# Patient Record
Sex: FEMALE | Race: WHITE | Employment: OTHER | URBAN - METROPOLITAN AREA
[De-identification: names, ages, dates, MRNs, and addresses within clinical notes are randomized per-mention and may not be internally consistent; named-entity substitution may affect disease eponyms.]

---

## 2018-03-19 RX ORDER — OXYCODONE AND ACETAMINOPHEN 7.5; 325 MG/1; MG/1
1 TABLET ORAL 3 TIMES DAILY
COMMUNITY
End: 2019-06-13 | Stop reason: ALTCHOICE

## 2018-03-20 ENCOUNTER — HOSPITAL ENCOUNTER (OUTPATIENT)
Age: 58
Setting detail: OUTPATIENT SURGERY
Discharge: HOME | End: 2018-03-20
Attending: INTERNAL MEDICINE | Admitting: INTERNAL MEDICINE
Payer: COMMERCIAL

## 2018-03-20 ENCOUNTER — ANESTHESIA EVENT (OUTPATIENT)
Dept: ENDOSCOPY | Age: 58
End: 2018-03-20
Payer: COMMERCIAL

## 2018-03-20 ENCOUNTER — ANESTHESIA (OUTPATIENT)
Dept: ENDOSCOPY | Age: 58
End: 2018-03-20
Payer: COMMERCIAL

## 2018-03-20 VITALS
HEART RATE: 68 BPM | RESPIRATION RATE: 18 BRPM | OXYGEN SATURATION: 94 % | WEIGHT: 259 LBS | TEMPERATURE: 97.8 F | DIASTOLIC BLOOD PRESSURE: 69 MMHG | HEIGHT: 67 IN | SYSTOLIC BLOOD PRESSURE: 121 MMHG | BODY MASS INDEX: 40.65 KG/M2

## 2018-03-20 VITALS — OXYGEN SATURATION: 98 % | DIASTOLIC BLOOD PRESSURE: 61 MMHG | SYSTOLIC BLOOD PRESSURE: 130 MMHG

## 2018-03-20 PROCEDURE — C1773 RET DEV, INSERTABLE: HCPCS | Performed by: INTERNAL MEDICINE

## 2018-03-20 PROCEDURE — 6360000002 HC RX W HCPCS: Performed by: NURSE ANESTHETIST, CERTIFIED REGISTERED

## 2018-03-20 PROCEDURE — 6360000002 HC RX W HCPCS: Performed by: ANESTHESIOLOGY

## 2018-03-20 PROCEDURE — 7100000010 HC PHASE II RECOVERY - FIRST 15 MIN: Performed by: INTERNAL MEDICINE

## 2018-03-20 PROCEDURE — 2580000003 HC RX 258: Performed by: HOSPITALIST

## 2018-03-20 PROCEDURE — 3700000001 HC ADD 15 MINUTES (ANESTHESIA): Performed by: INTERNAL MEDICINE

## 2018-03-20 PROCEDURE — 7100000011 HC PHASE II RECOVERY - ADDTL 15 MIN: Performed by: INTERNAL MEDICINE

## 2018-03-20 PROCEDURE — 3609009500 HC COLONOSCOPY DIAGNOSTIC OR SCREENING: Performed by: INTERNAL MEDICINE

## 2018-03-20 PROCEDURE — 3700000000 HC ANESTHESIA ATTENDED CARE: Performed by: INTERNAL MEDICINE

## 2018-03-20 PROCEDURE — 3609012400 HC EGD TRANSORAL BIOPSY SINGLE/MULTIPLE: Performed by: INTERNAL MEDICINE

## 2018-03-20 PROCEDURE — 88305 TISSUE EXAM BY PATHOLOGIST: CPT

## 2018-03-20 RX ORDER — MIDAZOLAM HYDROCHLORIDE 1 MG/ML
INJECTION INTRAMUSCULAR; INTRAVENOUS PRN
Status: DISCONTINUED | OUTPATIENT
Start: 2018-03-20 | End: 2018-03-20 | Stop reason: SDUPTHER

## 2018-03-20 RX ORDER — FENTANYL CITRATE 50 UG/ML
25 INJECTION, SOLUTION INTRAMUSCULAR; INTRAVENOUS PRN
Status: DISCONTINUED | OUTPATIENT
Start: 2018-03-20 | End: 2018-03-20 | Stop reason: HOSPADM

## 2018-03-20 RX ORDER — FENTANYL CITRATE 50 UG/ML
INJECTION, SOLUTION INTRAMUSCULAR; INTRAVENOUS PRN
Status: DISCONTINUED | OUTPATIENT
Start: 2018-03-20 | End: 2018-03-20 | Stop reason: SDUPTHER

## 2018-03-20 RX ORDER — SODIUM CHLORIDE 9 MG/ML
INJECTION, SOLUTION INTRAVENOUS CONTINUOUS
Status: DISCONTINUED | OUTPATIENT
Start: 2018-03-20 | End: 2018-03-20 | Stop reason: HOSPADM

## 2018-03-20 RX ORDER — SODIUM CHLORIDE 9 MG/ML
INJECTION, SOLUTION INTRAVENOUS CONTINUOUS PRN
Status: DISCONTINUED | OUTPATIENT
Start: 2018-03-20 | End: 2018-03-20

## 2018-03-20 RX ORDER — PROPOFOL 10 MG/ML
INJECTION, EMULSION INTRAVENOUS PRN
Status: DISCONTINUED | OUTPATIENT
Start: 2018-03-20 | End: 2018-03-20 | Stop reason: SDUPTHER

## 2018-03-20 RX ADMIN — PROPOFOL 50 MG: 10 INJECTION, EMULSION INTRAVENOUS at 15:46

## 2018-03-20 RX ADMIN — MIDAZOLAM HYDROCHLORIDE 1 MG: 1 INJECTION, SOLUTION INTRAMUSCULAR; INTRAVENOUS at 15:00

## 2018-03-20 RX ADMIN — PROPOFOL 100 MG: 10 INJECTION, EMULSION INTRAVENOUS at 15:43

## 2018-03-20 RX ADMIN — PROPOFOL 100 MG: 10 INJECTION, EMULSION INTRAVENOUS at 16:05

## 2018-03-20 RX ADMIN — FENTANYL CITRATE 50 MCG: 50 INJECTION, SOLUTION INTRAMUSCULAR; INTRAVENOUS at 15:43

## 2018-03-20 RX ADMIN — FENTANYL CITRATE 50 MCG: 50 INJECTION, SOLUTION INTRAMUSCULAR; INTRAVENOUS at 15:46

## 2018-03-20 RX ADMIN — MIDAZOLAM HYDROCHLORIDE 1 MG: 1 INJECTION, SOLUTION INTRAMUSCULAR; INTRAVENOUS at 15:46

## 2018-03-20 RX ADMIN — SODIUM CHLORIDE: 9 INJECTION, SOLUTION INTRAVENOUS at 14:45

## 2018-03-20 RX ADMIN — FENTANYL CITRATE 25 MCG: 50 INJECTION INTRAMUSCULAR; INTRAVENOUS at 16:45

## 2018-03-20 ASSESSMENT — PAIN - FUNCTIONAL ASSESSMENT: PAIN_FUNCTIONAL_ASSESSMENT: 0-10

## 2018-03-20 NOTE — PROCEDURES
Procedure:  Esophagogastroduodenoscopy with Biopsy and 61 F Amanda dilation of esophagus    Indication:  Dysphagia    Sedation  MAC    Endoscope was advanced easily through mouth to Diane- en- Y anastomosis      Oropharynx views are limited but grossly normal.    Esophagus:   Mucosa is normal.  GEJ at 40 cm. Bx from esophagus taken                          61 F Amanda dilation of esophagus without difficulty    Stomach:   Diane- en-Y bypass widely patent    Duodenum: Visible small bowel is normal       IMPRESSION AND PLAN:     1.  Diane- en-Y bypass widely patent       Await response to dilation and Biopsy results. Office visit 1 month      Follow up as outpatient in office, call [phone] to schedule for appointment.       CHRISTOPHE Monaco MD  3/20/2018  4:11 PM

## 2018-03-20 NOTE — PROCEDURES
Colonoscopy note    Procedure: Colonoscopy with Hot Snare Polypectomy    Indication: Colon cancer screening      Sedation: MAC      Endoscope was advanced through anus to cecum       Preparation is poor. Patient tolerated procedure well. No apparent post- procedure complications. Cecum is normal  Ascending colon is normal  Hepatic Flexure 8 mm polyp removed with hot snare  Proximal Transverse colon 8 mm polyp removed with hot snare  Splenic Flexure is normal  Descending colon 8 mm polyp removed with hot snare  Sigmoid colon is normal  Rectum direct views are normal  Retroflexion in rectum shows internal hemorrhoids    IMPRESSION AND PLAN:  Hepatic Flexure 8 mm polyp   Proximal Transverse colon 8 mm polyp  Descending colon 8 mm polyp   Repeat colonoscopy 6 months with two day prep         Follow up as outpatient in office , call [phone] to schedule for appointment if needed.

## 2018-03-20 NOTE — ANESTHESIA PRE PROCEDURE
20 mEq by mouth 2 times daily as needed    Yes Historical Provider, MD   loratadine (CLARITIN) 10 MG tablet Take 10 mg by mouth daily   Yes Historical Provider, MD   ferrous sulfate 325 (65 FE) MG tablet Take 325 mg by mouth daily (with breakfast)    Yes Historical Provider, MD   allopurinol (ZYLOPRIM) 100 MG tablet Take 100 mg by mouth daily   Yes Historical Provider, MD   OXYGEN 2.5-3 L by Nasal route as needed. Yes Historical Provider, MD   escitalopram (LEXAPRO) 20 MG tablet   Take 20 mg by mouth 2 times daily    Yes Historical Provider, MD   baclofen (LIORESAL) 20 MG tablet Take 1 tablet by mouth 4 times daily. 10/31/13  Yes Wandy Phan MD   gabapentin (NEURONTIN) 600 MG tablet Take 1 tablet by mouth 4 times daily. 10/31/13  Yes Wandy Phan MD   furosemide (LASIX) 40 MG tablet   Take 40 mg by mouth daily    Yes Historical Provider, MD   topiramate (TOPAMAX) 200 MG tablet Take 1 tablet by mouth 2 times daily. 5/9/12  Yes Ab Benavides,    ziprasidone (GEODON) 20 MG capsule Take 20 mg by mouth nightly    Yes Historical Provider, MD   clonazepam (KLONOPIN) 0.5 MG tablet Take 1 mg by mouth nightly . Yes Historical Provider, MD       Current medications:    No current facility-administered medications for this encounter. Allergies: Allergies   Allergen Reactions    Penicillins Anaphylaxis    Ropinirole Hcl Anaphylaxis    Vistaril [Hydroxyzine Hcl] Anaphylaxis    Aripiprazole Other (See Comments)     muscle spasms and confusion    Hydroxyzine Pamoate Itching and Rash    Tape Usha Konig Tape] Rash     Paper tape allergy       Problem List:    Patient Active Problem List   Diagnosis Code    Rhabdomyolysis M62.82    Acute renal failure ([address] N17.9    Fall W19. XXXA    Debility R53.81    Obesity E66.9    Bipolar 1 disorder ([address] F31.9    Seizure disorder ([address] G40.909    HTN (hypertension) I10    Hypernatremia E87.0    Fall W19. 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    Lymphedema of lower extremity I89.0    Swelling of lower limb M79.89    Pain in lower limb M79.606    Degenerative Osteoarthritis of both knees M17.0    Osteoarthritis of left hip M16.12    Status post total knee replacement, right Z96.651    Exogenous obesity E66.09    Mitochondrial disease ([facility]) E88.40    Degenerative Osteoarthritis of lumbar spine M47.816    Degenerative Osteoarthritis of cervical spine M47.812    Degenerative Osteoarthritis thoracic spine M47.814    Thoracic facet syndrome M12.88    Cervical facet syndrome M12.88    Facet syndrome, lumbar M12.88    Kidney dysfunction N28.9    Bursitis M71.9    Neural foraminal stenosis of lumbar spine M99.83    Lumbar radiculopathy M54.16    DDD (degenerative disc disease), cervical M50.30    Neural foraminal stenosis of cervical spine M99.81    Protruded cervical disc M50.20    Cervical radiculopathy M54.12    Lumbar post-laminectomy syndrome M96.1    Neuropathic pain syndrome (non-herpetic) M79.2    Chronic pain G89.29    Left lower quadrant pain R10.32       Past Medical History:        Diagnosis Date    Arthritis     Asthma     Bipolar affective ([facility])     Chronic back pain     CPAP (continuous positive airway pressure) dependence     Depression     Hyperlipidemia     Lymphedema of lower extremity 09/06/2012    Mitochondrial cytopathy ([address]     Obesity     PONV (postoperative nausea and vomiting)     Seizures ([address]     last approx fall 2017    Spinal headache        Past Surgical History:        Procedure Laterality Date    BACK SURGERY      CARDIAC CATHETERIZATION Right 6-6-2013    Dr. Ranjan Amaya, COLON, DIAGNOSTIC      GALLBLADDER SURGERY      GASTRIC BYPASS SURGERY      HYSTERECTOMY      JOINT REPLACEMENT Bilateral     KNEE SURGERY      MYOMECTOMY      NERVE BLOCK Left 10 02 2013    lumbar paravertebral facet #2    NERVE BLOCK Left 10 9 13    hip inj #1    NERVE BLOCK Left 10/16/13    hip injection    NERVE BLOCK Left 10/29/2014    left lumbar transforaminal nerve lbock  #1    NERVE BLOCK Left 11/12/2014    lumbar left transforaminal nerve block  #2    NERVE BLOCK Left 12 8 14    lumbar transforaminal #3    NERVE BLOCK Right 3/30/15    cervical transforaminal #1    NERVE BLOCK Right 4/6/2015    right cervical transforaminal nerve block  #2    NERVE BLOCK Right 4/13/15    cervical transforaminal #3    NERVE BLOCK Left 7/6/15    knee injection #1    NERVE BLOCK  07/20/15    left genicular nerve block/knee #2    NERVE BLOCK Left 10 1 15    lumb transforam #1    NERVE BLOCK  10/26/15    left lumbar transforaminal nerve block #3    OTHER SURGICAL HISTORY Left 11 25 13    lumbar radiofreq    OTHER SURGICAL HISTORY  3/28/2016    stage 1, 3 day percutanous trial [employer] lumbar spinal cord stimulator    TONSILLECTOMY         Social History:    Social History   Substance Use Topics    Smoking status: Current Every Day Smoker     Packs/day: 0.50     Years: 30.00     Types: Cigarettes, Cigars    Smokeless tobacco: Never Used    Alcohol use No                                Ready to quit: Not Answered  Counseling given: Not Answered      Vital Signs (Current):   Vitals:    03/19/18 1005 03/20/18 1419   BP:  121/65   Pulse:  74   Resp:  20   Temp:  97.8 °F (36.6 °C)   TempSrc:  Temporal   SpO2:  92%   Weight: 259 lb (117.5 kg) 259 lb (117.5 kg)   Height: 5' 7\" (1.702 m) 5' 7\" (1.702 m)                                              BP Readings from Last 3 Encounters:   03/20/18 121/65   02/19/18 131/70   09/26/17 104/68       NPO Status:                                                                                 BMI:   Wt Readings from Last 3 Encounters:   03/20/18 259 lb (117.5

## 2018-03-26 ENCOUNTER — OFFICE VISIT (OUTPATIENT)
Dept: ENT CLINIC | Age: 58
End: 2018-03-26
Payer: COMMERCIAL

## 2018-03-26 VITALS
BODY MASS INDEX: 40.81 KG/M2 | HEIGHT: 67 IN | WEIGHT: 260 LBS | SYSTOLIC BLOOD PRESSURE: 103 MMHG | DIASTOLIC BLOOD PRESSURE: 52 MMHG | OXYGEN SATURATION: 91 % | HEART RATE: 71 BPM

## 2018-03-26 DIAGNOSIS — J32.4 CHRONIC PANSINUSITIS: Primary | ICD-10-CM

## 2018-03-26 DIAGNOSIS — J34.89 NASAL SEPTAL SPUR: ICD-10-CM

## 2018-03-26 DIAGNOSIS — J34.3 HYPERTROPHY OF BOTH INFERIOR NASAL TURBINATES: ICD-10-CM

## 2018-03-26 PROCEDURE — G8427 DOCREV CUR MEDS BY ELIG CLIN: HCPCS | Performed by: OTOLARYNGOLOGY

## 2018-03-26 PROCEDURE — 4004F PT TOBACCO SCREEN RCVD TLK: CPT | Performed by: OTOLARYNGOLOGY

## 2018-03-26 PROCEDURE — G8484 FLU IMMUNIZE NO ADMIN: HCPCS | Performed by: OTOLARYNGOLOGY

## 2018-03-26 PROCEDURE — G8417 CALC BMI ABV UP PARAM F/U: HCPCS | Performed by: OTOLARYNGOLOGY

## 2018-03-26 PROCEDURE — 99213 OFFICE O/P EST LOW 20 MIN: CPT | Performed by: OTOLARYNGOLOGY

## 2018-03-26 PROCEDURE — 3017F COLORECTAL CA SCREEN DOC REV: CPT | Performed by: OTOLARYNGOLOGY

## 2018-03-26 PROCEDURE — 3014F SCREEN MAMMO DOC REV: CPT | Performed by: OTOLARYNGOLOGY

## 2018-03-26 RX ORDER — FEXOFENADINE HCL 180 MG/1
180 TABLET ORAL DAILY
Qty: 30 TABLET | Refills: 2 | Status: SHIPPED | OUTPATIENT
Start: 2018-03-26 | End: 2018-04-25

## 2018-03-26 RX ORDER — RANITIDINE 300 MG/1
1 TABLET ORAL 2 TIMES DAILY
Refills: 3 | COMMUNITY
Start: 2018-03-16 | End: 2019-06-13 | Stop reason: ALTCHOICE

## 2018-03-26 ASSESSMENT — ENCOUNTER SYMPTOMS
VOMITING: 0
SHORTNESS OF BREATH: 0
COUGH: 0
HEARTBURN: 0
BLURRED VISION: 0
SINUS PAIN: 1
DOUBLE VISION: 0

## 2018-05-29 ENCOUNTER — HOSPITAL ENCOUNTER (EMERGENCY)
Age: 58
Discharge: HOME | End: 2018-05-29
Attending: EMERGENCY MEDICINE
Payer: COMMERCIAL

## 2018-05-29 VITALS
TEMPERATURE: 97.9 F | WEIGHT: 257 LBS | BODY MASS INDEX: 40.34 KG/M2 | HEIGHT: 67 IN | DIASTOLIC BLOOD PRESSURE: 68 MMHG | OXYGEN SATURATION: 98 % | HEART RATE: 78 BPM | RESPIRATION RATE: 16 BRPM | SYSTOLIC BLOOD PRESSURE: 121 MMHG

## 2018-05-29 DIAGNOSIS — S39.012A LUMBOSACRAL STRAIN, INITIAL ENCOUNTER: Primary | ICD-10-CM

## 2018-05-29 LAB
BILIRUBIN URINE: NEGATIVE
BLOOD, URINE: NEGATIVE
CLARITY: CLEAR
COLOR: YELLOW
GLUCOSE URINE: NEGATIVE MG/DL
KETONES, URINE: NEGATIVE MG/DL
LEUKOCYTE ESTERASE, URINE: NEGATIVE
NITRITE, URINE: NEGATIVE
PH UA: 5.5 (ref 5–9)
PROTEIN UA: NEGATIVE MG/DL
SPECIFIC GRAVITY UA: 1.01 (ref 1–1.03)
UROBILINOGEN, URINE: 0.2 E.U./DL

## 2018-05-29 PROCEDURE — 6360000002 HC RX W HCPCS: Performed by: EMERGENCY MEDICINE

## 2018-05-29 PROCEDURE — 99284 EMERGENCY DEPT VISIT MOD MDM: CPT

## 2018-05-29 PROCEDURE — 81003 URINALYSIS AUTO W/O SCOPE: CPT

## 2018-05-29 PROCEDURE — 96374 THER/PROPH/DIAG INJ IV PUSH: CPT

## 2018-05-29 RX ORDER — LORAZEPAM 2 MG/ML
2 INJECTION INTRAMUSCULAR ONCE
Status: COMPLETED | OUTPATIENT
Start: 2018-05-29 | End: 2018-05-29

## 2018-05-29 RX ORDER — ORPHENADRINE CITRATE 100 MG/1
100 TABLET, EXTENDED RELEASE ORAL 2 TIMES DAILY
Qty: 20 TABLET | Refills: 0 | Status: SHIPPED | OUTPATIENT
Start: 2018-05-29 | End: 2018-06-08

## 2018-05-29 RX ORDER — LORAZEPAM 2 MG/ML
2 INJECTION INTRAMUSCULAR ONCE
Status: DISCONTINUED | OUTPATIENT
Start: 2018-05-29 | End: 2018-05-29

## 2018-05-29 RX ADMIN — LORAZEPAM 2 MG: 2 INJECTION INTRAMUSCULAR; INTRAVENOUS at 09:48

## 2018-05-29 ASSESSMENT — PAIN DESCRIPTION - LOCATION: LOCATION: FLANK

## 2018-05-29 ASSESSMENT — PAIN DESCRIPTION - DESCRIPTORS: DESCRIPTORS: PATIENT UNABLE TO DESCRIBE

## 2018-05-29 ASSESSMENT — PAIN DESCRIPTION - ORIENTATION: ORIENTATION: LEFT

## 2018-05-29 ASSESSMENT — PAIN DESCRIPTION - PROGRESSION
CLINICAL_PROGRESSION: NOT CHANGED
CLINICAL_PROGRESSION: RAPIDLY IMPROVING

## 2018-05-29 ASSESSMENT — PAIN SCALES - GENERAL
PAINLEVEL_OUTOF10: 6
PAINLEVEL_OUTOF10: 1

## 2018-05-29 ASSESSMENT — PAIN DESCRIPTION - FREQUENCY: FREQUENCY: CONTINUOUS

## 2018-05-29 ASSESSMENT — PAIN DESCRIPTION - PAIN TYPE
TYPE: ACUTE PAIN
TYPE: ACUTE PAIN

## 2018-06-08 ENCOUNTER — TELEPHONE (OUTPATIENT)
Dept: ENT CLINIC | Age: 58
End: 2018-06-08

## 2018-06-09 RX ORDER — AZELASTINE 1 MG/ML
2 SPRAY, METERED NASAL 2 TIMES DAILY
Qty: 1 BOTTLE | Refills: 3 | Status: SHIPPED | OUTPATIENT
Start: 2018-06-09 | End: 2018-09-17 | Stop reason: SDUPTHER

## 2018-06-11 ENCOUNTER — HOSPITAL ENCOUNTER (OUTPATIENT)
Dept: GENERAL RADIOLOGY | Age: 58
Discharge: HOME | End: 2018-06-13
Payer: COMMERCIAL

## 2018-06-11 DIAGNOSIS — M81.0 POSTMENOPAUSAL OSTEOPOROSIS: ICD-10-CM

## 2018-06-11 PROCEDURE — 77080 DXA BONE DENSITY AXIAL: CPT

## 2018-06-13 ENCOUNTER — TELEPHONE (OUTPATIENT)
Dept: ENT CLINIC | Age: 58
End: 2018-06-13

## 2018-06-13 RX ORDER — FEXOFENADINE HCL 180 MG/1
180 TABLET ORAL DAILY
Qty: 30 TABLET | Refills: 1 | Status: SHIPPED | OUTPATIENT
Start: 2018-06-13 | End: 2018-08-13 | Stop reason: SDUPTHER

## 2018-06-23 ENCOUNTER — APPOINTMENT (OUTPATIENT)
Dept: GENERAL RADIOLOGY | Age: 58
End: 2018-06-23
Payer: COMMERCIAL

## 2018-06-23 ENCOUNTER — HOSPITAL ENCOUNTER (EMERGENCY)
Age: 58
Discharge: HOME | End: 2018-06-23
Payer: COMMERCIAL

## 2018-06-23 VITALS
DIASTOLIC BLOOD PRESSURE: 84 MMHG | RESPIRATION RATE: 16 BRPM | SYSTOLIC BLOOD PRESSURE: 123 MMHG | HEIGHT: 67 IN | OXYGEN SATURATION: 95 % | TEMPERATURE: 98.2 F | BODY MASS INDEX: 40.34 KG/M2 | HEART RATE: 78 BPM | WEIGHT: 257 LBS

## 2018-06-23 DIAGNOSIS — S93.401A SPRAIN OF RIGHT ANKLE, UNSPECIFIED LIGAMENT, INITIAL ENCOUNTER: Primary | ICD-10-CM

## 2018-06-23 PROCEDURE — 6360000002 HC RX W HCPCS: Performed by: NURSE PRACTITIONER

## 2018-06-23 PROCEDURE — 96372 THER/PROPH/DIAG INJ SC/IM: CPT

## 2018-06-23 PROCEDURE — 73610 X-RAY EXAM OF ANKLE: CPT

## 2018-06-23 PROCEDURE — 99283 EMERGENCY DEPT VISIT LOW MDM: CPT

## 2018-06-23 RX ORDER — MORPHINE SULFATE 2 MG/ML
2 INJECTION, SOLUTION INTRAMUSCULAR; INTRAVENOUS ONCE
Status: COMPLETED | OUTPATIENT
Start: 2018-06-23 | End: 2018-06-23

## 2018-06-23 RX ADMIN — MORPHINE SULFATE 2 MG: 2 INJECTION, SOLUTION INTRAMUSCULAR; INTRAVENOUS at 20:47

## 2018-06-23 ASSESSMENT — PAIN DESCRIPTION - DESCRIPTORS: DESCRIPTORS: ACHING

## 2018-06-23 ASSESSMENT — PAIN DESCRIPTION - LOCATION: LOCATION: ANKLE

## 2018-06-23 ASSESSMENT — PAIN SCALES - GENERAL
PAINLEVEL_OUTOF10: 3
PAINLEVEL_OUTOF10: 6
PAINLEVEL_OUTOF10: 6

## 2018-06-23 ASSESSMENT — PAIN DESCRIPTION - ORIENTATION: ORIENTATION: RIGHT

## 2018-06-23 ASSESSMENT — PAIN DESCRIPTION - PAIN TYPE: TYPE: ACUTE PAIN

## 2018-07-09 ENCOUNTER — HOSPITAL ENCOUNTER (OUTPATIENT)
Dept: GENERAL RADIOLOGY | Age: 58
Discharge: HOME | End: 2018-07-11
Payer: COMMERCIAL

## 2018-07-09 DIAGNOSIS — Z12.31 VISIT FOR SCREENING MAMMOGRAM: ICD-10-CM

## 2018-07-09 PROCEDURE — 77063 BREAST TOMOSYNTHESIS BI: CPT

## 2018-08-13 NOTE — TELEPHONE ENCOUNTER
Patient requesting refill on Allegra. Last seen in March and last fill of med in June. Please advise.     Electronically signed by Christin Osei MA on 8/13/18 at 2:45 PM

## 2018-08-14 RX ORDER — FEXOFENADINE HCL 180 MG/1
180 TABLET ORAL DAILY
Qty: 30 TABLET | Refills: 1 | Status: SHIPPED | OUTPATIENT
Start: 2018-08-14 | End: 2019-04-01 | Stop reason: SDUPTHER

## 2018-09-04 ENCOUNTER — HOSPITAL ENCOUNTER (OUTPATIENT)
Age: 58
Discharge: HOME | End: 2018-09-04
Payer: COMMERCIAL

## 2018-09-04 ENCOUNTER — HOSPITAL ENCOUNTER (OUTPATIENT)
Dept: CT IMAGING | Age: 58
Discharge: HOME | End: 2018-09-06
Payer: COMMERCIAL

## 2018-09-04 DIAGNOSIS — R10.84 ABDOMINAL PAIN, GENERALIZED: ICD-10-CM

## 2018-09-04 DIAGNOSIS — Z12.11 ENCOUNTER FOR COLONOSCOPY DUE TO HISTORY OF ADENOMATOUS COLONIC POLYPS: ICD-10-CM

## 2018-09-04 DIAGNOSIS — Z86.010 ENCOUNTER FOR COLONOSCOPY DUE TO HISTORY OF ADENOMATOUS COLONIC POLYPS: ICD-10-CM

## 2018-09-04 LAB
BUN BLDV-MCNC: 9 MG/DL (ref 6–20)
CREAT SERPL-MCNC: 0.9 MG/DL (ref 0.5–1)
GFR AFRICAN AMERICAN: >60
GFR NON-AFRICAN AMERICAN: >60 ML/MIN/1.73

## 2018-09-04 PROCEDURE — 84520 ASSAY OF UREA NITROGEN: CPT

## 2018-09-04 PROCEDURE — 74177 CT ABD & PELVIS W/CONTRAST: CPT

## 2018-09-04 PROCEDURE — 82565 ASSAY OF CREATININE: CPT

## 2018-09-04 PROCEDURE — 36415 COLL VENOUS BLD VENIPUNCTURE: CPT

## 2018-09-04 PROCEDURE — 6360000004 HC RX CONTRAST MEDICATION: Performed by: RADIOLOGY

## 2018-09-04 RX ADMIN — IOHEXOL 50 ML: 240 INJECTION, SOLUTION INTRATHECAL; INTRAVASCULAR; INTRAVENOUS; ORAL at 14:30

## 2018-09-04 RX ADMIN — IOPAMIDOL 110 ML: 755 INJECTION, SOLUTION INTRAVENOUS at 14:30

## 2018-09-11 NOTE — PROGRESS NOTES
[facility] PRE-ADMISSION TESTING INSTRUCTIONS    The Preadmission Testing patient is instructed accordingly using the following criteria (check applicable):    ARRIVAL INSTRUCTIONS:  [x] Parking the day of Surgery is located in the Main Entrance lot. Upon entering the door, make an immediate right to the surgery reception desk    [x] [address] [employer] is available Monday through Friday 6 am to 6 pm    [x] Bring photo ID and insurance card    [] Bring in a copy of Living will or Durable Power of  papers. [x] Please be sure to arrange for responsible adult to provide transportation to and from the hospital    [x] Please arrange for responsible adult to be with you for the 24 hour period post procedure due to having anesthesia      GENERAL INSTRUCTIONS:    [x] Nothing by mouth after midnight, including gum, candy, mints or water    [x] You may brush your teeth, but do not swallow any water    [x] Take medications as instructed with 1-2 oz of water    [x] Stop herbal supplements and vitamins  per Dr. Milan Felipe instructions    [] Follow preop dosing of blood thinners per physician instructions    [] Take 1/2 dose of evening insulin, but no insulin after midnight    [] No oral diabetic medications after midnight    [] If diabetic and have low blood sugar or feel symptomatic, take 1-2oz apple juice only    [] Bring inhalers day of surgery    [] Bring C-PAP/ Bi-Pap day of surgery    [] Bring urine specimen day of surgery    [x]  no lotion, powders or creams    [x] Follow bowel prep as instructed per surgeon    [] No tobacco products within 24 hours of surgery     [x] No alcohol or illegal drug use within 24 hours of surgery.     [x] Jewelry, body piercing's, eyeglasses, contact lenses and dentures are not permitted into surgery (bring cases)      [x] Please do not wear any nail polish, make up or hair products on the day of surgery    [x] If not already done, you can expect a call from registration    [x] You can expect a call the business day prior to procedure to notify you if your arrival time changes    [x] If you receive a survey after surgery we would greatly appreciate your comments    [] Parent/guardian of a minor must accompany their child and remain on the premises  the entire time they are under our care     [] Pediatric patients may bring favorite toy, blanket or comfort item with them    [] A caregiver or family member must remain with the patient during their stay if they are mentally handicapped, have dementia, disoriented or unable to use a call light or would be a safety concern if left unattended    [x] Please notify surgeon if you develop any illness between now and time of surgery (cold, cough, sore throat, fever, nausea, vomiting) or any signs of infections  including skin, wounds, and dental.    [] Other instructions    EDUCATIONAL MATERIALS PROVIDED:    [] PAT Preoperative Education Packet/Booklet     [] Medication List    [] Fluoroscopy Information Pamphlet    [] Transfusion bracelet applied with instructions    [] Joint replacement video reviewed    [] Shower with soap, lather and rinse well, and use CHG wipes provided the evening before surgery as instructed

## 2018-09-17 ENCOUNTER — OFFICE VISIT (OUTPATIENT)
Dept: ENT CLINIC | Age: 58
End: 2018-09-17
Payer: COMMERCIAL

## 2018-09-17 VITALS
HEIGHT: 67 IN | HEART RATE: 75 BPM | OXYGEN SATURATION: 92 % | BODY MASS INDEX: 41.59 KG/M2 | DIASTOLIC BLOOD PRESSURE: 86 MMHG | WEIGHT: 265 LBS | SYSTOLIC BLOOD PRESSURE: 127 MMHG

## 2018-09-17 DIAGNOSIS — H61.23 CERUMEN DEBRIS ON TYMPANIC MEMBRANE OF BOTH EARS: ICD-10-CM

## 2018-09-17 DIAGNOSIS — J31.0 RHINITIS, CHRONIC: Primary | ICD-10-CM

## 2018-09-17 PROCEDURE — G8427 DOCREV CUR MEDS BY ELIG CLIN: HCPCS | Performed by: OTOLARYNGOLOGY

## 2018-09-17 PROCEDURE — 99214 OFFICE O/P EST MOD 30 MIN: CPT | Performed by: OTOLARYNGOLOGY

## 2018-09-17 PROCEDURE — 4004F PT TOBACCO SCREEN RCVD TLK: CPT | Performed by: OTOLARYNGOLOGY

## 2018-09-17 PROCEDURE — 69210 REMOVE IMPACTED EAR WAX UNI: CPT | Performed by: OTOLARYNGOLOGY

## 2018-09-17 PROCEDURE — G8417 CALC BMI ABV UP PARAM F/U: HCPCS | Performed by: OTOLARYNGOLOGY

## 2018-09-17 PROCEDURE — 3017F COLORECTAL CA SCREEN DOC REV: CPT | Performed by: OTOLARYNGOLOGY

## 2018-09-17 RX ORDER — AZELASTINE 1 MG/ML
2 SPRAY, METERED NASAL 2 TIMES DAILY
Qty: 1 BOTTLE | Refills: 3 | Status: ON HOLD | OUTPATIENT
Start: 2018-09-17 | End: 2019-06-14 | Stop reason: HOSPADM

## 2018-09-18 ENCOUNTER — ANESTHESIA (OUTPATIENT)
Dept: ENDOSCOPY | Age: 58
End: 2018-09-18
Payer: COMMERCIAL

## 2018-09-18 ENCOUNTER — ANESTHESIA EVENT (OUTPATIENT)
Dept: ENDOSCOPY | Age: 58
End: 2018-09-18
Payer: COMMERCIAL

## 2018-09-18 ENCOUNTER — HOSPITAL ENCOUNTER (OUTPATIENT)
Age: 58
Setting detail: OUTPATIENT SURGERY
Discharge: HOME | End: 2018-09-18
Attending: INTERNAL MEDICINE | Admitting: INTERNAL MEDICINE
Payer: COMMERCIAL

## 2018-09-18 VITALS
TEMPERATURE: 96.8 F | DIASTOLIC BLOOD PRESSURE: 64 MMHG | HEART RATE: 68 BPM | BODY MASS INDEX: 40.81 KG/M2 | SYSTOLIC BLOOD PRESSURE: 132 MMHG | OXYGEN SATURATION: 96 % | RESPIRATION RATE: 18 BRPM | HEIGHT: 67 IN | WEIGHT: 260 LBS

## 2018-09-18 VITALS — DIASTOLIC BLOOD PRESSURE: 57 MMHG | OXYGEN SATURATION: 97 % | SYSTOLIC BLOOD PRESSURE: 127 MMHG

## 2018-09-18 LAB — METER GLUCOSE: 91 MG/DL (ref 70–110)

## 2018-09-18 PROCEDURE — 3609015300 HC ESOPHAGEAL DILATION MALONEY: Performed by: INTERNAL MEDICINE

## 2018-09-18 PROCEDURE — 3609010400 HC COLONOSCOPY POLYPECTOMY HOT BIOPSY: Performed by: INTERNAL MEDICINE

## 2018-09-18 PROCEDURE — 3700000000 HC ANESTHESIA ATTENDED CARE: Performed by: INTERNAL MEDICINE

## 2018-09-18 PROCEDURE — 88305 TISSUE EXAM BY PATHOLOGIST: CPT

## 2018-09-18 PROCEDURE — 2580000003 HC RX 258: Performed by: NURSE ANESTHETIST, CERTIFIED REGISTERED

## 2018-09-18 PROCEDURE — 3700000001 HC ADD 15 MINUTES (ANESTHESIA): Performed by: INTERNAL MEDICINE

## 2018-09-18 PROCEDURE — 7100000010 HC PHASE II RECOVERY - FIRST 15 MIN: Performed by: INTERNAL MEDICINE

## 2018-09-18 PROCEDURE — 6360000002 HC RX W HCPCS: Performed by: NURSE ANESTHETIST, CERTIFIED REGISTERED

## 2018-09-18 PROCEDURE — 7100000011 HC PHASE II RECOVERY - ADDTL 15 MIN: Performed by: INTERNAL MEDICINE

## 2018-09-18 PROCEDURE — 2500000003 HC RX 250 WO HCPCS: Performed by: NURSE ANESTHETIST, CERTIFIED REGISTERED

## 2018-09-18 PROCEDURE — 2709999900 HC NON-CHARGEABLE SUPPLY: Performed by: INTERNAL MEDICINE

## 2018-09-18 PROCEDURE — C1773 RET DEV, INSERTABLE: HCPCS | Performed by: INTERNAL MEDICINE

## 2018-09-18 PROCEDURE — 82962 GLUCOSE BLOOD TEST: CPT

## 2018-09-18 RX ORDER — PROPOFOL 10 MG/ML
INJECTION, EMULSION INTRAVENOUS PRN
Status: DISCONTINUED | OUTPATIENT
Start: 2018-09-18 | End: 2018-09-18 | Stop reason: SDUPTHER

## 2018-09-18 RX ORDER — MIDAZOLAM HYDROCHLORIDE 1 MG/ML
INJECTION INTRAMUSCULAR; INTRAVENOUS PRN
Status: DISCONTINUED | OUTPATIENT
Start: 2018-09-18 | End: 2018-09-18 | Stop reason: SDUPTHER

## 2018-09-18 RX ORDER — SODIUM CHLORIDE 9 MG/ML
INJECTION, SOLUTION INTRAVENOUS CONTINUOUS PRN
Status: DISCONTINUED | OUTPATIENT
Start: 2018-09-18 | End: 2018-09-18 | Stop reason: SDUPTHER

## 2018-09-18 RX ORDER — FENTANYL CITRATE 50 UG/ML
INJECTION, SOLUTION INTRAMUSCULAR; INTRAVENOUS PRN
Status: DISCONTINUED | OUTPATIENT
Start: 2018-09-18 | End: 2018-09-18 | Stop reason: SDUPTHER

## 2018-09-18 RX ORDER — LIDOCAINE HYDROCHLORIDE 10 MG/ML
INJECTION, SOLUTION EPIDURAL; INFILTRATION; INTRACAUDAL; PERINEURAL PRN
Status: DISCONTINUED | OUTPATIENT
Start: 2018-09-18 | End: 2018-09-18 | Stop reason: SDUPTHER

## 2018-09-18 RX ADMIN — MIDAZOLAM HYDROCHLORIDE 2 MG: 1 INJECTION, SOLUTION INTRAMUSCULAR; INTRAVENOUS at 14:23

## 2018-09-18 RX ADMIN — FENTANYL CITRATE 50 MCG: 50 INJECTION, SOLUTION INTRAMUSCULAR; INTRAVENOUS at 15:07

## 2018-09-18 RX ADMIN — SODIUM CHLORIDE: 9 INJECTION, SOLUTION INTRAVENOUS at 14:23

## 2018-09-18 RX ADMIN — PROPOFOL 100 MG: 10 INJECTION, EMULSION INTRAVENOUS at 15:00

## 2018-09-18 RX ADMIN — PROPOFOL 100 MG: 10 INJECTION, EMULSION INTRAVENOUS at 14:51

## 2018-09-18 RX ADMIN — PROPOFOL 100 MG: 10 INJECTION, EMULSION INTRAVENOUS at 14:57

## 2018-09-18 RX ADMIN — LIDOCAINE HYDROCHLORIDE 20 MG: 10 INJECTION, SOLUTION EPIDURAL; INFILTRATION; INTRACAUDAL; PERINEURAL at 14:24

## 2018-09-18 RX ADMIN — PROPOFOL 100 MG: 10 INJECTION, EMULSION INTRAVENOUS at 15:19

## 2018-09-18 RX ADMIN — FENTANYL CITRATE 50 MCG: 50 INJECTION, SOLUTION INTRAMUSCULAR; INTRAVENOUS at 14:27

## 2018-09-18 RX ADMIN — SODIUM CHLORIDE: 9 INJECTION, SOLUTION INTRAVENOUS at 14:15

## 2018-09-18 ASSESSMENT — PAIN - FUNCTIONAL ASSESSMENT: PAIN_FUNCTIONAL_ASSESSMENT: 0-10

## 2018-09-18 NOTE — PROCEDURES
Colonoscopy note    Procedure: Colonoscopy with Hot snare polypectomy    Indication: H/O polyps and constipation      Sedation: MAC      Endoscope was advanced through anus to cecum       Preparation is poor. Patient tolerated procedure well. No apparent post- procedure complications. Cecum is normal  Ascending colon is normal  Hepatic Flexure is normal  Transverse colon 8 mm polyp x 2 removed with hot snare  Splenic Flexure is normal  Descending colon is normal  Sigmoid colon has anastomosis  Rectum direct views are normal  Retroflexion in rectum shows small internal hemorrhoids    IMPRESSION AND PLAN:  Poor prep of the colon despite 2 day bowel cleanse  Melanosis  Transverse colon 8 mm polyp x 2   Small internal hemorrhoids  Sigmoid colon has anastomosis    Repeat colonoscopy in one year with 2- day bowel prep. Office follow-up     Follow up as outpatient in office , call [phone] to schedule for appointment if needed.

## 2018-09-18 NOTE — PROGRESS NOTES
Patient awake and alert, no complaints of pain, nausea or vomiting. Discharge instructions reviewed with patient and responsible person. Questions answered. Patient will be discharged home with responsible adult.

## 2018-09-18 NOTE — ANESTHESIA PRE PROCEDURE
Shortness Of Breath and Swelling     And wasp    Penicillins Anaphylaxis    Ropinirole Hcl Anaphylaxis    Vistaril [Hydroxyzine Hcl] Anaphylaxis    Aripiprazole Other (See Comments)     muscle spasms and confusion    Hydroxyzine Pamoate Itching and Rash    Tape Tawnya Crate Tape] Rash     Paper tape allergy       Problem List:    Patient Active Problem List   Diagnosis Code    Rhabdomyolysis M62.82    Acute renal failure ([facility] N17.9    Fall W19. XXXA    Debility R53.81    Obesity E66.9    Bipolar 1 disorder ([facility] F31.9    Seizure disorder ([facility] G40.909    HTN (hypertension) I10    Hypernatremia E87.0    Fall W19. 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    Lymphedema of lower extremity I89.0    Swelling of lower limb M79.89    Pain in lower limb M79.606    Degenerative Osteoarthritis of both knees M17.0    Osteoarthritis of left hip M16.12    Status post total knee replacement, right Z96.651    Exogenous obesity E66.09    Mitochondrial disease (HCC) E88.40    Degenerative Osteoarthritis of lumbar spine M47.816    Degenerative Osteoarthritis of cervical spine M47.812    Degenerative Osteoarthritis thoracic spine M47.814    Thoracic facet syndrome (HCC) M46.94    Cervical facet syndrome (HCC) M46.92    Facet syndrome, lumbar (HCC) M46.96    Kidney dysfunction N28.9    Bursitis M71.9    Neural foraminal stenosis of lumbar spine M99.83    Lumbar radiculopathy M54.16    DDD (degenerative disc disease), cervical M50.30    Neural foraminal stenosis of cervical spine M99.81    Protruded cervical disc M50.20    Cervical radiculopathy M54.12    Lumbar post-laminectomy syndrome M96.1    Neuropathic pain syndrome (non-herpetic) M79.2    Chronic pain G89.29    Left lower quadrant pain R10.32       Past Medical 102.2 09/16/2017    RDW 13.2 09/16/2017     09/16/2017       CMP:   Lab Results   Component Value Date     09/16/2017    K 4.7 09/16/2017     09/16/2017    CO2 26 09/16/2017    BUN 9 09/04/2018    CREATININE 0.9 09/04/2018    GFRAA >60 09/04/2018    LABGLOM >60 09/04/2018    GLUCOSE 91 09/16/2017    GLUCOSE 93 05/24/2012    PROT 6.2 02/03/2017    CALCIUM 8.8 09/16/2017    BILITOT 0.5 02/03/2017    ALKPHOS 92 02/03/2017    AST 19 02/03/2017    ALT 17 02/03/2017       POC Tests: No results for input(s): POCGLU, POCNA, POCK, POCCL, POCBUN, POCHEMO, POCHCT in the last 72 hours. Coags:   Lab Results   Component Value Date    PROTIME 10.5 09/12/2017    PROTIME 10.8 05/07/2012    INR 1.0 09/12/2017    APTT 30.6 08/04/2016       HCG (If Applicable): No results found for: PREGTESTUR, PREGSERUM, HCG, HCGQUANT     ABGs:   Lab Results   Component Value Date    M4VBHMEW 94.0 05/06/2012        Type & Screen (If Applicable):  No results found for: LABABO, [address]    Anesthesia Evaluation  Patient summary reviewed and Nursing notes reviewed   history of anesthetic complications: PONV.   Airway: Mallampati: III  TM distance: >3 FB   Neck ROM: full  Mouth opening: > = 3 FB Dental:    (+) edentulous      Pulmonary: breath sounds clear to auscultation  (+) asthma: allergic asthma, seasonal asthma,                            Cardiovascular:  Exercise tolerance: good (>4 METS),   (+) hypertension:,         Rhythm: regular  Rate: normal           Beta Blocker:  Not on Beta Blocker         Neuro/Psych:   (+) seizures:, neuromuscular disease:, headaches:, psychiatric history:             ROS comment: mitochondrial myopathy    Last seizure (grand mal) was a year ago    Bipolar    neuropathy GI/Hepatic/Renal:   (+) GERD:, liver disease:,           Endo/Other: Negative Endo/Other ROS                    Abdominal:           Vascular:                                        Anesthesia Plan      MAC     ASA 3       Induction: intravenous. Anesthetic plan and risks discussed with patient.       Plan discussed with CRNA and surgical team.                  Omid Griffiths MD   9/18/2018

## 2018-10-04 ASSESSMENT — ENCOUNTER SYMPTOMS
COUGH: 0
SHORTNESS OF BREATH: 0
DOUBLE VISION: 0
SINUS PAIN: 0
BLURRED VISION: 0
HEARTBURN: 0
VOMITING: 0

## 2018-10-04 NOTE — PROGRESS NOTES
[id number] [facility] Otolaryngology  Dr. Valery Murray D.O. Ms.Ed. New Consult       Patient Name:  Marilee Dhillon  :  1960     CHIEF C/O:    Chief Complaint   Patient presents with    Other     allergies       HISTORY OBTAINED FROM:  patient    HISTORY OF PRESENT ILLNESS:       Carlos Alberto Tom is a 62y.o. year old female, here today for Seasonal allergic rhinitis and frequent nasal congestion and drainage. Patient is placed on current medical therapy to include Astelin, nasal saline, but episodes of Allegra as needed. Patient denies any current complaints of MRI as terms of sinusitis, no cooperative fever or chills, no difficulty swallowing the source of breath stridor headaches or vision changes.     Past Medical History:   Diagnosis Date    Anemia     Arthritis     Asthma     Bipolar affective ([address]     Chronic back pain     Spinal cord stimulator in place    CPAP (continuous positive airway pressure) dependence     PETR    Depression     stable    Environmental and seasonal allergies     Fatty liver     Full dentures     GERD (gastroesophageal reflux disease)     Gout     past hx of    Lymphedema of lower extremity 2012    Mitochondrial cytopathy (HCC)     s/s muscle and nerve pain, difficulty breathing, seizures, difficulty swallowing, digestive disorders    Neuropathy     at feet    Obesity     Oxygen dependent     at times dt diaphragm does not function properly dt Mitochondrial disorder    PONV (postoperative nausea and vomiting)     Seizures ([address]     last approx 2017 / Daniel Maurer    Spinal headache     Water retention     at legs     Past Surgical History:   Procedure Laterality Date    APPENDECTOMY      with Hysterectomy    BACK SURGERY  last one     lumbar x 2    CARDIAC CATHETERIZATION Right 2013    Dr. Mauricio Somers    open with gastric bypass    COLONOSCOPY N/A 2018    COLONOSCOPY POLYPECTOMY HOT BIOPSY performed by Yazmin Schaefer times daily , Disp: , Rfl:     fexofenadine (ALLEGRA ALLERGY) 180 MG tablet, Take 1 tablet by mouth daily, Disp: 30 tablet, Rfl: 1    ranitidine (ZANTAC) 300 MG tablet, Take 1 tablet by mouth 2 times daily Instructed to take with sip water am of procedure, Disp: , Rfl: 3    oxyCODONE-acetaminophen (PERCOCET) 7.5-325 MG per tablet, Take 1 tablet by mouth 3 times daily. Instructed to take with sip water am of procedure., Disp: , Rfl:     albuterol (PROVENTIL) (2.5 MG/3ML) 0.083% nebulizer solution, Take 2.5 mg by nebulization 3 times daily as needed To use dos if needs, Disp: , Rfl:     levOCARNitine (CARNITOR) 330 MG tablet, Take 330 mg by mouth 3 times daily , Disp: , Rfl:     vitamin E 100 UNITS capsule, Take by mouth 2 times daily , Disp: , Rfl:     Docusate Sodium (DOC-Q-LACE PO), Take 100 mg by mouth 2 times daily, Disp: , Rfl:     polyethylene glycol (GLYCOLAX) packet, 17 g 2 times daily , Disp: , Rfl: 1    Vitamins-Lipotropics (B-50 PO), Take by mouth 2 times daily , Disp: , Rfl:     Acetaminophen (TYLENOL 8 HOUR PO), Take 1,000 mg by mouth 3 times daily Instructed to take with sip water am of procedure, Disp: , Rfl:     Omega-3 Fatty Acids (FISH OIL) 1000 MG CAPS, Take 1,000 mg by mouth 3 times daily , Disp: , Rfl:     magnesium 30 MG tablet, Take 250 mg by mouth 3 times daily Taking for leg cramps / patient states she cannot stop this supplement, Disp: , Rfl:     Coenzyme Q10 (CO Q-10) 200 MG CAPS, Take 1 capsule by mouth 2 times daily , Disp: , Rfl:     potassium chloride SA (K-DUR;KLOR-CON M) 20 MEQ tablet, Take 20 mEq by mouth 2 times daily as needed , Disp: , Rfl:     ferrous sulfate 325 (65 FE) MG tablet, Take 325 mg by mouth daily (with breakfast) , Disp: , Rfl:     allopurinol (ZYLOPRIM) 100 MG tablet, Take 100 mg by mouth daily, Disp: , Rfl:     OXYGEN, 2.5-3 L by Nasal route as needed. , Disp: , Rfl:     escitalopram (LEXAPRO) 20 MG tablet, Take 20 mg by mouth 2 times daily complaint. Positive forced sugar when she went partial removal U under microscopic assistance with instrumentation. Dr. Todd Busch.  Otolaryngology Facial Plastic Surgery  :  [facility] Otolaryngology/Facial Plastic Surgery Residency  Associate Clinical Professor:  Rohini Sotelo [facility] [address]

## 2018-10-10 RX ORDER — FEXOFENADINE HCL 180 MG/1
180 TABLET ORAL DAILY
Qty: 30 TABLET | Refills: 1 | Status: SHIPPED | OUTPATIENT
Start: 2018-10-10 | End: 2018-12-19 | Stop reason: SDUPTHER

## 2018-12-10 ENCOUNTER — HOSPITAL ENCOUNTER (OUTPATIENT)
Dept: GENERAL RADIOLOGY | Age: 58
Discharge: HOME | End: 2018-12-12
Payer: MEDICAID

## 2018-12-10 ENCOUNTER — HOSPITAL ENCOUNTER (OUTPATIENT)
Age: 58
Discharge: HOME | End: 2018-12-12
Payer: MEDICAID

## 2018-12-10 DIAGNOSIS — R52 PAIN: ICD-10-CM

## 2018-12-10 PROCEDURE — 71046 X-RAY EXAM CHEST 2 VIEWS: CPT

## 2018-12-19 RX ORDER — FEXOFENADINE HCL 180 MG/1
180 TABLET ORAL DAILY
Qty: 30 TABLET | Refills: 3 | Status: SHIPPED | OUTPATIENT
Start: 2018-12-19 | End: 2019-06-13 | Stop reason: ALTCHOICE

## 2019-03-25 ENCOUNTER — TELEPHONE (OUTPATIENT)
Dept: ADMINISTRATIVE | Age: 59
End: 2019-03-25

## 2019-04-01 ENCOUNTER — OFFICE VISIT (OUTPATIENT)
Dept: ENT CLINIC | Age: 59
End: 2019-04-01
Payer: MEDICAID

## 2019-04-01 VITALS
HEIGHT: 67 IN | DIASTOLIC BLOOD PRESSURE: 62 MMHG | BODY MASS INDEX: 42.69 KG/M2 | SYSTOLIC BLOOD PRESSURE: 112 MMHG | OXYGEN SATURATION: 92 % | WEIGHT: 272 LBS | HEART RATE: 81 BPM

## 2019-04-01 DIAGNOSIS — R49.0 HOARSENESS: Primary | ICD-10-CM

## 2019-04-01 PROCEDURE — 99202 OFFICE O/P NEW SF 15 MIN: CPT | Performed by: OTOLARYNGOLOGY

## 2019-04-01 PROCEDURE — 4004F PT TOBACCO SCREEN RCVD TLK: CPT | Performed by: OTOLARYNGOLOGY

## 2019-04-01 PROCEDURE — 3017F COLORECTAL CA SCREEN DOC REV: CPT | Performed by: OTOLARYNGOLOGY

## 2019-04-01 PROCEDURE — G8417 CALC BMI ABV UP PARAM F/U: HCPCS | Performed by: OTOLARYNGOLOGY

## 2019-04-01 PROCEDURE — G8427 DOCREV CUR MEDS BY ELIG CLIN: HCPCS | Performed by: OTOLARYNGOLOGY

## 2019-04-01 PROCEDURE — 31575 DIAGNOSTIC LARYNGOSCOPY: CPT | Performed by: OTOLARYNGOLOGY

## 2019-04-01 RX ORDER — LINACLOTIDE 145 UG/1
290 CAPSULE, GELATIN COATED ORAL
Status: ON HOLD | COMMUNITY
End: 2022-04-15 | Stop reason: HOSPADM

## 2019-04-01 RX ORDER — CHOLECALCIFEROL (VITAMIN D3) 25 MCG
TABLET,CHEWABLE ORAL
Refills: 3 | COMMUNITY
Start: 2018-12-30 | End: 2019-06-13 | Stop reason: ALTCHOICE

## 2019-04-01 RX ORDER — EPINEPHRINE 0.3 MG/.3ML
INJECTION SUBCUTANEOUS
COMMUNITY
Start: 2017-12-11 | End: 2019-06-13 | Stop reason: ALTCHOICE

## 2019-04-01 NOTE — PROGRESS NOTES
progressily more hoarse    Feels lympg nodes in the neck    Some dysphaia     abx x2 no change     Scope is ok

## 2019-04-14 ASSESSMENT — ENCOUNTER SYMPTOMS
VOMITING: 0
COUGH: 0
SHORTNESS OF BREATH: 0
RHINORRHEA: 1
VOICE CHANGE: 1

## 2019-04-14 NOTE — PROGRESS NOTES
[facility] Otolaryngology  Dr. Deepti Pena. DINORA Murray Ms.Ed. New Consult       Patient Name:  Katey Jaime  :  1960     CHIEF C/O:    Chief Complaint   Patient presents with    Other     Hoarsenss for the last year. Progressively getting worse. HISTORY OBTAINED FROM:  patient    HISTORY OF PRESENT ILLNESS:       Alejo Albert is a 62y.o. year old female, here today for progressive episodes of hoarseness no episodes of total loss of voice, no issues with throat pain. No concern for hemoptysis, no other associated shortness of breath or stridor. Patient admits to a history of tobacco abuse, no current complaints of nausea or vomiting. No recent history of intubations, no history of surgical intervention to the larynx in the past.  No other complaints of hearing loss tinnitus vertigo no other associated complaints of nasal congestion or drainage.       Past Medical History:   Diagnosis Date    Anemia     Arthritis     Asthma     Bipolar affective ([address]     Chronic back pain     Spinal cord stimulator in place    CPAP (continuous positive airway pressure) dependence     PETR    Depression     stable    Environmental and seasonal allergies     Fatty liver     Full dentures     GERD (gastroesophageal reflux disease)     Gout     past hx of    Lymphedema of lower extremity 2012    Mitochondrial cytopathy (HCC)     s/s muscle and nerve pain, difficulty breathing, seizures, difficulty swallowing, digestive disorders    Neuropathy     at feet    Obesity     Oxygen dependent     at times dt diaphragm does not function properly dt Mitochondrial disorder    PONV (postoperative nausea and vomiting)     Seizures ([address]     last approx 2017 / Shira Davis    Spinal headache     Water retention     at legs     Past Surgical History:   Procedure Laterality Date    APPENDECTOMY      with Hysterectomy    BACK SURGERY  last one     lumbar x 2    CARDIAC CATHETERIZATION Right 2013     Cyanocobalamin (B-12) 1000 MCG LOZG, , Disp: , Rfl: 3    EPINEPHrine (EPIPEN 2-CARMEN) 0.3 MG/0.3ML SOAJ injection, , Disp: , Rfl:     fexofenadine (ALLEGRA) 180 MG tablet, Take 1 tablet by mouth daily, Disp: 30 tablet, Rfl: 3    azelastine (ASTELIN) 0.1 % nasal spray, 2 sprays by Nasal route 2 times daily Use in each nostril as directed, Disp: 1 Bottle, Rfl: 3    ALPHA LIPOIC ACID PO, Take 200 mg by mouth 2 times daily , Disp: , Rfl:     ranitidine (ZANTAC) 300 MG tablet, Take 1 tablet by mouth 2 times daily Instructed to take with sip water am of procedure, Disp: , Rfl: 3    oxyCODONE-acetaminophen (PERCOCET) 7.5-325 MG per tablet, Take 1 tablet by mouth 3 times daily.  Instructed to take with sip water am of procedure., Disp: , Rfl:     albuterol (PROVENTIL) (2.5 MG/3ML) 0.083% nebulizer solution, Take 2.5 mg by nebulization 3 times daily as needed To use dos if needs, Disp: , Rfl:     levOCARNitine (CARNITOR) 330 MG tablet, Take 330 mg by mouth 3 times daily , Disp: , Rfl:     vitamin E 100 UNITS capsule, Take by mouth 2 times daily , Disp: , Rfl:     Docusate Sodium (DOC-Q-LACE PO), Take 100 mg by mouth 2 times daily, Disp: , Rfl:     polyethylene glycol (GLYCOLAX) packet, 17 g 2 times daily , Disp: , Rfl: 1    Vitamins-Lipotropics (B-50 PO), Take by mouth 2 times daily , Disp: , Rfl:     Acetaminophen (TYLENOL 8 HOUR PO), Take 1,000 mg by mouth 3 times daily Instructed to take with sip water am of procedure, Disp: , Rfl:     Omega-3 Fatty Acids (FISH OIL) 1000 MG CAPS, Take 1,000 mg by mouth 3 times daily , Disp: , Rfl:     magnesium 30 MG tablet, Take 250 mg by mouth 3 times daily Taking for leg cramps / patient states she cannot stop this supplement, Disp: , Rfl:     Coenzyme Q10 (CO Q-10) 200 MG CAPS, Take 1 capsule by mouth 2 times daily , Disp: , Rfl:     potassium chloride SA (K-DUR;KLOR-CON M) 20 MEQ tablet, Take 20 mEq by mouth 2 times daily as needed , Disp: , Rfl:     ferrous sulfate 325 (65 FE) MG tablet, Take 325 mg by mouth daily (with breakfast) , Disp: , Rfl:     allopurinol (ZYLOPRIM) 100 MG tablet, Take 100 mg by mouth daily, Disp: , Rfl:     OXYGEN, 2.5-3 L by Nasal route as needed. , Disp: , Rfl:     escitalopram (LEXAPRO) 20 MG tablet, Take 20 mg by mouth 2 times daily Instructed to take with sip water am of procedure, Disp: , Rfl:     baclofen (LIORESAL) 20 MG tablet, Take 1 tablet by mouth 4 times daily. , Disp: 120 tablet, Rfl: 5    gabapentin (NEURONTIN) 600 MG tablet, Take 1 tablet by mouth 4 times daily. , Disp: 120 tablet, Rfl: 5    furosemide (LASIX) 40 MG tablet,  Take 40 mg by mouth daily , Disp: , Rfl:     topiramate (TOPAMAX) 200 MG tablet, Take 1 tablet by mouth 2 times daily. , Disp: 1 tablet, Rfl: 0    ziprasidone (GEODON) 20 MG capsule, Take 20 mg by mouth nightly , Disp: , Rfl:     clonazepam (KLONOPIN) 0.5 MG tablet, Take 1 mg by mouth nightly ., Disp: , Rfl:     Plecanatide (TRULANCE) 3 MG TABS, Take 3 mg by mouth daily Takes daily at 1700, Disp: , Rfl:     loratadine (CLARITIN) 10 MG tablet, Take 10 mg by mouth daily, Disp: , Rfl:   Bee pollen; Penicillins; Ropinirole hcl; Vistaril [hydroxyzine hcl]; Aripiprazole; Hydroxyzine pamoate; and Tape Robley Stade tape]  Social History     Tobacco Use    Smoking status: Current Every Day Smoker     Packs/day: 0.25     Years: 30.00     Pack years: 7.50     Types: Cigarettes, Cigars    Smokeless tobacco: Never Used   Substance Use Topics    Alcohol use: No     Alcohol/week: 0.0 oz    Drug use: No     Family History   Problem Relation Age of Onset    Heart Disease Mother     Cancer Father     Arthritis Other     Cancer Other     Depression Other     Heart Disease Other     Hypertension Other     Mental Illness Other     Stroke Other        Review of Systems   Constitutional: Negative for chills and fever. HENT: Positive for rhinorrhea and voice change. Negative for congestion, ear discharge and hearing loss. Respiratory: Negative for cough and shortness of breath. Cardiovascular: Negative for chest pain and palpitations. Gastrointestinal: Negative for vomiting. Skin: Negative for rash. Allergic/Immunologic: Negative for environmental allergies. Neurological: Negative for dizziness and headaches. Hematological: Does not bruise/bleed easily. All other systems reviewed and are negative. /62 (Site: Left Lower Arm, Position: Sitting, Cuff Size: Medium Adult)   Pulse 81   Ht 5' 7\" (1.702 m)   Wt 272 lb (123.4 kg)   LMP 08/31/1988   SpO2 92%   BMI 42.60 kg/m²   Physical Exam   Constitutional: She appears well-developed and well-nourished. Eyes: Pupils are equal, round, and reactive to light. EOM are normal.   Neck: Normal range of motion. No tracheal deviation present. No thyromegaly present. Cardiovascular: Normal rate and intact distal pulses. Pulmonary/Chest: Effort normal. No respiratory distress. Musculoskeletal: Normal range of motion. She exhibits no edema. Lymphadenopathy:     She has no cervical adenopathy. Neurological: She is alert. No cranial nerve deficit. Skin: Skin is warm. No erythema. Nursing note and vitals reviewed. Procedure Note    Pre-operative Diagnosis: Hoarseness    Post-operative Diagnosis: same    Anesthesia: Lidocaine 2% and Anival-Synephrine 1/2%    Endoscopy Type:  Flexible Laryngoscopy    Procedure Details: The patient was placed in the sitting position. After topical anesthesia and decongestion, the 4 mm laryngoscope was passed. The nasal cavities, nasopharynx, oropharynx, hypopharynx, and larynx were all examined. Vocal cords were examined during respiration and phonation. The following findings were noted:    Findings: Normal nasopharynx, normal epiglottis, normal tongue base, normal pyriform, normal TVC motion and mucosa, no subglottic masses or lesions      Condition:  Stable.   Patient tolerated procedure well.    Complications:  None    IMPRESSION/PLAN:  Patient seems her upper history of persistent and intermittent episodes of hoarseness likely representative of medical therapy, no sign of mass tumor lesions on fiberoptic exam today. Recommendations are for voice rest as needed, hydration, consideration for speech therapy if no improvement with conservative management. Smoking cessation. No surgical intervention required. Dr. Jared St Otolaryngology/Facial Plastic Surgery Residency  Associate Clinical Professor:  Mayank Harvey [facility]

## 2019-04-24 ENCOUNTER — TELEPHONE (OUTPATIENT)
Dept: ADMINISTRATIVE | Age: 59
End: 2019-04-24

## 2019-04-24 NOTE — TELEPHONE ENCOUNTER
Patient is scheduled 7/3 at 10 am in [place]. She has lumps under her tongue and along both sides of her jaw. Her pcp had her on 4 courses of antibiotics with no relief. She was seen by pcp again and is starting 5th course of antibiotics. He referred her to her ENT. She is established and prefers the [facility] office. She is on waitlist. If she can be seen sooner and in [facility], please call her. Thank you.

## 2019-05-13 ENCOUNTER — OFFICE VISIT (OUTPATIENT)
Dept: ENT CLINIC | Age: 59
End: 2019-05-13
Payer: MEDICAID

## 2019-05-13 VITALS
SYSTOLIC BLOOD PRESSURE: 125 MMHG | HEART RATE: 82 BPM | BODY MASS INDEX: 42.53 KG/M2 | HEIGHT: 67 IN | WEIGHT: 271 LBS | DIASTOLIC BLOOD PRESSURE: 70 MMHG

## 2019-05-13 DIAGNOSIS — K11.20 SIALADENITIS: Primary | ICD-10-CM

## 2019-05-13 PROCEDURE — 3017F COLORECTAL CA SCREEN DOC REV: CPT | Performed by: OTOLARYNGOLOGY

## 2019-05-13 PROCEDURE — G8417 CALC BMI ABV UP PARAM F/U: HCPCS | Performed by: OTOLARYNGOLOGY

## 2019-05-13 PROCEDURE — G8427 DOCREV CUR MEDS BY ELIG CLIN: HCPCS | Performed by: OTOLARYNGOLOGY

## 2019-05-13 PROCEDURE — 99213 OFFICE O/P EST LOW 20 MIN: CPT | Performed by: OTOLARYNGOLOGY

## 2019-05-13 PROCEDURE — 4004F PT TOBACCO SCREEN RCVD TLK: CPT | Performed by: OTOLARYNGOLOGY

## 2019-05-13 NOTE — PROGRESS NOTES
[facility] Otolaryngology  Dr. Robert Payan. DINORA Murray Ms.Ed. New Consult       Patient Name:  Stoney Rodrigez  :  1960     CHIEF C/O:    Chief Complaint   Patient presents with    Other     lumps under tongue and on both sides of jaw. problem has not resolved after antibiotics x5. pt states that this has been ongoing since 2018. HISTORY OBTAINED FROM:  patient    HISTORY OF PRESENT ILLNESS:       Derek Domingo is a 62y.o. year old female, here today for bilateral facial swelling. Patient states that under the jaw line she's had swelling is ongoing now for 3 months and is intermittent in nature. She denies any current fever chills, she does state it is somewhat painful without any overlying erythema. No complete no complaints of difficulty swallowing or changes in area but denies any exposure, no recent imaging studies have been conducted, she's been on antibiotics in the past without any significant improvement in her current complaints.       Past Medical History:   Diagnosis Date    Anemia     Arthritis     Asthma     Bipolar affective ([address]     Chronic back pain     Spinal cord stimulator in place    CPAP (continuous positive airway pressure) dependence     PETR    Depression     stable    Environmental and seasonal allergies     Fatty liver     Full dentures     GERD (gastroesophageal reflux disease)     Gout     past hx of    Lymphedema of lower extremity 2012    Mitochondrial cytopathy (HCC)     s/s muscle and nerve pain, difficulty breathing, seizures, difficulty swallowing, digestive disorders    Neuropathy     at feet    Obesity     Oxygen dependent     at times dt diaphragm does not function properly dt Mitochondrial disorder    PONV (postoperative nausea and vomiting)     Seizures ([address]     last approx 2017 / Ronal Whitmore    Spinal headache     Water retention     at legs     Past Surgical History:   Procedure Laterality Date    APPENDECTOMY      with Hysterectomy    BACK SURGERY  last one 1995    lumbar x 2    CARDIAC CATHETERIZATION Right 6-6-2013    Dr. Asha Hawkins    open with gastric bypass    COLONOSCOPY N/A 9/18/2018    COLONOSCOPY POLYPECTOMY HOT BIOPSY performed by Aldo Robertson MD at [address] [facility], COLON, DIAGNOSTIC      ESOPHAGEAL DILATATION  9/18/2018    ESOPHAGEAL DILATION Elio Sham performed by Aldo Robertson MD at [address] [facility] [address] Bilateral 2007,2017    knees    KNEE SURGERY Bilateral     scope    MYOMECTOMY      NERVE BLOCK Left 10 02 2013    lumbar paravertebral facet #2    NERVE BLOCK Left 10 9 13    hip inj #1    NERVE BLOCK Left 10/16/13    hip injection    NERVE BLOCK Left 10/29/2014    left lumbar transforaminal nerve lbock  #1    NERVE BLOCK Left 11/12/2014    lumbar left transforaminal nerve block  #2    NERVE BLOCK Left 12 8 14    lumbar transforaminal #3    NERVE BLOCK Right 3/30/15    cervical transforaminal #1    NERVE BLOCK Right 4/6/2015    right cervical transforaminal nerve block  #2    NERVE BLOCK Right 4/13/15    cervical transforaminal #3    NERVE BLOCK Left 7/6/15    knee injection #1    NERVE BLOCK  07/20/15    left genicular nerve block/knee #2    NERVE BLOCK Left 10 1 15    lumb transforam #1    NERVE BLOCK  10/26/15    left lumbar transforaminal nerve block #3    OTHER SURGICAL HISTORY Left 11 25 13    lumbar radiofreq    OTHER SURGICAL HISTORY  3/28/2016    stage 1, 3 day percutanous trial [employer] lumbar spinal cord stimulator    IN COLONOSCOPY FLX DX W/COLLJ SPEC WHEN PFRMD N/A 3/20/2018    COLONOSCOPY DIAGNOSTIC OR SCREENING performed by Aldo Robertson MD at [place] [address] EGD TRANSORAL BIOPSY SINGLE/MULTIPLE N/A 3/20/2018    EGD BIOPSY performed by Aldo Robertson MD at [address] [place] [address]         Current Outpatient Medications:     linaclotide (LINZESS) 145 MCG capsule, Take 145 mcg by mouth every morning (before breakfast), Disp: , Rfl:     Cyanocobalamin (B-12) 1000 MCG LOZG, , Disp: , Rfl: 3    EPINEPHrine (EPIPEN 2-CARMEN) 0.3 MG/0.3ML SOAJ injection, , Disp: , Rfl:     fexofenadine (ALLEGRA) 180 MG tablet, Take 1 tablet by mouth daily, Disp: 30 tablet, Rfl: 3    azelastine (ASTELIN) 0.1 % nasal spray, 2 sprays by Nasal route 2 times daily Use in each nostril as directed, Disp: 1 Bottle, Rfl: 3    Plecanatide (TRULANCE) 3 MG TABS, Take 3 mg by mouth daily Takes daily at 1700, Disp: , Rfl:     ALPHA LIPOIC ACID PO, Take 200 mg by mouth 2 times daily , Disp: , Rfl:     ranitidine (ZANTAC) 300 MG tablet, Take 1 tablet by mouth 2 times daily Instructed to take with sip water am of procedure, Disp: , Rfl: 3    oxyCODONE-acetaminophen (PERCOCET) 7.5-325 MG per tablet, Take 1 tablet by mouth 3 times daily.  Instructed to take with sip water am of procedure., Disp: , Rfl:     albuterol (PROVENTIL) (2.5 MG/3ML) 0.083% nebulizer solution, Take 2.5 mg by nebulization 3 times daily as needed To use dos if needs, Disp: , Rfl:     levOCARNitine (CARNITOR) 330 MG tablet, Take 330 mg by mouth 3 times daily , Disp: , Rfl:     vitamin E 100 UNITS capsule, Take by mouth 2 times daily , Disp: , Rfl:     Docusate Sodium (DOC-Q-LACE PO), Take 100 mg by mouth 2 times daily, Disp: , Rfl:     polyethylene glycol (GLYCOLAX) packet, 17 g 2 times daily , Disp: , Rfl: 1    Vitamins-Lipotropics (B-50 PO), Take by mouth 2 times daily , Disp: , Rfl:     Acetaminophen (TYLENOL 8 HOUR PO), Take 1,000 mg by mouth 3 times daily Instructed to take with sip water am of procedure, Disp: , Rfl:     Omega-3 Fatty Acids (FISH OIL) 1000 MG CAPS, Take 1,000 mg by mouth 3 times daily , Disp: , Rfl:     magnesium 30 MG tablet, Take 250 mg by mouth 3 times daily Taking for leg cramps / patient states she cannot stop this supplement, Disp: , Rfl:     Coenzyme Q10 (CO Q-10) 200 MG change, appetite change, chills and fever. HENT: Negative for ear discharge and hearing loss. Respiratory: Negative for cough and shortness of breath. Cardiovascular: Negative for chest pain and palpitations. Gastrointestinal: Negative for vomiting. Skin: Negative for rash. Allergic/Immunologic: Negative for environmental allergies. Neurological: Negative for dizziness and headaches. Hematological: Does not bruise/bleed easily. All other systems reviewed and are negative. /70   Pulse 82   Ht 5' 7\" (1.702 m)   Wt 271 lb (122.9 kg)   LMP 08/31/1988   BMI 42.44 kg/m²   Physical Exam   HENT:   Head: Normocephalic. Right Ear: External ear and ear canal normal.   Left Ear: External ear and ear canal normal.   Nose: No mucosal edema, rhinorrhea, nose lacerations or sinus tenderness. Right sinus exhibits no maxillary sinus tenderness and no frontal sinus tenderness. Left sinus exhibits no maxillary sinus tenderness and no frontal sinus tenderness. Mouth/Throat: No dental abscesses or lacerations. No oropharyngeal exudate or posterior oropharyngeal edema. IMPRESSION/PLAN:  She was seen and examined for a history of bilateral some tinnitus. Recommendations the patient undergo CT scan to better locate etiology and follow-up at that time. Continue good hydration, as well as gentle massage warm compresses needed in the future. Dr. Linh Aiken Otolaryngology/Facial Plastic Surgery Residency  Associate Clinical Professor:  Jim Meraz, [facility]

## 2019-05-16 ENCOUNTER — TELEPHONE (OUTPATIENT)
Dept: ENT CLINIC | Age: 59
End: 2019-05-16

## 2019-05-17 ENCOUNTER — TELEPHONE (OUTPATIENT)
Dept: ENT CLINIC | Age: 59
End: 2019-05-17

## 2019-05-17 NOTE — TELEPHONE ENCOUNTER
Hospital called and lvm stating patient needed labs for her ct next week    Labs are scanned in epic under .

## 2019-05-21 ENCOUNTER — TELEPHONE (OUTPATIENT)
Dept: ENT CLINIC | Age: 59
End: 2019-05-21

## 2019-05-21 NOTE — TELEPHONE ENCOUNTER
July 31, 2018      Shira Guthrie  [address]  [address]  [place] [address]        Dear Shira,    Please see below for your test results.    Resulted Orders   Rapid Urine Drug Screen ([facility])   Result Value Ref Range    Phencyclidine NEGATIVE NEGATIVE    Propoxyphene NEGATIVE NEGATIVE    Tricyclic Antidepressants NEGATIVE NEGATIVE    Amphetamines Qual NEGATIVE NEGATIVE    Barbiturates Qual Urine NEGATIVE NEGATIVE    Buprenorphine Qual Urine NEGATIVE NEGATIVE    Benzodiazepine Qual Urine NEGATIVE NEGATIVE    Cocaine Qual Urine NEGATIVE NEGATIVE    Cannabinoids Qual Urine NEGATIVE NEGATIVE    Methamphetamine Qual NEGATIVE NEGATIVE    Methadone Qual NEGATIVE NEGATIVE    Morphine Qual NEGATIVE NEGATIVE    Oxycodone Qual NEGATIVE NEGATIVE    Temperature of Urine was Between  Degrees F YES YES      Comment:      This is a preliminary screening test that detects drugs-of-abuse in urine at   specified detection levels.  To confirm preliminary results, a more specific   method such as Gas Chromatography/Mass Spectrometry (GC/MS) must be used.        Chlamydia/Gono Amplified ([employer])   Result Value Ref Range    Chlamydia trac,Amplified Prb Negative Negative    N gonorrhoeae,Amplified Prb Negative Negative    Narrative    Test performed by:  [facility] [address] [place] [address]   Hepatitis B Surface Ag ([employer])   Result Value Ref Range    Hepatitis B Surface Antigen Negative Negative    Narrative    Test performed by:  [facility] [address] [place] [address]   HIV Ag/Ab Screen [facility] ([facility])   Result Value Ref Range    HIV Antigen/Antibody Negative Negative    Narrative    Test performed by:  [facility] [address] [place] [address]  Method is [employer] HIV Ag/Ab for the detection of HIV p24 antigen, HIV-1   antibodies and HIV-2 antibodies.   Syphilis Screen [facility] ([facility])   Result Value Ref Range    Treponema Antibody  Patient lvm stating her phone calendar was reset and could not remember when her ct was. Called patient back and surjitm with detailed information in regards to her Ct apt and follow up. (Syphilis) Negative Negative    Narrative    Test performed by:  [facility]  [address] [place] [address]     Good news!  All of your tests were negative for infections.  Your urine test was negative for all drugs.      If you have any questions, please call the clinic to make an appointment.    Sincerely,    Sally Pierre MD

## 2019-05-22 ENCOUNTER — HOSPITAL ENCOUNTER (OUTPATIENT)
Dept: CT IMAGING | Age: 59
Discharge: HOME | End: 2019-05-24
Payer: MEDICAID

## 2019-05-22 DIAGNOSIS — K11.20 SIALADENITIS: ICD-10-CM

## 2019-05-22 PROCEDURE — 70492 CT SFT TSUE NCK W/O & W/DYE: CPT

## 2019-05-22 PROCEDURE — 6360000004 HC RX CONTRAST MEDICATION: Performed by: RADIOLOGY

## 2019-05-22 RX ORDER — SODIUM CHLORIDE 0.9 % (FLUSH) 0.9 %
10 SYRINGE (ML) INJECTION PRN
Status: DISCONTINUED | OUTPATIENT
Start: 2019-05-22 | End: 2019-05-25 | Stop reason: HOSPADM

## 2019-05-22 RX ADMIN — IOPAMIDOL 80 ML: 755 INJECTION, SOLUTION INTRAVENOUS at 15:18

## 2019-05-23 ASSESSMENT — ENCOUNTER SYMPTOMS
VOMITING: 0
COUGH: 0
SHORTNESS OF BREATH: 0

## 2019-06-13 ENCOUNTER — HOSPITAL ENCOUNTER (INPATIENT)
Age: 59
LOS: 1 days | Discharge: HOME | DRG: 053 | End: 2019-06-14
Attending: EMERGENCY MEDICINE | Admitting: INTERNAL MEDICINE
Payer: MEDICAID

## 2019-06-13 ENCOUNTER — APPOINTMENT (OUTPATIENT)
Dept: GENERAL RADIOLOGY | Age: 59
DRG: 053 | End: 2019-06-13
Payer: MEDICAID

## 2019-06-13 ENCOUNTER — APPOINTMENT (OUTPATIENT)
Dept: CT IMAGING | Age: 59
DRG: 053 | End: 2019-06-13
Payer: MEDICAID

## 2019-06-13 DIAGNOSIS — R41.0 ACUTE DELIRIUM: Primary | ICD-10-CM

## 2019-06-13 LAB
ACETAMINOPHEN LEVEL: <5 MCG/ML (ref 10–30)
ALBUMIN SERPL-MCNC: 3.6 G/DL (ref 3.5–5.2)
ALP BLD-CCNC: 104 U/L (ref 35–104)
ALT SERPL-CCNC: 16 U/L (ref 0–32)
AMMONIA: 16.9 UMOL/L (ref 11–51)
AMPHETAMINE SCREEN, URINE: NOT DETECTED
ANION GAP SERPL CALCULATED.3IONS-SCNC: 11 MMOL/L (ref 7–16)
APTT: 29.6 SEC (ref 24.5–35.1)
AST SERPL-CCNC: 20 U/L (ref 0–31)
B.E.: 0.9 MMOL/L (ref -3–3)
BARBITURATE SCREEN URINE: NOT DETECTED
BASOPHILS ABSOLUTE: 0.03 E9/L (ref 0–0.2)
BASOPHILS RELATIVE PERCENT: 0.5 % (ref 0–2)
BENZODIAZEPINE SCREEN, URINE: NOT DETECTED
BILIRUB SERPL-MCNC: 0.3 MG/DL (ref 0–1.2)
BILIRUBIN URINE: NEGATIVE
BLOOD, URINE: NEGATIVE
BUN BLDV-MCNC: 8 MG/DL (ref 6–20)
CALCIUM SERPL-MCNC: 9 MG/DL (ref 8.6–10.2)
CANNABINOID SCREEN URINE: NOT DETECTED
CHLORIDE BLD-SCNC: 109 MMOL/L (ref 98–107)
CLARITY: CLEAR
CO2: 25 MMOL/L (ref 22–29)
COCAINE METABOLITE SCREEN URINE: NOT DETECTED
COHB: 5.4 % (ref 0–1.5)
COLOR: YELLOW
CREAT SERPL-MCNC: 0.8 MG/DL (ref 0.5–1)
CRITICAL: ABNORMAL
DATE ANALYZED: ABNORMAL
DATE OF COLLECTION: ABNORMAL
EKG ATRIAL RATE: 49 BPM
EKG P AXIS: 13 DEGREES
EKG P-R INTERVAL: 194 MS
EKG Q-T INTERVAL: 424 MS
EKG QRS DURATION: 92 MS
EKG QTC CALCULATION (BAZETT): 383 MS
EKG R AXIS: 30 DEGREES
EKG T AXIS: 38 DEGREES
EKG VENTRICULAR RATE: 49 BPM
EOSINOPHILS ABSOLUTE: 0.12 E9/L (ref 0.05–0.5)
EOSINOPHILS RELATIVE PERCENT: 2.1 % (ref 0–6)
ETHANOL: <10 MG/DL (ref 0–0.08)
ETHANOL: <10 MG/DL (ref 0–0.08)
GFR AFRICAN AMERICAN: >60
GFR NON-AFRICAN AMERICAN: >60 ML/MIN/1.73
GLUCOSE BLD-MCNC: 86 MG/DL (ref 74–99)
GLUCOSE URINE: NEGATIVE MG/DL
HCO3: 25.9 MMOL/L (ref 22–26)
HCT VFR BLD CALC: 45.3 % (ref 34–48)
HEMOGLOBIN: 14.4 G/DL (ref 11.5–15.5)
HHB: 5.9 % (ref 0–5)
IMMATURE GRANULOCYTES #: 0.03 E9/L
IMMATURE GRANULOCYTES %: 0.5 % (ref 0–5)
INR BLD: 0.9
KETONES, URINE: NEGATIVE MG/DL
LAB: ABNORMAL
LACTIC ACID, SEPSIS: 1.1 MMOL/L (ref 0.5–1.9)
LEUKOCYTE ESTERASE, URINE: NEGATIVE
LIPASE: 17 U/L (ref 13–60)
LYMPHOCYTES ABSOLUTE: 1.75 E9/L (ref 1.5–4)
LYMPHOCYTES RELATIVE PERCENT: 30.4 % (ref 20–42)
Lab: ABNORMAL
MCH RBC QN AUTO: 32.4 PG (ref 26–35)
MCHC RBC AUTO-ENTMCNC: 31.8 % (ref 32–34.5)
MCV RBC AUTO: 101.8 FL (ref 80–99.9)
METHADONE SCREEN, URINE: NOT DETECTED
METHB: 0.3 % (ref 0–1.5)
MODE: ABNORMAL
MONOCYTES ABSOLUTE: 0.46 E9/L (ref 0.1–0.95)
MONOCYTES RELATIVE PERCENT: 8 % (ref 2–12)
NEUTROPHILS ABSOLUTE: 3.36 E9/L (ref 1.8–7.3)
NEUTROPHILS RELATIVE PERCENT: 58.5 % (ref 43–80)
NITRITE, URINE: NEGATIVE
O2 CONTENT: 18.1 ML/DL
O2 SATURATION: 93.7 % (ref 92–98.5)
O2HB: 88.4 % (ref 94–97)
OPERATOR ID: 316
OPIATE SCREEN URINE: NOT DETECTED
PATIENT TEMP: 37 C
PCO2: 42.4 MMHG (ref 35–45)
PDW BLD-RTO: 13.1 FL (ref 11.5–15)
PH BLOOD GAS: 7.4 (ref 7.35–7.45)
PH UA: 6.5 (ref 5–9)
PHENCYCLIDINE SCREEN URINE: NOT DETECTED
PLATELET # BLD: 189 E9/L (ref 130–450)
PMV BLD AUTO: 9.1 FL (ref 7–12)
PO2: 64.7 MMHG (ref 60–100)
POTASSIUM REFLEX MAGNESIUM: 3.9 MMOL/L (ref 3.5–5)
PROPOXYPHENE SCREEN: NOT DETECTED
PROTEIN UA: NEGATIVE MG/DL
PROTHROMBIN TIME: 10 SEC (ref 9.3–12.4)
RBC # BLD: 4.45 E12/L (ref 3.5–5.5)
REASON FOR REJECTION: NORMAL
REJECTED TEST: NORMAL
SALICYLATE, SERUM: <0.3 MG/DL (ref 0–30)
SODIUM BLD-SCNC: 145 MMOL/L (ref 132–146)
SOURCE, BLOOD GAS: ABNORMAL
SPECIFIC GRAVITY UA: 1.01 (ref 1–1.03)
THB: 14.6 G/DL (ref 11.5–16.5)
TIME ANALYZED: 1116
TOTAL PROTEIN: 6.1 G/DL (ref 6.4–8.3)
TRICYCLIC ANTIDEPRESSANTS SCREEN SERUM: NEGATIVE NG/ML
TROPONIN: <0.01 NG/ML (ref 0–0.03)
TSH SERPL DL<=0.05 MIU/L-ACNC: 0.24 UIU/ML (ref 0.27–4.2)
UROBILINOGEN, URINE: 0.2 E.U./DL
WBC # BLD: 5.8 E9/L (ref 4.5–11.5)

## 2019-06-13 PROCEDURE — 71045 X-RAY EXAM CHEST 1 VIEW: CPT

## 2019-06-13 PROCEDURE — 2060000000 HC ICU INTERMEDIATE R&B

## 2019-06-13 PROCEDURE — 36415 COLL VENOUS BLD VENIPUNCTURE: CPT

## 2019-06-13 PROCEDURE — 85025 COMPLETE CBC W/AUTO DIFF WBC: CPT

## 2019-06-13 PROCEDURE — 93010 ELECTROCARDIOGRAM REPORT: CPT | Performed by: INTERNAL MEDICINE

## 2019-06-13 PROCEDURE — 82805 BLOOD GASES W/O2 SATURATION: CPT

## 2019-06-13 PROCEDURE — 81003 URINALYSIS AUTO W/O SCOPE: CPT

## 2019-06-13 PROCEDURE — 85610 PROTHROMBIN TIME: CPT

## 2019-06-13 PROCEDURE — 84443 ASSAY THYROID STIM HORMONE: CPT

## 2019-06-13 PROCEDURE — 87088 URINE BACTERIA CULTURE: CPT

## 2019-06-13 PROCEDURE — 2580000003 HC RX 258: Performed by: INTERNAL MEDICINE

## 2019-06-13 PROCEDURE — 96372 THER/PROPH/DIAG INJ SC/IM: CPT

## 2019-06-13 PROCEDURE — G0378 HOSPITAL OBSERVATION PER HR: HCPCS

## 2019-06-13 PROCEDURE — 83690 ASSAY OF LIPASE: CPT

## 2019-06-13 PROCEDURE — 74176 CT ABD & PELVIS W/O CONTRAST: CPT

## 2019-06-13 PROCEDURE — 84484 ASSAY OF TROPONIN QUANT: CPT

## 2019-06-13 PROCEDURE — 83605 ASSAY OF LACTIC ACID: CPT

## 2019-06-13 PROCEDURE — 99285 EMERGENCY DEPT VISIT HI MDM: CPT

## 2019-06-13 PROCEDURE — 82140 ASSAY OF AMMONIA: CPT

## 2019-06-13 PROCEDURE — 80307 DRUG TEST PRSMV CHEM ANLYZR: CPT

## 2019-06-13 PROCEDURE — 6360000002 HC RX W HCPCS: Performed by: INTERNAL MEDICINE

## 2019-06-13 PROCEDURE — 70450 CT HEAD/BRAIN W/O DYE: CPT

## 2019-06-13 PROCEDURE — 85730 THROMBOPLASTIN TIME PARTIAL: CPT

## 2019-06-13 PROCEDURE — 80053 COMPREHEN METABOLIC PANEL: CPT

## 2019-06-13 PROCEDURE — 6370000000 HC RX 637 (ALT 250 FOR IP): Performed by: INTERNAL MEDICINE

## 2019-06-13 PROCEDURE — 87040 BLOOD CULTURE FOR BACTERIA: CPT

## 2019-06-13 PROCEDURE — 93005 ELECTROCARDIOGRAM TRACING: CPT | Performed by: EMERGENCY MEDICINE

## 2019-06-13 PROCEDURE — G0480 DRUG TEST DEF 1-7 CLASSES: HCPCS

## 2019-06-13 RX ORDER — ZIPRASIDONE HYDROCHLORIDE 20 MG/1
20 CAPSULE ORAL NIGHTLY
Status: DISCONTINUED | OUTPATIENT
Start: 2019-06-13 | End: 2019-06-14 | Stop reason: HOSPADM

## 2019-06-13 RX ORDER — POLYETHYLENE GLYCOL 3350 17 G/17G
17 POWDER, FOR SOLUTION ORAL 2 TIMES DAILY
Status: DISCONTINUED | OUTPATIENT
Start: 2019-06-13 | End: 2019-06-14 | Stop reason: HOSPADM

## 2019-06-13 RX ORDER — DOCUSATE SODIUM 100 MG/1
100 CAPSULE, LIQUID FILLED ORAL 2 TIMES DAILY
Status: DISCONTINUED | OUTPATIENT
Start: 2019-06-13 | End: 2019-06-14 | Stop reason: HOSPADM

## 2019-06-13 RX ORDER — FUROSEMIDE 40 MG/1
40 TABLET ORAL DAILY
Status: DISCONTINUED | OUTPATIENT
Start: 2019-06-13 | End: 2019-06-14

## 2019-06-13 RX ORDER — AMPICILLIN TRIHYDRATE 250 MG
1 CAPSULE ORAL 2 TIMES DAILY
COMMUNITY
End: 2021-02-01

## 2019-06-13 RX ORDER — ALBUTEROL SULFATE 90 UG/1
2 AEROSOL, METERED RESPIRATORY (INHALATION) EVERY 4 HOURS PRN
Status: DISCONTINUED | OUTPATIENT
Start: 2019-06-13 | End: 2019-06-14 | Stop reason: HOSPADM

## 2019-06-13 RX ORDER — OMEPRAZOLE 40 MG/1
40 CAPSULE, DELAYED RELEASE ORAL EVERY MORNING
Status: ON HOLD | COMMUNITY
End: 2019-06-14 | Stop reason: HOSPADM

## 2019-06-13 RX ORDER — SODIUM CHLORIDE 0.9 % (FLUSH) 0.9 %
10 SYRINGE (ML) INJECTION PRN
Status: DISCONTINUED | OUTPATIENT
Start: 2019-06-13 | End: 2019-06-14 | Stop reason: HOSPADM

## 2019-06-13 RX ORDER — OXYCODONE AND ACETAMINOPHEN 10; 325 MG/1; MG/1
1 TABLET ORAL 3 TIMES DAILY
Status: ON HOLD | COMMUNITY
End: 2019-06-14 | Stop reason: HOSPADM

## 2019-06-13 RX ORDER — ONDANSETRON 2 MG/ML
4 INJECTION INTRAMUSCULAR; INTRAVENOUS EVERY 6 HOURS PRN
Status: DISCONTINUED | OUTPATIENT
Start: 2019-06-13 | End: 2019-06-14 | Stop reason: HOSPADM

## 2019-06-13 RX ORDER — ALLOPURINOL 100 MG/1
200 TABLET ORAL DAILY
Status: DISCONTINUED | OUTPATIENT
Start: 2019-06-13 | End: 2019-06-14 | Stop reason: HOSPADM

## 2019-06-13 RX ORDER — MIDAZOLAM HYDROCHLORIDE 1 MG/ML
5 INJECTION INTRAMUSCULAR; INTRAVENOUS ONCE
Status: DISCONTINUED | OUTPATIENT
Start: 2019-06-13 | End: 2019-06-14 | Stop reason: HOSPADM

## 2019-06-13 RX ORDER — SODIUM CHLORIDE 0.9 % (FLUSH) 0.9 %
10 SYRINGE (ML) INJECTION EVERY 12 HOURS SCHEDULED
Status: DISCONTINUED | OUTPATIENT
Start: 2019-06-13 | End: 2019-06-14 | Stop reason: HOSPADM

## 2019-06-13 RX ORDER — LEVOCARNITINE 330 MG/1
330 TABLET ORAL 3 TIMES DAILY
Status: DISCONTINUED | OUTPATIENT
Start: 2019-06-13 | End: 2019-06-14 | Stop reason: HOSPADM

## 2019-06-13 RX ORDER — FERROUS SULFATE 325(65) MG
325 TABLET ORAL DAILY
Status: DISCONTINUED | OUTPATIENT
Start: 2019-06-13 | End: 2019-06-14 | Stop reason: HOSPADM

## 2019-06-13 RX ORDER — ESCITALOPRAM OXALATE 10 MG/1
20 TABLET ORAL 2 TIMES DAILY
Status: DISCONTINUED | OUTPATIENT
Start: 2019-06-13 | End: 2019-06-14 | Stop reason: HOSPADM

## 2019-06-13 RX ORDER — ALBUTEROL SULFATE 90 UG/1
2 AEROSOL, METERED RESPIRATORY (INHALATION) EVERY 4 HOURS PRN
Status: ON HOLD | COMMUNITY
End: 2022-02-16 | Stop reason: HOSPADM

## 2019-06-13 RX ORDER — FAMOTIDINE 40 MG/1
40 TABLET, FILM COATED ORAL NIGHTLY
Status: ON HOLD | COMMUNITY
End: 2019-06-14 | Stop reason: HOSPADM

## 2019-06-13 RX ORDER — DOCUSATE SODIUM 100 MG/1
100 CAPSULE, LIQUID FILLED ORAL 2 TIMES DAILY
COMMUNITY
End: 2022-10-06

## 2019-06-13 RX ORDER — LIDOCAINE 40 MG/G
CREAM TOPICAL PRN
COMMUNITY
End: 2021-02-01

## 2019-06-13 RX ORDER — POTASSIUM CHLORIDE 20 MEQ/1
20 TABLET, EXTENDED RELEASE ORAL DAILY
COMMUNITY
End: 2021-02-01

## 2019-06-13 RX ORDER — LIDOCAINE 40 MG/G
CREAM TOPICAL PRN
Status: DISCONTINUED | OUTPATIENT
Start: 2019-06-13 | End: 2019-06-14 | Stop reason: HOSPADM

## 2019-06-13 RX ORDER — PANTOPRAZOLE SODIUM 40 MG/1
40 TABLET, DELAYED RELEASE ORAL NIGHTLY
COMMUNITY
End: 2019-09-12

## 2019-06-13 RX ORDER — TOPIRAMATE 100 MG/1
200 TABLET, FILM COATED ORAL 2 TIMES DAILY
Status: DISCONTINUED | OUTPATIENT
Start: 2019-06-13 | End: 2019-06-14 | Stop reason: HOSPADM

## 2019-06-13 RX ORDER — FERROUS SULFATE 325(65) MG
325 TABLET ORAL DAILY
Status: ON HOLD | COMMUNITY
End: 2020-09-09 | Stop reason: HOSPADM

## 2019-06-13 RX ORDER — ACETAMINOPHEN 325 MG/1
650 TABLET ORAL EVERY 4 HOURS PRN
Status: DISCONTINUED | OUTPATIENT
Start: 2019-06-13 | End: 2019-06-14 | Stop reason: HOSPADM

## 2019-06-13 RX ORDER — PANTOPRAZOLE SODIUM 40 MG/1
40 TABLET, DELAYED RELEASE ORAL DAILY
Status: DISCONTINUED | OUTPATIENT
Start: 2019-06-13 | End: 2019-06-14 | Stop reason: HOSPADM

## 2019-06-13 RX ORDER — FUROSEMIDE 40 MG/1
40 TABLET ORAL DAILY
Status: ON HOLD | COMMUNITY
End: 2019-06-14 | Stop reason: SDUPTHER

## 2019-06-13 RX ORDER — POTASSIUM CHLORIDE 20 MEQ/1
20 TABLET, EXTENDED RELEASE ORAL DAILY
Status: DISCONTINUED | OUTPATIENT
Start: 2019-06-13 | End: 2019-06-14 | Stop reason: HOSPADM

## 2019-06-13 RX ADMIN — FERROUS SULFATE TAB 325 MG (65 MG ELEMENTAL FE) 325 MG: 325 (65 FE) TAB at 17:19

## 2019-06-13 RX ADMIN — DOCUSATE SODIUM 100 MG: 100 CAPSULE, LIQUID FILLED ORAL at 20:28

## 2019-06-13 RX ADMIN — Medication 10 ML: at 20:29

## 2019-06-13 RX ADMIN — ALLOPURINOL 200 MG: 100 TABLET ORAL at 17:19

## 2019-06-13 RX ADMIN — FUROSEMIDE 40 MG: 40 TABLET ORAL at 17:19

## 2019-06-13 RX ADMIN — ZIPRASIDONE HYDROCHLORIDE 20 MG: 20 CAPSULE ORAL at 20:28

## 2019-06-13 RX ADMIN — ENOXAPARIN SODIUM 40 MG: 40 INJECTION SUBCUTANEOUS at 17:18

## 2019-06-13 RX ADMIN — TOPIRAMATE 200 MG: 100 TABLET, FILM COATED ORAL at 20:27

## 2019-06-13 RX ADMIN — POLYETHYLENE GLYCOL 3350 17 G: 17 POWDER, FOR SOLUTION ORAL at 20:27

## 2019-06-13 RX ADMIN — PANTOPRAZOLE SODIUM 40 MG: 40 TABLET, DELAYED RELEASE ORAL at 17:18

## 2019-06-13 RX ADMIN — POTASSIUM CHLORIDE 20 MEQ: 20 TABLET, EXTENDED RELEASE ORAL at 17:18

## 2019-06-13 RX ADMIN — ESCITALOPRAM OXALATE 20 MG: 10 TABLET ORAL at 20:28

## 2019-06-13 RX ADMIN — Medication 10 ML: at 20:28

## 2019-06-13 ASSESSMENT — PAIN SCALES - GENERAL
PAINLEVEL_OUTOF10: 0
PAINLEVEL_OUTOF10: 0

## 2019-06-13 NOTE — ED NOTES
Pt reports that she did not take all of the missing percocet, that she is giving them to her friend who has cancer.       Car Bruner RN  06/13/19 [id number]

## 2019-06-13 NOTE — ED NOTES
Pt taken to floor and controlled substances re-counted and verified by Erin Rivera RN. Medications left in possession of nurse taking over patient.       Myriam Buchanan RN  06/13/19 [id number]

## 2019-06-13 NOTE — ED NOTES
This nurse  and inventoried patients prescription pills with pharmacy tech. Pills have been totaled and bottles taped. Over the counter medications are in one bag. Pt prescriptions bottles in a separate bag. Pt also had pills in bottles not prescribed to her which were given to the officer.  Loose pills collected and given to officer for disposal.        Controlled substance  bottles as follows    linzess  6 pills  Gabapentin  25 pills  linzess 18 pills   escitalopram   35 pills  Percocet  54  geodon   29 pills  Baclofen  113 pills  escitalopram 36 pills      levocarnitine pills- A whole unopened box and a sleeve containing 2 pills             Ismael Hartman, RN  06/13/19 [id number]

## 2019-06-13 NOTE — ED NOTES
Pt has two large bags full of pill bottles. Doing inventory of pills, multiple empty and half empty bottles.      Sandy Hernandes, RN  06/13/19 [id number]

## 2019-06-13 NOTE — ED NOTES
Pt is alert to self and knows she is the hospital. Does not know why or what year it is. Pt constantly having involuntary arm and leg movements.       Eliana Marsh, RIANA  06/13/19 7214

## 2019-06-13 NOTE — ED PROVIDER NOTES
Block (Left, 11/12/2014); Nerve Block (Left, 12 8 14); Nerve Block (Right, 3/30/15); Nerve Block (Right, 4/6/2015); Nerve Block (Right, 4/13/15); Nerve Block (Left, 7/6/15); Nerve Block (07/20/15); Nerve Block (Left, 10 1 15); Nerve Block (10/26/15); other surgical history (3/28/2016); Endoscopy, colon, diagnostic; pr colonoscopy flx dx w/collj spec when pfrmd (N/A, 3/20/2018); pr egd transoral biopsy single/multiple (N/A, 3/20/2018); back surgery (last one 1995); Cholecystectomy (1999); Hysterectomy (1988); joint replacement (Bilateral, [id number]); Appendectomy; Colonoscopy (N/A, 9/18/2018); and Esophagus dilation (9/18/2018). Social History:  reports that she has been smoking cigarettes and cigars. She has a 7.50 pack-year smoking history. She has never used smokeless tobacco. She reports that she does not drink alcohol or use drugs. Family History: family history includes Arthritis in an other family member; Cancer in her father and another family member; Depression in an other family member; Heart Disease in her mother and another family member; Hypertension in an other family member; Mental Illness in an other family member; Stroke in an other family member. The patients home medications have been reviewed. Allergies: Bee pollen; Penicillins; Ropinirole hcl; Vistaril [hydroxyzine hcl]; Aripiprazole; Hydroxyzine pamoate; and Tape [adhesive tape]      ---------------------------------------------------PHYSICAL EXAM--------------------------------------    Constitutional/General: Awake, but not oriented. Head: Normocephalic and atraumatic  Eyes: PERRL  Mouth: Oropharynx clear, handling secretions, protecting airway. Neck: Supple, full ROMsigns  Respiratory: Lungs clear to auscultation bilaterally, no wheezes, rales, or rhonchi. Not in respiratory distress  Cardiovascular:  Regular rate. Regular rhythm. No murmurs, gallops, or rubs.  2+ distal pulses  Chest: No chest wall tenderness  GI:  Abdomen Soft, Non tender, Non distended. Musculoskeletal: Moves all extremities x 4. Warm and well perfused, no edema. Integument: skin warm and dry  Neurologic: Confused, not answering questions. Unable to fully assess due to patient's mental status. Psychiatric: Unable to assess. -------------------------------------------------- RESULTS -------------------------------------------------  I have personally reviewed all laboratory and imaging results for this patient. Results are listed below.      LABS:  Results for orders placed or performed during the hospital encounter of 06/13/19   Lactate, Sepsis   Result Value Ref Range    Lactic Acid, Sepsis 1.1 0.5 - 1.9 mmol/L   CBC auto differential   Result Value Ref Range    WBC 5.8 4.5 - 11.5 E9/L    RBC 4.45 3.50 - 5.50 E12/L    Hemoglobin 14.4 11.5 - 15.5 g/dL    Hematocrit 45.3 34.0 - 48.0 %    .8 (H) 80.0 - 99.9 fL    MCH 32.4 26.0 - 35.0 pg    MCHC 31.8 (L) 32.0 - 34.5 %    RDW 13.1 11.5 - 15.0 fL    Platelets 609 201 - 348 E9/L    MPV 9.1 7.0 - 12.0 fL    Neutrophils % 58.5 43.0 - 80.0 %    Immature Granulocytes % 0.5 0.0 - 5.0 %    Lymphocytes % 30.4 20.0 - 42.0 %    Monocytes % 8.0 2.0 - 12.0 %    Eosinophils % 2.1 0.0 - 6.0 %    Basophils % 0.5 0.0 - 2.0 %    Neutrophils # 3.36 1.80 - 7.30 E9/L    Immature Granulocytes # 0.03 E9/L    Lymphocytes # 1.75 1.50 - 4.00 E9/L    Monocytes # 0.46 0.10 - 0.95 E9/L    Eosinophils # 0.12 0.05 - 0.50 E9/L    Basophils # 0.03 0.00 - 0.20 E9/L   Comprehensive Metabolic Panel w/ Reflex to MG   Result Value Ref Range    Sodium 145 132 - 146 mmol/L    Potassium reflex Magnesium 3.9 3.5 - 5.0 mmol/L    Chloride 109 (H) 98 - 107 mmol/L    CO2 25 22 - 29 mmol/L    Anion Gap 11 7 - 16 mmol/L    Glucose 86 74 - 99 mg/dL    BUN 8 6 - 20 mg/dL    CREATININE 0.8 0.5 - 1.0 mg/dL    GFR Non-African American >60 >=60 mL/min/1.73    GFR African American >60     Calcium 9.0 8.6 - 10.2 mg/dL    Total Protein 6.1 (L) 6.4 - 8.3 g/dL    Alb 3.6 3.5 - 5.2 g/dL    Total Bilirubin 0.3 0.0 - 1.2 mg/dL    Alkaline Phosphatase 104 35 - 104 U/L    ALT 16 0 - 32 U/L    AST 20 0 - 31 U/L   Urinalysis   Result Value Ref Range    Color, UA Yellow Straw/Yellow    Clarity, UA Clear Clear    Glucose, Ur Negative Negative mg/dL    Bilirubin Urine Negative Negative    Ketones, Urine Negative Negative mg/dL    Specific Gravity, UA 1.010 1.005 - 1.030    Blood, Urine Negative Negative    pH, UA 6.5 5.0 - 9.0    Protein, UA Negative Negative mg/dL    Urobilinogen, Urine 0.2 <2.0 E.U./dL    Nitrite, Urine Negative Negative    Leukocyte Esterase, Urine Negative Negative   APTT   Result Value Ref Range    aPTT 29.6 24.5 - 35.1 sec   Protime-INR   Result Value Ref Range    Protime 10.0 9.3 - 12.4 sec    INR 0.9    Lipase   Result Value Ref Range    Lipase 17 13 - 60 U/L   Troponin   Result Value Ref Range    Troponin <0.01 0.00 - 0.03 ng/mL   Urine Drug Screen   Result Value Ref Range    Amphetamine Screen, Urine NOT DETECTED Negative <1000 ng/mL    Barbiturate Screen, Ur NOT DETECTED Negative < 200 ng/mL    Benzodiazepine Screen, Urine NOT DETECTED Negative < 200 ng/mL    Cannabinoid Scrn, Ur NOT DETECTED Negative < 50ng/mL    Cocaine Metabolite Screen, Urine NOT DETECTED Negative < 300 ng/mL    Opiate Scrn, Ur NOT DETECTED Negative < 300ng/mL    PCP Screen, Urine NOT DETECTED Negative < 25 ng/mL    Methadone Screen, Urine NOT DETECTED Negative <300 ng/mL    Propoxyphene Scrn, Ur NOT DETECTED Negative <300 ng/mL   Ethanol   Result Value Ref Range    Ethanol Lvl <10 mg/dL   Blood Gas, Arterial   Result Value Ref Range    Date Analyzed [id number]     Time Analyzed 1116     Source: Blood Arterial     pH, Blood Gas 7.403 7.350 - 7.450    PCO2 42.4 35.0 - 45.0 mmHg    PO2 64.7 60.0 - 100.0 mmHg    HCO3 25.9 22.0 - 26.0 mmol/L    B.E. 0.9 -3.0 - 3.0 mmol/L    O2 Sat 93.7 92.0 - 98.5 %    O2Hb 88.4 (L) 94.0 - 97.0 %    COHb 5.4 (H) 0.0 - 1.5 %    MetHb 0.3 0.0 - 1.5 % O2 Content 18.1 mL/dL    HHb 5.9 (H) 0.0 - 5.0 %    tHb (est) 14.6 11.5 - 16.5 g/dL    Mode RA     Date Of Collection      Time Collected      Pt Temp 37.0 C     ID [id number]     Lab [id number]     Critical(s) Notified . No Critical Values    EKG 12 lead   Result Value Ref Range    Ventricular Rate 49 BPM    Atrial Rate 49 BPM    P-R Interval 194 ms    QRS Duration 92 ms    Q-T Interval 424 ms    QTc Calculation (Bazett) 383 ms    P Axis 13 degrees    R Axis 30 degrees    T Axis 38 degrees       RADIOLOGY:  Interpreted by Radiologist.  CT HEAD WO CONTRAST   Final Result   Minimal atrophy and mild chronic microvascular ischemic disease. CT ABDOMEN PELVIS WO CONTRAST Additional Contrast? None   Final Result   Nonobstructing nephrolithiasis on the right. Postoperative changes as   noted. XR CHEST 1 VW   Final Result   Cardiomegaly   Tortuous aorta   There are patchy infiltrates seen throughout both the lung fields. Pulmonary vascular congestion could have this appearance                           EKG:  This EKG is signed and interpreted by the EP. Time: 11:35  Rate: 49  Rhythm: sinus bradycardia  Interpretation: sinus bradycardia  Comparison: stable, no STEMI    ------------------------- NURSING NOTES AND VITALS REVIEWED ---------------------------   The nursing notes within the ED encounter and vital signs as below have been reviewed by myself. /63   Pulse 64   Temp 98.7 °F (37.1 °C) (Oral)   Resp 16   Ht 5' 7\" (1.702 m)   Wt 270 lb (122.5 kg)   LMP 08/31/1988   SpO2 93%   BMI 42.29 kg/m²   Oxygen Saturation Interpretation: Normal    The patients available past medical records and past encounters were reviewed. ------------------------------ ED COURSE/MEDICAL DECISION MAKING----------------------  Medications   midazolam (VERSED) injection 5 mg (has no administration in time range)       Medical Decision Making:    Patient comes to the ED for altered mental by EMS.  It was reported that she was not alert/oriented for 20 minutes before EMS was called. Reports that upon arrival, pt was given Narcan. In the ED, she is confused, not oriented, and not answering questions. Had CT head, chest XR, CT abdomen, lab work, and EKG ordered. This patient's ED course included: a personal history and physicial examination and re-evaluation prior to disposition  This patient has been closely monitored during their ED course. Re-Evaluations:           Time 1:34 PM  Upon re-examination, patients symptoms show no change. Patient is awake but not answering questions. Consultations:            Time: 1:25 PM.   Spoke with Dr. Jae Hong (Medicine). Discussed case. They will admit this patient and will provide consultation. Counseling: The emergency provider has spoken with the patient and discussed todays results, in addition to providing specific details for the plan of care and counseling regarding the diagnosis and prognosis. Questions are answered at this time and they are agreeable with the plan.       --------------------------------- IMPRESSION AND DISPOSITION ---------------------------------    IMPRESSION  1. Acute delirium        DISPOSITION  Disposition: Admit to telemetry  Patient condition is stable    SCRIBE ATTESTATION  6/13/19, 10:37 AM.    This note is prepared by Aleksandra Garza acting as Scribe for Marita Young MD.    Marita Young MD:  The scribe's documentation has been prepared under my direction and personally reviewed by me in its entirety. I confirm that the note above accurately reflects all work, treatment, procedures, and medical decision making performed by me.

## 2019-06-13 NOTE — PROGRESS NOTES
Pt admitted with multiple bags full of both prescription and OTC medications. 25+ bottles are in 3 separate bags. One of which contained percocet, this was verified by the RN and Charge RN. Pt also had a weekly pill distributor, questionable as to whether or not it contained narcotics. It was taken directly to pharmacy by this RN for verification.

## 2019-06-13 NOTE — PROGRESS NOTES
Database complete, but is limited d/t patient LOC. Medications reconciled to the best of pharmacy technicians ability. Care plans and education initiated. Wears 2-3L prn at home.

## 2019-06-14 VITALS
WEIGHT: 270.8 LBS | TEMPERATURE: 98 F | RESPIRATION RATE: 20 BRPM | HEART RATE: 80 BPM | BODY MASS INDEX: 42.5 KG/M2 | SYSTOLIC BLOOD PRESSURE: 134 MMHG | DIASTOLIC BLOOD PRESSURE: 79 MMHG | OXYGEN SATURATION: 96 % | HEIGHT: 67 IN

## 2019-06-14 LAB
ALBUMIN SERPL-MCNC: 3.8 G/DL (ref 3.5–5.2)
ALP BLD-CCNC: 97 U/L (ref 35–104)
ALT SERPL-CCNC: 15 U/L (ref 0–32)
ANION GAP SERPL CALCULATED.3IONS-SCNC: 10 MMOL/L (ref 7–16)
AST SERPL-CCNC: 16 U/L (ref 0–31)
BASOPHILS ABSOLUTE: 0.02 E9/L (ref 0–0.2)
BASOPHILS RELATIVE PERCENT: 0.2 % (ref 0–2)
BILIRUB SERPL-MCNC: 0.4 MG/DL (ref 0–1.2)
BUN BLDV-MCNC: 10 MG/DL (ref 6–20)
CALCIUM SERPL-MCNC: 9.3 MG/DL (ref 8.6–10.2)
CHLORIDE BLD-SCNC: 107 MMOL/L (ref 98–107)
CO2: 24 MMOL/L (ref 22–29)
CREAT SERPL-MCNC: 0.7 MG/DL (ref 0.5–1)
EOSINOPHILS ABSOLUTE: 0.06 E9/L (ref 0.05–0.5)
EOSINOPHILS RELATIVE PERCENT: 0.6 % (ref 0–6)
GFR AFRICAN AMERICAN: >60
GFR NON-AFRICAN AMERICAN: >60 ML/MIN/1.73
GLUCOSE BLD-MCNC: 116 MG/DL (ref 74–99)
HCT VFR BLD CALC: 42.9 % (ref 34–48)
HEMOGLOBIN: 13.8 G/DL (ref 11.5–15.5)
IMMATURE GRANULOCYTES #: 0.04 E9/L
IMMATURE GRANULOCYTES %: 0.4 % (ref 0–5)
LYMPHOCYTES ABSOLUTE: 1.35 E9/L (ref 1.5–4)
LYMPHOCYTES RELATIVE PERCENT: 13.6 % (ref 20–42)
MCH RBC QN AUTO: 32.2 PG (ref 26–35)
MCHC RBC AUTO-ENTMCNC: 32.2 % (ref 32–34.5)
MCV RBC AUTO: 100.2 FL (ref 80–99.9)
MONOCYTES ABSOLUTE: 0.63 E9/L (ref 0.1–0.95)
MONOCYTES RELATIVE PERCENT: 6.3 % (ref 2–12)
NEUTROPHILS ABSOLUTE: 7.85 E9/L (ref 1.8–7.3)
NEUTROPHILS RELATIVE PERCENT: 78.9 % (ref 43–80)
PDW BLD-RTO: 13 FL (ref 11.5–15)
PLATELET # BLD: 186 E9/L (ref 130–450)
PMV BLD AUTO: 9.2 FL (ref 7–12)
POTASSIUM REFLEX MAGNESIUM: 4 MMOL/L (ref 3.5–5)
PRO-BNP: 178 PG/ML (ref 0–125)
RBC # BLD: 4.28 E12/L (ref 3.5–5.5)
SODIUM BLD-SCNC: 141 MMOL/L (ref 132–146)
T4 FREE: 1.18 NG/DL (ref 0.93–1.7)
TOTAL PROTEIN: 6.3 G/DL (ref 6.4–8.3)
WBC # BLD: 10 E9/L (ref 4.5–11.5)

## 2019-06-14 PROCEDURE — 97535 SELF CARE MNGMENT TRAINING: CPT

## 2019-06-14 PROCEDURE — 83880 ASSAY OF NATRIURETIC PEPTIDE: CPT

## 2019-06-14 PROCEDURE — 85025 COMPLETE CBC W/AUTO DIFF WBC: CPT

## 2019-06-14 PROCEDURE — 6370000000 HC RX 637 (ALT 250 FOR IP): Performed by: INTERNAL MEDICINE

## 2019-06-14 PROCEDURE — 84439 ASSAY OF FREE THYROXINE: CPT

## 2019-06-14 PROCEDURE — 6360000002 HC RX W HCPCS: Performed by: INTERNAL MEDICINE

## 2019-06-14 PROCEDURE — 2580000003 HC RX 258: Performed by: INTERNAL MEDICINE

## 2019-06-14 PROCEDURE — G0378 HOSPITAL OBSERVATION PER HR: HCPCS

## 2019-06-14 PROCEDURE — 36415 COLL VENOUS BLD VENIPUNCTURE: CPT

## 2019-06-14 PROCEDURE — 97165 OT EVAL LOW COMPLEX 30 MIN: CPT

## 2019-06-14 PROCEDURE — 97530 THERAPEUTIC ACTIVITIES: CPT

## 2019-06-14 PROCEDURE — 97161 PT EVAL LOW COMPLEX 20 MIN: CPT

## 2019-06-14 PROCEDURE — 80053 COMPREHEN METABOLIC PANEL: CPT

## 2019-06-14 PROCEDURE — 96372 THER/PROPH/DIAG INJ SC/IM: CPT

## 2019-06-14 RX ORDER — GABAPENTIN 300 MG/1
600 CAPSULE ORAL 4 TIMES DAILY
Status: DISCONTINUED | OUTPATIENT
Start: 2019-06-14 | End: 2019-06-14 | Stop reason: HOSPADM

## 2019-06-14 RX ORDER — FUROSEMIDE 40 MG/1
40 TABLET ORAL 2 TIMES DAILY
Status: DISCONTINUED | OUTPATIENT
Start: 2019-06-14 | End: 2019-06-14 | Stop reason: HOSPADM

## 2019-06-14 RX ORDER — FUROSEMIDE 40 MG/1
40 TABLET ORAL 2 TIMES DAILY
Qty: 60 TABLET | Refills: 0 | Status: ON HOLD
Start: 2019-06-14 | End: 2022-04-15 | Stop reason: HOSPADM

## 2019-06-14 RX ADMIN — FUROSEMIDE 40 MG: 40 TABLET ORAL at 09:15

## 2019-06-14 RX ADMIN — POLYETHYLENE GLYCOL 3350 17 G: 17 POWDER, FOR SOLUTION ORAL at 09:15

## 2019-06-14 RX ADMIN — ACETAMINOPHEN 650 MG: 325 TABLET ORAL at 09:15

## 2019-06-14 RX ADMIN — TOPIRAMATE 200 MG: 100 TABLET, FILM COATED ORAL at 09:14

## 2019-06-14 RX ADMIN — ESCITALOPRAM OXALATE 20 MG: 10 TABLET ORAL at 09:14

## 2019-06-14 RX ADMIN — GABAPENTIN 600 MG: 300 CAPSULE ORAL at 14:44

## 2019-06-14 RX ADMIN — ENOXAPARIN SODIUM 40 MG: 40 INJECTION SUBCUTANEOUS at 09:14

## 2019-06-14 RX ADMIN — POTASSIUM CHLORIDE 20 MEQ: 20 TABLET, EXTENDED RELEASE ORAL at 09:14

## 2019-06-14 RX ADMIN — Medication 10 ML: at 09:15

## 2019-06-14 RX ADMIN — DOCUSATE SODIUM 100 MG: 100 CAPSULE, LIQUID FILLED ORAL at 09:15

## 2019-06-14 RX ADMIN — PANTOPRAZOLE SODIUM 40 MG: 40 TABLET, DELAYED RELEASE ORAL at 09:14

## 2019-06-14 RX ADMIN — FERROUS SULFATE TAB 325 MG (65 MG ELEMENTAL FE) 325 MG: 325 (65 FE) TAB at 09:14

## 2019-06-14 RX ADMIN — LEVOCARNITINE 330 MG: 330 TABLET ORAL at 14:38

## 2019-06-14 RX ADMIN — ALLOPURINOL 200 MG: 100 TABLET ORAL at 09:14

## 2019-06-14 ASSESSMENT — PAIN SCALES - GENERAL
PAINLEVEL_OUTOF10: 6
PAINLEVEL_OUTOF10: 4
PAINLEVEL_OUTOF10: 6

## 2019-06-14 ASSESSMENT — PAIN DESCRIPTION - PROGRESSION: CLINICAL_PROGRESSION: NOT CHANGED

## 2019-06-14 ASSESSMENT — PAIN DESCRIPTION - FREQUENCY: FREQUENCY: INTERMITTENT

## 2019-06-14 ASSESSMENT — PAIN DESCRIPTION - LOCATION: LOCATION: GENERALIZED;BACK

## 2019-06-14 ASSESSMENT — PAIN DESCRIPTION - DESCRIPTORS: DESCRIPTORS: ACHING;DULL;DISCOMFORT

## 2019-06-14 NOTE — PLAN OF CARE
Problem: Falls - Risk of:  Goal: Will remain free from falls  Description  Will remain free from falls  6/14/2019 0320 by Farzad Perez RN  Outcome: Met This Shift  6/13/2019 1401 by Everardo Edge RN  Outcome: Met This Shift  Goal: Absence of physical injury  Description  Absence of physical injury  6/14/2019 0320 by Farzad Perez RN  Outcome: Met This Shift  6/13/2019 1401 by Everardo Edge RN  Outcome: Met This Shift     Problem: Pain:  Goal: Pain level will decrease  Description  Pain level will decrease     6/14/2019 0320 by Farzad Perez RN  Outcome: Met This Shift  6/13/2019 1401 by Everardo Edge RN  Outcome: Met This Shift     Problem: Fluid and Electrolyte Imbalance  Goal: Fluid and electrolyte balance are achieved/maintained  6/14/2019 0320 by Farzad Perez RN  Outcome: Met This Shift  6/13/2019 1401 by Everardo Edge RN  Outcome: Ongoing

## 2019-06-14 NOTE — H&P
Internal Medicine History & Physical     Name: Heinz Babinski  : 1960  Chief Complaint: Altered Mental Status (pt was found unresponsive by roomate per ems. Pt intermot. awake, but unable to answer questions )  Primary Care Physician: Concepcion Means MD  Admission date: 2019  Date of service: 2019     History of Present Illness  Shahab Gaines is a 62y.o. year old female who  has a past medical history of Anemia, Arthritis, Asthma, Bipolar affective ([address] Chronic back pain, Chronic respiratory failure with hypoxia ([address] Depression, Environmental and seasonal allergies, Fatty liver, Full dentures, GERD (gastroesophageal reflux disease), Gout, Hyperlipidemia, Hypertension, Lymphedema of lower extremity, Mitochondrial cytopathy ([address] Neuropathy, Obesity, PETR (obstructive sleep apnea), PONV (postoperative nausea and vomiting), Seizures ([address] Spinal headache, and Water retention. .     The patient presented to the hospital with a chief complaint of altered mental status. She states that the neighbor found her on the floor. She has no recollection of this. She states \"I think I had a seizure in my sleep\". She states that she has had many seizures in the past.  At this time she feels perfectly fine and wants to go home. She denies any complaints or issues whatsoever. She denies taking too much of any of her medications. She states that she takes her medications only as prescribed. She adamantly refuses home health care. She adamantly refuses for me to decrease any of her medications at this time. I recommend that she cut back on the baclofen as well as the Neurontin. She refused. I told her that she needs to see her PCP on Monday or Tuesday of next week and she agreed to make this appointment. There are no family or friends present at bedside. Case discussed with patient and she is felt to be a fair historian. ED course:   Initial blood work and imaging studies performed.  Admission injection    NERVE BLOCK Left 10/29/2014    left lumbar transforaminal nerve lbock  #1    NERVE BLOCK Left 11/12/2014    lumbar left transforaminal nerve block  #2    NERVE BLOCK Left 12 8 14    lumbar transforaminal #3    NERVE BLOCK Right 3/30/15    cervical transforaminal #1    NERVE BLOCK Right 4/6/2015    right cervical transforaminal nerve block  #2    NERVE BLOCK Right 4/13/15    cervical transforaminal #3    NERVE BLOCK Left 7/6/15    knee injection #1    NERVE BLOCK  07/20/15    left genicular nerve block/knee #2    NERVE BLOCK Left 10 1 15    lumb transforam #1    NERVE BLOCK  10/26/15    left lumbar transforaminal nerve block #3    OTHER SURGICAL HISTORY Left 11 25 13    lumbar radiofreq    OTHER SURGICAL HISTORY  3/28/2016    stage 1, 3 day percutanous trial [employer] lumbar spinal cord stimulator    NC COLONOSCOPY FLX DX W/COLLJ SPEC WHEN PFRMD N/A 3/20/2018    COLONOSCOPY DIAGNOSTIC OR SCREENING performed by Shyam Waller MD at [facility] ENDOSCOPY    NC EGD TRANSORAL BIOPSY SINGLE/MULTIPLE N/A 3/20/2018    EGD BIOPSY performed by Shyam Waller MD at [facility] ENDOSCOPY    TONSILLECTOMY         Family History   Problem Relation Age of Onset    Heart Disease Mother     Cancer Father     Arthritis Other     Cancer Other     Depression Other     Heart Disease Other     Hypertension Other     Mental Illness Other     Stroke Other        Social History  Patient lives at home. Employment: None  Illicit drug use-denies  TOBACCO:   reports that she has been smoking cigarettes and cigars. She started smoking about 29 years ago. She has a 7.50 pack-year smoking history. She has never used smokeless tobacco.  ETOH:   reports that she does not drink alcohol. Home Medications  Prior to Admission medications    Medication Sig Start Date End Date Taking?  Authorizing Provider   furosemide (LASIX) 40 MG tablet Take 1 tablet by mouth 2 times daily 6/14/19  Yes Jori Arreguin,  albuterol sulfate  (90 Base) MCG/ACT inhaler Inhale 2 puffs into the lungs every 4 hours as needed for Wheezing or Shortness of Breath    Historical Provider, MD   Cholecalciferol (VITAMIN D3) 5000 units TABS Take 1 tablet by mouth every other day    Historical Provider, MD   Coenzyme Q10 (COQ10) 200 MG CAPS Take 1 capsule by mouth 2 times daily    Historical Provider, MD   ferrous sulfate 325 (65 Fe) MG tablet Take 325 mg by mouth daily    Historical Provider, MD   potassium chloride (KLOR-CON M) 20 MEQ extended release tablet Take 20 mEq by mouth daily    Historical Provider, MD   docusate sodium (COLACE) 100 MG capsule Take 100 mg by mouth 2 times daily    Historical Provider, MD   pantoprazole (PROTONIX) 40 MG tablet Take 40 mg by mouth daily    Historical Provider, MD   lidocaine (LMX) 4 % cream Apply topically as needed for Pain Apply topically as needed. Historical Provider, MD   linaclotide (LINZESS) 145 MCG capsule Take 145 mcg by mouth every morning (before breakfast)    Historical Provider, MD   levOCARNitine (CARNITOR) 330 MG tablet Take 330 mg by mouth 3 times daily     Historical Provider, MD   polyethylene glycol (GLYCOLAX) packet Take 17 g by mouth 2 times daily  12/30/16   Historical Provider, MD   allopurinol (ZYLOPRIM) 100 MG tablet Take 200 mg by mouth daily     Historical Provider, MD   escitalopram (LEXAPRO) 20 MG tablet Take 20 mg by mouth 2 times daily     Historical Provider, MD   gabapentin (NEURONTIN) 600 MG tablet Take 1 tablet by mouth 4 times daily. 10/31/13   Lili Blum MD   topiramate (TOPAMAX) 200 MG tablet Take 1 tablet by mouth 2 times daily.  5/9/12   bA Benavides DO   ziprasidone (GEODON) 20 MG capsule Take 20 mg by mouth nightly     Historical Provider, MD       Allergies  Allergies   Allergen Reactions    Bee Pollen Shortness Of Breath and Swelling     And wasp    Penicillins Anaphylaxis    Ropinirole Hcl Anaphylaxis    Vistaril [Hydroxyzine Hcl] Anaphylaxis    Aripiprazole Other (See Comments)     muscle spasms and confusion    Hydroxyzine Pamoate Itching and Rash    Tape Nhung Buerger Tape] Rash     Paper tape allergy       Review of Systems  Please see HPI above. All bolded are positive. All un-bolded are negative. Constitutional Symptoms: fever, chills, fatigue, generalized weakness, diaphoresis, increase in thirst, loss of appetite  Eyes: vision change   Ears, Nose, Mouth, Throat: hearing loss, nasal congestion, sores in the mouth  Cardiovascular: chest pain, chest heaviness, palpitations  Respiratory: shortness of breath, wheezing, coughing  Gastrointestinal: abdominal pain, nausea, vomiting, diarrhea, constipation, melena, hematochezia, hematemesis  Genitourinary: dysuria, hematuria, increase in frequency  Musculoskeletal: lower extremity edema, myalgias, arthralgias, back pain  Integumentary: rashes, itching   Neurological: headache, lightheadedness, dizziness, confusion, syncope, numbness, tingling, focal weakness  Psychiatric: depression, suicidal ideation, anxiety  Endocrine: unintentional weight change  Hematologic/Lymphatic: lymphadenopathy, easy bruising, easy bleeding   Allergic/Immunologic: recurrent infections      Objective  VITALS:  /79   Pulse 80   Temp 98 °F (36.7 °C) (Oral)   Resp 20   Ht 5' 7\" (1.702 m)   Wt 270 lb 12.8 oz (122.8 kg)   LMP 08/31/1988   SpO2 96%   BMI 42.41 kg/m²     Physical Exam:  General: awake, alert, oriented to person, place, time, and purpose, appears stated age, cooperative, no acute distress, pleasant, appropriate mood  Eyes: conjunctivae/corneas clear, sclera non icteric, EOMI  Ears: no obvious scars, no lesions, no masses, hearing intact  Mouth: mucous membranes moist, no obvious oral sores  Head: normocephalic, atraumatic  Neck: no JVD, no adenopathy, no thyromegaly, neck is supple, trachea is midline  Back: ROM normal, no CVA tenderness.   Chest: no pain on palpation  Lungs: Diffuse wheezing  Heart: regular rate and regular rhythm, no murmur, normal S1, S2  Abdomen: Morbidly obese, soft, non-tender; bowel sounds normal; no masses, no organomegaly  Extremities: no lower extremity edema, extremities atraumatic, no cyanosis, no clubbing, 2+ pedal pulses palpated  Skin: normal color, normal texture, normal turgor, no rashes, no lesions  Neurologic:5/5 muscle strength throughout, normal muscle tone throughout, face symmetric, hearing intact, tongue midline, speech appropriate without slurring, sensation to fine touch intact in upper and lower extremities    Labs-   Lab Results   Component Value Date    WBC 10.0 06/14/2019    HGB 13.8 06/14/2019    HCT 42.9 06/14/2019     06/14/2019     06/14/2019    K 4.0 06/14/2019     06/14/2019    CREATININE 0.7 06/14/2019    BUN 10 06/14/2019    CO2 24 06/14/2019    GLUCOSE 116 (H) 06/14/2019    ALT 15 06/14/2019    AST 16 06/14/2019    INR 0.9 06/13/2019     Lab Results   Component Value Date    CKTOTAL 88 08/01/2015    CKMB 1.5 08/01/2015    TROPONINI <0.01 06/13/2019       Recent Radiological Studies:  CT HEAD WO CONTRAST   Final Result   Minimal atrophy and mild chronic microvascular ischemic disease. CT ABDOMEN PELVIS WO CONTRAST Additional Contrast? None   Final Result   Nonobstructing nephrolithiasis on the right. Postoperative changes as   noted. XR CHEST 1 VW   Final Result   Cardiomegaly   Tortuous aorta   There are patchy infiltrates seen throughout both the lung fields.    Pulmonary vascular congestion could have this appearance                         Assessment  Active Hospital Problems    Diagnosis    Acute delirium [R41.0]     Priority: High    Chronic respiratory failure with hypoxia (HCC) [J96.11]    Mitochondrial [address] [facility] [E88.40]    GERD (gastroesophageal reflux disease) [K21.9]    Fatty liver [K76.0]    Depression [F32.9]    PETR (obstructive sleep apnea) [G47.33]    Chronic back pain [M54.9, G89.29]    Bipolar affective ([address] [F31.9]    Chronic pain [G89.29]    Neuropathic pain syndrome (non-herpetic) [M79.2]    DDD (degenerative disc disease), cervical [M50.30]    Lumbar radiculopathy [M54.16]    Lymphedema of lower extremity [I89.0]    Obesity [E66.9]    Seizure disorder ([address] [G40.909]    Bipolar 1 disorder ([address] [F31.9]    Debility [R53.81]    Asthma [J45.909]    Hyperlipidemia [E78.5]    Hypertension [I10]       Patient Active Problem List    Diagnosis Date Noted    Acute delirium 06/13/2019     Priority: High    Chronic respiratory failure with hypoxia (HCC)      at times dt diaphragm does not function properly dt Mitochondrial disorder      Mitochondrial cytopathy ([address]      s/s muscle and nerve pain, difficulty breathing, seizures, difficulty swallowing, digestive disorders      GERD (gastroesophageal reflux disease)     Fatty liver     Depression      stable      PETR (obstructive sleep apnea)      CPAP      Chronic back pain      Spinal cord stimulator in place      Bipolar affective ([address]     Neuropathic pain syndrome (non-herpetic) 08/18/2016    Chronic pain 08/18/2016    Lumbar post-laminectomy syndrome 06/10/2015    Protruded cervical disc 03/30/2015    Cervical radiculopathy 03/30/2015    DDD (degenerative disc disease), cervical 03/12/2015    Neural foraminal stenosis of cervical spine 03/12/2015    Lumbar radiculopathy 11/12/2014    Neural foraminal stenosis of lumbar spine 10/29/2014    Status post total knee replacement, right 07/24/2013    Degenerative Osteoarthritis of lumbar spine 07/24/2013    Degenerative Osteoarthritis of cervical spine 07/24/2013    Degenerative Osteoarthritis thoracic spine 07/24/2013    Thoracic facet syndrome 07/24/2013    Cervical facet syndrome 07/24/2013    Facet syndrome, lumbar 07/24/2013    Degenerative Osteoarthritis of both knees 07/15/2013    Lymphedema of lower extremity 09/06/2012    Debility 05/06/2012    Obesity 05/06/2012    Bipolar 1 disorder ([facility] [address] 05/06/2012    Seizure disorder ([facility] [address] 05/06/2012    Asthma     Hyperlipidemia     Hypertension     Arthritis        Plan  · CT head normal. ABG ok. Ammonia level normal.  · Sedating pain medications stopped including : Pepcid, Percocet, baclofen. · Restart Neurontin, Klonopin due to seizure disorder. · TSH low. Normal free T4.  · Increase PO Lasix. Check ambulatory pulse ox. Check BNP. · Continue home medications other then what's listed above . · PT/OT  · Follow labs  · DVT prophylaxis. · Please see orders for further management and care. ·  for discharge planning  · Discharge plan: ok for home later amatory pulse ox is okay. She knows that she needs to come back to the emergency department immediately if she has further symptoms or issues. Marshal Walls DO  6/14/2019  1:13 PM    Hospital Cell (always forwarded to covering physician): [phone]. I can be reached through [employer] as well. NOTE:  This report was transcribed using voice recognition software. Every effort was made to ensure accuracy; however, inadvertent computerized transcription errors may be present.

## 2019-06-14 NOTE — PROGRESS NOTES
Occupational Therapy  OCCUPATIONAL THERAPY INITIAL EVALUATION      Date:2019  Patient Name: Shola Calle  MRN: [id number]  : 1960  Room: [address] [place] [address]    Evaluating OT: Mitchell Clark OTR/L [id number]    AM-PAC Daily Activity Raw Score:     Recommended Adaptive Equipment: TBD    Diagnosis: acute delirium. Pt presents to ED with confusion. Pertinent Medical History: chronic back pain, mitochondrial cytopathy, neuropathy   Precautions:  falls     Home Living: Pt lives with roommate in a single story home with 2 steps HR on entry. Bathroom setup: tub/shower combo, has seat if needed     Prior Level of Function: Independent with ADLs, Independent with IADLs; completed functional mobility cane PRN and has [employer] if needed  Driving: No.     Pain Level: no reported pain    Cognition: A&O: 4/4. Problem solving:  WFL   Judgement/safety:  Fair              Direction following: WFL              STM: 2/3 word recall post 1 min delay     Functional Assessment:   Initial Eval Status  Date: 19 Treatment session:  Short Term Goals  Treatment frequency: 2-5x/wk PRN x1-3 wks   Feeding Independent     Grooming Supervision  Standing sink level  Independent   UB Dressing Set up  Independent   LB Dressing SBA  Mod I    Bathing SBA  Independent   Toileting Supervision  Independent   Bed Mobility  Supine to sit: NT seated in chair on entry     Functional Transfers STS: Supervision  Independent   Functional Mobility Supervision no AD  Household distance  Independent during ADLs   Balance Sitting: fair plus    Standing: fair no AD     Activity Tolerance Fair plus  standing william x7-8 min with fair plus balance during self care tasks           ADL comments: Pt is limited in completion of self care tasks due to weakness, fatigue and balance deficit.      Strength ROM Additional Info:    RUE  4/5 WFL good  and FMC/dexterity noted during ADL tasks     LUE 4/5 WFL good  and FMC/dexterity noted during ADL tasks Hearing: WFL   Vision: WFL   Sensation:  No c/o numbness or tingling   Tone: WFL   Edema: none                             Comments/Treatment: Patient educated on adapted techniques for completion of ADL, safe functional transfers and mobility. Patient required cues for follow through with proper hand/foot placement, pacing, safety, and technique in functional transfers, functional mobility, LB dressing, toileting and grooming in preparation for maximum independence in all self care tasks. OT educates patient on pacing, ECT and safety in home environment including sitting/verses standing, breaking prolonged tasks up, use of medication organizer and having a second person double check medications to ensure accuracy and writing things down due to patients self reported difficulty with STM. Education on safety in bathroom environment including stepping in and out of tub and indicated use of indwelling shower chair with verbalized understanding. Eval Complexity: Low    Assessment of current deficits   Functional mobility [x]  ADLs [x] Strength [x]  Cognition []  Functional transfers  [x] IADLs [x] Safety Awareness [x]  Endurance [x]  Fine Motor Coordination [] Balance [x] Vision/perception [] Sensation []   Gross Motor Coordination [] ROM [] Delirium []                  Motor Control []    Plan of Care:   ADL retraining [x]   Equipment needs [x]   Neuromuscular re-education [x] Energy Conservation Techniques [x]  Functional Transfer training [x] Patient and/or Family Education [x]  Functional Mobility training [x]  Environmental Modifications [x]  Cognitive re-training []   Compensatory techniques for ADLs [x]  Splinting Needs []   Positioning to improve overall function [x]   Therapeutic Activity [x]  Therapeutic Exercise  [x]  Visual/Perceptual: []    Delirium prevention/treatment  []   Other:  []    Rehab Potential: Good for established goals     Patient / Family Goal: To get home.        Patient and/or family were instructed on functional diagnosis, prognosis/goals and OT plan of care. Pt and roommate verbalized good understanding. Upon arrival, patient seated in chair. At end of session, patient seated in chair with call light and phone within reach, all lines and tubes intact. Pt would benefit from continued skilled OT to increase safety and independence with completion of ADL/IADL tasks for functional independence and quality of life.      Low Evaluation + 23 timed treatment minutes  Tx Time in: 1507  Tx Time out: 1530    Evaluation time includes thorough review of current medical information, gathering information on past medical history/social history and prior level of function, completion of standardized testing/informal observation of tasks, assessment of data, and development of POC/Goals    Seven Cruzan OTR/L  [id number]

## 2019-06-14 NOTE — PROGRESS NOTES
Physical Therapy    Facility/Department: [address] INTERMEDIATE 1  Initial Assessment    NAME: Marina Kapoor  : 1960  MRN: [id number]    Date of Service: 2019    Evaluating Therapist: Shan Richardson. Ezekiel Hill, P.T. Room #: 0516/1826-Q  DIAGNOSIS: Acute Delirium  PRECAUTIONS: falls, bed/chair alarm    Social:  Pt lives with her friend Annalise Edmond in a 1 floor plan 2 steps and 1 rail to enter. Prior to admission pt walked with a cane PRN lately due to feeling weaker than normal and states she has a ww. Initial Evaluation  Date: 19 Treatment      Short Term/ Long Term   Goals   Was pt agreeable to Eval/treatment? yes     Does pt have pain? C/o chronic neck, low back, and BLE pain     Bed Mobility  Rolling: Independent  Supine to sit: Independent  Sit to supine: Independent  Scooting: Independent  Independent   Transfers Sit to stand: supervision  Stand to sit: supervision  Stand pivot: supervision with ww  Independent   Ambulation    200 feet with ww with SBA/Supervision  350 feet with AAD if needed Independent   Stair negotiation: ascended and descended NA  2 steps with 1 rail Independent   AM-PAC Raw Score  20/24       Pt is alert and Oriented x3  BLE ROM is WFL. BLE strength is grossly 4/5 to 4+/5. Balance: sitting EOB Independent and standing with ww supervision  Sensation: c/o chronic numbness and tingling in BLE and hands. Edema: none noted  Endurance: fair+     ASSESSMENT  Pt displays functional ability as noted in the objective portion of this evaluation. Comments/Treatment:  Pt asked to use ww and walked with ww with slow gait speed and had mild tremors at times with gait. She asked to lay back in bed. Pt was left supine in bed with call light in reach. Bed/Chair alarm: bed alarm was activated.      Patient education  Pt educated on gait with ww    Patient response to education:   Pt verbalized understanding Pt demonstrated skill Pt requires further education in this area   yes yes yes     Rehab potential is Good for reaching above PT goals. Pts/ family goals   1. To go home. Patient and or family understand(s) diagnosis, prognosis, and plan of care. PLAN  PT care will be provided in accordance with the objectives noted above. Whenever appropriate, clear delegation orders will be provided for nursing staff. Exercises and functional mobility practice will be used as well as appropriate assistive devices or modalities to obtain goals. Patient and family education will also be administered as needed. Frequency of treatments will be 2-5x/week x 3-5 days. Time in: 10:40  Time out: 11:05    Ab Salguero., P.T.    License number:  [id number]

## 2019-06-15 LAB — URINE CULTURE, ROUTINE: NORMAL

## 2019-06-18 LAB — BLOOD CULTURE, ROUTINE: NORMAL

## 2019-07-01 ENCOUNTER — HOSPITAL ENCOUNTER (OUTPATIENT)
Age: 59
Discharge: HOME | End: 2019-07-03
Payer: MEDICAID

## 2019-07-01 PROCEDURE — 87088 URINE BACTERIA CULTURE: CPT

## 2019-07-03 LAB — URINE CULTURE, ROUTINE: NORMAL

## 2019-07-08 ENCOUNTER — OFFICE VISIT (OUTPATIENT)
Dept: ENT CLINIC | Age: 59
End: 2019-07-08
Payer: MEDICAID

## 2019-07-08 VITALS
SYSTOLIC BLOOD PRESSURE: 102 MMHG | TEMPERATURE: 98 F | HEART RATE: 80 BPM | OXYGEN SATURATION: 95 % | RESPIRATION RATE: 16 BRPM | BODY MASS INDEX: 41.46 KG/M2 | WEIGHT: 258 LBS | DIASTOLIC BLOOD PRESSURE: 58 MMHG | HEIGHT: 66 IN

## 2019-07-08 DIAGNOSIS — E04.1 THYROID NODULE: Primary | ICD-10-CM

## 2019-07-08 PROCEDURE — G8427 DOCREV CUR MEDS BY ELIG CLIN: HCPCS | Performed by: OTOLARYNGOLOGY

## 2019-07-08 PROCEDURE — 3017F COLORECTAL CA SCREEN DOC REV: CPT | Performed by: OTOLARYNGOLOGY

## 2019-07-08 PROCEDURE — 99213 OFFICE O/P EST LOW 20 MIN: CPT | Performed by: OTOLARYNGOLOGY

## 2019-07-08 PROCEDURE — G8417 CALC BMI ABV UP PARAM F/U: HCPCS | Performed by: OTOLARYNGOLOGY

## 2019-07-08 PROCEDURE — 1111F DSCHRG MED/CURRENT MED MERGE: CPT | Performed by: OTOLARYNGOLOGY

## 2019-07-08 PROCEDURE — 4004F PT TOBACCO SCREEN RCVD TLK: CPT | Performed by: OTOLARYNGOLOGY

## 2019-07-08 ASSESSMENT — ENCOUNTER SYMPTOMS
FACIAL SWELLING: 0
TROUBLE SWALLOWING: 1
COUGH: 0
VOICE CHANGE: 1
NAUSEA: 0
VOMITING: 0
ALLERGIC/IMMUNOLOGIC NEGATIVE: 1
STRIDOR: 0
EYE DISCHARGE: 0
COLOR CHANGE: 0
EYE REDNESS: 0
SHORTNESS OF BREATH: 0

## 2019-07-16 ENCOUNTER — TELEPHONE (OUTPATIENT)
Dept: ENT CLINIC | Age: 59
End: 2019-07-16

## 2019-07-25 ENCOUNTER — TELEPHONE (OUTPATIENT)
Dept: ENT CLINIC | Age: 59
End: 2019-07-25

## 2019-07-25 DIAGNOSIS — E04.1 THYROID NODULE: Primary | ICD-10-CM

## 2019-07-31 ENCOUNTER — HOSPITAL ENCOUNTER (OUTPATIENT)
Dept: ULTRASOUND IMAGING | Age: 59
Discharge: HOME | End: 2019-08-02
Payer: MEDICAID

## 2019-07-31 ENCOUNTER — TELEPHONE (OUTPATIENT)
Dept: ENT CLINIC | Age: 59
End: 2019-07-31

## 2019-07-31 DIAGNOSIS — E04.1 THYROID NODULE: Primary | ICD-10-CM

## 2019-07-31 DIAGNOSIS — E04.1 THYROID NODULE: ICD-10-CM

## 2019-07-31 PROCEDURE — 76536 US EXAM OF HEAD AND NECK: CPT

## 2019-08-01 RX ORDER — SODIUM CHLORIDE 0.9 % (FLUSH) 0.9 %
10 SYRINGE (ML) INJECTION PRN
Status: CANCELLED | OUTPATIENT
Start: 2019-08-01

## 2019-08-02 ENCOUNTER — HOSPITAL ENCOUNTER (OUTPATIENT)
Dept: CT IMAGING | Age: 59
Discharge: HOME | End: 2019-08-04
Payer: MEDICAID

## 2019-08-02 VITALS
WEIGHT: 263 LBS | TEMPERATURE: 97.8 F | DIASTOLIC BLOOD PRESSURE: 54 MMHG | OXYGEN SATURATION: 96 % | SYSTOLIC BLOOD PRESSURE: 110 MMHG | HEIGHT: 67 IN | RESPIRATION RATE: 18 BRPM | HEART RATE: 66 BPM | BODY MASS INDEX: 41.28 KG/M2

## 2019-08-02 DIAGNOSIS — E04.1 THYROID NODULE: ICD-10-CM

## 2019-08-02 PROCEDURE — 87075 CULTR BACTERIA EXCEPT BLOOD: CPT

## 2019-08-02 PROCEDURE — 88173 CYTOPATH EVAL FNA REPORT: CPT

## 2019-08-02 PROCEDURE — 87205 SMEAR GRAM STAIN: CPT

## 2019-08-02 PROCEDURE — 6360000002 HC RX W HCPCS: Performed by: RADIOLOGY

## 2019-08-02 PROCEDURE — 2500000003 HC RX 250 WO HCPCS: Performed by: RADIOLOGY

## 2019-08-02 PROCEDURE — 2709999900 CT GUIDED FNA

## 2019-08-02 PROCEDURE — 88184 FLOWCYTOMETRY/ TC 1 MARKER: CPT

## 2019-08-02 PROCEDURE — 7100000010 HC PHASE II RECOVERY - FIRST 15 MIN

## 2019-08-02 PROCEDURE — 88305 TISSUE EXAM BY PATHOLOGIST: CPT

## 2019-08-02 PROCEDURE — 87070 CULTURE OTHR SPECIMN AEROBIC: CPT

## 2019-08-02 PROCEDURE — 36415 COLL VENOUS BLD VENIPUNCTURE: CPT

## 2019-08-02 PROCEDURE — 88185 FLOWCYTOMETRY/TC ADD-ON: CPT

## 2019-08-02 PROCEDURE — 7100000011 HC PHASE II RECOVERY - ADDTL 15 MIN

## 2019-08-02 PROCEDURE — 21550 BIOPSY OF NECK/CHEST: CPT

## 2019-08-02 RX ORDER — LIDOCAINE HYDROCHLORIDE 20 MG/ML
10 INJECTION, SOLUTION INFILTRATION; PERINEURAL ONCE
Status: COMPLETED | OUTPATIENT
Start: 2019-08-02 | End: 2019-08-02

## 2019-08-02 RX ORDER — FENTANYL CITRATE 50 UG/ML
50 INJECTION, SOLUTION INTRAMUSCULAR; INTRAVENOUS ONCE
Status: COMPLETED | OUTPATIENT
Start: 2019-08-02 | End: 2019-08-02

## 2019-08-02 RX ORDER — MIDAZOLAM HYDROCHLORIDE 1 MG/ML
1 INJECTION INTRAMUSCULAR; INTRAVENOUS ONCE
Status: COMPLETED | OUTPATIENT
Start: 2019-08-02 | End: 2019-08-02

## 2019-08-02 RX ORDER — SODIUM CHLORIDE 0.9 % (FLUSH) 0.9 %
10 SYRINGE (ML) INJECTION PRN
Status: DISCONTINUED | OUTPATIENT
Start: 2019-08-02 | End: 2019-08-05 | Stop reason: HOSPADM

## 2019-08-02 RX ADMIN — MIDAZOLAM 1 MG: 1 INJECTION INTRAMUSCULAR; INTRAVENOUS at 10:35

## 2019-08-02 RX ADMIN — LIDOCAINE HYDROCHLORIDE 10 ML: 20 INJECTION, SOLUTION INFILTRATION; PERINEURAL at 10:26

## 2019-08-02 RX ADMIN — FENTANYL CITRATE 50 MCG: 50 INJECTION, SOLUTION INTRAMUSCULAR; INTRAVENOUS at 10:35

## 2019-08-02 ASSESSMENT — PAIN - FUNCTIONAL ASSESSMENT: PAIN_FUNCTIONAL_ASSESSMENT: 0-10

## 2019-08-02 NOTE — H&P
spray by Nasal route 2 times daily Use in each nostril as directed, Disp: , Rfl:     furosemide (LASIX) 40 MG tablet, Take 1 tablet by mouth 2 times daily, Disp: 60 tablet, Rfl: 0    albuterol sulfate  (90 Base) MCG/ACT inhaler, Inhale 2 puffs into the lungs every 4 hours as needed for Wheezing or Shortness of Breath Instructed to take am of procedure and bring, Disp: , Rfl:     Cholecalciferol (VITAMIN D3) 5000 units TABS, Take 1 tablet by mouth every other day, Disp: , Rfl:     Coenzyme Q10 (COQ10) 200 MG CAPS, Take 1 capsule by mouth 2 times daily, Disp: , Rfl:     ferrous sulfate 325 (65 Fe) MG tablet, Take 325 mg by mouth daily, Disp: , Rfl:     potassium chloride (KLOR-CON M) 20 MEQ extended release tablet, Take 20 mEq by mouth daily, Disp: , Rfl:     docusate sodium (COLACE) 100 MG capsule, Take 100 mg by mouth 2 times daily, Disp: , Rfl:     pantoprazole (PROTONIX) 40 MG tablet, Take 40 mg by mouth nightly , Disp: , Rfl:     lidocaine (LMX) 4 % cream, Apply topically as needed for Pain Apply topically as needed. , Disp: , Rfl:     linaclotide (LINZESS) 145 MCG capsule, Take 145 mcg by mouth every morning (before breakfast), Disp: , Rfl:     levOCARNitine (CARNITOR) 330 MG tablet, Take 330 mg by mouth 3 times daily Instructed to take am of procedure For mitochondrial disease, Disp: , Rfl:     polyethylene glycol (GLYCOLAX) packet, Take 17 g by mouth 2 times daily , Disp: , Rfl: 1    allopurinol (ZYLOPRIM) 100 MG tablet, Take 200 mg by mouth 2 times daily , Disp: , Rfl:     escitalopram (LEXAPRO) 20 MG tablet, Take 20 mg by mouth 2 times daily Instructed to take am of procedure, Disp: , Rfl:     gabapentin (NEURONTIN) 600 MG tablet, Take 1 tablet by mouth 4 times daily. (Patient taking differently: Take 600 mg by mouth 4 times daily. Instructed to take am of procedure), Disp: 120 tablet, Rfl: 5    topiramate (TOPAMAX) 200 MG tablet, Take 1 tablet by mouth 2 times daily.  (Patient taking

## 2019-08-02 NOTE — BRIEF OP NOTE
Brief Postoperative Note    Jennifer Meyers  YOB: 1960  [id number]    Pre-operative Diagnosis and Procedure:  63yo female with a suspicious nodule along the inferior aspect of the isthmus of the thyroid. Plan for FNA biopsy. Post-operative Diagnosis: Same    Anesthesia: Local    Estimated Blood Loss: < 10 cc    Surgeon: Lisseth Anglin MD    Complications: none    Specimen obtained: FNA, core with specimens sent in formalin and flow cytometry, and micro    Findings:  Suspicious nodule along the inferior aspect of the isthmus of the thyroid. It is unclear if this structure arises from the thyroid or is a separate structure inferior to the thyroid on imaging.      Lisseth Anglin MD   8/2/2019 11:20 AM

## 2019-08-02 NOTE — PROGRESS NOTES
1584 - Patient arrived via ambulate with cane to Radiology department for  thyroid biopsy             . Allergies, home medications, H&P and fasting instructions reviewed with patient. Vital signs taken. IV placed, blood obtained, IV flushed and prn adapter attached. No labs ordered for this procedure . Procedural instructions given, questions answered, understanding expressed and consent signed. 3702 - Patient transfered to procedure  [phone] - Patient arrived to Radiology recovery room for observation until discharged. Vital signs, dressing and pain assessment will be done every 15 minutes or as needed. Presently patient is alert, eating and drinking called pt taxi service          [phone] -  Patient prepared for discharge, up and dressed, denies discomfort, dressing over xxxx dry and intact. Taken to door via wheelchair and released with discharge instructions to home by taxi.

## 2019-08-03 LAB — GRAM STAIN ORDERABLE: NORMAL

## 2019-08-04 LAB
BODY FLUID CULTURE, STERILE: NORMAL
GRAM STAIN RESULT: NORMAL

## 2019-08-05 LAB
Lab: NORMAL
REPORT: NORMAL
THIS TEST SENT TO: NORMAL

## 2019-08-07 LAB — ANAEROBIC CULTURE: NORMAL

## 2019-08-12 ENCOUNTER — OFFICE VISIT (OUTPATIENT)
Dept: ENT CLINIC | Age: 59
End: 2019-08-12
Payer: MEDICAID

## 2019-08-12 VITALS
TEMPERATURE: 98.7 F | HEIGHT: 67 IN | DIASTOLIC BLOOD PRESSURE: 71 MMHG | OXYGEN SATURATION: 94 % | SYSTOLIC BLOOD PRESSURE: 149 MMHG | WEIGHT: 260 LBS | BODY MASS INDEX: 40.81 KG/M2 | HEART RATE: 80 BPM | RESPIRATION RATE: 16 BRPM

## 2019-08-12 DIAGNOSIS — E04.1 THYROID NODULE: Primary | ICD-10-CM

## 2019-08-12 PROCEDURE — 99213 OFFICE O/P EST LOW 20 MIN: CPT | Performed by: OTOLARYNGOLOGY

## 2019-08-12 PROCEDURE — G8427 DOCREV CUR MEDS BY ELIG CLIN: HCPCS | Performed by: OTOLARYNGOLOGY

## 2019-08-12 PROCEDURE — 4004F PT TOBACCO SCREEN RCVD TLK: CPT | Performed by: OTOLARYNGOLOGY

## 2019-08-12 PROCEDURE — G8417 CALC BMI ABV UP PARAM F/U: HCPCS | Performed by: OTOLARYNGOLOGY

## 2019-08-12 PROCEDURE — 3017F COLORECTAL CA SCREEN DOC REV: CPT | Performed by: OTOLARYNGOLOGY

## 2019-08-12 RX ORDER — MONTELUKAST SODIUM 10 MG/1
10 TABLET ORAL DAILY
Qty: 30 TABLET | Refills: 3 | Status: SHIPPED | OUTPATIENT
Start: 2019-08-12

## 2019-08-12 NOTE — PROGRESS NOTES
[facility]  Dr. Aubrey Owen. Vladimir Fuelling Ms.Ed        Patient Name:  Jean Poster  :  1960     CHIEF C/O:    Chief Complaint   Patient presents with    Results     FNA results. HISTORY OBTAINED FROM:  patient    HISTORY OF PRESENT ILLNESS:       Anette Mendoza is a 62y.o. year old female, here today for follow up of history of thyroid nodule. Patient underwent thyroid nodule biopsy, no new complaints of difficulty swallowing change in voice and biopsy results reviewed showing negative for papillary thyroid carcinoma or thyroid cancer of any type. Patient does have some mild anterior neck bruising to the biopsy otherwise no complaints. Patient does also have a complaint of chronic nasal congestion postnasal drainage and frequent rhinorrhea. Patient was placed on Singulair, which worked to some degree but wishes to go back on their Flonase as well. No other new complaints today of sinusitis type symptoms including headaches vision changes sore throat fever chills.       Past Medical History:   Diagnosis Date    Anemia     Arthritis     Asthma     Bipolar affective ([address]     Chronic back pain     Spinal cord stimulator in place    Chronic respiratory failure with hypoxia (HCC)     at times dt diaphragm does not function properly dt Mitochondrial disorder    Depression     stable    Environmental and seasonal allergies     Fatty liver     Full dentures     GERD (gastroesophageal reflux disease)     Gout     past hx of    Hyperlipidemia     Hypertension     Lymphedema of lower extremity 2012    Mitochondrial cytopathy (HCC)     s/s muscle and nerve pain, difficulty breathing, seizures, difficulty swallowing, digestive disorders    Neuropathy     at feet    Obesity     PETR (obstructive sleep apnea)     no CPAP    PONV (postoperative nausea and vomiting)     Seizures ([address]     last approx 6-13-19- f/u w/ PCP  Granmal, flares up in heat/ summer time    Dinesh Alvarez Spinal performed by Luba Leo MD at [facility]         Current Outpatient Medications:     montelukast (SINGULAIR) 10 MG tablet, Take 1 tablet by mouth daily, Disp: 30 tablet, Rfl: 3    omeprazole (PRILOSEC) 20 MG delayed release capsule, Take 20 mg by mouth Daily Instructed to take am of procedure, Disp: , Rfl:     oxyCODONE-acetaminophen (PERCOCET)  MG per tablet, Take 1 tablet by mouth every 8 hours as needed for Pain. Instructed to take am of procedure, Disp: , Rfl:     baclofen (LIORESAL) 20 MG tablet, Take 20 mg by mouth 3 times daily Instructed to take am of procedure, Disp: , Rfl:     clonazePAM (KLONOPIN) 1 MG tablet, Take 1 mg by mouth nightly as needed. , Disp: , Rfl:     furosemide (LASIX) 40 MG tablet, Take 1 tablet by mouth 2 times daily, Disp: 60 tablet, Rfl: 0    albuterol sulfate  (90 Base) MCG/ACT inhaler, Inhale 2 puffs into the lungs every 4 hours as needed for Wheezing or Shortness of Breath Instructed to take am of procedure and bring, Disp: , Rfl:     Cholecalciferol (VITAMIN D3) 5000 units TABS, Take 1 tablet by mouth every other day, Disp: , Rfl:     Coenzyme Q10 (COQ10) 200 MG CAPS, Take 1 capsule by mouth 2 times daily, Disp: , Rfl:     ferrous sulfate 325 (65 Fe) MG tablet, Take 325 mg by mouth daily, Disp: , Rfl:     potassium chloride (KLOR-CON M) 20 MEQ extended release tablet, Take 20 mEq by mouth daily, Disp: , Rfl:     docusate sodium (COLACE) 100 MG capsule, Take 100 mg by mouth 2 times daily, Disp: , Rfl:     pantoprazole (PROTONIX) 40 MG tablet, Take 40 mg by mouth nightly , Disp: , Rfl:     lidocaine (LMX) 4 % cream, Apply topically as needed for Pain Apply topically as needed. , Disp: , Rfl:     linaclotide (LINZESS) 145 MCG capsule, Take 145 mcg by mouth every morning (before breakfast), Disp: , Rfl:     levOCARNitine (CARNITOR) 330 MG tablet, Take 330 mg by mouth 3 times daily Instructed to take am of procedure For mitochondrial vomiting. Skin: Negative for rash. Allergic/Immunologic: Negative for environmental allergies. Neurological: Negative for dizziness and headaches. Hematological: Does not bruise/bleed easily. All other systems reviewed and are negative. BP (!) 149/71 (Site: Left Lower Arm, Position: Sitting, Cuff Size: Medium Adult)   Pulse 80   Temp 98.7 °F (37.1 °C) (Oral)   Resp 16   Ht 5' 7\" (1.702 m)   Wt 260 lb (117.9 kg)   LMP 08/31/1988   SpO2 94%   BMI 40.72 kg/m²   Physical Exam   Constitutional: She appears well-developed and well-nourished. HENT:   Right Ear: External ear normal.   Nose: Nose normal.   Mouth/Throat: Oropharynx is clear and moist. No oropharyngeal exudate. Eyes: Pupils are equal, round, and reactive to light. EOM are normal.   Neck: Normal range of motion. No tracheal deviation present. Thyromegaly present. Cardiovascular: Normal rate and intact distal pulses. Pulmonary/Chest: Effort normal. No respiratory distress. Musculoskeletal: Normal range of motion. She exhibits no edema. Lymphadenopathy:     She has no cervical adenopathy. Neurological: She is alert. No cranial nerve deficit. Skin: Skin is warm. No erythema. Nursing note and vitals reviewed. IMPRESSION/PLAN:  Patient seen and examined, thyroid nodule was negative for carcinoma recommendation is for the patient undergo continued observation with a 6-month follow-up thyroid ultrasound. Patient also history of chronic nasal drainage and congestion recommendation patient undergo Singulair daily as well as Flonase daily and follow-up in 6 months or sooner with any acute exacerbations current complaints. Dr. Fortino Alvarez.  Otolaryngology Facial Plastic Surgery  :  [address] [place] [address] Otolaryngology/Facial Plastic Surgery Residency  Associate Clinical Professor:  Hugh Rawls, [facility]

## 2019-08-15 RX ORDER — AZELASTINE 1 MG/ML
1 SPRAY, METERED NASAL 2 TIMES DAILY
Qty: 1 BOTTLE | Refills: 3 | Status: ON HOLD | OUTPATIENT
Start: 2019-08-15 | End: 2022-02-16 | Stop reason: HOSPADM

## 2019-08-25 ASSESSMENT — ENCOUNTER SYMPTOMS
SHORTNESS OF BREATH: 0
VOMITING: 0
COUGH: 0

## 2019-09-05 ENCOUNTER — OFFICE VISIT (OUTPATIENT)
Dept: ENDOCRINOLOGY | Age: 59
End: 2019-09-05
Payer: MEDICAID

## 2019-09-05 VITALS
HEART RATE: 79 BPM | SYSTOLIC BLOOD PRESSURE: 138 MMHG | OXYGEN SATURATION: 98 % | HEIGHT: 67 IN | BODY MASS INDEX: 41.12 KG/M2 | WEIGHT: 262 LBS | DIASTOLIC BLOOD PRESSURE: 68 MMHG | RESPIRATION RATE: 16 BRPM

## 2019-09-05 DIAGNOSIS — D35.01 ADENOMA OF RIGHT ADRENAL GLAND: Primary | ICD-10-CM

## 2019-09-05 DIAGNOSIS — E04.1 THYROID NODULE: ICD-10-CM

## 2019-09-05 PROCEDURE — 3017F COLORECTAL CA SCREEN DOC REV: CPT | Performed by: INTERNAL MEDICINE

## 2019-09-05 PROCEDURE — 99204 OFFICE O/P NEW MOD 45 MIN: CPT | Performed by: INTERNAL MEDICINE

## 2019-09-05 PROCEDURE — G8427 DOCREV CUR MEDS BY ELIG CLIN: HCPCS | Performed by: INTERNAL MEDICINE

## 2019-09-05 PROCEDURE — 4004F PT TOBACCO SCREEN RCVD TLK: CPT | Performed by: INTERNAL MEDICINE

## 2019-09-05 PROCEDURE — G8417 CALC BMI ABV UP PARAM F/U: HCPCS | Performed by: INTERNAL MEDICINE

## 2019-10-08 ENCOUNTER — ANESTHESIA (OUTPATIENT)
Dept: ENDOSCOPY | Age: 59
End: 2019-10-08
Payer: MEDICAID

## 2019-10-08 ENCOUNTER — ANESTHESIA EVENT (OUTPATIENT)
Dept: ENDOSCOPY | Age: 59
End: 2019-10-08
Payer: MEDICAID

## 2019-10-08 ENCOUNTER — HOSPITAL ENCOUNTER (OUTPATIENT)
Age: 59
Setting detail: OUTPATIENT SURGERY
Discharge: HOME | End: 2019-10-08
Attending: INTERNAL MEDICINE | Admitting: INTERNAL MEDICINE
Payer: MEDICAID

## 2019-10-08 VITALS
DIASTOLIC BLOOD PRESSURE: 59 MMHG | RESPIRATION RATE: 16 BRPM | HEART RATE: 57 BPM | WEIGHT: 262 LBS | HEIGHT: 67 IN | TEMPERATURE: 97.3 F | OXYGEN SATURATION: 98 % | BODY MASS INDEX: 41.12 KG/M2 | SYSTOLIC BLOOD PRESSURE: 122 MMHG

## 2019-10-08 VITALS — DIASTOLIC BLOOD PRESSURE: 98 MMHG | OXYGEN SATURATION: 97 % | SYSTOLIC BLOOD PRESSURE: 149 MMHG

## 2019-10-08 PROCEDURE — 6360000002 HC RX W HCPCS: Performed by: NURSE ANESTHETIST, CERTIFIED REGISTERED

## 2019-10-08 PROCEDURE — 7100000010 HC PHASE II RECOVERY - FIRST 15 MIN: Performed by: INTERNAL MEDICINE

## 2019-10-08 PROCEDURE — 2709999900 HC NON-CHARGEABLE SUPPLY: Performed by: INTERNAL MEDICINE

## 2019-10-08 PROCEDURE — 3700000001 HC ADD 15 MINUTES (ANESTHESIA): Performed by: INTERNAL MEDICINE

## 2019-10-08 PROCEDURE — 7100000011 HC PHASE II RECOVERY - ADDTL 15 MIN: Performed by: INTERNAL MEDICINE

## 2019-10-08 PROCEDURE — 88305 TISSUE EXAM BY PATHOLOGIST: CPT

## 2019-10-08 PROCEDURE — 3609017700 HC EGD DILATION GASTRIC/DUODENAL STRICTURE: Performed by: INTERNAL MEDICINE

## 2019-10-08 PROCEDURE — 3609010600 HC COLONOSCOPY POLYPECTOMY SNARE/COLD BIOPSY: Performed by: INTERNAL MEDICINE

## 2019-10-08 PROCEDURE — 3700000000 HC ANESTHESIA ATTENDED CARE: Performed by: INTERNAL MEDICINE

## 2019-10-08 PROCEDURE — 2580000003 HC RX 258: Performed by: NURSE ANESTHETIST, CERTIFIED REGISTERED

## 2019-10-08 RX ORDER — SODIUM CHLORIDE 9 MG/ML
INJECTION, SOLUTION INTRAVENOUS CONTINUOUS PRN
Status: DISCONTINUED | OUTPATIENT
Start: 2019-10-08 | End: 2019-10-08 | Stop reason: SDUPTHER

## 2019-10-08 RX ORDER — PROPOFOL 10 MG/ML
INJECTION, EMULSION INTRAVENOUS PRN
Status: DISCONTINUED | OUTPATIENT
Start: 2019-10-08 | End: 2019-10-08 | Stop reason: SDUPTHER

## 2019-10-08 RX ORDER — MIDAZOLAM HYDROCHLORIDE 1 MG/ML
INJECTION INTRAMUSCULAR; INTRAVENOUS PRN
Status: DISCONTINUED | OUTPATIENT
Start: 2019-10-08 | End: 2019-10-08 | Stop reason: SDUPTHER

## 2019-10-08 RX ADMIN — PROPOFOL 40 MG: 10 INJECTION, EMULSION INTRAVENOUS at 15:10

## 2019-10-08 RX ADMIN — PROPOFOL 40 MG: 10 INJECTION, EMULSION INTRAVENOUS at 15:13

## 2019-10-08 RX ADMIN — PROPOFOL 30 MG: 10 INJECTION, EMULSION INTRAVENOUS at 15:00

## 2019-10-08 RX ADMIN — PROPOFOL 40 MG: 10 INJECTION, EMULSION INTRAVENOUS at 14:55

## 2019-10-08 RX ADMIN — PROPOFOL 40 MG: 10 INJECTION, EMULSION INTRAVENOUS at 15:06

## 2019-10-08 RX ADMIN — PROPOFOL 20 MG: 10 INJECTION, EMULSION INTRAVENOUS at 15:14

## 2019-10-08 RX ADMIN — PROPOFOL 50 MG: 10 INJECTION, EMULSION INTRAVENOUS at 15:16

## 2019-10-08 RX ADMIN — MIDAZOLAM HYDROCHLORIDE 2 MG: 1 INJECTION, SOLUTION INTRAMUSCULAR; INTRAVENOUS at 14:46

## 2019-10-08 RX ADMIN — PROPOFOL 50 MG: 10 INJECTION, EMULSION INTRAVENOUS at 15:18

## 2019-10-08 RX ADMIN — PROPOFOL 100 MG: 10 INJECTION, EMULSION INTRAVENOUS at 14:52

## 2019-10-08 RX ADMIN — PROPOFOL 40 MG: 10 INJECTION, EMULSION INTRAVENOUS at 14:58

## 2019-10-08 RX ADMIN — PROPOFOL 40 MG: 10 INJECTION, EMULSION INTRAVENOUS at 15:05

## 2019-10-08 RX ADMIN — PROPOFOL 40 MG: 10 INJECTION, EMULSION INTRAVENOUS at 15:01

## 2019-10-08 RX ADMIN — PROPOFOL 40 MG: 10 INJECTION, EMULSION INTRAVENOUS at 15:20

## 2019-10-08 RX ADMIN — PROPOFOL 30 MG: 10 INJECTION, EMULSION INTRAVENOUS at 15:03

## 2019-10-08 RX ADMIN — PROPOFOL 40 MG: 10 INJECTION, EMULSION INTRAVENOUS at 15:07

## 2019-10-08 RX ADMIN — SODIUM CHLORIDE: 9 INJECTION, SOLUTION INTRAVENOUS at 14:46

## 2019-10-08 ASSESSMENT — PAIN SCALES - GENERAL
PAINLEVEL_OUTOF10: 0

## 2019-10-08 ASSESSMENT — PAIN - FUNCTIONAL ASSESSMENT: PAIN_FUNCTIONAL_ASSESSMENT: 0-10

## 2019-10-08 ASSESSMENT — LIFESTYLE VARIABLES: SMOKING_STATUS: 1

## 2019-10-18 ENCOUNTER — HOSPITAL ENCOUNTER (OUTPATIENT)
Dept: ULTRASOUND IMAGING | Age: 59
Discharge: HOME | End: 2019-10-20
Payer: MEDICAID

## 2019-10-18 DIAGNOSIS — N39.46 MIXED INCONTINENCE: ICD-10-CM

## 2019-10-18 DIAGNOSIS — R39.14 FEELING OF INCOMPLETE BLADDER EMPTYING: ICD-10-CM

## 2019-10-18 PROCEDURE — 76770 US EXAM ABDO BACK WALL COMP: CPT

## 2019-11-04 ENCOUNTER — PROCEDURE VISIT (OUTPATIENT)
Dept: AUDIOLOGY | Age: 59
End: 2019-11-04
Payer: MEDICAID

## 2019-11-04 ENCOUNTER — OFFICE VISIT (OUTPATIENT)
Dept: ENT CLINIC | Age: 59
End: 2019-11-04
Payer: MEDICAID

## 2019-11-04 VITALS
HEIGHT: 67 IN | SYSTOLIC BLOOD PRESSURE: 107 MMHG | OXYGEN SATURATION: 91 % | WEIGHT: 252 LBS | DIASTOLIC BLOOD PRESSURE: 48 MMHG | BODY MASS INDEX: 39.55 KG/M2 | HEART RATE: 78 BPM

## 2019-11-04 DIAGNOSIS — H92.03 EAR PAIN, BILATERAL: Primary | ICD-10-CM

## 2019-11-04 DIAGNOSIS — H69.83 ETD (EUSTACHIAN TUBE DYSFUNCTION), BILATERAL: Primary | ICD-10-CM

## 2019-11-04 PROCEDURE — 4004F PT TOBACCO SCREEN RCVD TLK: CPT | Performed by: OTOLARYNGOLOGY

## 2019-11-04 PROCEDURE — G8417 CALC BMI ABV UP PARAM F/U: HCPCS | Performed by: OTOLARYNGOLOGY

## 2019-11-04 PROCEDURE — 92567 TYMPANOMETRY: CPT | Performed by: AUDIOLOGIST

## 2019-11-04 PROCEDURE — 99213 OFFICE O/P EST LOW 20 MIN: CPT | Performed by: OTOLARYNGOLOGY

## 2019-11-04 PROCEDURE — 3017F COLORECTAL CA SCREEN DOC REV: CPT | Performed by: OTOLARYNGOLOGY

## 2019-11-04 PROCEDURE — G8484 FLU IMMUNIZE NO ADMIN: HCPCS | Performed by: OTOLARYNGOLOGY

## 2019-11-04 PROCEDURE — G8427 DOCREV CUR MEDS BY ELIG CLIN: HCPCS | Performed by: OTOLARYNGOLOGY

## 2019-11-04 ASSESSMENT — ENCOUNTER SYMPTOMS
SINUS PRESSURE: 0
COUGH: 0
VOMITING: 0
RHINORRHEA: 0
SINUS PAIN: 0
TROUBLE SWALLOWING: 0
SHORTNESS OF BREATH: 0

## 2019-12-13 ENCOUNTER — APPOINTMENT (OUTPATIENT)
Dept: GENERAL RADIOLOGY | Age: 59
End: 2019-12-13
Payer: MEDICAID

## 2019-12-13 ENCOUNTER — HOSPITAL ENCOUNTER (EMERGENCY)
Age: 59
Discharge: HOME | End: 2019-12-13
Payer: MEDICAID

## 2019-12-13 ENCOUNTER — APPOINTMENT (OUTPATIENT)
Dept: CT IMAGING | Age: 59
End: 2019-12-13
Payer: MEDICAID

## 2019-12-13 VITALS
BODY MASS INDEX: 40.49 KG/M2 | OXYGEN SATURATION: 95 % | SYSTOLIC BLOOD PRESSURE: 103 MMHG | WEIGHT: 258 LBS | DIASTOLIC BLOOD PRESSURE: 58 MMHG | HEIGHT: 67 IN | RESPIRATION RATE: 16 BRPM | HEART RATE: 83 BPM | TEMPERATURE: 98.4 F

## 2019-12-13 DIAGNOSIS — S30.0XXA CONTUSION OF PELVIC REGION, INITIAL ENCOUNTER: ICD-10-CM

## 2019-12-13 DIAGNOSIS — S09.90XA INJURY OF HEAD, INITIAL ENCOUNTER: Primary | ICD-10-CM

## 2019-12-13 DIAGNOSIS — S10.93XA CONTUSION OF NECK, INITIAL ENCOUNTER: ICD-10-CM

## 2019-12-13 PROCEDURE — 6370000000 HC RX 637 (ALT 250 FOR IP): Performed by: NURSE PRACTITIONER

## 2019-12-13 PROCEDURE — 70450 CT HEAD/BRAIN W/O DYE: CPT

## 2019-12-13 PROCEDURE — 72125 CT NECK SPINE W/O DYE: CPT

## 2019-12-13 PROCEDURE — 72131 CT LUMBAR SPINE W/O DYE: CPT

## 2019-12-13 PROCEDURE — 73521 X-RAY EXAM HIPS BI 2 VIEWS: CPT

## 2019-12-13 PROCEDURE — 99284 EMERGENCY DEPT VISIT MOD MDM: CPT

## 2019-12-13 RX ORDER — OXYCODONE HYDROCHLORIDE AND ACETAMINOPHEN 5; 325 MG/1; MG/1
1 TABLET ORAL EVERY 6 HOURS PRN
Qty: 10 TABLET | Refills: 0 | Status: SHIPPED | OUTPATIENT
Start: 2019-12-13 | End: 2019-12-13

## 2019-12-13 RX ORDER — LIDOCAINE 50 MG/G
1 PATCH TOPICAL EVERY 24 HOURS
Qty: 10 PATCH | Refills: 1 | Status: SHIPPED | OUTPATIENT
Start: 2019-12-13 | End: 2019-12-23

## 2019-12-13 RX ORDER — CYCLOBENZAPRINE HCL 10 MG
10 TABLET ORAL 3 TIMES DAILY PRN
Qty: 30 TABLET | Refills: 0 | Status: SHIPPED | OUTPATIENT
Start: 2019-12-13 | End: 2019-12-23

## 2019-12-13 RX ORDER — OXYCODONE HYDROCHLORIDE AND ACETAMINOPHEN 5; 325 MG/1; MG/1
1 TABLET ORAL ONCE
Status: COMPLETED | OUTPATIENT
Start: 2019-12-13 | End: 2019-12-13

## 2019-12-13 RX ADMIN — OXYCODONE HYDROCHLORIDE AND ACETAMINOPHEN 1 TABLET: 5; 325 TABLET ORAL at 19:32

## 2019-12-13 ASSESSMENT — PAIN DESCRIPTION - DESCRIPTORS: DESCRIPTORS: THROBBING;ACHING

## 2019-12-13 ASSESSMENT — PAIN DESCRIPTION - PAIN TYPE: TYPE: ACUTE PAIN

## 2019-12-13 ASSESSMENT — PAIN DESCRIPTION - LOCATION: LOCATION: BACK;HEAD

## 2019-12-13 ASSESSMENT — PAIN SCALES - GENERAL
PAINLEVEL_OUTOF10: 8
PAINLEVEL_OUTOF10: 8

## 2019-12-13 ASSESSMENT — PAIN DESCRIPTION - FREQUENCY: FREQUENCY: CONTINUOUS

## 2020-01-06 ENCOUNTER — TELEPHONE (OUTPATIENT)
Dept: ENT CLINIC | Age: 60
End: 2020-01-06

## 2020-02-03 ENCOUNTER — HOSPITAL ENCOUNTER (OUTPATIENT)
Dept: ULTRASOUND IMAGING | Age: 60
Discharge: HOME | End: 2020-02-05
Payer: MEDICAID

## 2020-02-03 PROCEDURE — 76536 US EXAM OF HEAD AND NECK: CPT

## 2020-02-06 ENCOUNTER — HOSPITAL ENCOUNTER (OUTPATIENT)
Age: 60
Discharge: HOME | End: 2020-02-06
Payer: MEDICAID

## 2020-02-06 LAB
ALBUMIN SERPL-MCNC: 3.5 G/DL (ref 3.5–5.2)
ALP BLD-CCNC: 149 U/L (ref 35–104)
ALT SERPL-CCNC: 15 U/L (ref 0–32)
ANION GAP SERPL CALCULATED.3IONS-SCNC: 12 MMOL/L (ref 7–16)
AST SERPL-CCNC: 20 U/L (ref 0–31)
BILIRUB SERPL-MCNC: <0.2 MG/DL (ref 0–1.2)
BUN BLDV-MCNC: 11 MG/DL (ref 6–20)
CALCIUM SERPL-MCNC: 9 MG/DL (ref 8.6–10.2)
CHLORIDE BLD-SCNC: 100 MMOL/L (ref 98–107)
CO2: 29 MMOL/L (ref 22–29)
CREAT SERPL-MCNC: 1.2 MG/DL (ref 0.5–1)
GFR AFRICAN AMERICAN: 56
GFR NON-AFRICAN AMERICAN: 46 ML/MIN/1.73
GLUCOSE BLD-MCNC: 98 MG/DL (ref 74–99)
POTASSIUM SERPL-SCNC: 3.9 MMOL/L (ref 3.5–5)
SODIUM BLD-SCNC: 141 MMOL/L (ref 132–146)
TOTAL PROTEIN: 6.2 G/DL (ref 6.4–8.3)

## 2020-02-06 PROCEDURE — 83835 ASSAY OF METANEPHRINES: CPT

## 2020-02-06 PROCEDURE — 84244 ASSAY OF RENIN: CPT

## 2020-02-06 PROCEDURE — 82384 ASSAY THREE CATECHOLAMINES: CPT

## 2020-02-06 PROCEDURE — 82570 ASSAY OF URINE CREATININE: CPT

## 2020-02-06 PROCEDURE — 82530 CORTISOL FREE: CPT

## 2020-02-06 PROCEDURE — 82088 ASSAY OF ALDOSTERONE: CPT

## 2020-02-06 PROCEDURE — 82533 TOTAL CORTISOL: CPT

## 2020-02-06 PROCEDURE — 36415 COLL VENOUS BLD VENIPUNCTURE: CPT

## 2020-02-06 PROCEDURE — 82340 ASSAY OF CALCIUM IN URINE: CPT

## 2020-02-06 PROCEDURE — 80053 COMPREHEN METABOLIC PANEL: CPT

## 2020-02-06 PROCEDURE — 82024 ASSAY OF ACTH: CPT

## 2020-02-07 LAB
24HR URINE VOLUME (ML): 2640 ML
24HR URINE VOLUME (ML): 2640 ML
CALCIUM 24 HOUR URINE: 158 MG/24 HR (ref 100–300)
CORTISOL TOTAL: 6.06 MCG/DL (ref 2.68–18.4)
CREATININE 24 HOUR URINE: 713 MG/24H (ref 720–1510)
CREATININE 24 HOUR URINE: 713 MG/24H (ref 720–1510)
Lab: 24 HOURS
Lab: 24 HOURS

## 2020-02-08 LAB — ADRENOCORTICOTROPIC HORMONE: 4.2 PG/ML (ref 7.2–63.3)

## 2020-02-09 LAB
ALDOSTERONE: 8.8 NG/DL
RENIN ACTIVITY: 2.9 NG/ML/HR

## 2020-02-10 ENCOUNTER — APPOINTMENT (OUTPATIENT)
Dept: GENERAL RADIOLOGY | Age: 60
End: 2020-02-10
Payer: MEDICAID

## 2020-02-10 ENCOUNTER — APPOINTMENT (OUTPATIENT)
Dept: CT IMAGING | Age: 60
End: 2020-02-10
Payer: MEDICAID

## 2020-02-10 ENCOUNTER — HOSPITAL ENCOUNTER (EMERGENCY)
Age: 60
Discharge: HOME | End: 2020-02-10
Payer: MEDICAID

## 2020-02-10 VITALS
RESPIRATION RATE: 14 BRPM | BODY MASS INDEX: 40.02 KG/M2 | HEIGHT: 66 IN | SYSTOLIC BLOOD PRESSURE: 128 MMHG | OXYGEN SATURATION: 96 % | TEMPERATURE: 98.3 F | WEIGHT: 249 LBS | HEART RATE: 87 BPM | DIASTOLIC BLOOD PRESSURE: 63 MMHG

## 2020-02-10 LAB
CORTISOL (UR), FREE: 9.8 UG/D
CORTISOL URINE, FREE (/G CRT): 13.74 UG/G CRT
CORTISOL,F,UG/L,U: 3.71 UG/L
INTERPRETATION: NORMAL

## 2020-02-10 PROCEDURE — 73502 X-RAY EXAM HIP UNI 2-3 VIEWS: CPT

## 2020-02-10 PROCEDURE — 72100 X-RAY EXAM L-S SPINE 2/3 VWS: CPT

## 2020-02-10 PROCEDURE — 99284 EMERGENCY DEPT VISIT MOD MDM: CPT

## 2020-02-10 PROCEDURE — 72125 CT NECK SPINE W/O DYE: CPT

## 2020-02-10 PROCEDURE — 6370000000 HC RX 637 (ALT 250 FOR IP): Performed by: PHYSICIAN ASSISTANT

## 2020-02-10 PROCEDURE — 70450 CT HEAD/BRAIN W/O DYE: CPT

## 2020-02-10 PROCEDURE — 6360000002 HC RX W HCPCS: Performed by: PHYSICIAN ASSISTANT

## 2020-02-10 PROCEDURE — 73630 X-RAY EXAM OF FOOT: CPT

## 2020-02-10 PROCEDURE — 96372 THER/PROPH/DIAG INJ SC/IM: CPT

## 2020-02-10 RX ORDER — OXYCODONE HYDROCHLORIDE AND ACETAMINOPHEN 5; 325 MG/1; MG/1
1 TABLET ORAL ONCE
Status: COMPLETED | OUTPATIENT
Start: 2020-02-10 | End: 2020-02-10

## 2020-02-10 RX ORDER — KETOROLAC TROMETHAMINE 30 MG/ML
30 INJECTION, SOLUTION INTRAMUSCULAR; INTRAVENOUS ONCE
Status: COMPLETED | OUTPATIENT
Start: 2020-02-10 | End: 2020-02-10

## 2020-02-10 RX ORDER — HYDROCODONE BITARTRATE AND ACETAMINOPHEN 5; 325 MG/1; MG/1
1 TABLET ORAL EVERY 6 HOURS PRN
Qty: 12 TABLET | Refills: 0 | Status: SHIPPED | OUTPATIENT
Start: 2020-02-10 | End: 2020-02-10 | Stop reason: CLARIF

## 2020-02-10 RX ADMIN — OXYCODONE HYDROCHLORIDE AND ACETAMINOPHEN 1 TABLET: 5; 325 TABLET ORAL at 10:19

## 2020-02-10 RX ADMIN — KETOROLAC TROMETHAMINE 30 MG: 30 INJECTION, SOLUTION INTRAMUSCULAR; INTRAVENOUS at 10:19

## 2020-02-10 ASSESSMENT — PAIN DESCRIPTION - PROGRESSION: CLINICAL_PROGRESSION: GRADUALLY WORSENING

## 2020-02-10 ASSESSMENT — PAIN DESCRIPTION - LOCATION: LOCATION: HEAD

## 2020-02-10 ASSESSMENT — PAIN SCALES - GENERAL
PAINLEVEL_OUTOF10: 8
PAINLEVEL_OUTOF10: 8

## 2020-02-10 ASSESSMENT — PAIN DESCRIPTION - FREQUENCY: FREQUENCY: CONTINUOUS

## 2020-02-10 ASSESSMENT — PAIN DESCRIPTION - PAIN TYPE: TYPE: ACUTE PAIN

## 2020-02-10 ASSESSMENT — PAIN DESCRIPTION - ORIENTATION: ORIENTATION: POSTERIOR;LEFT

## 2020-02-10 ASSESSMENT — PAIN DESCRIPTION - ONSET: ONSET: SUDDEN

## 2020-02-10 NOTE — ED PROVIDER NOTES
Independent MLP      HPI:  2/10/20,   Time: 10:45 AM         Khang Pérez is a 61 y.o. female presenting to the ED for    fall with pain in head, neck, back and left foot, beginning 1 day ago. The complaint has been persistent, moderate in severity, and worsened by changing position. Patient states she slipped and fell yesterday in the home. She reports that she lost her balance falling backwards and landing on the ground. The patient reports she did hit her head but did not lose consciousness. She is not on any blood thinners besides a baby aspirin daily. The patient was able to get back up on her own and has been ambulatory and weightbearing. She comes to the emergency room today via private car. The patient has pain in her head neck and left lower extremity. Denies vomiting or confusion. She states that she is a little nauseated. She has chronic issues with memory.   She is requesting meds for pain      ROS:     Constitutional: Negative for fever and chills  HENT: Negative for ear pain, sore throat and sinus pressure  Eyes: Negative for pain, discharge and redness  Respiratory:  Negative for shortness of breath, cough and wheezing  Cardiovascular: Negative for CP, edema or palpitations  Gastrointestinal: Negative for nausea, vomiting, diarrhea and abdominal distention  Genitourinary: Negative for dysuria and frequency  Musculoskeletal:  See HPI  Skin: See HPI  Neurological: Negative for weakness and headaches  Hematological: Negative for adenopathy    All other systems reviewed and are negative      -------------------------------- PAST HISTORY ----------------------------------  Past Medical History:  has a past medical history of Adenoma of right adrenal gland, Anemia, Anxiety, Arthritis, Asthma, Benign essential tremor, Bipolar affective ([address] Chronic back pain, Chronic respiratory failure with hypoxia ([address] Depression, Difficulty swallowing, Environmental and seasonal allergies, Fatty liver, Full dentures, GERD (gastroesophageal reflux disease), Gout, Herniated cervical disc, History of swelling of feet, Lymphedema of lower extremity, Mitochondrial cytopathy ([address] Neuropathy, Obesity, PETR (obstructive sleep apnea), PONV (postoperative nausea and vomiting), Seizures ([address] and Spinal headache. Past Surgical History:  has a past surgical history that includes knee surgery (Bilateral); Gastric bypass surgery (1999); myomectomy; Tonsillectomy; Nerve Block (Left, 10 02 2013); Nerve Block (Left, 10 9 13); Nerve Block (Left, 10/16/13); other surgical history (Left, 11 25 13); Nerve Block (Left, 10/29/2014); Nerve Block (Left, 11/12/2014); Nerve Block (Left, 12 8 14); Nerve Block (Right, 3/30/15); Nerve Block (Right, 4/6/2015); Nerve Block (Right, 4/13/15); Nerve Block (Left, 7/6/15); Nerve Block (07/20/15); Nerve Block (Left, 10 1 15); Nerve Block (10/26/15); other surgical history (3/28/2016); Endoscopy, colon, diagnostic; pr colonoscopy flx dx w/collj spec when pfrmd (N/A, 3/20/2018); pr egd transoral biopsy single/multiple (N/A, 3/20/2018); Cholecystectomy (1999); Hysterectomy (1988); Colonoscopy (N/A, 9/18/2018); Esophagus dilation (9/18/2018); back surgery (last one 1995); Cardiac catheterization (Right, 6-6-2013); Appendectomy; other surgical history (1995); joint replacement (Bilateral, [id number]); egd colonoscopy (N/A, 10/8/2019); and Colonoscopy (N/A, 10/8/2019). Social History:  reports that she has been smoking cigarettes and cigars. She started smoking about 29 years ago. She has a 15.00 pack-year smoking history. She has never used smokeless tobacco. She reports that she does not drink alcohol or use drugs.     Family History: family history includes Arthritis in an other family member; Cancer in her father and another family member; Depression in an other family member; Heart Disease in her mother and another family member; Hypertension in an other family member; Mental Illness in an other family member; Stroke in an other family member. The patients home medications have been reviewed. Allergies: Bee pollen; Penicillins; Ropinirole hcl; Vistaril [hydroxyzine hcl]; Aripiprazole; Hydroxyzine pamoate; and Tape [adhesive tape]    --------------------------------- RESULTS ------------------------------------------  All laboratory and radiology results have been personally reviewed by myself   LABS:  No results found for this visit on 02/10/20. RADIOLOGY:  Interpreted by Radiologist.  Dustin Pal Contrast   Final Result   No evidence of intracranial hemorrhage or edema. There is   age-appropriate atrophy. CT Cervical Spine WO Contrast   Final Result   No evidence of fracture or dislocation of cervical spine. Mild diffuse   degenerative changes are identified from C2 to T1 with osteophytes and   multilevel disc bulges. XR HIP RIGHT (2-3 VIEWS)   Final Result   Mild degenerative changes, the study is limited by   positioning      XR LUMBAR SPINE (2-3 VIEWS)   Final Result   Findings consistent with degenerative disc disease and   degenerative facets disease            XR FOOT LEFT (MIN 3 VIEWS)   Final Result   Mild degenerative changes   Calcaneal spur          ----------------- NURSING NOTES AND VITALS REVIEWED ---------------   The nursing notes within the ED encounter and vital signs as below have been reviewed. /63   Pulse 87   Temp 98.3 °F (36.8 °C) (Oral)   Resp 14   Ht 5' 6\" (1.676 m)   Wt 249 lb (112.9 kg)   LMP 08/31/1988   SpO2 96%   BMI 40.19 kg/m²   Oxygen Saturation Interpretation: Normal      --------------------------------PHYSICAL EXAM------------------------------------      Constitutional/General: Alert and oriented x3, mild/moderate distress  Head: NC/AT  Eyes: PERRL, EOMI  Mouth: Oropharynx clear, handling secretions, no trismus  Neck: Supple, diffusely tender over midline cervical spine.   Nonfocal  Pulmonary: Lungs clear to auscultation bilaterally, no wheezes, rales, or rhonchi. Not in respiratory distress  Cardiovascular:  Regular rate and rhythm, no murmurs, gallops, or rubs. 2+ distal pulses  Abdomen: Soft, + BS. No distension. Nontender. No palpable rigidity, rebound or guarding  Extremities: Moves all extremities x 4. No obvious deformity. Pt has mild diffuse tenderness over posterior heel/achilles. Left ankle ROM  Intact. Warm and well perfused  Skin: warm and dry without rash  Neurologic: GCS 15,  Intact. No focal deficits  Psych: Normal Affect      ------------------------ ED COURSE/MEDICAL DECISION MAKING----------------------  Medications   oxyCODONE-acetaminophen (PERCOCET) 5-325 MG per tablet 1 tablet (1 tablet Oral Given 2/10/20 1019)   ketorolac (TORADOL) injection 30 mg (30 mg Intramuscular Given 2/10/20 1019)         Medical Decision Making:    Patient's work-up is unremarkable. CT scan of the head negative for any acute bleed at the x-rays are negative for acute bony injury. Cervical spine within normal limits as well. Again no acute fracture or bony changes. The patient is reassured. Certainly she has bruising and soft tissue injury with pain. The patient is already on gabapentin and oxycodone at home. Follow-up as needed for any persistent or ongoing symptoms. Counseling: The emergency provider has spoken with the patient and discussed todays results, in addition to providing specific details for the plan of care and counseling regarding the diagnosis and prognosis. Questions are answered at this time and they are agreeable with the plan.      ------------------------ IMPRESSION AND DISPOSITION -------------------------------    IMPRESSION  1. Fall, initial encounter    2. Cervical sprain, initial encounter    3.  Foot sprain, left, initial encounter        DISPOSITION  Disposition: Discharge to home  Patient condition is stable                   Dara Mccann PA-C  02/10/20

## 2020-02-12 LAB
CATE U INT: ABNORMAL
CREATININE 24 HOUR URINE: 713 MG/D (ref 500–1400)
CREATININE URINE: 27 MG/DL
DOPAMINE (G CRT): 200 UG/G CRT (ref 0–250)
DOPAMINE 24 HOUR URINE: 143 UG/D (ref 71–485)
DOPAMINE, (UG/L): 54 UG/L
EPINEPHRINE (G CRT): <4 UG/G CRT (ref 0–20)
EPINEPHRINE 24 HOUR URINE: <3 UG/D (ref 1–14)
EPINEPHRINE, (UG/L): <1 UG/L
HOURS COLLECTED: 24 HR
METANEPHRINE INTREP URINE: ABNORMAL
METANEPHRINE UG/G CRE: 67 UG/G CRT (ref 0–300)
METANEPHRINE, UR-PER VOL: 18 UG/L
METANEPHRINES URINE: 48 UG/D (ref 36–229)
NOREPINEPH (G CRT): 74 UG/G CRT (ref 0–45)
NOREPINEPHRINE 24 HOUR URINE: 53 UG/D (ref 14–120)
NOREPINEPHRINE, (UG/L): 20 UG/L
NORMETANEPHRINE 24 HOUR URINE: 290 UG/D (ref 95–650)
NORMETANEPHRINE, (G/CRT): 407 UG/G CRT (ref 0–400)
NORMETANEPHRINES, NMOL/L: 110 UG/L
URINE TOTAL VOLUME: 2640 ML

## 2020-02-16 ENCOUNTER — TELEPHONE (OUTPATIENT)
Dept: ENDOCRINOLOGY | Age: 60
End: 2020-02-16

## 2020-02-16 NOTE — TELEPHONE ENCOUNTER
24 hr urine labs reviewed from 2/6/20. They are unremarkable. There are no signs that her adrenal nodule is functioning.   Sidra 45  02/16/20

## 2020-02-25 ENCOUNTER — HOSPITAL ENCOUNTER (EMERGENCY)
Age: 60
Discharge: HOME | End: 2020-02-26
Attending: EMERGENCY MEDICINE
Payer: MEDICAID

## 2020-02-25 ENCOUNTER — APPOINTMENT (OUTPATIENT)
Dept: CT IMAGING | Age: 60
End: 2020-02-25
Payer: MEDICAID

## 2020-02-25 VITALS
OXYGEN SATURATION: 99 % | DIASTOLIC BLOOD PRESSURE: 60 MMHG | TEMPERATURE: 97.6 F | WEIGHT: 249 LBS | SYSTOLIC BLOOD PRESSURE: 100 MMHG | RESPIRATION RATE: 16 BRPM | BODY MASS INDEX: 39.08 KG/M2 | HEIGHT: 67 IN | HEART RATE: 89 BPM

## 2020-02-25 PROCEDURE — 70450 CT HEAD/BRAIN W/O DYE: CPT

## 2020-02-25 PROCEDURE — 99284 EMERGENCY DEPT VISIT MOD MDM: CPT

## 2020-02-25 ASSESSMENT — PAIN DESCRIPTION - LOCATION: LOCATION: HEAD

## 2020-02-25 ASSESSMENT — PAIN SCALES - GENERAL: PAINLEVEL_OUTOF10: 7

## 2020-02-25 ASSESSMENT — PAIN DESCRIPTION - PAIN TYPE: TYPE: ACUTE PAIN

## 2020-02-26 PROCEDURE — 96372 THER/PROPH/DIAG INJ SC/IM: CPT

## 2020-02-26 PROCEDURE — 6360000002 HC RX W HCPCS: Performed by: STUDENT IN AN ORGANIZED HEALTH CARE EDUCATION/TRAINING PROGRAM

## 2020-02-26 PROCEDURE — 6370000000 HC RX 637 (ALT 250 FOR IP): Performed by: STUDENT IN AN ORGANIZED HEALTH CARE EDUCATION/TRAINING PROGRAM

## 2020-02-26 RX ORDER — IBUPROFEN 800 MG/1
800 TABLET ORAL EVERY 8 HOURS PRN
Qty: 20 TABLET | Refills: 0 | Status: SHIPPED | OUTPATIENT
Start: 2020-02-26 | End: 2021-02-01

## 2020-02-26 RX ORDER — ONDANSETRON 4 MG/1
4 TABLET, ORALLY DISINTEGRATING ORAL ONCE
Status: COMPLETED | OUTPATIENT
Start: 2020-02-26 | End: 2020-02-26

## 2020-02-26 RX ORDER — KETOROLAC TROMETHAMINE 30 MG/ML
30 INJECTION, SOLUTION INTRAMUSCULAR; INTRAVENOUS ONCE
Status: COMPLETED | OUTPATIENT
Start: 2020-02-26 | End: 2020-02-26

## 2020-02-26 RX ORDER — ONDANSETRON 4 MG/1
4 TABLET, ORALLY DISINTEGRATING ORAL 3 TIMES DAILY PRN
Qty: 21 TABLET | Refills: 0 | Status: ON HOLD | OUTPATIENT
Start: 2020-02-26 | End: 2022-02-16 | Stop reason: HOSPADM

## 2020-02-26 RX ADMIN — ONDANSETRON 4 MG: 4 TABLET, ORALLY DISINTEGRATING ORAL at 00:52

## 2020-02-26 RX ADMIN — KETOROLAC TROMETHAMINE 30 MG: 30 INJECTION, SOLUTION INTRAMUSCULAR; INTRAVENOUS at 00:52

## 2020-02-26 ASSESSMENT — ENCOUNTER SYMPTOMS
NAUSEA: 1
PHOTOPHOBIA: 0
VOMITING: 1
CONSTIPATION: 0
BACK PAIN: 0
ABDOMINAL PAIN: 0
DIARRHEA: 0
SHORTNESS OF BREATH: 0
RHINORRHEA: 0
BLOOD IN STOOL: 0

## 2020-02-26 ASSESSMENT — PAIN SCALES - GENERAL: PAINLEVEL_OUTOF10: 8

## 2020-02-26 NOTE — ED TRIAGE NOTES
Pt c/o dizziness, nausea headache - symptoms ongoing since fall last week.   Pt says she vomited x 4 today  Had follow up after ER visit with PMD

## 2020-03-01 ENCOUNTER — APPOINTMENT (OUTPATIENT)
Dept: GENERAL RADIOLOGY | Age: 60
End: 2020-03-01
Payer: MEDICAID

## 2020-03-01 ENCOUNTER — HOSPITAL ENCOUNTER (EMERGENCY)
Age: 60
Discharge: HOME | End: 2020-03-01
Attending: EMERGENCY MEDICINE
Payer: MEDICAID

## 2020-03-01 VITALS
TEMPERATURE: 98 F | OXYGEN SATURATION: 95 % | WEIGHT: 246 LBS | DIASTOLIC BLOOD PRESSURE: 68 MMHG | HEIGHT: 67 IN | BODY MASS INDEX: 38.61 KG/M2 | SYSTOLIC BLOOD PRESSURE: 125 MMHG | HEART RATE: 92 BPM | RESPIRATION RATE: 14 BRPM

## 2020-03-01 PROCEDURE — 99283 EMERGENCY DEPT VISIT LOW MDM: CPT

## 2020-03-01 PROCEDURE — 73090 X-RAY EXAM OF FOREARM: CPT

## 2020-03-01 PROCEDURE — 29125 APPL SHORT ARM SPLINT STATIC: CPT

## 2020-03-01 PROCEDURE — 73130 X-RAY EXAM OF HAND: CPT

## 2020-03-01 ASSESSMENT — PAIN SCALES - GENERAL: PAINLEVEL_OUTOF10: 8

## 2020-03-01 ASSESSMENT — PAIN DESCRIPTION - PAIN TYPE: TYPE: ACUTE PAIN

## 2020-03-01 ASSESSMENT — PAIN DESCRIPTION - ORIENTATION: ORIENTATION: RIGHT

## 2020-03-01 ASSESSMENT — PAIN DESCRIPTION - FREQUENCY: FREQUENCY: CONTINUOUS

## 2020-03-01 ASSESSMENT — PAIN DESCRIPTION - LOCATION: LOCATION: HAND

## 2020-03-01 ASSESSMENT — PAIN DESCRIPTION - ONSET: ONSET: SUDDEN

## 2020-03-01 NOTE — ED NOTES
Discharge instructions given. Patient verbalizes understanding. No other noted or stated problems at this time. Patient will follow up with primary care and orthopedic.       Gold Friedman RN  03/01/20 [id number]

## 2020-03-01 NOTE — ED PROVIDER NOTES
11/12/2014); Nerve Block (Left, 12 8 14); Nerve Block (Right, 3/30/15); Nerve Block (Right, 4/6/2015); Nerve Block (Right, 4/13/15); Nerve Block (Left, 7/6/15); Nerve Block (07/20/15); Nerve Block (Left, 10 1 15); Nerve Block (10/26/15); other surgical history (3/28/2016); Endoscopy, colon, diagnostic; pr colonoscopy flx dx w/collj spec when pfrmd (N/A, 3/20/2018); pr egd transoral biopsy single/multiple (N/A, 3/20/2018); Cholecystectomy (1999); Hysterectomy (1988); Colonoscopy (N/A, 9/18/2018); Esophagus dilation (9/18/2018); back surgery (last one 1995); Cardiac catheterization (Right, 6-6-2013); Appendectomy; other surgical history (1995); joint replacement (Bilateral, [id number]); egd colonoscopy (N/A, 10/8/2019); and Colonoscopy (N/A, 10/8/2019). Social History:  reports that she has been smoking cigarettes and cigars. She started smoking about 29 years ago. She has a 15.00 pack-year smoking history. She has never used smokeless tobacco. She reports that she does not drink alcohol or use drugs. Family History: family history includes Arthritis in an other family member; Cancer in her father and another family member; Depression in an other family member; Heart Disease in her mother and another family member; Hypertension in an other family member; Mental Illness in an other family member; Stroke in an other family member. The patients home medications have been reviewed. Allergies: Bee pollen; Penicillins; Ropinirole hcl; Vistaril [hydroxyzine hcl]; Aripiprazole; Hydroxyzine pamoate; and Tape [adhesive tape]    -------------------------------------------------- RESULTS -------------------------------------------------  All laboratory and radiology results have been personally reviewed by myself   LABS:  No results found for this visit on 03/01/20. RADIOLOGY:  Interpreted by Radiologist.  XR HAND RIGHT (MIN 3 VIEWS)   Final Result      Acute right fifth proximal phalanx fracture.       XR RADIUS prognosis. Questions are answered at this time and they are agreeable with the plan.      --------------------------------- IMPRESSION AND DISPOSITION ---------------------------------    IMPRESSION  1.  Closed displaced fracture of proximal phalanx of right little finger, initial encounter        DISPOSITION  Disposition: Discharge to home  Patient condition is stable                 Steven Medrano [facility]  03/01/20 2089

## 2020-03-02 ENCOUNTER — OFFICE VISIT (OUTPATIENT)
Dept: ENT CLINIC | Age: 60
End: 2020-03-02
Payer: MEDICAID

## 2020-03-02 VITALS
SYSTOLIC BLOOD PRESSURE: 115 MMHG | HEIGHT: 67 IN | DIASTOLIC BLOOD PRESSURE: 58 MMHG | WEIGHT: 243 LBS | BODY MASS INDEX: 38.14 KG/M2 | HEART RATE: 92 BPM

## 2020-03-02 PROCEDURE — 4004F PT TOBACCO SCREEN RCVD TLK: CPT | Performed by: OTOLARYNGOLOGY

## 2020-03-02 PROCEDURE — 99213 OFFICE O/P EST LOW 20 MIN: CPT | Performed by: OTOLARYNGOLOGY

## 2020-03-02 PROCEDURE — 3017F COLORECTAL CA SCREEN DOC REV: CPT | Performed by: OTOLARYNGOLOGY

## 2020-03-02 PROCEDURE — G8417 CALC BMI ABV UP PARAM F/U: HCPCS | Performed by: OTOLARYNGOLOGY

## 2020-03-02 PROCEDURE — G8484 FLU IMMUNIZE NO ADMIN: HCPCS | Performed by: OTOLARYNGOLOGY

## 2020-03-02 PROCEDURE — G8427 DOCREV CUR MEDS BY ELIG CLIN: HCPCS | Performed by: OTOLARYNGOLOGY

## 2020-03-02 NOTE — PROGRESS NOTES
needed for Pain (pt took 1 tablet at 6pm tonight). Instructed to take am of procedure , Disp: , Rfl:     baclofen (LIORESAL) 20 MG tablet, Take 20 mg by mouth 4 times daily Instructed to take am of procedure, Disp: , Rfl:     furosemide (LASIX) 40 MG tablet, Take 1 tablet by mouth 2 times daily, Disp: 60 tablet, Rfl: 0    albuterol sulfate  (90 Base) MCG/ACT inhaler, Inhale 2 puffs into the lungs every 4 hours as needed for Wheezing or Shortness of Breath Instructed to take am of procedure and bring, Disp: , Rfl:     Cholecalciferol (VITAMIN D3) 5000 units TABS, Take 1 tablet by mouth every other day LD 9/11/2019, Disp: , Rfl:     Coenzyme Q10 (COQ10) 200 MG CAPS, Take 1 capsule by mouth 2 times daily LD 10/4/2019, Disp: , Rfl:     ferrous sulfate 325 (65 Fe) MG tablet, Take 325 mg by mouth daily, Disp: , Rfl:     potassium chloride (KLOR-CON M) 20 MEQ extended release tablet, Take 20 mEq by mouth daily, Disp: , Rfl:     docusate sodium (COLACE) 100 MG capsule, Take 100 mg by mouth 2 times daily, Disp: , Rfl:     lidocaine (LMX) 4 % cream, Apply topically as needed for Pain Apply topically as needed. , Disp: , Rfl:     linaclotide (LINZESS) 145 MCG capsule, Take 290 mcg by mouth every morning (before breakfast) , Disp: , Rfl:     levOCARNitine (CARNITOR) 330 MG tablet, Take 330 mg by mouth 3 times daily Instructed to take am of procedure For mitochondrial disease, Disp: , Rfl:     allopurinol (ZYLOPRIM) 100 MG tablet, Take 200 mg by mouth 2 times daily , Disp: , Rfl:     escitalopram (LEXAPRO) 20 MG tablet, Take 20 mg by mouth 2 times daily Instructed to take am of procedure, Disp: , Rfl:     gabapentin (NEURONTIN) 600 MG tablet, Take 1 tablet by mouth 4 times daily. (Patient taking differently: Take 600 mg by mouth 4 times daily. Instructed to take am of procedure), Disp: 120 tablet, Rfl: 5    topiramate (TOPAMAX) 200 MG tablet, Take 1 tablet by mouth 2 times daily.  (Patient taking differently: Take 200 mg by mouth 2 times daily Instructed to take am of procedure), Disp: 1 tablet, Rfl: 0    ziprasidone (GEODON) 20 MG capsule, Take 20 mg by mouth nightly , Disp: , Rfl:     polyethylene glycol (GLYCOLAX) packet, Take 17 g by mouth 2 times daily , Disp: , Rfl: 1  Bee pollen; Penicillins; Ropinirole hcl; Vistaril [hydroxyzine hcl]; Aripiprazole; Hydroxyzine pamoate; and Tape [place] tape]  Social History     Tobacco Use    Smoking status: Current Every Day Smoker     Packs/day: 0.50     Years: 30.00     Pack years: 15.00     Types: Cigarettes, Cigars     Start date: 6/13/1990    Smokeless tobacco: Never Used   Substance Use Topics    Alcohol use: No     Alcohol/week: 0.0 standard drinks    Drug use: No     Family History   Problem Relation Age of Onset    Heart Disease Mother     Cancer Father     Arthritis Other     Cancer Other     Depression Other     Heart Disease Other     Hypertension Other     Mental Illness Other     Stroke Other        Review of Systems   Constitutional: Negative for chills and fever. HENT: Negative for congestion, drooling, ear discharge, hearing loss, rhinorrhea, sneezing and trouble swallowing. Respiratory: Negative for cough and shortness of breath. Cardiovascular: Negative for chest pain and palpitations. Gastrointestinal: Negative for vomiting. Skin: Negative for rash. Allergic/Immunologic: Negative for environmental allergies. Neurological: Negative for dizziness and headaches. Hematological: Does not bruise/bleed easily. All other systems reviewed and are negative. BP (!) 115/58 (Site: Left Upper Arm, Position: Sitting, Cuff Size: Large Adult)   Pulse 92   Ht 5' 7\" (1.702 m)   Wt 243 lb (110.2 kg)   LMP 08/31/1988   BMI 38.06 kg/m²   Physical Exam  Vitals signs and nursing note reviewed. Constitutional:       Appearance: She is well-developed. HENT:      Head: Normocephalic and atraumatic.       Right Ear: Tympanic membrane and ear canal normal.      Left Ear: Tympanic membrane and ear canal normal.      Nose: Nose normal. No congestion or rhinorrhea. Eyes:      Pupils: Pupils are equal, round, and reactive to light. Neck:      Musculoskeletal: Normal range of motion. Thyroid: No thyromegaly. Trachea: No tracheal deviation. Cardiovascular:      Rate and Rhythm: Normal rate. Pulmonary:      Effort: Pulmonary effort is normal. No respiratory distress. Musculoskeletal: Normal range of motion. Lymphadenopathy:      Cervical: No cervical adenopathy. Skin:     General: Skin is warm. Findings: No erythema. Neurological:      Mental Status: She is alert. Cranial Nerves: No cranial nerve deficit. IMPRESSION/PLAN:  Patient seen and examined with a stable thyroid nodule with no significant growth in 6 months repeat thyroid ultrasound 6 months to 1 year, unless any significant overall increase in symptoms. Dr. Todd Keller.  Otolaryngology Facial Plastic Surgery  :  [facility]  Associate Clinical Professor:  [facility]

## 2020-03-07 ASSESSMENT — ENCOUNTER SYMPTOMS
VOMITING: 0
COUGH: 0
SHORTNESS OF BREATH: 0
RHINORRHEA: 0
TROUBLE SWALLOWING: 0

## 2020-03-09 ENCOUNTER — OFFICE VISIT (OUTPATIENT)
Dept: NEUROLOGY | Age: 60
End: 2020-03-09
Payer: MEDICAID

## 2020-03-09 VITALS
SYSTOLIC BLOOD PRESSURE: 160 MMHG | BODY MASS INDEX: 40.65 KG/M2 | HEIGHT: 67 IN | WEIGHT: 259 LBS | DIASTOLIC BLOOD PRESSURE: 80 MMHG

## 2020-03-09 PROCEDURE — 99204 OFFICE O/P NEW MOD 45 MIN: CPT | Performed by: PSYCHIATRY & NEUROLOGY

## 2020-03-09 PROCEDURE — G8484 FLU IMMUNIZE NO ADMIN: HCPCS | Performed by: PSYCHIATRY & NEUROLOGY

## 2020-03-09 PROCEDURE — G8427 DOCREV CUR MEDS BY ELIG CLIN: HCPCS | Performed by: PSYCHIATRY & NEUROLOGY

## 2020-03-09 PROCEDURE — 3017F COLORECTAL CA SCREEN DOC REV: CPT | Performed by: PSYCHIATRY & NEUROLOGY

## 2020-03-09 PROCEDURE — 4004F PT TOBACCO SCREEN RCVD TLK: CPT | Performed by: PSYCHIATRY & NEUROLOGY

## 2020-03-09 PROCEDURE — G8417 CALC BMI ABV UP PARAM F/U: HCPCS | Performed by: PSYCHIATRY & NEUROLOGY

## 2020-03-09 RX ORDER — TOPIRAMATE 25 MG/1
TABLET ORAL
Qty: 124 TABLET | Refills: 11 | Status: SHIPPED | OUTPATIENT
Start: 2020-03-09 | End: 2020-09-08

## 2020-03-09 ASSESSMENT — ENCOUNTER SYMPTOMS
RESPIRATORY NEGATIVE: 1
EYES NEGATIVE: 1
BACK PAIN: 1
GASTROINTESTINAL NEGATIVE: 1

## 2020-03-09 NOTE — PROGRESS NOTES
demonstrate severe degenerative changes. There is mild loss of the vertebral body height   The are severe degenerative changes involving the facets. There are findings to suggest an ileus     Current Outpatient Medications   Medication Sig Dispense Refill    topiramate (TOPAMAX) 25 MG tablet Take one twice daily x 1 wk, then 2 twice daily 124 tablet 11    ibuprofen (ADVIL;MOTRIN) 800 MG tablet Take 1 tablet by mouth every 8 hours as needed for Pain 20 tablet 0    albuterol (PROVENTIL) (5 MG/ML) 0.5% nebulizer solution Take 2.5 mg by nebulization 3 times daily Instructed to take am of procedure      B Complex Vitamins (VITAMIN-B COMPLEX PO) Take by mouth daily LD patient to verify with Dr. Reva Villalpando magnesium 200 MG TABS tablet Take 200 mg by mouth 2 times daily LD to verify with Dr. Beth Khan   LD 10-4-19      calcium carbonate (CALCIUM 600) 600 MG TABS tablet Take 1 tablet by mouth 2 times daily LD patient to verify with Dr. Reva Villalpando famotidine (PEPCID) 20 MG tablet Take 20 mg by mouth nightly      azelastine (ASTELIN) 0.1 % nasal spray 1 spray by Nasal route 2 times daily Use in each nostril as directed 1 Bottle 3    montelukast (SINGULAIR) 10 MG tablet Take 1 tablet by mouth daily 30 tablet 3    omeprazole (PRILOSEC) 20 MG delayed release capsule Take 20 mg by mouth Daily Instructed to take am of procedure      oxyCODONE-acetaminophen (PERCOCET)  MG per tablet Take 1 tablet by mouth every 8 hours as needed for Pain (pt took 1 tablet at 6pm tonight).  Instructed to take am of procedure       baclofen (LIORESAL) 20 MG tablet Take 20 mg by mouth 4 times daily Instructed to take am of procedure      furosemide (LASIX) 40 MG tablet Take 1 tablet by mouth 2 times daily 60 tablet 0    albuterol sulfate  (90 Base) MCG/ACT inhaler Inhale 2 puffs into the lungs every 4 hours as needed for Wheezing or Shortness of Breath Instructed to take am of procedure and bring      Cholecalciferol (VITAMIN D3) 5000 units TABS Take 1 tablet by mouth every other day LD 9/11/2019      Coenzyme Q10 (COQ10) 200 MG CAPS Take 1 capsule by mouth 2 times daily LD 10/4/2019      ferrous sulfate 325 (65 Fe) MG tablet Take 325 mg by mouth daily      potassium chloride (KLOR-CON M) 20 MEQ extended release tablet Take 20 mEq by mouth daily      docusate sodium (COLACE) 100 MG capsule Take 100 mg by mouth 2 times daily      lidocaine (LMX) 4 % cream Apply topically as needed for Pain Apply topically as needed.  linaclotide (LINZESS) 145 MCG capsule Take 290 mcg by mouth every morning (before breakfast)       levOCARNitine (CARNITOR) 330 MG tablet Take 330 mg by mouth 3 times daily Instructed to take am of procedure  For mitochondrial disease      polyethylene glycol (GLYCOLAX) packet Take 17 g by mouth 2 times daily   1    allopurinol (ZYLOPRIM) 100 MG tablet Take 200 mg by mouth 2 times daily       escitalopram (LEXAPRO) 20 MG tablet Take 20 mg by mouth 2 times daily Instructed to take am of procedure      gabapentin (NEURONTIN) 600 MG tablet Take 1 tablet by mouth 4 times daily. (Patient taking differently: Take 600 mg by mouth 4 times daily. Instructed to take am of procedure) 120 tablet 5    topiramate (TOPAMAX) 200 MG tablet Take 1 tablet by mouth 2 times daily. (Patient taking differently: Take 200 mg by mouth 2 times daily Instructed to take am of procedure) 1 tablet 0    ziprasidone (GEODON) 20 MG capsule Take 20 mg by mouth nightly       ondansetron (ZOFRAN-ODT) 4 MG disintegrating tablet Take 1 tablet by mouth 3 times daily as needed for Nausea or Vomiting (Patient not taking: Reported on 3/9/2020) 21 tablet 0     No current facility-administered medications for this visit.         Allergies   Allergen Reactions    Bee Pollen Anaphylaxis, Shortness Of Breath and Swelling     And wasp    Penicillins Anaphylaxis    Ropinirole Hcl Anaphylaxis    Vistaril [Hydroxyzine Hcl] Anaphylaxis    Aripiprazole Other (See Comments)     muscle spasms and confusion    Hydroxyzine Pamoate Itching and Rash    Tape Karyna [employer]man Tape] Rash     Paper tape allergy    Fabric tape is OK to use        Patient Active Problem List   Diagnosis    Debility    Obesity    Bipolar [facility] disorder ([facility]    Seizure disorder ([facility]    Asthma    Hyperlipidemia    Hypertension    Arthritis    Lymphedema of lower extremity    Degenerative Osteoarthritis of both knees    Status post total knee replacement, right    Degenerative Osteoarthritis of lumbar spine    Degenerative Osteoarthritis of cervical spine    Degenerative Osteoarthritis thoracic spine    Thoracic facet syndrome    Cervical facet syndrome    Facet syndrome, lumbar    Neural foraminal stenosis of lumbar spine    Lumbar radiculopathy    DDD (degenerative disc disease), cervical    Neural foraminal stenosis of cervical spine    Protruded cervical disc    Cervical radiculopathy    Lumbar post-laminectomy syndrome    Neuropathic pain syndrome (non-herpetic)    Chronic pain    Acute delirium    Chronic respiratory failure with hypoxia (HCC)    Mitochondrial cytopathy (HCC)    GERD (gastroesophageal reflux disease)    Fatty liver    Depression    PETR (obstructive sleep apnea)    Chronic back pain    Bipolar affective ([facility]    Adenoma of right adrenal gland       Past Medical History:   Diagnosis Date    Adenoma of right adrenal gland     Anemia     Anxiety     Arthritis     spinal    Asthma     controlled with inhalers     Benign essential tremor     Bipolar affective (HCC)     Chronic back pain     Spinal cord stimulator in place    Chronic respiratory failure with hypoxia (HCC)     at times dt diaphragm does not function properly dt Mitochondrial disorder    Depression     stable    Difficulty swallowing     for EGD 10-8-19     Environmental and seasonal allergies     Fatty liver     Full dentures     GERD (gastroesophageal reflux tobacco: Never Used   Substance and Sexual Activity    Alcohol use: No     Alcohol/week: 0.0 standard drinks    Drug use: No    Sexual activity: Not on file   Lifestyle    Physical activity     Days per week: Not on file     Minutes per session: Not on file    Stress: Not on file   Relationships    Social connections     Talks on phone: Not on file     Gets together: Not on file     Attends Latter day service: Not on file     Active member of club or organization: Not on file     Attends meetings of clubs or organizations: Not on file     Relationship status: Not on file    Intimate partner violence     Fear of current or ex partner: Not on file     Emotionally abused: Not on file     Physically abused: Not on file     Forced sexual activity: Not on file   Other Topics Concern    Not on file   Social History Narrative    ** Merged History Encounter **          Review of Systems   Constitutional: Positive for fatigue. HENT: Negative. Eyes: Negative. Respiratory: Negative. Cardiovascular: Negative. Gastrointestinal: Negative. Endocrine: Negative. Genitourinary: Negative. Musculoskeletal: Positive for arthralgias, back pain and gait problem. Hx lumbar DDD   Skin: Negative. Neurological: Positive for tremors and seizures. Hematological: Negative. Psychiatric/Behavioral: Positive for decreased concentration and dysphoric mood. The patient is nervous/anxious. Hx affective disorder, multiple psychotropic meds. All other systems reviewed and are negative. Neurologic Exam:  BP (!) 160/80 (Site: Right Upper Arm, Position: Sitting, Cuff Size: Medium Adult)   Ht 5' 7\" (1.702 m)   Wt 259 lb (117.5 kg)   LMP 08/31/1988   BMI 40.57 kg/m²   General appearance: Alert, morbidly obese, anxious, seated in the exam room chair, no acute distress  HEENT: Normocephalic/atraumatic. Neck: Supple  Cardiac: RRR  Respiratory: grossly clear  Extremities:  Morbidly obese, mild, nonpitting peripheral ankle/pedal edema without erythema or cyanosis  Skin: No apparent lesions or rashes  Musculoskeletal: No fasciculations. Intermittent action type tremors of the bilateral outstretched hands and intermittent head titubation tremor exacerbated by stress/anxiety and exaggerated physiologic tremor component. No foot drop. Mental Status: Alert, oriented x3  Speech/Language: Clear, fluent  Attention span/Concentration: Mildly reduced with easy distractibility  Affect/Mood: Moderate anxiety level  Insight/Judgement: [employer] of Knowledge/Current events: Unable to accurately assess, poor historian, tangential  CN II-XII:     Pupils: Equal, reactive to light, 1.4 mm     EOM's: Full without nystagmus  Visual Fields: Full to confrontation  Fundi: Unable to visualize, miosis to light  CN V: normal V1-V3  CN VII: No facial droop, symmetric smile  CN VIII: Hearing grossly intact  CN IX-XII: nopalatal asymmetry, tongue midline  SCM/Trapezii: 5/5 power  Motor: Effort related but demonstrates 5/5 power in the upper and lower extremities without pronator drift, intact fine motor function of both hands, symmetric. DTR's: 1+ in the upper extremities, absent in lower extremities, no ankle clonus, plantars are flexor. Sensory: Reports decreased subjective sensation in a stocking distribution to below the level of the knees consistent with a chronic peripheral neuropathy. No right/left confusion. Coordination/Gait: No gross limb dysmetria on finger-to-nose testing. Cannot perform heel-to-shin testing due to morbid obesity. Uses a cane for ambulatory support. Assessment/Plan:  1. History of generalized seizure disorder with last breakthrough seizure reported in August 2019 treated with topiramate 200 mg twice daily.   Because of her history of breakthrough seizure, I recommended she advance topiramate by an additional 25 mg twice daily for 1 week, then 50 mg twice daily added to 200 mg twice daily (250 mg twice daily). She reports she is not driving. 2.  An a.m. trough topiramate level, and additional lab tests are also ordered and once resulted she will be informed accordingly. 3.  Routine awake/sleep EEG is ordered. 4.  Chronic ambulatory dysfunction, likely multifactorial etiology related to mechanical gait dysfunction from morbid obesity, severe lumbar degenerative disc disease, and chronic peripheral neuropathy. She also has a history of mitochondrial disorder followed by a neuromuscular specialist in [facility] she reports. 5.  Follow-up in the Neurology clinic in 6 months otherwise. 6.  She was provided patient information from the [facility] website. Sincerely,      Adelina Swann MD    This note was created using speech recognition transcription software. Despite proofreading, there may be several typographical errors present that may affect the meaning of the content. Please call with any questions. Note: More than 50% of this 40-minute face-face visit time included counseling and coordination of care based on clinical impression, review of test results, treatment plan, risk factor reduction and patient and/or family education.     Orders Placed This Encounter   Procedures    Topiramate Level     Standing Status:   Future     Standing Expiration Date:   3/9/2021    Comprehensive Metabolic Panel     Standing Status:   Future     Standing Expiration Date:   3/9/2021    Hepatic Function Panel     Standing Status:   Future     Standing Expiration Date:   3/9/2021    CK     Standing Status:   Future     Standing Expiration Date:   3/9/2021    Aldolase     Standing Status:   Future     Standing Expiration Date:   3/9/2021    Sedimentation Rate     Standing Status:   Future     Standing Expiration Date:   3/9/2021    C-Reactive Protein     Standing Status:   Future     Standing Expiration Date:   3/9/2021    Vitamin B12 & Folate     Standing Status:   Future     Standing Expiration Date:   3/9/2021    TSH without Reflex     Standing Status:   Future     Standing Expiration Date:   3/9/2021    T4, Free     Standing Status:   Future     Standing Expiration Date:   3/9/2021    Sjogren's Ab (SS-A, SS-B)     Standing Status:   Future     Standing Expiration Date:   3/9/2021    Magnesium     Standing Status:   Future     Standing Expiration Date:   3/9/2021    Lyme Disease Acute Reflexive Panel     Standing Status:   Future     Standing Expiration Date:   3/9/2021    Ammonia     Standing Status:   Future     Standing Expiration Date:   3/9/2021    Electrophoresis Protein, Serum without Reflex to Immunofixation     Standing Status:   Future     Standing Expiration Date:   3/9/2021    RPR Reflex to Titer and TPPA     Standing Status:   Future     Standing Expiration Date:   3/9/2021    Vitamin B6     Standing Status:   Future     Standing Expiration Date:   3/9/2021    Vitamin B1     Standing Status:   Future     Standing Expiration Date:   3/9/2021    EEG     Routine awake/sleep EEG with HV and PS. Reports hx gen.  Seizure disorder x 20 yrs., last breakthrough seizure was last Aug. 2019     Standing Status:   Future     Standing Expiration Date:   3/27/2020

## 2020-03-10 ENCOUNTER — OFFICE VISIT (OUTPATIENT)
Dept: ENDOCRINOLOGY | Age: 60
End: 2020-03-10
Payer: MEDICAID

## 2020-03-10 VITALS
HEIGHT: 67 IN | BODY MASS INDEX: 42.22 KG/M2 | RESPIRATION RATE: 16 BRPM | HEART RATE: 102 BPM | OXYGEN SATURATION: 95 % | DIASTOLIC BLOOD PRESSURE: 72 MMHG | SYSTOLIC BLOOD PRESSURE: 115 MMHG | WEIGHT: 269 LBS

## 2020-03-10 PROCEDURE — G8484 FLU IMMUNIZE NO ADMIN: HCPCS | Performed by: INTERNAL MEDICINE

## 2020-03-10 PROCEDURE — 3017F COLORECTAL CA SCREEN DOC REV: CPT | Performed by: INTERNAL MEDICINE

## 2020-03-10 PROCEDURE — 4004F PT TOBACCO SCREEN RCVD TLK: CPT | Performed by: INTERNAL MEDICINE

## 2020-03-10 PROCEDURE — G8427 DOCREV CUR MEDS BY ELIG CLIN: HCPCS | Performed by: INTERNAL MEDICINE

## 2020-03-10 PROCEDURE — G8417 CALC BMI ABV UP PARAM F/U: HCPCS | Performed by: INTERNAL MEDICINE

## 2020-03-10 PROCEDURE — 99215 OFFICE O/P EST HI 40 MIN: CPT | Performed by: INTERNAL MEDICINE

## 2020-03-10 RX ORDER — DEXAMETHASONE 1 MG
TABLET ORAL
Qty: 1 TABLET | Refills: 0 | Status: ON HOLD | OUTPATIENT
Start: 2020-03-10 | End: 2020-09-09 | Stop reason: HOSPADM

## 2020-03-10 NOTE — PROGRESS NOTES
CC -   Thyroid nodule, Adrenal nodule    Background -   Pt returns for follow up of last visit with me on 09/05/2019    Thyroid nodule -  Diagnosed 5/22/19 5/22/19 Discovered incidentally on CT of neck 5/22/19 ordered by Dr. Liliya Bland for evaluation of swelling of the neck;  7/31/19 US of thyroid: 1.3cm hypoechoic nodule in the isthmus  8/02/19 CT-guided core biopsy was negative for malignancy; flow cytometry was attempted but the cellularity of the specimen was inadequate. 02/03/20 US of thyroid: no significant change from 7/31/19; two nodules reported, one in each lobe located near the isthmus, both are 1.2 cm; calcification now noted in the right lobe, 0.5 cm, non-vascular    Right adrenal nodule - 2010 (per 12/28/12 CT report)  12/28/20  CT of abd : Stable small nodule in the right adrenal gland likely reflecting adenoma  02/06/20  24 urine metanephrines and catecholamines neg for pheo; urine cortisol nml        PRA and serum dean normal       Serum Cortisol level 6.06 mcg/dl, but was checked at 4:38 p    ______________________    [facility] -  Medical problems: Thyroid nodule, R Adrenal nodule, Bipolar Type 2, Chronic back pain, Fatty liver, Essential tremor, Mitochondrial cytopathy, Neuropathy, Seizures, GERD, Diaphragm does not function well wh/ causes hypoxia   Medications: Topiramate, Famotidine, Montelukast, Omeprazole, Baclofen, Furosemide, Linaclotide, Levocarnitine, Allopurinol, Escitalopram, Ziprasidone, Ondansetron     ROS -  Card - no CP  GI - + N/V after falling and hitting her head, has seen neurology for this    PE -  Gen : /72 (Site: Left Lower Arm, Position: Sitting, Cuff Size: Medium Adult)   Pulse 102   Resp 16   Ht 5' 7\" (1.702 m)   Wt 269 lb (122 kg)   LMP 08/31/1988   SpO2 95%   BMI 42.13 kg/m²    WN, WD, NAD  Lung: Nml resp effort  Psych: Normal mood   Full affect  Neuro:  Moves all ext well  ______________________      HPI & A/P -   Problem 1  - Thyroid nodule  HPI   - Duration 9

## 2020-03-10 NOTE — PATIENT INSTRUCTIONS
Day 1: take the 1mg Dexamethasone tablet at 11:00 pm  Day 2: Have the ordered blood tests drawn at 8 am (7:45-8:15 am)  See me back in four months

## 2020-04-21 ENCOUNTER — TELEPHONE (OUTPATIENT)
Dept: NEUROLOGY | Age: 60
End: 2020-04-21

## 2020-04-28 ENCOUNTER — HOSPITAL ENCOUNTER (EMERGENCY)
Age: 60
Discharge: HOME | End: 2020-04-28
Attending: EMERGENCY MEDICINE
Payer: MEDICAID

## 2020-04-28 ENCOUNTER — APPOINTMENT (OUTPATIENT)
Dept: CT IMAGING | Age: 60
End: 2020-04-28
Payer: MEDICAID

## 2020-04-28 ENCOUNTER — APPOINTMENT (OUTPATIENT)
Dept: GENERAL RADIOLOGY | Age: 60
End: 2020-04-28
Payer: MEDICAID

## 2020-04-28 VITALS
WEIGHT: 249 LBS | TEMPERATURE: 97.2 F | OXYGEN SATURATION: 99 % | DIASTOLIC BLOOD PRESSURE: 64 MMHG | RESPIRATION RATE: 16 BRPM | SYSTOLIC BLOOD PRESSURE: 116 MMHG | HEIGHT: 67 IN | HEART RATE: 75 BPM | BODY MASS INDEX: 39.08 KG/M2

## 2020-04-28 LAB — METER GLUCOSE: 119 MG/DL (ref 74–99)

## 2020-04-28 PROCEDURE — 72125 CT NECK SPINE W/O DYE: CPT

## 2020-04-28 PROCEDURE — 70450 CT HEAD/BRAIN W/O DYE: CPT

## 2020-04-28 PROCEDURE — 99284 EMERGENCY DEPT VISIT MOD MDM: CPT

## 2020-04-28 PROCEDURE — 73630 X-RAY EXAM OF FOOT: CPT

## 2020-04-28 PROCEDURE — 72131 CT LUMBAR SPINE W/O DYE: CPT

## 2020-04-28 PROCEDURE — 82962 GLUCOSE BLOOD TEST: CPT

## 2020-04-28 ASSESSMENT — ENCOUNTER SYMPTOMS
RESPIRATORY NEGATIVE: 1
VISUAL CHANGE: 0
NAUSEA: 0
GASTROINTESTINAL NEGATIVE: 1
BOWEL INCONTINENCE: 0
VOMITING: 0
ABDOMINAL PAIN: 0
EYES NEGATIVE: 1

## 2020-04-28 ASSESSMENT — PAIN SCALES - GENERAL: PAINLEVEL_OUTOF10: 8

## 2020-04-28 ASSESSMENT — PAIN DESCRIPTION - PAIN TYPE: TYPE: ACUTE PAIN

## 2020-04-28 ASSESSMENT — PAIN DESCRIPTION - ORIENTATION: ORIENTATION: ANTERIOR

## 2020-04-28 ASSESSMENT — PAIN DESCRIPTION - DESCRIPTORS: DESCRIPTORS: THROBBING

## 2020-04-28 ASSESSMENT — PAIN DESCRIPTION - LOCATION: LOCATION: HEAD

## 2020-04-28 ASSESSMENT — PAIN - FUNCTIONAL ASSESSMENT: PAIN_FUNCTIONAL_ASSESSMENT: PREVENTS OR INTERFERES SOME ACTIVE ACTIVITIES AND ADLS

## 2020-04-28 ASSESSMENT — PAIN DESCRIPTION - ONSET: ONSET: SUDDEN

## 2020-04-28 ASSESSMENT — PAIN DESCRIPTION - FREQUENCY: FREQUENCY: CONTINUOUS

## 2020-04-28 NOTE — ED PROVIDER NOTES
No weakness. Coordination: Coordination normal.          Procedures     MDM  Number of Diagnoses or Management Options  Closed head injury, initial encounter: new and requires workup  Contusion of fifth toe of right foot, initial encounter: new and requires workup  Lumbar contusion, initial encounter: new and requires workup  Neck sprain, initial encounter: new and requires workup  Osteoarthritis of multiple joints, unspecified osteoarthritis type: minor  Diagnosis management comments: Differential diagnoses include closed head injury, intracranial bleed, skull fracture, cervical spine sprain, strain, fracture, subluxation, right foot fracture, contusion. Amount and/or Complexity of Data Reviewed  Tests in the radiology section of CPT®: ordered and reviewed    Risk of Complications, Morbidity, and/or Mortality  Presenting problems: high  Diagnostic procedures: high  Management options: high  General comments: Patient remained stable throughout the course of treatment. Right foot x-ray was negative for any acute bony pathology, dislocation, fracture. CAT scan of the head was unremarkable. CT of cervical spine and lumbar spine were negative for any acute fracture, subluxation. Positive DJD. Case discussed with patient. Will discharge patient home to follow-up with her doctor and also her orthopedic doctor.   Patient understood and agreed with our management.                  --------------------------------------------- PAST HISTORY ---------------------------------------------  Past Medical History:  has a past medical history of Adenoma of right adrenal gland, Anemia, Anxiety, Arthritis, Asthma, Benign essential tremor, Bipolar affective ([address] Chronic back pain, Chronic respiratory failure with hypoxia ([address] Depression, Difficulty swallowing, Environmental and seasonal allergies, Fatty liver, Full dentures, GERD (gastroesophageal reflux disease), Gout, Herniated cervical disc, History of swelling of feet, Lymphedema of lower extremity, Mitochondrial cytopathy ([address] Neuropathy, Obesity, PETR (obstructive sleep apnea), PONV (postoperative nausea and vomiting), Seizures ([address] and Spinal headache. Past Surgical History:  has a past surgical history that includes knee surgery (Bilateral); Gastric bypass surgery (1999); myomectomy; Tonsillectomy; Nerve Block (Left, 10 02 2013); Nerve Block (Left, 10 9 13); Nerve Block (Left, 10/16/13); other surgical history (Left, 11 25 13); Nerve Block (Left, 10/29/2014); Nerve Block (Left, 11/12/2014); Nerve Block (Left, 12 8 14); Nerve Block (Right, 3/30/15); Nerve Block (Right, 4/6/2015); Nerve Block (Right, 4/13/15); Nerve Block (Left, 7/6/15); Nerve Block (07/20/15); Nerve Block (Left, 10 1 15); Nerve Block (10/26/15); other surgical history (3/28/2016); Endoscopy, colon, diagnostic; pr colonoscopy flx dx w/collj spec when pfrmd (N/A, 3/20/2018); pr egd transoral biopsy single/multiple (N/A, 3/20/2018); Cholecystectomy (1999); Hysterectomy (1988); Colonoscopy (N/A, 9/18/2018); Esophagus dilation (9/18/2018); back surgery (last one 1995); Cardiac catheterization (Right, 6-6-2013); Appendectomy; other surgical history (1995); joint replacement (Bilateral, [id number]); egd colonoscopy (N/A, 10/8/2019); and Colonoscopy (N/A, 10/8/2019). Social History:  reports that she has been smoking cigarettes and cigars. She started smoking about 29 years ago. She has a 15.00 pack-year smoking history. She has never used smokeless tobacco. She reports that she does not drink alcohol or use drugs. Family History: family history includes Arthritis in an other family member; Cancer in her father and another family member; Depression in an other family member; Heart Disease in her mother and another family member; Hypertension in an other family member; Mental Illness in an other family member; Stroke in an other family member.      The patients home medications have been reviewed. Allergies: Bee pollen; Penicillins; Ropinirole hcl; Vistaril [hydroxyzine hcl]; Aripiprazole; Hydroxyzine pamoate; and Tape [adhesive tape]    -------------------------------------------------- RESULTS -------------------------------------------------  Labs:  No results found for this visit on 04/28/20. Radiology:  CT Head WO Contrast   Final Result   No acute abnormality. CT Cervical Spine WO Contrast   Final Result      1. No acute fracture. 2. Stable mild compression fracture T2 superior endplate. 3. Chronic mild degenerative changes lower cervical spine, no   significant stenosis. 4. Chronic thyroid enlargement and nodule in the thyroid isthmus. CT Lumbar Spine WO Contrast   Final Result      1. No acute fracture. 2. Stable compression fracture L3.   3. Multilevel degenerative changes with several old disc herniations,   bone spurs and mild to moderate stenosis as described. 4. Bone spurs causing a significant left neural foraminal stenosis   L5/S1. Not obviously changed. 5. A right renal calculus. XR FOOT RIGHT (MIN 3 VIEWS)   Final Result      NO ACUTE FRACTURE OR DISLOCATION RIGHT FOOT      Pes planus deformity      Ventral and dorsal calcaneal spurs      Soft tissue swelling.             ------------------------- NURSING NOTES AND VITALS REVIEWED ---------------------------  Date / Time Roomed:  4/28/2020  3:41 PM  ED Bed Assignment:  25/25    The nursing notes within the ED encounter and vital signs as below have been reviewed.    /68   Pulse 88   Temp 97.3 °F (36.3 °C) (Temporal)   Resp 14   Ht 5' 7\" (1.702 m)   Wt 249 lb (112.9 kg)   LMP 08/31/1988   SpO2 95%   BMI 39.00 kg/m²   Oxygen Saturation Interpretation: Normal      ------------------------------------------ PROGRESS NOTES ------------------------------------------  I have spoken with the patient and discussed todays results, in addition to providing specific

## 2020-04-29 ENCOUNTER — CARE COORDINATION (OUTPATIENT)
Dept: CARE COORDINATION | Age: 60
End: 2020-04-29

## 2020-04-30 ENCOUNTER — APPOINTMENT (OUTPATIENT)
Dept: CT IMAGING | Age: 60
End: 2020-04-30
Payer: MEDICAID

## 2020-04-30 ENCOUNTER — APPOINTMENT (OUTPATIENT)
Dept: GENERAL RADIOLOGY | Age: 60
End: 2020-04-30
Payer: MEDICAID

## 2020-04-30 ENCOUNTER — HOSPITAL ENCOUNTER (EMERGENCY)
Age: 60
Discharge: HOME | End: 2020-05-01
Payer: MEDICAID

## 2020-04-30 VITALS
WEIGHT: 239 LBS | DIASTOLIC BLOOD PRESSURE: 59 MMHG | HEART RATE: 84 BPM | SYSTOLIC BLOOD PRESSURE: 109 MMHG | OXYGEN SATURATION: 95 % | BODY MASS INDEX: 37.51 KG/M2 | TEMPERATURE: 97 F | HEIGHT: 67 IN | RESPIRATION RATE: 16 BRPM

## 2020-04-30 LAB
ALBUMIN SERPL-MCNC: 3.4 G/DL (ref 3.5–5.2)
ALP BLD-CCNC: 155 U/L (ref 35–104)
ALT SERPL-CCNC: 20 U/L (ref 0–32)
ANION GAP SERPL CALCULATED.3IONS-SCNC: 10 MMOL/L (ref 7–16)
AST SERPL-CCNC: 27 U/L (ref 0–31)
BASOPHILS ABSOLUTE: 0.02 E9/L (ref 0–0.2)
BASOPHILS RELATIVE PERCENT: 0.3 % (ref 0–2)
BILIRUB SERPL-MCNC: <0.2 MG/DL (ref 0–1.2)
BUN BLDV-MCNC: 16 MG/DL (ref 6–20)
CALCIUM SERPL-MCNC: 7.9 MG/DL (ref 8.6–10.2)
CHLORIDE BLD-SCNC: 100 MMOL/L (ref 98–107)
CO2: 28 MMOL/L (ref 22–29)
CREAT SERPL-MCNC: 1 MG/DL (ref 0.5–1)
D DIMER: <200 NG/ML DDU
EOSINOPHILS ABSOLUTE: 0.09 E9/L (ref 0.05–0.5)
EOSINOPHILS RELATIVE PERCENT: 1.2 % (ref 0–6)
GFR AFRICAN AMERICAN: >60
GFR NON-AFRICAN AMERICAN: 57 ML/MIN/1.73
GLUCOSE BLD-MCNC: 105 MG/DL (ref 74–99)
HCT VFR BLD CALC: 38.9 % (ref 34–48)
HEMOGLOBIN: 12.5 G/DL (ref 11.5–15.5)
IMMATURE GRANULOCYTES #: 0.03 E9/L
IMMATURE GRANULOCYTES %: 0.4 % (ref 0–5)
LYMPHOCYTES ABSOLUTE: 2.23 E9/L (ref 1.5–4)
LYMPHOCYTES RELATIVE PERCENT: 30.8 % (ref 20–42)
MCH RBC QN AUTO: 32.3 PG (ref 26–35)
MCHC RBC AUTO-ENTMCNC: 32.1 % (ref 32–34.5)
MCV RBC AUTO: 100.5 FL (ref 80–99.9)
MONOCYTES ABSOLUTE: 0.72 E9/L (ref 0.1–0.95)
MONOCYTES RELATIVE PERCENT: 10 % (ref 2–12)
NEUTROPHILS ABSOLUTE: 4.14 E9/L (ref 1.8–7.3)
NEUTROPHILS RELATIVE PERCENT: 57.3 % (ref 43–80)
PDW BLD-RTO: 13.2 FL (ref 11.5–15)
PLATELET # BLD: 233 E9/L (ref 130–450)
PMV BLD AUTO: 9.1 FL (ref 7–12)
POTASSIUM REFLEX MAGNESIUM: 3.9 MMOL/L (ref 3.5–5)
PRO-BNP: 68 PG/ML (ref 0–125)
RBC # BLD: 3.87 E12/L (ref 3.5–5.5)
SODIUM BLD-SCNC: 138 MMOL/L (ref 132–146)
STREP GRP A PCR: NEGATIVE
TOTAL PROTEIN: 6.1 G/DL (ref 6.4–8.3)
TROPONIN: <0.01 NG/ML (ref 0–0.03)
WBC # BLD: 7.2 E9/L (ref 4.5–11.5)

## 2020-04-30 PROCEDURE — 85378 FIBRIN DEGRADE SEMIQUANT: CPT

## 2020-04-30 PROCEDURE — 96374 THER/PROPH/DIAG INJ IV PUSH: CPT

## 2020-04-30 PROCEDURE — 70450 CT HEAD/BRAIN W/O DYE: CPT

## 2020-04-30 PROCEDURE — 80053 COMPREHEN METABOLIC PANEL: CPT

## 2020-04-30 PROCEDURE — 85025 COMPLETE CBC W/AUTO DIFF WBC: CPT

## 2020-04-30 PROCEDURE — 87880 STREP A ASSAY W/OPTIC: CPT

## 2020-04-30 PROCEDURE — 6360000002 HC RX W HCPCS: Performed by: NURSE PRACTITIONER

## 2020-04-30 PROCEDURE — 93005 ELECTROCARDIOGRAM TRACING: CPT | Performed by: NURSE PRACTITIONER

## 2020-04-30 PROCEDURE — 96375 TX/PRO/DX INJ NEW DRUG ADDON: CPT

## 2020-04-30 PROCEDURE — 84484 ASSAY OF TROPONIN QUANT: CPT

## 2020-04-30 PROCEDURE — U0002 COVID-19 LAB TEST NON-CDC: HCPCS

## 2020-04-30 PROCEDURE — 99284 EMERGENCY DEPT VISIT MOD MDM: CPT

## 2020-04-30 PROCEDURE — 71045 X-RAY EXAM CHEST 1 VIEW: CPT

## 2020-04-30 PROCEDURE — 83880 ASSAY OF NATRIURETIC PEPTIDE: CPT

## 2020-04-30 RX ORDER — ONDANSETRON 2 MG/ML
4 INJECTION INTRAMUSCULAR; INTRAVENOUS ONCE
Status: COMPLETED | OUTPATIENT
Start: 2020-04-30 | End: 2020-04-30

## 2020-04-30 RX ORDER — FENTANYL CITRATE 50 UG/ML
25 INJECTION, SOLUTION INTRAMUSCULAR; INTRAVENOUS ONCE
Status: COMPLETED | OUTPATIENT
Start: 2020-04-30 | End: 2020-04-30

## 2020-04-30 RX ADMIN — FENTANYL CITRATE 25 MCG: 50 INJECTION, SOLUTION INTRAMUSCULAR; INTRAVENOUS at 22:05

## 2020-04-30 RX ADMIN — ONDANSETRON 4 MG: 2 INJECTION INTRAMUSCULAR; INTRAVENOUS at 22:05

## 2020-04-30 ASSESSMENT — PAIN DESCRIPTION - LOCATION: LOCATION: ABDOMEN;HEAD

## 2020-04-30 ASSESSMENT — PAIN SCALES - GENERAL: PAINLEVEL_OUTOF10: 6

## 2020-04-30 ASSESSMENT — PAIN DESCRIPTION - PROGRESSION: CLINICAL_PROGRESSION: GRADUALLY WORSENING

## 2020-04-30 ASSESSMENT — PAIN DESCRIPTION - PAIN TYPE: TYPE: ACUTE PAIN

## 2020-04-30 ASSESSMENT — PAIN DESCRIPTION - ONSET: ONSET: GRADUAL

## 2020-04-30 ASSESSMENT — PAIN DESCRIPTION - DESCRIPTORS: DESCRIPTORS: BURNING;HEADACHE

## 2020-04-30 ASSESSMENT — PAIN DESCRIPTION - FREQUENCY: FREQUENCY: CONTINUOUS

## 2020-04-30 ASSESSMENT — PAIN DESCRIPTION - ORIENTATION: ORIENTATION: LEFT

## 2020-05-01 LAB
EKG ATRIAL RATE: 81 BPM
EKG P AXIS: 60 DEGREES
EKG P-R INTERVAL: 162 MS
EKG Q-T INTERVAL: 380 MS
EKG QRS DURATION: 86 MS
EKG QTC CALCULATION (BAZETT): 441 MS
EKG T AXIS: 4 DEGREES
EKG VENTRICULAR RATE: 81 BPM
SARS-COV-2, NAAT: NOT DETECTED

## 2020-05-01 PROCEDURE — 93010 ELECTROCARDIOGRAM REPORT: CPT | Performed by: INTERNAL MEDICINE

## 2020-05-01 NOTE — ED PROVIDER NOTES
[facility]       Department of Emergency Medicine   ED  Provider Note  Admit Date/RoomTime: 4/30/2020  9:09 PM  ED Room: 08/08   Chief Complaint:   Headache (states here and dx with concussion. not better ); Blurred Vision; Shortness of Breath; and Cough    History of Present Illness   Source of history provided by:  patient. History/Exam Limitations: none. Grabiel Miller is a 61 y.o. old female who has a past medical hx of:   Past Medical History:   Diagnosis Date    Adenoma of right adrenal gland     Anemia     Anxiety     Arthritis     spinal    Asthma     controlled with inhalers     Benign essential tremor     Bipolar affective (HCC)     Chronic back pain     Spinal cord stimulator in place    Chronic respiratory failure with hypoxia (HCC)     at times dt diaphragm does not function properly dt Mitochondrial disorder    Depression     stable    Difficulty swallowing     for EGD 10-8-19     Environmental and seasonal allergies     Fatty liver     Full dentures     GERD (gastroesophageal reflux disease)     Gout     past hx of    Herniated cervical disc     limited rom of head and neck    History of swelling of feet     Lymphedema of lower extremity 09/06/2012    Mitochondrial cytopathy (HCC)     s/s muscle and nerve pain, difficulty breathing, seizures, difficulty swallowing, digestive disorders- Dr. Felix Patient [facility]    Neuropathy     at feet    Obesity     PETR (obstructive sleep apnea)     no CPAP dt insurance will not pay for    PONV (postoperative nausea and vomiting)     Seizures ([facility] [address]     last approx 6-13-19- f/u w/ PCP  Granmal, flares up in heat/ summer time - no recent issues as of 10-4-19     Spinal headache     presents to the emergency department for complaint of headache. She reports that she fell and was seen here 2 days ago and dx with a concussion. She reports that she has had ongoing headache since.  She reports taking Percocet for the headaches with no [x]   Slightly Suspicious  0       []   Moderately Suspicious  +1       []   Highly Suspicious  +2    EK point: No ST deviation but LBBB, LVH repolarization changes (ex:digoxin);               2 points: ST deviation not due to LBBB, LVH or digoxin         [x]   Normal  0       []   Nonspecific Repolarization Disturbance  +1       []   Significant ST Depression  +2    AGE       []   <45  0       [x]   45-64  +1       []    >65  +2    RISK FACTORS:  1. HTN    2. Hypercholesterolemia    3. DM     4. Cigarette smoking (current or cessation < 3 mos)    5. Positive family history  (parent or sibling with CVD before age 72). 6. Obesity (BMI >30kg/m2)         []   No Risk factors Known  0       []   1-2 Risk Factors  +1       [x]   >3 Risk Factors or History of Atherosclerotic Disease  +2      INITIAL TROPONIN       [x]   < Normal Limit   0       []   1-3 x Normal Limit   +1       []   >3 x Normal Limit   +2     -----------------------------------------------------------------------------------------------------------------  SCORE TOTAL:  3 POINTS     Low Score          (0-3 Points), risk of MACE of 0.9-1.7% (discuss d/c home with f/u)  Mod Score          (4-6 Points), risk of MACE of 12-16.6% (discuss admission for further testing)  High Score         (7-10 Points), risk of MACE of 50-65% (Admit ALL as they are candidates for early invasive measures)     Re-examination:  20       Time: 2330: Patient reports improvement of her headache. She remains neurologically intact. She ambulated to the bathroom with assistance of her cane, no ataxia, gait is steady. Consult(s):   None    Procedure(s):   none    MDM:   Sravan Cleary is a 61year old female who presented to the ED for complaint of headache, shortness of breath, and chest pain. She sustained a fall 1 day ago and was diagnosed with a concussion. No anticoagulation therapy. CBC no leukocytosis, H&H 12.5/30.9. CMP unremarkable. D-dimer < 200. Troponin < 0.01, EKG sinus rhythm with no acute findings noted. Her chest was tender on palpation and pain was reproducible. HEART score 3. BNP 68.  CT head negative for any acute findings. Chest x-ray was negative. COVID 19 test pending. She was medicated for headache with positive results. She ambulated in the ED, gait steady with use of cane, NO ataxia. she remained hemodynamically stable,  neurologically intact, nontoxic, and appropriate for outpatient management. She was instructed to have close follow-up with her PCP and advised to return for any new, changing, worsening symptoms or concerns. At this time the patient is without objective evidence of an acute process requiring hospitalization or inpatient management. They have remained hemodynamically stable throughout their entire ED visit and are stable for discharge with outpatient follow-up. The plan has been discussed in detail and they are aware of the specific conditions for emergent return, as well as the importance of follow-up. Counseling: The emergency provider has spoken with the patient and discussed todays results, in addition to providing specific details for the plan of care and counseling regarding the diagnosis and prognosis. Questions are answered at this time and they are agreeable with the plan. Assessment      1. Concussion with loss of consciousness, initial encounter    2. Nonintractable headache, unspecified chronicity pattern, unspecified headache type    3. Chest wall pain      Plan   Discharge to home  Patient condition is good    New Medications     Discharge Medication List as of 4/30/2020 11:55 PM        Electronically signed by ADEEL Angelo CNP   DD: 4/30/20  **This report was transcribed using voice recognition software. Every effort was made to ensure accuracy; however, inadvertent computerized transcription errors may be present.   END OF ED PROVIDER NOTE     ADEEL Angelo CNP  05/01/20

## 2020-05-01 NOTE — ED NOTES
Patient had tylenol 2020, Percocet x2 today last dose 1430. States she has a headache, and is having pain when inhaling on abdomen and left lung. Was just here a couple of days ago for a concussion.       Rishi Miller RN  04/30/20 [id number]

## 2020-05-02 ENCOUNTER — CARE COORDINATION (OUTPATIENT)
Dept: CARE COORDINATION | Age: 60
End: 2020-05-02

## 2020-05-11 ENCOUNTER — HOSPITAL ENCOUNTER (OUTPATIENT)
Age: 60
Discharge: HOME | End: 2020-05-11
Payer: MEDICAID

## 2020-05-11 LAB
ALBUMIN SERPL-MCNC: 4 G/DL (ref 3.5–5.2)
ALP BLD-CCNC: 201 U/L (ref 35–104)
ALT SERPL-CCNC: 20 U/L (ref 0–32)
AMMONIA: 22 UMOL/L (ref 11–51)
ANION GAP SERPL CALCULATED.3IONS-SCNC: 15 MMOL/L (ref 7–16)
AST SERPL-CCNC: 19 U/L (ref 0–31)
BILIRUB SERPL-MCNC: 0.2 MG/DL (ref 0–1.2)
BILIRUBIN DIRECT: 0.2 MG/DL (ref 0–0.3)
BILIRUBIN, INDIRECT: 0 MG/DL (ref 0–1)
BUN BLDV-MCNC: 14 MG/DL (ref 6–20)
C-REACTIVE PROTEIN: 0.2 MG/DL (ref 0–0.4)
CALCIUM SERPL-MCNC: 9 MG/DL (ref 8.6–10.2)
CHLORIDE BLD-SCNC: 104 MMOL/L (ref 98–107)
CO2: 23 MMOL/L (ref 22–29)
CORTISOL TOTAL: 3.03 MCG/DL (ref 2.68–18.4)
CREAT SERPL-MCNC: 0.8 MG/DL (ref 0.5–1)
FOLATE: >20 NG/ML (ref 4.8–24.2)
GFR AFRICAN AMERICAN: >60
GFR NON-AFRICAN AMERICAN: >60 ML/MIN/1.73
GLUCOSE BLD-MCNC: 105 MG/DL (ref 74–99)
MAGNESIUM: 2.1 MG/DL (ref 1.6–2.6)
POTASSIUM SERPL-SCNC: 3.5 MMOL/L (ref 3.5–5)
SEDIMENTATION RATE, ERYTHROCYTE: 20 MM/HR (ref 0–20)
SODIUM BLD-SCNC: 142 MMOL/L (ref 132–146)
T3 TOTAL: 113.9 NG/DL (ref 80–200)
T4 FREE: 1.16 NG/DL (ref 0.93–1.7)
TOTAL CK: 69 U/L (ref 20–180)
TSH SERPL DL<=0.05 MIU/L-ACNC: 0.81 UIU/ML (ref 0.27–4.2)
VITAMIN B-12: 917 PG/ML (ref 211–946)

## 2020-05-11 PROCEDURE — 84439 ASSAY OF FREE THYROXINE: CPT

## 2020-05-11 PROCEDURE — 82746 ASSAY OF FOLIC ACID SERUM: CPT

## 2020-05-11 PROCEDURE — 84480 ASSAY TRIIODOTHYRONINE (T3): CPT

## 2020-05-11 PROCEDURE — 86618 LYME DISEASE ANTIBODY: CPT

## 2020-05-11 PROCEDURE — 84425 ASSAY OF VITAMIN B-1: CPT

## 2020-05-11 PROCEDURE — 80201 ASSAY OF TOPIRAMATE: CPT

## 2020-05-11 PROCEDURE — 82533 TOTAL CORTISOL: CPT

## 2020-05-11 PROCEDURE — 84443 ASSAY THYROID STIM HORMONE: CPT

## 2020-05-11 PROCEDURE — 84207 ASSAY OF VITAMIN B-6: CPT

## 2020-05-11 PROCEDURE — 86140 C-REACTIVE PROTEIN: CPT

## 2020-05-11 PROCEDURE — 80053 COMPREHEN METABOLIC PANEL: CPT

## 2020-05-11 PROCEDURE — 83735 ASSAY OF MAGNESIUM: CPT

## 2020-05-11 PROCEDURE — 82248 BILIRUBIN DIRECT: CPT

## 2020-05-11 PROCEDURE — 85651 RBC SED RATE NONAUTOMATED: CPT

## 2020-05-11 PROCEDURE — 82550 ASSAY OF CK (CPK): CPT

## 2020-05-11 PROCEDURE — 82140 ASSAY OF AMMONIA: CPT

## 2020-05-11 PROCEDURE — 82085 ASSAY OF ALDOLASE: CPT

## 2020-05-11 PROCEDURE — 86235 NUCLEAR ANTIGEN ANTIBODY: CPT

## 2020-05-11 PROCEDURE — 86592 SYPHILIS TEST NON-TREP QUAL: CPT

## 2020-05-11 PROCEDURE — 82024 ASSAY OF ACTH: CPT

## 2020-05-11 PROCEDURE — 84165 PROTEIN E-PHORESIS SERUM: CPT

## 2020-05-11 PROCEDURE — 82607 VITAMIN B-12: CPT

## 2020-05-11 PROCEDURE — 36415 COLL VENOUS BLD VENIPUNCTURE: CPT

## 2020-05-12 LAB
ENA TO SSA (RO) ANTIBODY: NEGATIVE
ENA TO SSB (LA) ANTIBODY: NEGATIVE
RPR: NORMAL

## 2020-05-13 ENCOUNTER — CARE COORDINATION (OUTPATIENT)
Dept: CARE COORDINATION | Age: 60
End: 2020-05-13

## 2020-05-13 LAB
ADRENOCORTICOTROPIC HORMONE: 5.8 PG/ML (ref 7.2–63.3)
ALBUMIN SERPL-MCNC: 3.5 G/DL (ref 3.5–4.7)
ALPHA-1-GLOBULIN: 0.3 G/DL (ref 0.2–0.4)
ALPHA-2-GLOBULIN: 1 G/FL (ref 0.5–1)
BETA GLOBULIN: 1.1 G/DL (ref 0.8–1.3)
ELECTROPHORESIS: NORMAL
GAMMA GLOBULIN: 0.7 G/DL (ref 0.7–1.6)
TOTAL PROTEIN: 6.5 G/DL (ref 6.4–8.3)

## 2020-05-14 ENCOUNTER — TELEPHONE (OUTPATIENT)
Dept: NEUROLOGY | Age: 60
End: 2020-05-14

## 2020-05-14 LAB
ALDOLASE: 2.9 U/L (ref 1.5–8.1)
LYME, EIA: 0.16 LIV (ref 0–1.2)
TOPIRAMATE LEVEL: 15.2 UG/ML (ref 5–20)
VITAMIN B1 WHOLE BLOOD: 292 NMOL/L (ref 70–180)

## 2020-05-15 LAB — VITAMIN B6: 134.8 NMOL/L (ref 20–125)

## 2020-06-03 ENCOUNTER — HOSPITAL ENCOUNTER (OUTPATIENT)
Dept: NEUROLOGY | Age: 60
Discharge: HOME | End: 2020-06-03
Payer: MEDICAID

## 2020-06-03 PROCEDURE — 95812 EEG 41-60 MINUTES: CPT | Performed by: PSYCHIATRY & NEUROLOGY

## 2020-06-03 PROCEDURE — 95819 EEG AWAKE AND ASLEEP: CPT

## 2020-06-04 ENCOUNTER — TELEPHONE (OUTPATIENT)
Dept: NEUROLOGY | Age: 60
End: 2020-06-04

## 2020-06-06 ENCOUNTER — TELEPHONE (OUTPATIENT)
Dept: ENDOCRINOLOGY | Age: 60
End: 2020-06-06

## 2020-06-06 NOTE — TELEPHONE ENCOUNTER
Spoke with pt about test results and CT of adrenals  05/11/20  Low dose dexamethasone suppression test: failure to suppress - cortisol 3.03 mcg/dl and ACTH 5.8 pg/ml   Needs the CT of adrenals performed  Plan -   1. Check midnight salivary cortisol (I will check with staff to see if we have the kits here)  2.   Check CT of adrenals (not ordered in March b/c of needing clarification on the exact order to use in Epic and then Covid occurred)  SDR  06/06/20

## 2020-06-09 NOTE — TELEPHONE ENCOUNTER
The pt scheduled for 6/12/20 by [id number] [facility] in [facility]. [facility] to [facility].

## 2020-06-12 ENCOUNTER — HOSPITAL ENCOUNTER (OUTPATIENT)
Dept: CT IMAGING | Age: 60
Discharge: HOME | End: 2020-06-14
Payer: MEDICAID

## 2020-06-12 PROCEDURE — 2580000003 HC RX 258: Performed by: RADIOLOGY

## 2020-06-12 PROCEDURE — 6360000004 HC RX CONTRAST MEDICATION: Performed by: RADIOLOGY

## 2020-06-12 PROCEDURE — 74170 CT ABD WO CNTRST FLWD CNTRST: CPT

## 2020-06-12 RX ORDER — SODIUM CHLORIDE 0.9 % (FLUSH) 0.9 %
10 SYRINGE (ML) INJECTION PRN
Status: DISCONTINUED | OUTPATIENT
Start: 2020-06-12 | End: 2020-06-15 | Stop reason: HOSPADM

## 2020-06-12 RX ADMIN — IOPAMIDOL 100 ML: 755 INJECTION, SOLUTION INTRAVENOUS at 13:27

## 2020-06-12 RX ADMIN — Medication 10 ML: at 13:38

## 2020-06-12 RX ADMIN — Medication 10 ML: at 13:27

## 2020-07-09 ENCOUNTER — VIRTUAL VISIT (OUTPATIENT)
Dept: ENDOCRINOLOGY | Age: 60
End: 2020-07-09
Payer: MEDICAID

## 2020-07-09 PROCEDURE — 99214 OFFICE O/P EST MOD 30 MIN: CPT | Performed by: INTERNAL MEDICINE

## 2020-07-09 NOTE — PROGRESS NOTES
Lv Sauceda is a 61 y.o. female evaluated via telephone on 7/9/2020. Consent:  She and/or health care decision maker is aware that that she may receive a bill for this telephone service, depending on her insurance coverage, and has provided verbal consent to proceed: Yes      Documentation:  I communicated with the patient and/or health care decision maker about adrenal nodule and thyroid nodule. Details of this discussion including any medical advice provided are as follows    Background -   Last visit: 03/10/2020  Initial Visit: 09/05/2019    Thyroid nodule -  Diagnosed 5/22/19 5/22/19 Discovered incidentally on CT of neck 5/22/19 ordered by Dr. Merlin Nguyen for evaluation of swelling of the neck;  7/31/19 US of thyroid: 1.3cm hypoechoic nodule in the isthmus  8/02/19 CT-guided core biopsy was negative for malignancy; flow cytometry was attempted but the cellularity of the specimen was inadequate.   02/03/20 US of thyroid: no significant change from 7/31/19; two nodules reported, one in each lobe located near the isthmus, both are 1.2 cm; calcification now noted in the right lobe, 0.5 cm, non-vascular    6/13/19  TSH 0.239,               fT4 1.18  5/11/20  TSH 0.809, T3 114,  fT4 1.16    Right adrenal nodule - 2010 (per 12/28/12 CT report)  12/28/19  CT of abd : Stable small nodule in the right adrenal gland likely reflecting adenoma  02/06/20  24 urine metanephrines and catecholamines neg for pheo; urine cortisol nml        PRA and serum dean normal         02/06/20 Serum Cortisol level 6.06 mcg/dl, but was checked at 4:38 p    5/11/20  Dexamethasone suppression test: Cortisol 3.03, ACTH 5.8    ______________________    [facility] -  Medical problems: Thyroid nodule, R Adrenal nodule, Bipolar Type 2, Chronic back pain, Fatty liver, Essential tremor, Mitochondrial cytopathy, Neuropathy, Seizures, GERD, Diaphragm does not function well wh/ causes hypoxia   Medications: Topiramate, Famotidine, Montelukast, Omeprazole, Baclofen, Furosemide, Linaclotide, Levocarnitine, Allopurinol, Escitalopram, Ziprasidone, Ondansetron     ROS -  Gen: no fever  Pulm: no cough    PE -  Gen : /72 (Site: Left Lower Arm, Position: Sitting, Cuff Size: Medium Adult)   Pulse 102   Resp 16   Ht 5' 7\" (1.702 m)   Wt 269 lb (122 kg)   LMP 08/31/1988   SpO2 95%   BMI 42.13 kg/m²    WN, WD, NAD  Lung: Nml resp effort  Psych: Normal mood   Full affect  Neuro: Moves all ext well  ______________________      HPI & A/P -   Problem 1  - Thyroid nodule  HPI   - Duration 14 months, intensity of mild severity, no aggravating or alleviating factors, managed in the context of mitochodrial cytopathy limiting assessment by symptoms of mass effect       See background for description of the problem      + hoarseness - present for 2-3 yrs; due to dryness      + dysphagia to liquids (hot especially), started with solids 2.5 yrs ago (due to her mictochondrial cytopathy)      No hyper or hypothyroid symptoms other than ones that are chronic and due to other problems  Assessment  - Stable clinically  Plan   - Repeat the US Feb 2020 (one year after last US)    Problem 2  - Adrenal nodules, bilateral  HPI   - non-functioning per 24 hr urine studies, plasma renin activity and serum aldosterone       Assessment  - stable in size and characteristics, need to rule out cortisol producing adenoma (very unlikely, but must be considered since the dexamethasone suppression test was abnormal)  Plan   - Check midnight salivary cortisol test x3    Follow up  Return to see me in four months      I affirm this is a Patient Initiated Episode with a Patient who has not had a related appointment within my department in the past 7 days or scheduled within the next 24 hours.     Patient identification was verified at the start of the visit: Yes    Start time: 1:50 pm  End time:  2:15 pm    Total Time: minutes: 21-30 minutes    Note: not billable if this call serves to triage the patient into an appointment for the relevant concern      Michaela Jean

## 2020-07-15 NOTE — PATIENT INSTRUCTIONS
Complete three midnight salivary cortisol tests  Have a repeat US performed one week or so prior to next appt  See me in February 2021

## 2020-08-17 ENCOUNTER — TELEPHONE (OUTPATIENT)
Dept: VASCULAR SURGERY | Age: 60
End: 2020-08-17

## 2020-08-18 ENCOUNTER — OFFICE VISIT (OUTPATIENT)
Dept: NEUROSURGERY | Age: 60
End: 2020-08-18
Payer: MEDICAID

## 2020-08-18 VITALS
HEIGHT: 67 IN | TEMPERATURE: 97.7 F | HEART RATE: 83 BPM | WEIGHT: 246 LBS | BODY MASS INDEX: 38.61 KG/M2 | DIASTOLIC BLOOD PRESSURE: 62 MMHG | SYSTOLIC BLOOD PRESSURE: 102 MMHG

## 2020-08-18 PROCEDURE — 99203 OFFICE O/P NEW LOW 30 MIN: CPT | Performed by: PHYSICIAN ASSISTANT

## 2020-08-18 PROCEDURE — 4004F PT TOBACCO SCREEN RCVD TLK: CPT | Performed by: PHYSICIAN ASSISTANT

## 2020-08-18 PROCEDURE — G8427 DOCREV CUR MEDS BY ELIG CLIN: HCPCS | Performed by: PHYSICIAN ASSISTANT

## 2020-08-18 PROCEDURE — 3017F COLORECTAL CA SCREEN DOC REV: CPT | Performed by: PHYSICIAN ASSISTANT

## 2020-08-18 PROCEDURE — G8417 CALC BMI ABV UP PARAM F/U: HCPCS | Performed by: PHYSICIAN ASSISTANT

## 2020-08-18 RX ORDER — TRAZODONE HYDROCHLORIDE 100 MG/1
100 TABLET ORAL NIGHTLY
COMMUNITY

## 2020-08-18 ASSESSMENT — ENCOUNTER SYMPTOMS
GASTROINTESTINAL NEGATIVE: 1
ALLERGIC/IMMUNOLOGIC NEGATIVE: 1
EYES NEGATIVE: 1
RESPIRATORY NEGATIVE: 1
BACK PAIN: 1

## 2020-08-18 NOTE — PROGRESS NOTES
Subjective:      Patient ID: Joy Marie is a 61 y.o. female. Back Pain   This is a new problem. Episode onset: 8-9 months. The problem occurs constantly. The pain is present in the thoracic spine. Neck Pain    This is a new problem. Episode onset: 8-9 months. The problem occurs constantly. The problem has been gradually worsening. The pain is present in the midline (and bilateral arm/hand pain/numbness into the hands). The quality of the pain is described as aching. The pain is at a severity of 8/10. She has tried NSAIDs and acetaminophen (percocet 10mg for years. gabapentin, baclofen) for the symptoms. The treatment provided mild relief. Review of Systems   Constitutional: Negative. HENT: Negative. Eyes: Negative. Respiratory: Negative. Cardiovascular: Negative. Gastrointestinal: Negative. Endocrine: Negative. Genitourinary: Negative. Musculoskeletal: Positive for back pain and neck pain. Skin: Negative. Allergic/Immunologic: Negative. Neurological: Negative. Hematological: Negative. Psychiatric/Behavioral: Negative. Objective:   Physical Exam  Constitutional:       Appearance: Normal appearance. HENT:      Head: Normocephalic and atraumatic. Eyes:      Pupils: Pupils are equal, round, and reactive to light. Pulmonary:      Effort: Pulmonary effort is normal.   Abdominal:      General: There is no distension. Musculoskeletal:      Comments: Pain with ROM of the cervical spine   Skin:     General: Skin is warm and dry. Neurological:      Mental Status: She is alert. GCS: GCS eye subscore is 4. GCS verbal subscore is 5. GCS motor subscore is 6. Cranial Nerves: Cranial nerves are intact. Sensory: Sensory deficit present. Motor: Weakness present. Gait: Gait abnormal.      Deep Tendon Reflexes: Babinski sign absent on the right side. Babinski sign absent on the left side.       Reflex Scores:       Tricep reflexes are 1+ on the right side and 1+ on the left side. Bicep reflexes are 1+ on the right side and 1+ on the left side. Brachioradialis reflexes are 1+ on the right side and 1+ on the left side. Patellar reflexes are 1+ on the right side and 1+ on the left side. Achilles reflexes are 1+ on the right side and 1+ on the left side. Comments: 4/5 UE weakness, pain noted    decreased sensation in a both UE in glove dist to light touch    - hoffmans sign   Psychiatric:         Mood and Affect: Mood normal.         Assessment:      61year old female with neck, mid back and bilateral arm pain, numbness, and weakness. Cervical CT reveals reversal of normal lordosis and degenerative changes worst at C6-7. Plan:       We will order UE EMG and cervical/thoracic myelogram.         RADHA Warner

## 2020-08-27 ENCOUNTER — HOSPITAL ENCOUNTER (OUTPATIENT)
Dept: NEUROLOGY | Age: 60
Discharge: HOME | End: 2020-08-27
Payer: MEDICAID

## 2020-08-27 VITALS — WEIGHT: 247 LBS | HEIGHT: 67 IN | BODY MASS INDEX: 38.77 KG/M2

## 2020-08-27 PROCEDURE — 95911 NRV CNDJ TEST 9-10 STUDIES: CPT

## 2020-08-27 PROCEDURE — 95885 MUSC TST DONE W/NERV TST LIM: CPT | Performed by: PHYSICAL MEDICINE & REHABILITATION

## 2020-08-27 PROCEDURE — 95886 MUSC TEST DONE W/N TEST COMP: CPT

## 2020-08-27 PROCEDURE — 95911 NRV CNDJ TEST 9-10 STUDIES: CPT | Performed by: PHYSICAL MEDICINE & REHABILITATION

## 2020-08-27 NOTE — PROCEDURES
41.9 14 56 32.7   L Ulnar - Digit V (Antidromic)      Wrist 2.34 2.97 12.1 14 60 32.5   R Ulnar - Digit V (Antidromic)      Wrist 2.71 3.28 9.8 14 52 32.6   R Radial - Anatomical snuff box (Forearm)      Forearm 1.61 2.03 25.5 10 62 32.6   L Radial - Anatomical snuff box (Forearm)      Forearm 1.67 2.29 15.3 10 60 32.5       F  Wave      Nerve F Lat M Lat F-M Lat    ms ms ms   R Median - APB 28.7 3.8 24.9   R Ulnar - ADM 29.0 2.6 26.4   L Median - APB 27.6 3.6 24.0   L Ulnar - ADM 29.0 2.9 26.0       EMG         *Needle exam attempted, however unable to get an adequate interpretable needle exam due to severe electrical interference from patient's stimulator. She did not bring controller to turn stimulator off. Summary of Findings:   Nerve conduction studies:   Sensory nerve conduction studies of the bilateral median, ulnar, and radial nerves showed normal distal latencies and normal SNAP amplitudes. Motor nerve conduction studies of the bilateral median nerves revealed normal distal latencies, normal CMAP amplitudes, and decreased conduction velocities. Motor nerve conduction studies of the bilateral ulnar nerves showed normal distal latencies, normal CMAP amplitudes, normal conduction velocities about the elbows, and decreased conduction velocities in the distal segments. F-wave studies of the bilateral median and ulnar nerves revealed normal latencies. Needle EMG:   · Needle EMG was attempted using a monopolar needle. · Needle exam attempted, however unable to get an adequate interpretable needle exam due to severe electrical interference from patient's stimulator. She did not bring controller to turn stimulator off. Diagnostic Interpretation: This study was Abnormal.     1. Decreased conduction velocity in the distal segments of all tested motor nerves on NCS may suggest a peripheral polyneuropathy.  However, cannot determine with certainty as complete peripheral neuropathy evaluation,

## 2020-08-30 ENCOUNTER — HOSPITAL ENCOUNTER (EMERGENCY)
Age: 60
Discharge: HOME | End: 2020-08-30
Attending: EMERGENCY MEDICINE
Payer: MEDICAID

## 2020-08-30 VITALS
RESPIRATION RATE: 22 BRPM | WEIGHT: 246 LBS | DIASTOLIC BLOOD PRESSURE: 63 MMHG | BODY MASS INDEX: 38.61 KG/M2 | OXYGEN SATURATION: 98 % | HEART RATE: 70 BPM | HEIGHT: 67 IN | SYSTOLIC BLOOD PRESSURE: 127 MMHG | TEMPERATURE: 98.1 F

## 2020-08-30 PROCEDURE — 99291 CRITICAL CARE FIRST HOUR: CPT

## 2020-08-30 PROCEDURE — 99282 EMERGENCY DEPT VISIT SF MDM: CPT

## 2020-08-30 PROCEDURE — 2500000003 HC RX 250 WO HCPCS: Performed by: NURSE PRACTITIONER

## 2020-08-30 PROCEDURE — 2580000003 HC RX 258: Performed by: NURSE PRACTITIONER

## 2020-08-30 PROCEDURE — 99283 EMERGENCY DEPT VISIT LOW MDM: CPT

## 2020-08-30 PROCEDURE — 96375 TX/PRO/DX INJ NEW DRUG ADDON: CPT

## 2020-08-30 PROCEDURE — 6360000002 HC RX W HCPCS: Performed by: NURSE PRACTITIONER

## 2020-08-30 PROCEDURE — 96372 THER/PROPH/DIAG INJ SC/IM: CPT

## 2020-08-30 PROCEDURE — 6360000002 HC RX W HCPCS

## 2020-08-30 PROCEDURE — 96374 THER/PROPH/DIAG INJ IV PUSH: CPT

## 2020-08-30 RX ORDER — METHYLPREDNISOLONE SODIUM SUCCINATE 125 MG/2ML
INJECTION, POWDER, LYOPHILIZED, FOR SOLUTION INTRAMUSCULAR; INTRAVENOUS
Status: COMPLETED
Start: 2020-08-30 | End: 2020-08-30

## 2020-08-30 RX ORDER — METHYLPREDNISOLONE 4 MG/1
TABLET ORAL
Qty: 21 TABLET | Status: SHIPPED | OUTPATIENT
Start: 2020-08-30 | End: 2020-09-05

## 2020-08-30 RX ORDER — DIPHENHYDRAMINE HYDROCHLORIDE 50 MG/ML
25 INJECTION INTRAMUSCULAR; INTRAVENOUS ONCE
Status: COMPLETED | OUTPATIENT
Start: 2020-08-30 | End: 2020-08-30

## 2020-08-30 RX ORDER — EPINEPHRINE 0.3 MG/.3ML
0.3 INJECTION SUBCUTANEOUS
Qty: 0.3 ML | Refills: 0 | Status: SHIPPED | OUTPATIENT
Start: 2020-08-30 | End: 2021-02-01

## 2020-08-30 RX ORDER — FAMOTIDINE 20 MG/1
20 TABLET, FILM COATED ORAL 2 TIMES DAILY
Qty: 14 TABLET | Refills: 0 | Status: SHIPPED | OUTPATIENT
Start: 2020-08-30 | End: 2020-09-10

## 2020-08-30 RX ORDER — METHYLPREDNISOLONE SODIUM SUCCINATE 125 MG/2ML
125 INJECTION, POWDER, LYOPHILIZED, FOR SOLUTION INTRAMUSCULAR; INTRAVENOUS ONCE
Status: COMPLETED | OUTPATIENT
Start: 2020-08-30 | End: 2020-08-30

## 2020-08-30 RX ORDER — DIPHENHYDRAMINE HYDROCHLORIDE 50 MG/ML
INJECTION INTRAMUSCULAR; INTRAVENOUS
Status: COMPLETED
Start: 2020-08-30 | End: 2020-08-30

## 2020-08-30 RX ORDER — 0.9 % SODIUM CHLORIDE 0.9 %
1000 INTRAVENOUS SOLUTION INTRAVENOUS ONCE
Status: COMPLETED | OUTPATIENT
Start: 2020-08-30 | End: 2020-08-30

## 2020-08-30 RX ORDER — EPINEPHRINE 1 MG/ML
0.3 INJECTION, SOLUTION, CONCENTRATE INTRAVENOUS ONCE
Status: COMPLETED | OUTPATIENT
Start: 2020-08-30 | End: 2020-08-30

## 2020-08-30 RX ADMIN — DIPHENHYDRAMINE HYDROCHLORIDE 25 MG: 50 INJECTION, SOLUTION INTRAMUSCULAR; INTRAVENOUS at 16:33

## 2020-08-30 RX ADMIN — EPINEPHRINE 0.3 MG: 1 INJECTION, SOLUTION, CONCENTRATE INTRAVENOUS at 16:30

## 2020-08-30 RX ADMIN — DIPHENHYDRAMINE HYDROCHLORIDE 25 MG: 50 INJECTION INTRAMUSCULAR; INTRAVENOUS at 16:33

## 2020-08-30 RX ADMIN — SODIUM CHLORIDE 1000 ML: 9 INJECTION, SOLUTION INTRAVENOUS at 16:46

## 2020-08-30 RX ADMIN — FAMOTIDINE 20 MG: 10 INJECTION, SOLUTION INTRAVENOUS at 16:33

## 2020-08-30 RX ADMIN — METHYLPREDNISOLONE SODIUM SUCCINATE 125 MG: 125 INJECTION, POWDER, LYOPHILIZED, FOR SOLUTION INTRAMUSCULAR; INTRAVENOUS at 16:32

## 2020-08-30 RX ADMIN — METHYLPREDNISOLONE SODIUM SUCCINATE 125 MG: 125 INJECTION, POWDER, FOR SOLUTION INTRAMUSCULAR; INTRAVENOUS at 16:32

## 2020-08-30 ASSESSMENT — PAIN DESCRIPTION - PAIN TYPE: TYPE: CHRONIC PAIN

## 2020-08-30 ASSESSMENT — PAIN DESCRIPTION - LOCATION: LOCATION: BACK;LEG

## 2020-08-30 NOTE — ED PROVIDER NOTES
HPI:  8/30/20, Time: 4:30 PM EDT         Kristal Franklin is a 61 y.o. female presenting to the ED for Bee sting SOB swollen throat , beginning 20 minutes ago. The complaint has been persistent, severe in severity, and worsened by nothing. Patient stated that she was stung by a bee on her chest at 1606 today. She went to use her EpiPen and it did not work. She came in today because of the concern for anaphylaxis which she is had in the past.  She complains of difficulty swallowing also nausea and shortness of breath. ROS:   Pertinent positives and negatives are stated within HPI, all other systems reviewed and are negative.  --------------------------------------------- PAST HISTORY ---------------------------------------------  Past Medical History:  has a past medical history of Adenoma of right adrenal gland, Anemia, Anxiety, Arthritis, Asthma, Benign essential tremor, Bipolar affective (HCC), Chronic back pain, Chronic respiratory failure with hypoxia ([address] Depression, Difficulty swallowing, DM (diabetes mellitus) ([address] Environmental and seasonal allergies, Fatty liver, Full dentures, GERD (gastroesophageal reflux disease), Gout, Herniated cervical disc, History of swelling of feet, Lymphedema of lower extremity, Mitochondrial cytopathy ([address] Neuropathy, Obesity, PETR (obstructive sleep apnea), PONV (postoperative nausea and vomiting), Seizures ([address] and Spinal headache. Past Surgical History:  has a past surgical history that includes knee surgery (Bilateral); Gastric bypass surgery (1999); myomectomy; Tonsillectomy; Nerve Block (Left, 10 02 2013); Nerve Block (Left, 10 9 13); Nerve Block (Left, 10/16/13); other surgical history (Left, 11 25 13); Nerve Block (Left, 10/29/2014); Nerve Block (Left, 11/12/2014); Nerve Block (Left, 12 8 14); Nerve Block (Right, 3/30/15); Nerve Block (Right, 4/6/2015); Nerve Block (Right, 4/13/15); Nerve Block (Left, 7/6/15); Nerve Block (07/20/15);  Nerve Block (Left, 10 1 15); Nerve Block (10/26/15); other surgical history (3/28/2016); Endoscopy, colon, diagnostic; pr colonoscopy flx dx w/collj spec when pfrmd (N/A, 3/20/2018); pr egd transoral biopsy single/multiple (N/A, 3/20/2018); Cholecystectomy (1999); Hysterectomy (1988); Colonoscopy (N/A, 9/18/2018); Esophagus dilation (9/18/2018); back surgery (last one 1995); Cardiac catheterization (Right, 6-6-2013); Appendectomy; other surgical history (1995); joint replacement (Bilateral, [id number]); egd colonoscopy (N/A, 10/8/2019); and Colonoscopy (N/A, 10/8/2019). Social History:  reports that she has been smoking cigarettes and cigars. She started smoking about 30 years ago. She has a 31.50 pack-year smoking history. She has never used smokeless tobacco. She reports that she does not drink alcohol or use drugs. Family History: family history includes Arthritis in an other family member; Cancer in her father and another family member; Depression in an other family member; Heart Disease in her mother and another family member; Hypertension in an other family member; Mental Illness in an other family member; Stroke in an other family member. The patients home medications have been reviewed. Allergies: Bee pollen; Penicillins; Ropinirole hcl; Vistaril [hydroxyzine hcl]; Aripiprazole; Hydroxyzine pamoate; and Tape [adhesive tape]    ---------------------------------------------------PHYSICAL EXAM--------------------------------------    Constitutional/General: Alert and oriented x3, well appearing, non toxic in NAD  Head: Normocephalic and atraumatic  Eyes: PERRL, EOMI  Mouth: Oropharynx clear, handling secretions, no trismus  Neck: Supple, full ROM, non tender to palpation in the midline, no stridor, no crepitus, no meningeal signs  Pulmonary: Lungs clear to auscultation bilaterally, no wheezes, rales, or rhonchi. Not in respiratory distress  Cardiovascular:  Regular rate. Regular rhythm. No murmurs, gallops, or rubs. 2+ distal pulses  Chest: no chest wall tenderness  Abdomen: Soft. Non tender. Non distended. +BS. No rebound, guarding, or rigidity. No pulsatile masses appreciated. Musculoskeletal: Moves all extremities x 4. Warm and well perfused, no clubbing, cyanosis, or edema. Capillary refill <3 seconds  Skin: warm and dry. No rashes. Neurologic: GCS 15, CN 2-12 grossly intact, no focal deficits, symmetric strength 5/5 in the upper and lower extremities bilaterally  Psych: Normal Affect    -------------------------------------------------- RESULTS -------------------------------------------------  I have personally reviewed all laboratory and imaging results for this patient. Results are listed below. LABS:  No results found for this visit on 08/30/20. RADIOLOGY:  Interpreted by Radiologist.  No orders to display           ------------------------- NURSING NOTES AND VITALS REVIEWED ---------------------------   The nursing notes within the ED encounter and vital signs as below have been reviewed by myself. /63   Pulse 70   Temp 98.1 °F (36.7 °C) (Oral)   Resp 22   Ht 5' 7\" (1.702 m)   Wt 246 lb (111.6 kg)   LMP 08/31/1988   SpO2 98%   BMI 38.53 kg/m²   Oxygen Saturation Interpretation: Normal    The patients available past medical records and past encounters were reviewed.         ------------------------------ ED COURSE/MEDICAL DECISION MAKING----------------------  Medications   0.9 % sodium chloride bolus (0 mLs Intravenous Stopped 8/30/20 1934)   methylPREDNISolone sodium (SOLU-MEDROL) injection 125 mg (125 mg Intravenous Given 8/30/20 1632)   diphenhydrAMINE (BENADRYL) injection 25 mg (25 mg Intravenous Given 8/30/20 1633)   famotidine (PEPCID) injection 20 mg (20 mg Intravenous Given 8/30/20 1633)   EPINEPHrine PF 1 MG/ML injection 0.3 mg (0.3 mg Intramuscular Given 8/30/20 1630)             Medical Decision Making:    Presents SP bee sting, throat closing SOB reports anaphylaxis from [facility] [place] stings. EPI pen did not work. EPI Benadryl Pepcid solumedrol. IV fluids. Re-Evaluations:             Re-evaluation. Patients symptoms are improving    ED Course as of Sep 01 0557   Sun Aug 30, 2020   5094 Patient sleeping resting comfortably. O2 sat dropped to 86% after receiving Benadryl. Patient was placed on 3 L nasal cannula. Sats now 95%. [JN]   1903 Patient stated she is feeling much better after above medications. She has no complaints. She will follow-up with her PCP. She will be discharged with Medrol Dosepak Pepcid and EpiPen. [JN]      ED Course User Index  [JN] Tricia Lorenzana,          Consultations:             NONE    Critical Care:   CRITICAL CARE:   30 MINUTES. Please note that the withdrawal or failure to initiate urgent interventions for this patient would likely result in a life threatening deterioration or permanent disability. Accordingly this patient received the above mentioned time, excluding separately billable procedures. This patient's ED course included: a personal history and physicial examination, re-evaluation prior to disposition, multiple bedside re-evaluations, IV medications, cardiac monitoring, continuous pulse oximetry, complex medical decision making and emergency management and a personal history and physicial eaxmination    This patient has remained hemodynamically stable, improved and been closely monitored during their ED course. Counseling: The emergency provider has spoken with the patient and discussed todays results, in addition to providing specific details for the plan of care and counseling regarding the diagnosis and prognosis. Questions are answered at this time and they are agreeable with the plan.       --------------------------------- IMPRESSION AND DISPOSITION ---------------------------------    IMPRESSION  1.  Allergic reaction to bee sting        DISPOSITION  Disposition: Discharge to home  Patient condition is

## 2020-09-08 ENCOUNTER — OFFICE VISIT (OUTPATIENT)
Dept: NEUROLOGY | Age: 60
End: 2020-09-08
Payer: MEDICAID

## 2020-09-08 VITALS
BODY MASS INDEX: 37.59 KG/M2 | WEIGHT: 240 LBS | SYSTOLIC BLOOD PRESSURE: 110 MMHG | DIASTOLIC BLOOD PRESSURE: 70 MMHG | TEMPERATURE: 97.4 F

## 2020-09-08 PROCEDURE — 4004F PT TOBACCO SCREEN RCVD TLK: CPT | Performed by: PSYCHIATRY & NEUROLOGY

## 2020-09-08 PROCEDURE — 3017F COLORECTAL CA SCREEN DOC REV: CPT | Performed by: PSYCHIATRY & NEUROLOGY

## 2020-09-08 PROCEDURE — G8427 DOCREV CUR MEDS BY ELIG CLIN: HCPCS | Performed by: PSYCHIATRY & NEUROLOGY

## 2020-09-08 PROCEDURE — 99212 OFFICE O/P EST SF 10 MIN: CPT | Performed by: PSYCHIATRY & NEUROLOGY

## 2020-09-08 PROCEDURE — G8417 CALC BMI ABV UP PARAM F/U: HCPCS | Performed by: PSYCHIATRY & NEUROLOGY

## 2020-09-08 RX ORDER — TOPIRAMATE 25 MG/1
25 TABLET ORAL 2 TIMES DAILY
Qty: 60 TABLET | Refills: 5 | Status: SHIPPED
Start: 2020-09-08 | End: 2020-09-08

## 2020-09-08 ASSESSMENT — ENCOUNTER SYMPTOMS
EYES NEGATIVE: 1
GASTROINTESTINAL NEGATIVE: 1
RESPIRATORY NEGATIVE: 1
BACK PAIN: 1

## 2020-09-08 NOTE — PROGRESS NOTES
Neurology Progress Note, Follow-up:    Patient: Seamus Jones  : 1960  Date: 20  Primary provider: Jodie Waters MD     Re: Followup, generalized seizure disorder, stable without report of recurrent seizures. Dear Jodie Waters MD:    I have seen Kyler Ricardo for a follow-up office visit in regards to her history of generalized seizure disorder, treated with topiramate 225 mg twice daily. She reports no recurrent clinical seizures or confusional episodes since increasing her topiramate dose by an additional 25 mg twice daily to 225 mg twice daily. Records reviewed indicates she was prescribed topiramate for bipolar disorder and her seizure disorder. She reports that her primary physician, Dr. Lindsay Villalpando prescribes her topiramate. She also goes to [facility] for behavioral health counseling. Routine awake/sleep EEG, 6/3/2020, within normal limits, no keely epileptiform discharges. Lab data: Reviewed from 2020. Blood glucose 105, ACTH 5.8, normal TFTs. Normal cortisol level. CK normal, CRP 0.2, vitamin B6 level 134.8, topiramate level 15.2 ug/ml, vitamin B1 level 292, folate greater than 20, vitamin B12 level 917, sed rate 20, Lyme reflex antibody screen, normal, RPR nonreactive, negative Sjogren antibody test, negative IEP. Normal aldolase level of 2.9. Please refer to prior Neurology consult note of 3/9/2020 for additional information if needed.     Current Outpatient Medications   Medication Sig Dispense Refill    traZODone (DESYREL) 100 MG tablet Take 100 mg by mouth nightly      dexamethasone (DECADRON) 1 MG tablet Take one tablet at 11 pm the night before the 8 am blood test 1 tablet 0    ondansetron (ZOFRAN-ODT) 4 MG disintegrating tablet Take 1 tablet by mouth 3 times daily as needed for Nausea or Vomiting 21 tablet 0    ibuprofen (ADVIL;MOTRIN) 800 MG tablet Take 1 tablet by mouth every 8 hours as needed for Pain 20 tablet 0    albuterol (PROVENTIL) (5 MG/ML) 0.5% nebulizer solution Take 2.5 mg by nebulization 3 times daily Instructed to take am of procedure      B Complex Vitamins (VITAMIN-B COMPLEX PO) Take by mouth daily LD patient to verify with Dr. Lisandro Billy magnesium 200 MG TABS tablet Take 200 mg by mouth 2 times daily LD to verify with Dr. Ritesh Lim   LD 10-4-19      calcium carbonate (CALCIUM 600) 600 MG TABS tablet Take 1 tablet by mouth 2 times daily LD patient to verify with Dr. Lisandro Billy azelastine (ASTELIN) 0.1 % nasal spray 1 spray by Nasal route 2 times daily Use in each nostril as directed 1 Bottle 3    montelukast (SINGULAIR) 10 MG tablet Take 1 tablet by mouth daily 30 tablet 3    omeprazole (PRILOSEC) 20 MG delayed release capsule Take 20 mg by mouth Daily Instructed to take am of procedure      oxyCODONE-acetaminophen (PERCOCET)  MG per tablet Take 1 tablet by mouth every 8 hours as needed for Pain (pt took 1 tablet at 6pm tonight). Instructed to take am of procedure       baclofen (LIORESAL) 20 MG tablet Take 20 mg by mouth 4 times daily Instructed to take am of procedure      furosemide (LASIX) 40 MG tablet Take 1 tablet by mouth 2 times daily 60 tablet 0    albuterol sulfate  (90 Base) MCG/ACT inhaler Inhale 2 puffs into the lungs every 4 hours as needed for Wheezing or Shortness of Breath Instructed to take am of procedure and bring      Cholecalciferol (VITAMIN D3) 5000 units TABS Take 1 tablet by mouth every other day LD 9/11/2019      Coenzyme Q10 (COQ10) 200 MG CAPS Take 1 capsule by mouth 2 times daily LD 10/4/2019      ferrous sulfate 325 (65 Fe) MG tablet Take 325 mg by mouth daily      potassium chloride (KLOR-CON M) 20 MEQ extended release tablet Take 20 mEq by mouth daily      docusate sodium (COLACE) 100 MG capsule Take 100 mg by mouth 2 times daily      lidocaine (LMX) 4 % cream Apply topically as needed for Pain Apply topically as needed.       linaclotide (LINZESS) 145 MCG capsule Take 290 mcg by mouth every morning (before breakfast)       levOCARNitine (CARNITOR) 330 MG tablet Take 330 mg by mouth 3 times daily Instructed to take am of procedure  For mitochondrial disease      polyethylene glycol (GLYCOLAX) packet Take 17 g by mouth 2 times daily   1    allopurinol (ZYLOPRIM) 100 MG tablet Take 200 mg by mouth 2 times daily       escitalopram (LEXAPRO) 20 MG tablet Take 20 mg by mouth 2 times daily Instructed to take am of procedure      gabapentin (NEURONTIN) 600 MG tablet Take 1 tablet by mouth 4 times daily. (Patient taking differently: Take 600 mg by mouth 4 times daily. Instructed to take am of procedure) 120 tablet 5    topiramate (TOPAMAX) 200 MG tablet Take 1 tablet by mouth 2 times daily. 1 tablet 0    ziprasidone (GEODON) 20 MG capsule Take 20 mg by mouth nightly       EPINEPHrine (EPIPEN 2-CARMEN) 0.3 MG/0.3ML SOAJ injection Inject 0.3 mLs into the muscle once as needed (Bee stings) Use as directed for allergic reaction 0.3 mL 0    famotidine (PEPCID) 20 MG tablet Take 1 tablet by mouth 2 times daily for 7 days 14 tablet 0     No current facility-administered medications for this visit.         Allergies   Allergen Reactions    Bee Pollen Anaphylaxis, Shortness Of Breath and Swelling     And wasp    Penicillins Anaphylaxis    Ropinirole Hcl Anaphylaxis    Vistaril [Hydroxyzine Hcl] Anaphylaxis    Aripiprazole Other (See Comments)     muscle spasms and confusion    Hydroxyzine Pamoate Itching and Rash    Tape Arthurine Fuchs Tape] Rash     Paper tape allergy    Fabric tape is OK to use        Patient Active Problem List   Diagnosis    Debility    Obesity    Bipolar 1 disorder (HCC)    Generalized seizure disorder ([address]    Asthma    Hyperlipidemia    Hypertension    Arthritis    Lymphedema of lower extremity    Degenerative Osteoarthritis of both knees    Status post total knee replacement, right    Degenerative Osteoarthritis of lumbar spine    Degenerative Osteoarthritis of cervical SURGICAL HISTORY Left 11 25 13    lumbar radiofreq    OTHER SURGICAL HISTORY  3/28/2016    stage 1, 3 day percutanous trial [employer] lumbar spinal cord stimulator    OTHER SURGICAL HISTORY  1995    racz procedure / lower back    VT COLONOSCOPY FLX DX W/COLLJ SPEC WHEN PFRMD N/A 3/20/2018    COLONOSCOPY DIAGNOSTIC OR SCREENING performed by Matt Crenshaw MD at [facility] EGD TRANSORAL BIOPSY SINGLE/MULTIPLE N/A 3/20/2018    EGD BIOPSY performed by Matt Crenshaw MD at [address] [place] [address]         Family History   Problem Relation Age of Onset    Heart Disease Mother     Cancer Father     Arthritis Other     Cancer Other     Depression Other     Heart Disease Other     Hypertension Other     Mental Illness Other     Stroke Other        Social History     Socioeconomic History    Marital status:      Spouse name: Not on file    Number of children: Not on file    Years of education: Not on file    Highest education level: Not on file   Occupational History    Not on file   Social Needs    Financial resource strain: Not on file    Food insecurity     Worry: Not on file     Inability: Not on file    Transportation needs     Medical: Not on file     Non-medical: Not on file   Tobacco Use    Smoking status: Current Every Day Smoker     Packs/day: 0.75     Years: 42.00     Pack years: 31.50     Types: Cigarettes, Cigars     Start date: 6/13/1990    Smokeless tobacco: Never Used   Substance and Sexual Activity    Alcohol use: No     Alcohol/week: 0.0 standard drinks    Drug use: No    Sexual activity: Not on file   Lifestyle    Physical activity     Days per week: Not on file     Minutes per session: Not on file    Stress: Not on file   Relationships    Social connections     Talks on phone: Not on file     Gets together: Not on file     Attends [employer] service: Not on file     Active member of club or organization: Not on file     Attends meetings of clubs or organizations: Not on file     Relationship status: Not on file    Intimate partner violence     Fear of current or ex partner: Not on file     Emotionally abused: Not on file     Physically abused: Not on file     Forced sexual activity: Not on file   Other Topics Concern    Not on file   Social History Narrative    ** Merged History Encounter **          Review of Systems   Constitutional: Negative. HENT: Negative. Eyes: Negative. Respiratory: Negative. Cardiovascular: Negative. Gastrointestinal: Negative. Endocrine: Negative. Genitourinary: Negative. Musculoskeletal: Positive for back pain. Skin: Negative. Neurological: Positive for seizures. Reports no seizures since inc. Topiramate dosing to 225 mg bid   Hematological: Negative. Psychiatric/Behavioral: The patient is nervous/anxious. All other systems reviewed and are negative. Neurologic Exam:  /70 (Site: Right Upper Arm, Position: Sitting, Cuff Size: Medium Adult)   Temp 97.4 °F (36.3 °C)   Wt 240 lb (108.9 kg)   LMP 08/31/1988   BMI 37.59 kg/m²    Mental Status: Alert, oriented x3 at her usual cognitive baseline state. Speech is clear and language fluent. Comprehension ability remains grossly intact. Affect is appropriate and mood is mildly anxious. There are no paraphasic word errors or dysarthria. CN's II-XII: Remains grossly intact throughout. Pupils are equal and reactive to light. EOMs are intact nystagmus. Visual fields are grossly full. Facial expression and sensation are normal and symmetric. Hearing is grossly intact. The tongue is midline. Motor/Sensory Exam: Grossly intact motor power in the upper and lower extremities without tremor or drift and normal motor tone without cogwheeling or spasticity, tactile motor function of both hands, symmetric. No pathologic reflexes. Absent DTRs in the lower extremities as previous.   Sensory exam notable for decreased subjective sensation in a stocking distribution to the level of the knees consistent with history of chronic peripheral neuropathy. Coordination/Gait: No limb dysmetria, no gait ataxia, chronic ambulatory dysfunction felt to be multifactorial related to morbid obesity, severe lumbar degenerative disc disease and chronic peripheral neuropathy. She also has a history of mitochondrial disorder followed by a neuromuscular specialist in [facility], reported previously. Assessment/Plan: 1. Contnue 225 mg topiramate twice daily, seizure prophylaxis. She reports that her primary care physician prescribes her topiramate. 2.  Follow-up in the Neurology clinic in 1 year otherwise. Sincerely,      Mauro Roach MD    Please note this report has been partially produced using speech recognition software and may cause contain errors related to that system including grammar, punctuation and spelling or words and phrases that may not seem appropriate. If there are questions or concerns please feel free to contact me to clarify.

## 2020-09-09 ENCOUNTER — APPOINTMENT (OUTPATIENT)
Dept: NON INVASIVE DIAGNOSTICS | Age: 60
End: 2020-09-09
Payer: MEDICAID

## 2020-09-09 ENCOUNTER — HOSPITAL ENCOUNTER (OUTPATIENT)
Age: 60
Setting detail: OBSERVATION
LOS: 1 days | Discharge: HOME | End: 2020-09-09
Attending: EMERGENCY MEDICINE | Admitting: INTERNAL MEDICINE
Payer: MEDICAID

## 2020-09-09 ENCOUNTER — APPOINTMENT (OUTPATIENT)
Dept: GENERAL RADIOLOGY | Age: 60
End: 2020-09-09
Payer: MEDICAID

## 2020-09-09 ENCOUNTER — APPOINTMENT (OUTPATIENT)
Dept: NUCLEAR MEDICINE | Age: 60
End: 2020-09-09
Payer: MEDICAID

## 2020-09-09 VITALS
TEMPERATURE: 97.7 F | DIASTOLIC BLOOD PRESSURE: 52 MMHG | OXYGEN SATURATION: 97 % | HEART RATE: 73 BPM | WEIGHT: 246 LBS | SYSTOLIC BLOOD PRESSURE: 116 MMHG | HEIGHT: 67 IN | BODY MASS INDEX: 38.61 KG/M2 | RESPIRATION RATE: 18 BRPM

## 2020-09-09 PROBLEM — R07.9 CHEST PAIN: Status: ACTIVE | Noted: 2020-09-09

## 2020-09-09 PROBLEM — R41.0 ACUTE DELIRIUM: Status: RESOLVED | Noted: 2019-06-13 | Resolved: 2020-09-09

## 2020-09-09 LAB
ANION GAP SERPL CALCULATED.3IONS-SCNC: 10 MMOL/L (ref 7–16)
BASOPHILS ABSOLUTE: 0.03 E9/L (ref 0–0.2)
BASOPHILS RELATIVE PERCENT: 0.4 % (ref 0–2)
BUN BLDV-MCNC: 14 MG/DL (ref 6–20)
CALCIUM SERPL-MCNC: 8.8 MG/DL (ref 8.6–10.2)
CHLORIDE BLD-SCNC: 101 MMOL/L (ref 98–107)
CO2: 25 MMOL/L (ref 22–29)
CREAT SERPL-MCNC: 0.8 MG/DL (ref 0.5–1)
EKG ATRIAL RATE: 72 BPM
EKG P AXIS: 32 DEGREES
EKG P-R INTERVAL: 192 MS
EKG Q-T INTERVAL: 394 MS
EKG QRS DURATION: 100 MS
EKG QTC CALCULATION (BAZETT): 431 MS
EKG R AXIS: -5 DEGREES
EKG T AXIS: 8 DEGREES
EKG VENTRICULAR RATE: 72 BPM
EOSINOPHILS ABSOLUTE: 0.11 E9/L (ref 0.05–0.5)
EOSINOPHILS RELATIVE PERCENT: 1.6 % (ref 0–6)
GFR AFRICAN AMERICAN: >60
GFR NON-AFRICAN AMERICAN: >60 ML/MIN/1.73
GLUCOSE BLD-MCNC: 104 MG/DL (ref 74–99)
HCT VFR BLD CALC: 40.8 % (ref 34–48)
HEMOGLOBIN: 13.5 G/DL (ref 11.5–15.5)
IMMATURE GRANULOCYTES #: 0.03 E9/L
IMMATURE GRANULOCYTES %: 0.4 % (ref 0–5)
LV EF: 72 %
LVEF MODALITY: NORMAL
LYMPHOCYTES ABSOLUTE: 2.01 E9/L (ref 1.5–4)
LYMPHOCYTES RELATIVE PERCENT: 29.9 % (ref 20–42)
MCH RBC QN AUTO: 33.3 PG (ref 26–35)
MCHC RBC AUTO-ENTMCNC: 33.1 % (ref 32–34.5)
MCV RBC AUTO: 100.5 FL (ref 80–99.9)
MONOCYTES ABSOLUTE: 0.63 E9/L (ref 0.1–0.95)
MONOCYTES RELATIVE PERCENT: 9.4 % (ref 2–12)
NEUTROPHILS ABSOLUTE: 3.92 E9/L (ref 1.8–7.3)
NEUTROPHILS RELATIVE PERCENT: 58.3 % (ref 43–80)
PDW BLD-RTO: 13.2 FL (ref 11.5–15)
PLATELET # BLD: 228 E9/L (ref 130–450)
PMV BLD AUTO: 8.8 FL (ref 7–12)
POTASSIUM REFLEX MAGNESIUM: 3.6 MMOL/L (ref 3.5–5)
PRO-BNP: 39 PG/ML (ref 0–125)
RBC # BLD: 4.06 E12/L (ref 3.5–5.5)
SODIUM BLD-SCNC: 136 MMOL/L (ref 132–146)
TROPONIN: <0.01 NG/ML (ref 0–0.03)
TROPONIN: <0.01 NG/ML (ref 0–0.03)
WBC # BLD: 6.7 E9/L (ref 4.5–11.5)

## 2020-09-09 PROCEDURE — 99285 EMERGENCY DEPT VISIT HI MDM: CPT

## 2020-09-09 PROCEDURE — 83880 ASSAY OF NATRIURETIC PEPTIDE: CPT

## 2020-09-09 PROCEDURE — 6370000000 HC RX 637 (ALT 250 FOR IP): Performed by: EMERGENCY MEDICINE

## 2020-09-09 PROCEDURE — 93018 CV STRESS TEST I&R ONLY: CPT | Performed by: INTERNAL MEDICINE

## 2020-09-09 PROCEDURE — 93005 ELECTROCARDIOGRAM TRACING: CPT | Performed by: EMERGENCY MEDICINE

## 2020-09-09 PROCEDURE — 6370000000 HC RX 637 (ALT 250 FOR IP): Performed by: INTERNAL MEDICINE

## 2020-09-09 PROCEDURE — 6360000002 HC RX W HCPCS: Performed by: INTERNAL MEDICINE

## 2020-09-09 PROCEDURE — 36415 COLL VENOUS BLD VENIPUNCTURE: CPT

## 2020-09-09 PROCEDURE — 94640 AIRWAY INHALATION TREATMENT: CPT

## 2020-09-09 PROCEDURE — 93017 CV STRESS TEST TRACING ONLY: CPT

## 2020-09-09 PROCEDURE — 78452 HT MUSCLE IMAGE SPECT MULT: CPT

## 2020-09-09 PROCEDURE — 3430000000 HC RX DIAGNOSTIC RADIOPHARMACEUTICAL: Performed by: RADIOLOGY

## 2020-09-09 PROCEDURE — 2580000003 HC RX 258: Performed by: INTERNAL MEDICINE

## 2020-09-09 PROCEDURE — 96372 THER/PROPH/DIAG INJ SC/IM: CPT

## 2020-09-09 PROCEDURE — 85025 COMPLETE CBC W/AUTO DIFF WBC: CPT

## 2020-09-09 PROCEDURE — G0378 HOSPITAL OBSERVATION PER HR: HCPCS

## 2020-09-09 PROCEDURE — 6360000002 HC RX W HCPCS: Performed by: NURSE PRACTITIONER

## 2020-09-09 PROCEDURE — A9500 TC99M SESTAMIBI: HCPCS | Performed by: RADIOLOGY

## 2020-09-09 PROCEDURE — 99284 EMERGENCY DEPT VISIT MOD MDM: CPT

## 2020-09-09 PROCEDURE — 96374 THER/PROPH/DIAG INJ IV PUSH: CPT

## 2020-09-09 PROCEDURE — 93010 ELECTROCARDIOGRAM REPORT: CPT | Performed by: INTERNAL MEDICINE

## 2020-09-09 PROCEDURE — 93016 CV STRESS TEST SUPVJ ONLY: CPT | Performed by: INTERNAL MEDICINE

## 2020-09-09 PROCEDURE — 6360000002 HC RX W HCPCS: Performed by: EMERGENCY MEDICINE

## 2020-09-09 PROCEDURE — 99204 OFFICE O/P NEW MOD 45 MIN: CPT | Performed by: INTERNAL MEDICINE

## 2020-09-09 PROCEDURE — 84484 ASSAY OF TROPONIN QUANT: CPT

## 2020-09-09 PROCEDURE — 80048 BASIC METABOLIC PNL TOTAL CA: CPT

## 2020-09-09 PROCEDURE — 71045 X-RAY EXAM CHEST 1 VIEW: CPT

## 2020-09-09 PROCEDURE — APPSS60 APP SPLIT SHARED TIME 46-60 MINUTES: Performed by: NURSE PRACTITIONER

## 2020-09-09 RX ORDER — IBUPROFEN 800 MG/1
800 TABLET ORAL EVERY 8 HOURS PRN
Status: DISCONTINUED | OUTPATIENT
Start: 2020-09-09 | End: 2020-09-09 | Stop reason: HOSPADM

## 2020-09-09 RX ORDER — ASPIRIN 81 MG/1
243 TABLET, CHEWABLE ORAL ONCE
Status: COMPLETED | OUTPATIENT
Start: 2020-09-09 | End: 2020-09-09

## 2020-09-09 RX ORDER — ESCITALOPRAM OXALATE 10 MG/1
20 TABLET ORAL 2 TIMES DAILY
Status: DISCONTINUED | OUTPATIENT
Start: 2020-09-09 | End: 2020-09-09 | Stop reason: HOSPADM

## 2020-09-09 RX ORDER — POTASSIUM CHLORIDE 20 MEQ/1
20 TABLET, EXTENDED RELEASE ORAL
Status: DISCONTINUED | OUTPATIENT
Start: 2020-09-09 | End: 2020-09-09 | Stop reason: HOSPADM

## 2020-09-09 RX ORDER — ALBUTEROL SULFATE 90 UG/1
2 AEROSOL, METERED RESPIRATORY (INHALATION) EVERY 4 HOURS PRN
Status: DISCONTINUED | OUTPATIENT
Start: 2020-09-09 | End: 2020-09-09 | Stop reason: CLARIF

## 2020-09-09 RX ORDER — AZELASTINE 1 MG/ML
1 SPRAY, METERED NASAL 2 TIMES DAILY
Status: DISCONTINUED | OUTPATIENT
Start: 2020-09-09 | End: 2020-09-09 | Stop reason: CLARIF

## 2020-09-09 RX ORDER — PANTOPRAZOLE SODIUM 40 MG/1
40 TABLET, DELAYED RELEASE ORAL
Status: DISCONTINUED | OUTPATIENT
Start: 2020-09-09 | End: 2020-09-09 | Stop reason: HOSPADM

## 2020-09-09 RX ORDER — BACLOFEN 10 MG/1
20 TABLET ORAL 4 TIMES DAILY
Status: DISCONTINUED | OUTPATIENT
Start: 2020-09-09 | End: 2020-09-09 | Stop reason: HOSPADM

## 2020-09-09 RX ORDER — ZIPRASIDONE HYDROCHLORIDE 20 MG/1
20 CAPSULE ORAL NIGHTLY
Status: DISCONTINUED | OUTPATIENT
Start: 2020-09-09 | End: 2020-09-09 | Stop reason: HOSPADM

## 2020-09-09 RX ORDER — SODIUM CHLORIDE 0.9 % (FLUSH) 0.9 %
10 SYRINGE (ML) INJECTION EVERY 12 HOURS SCHEDULED
Status: DISCONTINUED | OUTPATIENT
Start: 2020-09-09 | End: 2020-09-09 | Stop reason: HOSPADM

## 2020-09-09 RX ORDER — IPRATROPIUM BROMIDE AND ALBUTEROL SULFATE 2.5; .5 MG/3ML; MG/3ML
1 SOLUTION RESPIRATORY (INHALATION)
Status: COMPLETED | OUTPATIENT
Start: 2020-09-09 | End: 2020-09-09

## 2020-09-09 RX ORDER — MONTELUKAST SODIUM 10 MG/1
10 TABLET ORAL DAILY
Status: DISCONTINUED | OUTPATIENT
Start: 2020-09-09 | End: 2020-09-09 | Stop reason: HOSPADM

## 2020-09-09 RX ORDER — TRAZODONE HYDROCHLORIDE 50 MG/1
100 TABLET ORAL NIGHTLY
Status: DISCONTINUED | OUTPATIENT
Start: 2020-09-09 | End: 2020-09-09 | Stop reason: HOSPADM

## 2020-09-09 RX ORDER — ATORVASTATIN CALCIUM 40 MG/1
40 TABLET, FILM COATED ORAL NIGHTLY
Status: DISCONTINUED | OUTPATIENT
Start: 2020-09-09 | End: 2020-09-09 | Stop reason: HOSPADM

## 2020-09-09 RX ORDER — FUROSEMIDE 40 MG/1
40 TABLET ORAL 2 TIMES DAILY
Status: DISCONTINUED | OUTPATIENT
Start: 2020-09-09 | End: 2020-09-09 | Stop reason: HOSPADM

## 2020-09-09 RX ORDER — ALBUTEROL SULFATE 2.5 MG/3ML
2.5 SOLUTION RESPIRATORY (INHALATION) EVERY 4 HOURS PRN
Status: DISCONTINUED | OUTPATIENT
Start: 2020-09-09 | End: 2020-09-09 | Stop reason: HOSPADM

## 2020-09-09 RX ORDER — ONDANSETRON 2 MG/ML
4 INJECTION INTRAMUSCULAR; INTRAVENOUS EVERY 6 HOURS PRN
Status: DISCONTINUED | OUTPATIENT
Start: 2020-09-09 | End: 2020-09-09 | Stop reason: HOSPADM

## 2020-09-09 RX ORDER — TOPIRAMATE 100 MG/1
200 TABLET, FILM COATED ORAL 2 TIMES DAILY
Status: DISCONTINUED | OUTPATIENT
Start: 2020-09-09 | End: 2020-09-09 | Stop reason: HOSPADM

## 2020-09-09 RX ORDER — PROMETHAZINE HYDROCHLORIDE 25 MG/1
12.5 TABLET ORAL EVERY 6 HOURS PRN
Status: DISCONTINUED | OUTPATIENT
Start: 2020-09-09 | End: 2020-09-09 | Stop reason: HOSPADM

## 2020-09-09 RX ORDER — ACETAMINOPHEN 325 MG/1
650 TABLET ORAL EVERY 6 HOURS PRN
Status: DISCONTINUED | OUTPATIENT
Start: 2020-09-09 | End: 2020-09-09 | Stop reason: HOSPADM

## 2020-09-09 RX ORDER — DOCUSATE SODIUM 100 MG/1
100 CAPSULE, LIQUID FILLED ORAL 2 TIMES DAILY
Status: DISCONTINUED | OUTPATIENT
Start: 2020-09-09 | End: 2020-09-09 | Stop reason: HOSPADM

## 2020-09-09 RX ORDER — NITROGLYCERIN 0.4 MG/1
0.4 TABLET SUBLINGUAL EVERY 5 MIN PRN
Status: DISCONTINUED | OUTPATIENT
Start: 2020-09-09 | End: 2020-09-09 | Stop reason: HOSPADM

## 2020-09-09 RX ORDER — GABAPENTIN 300 MG/1
600 CAPSULE ORAL 4 TIMES DAILY
Status: DISCONTINUED | OUTPATIENT
Start: 2020-09-09 | End: 2020-09-09 | Stop reason: HOSPADM

## 2020-09-09 RX ORDER — SODIUM CHLORIDE 0.9 % (FLUSH) 0.9 %
10 SYRINGE (ML) INJECTION PRN
Status: DISCONTINUED | OUTPATIENT
Start: 2020-09-09 | End: 2020-09-09 | Stop reason: HOSPADM

## 2020-09-09 RX ORDER — ASPIRIN 81 MG/1
81 TABLET, CHEWABLE ORAL DAILY
Status: DISCONTINUED | OUTPATIENT
Start: 2020-09-10 | End: 2020-09-09 | Stop reason: HOSPADM

## 2020-09-09 RX ORDER — LIDOCAINE 40 MG/G
CREAM TOPICAL PRN
Status: DISCONTINUED | OUTPATIENT
Start: 2020-09-09 | End: 2020-09-09 | Stop reason: HOSPADM

## 2020-09-09 RX ORDER — ALLOPURINOL 100 MG/1
200 TABLET ORAL 2 TIMES DAILY
Status: DISCONTINUED | OUTPATIENT
Start: 2020-09-09 | End: 2020-09-09 | Stop reason: HOSPADM

## 2020-09-09 RX ORDER — ACETAMINOPHEN 650 MG/1
650 SUPPOSITORY RECTAL EVERY 6 HOURS PRN
Status: DISCONTINUED | OUTPATIENT
Start: 2020-09-09 | End: 2020-09-09 | Stop reason: HOSPADM

## 2020-09-09 RX ORDER — OMEPRAZOLE 20 MG/1
20 CAPSULE, DELAYED RELEASE ORAL DAILY
Status: DISCONTINUED | OUTPATIENT
Start: 2020-09-09 | End: 2020-09-09 | Stop reason: CLARIF

## 2020-09-09 RX ORDER — POLYETHYLENE GLYCOL 3350 17 G/17G
17 POWDER, FOR SOLUTION ORAL 2 TIMES DAILY
Status: DISCONTINUED | OUTPATIENT
Start: 2020-09-09 | End: 2020-09-09 | Stop reason: HOSPADM

## 2020-09-09 RX ORDER — METHYLPREDNISOLONE SODIUM SUCCINATE 40 MG/ML
40 INJECTION, POWDER, LYOPHILIZED, FOR SOLUTION INTRAMUSCULAR; INTRAVENOUS ONCE
Status: COMPLETED | OUTPATIENT
Start: 2020-09-09 | End: 2020-09-09

## 2020-09-09 RX ORDER — LEVOCARNITINE 330 MG/1
330 TABLET ORAL 3 TIMES DAILY
Status: DISCONTINUED | OUTPATIENT
Start: 2020-09-09 | End: 2020-09-09 | Stop reason: HOSPADM

## 2020-09-09 RX ORDER — OXYCODONE AND ACETAMINOPHEN 10; 325 MG/1; MG/1
1 TABLET ORAL EVERY 8 HOURS PRN
Status: DISCONTINUED | OUTPATIENT
Start: 2020-09-09 | End: 2020-09-09 | Stop reason: HOSPADM

## 2020-09-09 RX ADMIN — IPRATROPIUM BROMIDE AND ALBUTEROL SULFATE 1 AMPULE: .5; 3 SOLUTION RESPIRATORY (INHALATION) at 01:06

## 2020-09-09 RX ADMIN — NITROGLYCERIN 0.4 MG: 0.4 TABLET SUBLINGUAL at 01:27

## 2020-09-09 RX ADMIN — OXYCODONE AND ACETAMINOPHEN 1 TABLET: 10; 325 TABLET ORAL at 06:44

## 2020-09-09 RX ADMIN — PANTOPRAZOLE SODIUM 40 MG: 40 TABLET, DELAYED RELEASE ORAL at 06:44

## 2020-09-09 RX ADMIN — ESCITALOPRAM OXALATE 20 MG: 10 TABLET ORAL at 08:34

## 2020-09-09 RX ADMIN — METHYLPREDNISOLONE SODIUM SUCCINATE 40 MG: 40 INJECTION, POWDER, LYOPHILIZED, FOR SOLUTION INTRAMUSCULAR; INTRAVENOUS at 02:31

## 2020-09-09 RX ADMIN — Medication 30 MILLICURIE: at 12:01

## 2020-09-09 RX ADMIN — ENOXAPARIN SODIUM 40 MG: 40 INJECTION SUBCUTANEOUS at 08:33

## 2020-09-09 RX ADMIN — POTASSIUM CHLORIDE 20 MEQ: 20 TABLET, EXTENDED RELEASE ORAL at 08:33

## 2020-09-09 RX ADMIN — MAGNESIUM OXIDE TAB 400 MG (241.3 MG ELEMENTAL MG) 200 MG: 400 (241.3 MG) TAB at 08:33

## 2020-09-09 RX ADMIN — TOPIRAMATE 200 MG: 100 TABLET, FILM COATED ORAL at 08:34

## 2020-09-09 RX ADMIN — GABAPENTIN 600 MG: 300 CAPSULE ORAL at 14:50

## 2020-09-09 RX ADMIN — SODIUM CHLORIDE, PRESERVATIVE FREE 10 ML: 5 INJECTION INTRAVENOUS at 08:34

## 2020-09-09 RX ADMIN — ALBUTEROL SULFATE 2.5 MG: 2.5 SOLUTION RESPIRATORY (INHALATION) at 14:05

## 2020-09-09 RX ADMIN — DOCUSATE SODIUM 100 MG: 100 CAPSULE, LIQUID FILLED ORAL at 08:34

## 2020-09-09 RX ADMIN — ALBUTEROL SULFATE 2.5 MG: 2.5 SOLUTION RESPIRATORY (INHALATION) at 08:47

## 2020-09-09 RX ADMIN — REGADENOSON 0.4 MG: 0.08 INJECTION, SOLUTION INTRAVENOUS at 11:55

## 2020-09-09 RX ADMIN — NITROGLYCERIN 0.4 MG: 0.4 TABLET SUBLINGUAL at 00:45

## 2020-09-09 RX ADMIN — BACLOFEN 20 MG: 10 TABLET ORAL at 14:49

## 2020-09-09 RX ADMIN — IPRATROPIUM BROMIDE AND ALBUTEROL SULFATE 1 AMPULE: .5; 3 SOLUTION RESPIRATORY (INHALATION) at 00:52

## 2020-09-09 RX ADMIN — OXYCODONE AND ACETAMINOPHEN 1 TABLET: 10; 325 TABLET ORAL at 16:24

## 2020-09-09 RX ADMIN — FUROSEMIDE 40 MG: 40 TABLET ORAL at 06:44

## 2020-09-09 RX ADMIN — ASPIRIN 243 MG: 81 TABLET, CHEWABLE ORAL at 00:45

## 2020-09-09 RX ADMIN — GABAPENTIN 600 MG: 300 CAPSULE ORAL at 08:34

## 2020-09-09 RX ADMIN — POLYETHYLENE GLYCOL 3350 17 G: 17 POWDER, FOR SOLUTION ORAL at 08:34

## 2020-09-09 RX ADMIN — BENZOCAINE AND MENTHOL 1 LOZENGE: 15; 3.6 LOZENGE ORAL at 16:24

## 2020-09-09 RX ADMIN — MONTELUKAST SODIUM 10 MG: 10 TABLET, FILM COATED ORAL at 08:33

## 2020-09-09 RX ADMIN — ALLOPURINOL 200 MG: 100 TABLET ORAL at 08:34

## 2020-09-09 RX ADMIN — Medication 10 MILLICURIE: at 10:45

## 2020-09-09 RX ADMIN — BACLOFEN 20 MG: 10 TABLET ORAL at 08:34

## 2020-09-09 RX ADMIN — IPRATROPIUM BROMIDE AND ALBUTEROL SULFATE 1 AMPULE: .5; 3 SOLUTION RESPIRATORY (INHALATION) at 01:00

## 2020-09-09 ASSESSMENT — PAIN DESCRIPTION - DESCRIPTORS: DESCRIPTORS: PRESSURE;TIGHTNESS;SQUEEZING

## 2020-09-09 ASSESSMENT — PAIN SCALES - GENERAL
PAINLEVEL_OUTOF10: 6
PAINLEVEL_OUTOF10: 6
PAINLEVEL_OUTOF10: 0
PAINLEVEL_OUTOF10: 0
PAINLEVEL_OUTOF10: 8
PAINLEVEL_OUTOF10: 6
PAINLEVEL_OUTOF10: 0

## 2020-09-09 ASSESSMENT — ENCOUNTER SYMPTOMS
SORE THROAT: 0
VOMITING: 0
SINUS PRESSURE: 0
NAUSEA: 1
SHORTNESS OF BREATH: 1
EYE REDNESS: 0
EYE DISCHARGE: 0
BACK PAIN: 0
WHEEZING: 1
EYE PAIN: 0
DIARRHEA: 0
ABDOMINAL PAIN: 0
COUGH: 0

## 2020-09-09 ASSESSMENT — PAIN DESCRIPTION - ORIENTATION: ORIENTATION: MID

## 2020-09-09 ASSESSMENT — PAIN DESCRIPTION - LOCATION
LOCATION: CHEST
LOCATION: BACK

## 2020-09-09 ASSESSMENT — PAIN DESCRIPTION - PAIN TYPE
TYPE: ACUTE PAIN
TYPE: CHRONIC PAIN

## 2020-09-09 ASSESSMENT — PAIN DESCRIPTION - PROGRESSION: CLINICAL_PROGRESSION: GRADUALLY WORSENING

## 2020-09-09 ASSESSMENT — PAIN DESCRIPTION - FREQUENCY: FREQUENCY: CONTINUOUS

## 2020-09-09 ASSESSMENT — PAIN DESCRIPTION - ONSET: ONSET: SUDDEN

## 2020-09-09 NOTE — PROGRESS NOTES
Pharmacy Note    Gena Mckeon was ordered Astelin Nasal Spray. Per the [address], this medication is non-formulary and not stocked by pharmacy for the reason indicated below. The medication can be reordered at discharge. Medications that lack necessity during an acute hospital stay:      -  nasal antihistamines        -  nasal ipratropium 0.03% and 0.06%        -  nasal miacalcin       Thank You, Raza Turner Pharm. D 9/9/2020 6:23 AM

## 2020-09-09 NOTE — ED PROVIDER NOTES
no surgery         Review of Systems   Constitutional: Negative for chills, fatigue and fever. HENT: Negative for ear pain, sinus pressure and sore throat. Eyes: Negative for pain, discharge and redness. Respiratory: Positive for shortness of breath and wheezing. Negative for cough. Cardiovascular: Positive for chest pain and leg swelling (Bilateral, chronic). Negative for syncope. Gastrointestinal: Positive for nausea. Negative for abdominal pain, diarrhea and vomiting. Genitourinary: Negative for dysuria and frequency. Musculoskeletal: Negative for arthralgias and back pain. Skin: Negative for rash and wound. Neurological: Positive for light-headedness (Resolved). Negative for weakness, numbness and headaches. All other systems reviewed and are negative. Physical Exam  Vitals signs and nursing note reviewed. Constitutional:       General: She is not in acute distress. Appearance: She is well-developed. She is not ill-appearing. HENT:      Head: Normocephalic and atraumatic. Eyes:      Pupils: Pupils are equal, round, and reactive to light. Neck:      Musculoskeletal: Normal range of motion and neck supple. Cardiovascular:      Rate and Rhythm: Normal rate and regular rhythm. Pulmonary:      Effort: Pulmonary effort is normal. No tachypnea, accessory muscle usage or respiratory distress. Breath sounds: Examination of the right-lower field reveals wheezing. Examination of the left-lower field reveals wheezing. Wheezing present. No rales. Abdominal:      General: Bowel sounds are normal.      Palpations: Abdomen is soft. Tenderness: There is no abdominal tenderness. There is no guarding or rebound. Musculoskeletal: Normal range of motion. Right lower leg: Edema present. Left lower leg: Edema present. Comments: Bilateral nonpitting edema, nontender   Skin:     General: Skin is warm and dry.       Capillary Refill: Capillary refill takes less than 2 seconds. Neurological:      General: No focal deficit present. Mental Status: She is alert and oriented to person, place, and time. Cranial Nerves: No cranial nerve deficit. Motor: No weakness. Coordination: Coordination normal.          Procedures     MDM  Number of Diagnoses or Management Options  Chest pain, unspecified type:   COPD exacerbation [facility]):   Patient presents emergency department for chest pain and shortness of breath. Chest pain concerning for ACS, patient given aspirin as well as nitroglycerin. The nitro did completely resolve her chest pain but it did recur. Repeat doses of nitro given. EKG with no ischemic changes and troponin less than 0.01. No evidence of anemia. Chest x-ray unremarkable, less concern for infectious causes patient is no leukocytosis, afebrile, no cough or sputum production. History and physical exam less likely for acute dissection. Patient did have wheezing bilaterally and history of COPD. She was given duo nebs in the department as well as steroids and shortness of breath did improve. Pulmonary embolism less likely at this time. She is not hypoxic, not tachycardic, shortness of breath improved with duo nebs, wheezing initially on exam.  With patient's cardiac history, has not seen a cardiologist in many years coupled with history of present illness patient will be admitted for inpatient evaluation and care. Consult placed on-call hospitalist who accepted for admission. Educated patient on symptoms, diagnosis and need to stay in the hospital for further evaluation and care. She verbalized understanding is agreeable to plan.   Patient was admitted.        ----------------------------------------------- PAST HISTORY --------------------------------------------  Past Medical History:  has a past medical history of Adenoma of right adrenal gland, Anemia, Anxiety, Arthritis, Asthma, Benign essential tremor, Bipolar affective ([facility] [address] Chronic back pain, Chronic respiratory failure with hypoxia ([address] Depression, Difficulty swallowing, DM (diabetes mellitus) ([address] Environmental and seasonal allergies, Fatty liver, Full dentures, Generalized seizure disorder ([address] GERD (gastroesophageal reflux disease), Gout, Herniated cervical disc, History of swelling of feet, Lymphedema of lower extremity, Mitochondrial cytopathy ([address] Neuropathy, Obesity, PETR (obstructive sleep apnea), PONV (postoperative nausea and vomiting), Seizures ([address] and Spinal headache. Past Surgical History:  has a past surgical history that includes knee surgery (Bilateral); Gastric bypass surgery (1999); myomectomy; Tonsillectomy; Nerve Block (Left, 10 02 2013); Nerve Block (Left, 10 9 13); Nerve Block (Left, 10/16/13); other surgical history (Left, 11 25 13); Nerve Block (Left, 10/29/2014); Nerve Block (Left, 11/12/2014); Nerve Block (Left, 12 8 14); Nerve Block (Right, 3/30/15); Nerve Block (Right, 4/6/2015); Nerve Block (Right, 4/13/15); Nerve Block (Left, 7/6/15); Nerve Block (07/20/15); Nerve Block (Left, 10 1 15); Nerve Block (10/26/15); other surgical history (3/28/2016); Endoscopy, colon, diagnostic; pr colonoscopy flx dx w/collj spec when pfrmd (N/A, 3/20/2018); pr egd transoral biopsy single/multiple (N/A, 3/20/2018); Cholecystectomy (1999); Hysterectomy (1988); Colonoscopy (N/A, 9/18/2018); Esophagus dilation (9/18/2018); back surgery (last one 1995); Cardiac catheterization (Right, 6-6-2013); Appendectomy; other surgical history (1995); joint replacement (Bilateral, [id number]); egd colonoscopy (N/A, 10/8/2019); and Colonoscopy (N/A, 10/8/2019). Social History:  reports that she has been smoking cigarettes and cigars. She started smoking about 30 years ago. She has a 42.00 pack-year smoking history. She has never used smokeless tobacco. She reports that she does not drink alcohol or use drugs.     Family History: family history includes Arthritis in an other family member; Cancer in her father and another family member; Depression in an other family member; Heart Disease in her mother and another family member; Hypertension in an other family member; Mental Illness in an other family member; Stroke in an other family member. The patients home medications have been reviewed. Allergies: Bee pollen; Penicillins; Ropinirole hcl; Vistaril [hydroxyzine hcl]; Aripiprazole;  Hydroxyzine pamoate; and Tape [adhesive tape]    ------------------------------------------------ RESULTS ---------------------------------------------------    LABS:  Results for orders placed or performed during the hospital encounter of 09/09/20   CBC Auto Differential   Result Value Ref Range    WBC 6.7 4.5 - 11.5 E9/L    RBC 4.06 3.50 - 5.50 E12/L    Hemoglobin 13.5 11.5 - 15.5 g/dL    Hematocrit 40.8 34.0 - 48.0 %    .5 (H) 80.0 - 99.9 fL    MCH 33.3 26.0 - 35.0 pg    MCHC 33.1 32.0 - 34.5 %    RDW 13.2 11.5 - 15.0 fL    Platelets 699 976 - 047 E9/L    MPV 8.8 7.0 - 12.0 fL    Neutrophils % 58.3 43.0 - 80.0 %    Immature Granulocytes % 0.4 0.0 - 5.0 %    Lymphocytes % 29.9 20.0 - 42.0 %    Monocytes % 9.4 2.0 - 12.0 %    Eosinophils % 1.6 0.0 - 6.0 %    Basophils % 0.4 0.0 - 2.0 %    Neutrophils Absolute 3.92 1.80 - 7.30 E9/L    Immature Granulocytes # 0.03 E9/L    Lymphocytes Absolute 2.01 1.50 - 4.00 E9/L    Monocytes Absolute 0.63 0.10 - 0.95 E9/L    Eosinophils Absolute 0.11 0.05 - 0.50 E9/L    Basophils Absolute 0.03 0.00 - 0.20 M0/L   Basic Metabolic Panel w/ Reflex to MG   Result Value Ref Range    Sodium 136 132 - 146 mmol/L    Potassium reflex Magnesium 3.6 3.5 - 5.0 mmol/L    Chloride 101 98 - 107 mmol/L    CO2 25 22 - 29 mmol/L    Anion Gap 10 7 - 16 mmol/L    Glucose 104 (H) 74 - 99 mg/dL    BUN 14 6 - 20 mg/dL    CREATININE 0.8 0.5 - 1.0 mg/dL    GFR Non-African American >60 >=60 mL/min/1.73    GFR African American >60     Calcium 8.8 8.6 - 10.2 mg/dL   Troponin   Result Value Ref Range Troponin <0.01 0.00 - 0.03 ng/mL   Brain Natriuretic Peptide   Result Value Ref Range    Pro-BNP 39 0 - 125 pg/mL   EKG 12 Lead   Result Value Ref Range    Ventricular Rate 72 BPM    Atrial Rate 72 BPM    P-R Interval 192 ms    QRS Duration 100 ms    Q-T Interval 394 ms    QTc Calculation (Bazett) 431 ms    P Axis 32 degrees    R Axis -5 degrees    T Axis 8 degrees       RADIOLOGY:    All Radiology results interpreted by Radiologist unless otherwise noted. XR CHEST PORTABLE   Final Result      No airspace opacities or pleural effusion. EKG:  This EKG is signed and interpreted by ED Physician. Time:  0017   Rate: 72  Rhythm: Sinus. Interpretation: non-specific EKG. incomplete right bundle branch block. QTc 431. Normal axis deviation. T wave inversion in lead III, seen on prior. Comparison: changes compared to previous EKG.    ---------------------------- NURSING NOTES AND VITALS REVIEWED -------------------------   The nursing notes within the ED encounter and vital signs as below have been reviewed.    BP (!) 140/78   Pulse 60   Temp 97.7 °F (36.5 °C) (Oral)   Resp 16   Ht 5' 7\" (1.702 m)   Wt 246 lb (111.6 kg)   LMP 08/31/1988   SpO2 99%   BMI 38.53 kg/m²   Oxygen Saturation Interpretation: Normal      ------------------------------------------PROGRESS NOTES -------------------------------------------    ED COURSE MEDICATIONS:                Medications   nitroGLYCERIN (NITROSTAT) SL tablet 0.4 mg (0.4 mg Sublingual Given 9/9/20 0127)   sodium chloride flush 0.9 % injection 10 mL (has no administration in time range)   sodium chloride flush 0.9 % injection 10 mL (has no administration in time range)   aspirin chewable tablet 243 mg (243 mg Oral Given 9/9/20 0045)   ipratropium-albuterol (DUONEB) nebulizer solution 1 ampule (1 ampule Inhalation Given 9/9/20 0106)   methylPREDNISolone sodium (SOLU-MEDROL) injection 40 mg (40 mg Intravenous Given 9/9/20 0231)       CONSULTATIONS: Consultation:  I Spoke with Dr. Hodan Vargas (Medicine). Discussed case. They will admit this patient. Paige Zimmerman PROCEDURES:            none. COUNSELING:   I have spoken with the patient and discussed todays results, in addition to providing specific details for the plan of care and counseling regarding the diagnosis and prognosis.     --------------------------------------- IMPRESSION & DISPOSITION --------------------------------     IMPRESSION(s):  1. Chest pain, unspecified type    2. COPD exacerbation ([facility] [address]        This patient's ED course included: a personal history and physicial examination, re-evaluation prior to disposition, IV medications, cardiac monitoring and continuous pulse oximetry    This patient has been closely monitored during their ED course. DISPOSITION:  Disposition: Admit to telemetry. Patient condition is stable. END OF PROVIDER NOTE.             [address] [place] [address] DO Amie  Resident  09/09/20 [id number]

## 2020-09-09 NOTE — PROCEDURES
No [facility] Nuclear Stress Test:    Cardiologist: Dr. Marilyn Sehrman    Date: 9/9/2020    Indications for study: Chest pain    1. No new chest pain  2. No new arrhythmias  3. No EKG changes suggestive of stress induced ischemia  4.  Nuclear images pending    Chana Monaco MD  [facility]) Cardiology

## 2020-09-09 NOTE — PROGRESS NOTES
Please be aware of the possible interaction of QT-interval prolongation with Zofran, Phenergan, Lexapro and Trazodone. Monitor the patient closely. If QTc prolongs consider using Tigan or Compazine for nausea and vomiting. Thank Corinne Goode Pharm. D 9/9/2020 6:47 AM

## 2020-09-09 NOTE — PROGRESS NOTES
Pharm stress test completed with RN, physician and nuclear med tech wearing procedure masks.   Patient removed her mask for breathing purposes during the test.

## 2020-09-09 NOTE — H&P
Internal Medicine History & Physical     Name: Mark Garrett  : 1960  Chief Complaint: Chest Pain (Mid Radiates LUE/Neck/Jaw; Onset 1000); Shortness of Breath; Nausea; Headache; and Dizziness  Primary Care Physician: Todd Morales MD  Admission date: 2020  Date of service: 2020     History of Present Illness  Elizabeth Chan is a 61y.o. year old female with a history of hypertension, diabetes, arthritis, asthma, hypothyroidism who presented to the ED yesterday due to chest pain substernal and radiating to her jaw and left shoulder which began on the evening of . She had been having shortness of breath for a few days before that as well. She also reports that she had lower extremity swelling worsened over the past week or so, which has since resolved with her p.o. lisinopril and laying with her legs elevated. Symptoms have been worsened by exertion and were relieved with sublingual nitro in the ED yesterday. In the ED cardiac work-up was negative for any evidence of ischemic changes. She reports having had a heart cath and stress test in the past which were \"okay\". She is a 1 PPD tobacco smoker for 40 years. She admits to nausea associated with the onset of pain. She denies any recent illnesses, fever, confusion, headache, dizziness, changes in vision, diaphoresis, feelings of palpitations, cough, wheezing, abdominal pain, vomiting, diarrhea, numbness or tingling of the extremities, or current lower extremity edema. On encounter this morning she states that her chest pain is no longer present, that she just feels some lingering \"chest discomfort. \"    There are no family or friends at bedside. The history is provided by the patient. She is felt to be a bed historian. ED course:   Initial blood work and imaging studies performed. Admission recommended by ED physician. Case discussed with ED provider.  Meds in ED consisted of the following: ASA, Nitro    Past Medical History:   Diagnosis Date    Adenoma of right adrenal gland     Anemia     Anxiety     Arthritis     spinal    Asthma     controlled with inhalers     Benign essential tremor     Bipolar affective (HCC)     Chronic back pain     Spinal cord stimulator in place    Chronic respiratory failure with hypoxia (HCC)     at times dt diaphragm does not function properly dt Mitochondrial disorder    Depression     stable    Difficulty swallowing     for EGD 10-8-19     DM (diabetes mellitus) ([facility] [address]     Environmental and seasonal allergies     Fatty liver     Full dentures     Generalized seizure disorder ([address] 5/6/2012    GERD (gastroesophageal reflux disease)     Gout     past hx of    Herniated cervical disc     limited rom of head and neck    History of swelling of feet     Lymphedema of lower extremity 09/06/2012    Mitochondrial cytopathy (HCC)     s/s muscle and nerve pain, difficulty breathing, seizures, difficulty swallowing, digestive disorders- Dr. Le Pavon [facility]    Neuropathy     at feet    Obesity     PETR (obstructive sleep apnea)     no CPAP dt insurance will not pay for    PONV (postoperative nausea and vomiting)     Seizures ([facility] [address]     last approx 6-13-19- f/u w/ PCP  Granmal, flares up in heat/ summer time - no recent issues as of 10-4-19     Spinal headache        Past Surgical History:   Procedure Laterality Date    APPENDECTOMY      with Hysterectomy / unknown    BACK SURGERY  last one 1995    lumbar x 2    CARDIAC CATHETERIZATION Right 6-6-2013    Dr. Alexis Larry Radial, no stents placed, no blockages     CHOLECYSTECTOMY  1999    open with gastric bypass    COLONOSCOPY N/A 9/18/2018    COLONOSCOPY POLYPECTOMY HOT BIOPSY performed by CHRISTOPHE Agustin MD at [address] COLONOSCOPY N/A 10/8/2019    COLONOSCOPY POLYPECTOMY SNARE/COLD BIOPSY performed by Jagruti Melissa MD at [address] EGD COLONOSCOPY N/A 10/8/2019    EGD ESOPHAGOGASTRODUODENOSCOPY DILATATION performed by Daniel Dougherty Social History  Patient lives at home with a roommate. Employment: Retired  Illicit drug use- denies  TOBACCO:   reports that she has been smoking cigarettes and cigars. She started smoking about 30 years ago. She has a 42.00 pack-year smoking history. She has never used smokeless tobacco.  ETOH:   reports no history of alcohol use. Home Medications  Prior to Admission medications    Medication Sig Start Date End Date Taking?  Authorizing Provider   EPINEPHrine (EPIPEN 2-CARMEN) 0.3 MG/0.3ML SOAJ injection Inject 0.3 mLs into the muscle once as needed (Bee stings) Use as directed for allergic reaction 8/30/20 8/30/20  Steven Chavez DO   famotidine (PEPCID) 20 MG tablet Take 1 tablet by mouth 2 times daily for 7 days 8/30/20 9/6/20  Steven Chavez DO   traZODone (DESYREL) 100 MG tablet Take 100 mg by mouth nightly    Historical Provider, MD   dexamethasone (DECADRON) 1 MG tablet Take one tablet at 11 pm the night before the 8 am blood test 3/10/20   Fulton Cheadle, MD   ondansetron (ZOFRAN-ODT) 4 MG disintegrating tablet Take 1 tablet by mouth 3 times daily as needed for Nausea or Vomiting 2/26/20   Chas Mccarthy MD   ibuprofen (ADVIL;MOTRIN) 800 MG tablet Take 1 tablet by mouth every 8 hours as needed for Pain 2/26/20   Chas Mccarthy MD   albuterol (PROVENTIL) (5 MG/ML) 0.5% nebulizer solution Take 2.5 mg by nebulization 3 times daily Instructed to take am of procedure    Historical Provider, MD   B Complex Vitamins (VITAMIN-B COMPLEX PO) Take by mouth daily LD patient to verify with Dr. Emelyn Eubanks Provider, MD   magnesium 200 MG TABS tablet Take 200 mg by mouth 2 times daily LD to verify with Dr. Kirk Devlin   LD 10-4-19    M[place] Provider, MD   calcium carbonate (CALCIUM 600) 600 MG TABS tablet Take 1 tablet by mouth 2 times daily LD patient to verify with Dr. Emelyn Quispe MD   azelastine (ASTELIN) 0.1 % nasal spray 1 spray by Nasal route 2 times daily Use in each nostril as directed 8/15/19   Alison Babinski Bunevich, DO   montelukast (SINGULAIR) 10 MG tablet Take 1 tablet by mouth daily 8/12/19   Alison Babinski Bunevich, DO   omeprazole (PRILOSEC) 20 MG delayed release capsule Take 20 mg by mouth Daily Instructed to take am of procedure    Historical Provider, MD   oxyCODONE-acetaminophen (PERCOCET)  MG per tablet Take 1 tablet by mouth every 8 hours as needed for Pain (pt took 1 tablet at 6pm tonight). Instructed to take am of procedure     Historical Provider, MD   baclofen (LIORESAL) 20 MG tablet Take 20 mg by mouth 4 times daily Instructed to take am of procedure    Historical Provider, MD   furosemide (LASIX) 40 MG tablet Take 1 tablet by mouth 2 times daily 6/14/19   Phil Sanchez,    albuterol sulfate  (90 Base) MCG/ACT inhaler Inhale 2 puffs into the lungs every 4 hours as needed for Wheezing or Shortness of Breath Instructed to take am of procedure and bring    Historical Provider, MD   Cholecalciferol (VITAMIN D3) 5000 units TABS Take 1 tablet by mouth every other day LD 9/11/2019    Historical Provider, MD   Coenzyme Q10 (COQ10) 200 MG CAPS Take 1 capsule by mouth 2 times daily LD 10/4/2019    Historical Provider, MD   ferrous sulfate 325 (65 Fe) MG tablet Take 325 mg by mouth daily    Historical Provider, MD   potassium chloride (KLOR-CON M) 20 MEQ extended release tablet Take 20 mEq by mouth daily    Historical Provider, MD   docusate sodium (COLACE) 100 MG capsule Take 100 mg by mouth 2 times daily    Historical Provider, MD   lidocaine (LMX) 4 % cream Apply topically as needed for Pain Apply topically as needed.     Historical Provider, MD   linaclotide (LINZESS) 145 MCG capsule Take 290 mcg by mouth every morning (before breakfast)     Historical Provider, MD   levOCARNitine (CARNITOR) 330 MG tablet Take 330 mg by mouth 3 times daily Instructed to take am of procedure  For mitochondrial disease    Historical Provider, MD   polyethylene glycol (GLYCOLAX) packet Take 17 g by mouth 2 times daily  12/30/16   Historical Provider, MD   allopurinol (ZYLOPRIM) 100 MG tablet Take 200 mg by mouth 2 times daily     Historical Provider, MD   escitalopram (LEXAPRO) 20 MG tablet Take 20 mg by mouth 2 times daily Instructed to take am of procedure    Historical Provider, MD   gabapentin (NEURONTIN) 600 MG tablet Take 1 tablet by mouth 4 times daily. Patient taking differently: Take 600 mg by mouth 4 times daily. Instructed to take am of procedure 10/31/13   Tiffani Miramontes MD   topiramate (TOPAMAX) 200 MG tablet Take 1 tablet by mouth 2 times daily. 5/9/12   Ab Benavides DO   ziprasidone (GEODON) 20 MG capsule Take 20 mg by mouth nightly     Historical Provider, MD       Allergies  Allergies   Allergen Reactions    Bee Pollen Anaphylaxis, Shortness Of Breath and Swelling     And wasp    Penicillins Anaphylaxis    Ropinirole Hcl Anaphylaxis    Vistaril [Hydroxyzine Hcl] Anaphylaxis    Aripiprazole Other (See Comments)     muscle spasms and confusion    Hydroxyzine Pamoate Itching and Rash    Tape Ruby Ends Tape] Rash     Paper tape allergy    Fabric tape is OK to use        Review of Systems  Please see HPI above. All bolded are positive. All un-bolded are negative.   Constitutional Symptoms: fever, chills, fatigue, generalized weakness, diaphoresis, increase in thirst, loss of appetite  Eyes: vision change   Ears, Nose, Mouth, Throat: hearing loss, nasal congestion, sores in the mouth  Cardiovascular: chest pain, chest heaviness, palpitations  Respiratory: shortness of breath, wheezing, coughing  Gastrointestinal: abdominal pain, nausea, vomiting, diarrhea, constipation, melena, hematochezia, hematemesis  Genitourinary: dysuria, hematuria, increased frequency  Musculoskeletal: lower extremity edema, myalgias, arthralgias, back pain  Integumentary: rashes, itching   Neurological: headache, lightheadedness, dizziness, confusion, syncope, numbness, tingling, focal 05/11/2020    AST 19 05/11/2020    INR 0.9 06/13/2019     Lab Results   Component Value Date    CKTOTAL 69 05/11/2020    CKMB 1.5 08/01/2015    TROPONINI <0.01 09/09/2020       Recent Radiological Studies:  XR CHEST PORTABLE   Final Result      No airspace opacities or pleural effusion. Assessment  Active Hospital Problems    Diagnosis    Chest pain [R07.9]     Priority: High    Chronic respiratory failure with hypoxia (HCC) [J96.11]    Depression [F32.9]    Chronic pain [G89.29]    Lymphedema of lower extremity [I89.0]    Obesity [E66.9]    Generalized seizure disorder ([facility] [address] [G40.309]    Bipolar 1 disorder ([facility] [address] [F31.9]    Asthma [J45.909]    Hyperlipidemia [E78.5]    Hypertension [I10]       Plan  Chest pain: Observation. Telemetry. Cardiology consultation--defer need for further cardiac testing to cardiology. CE's neg so far. ASA. Lipid panel pending. · Continue home medications--we will figure out why she is on Decadron at home. · Follow labs  · DVT prophylaxis. · Please see orders for further management and care. ·  for discharge planning  · Discharge plan: TBD pending clinical improvement     Patient seen, examined, and discussed with Dr. Asha Nevarez. Electronically signed by Ramos Evans DO on 9/9/2020 at 10:29 AM     Addendum: I have personally participated in a face-to-face history and physical exam on the date of service with the patient. I have discussed the case with the resident. I also participated in medical decision making with the resident on the date of service and I agree with all of the pertinent clinical information unless indicated in my editing of the note. I have reviewed and edited the note above based on my findings during my history, exam, and decision making on the same day of service.      My additional thoughts:    As above   For stress test per cardiology    Electronically signed by Karie Ramirez DO on 9/9/2020 at 10:40 AM    I can be reached through [employer].

## 2020-09-09 NOTE — CONSULTS
Inpatient Cardiology Consultation      Reason for Consult:  Chest pain     Consulting Physician: Dr. Nia Peguero    Requesting Physician:  Dr. Colletta Duncan    Date of Consultation: 9/9/2020    HISTORY OF PRESENT ILLNESS:   Ms. Shelah Gilford is a 59-year-old obese  female who is previously known to Dr. Joshua Tobin in the remote past (6/29/2013). At baseline, patient is fairly active (gardens and exercises by using bands and light weights) --> denies exertional chest pain and shortness of breath. She continues to smoke 1 PPD x40 years -->no home O2. [facility] 9/9/2020 with complaints of midsternal chest \"pressure and tightness\" 8/10 --> reporting \"I feel like it is deep down under my sternum\" with radiating discomfort up into her jaw and into her left shoulder, started at approximately 10 AM on 9/8/2020, constant for > 22 hours, episodes associated with shortness of breath and nausea --> Midsternal chest discomfort is reproducible upon light palpation and worsened with dry chronic cough. She stated at times \"the pain gets worse when I get up and move or exert myself. \"Denies lightheadedness, dizziness or feeling of her heart racing. She is unsure if for shortness of breath and wheezing starts before her chest discomfort. Therefore, she presented to the ED for further evaluation and management    Upon arrival to the ED: Blood pressure 116/59, heart rate 85, afebrile, 94% on room air. Labs: Sodium 136, potassium 3.6, BUN 14, creatinine 0.8, proBNP 39, troponin <0.01 x 2, WBC 6.7, H/H stable, platelet count 933. Chest x-ray no airspace opacity or pleural effusion. EKG: Sinus rhythm, iRBBB, rate 72 bpm.   ER medications: Aspirin 243 mg, DuoNeb and Solu-Medrol. He was admitted to a telemetry monitored unit for further evaluation and management. She is currently laying flat in bed and continues to have 1/10 midsternal chest pressure, reproducible upon light palpation. VSS.   Telemetry monitoring showing sinus rhythm with a heart rate in the 60s. Please note: past medical records were reviewed per electronic medical record (EMR) - see detailed reports under Past Medical/ Surgical History. Past Medical History:    1. Lexiscan MPS: 1/25/2013 showing probable normal Myoview view scan with fixed defect with some reversibility of the apical and anterior apical area most likely represents breast attenuation artifact especially with normal EF 78%. 2. Cardiac catheterization: 6/2013 showing normal coronaries with a preserved LV function. 3.  Hypertension  4. Hyperlipidemia: On statin therapy  5. History of palpitations: Holter monitor 2/2013 showing sinus rhythm with occasional PVCs and infrequent PACs  6.  Obesity: BMI 38.5  History of seizure disorder: Follows with neurology. Most recent seizure reported 5 months ago. 7.  History of arthritis  8. Chronic kidney disease: Follows with Dr. Melecio Villagran  9. History of bilateral knee replacements  10. History of cholecystectomy  11. History of mitochondrial cytopathy   12. PETR: Noncompliant with CPAP  13. Chronic back pain with history of cervical disc herniation  14. Gout  15. GERD /gastritis diagnosed on EGD 2/2017  16. History of nonalcoholic fatty liver disease  17. Anxiety/depression  18. History of adenoma of the right adrenal gland  19. History of bilateral lymphedema diagnosed 9/2012.  20.  History of thyroid nodularity with previous biopsy-negative for malignancy. Patient followed up with otolaryngology on 3/2020 --stable thyroid nodule on 3/2020. 21.  History of Diane en y gastric bypass. Max weight prior to surgery 480 lbs. current weight on 9/9/2020 246 pounds.    22.  Chronic constipation    Past Surgical History:    Past Surgical History:   Procedure Laterality Date    APPENDECTOMY      with Hysterectomy / unknown    BACK SURGERY  last one 1995    lumbar x 2    CARDIAC CATHETERIZATION Right 6-6-2013    Dr. Evan Ritchie Radial, no stents placed, no blockages [address]    open with gastric bypass    COLONOSCOPY N/A 9/18/2018    COLONOSCOPY POLYPECTOMY HOT BIOPSY performed by Jagruti Melissa MD at [address] COLONOSCOPY N/A 10/8/2019    COLONOSCOPY POLYPECTOMY SNARE/COLD BIOPSY performed by Jagruti Melissa MD at [address] EGD COLONOSCOPY N/A 10/8/2019    EGD ESOPHAGOGASTRODUODENOSCOPY DILATATION performed by Jagruti Melissa MD at [address], COLON, DIAGNOSTIC      ESOPHAGEAL DILATATION  9/18/2018    ESOPHAGEAL DILATION Brunilda Sell performed by Jagruti Melissa MD at [address] Bilateral 2007,2017    knees    KNEE SURGERY Bilateral     scope    MYOMECTOMY      NERVE BLOCK Left 10 02 2013    lumbar paravertebral facet #2    NERVE BLOCK Left 10 9 13    hip inj #1    NERVE BLOCK Left 10/16/13    hip injection    NERVE BLOCK Left 10/29/2014    left lumbar transforaminal nerve lbock  #1    NERVE BLOCK Left 11/12/2014    lumbar left transforaminal nerve block  #2    NERVE BLOCK Left 12 8 14    lumbar transforaminal #3    NERVE BLOCK Right 3/30/15    cervical transforaminal #1    NERVE BLOCK Right 4/6/2015    right cervical transforaminal nerve block  #2    NERVE BLOCK Right 4/13/15    cervical transforaminal #3    NERVE BLOCK Left 7/6/15    knee injection #1    NERVE BLOCK  07/20/15    left genicular nerve block/knee #2    NERVE BLOCK Left 10 1 15    lumb transforam #1    NERVE BLOCK  10/26/15    left lumbar transforaminal nerve block #3    OTHER SURGICAL HISTORY Left 11 25 13    lumbar radiofreq    OTHER SURGICAL HISTORY  3/28/2016    stage 1, 3 day percutanous trial [employer] lumbar spinal cord stimulator    OTHER SURGICAL HISTORY  1995    racz procedure / lower back    NV COLONOSCOPY FLX DX W/COLLJ SPEC WHEN PFRMD N/A 3/20/2018    COLONOSCOPY DIAGNOSTIC OR SCREENING performed by Jagruti Melissa MD at [place] [address] EGD TRANSORAL BIOPSY SINGLE/MULTIPLE N/A 3/20/2018    EGD BIOPSY performed by Halima Conroy MD at [facility]         Medications Prior to admit:  Prior to Admission medications    Medication Sig Start Date End Date Taking?  Authorizing Provider   EPINEPHrine (EPIPEN 2-CARMEN) 0.3 MG/0.3ML SOAJ injection Inject 0.3 mLs into the muscle once as needed (Bee stings) Use as directed for allergic reaction 8/30/20 8/30/20  Gregoria Benedict DO   famotidine (PEPCID) 20 MG tablet Take 1 tablet by mouth 2 times daily for 7 days 8/30/20 9/6/20  Gregoria Benedict DO   traZODone (DESYREL) 100 MG tablet Take 100 mg by mouth nightly    Historical Provider, MD   dexamethasone (DECADRON) 1 MG tablet Take one tablet at 11 pm the night before the 8 am blood test 3/10/20   Roque England MD   ondansetron (ZOFRAN-ODT) 4 MG disintegrating tablet Take 1 tablet by mouth 3 times daily as needed for Nausea or Vomiting 2/26/20   Traci Kapoor MD   ibuprofen (ADVIL;MOTRIN) 800 MG tablet Take 1 tablet by mouth every 8 hours as needed for Pain 2/26/20   Traci Kapoor MD   albuterol (PROVENTIL) (5 MG/ML) 0.5% nebulizer solution Take 2.5 mg by nebulization 3 times daily Instructed to take am of procedure    Historical Provider, MD   B Complex Vitamins (VITAMIN-B COMPLEX PO) Take by mouth daily LD patient to verify with Dr. Spencer Quispe MD   magnesium 200 MG TABS tablet Take 200 mg by mouth 2 times daily LD to verify with Dr. Corinne Ny   LD 10-4-19    Historical Provider, MD   calcium carbonate (CALCIUM 600) 600 MG TABS tablet Take 1 tablet by mouth 2 times daily LD patient to verify with Dr. Spencer Quispe MD   azelastine (ASTELIN) 0.1 % nasal spray 1 spray by Nasal route 2 times daily Use in each nostril as directed 8/15/19   Francisco Javier Murray DO   montelukast (SINGULAIR) 10 MG tablet Take 1 tablet by mouth daily 8/12/19   Francisco Javier Murray DO   omeprazole (PRILOSEC) 20 MG delayed release capsule Take 20 Take 20 mg by mouth 2 times daily Instructed to take am of procedure    Historical Provider, MD   gabapentin (NEURONTIN) 600 MG tablet Take 1 tablet by mouth 4 times daily. Patient taking differently: Take 600 mg by mouth 4 times daily. Instructed to take am of procedure 10/31/13   Rai Griffith MD   topiramate (TOPAMAX) 200 MG tablet Take 1 tablet by mouth 2 times daily.  5/9/12   Ab Benavides DO   ziprasidone (GEODON) 20 MG capsule Take 20 mg by mouth nightly     Historical Provider, MD       Current Medications:    Current Facility-Administered Medications: nitroGLYCERIN (NITROSTAT) SL tablet 0.4 mg, 0.4 mg, Sublingual, Q5 Min PRN  sodium chloride flush 0.9 % injection 10 mL, 10 mL, Intravenous, 2 times per day  sodium chloride flush 0.9 % injection 10 mL, 10 mL, Intravenous, PRN  acetaminophen (TYLENOL) tablet 650 mg, 650 mg, Oral, Q6H PRN **OR** acetaminophen (TYLENOL) suppository 650 mg, 650 mg, Rectal, Q6H PRN  magnesium hydroxide (MILK OF MAGNESIA) 400 MG/5ML suspension 30 mL, 30 mL, Oral, Daily PRN  promethazine (PHENERGAN) tablet 12.5 mg, 12.5 mg, Oral, Q6H PRN **OR** ondansetron (ZOFRAN) injection 4 mg, 4 mg, Intravenous, Q6H PRN  atorvastatin (LIPITOR) tablet 40 mg, 40 mg, Oral, Nightly  [START ON 9/10/2020] aspirin chewable tablet 81 mg, 81 mg, Oral, Daily  enoxaparin (LOVENOX) injection 40 mg, 40 mg, Subcutaneous, Daily  albuterol (PROVENTIL) nebulizer solution 2.5 mg, 2.5 mg, Nebulization, TID  allopurinol (ZYLOPRIM) tablet 200 mg, 200 mg, Oral, BID  baclofen (LIORESAL) tablet 20 mg, 20 mg, Oral, 4x Daily  docusate sodium (COLACE) capsule 100 mg, 100 mg, Oral, BID  escitalopram (LEXAPRO) tablet 20 mg, 20 mg, Oral, BID  furosemide (LASIX) tablet 40 mg, 40 mg, Oral, BID  gabapentin (NEURONTIN) capsule 600 mg, 600 mg, Oral, 4x Daily  ibuprofen (ADVIL;MOTRIN) tablet 800 mg, 800 mg, Oral, Q8H PRN  levOCARNitine (CARNITOR) tablet 330 mg, 330 mg, Oral, TID  magnesium oxide (MAG-OX) tablet 200 mg, 200 mg, Oral, BID  linaclotide (LINZESS) capsule 290 mcg, 290 mcg, Oral, QAM AC  lidocaine (LMX) 4 % cream, , Topical, PRN  montelukast (SINGULAIR) tablet 10 mg, 10 mg, Oral, Daily  oxyCODONE-acetaminophen (PERCOCET)  MG per tablet 1 tablet, 1 tablet, Oral, Q8H PRN  polyethylene glycol (GLYCOLAX) packet 17 g, 17 g, Oral, BID  potassium chloride (KLOR-CON M) extended release tablet 20 mEq, 20 mEq, Oral, Daily with breakfast  topiramate (TOPAMAX) tablet 200 mg, 200 mg, Oral, BID  traZODone (DESYREL) tablet 100 mg, 100 mg, Oral, Nightly  ziprasidone (GEODON) capsule 20 mg, 20 mg, Oral, Nightly  albuterol (PROVENTIL) nebulizer solution 2.5 mg, 2.5 mg, Nebulization, Q4H PRN  pantoprazole (PROTONIX) tablet 40 mg, 40 mg, Oral, QAM AC    Allergies:  Bee pollen; Penicillins; Ropinirole hcl; Vistaril [hydroxyzine hcl]; Aripiprazole; Hydroxyzine pamoate; and Tape Kehinde Hench tape]    Social History:    Patient lives alone. Denies alcohol. Smokes cigarettes: 1 PPD x40 years. Occasionally smokes a cigar  Denies using a walker or cane    Family History: Mother: History of CVA and MI in her [de-identified]  Father:  of brain cancer  She has 3 brothers who are alive and in good health  He has 3 sisters. One sister has a history of an MI status post stents in her 46s. Her older sister has a history of CABG and an unknown age. REVIEW OF SYSTEMS:     · Constitutional: + Fatigue. Denies fevers, chills or night sweats  · Eyes: Denies visual changes or drainage  · ENT: Denies headaches or hearing loss. No mouth sores or sore throat. No epistaxis   · Cardiovascular: See HPI.  + Lower extremity swelling. · Respiratory: Chronic DORMAN, chronic dry cough, denies orthopnea or PND. No hemoptysis   · Gastrointestinal: Denies hematemesis or anorexia. No hematochezia or melena    · Genitourinary: Denies urgency, dysuria or hematuria. · Musculoskeletal: Denies gait disturbance.+ generalized weakness.   Bilateral knee pain  · Integumentary: Denies rash, hives or pruritis   · Neurological: Denies dizziness, headaches or seizures. No numbness or tingling  · Psychiatric: Denies anxiety or depression. · Endocrine: Denies temperature intolerance. No recent weight change. .  · Hematologic/Lymphatic: Denies abnormal bruising or bleeding. No swollen lymph nodes    PHYSICAL EXAM:   /62   Pulse 66   Temp 98.2 °F (36.8 °C) (Oral)   Resp 16   Ht 5' 7\" (1.702 m)   Wt 246 lb (111.6 kg)   LMP 08/31/1988   SpO2 100%   BMI 38.53 kg/m²   CONST:  Well developed, well nourished middle-aged  female who appears of stated age. Awake, alert and cooperative. No apparent distress. HEENT:   Head- Normocephalic, atraumatic   Eyes- Conjunctivae pink, anicteric  Throat- Oral mucosa pink and moist  Neck-  No stridor, trachea midline, no jugular venous distention. No carotid bruit. CHEST: Chest symmetrical and non-tender to palpation. No accessory muscle use or intercostal retractions  RESPIRATORY: Lung sounds -expiratory wheeze. On room air  CARDIOVASCULAR:     Heart Inspection- shows no noted pulsations  Heart Palpation- no heaves or thrills; PMI is non-displaced   Heart Ausculation- Regular rate and rhythm, no murmur. No s3, s4 or rub   PV: Trace lower extremity edema. No varicosities. Pedal pulses palpable, no clubbing or cyanosis   ABDOMEN: Soft, obese, non-tender to light palpation. Bowel sounds present. No palpable masses no organomegaly; no abdominal bruit  MS: Good muscle strength and tone. No atrophy or abnormal movements. : Deferred  SKIN: Warm and dry no statis dermatitis or ulcers   NEURO / PSYCH: Oriented to person, place and time. Speech clear and appropriate. Follows all commands. Pleasant affect     DATA:    ECG: See HPI.    Tele strips: Sinus rhythm  Diagnostic:      Intake/Output Summary (Last 24 hours) at 9/9/2020 1243  Last data filed at 9/9/2020 1007  Gross per 24 hour   Intake --   Output 1900 ml   Net -1900 ml       Labs: CBC:   Recent Labs     09/09/20 0022   WBC 6.7   HGB 13.5   HCT 40.8        BMP:   Recent Labs     09/09/20 0022      K 3.6   CO2 25   BUN 14   CREATININE 0.8   LABGLOM >60   CALCIUM 8.8     Mag: No results for input(s): MG in the last 72 hours. Phos: No results for input(s): PHOS in the last 72 hours. TSH: No results for input(s): TSH in the last 72 hours. HgA1c:   Lab Results   Component Value Date    LABA1C 5.7 12/13/2013     No results found for: EAG  proBNP:   Recent Labs     09/09/20  0022   PROBNP 39     CARDIAC ENZYMES:  Recent Labs     09/09/20  0022 09/09/20  1035   TROPONINI <0.01 <0.01     FASTING LIPID PANEL:  Lab Results   Component Value Date    CHOL 230 12/13/2013    HDL 50 12/13/2013    LDLCALC 111 12/13/2013    TRIG 347 12/13/2013     CXR: 9/9/2020  No airspace opacities or pleural effusion. Assessment/plan:  1. Atypical chest pain: Troponin <0.01 x 2. No acute EKG changes. 2.  History of cardiac catheterization 2013 showing normal coronaries with a preserved LV function. 3.  Hypertension: //62  4. Hyperlipidemia: On statin therapy  5. Obesity: BMI 38.5 /history of gastric bypass surgery  6. COPD  7. Continued tobacco abuse: 1 PPD x40 years  8. Chronic kidney disease: Stable  9. Mitochondrial cytopathy  10. History of seizure disorder    -Lexiscan MPS today  -Continue current cardiac medications  -Check lipid panel  -Smoking cessation  -Aggressive risk factor modifications  -Further recommendations pending the above    Above assessment and plan discussed with Dr. Jennifer Raya.  Electronically signed by ADEEL Haas CNP on 9/9/20 at 1:12 PM EDT    _________________________________________________________  I independently interviewed and examined the patient. I have reviewed the above documentation completed by the HENRI. Please see my additional contributions to the HPI, physical exam, and assessment / medical decision making.     HPI, ROS, PMH, PSH, [address], SH, and medications independently reviewed (agree; see above documentation)    History of Present Illness:  Currently with no chest pain, respiratory distress, or palpitations. SR on EKG and telemetry. Review of Systems:   Cardiac: As per HPI  General: No fever, chills  Pulmonary: As per HPI  HEENT: No visual disturbances, difficult swallowing  GI: No nausea, vomiting  Musculoskeletal: TALAVERA x 4, no focal motor deficits  Skin: Intact, no rashes  Neuro/Psych: No headache or seizures    Physical Exam:  BP (!) 116/52   Pulse 73   Temp 97.7 °F (36.5 °C) (Oral)   Resp 18   Ht 5' 7\" (1.702 m)   Wt 246 lb (111.6 kg)   LMP 08/31/1988   SpO2 97%   BMI 38.53 kg/m²   Wt Readings from Last 3 Encounters:   09/09/20 246 lb (111.6 kg)   09/08/20 240 lb (108.9 kg)   08/30/20 246 lb (111.6 kg)     Appearance: Awake, alert, no acute respiratory distress  Skin: Intact, no rash  Head: Normocephalic, atraumatic  Eyes: EOMI, no conjunctival erythema  ENMT: No pharyngeal erythema, MMM, no rhinorrhea  Neck: Supple, no carotid bruits  Lungs: Decreased BS B/L, no wheezing  Cardiac: Regular rate and rhythm, +S1S2, no murmurs apparent  Abdomen: Soft, nontender, +bowel sounds  Extremities: Moves all extremities x 4, no lower extremity edema  Neurologic: No focal motor deficits apparent, normal mood and affect    - Lexiscan nuclear stress test  - Aggressive risk factor modifications    Thank you for allowing me to participate in your patient's care. Please feel free to contact me if you have any questions or concerns.     Daniel Washington MD  [facility]) Cardiology

## 2020-09-09 NOTE — PROGRESS NOTES
Richard Downingo was ordered Linzess and Carnitor which are non formulary medications. The patient has indicated that the home supply of these medication will be brought in to the hospital for inpatient use. If the medication has not been administered by 1400 on the following day from the time the order was placed, a pharmacist will follow-up with the nurse of the patient to assess the capability of the patient to bring in the medication. If it is determined that the patient cannot supply the medication and it is not available to be dispensed from the pharmacy, a call will be placed to the ordering provider to discuss alternative options. Thank Micheal Maddox Pharm. D 9/9/2020 6:40 AM

## 2020-09-10 ENCOUNTER — HOSPITAL ENCOUNTER (EMERGENCY)
Age: 60
Discharge: HOME | End: 2020-09-10
Attending: EMERGENCY MEDICINE
Payer: MEDICAID

## 2020-09-10 ENCOUNTER — TELEPHONE (OUTPATIENT)
Dept: OTHER | Facility: CLINIC | Age: 60
End: 2020-09-10

## 2020-09-10 VITALS
RESPIRATION RATE: 20 BRPM | WEIGHT: 246 LBS | HEART RATE: 92 BPM | OXYGEN SATURATION: 93 % | BODY MASS INDEX: 38.61 KG/M2 | TEMPERATURE: 98 F | HEIGHT: 67 IN | SYSTOLIC BLOOD PRESSURE: 132 MMHG | DIASTOLIC BLOOD PRESSURE: 60 MMHG

## 2020-09-10 PROCEDURE — 2500000003 HC RX 250 WO HCPCS

## 2020-09-10 PROCEDURE — 96372 THER/PROPH/DIAG INJ SC/IM: CPT

## 2020-09-10 PROCEDURE — 6360000002 HC RX W HCPCS

## 2020-09-10 PROCEDURE — 94664 DEMO&/EVAL PT USE INHALER: CPT

## 2020-09-10 PROCEDURE — 6370000000 HC RX 637 (ALT 250 FOR IP): Performed by: STUDENT IN AN ORGANIZED HEALTH CARE EDUCATION/TRAINING PROGRAM

## 2020-09-10 PROCEDURE — 99283 EMERGENCY DEPT VISIT LOW MDM: CPT

## 2020-09-10 RX ORDER — PREDNISONE 20 MG/1
TABLET ORAL
Qty: 18 TABLET | Refills: 0 | Status: SHIPPED | OUTPATIENT
Start: 2020-09-10 | End: 2020-09-20

## 2020-09-10 RX ORDER — METHYLPREDNISOLONE SODIUM SUCCINATE 125 MG/2ML
INJECTION, POWDER, LYOPHILIZED, FOR SOLUTION INTRAMUSCULAR; INTRAVENOUS
Status: COMPLETED
Start: 2020-09-10 | End: 2020-09-10

## 2020-09-10 RX ORDER — EPINEPHRINE 0.3 MG/.3ML
0.3 INJECTION SUBCUTANEOUS ONCE
Qty: 1 EACH | Refills: 0 | Status: ON HOLD | OUTPATIENT
Start: 2020-09-10 | End: 2022-02-16 | Stop reason: HOSPADM

## 2020-09-10 RX ORDER — IPRATROPIUM BROMIDE AND ALBUTEROL SULFATE 2.5; .5 MG/3ML; MG/3ML
1 SOLUTION RESPIRATORY (INHALATION)
Status: DISCONTINUED | OUTPATIENT
Start: 2020-09-10 | End: 2020-09-10 | Stop reason: HOSPADM

## 2020-09-10 RX ORDER — FAMOTIDINE 20 MG/1
20 TABLET, FILM COATED ORAL 2 TIMES DAILY
Qty: 60 TABLET | Refills: 0 | Status: SHIPPED | OUTPATIENT
Start: 2020-09-10 | End: 2022-10-06

## 2020-09-10 RX ORDER — DIPHENHYDRAMINE HYDROCHLORIDE 50 MG/ML
INJECTION INTRAMUSCULAR; INTRAVENOUS
Status: COMPLETED
Start: 2020-09-10 | End: 2020-09-10

## 2020-09-10 RX ADMIN — DIPHENHYDRAMINE HYDROCHLORIDE 50 MG: 50 INJECTION, SOLUTION INTRAMUSCULAR; INTRAVENOUS at 15:12

## 2020-09-10 RX ADMIN — METHYLPREDNISOLONE SODIUM SUCCINATE 125 MG: 125 INJECTION, POWDER, FOR SOLUTION INTRAMUSCULAR; INTRAVENOUS at 15:12

## 2020-09-10 RX ADMIN — FAMOTIDINE 20 MG: 10 INJECTION, SOLUTION INTRAVENOUS at 15:12

## 2020-09-10 RX ADMIN — IPRATROPIUM BROMIDE AND ALBUTEROL SULFATE 1 AMPULE: .5; 3 SOLUTION RESPIRATORY (INHALATION) at 15:34

## 2020-09-10 ASSESSMENT — ENCOUNTER SYMPTOMS
DIARRHEA: 0
ABDOMINAL DISTENTION: 0
COUGH: 0
SINUS PRESSURE: 0
CHEST TIGHTNESS: 0
SORE THROAT: 0
VOMITING: 0
SHORTNESS OF BREATH: 1
CONSTIPATION: 0
SINUS PAIN: 0
BACK PAIN: 0
NAUSEA: 0
ABDOMINAL PAIN: 0
COLOR CHANGE: 0

## 2020-09-10 NOTE — ED PROVIDER NOTES
Patient on female presents the ED with complaints of anaphylactic reaction after being stung by a bee. Patient states she was stung by a bee around an hour ago. Did use her own EpiPen. Came in complaining of some shortness of breath and swelling in the throat. Patient states she feels a little dizzy from struggling to breathe as well. Denies any falls or loss of consciousness before coming to the ED. Patient denies any changes to bowel or bladder denies any abdominal pain. Denies any fevers chills nausea vomiting. Patient states she was stung by a bee last Sunday as well and requested a higher dose of prednisone the last time due to it was not very effective. Review of Systems   Constitutional: Negative for chills, fatigue and fever. HENT: Negative for congestion, sinus pressure, sinus pain and sore throat. Respiratory: Positive for shortness of breath. Negative for cough and chest tightness. Cardiovascular: Negative for chest pain and leg swelling. Gastrointestinal: Negative for abdominal distention, abdominal pain, constipation, diarrhea, nausea and vomiting. Endocrine: Negative for polyuria. Genitourinary: Negative for difficulty urinating, dysuria, frequency, hematuria, urgency, vaginal bleeding and vaginal discharge. Musculoskeletal: Negative for arthralgias and back pain. Skin: Negative for color change and pallor. Neurological: Positive for dizziness. Negative for weakness. Physical Exam  Vitals signs and nursing note reviewed. Constitutional:       Appearance: She is well-developed and normal weight. Interventions: She is not intubated. HENT:      Head: Normocephalic and atraumatic. Cardiovascular:      Rate and Rhythm: Normal rate and regular rhythm. No extrasystoles are present. Pulses: Normal pulses. Heart sounds: Normal heart sounds. No murmur. No gallop.     Pulmonary:      Effort: Pulmonary effort is normal. No tachypnea, accessory muscle usage or respiratory distress. She is not intubated. Breath sounds: Normal breath sounds. No stridor. Abdominal:      General: Bowel sounds are normal.      Tenderness: There is no abdominal tenderness. There is no guarding or rebound. Skin:     General: Skin is warm and dry. Capillary Refill: Capillary refill takes less than 2 seconds. Neurological:      General: No focal deficit present. Mental Status: She is alert and oriented to person, place, and time. Procedures     MDM  Number of Diagnoses or Management Options  Acute anaphylaxis, initial encounter:   Diagnosis management comments: 5year-old female presents ED with complaints of anaphylactic reaction and already used her EpiPen. Will give 125 Solu-Medrol 50 of Benadryl 20 Pepcid, and a DuoNeb treatment due to her wheezing as well. Patient states she took her EpiPen approximately 1 hour ago. Plan is to watch patient for an additional 3 hours for a total of 4 hours observation post her EpiPen and to see how she does with the medications if she clinically is improving and does not have any wheezing or any worsening swelling and is feeling better able to discharge patient home.   Patient was feeling much better 4 hours later following the medication and was feeling close to her baseline as decision was made at this time to discharge the patient home she was agreeable with this concerned about following up with her primary care doctor and to return to the ED if she had worsening swelling of throat trouble breathing got stung by a bee again, or signs for anaphylaxis were returning.            --------------------------------------------- PAST HISTORY ---------------------------------------------  Past Medical History:  has a past medical history of Adenoma of right adrenal gland, Anemia, Anxiety, Arthritis, Asthma, Benign essential tremor, Bipolar affective ([address] Chronic back pain, Chronic respiratory failure with hypoxia ([address] Depression, Difficulty swallowing, DM (diabetes mellitus) ([address] Environmental and seasonal allergies, Fatty liver, Full dentures, Generalized seizure disorder ([address] GERD (gastroesophageal reflux disease), Gout, Herniated cervical disc, History of swelling of feet, Lymphedema of lower extremity, Mitochondrial cytopathy ([address] Neuropathy, Obesity, PETR (obstructive sleep apnea), PONV (postoperative nausea and vomiting), Seizures ([address] and Spinal headache. Past Surgical History:  has a past surgical history that includes knee surgery (Bilateral); Gastric bypass surgery (1999); myomectomy; Tonsillectomy; Nerve Block (Left, 10 02 2013); Nerve Block (Left, 10 9 13); Nerve Block (Left, 10/16/13); other surgical history (Left, 11 25 13); Nerve Block (Left, 10/29/2014); Nerve Block (Left, 11/12/2014); Nerve Block (Left, 12 8 14); Nerve Block (Right, 3/30/15); Nerve Block (Right, 4/6/2015); Nerve Block (Right, 4/13/15); Nerve Block (Left, 7/6/15); Nerve Block (07/20/15); Nerve Block (Left, 10 1 15); Nerve Block (10/26/15); other surgical history (3/28/2016); Endoscopy, colon, diagnostic; pr colonoscopy flx dx w/collj spec when pfrmd (N/A, 3/20/2018); pr egd transoral biopsy single/multiple (N/A, 3/20/2018); Cholecystectomy (1999); Hysterectomy (1988); Colonoscopy (N/A, 9/18/2018); Esophagus dilation (9/18/2018); back surgery (last one 1995); Cardiac catheterization (Right, 6-6-2013); Appendectomy; other surgical history (1995); joint replacement (Bilateral, [id number]); egd colonoscopy (N/A, 10/8/2019); and Colonoscopy (N/A, 10/8/2019). Social History:  reports that she has been smoking cigarettes and cigars. She started smoking about 30 years ago. She has a 42.00 pack-year smoking history. She has never used smokeless tobacco. She reports that she does not drink alcohol or use drugs.     Family History: family history includes Arthritis in an other family member; Cancer in her father and another family member; Depression in an other family member; Heart Disease in her mother and another family member; Hypertension in an other family member; Mental Illness in an other family member; Stroke in an other family member. The patients home medications have been reviewed. Allergies: Bee pollen; Penicillins; Ropinirole hcl; Vistaril [hydroxyzine hcl]; Aripiprazole; Hydroxyzine pamoate; and Tape [adhesive tape]    -------------------------------------------------- RESULTS -------------------------------------------------  Labs:  No results found for this visit on 09/10/20. Radiology:  No orders to display       ------------------------- NURSING NOTES AND VITALS REVIEWED ---------------------------  Date / Time Roomed:  9/10/2020  2:55 PM  ED Bed Assignment:  20/20    The nursing notes within the ED encounter and vital signs as below have been reviewed. /60   Pulse 91   Temp 98 °F (36.7 °C) (Temporal)   Resp 22   Ht 5' 7\" (1.702 m)   Wt 246 lb (111.6 kg)   LMP 08/31/1988   SpO2 93%   BMI 38.53 kg/m²   Oxygen Saturation Interpretation: Normal      ------------------------------------------ PROGRESS NOTES ------------------------------------------  5:33 PM EDT  I have spoken with the patient and discussed todays results, in addition to providing specific details for the plan of care and counseling regarding the diagnosis and prognosis. Their questions are answered at this time and they are agreeable with the plan. I discussed at length with them reasons for immediate return here for re evaluation. They will followup with their primary care physician by calling their office as needed. --------------------------------- ADDITIONAL PROVIDER NOTES ---------------------------------  At this time the patient is without objective evidence of an acute process requiring hospitalization or inpatient management.   They have remained hemodynamically stable throughout their entire ED visit and are stable for discharge with outpatient follow-up. The plan has been discussed in detail and they are aware of the specific conditions for emergent return, as well as the importance of follow-up. New Prescriptions    EPINEPHRINE (EPIPEN 2-CARMEN) 0.3 MG/0.3ML SOAJ INJECTION    Inject 0.3 mLs into the muscle once for 1 dose Use as directed for allergic reaction    FAMOTIDINE (PEPCID) 20 MG TABLET    Take 1 tablet by mouth 2 times daily    PREDNISONE (DELTASONE) 20 MG TABLET    Sig.: Take 60mg  Po qd x 3 days, then 40mg po qd x3 days, then 20mg po qd x 3 days. QS x 9 days       Diagnosis:  1. Acute anaphylaxis, initial encounter        Disposition:  Patient's disposition: Discharge to home  Patient's condition is stable.                  Amado Mustafa MD  Resident  09/10/20 [id number]

## 2020-09-10 NOTE — TELEPHONE ENCOUNTER
Writer contacted  ED provider, aNel Lackey, to inform of 30 day readmission risk. ED provider informed writer of intention to discharge and follow up recommended.

## 2020-09-10 NOTE — ED NOTES
Pt presents to the ED with allergic rxn following bee sting. Pt states that her throat is closing and that she has a hx of anaphylactic reaction to bees. Self administered epipen at 1430. Pt taken to room 29 due to lack rooms. This RN started an IV, medications ordered, meds given, pt placed on monitor. Will continue to closely monitor pt.       Hai Choe RN  09/10/20 [id number]

## 2020-09-11 ENCOUNTER — TELEPHONE (OUTPATIENT)
Dept: OTHER | Facility: CLINIC | Age: 60
End: 2020-09-11

## 2020-09-11 ENCOUNTER — CARE COORDINATION (OUTPATIENT)
Dept: CARE COORDINATION | Age: 60
End: 2020-09-11

## 2020-09-11 ENCOUNTER — APPOINTMENT (OUTPATIENT)
Dept: GENERAL RADIOLOGY | Age: 60
End: 2020-09-11
Payer: MEDICAID

## 2020-09-11 ENCOUNTER — HOSPITAL ENCOUNTER (EMERGENCY)
Age: 60
Discharge: LEFT BEFORE DISCHARGE | End: 2020-09-11
Attending: EMERGENCY MEDICINE
Payer: MEDICAID

## 2020-09-11 VITALS
OXYGEN SATURATION: 97 % | SYSTOLIC BLOOD PRESSURE: 131 MMHG | DIASTOLIC BLOOD PRESSURE: 71 MMHG | BODY MASS INDEX: 38.61 KG/M2 | HEART RATE: 82 BPM | RESPIRATION RATE: 16 BRPM | HEIGHT: 67 IN | WEIGHT: 246 LBS | TEMPERATURE: 98 F

## 2020-09-11 LAB
ANION GAP SERPL CALCULATED.3IONS-SCNC: 10 MMOL/L (ref 7–16)
BASOPHILS ABSOLUTE: 0.01 E9/L (ref 0–0.2)
BASOPHILS RELATIVE PERCENT: 0.1 % (ref 0–2)
BUN BLDV-MCNC: 15 MG/DL (ref 6–20)
CALCIUM SERPL-MCNC: 8.8 MG/DL (ref 8.6–10.2)
CHLORIDE BLD-SCNC: 107 MMOL/L (ref 98–107)
CO2: 21 MMOL/L (ref 22–29)
CREAT SERPL-MCNC: 0.8 MG/DL (ref 0.5–1)
EOSINOPHILS ABSOLUTE: 0 E9/L (ref 0.05–0.5)
EOSINOPHILS RELATIVE PERCENT: 0 % (ref 0–6)
GFR AFRICAN AMERICAN: >60
GFR NON-AFRICAN AMERICAN: >60 ML/MIN/1.73
GLUCOSE BLD-MCNC: 100 MG/DL (ref 74–99)
HCT VFR BLD CALC: 41.7 % (ref 34–48)
HEMOGLOBIN: 13.5 G/DL (ref 11.5–15.5)
IMMATURE GRANULOCYTES #: 0.05 E9/L
IMMATURE GRANULOCYTES %: 0.6 % (ref 0–5)
LYMPHOCYTES ABSOLUTE: 0.75 E9/L (ref 1.5–4)
LYMPHOCYTES RELATIVE PERCENT: 8.7 % (ref 20–42)
MCH RBC QN AUTO: 33.2 PG (ref 26–35)
MCHC RBC AUTO-ENTMCNC: 32.4 % (ref 32–34.5)
MCV RBC AUTO: 102.5 FL (ref 80–99.9)
MONOCYTES ABSOLUTE: 0.41 E9/L (ref 0.1–0.95)
MONOCYTES RELATIVE PERCENT: 4.7 % (ref 2–12)
NEUTROPHILS ABSOLUTE: 7.43 E9/L (ref 1.8–7.3)
NEUTROPHILS RELATIVE PERCENT: 85.9 % (ref 43–80)
PDW BLD-RTO: 13.2 FL (ref 11.5–15)
PLATELET # BLD: 233 E9/L (ref 130–450)
PMV BLD AUTO: 8.9 FL (ref 7–12)
POTASSIUM REFLEX MAGNESIUM: 4 MMOL/L (ref 3.5–5)
RBC # BLD: 4.07 E12/L (ref 3.5–5.5)
SODIUM BLD-SCNC: 138 MMOL/L (ref 132–146)
WBC # BLD: 8.7 E9/L (ref 4.5–11.5)

## 2020-09-11 PROCEDURE — 94664 DEMO&/EVAL PT USE INHALER: CPT

## 2020-09-11 PROCEDURE — 96374 THER/PROPH/DIAG INJ IV PUSH: CPT

## 2020-09-11 PROCEDURE — 6370000000 HC RX 637 (ALT 250 FOR IP): Performed by: STUDENT IN AN ORGANIZED HEALTH CARE EDUCATION/TRAINING PROGRAM

## 2020-09-11 PROCEDURE — 71045 X-RAY EXAM CHEST 1 VIEW: CPT

## 2020-09-11 PROCEDURE — 6360000002 HC RX W HCPCS: Performed by: EMERGENCY MEDICINE

## 2020-09-11 PROCEDURE — 99284 EMERGENCY DEPT VISIT MOD MDM: CPT

## 2020-09-11 PROCEDURE — 2500000003 HC RX 250 WO HCPCS: Performed by: EMERGENCY MEDICINE

## 2020-09-11 PROCEDURE — 85025 COMPLETE CBC W/AUTO DIFF WBC: CPT

## 2020-09-11 PROCEDURE — 80048 BASIC METABOLIC PNL TOTAL CA: CPT

## 2020-09-11 PROCEDURE — 96375 TX/PRO/DX INJ NEW DRUG ADDON: CPT

## 2020-09-11 PROCEDURE — 2580000003 HC RX 258: Performed by: EMERGENCY MEDICINE

## 2020-09-11 PROCEDURE — 99282 EMERGENCY DEPT VISIT SF MDM: CPT

## 2020-09-11 PROCEDURE — 99281 EMR DPT VST MAYX REQ PHY/QHP: CPT

## 2020-09-11 RX ORDER — IPRATROPIUM BROMIDE AND ALBUTEROL SULFATE 2.5; .5 MG/3ML; MG/3ML
1 SOLUTION RESPIRATORY (INHALATION)
Status: DISCONTINUED | OUTPATIENT
Start: 2020-09-11 | End: 2020-09-11

## 2020-09-11 RX ORDER — IPRATROPIUM BROMIDE AND ALBUTEROL SULFATE 2.5; .5 MG/3ML; MG/3ML
1 SOLUTION RESPIRATORY (INHALATION) ONCE
Status: COMPLETED | OUTPATIENT
Start: 2020-09-11 | End: 2020-09-11

## 2020-09-11 RX ORDER — EPINEPHRINE 0.3 MG/.3ML
0.3 INJECTION SUBCUTANEOUS ONCE
Qty: 1 EACH | Refills: 2 | Status: SHIPPED | OUTPATIENT
Start: 2020-09-11 | End: 2021-02-01

## 2020-09-11 RX ORDER — METHYLPREDNISOLONE SODIUM SUCCINATE 125 MG/2ML
125 INJECTION, POWDER, LYOPHILIZED, FOR SOLUTION INTRAMUSCULAR; INTRAVENOUS ONCE
Status: COMPLETED | OUTPATIENT
Start: 2020-09-11 | End: 2020-09-11

## 2020-09-11 RX ORDER — 0.9 % SODIUM CHLORIDE 0.9 %
1000 INTRAVENOUS SOLUTION INTRAVENOUS ONCE
Status: COMPLETED | OUTPATIENT
Start: 2020-09-11 | End: 2020-09-11

## 2020-09-11 RX ADMIN — SODIUM CHLORIDE 1000 ML: 9 INJECTION, SOLUTION INTRAVENOUS at 17:44

## 2020-09-11 RX ADMIN — METHYLPREDNISOLONE SODIUM SUCCINATE 125 MG: 125 INJECTION, POWDER, FOR SOLUTION INTRAMUSCULAR; INTRAVENOUS at 17:44

## 2020-09-11 RX ADMIN — FAMOTIDINE 20 MG: 10 INJECTION INTRAVENOUS at 17:44

## 2020-09-11 RX ADMIN — IPRATROPIUM BROMIDE AND ALBUTEROL SULFATE 1 AMPULE: .5; 3 SOLUTION RESPIRATORY (INHALATION) at 18:02

## 2020-09-11 ASSESSMENT — ENCOUNTER SYMPTOMS
CONSTIPATION: 0
ABDOMINAL PAIN: 0
SHORTNESS OF BREATH: 1
SINUS PAIN: 0
BACK PAIN: 0
SINUS PRESSURE: 0
DIARRHEA: 0
COUGH: 0
NAUSEA: 0
VOMITING: 0
ABDOMINAL DISTENTION: 0
CHEST TIGHTNESS: 0
SORE THROAT: 0
COLOR CHANGE: 0

## 2020-09-11 NOTE — CARE COORDINATION
Patient contacted regarding recent discharge and COVID-19 risk. Discussed COVID-19 related testing which was not done at this time. Test results were not done. Patient informed of results, if available? No     Care Transition Nurse/ Ambulatory Care Manager contacted the patient by telephone to perform post discharge assessment. Verified name and  with patient as identifiers. Patient has following risk factors of: asthma and diabetes. CTN/ACM reviewed discharge instructions, medical action plan and red flags related to discharge diagnosis. Reviewed and educated them on any new and changed medications related to discharge diagnosis. Advised obtaining a 90-day supply of all daily and as-needed medications. Education provided regarding infection prevention, and signs and symptoms of COVID-19 and when to seek medical attention with patient who verbalized understanding. Discussed exposure protocols and quarantine from [address] you at higher risk for severe illness  and given an opportunity for questions and concerns. The patient agrees to contact the COVID-19 hotline [phone] or PCP office for questions related to their healthcare. CTN/ACM provided contact information for future reference. From CDC: Are you at higher risk for severe illness?  Wash your hands often.  Avoid close contact (6 feet, which is about two arm lengths) with people who are sick.  Put distance between yourself and other people if COVID-19 is spreading in your community.  Clean and disinfect frequently touched surfaces.  Avoid all cruise travel and non-essential air travel.  Call your healthcare professional if you have concerns about COVID-19 and your underlying condition or if you are sick. For more information on steps you can take to protect yourself, see CDC's How to [address] says she is still experiencing a generalized rash, worse at the site of her bee sting.  She has filled and begun taking her prednisone today, along with benadryl. She said her pharmacy would not allow her to fill the script for epipens, as she recently filled a script and still has one remaining. She said she is still SOB, but that it is improved from yesterday. She denies wheezing, difficulty swallowing, or tongue swelling. ACM discussed red flags for which to return to the ED for re-evaluation, understanding verbalized. Covid risk, red flags and precautions reviewed in detail, understanding verbalized. Pt is scheduled for PCP f/u on Tuesday, 9/15 at 2pm.     Pt denies questions/concerns at this time. No further outreaches planned at this time.

## 2020-09-11 NOTE — TELEPHONE ENCOUNTER
RN Access contact pt to notify her of ED f/u appt. Spoke with pt, appt scheduled for Tuesday 9-15 @2:00pm with Dr. Nikhil Santana. Pt stated understanding.

## 2020-09-17 ENCOUNTER — HOSPITAL ENCOUNTER (OUTPATIENT)
Age: 60
Discharge: HOME | End: 2020-09-19
Payer: MEDICAID

## 2020-09-17 DIAGNOSIS — Z01.818 PRE-OP EXAM: Primary | ICD-10-CM

## 2020-09-17 PROCEDURE — U0003 INFECTIOUS AGENT DETECTION BY NUCLEIC ACID (DNA OR RNA); SEVERE ACUTE RESPIRATORY SYNDROME CORONAVIRUS 2 (SARS-COV-2) (CORONAVIRUS DISEASE [COVID-19]), AMPLIFIED PROBE TECHNIQUE, MAKING USE OF HIGH THROUGHPUT TECHNOLOGIES AS DESCRIBED BY CMS-2020-01-R: HCPCS

## 2020-09-19 LAB
SARS-COV-2: NOT DETECTED
SOURCE: NORMAL

## 2020-09-23 ENCOUNTER — HOSPITAL ENCOUNTER (OUTPATIENT)
Dept: GENERAL RADIOLOGY | Age: 60
Discharge: HOME | End: 2020-09-25
Payer: MEDICAID

## 2020-09-23 ENCOUNTER — HOSPITAL ENCOUNTER (OUTPATIENT)
Dept: CT IMAGING | Age: 60
Discharge: HOME | End: 2020-09-25
Payer: MEDICAID

## 2020-09-23 VITALS
SYSTOLIC BLOOD PRESSURE: 120 MMHG | OXYGEN SATURATION: 96 % | DIASTOLIC BLOOD PRESSURE: 56 MMHG | TEMPERATURE: 97 F | HEART RATE: 65 BPM | RESPIRATION RATE: 18 BRPM

## 2020-09-23 LAB
INR BLD: 0.9
PLATELET # BLD: 259 E9/L (ref 130–450)
PROTHROMBIN TIME: 9.7 SEC (ref 9.3–12.4)

## 2020-09-23 PROCEDURE — 2709999900 FL MYELOGRAM 2 OR MORE REGIONS S&I

## 2020-09-23 PROCEDURE — 7100000010 HC PHASE II RECOVERY - FIRST 15 MIN

## 2020-09-23 PROCEDURE — 85049 AUTOMATED PLATELET COUNT: CPT

## 2020-09-23 PROCEDURE — 36415 COLL VENOUS BLD VENIPUNCTURE: CPT

## 2020-09-23 PROCEDURE — 72126 CT NECK SPINE W/DYE: CPT

## 2020-09-23 PROCEDURE — 6360000004 HC RX CONTRAST MEDICATION: Performed by: NEUROLOGICAL SURGERY

## 2020-09-23 PROCEDURE — 7100000011 HC PHASE II RECOVERY - ADDTL 15 MIN

## 2020-09-23 PROCEDURE — 72129 CT CHEST SPINE W/DYE: CPT

## 2020-09-23 PROCEDURE — 85610 PROTHROMBIN TIME: CPT

## 2020-09-23 RX ADMIN — IOPAMIDOL 13 ML: 612 INJECTION, SOLUTION INTRATHECAL at 09:02

## 2020-09-23 NOTE — PROGRESS NOTES
Patient arrived to Radiology recovery room for observation until discharged. Vital signs, dressing, and pain were observed every 15 minutes for 1 hour. Patient discharged in stable condition, IV removed. Patient's Band-Aid on the low back remained clean, dry, and intact. Discharge instructions were reviewed and questions were answered. The patient verbalized understanding. Signed copy given to patient and copy placed in folder for Medical Records. Patient tolerated food and drink during recovery, dressed herself, and went to the restroom before being discharged. She was taken to the radiology entrance to meet  in wheelchair and handed off in good condition.

## 2020-09-23 NOTE — PROGRESS NOTES
0800 Patient arrived via wheelchair by self   to Radiology department for  Cervical and thoracic myelogram             . Allergies, home medications, H&P and fasting instructions reviewed with patient. Vital signs taken. IV placed, blood obtained, IV flushed and prn adapter attached. Blood sample sent to lab for ordered tests. Procedural instructions given, questions answered, understanding expressed and consent signed.  Patient given fluoroscopy education, no questions at this time    0844 to fluoro for myelogram

## 2020-09-23 NOTE — H&P
IR Pre-Operative History and Physical  LUMBAR PUNCTURE W/CONTRAST INJECTION  FOR CERVICAL and THORACIC CT MYELOGRAM      DIAGNOSIS:    Patient Active Problem List   Diagnosis    Debility    Obesity    Bipolar 1 disorder ([address]    Generalized seizure disorder ([facility] [address]    Asthma    Hyperlipidemia    Hypertension    Arthritis    Lymphedema of lower extremity    Degenerative Osteoarthritis of both knees    Status post total knee replacement, right    Degenerative Osteoarthritis of lumbar spine    Degenerative Osteoarthritis of cervical spine    Degenerative Osteoarthritis thoracic spine    Thoracic facet syndrome    Cervical facet syndrome    Facet syndrome, lumbar    Neural foraminal stenosis of lumbar spine    Lumbar radiculopathy    DDD (degenerative disc disease), cervical    Neural foraminal stenosis of cervical spine    Protruded cervical disc    Cervical radiculopathy    Lumbar post-laminectomy syndrome    Neuropathic pain syndrome (non-herpetic)    Chronic pain    Chronic respiratory failure with hypoxia (HCC)    Mitochondrial cytopathy (HCC)    GERD (gastroesophageal reflux disease)    Fatty liver    Depression    PETR (obstructive sleep apnea)    Chronic back pain    Bipolar affective ([facility] [address]    Adenoma of right adrenal gland    Chest pain       Active Problems:  1. Cervical Radiculopathy  2. Dorsalgia, Unspecified      PROCEDURE:  Lumbar puncture with contrast injection for cervical, thoracic CT Myelogram.    Allergies:  Bee pollen; Penicillins; Ropinirole hcl; Vistaril [hydroxyzine hcl]; Aripiprazole; Hydroxyzine pamoate; and Tape [adhesive tape]    PHYSICAL EXAM:      Constitutional:  Awake and alert. cooperative.     Heart:  regular rhythm    Lungs:  Clear     Abdomen:  normal      DATA:  CBC:   Lab Results   Component Value Date    WBC 8.7 09/11/2020    RBC 4.07 09/11/2020    HGB 13.5 09/11/2020    HCT 41.7 09/11/2020    .5 09/11/2020    MCH 33.2 09/11/2020    [facility] 32.4 09/11/2020    RDW 13.2 09/11/2020     09/11/2020    MPV 8.9 09/11/2020     CBC with Differential:    Lab Results   Component Value Date    WBC 8.7 09/11/2020    RBC 4.07 09/11/2020    HGB 13.5 09/11/2020    HCT 41.7 09/11/2020     09/11/2020    .5 09/11/2020    MCH 33.2 09/11/2020    MCHC 32.4 09/11/2020    RDW 13.2 09/11/2020    SEGSPCT 64 12/27/2013    LYMPHOPCT 8.7 09/11/2020    MONOPCT 4.7 09/11/2020    BASOPCT 0.1 09/11/2020    MONOSABS 0.41 09/11/2020    LYMPHSABS 0.75 09/11/2020    EOSABS 0.00 09/11/2020    BASOSABS 0.01 09/11/2020     Platelets:    Lab Results   Component Value Date     09/11/2020     BUN/Creatinine:    Lab Results   Component Value Date    BUN 15 09/11/2020    CREATININE 0.8 09/11/2020       ASSESSMENT AND PLAN:    1. History and Physical reviewed prior to the procedure. 2. Procedure options, risks and benefits reviewed with patient. Patient expresses understanding and consent.   3. Lumbar puncture with contrast injection for CT cervical, thoracic Myelogram.    Electronically signed by RADHA Singletary   DD: 9/23/20  8:18 AM EDT

## 2020-09-23 NOTE — BRIEF OP NOTE
Brief Postoperative Note  ______________________________________________________________      IR LUMBAR PUNCTURE WITH CONTRAST  INJECTION FOR CT MYELOGRAM      Patient Name: Aria Montiel   YOB: 1960  Medical Record Number: [id number]  Date of Procedure: 9/23/20  Room/Bed: Room/bed info not found    Preoperative diagnosis: 1. Cervical Radiculopathy  2. Dorsalgia, Unspecified    Postoperative diagnosis: Same    Procedure Type: Fluoroscopic-guided lumbar puncture with contrast injection for CT Myelogram Imaging of cervical, thoracic Spine. Anesthesia: Local anesthesia with approximately 5 mL of 1% lidocaine administered subcutaneously. Performed by: RADHA Soto under on-site supervision by Emanuel Oliveira II, MD.    Estimated blood loss: Minimal    Complications: None    Implants: None    Procedure: Informed consent obtained. The appropriate labs reviewed including INR and PT. A site under fluoroscopy guidance at the level of the L3-L4 interspace was selected. This site was prepped in usual sterile fashion. 1% lidocaine local injection was used for local anesthesia. A 3.5 in. 20g spinal needle inserted into thecal sac. 13 ml of Isovue M 300 contrast was injected. Final needle placement and contrast within the thecal sac was confirmed and images were saved into PACs. The patient tolerated the procedure. There were no immediate complications. Patient was taken to the CT suite for completion of imaging as per ordering provider.       Electronically signed by RADHA Soto   DD: 9/23/20  8:19 AM EDT

## 2020-10-08 ENCOUNTER — HOSPITAL ENCOUNTER (EMERGENCY)
Age: 60
Discharge: HOME | End: 2020-10-08
Attending: EMERGENCY MEDICINE
Payer: MEDICAID

## 2020-10-08 ENCOUNTER — APPOINTMENT (OUTPATIENT)
Dept: CT IMAGING | Age: 60
End: 2020-10-08
Payer: MEDICAID

## 2020-10-08 VITALS
WEIGHT: 248 LBS | SYSTOLIC BLOOD PRESSURE: 140 MMHG | BODY MASS INDEX: 39.86 KG/M2 | OXYGEN SATURATION: 97 % | RESPIRATION RATE: 18 BRPM | DIASTOLIC BLOOD PRESSURE: 60 MMHG | HEART RATE: 70 BPM | HEIGHT: 66 IN | TEMPERATURE: 97.3 F

## 2020-10-08 LAB
ANION GAP SERPL CALCULATED.3IONS-SCNC: 10 MMOL/L (ref 7–16)
BASOPHILS ABSOLUTE: 0.02 E9/L (ref 0–0.2)
BASOPHILS RELATIVE PERCENT: 0.2 % (ref 0–2)
BILIRUBIN URINE: NEGATIVE
BLOOD, URINE: NEGATIVE
BUN BLDV-MCNC: 16 MG/DL (ref 8–23)
CALCIUM SERPL-MCNC: 8.8 MG/DL (ref 8.6–10.2)
CHLORIDE BLD-SCNC: 103 MMOL/L (ref 98–107)
CLARITY: CLEAR
CO2: 27 MMOL/L (ref 22–29)
COLOR: YELLOW
CREAT SERPL-MCNC: 0.9 MG/DL (ref 0.5–1)
EOSINOPHILS ABSOLUTE: 0.11 E9/L (ref 0.05–0.5)
EOSINOPHILS RELATIVE PERCENT: 1.2 % (ref 0–6)
GFR AFRICAN AMERICAN: >60
GFR NON-AFRICAN AMERICAN: >60 ML/MIN/1.73
GLUCOSE BLD-MCNC: 121 MG/DL (ref 74–99)
GLUCOSE URINE: NEGATIVE MG/DL
HCT VFR BLD CALC: 41.8 % (ref 34–48)
HEMOGLOBIN: 13.3 G/DL (ref 11.5–15.5)
IMMATURE GRANULOCYTES #: 0.04 E9/L
IMMATURE GRANULOCYTES %: 0.4 % (ref 0–5)
KETONES, URINE: NEGATIVE MG/DL
LEUKOCYTE ESTERASE, URINE: NEGATIVE
LYMPHOCYTES ABSOLUTE: 1.44 E9/L (ref 1.5–4)
LYMPHOCYTES RELATIVE PERCENT: 15.9 % (ref 20–42)
MCH RBC QN AUTO: 33.3 PG (ref 26–35)
MCHC RBC AUTO-ENTMCNC: 31.8 % (ref 32–34.5)
MCV RBC AUTO: 104.5 FL (ref 80–99.9)
MONOCYTES ABSOLUTE: 0.69 E9/L (ref 0.1–0.95)
MONOCYTES RELATIVE PERCENT: 7.6 % (ref 2–12)
NEUTROPHILS ABSOLUTE: 6.73 E9/L (ref 1.8–7.3)
NEUTROPHILS RELATIVE PERCENT: 74.7 % (ref 43–80)
NITRITE, URINE: NEGATIVE
PDW BLD-RTO: 13.6 FL (ref 11.5–15)
PH UA: 5.5 (ref 5–9)
PLATELET # BLD: 240 E9/L (ref 130–450)
PMV BLD AUTO: 9 FL (ref 7–12)
POTASSIUM REFLEX MAGNESIUM: 4 MMOL/L (ref 3.5–5)
PROTEIN UA: NEGATIVE MG/DL
RBC # BLD: 4 E12/L (ref 3.5–5.5)
SODIUM BLD-SCNC: 140 MMOL/L (ref 132–146)
SPECIFIC GRAVITY UA: 1.01 (ref 1–1.03)
UROBILINOGEN, URINE: 0.2 E.U./DL
WBC # BLD: 9 E9/L (ref 4.5–11.5)

## 2020-10-08 PROCEDURE — 81003 URINALYSIS AUTO W/O SCOPE: CPT

## 2020-10-08 PROCEDURE — 96374 THER/PROPH/DIAG INJ IV PUSH: CPT

## 2020-10-08 PROCEDURE — 2580000003 HC RX 258: Performed by: EMERGENCY MEDICINE

## 2020-10-08 PROCEDURE — 99284 EMERGENCY DEPT VISIT MOD MDM: CPT

## 2020-10-08 PROCEDURE — 6360000002 HC RX W HCPCS: Performed by: EMERGENCY MEDICINE

## 2020-10-08 PROCEDURE — 74176 CT ABD & PELVIS W/O CONTRAST: CPT

## 2020-10-08 PROCEDURE — 99285 EMERGENCY DEPT VISIT HI MDM: CPT

## 2020-10-08 PROCEDURE — 85025 COMPLETE CBC W/AUTO DIFF WBC: CPT

## 2020-10-08 PROCEDURE — 96375 TX/PRO/DX INJ NEW DRUG ADDON: CPT

## 2020-10-08 PROCEDURE — 80048 BASIC METABOLIC PNL TOTAL CA: CPT

## 2020-10-08 RX ORDER — KETOROLAC TROMETHAMINE 30 MG/ML
15 INJECTION, SOLUTION INTRAMUSCULAR; INTRAVENOUS ONCE
Status: COMPLETED | OUTPATIENT
Start: 2020-10-08 | End: 2020-10-08

## 2020-10-08 RX ORDER — ONDANSETRON 2 MG/ML
4 INJECTION INTRAMUSCULAR; INTRAVENOUS ONCE
Status: COMPLETED | OUTPATIENT
Start: 2020-10-08 | End: 2020-10-08

## 2020-10-08 RX ORDER — 0.9 % SODIUM CHLORIDE 0.9 %
1000 INTRAVENOUS SOLUTION INTRAVENOUS ONCE
Status: COMPLETED | OUTPATIENT
Start: 2020-10-08 | End: 2020-10-08

## 2020-10-08 RX ADMIN — KETOROLAC TROMETHAMINE 15 MG: 30 INJECTION, SOLUTION INTRAMUSCULAR; INTRAVENOUS at 01:36

## 2020-10-08 RX ADMIN — SODIUM CHLORIDE 1000 ML: 9 INJECTION, SOLUTION INTRAVENOUS at 01:36

## 2020-10-08 RX ADMIN — ONDANSETRON 4 MG: 2 INJECTION INTRAMUSCULAR; INTRAVENOUS at 01:36

## 2020-10-08 ASSESSMENT — PAIN SCALES - GENERAL
PAINLEVEL_OUTOF10: 0
PAINLEVEL_OUTOF10: 7
PAINLEVEL_OUTOF10: 7

## 2020-10-08 ASSESSMENT — PAIN DESCRIPTION - PAIN TYPE: TYPE: ACUTE PAIN

## 2020-10-08 ASSESSMENT — PAIN DESCRIPTION - ORIENTATION: ORIENTATION: LEFT

## 2020-10-08 ASSESSMENT — PAIN DESCRIPTION - LOCATION: LOCATION: FLANK

## 2020-10-08 NOTE — ED PROVIDER NOTES
HPI:  10/8/20,   Time: 4:08 AM EDT        Anne Mchugh is a 61 y.o. female presenting to the ED for left lower quadrant left flank pain. Patient states symptoms began yesterday. Patient states she does have a history of kidney stones, thinks she passed on the other day, states this feels like she has another one. Patient states she has been having loose stools for the past several days. Patient denies fevers or chills, nausea or vomiting, dysuria, or any other complaints. ROS:   GEN: no fevers, no chills, no fatique  HENT: No trauma, no sore throat, no swelling throat or oral mucosa  EYES: No changes in vision, no pain  CARDIO: No chest pain, no palpitations  PULM: No trouble breathing, no wheezing  ABD: See HPI  MSK: No pain, no trauma, no deformities  SKIN: No rashes, no abrasions, no lacerations  NEURO: No changes in mentation, no headache, no weakness     --------------------------------------------- PAST HISTORY ---------------------------------------------  Past Medical History:  has a past medical history of Adenoma of right adrenal gland, Anemia, Anxiety, Arthritis, Asthma, Benign essential tremor, Bipolar affective (HCC), Chronic back pain, Chronic respiratory failure with hypoxia ([address] Depression, Difficulty swallowing, DM (diabetes mellitus) ([address] Environmental and seasonal allergies, Fatty liver, Full dentures, Generalized seizure disorder ([address] GERD (gastroesophageal reflux disease), Gout, Herniated cervical disc, History of swelling of feet, Lymphedema of lower extremity, Mitochondrial cytopathy ([address] Neuropathy, Obesity, PETR (obstructive sleep apnea), PONV (postoperative nausea and vomiting), Seizures ([address] Spinal headache, and Thyroid disease. Past Surgical History:  has a past surgical history that includes knee surgery (Bilateral); Gastric bypass surgery (1999); myomectomy; Tonsillectomy; Nerve Block (Left, 10 02 2013); Nerve Block (Left, 10 9 13);  Nerve Block (Left, 10/16/13); other surgical history (Left, 11 25 13); Nerve Block (Left, 10/29/2014); Nerve Block (Left, 11/12/2014); Nerve Block (Left, 12 8 14); Nerve Block (Right, 3/30/15); Nerve Block (Right, 4/6/2015); Nerve Block (Right, 4/13/15); Nerve Block (Left, 7/6/15); Nerve Block (07/20/15); Nerve Block (Left, 10 1 15); Nerve Block (10/26/15); other surgical history (3/28/2016); Endoscopy, colon, diagnostic; pr colonoscopy flx dx w/collj spec when pfrmd (N/A, 3/20/2018); pr egd transoral biopsy single/multiple (N/A, 3/20/2018); Cholecystectomy (1999); Hysterectomy (1988); Colonoscopy (N/A, 9/18/2018); Esophagus dilation (9/18/2018); back surgery (last one 1995); Cardiac catheterization (Right, 6-6-2013); Appendectomy; other surgical history (1995); joint replacement (Bilateral, [id number]); egd colonoscopy (N/A, 10/8/2019); and Colonoscopy (N/A, 10/8/2019). Social History:  reports that she has been smoking cigarettes and cigars. She started smoking about 30 years ago. She has a 42.00 pack-year smoking history. She has never used smokeless tobacco. She reports that she does not drink alcohol or use drugs. Family History: family history includes Arthritis in an other family member; Cancer in her father and another family member; Depression in an other family member; Heart Disease in her mother and another family member; Hypertension in an other family member; Mental Illness in an other family member; Stroke in an other family member. The patients home medications have been reviewed. Allergies: Bee pollen; Penicillins; Ropinirole hcl; Vistaril [hydroxyzine hcl]; Aripiprazole;  Hydroxyzine pamoate; and Tape [adhesive tape]    -------------------------------------------------- RESULTS -------------------------------------------------  All laboratory and radiology results have been personally reviewed by myself   LABS:  Results for orders placed or performed during the hospital encounter of 10/08/20   CBC Auto Differential Result Value Ref Range    WBC 9.0 4.5 - 11.5 E9/L    RBC 4.00 3.50 - 5.50 E12/L    Hemoglobin 13.3 11.5 - 15.5 g/dL    Hematocrit 41.8 34.0 - 48.0 %    .5 (H) 80.0 - 99.9 fL    MCH 33.3 26.0 - 35.0 pg    MCHC 31.8 (L) 32.0 - 34.5 %    RDW 13.6 11.5 - 15.0 fL    Platelets 237 093 - 568 E9/L    MPV 9.0 7.0 - 12.0 fL    Neutrophils % 74.7 43.0 - 80.0 %    Immature Granulocytes % 0.4 0.0 - 5.0 %    Lymphocytes % 15.9 (L) 20.0 - 42.0 %    Monocytes % 7.6 2.0 - 12.0 %    Eosinophils % 1.2 0.0 - 6.0 %    Basophils % 0.2 0.0 - 2.0 %    Neutrophils Absolute 6.73 1.80 - 7.30 E9/L    Immature Granulocytes # 0.04 E9/L    Lymphocytes Absolute 1.44 (L) 1.50 - 4.00 E9/L    Monocytes Absolute 0.69 0.10 - 0.95 E9/L    Eosinophils Absolute 0.11 0.05 - 0.50 E9/L    Basophils Absolute 0.02 0.00 - 0.20 U7/S   Basic Metabolic Panel w/ Reflex to MG   Result Value Ref Range    Sodium 140 132 - 146 mmol/L    Potassium reflex Magnesium 4.0 3.5 - 5.0 mmol/L    Chloride 103 98 - 107 mmol/L    CO2 27 22 - 29 mmol/L    Anion Gap 10 7 - 16 mmol/L    Glucose 121 (H) 74 - 99 mg/dL    BUN 16 8 - 23 mg/dL    CREATININE 0.9 0.5 - 1.0 mg/dL    GFR Non-African American >60 >=60 mL/min/1.73    GFR African American >60     Calcium 8.8 8.6 - 10.2 mg/dL   Urinalysis, reflex to microscopic   Result Value Ref Range    Color, UA Yellow Straw/Yellow    Clarity, UA Clear Clear    Glucose, Ur Negative Negative mg/dL    Bilirubin Urine Negative Negative    Ketones, Urine Negative Negative mg/dL    Specific Gravity, UA 1.015 1.005 - 1.030    Blood, Urine Negative Negative    pH, UA 5.5 5.0 - 9.0    Protein, UA Negative Negative mg/dL    Urobilinogen, Urine 0.2 <2.0 E.U./dL    Nitrite, Urine Negative Negative    Leukocyte Esterase, Urine Negative Negative       RADIOLOGY:  Interpreted by Radiologist.  CT ABDOMEN PELVIS WO CONTRAST Additional Contrast? None   Preliminary Result   Distal colonic fluid levels may indicate diarrheal illness.   No bowel Urinalysis unremarkable for infectious process. Imaging unremarkable for acute significant process. Abdomen soft and non-peritoneal.  On reexamination patient resting comfortably, states symptoms are improved. Discharged home with appropriate recommendations and return precautions. Recommended follow-up with primary care physician. Patient states understanding and agrees to plan.       --------------------------------- ADDITIONAL PROVIDER NOTES ---------------------------------    This patient's ED course included: re-evaluation prior to disposition and a personal history and physicial eaxmination    This patient has remained hemodynamically stable during their ED course. Counseling: The emergency provider has spoken with the patient and discussed todays results, in addition to providing specific details for the plan of care and counseling regarding the diagnosis and prognosis. Questions are answered at this time and they are agreeable with the plan.      --------------------------------- IMPRESSION AND DISPOSITION ---------------------------------    IMPRESSION  1. Abdominal pain, left lower quadrant    2.  Left flank pain        DISPOSITION  Disposition: Discharge to home  Patient condition is good        Yuli Altman DO  10/08/20 0410

## 2020-10-09 ENCOUNTER — TELEPHONE (OUTPATIENT)
Dept: OTHER | Facility: CLINIC | Age: 60
End: 2020-10-09

## 2020-10-09 NOTE — TELEPHONE ENCOUNTER
[facility] contacted Dr. Karla Ga [facility] to schedule ED f/u appt. Spoke with the office pt has appt fro Wednesday 10-14.

## 2020-10-30 ENCOUNTER — TELEPHONE (OUTPATIENT)
Dept: VASCULAR SURGERY | Age: 60
End: 2020-10-30

## 2020-11-02 ENCOUNTER — OFFICE VISIT (OUTPATIENT)
Dept: VASCULAR SURGERY | Age: 60
End: 2020-11-02
Payer: MEDICAID

## 2020-11-02 VITALS — RESPIRATION RATE: 16 BRPM | WEIGHT: 243 LBS | BODY MASS INDEX: 39.05 KG/M2 | HEIGHT: 66 IN

## 2020-11-02 PROCEDURE — G8484 FLU IMMUNIZE NO ADMIN: HCPCS | Performed by: NURSE PRACTITIONER

## 2020-11-02 PROCEDURE — 99203 OFFICE O/P NEW LOW 30 MIN: CPT | Performed by: NURSE PRACTITIONER

## 2020-11-02 PROCEDURE — 3017F COLORECTAL CA SCREEN DOC REV: CPT | Performed by: NURSE PRACTITIONER

## 2020-11-02 PROCEDURE — 4004F PT TOBACCO SCREEN RCVD TLK: CPT | Performed by: NURSE PRACTITIONER

## 2020-11-02 PROCEDURE — G8427 DOCREV CUR MEDS BY ELIG CLIN: HCPCS | Performed by: NURSE PRACTITIONER

## 2020-11-02 PROCEDURE — G8417 CALC BMI ABV UP PARAM F/U: HCPCS | Performed by: NURSE PRACTITIONER

## 2020-11-02 NOTE — PROGRESS NOTES
ENDOSCOPY    GASTRIC BYPASS SURGERY  1999    HYSTERECTOMY  1988    JOINT REPLACEMENT Bilateral [id number]    knees    KNEE SURGERY Bilateral     scope    MYOMECTOMY      NERVE BLOCK Left 10 02 2013    lumbar paravertebral facet #2    NERVE BLOCK Left 10 9 13    hip inj #1    NERVE BLOCK Left 10/16/13    hip injection    NERVE BLOCK Left 10/29/2014    left lumbar transforaminal nerve lbock  #1    NERVE BLOCK Left 11/12/2014    lumbar left transforaminal nerve block  #2    NERVE BLOCK Left 12 8 14    lumbar transforaminal #3    NERVE BLOCK Right 3/30/15    cervical transforaminal #1    NERVE BLOCK Right 4/6/2015    right cervical transforaminal nerve block  #2    NERVE BLOCK Right 4/13/15    cervical transforaminal #3    NERVE BLOCK Left 7/6/15    knee injection #1    NERVE BLOCK  07/20/15    left genicular nerve block/knee #2    NERVE BLOCK Left 10 1 15    lumb transforam #1    NERVE BLOCK  10/26/15    left lumbar transforaminal nerve block #3    OTHER SURGICAL HISTORY Left 11 25 13    lumbar radiofreq    OTHER SURGICAL HISTORY  3/28/2016    stage 1, 3 day percutanous trial [employer] lumbar spinal cord stimulator    OTHER SURGICAL HISTORY  1995    racz procedure / lower back    SD COLONOSCOPY FLX DX W/COLLJ SPEC WHEN PFRMD N/A 3/20/2018    COLONOSCOPY DIAGNOSTIC OR SCREENING performed by Nimo Monroe MD at [address] [place] [address] EGD TRANSORAL BIOPSY SINGLE/MULTIPLE N/A 3/20/2018    EGD BIOPSY performed by Nimo Monroe MD at [address] [place] [address]       Current Medications:   Current Outpatient Medications   Medication Sig Dispense Refill    EPINEPHrine (EPIPEN 2-CARMEN) 0.3 MG/0.3ML SOAJ injection Inject 0.3 mLs into the muscle once for 1 dose Use as directed for allergic reaction 1 each 2    famotidine (PEPCID) 20 MG tablet Take 1 tablet by mouth 2 times daily 60 tablet 0    EPINEPHrine (EPIPEN 2-CARMEN) 0.3 MG/0.3ML SOAJ injection Inject 0.3 mLs into the muscle once for 1 dose Use as directed for allergic reaction 1 each 0    EPINEPHrine (EPIPEN 2-CARMEN) 0.3 MG/0.3ML SOAJ injection Inject 0.3 mLs into the muscle once as needed (Bee stings) Use as directed for allergic reaction 0.3 mL 0    traZODone (DESYREL) 100 MG tablet Take 100 mg by mouth nightly      ondansetron (ZOFRAN-ODT) 4 MG disintegrating tablet Take 1 tablet by mouth 3 times daily as needed for Nausea or Vomiting 21 tablet 0    ibuprofen (ADVIL;MOTRIN) 800 MG tablet Take 1 tablet by mouth every 8 hours as needed for Pain 20 tablet 0    albuterol (PROVENTIL) (5 MG/ML) 0.5% nebulizer solution Take 2.5 mg by nebulization 3 times daily Instructed to take am of procedure      B Complex Vitamins (VITAMIN-B COMPLEX PO) Take by mouth daily LD patient to verify with Dr. Pavel Curry magnesium 200 MG TABS tablet Take 200 mg by mouth 2 times daily LD to verify with Dr. Ashley Zuniga   LD 10-4-19      calcium carbonate (CALCIUM 600) 600 MG TABS tablet Take 1 tablet by mouth 2 times daily LD patient to verify with Dr. Pavel Curry azelastine (ASTELIN) 0.1 % nasal spray 1 spray by Nasal route 2 times daily Use in each nostril as directed 1 Bottle 3    montelukast (SINGULAIR) 10 MG tablet Take 1 tablet by mouth daily 30 tablet 3    omeprazole (PRILOSEC) 20 MG delayed release capsule Take 20 mg by mouth Daily Instructed to take am of procedure      oxyCODONE-acetaminophen (PERCOCET)  MG per tablet Take 1 tablet by mouth every 8 hours as needed for Pain (pt took 1 tablet at 6pm tonight).  Instructed to take am of procedure       baclofen (LIORESAL) 20 MG tablet Take 20 mg by mouth 4 times daily Instructed to take am of procedure      furosemide (LASIX) 40 MG tablet Take 1 tablet by mouth 2 times daily 60 tablet 0    albuterol sulfate  (90 Base) MCG/ACT inhaler Inhale 2 puffs into the lungs every 4 hours as needed for Wheezing or Shortness of Breath Instructed to take am of procedure and bring      Cholecalciferol (VITAMIN D3) 5000 units TABS Take 1 tablet by mouth every other day LD 9/11/2019      Coenzyme Q10 (COQ10) 200 MG CAPS Take 1 capsule by mouth 2 times daily LD 10/4/2019      potassium chloride (KLOR-CON M) 20 MEQ extended release tablet Take 20 mEq by mouth daily      docusate sodium (COLACE) 100 MG capsule Take 100 mg by mouth 2 times daily      lidocaine (LMX) 4 % cream Apply topically as needed for Pain Apply topically as needed.  linaclotide (LINZESS) 145 MCG capsule Take 290 mcg by mouth every morning (before breakfast)       levOCARNitine (CARNITOR) 330 MG tablet Take 330 mg by mouth 3 times daily Instructed to take am of procedure  For mitochondrial disease      polyethylene glycol (GLYCOLAX) packet Take 17 g by mouth 2 times daily   1    allopurinol (ZYLOPRIM) 100 MG tablet Take 200 mg by mouth 2 times daily       escitalopram (LEXAPRO) 20 MG tablet Take 20 mg by mouth 2 times daily Instructed to take am of procedure      gabapentin (NEURONTIN) 600 MG tablet Take 1 tablet by mouth 4 times daily. (Patient taking differently: Take 600 mg by mouth 4 times daily. Instructed to take am of procedure) 120 tablet 5    topiramate (TOPAMAX) 200 MG tablet Take 1 tablet by mouth 2 times daily. 1 tablet 0    ziprasidone (GEODON) 20 MG capsule Take 20 mg by mouth nightly        No current facility-administered medications for this visit. Allergies:  Bee pollen; Penicillins; Ropinirole hcl; Vistaril [hydroxyzine hcl]; Aripiprazole;  Hydroxyzine pamoate; and Tape Kerri Draper tape]  Social History     Socioeconomic History    Marital status:      Spouse name: Not on file    Number of children: Not on file    Years of education: Not on file    Highest education level: Not on file   Occupational History    Not on file   Social Needs    Financial resource strain: Not on file    Food insecurity     Worry: Not on file     Inability: Not on file    Transportation needs     Medical: Not on file Non-medical: Not on file   Tobacco Use    Smoking status: Current Every Day Smoker     Packs/day: 1.00     Years: 42.00     Pack years: 42.00     Types: Cigarettes, Cigars     Start date: 6/13/1990    Smokeless tobacco: Never Used   Substance and Sexual Activity    Alcohol use: No     Alcohol/week: 0.0 standard drinks    Drug use: No    Sexual activity: Not on file   Lifestyle    Physical activity     Days per week: Not on file     Minutes per session: Not on file    Stress: Not on file   Relationships    Social connections     Talks on phone: Not on file     Gets together: Not on file     Attends [employer] service: Not on file     Active member of club or organization: Not on file     Attends meetings of clubs or organizations: Not on file     Relationship status: Not on file    Intimate partner violence     Fear of current or ex partner: Not on file     Emotionally abused: Not on file     Physically abused: Not on file     Forced sexual activity: Not on file   Other Topics Concern    Not on file   Social History Narrative    ** Merged History Encounter **             Family History   Problem Relation Age of Onset    Heart Disease Mother     Cancer Father     Arthritis Other     Cancer Other     Depression Other     Heart Disease Other     Hypertension Other     Mental Illness Other     Stroke Other      Labs  Lab Results   Component Value Date    WBC 9.0 10/08/2020    HGB 13.3 10/08/2020    HCT 41.8 10/08/2020     10/08/2020    PROTIME 9.7 09/23/2020    INR 0.9 09/23/2020    APTT 29.6 06/13/2019    K 4.0 10/08/2020    BUN 16 10/08/2020    CREATININE 0.9 10/08/2020     PHYSICAL EXAM  Resp 16   Ht 5' 6\" (1.676 m)   Wt 243 lb (110.2 kg)   LMP 08/31/1988   BMI 39.22 kg/m²   CONSTITUTIONAL:   Awake, alert, cooperative  PSYCHIATRIC :  Oriented to time, place and person     Appropriate insight to disease process  EYES: Lids and lashes normal  ENT:  External ears and nose without

## 2020-11-06 ENCOUNTER — HOSPITAL ENCOUNTER (EMERGENCY)
Age: 60
Discharge: LEFT BEFORE DISCHARGE | End: 2020-11-06

## 2021-02-01 ENCOUNTER — OFFICE VISIT (OUTPATIENT)
Dept: ENT CLINIC | Age: 61
End: 2021-02-01
Payer: MEDICAID

## 2021-02-01 VITALS — HEIGHT: 67 IN | BODY MASS INDEX: 39.08 KG/M2 | WEIGHT: 249 LBS | TEMPERATURE: 97 F

## 2021-02-01 DIAGNOSIS — J30.9 ALLERGIC RHINITIS, UNSPECIFIED SEASONALITY, UNSPECIFIED TRIGGER: ICD-10-CM

## 2021-02-01 DIAGNOSIS — R49.0 HOARSENESS: ICD-10-CM

## 2021-02-01 DIAGNOSIS — E04.1 THYROID NODULE: Primary | ICD-10-CM

## 2021-02-01 DIAGNOSIS — G47.33 OSA (OBSTRUCTIVE SLEEP APNEA): ICD-10-CM

## 2021-02-01 PROCEDURE — 99214 OFFICE O/P EST MOD 30 MIN: CPT | Performed by: OTOLARYNGOLOGY

## 2021-02-01 PROCEDURE — 31575 DIAGNOSTIC LARYNGOSCOPY: CPT | Performed by: OTOLARYNGOLOGY

## 2021-02-01 PROCEDURE — G8417 CALC BMI ABV UP PARAM F/U: HCPCS | Performed by: OTOLARYNGOLOGY

## 2021-02-01 PROCEDURE — G8484 FLU IMMUNIZE NO ADMIN: HCPCS | Performed by: OTOLARYNGOLOGY

## 2021-02-01 PROCEDURE — G8427 DOCREV CUR MEDS BY ELIG CLIN: HCPCS | Performed by: OTOLARYNGOLOGY

## 2021-02-01 PROCEDURE — 4004F PT TOBACCO SCREEN RCVD TLK: CPT | Performed by: OTOLARYNGOLOGY

## 2021-02-01 PROCEDURE — 3017F COLORECTAL CA SCREEN DOC REV: CPT | Performed by: OTOLARYNGOLOGY

## 2021-02-01 RX ORDER — CLOTRIMAZOLE AND BETAMETHASONE DIPROPIONATE 10; .5 MG/ML; MG/ML
LOTION TOPICAL
Status: ON HOLD | COMMUNITY
End: 2022-02-16 | Stop reason: HOSPADM

## 2021-02-01 RX ORDER — LANOLIN ALCOHOL/MO/W.PET/CERES
325 CREAM (GRAM) TOPICAL DAILY
COMMUNITY
Start: 2021-01-12 | End: 2022-10-06

## 2021-02-01 RX ORDER — CHOLECALCIFEROL (VITAMIN D3) 125 MCG
CAPSULE ORAL
COMMUNITY
Start: 2021-01-12 | End: 2021-02-01

## 2021-02-01 RX ORDER — RANITIDINE 150 MG/1
TABLET ORAL
COMMUNITY
End: 2021-03-23 | Stop reason: ALTCHOICE

## 2021-02-01 RX ORDER — POTASSIUM CHLORIDE 750 MG/1
10 TABLET, FILM COATED, EXTENDED RELEASE ORAL PRN
COMMUNITY
End: 2022-02-25

## 2021-02-01 RX ORDER — CETIRIZINE HYDROCHLORIDE 10 MG/1
TABLET ORAL
Status: ON HOLD | COMMUNITY
Start: 2021-01-27 | End: 2022-02-16 | Stop reason: HOSPADM

## 2021-02-01 RX ORDER — PREDNISONE 10 MG/1
10 TABLET ORAL DAILY
COMMUNITY
End: 2021-03-23 | Stop reason: ALTCHOICE

## 2021-02-01 RX ORDER — LIDOCAINE AND PRILOCAINE 25; 25 MG/G; MG/G
CREAM TOPICAL
Status: ON HOLD | COMMUNITY
Start: 2020-10-31 | End: 2022-02-16 | Stop reason: HOSPADM

## 2021-02-01 RX ORDER — UBIDECARENONE/VIT E/VIT E MIX 100-20-15
CAPSULE ORAL
Status: ON HOLD | COMMUNITY
Start: 2021-01-18 | End: 2022-02-16 | Stop reason: HOSPADM

## 2021-02-01 ASSESSMENT — ENCOUNTER SYMPTOMS
EYE PAIN: 0
SORE THROAT: 1
NAUSEA: 0
SHORTNESS OF BREATH: 0
STRIDOR: 0
RHINORRHEA: 0
EYE DISCHARGE: 0
ABDOMINAL PAIN: 0
VOICE CHANGE: 1
SINUS PRESSURE: 0

## 2021-02-01 NOTE — PROGRESS NOTES
[facility] Otolaryngology  Dr. Ezequiel Lewis. Ms.Ed        Patient Name:  Dhaval Pompa  :  1960     CHIEF C/O:    Chief Complaint   Patient presents with    Other     sore throat/swollen sinus for 3 months; pcp had ct done at [place]    Ear Problem     ear bothering for a while too; dry blood came out of left ear       HISTORY OBTAINEDFROM:  patient    HISTORY OF PRESENT ILLNESS:       Roni Gaspar is a 61y.o. year old female, here today for evaluation of bilateral ear pain, hoarseness, and sore throat. She is established patient and due to follow up in 1 month for a repeat U/S. She has been treated with azithromycin, doxycycline recently. Her voice will get worse throughout the day and when she does not drink water. She continues to smoke, roughly 1 pack per day. She denies cough, hemoptysis, or dysphagia.        Past Medical History:   Diagnosis Date    Adenoma of right adrenal gland     Anemia     Anxiety     Arthritis     spinal    Asthma     controlled with inhalers     Benign essential tremor     Bipolar affective (HCC)     Chronic back pain     Spinal cord stimulator in place    Chronic respiratory failure with hypoxia (HCC)     at times dt diaphragm does not function properly dt Mitochondrial disorder    Depression     stable    Difficulty swallowing     for EGD 10-8-19     DM (diabetes mellitus) ([address]     Environmental and seasonal allergies     Fatty liver     Full dentures     Generalized seizure disorder ([address] 2012    GERD (gastroesophageal reflux disease)     Gout     past hx of    Herniated cervical disc     limited rom of head and neck    History of swelling of feet     Lymphedema of lower extremity 2012    Mitochondrial cytopathy (HCC)     s/s muscle and nerve pain, difficulty breathing, seizures, difficulty swallowing, digestive disorders- Dr. Palmira Pulido [facility]    Neuropathy     at feet    Obesity     PETR (obstructive sleep apnea)     no CPAP dt insurance will not pay for    PONV (postoperative nausea and vomiting)     Seizures ([facility]     last approx 6-13-19- f/u w/ PCP  Granmal, flares up in heat/ summer time - no recent issues as of 10-4-19     Spinal headache     Thyroid disease      Past Surgical History:   Procedure Laterality Date    APPENDECTOMY      with Hysterectomy / unknown    BACK SURGERY  last one 1995    lumbar x 2    CARDIAC CATHETERIZATION Right 6-6-2013    Dr. Roseanna Delcid Radial, no stents placed, no blockages    [address]    open with gastric bypass    COLONOSCOPY N/A 9/18/2018    COLONOSCOPY POLYPECTOMY HOT BIOPSY performed by Ronda Osorio MD at [address] COLONOSCOPY N/A 10/8/2019    COLONOSCOPY POLYPECTOMY SNARE/COLD BIOPSY performed by Ronda Osorio MD at [address] EGD COLONOSCOPY N/A 10/8/2019    EGD ESOPHAGOGASTRODUODENOSCOPY DILATATION performed by Ronda Osorio MD at [address] [facility], [place], DIAGNOSTIC      ESOPHAGEAL DILATATION  9/18/2018    ESOPHAGEAL DILATION [place] Distance performed by Ronda Osorio MD at [facility] Bilateral 2007,2017    knees    KNEE SURGERY Bilateral     scope    MYOMECTOMY      NERVE BLOCK Left 10 02 2013    lumbar paravertebral facet #2    NERVE BLOCK Left 10 9 13    hip inj #1    NERVE BLOCK Left 10/16/13    hip injection    NERVE BLOCK Left 10/29/2014    left lumbar transforaminal nerve lbock  #1    NERVE BLOCK Left 11/12/2014    lumbar left transforaminal nerve block  #2    NERVE BLOCK Left 12 8 14    lumbar transforaminal #3    NERVE BLOCK Right 3/30/15    cervical transforaminal #1    NERVE BLOCK Right 4/6/2015    right cervical transforaminal nerve block  #2    NERVE BLOCK Right 4/13/15    cervical transforaminal #3    NERVE BLOCK Left 7/6/15    knee injection #1    NERVE BLOCK  07/20/15    left genicular nerve block/knee #2    NERVE BLOCK Left 10 1 15 lumb transforam #1    NERVE BLOCK  10/26/15    left lumbar transforaminal nerve block #3    OTHER SURGICAL HISTORY Left 11 25 13    lumbar radiofreq    OTHER SURGICAL HISTORY  3/28/2016    stage 1, 3 day percutanous trial [employer] lumbar spinal cord stimulator    OTHER SURGICAL HISTORY  1995    racz procedure / lower back    DC COLONOSCOPY FLX DX W/COLLJ SPEC WHEN PFRMD N/A 3/20/2018    COLONOSCOPY DIAGNOSTIC OR SCREENING performed by Mateo Patel MD at [place] [address] EGD TRANSORAL BIOPSY SINGLE/MULTIPLE N/A 3/20/2018    EGD BIOPSY performed by Mateo Patel MD at [address] [place] [address]         Current Outpatient Medications:     cetirizine (ZYRTEC) 10 MG tablet, take 1 tablet by mouth once daily as directed, Disp: , Rfl:     clotrimazole-betamethasone (LOTRISONE) 1-0.05 % lotion, , Disp: , Rfl:     Co-Enzyme Q10 100 MG CAPS, TAKE 1 CAPSULE BY MOUTH TWICE DAILY AS DIRECTED, Disp: , Rfl:     cyanocobalamin 50 MCG tablet, Take 100 mcg by mouth daily, Disp: , Rfl:     diclofenac sodium (VOLTAREN) 1 % GEL, APPLY 1 TO 2 GRAMS 3 TO 4 TIMES DAILY AS DIRECTED, Disp: , Rfl:     ferrous sulfate (FE TABS 325) 325 (65 Fe) MG EC tablet, take 1 tablet by mouth once daily, Disp: , Rfl:     Glucosamine-Chondroitin 750-600 MG CHEW, Take by mouth 2 times daily, Disp: , Rfl:     lidocaine-prilocaine (EMLA) 2.5-2.5 % cream, APPLY 1 TO 2 GRAMS 3 TO 4 TIMES DAILY AS DIRECTED, Disp: , Rfl:     mupirocin (BACTROBAN) 2 % ointment, apply to affected area twice a day, Disp: , Rfl:     potassium chloride (KLOR-CON) 10 MEQ extended release tablet, Take 10 mEq by mouth as needed, Disp: , Rfl:     predniSONE (DELTASONE) 10 MG tablet, Take 10 mg by mouth daily, Disp: , Rfl:     Pseudoephedrine-Naproxen Na (ALEVE-D SINUS & COLD PO), , Disp: , Rfl:     raNITIdine (ZANTAC) 150 MG tablet, Take 1 tablet twice a day by oral route., Disp: , Rfl:     famotidine (PEPCID) 20 MG tablet, Take 1 tablet by mouth 2 times daily, Disp: 60 tablet, Rfl: 0    traZODone (DESYREL) 100 MG tablet, Take 100 mg by mouth nightly, Disp: , Rfl:     ondansetron (ZOFRAN-ODT) 4 MG disintegrating tablet, Take 1 tablet by mouth 3 times daily as needed for Nausea or Vomiting, Disp: 21 tablet, Rfl: 0    albuterol (PROVENTIL) (5 MG/ML) 0.5% nebulizer solution, Take 2.5 mg by nebulization 3 times daily Instructed to take am of procedure, Disp: , Rfl:     B Complex Vitamins (VITAMIN-B COMPLEX PO), Take by mouth daily LD patient to verify with Dr. Cornell Bradley, Disp: , Rfl:     magnesium 200 MG TABS tablet, Take 200 mg by mouth 2 times daily LD to verify with Dr. Cornell Bradley  LD 10-4-19, Disp: , Rfl:     calcium carbonate (CALCIUM 600) 600 MG TABS tablet, Take 1 tablet by mouth 2 times daily LD patient to verify with Dr. Cornell Bradley, Disp: , Rfl:     azelastine (ASTELIN) 0.1 % nasal spray, 1 spray by Nasal route 2 times daily Use in each nostril as directed, Disp: 1 Bottle, Rfl: 3    montelukast (SINGULAIR) 10 MG tablet, Take 1 tablet by mouth daily, Disp: 30 tablet, Rfl: 3    omeprazole (PRILOSEC) 20 MG delayed release capsule, Take 20 mg by mouth Daily Instructed to take am of procedure, Disp: , Rfl:     oxyCODONE-acetaminophen (PERCOCET)  MG per tablet, Take 1 tablet by mouth every 8 hours as needed for Pain (pt took 1 tablet at 6pm tonight).  Instructed to take am of procedure , Disp: , Rfl:     baclofen (LIORESAL) 20 MG tablet, Take 20 mg by mouth 4 times daily Instructed to take am of procedure, Disp: , Rfl:     furosemide (LASIX) 40 MG tablet, Take 1 tablet by mouth 2 times daily, Disp: 60 tablet, Rfl: 0    albuterol sulfate  (90 Base) MCG/ACT inhaler, Inhale 2 puffs into the lungs every 4 hours as needed for Wheezing or Shortness of Breath Instructed to take am of procedure and bring, Disp: , Rfl:     Cholecalciferol (VITAMIN D3) 5000 units TABS, Take 1 tablet by mouth every other day LD 9/11/2019, Disp: , Rfl:     docusate sodium postnasal drip, sore throat and voice change. Negative for congestion, ear discharge, hearing loss, nosebleeds, rhinorrhea and sinus pressure. Eyes: Negative for pain and discharge. Respiratory: Negative for shortness of breath and stridor. Gastrointestinal: Negative for abdominal pain and nausea. Endocrine: Negative for cold intolerance and heat intolerance. Genitourinary: Negative for frequency. Musculoskeletal: Negative for arthralgias and joint swelling. Skin: Negative for rash and wound. Neurological: Negative for speech difficulty, weakness and headaches. Psychiatric/Behavioral: Negative for agitation and behavioral problems. Temp 97 °F (36.1 °C) (Temporal)   Ht 5' 7\" (1.702 m)   Wt 249 lb (112.9 kg)   LMP 08/31/1988   BMI 39.00 kg/m²   Physical Exam  Constitutional:       General: She is not in acute distress. Appearance: She is well-developed. HENT:      Head: Normocephalic and atraumatic. Right Ear: Hearing, tympanic membrane, ear canal and external ear normal. No drainage. Left Ear: Hearing, tympanic membrane, ear canal and external ear normal. No drainage. Ears:      Comments: Dried scab to left ear     Nose: No nasal deformity, mucosal edema or rhinorrhea. Right Sinus: No maxillary sinus tenderness or frontal sinus tenderness. Left Sinus: No maxillary sinus tenderness or frontal sinus tenderness. Mouth/Throat:      Dentition: Normal dentition. Pharynx: Uvula midline. Eyes:      Conjunctiva/sclera: Conjunctivae normal.   Neck:      Musculoskeletal: Normal range of motion and neck supple. Thyroid: No thyromegaly. Trachea: No tracheal deviation. Cardiovascular:      Rate and Rhythm: Normal rate. Pulmonary:      Effort: Pulmonary effort is normal. No respiratory distress. Breath sounds: No stridor. No wheezing. Abdominal:      General: There is no distension. Palpations: Abdomen is soft.    Musculoskeletal: Normal range of motion. Skin:     General: Skin is warm and dry. Neurological:      Mental Status: She is alert and oriented to person, place, and time. Endoscopy Procedure Note    Pre-operative Diagnosis: Hoarseness, hx of smoking    Post-operative Diagnosis: right vocal cord mucosal changes    Indications: Evaluation of the larynx and immediate subglottis - unable to be visualized by mirror examination    Anesthesia: Lidocaine 4% and Anival-Synephrine 1/2%    Endoscopy Type:  laryngoscopy    Procedure Details   With the patient sitting upright in the examining chair informed consent was obtained. The bilateral side(s) of the nose was topically anesthetized with spray. After waiting an appropriate period of time for anesthesia/ vasoconstriction to become effective, the flexible fiberoptic  flexible laryngoscope was passed through the right side(s) of the nose, and the nose, nasopharynx, oropharynx, hypopharynx and larynx were examined. An identical procedure was performed on the contralateral side. Examination was performed during quiet respiration and with phonation. I was present for the entire procedure. The following findings were noted. Findings:  Mucosa:  normal   Nasal septum:  deviated   Discharge:  none   Turbinates:  normal   Adenoid:  normal   Posterior choanae:  normal   Eustachian tubes:  normal   Mucous stranding:  present   Lesions:  present   Modified Ford's Maneuver not indicated       Condition:  Stable    Complications:  None        Findings: R true vocal cord mucosal changes, difficult to determine if this is dried mucous or actual lesion    IMPRESSION/PLAN:  62 y/o F with multiple ENT complaints at this time. Recommend continue flonase and astelin. Discussed smoking cessation. Repeat flexible laryngoscopy in 1 month and if there is no change in her vocal cords we will recommend biopsy. Go over thyroid U/S results at next appointment    Follow up in 1 month.      Patient seen, examined, and plan discussed with Dr. Jimmy Machado    Electronically signed by Darryl Steel DO on 2/1/2021 at 11:24 AM                          F/U after thyroid U/S and repeat scope                Gurpreet Mendez  1960      I have discussed the case, including pertinent history and exam findings with the resident. I have seen and examined the patient and the key elements of the encounter have been performed by me. I agree with the assessment, plan and orders as documented by the resident. Patient here for follow up of medical problems. Remainder of medical problems as per resident note.       [facility],   2/16/21

## 2021-02-04 ENCOUNTER — TELEPHONE (OUTPATIENT)
Dept: ENT CLINIC | Age: 61
End: 2021-02-04

## 2021-02-04 NOTE — TELEPHONE ENCOUNTER
Patient lvm stating someone contacted her about her upcoming [address] for 2/8 but she wasn't sure why. Patient stated to call her back at [phone] and stated it was okay to leave a detailed message with what was needed. - I don't see any documentation of a call -    Tuan Ram called patient to notify of apt     LVM advising of apt 3/2 arrival of 1100 at [facility] and that fo/u for 3/1 is being cancelled and she will f/u with Dr. Keshawn Márquez 3/8 in the [facility] office.

## 2021-02-04 NOTE — TELEPHONE ENCOUNTER
2/4/2021 patient scheduled for US thyroid on Tuesday 3/2/2021 @11:30am, arrive @11:00am [facility] no restrictions, bring ins&photo id. Imaging services to prior auth.  Patient notified

## 2021-02-05 ENCOUNTER — TELEPHONE (OUTPATIENT)
Dept: VASCULAR SURGERY | Age: 61
End: 2021-02-05

## 2021-02-06 ENCOUNTER — HOSPITAL ENCOUNTER (OUTPATIENT)
Dept: ULTRASOUND IMAGING | Age: 61
Discharge: HOME | End: 2021-02-08
Payer: MEDICAID

## 2021-02-06 DIAGNOSIS — E04.2 NON-TOXIC MULTINODULAR GOITER: ICD-10-CM

## 2021-02-06 PROCEDURE — 76536 US EXAM OF HEAD AND NECK: CPT

## 2021-02-07 ENCOUNTER — TELEPHONE (OUTPATIENT)
Dept: ENDOCRINOLOGY | Age: 61
End: 2021-02-07

## 2021-02-08 ENCOUNTER — HOSPITAL ENCOUNTER (OUTPATIENT)
Dept: CARDIOLOGY | Age: 61
Discharge: HOME | End: 2021-02-08
Payer: MEDICAID

## 2021-02-08 ENCOUNTER — TELEPHONE (OUTPATIENT)
Dept: ENT CLINIC | Age: 61
End: 2021-02-08

## 2021-02-08 ENCOUNTER — HOSPITAL ENCOUNTER (OUTPATIENT)
Dept: PSYCHIATRY | Age: 61
Discharge: HOME | End: 2021-02-08
Payer: MEDICAID

## 2021-02-08 ENCOUNTER — OFFICE VISIT (OUTPATIENT)
Dept: VASCULAR SURGERY | Age: 61
End: 2021-02-08
Payer: MEDICAID

## 2021-02-08 VITALS — BODY MASS INDEX: 38.92 KG/M2 | RESPIRATION RATE: 16 BRPM | HEIGHT: 67 IN | WEIGHT: 248 LBS

## 2021-02-08 DIAGNOSIS — I87.2 VENOUS INSUFFICIENCY OF BOTH LOWER EXTREMITIES: ICD-10-CM

## 2021-02-08 DIAGNOSIS — I87.2 VENOUS INSUFFICIENCY OF BOTH LOWER EXTREMITIES: Primary | ICD-10-CM

## 2021-02-08 PROCEDURE — G8484 FLU IMMUNIZE NO ADMIN: HCPCS | Performed by: SURGERY

## 2021-02-08 PROCEDURE — 93970 EXTREMITY STUDY: CPT

## 2021-02-08 PROCEDURE — 99213 OFFICE O/P EST LOW 20 MIN: CPT | Performed by: SURGERY

## 2021-02-08 PROCEDURE — 4004F PT TOBACCO SCREEN RCVD TLK: CPT | Performed by: SURGERY

## 2021-02-08 PROCEDURE — G8417 CALC BMI ABV UP PARAM F/U: HCPCS | Performed by: SURGERY

## 2021-02-08 PROCEDURE — G8427 DOCREV CUR MEDS BY ELIG CLIN: HCPCS | Performed by: SURGERY

## 2021-02-08 PROCEDURE — 3017F COLORECTAL CA SCREEN DOC REV: CPT | Performed by: SURGERY

## 2021-02-08 NOTE — PROGRESS NOTES
Vascular Surgery Outpatient Follow Up    PCP : Isauro Flores MD    HISTORY OF PRESENT ILLNESS:    The patient is a 61 y.o. female who presents in regards to previously painful varicose veins. The symptoms were bilateral, left greater than right. The pain is located over the veins on the top of her feet and into her calves. Symptoms include pain and aching typically worse as on feet more, at end of the day. Pain at its worst was a 4/10 when last seen but notes she is having no pain to the legs associated with the veins. The pain she does have in the legs is associated with nerve pain associated with her mitochondrial disorder. The patient also has a history of mitochondrial disease and has chronic pain issues related to that. The patient has a history of venous insufficiency and lymphedema. She underwent lymphedema therapy at [facility] for this approximately 5 years ago. Since that time she has lost close to 100 pounds. Since then, her swelling has been much more controlled. The patient has used compression stockings in the past. She has not had to use the stockings recently due to no pain or swelling. She has increased her activity and lost weight which she attributes to her improvement in symptoms.     They do not have a hx of DVT in the past.     ROS : All others Negative if blank [], Positive if [x]  General Urinary   [] Fevers [] Hematuria   [] Chills [] Dysuria   [x] Weight Loss Vascular   Skin [] Claudication   [] Tissue Loss [] Rest Pain   Eyes Neurologic   [x] Wears Glasses/Contacts [] Stroke/TIA   [] Vision Changes [] Focal weakness   Respiratory [] Slurred Speech    [] Shortness of breath ENT   Cardiovascular [] Difficulty swallowing   [] Chest Pain Endocrine    [] Shortness of breath with exertion [] Increased Thirst   Gastrointestinal    [] Abdominal Pain    [] Melena   [] Hematochezia         Past Medical History:        Diagnosis Date    Adenoma of right adrenal gland     Anemia  Anxiety     Arthritis     spinal    Asthma     controlled with inhalers     Benign essential tremor     Bipolar affective (HCC)     Chronic back pain     Spinal cord stimulator in place    Chronic respiratory failure with hypoxia (HCC)     at times dt diaphragm does not function properly dt Mitochondrial disorder    Depression     stable    Difficulty swallowing     for EGD 10-8-19     DM [facility]diabetes mellitus) ([facility]     Environmental and seasonal allergies     Fatty liver     Full dentures     Generalized seizure disorder ([facility] 5/6/2012    GERD (gastroesophageal reflux disease)     Gout     past hx of    Herniated cervical disc     limited rom of head and neck    History of swelling of feet     Lymphedema of lower extremity 09/06/2012    Mitochondrial cytopathy (HCC)     s/s muscle and nerve pain, difficulty breathing, seizures, difficulty swallowing, digestive disorders- Dr. Ruma Beckman [facility]    Neuropathy     at feet    Obesity     PETR (obstructive sleep apnea)     no CPAP dt insurance will not pay for    PONV (postoperative nausea and vomiting)     Seizures (HCC)     last approx 6-13-19- f/u w/ PCP  Granmal, flares up in heat/ summer time - no recent issues as of 10-4-19     Spinal headache     Thyroid disease      Past Surgical History:        Procedure Laterality Date    APPENDECTOMY      with Hysterectomy / unknown    BACK SURGERY  last one 1995    lumbar x 2    CARDIAC CATHETERIZATION Right 6-6-2013    Dr. Monico Vargas, no stents placed, no blockages     CHOLECYSTECTOMY  1999    open with gastric bypass    COLONOSCOPY N/A 9/18/2018    COLONOSCOPY POLYPECTOMY HOT BIOPSY performed by CHRISTOPHE Montero MD at [address] [facility] COLONOSCOPY N/A 10/8/2019    COLONOSCOPY POLYPECTOMY SNARE/COLD BIOPSY performed by Kavitha Colbert MD at [address] [facility] EGD COLONOSCOPY N/A 10/8/2019    EGD ESOPHAGOGASTRODUODENOSCOPY DILATATION performed by Kavitha Colbert MD at [facility] ENDOSCOPY  ENDOSCOPY, COLON, DIAGNOSTIC      ESOPHAGEAL DILATATION  9/18/2018    ESOPHAGEAL DILATION PEMBERTON performed by Francois Crane MD at [address] [place] [address] Bilateral 2007,2017    knees    KNEE SURGERY Bilateral     scope    MYOMECTOMY      NERVE BLOCK Left 10 02 2013    lumbar paravertebral facet #2    NERVE BLOCK Left 10 9 13    hip inj #1    NERVE BLOCK Left 10/16/13    hip injection    NERVE BLOCK Left 10/29/2014    left lumbar transforaminal nerve lbock  #1    NERVE BLOCK Left 11/12/2014    lumbar left transforaminal nerve block  #2    NERVE BLOCK Left 12 8 14    lumbar transforaminal #3    NERVE BLOCK Right 3/30/15    cervical transforaminal #1    NERVE BLOCK Right 4/6/2015    right cervical transforaminal nerve block  #2    NERVE BLOCK Right 4/13/15    cervical transforaminal #3    NERVE BLOCK Left 7/6/15    knee injection #1    NERVE BLOCK  07/20/15    left genicular nerve block/knee #2    NERVE BLOCK Left 10 1 15    lumb transforam #1    NERVE BLOCK  10/26/15    left lumbar transforaminal nerve block #3    OTHER SURGICAL HISTORY Left 11 25 13    lumbar radiofreq    OTHER SURGICAL HISTORY  3/28/2016    stage 1, 3 day percutanous trial [employer] lumbar spinal cord stimulator    OTHER SURGICAL HISTORY  1995    racz procedure / lower back    OK COLONOSCOPY FLX DX W/COLLJ SPEC WHEN PFRMD N/A 3/20/2018    COLONOSCOPY DIAGNOSTIC OR SCREENING performed by Francois Crane MD at [place] [address] EGD TRANSORAL BIOPSY SINGLE/MULTIPLE N/A 3/20/2018    EGD BIOPSY performed by Francois Crane MD at [address] [place] [address]       Current Medications:   Current Outpatient Medications   Medication Sig Dispense Refill    cetirizine (ZYRTEC) 10 MG tablet take 1 tablet by mouth once daily as directed      clotrimazole-betamethasone (LOTRISONE) 1-0.05 % lotion  Co-Enzyme Q10 100 MG CAPS TAKE 1 CAPSULE BY MOUTH TWICE DAILY AS DIRECTED      cyanocobalamin 50 MCG tablet Take 100 mcg by mouth daily      diclofenac sodium (VOLTAREN) 1 % GEL APPLY 1 TO 2 GRAMS 3 TO 4 TIMES DAILY AS DIRECTED      ferrous sulfate (FE TABS 325) 325 (65 Fe) MG EC tablet take 1 tablet by mouth once daily      Glucosamine-Chondroitin 750-600 MG CHEW Take by mouth 2 times daily      lidocaine-prilocaine (EMLA) 2.5-2.5 % cream APPLY 1 TO 2 GRAMS 3 TO 4 TIMES DAILY AS DIRECTED      mupirocin (BACTROBAN) 2 % ointment apply to affected area twice a day      potassium chloride (KLOR-CON) 10 MEQ extended release tablet Take 10 mEq by mouth as needed      predniSONE (DELTASONE) 10 MG tablet Take 10 mg by mouth daily      Pseudoephedrine-Naproxen Na (ALEVE-D SINUS & COLD PO)       raNITIdine (ZANTAC) 150 MG tablet Take 1 tablet twice a day by oral route.       famotidine (PEPCID) 20 MG tablet Take 1 tablet by mouth 2 times daily 60 tablet 0    traZODone (DESYREL) 100 MG tablet Take 100 mg by mouth nightly      ondansetron (ZOFRAN-ODT) 4 MG disintegrating tablet Take 1 tablet by mouth 3 times daily as needed for Nausea or Vomiting 21 tablet 0    albuterol (PROVENTIL) (5 MG/ML) 0.5% nebulizer solution Take 2.5 mg by nebulization 3 times daily Instructed to take am of procedure      B Complex Vitamins (VITAMIN-B COMPLEX PO) Take by mouth daily LD patient to verify with Dr. Tyrell Mckeon magnesium 200 MG TABS tablet Take 200 mg by mouth 2 times daily LD to verify with Dr. Sylvie Kessler   LD 10-4-19      calcium carbonate (CALCIUM 600) 600 MG TABS tablet Take 1 tablet by mouth 2 times daily LD patient to verify with Dr. Tyrell Mckeon azelastine (ASTELIN) 0.1 % nasal spray 1 spray by Nasal route 2 times daily Use in each nostril as directed 1 Bottle 3    montelukast (SINGULAIR) 10 MG tablet Take 1 tablet by mouth daily 30 tablet 3  omeprazole (PRILOSEC) 20 MG delayed release capsule Take 20 mg by mouth Daily Instructed to take am of procedure      oxyCODONE-acetaminophen (PERCOCET)  MG per tablet Take 1 tablet by mouth every 8 hours as needed for Pain (pt took 1 tablet at 6pm tonight). Instructed to take am of procedure       baclofen (LIORESAL) 20 MG tablet Take 20 mg by mouth 4 times daily Instructed to take am of procedure      furosemide (LASIX) 40 MG tablet Take 1 tablet by mouth 2 times daily 60 tablet 0    albuterol sulfate  (90 Base) MCG/ACT inhaler Inhale 2 puffs into the lungs every 4 hours as needed for Wheezing or Shortness of Breath Instructed to take am of procedure and bring      Cholecalciferol (VITAMIN D3) 5000 units TABS Take 1 tablet by mouth every other day LD 9/11/2019      docusate sodium (COLACE) 100 MG capsule Take 100 mg by mouth 2 times daily      linaclotide (LINZESS) 145 MCG capsule Take 290 mcg by mouth every morning (before breakfast)       levOCARNitine (CARNITOR) 330 MG tablet Take 330 mg by mouth 3 times daily Instructed to take am of procedure  For mitochondrial disease      allopurinol (ZYLOPRIM) 100 MG tablet Take 200 mg by mouth 2 times daily       escitalopram (LEXAPRO) 20 MG tablet Take 20 mg by mouth 2 times daily Instructed to take am of procedure      gabapentin (NEURONTIN) 600 MG tablet Take 1 tablet by mouth 4 times daily. (Patient taking differently: Take 600 mg by mouth 4 times daily. Instructed to take am of procedure) 120 tablet 5    topiramate (TOPAMAX) 200 MG tablet Take 1 tablet by mouth 2 times daily. 1 tablet 0    ziprasidone (GEODON) 20 MG capsule Take 20 mg by mouth nightly       EPINEPHrine (EPIPEN 2-CARMEN) 0.3 MG/0.3ML SOAJ injection Inject 0.3 mLs into the muscle once for 1 dose Use as directed for allergic reaction 1 each 0     No current facility-administered medications for this visit. Allergies:  Bee pollen, Penicillins, Ropinirole hcl, Vistaril [hydroxyzine hcl], Aripiprazole, Hydroxyzine pamoate, and Tape Nae Morning tape]  Social History     Socioeconomic History    Marital status:      Spouse name: Not on file    Number of children: Not on file    Years of education: Not on file    Highest education level: Not on file   Occupational History    Not on file   Social Needs    Financial resource strain: Not on file    Food insecurity     Worry: Not on file     Inability: Not on file    Transportation needs     Medical: Not on file     Non-medical: Not on file   Tobacco Use    Smoking status: Current Every Day Smoker     Packs/day: 1.00     Years: 42.00     Pack years: 42.00     Types: Cigarettes, Cigars     Start date: 6/13/1990    Smokeless tobacco: Never Used   Substance and Sexual Activity    Alcohol use: No     Alcohol/week: 0.0 standard drinks    Drug use: No    Sexual activity: Not on file   Lifestyle    Physical activity     Days per week: Not on file     Minutes per session: Not on file    Stress: Not on file   Relationships    Social connections     Talks on phone: Not on file     Gets together: Not on file     Attends Religion service: Not on file     Active member of club or organization: Not on file     Attends meetings of clubs or organizations: Not on file     Relationship status: Not on file    Intimate partner violence     Fear of current or ex partner: Not on file     Emotionally abused: Not on file     Physically abused: Not on file     Forced sexual activity: Not on file   Other Topics Concern    Not on file   Social History Narrative    ** Merged History Encounter **             Family History   Problem Relation Age of Onset    Heart Disease Mother     Cancer Father     Arthritis Other     Cancer Other     Depression Other     Heart Disease Other     Hypertension Other     Mental Illness Other     Stroke Other      Labs  Lab Results Component Value Date    WBC 9.0 10/08/2020    HGB 13.3 10/08/2020    HCT 41.8 10/08/2020     10/08/2020    PROTIME 9.7 09/23/2020    INR 0.9 09/23/2020    APTT 29.6 06/13/2019    K 4.0 10/08/2020    BUN 16 10/08/2020    CREATININE 0.9 10/08/2020     PHYSICAL EXAM  Resp 16   Ht 5' 7\" (1.702 m)   Wt 248 lb (112.5 kg)   LMP 08/31/1988   BMI 38.84 kg/m²   CONSTITUTIONAL:   Awake, alert, cooperative  PSYCHIATRIC :  Oriented to time, place and person     Appropriate insight to disease process  EYES: Lids and lashes normal  ENT:  External ears and nose without lesions  NECK: Supple, symmetrical, trachea midline   Carotid bruit absent  LUNGS:  No increased work of breathing                 Clear to auscultation  CARDIOVASCULAR:  regular rate and rhythm   ABDOMEN:  soft, non-distended, non-tender   Hernias not noted   Aorta is not palpable  SKIN:   Normal skin color   Texture and turgor normal, no induration  EXTREMITIES:   R LE Edema present   Varicose veins present    Brawny discoloration of lower legs  L LE Edema present   Varicose veins present    Brawny discoloration of lower legs    R dorsalis pedis 2 L dorsalis pedis 2   R posterior tibial 1 L posterior tibial 1     RADIOLOGY: Venous US reflux studies reviewed showing reflux in bilateral GSV    A/P Asymptomatic Varicose Veins of Bilateral LE   · Etiology is likely associated with venous reflux although she is not experiencing issues at this time  · I explained to them the pathophysiology of venous reflux and the symptoms associated with it including swelling, pain, edema, heaviness, and even ulceration  · Elevate Bilateral LE while in bed or sitting  · Continue wearing compression stockings knee high 20-30 mm hg as needed for symptoms  · Discussed how this is the first line of therapy  · They understand even if surgical intervention was felt to be appropriate in the future they would need to wear compression stockings lifetime · Since she is asymptomatic at this time we will not proceed with any further testing or intervention. · F/U PRN for new or worsening symptoms  · Call if patient develops worsening of swelling or wounds  · All ?s answered    Pt seen and plan reviewed with Dr. Sarah Treviño.      Valentino Hall PA-C

## 2021-02-08 NOTE — TELEPHONE ENCOUNTER
Pt called to request a f/u appt with Dr Lane Wong. She was seen 02-01-21 in [facility]. Pt wanted the office to know she did have the [address] of her neck on 02-06-21 it had been ordered by Dr Darien Diaz. Please review and let pt know when, and where she can shai seen.  [phone]

## 2021-02-08 NOTE — PROGRESS NOTES
[facility] - [facility]    This is a pre read worksheet - prior to official physician interpretation    Ronnell Simpsonok  1960  Date of study: 2/8/21    Indication for study:  Leg pain and swelling, varicose veins  Study : Bilateral Lower Extremity Venous Duplex and Reflux Examination    Duplex examination of the common, deep, superficial femoral, and the popliteal veins of the RIGHT lower extremity identifies spontaneous flow. All scanned veins are compressible and free of echogenic densities. There was evidence of reflux in the right greater saphenous vein lasting 555 ms. The right greater saphenous vein measured 0.9 x 1.0 cm. Duplex examination of the common, deep, superficial femoral, and the popliteal veins of the LEFT lower extremity identifies spontaneous flow. All scanned veins are compressible and free of echogenic densities. There was evidence of reflux in the left greater saphenous vein lasting 339 ms. The left greater saphenous vein measured 1.0 x 1.0 cm. Additional comments: Negative DVT   There was no evidence of reflux in the right lesser saphenous vein. The right lesser saphenous vein measured 0.3 x 0.3 cm. There was no evidence of reflux in the left lesser saphenous vein. The left lesser saphenous vein measured 0.4 x 0.4 cm.

## 2021-02-08 NOTE — FLOWSHEET NOTE
Assessment completed. Client provided verbal consent to participate in the program via telehealth.    Electronically signed by Melvin Wright on 2/8/2021 at 2:16 PM

## 2021-02-10 ENCOUNTER — APPOINTMENT (OUTPATIENT)
Dept: CT IMAGING | Age: 61
End: 2021-02-10
Payer: MEDICAID

## 2021-02-10 ENCOUNTER — APPOINTMENT (OUTPATIENT)
Dept: GENERAL RADIOLOGY | Age: 61
End: 2021-02-10
Payer: MEDICAID

## 2021-02-10 ENCOUNTER — HOSPITAL ENCOUNTER (EMERGENCY)
Age: 61
Discharge: HOME | End: 2021-02-10
Payer: MEDICAID

## 2021-02-10 VITALS
TEMPERATURE: 98.6 F | HEART RATE: 88 BPM | RESPIRATION RATE: 16 BRPM | SYSTOLIC BLOOD PRESSURE: 128 MMHG | HEIGHT: 67 IN | DIASTOLIC BLOOD PRESSURE: 64 MMHG | BODY MASS INDEX: 38.92 KG/M2 | OXYGEN SATURATION: 97 % | WEIGHT: 248 LBS

## 2021-02-10 DIAGNOSIS — S39.012A STRAIN OF LUMBAR REGION, INITIAL ENCOUNTER: ICD-10-CM

## 2021-02-10 DIAGNOSIS — S16.1XXA STRAIN OF NECK MUSCLE, INITIAL ENCOUNTER: ICD-10-CM

## 2021-02-10 DIAGNOSIS — S09.90XA CLOSED HEAD INJURY, INITIAL ENCOUNTER: ICD-10-CM

## 2021-02-10 DIAGNOSIS — S46.912A STRAIN OF LEFT SHOULDER, INITIAL ENCOUNTER: Primary | ICD-10-CM

## 2021-02-10 PROCEDURE — 72125 CT NECK SPINE W/O DYE: CPT

## 2021-02-10 PROCEDURE — 99282 EMERGENCY DEPT VISIT SF MDM: CPT

## 2021-02-10 PROCEDURE — 71111 X-RAY EXAM RIBS/CHEST4/> VWS: CPT

## 2021-02-10 PROCEDURE — 72131 CT LUMBAR SPINE W/O DYE: CPT

## 2021-02-10 PROCEDURE — 73030 X-RAY EXAM OF SHOULDER: CPT

## 2021-02-10 PROCEDURE — 73564 X-RAY EXAM KNEE 4 OR MORE: CPT

## 2021-02-10 PROCEDURE — 70450 CT HEAD/BRAIN W/O DYE: CPT

## 2021-02-10 PROCEDURE — U0003 INFECTIOUS AGENT DETECTION BY NUCLEIC ACID (DNA OR RNA); SEVERE ACUTE RESPIRATORY SYNDROME CORONAVIRUS 2 (SARS-COV-2) (CORONAVIRUS DISEASE [COVID-19]), AMPLIFIED PROBE TECHNIQUE, MAKING USE OF HIGH THROUGHPUT TECHNOLOGIES AS DESCRIBED BY CMS-2020-01-R: HCPCS

## 2021-02-10 PROCEDURE — 6370000000 HC RX 637 (ALT 250 FOR IP): Performed by: PHYSICIAN ASSISTANT

## 2021-02-10 RX ORDER — METHOCARBAMOL 500 MG/1
500 TABLET, FILM COATED ORAL 4 TIMES DAILY PRN
Qty: 30 TABLET | Refills: 0 | Status: SHIPPED | OUTPATIENT
Start: 2021-02-10 | End: 2021-02-20

## 2021-02-10 RX ORDER — OXYCODONE HYDROCHLORIDE AND ACETAMINOPHEN 5; 325 MG/1; MG/1
1 TABLET ORAL ONCE
Status: COMPLETED | OUTPATIENT
Start: 2021-02-10 | End: 2021-02-10

## 2021-02-10 RX ADMIN — OXYCODONE HYDROCHLORIDE AND ACETAMINOPHEN 1 TABLET: 5; 325 TABLET ORAL at 14:39

## 2021-02-10 ASSESSMENT — PAIN SCALES - GENERAL: PAINLEVEL_OUTOF10: 10

## 2021-02-10 NOTE — ED PROVIDER NOTES
Independent MLP    HPI:  2/10/21, Time: 3:18 PM HERIBERTO Torres is a 61 y.o. female presenting to the ED for injuries sustained from a fall, beginning 2 days ago. The complaint has been persistent, moderate in severity, and worsened by movement of affected areas. Patient reports pain to the left shoulder, back, and head. She has taken her prescribed percocet for her pain. Did not lose consciousness with her fall. Denies numbness or weakness in her extremities. Afebrile without recent travel or sick contacts. Patient denies all other symptoms at this time. Review of Systems:   A complete review of systems was performed and pertinent positives and negatives are stated within HPI, all other systems reviewed and are negative.          --------------------------------------------- PAST HISTORY ---------------------------------------------  Past Medical History:  has a past medical history of Adenoma of right adrenal gland, Anemia, Anxiety, Arthritis, Asthma, Benign essential tremor, Bipolar affective (HCC), Chronic back pain, Chronic respiratory failure with hypoxia ([address] Depression, Difficulty swallowing, DM (diabetes mellitus) ([address] Environmental and seasonal allergies, Fatty liver, Full dentures, Generalized seizure disorder ([address] GERD (gastroesophageal reflux disease), Gout, Herniated cervical disc, History of swelling of feet, Lymphedema of lower extremity, Mitochondrial cytopathy ([address] Neuropathy, Obesity, PETR (obstructive sleep apnea), PONV (postoperative nausea and vomiting), Seizures ([address] Spinal headache, and Thyroid disease. Past Surgical History:  has a past surgical history that includes knee surgery (Bilateral); Gastric bypass surgery (1999); myomectomy; Tonsillectomy; Nerve Block (Left, 10 02 2013); Nerve Block (Left, 10 9 13); Nerve Block (Left, 10/16/13); other surgical history (Left, 11 25 13); Nerve Block (Left, 10/29/2014); Nerve Block (Left, 11/12/2014);  Nerve Block (Left, 12 8 14); Nerve Block (Right, 3/30/15); Nerve Block (Right, 4/6/2015); Nerve Block (Right, 4/13/15); Nerve Block (Left, 7/6/15); Nerve Block (07/20/15); Nerve Block (Left, 10 1 15); Nerve Block (10/26/15); other surgical history (3/28/2016); Endoscopy, colon, diagnostic; pr colonoscopy flx dx w/collj spec when pfrmd (N/A, 3/20/2018); pr egd transoral biopsy single/multiple (N/A, 3/20/2018); Cholecystectomy (1999); Hysterectomy (1988); Colonoscopy (N/A, 9/18/2018); Esophagus dilation (9/18/2018); back surgery (last one 1995); Cardiac catheterization (Right, 6-6-2013); Appendectomy; other surgical history (1995); joint replacement (Bilateral, [id number]); egd colonoscopy (N/A, 10/8/2019); and Colonoscopy (N/A, 10/8/2019). Social History:  reports that she has been smoking cigarettes and cigars. She started smoking about 30 years ago. She has a 42.00 pack-year smoking history. She has never used smokeless tobacco. She reports that she does not drink alcohol or use drugs. Family History: family history includes Arthritis in an other family member; Cancer in her father and another family member; Depression in an other family member; Heart Disease in her mother and another family member; Hypertension in an other family member; Mental Illness in an other family member; Stroke in an other family member. The patients home medications have been reviewed. Allergies: Bee pollen, Penicillins, Ropinirole hcl, Vistaril [hydroxyzine hcl], Aripiprazole, Hydroxyzine pamoate, and Tape [adhesive tape]    -------------------------------------------------- RESULTS -------------------------------------------------  All laboratory and radiology results have been personally reviewed by myself   LABS:  No results found for this visit on 02/10/21. RADIOLOGY:  Interpreted by Radiologist.  Isaura Roth   Final Result   No acute abnormality of the cervical spine.    Degenerative disc changes at C5-6 and C6-7 CT LUMBAR SPINE WO CONTRAST   Final Result   No acute fracture of the lumbar spine with an unchanged chronic superior   endplate compression fracture at L3. Moderate to severe multilevel spondylosis which is unchanged from 10 months   prior. CT HEAD WO CONTRAST   Final Result   No acute intracranial abnormality. XR RIBS BILATERAL (MIN 4 VIEWS)   Final Result   Non-specific new small ground-glass opacities bilaterally may be atelectasis   or edema. Differential includes multifocal pneumonitis. Consider following   with chest x-ray as clinically indicated. No radiographically visible acute displaced rib fracture. Right 9th and 10th rib fractures and left anterior 7th rib fractures appear   healed with slight hypertrophic deformity      XR KNEE LEFT (MIN 4 VIEWS)   Final Result   No acute osseous abnormality is identified      XR SHOULDER LEFT (MIN 2 VIEWS)   Final Result   No evidence of shoulder fracture or dislocation. RECOMMENDATION:   None.          ------------------------- NURSING NOTES AND VITALS REVIEWED ---------------------------   The nursing notes within the ED encounter and vital signs as below have been reviewed. /64   Pulse 88   Temp 98.6 °F (37 °C) (Oral)   Resp 16   Ht 5' 7\" (1.702 m)   Wt 248 lb (112.5 kg)   LMP 08/31/1988   SpO2 97%   BMI 38.84 kg/m²   Oxygen Saturation Interpretation: Normal      ---------------------------------------------------PHYSICAL EXAM--------------------------------------      Constitutional/General: Alert and oriented x3, well appearing, non toxic in NAD  Head: Normocephalic and atraumatic  Eyes: PERRL, EOMI  Mouth: Oropharynx clear, handling secretions, no trismus  Neck: Supple, full ROM,   Pulmonary: Lungs clear to auscultation bilaterally, no wheezes, rales, or rhonchi. Not in respiratory distress  Cardiovascular:  Regular rate and rhythm, no murmurs, gallops, or rubs.  2+ distal pulses  Abdomen: Soft, non tender, non distended, Extremities: Moves all extremities x 4. Warm and well perfused, ecchymosis noted to the lateral left shoulder with generalized shoulder tenderness to palpation  Skin: warm and dry without rash  Neurologic: GCS 15,  Psych: Normal Affect      ------------------------------ ED COURSE/MEDICAL DECISION MAKING----------------------  Medications   oxyCODONE-acetaminophen (PERCOCET) 5-325 MG per tablet 1 tablet (1 tablet Oral Given 2/10/21 8292)     Medical Decision Making:     ED COURSE:  ED Course as of Feb 10 1913   Wed Feb 10, 2021   1516 Reassessed patient. Remains stable and neurovascularly intact. Discussed results. Vitals stable. No acute pathology noted on imaging studies today. COVID test pending. Patient is aware of healing rib fractures. Recommended close follow up with PCP for recheck. Return to the ED with new or worsening symptoms. Patient voiced understanding and is agreeable to the above treatment plan. [MS]      ED Course User Index  [MS] Zhang Franz         Counseling: The emergency provider has spoken with the patient and discussed todays results, in addition to providing specific details for the plan of care and counseling regarding the diagnosis and prognosis. Questions are answered at this time and they are agreeable with the plan.      --------------------------------- IMPRESSION AND DISPOSITION ---------------------------------    IMPRESSION  1. Strain of left shoulder, initial encounter    2. Closed head injury, initial encounter    3. Strain of lumbar region, initial encounter    4. Strain of neck muscle, initial encounter        DISPOSITION  Disposition: Discharge to home  Patient condition is stable      NOTE: This report was transcribed using voice recognition software.  Every effort was made to ensure accuracy; however, inadvertent computerized transcription errors may be present        Zhang Franz  02/10/21 1913

## 2021-02-11 ENCOUNTER — CARE COORDINATION (OUTPATIENT)
Dept: CARE COORDINATION | Age: 61
End: 2021-02-11

## 2021-02-11 LAB — SARS-COV-2, PCR: NOT DETECTED

## 2021-02-11 NOTE — CARE COORDINATION
and HTN, obesity, seizure, osteoarthritis, HLD, Bipolar 1 disorder, GERD, depression, PETR. CTN/ACM reviewed discharge instructions, medical action plan and red flags such as increased shortness of breath, increasing fever and signs of decompensation with patient who verbalized understanding. Discussed exposure protocols and quarantine with CDC Guidelines What to do if you are sick with coronavirus disease 2019.  Patient was given an opportunity for questions and concerns. The patient agrees to contact the [facility] exposure line [phone], local health department [place] Department of Health: ([phone]) and PCP office for questions related to their healthcare. CTN/ACM provided contact information for future needs. Reviewed and educated patient on any new and changed medications related to discharge diagnosis     Patient/family/caregiver given information for [employer] Loop and agrees to enroll yes  Patient's preferred e-mail: [email or web address]   Patient's preferred phone number: [phone]  Based on Loop alert triggers, patient will be contacted by nurse care manager for worsening symptoms.      -Pt reports her left shoulder feels terrible. She said she has a bruise on her left shoulder 3 inches X 4 inches. She  has ice intermittently on her left shoulder and back. Back pain is worse than baseline and she has the back brace on. Head and left knee are sore. No fever, SOB or loss of taste or smell. Cough is baseline with her COPD. Pt is taking the Percocet and Robaxin with minimal relief.    -Pt reports that she continues to smoke cigarettes. -Pt reports keeping hydrated and appetite is normal.    -Pt is considered Ortho appt for her left knee. -Offered MyChart, Pt accepted. ACM submitted Pt's email via [employer] and gave the [address] phone number. Pt said she will monitor MyChart for the Covid test result.   -Accepted LOOP.     Pt will be further monitored by COVID Loop Team based on severity of symptoms and risk factors.

## 2021-02-15 ENCOUNTER — HOSPITAL ENCOUNTER (OUTPATIENT)
Dept: PSYCHIATRY | Age: 61
Discharge: HOME | End: 2021-02-15
Payer: MEDICAID

## 2021-02-15 PROCEDURE — 99411 PREVENTIVE COUNSELING GROUP: CPT | Performed by: COUNSELOR

## 2021-02-16 ENCOUNTER — HOSPITAL ENCOUNTER (EMERGENCY)
Age: 61
Discharge: HOME | End: 2021-02-17
Attending: EMERGENCY MEDICINE
Payer: MEDICAID

## 2021-02-16 VITALS
RESPIRATION RATE: 20 BRPM | TEMPERATURE: 97.8 F | HEART RATE: 92 BPM | SYSTOLIC BLOOD PRESSURE: 147 MMHG | OXYGEN SATURATION: 96 % | HEIGHT: 67 IN | WEIGHT: 243 LBS | BODY MASS INDEX: 38.14 KG/M2 | DIASTOLIC BLOOD PRESSURE: 78 MMHG

## 2021-02-16 DIAGNOSIS — L03.012 CELLULITIS OF FINGER OF LEFT HAND: ICD-10-CM

## 2021-02-16 DIAGNOSIS — L03.012 PARONYCHIA OF FINGER OF LEFT HAND: Primary | ICD-10-CM

## 2021-02-16 PROCEDURE — 90471 IMMUNIZATION ADMIN: CPT | Performed by: STUDENT IN AN ORGANIZED HEALTH CARE EDUCATION/TRAINING PROGRAM

## 2021-02-16 PROCEDURE — 90715 TDAP VACCINE 7 YRS/> IM: CPT | Performed by: STUDENT IN AN ORGANIZED HEALTH CARE EDUCATION/TRAINING PROGRAM

## 2021-02-16 PROCEDURE — 99284 EMERGENCY DEPT VISIT MOD MDM: CPT

## 2021-02-16 PROCEDURE — 10060 I&D ABSCESS SIMPLE/SINGLE: CPT

## 2021-02-16 PROCEDURE — 2500000003 HC RX 250 WO HCPCS: Performed by: STUDENT IN AN ORGANIZED HEALTH CARE EDUCATION/TRAINING PROGRAM

## 2021-02-16 PROCEDURE — 6360000002 HC RX W HCPCS: Performed by: STUDENT IN AN ORGANIZED HEALTH CARE EDUCATION/TRAINING PROGRAM

## 2021-02-16 RX ORDER — SULFAMETHOXAZOLE AND TRIMETHOPRIM 800; 160 MG/1; MG/1
1 TABLET ORAL 2 TIMES DAILY
Qty: 20 TABLET | Refills: 0 | Status: SHIPPED | OUTPATIENT
Start: 2021-02-16 | End: 2021-02-26

## 2021-02-16 RX ORDER — LIDOCAINE HYDROCHLORIDE 10 MG/ML
10 INJECTION, SOLUTION EPIDURAL; INFILTRATION; INTRACAUDAL; PERINEURAL ONCE
Status: COMPLETED | OUTPATIENT
Start: 2021-02-16 | End: 2021-02-16

## 2021-02-16 RX ADMIN — TETANUS TOXOID, REDUCED DIPHTHERIA TOXOID AND ACELLULAR PERTUSSIS VACCINE, ADSORBED 0.5 ML: 5; 2.5; 8; 8; 2.5 SUSPENSION INTRAMUSCULAR at 23:28

## 2021-02-16 RX ADMIN — LIDOCAINE HYDROCHLORIDE 10 ML: 10 INJECTION, SOLUTION EPIDURAL; INFILTRATION; INTRACAUDAL; PERINEURAL at 23:30

## 2021-02-16 ASSESSMENT — PAIN DESCRIPTION - PAIN TYPE: TYPE: ACUTE PAIN

## 2021-02-16 ASSESSMENT — PAIN DESCRIPTION - LOCATION: LOCATION: HAND

## 2021-02-16 ASSESSMENT — PAIN SCALES - GENERAL: PAINLEVEL_OUTOF10: 5

## 2021-02-16 NOTE — PROGRESS NOTES
[facility]   Progress Note      Client Name: Eddy Santillan    Treatment Site:   [x][facility]      [] [facility]                      [place] Level:  ppm    Length of Service: 30 minutes       Session Type:  [] In Person [x] Virtually - Provider Location [][facility]      [] [address]                 Patient Location    [x]Patient's Home    [] Other:       Session Provided: [x] Individual   []Group             Client/Staff Ratio:                                 Clients target quit date: 2021       Last date of tobacco use: 2/15/21    Client's actual quit date:       [x]  Client still smoking     Pharmacotherapy provided this session:  []  NRT: Patch  []21mg . / []14mg.  / []7mg. []  NRT:  Gum []2mg. / []4mg.   x (  )boxes  []  NRT: Lozenge []2mg.  / []4mg.  x (  ) tubes      []  Varenicline []0.5 mg.  [] 1 mg. Wks. (  )  []  Bupropion    Wks. (  )  [x]  Other: None waiting on authorization from doctor. See comments.                                                           Treatment Objectives addressed during the session:       [] Coping Skils [] Secondhand Smoke Risks   [] Relapse Prevention [] Promote Self Efficacy   [] Addiction [] Enhance Self-Image   [] Stress Management [x] Use Self Affirmation   [] Health and Wellness [] Problem Solving Techniques   []  [] Conflict Resolution/Anger Management      Clients Response to counseling:  [x] Active participant                        [] Passive listener        [] No behavior change, still participating                   [] Inappropriate:   [] Implemented other strategy/behavior changes:   [] Discussed Quit Plan that will be implemented  [] Re-set Quit Date:     Clients Response to Pharmacotherapy:  [] Decreased cravings  [] Decrease in tobacco use:   [] Maintaining abstinence  [] No change experienced by client  [x]        Risk/Benefit Meds education provided to client  []        Adverse reaction:

## 2021-02-17 ASSESSMENT — ENCOUNTER SYMPTOMS
NAUSEA: 0
SHORTNESS OF BREATH: 0
CHEST TIGHTNESS: 0
COLOR CHANGE: 1
ABDOMINAL PAIN: 0
VOMITING: 0

## 2021-02-17 NOTE — ED PROVIDER NOTES
Patient is a 80-year-old female that presents emergency department for evaluation of a wound check. Patient states that 5 days ago she had a hangnail on her left middle finger and she tore it off. Since that time she has had pain and swelling of the distal portion of the left middle finger. It became swollen and erythematous until 2 days ago when she attempted to open up and drain it herself. Stated that she used Betadine to soak her finger as well as a paring knife and then made an incision at the base of the nail bed. She did have thick white, purulent discharge at that time. She states swelling and redness has since worsened although drainage has become minimal.  Pain is described as an aching throbbing sensation worse with palpation. Nothing seems to make it better, no pain medication was taken prior to arrival.  It has been constant and moderate in severity. She came in for further evaluation. She denies fever, chills, nausea, vomiting, chest pain, shortness of breath, red streaking up the arm, numbness or tingling of the hand, weakness of the arm or hand. The history is provided by the patient. Review of Systems   Constitutional: Negative for chills, diaphoresis, fatigue and fever. Respiratory: Negative for chest tightness and shortness of breath. Cardiovascular: Negative for chest pain and palpitations. Gastrointestinal: Negative for abdominal pain, nausea and vomiting. Musculoskeletal: Positive for joint swelling. Negative for myalgias. Skin: Positive for color change and wound. Neurological: Negative for dizziness, weakness, light-headedness, numbness and headaches. All other systems reviewed and are negative. Physical Exam  Vitals signs and nursing note reviewed. Constitutional:       General: She is not in acute distress. Appearance: Normal appearance. She is not ill-appearing or diaphoretic. HENT:      Head: Normocephalic and atraumatic.    Eyes: Extraocular Movements: Extraocular movements intact. Pupils: Pupils are equal, round, and reactive to light. Cardiovascular:      Rate and Rhythm: Normal rate and regular rhythm. Pulses: Normal pulses. Radial pulses are 2+ on the right side and 2+ on the left side. Pulmonary:      Effort: Pulmonary effort is normal. No respiratory distress. Breath sounds: Normal breath sounds. No wheezing or rhonchi. Musculoskeletal:      Right lower leg: No edema. Left lower leg: No edema. Skin:     General: Skin is warm and dry. Findings: Erythema (Moderate erythema to the distal portion of the left middle finger worst near the nailbed. There is also swelling and tenderness palpation. Small amount of purulent drainage noted to the radial side of the nailbed.) present. Neurological:      Mental Status: She is alert and oriented to person, place, and time. Sensory: No sensory deficit. Motor: No weakness. Procedures   Incision and Drainage Procedure Note    Indication: Paronychia    Procedure: The patient was positioned appropriately and the skin over the incision site was prepped with chlorhexidine. Local anesthesia was obtained with a full digital block of the left long (middle) finger using 1% Lidocaine without epinephrine. An incision was then made over the base of the nailbed and approximately 2 cc of serous and bloody material was expressed. Loculations were not present. The drainage cavity was then dressed with a sterile dressing and a bandage. The patients tetanus status was updated with a tetanus booster. The patient tolerated the procedure well. Complications: None      MDM  Number of Diagnoses or Management Options  Cellulitis of finger of left hand  Paronychia of finger of left hand  Diagnosis management comments: Patient is a 71-year-old female that presents emergency room for evaluation of a wound check.   Wound and exam consistent with transoral biopsy single/multiple (N/A, 3/20/2018); Cholecystectomy (1999); Hysterectomy (1988); Colonoscopy (N/A, 9/18/2018); Esophagus dilation (9/18/2018); back surgery (last one 1995); Cardiac catheterization (Right, 6-6-2013); Appendectomy; other surgical history (1995); joint replacement (Bilateral, [id number]); egd colonoscopy (N/A, 10/8/2019); and Colonoscopy (N/A, 10/8/2019). Social History:  reports that she has been smoking cigarettes. She started smoking about 30 years ago. She has a 21.00 pack-year smoking history. She has never used smokeless tobacco. She reports that she does not drink alcohol or use drugs. Family History: family history includes Arthritis in an other family member; Cancer in her father and another family member; Depression in an other family member; Heart Disease in her mother and another family member; Hypertension in an other family member; Mental Illness in an other family member; Stroke in an other family member. The patients home medications have been reviewed. Allergies: Bee pollen, Penicillins, Ropinirole hcl, Vistaril [hydroxyzine hcl], Aripiprazole, Hydroxyzine pamoate, and Tape [adhesive tape]    -------------------------------------------------- RESULTS -------------------------------------------------  Labs:  No results found for this visit on 02/16/21. Radiology:  No orders to display       ------------------------- NURSING NOTES AND VITALS REVIEWED ---------------------------  Date / Time Roomed:  2/16/2021 11:03 PM  ED Bed Assignment:  16/16    The nursing notes within the ED encounter and vital signs as below have been reviewed.    BP (!) 147/78   Pulse 92   Temp 97.8 °F (36.6 °C) (Temporal)   Resp 20   Ht 5' 7\" (1.702 m)   Wt 243 lb (110.2 kg)   LMP 08/31/1988   SpO2 96%   BMI 38.06 kg/m²   Oxygen Saturation Interpretation: Normal      ------------------------------------------ PROGRESS NOTES ------------------------------------------  12:05 AM EST  I have spoken with the patient and discussed todays results, in addition to providing specific details for the plan of care and counseling regarding the diagnosis and prognosis. Their questions are answered at this time and they are agreeable with the plan. I discussed at length with them reasons for immediate return here for re evaluation. They will followup with their primary care physician by calling their office tomorrow. --------------------------------- ADDITIONAL PROVIDER NOTES ---------------------------------  At this time the patient is without objective evidence of an acute process requiring hospitalization or inpatient management. They have remained hemodynamically stable throughout their entire ED visit and are stable for discharge with outpatient follow-up. The plan has been discussed in detail and they are aware of the specific conditions for emergent return, as well as the importance of follow-up. New Prescriptions    SULFAMETHOXAZOLE-TRIMETHOPRIM (BACTRIM DS) 800-160 MG PER TABLET    Take 1 tablet by mouth 2 times daily for 10 days       Diagnosis:  1. Paronychia of finger of left hand    2. Cellulitis of finger of left hand        Disposition:  Patient's disposition: Discharge to home  Patient's condition is stable.        Paulo Dominguez., DO  Resident  02/17/21 1999

## 2021-02-18 ENCOUNTER — OFFICE VISIT (OUTPATIENT)
Dept: NEUROSURGERY | Age: 61
End: 2021-02-18
Payer: MEDICAID

## 2021-02-18 VITALS — TEMPERATURE: 97.9 F

## 2021-02-18 DIAGNOSIS — M47.22 OSTEOARTHRITIS OF SPINE WITH RADICULOPATHY, CERVICAL REGION: Primary | ICD-10-CM

## 2021-02-18 PROCEDURE — G8417 CALC BMI ABV UP PARAM F/U: HCPCS | Performed by: PHYSICIAN ASSISTANT

## 2021-02-18 PROCEDURE — G8427 DOCREV CUR MEDS BY ELIG CLIN: HCPCS | Performed by: PHYSICIAN ASSISTANT

## 2021-02-18 PROCEDURE — 4004F PT TOBACCO SCREEN RCVD TLK: CPT | Performed by: PHYSICIAN ASSISTANT

## 2021-02-18 PROCEDURE — 3017F COLORECTAL CA SCREEN DOC REV: CPT | Performed by: PHYSICIAN ASSISTANT

## 2021-02-18 PROCEDURE — G8484 FLU IMMUNIZE NO ADMIN: HCPCS | Performed by: PHYSICIAN ASSISTANT

## 2021-02-18 PROCEDURE — 99203 OFFICE O/P NEW LOW 30 MIN: CPT | Performed by: PHYSICIAN ASSISTANT

## 2021-02-18 ASSESSMENT — ENCOUNTER SYMPTOMS
ALLERGIC/IMMUNOLOGIC NEGATIVE: 1
EYES NEGATIVE: 1
RESPIRATORY NEGATIVE: 1
GASTROINTESTINAL NEGATIVE: 1
BACK PAIN: 1

## 2021-02-18 NOTE — PROGRESS NOTES
Subjective:      Patient ID: [id number] Gomez is a 61 y.o. female. Back Pain  This is a new problem. Episode onset: 8-9 months. The problem occurs constantly. The pain is present in the thoracic spine. Neck Pain   This is a new problem. Episode onset: 8-9 months. The problem occurs constantly. The problem has been gradually worsening. The pain is present in the midline (and bilateral arm/hand pain/numbness into the hands). The quality of the pain is described as aching. The pain is at a severity of 8/10. She has tried NSAIDs and acetaminophen (percocet 10mg for years. gabapentin, baclofen) for the symptoms. The treatment provided mild relief. Review of Systems   Constitutional: Negative. HENT: Negative. Eyes: Negative. Respiratory: Negative. Cardiovascular: Negative. Gastrointestinal: Negative. Endocrine: Negative. Genitourinary: Negative. Musculoskeletal: Positive for back pain and neck pain. Skin: Negative. Allergic/Immunologic: Negative. Neurological: Negative. Hematological: Negative. Psychiatric/Behavioral: Negative. Objective:   Physical Exam  Constitutional:       Appearance: Normal appearance. HENT:      Head: Normocephalic and atraumatic. Eyes:      Pupils: Pupils are equal, round, and reactive to light. Pulmonary:      Effort: Pulmonary effort is normal.   Abdominal:      General: There is no distension. Musculoskeletal:      Comments: Pain with ROM of the cervical spine   Skin:     General: Skin is warm and dry. Neurological:      Mental Status: She is alert. GCS: GCS eye subscore is 4. GCS verbal subscore is 5. GCS motor subscore is 6. Cranial Nerves: Cranial nerves are intact. Sensory: Sensory deficit present. Motor: Weakness present. Gait: Gait abnormal.      Deep Tendon Reflexes: Babinski sign absent on the right side. Babinski sign absent on the left side.       Reflex Scores: Tricep reflexes are 1+ on the right side and 1+ on the left side. Bicep reflexes are 1+ on the right side and 1+ on the left side. Brachioradialis reflexes are 1+ on the right side and 1+ on the left side. Patellar reflexes are 1+ on the right side and 1+ on the left side. Achilles reflexes are 1+ on the right side and 1+ on the left side. Comments: 4/5 UE weakness, pain noted    decreased sensation in a both UE in glove dist to light touch    - hoffmans sign   Psychiatric:         Mood and Affect: Mood normal.         Assessment:      61year old female with neck, mid back and bilateral arm pain, numbness, and weakness. Cervical and thoracic CT/myelogram reveals reversal of normal lordosis and degenerative changes worst at C6-7. No significant disc herniation or stenosis. Diffuse degenerative changes in thoracic spine. Plan:      She may continue with PT, NSAIDS if beneficial.  Pain management will be consulted. She is not a surgical candidate.         Veatrice Fleischer, PA

## 2021-02-22 ENCOUNTER — HOSPITAL ENCOUNTER (OUTPATIENT)
Dept: PSYCHIATRY | Age: 61
Discharge: HOME | End: 2021-02-22
Payer: MEDICAID

## 2021-02-22 PROCEDURE — 99412 PREVENTIVE COUNSELING GROUP: CPT | Performed by: COUNSELOR

## 2021-02-22 NOTE — FLOWSHEET NOTE
Client called in to say that when she went to  her medication it was not there. Informed client that CTTS did not know she was moving forward with NRT per doctor and CTTS recommendations. Informed client that CTTS would send to authorization to pharmacy. Client stated that she could not  her medication until Wednesday.  Client reported that she will attend group today at 4pm.    Electronically signed by Jerzy Blackburn on 2/22/2021 at 1:10 PM

## 2021-02-22 NOTE — PROGRESS NOTES
[facility]   Progress Note      Client Name: Mona Trammell    Treatment Site:   [x][facility]      [] [address] [place] [address]                      [place] Level:  ppm    Length of Service: 60 minutes       Session Type:  [] In Person [x] Virtually - Provider Location [][facility]      [x] [address] [place] [address]                    Patient Location    [x]Patient's Home    [] Other:       Session Provided: [] Individual   [x]Group             Client/Staff Ratio: 2/1                                Clients target quit date: 21       Last date of tobacco use: 21    Client's actual quit date:       [x]  Client still smoking     Pharmacotherapy provided this session:  [x]  NRT: Patch  [x]21mg . / []14mg.  / []7mg. []  NRT:  Gum []2mg. / []4mg.   x (  )boxes  [x]  NRT: Lozenge [x]2mg.  / []4mg.  x ( 4 ) tubes      []  Varenicline []0.5 mg.  [] 1 mg. Wks. (  )  []  Bupropion    Wks.  (  )  []  Other                                                                Treatment Objectives addressed during the session:       [x] Coping Skils [] Secondhand Smoke Risks   [] Relapse Prevention [x] Promote Self Efficacy   [] Addiction [] Enhance Self-Image   [] Stress Management [x] Use Self Affirmation   [] Health and Wellness [] Problem Solving Techniques   []  [] Conflict Resolution/Anger Management      Clients Response to counseling:  [x] Active participant                        [] Passive listener        [] No behavior change, still participating                   [] Inappropriate:   [] Implemented other strategy/behavior changes:   [x] Discussed Quit Plan that will be implemented  [] Re-set Quit Date:     Clients Response to Pharmacotherapy:  [] Decreased cravings  [] Decrease in tobacco use:   [] Maintaining abstinence  [] No change experienced by client  [x]        Risk/Benefit Meds education provided to client  []        Adverse reaction:   []       Other:     Plan of Action:  [] Maintain abstinence  [] Continue treatment;     [] Change pharmacotherapy:   [] Attend Nicotine Anonymous meeting   [x]        Assignments/Homework: complete coping section of crash course. []        Triggers / Concerns to be addressed:   [] Graduated / received aftercare plan    Comments: client stated that she went to  her medications last week and the pharmacy told her they didn't have anything. Explained to client that CTTS was waiting to hear back from her whether doctor would approve Chantix. Client informed today that her doctor does not want her on Chantix and only approved for NRT. NRT authorized by CTTS today. PORTER: Signature, Date, Time: Electronically signed by Darwin Perkins on 2/22/2021 at 4:51 PM          CO Level:  No reading taken due to pandemic.

## 2021-02-25 ENCOUNTER — OFFICE VISIT (OUTPATIENT)
Dept: ENDOCRINOLOGY | Age: 61
End: 2021-02-25
Payer: MEDICAID

## 2021-02-25 VITALS
TEMPERATURE: 96.4 F | HEART RATE: 86 BPM | HEIGHT: 67 IN | SYSTOLIC BLOOD PRESSURE: 118 MMHG | WEIGHT: 249 LBS | OXYGEN SATURATION: 92 % | DIASTOLIC BLOOD PRESSURE: 74 MMHG | BODY MASS INDEX: 39.08 KG/M2

## 2021-02-25 DIAGNOSIS — E04.2 NON-TOXIC MULTINODULAR GOITER: Primary | ICD-10-CM

## 2021-02-25 DIAGNOSIS — D35.01 ADENOMA OF RIGHT ADRENAL GLAND: ICD-10-CM

## 2021-02-25 PROCEDURE — G8484 FLU IMMUNIZE NO ADMIN: HCPCS | Performed by: INTERNAL MEDICINE

## 2021-02-25 PROCEDURE — G8417 CALC BMI ABV UP PARAM F/U: HCPCS | Performed by: INTERNAL MEDICINE

## 2021-02-25 PROCEDURE — 4004F PT TOBACCO SCREEN RCVD TLK: CPT | Performed by: INTERNAL MEDICINE

## 2021-02-25 PROCEDURE — G8427 DOCREV CUR MEDS BY ELIG CLIN: HCPCS | Performed by: INTERNAL MEDICINE

## 2021-02-25 PROCEDURE — 3017F COLORECTAL CA SCREEN DOC REV: CPT | Performed by: INTERNAL MEDICINE

## 2021-02-25 PROCEDURE — 99215 OFFICE O/P EST HI 40 MIN: CPT | Performed by: INTERNAL MEDICINE

## 2021-02-25 NOTE — PATIENT INSTRUCTIONS
Complete the midnight salivary tests now and take to the \"Lab Send Outs\" department at [facility].   Have thyroid function studies performed  See Dr. Porsha Ortiz for future endocrine care

## 2021-02-25 NOTE — PROGRESS NOTES
CC -  Thyroid nodule, Right adrenal nodule    Background -   Last visit:  07/09/2020  Initial Visit: 09/05/2019    · Thyroid nodule   Diagnosed 5/22/19 5/22/19 Discovered incidentally on CT of neck 5/22/19 ordered by Dr. Stan Jaquez for evaluation of swelling of the neck  7/31/19 US of thyroid: 1.3cm hypoechoic nodule in the isthmus  8/02/19 CT-guided core biopsy was negative for malignancy; flow cytometry was attempted but the cellularity of the specimen was inadequate. 2/03/20 US of thyroid: no significant change from 7/31/19; two nodules reported, one in each lobe located near the isthmus, both are 1.2 cm; calcification now noted in the right lobe, 0.5 cm, non-vascular    6/13/19  TSH 0.239,               fT4 1.18  5/11/20  TSH 0.809, T3 114,  fT4 1.16    Right adrenal nodule - 2010 (per 12/28/12 CT report)  12/28/19  CT of abd : Stable small nodule in the right adrenal gland likely reflecting adenoma  02/06/20  24 urine metanephrines and catecholamines neg for pheo; urine cortisol nml        PRA and serum dean normal         02/06/20 Serum Cortisol level 6.06 mcg/dl, but was checked at 4:38 p    5/11/20  Dexamethasone suppression test: Cortisol 3.03, ACTH 5.8    ______________________    [facility] -  Medical problems: Thyroid nodule, R Adrenal nodule, Bipolar Type 2, Chronic back pain, Fatty liver, Essential tremor, Mitochondrial cytopathy, Neuropathy, Seizures, GERD, Diaphragm does not function well wh/ causes hypoxia   Medications: Topiramate, Famotidine, Montelukast, Omeprazole, Baclofen, Furosemide, Linaclotide, Levocarnitine, Allopurinol, Escitalopram, Ziprasidone, Ondansetron     ROS -  Gen: no fever  Pulm: no cough    PE -  Gen : /72 (Site: Left Lower Arm, Position: Sitting, Cuff Size: Medium Adult)   Pulse 102   Resp 16   Ht 5' 7\" (1.702 m)   Wt 269 lb (122 kg)   LMP 08/31/1988   SpO2 95%   BMI 42.13 kg/m²    WN, WD, NAD  Lung: Nml resp effort  Psych: Normal mood   Full affect  Neuro:  Moves all ext well  ______________________      HPI & A/P -   Problem 1  - Thyroid nodule  HPI   - See background for description of the problem      + hoarseness - present for 2-3 yrs; due to dryness (also flexible laryngoscopy on 2/1/21 for sore throat showe something on her Vocal Cords; unclear is dried mucus or lesion; repeat planned for March; will bx if still present)      + dysphagia to liquids (hot especially), started with liquids (last visit I understood her to stay solids) 3 yrs ago (due to her mictochondrial cytopathy)    2/8/2021 US thyroid - no change in the two nodules present; both are mixed solid/cystic with hetergeneous echotexture; the small indistinct area in the left lobe with echogenic foci remains. These are present around the periphery of the thyroid gland as well. (I personally reviewed the images)    No hyper or hypothyroid symptoms other than ones that are chronic and due to other problems.  Will repeat TFTs now  Assessment  - Stable  Plan   - Check TSH, fT4 and T3      Follow up as planned with Dr. David Shine      Also will have Dr. Janes Segovia assume care b/c of his experience and expertise in managing thyroid nodules and thyroid cancer    Problem 2  - Adrenal nodules, bilateral  HPI   - non-functioning per 24 hr urine studies, plasma renin activity and serum aldosterone performed 2/06/20     Dexamethasone suppression test was abnormal; Midnight salivary test kits given; has not yet used these  Assessment  - stable in size and characteristics, need to rule out cortisol producing adenoma (very unlikely, but must be considered since the dexamethasone suppression test was abnormal)  Plan   - Complete midnight salivary cortisol test x3    Follow up  Transfer future endocrine care to Dr. Ramona Brown follow up in 4-6 week    Total time spent on encounter: 45 min      Note: not billable if this call serves to triage the patient into an appointment for the relevant concern      Shweta Ospina MD

## 2021-02-27 ENCOUNTER — APPOINTMENT (OUTPATIENT)
Dept: CT IMAGING | Age: 61
End: 2021-02-27
Payer: MEDICAID

## 2021-02-27 ENCOUNTER — APPOINTMENT (OUTPATIENT)
Dept: GENERAL RADIOLOGY | Age: 61
End: 2021-02-27
Payer: MEDICAID

## 2021-02-27 ENCOUNTER — HOSPITAL ENCOUNTER (EMERGENCY)
Age: 61
Discharge: LEFT BEFORE DISCHARGE | End: 2021-02-27
Attending: EMERGENCY MEDICINE
Payer: MEDICAID

## 2021-02-27 VITALS
WEIGHT: 248 LBS | SYSTOLIC BLOOD PRESSURE: 165 MMHG | DIASTOLIC BLOOD PRESSURE: 72 MMHG | TEMPERATURE: 97.8 F | HEART RATE: 81 BPM | RESPIRATION RATE: 16 BRPM | OXYGEN SATURATION: 93 % | BODY MASS INDEX: 38.84 KG/M2

## 2021-02-27 DIAGNOSIS — S22.41XA CLOSED FRACTURE OF MULTIPLE RIBS OF RIGHT SIDE, INITIAL ENCOUNTER: Primary | ICD-10-CM
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
## 2021-02-27 LAB
ALBUMIN SERPL-MCNC: 4 G/DL (ref 3.5–5.2)
ALP BLD-CCNC: 162 U/L (ref 35–104)
ALT SERPL-CCNC: 24 U/L (ref 0–32)
ANION GAP SERPL CALCULATED.3IONS-SCNC: 10 MMOL/L (ref 7–16)
AST SERPL-CCNC: 23 U/L (ref 0–31)
BASOPHILS ABSOLUTE: 0.02 E9/L (ref 0–0.2)
BASOPHILS RELATIVE PERCENT: 0.2 % (ref 0–2)
BILIRUB SERPL-MCNC: <0.2 MG/DL (ref 0–1.2)
BUN BLDV-MCNC: 13 MG/DL (ref 8–23)
CALCIUM SERPL-MCNC: 8.5 MG/DL (ref 8.6–10.2)
CHLORIDE BLD-SCNC: 102 MMOL/L (ref 98–107)
CO2: 27 MMOL/L (ref 22–29)
CREAT SERPL-MCNC: 1 MG/DL (ref 0.5–1)
EKG ATRIAL RATE: 76 BPM
EKG P AXIS: 45 DEGREES
EKG P-R INTERVAL: 190 MS
EKG Q-T INTERVAL: 380 MS
EKG QRS DURATION: 102 MS
EKG QTC CALCULATION (BAZETT): 427 MS
EKG R AXIS: 7 DEGREES
EKG T AXIS: 20 DEGREES
EKG VENTRICULAR RATE: 76 BPM
EOSINOPHILS ABSOLUTE: 0.11 E9/L (ref 0.05–0.5)
EOSINOPHILS RELATIVE PERCENT: 1.3 % (ref 0–6)
GFR AFRICAN AMERICAN: >60
GFR NON-AFRICAN AMERICAN: 56 ML/MIN/1.73
GLUCOSE BLD-MCNC: 92 MG/DL (ref 74–99)
HCT VFR BLD CALC: 45.9 % (ref 34–48)
HEMOGLOBIN: 14.4 G/DL (ref 11.5–15.5)
IMMATURE GRANULOCYTES #: 0.04 E9/L
IMMATURE GRANULOCYTES %: 0.5 % (ref 0–5)
LYMPHOCYTES ABSOLUTE: 1.77 E9/L (ref 1.5–4)
LYMPHOCYTES RELATIVE PERCENT: 21.5 % (ref 20–42)
MCH RBC QN AUTO: 32.7 PG (ref 26–35)
MCHC RBC AUTO-ENTMCNC: 31.4 % (ref 32–34.5)
MCV RBC AUTO: 104.1 FL (ref 80–99.9)
MONOCYTES ABSOLUTE: 0.61 E9/L (ref 0.1–0.95)
MONOCYTES RELATIVE PERCENT: 7.4 % (ref 2–12)
NEUTROPHILS ABSOLUTE: 5.7 E9/L (ref 1.8–7.3)
NEUTROPHILS RELATIVE PERCENT: 69.1 % (ref 43–80)
PDW BLD-RTO: 13.8 FL (ref 11.5–15)
PLATELET # BLD: 264 E9/L (ref 130–450)
PMV BLD AUTO: 8.8 FL (ref 7–12)
POTASSIUM REFLEX MAGNESIUM: 4.1 MMOL/L (ref 3.5–5)
RBC # BLD: 4.41 E12/L (ref 3.5–5.5)
SODIUM BLD-SCNC: 139 MMOL/L (ref 132–146)
TOTAL PROTEIN: 6.7 G/DL (ref 6.4–8.3)
TROPONIN: <0.01 NG/ML (ref 0–0.03)
WBC # BLD: 8.3 E9/L (ref 4.5–11.5)

## 2021-02-27 PROCEDURE — 72125 CT NECK SPINE W/O DYE: CPT

## 2021-02-27 PROCEDURE — 84484 ASSAY OF TROPONIN QUANT: CPT

## 2021-02-27 PROCEDURE — 85025 COMPLETE CBC W/AUTO DIFF WBC: CPT

## 2021-02-27 PROCEDURE — 96374 THER/PROPH/DIAG INJ IV PUSH: CPT

## 2021-02-27 PROCEDURE — 6360000002 HC RX W HCPCS: Performed by: STUDENT IN AN ORGANIZED HEALTH CARE EDUCATION/TRAINING PROGRAM

## 2021-02-27 PROCEDURE — 93005 ELECTROCARDIOGRAM TRACING: CPT | Performed by: STUDENT IN AN ORGANIZED HEALTH CARE EDUCATION/TRAINING PROGRAM

## 2021-02-27 PROCEDURE — 93010 ELECTROCARDIOGRAM REPORT: CPT | Performed by: INTERNAL MEDICINE

## 2021-02-27 PROCEDURE — 70450 CT HEAD/BRAIN W/O DYE: CPT

## 2021-02-27 PROCEDURE — 71250 CT THORAX DX C-: CPT

## 2021-02-27 PROCEDURE — 73030 X-RAY EXAM OF SHOULDER: CPT

## 2021-02-27 PROCEDURE — 80053 COMPREHEN METABOLIC PANEL: CPT

## 2021-02-27 PROCEDURE — 99284 EMERGENCY DEPT VISIT MOD MDM: CPT

## 2021-02-27 RX ORDER — LIDOCAINE 50 MG/G
1 PATCH TOPICAL DAILY
Qty: 10 PATCH | Refills: 0 | Status: SHIPPED | OUTPATIENT
Start: 2021-02-27 | End: 2021-02-27 | Stop reason: SDUPTHER

## 2021-02-27 RX ORDER — FENTANYL CITRATE 50 UG/ML
50 INJECTION, SOLUTION INTRAMUSCULAR; INTRAVENOUS ONCE
Status: COMPLETED | OUTPATIENT
Start: 2021-02-27 | End: 2021-02-27

## 2021-02-27 RX ORDER — LIDOCAINE 50 MG/G
1 PATCH TOPICAL DAILY
Qty: 10 PATCH | Refills: 0 | Status: SHIPPED | OUTPATIENT
Start: 2021-02-27 | End: 2021-03-09

## 2021-02-27 RX ADMIN — FENTANYL CITRATE 50 MCG: 50 INJECTION, SOLUTION INTRAMUSCULAR; INTRAVENOUS at 06:05

## 2021-02-27 ASSESSMENT — PAIN DESCRIPTION - ONSET: ONSET: ON-GOING

## 2021-02-27 ASSESSMENT — PAIN DESCRIPTION - LOCATION: LOCATION: HEAD;SHOULDER;LEG

## 2021-02-27 ASSESSMENT — ENCOUNTER SYMPTOMS
WHEEZING: 0
PHOTOPHOBIA: 0
ABDOMINAL DISTENTION: 0
SORE THROAT: 0
EYE REDNESS: 0
BACK PAIN: 1
VOMITING: 0
EYE PAIN: 0
DIARRHEA: 0
NAUSEA: 0
COUGH: 0
SINUS PRESSURE: 0
EYE DISCHARGE: 0
SHORTNESS OF BREATH: 0

## 2021-02-27 ASSESSMENT — PAIN SCALES - GENERAL: PAINLEVEL_OUTOF10: 9

## 2021-02-27 ASSESSMENT — PAIN DESCRIPTION - DESCRIPTORS: DESCRIPTORS: ACHING;CONSTANT;DISCOMFORT

## 2021-02-27 NOTE — ED PROVIDER NOTES
[address]      Pt Name: Adelfo Torres  MRN: [id number]  [place] 1960  Date of evaluation: 2/27/2021      CHIEF COMPLAINT       Chief Complaint   Patient presents with    Loss of Consciousness     states \"I knocked myself out for several minutes\"    Fall     states \"I tripped getting off the toilet and hit my head on the sink and landed on my L shoulder\"    Nausea    Dizziness     states \"my balance is off, I am having a hard time making my legs work right\"        HPI  Adelfo Torres is a 61 y.o. female with a past medical history of bipolar affective disorder, peripheral neuropathy presents with complaints of loss consciousness. Patient states that she was standing up out of a commode was walking towards the right and her \"legs gave out on her. \"  States that she found hit the left side of her head. Then from that fall she subsequently fell on the right arm. States that she was unconscious for minutes. She woke up and called for help from her roommate. Admits to nausea after the event. Denies any fevers, chills, chest pain, shortness of breath, abdominal pain, flank pain, dysuria, hematuria, diarrhea, constipation, new rashes or sores. Except as noted above the remainder of the review of systems was reviewed and negative. Review of Systems   Constitutional: Negative for chills and fever. HENT: Negative for ear pain, sinus pressure and sore throat. Eyes: Negative for photophobia, pain, discharge, redness and visual disturbance. Respiratory: Negative for cough, shortness of breath and wheezing. Cardiovascular: Negative for chest pain. Gastrointestinal: Negative for abdominal distention, diarrhea, nausea and vomiting. Genitourinary: Negative for dysuria and frequency. Musculoskeletal: Positive for back pain and gait problem.  Negative for arthralgias, joint swelling, myalgias, neck pain and neck stiffness. Skin: Negative for pallor, rash and wound. Neurological: Positive for syncope. Negative for tremors, seizures, speech difficulty, weakness and headaches. Hematological: Negative for adenopathy. Does not bruise/bleed easily. Psychiatric/Behavioral: Negative for agitation. All other systems reviewed and are negative. Physical Exam  Vitals signs and nursing note reviewed. Constitutional:       General: She is not in acute distress. Appearance: Normal appearance. She is well-developed. She is obese. She is not ill-appearing or diaphoretic. HENT:      Head: Normocephalic and atraumatic. Comments: Normocephalic atraumatic. Eyes:      Pupils: Pupils are equal, round, and reactive to light. Neck:      Musculoskeletal: Normal range of motion and neck supple. Cardiovascular:      Rate and Rhythm: Normal rate and regular rhythm. Pulmonary:      Effort: Pulmonary effort is normal. No respiratory distress. Breath sounds: Normal breath sounds. No wheezing or rales. Abdominal:      General: Bowel sounds are normal.      Palpations: Abdomen is soft. Tenderness: There is no abdominal tenderness. There is no guarding or rebound. Skin:     General: Skin is warm and dry. Coloration: Skin is not jaundiced. Neurological:      Mental Status: She is alert and oriented to person, place, and time. Cranial Nerves: No cranial nerve deficit. Coordination: Coordination normal.          Procedures     MDM  Number of Diagnoses or Management Options  Accident due to mechanical fall without injury, initial encounter  Closed fracture of multiple ribs of right side, initial encounter  Diagnosis management comments: 61year-old female status post syncopal episode and collapse at home after she was getting about commode yesterday evening presents with complaints of syncope, nausea, and dizziness. Vitals within normal limits.   On physical exam patient is no acute surgical history (Left, 11 25 13); Nerve Block (Left, 10/29/2014); Nerve Block (Left, 11/12/2014); Nerve Block (Left, 12 8 14); Nerve Block (Right, 3/30/15); Nerve Block (Right, 4/6/2015); Nerve Block (Right, 4/13/15); Nerve Block (Left, 7/6/15); Nerve Block (07/20/15); Nerve Block (Left, 10 1 15); Nerve Block (10/26/15); other surgical history (3/28/2016); Endoscopy, colon, diagnostic; pr colonoscopy flx dx w/collj spec when pfrmd (N/A, 3/20/2018); pr egd transoral biopsy single/multiple (N/A, 3/20/2018); Cholecystectomy (1999); Hysterectomy (1988); Colonoscopy (N/A, 9/18/2018); Esophagus dilation (9/18/2018); back surgery (last one 1995); Cardiac catheterization (Right, 6-6-2013); Appendectomy; other surgical history (1995); joint replacement (Bilateral, [id number]); egd colonoscopy (N/A, 10/8/2019); and Colonoscopy (N/A, 10/8/2019). Social History:  reports that she has been smoking cigarettes. She started smoking about 30 years ago. She has a 21.00 pack-year smoking history. She has never used smokeless tobacco. She reports that she does not drink alcohol or use drugs. Family History: family history includes Arthritis in an other family member; Cancer in her father and another family member; Depression in an other family member; Heart Disease in her mother and another family member; Hypertension in an other family member; Mental Illness in an other family member; Stroke in an other family member. The patients home medications have been reviewed.     Allergies: Bee pollen, Penicillins, Ropinirole hcl, Vistaril [hydroxyzine hcl], Aripiprazole, Hydroxyzine pamoate, and Tape [adhesive tape]    -------------------------------------------------- RESULTS -------------------------------------------------  Labs:  Results for orders placed or performed during the hospital encounter of 02/27/21   CBC Auto Differential   Result Value Ref Range    WBC 8.3 4.5 - 11.5 E9/L    RBC 4.41 3.50 - 5.50 E12/L    Hemoglobin 14.4 11.5 - 15.5 g/dL    Hematocrit 45.9 34.0 - 48.0 %    .1 (H) 80.0 - 99.9 fL    MCH 32.7 26.0 - 35.0 pg    MCHC 31.4 (L) 32.0 - 34.5 %    RDW 13.8 11.5 - 15.0 fL    Platelets 339 118 - 546 E9/L    MPV 8.8 7.0 - 12.0 fL    Neutrophils % 69.1 43.0 - 80.0 %    Immature Granulocytes % 0.5 0.0 - 5.0 %    Lymphocytes % 21.5 20.0 - 42.0 %    Monocytes % 7.4 2.0 - 12.0 %    Eosinophils % 1.3 0.0 - 6.0 %    Basophils % 0.2 0.0 - 2.0 %    Neutrophils Absolute 5.70 1.80 - 7.30 E9/L    Immature Granulocytes # 0.04 E9/L    Lymphocytes Absolute 1.77 1.50 - 4.00 E9/L    Monocytes Absolute 0.61 0.10 - 0.95 E9/L    Eosinophils Absolute 0.11 0.05 - 0.50 E9/L    Basophils Absolute 0.02 0.00 - 0.20 E9/L   Comprehensive Metabolic Panel w/ Reflex to MG   Result Value Ref Range    Sodium 139 132 - 146 mmol/L    Potassium reflex Magnesium 4.1 3.5 - 5.0 mmol/L    Chloride 102 98 - 107 mmol/L    CO2 27 22 - 29 mmol/L    Anion Gap 10 7 - 16 mmol/L    Glucose 92 74 - 99 mg/dL    BUN 13 8 - 23 mg/dL    CREATININE 1.0 0.5 - 1.0 mg/dL    GFR Non-African American 56 >=60 mL/min/1.73    GFR African American >60     Calcium 8.5 (L) 8.6 - 10.2 mg/dL    Total Protein 6.7 6.4 - 8.3 g/dL    Albumin 4.0 3.5 - 5.2 g/dL    Total Bilirubin <0.2 0.0 - 1.2 mg/dL    Alkaline Phosphatase 162 (H) 35 - 104 U/L    ALT 24 0 - 32 U/L    AST 23 0 - 31 U/L   Troponin   Result Value Ref Range    Troponin <0.01 0.00 - 0.03 ng/mL   EKG 12 Lead   Result Value Ref Range    Ventricular Rate 76 BPM    Atrial Rate 76 BPM    P-R Interval 190 ms    QRS Duration 102 ms    Q-T Interval 380 ms    QTc Calculation (Bazett) 427 ms    P Axis 45 degrees    R Axis 7 degrees    T Axis 20 degrees     EKG read by me. Heart rate 76. Normal axis deviation. No acute prolongation. No ST elevations or depressions. She was compared to previous EKG.     Radiology:  Xr Ribs Bilateral (min 4 Views)    Result Date: 2/10/2021  EXAMINATION: 4 XRAY VIEWS, each, OF THE BILATERAL RIBS WITH FRONTAL XRAY VIEW OF THE CHEST 2/10/2021 1:05 pm COMPARISON: A portable chest 09/11/2020 HISTORY: ORDERING SYSTEM PROVIDED HISTORY: trauma TECHNOLOGIST PROVIDED HISTORY: Reason for exam:->trauma Status post fall. Shoulder and knee pain. Bilateral rib pain. FINDINGS: Neurostimulator wires again noted overlying the thoracic spine. Right upper quadrant abdominal surgical clips are suggestive of prior cholecystectomy. Multiple surgical clips noted in left upper abdomen. Nonspecific new small ground-glass opacities are scattered in the right mid lung, right lower lung, and left lower lung. No pneumothorax or pleural effusion. Cardiac silhouette size is at upper limits of normal. Right anterolateral 9th and 10th rib fractures appear healed with slight hypertrophic deformity. Left anterior 7th rib fracture appears healed with slight hypertrophic deformity. There is no evidence of acute displaced fracture in the visible ribs. The ribs are partly obscured by superimposed anatomy. Non-specific new small ground-glass opacities bilaterally may be atelectasis or edema. Differential includes multifocal pneumonitis. Consider following with chest x-ray as clinically indicated. No radiographically visible acute displaced rib fracture. Right 9th and 10th rib fractures and left anterior 7th rib fractures appear healed with slight hypertrophic deformity    Xr Shoulder Right (min 2 Views)    Result Date: 2/27/2021  EXAMINATION: THREE XRAY VIEWS OF THE RIGHT SHOULDER 2/27/2021 4:35 am COMPARISON: 09/26/2017 HISTORY: ORDERING SYSTEM PROVIDED HISTORY: fall TECHNOLOGIST PROVIDED HISTORY: Reason for exam:->fall FINDINGS: Mild degenerative changes of the glenohumeral joint and moderate degenerative change of the acromioclavicular joint. No dislocation or distinct fracture identified in the shoulder. Minimally displaced fracture of the anterior 3rd rib. No pneumothorax identified. Degenerative changes.   Right 3rd rib fracture. Xr Knee Left (min 4 Views)    Result Date: 2/10/2021  EXAMINATION: FOUR XRAY VIEWS OF THE LEFT KNEE 2/10/2021 12:05 pm COMPARISON: 10/23/2016 HISTORY: ORDERING SYSTEM PROVIDED HISTORY: fall TECHNOLOGIST PROVIDED HISTORY: Reason for exam:->fall FINDINGS: There has been left knee arthroplasty. Hardware is in expected position. No acute fracture or dislocation is seen. No focal osseous lesions. No acute osseous abnormality is identified    Ct Head Wo Contrast    Result Date: 2/27/2021  EXAMINATION: CT OF THE HEAD WITHOUT CONTRAST  2/27/2021 4:10 am TECHNIQUE: CT of the head was performed without the administration of intravenous contrast. Dose modulation, iterative reconstruction, and/or weight based adjustment of the mA/kV was utilized to reduce the radiation dose to as low as reasonably achievable. COMPARISON: 02/10/2021 HISTORY: ORDERING SYSTEM PROVIDED HISTORY: fall TECHNOLOGIST PROVIDED HISTORY: Reason for exam:->fall Has a \"code stroke\" or \"stroke alert\" been called? ->No Decision Support Exception->Emergency Medical Condition (MA) FINDINGS: Some images are degraded by streak artifact from bilateral ear rings. BRAIN/VENTRICLES: There is no acute intracranial hemorrhage, mass effect or midline shift. No abnormal extra-axial fluid collection. The gray-white differentiation is maintained without evidence of an acute infarct. There is no evidence of hydrocephalus. ORBITS: The visualized portion of the orbits demonstrate no acute abnormality. SINUSES: Minimal opacities in the mastoid air cells bilaterally are unchanged and could be chronic. SOFT TISSUES/SKULL:  No acute abnormality of the visualized skull or soft tissues. No acute intracranial abnormality. No significant change.   MRI would be useful if symptoms persist.    Ct Head Wo Contrast    Result Date: 2/10/2021  EXAMINATION: CT OF THE HEAD WITHOUT CONTRAST  2/10/2021 1:04 pm TECHNIQUE: CT of the head was performed without the administration of intravenous contrast. Dose modulation, iterative reconstruction, and/or weight based adjustment of the mA/kV was utilized to reduce the radiation dose to as low as reasonably achievable. COMPARISON: 04/30/2020 HISTORY: ORDERING SYSTEM PROVIDED HISTORY: fall TECHNOLOGIST PROVIDED HISTORY: Reason for exam:->fall Has a \"code stroke\" or \"stroke alert\" been called? ->No Decision Support Exception->Emergency Medical Condition (MA) FINDINGS: BRAIN/VENTRICLES: There is no acute intracranial hemorrhage, mass effect or midline shift. No abnormal extra-axial fluid collection. The gray-white differentiation is maintained without evidence of an acute infarct. There is no evidence of hydrocephalus. ORBITS: The visualized portion of the orbits demonstrate no acute abnormality. SINUSES: Visualized paranasal sinuses are aerated. There is a trace amount of fluid in the left mastoid air cells. SOFT TISSUES/SKULL:  No acute abnormality of the visualized skull or soft tissues. No acute intracranial abnormality. Ct Chest Wo Contrast    Result Date: 2/27/2021  EXAMINATION: CT OF THE CHEST WITHOUT CONTRAST 2/27/2021 4:10 am TECHNIQUE: CT of the chest was performed without the administration of intravenous contrast. Multiplanar reformatted images are provided for review. Dose modulation, iterative reconstruction, and/or weight based adjustment of the mA/kV was utilized to reduce the radiation dose to as low as reasonably achievable. COMPARISON: None. HISTORY: ORDERING SYSTEM PROVIDED HISTORY: fall TECHNOLOGIST PROVIDED HISTORY: Reason for exam:->fall Decision Support Exception->Emergency Medical Condition (MA) FINDINGS: Exam is limited without intravenous contrast.  Scattered vascular calcifications. Normal-size lymph nodes without adenopathy by size criteria. Heart size is borderline. No pleural or pericardial effusion. Postoperative changes in the visualized abdomen including apparent gastric bypass.   Partial visualization of 6 mm nonobstructing right renal stone. Spinal stimulator electrodes terminate in the midthoracic region. No pneumothorax. Patchy ground-glass opacities are most evident within the left lower lobe and lingula. Minimally displaced fracture of the anterolateral aspect of the right 2nd rib with soft tissue thickening consistent with an acute fracture. Nondisplaced anterolateral right 3rd rib fracture. There are also multiple other anterior right rib fractures involving the more anteromedial aspect of the 2nd through 5th ribs. These are favored to be old. There also several old appearing lateral right rib fractures. Probable old left rib fractures. Degenerative changes are scattered in the spine. Acute fracture of the right 2nd rib and probable acute 3rd rib fracture. Multiple other old rib fractures. Patchy ground-glass opacities most notable within the lingula and left lower lobe. These are concerning for infectious process. Viral pneumonia not excluded. Follow-up recommended. Ct Cervical Spine Wo Contrast    Result Date: 2/27/2021  EXAMINATION: CT OF THE CERVICAL SPINE WITHOUT CONTRAST 2/27/2021 4:10 am TECHNIQUE: CT of the cervical spine was performed without the administration of intravenous contrast. Multiplanar reformatted images are provided for review. Dose modulation, iterative reconstruction, and/or weight based adjustment of the mA/kV was utilized to reduce the radiation dose to as low as reasonably achievable. COMPARISON: 02/10/2021 HISTORY: ORDERING SYSTEM PROVIDED HISTORY: fall TECHNOLOGIST PROVIDED HISTORY: Reason for exam:->fall Decision Support Exception->Emergency Medical Condition (MA) FINDINGS: No prevertebral edema or subluxation. Moderate multilevel degenerative changes including degenerative disc disease greatest at C5-6 and C6-7. The extreme anterior inferior aspect of the C7 vertebral body was partially excluded from the field of view.   Allowing for this, no acute fracture identified. The degenerative changes contribute to at least mild multilevel canal and foraminal narrowing. No fluid collection. Thyroid is enlarged. No acute abnormality of the cervical spine. Degenerative changes. MRI would be useful if symptoms persist.    Ct Cervical Spine Wo Contrast    Result Date: 2/10/2021  EXAMINATION: CT OF THE CERVICAL SPINE WITHOUT CONTRAST 2/10/2021 1:04 pm TECHNIQUE: CT of the cervical spine was performed without the administration of intravenous contrast. Multiplanar reformatted images are provided for review. Dose modulation, iterative reconstruction, and/or weight based adjustment of the mA/kV was utilized to reduce the radiation dose to as low as reasonably achievable. COMPARISON: 09/23/2020 HISTORY: ORDERING SYSTEM PROVIDED HISTORY: trauma TECHNOLOGIST PROVIDED HISTORY: Reason for exam:->trauma Decision Support Exception->Emergency Medical Condition (MA) FINDINGS: The vertebral body heights and alignment are maintained. There are osteoarthritic changes at the atlantodental articulation. There are degenerative disc changes at C5-6 and C6-7, with disc space narrowing and osteophyte formation. No acute fracture or subluxation is seen. The thyroid gland appears diffusely mildly prominent. No acute abnormality of the cervical spine. Degenerative disc changes at C5-6 and C6-7    Ct Lumbar Spine Wo Contrast    Result Date: 2/10/2021  EXAMINATION: CT OF THE LUMBAR SPINE WITHOUT CONTRAST  2/10/2021 TECHNIQUE: CT of the lumbar spine was performed without the administration of intravenous contrast. Multiplanar reformatted images are provided for review. Dose modulation, iterative reconstruction, and/or weight based adjustment of the mA/kV was utilized to reduce the radiation dose to as low as reasonably achievable.  COMPARISON: 04/28/2020 HISTORY: ORDERING SYSTEM PROVIDED HISTORY: PAIN TECHNOLOGIST PROVIDED HISTORY: Reason for exam:->pain Decision Support Exception->Emergency Medical Condition (MA) FINDINGS: BONES/ALIGNMENT: There is normal alignment of the spine. Chronic superior endplate compression with a sizable Schmorl's node at L3. The remaining vertebral body heights are maintained. No osseous destructive lesion is seen. DEGENERATIVE CHANGES: Unchanged at least moderate disc height loss at all lumbar levels with vacuum disc phenomenon at L1-L2 and L3 through S1. Similar disc protrusions with some associated disc calcification and endplate osteophytes which are overall not significantly changed relative to prior causing moderate spinal canal stenosis at the disc levels. Similar multilevel neuroforaminal stenosis, greatest at L5-S1 on the left. SOFT TISSUES/RETROPERITONEUM: No paravertebral hematoma. Similar diffuse paraspinal muscle atrophy. Neural stimulator in the left posterior soft tissues enters the posterior epidural space at L1-L2 and extends cranially beyond the field of view. Unchanged right medial limb adrenal adenoma. No acute fracture of the lumbar spine with an unchanged chronic superior endplate compression fracture at L3. Moderate to severe multilevel spondylosis which is unchanged from 10 months prior. Xr Shoulder Left (min 2 Views)    Result Date: 2/10/2021  EXAMINATION: THREE XRAY VIEWS OF THE LEFT SHOULDER 2/10/2021 1:05 pm COMPARISON: August 3, 2015 HISTORY: ORDERING SYSTEM PROVIDED HISTORY: fall, pain TECHNOLOGIST PROVIDED HISTORY: Reason for exam:->fall, pain FINDINGS: No fracture or dislocation of the shoulder. Acromioclavicular joint is intact. No evidence of shoulder fracture or dislocation. RECOMMENDATION: None.      ------------------------- NURSING NOTES AND VITALS REVIEWED ---------------------------  Date / Time Roomed:  2/27/2021  3:30 AM  ED Bed Assignment:  19/19    The nursing notes within the ED encounter and vital signs as below have been reviewed.    BP (!) 165/72   Pulse 81   Temp 97.8 °F (36.6 °C) (Oral) Resp 16   Wt 248 lb (112.5 kg)   LMP 08/31/1988   SpO2 93%   BMI 38.84 kg/m²   Oxygen Saturation Interpretation: Normal      ------------------------------------------ PROGRESS NOTES ------------------------------------------  Time: 0500  Re-evaluation. Patients symptoms are improving  Repeat physical examination is improved        I have spoken with the patient and discussed todays availabe results to this point, in addition to providing specific details for the plan of care and counseling regarding the diagnosis and prognosis. Their questions are answered, however they are not agreeable to admission.     --------------------------------- ADDITIONAL PROVIDER NOTES ---------------------------------  This patient has chosen to leave against medical advice. I have personally explained to them that choosing to do so may result in permanent bodily harm or death. I discussed at length that without further evaluation and monitoring there may be unforeseen circumstances and deterioration resulting in permanent bodily harm or death as a result of their choice. They are alert, oriented, and competent at this time. They state that they are aware of the serious risks as explained, but they continue to wish to leave against medical   advice. In light of their decision to leave against medical advice, follow-up has been arranged and they are aware of the importance of following up as instructed. They have been advised that they should return to the ED immediately if they change their mind at any time, or if their condition begins to change or worsen. The follow up plan has been discussed in detail and they are aware of the specific conditions for emergent return, as well as the importance of follow-up.       Discharge Medication List as of 2/27/2021  6:44 AM      START taking these medications    Details   lidocaine (LIDODERM) 5 % Place 1 patch onto the skin daily for 10 days 12 hours on, 12 hours off., Disp-10 patch, R-0Print             Diagnosis:  1. Closed fracture of multiple ribs of right side, initial encounter    2. Accident due to mechanical fall without injury, initial encounter        Disposition:  Patient's disposition: Left against medical advice. Patient's condition is fair.        Marsha White MD  Resident  03/10/21 [id number]

## 2021-02-27 NOTE — ED NOTES
Patient signed out AMA, wheeled out to roommates car at this time.      Dayne Duke RN  02/27/21 [id number]

## 2021-02-27 NOTE — ED NOTES
Patient up to bedside commode at this time to urinate, friend at bedside.      Nader Perez RN  02/27/21 [id number]

## 2021-03-01 ENCOUNTER — HOSPITAL ENCOUNTER (OUTPATIENT)
Dept: PSYCHIATRY | Age: 61
Discharge: HOME | End: 2021-03-01
Payer: MEDICAID

## 2021-03-01 PROCEDURE — 99412 PREVENTIVE COUNSELING GROUP: CPT | Performed by: COUNSELOR

## 2021-03-01 PROCEDURE — 82533 TOTAL CORTISOL: CPT

## 2021-03-01 NOTE — PROGRESS NOTES
[facility]   Progress Note      Client Name: Zana Lozano    Treatment Site:   [x][facility]      [] [address] [place] [address]                      CO Level:  ppm    Length of Service: 60 minutes       Session Type:  [] In Person [x] Virtually - Provider Location [][facility]      [x] [address] [place] [address]                    Patient Location    [x]Patient's Home    [] Other:       Session Provided: [] Individual   [x]Group             Client/Staff Ratio: 4/1                                Clients target quit date: 2021       Last date of tobacco use: 21    Client's actual quit date: 21      []  Client still smoking     Pharmacotherapy provided this session:  []  NRT: Patch  []21mg . / []14mg.  / []7mg. []  NRT:  Gum []2mg. / []4mg.   x (  )boxes  []  NRT: Lozenge []2mg.  / []4mg.  x (  ) tubes      []  Varenicline []0.5 mg.  [] 1 mg. Wks. (  )  []  Bupropion    Wks. (  )  [x]  Other: None this week.                                                                  Treatment Objectives addressed during the session:       [] Coping Skils [] Secondhand Smoke Risks   [x] Relapse Prevention [x] Promote Self Efficacy   [] Addiction [] Enhance Self-Image   [] Stress Management [x] Use Self Affirmation   [] Health and Wellness [] Problem Solving Techniques   []  [] Conflict Resolution/Anger Management      Clients Response to counseling:  [x] Active participant                        [] Passive listener        [] No behavior change, still participating                   [] Inappropriate:   [] Implemented other strategy/behavior changes:   [] Discussed Quit Plan that will be implemented  [] Re-set Quit Date:     Clients Response to Pharmacotherapy:  [] Decreased cravings  [] Decrease in tobacco use:   [x] Maintaining abstinence  [] No change experienced by client  []        Risk/Benefit Meds education provided to client  []        Adverse reaction:   []       Other:     Plan of Action:  [x] Maintain abstinence  [] Continue treatment;     [] Change pharmacotherapy:   [] Attend Nicotine Anonymous meeting   []        Assignments/Homework:   []        Triggers / Concerns to be addressed:   [] Graduated / received aftercare plan    Comments: client reported quitting 2 days after her quit date. Client reports wearing the patch and using the lozenges and that they appear to be working. PORTER: Signature, Date, Time: Electronically signed by Dorothy Fields on 3/1/2021 at 4:56 PM          CO Level:  No reading taken due to pandemic.

## 2021-03-03 ENCOUNTER — HOSPITAL ENCOUNTER (OUTPATIENT)
Age: 61
Discharge: HOME | End: 2021-03-03
Payer: MEDICAID

## 2021-03-03 ENCOUNTER — TELEPHONE (OUTPATIENT)
Dept: ENT CLINIC | Age: 61
End: 2021-03-03

## 2021-03-03 DIAGNOSIS — E04.2 NON-TOXIC MULTINODULAR GOITER: ICD-10-CM

## 2021-03-03 LAB
T3 TOTAL: 107.3 NG/DL (ref 80–200)
T4 FREE: 1.25 NG/DL (ref 0.93–1.7)
TSH SERPL DL<=0.05 MIU/L-ACNC: 0.37 UIU/ML (ref 0.27–4.2)

## 2021-03-03 PROCEDURE — 84439 ASSAY OF FREE THYROXINE: CPT

## 2021-03-03 PROCEDURE — 84480 ASSAY TRIIODOTHYRONINE (T3): CPT

## 2021-03-03 PROCEDURE — 84443 ASSAY THYROID STIM HORMONE: CPT

## 2021-03-03 PROCEDURE — 82533 TOTAL CORTISOL: CPT

## 2021-03-03 PROCEDURE — 36415 COLL VENOUS BLD VENIPUNCTURE: CPT

## 2021-03-03 NOTE — TELEPHONE ENCOUNTER
MA LVM due to cancelled ultrasound but still has f/u scheduled with Trevor. Requested call back.      Electronically signed by Claudia Whitney on 3/3/21 at 12:20 PM EST

## 2021-03-08 ENCOUNTER — HOSPITAL ENCOUNTER (OUTPATIENT)
Dept: PSYCHIATRY | Age: 61
Discharge: HOME | End: 2021-03-08
Payer: MEDICAID

## 2021-03-08 ENCOUNTER — OFFICE VISIT (OUTPATIENT)
Dept: ENT CLINIC | Age: 61
End: 2021-03-08
Payer: MEDICAID

## 2021-03-08 VITALS
DIASTOLIC BLOOD PRESSURE: 77 MMHG | BODY MASS INDEX: 38.92 KG/M2 | HEART RATE: 106 BPM | SYSTOLIC BLOOD PRESSURE: 151 MMHG | WEIGHT: 248 LBS | HEIGHT: 67 IN

## 2021-03-08 DIAGNOSIS — E04.1 THYROID NODULE: Primary | ICD-10-CM

## 2021-03-08 DIAGNOSIS — J38.3 VOCAL CORD POLYPOID DEGENERATION: ICD-10-CM

## 2021-03-08 PROCEDURE — 31575 DIAGNOSTIC LARYNGOSCOPY: CPT | Performed by: OTOLARYNGOLOGY

## 2021-03-08 PROCEDURE — G8417 CALC BMI ABV UP PARAM F/U: HCPCS | Performed by: OTOLARYNGOLOGY

## 2021-03-08 PROCEDURE — G8484 FLU IMMUNIZE NO ADMIN: HCPCS | Performed by: OTOLARYNGOLOGY

## 2021-03-08 PROCEDURE — G8427 DOCREV CUR MEDS BY ELIG CLIN: HCPCS | Performed by: OTOLARYNGOLOGY

## 2021-03-08 PROCEDURE — 1036F TOBACCO NON-USER: CPT | Performed by: OTOLARYNGOLOGY

## 2021-03-08 PROCEDURE — 3017F COLORECTAL CA SCREEN DOC REV: CPT | Performed by: OTOLARYNGOLOGY

## 2021-03-08 PROCEDURE — 99214 OFFICE O/P EST MOD 30 MIN: CPT | Performed by: OTOLARYNGOLOGY

## 2021-03-08 PROCEDURE — 99411 PREVENTIVE COUNSELING GROUP: CPT | Performed by: COUNSELOR

## 2021-03-08 NOTE — PROGRESS NOTES
[address] [facility] Otolaryngology  Dr. Lauren Vo. Saran Sol. Ms.Ed        Patient Name:  Pro Peralta  :  1960     CHIEF C/O:    Chief Complaint   Patient presents with    Follow-up     patches on vocal cords        HISTORY OBTAINED FROM:  patient    HISTORY OF PRESENT ILLNESS:       Joie Coppola is a 61y.o. year old female, here today for follow up of for evaluation and discussion of 2 separate definable events. First, patient states she had a history of thyroid nodularity, underwent repeat thyroid ultrasound today results reviewed for persistent thyroid nodularity. No other complaints today of hoarseness or neck swelling difficulty swallowing. Patient with a history of recurrent hoarseness, with a history of significant changes to the vocal cords patient has quit smoking and her hoarseness significantly improved. She does use intermittent Astelin for chronic allergic rhinitis postnasal drainage and is on Pepcid 20 mg daily. Patient states overall throat and voice feel significantly improved from previous evaluations.       Past Medical History:   Diagnosis Date    Adenoma of right adrenal gland     Anemia     Anxiety     Arthritis     spinal    Asthma     controlled with inhalers     Benign essential tremor     Bipolar affective (HCC)     Chronic back pain     Spinal cord stimulator in place    Chronic respiratory failure with hypoxia (HCC)     at times dt diaphragm does not function properly dt Mitochondrial disorder    Depression     stable    Difficulty swallowing     for EGD 10-8-19     DM (diabetes mellitus) ([address]     Environmental and seasonal allergies     Fatty liver     Full dentures     Generalized seizure disorder ([address] 2012    GERD (gastroesophageal reflux disease)     Gout     past hx of    Herniated cervical disc     limited rom of head and neck    History of swelling of feet     Lymphedema of lower extremity 2012    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 Arslan Valentino [facility]    Neuropathy     at feet    Obesity     PETR (obstructive sleep apnea)     no CPAP dt insurance will not pay for    PONV (postoperative nausea and vomiting)     Seizures ([facility]     last approx 6-13-19- f/u w/ PCP  Granmal, flares up in heat/ summer time - no recent issues as of 10-4-19     Spinal headache     Thyroid disease      Past Surgical History:   Procedure Laterality Date    APPENDECTOMY      with Hysterectomy / unknown    BACK SURGERY  last one 1995    lumbar x 2    CARDIAC CATHETERIZATION Right 6-6-2013    Dr. Crys Arreola Radial, no stents placed, no blockages    [address]    open with gastric bypass    COLONOSCOPY N/A 9/18/2018    COLONOSCOPY POLYPECTOMY HOT BIOPSY performed by Amy Tony MD at [address] COLONOSCOPY N/A 10/8/2019    COLONOSCOPY POLYPECTOMY SNARE/COLD BIOPSY performed by Amy Tony MD at [address] EGD COLONOSCOPY N/A 10/8/2019    EGD ESOPHAGOGASTRODUODENOSCOPY DILATATION performed by Amy Tony MD at [address] [facility], COLON, DIAGNOSTIC      ESOPHAGEAL DILATATION  9/18/2018    ESOPHAGEAL DILATION Verl Devries performed by Amy Tony MD at [address] [facility] Bilateral 2007,2017    knees    KNEE SURGERY Bilateral     scope    MYOMECTOMY      NERVE BLOCK Left 10 02 2013    lumbar paravertebral facet #2    NERVE BLOCK Left 10 9 13    hip inj #1    NERVE BLOCK Left 10/16/13    hip injection    NERVE BLOCK Left 10/29/2014    left lumbar transforaminal nerve lbock  #1    NERVE BLOCK Left 11/12/2014    lumbar left transforaminal nerve block  #2    NERVE BLOCK Left 12 8 14    lumbar transforaminal #3    NERVE BLOCK Right 3/30/15    cervical transforaminal #1    NERVE BLOCK Right 4/6/2015    right cervical transforaminal nerve block  #2  NERVE BLOCK Right 4/13/15    cervical transforaminal #3    NERVE BLOCK Left 7/6/15    knee injection #1    NERVE BLOCK  07/20/15    left genicular nerve block/knee #2    NERVE BLOCK Left 10 1 15    lumb transforam #1    NERVE BLOCK  10/26/15    left lumbar transforaminal nerve block #3    OTHER SURGICAL HISTORY Left 11 25 13    lumbar radiofreq    OTHER SURGICAL HISTORY  3/28/2016    stage 1, 3 day percutanous trial [employer] lumbar spinal cord stimulator    OTHER SURGICAL HISTORY  1995    racz procedure / lower back    [place] COLONOSCOPY FLX DX W/COLLJ SPEC WHEN PFRMD N/A 3/20/2018    COLONOSCOPY DIAGNOSTIC OR SCREENING performed by Jason Trinidad MD at [place] [address] EGD TRANSORAL BIOPSY SINGLE/MULTIPLE N/A 3/20/2018    EGD BIOPSY performed by Jason Trinidad MD at [address] [place] [address]         Current Outpatient Medications:     cetirizine (ZYRTEC) 10 MG tablet, take 1 tablet by mouth once daily as directed, Disp: , Rfl:     clotrimazole-betamethasone (LOTRISONE) 1-0.05 % lotion, , Disp: , Rfl:     Co-Enzyme Q10 100 MG CAPS, TAKE 1 CAPSULE BY MOUTH TWICE DAILY AS DIRECTED, Disp: , Rfl:     cyanocobalamin 50 MCG tablet, Take 100 mcg by mouth daily, Disp: , Rfl:     diclofenac sodium (VOLTAREN) 1 % GEL, APPLY 1 TO 2 GRAMS 3 TO 4 TIMES DAILY AS DIRECTED, Disp: , Rfl:     ferrous sulfate (FE TABS 325) 325 (65 Fe) MG EC tablet, take 1 tablet by mouth once daily, Disp: , Rfl:     Glucosamine-Chondroitin 750-600 MG CHEW, Take by mouth 2 times daily, Disp: , Rfl:     lidocaine-prilocaine (EMLA) 2.5-2.5 % cream, APPLY 1 TO 2 GRAMS 3 TO 4 TIMES DAILY AS DIRECTED, Disp: , Rfl:     mupirocin (BACTROBAN) 2 % ointment, apply to affected area twice a day, Disp: , Rfl:     potassium chloride (KLOR-CON) 10 MEQ extended release tablet, Take 10 mEq by mouth as needed, Disp: , Rfl:     Pseudoephedrine-Naproxen Na (ALEVE-D SINUS & COLD PO), , Disp: , Rfl:     raNITIdine (ZANTAC) 150 MG tablet, Take 1 tablet twice a day by oral route., Disp: , Rfl:     famotidine (PEPCID) 20 MG tablet, Take 1 tablet by mouth 2 times daily, Disp: 60 tablet, Rfl: 0    traZODone (DESYREL) 100 MG tablet, Take 100 mg by mouth nightly, Disp: , Rfl:     ondansetron (ZOFRAN-ODT) 4 MG disintegrating tablet, Take 1 tablet by mouth 3 times daily as needed for Nausea or Vomiting, Disp: 21 tablet, Rfl: 0    albuterol (PROVENTIL) (5 MG/ML) 0.5% nebulizer solution, Take 2.5 mg by nebulization 3 times daily Instructed to take am of procedure, Disp: , Rfl:     B Complex Vitamins (VITAMIN-B COMPLEX PO), Take by mouth daily LD patient to verify with Dr. Sajan Edwards, Disp: , Rfl:     magnesium 200 MG TABS tablet, Take 200 mg by mouth 2 times daily LD to verify with Dr. Sajan Edwards  LD 10-4-19, Disp: , Rfl:     calcium carbonate (CALCIUM 600) 600 MG TABS tablet, Take 1 tablet by mouth 2 times daily LD patient to verify with Dr. Sajan Edwards, Disp: , Rfl:     azelastine (ASTELIN) 0.1 % nasal spray, 1 spray by Nasal route 2 times daily Use in each nostril as directed, Disp: 1 Bottle, Rfl: 3    montelukast (SINGULAIR) 10 MG tablet, Take 1 tablet by mouth daily, Disp: 30 tablet, Rfl: 3    omeprazole (PRILOSEC) 20 MG delayed release capsule, Take 20 mg by mouth Daily Instructed to take am of procedure, Disp: , Rfl:     oxyCODONE-acetaminophen (PERCOCET)  MG per tablet, Take 1 tablet by mouth every 8 hours as needed for Pain (pt took 1 tablet at 6pm tonight).  Instructed to take am of procedure , Disp: , Rfl:     baclofen (LIORESAL) 20 MG tablet, Take 20 mg by mouth 4 times daily Instructed to take am of procedure, Disp: , Rfl:     furosemide (LASIX) 40 MG tablet, Take 1 tablet by mouth 2 times daily, Disp: 60 tablet, Rfl: 0    albuterol sulfate  (90 Base) MCG/ACT inhaler, Inhale 2 puffs into the lungs every 4 hours as needed for Wheezing or Shortness of Breath Instructed to take am of procedure and bring, Disp: , Rfl:    Cholecalciferol (VITAMIN D3) 5000 units TABS, Take 1 tablet by mouth every other day LD 2019, Disp: , Rfl:     docusate sodium (COLACE) 100 MG capsule, Take 100 mg by mouth 2 times daily, Disp: , Rfl:     linaclotide (LINZESS) 145 MCG capsule, Take 290 mcg by mouth every morning (before breakfast) , Disp: , Rfl:     levOCARNitine (CARNITOR) 330 MG tablet, Take 330 mg by mouth 3 times daily Instructed to take am of procedure For mitochondrial disease, Disp: , Rfl:     allopurinol (ZYLOPRIM) 100 MG tablet, Take 200 mg by mouth 2 times daily , Disp: , Rfl:     escitalopram (LEXAPRO) 20 MG tablet, Take 20 mg by mouth 2 times daily Instructed to take am of procedure, Disp: , Rfl:     gabapentin (NEURONTIN) 600 MG tablet, Take 1 tablet by mouth 4 times daily. (Patient taking differently: Take 600 mg by mouth 4 times daily. Instructed to take am of procedure), Disp: 120 tablet, Rfl: 5    topiramate (TOPAMAX) 200 MG tablet, Take 1 tablet by mouth 2 times daily. , Disp: 1 tablet, Rfl: 0    ziprasidone (GEODON) 20 MG capsule, Take 20 mg by mouth nightly , Disp: , Rfl:     predniSONE (DELTASONE) 10 MG tablet, Take 10 mg by mouth daily, Disp: , Rfl:     EPINEPHrine (EPIPEN 2-CARMEN) 0.3 MG/0.3ML SOAJ injection, Inject 0.3 mLs into the muscle once for 1 dose Use as directed for allergic reaction, Disp: 1 each, Rfl: 0  Bee pollen, Penicillins, Ropinirole hcl, Vistaril [hydroxyzine hcl], Aripiprazole, Hydroxyzine pamoate, and Tape [adhesive tape]  Social History     Tobacco Use    Smoking status: Former Smoker     Packs/day: 0.50     Years: 42.00     Pack years: 21.00     Types: Cigarettes     Start date: 1990     Quit date: 3/11/2021     Years since quittin.0    Smokeless tobacco: Never Used    Tobacco comment: last ciggerette thursday the    Substance Use Topics    Alcohol use: No     Alcohol/week: 0.0 standard drinks    Drug use: No     Family History   Problem Relation Age of Onset    Post-operative Diagnosis: same    Anesthesia: Lidocaine 2% and Anival-Synephrine 1/2%    Endoscopy Type:  Flexible Laryngoscopy    Procedure Details: The patient was placed in the sitting position. After topical anesthesia and decongestion, the 4 mm laryngoscope was passed. The nasal cavities, nasopharynx, oropharynx, hypopharynx, and larynx were all examined. Vocal cords were examined during respiration and phonation. The following findings were noted:    Findings: Normal nasopharynx, normal epiglottis, normal tongue base, normal pyriform, normal TVC motion and mucosa, no subglottic masses or lesions      Condition:  Stable. Patient tolerated procedure well. Complications:  None    IMPRESSION/PLAN:  Patient seen and examined, thyroid nodularities are unchanged, with no significant signs for indication for biopsy. She will undergo thyroid ultrasound in 1 year for reevaluation. Patient also has a history of persistent hoarseness, with inflammatory polypoid changes to the vocal cords she underwent fiberoptic nasopharyngoscopy today, she has significant overall improvement in appearance of her glottic airway secondary to smoking cessation, continued nasal antihistamine as well as continue medical therapy for reflux disease. Continue current medical therapies, and follow-up in 1 year. Dr. Ezequiel Montes De Oca.  Otolaryngology Facial Plastic Surgery  :  [facility] Otolaryngology/Facial Plastic Surgery [facility]  Associate Clinical Professor:  Era Torres, [facility]

## 2021-03-09 NOTE — PROGRESS NOTES
[address] [facility]   Progress Note      Client Name: Sajan Medina    Treatment Site:   [x][facility]      [] [address] [place] [address]                      CO Level:  ppm    Length of Service: 30 minutes       Session Type:  [] In Person [x] Virtually - Provider Location [x][facility]      [] [address] [place] [address]                    Patient Location    [x]Patient's Home    [] Other:       Session Provided: [] Individual   [x]Group             Client/Staff Ratio: 4/1                                Clients target quit date: 21       Last date of tobacco use: 3/6/2021    Client's actual quit date: 3/7/2021      []  Client still smoking     Pharmacotherapy provided this session:  [x]  NRT: Patch  []21mg . / [x]14mg.  / []7mg. []  NRT:  Gum []2mg. / []4mg.   x (  )boxes  [x]  NRT: Lozenge [x]2mg.  / []4mg.  x (2  ) tubes      []  Varenicline []0.5 mg.  [] 1 mg. Wks. (  )  []  Bupropion    Wks.  (  )  []  Other                                                                Treatment Objectives addressed during the session:       [] Coping Skils [] Secondhand Smoke Risks   [] Relapse Prevention [x] Promote Self Efficacy   [x] Addiction [] Enhance Self-Image   [] Stress Management [x] Use Self Affirmation   [] Health and Wellness [] Problem Solving Techniques   []  [] Conflict Resolution/Anger Management      Clients Response to counseling:  [x] Active participant                        [] Passive listener        [] No behavior change, still participating                   [] Inappropriate:   [] Implemented other strategy/behavior changes:   [] Discussed Quit Plan that will be implemented  [] Re-set Quit Date:     Clients Response to Pharmacotherapy:  [] Decreased cravings  [] Decrease in tobacco use:   [] Maintaining abstinence  [] No change experienced by client  []        Risk/Benefit Meds education provided to client  []        Adverse reaction:   [x]       Other: client reported a lapse on 3/6/2021. Then re-engaged with quit process on 3/7/21. Plan of Action:  [] Maintain abstinence  [x] Continue treatment;     [] Change pharmacotherapy:   [] Attend Nicotine Anonymous meeting   [x]        Assignments/Homework: complete addiction section of crash course. [x]        Triggers / Concerns to be addressed: had client identify triggers that led to lapse. [] Graduated / received aftercare plan    Comments: client was anticipating test results pertaining to her health and received good news. Client has recommitted to the quit process. PORTER: Signature, Date, Time: Electronically signed by Reddy Aguilar on 3/9/2021 at 5:02 PM          CO Level:  No reading taken; phone session.

## 2021-03-10 ENCOUNTER — PREP FOR PROCEDURE (OUTPATIENT)
Dept: PAIN MANAGEMENT | Age: 61
End: 2021-03-10

## 2021-03-10 ENCOUNTER — OFFICE VISIT (OUTPATIENT)
Dept: PAIN MANAGEMENT | Age: 61
End: 2021-03-10
Payer: MEDICAID

## 2021-03-10 VITALS
WEIGHT: 248 LBS | BODY MASS INDEX: 38.92 KG/M2 | HEIGHT: 67 IN | OXYGEN SATURATION: 98 % | TEMPERATURE: 97.3 F | SYSTOLIC BLOOD PRESSURE: 122 MMHG | RESPIRATION RATE: 16 BRPM | HEART RATE: 95 BPM | DIASTOLIC BLOOD PRESSURE: 74 MMHG

## 2021-03-10 DIAGNOSIS — E66.9 OBESITY, UNSPECIFIED CLASSIFICATION, UNSPECIFIED OBESITY TYPE, UNSPECIFIED WHETHER SERIOUS COMORBIDITY PRESENT: ICD-10-CM

## 2021-03-10 DIAGNOSIS — M54.2 CERVICALGIA: ICD-10-CM

## 2021-03-10 DIAGNOSIS — F54 PSYCHOLOGICAL FACTORS AFFECTING MEDICAL CONDITION: ICD-10-CM

## 2021-03-10 DIAGNOSIS — M53.3 SACROILIAC DYSFUNCTION: ICD-10-CM

## 2021-03-10 DIAGNOSIS — M47.817 LUMBOSACRAL SPONDYLOSIS WITHOUT MYELOPATHY: ICD-10-CM

## 2021-03-10 DIAGNOSIS — M53.3 SACROILIAC DYSFUNCTION: Primary | ICD-10-CM

## 2021-03-10 DIAGNOSIS — M50.30 DDD (DEGENERATIVE DISC DISEASE), CERVICAL: ICD-10-CM

## 2021-03-10 DIAGNOSIS — F11.90 CHRONIC, CONTINUOUS USE OF OPIOIDS: ICD-10-CM

## 2021-03-10 DIAGNOSIS — M51.36 DDD (DEGENERATIVE DISC DISEASE), LUMBAR: ICD-10-CM

## 2021-03-10 DIAGNOSIS — E88.40 MITOCHONDRIAL CYTOPATHY (HCC): ICD-10-CM

## 2021-03-10 DIAGNOSIS — M43.02 CERVICAL SPONDYLOLYSIS: ICD-10-CM

## 2021-03-10 DIAGNOSIS — M96.1 POSTLAMINECTOMY SYNDROME, LUMBAR: Primary | ICD-10-CM

## 2021-03-10 DIAGNOSIS — M51.34 THORACIC DEGENERATIVE DISC DISEASE: ICD-10-CM

## 2021-03-10 PROBLEM — M51.369 DDD (DEGENERATIVE DISC DISEASE), LUMBAR: Status: ACTIVE | Noted: 2021-03-10

## 2021-03-10 LAB
Lab: NORMAL
REPORT: NORMAL
THIS TEST SENT TO: NORMAL

## 2021-03-10 PROCEDURE — 99204 OFFICE O/P NEW MOD 45 MIN: CPT | Performed by: ANESTHESIOLOGY

## 2021-03-10 PROCEDURE — 99205 OFFICE O/P NEW HI 60 MIN: CPT | Performed by: ANESTHESIOLOGY

## 2021-03-10 PROCEDURE — G8417 CALC BMI ABV UP PARAM F/U: HCPCS | Performed by: ANESTHESIOLOGY

## 2021-03-10 PROCEDURE — G8484 FLU IMMUNIZE NO ADMIN: HCPCS | Performed by: ANESTHESIOLOGY

## 2021-03-10 PROCEDURE — 4004F PT TOBACCO SCREEN RCVD TLK: CPT | Performed by: ANESTHESIOLOGY

## 2021-03-10 PROCEDURE — G8427 DOCREV CUR MEDS BY ELIG CLIN: HCPCS | Performed by: ANESTHESIOLOGY

## 2021-03-10 PROCEDURE — 3017F COLORECTAL CA SCREEN DOC REV: CPT | Performed by: ANESTHESIOLOGY

## 2021-03-10 NOTE — PROGRESS NOTES
[facility]        [address] [address], [address]  Dept: [phone]          Consult Note      Patient:  Eddy Santillan,  1960    Date of Service:  03/10/21     Requesting Physician:  RADHA Burciaga    Reason for Consult:      Patient presents with complaints of low back pain and neck pain    HISTORY OF PRESENT ILLNESS:      Ms. Eddy Santillan is a 61 y.o. female presented today to [address] for evaluation of  Chronic low back pain and neck pain for years- > 20 yrs. Prior lumbar spine surgery - in  laminectomy and discectomy. Had interventions by Dr. Frank Wilder. S/P SCS implantation- 2016 apparently gets coverage in the LE but does not cover low back area. CardiaLen. Has not programmed it for past few yrs. Neck pain - Predominantly axial intermittent left UE pain. Has been evaluated by NSG- no surgical indication. In the past used to get pain meds from Dr. Frank Wilder but apparently was DC'd due to noncompliance. Currently has been under the care of Dr. Lexa Gibson- getting pain meds from him- Currently on Percocet 10/325 Q 6 hrs. 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 Pain is constant and is described as aching and throbbing. She  has numbness, tingling of the hands and feet     Patient has bladder issues- KIP for over 10 yrs. Alleviating factors include: rest.  Aggravating factors include: movement, bending, lifting. Pain causes functional limitations/ limits Adl's : Yes    Nursing notes and details of the pain history reviewed. Please see intake notes for details.     NOTE:  Mitochondrial disease  H/o neuro[dell  H/o morbid obesity- s/p gastric bypass   Bipolar disorder    Previous treatments:   Physical Therapy : yes, / HEP     Medications: - NSAID's : yes             - Membrane stabilizers : yes             - Opioids : yes, - by Dr. Светлана Matias            - Adjuvants or Others : yes,     Surgeries: yes, lumbar spine surgery  In 1994, spinal cord stimulator. Interventional Pain procedures/ nerve blocks: yes, multiple     She has not been on anticoagulation medications     She has been on dietary/ herbal supplements. She is not diabetic. H/O Smoking: yes  H/O alcohol abuse : denies  H/O Illicit drug use : denies    Employment: disability    Imaging:     CT lumbar spine: 4/2020: Impression       1. No acute fracture. 2. Stable compression fracture L3.   3. Multilevel degenerative changes with several old disc herniations,   bone spurs and mild to moderate stenosis as described. 4. Bone spurs causing a significant left neural foraminal stenosis   L5/S1. Not obviously changed. 5. A right renal calculus. CT thoracic spine: Myelogram: 9/23/2020:  IMPRESSION:        1. CT myelography was performed. 2. Disc protrusions at multiple levels in the thoracic spine,   resulting in mild central canal stenosis at T10-11.   3. Mild neural foraminal stenoses at T9-10, T10-11 and T11-12. 4. Spinal stimulator in situ.           CT Myelogram Cervical spine: 9/23/2020: Impression       1. CT myelography was performed. The thecal sac was well opacified. 2. Mild central canal stenoses at C5-6 and C6-7.   3. No significant neural foraminal stenosis is appreciated.        Past Medical History:   Diagnosis Date    Adenoma of right adrenal gland     Anemia     Anxiety     Arthritis     spinal    Asthma     controlled with inhalers     Benign essential tremor     Bipolar affective (HCC)     Chronic back pain     Spinal cord stimulator in place    Chronic respiratory failure with hypoxia (HCC)     at times dt diaphragm does not function properly dt Mitochondrial disorder    Depression     stable    Difficulty swallowing     for EGD 10-8-19     DM (diabetes mellitus) ([facility] [address]     Environmental and seasonal allergies     Fatty liver     Full dentures     Generalized seizure disorder ([facility] [address] 5/6/2012    GERD (gastroesophageal reflux disease)     Gout     past hx of    Herniated cervical disc     limited rom of head and neck    History of swelling of feet     Lymphedema of lower extremity 09/06/2012    Mitochondrial cytopathy (HCC)     s/s muscle and nerve pain, difficulty breathing, seizures, difficulty swallowing, digestive disorders- Dr. Tracey Ni [facility]    Neuropathy     at feet    Obesity     PETR (obstructive sleep apnea)     no CPAP dt insurance will not pay for    PONV (postoperative nausea and vomiting)     Seizures ([facility] [address]     last approx 6-13-19- f/u w/ PCP  Granmal, flares up in heat/ summer time - no recent issues as of 10-4-19     Spinal headache     Thyroid disease        Past Surgical History:   Procedure Laterality Date    APPENDECTOMY      with Hysterectomy / unknown    BACK SURGERY  last one 1995    lumbar x 2    CARDIAC CATHETERIZATION Right 6-6-2013    Dr. Yulisa Oneill, no stents placed, no blockages    [address]    open with gastric bypass    COLONOSCOPY N/A 9/18/2018    COLONOSCOPY POLYPECTOMY HOT BIOPSY performed by CHRISTOPHE Tom MD at [address] [facility] COLONOSCOPY N/A 10/8/2019    COLONOSCOPY POLYPECTOMY SNARE/COLD BIOPSY performed by Nusrat Peres MD at [address] [facility] EGD COLONOSCOPY N/A 10/8/2019    EGD ESOPHAGOGASTRODUODENOSCOPY DILATATION performed by CHRISTOPHE Tom MD at [address] [facility], COLON, DIAGNOSTIC      ESOPHAGEAL DILATATION  9/18/2018    ESOPHAGEAL DILATION [place] performed by Nusrat Peres MD at [address] [facility] Bilateral 2007,2017    knees    KNEE SURGERY Bilateral     scope    MYOMECTOMY      NERVE BLOCK Left 10 02 2013    lumbar paravertebral facet #2    NERVE BLOCK Left 10 9 13    hip inj #1    NERVE BLOCK Left 10/16/13    hip injection    NERVE BLOCK Left 10/29/2014    left lumbar transforaminal nerve lbock  #1    NERVE BLOCK Left 11/12/2014    lumbar left transforaminal nerve block  #2    NERVE BLOCK Left 12 8 14    lumbar transforaminal #3    NERVE BLOCK Right 3/30/15    cervical transforaminal #1    NERVE BLOCK Right 4/6/2015    right cervical transforaminal nerve block  #2    NERVE BLOCK Right 4/13/15    cervical transforaminal #3    NERVE BLOCK Left 7/6/15    knee injection #1    NERVE BLOCK  07/20/15    left genicular nerve block/knee #2    NERVE BLOCK Left 10 1 15    lumb transforam #1    NERVE BLOCK  10/26/15    left lumbar transforaminal nerve block #3    OTHER SURGICAL HISTORY Left 11 25 13    lumbar radiofreq    OTHER SURGICAL HISTORY  3/28/2016    stage 1, 3 day percutanous trial [employer] lumbar spinal cord stimulator    OTHER SURGICAL HISTORY  1995    racz procedure / lower back    MN COLONOSCOPY FLX DX W/COLLJ SPEC WHEN PFRMD N/A 3/20/2018    COLONOSCOPY DIAGNOSTIC OR SCREENING performed by Katharine Wray MD at [place] [address] EGD TRANSORAL BIOPSY SINGLE/MULTIPLE N/A 3/20/2018    EGD BIOPSY performed by Katharine Wray MD at [address] [place] [address]         Prior to Admission medications    Medication Sig Start Date End Date Taking?  Authorizing Provider   cetirizine (ZYRTEC) 10 MG tablet take 1 tablet by mouth once daily as directed 1/27/21   Historical Provider, MD   clotrimazole-betamethasone (Lexy Wilber) 1-0.05 % lotion     Historical Provider, MD   Co-Enzyme Q10 100 MG CAPS TAKE 1 CAPSULE BY MOUTH TWICE DAILY AS DIRECTED 1/18/21   Historical Provider, MD   cyanocobalamin 50 MCG tablet Take 100 mcg by mouth daily    Historical Provider, MD   diclofenac sodium (VOLTAREN) 1 % GEL APPLY 1 TO 2 GRAMS 3 TO 4 TIMES DAILY AS DIRECTED 10/31/20   Historical Provider, MD   ferrous sulfate (FE TABS 325) 325 (65 Fe) MG EC tablet take 1 tablet by mouth once daily 1/12/21   Historical Provider, MD   Glucosamine-Chondroitin 750-600 MG CHEW Take by mouth 2 times daily    Historical Provider, MD   lidocaine-prilocaine (EMLA) 2.5-2.5 % cream APPLY 1 TO 2 GRAMS 3 TO 4 TIMES DAILY AS DIRECTED 10/31/20   Historical Provider, MD   mupirocin (BACTROBAN) 2 % ointment apply to affected area twice a day 1/5/21   Historical Provider, MD   potassium chloride (KLOR-CON) 10 MEQ extended release tablet Take 10 mEq by mouth as needed    Historical Provider, MD   predniSONE (DELTASONE) 10 MG tablet Take 10 mg by mouth daily    Historical Provider, MD   Pseudoephedrine-Naproxen Na (ALEVE-D SINUS & COLD PO)     Historical Provider, MD   raNITIdine (ZANTAC) 150 MG tablet Take 1 tablet twice a day by oral route.     Historical Provider, MD   famotidine (PEPCID) 20 MG tablet Take 1 tablet by mouth 2 times daily 9/10/20   Jennifer Vidal MD   EPINEPHrine (EPIPEN 2-CARMEN) 0.3 MG/0.3ML SOAJ injection Inject 0.3 mLs into the muscle once for 1 dose Use as directed for allergic reaction 9/10/20 2/25/21  Jennifer Vidal MD   traZODone (DESYREL) 100 MG tablet Take 100 mg by mouth nightly    Historical Provider, MD   ondansetron (ZOFRAN-ODT) 4 MG disintegrating tablet Take 1 tablet by mouth 3 times daily as needed for Nausea or Vomiting 2/26/20   Mahad Rolle MD   albuterol (PROVENTIL) (5 MG/ML) 0.5% nebulizer solution Take 2.5 mg by nebulization 3 times daily Instructed to take am of procedure    Historical Provider, MD   B Complex Vitamins (VITAMIN-B COMPLEX PO) Take by mouth daily LD patient to verify with Dr. Huntley Bagwell Provider, MD   magnesium 200 MG TABS tablet Take 200 mg by mouth 2 times daily LD to verify with Dr. Roland Montoya   LD 10-4-19    Historical Provider, MD   calcium carbonate (CALCIUM 600) 600 MG TABS tablet Take 1 tablet by mouth 2 times daily LD patient to verify with Dr. Huntley Bagwell Provider, MD   azelastine (ASTELIN) 0.1 % nasal spray 1 spray by Nasal route 2 times daily Use in each nostril as directed 8/15/19   Pola Murray DO   montelukast (SINGULAIR) 10 MG tablet Take 1 tablet by mouth daily 8/12/19   Mackey Duane Bunevich, DO   omeprazole (PRILOSEC) 20 MG delayed release capsule Take 20 mg by mouth Daily Instructed to take am of procedure    Historical Provider, MD   oxyCODONE-acetaminophen (PERCOCET)  MG per tablet Take 1 tablet by mouth every 8 hours as needed for Pain (pt took 1 tablet at 6pm tonight). Instructed to take am of procedure     Historical Provider, MD   baclofen (LIORESAL) 20 MG tablet Take 20 mg by mouth 4 times daily Instructed to take am of procedure    Historical Provider, MD   furosemide (LASIX) 40 MG tablet Take 1 tablet by mouth 2 times daily 6/14/19   Phil Sanchez, DO   albuterol sulfate  (90 Base) MCG/ACT inhaler Inhale 2 puffs into the lungs every 4 hours as needed for Wheezing or Shortness of Breath Instructed to take am of procedure and bring    Historical Provider, MD   Cholecalciferol (VITAMIN D3) 5000 units TABS Take 1 tablet by mouth every other day LD 9/11/2019    Historical Provider, MD   docusate sodium (COLACE) 100 MG capsule Take 100 mg by mouth 2 times daily    Historical Provider, MD   linaclotide (LINZESS) 145 MCG capsule Take 290 mcg by mouth every morning (before breakfast)     Historical Provider, MD   levOCARNitine (CARNITOR) 330 MG tablet Take 330 mg by mouth 3 times daily Instructed to take am of procedure  For mitochondrial disease    Historical Provider, MD   allopurinol (ZYLOPRIM) 100 MG tablet Take 200 mg by mouth 2 times daily     Historical Provider, MD   escitalopram (LEXAPRO) 20 MG tablet Take 20 mg by mouth 2 times daily Instructed to take am of procedure    Historical Provider, MD   gabapentin (NEURONTIN) 600 MG tablet Take 1 tablet by mouth 4 times daily. Patient taking differently: Take 600 mg by mouth 4 times daily. Instructed to take am of procedure 10/31/13   Kaylin Noel MD   topiramate (TOPAMAX) 200 MG tablet Take 1 tablet by mouth 2 times daily.  5/9/12   Ab Benavides, DO   ziprasidone (GEODON) 20 MG capsule Take 20 mg by mouth nightly     Historical Provider, MD       Allergies   Allergen Reactions    Bee Pollen Anaphylaxis, Shortness Of Breath and Swelling     And wasp    Penicillins Anaphylaxis    Ropinirole Hcl Anaphylaxis    Vistaril [Hydroxyzine Hcl] Anaphylaxis    Aripiprazole Other (See Comments)     muscle spasms and confusion    Hydroxyzine Pamoate Itching and Rash    Tape Simmie Aravind Tape] Rash     Paper tape allergy    Fabric tape is OK to use        Social History     Socioeconomic History    Marital status:      Spouse name: Not on file    Number of children: Not on file    Years of education: Not on file    Highest education level: Not on file   Occupational History    Not on file   Social Needs    Financial resource strain: Not on file    Food insecurity     Worry: Not on file     Inability: Not on file    Transportation needs     Medical: Not on file     Non-medical: Not on file   Tobacco Use    Smoking status: Current Every Day Smoker     Packs/day: 0.50     Years: 42.00     Pack years: 21.00     Types: Cigarettes     Start date: 6/13/1990    Smokeless tobacco: Never Used   Substance and Sexual Activity    Alcohol use: No     Alcohol/week: 0.0 standard drinks    Drug use: No    Sexual activity: Not on file   Lifestyle    Physical activity     Days per week: Not on file     Minutes per session: Not on file    Stress: Not on file   Relationships    Social connections     Talks on phone: Not on file     Gets together: Not on file     Attends Jew service: Not on file     Active member of club or organization: Not on file     Attends meetings of clubs or organizations: Not on file     Relationship status: Not on file    Intimate partner violence     Fear of current or ex partner: Not on file     Emotionally abused: Not on file     Physically abused: Not on file     Forced sexual activity: Not on file   Other Topics Concern    Not on file   Social History Narrative    ** Merged History Encounter **            Family History   Problem Relation Age of Onset    Heart Disease Mother     Cancer Father     Arthritis Other     Cancer Other     Depression Other     Heart Disease Other     Hypertension Other     Mental Illness Other     Stroke Other        REVIEW OF SYSTEMS:     Patient specifically denies fever/chills, chest pain, shortness of breath, new bowel or bladder complaints. All other review of systems was negative. Review of Systems - multiple positive ROS +      PHYSICAL EXAMINATION:      /74   Pulse 95   Temp 97.3 °F (36.3 °C) (Infrared)   Resp 16   Ht 5' 7\" (1.702 m)   Wt 248 lb (112.5 kg)   LMP 08/31/1988   SpO2 98%   BMI 38.84 kg/m²     General:      General appearance:  Pleasant and well-hydrated, in no distress and A & O x 3  Build:Obese  Function: Rises from seated position easily and Moves about room without difficulty    HEENT:    Head:normocephalic, atraumatic    Lungs:    Breathing:normal breathing pattern     CVS:     RRR    Abdomen:    Shape:obese, non-distended and normal    Cervical spine:    Inspection:normal  Palpation:tenderness paravertebral muscles, tenderness trapezium, left, right and positive. Range of motion:reduced flexion, extension, rotation bilaterally and is painful. Facet tenderness +  Spurling's: negative bilaterally    Thoracic spine:     Spine inspection:normal   Palpation:Yes tenderness over the paraspinal area, bilaterally  Range of motion:normal in flexion, extension rotation bilateral and is not painful. Lumbar spine:    Spine inspection: scar from the prior surgery noted- healed well   SCS lead and IPG Site clean   Palpation: Tenderness paravertebral muscles Yes bilaterally  Range of motion: Decreased, flexion Decreased, Lateral bending, extension and rotation bilaterally reduced is painful.   Lumbar facet loading + bilaterally  Sacroiliac joint tenderness Yes bilaterally  Piriformis tenderness: negative bilaterally  SLR : negative bilaterally  Trochanteric bursa tenderness: negative bilaterally  CVA tenderness:No     Musculoskeletal:    Trigger points + diffuse. Extremities:    Tremors:None bilaterally upper and lower  Edema:none x all 4 extremities    Neurological:    Sensory: Normal to light touch - except for diminished sensation over the hands and feet    Motor:   Generalized weakness +     Reflexes:    Bilaterally equal diminished. Gait:uses a cane to assist ambulation    Dermatology:    Skin:no rashes or lesions noted    Assessment/Plan:     Diagnosis Orders   1. Postlaminectomy syndrome, lumbar     2. Lumbosacral spondylosis without myelopathy     3. Sacroiliac dysfunction     4. DDD (degenerative disc disease), lumbar     5. Cervicalgia     6. DDD (degenerative disc disease), cervical     7. Cervical spondylolysis     8. Thoracic degenerative disc disease     9. Obesity, unspecified classification, unspecified obesity type, unspecified whether serious comorbidity present     10. Psychological factors affecting medical condition     11. Mitochondrial cytopathy ([facility] [address]     12. Chronic, continuous use of opioids         61 y.o. female with H/o chronic low back pain, neck pain for years. H/o Mitochondrial disease diffuse pain / neuropathy. Prior lumbar spine surgery years ago. S/p SCS implantation  [place] in 2016 by Dr. Verlin Simmonds. No programming recently. Has facet pain and SIJ pain - SIJ pain is predominant. Neck pain - predominantly axial - facet pain +    Has been evaluated by NSG- CT myelogram-Image findings reviewed and discussed. no high grade stenosis- no surgical interventions. On chronic opioids by Dr. Yasemin Beyer. Smoking +    Plan:    Xray LS spine and Thoracic spine to identify the SCS lead position. Will reprogram of SCS stimulator during the next visit. Bilateral SIJ injection under fluoroscopy. RBA discussed and patient agreed to proceed.     Hold dietary supplements for the procedure. Consider lumbar facet MBNB in future. Neck pain - features of facet pain- consider cervical facet MBNB in future. Patient will continue medication management by Dr. Shanita Mir and understands that our treatment modalities will be non opioid based. Discussed issues with chronic opioid therapy. Weight loss    Smoking cessation    Aquatic therapy. Counseling :    Patient encouraged to stay active and to watch/lose weight    Encouraged to continue Regular home exercise program as tolerated - stretching / strengthening. Treatment plan discussed with the patient including medication and procedure side effects. Controlled Substances Monitoring:     [place] reviewed. Romulo Turner MD    Dear  Yi Jacobs,   Thank you for referring Ms. Holland Lisa and allowing us to participate in her care. Please do not hesitate to contact me if you have any questions regarding her care. Tristin Cortez MD    CC:    Ray Kirk, [address] [facility].   [facility],  [address] [place] [address]

## 2021-03-11 ENCOUNTER — TELEPHONE (OUTPATIENT)
Dept: ADMINISTRATIVE | Age: 61
End: 2021-03-11

## 2021-03-13 ENCOUNTER — TELEPHONE (OUTPATIENT)
Dept: ENDOCRINOLOGY | Age: 61
End: 2021-03-13

## 2021-03-13 ENCOUNTER — HOSPITAL ENCOUNTER (OUTPATIENT)
Dept: GENERAL RADIOLOGY | Age: 61
Discharge: HOME | End: 2021-03-15
Payer: MEDICAID

## 2021-03-13 ENCOUNTER — HOSPITAL ENCOUNTER (OUTPATIENT)
Age: 61
Discharge: HOME | End: 2021-03-15
Payer: MEDICAID

## 2021-03-13 DIAGNOSIS — M96.1 POSTLAMINECTOMY SYNDROME, LUMBAR: ICD-10-CM

## 2021-03-13 DIAGNOSIS — R05.9 COUGH: ICD-10-CM

## 2021-03-13 PROCEDURE — 72110 X-RAY EXAM L-2 SPINE 4/>VWS: CPT

## 2021-03-13 PROCEDURE — 71046 X-RAY EXAM CHEST 2 VIEWS: CPT

## 2021-03-13 PROCEDURE — 72074 X-RAY EXAM THORAC SPINE4/>VW: CPT

## 2021-03-13 NOTE — TELEPHONE ENCOUNTER
Spoke with pt by phone. Salivary cortisol levels are equivocal. Cyclic Cushings is rare, but possible. Very mild Cushings is possible. More likely, her high DST and 1 elevated midnight salivary level out of 3 samples are normal variants. Retesting in one year is reasonable. She will be seeing Dr. Sarai Teran 5/18/2021 to establish further care with him. He will evaluate and make the decision on whether repeat testing is indicated.     Below is a summary of the cortisol work up    [place] [id number]  03/13/21  _______________________    Right adrenal nodule - 2010 (per 12/28/12 CT report)  12/28/19  CT of abd : Stable small nodule in the right adrenal gland likely reflecting adenoma    02/06/20  24 urine free cortisol nml                    02/06/20 Serum Cortisol level 6.06 mcg/dl, but was checked at 4:38 p     5/11/20  Dexamethasone suppression test: Cortisol 3.03, ACTH 5.8    2/25-27/21 Salivary cortisol, midnight: 0.074 mcg/dl, 0.098, 0.124  Levels >0.112 mcg/dl are consistent with Cushing's syndrome

## 2021-03-15 ENCOUNTER — HOSPITAL ENCOUNTER (OUTPATIENT)
Dept: PSYCHIATRY | Age: 61
Discharge: HOME | End: 2021-03-15
Payer: MEDICAID

## 2021-03-15 ENCOUNTER — OFFICE VISIT (OUTPATIENT)
Dept: PAIN MANAGEMENT | Age: 61
End: 2021-03-15
Payer: MEDICAID

## 2021-03-15 VITALS
DIASTOLIC BLOOD PRESSURE: 74 MMHG | HEART RATE: 80 BPM | RESPIRATION RATE: 16 BRPM | HEIGHT: 67 IN | BODY MASS INDEX: 38.92 KG/M2 | SYSTOLIC BLOOD PRESSURE: 122 MMHG | TEMPERATURE: 96.5 F | OXYGEN SATURATION: 92 % | WEIGHT: 248 LBS

## 2021-03-15 DIAGNOSIS — M53.3 SACROILIAC DYSFUNCTION: Primary | ICD-10-CM

## 2021-03-15 DIAGNOSIS — M43.02 CERVICAL SPONDYLOLYSIS: ICD-10-CM

## 2021-03-15 DIAGNOSIS — E88.40 MITOCHONDRIAL CYTOPATHY (HCC): ICD-10-CM

## 2021-03-15 DIAGNOSIS — E66.9 OBESITY, UNSPECIFIED CLASSIFICATION, UNSPECIFIED OBESITY TYPE, UNSPECIFIED WHETHER SERIOUS COMORBIDITY PRESENT: ICD-10-CM

## 2021-03-15 DIAGNOSIS — M96.1 POSTLAMINECTOMY SYNDROME, LUMBAR: ICD-10-CM

## 2021-03-15 DIAGNOSIS — M54.2 CERVICALGIA: ICD-10-CM

## 2021-03-15 DIAGNOSIS — M47.817 LUMBOSACRAL SPONDYLOSIS WITHOUT MYELOPATHY: ICD-10-CM

## 2021-03-15 DIAGNOSIS — F54 PSYCHOLOGICAL FACTORS AFFECTING MEDICAL CONDITION: ICD-10-CM

## 2021-03-15 DIAGNOSIS — M51.36 DDD (DEGENERATIVE DISC DISEASE), LUMBAR: ICD-10-CM

## 2021-03-15 DIAGNOSIS — M50.30 DDD (DEGENERATIVE DISC DISEASE), CERVICAL: ICD-10-CM

## 2021-03-15 PROCEDURE — 99213 OFFICE O/P EST LOW 20 MIN: CPT | Performed by: ANESTHESIOLOGY

## 2021-03-15 PROCEDURE — G8428 CUR MEDS NOT DOCUMENT: HCPCS | Performed by: ANESTHESIOLOGY

## 2021-03-15 PROCEDURE — 95971 ALYS SMPL SP/PN NPGT W/PRGRM: CPT | Performed by: ANESTHESIOLOGY

## 2021-03-15 PROCEDURE — 3017F COLORECTAL CA SCREEN DOC REV: CPT | Performed by: ANESTHESIOLOGY

## 2021-03-15 PROCEDURE — 99412 PREVENTIVE COUNSELING GROUP: CPT | Performed by: COUNSELOR

## 2021-03-15 PROCEDURE — 1036F TOBACCO NON-USER: CPT | Performed by: ANESTHESIOLOGY

## 2021-03-15 PROCEDURE — G8484 FLU IMMUNIZE NO ADMIN: HCPCS | Performed by: ANESTHESIOLOGY

## 2021-03-15 PROCEDURE — G8417 CALC BMI ABV UP PARAM F/U: HCPCS | Performed by: ANESTHESIOLOGY

## 2021-03-15 NOTE — PROGRESS NOTES
Action:  [x] Maintain abstinence  [] Continue treatment;     [] Change pharmacotherapy:   [] Attend Nicotine Anonymous meeting   [x]        Assignments/Homework: complete stress section of crash course. []        Triggers / Concerns to be addressed:   [x] Graduated / received aftercare plan    Comments: client is behind a week with receiving medication. CTTS will provide follow-up check in call for 5th and 6th week of NRT.       PORTER: Signature, Date, Time: Electronically signed by Edwin Smith on 3/15/2021 at 6:32 PM          CO Level:  No reading taken. Phone session.

## 2021-03-15 NOTE — PROGRESS NOTES
Do you currently have any of the following:    Fever: No  Headache:  No  Cough: No  Shortness of breath: No  Exposed to anyone with these symptoms: No                                                                                                                Pro Peralta presents to the [facility] on 3/15/2021. Joie Coppola is complaining of pain lower back and neck. The pain is persistent. The pain is described as constant spasm. Pain is rated on her best day at a 4, on her worst day at a 8, and on average at a 6 on the VAS scale. She took her last dose of Percocet, Neurontin and bacofin  today. Joie Coppola does have issues with constipation. Any procedures since your last visit: No, with  % relief. She is  on NSAIDS and  is not on anticoagulation medications to include none and is managed by . Pacemaker or defibrillator: No Physician managing device is . Medication Contract and Consent for Opioid Use Documents Filed      No documents found                   /74   Pulse 80   Temp 96.5 °F (35.8 °C) (Infrared)   Resp 16   Ht 5' 7\" (1.702 m)   Wt 248 lb (112.5 kg)   LMP 08/31/1988   SpO2 92%   BMI 38.84 kg/m²      Patient's last menstrual period was 08/31/1988.

## 2021-03-15 NOTE — PROGRESS NOTES
herbal supplements.     She is not diabetic.     H/O Smoking: yes  H/O alcohol abuse : denies  H/O Illicit drug use : denies     Employment: disability     Imaging:   X-ray of the lumbar spine done on 3/13/2021:  Impression   1.  Stable degenerative endplate changes notable involving lower lumbar   segments.       2.  No fracture or malalignment. X-ray of the thoracic spine done on 3/13/2021:  Impression   No fracture or malalignment of the thoracic spine.        CT lumbar spine: 4/2020: Impression       1. No acute fracture. 2. Stable compression fracture L3.   3. Multilevel degenerative changes with several old disc herniations,   bone spurs and mild to moderate stenosis as described. 4. Bone spurs causing a significant left neural foraminal stenosis   L5/S1. Not obviously changed. 5. A right renal calculus.         CT thoracic spine: Myelogram: 9/23/2020:  IMPRESSION:        1. CT myelography was performed. 2. Disc protrusions at multiple levels in the thoracic spine,   resulting in mild central canal stenosis at T10-11.   3. Mild neural foraminal stenoses at T9-10, T10-11 and T11-12. 4. Spinal stimulator in situ.             CT Myelogram Cervical spine: 9/23/2020: Impression       1. CT myelography was performed. The thecal sac was well opacified. 2. Mild central canal stenoses at C5-6 and C6-7.   3. No significant neural foraminal stenosis is appreciated.        Potential Aberrant Drug-Related Behavior:  See above    Urine Drug Screening: not today    OARRS report[de-identified]  reviewed 3/15/21     Past Medical History:   Diagnosis Date    Adenoma of right adrenal gland     Anemia     Anxiety     Arthritis     spinal    Asthma     controlled with inhalers     Benign essential tremor     Bipolar affective (HCC)     Chronic back pain     Spinal cord stimulator in place    Chronic respiratory failure with hypoxia (HCC)     at times dt diaphragm does not function properly dt Mitochondrial disorder  Depression     stable    Difficulty swallowing     for EGD 10-8-19     DM (diabetes mellitus) [facility]     Environmental and seasonal allergies     Fatty liver     Full dentures     Generalized seizure disorder ([address] 5/6/2012    GERD (gastroesophageal reflux disease)     Gout     past hx of    Herniated cervical disc     limited rom of head and neck    History of swelling of feet     Lymphedema of lower extremity 09/06/2012    Mitochondrial cytopathy (HCC)     s/s muscle and nerve pain, difficulty breathing, seizures, difficulty swallowing, digestive disorders- Dr. Clara Allison [facility]    Neuropathy     at feet    Obesity     PETR (obstructive sleep apnea)     no CPAP dt insurance will not pay for    PONV (postoperative nausea and vomiting)     Seizures ([facility] [address]     last approx 6-13-19- f/u w/ PCP  Granmal, flares up in heat/ summer time - no recent issues as of 10-4-19     Spinal headache     Thyroid disease        Past Surgical History:   Procedure Laterality Date    APPENDECTOMY      with Hysterectomy / unknown    BACK SURGERY  last one 1995    lumbar x 2    CARDIAC CATHETERIZATION Right 6-6-2013    Dr. Liyah Luz, no stents placed, no blockages     CHOLECYSTECTOMY  1999    open with gastric bypass    COLONOSCOPY N/A 9/18/2018    COLONOSCOPY POLYPECTOMY HOT BIOPSY performed by Mariam Crystal MD at [address] [facility] COLONOSCOPY N/A 10/8/2019    COLONOSCOPY POLYPECTOMY SNARE/COLD BIOPSY performed by Mariam Crystal MD at [id number] [facility] EGD COLONOSCOPY N/A 10/8/2019    EGD ESOPHAGOGASTRODUODENOSCOPY DILATATION performed by Mariam Crystal MD at [address] [facility], DIAGNOSTIC      ESOPHAGEAL DILATATION  9/18/2018    ESOPHAGEAL DILATION Calli Meter performed by CHRISTOPHE Harmon MD at [address] [facility] Bilateral 2007,2017    knees    KNEE SURGERY Bilateral     scope    MYOMECTOMY      NERVE BLOCK Left 10 02 2013    lumbar paravertebral facet #2    NERVE BLOCK Left 10 9 13    hip inj #1    NERVE BLOCK Left 10/16/13    hip injection    NERVE BLOCK Left 10/29/2014    left lumbar transforaminal nerve lbock  #1    NERVE BLOCK Left 11/12/2014    lumbar left transforaminal nerve block  #2    NERVE BLOCK Left 12 8 14    lumbar transforaminal #3    NERVE BLOCK Right 3/30/15    cervical transforaminal #1    NERVE BLOCK Right 4/6/2015    right cervical transforaminal nerve block  #2    NERVE BLOCK Right 4/13/15    cervical transforaminal #3    NERVE BLOCK Left 7/6/15    knee injection #1    NERVE BLOCK  07/20/15    left genicular nerve block/knee #2    NERVE BLOCK Left 10 1 15    lumb transforam #1    NERVE BLOCK  10/26/15    left lumbar transforaminal nerve block #3    OTHER SURGICAL HISTORY Left 11 25 13    lumbar radiofreq    OTHER SURGICAL HISTORY  3/28/2016    stage 1, 3 day percutanous trial [employer] lumbar spinal cord stimulator    OTHER SURGICAL HISTORY  1995    racz procedure / lower back    SC COLONOSCOPY FLX DX W/COLLJ SPEC WHEN PFRMD N/A 3/20/2018    COLONOSCOPY DIAGNOSTIC OR SCREENING performed by Mayi Cortés MD at [place] [address] EGD TRANSORAL BIOPSY SINGLE/MULTIPLE N/A 3/20/2018    EGD BIOPSY performed by Mayi Cortés MD at [address] [place] [address]         Prior to Admission medications    Medication Sig Start Date End Date Taking?  Authorizing Provider   cetirizine (ZYRTEC) 10 MG tablet take 1 tablet by mouth once daily as directed 1/27/21  Yes Historical Provider, MD   clotrimazole-betamethasone (Magdalene Spittle) 1-0.05 % lotion    Yes Historical Provider, MD   Co-Enzyme Q10 100 MG CAPS TAKE 1 CAPSULE BY MOUTH TWICE DAILY AS DIRECTED 1/18/21  Yes Historical Provider, MD   cyanocobalamin 50 MCG tablet Take 100 mcg by mouth daily   Yes Historical Provider, MD   diclofenac sodium (VOLTAREN) 1 % GEL APPLY 1 TO 2 GRAMS 3 TO 4 TIMES DAILY AS DIRECTED 10/31/20  Yes Historical Provider, MD   ferrous sulfate (FE TABS 325) 325 (65 Fe) MG EC tablet take 1 tablet by mouth once daily 1/12/21  Yes Historical Provider, MD   Glucosamine-Chondroitin 750-600 MG CHEW Take by mouth 2 times daily   Yes Historical Provider, MD   lidocaine-prilocaine (EMLA) 2.5-2.5 % cream APPLY 1 TO 2 GRAMS 3 TO 4 TIMES DAILY AS DIRECTED 10/31/20  Yes Historical Provider, MD   mupirocin (BACTROBAN) 2 % ointment apply to affected area twice a day 1/5/21  Yes Historical Provider, MD   potassium chloride (KLOR-CON) 10 MEQ extended release tablet Take 10 mEq by mouth as needed   Yes Historical Provider, MD   predniSONE (DELTASONE) 10 MG tablet Take 10 mg by mouth daily   Yes Historical Provider, MD   Pseudoephedrine-Naproxen Na (ALEVE-D SINUS & COLD PO)    Yes Historical Provider, MD   raNITIdine (ZANTAC) 150 MG tablet Take 1 tablet twice a day by oral route.    Yes Historical Provider, MD   famotidine (PEPCID) 20 MG tablet Take 1 tablet by mouth 2 times daily 9/10/20  Yes Radha Quintero MD   traZODone (DESYREL) 100 MG tablet Take 100 mg by mouth nightly   Yes Historical Provider, MD   ondansetron (ZOFRAN-ODT) 4 MG disintegrating tablet Take 1 tablet by mouth 3 times daily as needed for Nausea or Vomiting 2/26/20  Yes Dick Mendoza MD   albuterol (PROVENTIL) (5 MG/ML) 0.5% nebulizer solution Take 2.5 mg by nebulization 3 times daily Instructed to take am of procedure   Yes Historical Provider, MD   B Complex Vitamins (VITAMIN-B COMPLEX PO) Take by mouth daily LD patient to verify with Dr. Zachary Reyna   Yes Historical Provider, MD   magnesium 200 MG TABS tablet Take 200 mg by mouth 2 times daily LD to verify with Dr. Zachary Reyna   LD 10-4-19   Yes Historical Provider, MD   calcium carbonate (CALCIUM 600) 600 MG TABS tablet Take 1 tablet by mouth 2 times daily LD patient to verify with Dr. Zachary Reyna   Yes Historical Provider, MD   azelastine (ASTELIN) 0.1 % nasal spray 1 spray by Nasal route 2 times daily Use in each nostril as directed 8/15/19  Yes Pola Murray,    montelukast (SINGULAIR) 10 MG tablet Take 1 tablet by mouth daily 8/12/19  Yes Pola Murray DO   omeprazole (PRILOSEC) 20 MG delayed release capsule Take 20 mg by mouth Daily Instructed to take am of procedure   Yes Historical Provider, MD   oxyCODONE-acetaminophen (PERCOCET)  MG per tablet Take 1 tablet by mouth every 8 hours as needed for Pain (pt took 1 tablet at 6pm tonight). Instructed to take am of procedure    Yes Historical Provider, MD   baclofen (LIORESAL) 20 MG tablet Take 20 mg by mouth 4 times daily Instructed to take am of procedure   Yes Historical Provider, MD   furosemide (LASIX) 40 MG tablet Take 1 tablet by mouth 2 times daily 6/14/19  Yes hPil Sanchez,    albuterol sulfate  (90 Base) MCG/ACT inhaler Inhale 2 puffs into the lungs every 4 hours as needed for Wheezing or Shortness of Breath Instructed to take am of procedure and bring   Yes Historical Provider, MD   Cholecalciferol (VITAMIN D3) 5000 units TABS Take 1 tablet by mouth every other day LD 9/11/2019   Yes Historical Provider, MD   docusate sodium (COLACE) 100 MG capsule Take 100 mg by mouth 2 times daily   Yes Historical Provider, MD   linaclotide (LINZESS) 145 MCG capsule Take 290 mcg by mouth every morning (before breakfast)    Yes Historical Provider, MD   levOCARNitine (CARNITOR) 330 MG tablet Take 330 mg by mouth 3 times daily Instructed to take am of procedure  For mitochondrial disease   Yes Historical Provider, MD   allopurinol (ZYLOPRIM) 100 MG tablet Take 200 mg by mouth 2 times daily    Yes Historical Provider, MD   escitalopram (LEXAPRO) 20 MG tablet Take 20 mg by mouth 2 times daily Instructed to take am of procedure   Yes Historical Provider, MD   gabapentin (NEURONTIN) 600 MG tablet Take 1 tablet by mouth 4 times daily. Patient taking differently: Take 600 mg by mouth 4 times daily.  Instructed to take am of procedure 10/31/13  Yes Alberta Keys., MD   topiramate (TOPAMAX) 200 MG tablet Take 1 tablet by mouth 2 times daily.  12  Yes Ab Benavides,    ziprasidone (GEODON) 20 MG capsule Take 20 mg by mouth nightly    Yes Historical Provider, MD   EPINEPHrine (EPIPEN 2-CARMEN) 0.3 MG/0.3ML SOAJ injection Inject 0.3 mLs into the muscle once for 1 dose Use as directed for allergic reaction 9/10/20 2/25/21  Juliane Garner MD       Allergies   Allergen Reactions    Bee Pollen Anaphylaxis, Shortness Of Breath and Swelling     And wasp    Penicillins Anaphylaxis    Ropinirole Hcl Anaphylaxis    Vistaril [Hydroxyzine Hcl] Anaphylaxis    Aripiprazole Other (See Comments)     muscle spasms and confusion    Hydroxyzine Pamoate Itching and Rash    Tape Citlalli Khoa Tape] Rash     Paper tape allergy    Fabric tape is OK to use        Social History     Socioeconomic History    Marital status:      Spouse name: Not on file    Number of children: Not on file    Years of education: Not on file    Highest education level: Not on file   Occupational History    Not on file   Social Needs    Financial resource strain: Not on file    Food insecurity     Worry: Not on file     Inability: Not on file    Transportation needs     Medical: Not on file     Non-medical: Not on file   Tobacco Use    Smoking status: Former Smoker     Packs/day: 0.50     Years: 42.00     Pack years: 21.00     Types: Cigarettes     Start date: 1990     Quit date: 3/11/2021     Years since quittin.0    Smokeless tobacco: Never Used    Tobacco comment: last ciggerette thursday the    Substance and Sexual Activity    Alcohol use: No     Alcohol/week: 0.0 standard drinks    Drug use: No    Sexual activity: Not on file   Lifestyle    Physical activity     Days per week: Not on file     Minutes per session: Not on file    Stress: Not on file   Relationships    Social connections     Talks on phone: Not on file     Gets together: Not on file     Attends [employer] service: Not on file     Active member of club or organization: Not on file     Attends meetings of clubs or organizations: Not on file     Relationship status: Not on file    Intimate partner violence     Fear of current or ex partner: Not on file     Emotionally abused: Not on file     Physically abused: Not on file     Forced sexual activity: Not on file   Other Topics Concern    Not on file   Social History Narrative    ** Merged History Encounter **            Family History   Problem Relation Age of Onset    Heart Disease Mother     Cancer Father     Arthritis Other     Cancer Other     Depression Other     Heart Disease Other     Hypertension Other     Mental Illness Other     Stroke Other        REVIEW OF SYSTEMS:     Len John denies fever/chills, chest pain, shortness of breath, new bowel or bladder complaints. All other review of systems was negative. PHYSICAL EXAMINATION:      /74   Pulse 80   Temp 96.5 °F (35.8 °C) (Infrared)   Resp 16   Ht 5' 7\" (1.702 m)   Wt 248 lb (112.5 kg)   LMP 08/31/1988   SpO2 92%   BMI 38.84 kg/m²   General:       General appearance:  Pleasant and well-hydrated, in no distress and A & O x 3  Build:Obese  Function: Rises from seated position easily and Moves about room without difficulty     HEENT:     Head:normocephalic, atraumatic     Lungs:     Breathing:normal breathing pattern      CVS:     RRR     Abdomen:     Shape:obese, non-distended and normal     Cervical spine:     Inspection:normal  Palpation:tenderness paravertebral muscles, tenderness trapezium, left, right and positive. Range of motion:reduced flexion, extension, rotation bilaterally and is painful.   Facet tenderness +  Spurling's: negative bilaterally     Thoracic spine:                Spine inspection:normal   Palpation:Yes tenderness over the paraspinal area, bilaterally  Range of motion:normal in flexion, extension rotation bilateral and is not painful.     Lumbar spine:     Spine inspection: scar from the prior surgery noted- healed well   SCS lead and IPG Site clean   Palpation: Tenderness paravertebral muscles Yes bilaterally  Range of motion: Decreased, flexion Decreased, Lateral bending, extension and rotation bilaterally reduced is painful. Lumbar facet loading + bilaterally  Sacroiliac joint tenderness Yes bilaterally  Piriformis tenderness: negative bilaterally  SLR : negative bilaterally  Trochanteric bursa tenderness: negative bilaterally  CVA tenderness:No      Musculoskeletal:     Trigger points + diffuse.     Extremities:     Tremors:None bilaterally upper and lower  Edema:none x all 4 extremities     Neurological:     Sensory: Normal to light touch - except for diminished sensation over the hands and feet     Motor:   Generalized weakness +      Reflexes:    Bilaterally equal diminished.     Gait:uses a cane to assist ambulation     Dermatology:     Skin:no rashes or lesions noted      Assessment/Plan:  1. Postlaminectomy syndrome, lumbar      2. Lumbosacral spondylosis without myelopathy      3. Sacroiliac dysfunction      4. DDD (degenerative disc disease), lumbar      5. Cervicalgia      6. DDD (degenerative disc disease), cervical      7. Cervical spondylolysis      8. Thoracic degenerative disc disease      9. Obesity, unspecified classification, unspecified obesity type, unspecified whether serious comorbidity present      10. Psychological factors affecting medical condition      11. Mitochondrial cytopathy ([facility] [address] [address]      12. Chronic, continuous use of opioids            61 y.o.  female with H/o chronic low back pain, neck pain for years. H/o Mitochondrial disease diffuse pain / neuropathy.     Prior lumbar spine surgery years ago. S/p SCS implantation  [employer] in 2016 by Dr. Kourtney Lopez ( Lead placement at T6).  No programming recently.     Has facet pain and SIJ pain - SIJ pain is predominant.     Neck pain - predominantly axial - facet pain +

## 2021-03-16 ENCOUNTER — HOSPITAL ENCOUNTER (OUTPATIENT)
Age: 61
Discharge: HOME | End: 2021-03-16
Payer: MEDICAID

## 2021-03-16 LAB
ALBUMIN SERPL-MCNC: 3.8 G/DL (ref 3.5–5.2)
ALP BLD-CCNC: 133 U/L (ref 35–104)
ALT SERPL-CCNC: 17 U/L (ref 0–32)
ANION GAP SERPL CALCULATED.3IONS-SCNC: 9 MMOL/L (ref 7–16)
AST SERPL-CCNC: 18 U/L (ref 0–31)
BASOPHILS ABSOLUTE: 0.05 E9/L (ref 0–0.2)
BASOPHILS RELATIVE PERCENT: 0.8 % (ref 0–2)
BILIRUB SERPL-MCNC: 0.2 MG/DL (ref 0–1.2)
BILIRUBIN DIRECT: <0.2 MG/DL (ref 0–0.3)
BILIRUBIN, INDIRECT: NORMAL MG/DL (ref 0–1)
BUN BLDV-MCNC: 13 MG/DL (ref 8–23)
CALCIUM SERPL-MCNC: 8.7 MG/DL (ref 8.6–10.2)
CHLORIDE BLD-SCNC: 104 MMOL/L (ref 98–107)
CHOLESTEROL, TOTAL: 149 MG/DL (ref 0–199)
CO2: 29 MMOL/L (ref 22–29)
CREAT SERPL-MCNC: 0.9 MG/DL (ref 0.5–1)
EOSINOPHILS ABSOLUTE: 0.14 E9/L (ref 0.05–0.5)
EOSINOPHILS RELATIVE PERCENT: 2.3 % (ref 0–6)
GFR AFRICAN AMERICAN: >60
GFR NON-AFRICAN AMERICAN: >60 ML/MIN/1.73
GLUCOSE BLD-MCNC: 105 MG/DL (ref 74–99)
HCT VFR BLD CALC: 44.5 % (ref 34–48)
HDLC SERPL-MCNC: 66 MG/DL
HEMOGLOBIN: 14.2 G/DL (ref 11.5–15.5)
IMMATURE GRANULOCYTES #: 0.04 E9/L
IMMATURE GRANULOCYTES %: 0.7 % (ref 0–5)
IRON: 96 MCG/DL (ref 37–145)
LDL CHOLESTEROL CALCULATED: 63 MG/DL (ref 0–99)
LYMPHOCYTES ABSOLUTE: 1.06 E9/L (ref 1.5–4)
LYMPHOCYTES RELATIVE PERCENT: 17.5 % (ref 20–42)
MCH RBC QN AUTO: 32.5 PG (ref 26–35)
MCHC RBC AUTO-ENTMCNC: 31.9 % (ref 32–34.5)
MCV RBC AUTO: 101.8 FL (ref 80–99.9)
MONOCYTES ABSOLUTE: 0.45 E9/L (ref 0.1–0.95)
MONOCYTES RELATIVE PERCENT: 7.5 % (ref 2–12)
NEUTROPHILS ABSOLUTE: 4.3 E9/L (ref 1.8–7.3)
NEUTROPHILS RELATIVE PERCENT: 71.2 % (ref 43–80)
PDW BLD-RTO: 13.3 FL (ref 11.5–15)
PLATELET # BLD: 362 E9/L (ref 130–450)
PMV BLD AUTO: 9 FL (ref 7–12)
POTASSIUM SERPL-SCNC: 3.9 MMOL/L (ref 3.5–5)
RBC # BLD: 4.37 E12/L (ref 3.5–5.5)
SARS-COV-2 ANTIBODY, TOTAL: NORMAL
SEDIMENTATION RATE, ERYTHROCYTE: 47 MM/HR (ref 0–20)
SODIUM BLD-SCNC: 142 MMOL/L (ref 132–146)
T4 FREE: 1.09 NG/DL (ref 0.93–1.7)
TOTAL PROTEIN: 6.8 G/DL (ref 6.4–8.3)
TRIGL SERPL-MCNC: 99 MG/DL (ref 0–149)
TSH SERPL DL<=0.05 MIU/L-ACNC: 0.55 UIU/ML (ref 0.27–4.2)
URIC ACID, SERUM: 7.6 MG/DL (ref 2.4–5.7)
VITAMIN D 25-HYDROXY: 50 NG/ML (ref 30–100)
VLDLC SERPL CALC-MCNC: 20 MG/DL
WBC # BLD: 6 E9/L (ref 4.5–11.5)

## 2021-03-16 PROCEDURE — 82306 VITAMIN D 25 HYDROXY: CPT

## 2021-03-16 PROCEDURE — 80061 LIPID PANEL: CPT

## 2021-03-16 PROCEDURE — 84443 ASSAY THYROID STIM HORMONE: CPT

## 2021-03-16 PROCEDURE — 84550 ASSAY OF BLOOD/URIC ACID: CPT

## 2021-03-16 PROCEDURE — 84439 ASSAY OF FREE THYROXINE: CPT

## 2021-03-16 PROCEDURE — 80053 COMPREHEN METABOLIC PANEL: CPT

## 2021-03-16 PROCEDURE — 85025 COMPLETE CBC W/AUTO DIFF WBC: CPT

## 2021-03-16 PROCEDURE — 82248 BILIRUBIN DIRECT: CPT

## 2021-03-16 PROCEDURE — 85651 RBC SED RATE NONAUTOMATED: CPT

## 2021-03-16 PROCEDURE — 83540 ASSAY OF IRON: CPT

## 2021-03-16 PROCEDURE — 36415 COLL VENOUS BLD VENIPUNCTURE: CPT

## 2021-03-16 PROCEDURE — 86769 SARS-COV-2 COVID-19 ANTIBODY: CPT

## 2021-03-21 ASSESSMENT — ENCOUNTER SYMPTOMS
SINUS PRESSURE: 0
COUGH: 0
TROUBLE SWALLOWING: 0
VOMITING: 0
SHORTNESS OF BREATH: 0

## 2021-03-23 NOTE — PROGRESS NOTES
[facility]  INSTRUCTIONS  . .......................................................................................................................................... [] Parking the day of Surgery is located in the [facility].   Upon entering the door, someone will be there to greet you    []  Bring photo ID and insurance card     [] You may have a light breakfast day of procedure    []  Wear loose comfortable clothing    []  Please follow instructions for medications as given per Dr's office     [] Stop blood thinners as per Dr's office instructions    [] You can expect a call the business day prior to procedure to notify you of your arrival time     [] Please arrange for     []  Other instructions

## 2021-03-23 NOTE — PROGRESS NOTES
Have you been tested for COVID  Yes           Have you been told you were positive for COVID No  Have you had any known exposure to someone that is positive for COVID No  Do you have a cough                   No              Do you have shortness of breath No                 Do you have a sore throat            No                Are you having chills                    No                Are you having muscle aches. No                    Please come to the hospital wearing a mask and have your significant other wear a mask as well. Both of you should check your temperature before leaving to come here,  if it is 100 or higher please call [phone] for instruction.

## 2021-04-01 ENCOUNTER — HOSPITAL ENCOUNTER (OUTPATIENT)
Age: 61
Setting detail: OUTPATIENT SURGERY
Discharge: HOME | End: 2021-04-01
Attending: ANESTHESIOLOGY | Admitting: ANESTHESIOLOGY
Payer: MEDICAID

## 2021-04-01 ENCOUNTER — HOSPITAL ENCOUNTER (OUTPATIENT)
Dept: GENERAL RADIOLOGY | Age: 61
Discharge: HOME | End: 2021-04-03
Attending: ANESTHESIOLOGY
Payer: MEDICAID

## 2021-04-01 VITALS
WEIGHT: 250 LBS | HEART RATE: 76 BPM | RESPIRATION RATE: 18 BRPM | DIASTOLIC BLOOD PRESSURE: 62 MMHG | HEIGHT: 67 IN | BODY MASS INDEX: 39.24 KG/M2 | SYSTOLIC BLOOD PRESSURE: 136 MMHG | OXYGEN SATURATION: 94 %

## 2021-04-01 DIAGNOSIS — R52 PAIN MANAGEMENT: ICD-10-CM

## 2021-04-01 PROCEDURE — 3600000002 HC SURGERY LEVEL 2 BASE: Performed by: ANESTHESIOLOGY

## 2021-04-01 PROCEDURE — 6360000004 HC RX CONTRAST MEDICATION: Performed by: ANESTHESIOLOGY

## 2021-04-01 PROCEDURE — 2709999900 HC NON-CHARGEABLE SUPPLY: Performed by: ANESTHESIOLOGY

## 2021-04-01 PROCEDURE — 3209999900 FLUORO FOR SURGICAL PROCEDURES

## 2021-04-01 PROCEDURE — 2500000003 HC RX 250 WO HCPCS: Performed by: ANESTHESIOLOGY

## 2021-04-01 PROCEDURE — 27096 INJECT SACROILIAC JOINT: CPT | Performed by: ANESTHESIOLOGY

## 2021-04-01 PROCEDURE — 7100000010 HC PHASE II RECOVERY - FIRST 15 MIN: Performed by: ANESTHESIOLOGY

## 2021-04-01 PROCEDURE — 6360000002 HC RX W HCPCS: Performed by: ANESTHESIOLOGY

## 2021-04-01 PROCEDURE — 7100000011 HC PHASE II RECOVERY - ADDTL 15 MIN: Performed by: ANESTHESIOLOGY

## 2021-04-01 RX ORDER — LIDOCAINE HYDROCHLORIDE 5 MG/ML
INJECTION, SOLUTION INFILTRATION; INTRAVENOUS PRN
Status: DISCONTINUED | OUTPATIENT
Start: 2021-04-01 | End: 2021-04-01 | Stop reason: ALTCHOICE

## 2021-04-01 RX ORDER — BUPIVACAINE HYDROCHLORIDE 2.5 MG/ML
INJECTION, SOLUTION EPIDURAL; INFILTRATION; INTRACAUDAL PRN
Status: DISCONTINUED | OUTPATIENT
Start: 2021-04-01 | End: 2021-04-01 | Stop reason: ALTCHOICE

## 2021-04-01 RX ORDER — METHYLPREDNISOLONE ACETATE 40 MG/ML
INJECTION, SUSPENSION INTRA-ARTICULAR; INTRALESIONAL; INTRAMUSCULAR; SOFT TISSUE PRN
Status: DISCONTINUED | OUTPATIENT
Start: 2021-04-01 | End: 2021-04-01 | Stop reason: ALTCHOICE

## 2021-04-01 ASSESSMENT — PAIN - FUNCTIONAL ASSESSMENT: PAIN_FUNCTIONAL_ASSESSMENT: 0-10

## 2021-04-01 NOTE — H&P
Update History & Physical    The patient's History and Physical of March 15, 2021 was reviewed with the patient and I examined the patient. There was no change. The surgical site was confirmed by the patient and me. Plan: The risks, benefits, expected outcome, and alternative to the recommended procedure have been discussed with the patient. Patient understands and wants to proceed with the procedure. The patient was counseled at length about the risks of briana Covid-19 during their perioperative period and any recovery window from their procedure. The patient was made aware that briana Covid-19  may worsen their prognosis for recovering from their procedure  and lend to a higher morbidity and/or mortality risk. All material risks, benefits, and reasonable alternatives including postponing the procedure were discussed. The patient does wish to proceed with the procedure at this time.     Electronically signed by Micheline Kawasaki, MD

## 2021-04-01 NOTE — PROGRESS NOTES
Patient awake and alert, no complaints of pain, nausea or vomiting. Discharge instructions reviewed with patient . Questions answered. Patient will be discharged home with responsible adult.  - [facility]

## 2021-04-25 ENCOUNTER — HOSPITAL ENCOUNTER (EMERGENCY)
Age: 61
Discharge: HOME | End: 2021-04-25
Payer: MEDICAID

## 2021-04-25 ENCOUNTER — APPOINTMENT (OUTPATIENT)
Dept: GENERAL RADIOLOGY | Age: 61
End: 2021-04-25
Payer: MEDICAID

## 2021-04-25 ENCOUNTER — APPOINTMENT (OUTPATIENT)
Dept: CT IMAGING | Age: 61
End: 2021-04-25
Payer: MEDICAID

## 2021-04-25 VITALS
OXYGEN SATURATION: 93 % | TEMPERATURE: 99.2 F | SYSTOLIC BLOOD PRESSURE: 116 MMHG | HEART RATE: 65 BPM | DIASTOLIC BLOOD PRESSURE: 72 MMHG | RESPIRATION RATE: 20 BRPM

## 2021-04-25 DIAGNOSIS — S16.1XXA STRAIN OF NECK MUSCLE, INITIAL ENCOUNTER: ICD-10-CM

## 2021-04-25 DIAGNOSIS — S70.02XA CONTUSION OF LEFT HIP, INITIAL ENCOUNTER: ICD-10-CM

## 2021-04-25 DIAGNOSIS — J44.1 COPD WITH ACUTE EXACERBATION (HCC): ICD-10-CM

## 2021-04-25 DIAGNOSIS — G40.919 BREAKTHROUGH SEIZURE (HCC): Primary | ICD-10-CM

## 2021-04-25 DIAGNOSIS — S40.022A CONTUSION OF MULTIPLE SITES OF LEFT SHOULDER AND UPPER ARM, INITIAL ENCOUNTER: ICD-10-CM

## 2021-04-25 DIAGNOSIS — R50.9 FEVER, UNSPECIFIED FEVER CAUSE: ICD-10-CM

## 2021-04-25 DIAGNOSIS — S40.012A CONTUSION OF MULTIPLE SITES OF LEFT SHOULDER AND UPPER ARM, INITIAL ENCOUNTER: ICD-10-CM

## 2021-04-25 DIAGNOSIS — S09.90XA CLOSED HEAD INJURY, INITIAL ENCOUNTER: ICD-10-CM

## 2021-04-25 LAB
ALBUMIN SERPL-MCNC: 3.7 G/DL (ref 3.5–5.2)
ALP BLD-CCNC: 120 U/L (ref 35–104)
ALT SERPL-CCNC: 15 U/L (ref 0–32)
AMPHETAMINE SCREEN, URINE: NOT DETECTED
ANION GAP SERPL CALCULATED.3IONS-SCNC: 8 MMOL/L (ref 7–16)
AST SERPL-CCNC: 17 U/L (ref 0–31)
BARBITURATE SCREEN URINE: NOT DETECTED
BASOPHILS ABSOLUTE: 0.01 E9/L (ref 0–0.2)
BASOPHILS RELATIVE PERCENT: 0.1 % (ref 0–2)
BENZODIAZEPINE SCREEN, URINE: NOT DETECTED
BILIRUB SERPL-MCNC: 0.3 MG/DL (ref 0–1.2)
BILIRUBIN URINE: NEGATIVE
BLOOD, URINE: NEGATIVE
BUN BLDV-MCNC: 10 MG/DL (ref 6–23)
CALCIUM SERPL-MCNC: 9.1 MG/DL (ref 8.6–10.2)
CANNABINOID SCREEN URINE: POSITIVE
CHLORIDE BLD-SCNC: 102 MMOL/L (ref 98–107)
CLARITY: CLEAR
CO2: 30 MMOL/L (ref 22–29)
COCAINE METABOLITE SCREEN URINE: NOT DETECTED
COLOR: YELLOW
CREAT SERPL-MCNC: 0.7 MG/DL (ref 0.5–1)
EKG ATRIAL RATE: 85 BPM
EKG P AXIS: 39 DEGREES
EKG P-R INTERVAL: 172 MS
EKG Q-T INTERVAL: 340 MS
EKG QRS DURATION: 94 MS
EKG QTC CALCULATION (BAZETT): 404 MS
EKG R AXIS: 18 DEGREES
EKG T AXIS: 31 DEGREES
EKG VENTRICULAR RATE: 85 BPM
EOSINOPHILS ABSOLUTE: 0.01 E9/L (ref 0.05–0.5)
EOSINOPHILS RELATIVE PERCENT: 0.1 % (ref 0–6)
FENTANYL SCREEN, URINE: NOT DETECTED
GFR AFRICAN AMERICAN: >60
GFR NON-AFRICAN AMERICAN: >60 ML/MIN/1.73
GLUCOSE BLD-MCNC: 100 MG/DL (ref 74–99)
GLUCOSE URINE: NEGATIVE MG/DL
HCT VFR BLD CALC: 44.1 % (ref 34–48)
HEMOGLOBIN: 13.9 G/DL (ref 11.5–15.5)
IMMATURE GRANULOCYTES #: 0.02 E9/L
IMMATURE GRANULOCYTES %: 0.3 % (ref 0–5)
KETONES, URINE: NEGATIVE MG/DL
LEUKOCYTE ESTERASE, URINE: NEGATIVE
LYMPHOCYTES ABSOLUTE: 0.49 E9/L (ref 1.5–4)
LYMPHOCYTES RELATIVE PERCENT: 7.1 % (ref 20–42)
Lab: ABNORMAL
MCH RBC QN AUTO: 32.5 PG (ref 26–35)
MCHC RBC AUTO-ENTMCNC: 31.5 % (ref 32–34.5)
MCV RBC AUTO: 103 FL (ref 80–99.9)
METHADONE SCREEN, URINE: NOT DETECTED
MONOCYTES ABSOLUTE: 0.39 E9/L (ref 0.1–0.95)
MONOCYTES RELATIVE PERCENT: 5.7 % (ref 2–12)
NEUTROPHILS ABSOLUTE: 5.96 E9/L (ref 1.8–7.3)
NEUTROPHILS RELATIVE PERCENT: 86.7 % (ref 43–80)
NITRITE, URINE: NEGATIVE
OPIATE SCREEN URINE: NOT DETECTED
OXYCODONE URINE: POSITIVE
PDW BLD-RTO: 13.5 FL (ref 11.5–15)
PH UA: 7.5 (ref 5–9)
PHENCYCLIDINE SCREEN URINE: NOT DETECTED
PLATELET # BLD: 168 E9/L (ref 130–450)
PMV BLD AUTO: 9.3 FL (ref 7–12)
POTASSIUM REFLEX MAGNESIUM: 4.2 MMOL/L (ref 3.5–5)
PROTEIN UA: NEGATIVE MG/DL
RBC # BLD: 4.28 E12/L (ref 3.5–5.5)
RBC # BLD: NORMAL 10*6/UL
SARS-COV-2, NAAT: NOT DETECTED
SODIUM BLD-SCNC: 140 MMOL/L (ref 132–146)
SPECIFIC GRAVITY UA: 1.01 (ref 1–1.03)
TOTAL PROTEIN: 6.6 G/DL (ref 6.4–8.3)
TROPONIN: <0.01 NG/ML (ref 0–0.03)
UROBILINOGEN, URINE: 0.2 E.U./DL
WBC # BLD: 6.9 E9/L (ref 4.5–11.5)

## 2021-04-25 PROCEDURE — 6370000000 HC RX 637 (ALT 250 FOR IP): Performed by: PHYSICIAN ASSISTANT

## 2021-04-25 PROCEDURE — 72125 CT NECK SPINE W/O DYE: CPT

## 2021-04-25 PROCEDURE — 85025 COMPLETE CBC W/AUTO DIFF WBC: CPT

## 2021-04-25 PROCEDURE — 94664 DEMO&/EVAL PT USE INHALER: CPT

## 2021-04-25 PROCEDURE — 6370000000 HC RX 637 (ALT 250 FOR IP)

## 2021-04-25 PROCEDURE — 87635 SARS-COV-2 COVID-19 AMP PRB: CPT

## 2021-04-25 PROCEDURE — 80053 COMPREHEN METABOLIC PANEL: CPT

## 2021-04-25 PROCEDURE — 93005 ELECTROCARDIOGRAM TRACING: CPT | Performed by: PHYSICIAN ASSISTANT

## 2021-04-25 PROCEDURE — 70450 CT HEAD/BRAIN W/O DYE: CPT

## 2021-04-25 PROCEDURE — 71045 X-RAY EXAM CHEST 1 VIEW: CPT

## 2021-04-25 PROCEDURE — 93010 ELECTROCARDIOGRAM REPORT: CPT | Performed by: INTERNAL MEDICINE

## 2021-04-25 PROCEDURE — 99284 EMERGENCY DEPT VISIT MOD MDM: CPT

## 2021-04-25 PROCEDURE — 73502 X-RAY EXAM HIP UNI 2-3 VIEWS: CPT

## 2021-04-25 PROCEDURE — 73030 X-RAY EXAM OF SHOULDER: CPT

## 2021-04-25 PROCEDURE — 6370000000 HC RX 637 (ALT 250 FOR IP): Performed by: EMERGENCY MEDICINE

## 2021-04-25 PROCEDURE — 96365 THER/PROPH/DIAG IV INF INIT: CPT

## 2021-04-25 PROCEDURE — 80307 DRUG TEST PRSMV CHEM ANLYZR: CPT

## 2021-04-25 PROCEDURE — 96375 TX/PRO/DX INJ NEW DRUG ADDON: CPT

## 2021-04-25 PROCEDURE — 81003 URINALYSIS AUTO W/O SCOPE: CPT

## 2021-04-25 PROCEDURE — 6360000002 HC RX W HCPCS: Performed by: PHYSICIAN ASSISTANT

## 2021-04-25 PROCEDURE — 84484 ASSAY OF TROPONIN QUANT: CPT

## 2021-04-25 RX ORDER — OXYCODONE HYDROCHLORIDE AND ACETAMINOPHEN 5; 325 MG/1; MG/1
TABLET ORAL
Status: COMPLETED
Start: 2021-04-25 | End: 2021-04-25

## 2021-04-25 RX ORDER — LORAZEPAM 2 MG/ML
1 INJECTION INTRAMUSCULAR ONCE
Status: COMPLETED | OUTPATIENT
Start: 2021-04-25 | End: 2021-04-25

## 2021-04-25 RX ORDER — ACETAMINOPHEN 325 MG/1
650 TABLET ORAL ONCE
Status: COMPLETED | OUTPATIENT
Start: 2021-04-25 | End: 2021-04-25

## 2021-04-25 RX ORDER — GABAPENTIN 300 MG/1
600 CAPSULE ORAL ONCE
Status: COMPLETED | OUTPATIENT
Start: 2021-04-25 | End: 2021-04-25

## 2021-04-25 RX ORDER — BACLOFEN 10 MG/1
20 TABLET ORAL ONCE
Status: COMPLETED | OUTPATIENT
Start: 2021-04-25 | End: 2021-04-25

## 2021-04-25 RX ORDER — LEVETIRACETAM 10 MG/ML
1000 INJECTION INTRAVASCULAR ONCE
Status: COMPLETED | OUTPATIENT
Start: 2021-04-25 | End: 2021-04-25

## 2021-04-25 RX ORDER — IPRATROPIUM BROMIDE AND ALBUTEROL SULFATE 2.5; .5 MG/3ML; MG/3ML
1 SOLUTION RESPIRATORY (INHALATION) ONCE
Status: COMPLETED | OUTPATIENT
Start: 2021-04-25 | End: 2021-04-25

## 2021-04-25 RX ORDER — TOPIRAMATE 100 MG/1
200 TABLET, FILM COATED ORAL ONCE
Status: COMPLETED | OUTPATIENT
Start: 2021-04-25 | End: 2021-04-25

## 2021-04-25 RX ORDER — OXYCODONE HYDROCHLORIDE AND ACETAMINOPHEN 5; 325 MG/1; MG/1
1 TABLET ORAL ONCE
Status: COMPLETED | OUTPATIENT
Start: 2021-04-25 | End: 2021-04-25

## 2021-04-25 RX ADMIN — IPRATROPIUM BROMIDE AND ALBUTEROL SULFATE 1 AMPULE: .5; 3 SOLUTION RESPIRATORY (INHALATION) at 02:54

## 2021-04-25 RX ADMIN — LORAZEPAM 1 MG: 2 INJECTION INTRAMUSCULAR; INTRAVENOUS at 01:44

## 2021-04-25 RX ADMIN — LEVETIRACETAM 1000 MG: 10 INJECTION INTRAVENOUS at 01:44

## 2021-04-25 RX ADMIN — ACETAMINOPHEN 650 MG: 325 TABLET ORAL at 04:20

## 2021-04-25 RX ADMIN — OXYCODONE HYDROCHLORIDE AND ACETAMINOPHEN 1 TABLET: 5; 325 TABLET ORAL at 09:49

## 2021-04-25 RX ADMIN — GABAPENTIN 600 MG: 300 CAPSULE ORAL at 11:41

## 2021-04-25 RX ADMIN — BACLOFEN 20 MG: 10 TABLET ORAL at 10:26

## 2021-04-25 RX ADMIN — TOPIRAMATE 200 MG: 100 TABLET, FILM COATED ORAL at 11:41

## 2021-04-25 ASSESSMENT — PAIN SCALES - GENERAL: PAINLEVEL_OUTOF10: 7

## 2021-04-25 NOTE — ED PROVIDER NOTES
Block (Left, 10/29/2014); Nerve Block (Left, 11/12/2014); Nerve Block (Left, 12 8 14); Nerve Block (Right, 3/30/15); Nerve Block (Right, 4/6/2015); Nerve Block (Right, 4/13/15); Nerve Block (Left, 7/6/15); Nerve Block (07/20/15); Nerve Block (Left, 10 1 15); Nerve Block (10/26/15); other surgical history (3/28/2016); Endoscopy, colon, diagnostic; pr colonoscopy flx dx w/collj spec when pfrmd (N/A, 3/20/2018); pr egd transoral biopsy single/multiple (N/A, 3/20/2018); Cholecystectomy (1999); Hysterectomy (1988); Colonoscopy (N/A, 9/18/2018); Esophagus dilation (9/18/2018); back surgery (last one 1995); Cardiac catheterization (Right, 6-6-2013); Appendectomy; other surgical history (1995); joint replacement (Bilateral, [id number]); egd colonoscopy (N/A, 10/8/2019); Colonoscopy (N/A, 10/8/2019); and Nerve Block (Bilateral, 4/1/2021). Social History:  reports that she has been smoking cigarettes. She started smoking about 30 years ago. She has a 21.00 pack-year smoking history. She has never used smokeless tobacco. She reports that she does not drink alcohol or use drugs. Family History: family history includes Arthritis in an other family member; Cancer in her father and another family member; Depression in an other family member; Heart Disease in her mother and another family member; Hypertension in an other family member; Mental Illness in an other family member; Stroke in an other family member. The patients home medications have been reviewed.     Allergies: Bee pollen, Penicillins, Ropinirole hcl, Vistaril [hydroxyzine hcl], Aripiprazole, Hydroxyzine pamoate, and Tape [adhesive tape]    -------------------------------------------------- RESULTS -------------------------------------------------  All laboratory and radiology results have been personally reviewed by myself   LABS:  Results for orders placed or performed during the hospital encounter of 04/25/21   COVID-19, Rapid    Specimen: Nasopharyngeal Swab   Result Value Ref Range    SARS-CoV-2, NAAT Not Detected Not Detected   CBC Auto Differential   Result Value Ref Range    WBC 6.9 4.5 - 11.5 E9/L    RBC 4.28 3.50 - 5.50 E12/L    Hemoglobin 13.9 11.5 - 15.5 g/dL    Hematocrit 44.1 34.0 - 48.0 %    .0 (H) 80.0 - 99.9 fL    MCH 32.5 26.0 - 35.0 pg    MCHC 31.5 (L) 32.0 - 34.5 %    RDW 13.5 11.5 - 15.0 fL    Platelets 348 872 - 232 E9/L    MPV 9.3 7.0 - 12.0 fL    Neutrophils % 86.7 (H) 43.0 - 80.0 %    Immature Granulocytes % 0.3 0.0 - 5.0 %    Lymphocytes % 7.1 (L) 20.0 - 42.0 %    Monocytes % 5.7 2.0 - 12.0 %    Eosinophils % 0.1 0.0 - 6.0 %    Basophils % 0.1 0.0 - 2.0 %    Neutrophils Absolute 5.96 1.80 - 7.30 E9/L    Immature Granulocytes # 0.02 E9/L    Lymphocytes Absolute 0.49 (L) 1.50 - 4.00 E9/L    Monocytes Absolute 0.39 0.10 - 0.95 E9/L    Eosinophils Absolute 0.01 (L) 0.05 - 0.50 E9/L    Basophils Absolute 0.01 0.00 - 0.20 E9/L    RBC Morphology Normal    Comprehensive Metabolic Panel w/ Reflex to MG   Result Value Ref Range    Sodium 140 132 - 146 mmol/L    Potassium reflex Magnesium 4.2 3.5 - 5.0 mmol/L    Chloride 102 98 - 107 mmol/L    CO2 30 (H) 22 - 29 mmol/L    Anion Gap 8 7 - 16 mmol/L    Glucose 100 (H) 74 - 99 mg/dL    BUN 10 6 - 23 mg/dL    CREATININE 0.7 0.5 - 1.0 mg/dL    GFR Non-African American >60 >=60 mL/min/1.73    GFR African American >60     Calcium 9.1 8.6 - 10.2 mg/dL    Total Protein 6.6 6.4 - 8.3 g/dL    Albumin 3.7 3.5 - 5.2 g/dL    Total Bilirubin 0.3 0.0 - 1.2 mg/dL    Alkaline Phosphatase 120 (H) 35 - 104 U/L    ALT 15 0 - 32 U/L    AST 17 0 - 31 U/L   Troponin   Result Value Ref Range    Troponin <0.01 0.00 - 0.03 ng/mL   Urinalysis   Result Value Ref Range    Color, UA Yellow Straw/Yellow    Clarity, UA Clear Clear    Glucose, Ur Negative Negative mg/dL    Bilirubin Urine Negative Negative    Ketones, Urine Negative Negative mg/dL    Specific Gravity, UA 1.015 1.005 - 1.030    Blood, Urine Negative Negative    pH, UA 7.5 5.0 - 9.0    Protein, UA Negative Negative mg/dL    Urobilinogen, Urine 0.2 <2.0 E.U./dL    Nitrite, Urine Negative Negative    Leukocyte Esterase, Urine Negative Negative   Urine Drug Screen   Result Value Ref Range    Amphetamine Screen, Urine NOT DETECTED Negative <1000 ng/mL    Barbiturate Screen, Ur NOT DETECTED Negative < 200 ng/mL    Benzodiazepine Screen, Urine NOT DETECTED Negative < 200 ng/mL    Cannabinoid Scrn, Ur POSITIVE (A) Negative < 50ng/mL    Cocaine Metabolite Screen, Urine NOT DETECTED Negative < 300 ng/mL    Opiate Scrn, Ur NOT DETECTED Negative < 300ng/mL    PCP Screen, Urine NOT DETECTED Negative < 25 ng/mL    Methadone Screen, Urine NOT DETECTED Negative <300 ng/mL    Oxycodone Urine POSITIVE (A) Negative <100 ng/mL    FENTANYL SCREEN, URINE NOT DETECTED Negative <1 ng/mL    Drug Screen Comment: see below    EKG 12 Lead   Result Value Ref Range    Ventricular Rate 85 BPM    Atrial Rate 85 BPM    P-R Interval 172 ms    QRS Duration 94 ms    Q-T Interval 340 ms    QTc Calculation (Bazett) 404 ms    P Axis 39 degrees    R Axis 18 degrees    T Axis 31 degrees       RADIOLOGY:  Interpreted by Radiologist.  XR HIP LEFT (2-3 VIEWS)   Final Result   Negative left shoulder. Negative left hip for acute abnormality. XR SHOULDER LEFT (MIN 2 VIEWS)   Final Result   Negative left shoulder. Negative left hip for acute abnormality. XR CHEST PORTABLE   Final Result   No pneumonia, pneumothorax or pleural effusion. Suboptimal inspiratory effort compared to prior study, with element of   central pulmonary vascular congestion favored over simply vascular crowding. No overt alveolar edema, however there may be an element of mild central   interstitial edema. CT HEAD WO CONTRAST   Final Result   No acute intracranial abnormality. CT CERVICAL SPINE WO CONTRAST   Final Result   No acute abnormality of the cervical spine.       Degenerative changes C4 through Apr 25, 2021   0919 Patient reassessed and she is alert and oriented x3 at this time. She is feeling markedly better she still somewhat achy all over she takes Percocet for pain at home. We will set her up have her try eating and drinking and make sure that she is able to tolerate p.o. and then we will give her Percocet for pain. Plan per previous physician Dr. Selena Edmonds who saw the patient was at once patient was alert and oriented patient could be discharged back home. Patient states that she has a roommate who can come pick her up. [OK]   2335 Patient ate and drank here she is feeling well she is alert and oriented she is able to ambulate she will be discharged home with a roommate who will come to pick her up. She will follow-up outpatient with her PCP. [KK]      ED Course User Index  [KK] Alex Tan MD    EKG # 1 Interpreted by emergency department physician unless otherwise noted. Time:  0153   Rate: 85 bpm  Rhythm: Sinus rhythm, PAC. Interpretation: Normal sinus rhythm and PAC's noted. Comparison: There are no significant changes when compared to prior tracings. Medical Decision Making:    Negative work-up including CAT scans of the head and neck and x-rays showing no fracture or pneumonia and urine is clean and Covid test was negative therefore probable cause of fever is virus    Counseling: The emergency provider has spoken with the patient and discussed todays results, in addition to providing specific details for the plan of care and counseling regarding the diagnosis and prognosis. Questions are answered at this time and they are agreeable with the plan.      --------------------------------- IMPRESSION AND DISPOSITION ---------------------------------    IMPRESSION  1. Breakthrough seizure ([address]    2. Closed head injury, initial encounter    3. Strain of neck muscle, initial encounter    4. Contusion of multiple sites of left shoulder and upper arm, initial encounter    5. Contusion of left hip, initial encounter    6. Fever, unspecified fever cause    7. COPD with acute exacerbation ([facility] [address]        DISPOSITION  Disposition: Discharge to home  Patient condition is stable      NOTE: This report was transcribed using voice recognition software.  Every effort was made to ensure accuracy; however, inadvertent computerized transcription errors may be present     Mandy Torres MD  04/25/21 [address] [place] [address]  04/25/21 [id number]

## 2021-04-25 NOTE — ED NOTES
Pt awoken with speaking loud and touching pt. Pt very sleepy and currently A+OX2 upon questioning. Pt falls asleep as soon as she is not stimulated. Dr Christi Cameron updated.       Sadie Nelson RN  04/25/21 [id number]

## 2021-04-25 NOTE — ED NOTES
Pt awake, walked to bathroom, and is now eating sitting up at the bedside.      Facundo Romero RN  04/25/21 [id number]

## 2021-04-25 NOTE — ED NOTES
Bed: 22  Expected date:   Expected time:   Means of arrival:   Comments:  seizure     lFoyd Szymanski, [facility]  04/25/21 [id number]

## 2021-05-03 ENCOUNTER — OFFICE VISIT (OUTPATIENT)
Dept: PAIN MANAGEMENT | Age: 61
End: 2021-05-03
Payer: MEDICAID

## 2021-05-03 VITALS
OXYGEN SATURATION: 94 % | SYSTOLIC BLOOD PRESSURE: 128 MMHG | HEART RATE: 74 BPM | DIASTOLIC BLOOD PRESSURE: 78 MMHG | TEMPERATURE: 97.8 F | RESPIRATION RATE: 20 BRPM

## 2021-05-03 DIAGNOSIS — M96.1 POSTLAMINECTOMY SYNDROME, LUMBAR: Primary | ICD-10-CM

## 2021-05-03 DIAGNOSIS — F54 PSYCHOLOGICAL FACTORS AFFECTING MEDICAL CONDITION: ICD-10-CM

## 2021-05-03 DIAGNOSIS — M47.817 LUMBOSACRAL SPONDYLOSIS WITHOUT MYELOPATHY: ICD-10-CM

## 2021-05-03 DIAGNOSIS — M54.2 CERVICALGIA: ICD-10-CM

## 2021-05-03 DIAGNOSIS — M51.36 DDD (DEGENERATIVE DISC DISEASE), LUMBAR: ICD-10-CM

## 2021-05-03 DIAGNOSIS — E66.9 OBESITY, UNSPECIFIED CLASSIFICATION, UNSPECIFIED OBESITY TYPE, UNSPECIFIED WHETHER SERIOUS COMORBIDITY PRESENT: ICD-10-CM

## 2021-05-03 DIAGNOSIS — M43.02 CERVICAL SPONDYLOLYSIS: ICD-10-CM

## 2021-05-03 DIAGNOSIS — M53.3 SACROILIAC DYSFUNCTION: ICD-10-CM

## 2021-05-03 DIAGNOSIS — M51.34 THORACIC DEGENERATIVE DISC DISEASE: ICD-10-CM

## 2021-05-03 PROCEDURE — 3017F COLORECTAL CA SCREEN DOC REV: CPT | Performed by: ANESTHESIOLOGY

## 2021-05-03 PROCEDURE — 99213 OFFICE O/P EST LOW 20 MIN: CPT | Performed by: ANESTHESIOLOGY

## 2021-05-03 PROCEDURE — G8427 DOCREV CUR MEDS BY ELIG CLIN: HCPCS | Performed by: ANESTHESIOLOGY

## 2021-05-03 PROCEDURE — G8417 CALC BMI ABV UP PARAM F/U: HCPCS | Performed by: ANESTHESIOLOGY

## 2021-05-03 PROCEDURE — 4004F PT TOBACCO SCREEN RCVD TLK: CPT | Performed by: ANESTHESIOLOGY

## 2021-05-03 NOTE — PROGRESS NOTES
[facility]  [place], [address] [facility]  Dept: [phone]      Follow up Note      Zainab Morgan     Date of Visit:  5/3/2021    CC:  Patient presents for follow up   Chief Complaint   Patient presents with    Follow-up     post procedure        HPI:  He has had chronic low back pain and lower extremity pain. Prior lumbar spine surgery - in 1994 laminectomy and discectomy.      S/P SCS implantation- Aug 2016 by Dr. Marcos Hall apparently gets coverage in the LE but does not cover low back area. [employer]. Has not programmed it for past few yrs. (Initial lead placement at T6 according to op notes).     Neck pain - Predominantly axial intermittent left UE pain.     Has been evaluated by NSG- no surgical indication.     In the past used to get pain meds from Dr. Marcos Hall but apparently was DC'd due to noncompliance.     Currently has been under the care of Dr. Archie Finn- getting pain meds from him. 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 Patient has bladder issues- KIP for over 10 yrs. Nursing notes and details of the pain history reviewed. Please see intake notes for details.     Since last visit she has undergone B.l SIJ injection which has helped her SIJ hugo significantly.     NOTE:  Mitochondrial disease  H/o neuropathy  H/o morbid obesity- s/p gastric bypass   Bipolar disorder    Previous treatments:   Physical Therapy : yes, / HEP      Medications: - NSAID's : yes                        - Membrane stabilizers : yes                        - Opioids : yes - by Dr. Kavon Long                       - Adjuvants or Others : yes,      Surgeries: yes, lumbar spine surgery  In 1994, spinal cord stimulator.     Interventional Pain procedures/ nerve blocks: yes, multiple      She has not been on anticoagulation medications      She has been on dietary/ herbal supplements.     She is not diabetic.     H/O Smoking: yes  H/O alcohol abuse : denies  H/O Illicit drug use : denies     Employment: disability     Imaging:   X-ray of the lumbar spine done on 3/13/2021:  Impression   1.  Stable degenerative endplate changes notable involving lower lumbar   segments.       2.  No fracture or malalignment. X-ray of the thoracic spine done on 3/13/2021:  Impression   No fracture or malalignment of the thoracic spine.      CT lumbar spine: 4/2020: Impression       1. No acute fracture. 2. Stable compression fracture L3.   3. Multilevel degenerative changes with several old disc herniations,   bone spurs and mild to moderate stenosis as described. 4. Bone spurs causing a significant left neural foraminal stenosis   L5/S1. Not obviously changed. 5. A right renal calculus.         CT thoracic spine: Myelogram: 9/23/2020:  IMPRESSION:        1. CT myelography was performed. 2. Disc protrusions at multiple levels in the thoracic spine,   resulting in mild central canal stenosis at T10-11.   3. Mild neural foraminal stenoses at T9-10, T10-11 and T11-12. 4. Spinal stimulator in situ.             CT Myelogram Cervical spine: 9/23/2020: Impression       1. CT myelography was performed. The thecal sac was well opacified. 2. Mild central canal stenoses at C5-6 and C6-7.   3. No significant neural foraminal stenosis is appreciated.        Potential Aberrant Drug-Related Behavior:  See above    Urine Drug Screening: not today    OARRS report[de-identified]  reviewed 5/3/21     Past Medical History:   Diagnosis Date    Adenoma of right adrenal gland     Anemia     Anxiety     Arthritis     spinal    Asthma     controlled with inhalers     Benign essential tremor     Bipolar affective (HCC)     Chronic back pain     Spinal cord stimulator in place    Chronic respiratory failure with hypoxia (HCC)     at times dt diaphragm does not function properly dt Mitochondrial disorder    Depression     stable    Difficulty swallowing     for EGD 10-8-19     Environmental and seasonal allergies     Fatty liver     Full dentures     GERD (gastroesophageal reflux disease)     Gout     past hx of    Herniated cervical disc     limited rom of head and neck    History of swelling of feet     Lymphedema of lower extremity 09/06/2012    Mitochondrial cytopathy (HCC)     s/s muscle and nerve pain, difficulty breathing, seizures, difficulty swallowing, digestive disorders- Dr. Padmini Jernigan [facility]    Neuropathy     at feet    Obesity     PETR (obstructive sleep apnea)     no CPAP dt insurance will not pay for    PONV (postoperative nausea and vomiting)     Seizures ([address]     last approx 6-13-19- f/u w/ PCP  Granmal, flares up in heat/ summer time - no recent issues as of 10-4-19     Spinal headache     Thyroid disease        Past Surgical History:   Procedure Laterality Date    APPENDECTOMY      with Hysterectomy / unknown    BACK SURGERY  last one 1995    lumbar x 2    CARDIAC CATHETERIZATION Right 6-6-2013    Dr. Janette Ramesh, no stents placed, no blockages    [address]    open with gastric bypass    COLONOSCOPY N/A 9/18/2018    COLONOSCOPY POLYPECTOMY HOT BIOPSY performed by CHRISTOPHE Gill MD at [address] COLONOSCOPY N/A 10/8/2019    COLONOSCOPY POLYPECTOMY SNARE/COLD BIOPSY performed by Renetta Enriquez MD at [address] EGD COLONOSCOPY N/A 10/8/2019    EGD ESOPHAGOGASTRODUODENOSCOPY DILATATION performed by CHRISTOPHE Gill MD at [address] [facility] [place], DIAGNOSTIC      ESOPHAGEAL DILATATION  9/18/2018    ESOPHAGEAL DILATION Ángel Natasha performed by Renetta Enriquez MD at [address] Bilateral 2007,2017    knees    KNEE SURGERY Bilateral     scope    MYOMECTOMY      NERVE BLOCK Left 10 02 2013    lumbar paravertebral facet #2    NERVE BLOCK Left 10 9 13    hip inj #1    NERVE BLOCK Left 10/16/13    hip injection    NERVE BLOCK Left 10/29/2014    left lumbar 10/31/20  Yes Historical Provider, MD   ferrous sulfate (FE TABS 325) 325 (65 Fe) MG EC tablet take 1 tablet by mouth once daily 1/12/21  Yes Historical Provider, MD   Glucosamine-Chondroitin 750-600 MG CHEW Take by mouth 2 times daily   Yes Historical Provider, MD   lidocaine-prilocaine (EMLA) 2.5-2.5 % cream APPLY 1 TO 2 GRAMS 3 TO 4 TIMES DAILY AS DIRECTED 10/31/20  Yes Historical Provider, MD   potassium chloride (KLOR-CON) 10 MEQ extended release tablet Take 10 mEq by mouth as needed   Yes Historical Provider, MD   Pseudoephedrine-Naproxen Na (ALEVE-D SINUS & COLD PO)    Yes Historical Provider, MD   famotidine (PEPCID) 20 MG tablet Take 1 tablet by mouth 2 times daily 9/10/20  Yes Sedrick Harman MD   traZODone (DESYREL) 100 MG tablet Take 100 mg by mouth nightly   Yes Historical Provider, MD   ondansetron (ZOFRAN-ODT) 4 MG disintegrating tablet Take 1 tablet by mouth 3 times daily as needed for Nausea or Vomiting 2/26/20  Yes Alexys Mckeon MD   albuterol (PROVENTIL) (5 MG/ML) 0.5% nebulizer solution Take 2.5 mg by nebulization 3 times daily    Yes Historical Provider, MD   B Complex Vitamins (VITAMIN-B COMPLEX PO) Take by mouth daily    Yes Historical Provider, MD   magnesium 200 MG TABS tablet Take 200 mg by mouth 2 times daily    Yes Historical Provider, MD   calcium carbonate (CALCIUM 600) 600 MG TABS tablet Take 1 tablet by mouth 2 times daily    Yes Historical Provider, MD   azelastine (ASTELIN) 0.1 % nasal spray 1 spray by Nasal route 2 times daily Use in each nostril as directed 8/15/19  Yes Pola Murray DO   montelukast (SINGULAIR) 10 MG tablet Take 1 tablet by mouth daily 8/12/19  Yes Pola Murray DO   omeprazole (PRILOSEC) 20 MG delayed release capsule Take 20 mg by mouth Daily    Yes Historical Provider, MD   oxyCODONE-acetaminophen (PERCOCET)  MG per tablet Take 1 tablet by mouth every 8 hours as needed for Pain (pt took 1 tablet at 6pm tonight).  Instructed to take am of procedure Yes Historical Provider, MD   baclofen (LIORESAL) 20 MG tablet Take 20 mg by mouth 4 times daily    Yes Historical Provider, MD   furosemide (LASIX) 40 MG tablet Take 1 tablet by mouth 2 times daily 6/14/19  Yes Phil Sanchez,    albuterol sulfate  (90 Base) MCG/ACT inhaler Inhale 2 puffs into the lungs every 4 hours as needed for Wheezing or Shortness of Breath    Yes Historical Provider, MD   Cholecalciferol (VITAMIN D3) 5000 units TABS Take 1 tablet by mouth every other day    Yes Historical Provider, MD   docusate sodium (COLACE) 100 MG capsule Take 100 mg by mouth 2 times daily   Yes Historical Provider, MD   linaclotide (LINZESS) 145 MCG capsule Take 290 mcg by mouth every morning (before breakfast)    Yes Historical Provider, MD   levOCARNitine (CARNITOR) 330 MG tablet Take 330 mg by mouth 3 times daily For mitochondrial disease   Yes Historical Provider, MD   allopurinol (ZYLOPRIM) 100 MG tablet Take 200 mg by mouth 2 times daily    Yes Historical Provider, MD   escitalopram (LEXAPRO) 20 MG tablet Take 20 mg by mouth 2 times daily    Yes Historical Provider, MD   gabapentin (NEURONTIN) 600 MG tablet Take 1 tablet by mouth 4 times daily. 10/31/13  Yes Diogenes Grubbs., MD   topiramate (TOPAMAX) 200 MG tablet Take 1 tablet by mouth 2 times daily.  5/9/12  Yes Ab Benavides,    ziprasidone (GEODON) 20 MG capsule Take 20 mg by mouth nightly    Yes Historical Provider, MD   EPINEPHrine (EPIPEN 2-CARMEN) 0.3 MG/0.3ML SOAJ injection Inject 0.3 mLs into the muscle once for 1 dose Use as directed for allergic reaction 9/10/20 2/25/21  George Bradley MD       Allergies   Allergen Reactions    Bee Pollen Anaphylaxis, Shortness Of Breath and Swelling     And wasp    Penicillins Anaphylaxis    Ropinirole Hcl Anaphylaxis    Vistaril [Hydroxyzine Hcl] Anaphylaxis    Aripiprazole Other (See Comments)     muscle spasms and confusion    Hydroxyzine Pamoate Itching and Rash    Tape Mitcheal Porter Tape] Rash Paper tape allergy    Fabric tape is OK to use        Social History     Socioeconomic History    Marital status:      Spouse name: Not on file    Number of children: Not on file    Years of education: Not on file    Highest education level: Not on file   Occupational History    Not on file   Social Needs    Financial resource strain: Not on file    Food insecurity     Worry: Not on file     Inability: Not on file    Transportation needs     Medical: Not on file     Non-medical: Not on file   Tobacco Use    Smoking status: Current Every Day Smoker     Packs/day: 0.50     Years: 42.00     Pack years: 21.00     Types: Cigarettes     Start date: 1990     Last attempt to quit: 3/11/2021     Years since quittin.1    Smokeless tobacco: Never Used   Substance and Sexual Activity    Alcohol use: No     Alcohol/week: 0.0 standard drinks    Drug use: No    Sexual activity: Not on file   Lifestyle    Physical activity     Days per week: Not on file     Minutes per session: Not on file    Stress: Not on file   Relationships    Social connections     Talks on phone: Not on file     Gets together: Not on file     Attends Gnosticism service: Not on file     Active member of club or organization: Not on file     Attends meetings of clubs or organizations: Not on file     Relationship status: Not on file    Intimate partner violence     Fear of current or ex partner: Not on file     Emotionally abused: Not on file     Physically abused: Not on file     Forced sexual activity: Not on file   Other Topics Concern    Not on file   Social History Narrative    ** Merged History Encounter **            Family History   Problem Relation Age of Onset    Heart Disease Mother     Cancer Father     Arthritis Other     Cancer Other     Depression Other     Heart Disease Other     Hypertension Other     Mental Illness Other     Stroke Other        REVIEW OF SYSTEMS:     Katia Croft denies fever/chills, chest pain, shortness of breath, new bowel or bladder complaints. All other review of systems was negative. PHYSICAL EXAMINATION:      /78   Pulse 74   Temp 97.8 °F (36.6 °C) (Temporal)   Resp 20   LMP 08/31/1988   SpO2 94%   General:       General appearance:  Pleasant and well-hydrated, in no distress and A & O x 3  Build:Obese  Function: Rises from seated position easily and Moves about room without difficulty     HEENT:     Head:normocephalic, atraumatic     Lungs:     Breathing:normal breathing pattern      CVS:     RRR     Abdomen:     Shape:obese, non-distended and normal     Cervical spine:     Inspection:normal  Palpation:tenderness paravertebral muscles, tenderness trapezium, left, right and positive. Range of motion:reduced flexion, extension, rotation bilaterally and is painful. Facet tenderness +  Spurling's: negative bilaterally     Thoracic spine:                Spine inspection:normal   Palpation:Yes tenderness over the paraspinal area, bilaterally  Range of motion:normal in flexion, extension rotation bilateral and is not painful.     Lumbar spine:     Spine inspection: scar from the prior surgery noted- healed well   SCS lead and IPG Site clean   Palpation: Tenderness paravertebral muscles Yes bilaterally  Range of motion: Decreased, flexion Decreased, Lateral bending, extension and rotation bilaterally reduced is painful.   Lumbar facet loading + bilaterally  Sacroiliac joint tenderness - improved  Piriformis tenderness: negative bilaterally  SLR : negative bilaterally  Trochanteric bursa tenderness: negative bilaterally     Musculoskeletal:     Trigger points + diffuse.     Extremities:     Tremors:None bilaterally upper and lower  Edema:none x all 4 extremities     Neurological:     Sensory: Normal to light touch - except for diminished sensation over the hands and feet     Motor:   Generalized weakness +      Reflexes:    Bilaterally equal diminished.     Gait:uses a cane to assist ambulation     Dermatology:     Skin:no rashes or lesions noted      Assessment/Plan:  1. Postlaminectomy syndrome, lumbar      2. Lumbosacral spondylosis without myelopathy      3. Sacroiliac dysfunction      4. DDD (degenerative disc disease), lumbar      5. Cervicalgia      6. DDD (degenerative disc disease), cervical      7. Cervical spondylolysis      8. Thoracic degenerative disc disease      9. Obesity, unspecified classification, unspecified obesity type, unspecified whether serious comorbidity present      10. Psychological factors affecting medical condition      11. Mitochondrial cytopathy ([facility] [address]      12. Chronic, continuous use of opioids            61 y.o.  female with H/o chronic low back pain, neck pain for years. H/o Mitochondrial disease diffuse pain / neuropathy.     Prior lumbar spine surgery years ago. S/p SCS implantation  [employer] in 2016 by Dr. Stanislaw Meléndez (Lead placement at T6). SCS programming recently- helping.     Has facet pain and SIJ pain. SIJ injection on 4/1/2021 Good pain relief.     Neck pain - predominantly axial - facet pain +     Has been evaluated by NSG- CT myelogram-Image findings reviewed and discussed. no high grade stenosis- no surgical interventions.     Was on chronic opioids by Dr. Arslan Luther. Has started Medical Marijuana      Smoking +     Recent Xray LS spine and Thoracic spine on 3/13/2021 reviewed personally and interpreted for lead position:  The leads [facility] [employer]-16 contact leads) positions seems to be at top of T8 on the right side and bottom of T9 on the left side. Appears to be moved form the initial placed location after reviewing the op notes form Aug 2016 by Dr. Stanislaw Meléndez. Schedule for Bilateral SIJ injection under fluoroscopy. RBA discussed and patient agreed to proceed.  Hold dietary supplements for the procedure.     Consider lumbar facet MBNB to address pain from L3-4, L4-5, L5-S1 facets in future if continued axial low back pain.    Neck pain - features of facet pain- consider cervical facet MBNB in future.      Patient will continue medication management by Dr. Regan Acosta and understands that our treatment modalities will be non opioid based. Apparently she has started medical marijuana- from Dr. Regan Acosta.     Discussed issues with chronic opioid therapy. Can call for follow in 4-6 weeks.    Counseling :     Patient encouraged to stay active and to watch/lose weight     Encouraged to continue Regular home exercise program as tolerated - stretching / strengthening.     Treatment plan discussed with the patient including medication and procedure side effects.      Ellen Moody MD

## 2021-05-03 NOTE — PROGRESS NOTES
Do you currently have any of the following:    Fever: No  Headache:  No  Cough: No  Shortness of breath: No  Exposed to anyone with these symptoms: No         Karsten Brown [facility] to the [facility]. Maria Antonia Rivera is complaining of pain lower back/neck/shoulders The pain is constant. The pain is described as aching, throbbing, shooting and stabbing. Pain is rated on her best day at a 4, on her worst day at a 9, and on average at a 7 on the VAS scale. She took her last dose of Percocet/gabapentin  today. Any procedures since your last visit:     Pacemaker or defibrillator: No managed by     She is not on NSAIDS and is not on anticoagulation medications to include none and is managed by . Medication Contract and Consent for Opioid Use Documents Filed      No documents found                /78   Pulse 74   Temp 97.8 °F (36.6 °C) (Temporal)   Resp 20   LMP 08/31/1988   SpO2 94%      Patient's last menstrual period was 08/31/1988.

## 2021-05-07 ENCOUNTER — APPOINTMENT (OUTPATIENT)
Dept: CT IMAGING | Age: 61
End: 2021-05-07
Payer: MEDICAID

## 2021-05-07 ENCOUNTER — APPOINTMENT (OUTPATIENT)
Dept: GENERAL RADIOLOGY | Age: 61
End: 2021-05-07
Payer: MEDICAID

## 2021-05-07 ENCOUNTER — HOSPITAL ENCOUNTER (EMERGENCY)
Age: 61
Discharge: HOME | End: 2021-05-07
Payer: MEDICAID

## 2021-05-07 VITALS
OXYGEN SATURATION: 96 % | DIASTOLIC BLOOD PRESSURE: 77 MMHG | TEMPERATURE: 98.2 F | RESPIRATION RATE: 16 BRPM | HEART RATE: 90 BPM | HEIGHT: 67 IN | SYSTOLIC BLOOD PRESSURE: 127 MMHG | BODY MASS INDEX: 37.51 KG/M2 | WEIGHT: 239 LBS

## 2021-05-07 DIAGNOSIS — S70.01XA CONTUSION OF RIGHT HIP, INITIAL ENCOUNTER: Primary | ICD-10-CM

## 2021-05-07 DIAGNOSIS — E04.9 ENLARGED THYROID GLAND: ICD-10-CM

## 2021-05-07 DIAGNOSIS — G89.29 ACUTE EXACERBATION OF CHRONIC LOW BACK PAIN: ICD-10-CM

## 2021-05-07 DIAGNOSIS — M47.812 SPONDYLOSIS OF CERVICAL REGION WITHOUT MYELOPATHY OR RADICULOPATHY: ICD-10-CM
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
## 2021-05-07 DIAGNOSIS — M54.50 ACUTE EXACERBATION OF CHRONIC LOW BACK PAIN: ICD-10-CM

## 2021-05-07 DIAGNOSIS — S16.1XXA ACUTE STRAIN OF NECK MUSCLE, INITIAL ENCOUNTER: ICD-10-CM

## 2021-05-07 DIAGNOSIS — M47.817 DJD (DEGENERATIVE JOINT DISEASE), LUMBOSACRAL: ICD-10-CM

## 2021-05-07 LAB
BACTERIA: NORMAL /HPF
BILIRUBIN URINE: NEGATIVE
BLOOD, URINE: NEGATIVE
CLARITY: CLEAR
COLOR: YELLOW
EPITHELIAL CELLS, UA: NORMAL /HPF
GLUCOSE URINE: NEGATIVE MG/DL
KETONES, URINE: NEGATIVE MG/DL
LEUKOCYTE ESTERASE, URINE: ABNORMAL
NITRITE, URINE: NEGATIVE
PH UA: 5.5 (ref 5–9)
PROTEIN UA: NEGATIVE MG/DL
RBC UA: NORMAL /HPF (ref 0–2)
SPECIFIC GRAVITY UA: <=1.005 (ref 1–1.03)
UROBILINOGEN, URINE: 0.2 E.U./DL
WBC UA: NORMAL /HPF (ref 0–5)

## 2021-05-07 PROCEDURE — 72125 CT NECK SPINE W/O DYE: CPT

## 2021-05-07 PROCEDURE — 72131 CT LUMBAR SPINE W/O DYE: CPT

## 2021-05-07 PROCEDURE — 73502 X-RAY EXAM HIP UNI 2-3 VIEWS: CPT

## 2021-05-07 PROCEDURE — 81001 URINALYSIS AUTO W/SCOPE: CPT

## 2021-05-07 PROCEDURE — 99283 EMERGENCY DEPT VISIT LOW MDM: CPT

## 2021-05-07 PROCEDURE — 6370000000 HC RX 637 (ALT 250 FOR IP): Performed by: NURSE PRACTITIONER

## 2021-05-07 PROCEDURE — 70450 CT HEAD/BRAIN W/O DYE: CPT

## 2021-05-07 RX ORDER — OXYCODONE HYDROCHLORIDE AND ACETAMINOPHEN 5; 325 MG/1; MG/1
1 TABLET ORAL ONCE
Status: COMPLETED | OUTPATIENT
Start: 2021-05-07 | End: 2021-05-07

## 2021-05-07 RX ADMIN — OXYCODONE HYDROCHLORIDE AND ACETAMINOPHEN 1 TABLET: 5; 325 TABLET ORAL at 19:24

## 2021-05-07 NOTE — ED PROVIDER NOTES
[facility]  Department of Emergency Medicine   ED  Encounter Note  Admit Date/RoomTime: 2021  6:47 PM  ED Room:     NAME: Smith Em  : 1960  MRN: [id number]     Chief Complaint:  Back Pain (pt having lower back pain and right hip pain r/t fall wednesday. +loc) and Hip Pain    History of Present Illness       Smith Em is a 61 y.o. old female who presents to the emergency department by private vehicle with her roommate Marlys Hinds, for a mechanical fall which occured Wednesday night while walking out of the bathroom during the night and she tripped and fell landing on her right side 2 days prior to arrival. She reportedly fell onto her right hip and has a large bruise 10 cm x 5 cm and states that she has chronic pain in her lower back and think she blew out a disc L4 and 5 which she has done in the past.  She states she does constantly has chronic pain in her neck and her pain management doctor is attempting to wean her off of Percocet and she did take 1 a few hours prior to arrival.  She denies any hematuria, chest pain, shortness of breath, palpitations, dizziness, headache, blurred vision, double vision, extremity weaknesses she does have a bruise on her right dorsal mid forearm and denies any bony tenderness. Patient states she does have a history of seizures and did not know if she had a seizure or loss consciousness but does not think so. She denies any facial bony tenderness or any trauma to her face or head. Patient was ambulatory with assistance of a cane on arrival.  The patients tetanus status is up to date. Since onset the symptoms have been remaining constant and gradually worsening. Her pain is aggraveated by any movement, any use of or pressure on or palpation of and relieved by nothing. She denies any head injury, confusion, dizziness, abdominal pain or numbness.      ROS   Pertinent positives and negatives are stated within HPI, all other systems reviewed and are negative. Past Medical History:  has a past medical history of Adenoma of right adrenal gland, Anemia, Anxiety, Arthritis, Asthma, Benign essential tremor, Bipolar affective ([address] Chronic back pain, Chronic respiratory failure with hypoxia ([address] Depression, Difficulty swallowing, Environmental and seasonal allergies, Fatty liver, Full dentures, GERD (gastroesophageal reflux disease), Gout, Herniated cervical disc, History of swelling of feet, Lymphedema of lower extremity, Mitochondrial cytopathy ([address] Neuropathy, Obesity, PETR (obstructive sleep apnea), PONV (postoperative nausea and vomiting), Seizures ([address] Spinal headache, and Thyroid disease. Surgical History:  has a past surgical history that includes knee surgery (Bilateral); Gastric bypass surgery (1999); myomectomy; Tonsillectomy; Nerve Block (Left, 10 02 2013); Nerve Block (Left, 10 9 13); Nerve Block (Left, 10/16/13); other surgical history (Left, 11 25 13); Nerve Block (Left, 10/29/2014); Nerve Block (Left, 11/12/2014); Nerve Block (Left, 12 8 14); Nerve Block (Right, 3/30/15); Nerve Block (Right, 4/6/2015); Nerve Block (Right, 4/13/15); Nerve Block (Left, 7/6/15); Nerve Block (07/20/15); Nerve Block (Left, 10 1 15); Nerve Block (10/26/15); other surgical history (3/28/2016); Endoscopy, colon, diagnostic; pr colonoscopy flx dx w/collj spec when pfrmd (N/A, 3/20/2018); pr egd transoral biopsy single/multiple (N/A, 3/20/2018); Cholecystectomy (1999); Hysterectomy (1988); Colonoscopy (N/A, 9/18/2018); Esophagus dilation (9/18/2018); back surgery (last one 1995); Cardiac catheterization (Right, 6-6-2013); Appendectomy; other surgical history (1995); joint replacement (Bilateral, [id number]); egd colonoscopy (N/A, 10/8/2019); Colonoscopy (N/A, 10/8/2019); and Nerve Block (Bilateral, 4/1/2021). Social History:  reports that she has been smoking cigarettes. She started smoking about 30 years ago.  She has a 21.00 pack-year smoking history. She has never used smokeless tobacco. She reports that she does not drink alcohol or use drugs. Family History: family history includes Arthritis in an other family member; Cancer in her father and another family member; Depression in an other family member; Heart Disease in her mother and another family member; Hypertension in an other family member; Mental Illness in an other family member; Stroke in an other family member. Allergies: Bee pollen, Penicillins, Ropinirole hcl, Vistaril [hydroxyzine hcl], Aripiprazole, Hydroxyzine pamoate, and Tape [adhesive tape]    Physical Exam   Oxygen Saturation Interpretation: Normal.        ED Triage Vitals   BP Temp Temp Source Pulse Resp SpO2 Height Weight   05/07/21 1845 05/07/21 1829 05/07/21 1829 05/07/21 1829 05/07/21 1829 05/07/21 1829 05/07/21 1829 05/07/21 1829   127/77 98.2 °F (36.8 °C) Infrared 90 16 96 % 5' 7\" (1.702 m) 239 lb (108.4 kg)         Physical Exam  Constitutional:  Alert, development consistent with age. HEENT:  NC/NT. Airway patent. No raccoons or kirkland sign noted. No septal hematoma or hemotympanum. No facial bony tenderness to firm palpation. No oral abrasions or loose dentition. Neck:  midline or paravertebral tenderness to firm palpation. Normal ROM. Supple. Chest:  Symmetrical without visible rash or tenderness. Atraumatic  Respiratory:  Lungs Clear to auscultation and breath sounds equal.  CV:  Regular rate and rhythm, normal heart sounds, without pathological murmurs, ectopy, gallops, or rubs. GI:  Abdomen Soft, nontender, good bowel sounds. No firm or pulsatile mass. Pelvis:  Stable, nontender to palpation. Back:  No midline or paravertebral tenderness. No costovertebral tenderness. Extremities: tenderness noted to the right posterior hip and has a ecchymotic area approximately 11 cm's x 7 cm's on right upper buttock with no edema noted and tenderness to palpation.   2+ pedal and posterior tibial pulses intact. No lower leg edema. No calf pain. Bilateral hand grasp symmetrically strong ecchymotic area noted on right mid forearm approximately 3 x 2 cm with no bony tenderness with full painless range of motion to bilateral  shoulders, elbows, and wrists bilaterally with no snuffbox tenderness. Integument:  Normal turgor. Warm, dry, without visible rash, unless noted elsewhere. Lymphatic: no lymphadenopathy noted  Neurological:  Oriented x3, GCS 15. Motor functions intact. Cranial nerves II through XII grossly intact. Lab / Imaging Results   (All laboratory and radiology results have been personally reviewed by myself)  Labs:  Results for orders placed or performed during the hospital encounter of 05/07/21   Urinalysis   Result Value Ref Range    Color, UA Yellow Straw/Yellow    Clarity, UA Clear Clear    Glucose, Ur Negative Negative mg/dL    Bilirubin Urine Negative Negative    Ketones, Urine Negative Negative mg/dL    Specific Gravity, UA <=1.005 1.005 - 1.030    Blood, Urine Negative Negative    pH, UA 5.5 5.0 - 9.0    Protein, UA Negative Negative mg/dL    Urobilinogen, Urine 0.2 <2.0 E.U./dL    Nitrite, Urine Negative Negative    Leukocyte Esterase, Urine TRACE (A) Negative   Microscopic Urinalysis   Result Value Ref Range    WBC, UA NONE 0 - 5 /HPF    RBC, UA NONE 0 - 2 /HPF    Epithelial Cells, UA RARE /HPF    Bacteria, UA NONE SEEN None Seen /HPF       Imaging: All Radiology results interpreted by Radiologist unless otherwise noted. XR HIP 2-3 VW W PELVIS RIGHT   Final Result   No acute abnormality of the hip. CT HEAD WO CONTRAST   Final Result   No acute intracranial abnormality. CT CERVICAL SPINE WO CONTRAST   Final Result   No acute abnormality of the cervical spine. Stable degenerative changes C4-C7. Diffuse enlarged thyroid gland, no change. CT LUMBAR SPINE WO CONTRAST   Final Result   Unremarkable non-contrast CT of the lumbar spine. Stable multilevel degenerative changes and focal depression of the superior   endplate of L3 vertebral body. ED Course / Medical Decision Making     Medications   oxyCODONE-acetaminophen (PERCOCET) 5-325 MG per tablet 1 tablet (1 tablet Oral Given 5/7/21 1924)        Re-examination:  5/7/21     Time: 2040 patient appears to be dozing and resting comfortably with her friend sitting in a chair. Patient was made aware of CT scans and states she was previously made aware of her enlarged thyroid and follows with her PCP. Patient was able to stand and ambulate on the hip. Patient instructed to follow-up with her pain management doctor  Patients symptoms are improving. Consult(s):   None    Procedure(s):   none    MDM:   This is a 44-year-old female who presents today after having a fall days ago she was ambulatory with a cane to assist her ambulation. She is a large ecchymotic area over her right buttock and a ecchymotic area in the forearm but has full range of motion of the arm she denies any chest pain or shortness of breath she does not know if she lost consciousness and will obtain urinalysis and CT of the head neck and images of the lumbar spine is right hip. Patient has no signs of acute cauda equina syndrome. Patient was given a Percocet for symptomatic relief and was relieved. Patient was advised on findings of enlarged thyroid and other findings of CTs and hip x-rays showing chronic changes. Patient  she states she was previously made aware of and follows with her PCP. Patient also advised on falls precaution and follow-up with her PCP and pain management for reevaluation. Patient is afebrile, nontoxic in appearance, hemodynamically stable for discharge. Patient advised on signs and symptoms warranting immediate return to the ED for reevaluation at any time and follow-up.     Plan of Care/Counseling:  I reviewed today's visit with the patient and friend of the patient in addition to

## 2021-05-07 NOTE — ED NOTES
FIRST PROVIDER CONTACT ASSESSMENT NOTE        Department of Emergency Medicine            ED  First Provider Note            5/7/21  6:39 PM EDT    Chief Complaint: No chief complaint on file. History of Present Illness:    Satish Rodriguez is a 61 y.o. female who presents to the emergency department for fall 2 days ago   Focused Screening Exam:  Constitutional:  Alert, appears stated age and is in no distress.     Heart rrr   Lungs clear     *ALLERGIES*     Bee pollen, Penicillins, Ropinirole hcl, Vistaril [hydroxyzine hcl], Aripiprazole, Hydroxyzine pamoate, and Tape [adhesive tape]     ED Triage Vitals [05/07/21 1829]   BP Temp Temp Source Pulse Resp SpO2 Height Weight   -- 98.2 °F (36.8 °C) Infrared 90 16 96 % 5' 7\" (1.702 m) 239 lb (108.4 kg)        Initial Plan of Care:  Initiate Treatment-Testing, Proceed toTreatment Area When Bed Available for ED Attending/MLP to Continue Care    -----------------END OF FIRST PROVIDER CONTACT ASSESSMENT NOTE--------------  Electronically signed by RADHA Connell   DD: 5/7/21     RADHA Connell  05/07/21 [id number]

## 2021-05-10 ENCOUNTER — TELEPHONE (OUTPATIENT)
Dept: NON INVASIVE DIAGNOSTICS | Age: 61
End: 2021-05-10

## 2021-05-10 NOTE — TELEPHONE ENCOUNTER
Called and left a message to call the office back to schedule a new pt appointment. Dr Arlin Mills referring for syncope.   Records scanned

## 2021-05-13 ENCOUNTER — PREP FOR PROCEDURE (OUTPATIENT)
Dept: PAIN MANAGEMENT | Age: 61
End: 2021-05-13

## 2021-05-13 ENCOUNTER — OFFICE VISIT (OUTPATIENT)
Dept: PAIN MANAGEMENT | Age: 61
End: 2021-05-13
Payer: MEDICAID

## 2021-05-13 VITALS
BODY MASS INDEX: 37.51 KG/M2 | WEIGHT: 239 LBS | RESPIRATION RATE: 16 BRPM | HEART RATE: 90 BPM | DIASTOLIC BLOOD PRESSURE: 76 MMHG | SYSTOLIC BLOOD PRESSURE: 122 MMHG | HEIGHT: 67 IN | TEMPERATURE: 97.2 F | OXYGEN SATURATION: 95 %

## 2021-05-13 DIAGNOSIS — M47.817 LUMBOSACRAL SPONDYLOSIS WITHOUT MYELOPATHY: Primary | ICD-10-CM

## 2021-05-13 DIAGNOSIS — M43.02 CERVICAL SPONDYLOLYSIS: ICD-10-CM

## 2021-05-13 DIAGNOSIS — M96.1 POSTLAMINECTOMY SYNDROME, LUMBAR: Primary | ICD-10-CM

## 2021-05-13 DIAGNOSIS — M47.817 LUMBOSACRAL SPONDYLOSIS WITHOUT MYELOPATHY: ICD-10-CM

## 2021-05-13 DIAGNOSIS — E66.9 OBESITY, UNSPECIFIED CLASSIFICATION, UNSPECIFIED OBESITY TYPE, UNSPECIFIED WHETHER SERIOUS COMORBIDITY PRESENT: ICD-10-CM

## 2021-05-13 DIAGNOSIS — M50.30 DDD (DEGENERATIVE DISC DISEASE), CERVICAL: ICD-10-CM

## 2021-05-13 DIAGNOSIS — E88.40 MITOCHONDRIAL CYTOPATHY (HCC): ICD-10-CM

## 2021-05-13 DIAGNOSIS — M51.36 DDD (DEGENERATIVE DISC DISEASE), LUMBAR: ICD-10-CM

## 2021-05-13 DIAGNOSIS — M53.3 SACROILIAC DYSFUNCTION: ICD-10-CM

## 2021-05-13 PROCEDURE — 99214 OFFICE O/P EST MOD 30 MIN: CPT | Performed by: ANESTHESIOLOGY

## 2021-05-13 PROCEDURE — 3017F COLORECTAL CA SCREEN DOC REV: CPT | Performed by: ANESTHESIOLOGY

## 2021-05-13 PROCEDURE — G8417 CALC BMI ABV UP PARAM F/U: HCPCS | Performed by: ANESTHESIOLOGY

## 2021-05-13 PROCEDURE — G8427 DOCREV CUR MEDS BY ELIG CLIN: HCPCS | Performed by: ANESTHESIOLOGY

## 2021-05-13 PROCEDURE — 4004F PT TOBACCO SCREEN RCVD TLK: CPT | Performed by: ANESTHESIOLOGY

## 2021-05-13 PROCEDURE — 99203 OFFICE O/P NEW LOW 30 MIN: CPT | Performed by: ANESTHESIOLOGY

## 2021-05-13 NOTE — PROGRESS NOTES
DINORAH BATRES [facility] - BE[facility]IOR[facility] Pain Management  [place]  Dept: [phone]      Follow up Note      Neville Pace     Date of Visit:  5/13/2021    CC:  Patient presents for follow up   Chief Complaint   Patient presents with    Follow-up    Neck Pain    Lower Back Pain    Leg Pain     both       HPI:  He has had chronic low back pain and lower extremity pain. Prior lumbar spine surgery - in 1994 laminectomy and discectomy.      S/P SCS implantation- Aug 2016 by Dr. Hallie Rothman apparently gets coverage in the LE but does not cover low back area. [employer] system. (Initial lead placement at T6 according to op notes).     Neck pain - Predominantly axial intermittent left UE pain.     Has been evaluated by NSG- no surgical indication.     In the past used to get pain meds from Dr. Hallie Rothman but apparently was DC'd due to noncompliance.     Currently has been under the care of Dr. Nayla Dougherty- getting pain meds from him. 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 Patient has bladder issues- KIP for over 10 yrs. Nursing notes and details of the pain history reviewed. Please see intake notes for details. Since last visit she has undergone B/l SIJ injection which has helped her SIJ hugo significantly.       Most recently had a fall and was evaluate in the ER. Having low back and right hip pain. Gets muscle spasm. reviewed ER notes. Had CT LS spine, CT Cervical spine and X ray Hips/ pelvis on 5/7/2021- no acute changes.     NOTE:  Mitochondrial disease  H/o neuropathy  H/o morbid obesity- s/p gastric bypass   Bipolar disorder    Previous treatments:   Physical Therapy : yes, / HEP      Medications: - NSAID's : yes                        - Membrane stabilizers : yes                        - Opioids : yes - by Dr. Odin Barfield                       - Adjuvants or Others : yes,      Surgeries: yes, lumbar spine surgery  In 1994, spinal cord stimulator.     Interventional Pain described. 4. Bone spurs causing a significant left neural foraminal stenosis   L5/S1. Not obviously changed. 5. A right renal calculus.         CT thoracic spine: Myelogram: 9/23/2020:  IMPRESSION:        1. CT myelography was performed. 2. Disc protrusions at multiple levels in the thoracic spine,   resulting in mild central canal stenosis at T10-11.   3. Mild neural foraminal stenoses at T9-10, T10-11 and T11-12. 4. Spinal stimulator in situ.             CT Myelogram Cervical spine: 9/23/2020: Impression       1. CT myelography was performed. The thecal sac was well opacified. 2. Mild central canal stenoses at C5-6 and C6-7.   3. No significant neural foraminal stenosis is appreciated.        Potential Aberrant Drug-Related Behavior:  See above    Urine Drug Screening: not today    OARRS report[de-identified]  reviewed 5/13/21 - getting meds from Dr. Kavon Long    Past Medical History:   Diagnosis Date    Adenoma of right adrenal gland     Anemia     Anxiety     Arthritis     spinal    Asthma     controlled with inhalers     Benign essential tremor     Bipolar affective ([address]     Chronic back pain     Spinal cord stimulator in place    Chronic respiratory failure with hypoxia ([address]     at times dt diaphragm does not function properly dt Mitochondrial disorder    Depression     stable    Difficulty swallowing     for EGD 10-8-19     Environmental and seasonal allergies     Fatty liver     Full dentures     GERD (gastroesophageal reflux disease)     Gout     past hx of    Herniated cervical disc     limited rom of head and neck    History of swelling of feet     Lymphedema of lower extremity 09/06/2012    Mitochondrial cytopathy (HCC)     s/s muscle and nerve pain, difficulty breathing, seizures, difficulty swallowing, digestive disorders- Dr. Roxanne Wright [facility]    Neuropathy     at feet    Obesity     PETR (obstructive sleep apnea)     no CPAP dt insurance will not pay for    PONV (postoperative nausea and vomiting)     Seizures (HCC)     last approx 6-13-19- f/u w/ PCP  Granmal, flares up in heat/ summer time - no recent issues as of 10-4-19     Spinal headache     Thyroid disease        Past Surgical History:   Procedure Laterality Date    APPENDECTOMY      with Hysterectomy / unknown    BACK SURGERY  last one 1995    lumbar x 2    CARDIAC CATHETERIZATION Right 6-6-2013    Dr. Kylee Harris Radial, no stents placed, no blockages    [address] [facility]    open with gastric bypass    COLONOSCOPY N/A 9/18/2018    COLONOSCOPY POLYPECTOMY HOT BIOPSY performed by Eddy Lepe MD at [address] COLONOSCOPY N/A 10/8/2019    COLONOSCOPY POLYPECTOMY SNARE/COLD BIOPSY performed by Eddy Lepe MD at [address] EGD COLONOSCOPY N/A 10/8/2019    EGD ESOPHAGOGASTRODUODENOSCOPY DILATATION performed by Eddy Lepe MD at [address] [facility] [place], DIAGNOSTIC      ESOPHAGEAL DILATATION  9/18/2018    ESOPHAGEAL DILATION Tresa Lars performed by Eddy Lepe MD at [address] [facility] [address] Bilateral 2007,2017    knees    KNEE SURGERY Bilateral     scope    MYOMECTOMY      NERVE BLOCK Left 10 02 2013    lumbar paravertebral facet #2    NERVE BLOCK Left 10 9 13    hip inj #1    NERVE BLOCK Left 10/16/13    hip injection    NERVE BLOCK Left 10/29/2014    left lumbar transforaminal nerve lbock  #1    NERVE BLOCK Left 11/12/2014    lumbar left transforaminal nerve block  #2    NERVE BLOCK Left 12 8 14    lumbar transforaminal #3    NERVE BLOCK Right 3/30/15    cervical transforaminal #1    NERVE BLOCK Right 4/6/2015    right cervical transforaminal nerve block  #2    NERVE BLOCK Right 4/13/15    cervical transforaminal #3    NERVE BLOCK Left 7/6/15    knee injection #1    NERVE BLOCK  07/20/15    left genicular nerve block/knee #2    NERVE BLOCK Left 10 1 15    lumb transforam #1    NERVE BLOCK  10/26/15 left lumbar transforaminal nerve block #3    NERVE BLOCK Bilateral 4/1/2021    #1 BILATERAL SACROILIAC JOINT INJECTION UNDER FLUORO performed by Sophie Reynaga MD at [address] [place] [address] Drive,[id number] Left 11 25 13    lumbar radiofreq    OTHER SURGICAL HISTORY  3/28/2016    stage 1, 3 day percutanous trial [employer] lumbar spinal cord stimulator    OTHER SURGICAL HISTORY  1995    racz procedure / lower back    LA COLONOSCOPY FLX DX W/COLLJ SPEC WHEN PFRMD N/A 3/20/2018    COLONOSCOPY DIAGNOSTIC OR SCREENING performed by Gloria Madden MD at [place] [address] EGD TRANSORAL BIOPSY SINGLE/MULTIPLE N/A 3/20/2018    EGD BIOPSY performed by Gloria Madden MD at [address] [place] [address]         Prior to Admission medications    Medication Sig Start Date End Date Taking?  Authorizing Provider   NONFORMULARY Medical marijuana   Yes Historical Provider, MD   cetirizine (ZYRTEC) 10 MG tablet take 1 tablet by mouth once daily as directed 1/27/21  Yes Historical Provider, MD   clotrimazole-betamethasone ([place]) 1-0.05 % lotion    Yes Historical Provider, MD   Co-Enzyme Q10 100 MG CAPS TAKE 1 CAPSULE BY MOUTH TWICE DAILY AS DIRECTED 1/18/21  Yes Historical Provider, MD   cyanocobalamin 50 MCG tablet Take 100 mcg by mouth daily   Yes Historical Provider, MD   diclofenac sodium (VOLTAREN) 1 % GEL APPLY 1 TO 2 GRAMS 3 TO 4 TIMES DAILY AS DIRECTED 10/31/20  Yes Historical Provider, MD   ferrous sulfate (FE TABS 325) 325 (65 Fe) MG EC tablet take 1 tablet by mouth once daily 1/12/21  Yes Historical Provider, MD   lidocaine-prilocaine (EMLA) 2.5-2.5 % cream APPLY 1 TO 2 GRAMS 3 TO 4 TIMES DAILY AS DIRECTED 10/31/20  Yes Historical Provider, MD   potassium chloride (KLOR-CON) 10 MEQ extended release tablet Take 10 mEq by mouth as needed   Yes Historical Provider, MD   famotidine (PEPCID) 20 MG tablet Take 1 tablet by mouth 2 times daily 9/10/20  Yes Danisha Wynn MD   traZODone (DESYREL) 100 MG tablet Take 100 mg by mouth nightly   Yes Historical Provider, MD   ondansetron (ZOFRAN-ODT) 4 MG disintegrating tablet Take 1 tablet by mouth 3 times daily as needed for Nausea or Vomiting 2/26/20  Yes Liss Mueller MD   albuterol (PROVENTIL) (5 MG/ML) 0.5% nebulizer solution Take 2.5 mg by nebulization 3 times daily    Yes Historical Provider, MD   B Complex Vitamins (VITAMIN-B COMPLEX PO) Take by mouth daily    Yes Historical Provider, MD   magnesium 200 MG TABS tablet Take 200 mg by mouth 2 times daily    Yes Historical Provider, MD   calcium carbonate (CALCIUM 600) 600 MG TABS tablet Take 1 tablet by mouth 2 times daily    Yes Historical Provider, MD   azelastine (ASTELIN) 0.1 % nasal spray 1 spray by Nasal route 2 times daily Use in each nostril as directed 8/15/19  Yes Pola Murray DO   montelukast (SINGULAIR) 10 MG tablet Take 1 tablet by mouth daily 8/12/19  Yes Pola Murray DO   omeprazole (PRILOSEC) 20 MG delayed release capsule Take 20 mg by mouth Daily    Yes Historical Provider, MD   oxyCODONE-acetaminophen (PERCOCET)  MG per tablet Take 1 tablet by mouth every 8 hours as needed for Pain (pt took 1 tablet at 6pm tonight).  Instructed to take am of procedure    Yes Historical Provider, MD   baclofen (LIORESAL) 20 MG tablet Take 20 mg by mouth 4 times daily    Yes Historical Provider, MD   furosemide (LASIX) 40 MG tablet Take 1 tablet by mouth 2 times daily 6/14/19  Yes Phil Sanchez DO   albuterol sulfate  (90 Base) MCG/ACT inhaler Inhale 2 puffs into the lungs every 4 hours as needed for Wheezing or Shortness of Breath    Yes Historical Provider, MD   Cholecalciferol (VITAMIN D3) 5000 units TABS Take 1 tablet by mouth every other day    Yes Historical Provider, MD   docusate sodium (COLACE) 100 MG capsule Take 100 mg by mouth 2 times daily   Yes Historical Provider, MD   linaclotide (LINZESS) 145 MCG capsule Take 290 mcg by mouth every morning (before breakfast)    Yes Historical Smoker     Packs/day: 0.50     Years: 42.00     Pack years: 21.00     Types: Cigarettes     Start date: 1990     Last attempt to quit: 3/11/2021     Years since quittin.1    Smokeless tobacco: Never Used   Substance and Sexual Activity    Alcohol use: No     Alcohol/week: 0.0 standard drinks    Drug use: No    Sexual activity: Not on file   Lifestyle    Physical activity     Days per week: Not on file     Minutes per session: Not on file    Stress: Not on file   Relationships    Social connections     Talks on phone: Not on file     Gets together: Not on file     Attends Nondenominational service: Not on file     Active member of club or organization: Not on file     Attends meetings of clubs or organizations: Not on file     Relationship status: Not on file    Intimate partner violence     Fear of current or ex partner: Not on file     Emotionally abused: Not on file     Physically abused: Not on file     Forced sexual activity: Not on file   Other Topics Concern    Not on file   Social History Narrative    ** Merged History Encounter **            Family History   Problem Relation Age of Onset    Heart Disease Mother     Cancer Father     Arthritis Other     Cancer Other     Depression Other     Heart Disease Other     Hypertension Other     Mental Illness Other     Stroke Other        REVIEW OF SYSTEMS:     [place] denies fever/chills, chest pain, shortness of breath, new bowel or bladder complaints. All other review of systems was negative.     PHYSICAL EXAMINATION:      /76   Pulse 90   Temp 97.2 °F (36.2 °C) (Infrared)   Resp 16   Ht 5' 7\" (1.702 m)   Wt 239 lb (108.4 kg)   LMP 1988   SpO2 95%   BMI 37.43 kg/m²   General:       General appearance:  Pleasant and well-hydrated, in no distress and A & O x 3  Build:Obese  Function: Rises from seated position easily and Moves about room without difficulty     HEENT:     Head:normocephalic, atraumatic     Lungs:    Breathing:normal breathing pattern      CVS:     RRR     Abdomen:     Shape:obese, non-distended and normal     Cervical spine:     Inspection:normal  Palpation:tenderness paravertebral muscles, tenderness trapezium, left, right and positive. Range of motion:reduced flexion, extension, rotation bilaterally and is painful. Facet tenderness +  Spurling's: negative bilaterally     Thoracic spine:                Spine inspection:normal   Palpation:Yes tenderness over the paraspinal area, bilaterally  Range of motion:normal in flexion, extension rotation bilateral and is not painful.     Lumbar spine:     Spine inspection: scar from the prior surgery noted- healed well   SCS lead and IPG Site clean   Palpation: Tenderness paravertebral muscles Yes bilaterally  Range of motion: Decreased, flexion Decreased, Lateral bending, extension and rotation bilaterally reduced is painful. Lumbar facet loading + bilaterally  Sacroiliac joint tenderness - improved  Piriformis tenderness: negative bilaterally  SLR : negative bilaterally  Trochanteric bursa tenderness: negative bilaterally     Musculoskeletal:     Trigger points + diffuse.     Extremities:     Tremors:None bilaterally upper and lower  Edema:none x all 4 extremities     Neurological:     Sensory: Normal to light touch - except for diminished sensation over the hands and feet     Motor:   Generalized weakness +      Reflexes:    Bilaterally equal diminished.     Gait:uses a cane to assist ambulation     Dermatology:     Skin:no rashes or lesions noted      Assessment/Plan:  1. Postlaminectomy syndrome, lumbar      2. Lumbosacral spondylosis without myelopathy      3. Sacroiliac dysfunction      4. DDD (degenerative disc disease), lumbar      5. Cervicalgia      6. DDD (degenerative disc disease), cervical      7. Cervical spondylolysis      8. Thoracic degenerative disc disease      9.  Obesity, unspecified classification, unspecified obesity type, unspecified whether serious comorbidity present      10. Psychological factors affecting medical condition      11. Mitochondrial cytopathy ([address]      12. Chronic, continuous use of opioids            61 y.o.  female with H/o chronic low back pain, neck pain for years. H/o Mitochondrial disease diffuse pain / neuropathy.     Prior lumbar spine surgery years ago. S/p SCS implantation  [employer] in 2016 by Dr. Felipe Minaya (Lead placement at T6). SCS programming recently- helping. Has been evaluated by NSG- CT myelogram-Image findings reviewed and discussed. no high grade stenosis- no surgical interventions.     SIJ injection on 4/1/2021 Good pain relief.     Neck pain - predominantly axial - facet pain +     Was on chronic opioids by Dr. Spencer Maharaj. Has started Medical Marijuana . Continued meds by Dr. Spencer Maharaj. Recent fall and had exacerbation of low back pain. Was evaluated in the ER. Reviewed CT LS spine, CT C spine and Xray hip findings on 5/7/2021- no acute changes. Exam; Significant facet tenderness.     Will schedule for lumbar facet MBNB to address pain from L3-4, L4-5, L5-S1 facets. RBA discussed. Moderate sedation do to h/o anxiety of needles.     Patient will continue medication management by Dr. Spencer Maharaj and understands that our treatment modalities will be non opioid based.      Can call for follow in 4-6 weeks.      Counseling :     Patient encouraged to stay active and to watch/lose weight     Encouraged to continue Regular home exercise program as tolerated - stretching / strengthening.     Treatment plan discussed with the patient including medication and procedure side effects.      Magdaleno Steel MD

## 2021-05-13 NOTE — PROGRESS NOTES
Do you currently have any of the following:    Fever: No  Headache:  No  Cough: No  Shortness of breath: No  Exposed to anyone with these symptoms: No         Abby Sinha presents to the [facility] on 5/13/[facility]21. Lsely Rosales is complaining of pain neck, lower back and both legs. The pain is constant. The pain is described as throbbing and shooting. Pain is rated on her best day at a 7, on her worst day at a 9, and on average at a 8 on the VAS scale. She took her last dose of Percocet today. Any procedures since your last visit: No, with  % relief. Pacemaker or defibrillator: No managed by . She is not on NSAIDS and is not on anticoagulation medications to include none and is managed by      Medication Contract and Consent for Opioid Use Documents Filed      No documents found                /76   Pulse 90   Temp 97.2 °F (36.2 °C) (Infrared)   Resp 16   Ht 5' 7\" (1.702 m)   Wt 239 lb (108.4 kg)   LMP 08/31/1988   SpO2 95%   BMI 37.43 kg/m²      Patient's last menstrual period was 08/31/1988.

## 2021-05-14 ENCOUNTER — TELEPHONE (OUTPATIENT)
Dept: ADMINISTRATIVE | Age: 61
End: 2021-05-14

## 2021-05-14 NOTE — TELEPHONE ENCOUNTER
Patient last saw Dr Slim Jett 9/20 for seizures. Patient called and said her seizure medication is not working and she is having seizure activity. Patient said Dr Georg Homans is referring her to see Dr Slim Jett again. I checked the new referral and it is for Epilepsy. Please call patient to schedule or advise how to schedule this appointment. Thank you!

## 2021-05-14 NOTE — TELEPHONE ENCOUNTER
Left message for patient and informed her that if she is having seizures she needs to go to the ED. She should follow up with Dr. Mancilla after being evaluated at the hospital. Call back number given.

## 2021-05-18 ENCOUNTER — OFFICE VISIT (OUTPATIENT)
Dept: ENDOCRINOLOGY | Age: 61
End: 2021-05-18
Payer: MEDICAID

## 2021-05-18 VITALS — WEIGHT: 239 LBS | BODY MASS INDEX: 37.51 KG/M2 | HEIGHT: 67 IN | OXYGEN SATURATION: 98 % | RESPIRATION RATE: 20 BRPM

## 2021-05-18 DIAGNOSIS — E27.8 ADRENAL INCIDENTALOMA (HCC): ICD-10-CM

## 2021-05-18 DIAGNOSIS — E04.2 MULTINODULAR GOITER: Primary | ICD-10-CM

## 2021-05-18 DIAGNOSIS — E55.9 VITAMIN D DEFICIENCY: ICD-10-CM

## 2021-05-18 PROCEDURE — 3017F COLORECTAL CA SCREEN DOC REV: CPT | Performed by: INTERNAL MEDICINE

## 2021-05-18 PROCEDURE — G8417 CALC BMI ABV UP PARAM F/U: HCPCS | Performed by: INTERNAL MEDICINE

## 2021-05-18 PROCEDURE — 4004F PT TOBACCO SCREEN RCVD TLK: CPT | Performed by: INTERNAL MEDICINE

## 2021-05-18 PROCEDURE — G8428 CUR MEDS NOT DOCUMENT: HCPCS | Performed by: INTERNAL MEDICINE

## 2021-05-18 PROCEDURE — 99214 OFFICE O/P EST MOD 30 MIN: CPT | Performed by: INTERNAL MEDICINE

## 2021-05-18 RX ORDER — DEXAMETHASONE 1 MG
1 TABLET ORAL ONCE
Qty: 1 TABLET | Refills: 0 | Status: ON HOLD | OUTPATIENT
Start: 2022-02-18 | End: 2022-01-06

## 2021-05-18 NOTE — PROGRESS NOTES
[address] [place] [address] Department of Endocrinology Diabetes and Metabolism   [address] [facility] [address]   Phone: [phone]  Fax: [phone]    Date of Service: 5/18/2021    Primary Care Physician: Kimberly Santiago MD  Referring physician: Maria Del Rosario Cuellar MD  Provider: Praveena Duarte MD            Reason for the visit:  Multinodular goiter       History of Present Illness: The history is provided by the patient. No  was used. Accuracy of the patient data is excellent. Nayla Moore is a very pleasant 61 y.o. female seen today for evaluation and management of multinodular goiter     The patient was found to have thyroid nodule on a routine physical examination   The pt was diagnosed with MNG in 5/19 5/22/19 Discovered incidentally on CT of neck 5/22/19 ordered by Dr. Stanford Lopez for evaluation of swelling of the neck  7/31/19 US of thyroid: 1.3cm hypoechoic nodule in the isthmus  8/02/19 CT-guided core biopsy was negative for malignancy; flow cytometry was attempted but the cellularity of the specimen was inadequate.   2/03/20 US of thyroid: no significant change from 7/31/19; two nodules reported, one in each lobe located near the isthmus, both are 1.2 cm; calcification now noted in the right  Thyroid US 2/2021  Right thyroid lobe:  8.0 x 2.6 x 2.8 cm, Left thyroid lobe:  5.8 x 2.8 x 2.6 cm, Isthmus:  1.0 cm  Mildly heterogeneous in echotexture with normal vascularity  Nodules  9 mm cystic and solid nodule in the right thyroid lobe  3 mm focus of dystrophic calcification without associated nodule  3 mm predominantly cystic lesion in the left thyroid lobe      6/13/19  TSH 0.239, fT4 1.18  5/11/20  TSH 0.809, T3 114,  fT4 1.16     Right adrenal nodule - 2010 (per 12/28/12 CT report)  12/28/19  CT of abd : Stable small nodule in the right adrenal gland likely reflecting adenoma  02/06/20  24 urine metanephrines and catecholamines neg for pheo; urine cortisol nml, PRA and serum dean normal                 02/06/20 Serum Cortisol level 6.06 mcg/dl, but was checked at 4:38 p  5/11/20  Dexamethasone suppression test: Cortisol 3.03, ACTH 5.8     PAST MEDICAL HISTORY   Past Medical History:   Diagnosis Date    Adenoma of right adrenal gland     Anemia     Anxiety     Arthritis     spinal    Asthma     controlled with inhalers     Benign essential tremor     Bipolar affective (HCC)     Chronic back pain     Spinal cord stimulator in place    Chronic respiratory failure with hypoxia (HCC)     at times dt diaphragm does not function properly dt Mitochondrial disorder    Depression     stable    Difficulty swallowing     for EGD 10-8-19     Environmental and seasonal allergies     Fatty liver     Full dentures     GERD (gastroesophageal reflux disease)     Gout     past hx of    Herniated cervical disc     limited rom of head and neck    History of swelling of feet     Lymphedema of lower extremity 09/06/2012    Mitochondrial cytopathy (HCC)     s/s muscle and nerve pain, difficulty breathing, seizures, difficulty swallowing, digestive disorders- Dr. Beto Phan [facility]    Neuropathy     at feet    Obesity     PETR (obstructive sleep apnea)     no CPAP dt insurance will not pay for    PONV (postoperative nausea and vomiting)     Seizures ([facility] [address]     last approx 6-13-19- f/u w/ PCP  Granmal, flares up in heat/ summer time - no recent issues as of 10-4-19     Spinal headache     Thyroid disease        PAST SURGICAL HISTORY   Past Surgical History:   Procedure Laterality Date    APPENDECTOMY      with Hysterectomy / unknown    BACK SURGERY  last one 1995    lumbar x 2    CARDIAC CATHETERIZATION Right 6-6-2013    Dr. Colt Jenkins Radial, no stents placed, no blockages     CHOLECYSTECTOMY  1999    open with gastric bypass    COLONOSCOPY N/A 9/18/2018    COLONOSCOPY POLYPECTOMY HOT BIOPSY performed by Yue Donovan MD at [facility] ENDOSCOPY    COLONOSCOPY N/A 10/8/2019    COLONOSCOPY POLYPECTOMY SNARE/COLD BIOPSY performed by Remi Huang MD at [address] EGD COLONOSCOPY N/A 10/8/2019    EGD ESOPHAGOGASTRODUODENOSCOPY DILATATION performed by Remi Huang MD at [address], COLON, DIAGNOSTIC      ESOPHAGEAL DILATATION  9/18/2018    ESOPHAGEAL DILATION Cloretta Hedger performed by Remi Huang MD at [address] Bilateral 2007,2017    knees    KNEE SURGERY Bilateral     scope    MYOMECTOMY      NERVE BLOCK Left 10 02 2013    lumbar paravertebral facet #2    NERVE BLOCK Left 10 9 13    hip inj #1    NERVE BLOCK Left 10/16/13    hip injection    NERVE BLOCK Left 10/29/2014    left lumbar transforaminal nerve lbock  #1    NERVE BLOCK Left 11/12/2014    lumbar left transforaminal nerve block  #2    NERVE BLOCK Left 12 8 14    lumbar transforaminal #3    NERVE BLOCK Right 3/30/15    cervical transforaminal #1    NERVE BLOCK Right 4/6/2015    right cervical transforaminal nerve block  #2    NERVE BLOCK Right 4/13/15    cervical transforaminal #3    NERVE BLOCK Left 7/6/15    knee injection #1    NERVE BLOCK  07/20/15    left genicular nerve block/knee #2    NERVE BLOCK Left 10 1 15    lumb transforam #1    NERVE BLOCK  10/26/15    left lumbar transforaminal nerve block #3    NERVE BLOCK Bilateral 4/1/2021    #1 BILATERAL SACROILIAC JOINT INJECTION UNDER FLUORO performed by Nicholas Ramos MD at [address] Left 11 25 13    lumbar radiofreq    OTHER SURGICAL HISTORY  3/28/2016    stage 1, 3 day percutanous trial [employer] lumbar spinal cord stimulator    OTHER SURGICAL HISTORY  1995    racz procedure / lower back    MN COLONOSCOPY FLX DX W/COLLJ SPEC WHEN PFRMD N/A 3/20/2018    COLONOSCOPY DIAGNOSTIC OR SCREENING performed by Remi Huang MD at [place] [address] EGD TRANSORAL BIOPSY SINGLE/MULTIPLE N/A 3/20/2018 EGD BIOPSY performed by Valeria Edge MD at [facility]   Tobacco:   reports that she has been smoking cigarettes. She started smoking about 30 years ago. She has a 21.00 pack-year smoking history. She has never used smokeless tobacco.  Alcohol:   reports no history of alcohol use. Drugs:   reports no history of drug use.     FAMILY HISTORY   Family History   Problem Relation Age of Onset    Heart Disease Mother     Cancer Father     Arthritis Other     Cancer Other     Depression Other     Heart Disease Other     Hypertension Other     Mental Illness Other     Stroke Other        ALLERGIES AND DRUG REACTIONS   Allergies   Allergen Reactions    Bee Pollen Anaphylaxis, Shortness Of Breath and Swelling     And wasp    Penicillins Anaphylaxis    Ropinirole Hcl Anaphylaxis    Vistaril [Hydroxyzine Hcl] Anaphylaxis    Aripiprazole Other (See Comments)     muscle spasms and confusion    Hydroxyzine Pamoate Itching and Rash    Tape Beau Gayer Tape] Rash     Paper tape allergy    Fabric tape is OK to use        CURRENT MEDICATIONS   Current Outpatient Medications   Medication Sig Dispense Refill    NONFORMULARY Medical marijuana      cetirizine (ZYRTEC) 10 MG tablet take 1 tablet by mouth once daily as directed      clotrimazole-betamethasone (LOTRISONE) 1-0.05 % lotion       Co-Enzyme Q10 100 MG CAPS TAKE 1 CAPSULE BY MOUTH TWICE DAILY AS DIRECTED      cyanocobalamin 50 MCG tablet Take 100 mcg by mouth daily      diclofenac sodium (VOLTAREN) 1 % GEL APPLY 1 TO 2 GRAMS 3 TO 4 TIMES DAILY AS DIRECTED      ferrous sulfate (FE TABS 325) 325 (65 Fe) MG EC tablet take 1 tablet by mouth once daily      Glucosamine-Chondroitin 750-600 MG CHEW Take by mouth 2 times daily      lidocaine-prilocaine (EMLA) 2.5-2.5 % cream APPLY 1 TO 2 GRAMS 3 TO 4 TIMES DAILY AS DIRECTED      potassium chloride (KLOR-CON) 10 MEQ extended release tablet Take 10 mEq by mouth as needed      Pseudoephedrine-Naproxen Na (ALEVE-D SINUS & COLD PO)       famotidine (PEPCID) 20 MG tablet Take 1 tablet by mouth 2 times daily 60 tablet 0    traZODone (DESYREL) 100 MG tablet Take 100 mg by mouth nightly      ondansetron (ZOFRAN-ODT) 4 MG disintegrating tablet Take 1 tablet by mouth 3 times daily as needed for Nausea or Vomiting 21 tablet 0    albuterol (PROVENTIL) (5 MG/ML) 0.5% nebulizer solution Take 2.5 mg by nebulization 3 times daily       B Complex Vitamins (VITAMIN-B COMPLEX PO) Take by mouth daily       magnesium 200 MG TABS tablet Take 200 mg by mouth 2 times daily       calcium carbonate (CALCIUM 600) 600 MG TABS tablet Take 1 tablet by mouth 2 times daily       azelastine (ASTELIN) 0.1 % nasal spray 1 spray by Nasal route 2 times daily Use in each nostril as directed 1 Bottle 3    montelukast (SINGULAIR) 10 MG tablet Take 1 tablet by mouth daily 30 tablet 3    omeprazole (PRILOSEC) 20 MG delayed release capsule Take 20 mg by mouth Daily       oxyCODONE-acetaminophen (PERCOCET)  MG per tablet Take 1 tablet by mouth every 8 hours as needed for Pain (pt took 1 tablet at 6pm tonight).  Instructed to take am of procedure       baclofen (LIORESAL) 20 MG tablet Take 20 mg by mouth 4 times daily       furosemide (LASIX) 40 MG tablet Take 1 tablet by mouth 2 times daily 60 tablet 0    albuterol sulfate  (90 Base) MCG/ACT inhaler Inhale 2 puffs into the lungs every 4 hours as needed for Wheezing or Shortness of Breath       Cholecalciferol (VITAMIN D3) 5000 units TABS Take 1 tablet by mouth every other day       docusate sodium (COLACE) 100 MG capsule Take 100 mg by mouth 2 times daily      linaclotide (LINZESS) 145 MCG capsule Take 290 mcg by mouth every morning (before breakfast)       levOCARNitine (CARNITOR) 330 MG tablet Take 330 mg by mouth 3 times daily For mitochondrial disease      allopurinol (ZYLOPRIM) 100 MG tablet Take 200 mg by mouth 2 times daily S2, no murmur, no heave. Dorsalis pedis pulse palpable   Abd: soft lax, no tenderness, no organomegaly, audible bowel sounds. Skin: warm, no lesions, no rash. No Palmar erythema  Musculoskeletal: No back tenderness, no kyphosis/scoliosis     Neuro: CN intact, sensation normal , muscle power normal. No tremors   Psych: normal mood, and affect    Review of Laboratory Data:  I personally reviewed the following labs:   Lab Results   Component Value Date/Time    WBC 6.9 04/25/2021 02:55 AM    RBC 4.28 04/25/2021 02:55 AM    HGB 13.9 04/25/2021 02:55 AM    HCT 44.1 04/25/2021 02:55 AM    .0 (H) 04/25/2021 02:55 AM    MCH 32.5 04/25/2021 02:55 AM    MCHC 31.5 (L) 04/25/2021 02:55 AM    RDW 13.5 04/25/2021 02:55 AM     04/25/2021 02:55 AM    MPV 9.3 04/25/2021 02:55 AM      Lab Results   Component Value Date/Time     04/25/2021 02:55 AM    K 4.2 04/25/2021 02:55 AM    CO2 30 (H) 04/25/2021 02:55 AM    BUN 10 04/25/2021 02:55 AM    CREATININE 0.7 04/25/2021 02:55 AM    CALCIUM 9.1 04/25/2021 02:55 AM    LABGLOM >60 04/25/2021 02:55 AM    GFRAA >60 04/25/2021 02:55 AM      Lab Results   Component Value Date/Time    TSH 0.548 03/16/2021 12:16 PM    T4FREE 1.09 03/16/2021 12:16 PM    S7MOMEA 10.0 05/21/2012 01:48 PM    U4BJHYI 107.30 03/03/2021 01:46 PM     Lab Results   Component Value Date    LABA1C 5.7 12/13/2013    GLUCOSE 100 04/25/2021    GLUCOSE 93 05/24/2012    LABCREA 27 02/06/2020     Lab Results   Component Value Date    TRIG 99 03/16/2021    HDL 66 03/16/2021    LDLCALC 63 03/16/2021    CHOL 149 03/16/2021     Lab Results   Component Value Date    VITD25 50 03/16/2021    VITD25 29 05/07/2012       ASSESSMENT & RECOMMENDATIONS   Lion Branham, a 61 y.o.-old female seen in for the following issues     Multinodular goiter   · Prevalence of thyroid nodule on thyroid ultrasound is 50% and 95 % of these nodules are benign.   · Will repeat thyroid US in 2/2022 and proceed accordingly      vitD deficiency   · Continue vitD supplement     Rt adrenal incidentaloma   · 12/28/19  CT of abd : Stable small nodule in the right adrenal gland likely reflecting adenoma  · 02/06/20  24 urine metanephrines and catecholamines neg for pheo; urine cortisol nml, PRA and serum dean normal                 · 02/06/20 Serum Cortisol level 6.06 mcg/dl, but was checked at 4:38 p  · 5/11/20  Dexamethasone suppression test: Cortisol 3.03, ACTH 5.8  · Labs before next visit in 2/2022     I personally reviewed external notes from PCP and other patient's care team providers, and personally interpreted labs associated with the above diagnosis. I also ordered labs to further assess and manage the above addressed medical conditions. Return in about 9 months (around 2/18/2022) for Multinodular goiter, VitD deficiency, adrenal incidentaloma . The above issues were reviewed with the patient who understood and agreed with the plan. Thank you for allowing us to participate in the care of this patient. Please do not hesitate to contact us with any additional questions. Diagnosis Orders   1. Multinodular goiter  TSH without Reflex    T4, Free    US THYROID   2. Adrenal incidentaloma ([address]  Basic Metabolic Panel    Metanephrines Plasma Free    Cortisol Total    Renin Activity and Aldosterone    dexamethasone (DECADRON) 1 MG tablet   3. Vitamin D deficiency       Liz Love MD  Endocrinologist, [facility])   [address] [place] [address], [address] [place] [address],[address] [address]   Phone: [phone]  Fax: [phone]  ------------------------------  An electronic signature was used to authenticate this note.  Dev Kim MD on 5/18/2021 at 11:31 AM

## 2021-05-21 ENCOUNTER — OFFICE VISIT (OUTPATIENT)
Dept: NON INVASIVE DIAGNOSTICS | Age: 61
End: 2021-05-21
Payer: MEDICAID

## 2021-05-21 VITALS
SYSTOLIC BLOOD PRESSURE: 128 MMHG | RESPIRATION RATE: 22 BRPM | WEIGHT: 239.2 LBS | BODY MASS INDEX: 37.54 KG/M2 | HEIGHT: 67 IN | DIASTOLIC BLOOD PRESSURE: 60 MMHG | HEART RATE: 75 BPM

## 2021-05-21 DIAGNOSIS — R55 SYNCOPE, UNSPECIFIED SYNCOPE TYPE: Primary | ICD-10-CM

## 2021-05-21 DIAGNOSIS — G47.33 OSA (OBSTRUCTIVE SLEEP APNEA): ICD-10-CM

## 2021-05-21 DIAGNOSIS — I10 ESSENTIAL HYPERTENSION: ICD-10-CM

## 2021-05-21 DIAGNOSIS — E78.2 MIXED HYPERLIPIDEMIA: ICD-10-CM

## 2021-05-21 PROCEDURE — G8417 CALC BMI ABV UP PARAM F/U: HCPCS | Performed by: INTERNAL MEDICINE

## 2021-05-21 PROCEDURE — 99204 OFFICE O/P NEW MOD 45 MIN: CPT | Performed by: INTERNAL MEDICINE

## 2021-05-21 PROCEDURE — 93000 ELECTROCARDIOGRAM COMPLETE: CPT | Performed by: INTERNAL MEDICINE

## 2021-05-21 PROCEDURE — 4004F PT TOBACCO SCREEN RCVD TLK: CPT | Performed by: INTERNAL MEDICINE

## 2021-05-21 PROCEDURE — G8427 DOCREV CUR MEDS BY ELIG CLIN: HCPCS | Performed by: INTERNAL MEDICINE

## 2021-05-21 PROCEDURE — 3017F COLORECTAL CA SCREEN DOC REV: CPT | Performed by: INTERNAL MEDICINE

## 2021-05-21 ASSESSMENT — ENCOUNTER SYMPTOMS
NAUSEA: 0
DIARRHEA: 0
CHEST TIGHTNESS: 0
SHORTNESS OF BREATH: 0
WHEEZING: 0
COLOR CHANGE: 0
COUGH: 0
SINUS PRESSURE: 0
BACK PAIN: 1
ABDOMINAL PAIN: 0
EYE REDNESS: 0
ABDOMINAL DISTENTION: 0

## 2021-05-21 NOTE — PROGRESS NOTES
without myelopathy 03/10/2021    Sacroiliac dysfunction 03/10/2021    DDD (degenerative disc disease), lumbar 03/10/2021    Thoracic degenerative disc disease 03/10/2021    Chest pain 09/09/2020    Adenoma of right adrenal gland     Chronic respiratory failure with hypoxia (HCC)      Overview Note:     at times dt diaphragm does not function properly dt Mitochondrial disorder      Mitochondrial cytopathy ([facility]      Overview Note:     s/s muscle and nerve pain, difficulty breathing, seizures, difficulty swallowing, digestive disorders      GERD (gastroesophageal reflux disease)     Fatty liver     Depression      Overview Note:     stable      PETR (obstructive sleep apnea)      Overview Note:     CPAP      Chronic back pain      Overview Note:     Spinal cord stimulator in place      Bipolar affective ([facility]     Protruded cervical disc 03/30/2015    Cervical radiculopathy 03/30/2015    DDD (degenerative disc disease), cervical 03/12/2015    Neural foraminal stenosis of cervical spine 03/12/2015    Status post total knee replacement, right 07/24/2013    Cervical spondylolysis 07/24/2013    Degenerative Osteoarthritis thoracic spine 07/24/2013    Thoracic facet syndrome 07/24/2013    Cervical facet syndrome 07/24/2013    Facet syndrome, lumbar 07/24/2013    Degenerative Osteoarthritis of both knees 07/15/2013    Lymphedema of lower extremity 09/06/2012    Debility 05/06/2012    Obesity 05/06/2012    Bipolar 1 disorder ([facility] 05/06/2012    Generalized seizure disorder ([facility] 05/06/2012    Cervicalgia 11/02/2011    Asthma     Hyperlipidemia     Hypertension     Arthritis        Family History   Problem Relation Age of Onset    Heart Disease Mother     Cancer Father     Arthritis Other     Cancer Other     Depression Other     Heart Disease Other     Hypertension Other     Mental Illness Other     Stroke Other          Past Surgical History:   Procedure Laterality Date    APPENDECTOMY      with Hysterectomy / unknown    BACK SURGERY  last one 1995    lumbar x 2    CARDIAC CATHETERIZATION Right 6-6-2013    Dr. Archie Coates Radial, no stents placed, no blockages     CHOLECYSTECTOMY  1999    open with gastric bypass    COLONOSCOPY N/A 9/18/2018    COLONOSCOPY POLYPECTOMY HOT BIOPSY performed by Rex Cr MD at [address] COLONOSCOPY N/A 10/8/2019    COLONOSCOPY POLYPECTOMY SNARE/COLD BIOPSY performed by Rex Cr MD at [address] EGD COLONOSCOPY N/A 10/8/2019    EGD ESOPHAGOGASTRODUODENOSCOPY DILATATION performed by Rex Cr MD at [address] [facility], COLON, DIAGNOSTIC      ESOPHAGEAL DILATATION  9/18/2018    ESOPHAGEAL DILATION Erin Best performed by Rex Cr MD at [address] [facility] Bilateral 2007,2017    knees    KNEE SURGERY Bilateral     scope    MYOMECTOMY      NERVE BLOCK Left 10 02 2013    lumbar paravertebral facet #2    NERVE BLOCK Left 10 9 13    hip inj #1    NERVE BLOCK Left 10/16/13    hip injection    NERVE BLOCK Left 10/29/2014    left lumbar transforaminal nerve lbock  #1    NERVE BLOCK Left 11/12/2014    lumbar left transforaminal nerve block  #2    NERVE BLOCK Left 12 8 14    lumbar transforaminal #3    NERVE BLOCK Right 3/30/15    cervical transforaminal #1    NERVE BLOCK Right 4/6/2015    right cervical transforaminal nerve block  #2    NERVE BLOCK Right 4/13/15    cervical transforaminal #3    NERVE BLOCK Left 7/6/15    knee injection #1    NERVE BLOCK  07/20/15    left genicular nerve block/knee #2    NERVE BLOCK Left 10 1 15    lumb transforam #1    NERVE BLOCK  10/26/15    left lumbar transforaminal nerve block #3    NERVE BLOCK Bilateral 4/1/2021    #1 BILATERAL SACROILIAC JOINT INJECTION UNDER FLUORO performed by Jerri Santiago MD at [facility] OR    OTHER SURGICAL HISTORY Left 11 25 13    lumbar radiofreq    OTHER SURGICAL HISTORY  3/28/2016    stage 1, 3 day percutanous trial [employer] Drumm lumbar spinal cord stimulator    OTHER SURGICAL HISTORY  1995    rac procedure / lower back    [place] COLONOSCOPY FLX DX W/COLLJ SPEC WHEN PFRMD N/A 3/20/2018    COLONOSCOPY DIAGNOSTIC OR SCREENING performed by Jun Dailey MD at [address] EGD TRANSORAL BIOPSY SINGLE/MULTIPLE N/A 3/20/2018    EGD BIOPSY performed by Jun Dailey MD at [address] [facility]         Current Outpatient Medications   Medication Sig Dispense Refill    NONFORMULARY Medical marijuana      cetirizine (ZYRTEC) 10 MG tablet take 1 tablet by mouth once daily as directed      clotrimazole-betamethasone (LOTRISONE) 1-0.05 % lotion       Co-Enzyme Q10 100 MG CAPS TAKE 1 CAPSULE BY MOUTH TWICE DAILY AS DIRECTED      cyanocobalamin 50 MCG tablet Take 100 mcg by mouth daily      diclofenac sodium (VOLTAREN) 1 % GEL APPLY 1 TO 2 GRAMS 3 TO 4 TIMES DAILY AS DIRECTED      ferrous sulfate (FE TABS 325) 325 (65 Fe) MG EC tablet take 1 tablet by mouth once daily      Glucosamine-Chondroitin 750-600 MG CHEW Take by mouth 2 times daily      lidocaine-prilocaine (EMLA) 2.5-2.5 % cream APPLY 1 TO 2 GRAMS 3 TO 4 TIMES DAILY AS DIRECTED      potassium chloride (KLOR-CON) 10 MEQ extended release tablet Take 10 mEq by mouth as needed      Pseudoephedrine-Naproxen Na (ALEVE-D SINUS & COLD PO)       famotidine (PEPCID) 20 MG tablet Take 1 tablet by mouth 2 times daily 60 tablet 0    EPINEPHrine (EPIPEN 2-CARMEN) 0.3 MG/0.3ML SOAJ injection Inject 0.3 mLs into the muscle once for 1 dose Use as directed for allergic reaction 1 each 0    traZODone (DESYREL) 100 MG tablet Take 100 mg by mouth nightly      ondansetron (ZOFRAN-ODT) 4 MG disintegrating tablet Take 1 tablet by mouth 3 times daily as needed for Nausea or Vomiting 21 tablet 0    albuterol (PROVENTIL) (5 MG/ML) 0.5% nebulizer solution Take 2.5 mg by nebulization 3 times daily       B Complex Vitamins (VITAMIN-B COMPLEX PO) Take by mouth daily       magnesium 200 MG TABS tablet Take 200 mg by mouth 2 times daily       calcium carbonate (CALCIUM 600) 600 MG TABS tablet Take 1 tablet by mouth 2 times daily       azelastine (ASTELIN) 0.1 % nasal spray 1 spray by Nasal route 2 times daily Use in each nostril as directed 1 Bottle 3    montelukast (SINGULAIR) 10 MG tablet Take 1 tablet by mouth daily 30 tablet 3    omeprazole (PRILOSEC) 20 MG delayed release capsule Take 20 mg by mouth Daily       oxyCODONE-acetaminophen (PERCOCET)  MG per tablet Take 1 tablet by mouth every 8 hours as needed for Pain (pt took 1 tablet at 6pm tonight). Instructed to take am of procedure       baclofen (LIORESAL) 20 MG tablet Take 20 mg by mouth 4 times daily       furosemide (LASIX) 40 MG tablet Take 1 tablet by mouth 2 times daily 60 tablet 0    albuterol sulfate  (90 Base) MCG/ACT inhaler Inhale 2 puffs into the lungs every 4 hours as needed for Wheezing or Shortness of Breath       Cholecalciferol (VITAMIN D3) 5000 units TABS Take 1 tablet by mouth every other day       docusate sodium (COLACE) 100 MG capsule Take 100 mg by mouth 2 times daily      linaclotide (LINZESS) 145 MCG capsule Take 290 mcg by mouth every morning (before breakfast)       levOCARNitine (CARNITOR) 330 MG tablet Take 330 mg by mouth 3 times daily For mitochondrial disease      allopurinol (ZYLOPRIM) 100 MG tablet Take 200 mg by mouth 2 times daily       escitalopram (LEXAPRO) 20 MG tablet Take 20 mg by mouth 2 times daily       gabapentin (NEURONTIN) 600 MG tablet Take 1 tablet by mouth 4 times daily. 120 tablet 5    topiramate (TOPAMAX) 200 MG tablet Take 1 tablet by mouth 2 times daily.  1 tablet 0    ziprasidone (GEODON) 20 MG capsule Take 20 mg by mouth nightly       [START ON 2/18/2022] dexamethasone (DECADRON) 1 MG tablet Take 1 tablet by mouth once for 1 dose Take 1 tablet of Dexamethasone 1 mg at 11 pm and go to the Lab next morning at 8am (Patient not taking: Reported on 5/21/2021) 1 tablet 0     No current facility-administered medications for this visit. Allergies   Allergen Reactions    Bee Pollen Anaphylaxis, Shortness Of Breath and Swelling     And wasp    Penicillins Anaphylaxis    Ropinirole Hcl Anaphylaxis    Vistaril [Hydroxyzine Hcl] Anaphylaxis    Aripiprazole Other (See Comments)     muscle spasms and confusion    Hydroxyzine Pamoate Itching and Rash    Tape Derek Mikhail Tape] Rash     Paper tape allergy    Fabric tape is OK to use            ROS:   Review of Systems   Constitutional: Negative for fatigue and fever. HENT: Negative for congestion, nosebleeds and sinus pressure. Eyes: Negative for redness and visual disturbance. Respiratory: Negative for cough, chest tightness, shortness of breath and wheezing. Cardiovascular: Negative for chest pain, palpitations and leg swelling. Gastrointestinal: Negative for abdominal distention, abdominal pain, diarrhea and nausea. Endocrine: Negative for cold intolerance, heat intolerance, polydipsia and polyphagia. Genitourinary: Negative for difficulty urinating, frequency and urgency. Musculoskeletal: Positive for back pain and myalgias. Negative for arthralgias and joint swelling. Skin: Negative for color change and wound. Neurological: Positive for syncope. Negative for dizziness, weakness and numbness. Psychiatric/Behavioral: Negative for agitation, behavioral problems, confusion, decreased concentration, hallucinations and suicidal ideas. The patient is not nervous/anxious. PHYSICAL EXAM:  Vitals:    05/21/21 0935   BP: 128/60   Pulse: 75   Resp: 22   Weight: 239 lb 3.2 oz (108.5 kg)   Height: 5' 7\" (1.702 m)     Physical Exam  Vitals reviewed. Constitutional:       Appearance: Normal appearance. She is obese. HENT:      Head: Normocephalic.       Mouth/Throat:      Mouth: Mucous membranes are moist. Pharynx: Oropharynx is clear. Eyes:      Conjunctiva/sclera: Conjunctivae normal.   Neck:      Vascular: No carotid bruit. Cardiovascular:      Rate and Rhythm: Normal rate and regular rhythm. Pulses: Normal pulses. Heart sounds: Normal heart sounds. Pulmonary:      Effort: Pulmonary effort is normal.      Breath sounds: Normal breath sounds. No rales. Chest:      Chest wall: No tenderness. Abdominal:      General: Bowel sounds are normal.      Palpations: Abdomen is soft. Musculoskeletal:         General: Normal range of motion. Cervical back: Normal range of motion and neck supple. Comments: Uses assist device   Skin:     General: Skin is warm. Neurological:      General: No focal deficit present. Mental Status: She is alert and oriented to person, place, and time. Psychiatric:         Mood and Affect: Mood normal.         Behavior: Behavior normal.         Thought Content:  Thought content normal.          Pertinent Labs:  CBC:   WBC (E9/L)   Date Value   04/25/2021 6.9   03/16/2021 6.0   02/27/2021 8.3     Hemoglobin (g/dL)   Date Value   04/25/2021 13.9   03/16/2021 14.2   02/27/2021 14.4     Hematocrit (%)   Date Value   04/25/2021 44.1   03/16/2021 44.5   02/27/2021 45.9     Platelets (G5/J)   Date Value   04/25/2021 168   03/16/2021 362   02/27/2021 264      BMP:   Sodium (mmol/L)   Date Value   04/25/2021 140   03/16/2021 142   02/27/2021 139     Potassium (mmol/L)   Date Value   03/16/2021 3.9     Potassium reflex Magnesium (mmol/L)   Date Value   04/25/2021 4.2   02/27/2021 4.1   10/08/2020 4.0     Magnesium (mg/dL)   Date Value   05/11/2020 2.1   08/01/2015 3.0 (H)   12/13/2013 2.1     Chloride (mmol/L)   Date Value   04/25/2021 102   03/16/2021 104   02/27/2021 102     CO2 (mmol/L)   Date Value   04/25/2021 30 (H)   03/16/2021 29   02/27/2021 27     BUN (mg/dL)   Date Value   04/25/2021 10   03/16/2021 13   02/27/2021 13     CREATININE (mg/dL)   Date Value 2021 0.7   2021 0.9   2021 1.0     Glucose (mg/dL)   Date Value   2021 100 (H)   2021 105 (H)   2021 92   2012 93   2012 91   2012 91     Calcium (mg/dL)   Date Value   2021 9.1   2021 8.7   2021 8.5 (L)      INR:   INR (no units)   Date Value   2020 0.9   2019 0.9   2017 1.0      BNP:   BNP (pg/mL)   Date Value   2013 57   2013 8      TSH:   TSH (uIU/mL)   Date Value   2021 0.548   2021 0.370   2020 0.809      Cardiac Injury Profile: Total CK (U/L)   Date Value   2020 69     CK-MB (ng/mL)   Date Value   2015 1.5     Troponin (ng/mL)   Date Value   2021 <0.01     Lipid Profile:   Triglycerides (mg/dL)   Date Value   2021 99     HDL (mg/dL)   Date Value   2021 66     LDL Calculated (mg/dL)   Date Value   2021 63     Cholesterol, Total (mg/dL)   Date Value   2021 149      Hemoglobin A1C:   Hemoglobin A1C (%)   Date Value   2013 5.7           Pertinent Cardiac Testin/9/20 Lexiscan Stress Test      Perfusion images demonstrate no reversible perfusion defect. Wall motion is within normal limits. The end diastolic volume is 435 ml. The end systolic volume is 33 ml. The estimated ejection fraction is 72 %. 13 Cardiac Cath  IMPRESSION:   1. Normal coronary arteries without any significant disease. 2.  Preserved LV function    ECG 2021: normal sinus rhythm, QTC 392ms, QRS 102ms    I have independently reviewed all of the ECGs and rhythm strips per above    I have personally reviewed the laboratory, cardiac diagnostic and radiographic testing as outlined above: We have requested previous records. 1. Syncope, unspecified syncope type    2. Essential hypertension    3. Mixed hyperlipidemia    4. PETR (obstructive sleep apnea)         ASSESSMENT & PLAN    1.  Syncope  - ECG is wnl, no evidence of Brugada, LQT, LVH, WPW  - Sounds postural, occurs when she gets up from toilet after urinating  - She is also on medications that could alter her sensorium   - She does hydrate well with water but also drinks cherry coke  - She does have known history of seizures thus follow up with neurology to r/o exacerbating seizures  - Check 2 week event monitor  - Check TTE to assess LV function and structure  - Lexiscan stress test 2020 showed normal perfusion and LVEF    2. Moderate Obesity  - S/p gastric bypass in the past    3. PETR  - Follow up with sleep study    4. Seizures        Plan :  1. Check TTE for valvular function  2. Hydrate well with Gatorade/water  3. Check 2 week event monitor to evaluate for arrhythmias      Thank you for allowing me to participate in your patient's care. I have spent a total of 40 minutes with the patient and his/her family reviewing the above stated recommendations. A total of >50% of that time involved face-to-face time providing counseling and or coordination of care with the other providers.     Kristine Purdy MD  Cardiac Electrophysiology  [address] [place] [address]

## 2021-05-24 ENCOUNTER — OFFICE VISIT (OUTPATIENT)
Dept: NEUROLOGY | Age: 61
End: 2021-05-24
Payer: MEDICAID

## 2021-05-24 VITALS
WEIGHT: 234 LBS | SYSTOLIC BLOOD PRESSURE: 118 MMHG | HEIGHT: 67 IN | DIASTOLIC BLOOD PRESSURE: 50 MMHG | BODY MASS INDEX: 36.73 KG/M2

## 2021-05-24 DIAGNOSIS — R56.9 GENERALIZED SEIZURE (HCC): Primary | ICD-10-CM

## 2021-05-24 DIAGNOSIS — G40.909 RECURRENT SEIZURES (HCC): ICD-10-CM

## 2021-05-24 DIAGNOSIS — R25.1 EXCESSIVE PHYSIOLOGIC TREMOR: Chronic | ICD-10-CM

## 2021-05-24 PROCEDURE — G8427 DOCREV CUR MEDS BY ELIG CLIN: HCPCS | Performed by: PSYCHIATRY & NEUROLOGY

## 2021-05-24 PROCEDURE — 99214 OFFICE O/P EST MOD 30 MIN: CPT | Performed by: PSYCHIATRY & NEUROLOGY

## 2021-05-24 PROCEDURE — G8417 CALC BMI ABV UP PARAM F/U: HCPCS | Performed by: PSYCHIATRY & NEUROLOGY

## 2021-05-24 PROCEDURE — 3017F COLORECTAL CA SCREEN DOC REV: CPT | Performed by: PSYCHIATRY & NEUROLOGY

## 2021-05-24 PROCEDURE — 4004F PT TOBACCO SCREEN RCVD TLK: CPT | Performed by: PSYCHIATRY & NEUROLOGY

## 2021-05-24 ASSESSMENT — ENCOUNTER SYMPTOMS
EYES NEGATIVE: 1
RESPIRATORY NEGATIVE: 1
ALLERGIC/IMMUNOLOGIC NEGATIVE: 1
BACK PAIN: 1

## 2021-05-24 NOTE — PROGRESS NOTES
Neurology Progress Note, Follow-up:    Patient: Juan Monreal  : 1960  Date: 21  Primary provider: Mike Miller MD     Re: Followup, hx generalized seizure disorder, reports recurrent seizure. Dear Mike Miller MD    I have seen Larky for follow-up visit in regards to report of \"seizure flareup\". She has a history of generalized seizure disorder and has been reported to be stable without recurrent seizures. She is treated with topiramate 225 mg daily both for seizure disorder and bipolar disorder and is prescribed gabapentin 600 mg 4 times daily, per Dr. Micheal Johnson. She has had 2 neg. routine EEGs, one with Dr. Renata Webster her former neurologist, and another in 2020, which was within normal limits. Please refer to prior Neurology consult letter of 2020 and 3/9/2020, for additional formation if needed. ROS, family/social history, medication/allergy list: Reviewed, updated. Lab data: Reviewed from 2021, [place] [facility], urine drug screen positive for cannabinoids and oxycodone. Imaging data: CT head without contrast, 2021: No acute intracranial abnormality. Telephone intake progress note reviewed, [facility], Department of Epilepsy, 2021: 2 seizure types identified, grand mal seizures with aura and petit mal seizures; reported 2-3 episodes per day.     Current Outpatient Medications   Medication Sig Dispense Refill    [START ON 2022] dexamethasone (DECADRON) 1 MG tablet Take 1 tablet by mouth once for 1 dose Take 1 tablet of Dexamethasone 1 mg at 11 pm and go to the Lab next morning at 8am (Patient not taking: Reported on 2021) 1 tablet 0    NONFORMULARY Medical marijuana      cetirizine (ZYRTEC) 10 MG tablet take 1 tablet by mouth once daily as directed      clotrimazole-betamethasone (LOTRISONE) 1-0.05 % lotion       Co-Enzyme Q10 100 MG CAPS TAKE 1 CAPSULE BY MOUTH TWICE DAILY AS DIRECTED      cyanocobalamin 50 MCG tablet Take 100 mcg by mouth daily      diclofenac sodium (VOLTAREN) 1 % GEL APPLY 1 TO 2 GRAMS 3 TO 4 TIMES DAILY AS DIRECTED      ferrous sulfate (FE TABS 325) 325 (65 Fe) MG EC tablet take 1 tablet by mouth once daily      Glucosamine-Chondroitin 750-600 MG CHEW Take by mouth 2 times daily      lidocaine-prilocaine (EMLA) 2.5-2.5 % cream APPLY 1 TO 2 GRAMS 3 TO 4 TIMES DAILY AS DIRECTED      potassium chloride (KLOR-CON) 10 MEQ extended release tablet Take 10 mEq by mouth as needed      Pseudoephedrine-Naproxen Na (ALEVE-D SINUS & COLD PO)       famotidine (PEPCID) 20 MG tablet Take 1 tablet by mouth 2 times daily 60 tablet 0    EPINEPHrine (EPIPEN 2-CARMEN) 0.3 MG/0.3ML SOAJ injection Inject 0.3 mLs into the muscle once for 1 dose Use as directed for allergic reaction 1 each 0    traZODone (DESYREL) 100 MG tablet Take 100 mg by mouth nightly      ondansetron (ZOFRAN-ODT) 4 MG disintegrating tablet Take 1 tablet by mouth 3 times daily as needed for Nausea or Vomiting 21 tablet 0    albuterol (PROVENTIL) (5 MG/ML) 0.5% nebulizer solution Take 2.5 mg by nebulization 3 times daily       B Complex Vitamins (VITAMIN-B COMPLEX PO) Take by mouth daily       magnesium 200 MG TABS tablet Take 200 mg by mouth 2 times daily       calcium carbonate (CALCIUM 600) 600 MG TABS tablet Take 1 tablet by mouth 2 times daily       azelastine (ASTELIN) 0.1 % nasal spray 1 spray by Nasal route 2 times daily Use in each nostril as directed 1 Bottle 3    montelukast (SINGULAIR) 10 MG tablet Take 1 tablet by mouth daily 30 tablet 3    omeprazole (PRILOSEC) 20 MG delayed release capsule Take 20 mg by mouth Daily       oxyCODONE-acetaminophen (PERCOCET)  MG per tablet Take 1 tablet by mouth every 8 hours as needed for Pain (pt took 1 tablet at 6pm tonight).  Instructed to take am of procedure       furosemide (LASIX) 40 MG tablet Take 1 tablet by mouth 2 times daily 60 tablet 0    albuterol sulfate  (90 Base) MCG/ACT inhaler Inhale 2 puffs into the lungs every 4 hours as needed for Wheezing or Shortness of Breath       Cholecalciferol (VITAMIN D3) 5000 units TABS Take 1 tablet by mouth every other day       docusate sodium (COLACE) 100 MG capsule Take 100 mg by mouth 2 times daily      linaclotide (LINZESS) 145 MCG capsule Take 290 mcg by mouth every morning (before breakfast)       levOCARNitine (CARNITOR) 330 MG tablet Take 330 mg by mouth 3 times daily For mitochondrial disease      allopurinol (ZYLOPRIM) 100 MG tablet Take 200 mg by mouth 2 times daily       escitalopram (LEXAPRO) 20 MG tablet Take 20 mg by mouth 2 times daily       gabapentin (NEURONTIN) 600 MG tablet Take 1 tablet by mouth 4 times daily. 120 tablet 5    topiramate (TOPAMAX) 200 MG tablet Take 1 tablet by mouth 2 times daily. 1 tablet 0    ziprasidone (GEODON) 20 MG capsule Take 20 mg by mouth nightly        No current facility-administered medications for this visit.        Allergies   Allergen Reactions    Bee Pollen Anaphylaxis, Shortness Of Breath and Swelling     And wasp    Penicillins Anaphylaxis    Ropinirole Hcl Anaphylaxis    Vistaril [Hydroxyzine Hcl] Anaphylaxis    Aripiprazole Other (See Comments)     muscle spasms and confusion    Hydroxyzine Pamoate Itching and Rash    Tape Ezella Aquas Tape] Rash     Paper tape allergy    Fabric tape is OK to use        Patient Active Problem List   Diagnosis    Debility    Obesity    Bipolar 1 disorder (HCC)    Generalized seizure disorder (HCC)    Cervicalgia    Asthma    Hyperlipidemia    Hypertension    Arthritis    Lymphedema of lower extremity    Degenerative Osteoarthritis of both knees    Status post total knee replacement, right    Cervical spondylolysis    Degenerative Osteoarthritis thoracic spine    Thoracic facet syndrome    Cervical facet syndrome    Facet syndrome, lumbar    Neural foraminal stenosis of lumbar spine    Lumbar radiculopathy    DDD (degenerative disc no recent issues as of 10-4-19     Spinal headache     Thyroid disease        Past Surgical History:   Procedure Laterality Date    APPENDECTOMY      with Hysterectomy / unknown    BACK SURGERY  last one 1995    lumbar x 2    CARDIAC CATHETERIZATION Right 6-6-2013    Dr. Sneha Marsh, no stents placed, no blockages    [address]    open with gastric bypass    COLONOSCOPY N/A 9/18/2018    COLONOSCOPY POLYPECTOMY HOT BIOPSY performed by Valeria Edge MD at [address] COLONOSCOPY N/A 10/8/2019    COLONOSCOPY POLYPECTOMY SNARE/COLD BIOPSY performed by Valeria Edge MD at [address] EGD COLONOSCOPY N/A 10/8/2019    EGD ESOPHAGOGASTRODUODENOSCOPY DILATATION performed by Valeria Edge MD at [address] [facility], COLON, DIAGNOSTIC      ESOPHAGEAL DILATATION  9/18/2018    ESOPHAGEAL DILATION [place] performed by Valeria Edge MD at [address] Bilateral 2007,2017    knees    KNEE SURGERY Bilateral     scope    MYOMECTOMY      NERVE BLOCK Left 10 02 2013    lumbar paravertebral facet #2    NERVE BLOCK Left 10 9 13    hip inj #1    NERVE BLOCK Left 10/16/13    hip injection    NERVE BLOCK Left 10/29/2014    left lumbar transforaminal nerve lbock  #1    NERVE BLOCK Left 11/12/2014    lumbar left transforaminal nerve block  #2    NERVE BLOCK Left 12 8 14    lumbar transforaminal #3    NERVE BLOCK Right 3/30/15    cervical transforaminal #1    NERVE BLOCK Right 4/6/2015    right cervical transforaminal nerve block  #2    NERVE BLOCK Right 4/13/15    cervical transforaminal #3    NERVE BLOCK Left 7/6/15    knee injection #1    NERVE BLOCK  07/20/15    left genicular nerve block/knee #2    NERVE BLOCK Left 10 1 15    lumb transforam #1    NERVE BLOCK  10/26/15    left lumbar transforaminal nerve block #3    NERVE BLOCK Bilateral 4/1/2021    #1 BILATERAL SACROILIAC JOINT INJECTION UNDER FLUORO performed by Malka Delgado MD at [facility] OR    OTHER SURGICAL HISTORY Left 11 25 13    lumbar radiofreq    OTHER SURGICAL HISTORY  3/28/2016    stage 1, 3 day percutanous trial [employer] lumbar spinal cord stimulator    OTHER SURGICAL HISTORY  1995    racz procedure / lower back    FL COLONOSCOPY FLX DX W/COLLJ SPEC WHEN PFRMD N/A 3/20/2018    COLONOSCOPY DIAGNOSTIC OR SCREENING performed by Mayuri Coppola MD at [place] [address] EGD TRANSORAL BIOPSY SINGLE/MULTIPLE N/A 3/20/2018    EGD BIOPSY performed by Mayuri Coppola MD at [address] [place] [address]         Family History   Problem Relation Age of Onset    Heart Disease Mother     Cancer Father     Arthritis Other     Cancer Other     Depression Other     Heart Disease Other     Hypertension Other     Mental Illness Other     Stroke Other        Social History     Socioeconomic History    Marital status:      Spouse name: Not on file    Number of children: Not on file    Years of education: Not on file    Highest education level: Not on file   Occupational History    Not on file   Tobacco Use    Smoking status: Current Every Day Smoker     Packs/day: 0.75     Years: 42.00     Pack years: 31.50     Types: Cigarettes     Start date: 6/13/1990    Smokeless tobacco: Never Used   Vaping Use    Vaping Use: Never used   Substance and Sexual Activity    Alcohol use: No     Alcohol/week: 0.0 standard drinks    Drug use: Yes     Types: Marijuana     Comment: edibles- through pain doctor    Sexual activity: Not on file   Other Topics Concern    Not on file   Social History Narrative    ** Merged History Encounter **          Social Determinants of Health     Financial Resource Strain:     Difficulty of Paying Living Expenses:    Food Insecurity:     Worried About Running Out of Food in the Last Year:     Ran Out of Food in the Last Year:    Transportation Needs:     Lack of Transportation (Medical):  Lack of Transportation (Non-Medical):    Physical Activity:     Days of Exercise per Week:     Minutes of Exercise per Session:    Stress:     Feeling of Stress :    Social Connections:     Frequency of Communication with Friends and Family:     Frequency of Social Gatherings with Friends and Family:     Attends Temple Services:     Active Member of Clubs or Organizations:     Attends Club or Organization Meetings:     Marital Status:    Intimate Partner Violence:     Fear of Current or Ex-Partner:     Emotionally Abused:     Physically Abused:     Sexually Abused:      Review of Systems   Constitutional: Negative. HENT: Negative. Eyes: Negative. Respiratory: Negative. Cardiovascular: Negative. Endocrine: Negative. Genitourinary: Negative. Musculoskeletal: Positive for back pain. Skin: Negative. Allergic/Immunologic: Negative. Neurological: Positive for tremors and seizures. Hematological: Negative. Psychiatric/Behavioral: Positive for decreased concentration. The patient is nervous/anxious. History chronic affective disorder     Neurologic Exam:  BP (!) 118/50 (Site: Right Upper Arm, Position: Sitting, Cuff Size: Medium Adult)   Ht 5' 7\" (1.702 m)   Wt 234 lb (106.1 kg)   LMP 08/31/1988   BMI 36.65 kg/m²    Mental Status: Alert, oriented x3 at her usual cognitive baseline state. Speech is clear and language grossly fluent. Comprehension abilities grossly intact. Affect is constricted and mood is anxious. There are no paraphasic word errors. CN's II-XII: Remains grossly intact throughout. Pupils are equal and reactive to light. EOMs are full without nystagmus. Visual fields are grossly full. Facial expression and sensation are normal and symmetric and there is no facial droop. Hearing is grossly intact. The tongue is midline. Motor/Sensory Exam: Grossly intact strength in the upper and lower extremities without a pronator drift. Exaggerated physiologic tremor, increased with anxiety/stress observed. No pathologic reflexes. Sensory modalities are grossly intact subjectively throughout. Coordination/Gait: No gross limb dysmetria on finger-to-nose testing. Exaggerated physiologic tremor versus intention tremor. Assessment/Plan:   1. Chronic generalized seizure disorder; reports recurrent clinical seizures of 1/week or every other week. 2.  She already has her [facility] intake Epilepsy Department evaluation on 5/21/2021, and reports she is waiting to hear back from them regarding further evaluation which may require a videoEEG study to help characterize the nature of her seizures or spells. She reports continuing topiramate 225 mg twice daily as prescribed by her primary provider and gabapentin 600 mg 4 times daily for chronic pain management. 3.  Follow-up in the Neurology clinic on a as needed basis if clinically indicated. Sincerely,      Wale Schmitt MD    Please note this report has been partially produced using speech recognition software and may cause contain errors related to that system including grammar, punctuation and spelling or words and phrases that may not seem appropriate. If there are questions or concerns please feel free to contact me to clarify.

## 2021-05-31 ENCOUNTER — HOSPITAL ENCOUNTER (EMERGENCY)
Age: 61
Discharge: ANOTHER HOSPITAL | End: 2021-05-31
Attending: EMERGENCY MEDICINE
Payer: MEDICAID

## 2021-05-31 ENCOUNTER — APPOINTMENT (OUTPATIENT)
Dept: CT IMAGING | Age: 61
End: 2021-05-31
Payer: MEDICAID

## 2021-05-31 ENCOUNTER — APPOINTMENT (OUTPATIENT)
Dept: GENERAL RADIOLOGY | Age: 61
End: 2021-05-31
Payer: MEDICAID

## 2021-05-31 VITALS
DIASTOLIC BLOOD PRESSURE: 66 MMHG | OXYGEN SATURATION: 95 % | SYSTOLIC BLOOD PRESSURE: 153 MMHG | HEART RATE: 68 BPM | BODY MASS INDEX: 36.65 KG/M2 | HEIGHT: 67 IN | RESPIRATION RATE: 16 BRPM | TEMPERATURE: 98.5 F
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
## 2021-05-31 DIAGNOSIS — S72.351A CLOSED DISPLACED COMMINUTED FRACTURE OF SHAFT OF RIGHT FEMUR, INITIAL ENCOUNTER (HCC): Primary | ICD-10-CM

## 2021-05-31 LAB
ALBUMIN SERPL-MCNC: 3.5 G/DL (ref 3.5–5.2)
ALP BLD-CCNC: 122 U/L (ref 35–104)
ALT SERPL-CCNC: 15 U/L (ref 0–32)
ANION GAP SERPL CALCULATED.3IONS-SCNC: 6 MMOL/L (ref 7–16)
AST SERPL-CCNC: 21 U/L (ref 0–31)
BASOPHILS ABSOLUTE: 0.03 E9/L (ref 0–0.2)
BASOPHILS RELATIVE PERCENT: 0.3 % (ref 0–2)
BILIRUB SERPL-MCNC: 0.3 MG/DL (ref 0–1.2)
BUN BLDV-MCNC: 11 MG/DL (ref 6–23)
CALCIUM SERPL-MCNC: 8.5 MG/DL (ref 8.6–10.2)
CHLORIDE BLD-SCNC: 107 MMOL/L (ref 98–107)
CO2: 31 MMOL/L (ref 22–29)
CREAT SERPL-MCNC: 0.8 MG/DL (ref 0.5–1)
EOSINOPHILS ABSOLUTE: 0.05 E9/L (ref 0.05–0.5)
EOSINOPHILS RELATIVE PERCENT: 0.5 % (ref 0–6)
GFR AFRICAN AMERICAN: >60
GFR NON-AFRICAN AMERICAN: >60 ML/MIN/1.73
GLUCOSE BLD-MCNC: 131 MG/DL (ref 74–99)
HCT VFR BLD CALC: 42.5 % (ref 34–48)
HEMOGLOBIN: 13.5 G/DL (ref 11.5–15.5)
IMMATURE GRANULOCYTES #: 0.02 E9/L
IMMATURE GRANULOCYTES %: 0.2 % (ref 0–5)
LYMPHOCYTES ABSOLUTE: 0.77 E9/L (ref 1.5–4)
LYMPHOCYTES RELATIVE PERCENT: 8.5 % (ref 20–42)
MCH RBC QN AUTO: 32.9 PG (ref 26–35)
MCHC RBC AUTO-ENTMCNC: 31.8 % (ref 32–34.5)
MCV RBC AUTO: 103.7 FL (ref 80–99.9)
MONOCYTES ABSOLUTE: 0.66 E9/L (ref 0.1–0.95)
MONOCYTES RELATIVE PERCENT: 7.2 % (ref 2–12)
NEUTROPHILS ABSOLUTE: 7.58 E9/L (ref 1.8–7.3)
NEUTROPHILS RELATIVE PERCENT: 83.3 % (ref 43–80)
PDW BLD-RTO: 13.5 FL (ref 11.5–15)
PLATELET # BLD: 202 E9/L (ref 130–450)
PMV BLD AUTO: 8.9 FL (ref 7–12)
POTASSIUM REFLEX MAGNESIUM: 4.4 MMOL/L (ref 3.5–5)
RBC # BLD: 4.1 E12/L (ref 3.5–5.5)
SODIUM BLD-SCNC: 144 MMOL/L (ref 132–146)
TOTAL PROTEIN: 6.4 G/DL (ref 6.4–8.3)
WBC # BLD: 9.1 E9/L (ref 4.5–11.5)

## 2021-05-31 PROCEDURE — 96376 TX/PRO/DX INJ SAME DRUG ADON: CPT

## 2021-05-31 PROCEDURE — 73562 X-RAY EXAM OF KNEE 3: CPT

## 2021-05-31 PROCEDURE — 73502 X-RAY EXAM HIP UNI 2-3 VIEWS: CPT

## 2021-05-31 PROCEDURE — 71045 X-RAY EXAM CHEST 1 VIEW: CPT

## 2021-05-31 PROCEDURE — 99283 EMERGENCY DEPT VISIT LOW MDM: CPT

## 2021-05-31 PROCEDURE — 85025 COMPLETE CBC W/AUTO DIFF WBC: CPT

## 2021-05-31 PROCEDURE — 73552 X-RAY EXAM OF FEMUR 2/>: CPT

## 2021-05-31 PROCEDURE — 80053 COMPREHEN METABOLIC PANEL: CPT

## 2021-05-31 PROCEDURE — 96374 THER/PROPH/DIAG INJ IV PUSH: CPT

## 2021-05-31 PROCEDURE — 96375 TX/PRO/DX INJ NEW DRUG ADDON: CPT

## 2021-05-31 PROCEDURE — 70450 CT HEAD/BRAIN W/O DYE: CPT

## 2021-05-31 PROCEDURE — 6360000002 HC RX W HCPCS: Performed by: EMERGENCY MEDICINE

## 2021-05-31 RX ORDER — FENTANYL CITRATE 50 UG/ML
100 INJECTION, SOLUTION INTRAMUSCULAR; INTRAVENOUS ONCE
Status: COMPLETED | OUTPATIENT
Start: 2021-05-31 | End: 2021-05-31

## 2021-05-31 RX ADMIN — FENTANYL CITRATE 100 MCG: 50 INJECTION, SOLUTION INTRAMUSCULAR; INTRAVENOUS at 08:35

## 2021-05-31 RX ADMIN — HYDROMORPHONE HYDROCHLORIDE 1 MG: 1 INJECTION, SOLUTION INTRAMUSCULAR; INTRAVENOUS; SUBCUTANEOUS at 10:01

## 2021-05-31 RX ADMIN — HYDROMORPHONE HYDROCHLORIDE 1 MG: 1 INJECTION, SOLUTION INTRAMUSCULAR; INTRAVENOUS; SUBCUTANEOUS at 12:38

## 2021-05-31 ASSESSMENT — PAIN SCALES - GENERAL: PAINLEVEL_OUTOF10: 10

## 2021-05-31 NOTE — ED NOTES
Pt placed on 2L NC at this time. Pulse ox at 88% while pt is resting.  Pt states she wear o2 at home when asleep     Rodo Foy RN  05/31/21 [id number]

## 2021-05-31 NOTE — PROGRESS NOTES
Per Dr. Jeff Valente, called Dr. Simmons Job @ [facility] for \"R\" Hip FX post fall. Pt previously known to this physician.  will page on call physician for Ortho.

## 2021-05-31 NOTE — PROGRESS NOTES
Received call from [facility] with Ortho @ [facility] at [phone]. Dr. Camron Kilgore spoke to him @ this time. Jama advised as resident, no authority to make decisions on pt care. Will need to speak to attending, Dr. Mary Harden. Called [facility] @ [phone] to request Dr. Mary Harden, but  advised she cannot page physician without being contacted by resident.  advised will re page [facility], resident, to call. Jama called @ [phone]. Advised him that he must call  to page physician. He agreed.

## 2021-05-31 NOTE — ED NOTES
N2N called to se RN at [address] [place] [address], questions and concerns addressed     Dave Bruner RN  05/31/21 [id number]

## 2021-05-31 NOTE — ED PROVIDER NOTES
Patient is 57-year-old female presents emergency department due to a fall at home. She does have visible forming and pain of the right thigh. Patient does have a history of seizures. States she took her seizure medications morning. Denies being on thinners. Remembers the fall members everything that happened. States she has exquisite pain of the right thigh proximal to the knee. She does have better knee replacement on the right as well. Denies any fever, chills, nausea, vomiting, diarrhea. Does have a history of COPD. The history is provided by the patient. No  was used. Fall  The accident occurred 1 to 2 hours ago. The fall occurred while walking. She fell from a height of 3 to 5 ft. She landed on concrete. The point of impact was the right hip. The pain is present in the right knee and right hip. The pain is at a severity of 10/10. The pain is severe. She was not ambulatory at the scene. There was no entrapment after the fall. There was no drug use involved in the accident. There was no alcohol use involved in the accident. Pertinent negatives include no fever, no abdominal pain, no nausea, no vomiting and no headaches. The symptoms are aggravated by activity, flexion, extension and rotation. She has tried nothing for the symptoms. The treatment provided no relief. Hip Pain  Pertinent negatives include no chest pain, no abdominal pain, no headaches and no shortness of breath. Review of Systems   Constitutional: Negative for chills and fever. HENT: Negative for ear pain and sore throat. Eyes: Negative for pain. Respiratory: Negative for shortness of breath. Cardiovascular: Negative for chest pain. Gastrointestinal: Negative for abdominal pain, nausea and vomiting. Genitourinary: Negative for flank pain and pelvic pain. Musculoskeletal: Positive for arthralgias. Negative for back pain and neck pain. Skin: Negative for rash.    Neurological: Negative for medication. Which after multiple doses did start to improve her pain. After which spoke with multiple physicians at [facility] to facilitate transfer the patient ED to ED transfer to [facility]. ED Course as of Kurt 01 1401   Mon May 31, 2021   1235 Spoke with ED physician Dr. Allyson Jalloh regarding the patient. They stated they accept the patient for transfer ED to ED transfer.    [DM]      ED Course User Index  [DM] Sophie Walter DO        ED Course as of Kurt 01 1404   Mon May 31, 2021   1235 Spoke with ED physician Dr. Allyson Jalloh regarding the patient. They stated they accept the patient for transfer ED to ED transfer.    [DM]      ED Course User Index  [DM] Sophie Walter DO       --------------------------------------------- PAST HISTORY ---------------------------------------------  Past Medical History:  has a past medical history of Adenoma of right adrenal gland, Anemia, Anxiety, Arthritis, Asthma, Benign essential tremor, Bipolar affective ([address] Chronic back pain, Chronic respiratory failure with hypoxia ([address] Depression, Difficulty swallowing, Environmental and seasonal allergies, Excessive physiologic tremor, Fatty liver, Full dentures, GERD (gastroesophageal reflux disease), Gout, Herniated cervical disc, History of swelling of feet, Lymphedema of lower extremity, Mitochondrial cytopathy ([address] Neuropathy, Obesity, PETR (obstructive sleep apnea), PONV (postoperative nausea and vomiting), Seizures ([address] Spinal headache, and Thyroid disease. Past Surgical History:  has a past surgical history that includes knee surgery (Bilateral); Gastric bypass surgery (1999); myomectomy; Tonsillectomy; Nerve Block (Left, 10 02 2013); Nerve Block (Left, 10 9 13); Nerve Block (Left, 10/16/13); other surgical history (Left, 11 25 13); Nerve Block (Left, 10/29/2014); Nerve Block (Left, 11/12/2014); Nerve Block (Left, 12 8 14); Nerve Block (Right, 3/30/15); Nerve Block (Right, 4/6/2015);  Nerve Block (Right, 4/13/15); Nerve Block (Left, 7/6/15); Nerve Block (07/20/15); Nerve Block (Left, 10 1 15); Nerve Block (10/26/15); other surgical history (3/28/2016); Endoscopy, colon, diagnostic; pr colonoscopy flx dx w/collj spec when pfrmd (N/A, 3/20/2018); pr egd transoral biopsy single/multiple (N/A, 3/20/2018); Cholecystectomy (1999); Hysterectomy (1988); Colonoscopy (N/A, 9/18/2018); Esophagus dilation (9/18/2018); back surgery (last one 1995); Cardiac catheterization (Right, 6-6-2013); Appendectomy; other surgical history (1995); joint replacement (Bilateral, [id number]); egd colonoscopy (N/A, 10/8/2019); Colonoscopy (N/A, 10/8/2019); and Nerve Block (Bilateral, 4/1/2021). Social History:  reports that she has been smoking cigarettes. She started smoking about 30 years ago. She has a 31.50 pack-year smoking history. She has never used smokeless tobacco. She reports current drug use. Drug: Marijuana. She reports that she does not drink alcohol. Family History: family history includes Arthritis in an other family member; Cancer in her father and another family member; Depression in an other family member; Heart Disease in her mother and another family member; Hypertension in an other family member; Mental Illness in an other family member; Stroke in an other family member. The patients home medications have been reviewed.     Allergies: Bee pollen, Penicillins, Ropinirole hcl, Vistaril [hydroxyzine hcl], Aripiprazole, Hydroxyzine pamoate, and Tape [adhesive tape]    -------------------------------------------------- RESULTS -------------------------------------------------    Lab  Results for orders placed or performed during the hospital encounter of 05/31/21   CBC Auto Differential   Result Value Ref Range    WBC 9.1 4.5 - 11.5 E9/L    RBC 4.10 3.50 - 5.50 E12/L    Hemoglobin 13.5 11.5 - 15.5 g/dL    Hematocrit 42.5 34.0 - 48.0 %    .7 (H) 80.0 - 99.9 fL    MCH 32.9 26.0 - 35.0 pg    MCHC 31.8 (L) 32.0 - 34.5 %    RDW 13.5 11.5 - 15.0 fL    Platelets 395 640 - 073 E9/L    MPV 8.9 7.0 - 12.0 fL    Neutrophils % 83.3 (H) 43.0 - 80.0 %    Immature Granulocytes % 0.2 0.0 - 5.0 %    Lymphocytes % 8.5 (L) 20.0 - 42.0 %    Monocytes % 7.2 2.0 - 12.0 %    Eosinophils % 0.5 0.0 - 6.0 %    Basophils % 0.3 0.0 - 2.0 %    Neutrophils Absolute 7.58 (H) 1.80 - 7.30 E9/L    Immature Granulocytes # 0.02 E9/L    Lymphocytes Absolute 0.77 (L) 1.50 - 4.00 E9/L    Monocytes Absolute 0.66 0.10 - 0.95 E9/L    Eosinophils Absolute 0.05 0.05 - 0.50 E9/L    Basophils Absolute 0.03 0.00 - 0.20 E9/L   Comprehensive Metabolic Panel w/ Reflex to MG   Result Value Ref Range    Sodium 144 132 - 146 mmol/L    Potassium reflex Magnesium 4.4 3.5 - 5.0 mmol/L    Chloride 107 98 - 107 mmol/L    CO2 31 (H) 22 - 29 mmol/L    Anion Gap 6 (L) 7 - 16 mmol/L    Glucose 131 (H) 74 - 99 mg/dL    BUN 11 6 - 23 mg/dL    CREATININE 0.8 0.5 - 1.0 mg/dL    GFR Non-African American >60 >=60 mL/min/1.73    GFR African American >60     Calcium 8.5 (L) 8.6 - 10.2 mg/dL    Total Protein 6.4 6.4 - 8.3 g/dL    Albumin 3.5 3.5 - 5.2 g/dL    Total Bilirubin 0.3 0.0 - 1.2 mg/dL    Alkaline Phosphatase 122 (H) 35 - 104 U/L    ALT 15 0 - 32 U/L    AST 21 0 - 31 U/L       Radiology  XR CHEST PORTABLE   Final Result   There are stable mildly prominent bilateral reticular markings which may be   related to chronic lung changes. No acute infiltrates are seen         XR HIP 2-3 VW W PELVIS RIGHT   Final Result   No acute osseous abnormality         XR FEMUR RIGHT (MIN 2 VIEWS)   Final Result   Comminuted displaced fracture of the distal femoral shaft. XR KNEE RIGHT (3 VIEWS)   Final Result   Comminuted displaced fracture of the distal femoral shaft.          CT Head WO Contrast   Final Result   Study is mildly limited by motion artifact, otherwise no acute intracranial   abnormality.               ------------------------- NURSING NOTES AND VITALS REVIEWED ---------------------------  Date / Time Roomed:  5/31/2021  7:28 AM  ED Bed Assignment:  DEMI/DEMI    The nursing notes within the ED encounter and vital signs as below have been reviewed. No data found. Oxygen Saturation Interpretation: Normal      ------------------------------------------ PROGRESS NOTES ------------------------------------------  Re-evaluation(s):  Time: 900. Patients symptoms show no change  Repeat physical examination is not changed    Time: 1100. Patients symptoms are improving  Repeat physical examination is not changed    I have spoken with the patient and discussed todays results, in addition to providing specific details for the plan of care and counseling regarding the diagnosis and prognosis. Their questions are answered at this time and they are agreeable with the plan. I have discussed the risks and benefits of transfer and they wish to proceed with the transfer. --------------------------------- ADDITIONAL PROVIDER NOTES ---------------------------------  Consultations:  Spoke with Dr. Timi Lang (Medicine). Discussed case. They will come to the ED to evaluate this patient. Spoke with Dr. Darin Garcia (Orthopedics). Discussed case. They will admit this patient. Reason for transfer: Continuity of care. This patient's ED course included: a personal history and physicial examination, re-evaluation prior to disposition, multiple bedside re-evaluations, IV medications, cardiac monitoring, continuous pulse oximetry and complex medical decision making and emergency management    This patient has remained hemodynamically stable and been closely monitored during their ED course. Please note that the withdrawal or failure to initiate urgent interventions for this patient would likely result in a life threatening deterioration or permanent disability. Accordingly this patient received 30 minutes of critical care time, excluding separately billable procedures.     Clinical Impression  1. Closed displaced comminuted fracture of shaft of right femur, initial encounter ([facility] [address]    2. Fall, initial encounter          Disposition  Patient's disposition: Transfer to [facility]. Transferred by: Dr Reji Crum. Patient's condition is stable.           Seven Pham DO  Resident  06/01/21 [id number]

## 2021-05-31 NOTE — PROGRESS NOTES
Dr. Ebony Bucio called from [facility] @ [phone]. Dr. Kim Shea spoke to him @ this time. Per Dr. Kim Shea, pt is to transfer to [facility]. Pt going ED to ED. [address] @ [phone], per Dr. Kim Shea and Dr. Khloe Burton. Per Dr. Khloe Burton, looped STAT transport in to call. Advised pt is Trauma and would require MICU as per current Trauma Transfer Guidelines. When [de-identified] Ambulance was advised of pt going to [facility], they advised sending ALS unit from [facility]. ETA 90 minutes. Advised Dr. Khloe Burton of ALS transfer and he approved. [facility] was advised Dr. Ebony Bucio requested ED to ED, but advised they must speak to Dr. Praveena Wright to verify. Also advised they may require Covid Test prior to transfer.

## 2021-06-01 ASSESSMENT — ENCOUNTER SYMPTOMS
SHORTNESS OF BREATH: 0
SORE THROAT: 0
VOMITING: 0
NAUSEA: 0
BACK PAIN: 0
EYE PAIN: 0
ABDOMINAL PAIN: 0

## 2021-06-15 ENCOUNTER — TELEPHONE (OUTPATIENT)
Dept: CARDIOLOGY | Age: 61
End: 2021-06-15

## 2021-06-15 NOTE — TELEPHONE ENCOUNTER
Tried to call patient to schedule echo. Line busy x2.   Electronically signed by Dustin Messer on 6/15/2021 at 2:31 PM

## 2021-06-16 ENCOUNTER — TELEPHONE (OUTPATIENT)
Dept: CARDIOLOGY | Age: 61
End: 2021-06-16

## 2021-06-27 ENCOUNTER — HOSPITAL ENCOUNTER (EMERGENCY)
Age: 61
Discharge: NURSING, REHAB OR LONG-TERM CARE | End: 2021-06-27
Attending: EMERGENCY MEDICINE
Payer: MEDICAID

## 2021-06-27 ENCOUNTER — APPOINTMENT (OUTPATIENT)
Dept: CT IMAGING | Age: 61
End: 2021-06-27
Payer: MEDICAID

## 2021-06-27 VITALS
SYSTOLIC BLOOD PRESSURE: 166 MMHG | DIASTOLIC BLOOD PRESSURE: 62 MMHG | BODY MASS INDEX: 36.73 KG/M2 | HEIGHT: 67 IN | OXYGEN SATURATION: 96 % | HEART RATE: 91 BPM | RESPIRATION RATE: 16 BRPM | WEIGHT: 234 LBS | TEMPERATURE: 98.2 F

## 2021-06-27 DIAGNOSIS — G51.0 BELL'S PALSY: Primary | ICD-10-CM

## 2021-06-27 LAB
ALBUMIN SERPL-MCNC: 3.7 G/DL (ref 3.5–5.2)
ALP BLD-CCNC: 195 U/L (ref 35–104)
ALT SERPL-CCNC: 9 U/L (ref 0–32)
ANION GAP SERPL CALCULATED.3IONS-SCNC: 11 MMOL/L (ref 7–16)
AST SERPL-CCNC: 15 U/L (ref 0–31)
BASOPHILS ABSOLUTE: 0.03 E9/L (ref 0–0.2)
BASOPHILS RELATIVE PERCENT: 0.5 % (ref 0–2)
BILIRUB SERPL-MCNC: 0.4 MG/DL (ref 0–1.2)
BUN BLDV-MCNC: 13 MG/DL (ref 6–23)
CALCIUM SERPL-MCNC: 9 MG/DL (ref 8.6–10.2)
CHLORIDE BLD-SCNC: 100 MMOL/L (ref 98–107)
CO2: 27 MMOL/L (ref 22–29)
CREAT SERPL-MCNC: 0.7 MG/DL (ref 0.5–1)
EOSINOPHILS ABSOLUTE: 0.07 E9/L (ref 0.05–0.5)
EOSINOPHILS RELATIVE PERCENT: 1.1 % (ref 0–6)
GFR AFRICAN AMERICAN: >60
GFR NON-AFRICAN AMERICAN: >60 ML/MIN/1.73
GLUCOSE BLD-MCNC: 92 MG/DL (ref 74–99)
HCT VFR BLD CALC: 37.2 % (ref 34–48)
HEMOGLOBIN: 11.9 G/DL (ref 11.5–15.5)
IMMATURE GRANULOCYTES #: 0.04 E9/L
IMMATURE GRANULOCYTES %: 0.6 % (ref 0–5)
LYMPHOCYTES ABSOLUTE: 1.46 E9/L (ref 1.5–4)
LYMPHOCYTES RELATIVE PERCENT: 23.2 % (ref 20–42)
MAGNESIUM: 2.2 MG/DL (ref 1.6–2.6)
MCH RBC QN AUTO: 31.5 PG (ref 26–35)
MCHC RBC AUTO-ENTMCNC: 32 % (ref 32–34.5)
MCV RBC AUTO: 98.4 FL (ref 80–99.9)
MONOCYTES ABSOLUTE: 0.59 E9/L (ref 0.1–0.95)
MONOCYTES RELATIVE PERCENT: 9.4 % (ref 2–12)
NEUTROPHILS ABSOLUTE: 4.1 E9/L (ref 1.8–7.3)
NEUTROPHILS RELATIVE PERCENT: 65.2 % (ref 43–80)
PDW BLD-RTO: 14.3 FL (ref 11.5–15)
PLATELET # BLD: 219 E9/L (ref 130–450)
PMV BLD AUTO: 8.8 FL (ref 7–12)
POTASSIUM REFLEX MAGNESIUM: 3.4 MMOL/L (ref 3.5–5)
RBC # BLD: 3.78 E12/L (ref 3.5–5.5)
SODIUM BLD-SCNC: 138 MMOL/L (ref 132–146)
TOTAL PROTEIN: 6.5 G/DL (ref 6.4–8.3)
WBC # BLD: 6.3 E9/L (ref 4.5–11.5)

## 2021-06-27 PROCEDURE — 83735 ASSAY OF MAGNESIUM: CPT

## 2021-06-27 PROCEDURE — 99284 EMERGENCY DEPT VISIT MOD MDM: CPT

## 2021-06-27 PROCEDURE — 85025 COMPLETE CBC W/AUTO DIFF WBC: CPT

## 2021-06-27 PROCEDURE — 80053 COMPREHEN METABOLIC PANEL: CPT

## 2021-06-27 PROCEDURE — 70450 CT HEAD/BRAIN W/O DYE: CPT

## 2021-06-27 ASSESSMENT — ENCOUNTER SYMPTOMS
ABDOMINAL DISTENTION: 0
VOMITING: 0
SINUS PRESSURE: 0
NAUSEA: 0
EYE REDNESS: 0
WHEEZING: 0
EYE PAIN: 0
DIARRHEA: 0
COUGH: 0
BACK PAIN: 0
EYE DISCHARGE: 0
SHORTNESS OF BREATH: 0
SORE THROAT: 0

## 2021-06-27 NOTE — ED PROVIDER NOTES
Patient presents with facial numbness and weakness. 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 She states she has had previous symptoms similar to this one. She mentions her symptoms began this morning and they are resolving currently. She states she only has a little bit of tingling of the right side of her face at this time. She denies any other symptoms accompanying her complaint. She states her medications normally help and prevent these exacerbations but she has been unable to get them at her facility. She believes this is the inciting event. She denies any confusion, dizziness, or numbness tingling and weakness in any of her extremities. The history is provided by the patient. No  was used. Review of Systems   Constitutional: Negative for chills and fever. HENT: Negative for ear pain, sinus pressure and sore throat. Eyes: Negative for pain, discharge and redness. Respiratory: Negative for cough, shortness of breath and wheezing. Cardiovascular: Negative for chest pain. Gastrointestinal: Negative for abdominal distention, diarrhea, nausea and vomiting. Genitourinary: Negative for dysuria and frequency. Musculoskeletal: Negative for arthralgias and back pain. Skin: Negative for rash and wound. Neurological: Positive for facial asymmetry and numbness. Negative for dizziness, syncope, weakness and headaches. Hematological: Negative for adenopathy. All other systems reviewed and are negative. Physical Exam  Vitals and nursing note reviewed. Constitutional:       General: She is not in acute distress. Appearance: She is well-developed. She is obese. She is not ill-appearing. HENT:      Head: Normocephalic and atraumatic. Eyes:      Extraocular Movements: Extraocular movements intact. Conjunctiva/sclera: Conjunctivae normal.      Pupils: Pupils are equal, round, and reactive to light. Cardiovascular:      Rate and Rhythm: Normal rate and regular rhythm. Heart sounds: Normal heart sounds. No murmur heard. Pulmonary:      Effort: Pulmonary effort is normal. No respiratory distress. Breath sounds: Normal breath sounds. No wheezing or rales. Abdominal:      General: Bowel sounds are normal.      Palpations: Abdomen is soft. Tenderness: There is no abdominal tenderness. There is no guarding or rebound. Musculoskeletal:      Cervical back: Normal range of motion and neck supple. Skin:     General: Skin is warm and dry. Neurological:      General: No focal deficit present. Mental Status: She is alert and oriented to person, place, and time. Cranial Nerves: No cranial nerve deficit. Sensory: No sensory deficit. Motor: No weakness. Coordination: Coordination normal.     NIH Stroke Scale/Score at time of initial evaluation:  1A: Level of Consciousness 0 - alert; keenly responsive   1B: Ask Month and Age 0 - answers both questions correctly   1C:  Tell Patient To Open and Close Eyes, then Hand  Squeeze 0 - performs both tasks correctly   2: Test Horizontal Extraocular Movements 0 - normal   3: Test Visual Fields 0 - no visual loss   4: Test Facial Palsy 0 - normal symmetric movement   5A: Test Left Arm Motor Drift 0 - no drift, limb holds 90 (or 45) degrees for full 10 seconds   5B: Test Right Arm Motor Drift 0 - no drift, limb holds 90 (or 45) degrees for full 10 seconds   6A: Test Left Leg Motor Drift 0 - no drift; leg holds 30 degree position for full 5 seconds   6B: Test Right Leg Motor Drift Current injury preventing evaluation   7: Test Limb Ataxia   (FNF/Heel-Shin) 0 - absent   8: Test Sensation 0 - normal; no sensory loss   9: Test Language/Aphasia 0 - no aphasia, normal   10: Test Dysarthria 0 - normal   11: Test Extinction/Inattention 0 - no abnormality   Total 0        Procedures     MDM  Number of Diagnoses or Management Options  Bell's palsy  Diagnosis management comments: Patient presents with strokelike symptoms. They are currently resolving. Her last known well was greater than 4-1/2 hours ago. Her NIH is 0. Stroke or regina alert would not be called. CT of the head was obtained and did not show any acute intracranial pathology. CBC and BMP were unremarkable. At this point there is not appear to be any emergent processes ongoing. Symptoms likely secondary to patient's mitochondrial cytopathy. Patient symptoms are resolved. Patient's vitals have been stable throughout her stay. Patient was informed results and plan and is agreeable. Patient be discharged home with instruction to follow-up with her PCP for further evaluation and care. Amount and/or Complexity of Data Reviewed  Clinical lab tests: reviewed  Tests in the radiology section of CPT®: reviewed                    --------------------------------------------- PAST HISTORY ---------------------------------------------  Past Medical History:  has a past medical history of Adenoma of right adrenal gland, Anemia, Anxiety, Arthritis, Asthma, Benign essential tremor, Bipolar affective ([address] Chronic back pain, Chronic respiratory failure with hypoxia ([address] Depression, Difficulty swallowing, Environmental and seasonal allergies, Excessive physiologic tremor, Fatty liver, Full dentures, GERD (gastroesophageal reflux disease), Gout, Herniated cervical disc, History of swelling of feet, Lymphedema of lower extremity, Mitochondrial cytopathy ([address] Neuropathy, Obesity, PETR (obstructive sleep apnea), PONV (postoperative nausea and vomiting), Seizures ([address] Spinal headache, and Thyroid disease. Past Surgical History:  has a past surgical history that includes knee surgery (Bilateral); Gastric bypass surgery (1999); myomectomy; Tonsillectomy; Nerve Block (Left, 10 02 2013); Nerve Block (Left, 10 9 13); Nerve Block (Left, 10/16/13); other surgical history (Left, 11 25 13);  Nerve Block (Left, 10/29/2014); Nerve Block (Left, 11/12/2014); Nerve Block (Left, 12 8 14); Nerve Block (Right, 3/30/15); Nerve Block (Right, 4/6/2015); Nerve Block (Right, 4/13/15); Nerve Block (Left, 7/6/15); Nerve Block (07/20/15); Nerve Block (Left, 10 1 15); Nerve Block (10/26/15); other surgical history (3/28/2016); Endoscopy, colon, diagnostic; pr colonoscopy flx dx w/collj spec when pfrmd (N/A, 3/20/2018); pr egd transoral biopsy single/multiple (N/A, 3/20/2018); Cholecystectomy (1999); Hysterectomy (1988); Colonoscopy (N/A, 9/18/2018); Esophagus dilation (9/18/2018); back surgery (last one 1995); Cardiac catheterization (Right, 6-6-2013); Appendectomy; other surgical history (1995); joint replacement (Bilateral, [id number]); egd colonoscopy (N/A, 10/8/2019); Colonoscopy (N/A, 10/8/2019); and Nerve Block (Bilateral, 4/1/2021). Social History:  reports that she has been smoking cigarettes. She started smoking about 31 years ago. She has a 31.50 pack-year smoking history. She has never used smokeless tobacco. She reports current drug use. Drug: Marijuana. She reports that she does not drink alcohol. Family History: family history includes Arthritis in an other family member; Cancer in her father and another family member; Depression in an other family member; Heart Disease in her mother and another family member; Hypertension in an other family member; Mental Illness in an other family member; Stroke in an other family member. The patients home medications have been reviewed.     Allergies: Bee pollen, Penicillins, Ropinirole hcl, Vistaril [hydroxyzine hcl], Aripiprazole, Hydroxyzine pamoate, and Tape [adhesive tape]    -------------------------------------------------- RESULTS -------------------------------------------------  Labs:  Results for orders placed or performed during the hospital encounter of 06/27/21   CBC Auto Differential   Result Value Ref Range    WBC 6.3 4.5 - 11.5 E9/L    RBC 3.78 3.50 - 5.50 E12/L    Hemoglobin 11.9 11.5 - 15.5 g/dL    Hematocrit 37.2 34.0 - 48.0 %    MCV 98.4 80.0 - 99.9 fL    MCH 31.5 26.0 - 35.0 pg    MCHC 32.0 32.0 - 34.5 %    RDW 14.3 11.5 - 15.0 fL    Platelets 520 013 - 527 E9/L    MPV 8.8 7.0 - 12.0 fL    Neutrophils % 65.2 43.0 - 80.0 %    Immature Granulocytes % 0.6 0.0 - 5.0 %    Lymphocytes % 23.2 20.0 - 42.0 %    Monocytes % 9.4 2.0 - 12.0 %    Eosinophils % 1.1 0.0 - 6.0 %    Basophils % 0.5 0.0 - 2.0 %    Neutrophils Absolute 4.10 1.80 - 7.30 E9/L    Immature Granulocytes # 0.04 E9/L    Lymphocytes Absolute 1.46 (L) 1.50 - 4.00 E9/L    Monocytes Absolute 0.59 0.10 - 0.95 E9/L    Eosinophils Absolute 0.07 0.05 - 0.50 E9/L    Basophils Absolute 0.03 0.00 - 0.20 E9/L   Comprehensive Metabolic Panel w/ Reflex to MG   Result Value Ref Range    Sodium 138 132 - 146 mmol/L    Potassium reflex Magnesium 3.4 (L) 3.5 - 5.0 mmol/L    Chloride 100 98 - 107 mmol/L    CO2 27 22 - 29 mmol/L    Anion Gap 11 7 - 16 mmol/L    Glucose 92 74 - 99 mg/dL    BUN 13 6 - 23 mg/dL    CREATININE 0.7 0.5 - 1.0 mg/dL    GFR Non-African American >60 >=60 mL/min/1.73    GFR African American >60     Calcium 9.0 8.6 - 10.2 mg/dL    Total Protein 6.5 6.4 - 8.3 g/dL    Albumin 3.7 3.5 - 5.2 g/dL    Total Bilirubin 0.4 0.0 - 1.2 mg/dL    Alkaline Phosphatase 195 (H) 35 - 104 U/L    ALT 9 0 - 32 U/L    AST 15 0 - 31 U/L   Magnesium   Result Value Ref Range    Magnesium 2.2 1.6 - 2.6 mg/dL       Radiology:  CT Head WO Contrast   Final Result   No acute intracranial abnormality. MRI would be useful if symptoms persist.             ------------------------- NURSING NOTES AND VITALS REVIEWED ---------------------------  Date / Time Roomed:  6/27/2021  6:08 PM  ED Bed Assignment:  11/11    The nursing notes within the ED encounter and vital signs as below have been reviewed.    BP (!) 166/62   Pulse 91   Temp 98.2 °F (36.8 °C)   Resp 16   Ht 5' 7\" (1.702 m)   Wt 234 lb (106.1 kg)   LMP 08/31/1988 SpO2 96%   BMI 36.65 kg/m²   Oxygen Saturation Interpretation: Normal      ------------------------------------------ PROGRESS NOTES ------------------------------------------  9:20 PM EDT  I have spoken with the patient and discussed todays results, in addition to providing specific details for the plan of care and counseling regarding the diagnosis and prognosis. Their questions are answered at this time and they are agreeable with the plan. I discussed at length with them reasons for immediate return here for re evaluation. They will followup with their primary care physician by calling their office on Monday.      --------------------------------- ADDITIONAL PROVIDER NOTES ---------------------------------  At this time the patient is without objective evidence of an acute process requiring hospitalization or inpatient management. They have remained hemodynamically stable throughout their entire ED visit and are stable for discharge with outpatient follow-up. The plan has been discussed in detail and they are aware of the specific conditions for emergent return, as well as the importance of follow-up. Discharge Medication List as of 6/27/2021  8:53 PM          Diagnosis:  1. Bell's palsy        Disposition:  Patient's disposition: Discharge to nursing home  Patient's condition is stable.     Patient was seen and evaluated by both myself and Bimal Rocha,   Resident  06/27/21 2120

## 2021-07-01 DIAGNOSIS — E78.2 MIXED HYPERLIPIDEMIA: ICD-10-CM

## 2021-07-01 DIAGNOSIS — I10 ESSENTIAL HYPERTENSION: ICD-10-CM

## 2021-07-01 DIAGNOSIS — R55 SYNCOPE, UNSPECIFIED SYNCOPE TYPE: ICD-10-CM

## 2021-07-01 DIAGNOSIS — G47.33 OSA (OBSTRUCTIVE SLEEP APNEA): ICD-10-CM

## 2021-07-02 ENCOUNTER — TELEPHONE (OUTPATIENT)
Dept: NON INVASIVE DIAGNOSTICS | Age: 61
End: 2021-07-02

## 2021-07-02 DIAGNOSIS — I47.1 NONSUSTAINED SUPRAVENTRICULAR TACHYCARDIA (HCC): Primary | ICD-10-CM

## 2021-07-02 RX ORDER — METOPROLOL SUCCINATE 25 MG/1
12.5 TABLET, EXTENDED RELEASE ORAL DAILY
Qty: 30 TABLET | Refills: 3 | Status: SHIPPED | OUTPATIENT
Start: 2021-07-02

## 2021-07-02 NOTE — TELEPHONE ENCOUNTER
----- Message from Bob Romano sent at 7/2/2021  3:26 PM EDT -----    ----- Message -----  From: Annabella Cool MD  Sent: 7/2/2021  12:41 PM EDT  To: Avery Aldana    Episodes of nonsustained SVT noted.  Continue lifestyle modification with hydration  Lets try Toprol XL 12.5mg daily to see if this helps

## 2021-07-02 NOTE — TELEPHONE ENCOUNTER
Spoke to the patient regarding Dr. Lozano Faster recommendations and she verbalized understanding. She willing try Toprol XL 12.5mg daily. Currently the patient is in rehab recovering from a femur fracture. I spoke to [address] RN at [facility] and per [phone] Jasson she is requesting the Toprol XL order to be faxed to [phone].

## 2021-08-18 ENCOUNTER — TELEPHONE (OUTPATIENT)
Dept: CARDIOLOGY | Age: 61
End: 2021-08-18

## 2021-08-20 ENCOUNTER — APPOINTMENT (OUTPATIENT)
Dept: CT IMAGING | Age: 61
End: 2021-08-20
Payer: MEDICAID

## 2021-08-20 ENCOUNTER — HOSPITAL ENCOUNTER (EMERGENCY)
Age: 61
Discharge: HOME | End: 2021-08-21
Attending: EMERGENCY MEDICINE
Payer: MEDICAID

## 2021-08-20 ENCOUNTER — APPOINTMENT (OUTPATIENT)
Dept: GENERAL RADIOLOGY | Age: 61
End: 2021-08-20
Payer: MEDICAID

## 2021-08-20 VITALS
HEIGHT: 67 IN | RESPIRATION RATE: 16 BRPM | TEMPERATURE: 97.1 F | BODY MASS INDEX: 38.14 KG/M2 | HEART RATE: 70 BPM | OXYGEN SATURATION: 100 % | SYSTOLIC BLOOD PRESSURE: 136 MMHG | DIASTOLIC BLOOD PRESSURE: 58 MMHG | WEIGHT: 243 LBS

## 2021-08-20 DIAGNOSIS — J18.9 PNEUMONIA OF BOTH LUNGS DUE TO INFECTIOUS ORGANISM, UNSPECIFIED PART OF LUNG: ICD-10-CM

## 2021-08-20 DIAGNOSIS — R42 DIZZINESS: Primary | ICD-10-CM

## 2021-08-20 LAB
ALBUMIN SERPL-MCNC: 3.4 G/DL (ref 3.5–5.2)
ALP BLD-CCNC: 149 U/L (ref 35–104)
ALT SERPL-CCNC: 14 U/L (ref 0–32)
ANION GAP SERPL CALCULATED.3IONS-SCNC: 7 MMOL/L (ref 7–16)
AST SERPL-CCNC: 13 U/L (ref 0–31)
BASOPHILS ABSOLUTE: 0.02 E9/L (ref 0–0.2)
BASOPHILS RELATIVE PERCENT: 0.3 % (ref 0–2)
BILIRUB SERPL-MCNC: <0.2 MG/DL (ref 0–1.2)
BILIRUBIN URINE: NEGATIVE
BLOOD, URINE: NEGATIVE
BUN BLDV-MCNC: 15 MG/DL (ref 6–23)
CALCIUM SERPL-MCNC: 8.9 MG/DL (ref 8.6–10.2)
CHLORIDE BLD-SCNC: 101 MMOL/L (ref 98–107)
CLARITY: CLEAR
CO2: 31 MMOL/L (ref 22–29)
COLOR: YELLOW
CREAT SERPL-MCNC: 0.8 MG/DL (ref 0.5–1)
D DIMER: 482 NG/ML DDU
EOSINOPHILS ABSOLUTE: 0.14 E9/L (ref 0.05–0.5)
EOSINOPHILS RELATIVE PERCENT: 2 % (ref 0–6)
GFR AFRICAN AMERICAN: >60
GFR NON-AFRICAN AMERICAN: >60 ML/MIN/1.73
GLUCOSE BLD-MCNC: 84 MG/DL (ref 74–99)
GLUCOSE URINE: NEGATIVE MG/DL
HCT VFR BLD CALC: 37.5 % (ref 34–48)
HEMOGLOBIN: 12 G/DL (ref 11.5–15.5)
IMMATURE GRANULOCYTES #: 0.04 E9/L
IMMATURE GRANULOCYTES %: 0.6 % (ref 0–5)
KETONES, URINE: NEGATIVE MG/DL
LEUKOCYTE ESTERASE, URINE: NEGATIVE
LYMPHOCYTES ABSOLUTE: 1.06 E9/L (ref 1.5–4)
LYMPHOCYTES RELATIVE PERCENT: 15.5 % (ref 20–42)
MCH RBC QN AUTO: 30.8 PG (ref 26–35)
MCHC RBC AUTO-ENTMCNC: 32 % (ref 32–34.5)
MCV RBC AUTO: 96.2 FL (ref 80–99.9)
MONOCYTES ABSOLUTE: 0.56 E9/L (ref 0.1–0.95)
MONOCYTES RELATIVE PERCENT: 8.2 % (ref 2–12)
NEUTROPHILS ABSOLUTE: 5.03 E9/L (ref 1.8–7.3)
NEUTROPHILS RELATIVE PERCENT: 73.4 % (ref 43–80)
NITRITE, URINE: NEGATIVE
PDW BLD-RTO: 13.2 FL (ref 11.5–15)
PH UA: 7 (ref 5–9)
PLATELET # BLD: 213 E9/L (ref 130–450)
PMV BLD AUTO: 8.4 FL (ref 7–12)
POTASSIUM SERPL-SCNC: 3.8 MMOL/L (ref 3.5–5)
PRO-BNP: 268 PG/ML (ref 0–125)
PROTEIN UA: NEGATIVE MG/DL
RBC # BLD: 3.9 E12/L (ref 3.5–5.5)
SARS-COV-2, NAAT: NOT DETECTED
SODIUM BLD-SCNC: 139 MMOL/L (ref 132–146)
SPECIFIC GRAVITY UA: <=1.005 (ref 1–1.03)
TOTAL PROTEIN: 6.8 G/DL (ref 6.4–8.3)
TROPONIN, HIGH SENSITIVITY: 12 NG/L (ref 0–9)
TROPONIN, HIGH SENSITIVITY: 13 NG/L (ref 0–9)
UROBILINOGEN, URINE: 0.2 E.U./DL
WBC # BLD: 6.9 E9/L (ref 4.5–11.5)

## 2021-08-20 PROCEDURE — 6370000000 HC RX 637 (ALT 250 FOR IP): Performed by: EMERGENCY MEDICINE

## 2021-08-20 PROCEDURE — 71275 CT ANGIOGRAPHY CHEST: CPT

## 2021-08-20 PROCEDURE — 70496 CT ANGIOGRAPHY HEAD: CPT

## 2021-08-20 PROCEDURE — 83880 ASSAY OF NATRIURETIC PEPTIDE: CPT

## 2021-08-20 PROCEDURE — 6360000004 HC RX CONTRAST MEDICATION: Performed by: RADIOLOGY

## 2021-08-20 PROCEDURE — 85378 FIBRIN DEGRADE SEMIQUANT: CPT

## 2021-08-20 PROCEDURE — 81003 URINALYSIS AUTO W/O SCOPE: CPT

## 2021-08-20 PROCEDURE — 70498 CT ANGIOGRAPHY NECK: CPT

## 2021-08-20 PROCEDURE — 84484 ASSAY OF TROPONIN QUANT: CPT

## 2021-08-20 PROCEDURE — 99285 EMERGENCY DEPT VISIT HI MDM: CPT

## 2021-08-20 PROCEDURE — 87635 SARS-COV-2 COVID-19 AMP PRB: CPT

## 2021-08-20 PROCEDURE — 93005 ELECTROCARDIOGRAM TRACING: CPT | Performed by: EMERGENCY MEDICINE

## 2021-08-20 PROCEDURE — 80053 COMPREHEN METABOLIC PANEL: CPT

## 2021-08-20 PROCEDURE — 36415 COLL VENOUS BLD VENIPUNCTURE: CPT

## 2021-08-20 PROCEDURE — 71045 X-RAY EXAM CHEST 1 VIEW: CPT

## 2021-08-20 PROCEDURE — 85025 COMPLETE CBC W/AUTO DIFF WBC: CPT

## 2021-08-20 RX ORDER — OXYCODONE HYDROCHLORIDE AND ACETAMINOPHEN 5; 325 MG/1; MG/1
1 TABLET ORAL ONCE
Status: COMPLETED | OUTPATIENT
Start: 2021-08-20 | End: 2021-08-20

## 2021-08-20 RX ORDER — DOXYCYCLINE HYCLATE 100 MG/1
100 CAPSULE ORAL ONCE
Status: COMPLETED | OUTPATIENT
Start: 2021-08-20 | End: 2021-08-20

## 2021-08-20 RX ORDER — DOXYCYCLINE HYCLATE 100 MG
100 TABLET ORAL 2 TIMES DAILY
Qty: 20 TABLET | Refills: 0 | Status: SHIPPED | OUTPATIENT
Start: 2021-08-20 | End: 2021-08-30

## 2021-08-20 RX ADMIN — OXYCODONE HYDROCHLORIDE AND ACETAMINOPHEN 1 TABLET: 5; 325 TABLET ORAL at 20:18

## 2021-08-20 RX ADMIN — DOXYCYCLINE HYCLATE 100 MG: 100 CAPSULE ORAL at 22:04

## 2021-08-20 RX ADMIN — IOPAMIDOL 130 ML: 755 INJECTION, SOLUTION INTRAVENOUS at 17:37

## 2021-08-20 ASSESSMENT — PAIN SCALES - GENERAL: PAINLEVEL_OUTOF10: 8

## 2021-08-20 NOTE — ED PROVIDER NOTES
[address]  Department of Emergency Medicine   ED  Encounter Note  Admit Date/RoomTime: 2021  3:14 PM  ED Room: -    NAME: Bar Christiansen  : 1960  MRN: [id number]     Chief Complaint:  Dizziness [facility]    History of Present Illness        Bar Christiansen is a 61 y.o. old female who presents to the emergency department for evaluation of multiple complaints. She states for about 2 to 3 days, she has had vertigo. Symptoms are improved if she lays down with her eyes closed. Symptoms are worsened if she walks or moves around. She also describes right-sided chest pain and feels as though she may pass out. She states she had a fever 2 days ago which resolved on its own. She denies any cough or congestion. She did not have her COVID-19 vaccination. She denies any abdominal pain, vomiting or diarrhea. No dysuria or urinary symptoms. She currently resides at rehab after having a right hip surgery in  of this year. She does have history of COPD but does not wear home oxygen. Vertebrobasilar CVA Symptoms:    Vertigo: yes (1)   Diplopia: no (0)   Decreased Vision: no (0)   Oscillopsia: no (0)   Ataxia: no (0)   Precipitated by moving one upper extremity with  Decreased BP (subclavian steal syndrome):       no (0)   Total:    1     . ROS   Pertinent positives and negatives are stated within HPI, all other systems reviewed and are negative.     Past Medical History:  has a past medical history of Adenoma of right adrenal gland, Anemia, Anxiety, Arthritis, Asthma, Benign essential tremor, Bipolar affective ([address] Chronic back pain, Chronic respiratory failure with hypoxia ([address] Depression, Difficulty swallowing, Environmental and seasonal allergies, Excessive physiologic tremor, Fatty liver, Full dentures, GERD (gastroesophageal reflux disease), Gout, Herniated cervical disc, History of swelling of feet, Lymphedema of lower extremity, Mitochondrial cytopathy ([address] Neuropathy, Obesity, PETR (obstructive sleep apnea), PONV (postoperative nausea and vomiting), Seizures ([address] Spinal headache, and Thyroid disease. Surgical History:  has a past surgical history that includes knee surgery (Bilateral); Gastric bypass surgery (1999); myomectomy; Tonsillectomy; Nerve Block (Left, 10 02 2013); Nerve Block (Left, 10 9 13); Nerve Block (Left, 10/16/13); other surgical history (Left, 11 25 13); Nerve Block (Left, 10/29/2014); Nerve Block (Left, 11/12/2014); Nerve Block (Left, 12 8 14); Nerve Block (Right, 3/30/15); Nerve Block (Right, 4/6/2015); Nerve Block (Right, 4/13/15); Nerve Block (Left, 7/6/15); Nerve Block (07/20/15); Nerve Block (Left, 10 1 15); Nerve Block (10/26/15); other surgical history (3/28/2016); Endoscopy, colon, diagnostic; pr colonoscopy flx dx w/collj spec when pfrmd (N/A, 3/20/2018); pr egd transoral biopsy single/multiple (N/A, 3/20/2018); Cholecystectomy (1999); Hysterectomy (1988); Colonoscopy (N/A, 9/18/2018); Esophagus dilation (9/18/2018); back surgery (last one 1995); Cardiac catheterization (Right, 6-6-2013); Appendectomy; other surgical history (1995); joint replacement (Bilateral, [id number]); egd colonoscopy (N/A, 10/8/2019); Colonoscopy (N/A, 10/8/2019); and Nerve Block (Bilateral, 4/1/2021). Social History:  reports that she has been smoking cigarettes. She started smoking about 31 years ago. She has a 31.50 pack-year smoking history. She has never used smokeless tobacco. She reports current drug use. Drug: Marijuana. She reports that she does not drink alcohol.     Family History: family history includes Arthritis in an other family member; Cancer in her father and another family member; Depression in an other family member; Heart Disease in her mother and another family member; Hypertension in an other family member; Mental Illness in an other family member; Stroke in full 5 seconds   6B: Test Right Leg Motor Drift 0 - no drift; leg holds 30 degree position for full 5 seconds   7: Test Limb Ataxia   (FNF/Heel-Shin) 0 - absent   8: Test Sensation 0 - normal; no sensory loss   9: Test Language/Aphasia 0 - no aphasia, normal   10: Test Dysarthria 0 - normal   11: Test Extinction/Inattention 0 - no abnormality   Total Score: 0       Lab / Imaging Results   (All laboratory and radiology results have been personally reviewed by myself)  Labs:  Results for orders placed or performed during the hospital encounter of 08/20/21   COVID-19, Rapid    Specimen: Nasopharyngeal Swab   Result Value Ref Range    SARS-CoV-2, NAAT Not Detected Not Detected   CBC Auto Differential   Result Value Ref Range    WBC 6.9 4.5 - 11.5 E9/L    RBC 3.90 3.50 - 5.50 E12/L    Hemoglobin 12.0 11.5 - 15.5 g/dL    Hematocrit 37.5 34.0 - 48.0 %    MCV 96.2 80.0 - 99.9 fL    MCH 30.8 26.0 - 35.0 pg    MCHC 32.0 32.0 - 34.5 %    RDW 13.2 11.5 - 15.0 fL    Platelets 772 860 - 206 E9/L    MPV 8.4 7.0 - 12.0 fL    Neutrophils % 73.4 43.0 - 80.0 %    Immature Granulocytes % 0.6 0.0 - 5.0 %    Lymphocytes % 15.5 (L) 20.0 - 42.0 %    Monocytes % 8.2 2.0 - 12.0 %    Eosinophils % 2.0 0.0 - 6.0 %    Basophils % 0.3 0.0 - 2.0 %    Neutrophils Absolute 5.03 1.80 - 7.30 E9/L    Immature Granulocytes # 0.04 E9/L    Lymphocytes Absolute 1.06 (L) 1.50 - 4.00 E9/L    Monocytes Absolute 0.56 0.10 - 0.95 E9/L    Eosinophils Absolute 0.14 0.05 - 0.50 E9/L    Basophils Absolute 0.02 0.00 - 0.20 E9/L   Comprehensive Metabolic Panel   Result Value Ref Range    Sodium 139 132 - 146 mmol/L    Potassium 3.8 3.5 - 5.0 mmol/L    Chloride 101 98 - 107 mmol/L    CO2 31 (H) 22 - 29 mmol/L    Anion Gap 7 7 - 16 mmol/L    Glucose 84 74 - 99 mg/dL    BUN 15 6 - 23 mg/dL    CREATININE 0.8 0.5 - 1.0 mg/dL    GFR Non-African American >60 >=60 mL/min/1.73    GFR African American >60     Calcium 8.9 8.6 - 10.2 mg/dL    Total Protein 6.8 6.4 - 8.3 g/dL Albumin 3.4 (L) 3.5 - 5.2 g/dL    Total Bilirubin <0.2 0.0 - 1.2 mg/dL    Alkaline Phosphatase 149 (H) 35 - 104 U/L    ALT 14 0 - 32 U/L    AST 13 0 - 31 U/L   Troponin   Result Value Ref Range    Troponin, High Sensitivity 12 (H) 0 - 9 ng/L   D-Dimer, Quantitative   Result Value Ref Range    D-Dimer, Quant 482 ng/mL DDU   Brain Natriuretic Peptide   Result Value Ref Range    Pro- (H) 0 - 125 pg/mL   Urinalysis   Result Value Ref Range    Color, UA Yellow Straw/Yellow    Clarity, UA Clear Clear    Glucose, Ur Negative Negative mg/dL    Bilirubin Urine Negative Negative    Ketones, Urine Negative Negative mg/dL    Specific Gravity, UA <=1.005 1.005 - 1.030    Blood, Urine Negative Negative    pH, UA 7.0 5.0 - 9.0    Protein, UA Negative Negative mg/dL    Urobilinogen, Urine 0.2 <2.0 E.U./dL    Nitrite, Urine Negative Negative    Leukocyte Esterase, Urine Negative Negative   Troponin   Result Value Ref Range    Troponin, High Sensitivity 13 (H) 0 - 9 ng/L     Imaging: All Radiology results interpreted by Radiologist unless otherwise noted. CTA HEAD W CONTRAST   Final Result   No hemodynamically significant stenosis within the head and neck vasculature. No aneurysm. Findings suggestive of multilobar pneumonia with focal areas of consolidation   within superior segments of lower lobes and ground-glass densities and tree   in bud nodular densities within the visualized part of the upper lobes. No   further follow-up these nodular densities are recommended other than   treatment of underlying infectious etiology. For further evaluation please   refer to the same-day chest CT report. 2 cm nodule of thyroid isthmus. For further evaluation thyroid ultrasound   can be obtain non urgently. CTA NECK W CONTRAST   Final Result   No hemodynamically significant stenosis within the head and neck vasculature. No aneurysm.       Findings suggestive of multilobar pneumonia with focal areas of consolidation   within superior segments of lower lobes and ground-glass densities and tree   in bud nodular densities within the visualized part of the upper lobes. No   further follow-up these nodular densities are recommended other than   treatment of underlying infectious etiology. For further evaluation please   refer to the same-day chest CT report. 2 cm nodule of thyroid isthmus. For further evaluation thyroid ultrasound   can be obtain non urgently. CTA CHEST W CONTRAST   Final Result   Some      Limited study due to poor contrast.  There is no central pulmonary embolism. Small filling defect in the subsegmental and peripheral vessels which may   represent subsegmental pulmonary embolism versus artifact due to poor   contrast.      Diffuse patchy and nodular infiltrates predominantly in the lower lobes   likely infectious inflammatory process like pneumonia including viral   infection. Underlying developing malignant nodule is less likely but not   excluded. Close surveillance with repeat CT scan in 8-10 weeks after   appropriate treatment is recommended to see complete resolution. If the   nodules persist, continued surveillance according to Fleischner society   guidelines is recommended. XR CHEST PORTABLE   Final Result   Mild bibasilar infiltrates. EKG #1:  Interpreted by emergency department attending physician unless otherwise noted. 8/20/21  Time: 15:23    Rhythm: normal sinus   Rate: 65  Axis: normal  Conduction: normal  ST Segments: no acute change  T Waves: no acute change    Clinical Impression: no acute changes  Comparison to Prior tracings:  Today's ECG is unchanged from previous tracings.     ED Course / Medical Decision Making     Medications   iopamidol (ISOVUE-370) 76 % injection 130 mL (130 mLs Intravenous Given 8/20/21 1737)   oxyCODONE-acetaminophen (PERCOCET) 5-325 MG per tablet 1 tablet (1 tablet Oral Given 8/20/21 2018)   doxycycline hyclate (VIBRAMYCIN) capsule 100 mg (100 mg Oral Given 8/20/21 2206)        Re-Evaluations:  8/20/21      Patients condition is improving. Consultations:             None    Procedures:   none    MDM: Patient presents to the ED for evaluation of right-sided chest pain and vertigo. She has no neurologic deficits on physical exam.  She is currently in rehab after suffering a right hip fracture. Cardiac work-up negative for ischemia or dysrhythmia. CTA head and neck show no evidence of dissection, stroke or aneurysm. CTA of the chest shows no pulmonary embolism however, there is concern for pneumonia. Covid test was negative and labs are otherwise reassuring. Patient has normal oxygen saturation on room air. She has anaphylaxis to penicillins. She will be placed on doxycycline. Patient feels comfortable discharge back to her facility at this time. Return to the ED for new or worsening symptoms. Plan of Care/Counseling:  Ara Pro, DO reviewed today's visit with the patient in addition to providing specific details for the plan of care and counseling regarding the diagnosis and prognosis. Questions are answered at this time and are agreeable with the plan. Assessment      1. Dizziness    2. Pneumonia of both lungs due to infectious organism, unspecified part of lung      This patient's ED course included: a personal history and physicial examination  This patient has remained hemodynamically stable during their ED course. Plan   Discharged home. Patient condition is stable. New Medications     Discharge Medication List as of 8/20/2021  9:46 PM      START taking these medications    Details   doxycycline hyclate (VIBRA-TABS) 100 MG tablet Take 1 tablet by mouth 2 times daily for 10 days, Disp-20 tablet, R-0Print           Electronically signed by Ara Marques DO   DD: 8/20/21  **This report was transcribed using voice recognition software.  Every effort was made to ensure accuracy; however,

## 2021-08-20 NOTE — PROGRESS NOTES
IV infiltrated left AC 18 g. Removed put a warm compress on infiltrated. Started new IV rt ac 20g.  Completed exam.

## 2021-08-21 LAB
EKG ATRIAL RATE: 65 BPM
EKG P AXIS: 41 DEGREES
EKG P-R INTERVAL: 158 MS
EKG Q-T INTERVAL: 388 MS
EKG QRS DURATION: 102 MS
EKG QTC CALCULATION (BAZETT): 403 MS
EKG R AXIS: 24 DEGREES
EKG T AXIS: 28 DEGREES
EKG VENTRICULAR RATE: 65 BPM

## 2021-08-21 PROCEDURE — 93010 ELECTROCARDIOGRAM REPORT: CPT | Performed by: INTERNAL MEDICINE

## 2021-08-21 NOTE — ED NOTES
[facility] to [facility].  ETA 2-3 hours [id number].   [facility] said [facility] would try and outsource      Teo Wilder RN  08/20/21 4721

## 2021-09-02 ENCOUNTER — APPOINTMENT (OUTPATIENT)
Dept: ULTRASOUND IMAGING | Age: 61
End: 2021-09-02
Payer: MEDICAID

## 2021-09-02 ENCOUNTER — APPOINTMENT (OUTPATIENT)
Dept: GENERAL RADIOLOGY | Age: 61
End: 2021-09-02

## 2021-09-02 ENCOUNTER — HOSPITAL ENCOUNTER (EMERGENCY)
Age: 61
Discharge: HOME | End: 2021-09-03
Attending: EMERGENCY MEDICINE

## 2021-09-02 VITALS
HEART RATE: 64 BPM | SYSTOLIC BLOOD PRESSURE: 136 MMHG | WEIGHT: 243 LBS | HEIGHT: 67 IN | DIASTOLIC BLOOD PRESSURE: 74 MMHG | RESPIRATION RATE: 20 BRPM | OXYGEN SATURATION: 94 % | BODY MASS INDEX: 38.14 KG/M2 | TEMPERATURE: 97.1 F

## 2021-09-02 DIAGNOSIS — M25.561 ACUTE PAIN OF RIGHT KNEE: Primary | ICD-10-CM

## 2021-09-02 LAB
ANION GAP SERPL CALCULATED.3IONS-SCNC: 9 MMOL/L (ref 7–16)
BASOPHILS ABSOLUTE: 0.03 E9/L (ref 0–0.2)
BASOPHILS RELATIVE PERCENT: 0.5 % (ref 0–2)
BUN BLDV-MCNC: 10 MG/DL (ref 6–23)
C-REACTIVE PROTEIN: 0.6 MG/DL (ref 0–0.4)
CALCIUM SERPL-MCNC: 9 MG/DL (ref 8.6–10.2)
CHLORIDE BLD-SCNC: 100 MMOL/L (ref 98–107)
CO2: 30 MMOL/L (ref 22–29)
CREAT SERPL-MCNC: 0.7 MG/DL (ref 0.5–1)
EOSINOPHILS ABSOLUTE: 0.1 E9/L (ref 0.05–0.5)
EOSINOPHILS RELATIVE PERCENT: 1.8 % (ref 0–6)
GFR AFRICAN AMERICAN: >60
GFR NON-AFRICAN AMERICAN: >60 ML/MIN/1.73
GLUCOSE BLD-MCNC: 91 MG/DL (ref 74–99)
HCT VFR BLD CALC: 40.3 % (ref 34–48)
HEMOGLOBIN: 13.1 G/DL (ref 11.5–15.5)
IMMATURE GRANULOCYTES #: 0.06 E9/L
IMMATURE GRANULOCYTES %: 1.1 % (ref 0–5)
LYMPHOCYTES ABSOLUTE: 1.32 E9/L (ref 1.5–4)
LYMPHOCYTES RELATIVE PERCENT: 23.4 % (ref 20–42)
MCH RBC QN AUTO: 30.4 PG (ref 26–35)
MCHC RBC AUTO-ENTMCNC: 32.5 % (ref 32–34.5)
MCV RBC AUTO: 93.5 FL (ref 80–99.9)
MONOCYTES ABSOLUTE: 0.49 E9/L (ref 0.1–0.95)
MONOCYTES RELATIVE PERCENT: 8.7 % (ref 2–12)
NEUTROPHILS ABSOLUTE: 3.64 E9/L (ref 1.8–7.3)
NEUTROPHILS RELATIVE PERCENT: 64.5 % (ref 43–80)
PDW BLD-RTO: 13 FL (ref 11.5–15)
PLATELET # BLD: 279 E9/L (ref 130–450)
PMV BLD AUTO: 8.4 FL (ref 7–12)
POTASSIUM SERPL-SCNC: 4.1 MMOL/L (ref 3.5–5)
RBC # BLD: 4.31 E12/L (ref 3.5–5.5)
SEDIMENTATION RATE, ERYTHROCYTE: 50 MM/HR (ref 0–20)
SODIUM BLD-SCNC: 139 MMOL/L (ref 132–146)
WBC # BLD: 5.6 E9/L (ref 4.5–11.5)

## 2021-09-02 PROCEDURE — 6370000000 HC RX 637 (ALT 250 FOR IP): Performed by: EMERGENCY MEDICINE

## 2021-09-02 PROCEDURE — 96375 TX/PRO/DX INJ NEW DRUG ADDON: CPT

## 2021-09-02 PROCEDURE — 93971 EXTREMITY STUDY: CPT | Performed by: RADIOLOGY

## 2021-09-02 PROCEDURE — 6360000002 HC RX W HCPCS: Performed by: EMERGENCY MEDICINE

## 2021-09-02 PROCEDURE — 93971 EXTREMITY STUDY: CPT

## 2021-09-02 PROCEDURE — 85025 COMPLETE CBC W/AUTO DIFF WBC: CPT

## 2021-09-02 PROCEDURE — 85651 RBC SED RATE NONAUTOMATED: CPT

## 2021-09-02 PROCEDURE — 73552 X-RAY EXAM OF FEMUR 2/>: CPT

## 2021-09-02 PROCEDURE — 80048 BASIC METABOLIC PNL TOTAL CA: CPT

## 2021-09-02 PROCEDURE — 99285 EMERGENCY DEPT VISIT HI MDM: CPT

## 2021-09-02 PROCEDURE — 73564 X-RAY EXAM KNEE 4 OR MORE: CPT

## 2021-09-02 PROCEDURE — 86140 C-REACTIVE PROTEIN: CPT

## 2021-09-02 PROCEDURE — 96374 THER/PROPH/DIAG INJ IV PUSH: CPT

## 2021-09-02 RX ORDER — ONDANSETRON 2 MG/ML
4 INJECTION INTRAMUSCULAR; INTRAVENOUS ONCE
Status: COMPLETED | OUTPATIENT
Start: 2021-09-02 | End: 2021-09-02

## 2021-09-02 RX ORDER — HYDROCODONE BITARTRATE AND ACETAMINOPHEN 5; 325 MG/1; MG/1
1 TABLET ORAL ONCE
Status: COMPLETED | OUTPATIENT
Start: 2021-09-02 | End: 2021-09-02

## 2021-09-02 RX ORDER — METHOCARBAMOL 500 MG/1
500 TABLET, FILM COATED ORAL 4 TIMES DAILY PRN
Qty: 30 TABLET | Refills: 0 | Status: SHIPPED | OUTPATIENT
Start: 2021-09-02 | End: 2021-09-02 | Stop reason: SDUPTHER

## 2021-09-02 RX ORDER — METHOCARBAMOL 500 MG/1
500 TABLET, FILM COATED ORAL 4 TIMES DAILY PRN
Qty: 30 TABLET | Refills: 0 | Status: SHIPPED | OUTPATIENT
Start: 2021-09-02 | End: 2021-09-12

## 2021-09-02 RX ADMIN — HYDROMORPHONE HYDROCHLORIDE 0.5 MG: 1 INJECTION, SOLUTION INTRAMUSCULAR; INTRAVENOUS; SUBCUTANEOUS at 18:50

## 2021-09-02 RX ADMIN — ONDANSETRON 4 MG: 2 INJECTION INTRAMUSCULAR; INTRAVENOUS at 18:50

## 2021-09-02 RX ADMIN — HYDROCODONE BITARTRATE AND ACETAMINOPHEN 1 TABLET: 5; 325 TABLET ORAL at 11:02

## 2021-09-02 ASSESSMENT — PAIN DESCRIPTION - PAIN TYPE: TYPE: ACUTE PAIN

## 2021-09-02 ASSESSMENT — PAIN DESCRIPTION - ORIENTATION: ORIENTATION: RIGHT

## 2021-09-02 ASSESSMENT — PAIN SCALES - GENERAL
PAINLEVEL_OUTOF10: 7
PAINLEVEL_OUTOF10: 8
PAINLEVEL_OUTOF10: 7

## 2021-09-02 ASSESSMENT — PAIN DESCRIPTION - LOCATION: LOCATION: KNEE

## 2021-09-02 NOTE — ED PROVIDER NOTES
Dr Alyssa Cifuentes, ortho, at [facility] returned call, esr no change, crp noted, no wbc, xr and us neg for emergent path, will fu outpt      Jelena Martinez MD  09/02/21 1925

## 2021-09-02 NOTE — ED PROVIDER NOTES
HPI:  9/2/21, Time: 4:25 PM EDT         Damien Crowder is a 61 y.o. female presenting to the ED for knee pain. Came on gradually, nothing makes it better, worse with ambulation, sharp sensation that does not rating her. Patient is status post repair of a periprosthetic fracture on the right femur. This was done about 3 months ago. She said a couple weeks ago she felt a click in her knee. She says since then she has got more swelling and pain in that leg. She is no longer able to ambulate on it. She was sent in for evaluation. Denies fever, chills, nausea, vomiting, direct injury or trauma, paresthesias, lethargy, cough, sputum, or any other symptoms or complaints. Review of Systems:   A complete review of systems was performed and pertinent positives and negatives are stated within HPI, all other systems reviewed and are negative.          --------------------------------------------- PAST HISTORY ---------------------------------------------  Past Medical History:  has a past medical history of Adenoma of right adrenal gland, Anemia, Anxiety, Arthritis, Asthma, Benign essential tremor, Bipolar affective (HCC), Chronic back pain, Chronic respiratory failure with hypoxia ([address] Depression, Difficulty swallowing, Environmental and seasonal allergies, Excessive physiologic tremor, Fatty liver, Full dentures, GERD (gastroesophageal reflux disease), Gout, Herniated cervical disc, History of swelling of feet, Lymphedema of lower extremity, Mitochondrial cytopathy ([address] Neuropathy, Obesity, PETR (obstructive sleep apnea), PONV (postoperative nausea and vomiting), Seizures ([address] Spinal headache, and Thyroid disease. Past Surgical History:  has a past surgical history that includes knee surgery (Bilateral); Gastric bypass surgery (1999); myomectomy; Tonsillectomy; Nerve Block (Left, 10 02 2013); Nerve Block (Left, 10 9 13); Nerve Block (Left, 10/16/13); other surgical history (Left, 11 25 13);  Nerve Block (Left, 10/29/2014); Nerve Block (Left, 11/12/2014); Nerve Block (Left, 12 8 14); Nerve Block (Right, 3/30/15); Nerve Block (Right, 4/6/2015); Nerve Block (Right, 4/13/15); Nerve Block (Left, 7/6/15); Nerve Block (07/20/15); Nerve Block (Left, 10 1 15); Nerve Block (10/26/15); other surgical history (3/28/2016); Endoscopy, colon, diagnostic; pr colonoscopy flx dx w/collj spec when pfrmd (N/A, 3/20/2018); pr egd transoral biopsy single/multiple (N/A, 3/20/2018); Cholecystectomy (1999); Hysterectomy (1988); Colonoscopy (N/A, 9/18/2018); Esophagus dilation (9/18/2018); back surgery (last one 1995); Cardiac catheterization (Right, 6-6-2013); Appendectomy; other surgical history (1995); joint replacement (Bilateral, [id number]); egd colonoscopy (N/A, 10/8/2019); Colonoscopy (N/A, 10/8/2019); and Nerve Block (Bilateral, 4/1/2021). Social History:  reports that she has been smoking cigarettes. She started smoking about 31 years ago. She has a 31.50 pack-year smoking history. She has never used smokeless tobacco. She reports current drug use. Drug: Marijuana. She reports that she does not drink alcohol. Family History: family history includes Arthritis in an other family member; Cancer in her father and another family member; Depression in an other family member; Heart Disease in her mother and another family member; Hypertension in an other family member; Mental Illness in an other family member; Stroke in an other family member. The patients home medications have been reviewed.     Allergies: Bee pollen, Penicillins, Ropinirole hcl, Vistaril [hydroxyzine hcl], Aripiprazole, Hydroxyzine pamoate, and Tape [adhesive tape]    -------------------------------------------------- RESULTS -------------------------------------------------  All laboratory and radiology results have been personally reviewed by myself   LABS:  Results for orders placed or performed during the hospital encounter of 09/02/21   CBC Auto the distal right femoral metadiaphysis, with   residual offset/diastasis and angulation, as described. 2.  Osteopenia and osseous degenerative disease, as described. .      RECOMMENDATION:   1. Consider periodic surveillance imaging the right lower extremity, as   directed clinically. Shahida REYNA DUP LOWER EXTREMITY RIGHT KRAIG   Final Result   Within the visualized vessels there is no evidence for deep venous   thrombosis                   ------------------------- NURSING NOTES AND VITALS REVIEWED ---------------------------   The nursing notes within the ED encounter and vital signs as below have been reviewed. /74   Pulse 64   Temp 97.1 °F (36.2 °C) (Infrared)   Resp 20   Ht 5' 7\" (1.702 m)   Wt 243 lb (110.2 kg)   LMP 08/31/1988   SpO2 94%   BMI 38.06 kg/m²   Oxygen Saturation Interpretation: Normal      ---------------------------------------------------PHYSICAL EXAM--------------------------------------      Constitutional/General: Alert and oriented x3, well appearing, non toxic in NAD  Head: Normocephalic and atraumatic  Eyes: PERRL, EOMI  Mouth: Oropharynx clear, handling secretions, no trismus  Neck: Supple, full ROM,   Pulmonary: Lungs clear to auscultation bilaterally, no wheezes, rales, or rhonchi. Not in respiratory distress  Cardiovascular:  Regular rate and rhythm, no murmurs, gallops, or rubs. 2+ distal pulses  Abdomen: Soft, non tender, non distended,   Extremities: Moves all extremities x 4. Warm and well perfused.   Right lower extremity: Swelling over the prepatellar area on the right knee, it is mildly tender, it is not warm, no open wounds or lesions, dorsalis pedis 2+, no signs of ischemic limb or compartment syndrome  Skin: warm and dry without rash  Neurologic: GCS 15,  Psych: Normal Affect      ------------------------------ ED COURSE/MEDICAL DECISION MAKING----------------------  Medications   HYDROcodone-acetaminophen (NORCO) 5-325 MG per tablet 1 tablet (1 tablet Oral Given 9/2/21 4024)         ED COURSE:       Medical Decision Making:    Labs and imaging reviewed. Reevaluation, exam unchanged. Patient's distal leg is mildly swollen, but it is not warm, tender, nonetheless, [facility] orthopedics Dr. Malia Hogue was consulted. This phone call is pending. Will be signed out to oncoming physician for final disposition. Counseling: The emergency provider has spoken with the patient and discussed todays results, in addition to providing specific details for the plan of care and counseling regarding the diagnosis and prognosis. Questions are answered at this time and they are agreeable with the plan.      --------------------------------- IMPRESSION AND DISPOSITION ---------------------------------    IMPRESSION  1. Acute pain of right knee        DISPOSITION  Disposition: Pending  Patient condition is stable      NOTE: This report was transcribed using voice recognition software.  Every effort was made to ensure accuracy; however, inadvertent computerized transcription errors may be present        Isaac Balderas MD  09/04/21 [id number]

## 2021-09-03 NOTE — ED NOTES
Knee immobilizer placed, patient can ambulate short distanced, demonstrated while going to bathroom.      Barry Colby RN  09/02/21 [id number]

## 2021-09-03 NOTE — ED NOTES
Nurse to nurse called to Liang Duarte at [facility] re[facility].      Kael Da Silva RN  09/02/21 2041

## 2021-09-03 NOTE — ED NOTES
Eta approx 4 hours, reports they have tried to resource other calls out and were being told 9 hours for other pts, we can call others if we want      Carol Good RN  09/02/21 2126       Carol Good RN  09/02/21 2127

## 2021-10-26 ENCOUNTER — APPOINTMENT (OUTPATIENT)
Dept: GENERAL RADIOLOGY | Age: 61
End: 2021-10-26
Payer: MEDICAID

## 2021-10-26 ENCOUNTER — HOSPITAL ENCOUNTER (EMERGENCY)
Age: 61
Discharge: HOME | End: 2021-10-26
Attending: EMERGENCY MEDICINE
Payer: MEDICAID

## 2021-10-26 ENCOUNTER — APPOINTMENT (OUTPATIENT)
Dept: CT IMAGING | Age: 61
End: 2021-10-26
Payer: MEDICAID

## 2021-10-26 VITALS
OXYGEN SATURATION: 99 % | HEART RATE: 67 BPM | RESPIRATION RATE: 18 BRPM | SYSTOLIC BLOOD PRESSURE: 137 MMHG | DIASTOLIC BLOOD PRESSURE: 62 MMHG

## 2021-10-26 DIAGNOSIS — S22.42XA CLOSED FRACTURE OF MULTIPLE RIBS OF LEFT SIDE, INITIAL ENCOUNTER: ICD-10-CM
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
## 2021-10-26 DIAGNOSIS — S93.402A SPRAIN OF LEFT ANKLE, UNSPECIFIED LIGAMENT, INITIAL ENCOUNTER: ICD-10-CM

## 2021-10-26 LAB
AMPHETAMINE SCREEN, URINE: NOT DETECTED
BARBITURATE SCREEN URINE: NOT DETECTED
BENZODIAZEPINE SCREEN, URINE: NOT DETECTED
CANNABINOID SCREEN URINE: NOT DETECTED
COCAINE METABOLITE SCREEN URINE: NOT DETECTED
FENTANYL SCREEN, URINE: NOT DETECTED
Lab: ABNORMAL
METHADONE SCREEN, URINE: NOT DETECTED
OPIATE SCREEN URINE: NOT DETECTED
OXYCODONE URINE: POSITIVE
PHENCYCLIDINE SCREEN URINE: NOT DETECTED

## 2021-10-26 PROCEDURE — 6370000000 HC RX 637 (ALT 250 FOR IP): Performed by: EMERGENCY MEDICINE

## 2021-10-26 PROCEDURE — 80307 DRUG TEST PRSMV CHEM ANLYZR: CPT

## 2021-10-26 PROCEDURE — 73562 X-RAY EXAM OF KNEE 3: CPT

## 2021-10-26 PROCEDURE — 99285 EMERGENCY DEPT VISIT HI MDM: CPT

## 2021-10-26 PROCEDURE — 72125 CT NECK SPINE W/O DYE: CPT

## 2021-10-26 PROCEDURE — 73610 X-RAY EXAM OF ANKLE: CPT

## 2021-10-26 PROCEDURE — 71250 CT THORAX DX C-: CPT

## 2021-10-26 PROCEDURE — 70450 CT HEAD/BRAIN W/O DYE: CPT

## 2021-10-26 RX ORDER — HYDROCODONE BITARTRATE AND ACETAMINOPHEN 5; 325 MG/1; MG/1
2 TABLET ORAL ONCE
Status: COMPLETED | OUTPATIENT
Start: 2021-10-26 | End: 2021-10-26

## 2021-10-26 RX ADMIN — HYDROCODONE BITARTRATE AND ACETAMINOPHEN 2 TABLET: 5; 325 TABLET ORAL at 11:19

## 2021-10-26 ASSESSMENT — ENCOUNTER SYMPTOMS
COUGH: 0
ABDOMINAL PAIN: 0
SHORTNESS OF BREATH: 0
NAUSEA: 0
EYE PAIN: 0
CHOKING: 0
SORE THROAT: 0
VOMITING: 0
SINUS PAIN: 0
WHEEZING: 0
CHEST TIGHTNESS: 0
DIARRHEA: 0

## 2021-10-26 ASSESSMENT — PAIN SCALES - GENERAL: PAINLEVEL_OUTOF10: 7

## 2021-10-26 NOTE — ED NOTES
Bed: HB  Expected date: 10/26/21  Expected time:   Means of arrival: TEJ  Comments:  tej Person RN  10/26/21 [id number]

## 2021-10-26 NOTE — ED NOTES
Report called to [facility], awaiting ambulance transport.   Pts O2 sat on RA was 90% so O2 4L placed     Genaro Veloz RN  10/26/21 1914

## 2021-10-26 NOTE — ED NOTES
Pt returned from XR and CT.   Ace wrap applied to right ankle     [facility] [employer], RN  10/26/21 8375

## 2021-10-26 NOTE — ED NOTES
Pt arrives via ambulance from rehab. Pt states last night she got weak and twisted her left ankle and hit her left ribs and her head. No LOC. Pulses intact in ankle.      262 Roque Rocha RN  10/26/21 2752

## 2021-10-26 NOTE — ED PROVIDER NOTES
ED  Provider Note  Admit Date/RoomTime: 10/26/2021 10:45 AM  ED Room: Leyda BARBA/JACKSON     HPI:   Alysia Worthington is a 64 y.o. female presenting to the ED for ankle and rib pain, beginning last night. History comes primarily from the patient. 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 The complaint has been persistent, moderate in severity, improved by nothing and worsened by changing position. Associated symptoms include none. [facility] states that she was at a rehabilitation facility for a broken femur last month, and she developed a flare of her mitochondrial disease which led her to develop weak musculature last night. Because this weakness, she fell to the ground, causing pain to her left ankle, left knee, left rib cage, and she also believes that she struck her head in the fall. She states that he took 3 nursing staff members to get her off the ground. She was unable to ambulate today either, despite this being her baseline prior to her fall. She also notes that her left ankle has become more ecchymotic and swollen. This reason she was brought to [facility] emergency department for further evaluation and treatment. She denies any dizziness, lightheadedness, loss of consciousness, palpitations prior to her fall, and states that it was purely mechanical secondary to her onset of temporary weakness which is consistent with her prior mitochondrial disease. On arrival, the patient was assessed with history, physical exam, imaging studies, vital signs. Vital signs were stable on arrival and the patient was afebrile. Review of Systems   Constitutional: Negative for appetite change, chills and fever. HENT: Negative for sinus pain and sore throat. Eyes: Negative for pain and visual disturbance. Respiratory: Negative for cough, choking, chest tightness, shortness of breath and wheezing. Left voicemail for patient to return call to office.   Cardiovascular: Negative for chest pain and palpitations. Gastrointestinal: Negative for abdominal pain, diarrhea, nausea and vomiting. Genitourinary: Negative for hematuria. Musculoskeletal: Positive for arthralgias, joint swelling and myalgias. Negative for neck pain and neck stiffness. Skin: Negative for rash. Neurological: Negative for tremors, syncope and weakness. Psychiatric/Behavioral: Negative for confusion. Physical Exam  Vitals and nursing note reviewed. Constitutional:       Appearance: She is well-developed. She is obese. She is not ill-appearing. HENT:      Head: Normocephalic and atraumatic. Eyes:      Pupils: Pupils are equal, round, and reactive to light. Cardiovascular:      Rate and Rhythm: Normal rate and regular rhythm. Pulmonary:      Effort: Pulmonary effort is normal. No respiratory distress. Breath sounds: Normal breath sounds. No wheezing or rales. Abdominal:      General: Bowel sounds are normal.      Palpations: Abdomen is soft. Tenderness: There is no abdominal tenderness. There is no guarding or rebound. Musculoskeletal:         General: Swelling and tenderness present. Cervical back: Normal range of motion and neck supple. Comments: Left ankle is tender, swollen and ecchymotic as compared to the right. Tenderness is noted in the left knee as well as the left chest wall. Skin:     General: Skin is warm and dry. Neurological:      Mental Status: She is alert and oriented to person, place, and time. Cranial Nerves: No cranial nerve deficit.       Coordination: Coordination normal.          Procedures     MDM  Number of Diagnoses or Management Options  Closed fracture of multiple ribs of left side, initial encounter  Fall, initial encounter  Sprain of left ankle, unspecified ligament, initial encounter  Diagnosis management comments: Emergency Department evaluation was notable for findings consistent with 2 rib fractures on the left that are nondisplaced as well as a likely sprain of the patient's left ankle. No other findings were appreciated on assessment via imaging of this patient. CT of the head and neck were unremarkable. Patient was alert and oriented during the entirety of her emergency department stay without any alterations in behavior or hallucinations. At the request of the patient [facility] nurse facility a urine drug screen was ordered, however the results of this test will be somewhat obfuscated by the fact that a Norco was administered to the patient due to her rib fracture pain. The results of this test also will not change the management of this patient, who is stable for discharge back to her rehabilitation facility, which is likely the best possible location for this patient to return to after spraining her ankle and fracturing her ribs. The patient was advised that it is in her best interest to take deep regular breaths to avoid pulmonary splinting and was advised to follow-up with her primary care provider. They were advised to return to the emergency department should they develop fever, chills, night sweats, nausea, vomiting, diarrhea, chest pain, shortness of breath, or worsening of their symptoms despite treatment from this emergency department visit. They were instructed to follow-up with their primary care provider in 2 days. This information was relayed to the patient who understood this plan of care and was amenable to the plan. ED Course as of Oct 26 1549   Tue Oct 26, 2021   1351 Discussed patient with social work. Patient comes from a facility that includes a drug and alcohol rehabilitation program.  Doing a urine drug screen.   This will be performed, however it is of note that the patient did receive a Norco while here secondary to her fall and rib fractures for which she is in real and  legitimate pain    [RK]      ED Course User Index  [RK] Antonio  43., DO         ED Course as of Oct 26 1552   Tue Oct 26, 2021   3192 Discussed patient with social work. Patient comes from a facility that includes a drug and alcohol rehabilitation program.  Doing a urine drug screen. This will be performed, however it is of note that the patient did receive a Norco while here secondary to her fall and rib fractures for which she is in real and  legitimate pain    [RK]      ED Course User Index  [RK] Rafael Loco, DO       --------------------------------------------- PAST HISTORY ---------------------------------------------  Past Medical History:  has a past medical history of Adenoma of right adrenal gland, Anemia, Anxiety, Arthritis, Asthma, Benign essential tremor, Bipolar affective ([address] Chronic back pain, Chronic respiratory failure with hypoxia ([address] Depression, Difficulty swallowing, Environmental and seasonal allergies, Excessive physiologic tremor, Fatty liver, Full dentures, GERD (gastroesophageal reflux disease), Gout, Herniated cervical disc, History of swelling of feet, Lymphedema of lower extremity, Mitochondrial cytopathy ([address] Neuropathy, Obesity, PETR (obstructive sleep apnea), PONV (postoperative nausea and vomiting), Seizures ([address] Spinal headache, and Thyroid disease. Past Surgical History:  has a past surgical history that includes knee surgery (Bilateral); Gastric bypass surgery (1999); myomectomy; Tonsillectomy; Nerve Block (Left, 10 02 2013); Nerve Block (Left, 10 9 13); Nerve Block (Left, 10/16/13); other surgical history (Left, 11 25 13); Nerve Block (Left, 10/29/2014); Nerve Block (Left, 11/12/2014); Nerve Block (Left, 12 8 14); Nerve Block (Right, 3/30/15); Nerve Block (Right, 4/6/2015); Nerve Block (Right, 4/13/15); Nerve Block (Left, 7/6/15); Nerve Block (07/20/15); Nerve Block (Left, 10 1 15); Nerve Block (10/26/15); other surgical history (3/28/2016);  Endoscopy, colon, diagnostic; pr colonoscopy flx dx w/collj spec when pfrmd (N/A, 3/20/2018); pr egd transoral biopsy single/multiple (N/A, 3/20/2018); Cholecystectomy (1999); Hysterectomy (1988); Colonoscopy (N/A, 9/18/2018); Esophagus dilation (9/18/2018); back surgery (last one 1995); Cardiac catheterization (Right, 6-6-2013); Appendectomy; other surgical history (1995); joint replacement (Bilateral, [id number]); egd colonoscopy (N/A, 10/8/2019); Colonoscopy (N/A, 10/8/2019); and Nerve Block (Bilateral, 4/1/2021). Social History:  reports that she has been smoking cigarettes. She started smoking about 31 years ago. She has a 31.50 pack-year smoking history. She has never used smokeless tobacco. She reports current drug use. Drug: Marijuana. She reports that she does not drink alcohol. Family History: family history includes Arthritis in an other family member; Cancer in her father and another family member; Depression in an other family member; Heart Disease in her mother and another family member; Hypertension in an other family member; Mental Illness in an other family member; Stroke in an other family member. The patients home medications have been reviewed. Allergies: Bee pollen, Penicillins, Ropinirole hcl, Vistaril [hydroxyzine hcl], Aripiprazole, Hydroxyzine pamoate, and Tape [adhesive tape]    -------------------------------------------------- RESULTS -------------------------------------------------  Labs:  No results found for this visit on 10/26/21. Radiology:  CT HEAD WO CONTRAST   Final Result   No acute intracranial abnormality. Specifically, there is no acute   intracranial hemorrhage. CT CERVICAL SPINE WO CONTRAST   Final Result   1. There is no acute compression fracture or subluxation of the cervical   spine. 2. Multilevel degenerative disc and degenerative joint disease. CT CHEST WO CONTRAST   Final Result   Acute nondisplaced fractures of the lateral left 4th and 5th ribs. No   additional acute fractures. No pleural effusion, pneumothorax, or   hemothorax.   No chest wall hematoma. Mild residual bilateral lower lobe airspace disease, significantly improved   from prior. XR KNEE LEFT (3 VIEWS)   Final Result   1. Status post total left knee arthroplasty there is no acute fracture or   hardware complication. XR ANKLE LEFT (MIN 3 VIEWS)   Final Result   heel spur      Soft tissue calcifications which may be seen in chronic vascular   insufficiency, scleroderma or other etiologies      Otherwise no acute findings             ------------------------- NURSING NOTES AND VITALS REVIEWED ---------------------------  Date / Time Roomed:  10/26/2021 10:45 AM  ED Bed Assignment:  IGOR RAMOS    The nursing notes within the ED encounter and vital signs as below have been reviewed. /67   Pulse 61   Resp 18   LMP 08/31/1988   SpO2 94%   Oxygen Saturation Interpretation: Normal      ------------------------------------------ PROGRESS NOTES ------------------------------------------  3:52 PM EDT  I have spoken with the patient and discussed todays results, in addition to providing specific details for the plan of care and counseling regarding the diagnosis and prognosis. Their questions are answered at this time and they are agreeable with the plan. I discussed at length with them reasons for immediate return here for re evaluation. They will followup with their primary care physician by calling their office tomorrow. --------------------------------- ADDITIONAL PROVIDER NOTES ---------------------------------  At this time the patient is without objective evidence of an acute process requiring hospitalization or inpatient management. They have remained hemodynamically stable throughout their entire ED visit and are stable for discharge with outpatient follow-up. The plan has been discussed in detail and they are aware of the specific conditions for emergent return, as well as the importance of follow-up.       New Prescriptions    No medications on file

## 2022-01-05 ENCOUNTER — APPOINTMENT (OUTPATIENT)
Dept: GENERAL RADIOLOGY | Age: 62
DRG: 721 | End: 2022-01-05
Payer: MEDICAID

## 2022-01-05 ENCOUNTER — HOSPITAL ENCOUNTER (INPATIENT)
Age: 62
LOS: 1 days | Discharge: NURSING, REHAB OR LONG-TERM CARE | DRG: 721 | End: 2022-01-07
Attending: EMERGENCY MEDICINE | Admitting: FAMILY MEDICINE
Payer: MEDICAID

## 2022-01-05 DIAGNOSIS — M62.82 NON-TRAUMATIC RHABDOMYOLYSIS: Primary | ICD-10-CM

## 2022-01-05 DIAGNOSIS — S52.501A CLOSED FRACTURE OF DISTAL END OF RIGHT RADIUS, UNSPECIFIED FRACTURE MORPHOLOGY, INITIAL ENCOUNTER: ICD-10-CM

## 2022-01-05 LAB
ALBUMIN SERPL-MCNC: 3.5 G/DL (ref 3.5–5.2)
ALP BLD-CCNC: 135 U/L (ref 35–104)
ALT SERPL-CCNC: 29 U/L (ref 0–32)
ANION GAP SERPL CALCULATED.3IONS-SCNC: 10 MMOL/L (ref 7–16)
AST SERPL-CCNC: 146 U/L (ref 0–31)
BASOPHILS ABSOLUTE: 0.02 E9/L (ref 0–0.2)
BASOPHILS RELATIVE PERCENT: 0.1 % (ref 0–2)
BILIRUB SERPL-MCNC: 0.6 MG/DL (ref 0–1.2)
BUN BLDV-MCNC: 18 MG/DL (ref 6–23)
CALCIUM SERPL-MCNC: 8.8 MG/DL (ref 8.6–10.2)
CHLORIDE BLD-SCNC: 106 MMOL/L (ref 98–107)
CO2: 29 MMOL/L (ref 22–29)
CREAT SERPL-MCNC: 0.8 MG/DL (ref 0.5–1)
EOSINOPHILS ABSOLUTE: 0.01 E9/L (ref 0.05–0.5)
EOSINOPHILS RELATIVE PERCENT: 0.1 % (ref 0–6)
GFR AFRICAN AMERICAN: >60
GFR NON-AFRICAN AMERICAN: >60 ML/MIN/1.73
GLUCOSE BLD-MCNC: 131 MG/DL (ref 74–99)
HCT VFR BLD CALC: 41 % (ref 34–48)
HEMOGLOBIN: 13.2 G/DL (ref 11.5–15.5)
IMMATURE GRANULOCYTES #: 0.17 E9/L
IMMATURE GRANULOCYTES %: 1 % (ref 0–5)
LYMPHOCYTES ABSOLUTE: 0.82 E9/L (ref 1.5–4)
LYMPHOCYTES RELATIVE PERCENT: 4.7 % (ref 20–42)
MCH RBC QN AUTO: 31.6 PG (ref 26–35)
MCHC RBC AUTO-ENTMCNC: 32.2 % (ref 32–34.5)
MCV RBC AUTO: 98.1 FL (ref 80–99.9)
MONOCYTES ABSOLUTE: 0.98 E9/L (ref 0.1–0.95)
MONOCYTES RELATIVE PERCENT: 5.6 % (ref 2–12)
NEUTROPHILS ABSOLUTE: 15.5 E9/L (ref 1.8–7.3)
NEUTROPHILS RELATIVE PERCENT: 88.5 % (ref 43–80)
PDW BLD-RTO: 13.4 FL (ref 11.5–15)
PLATELET # BLD: 270 E9/L (ref 130–450)
PMV BLD AUTO: 8.7 FL (ref 7–12)
POTASSIUM REFLEX MAGNESIUM: 3.1 MMOL/L (ref 3.5–5)
RBC # BLD: 4.18 E12/L (ref 3.5–5.5)
SODIUM BLD-SCNC: 145 MMOL/L (ref 132–146)
TOTAL CK: 4801 U/L (ref 20–180)
TOTAL PROTEIN: 6.5 G/DL (ref 6.4–8.3)
WBC # BLD: 17.5 E9/L (ref 4.5–11.5)

## 2022-01-05 PROCEDURE — 73560 X-RAY EXAM OF KNEE 1 OR 2: CPT

## 2022-01-05 PROCEDURE — 82550 ASSAY OF CK (CPK): CPT

## 2022-01-05 PROCEDURE — 83735 ASSAY OF MAGNESIUM: CPT

## 2022-01-05 PROCEDURE — 85025 COMPLETE CBC W/AUTO DIFF WBC: CPT

## 2022-01-05 PROCEDURE — 73521 X-RAY EXAM HIPS BI 2 VIEWS: CPT

## 2022-01-05 PROCEDURE — 73090 X-RAY EXAM OF FOREARM: CPT

## 2022-01-05 PROCEDURE — 73130 X-RAY EXAM OF HAND: CPT

## 2022-01-05 PROCEDURE — 73110 X-RAY EXAM OF WRIST: CPT

## 2022-01-05 PROCEDURE — 96375 TX/PRO/DX INJ NEW DRUG ADDON: CPT

## 2022-01-05 PROCEDURE — 96374 THER/PROPH/DIAG INJ IV PUSH: CPT

## 2022-01-05 PROCEDURE — 99285 EMERGENCY DEPT VISIT HI MDM: CPT

## 2022-01-05 PROCEDURE — 80053 COMPREHEN METABOLIC PANEL: CPT

## 2022-01-05 PROCEDURE — 6360000002 HC RX W HCPCS: Performed by: PHYSICIAN ASSISTANT

## 2022-01-05 RX ORDER — ONDANSETRON 2 MG/ML
4 INJECTION INTRAMUSCULAR; INTRAVENOUS ONCE
Status: COMPLETED | OUTPATIENT
Start: 2022-01-05 | End: 2022-01-06

## 2022-01-05 RX ORDER — FENTANYL CITRATE 50 UG/ML
50 INJECTION, SOLUTION INTRAMUSCULAR; INTRAVENOUS ONCE
Status: COMPLETED | OUTPATIENT
Start: 2022-01-05 | End: 2022-01-05

## 2022-01-05 RX ORDER — 0.9 % SODIUM CHLORIDE 0.9 %
1000 INTRAVENOUS SOLUTION INTRAVENOUS ONCE
Status: COMPLETED | OUTPATIENT
Start: 2022-01-06 | End: 2022-01-06

## 2022-01-05 RX ADMIN — FENTANYL CITRATE 50 MCG: 50 INJECTION, SOLUTION INTRAMUSCULAR; INTRAVENOUS at 22:41

## 2022-01-05 ASSESSMENT — PAIN SCALES - GENERAL
PAINLEVEL_OUTOF10: 10
PAINLEVEL_OUTOF10: 10

## 2022-01-05 ASSESSMENT — PAIN DESCRIPTION - LOCATION: LOCATION: HIP;ARM

## 2022-01-05 ASSESSMENT — PAIN DESCRIPTION - DESCRIPTORS: DESCRIPTORS: DISCOMFORT

## 2022-01-05 ASSESSMENT — PAIN DESCRIPTION - PAIN TYPE: TYPE: ACUTE PAIN

## 2022-01-05 ASSESSMENT — PAIN DESCRIPTION - ORIENTATION: ORIENTATION: RIGHT

## 2022-01-05 NOTE — LETTER
[address] Medicaid  CERTIFICATION OF NECESSITY  FOR TRANSPORTATION   BY WHEELCHAIR VAN     Individual Information   1. Name: Bernabe Aguero 2. [place] Medicaid Billing Number:    3. Address: [facility] [address]      Transportation Provider Information   4. Provider Name:    5. [place] Medicaid Provider Number:  National Provider Identifier (NPI):      Certification  7. Criteria:  By signing this document, the practitioner certifies that two statements are true:  A. This individual must be accompanied by a mobility-related assistive device from the point of pick-up to the point of drop-off. B. Transport of this individual by standard passenger vehicle or common carrier is precluded or contraindicated. 8. Period Beginning Date: 1/7/2022   9. Length  [x] Not more than 7 day(s)  [] One Year     Additional Information Relevant to Certification   10. Comments or Explanations, If Necessary or Appropriate   R RADIUS FX, FALL RISK, 3 L O2, RHABDO     Certifying Practitioner Information   11. Name of Practitioner: Neisha Smith MD   12. [place] Medicaid Provider Number, If Applicable:  [address] Provider Identifier (NPI):      Signature Information   14. Date of Signature: 1/7/2022 15. Name of Person Signing: Brenda Campos   16. Signature and Professional Designation: Electronically signed by GAGE Tong on 1/7/2022 at 1:58 PM       [facility] [id number]  Rev. 7/2015      [address] [facility] Encounter Date/Time: 1/5/2022 2142    Hospital Account: [de-identified]    MRN: [id number]    Patient: Yang Alicea Serial #: [id number]      ENCOUNTER          Patient Class: I Private Enc?   No Unit  BD: [address] Service: Med/Surg   Encounter DX: Rhabdomyolysis [M62.82]   ADM Provider: Stephanie Sierra DO   Procedure:     ATT Provider: Pepper Seay MD   REF Provider:        Admission DX: Rhabdomyolysis, Non-traumatic rhabdomyolysis, Closed fracture of distal end of right radius, unspecified fracture morphology, initial encounter and DX codes: M62.82, M62.82, S52.501A      PATIENT                 Name: Leon Ta : 1960 (61 yrs)   [place] [facility] Sex: Female   City: [place] [address]         Marital Status:    Employer: RETIRED         Oriental orthodox: Gerri Martin   Primary Care Provider: Kwaku Suárez MD         Primary Phone: [phone]   EMERGENCY CONTACT   Contact Name Legal Guardian? Relationship to Patient Home Phone Work Phone   1. Bianca Tonja Lucasty  2. Nilda Chavez    No Other  Other [phone]              GUARANTOR            Guarantor: Leon Ta     : 1960   Address: [place] Sex: Female     [place] [address]     Relation to Patient: Self       Home Phone: [phone]   Guarantor ID: [id number]       Work Phone:     Guarantor Employer: RETIRED         Status: RETIRED      COVERAGE  PRIMARY INSURANCE   Payor: MEDICAID [place] Plan: MEDICAID [facility] DEPT OF*   Payor Address: [address]       Group Number:   Insurance Type: INDEMNITY   Subscriber Name: Samuel Watson : 1960   Subscriber ID: [id number] Pat. Rel. to Sub: Self   SECONDARY INSURANCE   Payor:   Plan:     Payor Address:  ,           Group Number:   Insurance Type:     Subscriber Name:   Subscriber :     Subscriber ID:   Pat.  Rel. to Sub:             CSN: [id number]

## 2022-01-06 ENCOUNTER — APPOINTMENT (OUTPATIENT)
Dept: CT IMAGING | Age: 62
DRG: 721 | End: 2022-01-06
Payer: MEDICAID

## 2022-01-06 ENCOUNTER — APPOINTMENT (OUTPATIENT)
Dept: GENERAL RADIOLOGY | Age: 62
DRG: 721 | End: 2022-01-06
Payer: MEDICAID

## 2022-01-06 PROBLEM — M62.82 RHABDOMYOLYSIS: Status: ACTIVE | Noted: 2022-01-06

## 2022-01-06 LAB
C-REACTIVE PROTEIN: 9.4 MG/DL (ref 0–0.4)
LACTIC ACID, SEPSIS: 0.7 MMOL/L (ref 0.5–1.9)
LACTIC ACID, SEPSIS: 0.8 MMOL/L (ref 0.5–1.9)
MAGNESIUM: 2.6 MG/DL (ref 1.6–2.6)
SARS-COV-2, NAAT: NOT DETECTED
SEDIMENTATION RATE, ERYTHROCYTE: 40 MM/HR (ref 0–20)
TOTAL CK: 3929 U/L (ref 20–180)

## 2022-01-06 PROCEDURE — 72125 CT NECK SPINE W/O DYE: CPT

## 2022-01-06 PROCEDURE — 87635 SARS-COV-2 COVID-19 AMP PRB: CPT

## 2022-01-06 PROCEDURE — 6360000002 HC RX W HCPCS: Performed by: FAMILY MEDICINE

## 2022-01-06 PROCEDURE — 6360000002 HC RX W HCPCS

## 2022-01-06 PROCEDURE — 73590 X-RAY EXAM OF LOWER LEG: CPT

## 2022-01-06 PROCEDURE — 86140 C-REACTIVE PROTEIN: CPT

## 2022-01-06 PROCEDURE — 97161 PT EVAL LOW COMPLEX 20 MIN: CPT

## 2022-01-06 PROCEDURE — 36415 COLL VENOUS BLD VENIPUNCTURE: CPT

## 2022-01-06 PROCEDURE — 87040 BLOOD CULTURE FOR BACTERIA: CPT

## 2022-01-06 PROCEDURE — 6360000002 HC RX W HCPCS: Performed by: EMERGENCY MEDICINE

## 2022-01-06 PROCEDURE — 2580000003 HC RX 258: Performed by: EMERGENCY MEDICINE

## 2022-01-06 PROCEDURE — 97530 THERAPEUTIC ACTIVITIES: CPT

## 2022-01-06 PROCEDURE — 6360000002 HC RX W HCPCS: Performed by: PHYSICIAN ASSISTANT

## 2022-01-06 PROCEDURE — 1200000000 HC SEMI PRIVATE

## 2022-01-06 PROCEDURE — 2700000000 HC OXYGEN THERAPY PER DAY

## 2022-01-06 PROCEDURE — 6370000000 HC RX 637 (ALT 250 FOR IP): Performed by: FAMILY MEDICINE

## 2022-01-06 PROCEDURE — 94640 AIRWAY INHALATION TREATMENT: CPT

## 2022-01-06 PROCEDURE — 2580000003 HC RX 258

## 2022-01-06 PROCEDURE — 71045 X-RAY EXAM CHEST 1 VIEW: CPT

## 2022-01-06 PROCEDURE — 96375 TX/PRO/DX INJ NEW DRUG ADDON: CPT

## 2022-01-06 PROCEDURE — 70450 CT HEAD/BRAIN W/O DYE: CPT

## 2022-01-06 PROCEDURE — 6360000004 HC RX CONTRAST MEDICATION: Performed by: RADIOLOGY

## 2022-01-06 PROCEDURE — 25605 CLTX DST RDL FX/EPHYS SEP W/: CPT

## 2022-01-06 PROCEDURE — 85651 RBC SED RATE NONAUTOMATED: CPT

## 2022-01-06 PROCEDURE — 6360000002 HC RX W HCPCS: Performed by: STUDENT IN AN ORGANIZED HEALTH CARE EDUCATION/TRAINING PROGRAM

## 2022-01-06 PROCEDURE — 83605 ASSAY OF LACTIC ACID: CPT

## 2022-01-06 PROCEDURE — 97165 OT EVAL LOW COMPLEX 30 MIN: CPT

## 2022-01-06 PROCEDURE — 73110 X-RAY EXAM OF WRIST: CPT

## 2022-01-06 PROCEDURE — 82550 ASSAY OF CK (CPK): CPT

## 2022-01-06 PROCEDURE — 73702 CT LWR EXTREMITY W/O&W/DYE: CPT

## 2022-01-06 PROCEDURE — 2580000003 HC RX 258: Performed by: FAMILY MEDICINE

## 2022-01-06 RX ORDER — METHOCARBAMOL 500 MG/1
500 TABLET, FILM COATED ORAL 4 TIMES DAILY
Status: ON HOLD | COMMUNITY
End: 2022-02-16 | Stop reason: HOSPADM

## 2022-01-06 RX ORDER — ONDANSETRON 2 MG/ML
4 INJECTION INTRAMUSCULAR; INTRAVENOUS EVERY 6 HOURS PRN
Status: DISCONTINUED | OUTPATIENT
Start: 2022-01-06 | End: 2022-01-07 | Stop reason: HOSPADM

## 2022-01-06 RX ORDER — LEVOFLOXACIN 5 MG/ML
750 INJECTION, SOLUTION INTRAVENOUS EVERY 24 HOURS
Status: DISCONTINUED | OUTPATIENT
Start: 2022-01-06 | End: 2022-01-07 | Stop reason: HOSPADM

## 2022-01-06 RX ORDER — ALBUTEROL SULFATE 2.5 MG/3ML
2.5 SOLUTION RESPIRATORY (INHALATION) 3 TIMES DAILY
Status: DISCONTINUED | OUTPATIENT
Start: 2022-01-06 | End: 2022-01-07 | Stop reason: HOSPADM

## 2022-01-06 RX ORDER — SODIUM CHLORIDE 9 MG/ML
INJECTION, SOLUTION INTRAVENOUS CONTINUOUS
Status: DISCONTINUED | OUTPATIENT
Start: 2022-01-06 | End: 2022-01-07 | Stop reason: HOSPADM

## 2022-01-06 RX ORDER — TRAZODONE HYDROCHLORIDE 50 MG/1
100 TABLET ORAL NIGHTLY
Status: DISCONTINUED | OUTPATIENT
Start: 2022-01-06 | End: 2022-01-07 | Stop reason: HOSPADM

## 2022-01-06 RX ORDER — METOPROLOL SUCCINATE 25 MG/1
12.5 TABLET, EXTENDED RELEASE ORAL DAILY
Status: DISCONTINUED | OUTPATIENT
Start: 2022-01-06 | End: 2022-01-07 | Stop reason: HOSPADM

## 2022-01-06 RX ORDER — FAMOTIDINE 20 MG/1
20 TABLET, FILM COATED ORAL 2 TIMES DAILY
Status: DISCONTINUED | OUTPATIENT
Start: 2022-01-06 | End: 2022-01-07 | Stop reason: HOSPADM

## 2022-01-06 RX ORDER — ZIPRASIDONE HYDROCHLORIDE 20 MG/1
20 CAPSULE ORAL NIGHTLY
Status: DISCONTINUED | OUTPATIENT
Start: 2022-01-06 | End: 2022-01-07 | Stop reason: HOSPADM

## 2022-01-06 RX ORDER — MONTELUKAST SODIUM 10 MG/1
10 TABLET ORAL DAILY
Status: DISCONTINUED | OUTPATIENT
Start: 2022-01-06 | End: 2022-01-07 | Stop reason: HOSPADM

## 2022-01-06 RX ORDER — FENTANYL CITRATE 50 UG/ML
25 INJECTION, SOLUTION INTRAMUSCULAR; INTRAVENOUS
Status: DISCONTINUED | OUTPATIENT
Start: 2022-01-06 | End: 2022-01-07 | Stop reason: HOSPADM

## 2022-01-06 RX ORDER — FERROUS SULFATE 325(65) MG
325 TABLET ORAL
Status: DISCONTINUED | OUTPATIENT
Start: 2022-01-06 | End: 2022-01-07 | Stop reason: HOSPADM

## 2022-01-06 RX ORDER — MAGNESIUM SULFATE IN WATER 40 MG/ML
2000 INJECTION, SOLUTION INTRAVENOUS PRN
Status: DISCONTINUED | OUTPATIENT
Start: 2022-01-06 | End: 2022-01-07 | Stop reason: HOSPADM

## 2022-01-06 RX ORDER — PROMETHAZINE HYDROCHLORIDE 25 MG/1
12.5 TABLET ORAL EVERY 6 HOURS PRN
Status: DISCONTINUED | OUTPATIENT
Start: 2022-01-06 | End: 2022-01-07 | Stop reason: HOSPADM

## 2022-01-06 RX ORDER — OXYCODONE AND ACETAMINOPHEN 10; 325 MG/1; MG/1
1 TABLET ORAL EVERY 6 HOURS PRN
Status: DISCONTINUED | OUTPATIENT
Start: 2022-01-06 | End: 2022-01-07 | Stop reason: HOSPADM

## 2022-01-06 RX ORDER — POTASSIUM CHLORIDE 20 MEQ/1
40 TABLET, EXTENDED RELEASE ORAL PRN
Status: DISCONTINUED | OUTPATIENT
Start: 2022-01-06 | End: 2022-01-07 | Stop reason: HOSPADM

## 2022-01-06 RX ORDER — ESCITALOPRAM OXALATE 10 MG/1
20 TABLET ORAL 2 TIMES DAILY
Status: DISCONTINUED | OUTPATIENT
Start: 2022-01-06 | End: 2022-01-07 | Stop reason: HOSPADM

## 2022-01-06 RX ORDER — ACETAMINOPHEN 325 MG/1
650 TABLET ORAL EVERY 6 HOURS PRN
Status: DISCONTINUED | OUTPATIENT
Start: 2022-01-06 | End: 2022-01-07 | Stop reason: HOSPADM

## 2022-01-06 RX ORDER — POTASSIUM CHLORIDE 7.45 MG/ML
10 INJECTION INTRAVENOUS PRN
Status: DISCONTINUED | OUTPATIENT
Start: 2022-01-06 | End: 2022-01-07 | Stop reason: HOSPADM

## 2022-01-06 RX ORDER — ALLOPURINOL 100 MG/1
200 TABLET ORAL 2 TIMES DAILY
Status: DISCONTINUED | OUTPATIENT
Start: 2022-01-06 | End: 2022-01-07 | Stop reason: HOSPADM

## 2022-01-06 RX ORDER — FUROSEMIDE 40 MG/1
40 TABLET ORAL 2 TIMES DAILY
Status: DISCONTINUED | OUTPATIENT
Start: 2022-01-06 | End: 2022-01-07 | Stop reason: HOSPADM

## 2022-01-06 RX ORDER — SODIUM CHLORIDE 9 MG/ML
25 INJECTION, SOLUTION INTRAVENOUS PRN
Status: DISCONTINUED | OUTPATIENT
Start: 2022-01-06 | End: 2022-01-07 | Stop reason: HOSPADM

## 2022-01-06 RX ORDER — GABAPENTIN 600 MG/1
600 TABLET ORAL 4 TIMES DAILY
Status: DISCONTINUED | OUTPATIENT
Start: 2022-01-06 | End: 2022-01-07 | Stop reason: HOSPADM

## 2022-01-06 RX ORDER — SODIUM CHLORIDE 0.9 % (FLUSH) 0.9 %
10 SYRINGE (ML) INJECTION EVERY 12 HOURS SCHEDULED
Status: DISCONTINUED | OUTPATIENT
Start: 2022-01-06 | End: 2022-01-07 | Stop reason: HOSPADM

## 2022-01-06 RX ORDER — POLYETHYLENE GLYCOL 3350 17 G/17G
17 POWDER, FOR SOLUTION ORAL DAILY PRN
Status: DISCONTINUED | OUTPATIENT
Start: 2022-01-06 | End: 2022-01-07 | Stop reason: HOSPADM

## 2022-01-06 RX ORDER — SODIUM CHLORIDE 0.9 % (FLUSH) 0.9 %
10 SYRINGE (ML) INJECTION PRN
Status: DISCONTINUED | OUTPATIENT
Start: 2022-01-06 | End: 2022-01-07 | Stop reason: HOSPADM

## 2022-01-06 RX ORDER — TOPIRAMATE 100 MG/1
200 TABLET, FILM COATED ORAL 2 TIMES DAILY
Status: DISCONTINUED | OUTPATIENT
Start: 2022-01-06 | End: 2022-01-07 | Stop reason: HOSPADM

## 2022-01-06 RX ORDER — ACETAMINOPHEN 650 MG/1
650 SUPPOSITORY RECTAL EVERY 6 HOURS PRN
Status: DISCONTINUED | OUTPATIENT
Start: 2022-01-06 | End: 2022-01-07 | Stop reason: HOSPADM

## 2022-01-06 RX ADMIN — ALLOPURINOL 200 MG: 100 TABLET ORAL at 20:29

## 2022-01-06 RX ADMIN — FAMOTIDINE 20 MG: 20 TABLET, FILM COATED ORAL at 20:29

## 2022-01-06 RX ADMIN — FAMOTIDINE 20 MG: 20 TABLET, FILM COATED ORAL at 09:01

## 2022-01-06 RX ADMIN — ZIPRASIDONE HYDROCHLORIDE 20 MG: 20 CAPSULE ORAL at 20:29

## 2022-01-06 RX ADMIN — SODIUM CHLORIDE 1000 ML: 9 INJECTION, SOLUTION INTRAVENOUS at 00:04

## 2022-01-06 RX ADMIN — FENTANYL CITRATE 25 MCG: 50 INJECTION INTRAMUSCULAR; INTRAVENOUS at 16:35

## 2022-01-06 RX ADMIN — HYDROMORPHONE HYDROCHLORIDE 1 MG: 1 INJECTION, SOLUTION INTRAMUSCULAR; INTRAVENOUS; SUBCUTANEOUS at 00:04

## 2022-01-06 RX ADMIN — FENTANYL CITRATE 100 MCG: 0.05 INJECTION, SOLUTION INTRAMUSCULAR; INTRAVENOUS at 01:41

## 2022-01-06 RX ADMIN — TOPIRAMATE 200 MG: 100 TABLET, FILM COATED ORAL at 09:01

## 2022-01-06 RX ADMIN — ACETAMINOPHEN 650 MG: 325 TABLET ORAL at 09:01

## 2022-01-06 RX ADMIN — OXYCODONE AND ACETAMINOPHEN 1 TABLET: 325; 10 TABLET ORAL at 04:54

## 2022-01-06 RX ADMIN — VANCOMYCIN HYDROCHLORIDE 2250 MG: 10 INJECTION, POWDER, LYOPHILIZED, FOR SOLUTION INTRAVENOUS at 03:04

## 2022-01-06 RX ADMIN — IOPAMIDOL 90 ML: 755 INJECTION, SOLUTION INTRAVENOUS at 07:14

## 2022-01-06 RX ADMIN — MONTELUKAST 10 MG: 10 TABLET, FILM COATED ORAL at 09:01

## 2022-01-06 RX ADMIN — METOPROLOL SUCCINATE 12.5 MG: 25 TABLET, EXTENDED RELEASE ORAL at 09:01

## 2022-01-06 RX ADMIN — TOPIRAMATE 200 MG: 100 TABLET, FILM COATED ORAL at 20:30

## 2022-01-06 RX ADMIN — LEVOFLOXACIN 750 MG: 5 INJECTION, SOLUTION INTRAVENOUS at 11:12

## 2022-01-06 RX ADMIN — VANCOMYCIN HYDROCHLORIDE 1000 MG: 1 INJECTION, POWDER, LYOPHILIZED, FOR SOLUTION INTRAVENOUS at 16:36

## 2022-01-06 RX ADMIN — FENTANYL CITRATE 25 MCG: 50 INJECTION INTRAMUSCULAR; INTRAVENOUS at 11:11

## 2022-01-06 RX ADMIN — FUROSEMIDE 40 MG: 40 TABLET ORAL at 11:11

## 2022-01-06 RX ADMIN — ONDANSETRON 4 MG: 2 INJECTION INTRAMUSCULAR; INTRAVENOUS at 00:04

## 2022-01-06 RX ADMIN — GABAPENTIN 600 MG: 600 TABLET, FILM COATED ORAL at 20:29

## 2022-01-06 RX ADMIN — ESCITALOPRAM OXALATE 20 MG: 10 TABLET ORAL at 09:01

## 2022-01-06 RX ADMIN — GABAPENTIN 600 MG: 600 TABLET, FILM COATED ORAL at 16:36

## 2022-01-06 RX ADMIN — FENTANYL CITRATE 25 MCG: 50 INJECTION INTRAMUSCULAR; INTRAVENOUS at 20:27

## 2022-01-06 RX ADMIN — FENTANYL CITRATE 25 MCG: 50 INJECTION INTRAMUSCULAR; INTRAVENOUS at 14:20

## 2022-01-06 RX ADMIN — ALLOPURINOL 200 MG: 100 TABLET ORAL at 09:01

## 2022-01-06 RX ADMIN — GABAPENTIN 600 MG: 600 TABLET, FILM COATED ORAL at 12:46

## 2022-01-06 RX ADMIN — ALBUTEROL SULFATE 2.5 MG: 2.5 SOLUTION RESPIRATORY (INHALATION) at 21:08

## 2022-01-06 RX ADMIN — TRAZODONE HYDROCHLORIDE 100 MG: 50 TABLET ORAL at 20:29

## 2022-01-06 RX ADMIN — ENOXAPARIN SODIUM 40 MG: 100 INJECTION SUBCUTANEOUS at 09:00

## 2022-01-06 RX ADMIN — ESCITALOPRAM OXALATE 20 MG: 10 TABLET ORAL at 20:29

## 2022-01-06 RX ADMIN — ALBUTEROL SULFATE 2.5 MG: 2.5 SOLUTION RESPIRATORY (INHALATION) at 09:07

## 2022-01-06 RX ADMIN — FUROSEMIDE 40 MG: 40 TABLET ORAL at 20:29

## 2022-01-06 RX ADMIN — ALBUTEROL SULFATE 2.5 MG: 2.5 SOLUTION RESPIRATORY (INHALATION) at 13:49

## 2022-01-06 RX ADMIN — OXYCODONE AND ACETAMINOPHEN 1 TABLET: 325; 10 TABLET ORAL at 12:46

## 2022-01-06 RX ADMIN — OXYCODONE AND ACETAMINOPHEN 1 TABLET: 325; 10 TABLET ORAL at 18:50

## 2022-01-06 RX ADMIN — GABAPENTIN 600 MG: 600 TABLET, FILM COATED ORAL at 09:01

## 2022-01-06 RX ADMIN — SODIUM CHLORIDE: 9 INJECTION, SOLUTION INTRAVENOUS at 11:13

## 2022-01-06 ASSESSMENT — PAIN SCALES - GENERAL
PAINLEVEL_OUTOF10: 9
PAINLEVEL_OUTOF10: 9
PAINLEVEL_OUTOF10: 8
PAINLEVEL_OUTOF10: 8
PAINLEVEL_OUTOF10: 9
PAINLEVEL_OUTOF10: 9
PAINLEVEL_OUTOF10: 8
PAINLEVEL_OUTOF10: 8
PAINLEVEL_OUTOF10: 9
PAINLEVEL_OUTOF10: 10
PAINLEVEL_OUTOF10: 10
PAINLEVEL_OUTOF10: 8

## 2022-01-06 ASSESSMENT — PAIN DESCRIPTION - LOCATION
LOCATION: ARM;HIP

## 2022-01-06 ASSESSMENT — PAIN DESCRIPTION - DESCRIPTORS
DESCRIPTORS: ACHING;DISCOMFORT
DESCRIPTORS: ACHING;DISCOMFORT;SORE

## 2022-01-06 ASSESSMENT — PAIN DESCRIPTION - ONSET
ONSET: ON-GOING

## 2022-01-06 ASSESSMENT — PAIN DESCRIPTION - ORIENTATION
ORIENTATION: RIGHT

## 2022-01-06 ASSESSMENT — PAIN DESCRIPTION - PAIN TYPE
TYPE: ACUTE PAIN

## 2022-01-06 ASSESSMENT — PAIN DESCRIPTION - FREQUENCY
FREQUENCY: CONTINUOUS

## 2022-01-06 ASSESSMENT — PULMONARY FUNCTION TESTS
PEFR_L/MIN: 20
PEFR_L/MIN: 21

## 2022-01-06 NOTE — H&P
Hospitalist History & Physical      PCP: Lion Benson MD    Date of Service: Pt seen/examined on 1/6/2022     Chief Complaint:  had concerns including Fall (pt rolled out of bed around 0800 while sleeping.  states she wasn't able to get out of bed due to weakness. c/o right hip and arm pain). History Of Present Illness:    Ms. Brittany Liu, a 64y.o. year old female  who  has a past medical history of Adenoma of right adrenal gland, Anemia, Anxiety, Arthritis, Asthma, Benign essential tremor, Bipolar affective ([address] Chronic back pain, Chronic respiratory failure with hypoxia ([address] Depression, Difficulty swallowing, Environmental and seasonal allergies, Excessive physiologic tremor, Fatty liver, Full dentures, GERD (gastroesophageal reflux disease), Gout, Herniated cervical disc, History of swelling of feet, Lymphedema of lower extremity, Mitochondrial cytopathy ([address] Neuropathy, Obesity, PETR (obstructive sleep apnea), PONV (postoperative nausea and vomiting), Seizures ([address] Spinal headache, and Thyroid disease. Patient presents to the emergency department after a fall. She fell out of bed and by 8:00. She was unable to get up due to weakness. Now complaining of right hip and arm pain. Vital signs reveal the patient be tachycardic and hypoxic with SPO2 of 85% and a heart rate of 120. The patient is afebrile. Laboratory studies reveal potassium 3.1, glucose 131, total CK 4801. WBC 17.5. Impacted distal radius fracture was identified on x-ray. Orthopedic service was consulted. There is also some concern the patient has infected prosthetic joint of the knee. Vancomycin was administered. Chest x-ray and COVID are still pending.       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 Len Caal [facility]    Neuropathy     at feet    Obesity     PETR (obstructive sleep apnea)     no CPAP dt insurance will not pay for    PONV (postoperative nausea and vomiting)     Seizures [address]     last approx 6-13-19- f/u w/ PCP  Granmal, flares up in heat/ summer time - no recent issues as of 10-4-19     Spinal headache     Thyroid disease        Past Surgical History:   Procedure Laterality Date    APPENDECTOMY      with Hysterectomy / unknown    BACK SURGERY  last one 1995    lumbar x 2    CARDIAC CATHETERIZATION Right 6-6-2013    Dr. Jayce Estrada, no stents placed, no blockages     CHOLECYSTECTOMY  1999    open with gastric bypass    COLONOSCOPY N/A 9/18/2018    COLONOSCOPY POLYPECTOMY HOT BIOPSY performed by Cari Bell MD at [address] COLONOSCOPY N/A 10/8/2019    COLONOSCOPY POLYPECTOMY SNARE/COLD BIOPSY performed by Cari Bell MD at [address] EGD COLONOSCOPY N/A 10/8/2019    EGD ESOPHAGOGASTRODUODENOSCOPY DILATATION performed by Cari Bell MD at [address] [facility], DIAGNOSTIC      ESOPHAGEAL DILATATION  9/18/2018    [facility] by CHRISTOPHE Ferrell MD at [address] REPLACEMENT Bilateral 2007,2017    knees    KNEE SURGERY Bilateral     scope    MYOMECTOMY      NERVE BLOCK Left 10 02 2013    lumbar paravertebral facet #2    NERVE BLOCK Left 10 9 13    hip inj #1    NERVE BLOCK Left 10/16/13    hip injection    NERVE BLOCK Left 10/29/2014    left lumbar transforaminal nerve lbock  #1    NERVE BLOCK Left 11/12/2014    lumbar left transforaminal nerve block  #2    NERVE BLOCK Left 12 8 14    lumbar transforaminal #3    NERVE BLOCK Right 3/30/15    cervical transforaminal #1    NERVE BLOCK Right 4/6/2015    right cervical transforaminal nerve block  #2    NERVE BLOCK Right 4/13/15    cervical transforaminal #3    NERVE BLOCK Left 7/6/15    knee injection #1    NERVE BLOCK  07/20/15    left genicular nerve block/knee #2    NERVE BLOCK Left 10 1 15    lumb transforam #1    NERVE BLOCK  10/26/15    left lumbar transforaminal nerve block #3    NERVE BLOCK Bilateral 4/1/2021    #1 BILATERAL SACROILIAC JOINT INJECTION UNDER FLUORO performed by Veronica Maldonado MD at [address] [facility] Left 11 25 13    lumbar radiofreq    OTHER SURGICAL HISTORY  3/28/2016    stage 1, 3 day percutanous trial [employer] lumbar spinal cord stimulator    OTHER SURGICAL HISTORY  1995    racz procedure / lower back    [place] COL[place] FLX DX W/COLLJ SPEC WHEN PFRMD N/A 3/20/2018    COLONOSCOPY DIAGNOSTIC OR SCREENING performed by Reymundo Galvez MD at [facility] ENDOSCOPY    [place] E[place] TRANSORAL BIOPSY SINGLE/MULTIPLE N/A 3/20/2018    EGD BIOPSY performed by Reymundo Galvez MD at [address] [facility]         Prior to Admission medications    Medication Sig Start Date End Date Taking?  Authorizing Provider   metoprolol succinate (TOPROL XL) 25 MG extended release tablet Take 0.5 tablets by mouth daily 7/2/21   Jeet Camp MD   dexamethasone (DECADRON) 1 MG tablet Take 1 tablet by mouth once for 1 dose Take 1 tablet of Dexamethasone 1 mg at 11 pm and go to the Lab next morning at 8am  Patient not taking: Reported on 5/21/2021 2/18/22 2/18/22  Jose Barnhart MD   NONFORMULARY Medical marijuana    Historical Provider, MD   cetirizine (ZYRTEC) 10 MG tablet take 1 tablet by mouth once daily as directed 1/27/21   Historical Provider, MD   clotrimazole-betamethasone (Morene Gato) 1-0.05 % lotion     Historical Provider, MD   Co-Enzyme Q10 100 MG CAPS TAKE 1 CAPSULE BY MOUTH TWICE DAILY AS DIRECTED 1/18/21   Historical Provider, MD   cyanocobalamin 50 MCG tablet Take 100 mcg by mouth daily    Historical Provider, MD   diclofenac sodium (VOLTAREN) 1 % GEL APPLY 1 TO 2 GRAMS 3 TO 4 TIMES DAILY AS DIRECTED 10/31/20   Historical Provider, MD   ferrous sulfate (FE TABS 325) 325 (65 Fe) MG EC tablet take 1 tablet by mouth once daily 1/12/21   Historical Provider, MD   Glucosamine-Chondroitin 750-600 MG CHEW Take by mouth 2 times daily    Historical Provider, MD   lidocaine-prilocaine (EMLA) 2.5-2.5 % cream APPLY 1 TO 2 GRAMS 3 TO 4 TIMES DAILY AS DIRECTED 10/31/20   Historical Provider, MD   potassium chloride (KLOR-CON) 10 MEQ extended release tablet Take 10 mEq by mouth as needed    Historical Provider, MD   Pseudoephedrine-Naproxen Na (ALEVE-D SINUS & COLD PO)     Historical Provider, MD   famotidine (PEPCID) 20 MG tablet Take 1 tablet by mouth 2 times daily 9/10/20   Bianka Davis MD   EPINEPHrine (EPIPEN 2-CARMEN) 0.3 MG/0.3ML SOAJ injection Inject 0.3 mLs into the muscle once for 1 dose Use as directed for allergic reaction 9/10/20 5/21/21  Bianka Davis MD   traZODone (DESYREL) 100 MG tablet Take 100 mg by mouth nightly    Historical Provider, MD   ondansetron (ZOFRAN-ODT) 4 MG disintegrating tablet Take 1 tablet by mouth 3 times daily as needed for Nausea or Vomiting 2/26/20   Jb Gabriel MD   albuterol (PROVENTIL) (5 MG/ML) 0.5% nebulizer solution Take 2.5 mg by nebulization 3 times daily     Historical Provider, MD   B Complex Vitamins (VITAMIN-B COMPLEX PO) Take by mouth daily     Historical Provider, MD   magnesium 200 MG TABS tablet Take 200 mg by mouth 2 times daily     Historical Provider, MD   calcium carbonate (CALCIUM 600) 600 MG TABS tablet Take 1 tablet by mouth 2 times daily     Historical Provider, MD   azelastine (ASTELIN) 0.1 % nasal spray 1 spray by Nasal route 2 times daily Use in each nostril as directed 8/15/19   Abril Murray DO   montelukast (SINGULAIR) 10 MG tablet Take 1 tablet by mouth daily 8/12/19   Sherrel Frankel Bunevich, DO   omeprazole (PRILOSEC) 20 MG delayed release capsule Take 20 mg by mouth Daily     Historical Provider, MD   oxyCODONE-acetaminophen (PERCOCET)  MG per tablet Take 1 tablet by mouth every 8 hours as needed for Pain (pt took 1 tablet at 6pm tonight). Instructed to take am of procedure     Historical Provider, MD   furosemide (LASIX) 40 MG tablet Take 1 tablet by mouth 2 times daily 6/14/19   Phil Sanchez DO   albuterol sulfate  (90 Base) MCG/ACT inhaler Inhale 2 puffs into the lungs every 4 hours as needed for Wheezing or Shortness of Breath     Historical Provider, MD   Cholecalciferol (VITAMIN D3) 5000 units TABS Take 1 tablet by mouth every other day     Historical Provider, MD   docusate sodium (COLACE) 100 MG capsule Take 100 mg by mouth 2 times daily    Historical Provider, MD   linaclotide (LINZESS) 145 MCG capsule Take 290 mcg by mouth every morning (before breakfast)     Historical Provider, MD   levOCARNitine (CARNITOR) 330 MG tablet Take 330 mg by mouth 3 times daily For mitochondrial disease    Historical Provider, MD   allopurinol (ZYLOPRIM) 100 MG tablet Take 200 mg by mouth 2 times daily     Historical Provider, MD   escitalopram (LEXAPRO) 20 MG tablet Take 20 mg by mouth 2 times daily     Historical Provider, MD   gabapentin (NEURONTIN) 600 MG tablet Take 1 tablet by mouth 4 times daily. 10/31/13   Trina Carreon MD   topiramate (TOPAMAX) 200 MG tablet Take 1 tablet by mouth 2 times daily. 5/9/12   Ab Benavides DO   ziprasidone (GEODON) 20 MG capsule Take 20 mg by mouth nightly     Historical Provider, MD         Allergies:  Bee pollen, Penicillins, Ropinirole hcl, Vistaril [hydroxyzine hcl], Aripiprazole, Hydroxyzine pamoate, and Tape [adhesive tape]    Social History:    TOBACCO:   reports that she has been smoking cigarettes. She started smoking about 31 years ago. She has a 31.50 pack-year smoking history. She has never used smokeless tobacco.  ETOH:   reports no history of alcohol use. Family History:    Reviewed in detail and negative for DM, CAD, Cancer, CVA. Positive as follows\"      Problem Relation Age of Onset    Heart Disease Mother     Cancer Father     Arthritis Other     Cancer Other     Depression Other     Heart Disease Other     Hypertension Other     Mental Illness Other     Stroke Other        REVIEW OF SYSTEMS:   Pertinent positives as noted in the HPI. All other systems reviewed and negative. PHYSICAL EXAM:  /85   Pulse 120   Temp 97.8 °F (36.6 °C)   Resp 16   Ht 5' 6\" (1.676 m)   Wt 243 lb (110.2 kg)   LMP 08/31/1988   SpO2 (!) 85% Comment: reports that she wears 3L at baseline, EMS was not aware of O2 needs  BMI 39.22 kg/m²   General appearance: No apparent distress, appears stated age and cooperative. HEENT: Normal cephalic, atraumatic without obvious deformity. Pupils equal, round, and reactive to light. Extra ocular muscles intact. Conjunctivae/corneas clear. Neck: Supple, with full range of motion. No jugular venous distention. Trachea midline. Respiratory: Clear to auscultation bilaterally  Cardiovascular: Tachycardic   Abdomen: Soft, nontender, nondistended  Musculoskeletal: Right arm splint  Skin: Normal skin color. No rashes or lesions. Neurologic:  Neurovascularly intact without any focal sensory/motor deficits.  Cranial nerves: II-XII intact, grossly non-focal.  Psychiatric: Alert and oriented, thought content appropriate, normal insight    Reviewed EKG and CXR personally      CBC:   Recent Labs     01/05/22 2230   WBC 17.5*   RBC 4.18   HGB 13.2   HCT 41.0   MCV 98.1   RDW 13.4        BMP:   Recent Labs     01/05/22 2230      K 3.1*      CO2 29   BUN 18   CREATININE 0.8   MG 2.6     LFT:  Recent Labs     01/05/22 2230   PROT 6.5   ALKPHOS 135*   ALT 29   *   BILITOT 0.6     CE:  Recent Labs     01/05/22  2230   CKTOTAL 4,801*     PT/INR: No results for input(s): INR, APTT in the last 72 hours. BNP: No results for input(s): BNP in the last 72 hours.   ESR:   Lab Results   Component Value Date    SEDRATE 50 (H) 09/02/2021     CRP:   Lab Results   Component Value Date    CRP 0.6 (H) 09/02/2021     D Dimer:   Lab Results   Component Value Date    DDIMER 482 08/20/2021      Folate and B12:   Lab Results   Component Value Date    PVNJKNIE79 568 05/11/2020   ,   Lab Results   Component Value Date    FOLATE >20.0 05/11/2020     Lactic Acid:   Lab Results   Component Value Date    LACTA 1.2 01/30/2017     Thyroid Studies:   Lab Results   Component Value Date    TSH 0.548 03/16/2021    O9EKCDX 107.30 03/03/2021    C0SMWXU 10.0 05/21/2012       Oupatient labs:  Lab Results   Component Value Date    CHOL 149 03/16/2021    TRIG 99 03/16/2021    HDL 66 03/16/2021    LDLCALC 63 03/16/2021    TSH 0.548 03/16/2021    INR 0.9 09/23/2020    LABA1C 5.7 12/13/2013       Urinalysis:    Lab Results   Component Value Date    NITRU Negative 08/20/2021    WBCUA NONE 05/07/2021    WBCUA 1-3 05/06/2012    BACTERIA NONE SEEN 05/07/2021    RBCUA NONE 05/07/2021    RBCUA 0-1 12/13/2013    BLOODU Negative 08/20/2021    SPECGRAV <=1.005 08/20/2021    GLUCOSEU Negative 08/20/2021    GLUCOSEU NEGATIVE 05/06/2012       Imaging:  XR RADIUS ULNA RIGHT (2 VIEWS)    Result Date: 1/5/2022  EXAMINATION: TWO XRAY VIEWS OF THE RIGHT FOREARM 1/5/2022 10:34 pm COMPARISON: 03/01/2020 HISTORY: ORDERING SYSTEM PROVIDED HISTORY: fall, pain TECHNOLOGIST PROVIDED HISTORY: Reason for exam:->fall, pain What reading provider will be dictating this exam?->CRC FINDINGS: Impacted distal radius fracture. There is normal alignment. No acute joint abnormality. No focal osseous lesion. No focal soft tissue abnormality. Impacted distal radius fracture. XR WRIST RIGHT (MIN 3 VIEWS)    Result Date: 1/5/2022  EXAMINATION: XRAY VIEWS OF THE RIGHT WRIST 1/5/2022 10:33 pm COMPARISON: None. HISTORY: ORDERING SYSTEM PROVIDED HISTORY: fall, pain TECHNOLOGIST PROVIDED HISTORY: Reason for exam:->fall, pain What reading provider will be dictating this exam?->CRC FINDINGS: Impacted distal radius fracture. There is normal alignment. No acute joint abnormality. No focal osseous lesion. No focal soft tissue abnormality. Impacted distal radius fracture. .     XR HAND RIGHT (MIN 3 VIEWS)    Result Date: 1/5/2022  EXAMINATION: THREE XRAY VIEWS OF THE RIGHT HAND 1/5/2022 10:34 pm COMPARISON: None. HISTORY: ORDERING SYSTEM PROVIDED HISTORY: fall, pain TECHNOLOGIST PROVIDED HISTORY: Reason for exam:->fall, pain What reading provider will be dictating this exam?->CRC FINDINGS: Impacted distal radius fracture. There is normal alignment. No acute joint abnormality. No focal osseous lesion. No focal soft tissue abnormality. Impacted distal radius fracture. XR HIP BILATERAL W AP PELVIS (2 VIEWS)    Result Date: 1/5/2022  EXAMINATION: ONE XRAY VIEW OF THE PELVIS AND TWO XRAY VIEWS OF EACH OF THE BILATERAL HIPS 1/5/2022 10:34 pm COMPARISON: 12/13/2019 HISTORY: ORDERING SYSTEM PROVIDED HISTORY: fall, pain TECHNOLOGIST PROVIDED HISTORY: Reason for exam:->fall, pain What reading provider will be dictating this exam?->CRC FINDINGS: No acute displaced fracture or subluxation is identified. The femoral heads are well positioned within the acetabula bilaterally. The sickly act joints and pubic symphysis are intact. No focal soft tissue abnormality. No acute osseous abnormality.      XR KNEE LEFT (1-2 VIEWS)    Addendum Date: 1/5/2022    ADDENDUM: The original study was completed in error with respect to laterality. Please disregard. See below for left knee radiograph report. EXAMINATION: TWO XRAY VIEWS OF THE LEFT KNEE 1/5/2022 10:35 pm COMPARISON: 10/26/2021 HISTORY: ORDERING SYSTEM PROVIDED HISTORY: fall pain TECHNOLOGIST PROVIDED HISTORY: Reason for exam:->fall pain What reading provider will be dictating this exam?->CRC FINDINGS: Redemonstration of left knee arthroplasty hardware without evidence of loosening or failure. No acute fracture or subluxation is identified. The soft tissues are unremarkable. Result Date: 1/5/2022  EXAMINATION: TWO XRAY VIEWS OF THE LEFT KNEE 1/5/2022 10:35 pm COMPARISON: None HISTORY: ORDERING SYSTEM PROVIDED HISTORY: fall pain TECHNOLOGIST PROVIDED HISTORY: Reason for exam:->fall pain What reading provider will be dictating this exam?->CRC FINDINGS: Status post interval side plate and screw fixation of the distal femur, partially visualized. There is evidence of interval healing of the distal femur fracture. Status post right knee arthroplasty. No evidence of hardware loosening or failure. Progressive healing of a distal femur fracture status post side plate and screw fixation. IMPRESSION: No acute osseous abnormality. Stable appearance of left knee arthroplasty. XR KNEE RIGHT (1-2 VIEWS)    Result Date: 1/5/2022  EXAMINATION: TWO XRAY VIEWS OF THE RIGHT KNEE 1/5/2022 10:35 pm COMPARISON: 09/02/2021 HISTORY: ORDERING SYSTEM PROVIDED HISTORY: fall pain TECHNOLOGIST PROVIDED HISTORY: Reason for exam:->fall pain What reading provider will be dictating this exam?->CRC FINDINGS: Redemonstration of right knee arthroplasty, unchanged. No evidence of hardware loosening or failure. There has been progressive interval healing of the distal femur. Sideplate and screw fixation appears unchanged. No evidence of hardware failure.   No new osseous abnormality is identified. No acute osseous abnormality. Progressive healing of the distal femur. Stable appearance of the fixation and arthroplasty hardware. CT Head WO Contrast    Result Date: 1/6/2022  EXAMINATION: CT OF THE HEAD WITHOUT CONTRAST  1/6/2022 12:33 am TECHNIQUE: CT of the head was performed without the administration of intravenous contrast. Dose modulation, iterative reconstruction, and/or weight based adjustment of the mA/kV was utilized to reduce the radiation dose to as low as reasonably achievable. COMPARISON: None. HISTORY: ORDERING SYSTEM PROVIDED HISTORY: fall FINDINGS: BRAIN/VENTRICLES: There is no acute intracranial hemorrhage, mass effect or midline shift. No abnormal extra-axial fluid collection. The gray-white differentiation is maintained without evidence of an acute infarct. There is no evidence of hydrocephalus. ORBITS: The visualized portion of the orbits demonstrate no acute abnormality. SINUSES: The visualized paranasal sinuses and mastoid air cells demonstrate no acute abnormality. SOFT TISSUES/SKULL:  No acute abnormality of the visualized skull or soft tissues. No acute intracranial abnormality. RECOMMENDATIONS: Unavailable     CT Cervical Spine WO Contrast    Result Date: 1/6/2022  EXAMINATION: CT OF THE CERVICAL SPINE WITHOUT CONTRAST 1/6/2022 12:33 am TECHNIQUE: CT of the cervical spine was performed without the administration of intravenous contrast. Multiplanar reformatted images are provided for review. Dose modulation, iterative reconstruction, and/or weight based adjustment of the mA/kV was utilized to reduce the radiation dose to as low as reasonably achievable. COMPARISON: None. HISTORY: ORDERING SYSTEM PROVIDED HISTORY: fall FINDINGS: BONES/ALIGNMENT: There is no acute fracture or traumatic malalignment. DEGENERATIVE CHANGES: Mild-to-moderate degenerative changes. SOFT TISSUES: There is no prevertebral soft tissue swelling.      No acute abnormality of the cervical spine. RECOMMENDATIONS: Unavailable       ASSESSMENT:  -Rhabdomyolysis  -Closed, acute right distal radius fracture  -Right distal femur surgical site infection  -Leukocytosis    PLAN:  -Admit to medicine  -Orthopedics following  -Pharmacy to dose vancomycin  -Pain management  -PT/OT        Diet: No diet orders on file  Code Status: Prior  Surrogate decision maker confirmed with patient:   Extended Emergency Contact Information  Primary Emergency Contact: Silvia Paez  Home Phone: [phone]  Mobile Phone: [phone]  Relation: Other  Secondary Emergency Contact: [address], [address] [address] Phone: [phone]  Relation: Other  Preferred language: English   needed? No    DVT Prophylaxis: []Lovenox []Heparin []PCD [] [address] []Encouraged ambulation  Disposition: []Med/Surg [] Intermediate [] ICU/CCU  Admit status: [] Observation [] Inpatient     +++++++++++++++++++++++++++++++++++++++++++++++++  Cami Marion DO  [address]  +++++++++++++++++++++++++++++++++++++++++++++++++  NOTE: This report was transcribed using voice recognition software. Every effort was made to ensure accuracy; however, inadvertent computerized transcription errors may be present.

## 2022-01-06 NOTE — CONSULTS
Department of Orthopedic Surgery  Resident Consult Note          Reason for Consult:  Right wrist pain after a fall    HISTORY OF PRESENT ILLNESS:       Patient is a morbidly obese 64 y.o. female with a past medical history of chronic pain, lymphedema of bilateral lower extremities, hypertension, hyperlipidemia, requiring home oxygen, who presents [facility] after falling out of her bed at home and falling onto her right wrist early Wednesday morning. Patient states she scraped both of her legs and also landed on her left hip was not able to get up. She states she had immediate pain deformity of the right wrist and was not able to call for help. Help arrived much later on in the day. Upon presentation to the emergency department she was found to have a right-sided distal radius fracture as well as rhabdomyolysis. She was also found to have a leukocytosis with significant left shift. She is currently denying any fevers or chills. Of note the patient has an extensive past orthopedic history including bilateral total knee arthroplasties as well as open reduction internal fixation of a right distal femur fracture in June 2021. States that this was performed up in [facility] [employer] She states that after the initial postoperative period her pain improved however over the past 2 to 3 weeks she has had increasing pain and swelling surrounding her right distal femur. Denies numbness/tingling/paresthesias. Denies any other orthopedic complaints at this time.       Past Medical History:        Diagnosis Date    Adenoma of right adrenal gland     Anemia     Anxiety     Arthritis     spinal    Asthma     controlled with inhalers     Benign essential tremor     Bipolar affective (HCC)     Chronic back pain     Spinal cord stimulator in place    Chronic respiratory failure with hypoxia (HCC)     at times dt diaphragm does not function properly dt Mitochondrial disorder    Depression stable    Difficulty swallowing     for EGD 10-8-19     Environmental and seasonal allergies     Excessive physiologic tremor 5/24/2021    Fatty liver     Full dentures     GERD (gastroesophageal reflux disease)     Gout     past hx of    Herniated cervical disc     limited rom of head and neck    History of swelling of feet     Lymphedema of lower extremity 09/06/2012    Mitochondrial cytopathy (HCC)     s/s muscle and nerve pain, difficulty breathing, seizures, difficulty swallowing, digestive disorders- Dr. Connie Evans [facility]    Neuropathy     at feet    Obesity     PETR (obstructive sleep apnea)     no CPAP dt insurance will not pay for    PONV (postoperative nausea and vomiting)     Seizures ([facility] [address] [address]     last approx 6-13-19- f/u w/ PCP  Granmal, flares up in heat/ summer time - no recent issues as of 10-4-19     Spinal headache     Thyroid disease      Past Surgical History:        Procedure Laterality Date    APPENDECTOMY      with Hysterectomy / unknown    BACK SURGERY  last one 1995    lumbar x 2    CARDIAC CATHETERIZATION Right 6-6-2013    Dr. Rob Florence, no stents placed, no blockages    [address]    open with gastric bypass    COLONOSCOPY N/A 9/18/2018    COLONOSCOPY POLYPECTOMY HOT BIOPSY performed by CHRISTOPHE Fuentes MD at [address] [facility] COLONOSCOPY N/A 10/8/2019    COLONOSCOPY POLYPECTOMY SNARE/COLD BIOPSY performed by Edmond Archuleta MD at [address] EGD COLONOSCOPY N/A 10/8/2019    EGD ESOPHAGOGASTRODUODENOSCOPY DILATATION performed by Edmond Archuleta MD at [address] [facility] [place], DIAGNOSTIC      ESOPHAGEAL DILATATION  9/18/2018    ESOPHAGEAL DILATION [place] [facility] performed by CHRISTOPHE Fuentes MD at [address] [facility] Bilateral 2007,2017    knees    KNEE SURGERY Bilateral     scope    MYOMECTOMY      NERVE BLOCK Left 10 02 2013    lumbar paravertebral facet #2   [place] NERVE BLOCK Left 10 9 13    hip inj #1    NERVE BLOCK Left 10/16/13    hip injection    NERVE BLOCK Left 10/29/2014    left lumbar transforaminal nerve lbock  #1    NERVE BLOCK Left 11/12/2014    lumbar left transforaminal nerve block  #2    NERVE BLOCK Left 12 8 14    lumbar transforaminal #3    NERVE BLOCK Right 3/30/15    cervical transforaminal #1    NERVE BLOCK Right 4/6/2015    right cervical transforaminal nerve block  #2    NERVE BLOCK Right 4/13/15    cervical transforaminal #3    NERVE BLOCK Left 7/6/15    knee injection #1    NERVE BLOCK  07/20/15    left genicular nerve block/knee #2    NERVE BLOCK Left 10 1 15    lumb transforam #1    NERVE BLOCK  10/26/15    left lumbar transforaminal nerve block #3    NERVE BLOCK Bilateral 4/1/2021    #1 BILATERAL SACROILIAC JOINT INJECTION UNDER FLUORO performed by Jorge L Combs MD at [address] Left 11 25 13    lumbar radiofreq    OTHER SURGICAL HISTORY  3/28/2016    stage 1, 3 day percutanous trial [place] BLAZER & FLIP FLOPS lumbar spinal cord stimulator    OTHER SURGICAL HISTORY  1995    racz procedure / lower back    AK COLONOSCOPY FLX DX W/COLLJ SPEC WHEN PFRMD N/A 3/20/2018    COLONOSCOPY DIAGNOSTIC OR SCREENING performed by Bull Jordan MD at [place] [address] EGD TRANSORAL BIOPSY SINGLE/MULTIPLE N/A 3/20/2018    EGD BIOPSY performed by Bull Jordan MD at [address] [place] [address]       Current Medications:   Current Facility-Administered Medications: fentaNYL (SUBLIMAZE) injection 100 mcg, 100 mcg, IntraVENous, Once  Allergies:  Bee pollen, Penicillins, Ropinirole hcl, Vistaril [hydroxyzine hcl], Aripiprazole, Hydroxyzine pamoate, and Tape [adhesive tape]    Social History:   TOBACCO:   reports that she has been smoking cigarettes. She started smoking about 31 years ago. She has a 31.50 pack-year smoking history. She has never used smokeless tobacco.  ETOH:   reports no history of alcohol use.   DRUGS: reports current drug use. Drug: Marijuana Mateo Piper). ACTIVITIES OF DAILY LIVING:    OCCUPATION:    Family History:       Problem Relation Age of Onset    Heart Disease Mother     Cancer Father     Arthritis Other     Cancer Other     Depression Other     Heart Disease Other     Hypertension Other     Mental Illness Other     Stroke Other        REVIEW OF SYSTEMS:  CONSTITUTIONAL:  negative for  fevers, chills  EYES:  negative for acute vision changes  HEENT:  negative for cough, hearing loss  RESPIRATORY:  negative for  dyspnea, wheezing  CARDIOVASCULAR:  negative for  chest pain  GASTROINTESTINAL:  negative for nausea, vomiting  GENITOURINARY:  negative for frequency, urinary incontinence  HEMATOLOGIC/LYMPHATIC:  negative for bleeding  MUSCULOSKELETAL:  positive for right wrist, bilateral shin, and left hip pain as well as chronic pain  NEUROLOGICAL:  negative for headaches, dizziness  BEHAVIOR/PSYCH:  negative for increased agitation and anxiety    PHYSICAL EXAM:    VITALS:  /85   Pulse 120   Temp 97.8 °F (36.6 °C)   Resp 16   Ht 5' 6\" (1.676 m)   Wt 243 lb (110.2 kg)   LMP 08/31/1988   SpO2 (!) 85% Comment: reports that she wears 3L at baseline, EMS was not aware of O2 needs  BMI 39.22 kg/m²   CONSTITUTIONAL:  awake, alert, cooperative, obese, no apparent distress, and appears stated age  MUSCULOSKELETAL:  Right upper Extremity:  · Skin with scattered areas of ecchymosis. Right wrist is edematous with obvious deformity. No evidence of superficial lacerations or abrasions. No evidence of poke hole.    · TTP about the distal forearm and right wrist.  No tenderness palpation over the clavicle, shoulder, humerus, elbow, hand, fingers  · Sensation tact light touch distally in median, radial, ulnar nerve distributions  · Motor function grossly intact distally in AIN, PIN, radial, median, ulnar nerve distributions  · Compartments soft and compressible  · +2/4 radial and ulnar pulses, hand warm and well-perfused, brisk capillary refill to all digits    Right lower extremity:  · Skin with scattered areas of ecchymosis over the anterior shin with a focal area of ecchymosis approximately 4 cm in diameter over the proximal tibial crest.  Diffuse lower extremity lymphedema. there are superficial abrasions over the knee. Skin incision from her total knee arthroplasty is well-healed with out signs of erythema or drainage. Skin incision from the ORIF of her distal femur has a midline punctate lesion resembling a sinus tract. Not actively draining. Small border of erythema surrounding this. There is fullness, edema and induration overlying the distal femur. Right lower extremity is warmer than the contralateral.  · Tenderness palpation of the right distal femur, right shin. No tenderness palpation over the right hip, proximal femur, knee, ankle, foot or toes. · Sensation intact light touch distally in sural, saphenous, tibial, deep and superficial peroneal nerve effusions. · Motor function grossly intact distally in tibial, deep and superficial peroneal nerve effusions. · Compartment soft and compressible  · +2/4 PT and DP pulses, foot warm and well-perfused, brisk capillary refill in all toes. Left lower extremity:  · Skin examination shows scattered areas of ecchymosis over the anterior shin. Superficial abrasions of the anterior knee. Diffuse lower extremity lymphedema. · Tenderness palpation laterally over the left hip on the greater trochanter and anteriorly over the shin. No tenderness palpation over the femur, knee, ankle, foot, toes. · Sensation intact light touch distally in sural, saphenous, tibial, deep and superficial peroneal nerve. · Motor function grossly intact distally in tibial, deep and superficial peroneal nerve distributions.   · Compartment soft and compressible  · +2/4 DP PT pulses, foot warm well perfused, brisk capillary refill in all toes    Secondary Exam:   · leftUE: No obvious signs of trauma. -TTP to fingers, hand, wrist, forearm, elbow, humerus, shoulder or clavicle. · Pelvis: -TTP, -Log roll, -Heel strike     DATA:    CBC:   Lab Results   Component Value Date    WBC 17.5 01/05/2022    RBC 4.18 01/05/2022    HGB 13.2 01/05/2022    HCT 41.0 01/05/2022    MCV 98.1 01/05/2022    MCH 31.6 01/05/2022    MCHC 32.2 01/05/2022    RDW 13.4 01/05/2022     01/05/2022    MPV 8.7 01/05/2022     PT/INR:    Lab Results   Component Value Date    PROTIME 9.7 09/23/2020    PROTIME 10.8 05/07/2012    INR 0.9 09/23/2020       Radiology Review:  XR 3 views of the right wrist reviewed demonstrating no acute displaced distal radius fracture with intra-articular extension. [place] volar with comminution. Carpus appears located. No other acute fracture dislocations noted. Soft tissue edema noted. No other soft tissue abnormalities noted. 2 views of the right radius and ulna reviewed demonstrating the fractures above. No other acute fracture dislocations noted. No other bony or soft tissue abnormalities noted. 3 views of the right hand reviewed demonstrating the right distal radius fracture as above. No other fracture dislocations noted. No other bony or soft tissue abnormalities noted. AP of the pelvis and 2 views of bilateral hips reviewed demonstrating no acute fracture dislocations. 2 views of the left knee reviewed demonstrating no acute fracture dislocations. Patient is status post total knee arthroplasty. Components appear to be stable. No other bony or soft tissue abnormalities noted. 2 views of the right knee reviewed demonstrating no acute fracture dislocations. Patient is status post total knee arthroplasty as well as open reduction internal fixation for a right distal femur fracture. Components appear well aligned without any evidence of loosening or failure. 2 views of bilateral tibia and fibula reviewed demonstrating no acute fracture dislocations. Right-sided views show the above. AP of the pelvis and 2 views of bilateral hips reviewed demonstrating no acute fracture dislocations. No other bony or soft tissue abnormalities noted. 2 views of the right wrist post reduction demonstrate improved alignment. IMPRESSION:  · Closed, acute right distal radius fracture, intra-articular extension  · Chronic pain syndrome  · Bilateral lower extremity lymphedema  · Rhabdomyolysis  · Concern for surgical site infection of right distal femur fixation    PROCEDURE:  After informed verbal consent was obtained patient right wrist was anesthetized using a hematoma block in the typical sterile fashion. Patient was then given 100 mcg of fentanyl by the ED staff. The right wrist was then reduced and checked under x-ray guidance. Once appropriate reduction had been achieved, the patient was then placed in a well-padded sugar tong splint. Patient tolerated the procedure well with no change in neurovascular status following the procedure. PLAN:  · Nonweightbearing right upper extremity, well-padded sugar tong splint placed post reduction  · Weightbearing as tolerated bilateral lower extremities  · Admission per ED  · Pain control per ED  · Antibiotics started while in the ED for concern of previous surgical site infection  · DVT Prophylaxis per ED  · Patient will most likely require surgical fixation of her right distal radius fracture, timing to be decided due to her leukocytosis and possible infection at another site. · All patient/family questions have been answered and patient is currently agreeable to plan.   · Discuss with Attending      Electronically signed by Sergio Pierre DO on 1/6/2022 at 1:36 AM

## 2022-01-06 NOTE — PROGRESS NOTES
Pharmacy Consultation Note  (Antibiotic Dosing and Monitoring)    Initial consult date: 1/6/22  Consulting physician/provider: Tawnya Crowell  Drug: Vancomycin  Indication: Surg site/skin/soft tissue infection    Age/  Gender Height Weight IBW  Allergy Information   61 y.o./female 5' 6\" (167.6 cm) 243 lb (110.2 kg)     Ideal body weight: 59.3 kg (130 lb 11.7 oz)  Adjusted ideal body weight: 82.5 kg (181 lb 15.5 oz)   Bee pollen, Penicillins, Ropinirole hcl, Vistaril [hydroxyzine hcl], Aripiprazole, Hydroxyzine pamoate, and Tape [adhesive tape]      Renal Function:  Recent Labs     01/05/22  2230   BUN 18   CREATININE 0.8     No intake or output data in the 24 hours ending 01/06/22 0547    Vancomycin Monitoring:  Trough:  No results for input(s): VANCOTROUGH in the last 72 hours. Random:  No results for input(s): VANCORANDOM in the last 72 hours. Vancomycin Administration Times:  Recent vancomycin administrations                   vancomycin (VANCOCIN) 2,250 mg in dextrose 5 % 500 mL IVPB (mg) 2,250 mg New Bag 01/06/22 0304                Assessment:  · Patient is a 64 y.o. female who has been initiated on vancomycin  · Estimated Creatinine Clearance: 96 mL/min (based on SCr of 0.8 mg/dL).   · To dose vancomycin, pharmacy will be utilizing [email or web address] calculation software for goal AUC/NAVARRO 400-600 mg/L-hr    Plan:  · Will continue vancomycin 2250mg IV every 12 hours  · Will check vancomycin levels when appropriate  · Will continue to monitor renal function   · Clinical pharmacy to follow      KIRIT Bennett [place] 1/6/2022 5:47 AM

## 2022-01-06 NOTE — CARE COORDINATION
1/6/2022 social work transition of care planning  Sw met with pt at bedside. Pt is from home alone. Pt reported that she has w/w,s/c,and bsc. Pt uses 3 l o2 at home-unable to remember provider. Pt pcp is Dr. Alex Tom and pharmacy is [facility] ([facility] on [address] or [facility] [address] for acute med delivery). Pt has hx of snf at [facility](d/c 12/30). Pt has hx of hhc-unable to remember provider. Explained sw role in transition of care planning. Pt plan is home,pt will call for a ride at discharge. Pt stated that someone will bring portable tank. Sw will follow.   Electronically signed by GAGE Tong on 1/6/2022 at 10:19 AM

## 2022-01-06 NOTE — PROGRESS NOTES
OCCUPATIONAL THERAPY INITIAL EVALUATION    [facility] [employer] [address] [address]        Date:2022                                                  Patient Name: Alycia Whitmore    MRN: [id number]    : 1960    Room: [address] [place] [address]          Evaluating OT: Charlott Ahumada OTR/L; [id number]       Referring Provider: Dagoberto Whiting DO    Specific Provider Orders/Date: OT Eval and Treat 22       Diagnosis: Rhabdomyolysis & closed fracture distal end right radius     Surgery: None    Pertinent Medical History:  has a past medical history of Adenoma of right adrenal gland, Anemia, Anxiety, Arthritis, Asthma, Benign essential tremor, Bipolar affective ([address] Chronic back pain, Chronic respiratory failure with hypoxia ([address] Depression, Difficulty swallowing, Environmental and seasonal allergies, Excessive physiologic tremor, Fatty liver, Full dentures, GERD (gastroesophageal reflux disease), Gout, Herniated cervical disc, History of swelling of feet, Lymphedema of lower extremity, Mitochondrial cytopathy ([address] Neuropathy, Obesity, PETR (obstructive sleep apnea), PONV (postoperative nausea and vomiting), Seizures ([address] Spinal headache, and Thyroid disease. Recommended Adaptive Equipment:  BSC - RN made aware.       Precautions:  Fall Risk, 3L 02 nasal cannula, WBAT BLE, NWB RUE splint with ace wrap, ortho perfectserve clarification : pt allowed to WB through elbow onto platform walker without hand  per ortho     Assessment of current deficits      [x] Functional mobility  [x]ADLs  [x] Strength               []Cognition    [x] Functional transfers   [x] IADLs         [x] Safety Awareness   [x]Endurance    [] Fine Coordination              [x] Balance      [] Vision/perception   []Sensation     []Gross Motor Coordination  [x] ROM  [] Delirium                   [] Motor Control     OT PLAN OF CARE   OT POC based on physician orders, patient Don/doff socks supine in bed  Minimal Assist    Bathing Maximal Assist  Assist to maintain NWB RUE precaution, LB hygiene, and safety d/t decreased dynamic sitting balance  Minimal Assist    Toileting Dependent   Decreased balance and assist to maintain NWB RUE precaution  Minimal Assist    Bed Mobility  Log Roll: NT  Supine to sit: Moderate Assist x2   Sit to supine: Moderate Assist x2  Assist d/t increased pain BLE, verbal cues & positioning d/t NWB RUE precaution  Supine to sit: Independent   Sit to supine: Independent    Functional Transfers Sit to stand: Moderate Assist x2  Stand to sit:Moderate Assist x2  Stand pivot: NT  Commode: NT  Verbal cues & assist d/t NWB RUE precautions and BLE pain   Sit to stand:Independent   Stand to sit: Independent  Stand pivot: Independent  Commode: Independent    Functional Mobility NT    Moderate [place]    Balance Sitting:     Static - Standby Assist     Dynamic - Minimal Assist  Standing: NT  Sitting:  Static: Independent  Dynamic: Independent  Standing: Modified [place]    Activity Tolerance Poor  Good   Visual/  Perceptual Glasses: Yes    pt able to read clock on wall          Safety Fair  Good   during ADL completion following NWB RUE precautions   Vitals   SpO2 EOB 97%  SpO2 end of session 97%  Pt with 3L 02 nasal cannula          Hand Dominance Right   AROM (PROM) Strength Additional Info:  Goal:   RUE  Limited d/t splint with ace wrap deferred d/t NWB RUE precaution Poor  noted - pt unable to flex fingers Improve overall RUE strength for participation in functional tasks       LUE WFL 4+/5 grossly tested Good  and wfl FMC/dexterity noted during ADL tasks   Improve overall LUE strength to 5/5 for participation in functional tasks         Hearing: [facility]   Sensation:  No c/o numbness or tingling   Tone: WFL   Edema: lymphedema BLE    Comment: Cleared by RN to see pt. Upon arrival patient supine in bed and agreeable to OT session.  At end of session, patient supine in bed with call light and phone within reach, all lines and tubes intact. Overall patient demonstrated decreased independence and safety during completion of ADL/functional transfer/mobility tasks. Therapist facilitated ADL/bed mobility/education throughout session to promote independence/engagement in ADL/IADL. Pt educated on NWB RUE precautions to improve safety and participation throughout daily activities. Pt would benefit from continued skilled OT to increase safety and independence with completion of ADL/IADL tasks for functional independence and quality of life. Treatment: OT treatment provided this date includes:    ADL-  Instruction/training on safety and adapted techniques for completion of ADLs: Pt dependent to don/doff socks supine in bed.   Mobility-  Instruction/training on safety and improved independence with bed mobility/functional transfers. Pt stood ~10 seconds d/t increased pain in BLE. Required verbal cues, assist, & positioning to maintain NWB RUE precaution.   Sitting EOB ~5 minutes to improve dynamic sitting balance and activity tolerance during ADLs. Increased dizziness requiring assist and verbal cues to maintain static sitting balance.   Activity tolerance- Instruction/training on energy conservation/work simplification for completion of ADLs: Required verbal cues for rest breaks and instruction on breathing techniques to improve participation and safety throughout session.   Skilled positioning/alignment-  Proper Positioning/Alignment. Education/positioning of RUE while supine; maintaining NWB RUE precautions throughout session. Rehab Potential: Good for established goals     LTG: maximize independence with ADLs to return to PLOF    Patient and/or family were instructed on functional diagnosis, prognosis/goals and OT plan of care. Demonstrated fair understanding.        Eval Complexity: Low   · History: Expanded chart review of medical records and additional review of physical, cognitive, or psychosocial history related to current functional performance  · Exam: 3+ performance deficits  · Assistance/Modification: Min/mod assistance or modifications required to perform tasks. May have comorbidities that affect occupational performance. Evaluation time includes thorough review of current medical information, gathering information on past medical & social history & PLOF, completion of standardized testing, informal observation of tasks, consultation with other medical professions/disciplines, assessment of data & development of POC/goals. Time In: 1030  Time Out: 1054  Total Treatment Time: 9    Min Units   OT Eval Low 74680  x     OT Eval Medium 22939      OT Eval High 46189      OT Re-Eval [id number]       Therapeutic Ex 59271       Therapeutic Activities 02124  9  1   ADL/Self Care 33329       Orthotic Management 12479       Manual [id number]     Neuro Re-Ed 02539       Non-Billable Time          Evaluation Time additionally includes thorough review of current medical information, gathering information on past medical history/social history and prior level of function, interpretation of standardized testing/informal observation of tasks, assessment of data and development of plan of care and goals.           Brandon Echavarria OTR/L; [id number]

## 2022-01-06 NOTE — PROGRESS NOTES
Physical Therapy  Physical Therapy Initial Evaluation    Name: Maggi Tovar  : 1960  MRN: [id number]      Date of Service: 2022    Evaluating PT:  Mukesh Bustamante, PT [id number]      Room #:  7824/7010-V  Diagnosis:  Rhabdomyolysis [M62.82]  Non-traumatic rhabdomyolysis [M62.82]  Closed fracture of distal end of right radius, unspecified fracture morphology, initial encounter [S52.802A]  PMHx/PSHx:     has a past medical history of Adenoma of right adrenal gland, Anemia, Anxiety, Arthritis, Asthma, Benign essential tremor, Bipolar affective ([address] Chronic back pain, Chronic respiratory failure with hypoxia ([address] Depression, Difficulty swallowing, Environmental and seasonal allergies, Excessive physiologic tremor, Fatty liver, Full dentures, GERD (gastroesophageal reflux disease), Gout, Herniated cervical disc, History of swelling of feet, Lymphedema of lower extremity, Mitochondrial cytopathy ([address] Neuropathy, Obesity, PETR (obstructive sleep apnea), PONV (postoperative nausea and vomiting), Seizures ([address] Spinal headache, and Thyroid disease. has a past surgical history that includes knee surgery (Bilateral); Gastric bypass surgery (); myomectomy; Tonsillectomy; Nerve Block (Left, 10 02 2013); Nerve Block (Left, 10 9 13); Nerve Block (Left, 10/16/13); other surgical history (Left, 13); Nerve Block (Left, 10/29/2014); Nerve Block (Left, 2014); Nerve Block (Left, 14); Nerve Block (Right, 3/30/15); Nerve Block (Right, 2015); Nerve Block (Right, 4/13/15); Nerve Block (Left, 7/6/15); Nerve Block (07/20/15); Nerve Block (Left, 10 1 15); Nerve Block (10/26/15); other surgical history (3/28/2016); Endoscopy, colon, diagnostic; pr colonoscopy flx dx w/collj spec when pfrmd (N/A, 3/20/2018); pr egd transoral biopsy single/multiple (N/A, 3/20/2018); Cholecystectomy (); Hysterectomy (); Colonoscopy (N/A, 2018);  Esophagus dilation (2018); back surgery (last one ); Cardiac catheterization (Right, 6-6-2013); Appendectomy; other surgical history (1995); joint replacement (Bilateral, [id number]); egd colonoscopy (N/A, 10/8/2019); Colonoscopy (N/A, 10/8/2019); and Nerve Block (Bilateral, 4/1/2021). Procedure/Surgery:  none  Precautions:  Falls, alarm and 3L O2 , NWB (non-weight bearing) RUE( will inquire regarding WB through RUE elbow as patient used fww in home setting). 1/6/22 pt allowed to WB minimally through elbow onto platform walker without hand  per ortho. Equipment Needs: To be determined,    SUBJECTIVE:    Patient lives alone  in a apartment elevator access level entry to building. Bed is on 1 floor and bath is on 1 floor. Patient ambulated with wheeled walker  PTA. Equipment owned: Lico Durham,      OBJECTIVE:   Initial Evaluation  Date: 1/6/22 Treatment Short Term/ Long Term   Goals   AM-PAC 6 Clicks 49/75     Was pt agreeable to Eval/treatment? yes     Does pt have pain? Yes RUE. Also reporting B hip pain and BLE pain. Bed Mobility  Rolling: Min   Supine to sit:   Mod x 2   Sit to supine: Mod x 2   Scooting: Mod x 2   Rolling: Mod Ind   Supine to sit: Mod Ind   Sit to supine: Mod Ind   Scooting: Mod Ind    Transfers Sit to stand: Mod x 2 to   Stand to sit: Mod x 2  Stand pivot: Mod x 2   Sit to stand: Mod Ind    Stand to sit: Mod Ind    Stand pivot: Mod Ind     Ambulation    NT patient only able to stand briefly. 100 feet with Mod Ind  will see if able to use platform fww. Stair negotiation: ascended and descended  NT  Level entry to building. ROM BUE:  Defer to OT eval  BLE:  decreased     Strength BUE: Defer to OT eval  RLE:  Grossly 4-/5  LLE:  Grossly  4-/5  4/5   Balance Sitting EOB:  SBA  Dynamic Standing: Mod a x 2  Sitting EOB:  Ind  Dynamic Standing:   Mod Ind     Patient is Alert & Oriented x person, place, time and situation and follows directions   Sensation:  Pt denies numbness and tingling to extremities  Edema:  Yes BLE    Vitals:  Seated   Blood Pressure at rest -   Heart Rate at rest 71 bpm   SPO2 at rest 97% 3L     Therapeutic Exercises:  Functional activity     Patient education  Pt educated regarding role of PT evaluation and need for OOB activity    Patient response to education:   Pt verbalized understanding Pt demonstrated skill Pt requires further education in this area   yes yes Reminders     ASSESSMENT:    Conditions Requiring Skilled Therapeutic Intervention:    [x]Decreased strength     [x]Decreased ROM  [x]Decreased functional mobility  [x]Decreased balance   [x]Decreased endurance   [x]Decreased posture  []Decreased sensation  []Decreased coordination   []Decreased vision  [x]Decreased safety awareness   []Increased pain       Comments:    RN cleared patient for participation in therapy session. Patient was seen this date for PT evaluation. Patient was agreeable to intervention. Results of the functional assessment are noted above. Upon entering the room patient was found supine in bed. Reluctant to participate in session due to pain symptoms. . Assisted to EOB Sat EOB x 5 minutes to increase dynamic sitting balance and activity tolerance. Able to complete Mod A x 2 required much encouragement to participate in session due her report of pain. Not willing to take side steps this date. At end of session, patient in bed with  call light and phone within reach,  all lines and tubes intact, nursing notified. This patient can benefit from the continuation of skilled PT possibly in a rehab setting to maximize functional level and return to PLOF.    Treatment:  Patient practiced and was instructed in the following treatment:    · Bed mobility training - pt given verbal and tactile cues to facilitate proper sequencing and safety during rolling and supine>sit as well as provided with physical assistance to complete task    · STS and transfer training - educated on hand/foot placement, safety, and sequencing during STS.   o Education in [employer] restrictions for RUE.   o Skilled repositioning in bed. Pt's/ family goals   1. Get stronger and return to home. Prognosis is good  for reaching above PT goals. Patient and or family understand(s) diagnosis, prognosis, and plan of care.  yes,     PHYSICAL THERAPY PLAN OF CARE:    PT POC is established based on physician order and patient diagnosis     Referring provider/PT Order:  PT Eval and Treat   01/06/22 0445  PT eval and treat     Eleni Daniel DO       Diagnosis:  Rhabdomyolysis [M62.82]  Non-traumatic rhabdomyolysis [M62.82]  Closed fracture of distal end of right radius, unspecified fracture morphology, initial encounter [S56.708B]  Specific instructions for next treatment:  Increase ambulation distance, Stair negotiation and BLE therapeutic exercise  Current Treatment Recommendations:     [x] Strengthening to improve independence with functional mobility   [x] ROM to improve independence with functional mobility   [x] Balance Training to improve static/dynamic balance and to reduce fall risk  [x] Endurance Training to improve activity tolerance during functional mobility   [x] Transfer Training to improve safety and independence with all functional transfers   [x] Gait Training to improve gait mechanics, endurance and asses need for appropriate assistive device  [x] Stair Training in preparation for safe discharge home and/or into the community   [] Positioning to prevent skin breakdown and contractures  [x] Safety and Education Training   [] Patient/Caregiver Education   [] HEP  [] Other     PT long term treatment goals are located in above grid    Frequency of treatments: 2-5x/week x 1-2 weeks.     Time in  1030  Time out  1100    Total Treatment Time  30 minutes     Evaluation Time includes thorough review of current medical information, gathering information on past medical history/social history and prior level of function, completion of standardized testing/informal observation of tasks, assessment of data and education on plan of care and goals.     CPT codes:  [x] Low Complexity PT evaluation 72674  [] Moderate Complexity PT evaluation 79508  [] High Complexity PT evaluation 04248  [] PT Re-evaluation 51784  [] Gait training 49593 - minutes  [] Manual therapy 98146 - minutes  [x] Therapeutic activities 50467 15 minutes  [] Therapeutic exercises 67257 - minutes  [] Neuromuscular reeducation 18621 - minutes     Sandro Osorio, [id number] [facility] [place]

## 2022-01-06 NOTE — PROGRESS NOTES
Orthopedic progress note:    CT of the right femur reviewed. There is a total knee prosthesis noted in addition to distal femoral plate and screws from prior femur fracture. No obvious osteomyelitis noted. Based on clinical exam early this morning, there is concern for right periprosthetic knee infection. Recommend patient follow-up with her treating orthopedic provider in [place] for definitive management as she may require staged surgery to address possible infection about her knee implant. Patient also with acute right distal radius fracture that was reduced and splinted. She may also follow-up with orthopedic provider in [place] for this injury. However, if she is unable to reach provider in [place], information will be provided with our orthopedic clinic. Please call with questions or concerns. No acute orthopedic intervention while inpatient.

## 2022-01-06 NOTE — PROGRESS NOTES
Pharmacy Consultation Note  (Antibiotic Dosing and Monitoring)    Initial consult date: 1/6/22  Consulting physician/provider: Jacob Miracle  Drug: Vancomycin  Indication: Right distal femur surgical site infection    Age/  Gender Height Weight IBW  Allergy Information   61 y.o./female 5' 6\" (167.6 cm) 243 lb (110.2 kg)     Ideal body weight: 59.3 kg (130 lb 11.7 oz)  Adjusted ideal body weight: 82.5 kg (181 lb 15.5 oz)   Bee pollen, Penicillins, Ropinirole hcl, Vistaril [hydroxyzine hcl], Aripiprazole, Hydroxyzine pamoate, and Tape [adhesive tape]      Renal Function:  Recent Labs     01/05/22  2230   BUN 18   CREATININE 0.8       Intake/Output Summary (Last 24 hours) at 1/6/2022 6123  Last data filed at 1/6/2022 0510  Gross per 24 hour   Intake 360 ml   Output 0 ml   Net 360 ml       Vancomycin Monitoring:  Trough:  No results for input(s): VANCOTROUGH in the last 72 hours. Random:  No results for input(s): VANCORANDOM in the last 72 hours. Vancomycin Administration Times:  Recent vancomycin administrations                   vancomycin (VANCOCIN) 2,250 mg in dextrose 5 % 500 mL IVPB (mg) 2,250 mg New Bag 01/06/22 0304                Assessment:  · Patient is a 64 y.o. female who has been initiated on vancomycin  Estimated Creatinine Clearance: 96 mL/min (based on SCr of 0.8 mg/dL).   · To dose vancomycin, pharmacy will be utilizing [employer] calculation software for goal AUC/NAVARRO <500 mg/L-hr and/or trough level of 10-15 mcg/mL   · 1/6: WBC elevated at 17.5 mg/L; afebrile; Scr at baseline (0.8 mg/dL)    Plan:  · Will decrease vancomycin to 1000 mg IV every 12 hours (est ; trough 12.8 mg/L)  · Will check vancomycin levels when appropriate  · Will continue to monitor renal function   · Clinical pharmacy to follow      Elia Harrison, PharmD  Pharmacy Resident  P: [id number]   1/6/2022 9:22 AM

## 2022-01-06 NOTE — PROGRESS NOTES
Ms. Nancy Whiting is a 77-year-old female who just came into the hospital due to a fall from her bed.   Patient has a chronic history of lymphedema in the bilateral lower extremities and on mitochondria disorder in which she is treated at [facility] and causes a clotting disorder easily the rhabdomyolysis right now her CK level is 3929 we will continue to monitor

## 2022-01-06 NOTE — PROGRESS NOTES
Gilberto Hodges was ordered linaclotide which is a nonformulary medication. This medication will need to be supplied by the patient as the pharmacy does not carry this non-formulary medication. If the medication has not been administered by 1400 on the following day from the time the order was placed, a pharmacist will follow-up with the nurse of the patient to assess the capability of the patient to bring in the medication. If it is determined that the patient cannot supply the medication and it is not available to be dispensed from the pharmacy, the provider will be notified.      Clinton Peralta, [place], [facility], BCPS 1/6/2022 4:47 AM

## 2022-01-06 NOTE — ED PROVIDER NOTES
ED Attending  CC: No    HPI:  1/5/22, Time: 9:52 PM HERIBERTO Landry is a 64 y.o. female presenting to the ED for injuries sustained from a fall out of bed, beginning this morning around 0800 when she rolled out of bed. The complaint has been persistent, moderate in severity, and worsened by movement of right wrist and bilateral hips. Patient states that she was too weak to ambulate after her fall so she laid in bed all day until EMS came to pick her up. Reporting overall body pain but most particularly the right wrist and bilateral hips. Patient states that she did hit her head but she did not lose consciousness. Currently not reporting any headache or neck pain. She has been afebrile without recent travel or sick contacts. Patient denies all other symptoms at this time. Review of Systems:   A complete review of systems was performed and pertinent positives and negatives are stated within HPI, all other systems reviewed and are negative.          --------------------------------------------- PAST HISTORY ---------------------------------------------  Past Medical History:  has a past medical history of Adenoma of right adrenal gland, Anemia, Anxiety, Arthritis, Asthma, Benign essential tremor, Bipolar affective (HCC), Chronic back pain, Chronic respiratory failure with hypoxia ([address] Depression, Difficulty swallowing, Environmental and seasonal allergies, Excessive physiologic tremor, Fatty liver, Full dentures, GERD (gastroesophageal reflux disease), Gout, Herniated cervical disc, History of swelling of feet, Lymphedema of lower extremity, Mitochondrial cytopathy ([address] Neuropathy, Obesity, PETR (obstructive sleep apnea), PONV (postoperative nausea and vomiting), Seizures ([address] Spinal headache, and Thyroid disease. Past Surgical History:  has a past surgical history that includes knee surgery (Bilateral); Gastric bypass surgery (1999); myomectomy; Tonsillectomy;  Nerve Block (Left, 10 02 2013); Nerve Block (Left, 10 9 13); Nerve Block (Left, 10/16/13); other surgical history (Left, 11 25 13); Nerve Block (Left, 10/29/2014); Nerve Block (Left, 11/12/2014); Nerve Block (Left, 12 8 14); Nerve Block (Right, 3/30/15); Nerve Block (Right, 4/6/2015); Nerve Block (Right, 4/13/15); Nerve Block (Left, 7/6/15); Nerve Block (07/20/15); Nerve Block (Left, 10 1 15); Nerve Block (10/26/15); other surgical history (3/28/2016); Endoscopy, colon, diagnostic; pr colonoscopy flx dx w/collj spec when pfrmd (N/A, 3/20/2018); pr egd transoral biopsy single/multiple (N/A, 3/20/2018); Cholecystectomy (1999); Hysterectomy (1988); Colonoscopy (N/A, 9/18/2018); Esophagus dilation (9/18/2018); back surgery (last one 1995); Cardiac catheterization (Right, 6-6-2013); Appendectomy; other surgical history (1995); joint replacement (Bilateral, [id number]); egd colonoscopy (N/A, 10/8/2019); Colonoscopy (N/A, 10/8/2019); and Nerve Block (Bilateral, 4/1/2021). Social History:  reports that she has been smoking cigarettes. She started smoking about 31 years ago. She has a 31.50 pack-year smoking history. She has never used smokeless tobacco. She reports current drug use. Drug: Marijuana Gelene Chasten). She reports that she does not drink alcohol. Family History: family history includes Arthritis in an other family member; Cancer in her father and another family member; Depression in an other family member; Heart Disease in her mother and another family member; Hypertension in an other family member; Mental Illness in an other family member; Stroke in an other family member. The patients home medications have been reviewed.     Allergies: Bee pollen, Penicillins, Ropinirole hcl, Vistaril [hydroxyzine hcl], Aripiprazole, Hydroxyzine pamoate, and Tape [adhesive tape]    -------------------------------------------------- RESULTS -------------------------------------------------  All laboratory and radiology results have been personally reviewed by myself   LABS:  Results for orders placed or performed during the hospital encounter of 01/05/22   COVID-19, Rapid    Specimen: Nasopharyngeal Swab   Result Value Ref Range    SARS-CoV-2, NAAT Not Detected Not Detected   CBC Auto Differential   Result Value Ref Range    WBC 17.5 (H) 4.5 - 11.5 E9/L    RBC 4.18 3.50 - 5.50 E12/L    Hemoglobin 13.2 11.5 - 15.5 g/dL    Hematocrit 41.0 34.0 - 48.0 %    MCV 98.1 80.0 - 99.9 fL    MCH 31.6 26.0 - 35.0 pg    MCHC 32.2 32.0 - 34.5 %    RDW 13.4 11.5 - 15.0 fL    Platelets 642 864 - 135 E9/L    MPV 8.7 7.0 - 12.0 fL    Neutrophils % 88.5 (H) 43.0 - 80.0 %    Immature Granulocytes % 1.0 0.0 - 5.0 %    Lymphocytes % 4.7 (L) 20.0 - 42.0 %    Monocytes % 5.6 2.0 - 12.0 %    Eosinophils % 0.1 0.0 - 6.0 %    Basophils % 0.1 0.0 - 2.0 %    Neutrophils Absolute 15.50 (H) 1.80 - 7.30 E9/L    Immature Granulocytes # 0.17 E9/L    Lymphocytes Absolute 0.82 (L) 1.50 - 4.00 E9/L    Monocytes Absolute 0.98 (H) 0.10 - 0.95 E9/L    Eosinophils Absolute 0.01 (L) 0.05 - 0.50 E9/L    Basophils Absolute 0.02 0.00 - 0.20 E9/L   Comprehensive Metabolic Panel w/ Reflex to MG   Result Value Ref Range    Sodium 145 132 - 146 mmol/L    Potassium reflex Magnesium 3.1 (L) 3.5 - 5.0 mmol/L    Chloride 106 98 - 107 mmol/L    CO2 29 22 - 29 mmol/L    Anion Gap 10 7 - 16 mmol/L    Glucose 131 (H) 74 - 99 mg/dL    BUN 18 6 - 23 mg/dL    CREATININE 0.8 0.5 - 1.0 mg/dL    GFR Non-African American >60 >=60 mL/min/1.73    GFR African American >60     Calcium 8.8 8.6 - 10.2 mg/dL    Total Protein 6.5 6.4 - 8.3 g/dL    Albumin 3.5 3.5 - 5.2 g/dL    Total Bilirubin 0.6 0.0 - 1.2 mg/dL    Alkaline Phosphatase 135 (H) 35 - 104 U/L    ALT 29 0 - 32 U/L     (H) 0 - 31 U/L   CK   Result Value Ref Range    Total CK 4,801 (H) 20 - 180 U/L   Magnesium   Result Value Ref Range    Magnesium 2.6 1.6 - 2.6 mg/dL   C-Reactive Protein   Result Value Ref Range    CRP 9.4 (H) 0.0 - 0.4 mg/dL RADIOLOGY:  Interpreted by Radiologist.  XR TIBIA FIBULA RIGHT (2 VIEWS)   Final Result   No acute osseous abnormality. XR TIBIA FIBULA LEFT (2 VIEWS)   Final Result   No acute osseous abnormality. XR CHEST PORTABLE   Final Result   Bibasilar atelectasis. XR WRIST RIGHT (MIN 3 VIEWS)   Final Result   Redemonstration of distal radius fracture status post reduction and splinting. CT Head WO Contrast   Final Result   No acute intracranial abnormality. RECOMMENDATIONS:   Unavailable         CT Cervical Spine WO Contrast   Final Result   No acute abnormality of the cervical spine. RECOMMENDATIONS:   Unavailable         XR HIP BILATERAL W AP PELVIS (2 VIEWS)   Final Result   No acute osseous abnormality. XR KNEE LEFT (1-2 VIEWS)   Final Result   Addendum 1 of 1   ADDENDUM:   The original study was completed in error with respect to laterality. Please   disregard. See below for left knee radiograph report. EXAMINATION:   TWO XRAY VIEWS OF THE LEFT KNEE      1/5/2022 10:35 pm      COMPARISON:   10/26/2021      HISTORY:   ORDERING SYSTEM PROVIDED HISTORY: fall pain   TECHNOLOGIST PROVIDED HISTORY:   Reason for exam:->fall pain   What reading provider will be dictating this exam?->CRC      FINDINGS:   Redemonstration of left knee arthroplasty hardware without evidence of   loosening or failure. No acute fracture or subluxation is identified. The   soft tissues are unremarkable. Final   Progressive healing of a distal femur fracture status post side plate and   screw fixation. IMPRESSION:   No acute osseous abnormality. Stable appearance of left knee arthroplasty. XR KNEE RIGHT (1-2 VIEWS)   Final Result   No acute osseous abnormality. Progressive healing of the distal femur. Stable appearance of the fixation and arthroplasty hardware. XR RADIUS ULNA RIGHT (2 VIEWS)   Final Result   Impacted distal radius fracture. XR HAND RIGHT (MIN 3 VIEWS)   Final Result   Impacted distal radius fracture. XR WRIST RIGHT (MIN 3 VIEWS)   Final Result   Impacted distal radius fracture. .         XR HIP LEFT (2-3 VIEWS)    (Results Pending)       ------------------------- NURSING NOTES AND VITALS REVIEWED ---------------------------   The nursing notes within the ED encounter and vital signs as below have been reviewed. BP (!) 128/58   Pulse 99   Temp 97.8 °F (36.6 °C)   Resp 16   Ht 5' 6\" (1.676 m)   Wt 243 lb (110.2 kg)   LMP 08/31/1988   SpO2 94%   BMI 39.22 kg/m²   Oxygen Saturation Interpretation: Normal      ---------------------------------------------------PHYSICAL EXAM--------------------------------------      Constitutional/General: Alert and oriented x3, well appearing, non toxic in NAD  Head: Normocephalic and atraumatic  Eyes: PERRL, EOMI  Mouth: Oropharynx clear, handling secretions, no trismus  Neck: Supple, full ROM,   Pulmonary: Lungs clear to auscultation bilaterally, no wheezes, rales, or rhonchi. Not in respiratory distress  Cardiovascular:  Regular rate and rhythm, no murmurs, gallops, or rubs. 2+ distal pulses  Abdomen: Soft, non tender, non distended,   Extremities: Moves all extremities x 4. Warm and well perfused. Generalized tenderness to palpation to bilateral lateral hips. Range of motion of hips not well evaluated secondary to pain. Patient also does have tenderness to palpation to the right wrist and distal forearm. 2+ radial pulse on the right. Brisk capillary refill in all the fingers and toes. No pretibial edema bilaterally. Skin: warm and dry without rash. Scattered ecchymosis to bilateral upper and lower extremities.   Neurologic: GCS 15,  Psych: Normal Affect      ------------------------------ ED COURSE/MEDICAL DECISION MAKING----------------------  Medications   vancomycin (VANCOCIN) 2,250 mg in dextrose 5 % 500 mL IVPB (has no administration in time range)   ondansetron [facility]) injection 4 mg (4 mg IntraVENous Given 1/6/22 0004)   fentaNYL (SUBLIMAZE) injection 50 mcg (50 mcg IntraVENous Given 1/5/22 2241)   0.9 % sodium chloride bolus (1,000 mLs IntraVENous New Bag 1/6/22 0004)   HYDROmorphone (DILAUDID) injection 1 mg (1 mg IntraVENous Given 1/6/22 0004)   fentaNYL (SUBLIMAZE) injection 100 mcg (100 mcg IntraVENous Given 1/6/22 0141)         ED COURSE:  ED Course as of 01/06/22 0234   Thu Jan 06, 2022   0056 Discussed case with orthopaedics. Will evaluate patient. [MS]      ED Course User Index  [MS] Zhang Monroe       Medical Decision Making:    Patient is a 58-year-old female presenting to the emergency department secondary to injury sustained after a fall this morning at her home. Patient's CK was elevated indicating rhabdomyolysis. Also found to have a distal radial fracture. Orthopedics was consulted, fracture reduced and splinted in the emergency department. We will plan for hospital admission secondary to rhabdomyolysis. Patient agreeable. Counseling: The emergency provider has spoken with the patient and discussed todays results, in addition to providing specific details for the plan of care and counseling regarding the diagnosis and prognosis. Questions are answered at this time and they are agreeable with the plan.      --------------------------------- IMPRESSION AND DISPOSITION ---------------------------------    IMPRESSION  1. Non-traumatic rhabdomyolysis    2. Closed fracture of distal end of right radius, unspecified fracture morphology, initial encounter        DISPOSITION  Disposition: admit to med/surg  Patient condition is stable      NOTE: This report was transcribed using voice recognition software.  Every effort was made to ensure accuracy; however, inadvertent computerized transcription errors may be present        RADHA Monroe  01/06/22 [address] [facility] [address], [place]  01/06/22 7205

## 2022-01-07 VITALS
TEMPERATURE: 98.4 F | BODY MASS INDEX: 41.6 KG/M2 | RESPIRATION RATE: 16 BRPM | WEIGHT: 258.82 LBS | SYSTOLIC BLOOD PRESSURE: 118 MMHG | HEIGHT: 66 IN | OXYGEN SATURATION: 94 % | HEART RATE: 76 BPM | DIASTOLIC BLOOD PRESSURE: 58 MMHG

## 2022-01-07 LAB
ANION GAP SERPL CALCULATED.3IONS-SCNC: 10 MMOL/L (ref 7–16)
BASOPHILS ABSOLUTE: 0.01 E9/L (ref 0–0.2)
BASOPHILS RELATIVE PERCENT: 0.2 % (ref 0–2)
BUN BLDV-MCNC: 10 MG/DL (ref 6–23)
CALCIUM SERPL-MCNC: 8.8 MG/DL (ref 8.6–10.2)
CHLORIDE BLD-SCNC: 105 MMOL/L (ref 98–107)
CO2: 28 MMOL/L (ref 22–29)
CREAT SERPL-MCNC: 0.7 MG/DL (ref 0.5–1)
EOSINOPHILS ABSOLUTE: 0.12 E9/L (ref 0.05–0.5)
EOSINOPHILS RELATIVE PERCENT: 1.9 % (ref 0–6)
GFR AFRICAN AMERICAN: >60
GFR NON-AFRICAN AMERICAN: >60 ML/MIN/1.73
GLUCOSE BLD-MCNC: 83 MG/DL (ref 74–99)
HCT VFR BLD CALC: 34.8 % (ref 34–48)
HEMOGLOBIN: 11 G/DL (ref 11.5–15.5)
IMMATURE GRANULOCYTES #: 0.02 E9/L
IMMATURE GRANULOCYTES %: 0.3 % (ref 0–5)
LYMPHOCYTES ABSOLUTE: 1.13 E9/L (ref 1.5–4)
LYMPHOCYTES RELATIVE PERCENT: 17.6 % (ref 20–42)
MCH RBC QN AUTO: 31.8 PG (ref 26–35)
MCHC RBC AUTO-ENTMCNC: 31.6 % (ref 32–34.5)
MCV RBC AUTO: 100.6 FL (ref 80–99.9)
MONOCYTES ABSOLUTE: 0.53 E9/L (ref 0.1–0.95)
MONOCYTES RELATIVE PERCENT: 8.3 % (ref 2–12)
NEUTROPHILS ABSOLUTE: 4.61 E9/L (ref 1.8–7.3)
NEUTROPHILS RELATIVE PERCENT: 71.7 % (ref 43–80)
PDW BLD-RTO: 13.2 FL (ref 11.5–15)
PLATELET # BLD: 197 E9/L (ref 130–450)
PMV BLD AUTO: 9 FL (ref 7–12)
POTASSIUM REFLEX MAGNESIUM: 3.9 MMOL/L (ref 3.5–5)
RBC # BLD: 3.46 E12/L (ref 3.5–5.5)
SODIUM BLD-SCNC: 143 MMOL/L (ref 132–146)
TOTAL CK: 1507 U/L (ref 20–180)
WBC # BLD: 6.4 E9/L (ref 4.5–11.5)

## 2022-01-07 PROCEDURE — 6360000002 HC RX W HCPCS: Performed by: FAMILY MEDICINE

## 2022-01-07 PROCEDURE — 97535 SELF CARE MNGMENT TRAINING: CPT

## 2022-01-07 PROCEDURE — 94640 AIRWAY INHALATION TREATMENT: CPT

## 2022-01-07 PROCEDURE — 97530 THERAPEUTIC ACTIVITIES: CPT

## 2022-01-07 PROCEDURE — 2700000000 HC OXYGEN THERAPY PER DAY

## 2022-01-07 PROCEDURE — 85025 COMPLETE CBC W/AUTO DIFF WBC: CPT

## 2022-01-07 PROCEDURE — 6370000000 HC RX 637 (ALT 250 FOR IP): Performed by: ORTHOPAEDIC SURGERY

## 2022-01-07 PROCEDURE — 6360000002 HC RX W HCPCS: Performed by: EMERGENCY MEDICINE

## 2022-01-07 PROCEDURE — 36415 COLL VENOUS BLD VENIPUNCTURE: CPT

## 2022-01-07 PROCEDURE — 2580000003 HC RX 258

## 2022-01-07 PROCEDURE — 82550 ASSAY OF CK (CPK): CPT

## 2022-01-07 PROCEDURE — 6370000000 HC RX 637 (ALT 250 FOR IP): Performed by: FAMILY MEDICINE

## 2022-01-07 PROCEDURE — 80048 BASIC METABOLIC PNL TOTAL CA: CPT

## 2022-01-07 PROCEDURE — 6360000002 HC RX W HCPCS

## 2022-01-07 RX ORDER — METHOCARBAMOL 500 MG/1
1000 TABLET, FILM COATED ORAL 4 TIMES DAILY
Status: DISCONTINUED | OUTPATIENT
Start: 2022-01-07 | End: 2022-01-07 | Stop reason: HOSPADM

## 2022-01-07 RX ADMIN — OXYCODONE AND ACETAMINOPHEN 1 TABLET: 325; 10 TABLET ORAL at 08:08

## 2022-01-07 RX ADMIN — METHOCARBAMOL TABLETS 1000 MG: 500 TABLET, COATED ORAL at 08:07

## 2022-01-07 RX ADMIN — GABAPENTIN 600 MG: 600 TABLET, FILM COATED ORAL at 14:07

## 2022-01-07 RX ADMIN — OXYCODONE AND ACETAMINOPHEN 1 TABLET: 325; 10 TABLET ORAL at 15:49

## 2022-01-07 RX ADMIN — LEVOFLOXACIN 750 MG: 5 INJECTION, SOLUTION INTRAVENOUS at 11:09

## 2022-01-07 RX ADMIN — FERROUS SULFATE TAB 325 MG (65 MG ELEMENTAL FE) 325 MG: 325 (65 FE) TAB at 08:08

## 2022-01-07 RX ADMIN — METHOCARBAMOL TABLETS 1000 MG: 500 TABLET, COATED ORAL at 14:06

## 2022-01-07 RX ADMIN — ALLOPURINOL 200 MG: 100 TABLET ORAL at 08:08

## 2022-01-07 RX ADMIN — GABAPENTIN 600 MG: 600 TABLET, FILM COATED ORAL at 08:08

## 2022-01-07 RX ADMIN — FAMOTIDINE 20 MG: 20 TABLET, FILM COATED ORAL at 08:07

## 2022-01-07 RX ADMIN — ESCITALOPRAM OXALATE 20 MG: 10 TABLET ORAL at 08:08

## 2022-01-07 RX ADMIN — POLYETHYLENE GLYCOL 3350 17 G: 17 POWDER, FOR SOLUTION ORAL at 11:10

## 2022-01-07 RX ADMIN — VANCOMYCIN HYDROCHLORIDE 1000 MG: 1 INJECTION, POWDER, LYOPHILIZED, FOR SOLUTION INTRAVENOUS at 16:29

## 2022-01-07 RX ADMIN — FENTANYL CITRATE 25 MCG: 50 INJECTION INTRAMUSCULAR; INTRAVENOUS at 11:18

## 2022-01-07 RX ADMIN — METOPROLOL SUCCINATE 12.5 MG: 25 TABLET, EXTENDED RELEASE ORAL at 08:09

## 2022-01-07 RX ADMIN — MONTELUKAST 10 MG: 10 TABLET, FILM COATED ORAL at 08:07

## 2022-01-07 RX ADMIN — ALBUTEROL SULFATE 2.5 MG: 2.5 SOLUTION RESPIRATORY (INHALATION) at 09:26

## 2022-01-07 RX ADMIN — FUROSEMIDE 40 MG: 40 TABLET ORAL at 08:09

## 2022-01-07 RX ADMIN — ENOXAPARIN SODIUM 40 MG: 100 INJECTION SUBCUTANEOUS at 08:07

## 2022-01-07 RX ADMIN — TOPIRAMATE 200 MG: 100 TABLET, FILM COATED ORAL at 08:07

## 2022-01-07 RX ADMIN — OXYCODONE AND ACETAMINOPHEN 1 TABLET: 325; 10 TABLET ORAL at 00:52

## 2022-01-07 ASSESSMENT — PAIN SCALES - GENERAL
PAINLEVEL_OUTOF10: 7
PAINLEVEL_OUTOF10: 8
PAINLEVEL_OUTOF10: 8
PAINLEVEL_OUTOF10: 9

## 2022-01-07 NOTE — CARE COORDINATION
1/7/2022 social work transition of care planning  Nitza transition of care planning. Pt agreeable to [facility] for rehab. N[facility]za reviewed [facility] list with pt. Pt chose [facility]-no beds 2) [facility] and 3) [facility]-referrals made. Sw will follow. Electronically signed by GAGE Miller on 1/7/2022 at 10:54 AM     Addendum:pt accepted at [facility] [id number].. Facility to transport at 6 pm(sw will cx if pt unable to d/c tonight). Covid test pending. Loc determination is complete.   Electronically signed by GAGE Miller on 1/7/2022 at 4:03 PM

## 2022-01-07 NOTE — PROGRESS NOTES
Physical Therapy  Physical Therapy Treatment Note    Name: Juliana Henry  : 1960  MRN: [id number]      Date of Service: 2022    Evaluating PT:  Boby Schneider, PT [id number]      Room #:  4833/8864-A  Diagnosis:  Rhabdomyolysis [M62.82]  Non-traumatic rhabdomyolysis [M62.82]  Closed fracture of distal end of right radius, unspecified fracture morphology, initial encounter [S52.891A]  PMHx/PSHx:     has a past medical history of Adenoma of right adrenal gland, Anemia, Anxiety, Arthritis, Asthma, Benign essential tremor, Bipolar affective ([address] Chronic back pain, Chronic respiratory failure with hypoxia ([address] Depression, Difficulty swallowing, Environmental and seasonal allergies, Excessive physiologic tremor, Fatty liver, Full dentures, GERD (gastroesophageal reflux disease), Gout, Herniated cervical disc, History of swelling of feet, Lymphedema of lower extremity, Mitochondrial cytopathy ([address] Neuropathy, Obesity, PETR (obstructive sleep apnea), PONV (postoperative nausea and vomiting), Seizures ([address] Spinal headache, and Thyroid disease. has a past surgical history that includes knee surgery (Bilateral); Gastric bypass surgery (); myomectomy; Tonsillectomy; Nerve Block (Left, 10 02 2013); Nerve Block (Left, 10 9 13); Nerve Block (Left, 10/16/13); other surgical history (Left, 13); Nerve Block (Left, 10/29/2014); Nerve Block (Left, 2014); Nerve Block (Left, 14); Nerve Block (Right, 3/30/15); Nerve Block (Right, 2015); Nerve Block (Right, 4/13/15); Nerve Block (Left, 7/6/15); Nerve Block (07/20/15); Nerve Block (Left, 10 1 15); Nerve Block (10/26/15); other surgical history (3/28/2016); Endoscopy, colon, diagnostic; pr colonoscopy flx dx w/collj spec when pfrmd (N/A, 3/20/2018); pr egd transoral biopsy single/multiple (N/A, 3/20/2018); Cholecystectomy (); Hysterectomy (); Colonoscopy (N/A, 2018); Esophagus dilation (2018); back surgery (last one );  Cardiac catheterization (Right, 6-6-2013); Appendectomy; other surgical history (1995); joint replacement (Bilateral, [id number]); egd colonoscopy (N/A, 10/8/2019); Colonoscopy (N/A, 10/8/2019); and Nerve Block (Bilateral, 4/1/2021). Procedure/Surgery:  none  Precautions:  Falls, alarm and 3L O2 , NWB (non-weight bearing) RUE(  1/6/22 pt allowed to WB minimally through elbow onto platform walker without hand  per ortho. Equipment Needs: To be determined, If home will need R platform attachment for walker. SUBJECTIVE:    Patient lives alone  in a apartment elevator access level entry to building. Bed is on 1 floor and bath is on 1 floor. Patient ambulated with wheeled walker  PTA. Equipment owned: [address] [place] [address] [employer],      OBJECTIVE:   Initial Evaluation  Date: 1/6/22 Treatment  1/7/22 Short Term/ Long Term   Goals   AM-PAC 6 Clicks 64/54 17/82    Was pt agreeable to Eval/treatment? yes yes    Does pt have pain? Yes RUE. Also reporting B hip pain and BLE pain. Less pain this date. Bed Mobility  Rolling: Min   Supine to sit:   Mod x 2   Sit to supine: Mod x 2   Scooting: Mod x 2  Rolling: Min   Supine to sit:   Min  Sit to supine: NT  Scooting: Min  Rolling: Mod Ind   Supine to sit: Mod Ind   Sit to supine: Mod Ind   Scooting: Mod Ind    Transfers Sit to stand: Mod x 2 to   Stand to sit: Mod x 2  Stand pivot: Mod x 2  Sit to stand: Mod low surface. Min high seating surface. To fww with R platform   Stand to sit: Min  Stand pivot: Min x 2  Sit to stand: Mod Ind    Stand to sit: Mod Ind    Stand pivot: Mod Ind     Ambulation    NT patient only able to stand briefly. Gait 10 feet x 2 rep with platform R side fww. Only balanced on R elbow. Mod a 100 feet with Mod Ind  will see if able to use platform fww. Stair negotiation: ascended and descended  NT NT Level entry to building.     ROM BUE:  Defer to OT eval  BLE:  decreased     Strength BUE: Defer to OT eval  RLE:  Grossly 4-/5  LLE:  Grossly  4-/5  4/5 Balance Sitting EOB:  SBA  Dynamic Standing: Mod a x 2  Sitting EOB:  Ind  Dynamic Standing: Mod Ind     Patient is Alert & Oriented x person, place, time and situation and follows directions   Sensation:  Pt denies numbness and tingling to extremities  Edema:  Yes BLE    Therapeutic Exercises:  Functional activity     Patient education  Pt educated regarding role of PT evaluation and need for OOB activity    Patient response to education:   Pt verbalized understanding Pt demonstrated skill Pt requires further education in this area   yes yes Reminders     ASSESSMENT:    Conditions Requiring Skilled Therapeutic Intervention:    [x]Decreased strength     [x]Decreased ROM  [x]Decreased functional mobility  [x]Decreased balance   [x]Decreased endurance   [x]Decreased posture  []Decreased sensation  []Decreased coordination   []Decreased vision  [x]Decreased safety awareness   []Increased pain       Comments:    RN cleared patient for participation in therapy session. Patient was seen this date for PT treatment. Patient was agreeable to intervention. Results of the functional assessment are noted above. Upon entering the room patient was found supine in bed. Assisted to EOB Sat EOB x 5 minutes to increase dynamic sitting balance and activity tolerance. Transfers and gait completed with R platform walker with care taken to bear weight through R elbow only. At end of session, patient in chair with call light and phone within reach, all lines and tubes intact, nursing notified. This patient can benefit from the continuation of skilled PT possibly in a rehab setting to maximize functional level and return to PLOF.    Treatment:  Patient practiced and was instructed in the following treatment:    · Bed mobility training - pt given verbal and tactile cues to facilitate proper sequencing and safety during rolling and supine>sit as well as provided with physical assistance to complete task    · STS and transfer training - educated on hand/foot placement, safety, and sequencing during STS. o Education in [employer] restrictions for RUE.    o Skilled repositioning in bed. Pt's/ family goals   1. Get stronger and return to home. Prognosis is good  for reaching above PT goals. Patient and or family understand(s) diagnosis, prognosis, and plan of care.  yes,     PHYSICAL THERAPY PLAN OF CARE:    PT POC is established based on physician order and patient diagnosis     Referring provider/PT Order:  PT Eval and Treat   01/06/22 0445  PT eval and treat     Maximiliano Poor, DO       Diagnosis:  Rhabdomyolysis [M62.82]  Non-traumatic rhabdomyolysis [M62.82]  Closed fracture of distal end of right radius, unspecified fracture morphology, initial encounter [S52.399X]  Specific instructions for next treatment:  Increase ambulation distance and BLE therapeutic exercise  Current Treatment Recommendations:     [x] Strengthening to improve independence with functional mobility   [x] ROM to improve independence with functional mobility   [x] Balance Training to improve static/dynamic balance and to reduce fall risk  [x] Endurance Training to improve activity tolerance during functional mobility   [x] Transfer Training to improve safety and independence with all functional transfers   [x] Gait Training to improve gait mechanics, endurance and asses need for appropriate assistive device  [x] Stair Training in preparation for safe discharge home and/or into the community   [] Positioning to prevent skin breakdown and contractures  [x] Safety and Education Training   [] Patient/Caregiver Education   [] HEP  [] Other     PT long term treatment goals are located in above grid    Frequency of treatments: 2-5x/week x 1-2 weeks.     Time in  0950  Time out  1015    Total Treatment Time  25 minutes     Evaluation Time includes thorough review of current medical information, gathering information on past medical history/social history and prior level of function, completion of standardized testing/informal observation of tasks, assessment of data and education on plan of care and goals.     CPT codes:  [] Low Complexity PT evaluation 37494  [] Moderate Complexity PT evaluation 75496  [] High Complexity PT evaluation 56405  [] PT Re-evaluation 22923  [] Gait training 36665 - minutes  [] Manual therapy 74188 - minutes  [x] Therapeutic activities 61347 25 minutes  [] Therapeutic exercises 77240 - minutes  [] Neuromuscular reeducation 44760 - minutes     [place], [address] [facility]

## 2022-01-07 NOTE — PROGRESS NOTES
Pharmacy Consultation Note  (Antibiotic Dosing and Monitoring)    Initial consult date: 1/6/22  Consulting physician/provider: Dago Nelson  Drug: Vancomycin  Indication: Right distal femur surgical site infection    Age/  Gender Height Weight IBW  Allergy Information   61 y.o./female 5' 6\" (167.6 cm) 243 lb (110.2 kg)     Ideal body weight: 59.3 kg (130 lb 11.7 oz)  Adjusted ideal body weight: 82.5 kg (181 lb 15.5 oz)   Bee pollen, Penicillins, Ropinirole hcl, Vistaril [hydroxyzine hcl], Aripiprazole, Hydroxyzine pamoate, and Tape [adhesive tape]      Renal Function:  Recent Labs     01/05/22  2230 01/07/22  0500   BUN 18 10   CREATININE 0.8 0.7       Intake/Output Summary (Last 24 hours) at 1/7/2022 2372  Last data filed at 1/6/2022 1539  Gross per 24 hour   Intake 420 ml   Output --   Net 420 ml       Vancomycin Monitoring:  Trough:  No results for input(s): VANCOTROUGH in the last 72 hours. Random:  No results for input(s): VANCORANDOM in the last 72 hours. Vancomycin Administration Times:  Recent vancomycin administrations                   vancomycin 1000 mg IVPB in 250 mL D5W addavial (mg) 1,000 mg New Bag 01/06/22 1636    vancomycin (VANCOCIN) 2,250 mg in dextrose 5 % 500 mL IVPB (mg) 2,250 mg New Bag 01/06/22 0304                   Assessment:  · Patient is a 64 y.o. female who has been initiated on vancomycin  Estimated Creatinine Clearance: 110 mL/min (based on SCr of 0.7 mg/dL).   · To dose vancomycin, pharmacy will be utilizing [employer] calculation software for goal AUC/NAVARRO <500 mg/L-hr and/or trough level of 10-15 mcg/mL   · 1/6: WBC elevated at 17.5 mg/L; afebrile; Scr at baseline (0.8 mg/dL)  · 1/7: WBC WNL; renal function stable; afebrile; BC pending; patient lost IV access; 1 missed vancomycin dose    Plan:  · Will continue vancomycin 1000 mg IV every 12 hours (est ; trough 10.6 mg/L)  · Will check vancomycin level tomorrow morning  · Will continue to monitor renal function   · Clinical pharmacy to follow      Lissett Rajan, PharmD  Pharmacy Resident  P: [id number]   1/7/2022 9:22 AM

## 2022-01-07 NOTE — DISCHARGE SUMMARY
Discharge Summary    Patient ID   Cecily Dueñas   1960  [id number]    Primary Care:   Alfonzo Thompson MD    Admit date: 1/5/2022   Discharge date: 1/7/2022    Medical Record number: [id number]   Admitting Physician: Terrell Kessler DO   Discharge Physician: Kevin Hicks MD    Consultants: orthopedic surgery    Procedures: none    Discharge Diagnoses:      Patient Active Problem List   Diagnosis Code    Debility R53.81    Obesity E66.9    Bipolar 1 disorder ([address] F31.9    Generalized seizure disorder ([facility] G40.309    Cervicalgia M54.2    Asthma J45.909    Hyperlipidemia E78.5    Hypertension I10    Arthritis M19.90    Lymphedema of lower extremity I89.0    Degenerative Osteoarthritis of both knees M17.0    Status post total knee replacement, right Z96.651    Cervical spondylolysis M43.02    Degenerative Osteoarthritis thoracic spine M47.814    Thoracic facet syndrome M47.894    Cervical facet syndrome M47.812    Facet syndrome, lumbar M47.816    Neural foraminal stenosis of lumbar spine M48.061    Lumbar radiculopathy M54.16    DDD (degenerative disc disease), cervical M50.30    Neural foraminal stenosis of cervical spine M48.02    Protruded cervical disc M50.20    Cervical radiculopathy M54.12    Postlaminectomy syndrome, lumbar M96.1    Neuropathic pain syndrome (non-herpetic) M79.2    Chronic pain G89.29    Chronic respiratory failure with hypoxia (HCC) J96.11    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 A    PETR (obstructive sleep apnea) G47.33    Chronic back pain M54.9, G89.29    Bipolar affective (HCC) F31.9    Adenoma of right adrenal gland D35.01    Chest pain R07.9    Lumbosacral spondylosis without myelopathy M47.817    Sacroiliac dysfunction M53.3    DDD (degenerative disc disease), lumbar M51.36    Thoracic degenerative disc disease M51.34    Excessive physiologic tremor R25.1    Rhabdomyolysis M62.82         Hospital Course:   Ms. Jorge Rene is a 70-year-old female who just came into the hospital due to a fall from her bed. Patient has a chronic history of lymphedema in the bilateral lower extremities and on mitochondria disorder in which she is treated at [facility] and causes a clotting disorder easily the rhabdomyolysis right now her CK level is 3929 we will continue to monitor. Patient improved with medical management and orthopedist did evaluate her and have her follow-up in [facility] where she is currently being treated and had the surgery done. Patient denies any pain or distress she is able to ambulate. She has been accepted at [facility] for continue medical management and rehabilitation.     Physical Exam:   BP (!) 118/58   Pulse 76   Temp 98.4 °F (36.9 °C) (Oral)   Resp 16   Ht 5' 6\" (1.676 m)   Wt 258 lb 13.1 oz (117.4 kg)   LMP 08/31/1988   SpO2 94%   BMI 41.77 kg/m²   Neck: no JVD  Lungs: equal BS, clear   CV: RRR, normal S1S2, no significant murmur  Abdomen: soft, nontender, normally active BS, no masses or tenderness  Extremities: no edema or cords  Neurologic: alert, oriented, no focal CN or motor deficit  Resume home medications any changes while in hospital      Medications: see computerized discharge medication list     Medication List      CONTINUE taking these medications    * albuterol sulfate  (90 Base) MCG/ACT inhaler     * albuterol (5 MG/ML) 0.5% nebulizer solution  Commonly known as: PROVENTIL     ALEVE-D SINUS & COLD PO     allopurinol 100 MG tablet  Commonly known as: ZYLOPRIM     azelastine 0.1 % nasal spray  Commonly known as: ASTELIN  1 spray by Nasal route 2 times daily Use in each nostril as directed     Calcium 600 600 MG Tabs tablet  Generic drug: calcium carbonate     Carnitor 330 MG tablet  Generic drug: levOCARNitine     cetirizine 10 MG tablet  Commonly known as: ZYRTEC     clotrimazole-betamethasone 1-0.05 % lotion  Commonly known as: LOTRISONE     Co-Enzyme Q10 100 MG Caps     cyanocobalamin 50 MCG tablet     diclofenac sodium 1 % Gel  Commonly known as: VOLTAREN     docusate sodium 100 MG capsule  Commonly known as: COLACE     EPINEPHrine 0.3 MG/0.3ML Soaj injection  Commonly known as: EpiPen 2-Shane  Inject 0.3 mLs into the muscle once for 1 dose Use as directed for allergic reaction     famotidine 20 MG tablet  Commonly known as: Pepcid  Take 1 tablet by mouth 2 times daily     ferrous sulfate 325 (65 Fe) MG EC tablet  Commonly known as: FE TABS 325     furosemide 40 MG tablet  Commonly known as: LASIX  Take 1 tablet by mouth 2 times daily     gabapentin 600 MG tablet  Commonly known as: NEURONTIN  Take 1 tablet by mouth 4 times daily. Glucosamine-Chondroitin 750-600 MG Chew     Lexapro 20 MG tablet  Generic drug: escitalopram     lidocaine-prilocaine 2.5-2.5 % cream  Commonly known as: EMLA     Linzess 145 MCG capsule  Generic drug: linaclotide     magnesium 200 MG Tabs tablet     methocarbamol 500 MG tablet  Commonly known as: ROBAXIN     metoprolol succinate 25 MG extended release tablet  Commonly known as: TOPROL XL  Take 0.5 tablets by mouth daily     montelukast 10 MG tablet  Commonly known as: SINGULAIR  Take 1 tablet by mouth daily     NONFORMULARY     omeprazole 20 MG delayed release capsule  Commonly known as: PRILOSEC     ondansetron 4 MG disintegrating tablet  Commonly known as: ZOFRAN-ODT  Take 1 tablet by mouth 3 times daily as needed for Nausea or Vomiting     oxyCODONE-acetaminophen  MG per tablet  Commonly known as: PERCOCET     potassium chloride 10 MEQ extended release tablet  Commonly known as: KLOR-CON     topiramate 200 MG tablet  Commonly known as: TOPAMAX  Take 1 tablet by mouth 2 times daily.      traZODone 100 MG tablet  Commonly known as: DESYREL     Vitamin D3 125 MCG (5000 UT) Tabs     VITAMIN-B COMPLEX PO     ziprasidone 20 MG capsule  Commonly known as: GEODON         * This list has 2 medication(s) that are the same as other medications prescribed for you. Read the directions carefully, and ask your doctor or other care provider to review them with you. Patient Instructions: Resume home medications any changes while in hospital      Discharged Condition: good  Disposition: SNF  Activity: activity as tolerated  Diet: regular diet  Wound Care: none needed    Follow-up: Dr. Nita Ernst in 1-2 weeks         Electronically signed by Keshia De León MD on 1/7/2022 at 1:20 PM  [facility] Hospitalist   Time spent on discharge 45 minutes

## 2022-01-07 NOTE — PROGRESS NOTES
Patient that patient was having a feeling of tightness to her splint in her right upper extremity and difficulty moving her fingers. Patient seen and examined splint loosened patient able to move all fingers neurovascularly intact to the right hand good capillary refill. Sensation intact to median ulnar radial nerve distributions. Hand warm and perfused. Patient states symptoms much improved after loosening of her splint. Continue with current plan otherwise.

## 2022-01-07 NOTE — DISCHARGE INSTR - COC
Continuity of Care Form    Patient Name: Tano Dickens   :  1960  MRN:  [id number]    Admit date:  2022  Discharge date:  2021    Code Status Order: Full Code   Advance Directives:      Admitting Physician:  Anthony Hodge DO  PCP: Tennis Current, MD    Discharging Nurse: [facility] Unit/Room#: 5874/5878-B  Discharging Unit Phone Number: [phone]    Emergency Contact:   Extended Emergency Contact Information  Primary Emergency Contact: Zelalem Paez  Home Phone: [phone]  Mobile Phone: [phone]  Relation: Other  Secondary Emergency Contact: [address], [address] [address] Phone: [phone]  Relation: Other  Preferred language: English   needed?  No    Past Surgical History:  Past Surgical History:   Procedure Laterality Date    APPENDECTOMY      with Hysterectomy / unknown    BACK SURGERY  last one     lumbar x 2    CARDIAC CATHETERIZATION Right 2013    Dr. Hernández Must Radial, no stents placed, no blockages     CHOLECYSTECTOMY      open with gastric bypass    COLONOSCOPY N/A 2018    COLONOSCOPY POLYPECTOMY HOT BIOPSY performed by Jf Arenas MD at [address] N/A 10/8/2019    COLONOSCOPY POLYPECTOMY SNARE/COLD BIOPSY performed by Jf Arenas MD at [address]    EGD COLONOSCOPY N/A 10/8/2019    EGD ESOPHAGOGASTRODUODENOSCOPY DILATATION performed by Jf Arenas MD at [facility], DIAGNOSTIC      ESOPHAGEAL DILATATION  2018    ESOPHAGEAL DILATION Olevia Sprain performed by Jf Arenas MD at [address] [facility] Bilateral ,    knees    KNEE SURGERY Bilateral     scope    MYOMECTOMY      NERVE BLOCK Left 10 02 2013    lumbar paravertebral facet #2    NERVE BLOCK Left 10 9 13    hip inj #1    NERVE BLOCK Left 10/16/13    hip injection    NERVE BLOCK Left 10/29/2014    left lumbar transforaminal nerve lbock  #1    NERVE BLOCK Left 11/12/2014    lumbar left transforaminal nerve block  #2    NERVE BLOCK Left 12 8 14    lumbar transforaminal #3    NERVE BLOCK Right 3/30/15    cervical transforaminal #1    NERVE BLOCK Right 4/6/2015    right cervical transforaminal nerve block  #2    NERVE BLOCK Right 4/13/15    cervical transforaminal #3    NERVE BLOCK Left 7/6/15    knee injection #1    NERVE BLOCK  07/20/15    left genicular nerve block/knee #2    NERVE BLOCK Left 10 1 15    lumb transforam #1    NERVE BLOCK  10/26/15    left lumbar transforaminal nerve block #3    NERVE BLOCK Bilateral 4/1/2021    #1 BILATERAL SACROILIAC JOINT INJECTION UNDER FLUORO performed by Giovany Julian MD at [address] Left 11 25 13    lumbar radiofreq    OTHER SURGICAL HISTORY  3/28/2016    stage 1, 3 day percutanous trial [employer] lumbar spinal cord stimulator    OTHER SURGICAL HISTORY  1995    racz procedure / lower back    NV COLONOSCOPY FLX DX W/COLLJ SPEC WHEN PFRMD N/A 3/20/2018    COLONOSCOPY DIAGNOSTIC OR SCREENING performed by Any Ruiz MD at [address] [place] [address] EGD TRANSORAL BIOPSY SINGLE/MULTIPLE N/A 3/20/2018    EGD BIOPSY performed by Any Ruiz MD at [facility]         Immunization History:   Immunization History   Administered Date(s) Administered    Influenza Virus Vaccine 11/02/2017, 11/02/2017, 02/06/2019, 02/02/2021    Influenza Whole 11/03/2008    Influenza, Quadv, IM, PF (6 mo and older Fluzone, Flulaval, Fluarix, and 3 yrs and older Afluria) 09/01/2018    Pneumococcal Conjugate 13-valent ([id number]) 02/03/2017    Pneumococcal Polysaccharide ([id number]) 11/03/2008, 09/01/2018    Td, unspecified formulation 11/02/2011    Tdap (Boostrix, Adacel) 02/16/2021       Active Problems:  Patient Active Problem List   Diagnosis Code    Debility R53.81    Obesity E66.9    Bipolar 1 disorder ([facility] F31.9    Generalized seizure disorder ([facility] G40.309    Cervicalgia M54.2 Asthma J45.909    Hyperlipidemia E78.5    Hypertension I10    Arthritis M19.90    Lymphedema of lower extremity I89.0    Degenerative Osteoarthritis of both knees M17.0    Status post total knee replacement, right Z96.651    Cervical spondylolysis M43.02    Degenerative Osteoarthritis thoracic spine M47.814    Thoracic facet syndrome M47.894    Cervical facet syndrome M47.812    Facet syndrome, lumbar M47.816    Neural foraminal stenosis of lumbar spine M48.061    Lumbar radiculopathy M54.16    DDD (degenerative disc disease), cervical M50.30    Neural foraminal stenosis of cervical spine M48.02    Protruded cervical disc M50.20    Cervical radiculopathy M54.12    Postlaminectomy syndrome, lumbar M96.1    Neuropathic pain syndrome (non-herpetic) M79.2    Chronic pain G89.29    Chronic respiratory failure with hypoxia (HCC) J96.11    Mitochondrial cytopathy ([facility]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 A    PETR (obstructive sleep apnea) G47.33    Chronic back pain M54.9, G89.29    Bipolar affective (HCC) F31.9    Adenoma of right adrenal gland D35.01    Chest pain R07.9    Lumbosacral spondylosis without myelopathy M47.817    Sacroiliac dysfunction M53.3    DDD (degenerative disc disease), lumbar M51.36    Thoracic degenerative disc disease M51.34    Excessive physiologic tremor R25.1    Rhabdomyolysis M62.82       Isolation/Infection:   Isolation            No Isolation          Patient Infection Status       Infection Onset Added Last Indicated Last Indicated By Review Planned Expiration Resolved Resolved By    None active    Resolved    COVID-19 (Rule Out) 01/05/22 01/05/22 01/06/22 COVID-19, Rapid (Ordered)   01/06/22 Rule-Out Test Resulted    COVID-19 (Rule Out) 08/20/21 08/20/21 08/20/21 COVID-19, Rapid (Ordered)   08/20/21 Rule-Out Test Resulted    COVID-19 (Rule Out) 04/25/21 04/25/21 04/25/21 COVID-19, Rapid (Ordered)   04/25/21 Rule-Out Test Resulted COVID-19 (Rule Out) 02/10/21 02/10/21 02/10/21 COVID-19 (Ordered)   02/11/21 Rule-Out Test Resulted    COVID-19 (Rule Out) 09/17/20 09/17/20 09/17/20 COVID-19 Ambulatory (Ordered)   09/19/20 Rule-Out Test Resulted    COVID-19 (Rule Out) 04/30/20 04/30/20 04/30/20 COVID-19 (Ordered)   05/01/20 Rule-Out Test Resulted    Not detected 4/30/2020            Nurse Assessment:  Last Vital Signs: BP (!) 118/58   Pulse 76   Temp 98.4 °F (36.9 °C) (Oral)   Resp 16   Ht 5' 6\" (1.676 m)   Wt 258 lb 13.1 oz (117.4 kg)   LMP 08/31/1988   SpO2 94%   BMI 41.77 kg/m²     Last documented pain score (0-10 scale): Pain Level: 9  Last Weight:   Wt Readings from Last 1 Encounters:   01/06/22 258 lb 13.1 oz (117.4 kg)     Mental Status:  alert    IV Access:  - None    Nursing Mobility/ADLs:  Walking   Assisted  Transfer  Assisted  Bathing  Assisted  Dressing  Assisted  Toileting  Assisted  Feeding  Assisted  Med Admin  Assisted  Med Delivery   whole    Wound Care Documentation and Therapy:        Elimination:  Continence: Bowel: {YES / QR:06897}  Bladder: {YES / XB:07735}  Urinary Catheter: {Urinary Catheter:178264871}   Colostomy/Ileostomy/Ileal Conduit: {YES / OC:62604}       Date of Last BM: ***    Intake/Output Summary (Last 24 hours) at 1/7/2022 1319  Last data filed at 1/6/2022 1539  Gross per 24 hour   Intake 420 ml   Output --   Net 420 ml     I/O last 3 completed shifts: In: 80 [P.O.:780]  Out: 0     Safety Concerns:     [address] [employer] Safety Concerns:429822509}    Impairments/Disabilities:      [address] CAITY Impairments/Disabilities:887934896}    Nutrition Therapy:  Current Nutrition Therapy:   - Oral Diet:  General    Routes of Feeding: Oral  Liquids: No Restrictions  Daily Fluid Restriction: no  Last Modified Barium Swallow with Video (Video Swallowing Test): not done    Treatments at the Time of Hospital Discharge:   Respiratory Treatments: ***  Oxygen Therapy:  is not on home oxygen therapy.   Ventilator:    - No ventilator support    Rehab Therapies: {THERAPEUTIC INTERVENTION:2247900845}  Weight Bearing Status/Restrictions: No weight bearing restirctions  Other Medical Equipment (for information only, NOT a DME order):  walker  Other Treatments: ***    Patient's personal belongings (please select all that are sent with patient):  {CHP DME Belongings:042360860}    RN SIGNATURE:  {Esignature:421510199}    CASE MANAGEMENT/SOCIAL WORK SECTION    Inpatient Status Date: ***    Readmission Risk Assessment Score:  Readmission Risk              Risk of Unplanned Readmission:  24           Discharging to Facility/ Agency   Name: [facility] Car[facility]  Address:  Phone:  Fax:    Dialysis Facility (if applicable)   Name:  Address:  Dialysis Schedule:  Phone:  Fax:    / signature: Electronically signed by GAGE Aquino on 1/7/2022 at 1:47 PM      PHYSICIAN SECTION    Prognosis: Good    Condition at Discharge: Stable    Rehab Potential (if transferring to Rehab): Good    Recommended Labs or Other Treatments After Discharge: physical and occupational therapies with nursing  Physician Certification: I certify the above information and transfer of Leon Ta  is necessary for the continuing treatment of the diagnosis listed and that she requires [facility] for less 30 days.      Update Admission H&P: No change in H&P    PHYSICIAN SIGNATURE:  Electronically signed by Jesica Wakefield MD on 1/7/22 at 1:20 PM EST

## 2022-01-11 LAB
BLOOD CULTURE, ROUTINE: NORMAL
CULTURE, BLOOD 2: NORMAL

## 2022-01-19 DIAGNOSIS — S52.501A CLOSED FRACTURE OF DISTAL END OF RIGHT RADIUS, UNSPECIFIED FRACTURE MORPHOLOGY, INITIAL ENCOUNTER: Primary | ICD-10-CM

## 2022-01-22 ENCOUNTER — APPOINTMENT (OUTPATIENT)
Dept: GENERAL RADIOLOGY | Age: 62
End: 2022-01-22
Payer: MEDICAID

## 2022-01-22 ENCOUNTER — HOSPITAL ENCOUNTER (OUTPATIENT)
Age: 62
Setting detail: OBSERVATION
Discharge: NURSING, REHAB OR LONG-TERM CARE | End: 2022-01-25
Attending: STUDENT IN AN ORGANIZED HEALTH CARE EDUCATION/TRAINING PROGRAM | Admitting: INTERNAL MEDICINE
Payer: MEDICAID

## 2022-01-22 DIAGNOSIS — R62.7 FAILURE TO THRIVE IN ADULT: ICD-10-CM

## 2022-01-22 DIAGNOSIS — M25.512 ACUTE PAIN OF BOTH SHOULDERS: ICD-10-CM

## 2022-01-22 DIAGNOSIS — S52.571D OTHER CLOSED INTRA-ARTICULAR FRACTURE OF DISTAL END OF RIGHT RADIUS WITH ROUTINE HEALING, SUBSEQUENT ENCOUNTER: ICD-10-CM

## 2022-01-22 DIAGNOSIS — M25.511 ACUTE PAIN OF BOTH SHOULDERS: ICD-10-CM
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
## 2022-01-22 DIAGNOSIS — M79.604 LEG PAIN, BILATERAL: ICD-10-CM

## 2022-01-22 DIAGNOSIS — M79.605 LEG PAIN, BILATERAL: ICD-10-CM

## 2022-01-22 PROCEDURE — 82550 ASSAY OF CK (CPK): CPT

## 2022-01-22 PROCEDURE — 99285 EMERGENCY DEPT VISIT HI MDM: CPT

## 2022-01-22 PROCEDURE — 93005 ELECTROCARDIOGRAM TRACING: CPT | Performed by: STUDENT IN AN ORGANIZED HEALTH CARE EDUCATION/TRAINING PROGRAM

## 2022-01-22 PROCEDURE — 80048 BASIC METABOLIC PNL TOTAL CA: CPT

## 2022-01-22 PROCEDURE — 85025 COMPLETE CBC W/AUTO DIFF WBC: CPT

## 2022-01-22 RX ORDER — 0.9 % SODIUM CHLORIDE 0.9 %
500 INTRAVENOUS SOLUTION INTRAVENOUS ONCE
Status: COMPLETED | OUTPATIENT
Start: 2022-01-23 | End: 2022-01-23

## 2022-01-22 RX ORDER — OXYCODONE HYDROCHLORIDE AND ACETAMINOPHEN 5; 325 MG/1; MG/1
1 TABLET ORAL ONCE
Status: COMPLETED | OUTPATIENT
Start: 2022-01-23 | End: 2022-01-23

## 2022-01-22 NOTE — LETTER
[place] Department Medicaid  CERTIFICATION OF NECESSITY  FOR NON-EMERGENCY TRANSPORTATION   BY GROUND AMBULANCE      Individual Information   1. Name: Juliana Henry 2. [place] Medicaid Billing Number:    3. Address: [facility] [address]      Transportation Provider Information   4. Provider Name:    5. [place] Medicaid Provider Number:  National Provider Identifier (NPI):      Certification  7. Criteria:  During transport, this individual requires:  [x] Medical treatment or continuous     supervision by an EMT. [] The administration or regulation of oxygen by another person. [] Supervised protective restraint. 8. Period Beginning Date: 1/25/2022     9. Length  [] Not more than 10 day(s)  [] One Year     Additional Information Relevant to Certification   10. Comments or Explanations, If Necessary or Appropriate   Dizziness,Falls, bipolar,right ankle sprain, Closed right distal radius fracture       Certifying Practitioner Information   11. Name of Practitioner: Dr Briana Valadez md   12. [place] Medicaid Provider Number, If Applicable:  [address] Provider Identifier (NPI):      Signature Information   14. Date of Signature: 1/25/2022   15. Name of Person Signing:Electronically signed by Elaine Ruvalcaba RN on 1/25/2022 at 11:30 AM     12. Signature and Professional Designation: Dr Briana Valadez MD /Electronically signed by Elaine Ruvalcaba RN on 1/25/2022 at 11:31 AM       ODM 36965  Rev. 7/2015         [address] Encounter Date/Time: 1/22/2022 [place] Account: [de-identified]    MRN: [id number]    Patient: Dakota Alcala Serial #: [id number]      ENCOUNTER          Patient Class: Observation Private Enc?   No Unit RM BD: SEYZ 8WE 2231/6466-C   Hospital Service: Med/Surg   Encounter DX: Failure to thrive in jose*   ADM Provider: Josh Herndon MD   Procedure:     ATT Provider: Josh Herndon MD   REF Provider:        Admission DX: Failure to thrive in adult, Leg pain, bilateral, Fall, initial encounter, Other closed intra-articular fracture of distal end of right radius with routine healing, subsequent encounter, Acute pain of both shoulders and DX codes: R62.7, M79.604, M79.605, W19. [place]if[place] Boggs, [id number], M25.511, M25.512      PATIENT                 Name: Jeffrey Correa : 1960 (61 yrs)   Address: [address]* Sex: Female   [facility] [address]         Marital Status:    Employer: RETIRED         Nondenominational: [place]   Primary Care Provider: Rebekah Milligan MD         Primary Phone: [phone] [address]   Contact Name Legal Guardian? Relationship to Patient Home Phone Work Phone   1. Len Rizvi  2. Willy Herman    No Other  Brother/Sister [phone]              GUARANTOR            Guarantor: Jeffrey Correa     : 1960   Address: [address] [place] [address]* Sex: Female   [place] [address]     Relation to Patient: Self       Home Phone: [phone]   Guarantor ID: [id number]       Work Phone:     Guarantor Employer: RETIRED         Status: RETIRED      COVERAGE        PRIMARY INSURANCE   Payor: MEDICAID [place] Plan: [facility]   Payor Address: [address]       Group Number:   Insurance Type: INDEMNITY   Subscriber Name: Yadiel Li : 1960   Subscriber ID: [id number] Pat. Rel. to Sub: Self   SECONDARY INSURANCE   Payor:   Plan:     Payor Address:  ,           Group Number:   Insurance Type:     Subscriber Name:   Subscriber :     Subscriber ID:   Pat.  Rel. to Sub:             CSN: [id number]

## 2022-01-23 ENCOUNTER — APPOINTMENT (OUTPATIENT)
Dept: GENERAL RADIOLOGY | Age: 62
End: 2022-01-23
Payer: MEDICAID

## 2022-01-23 ENCOUNTER — APPOINTMENT (OUTPATIENT)
Dept: CT IMAGING | Age: 62
End: 2022-01-23
Payer: MEDICAID

## 2022-01-23 PROBLEM — R62.7 FAILURE TO THRIVE IN ADULT: Status: ACTIVE | Noted: 2022-01-23

## 2022-01-23 LAB
ANION GAP SERPL CALCULATED.3IONS-SCNC: 9 MMOL/L (ref 7–16)
BASOPHILS ABSOLUTE: 0.01 E9/L (ref 0–0.2)
BASOPHILS RELATIVE PERCENT: 0.1 % (ref 0–2)
BUN BLDV-MCNC: 22 MG/DL (ref 6–23)
CALCIUM SERPL-MCNC: 6.8 MG/DL (ref 8.6–10.2)
CHLORIDE BLD-SCNC: 112 MMOL/L (ref 98–107)
CO2: 22 MMOL/L (ref 22–29)
CREAT SERPL-MCNC: 0.7 MG/DL (ref 0.5–1)
EOSINOPHILS ABSOLUTE: 0.03 E9/L (ref 0.05–0.5)
EOSINOPHILS RELATIVE PERCENT: 0.4 % (ref 0–6)
GFR AFRICAN AMERICAN: >60
GFR NON-AFRICAN AMERICAN: >60 ML/MIN/1.73
GLUCOSE BLD-MCNC: 97 MG/DL (ref 74–99)
HCT VFR BLD CALC: 34.4 % (ref 34–48)
HEMOGLOBIN: 10.7 G/DL (ref 11.5–15.5)
IMMATURE GRANULOCYTES #: 0.04 E9/L
IMMATURE GRANULOCYTES %: 0.5 % (ref 0–5)
LYMPHOCYTES ABSOLUTE: 0.71 E9/L (ref 1.5–4)
LYMPHOCYTES RELATIVE PERCENT: 9 % (ref 20–42)
MCH RBC QN AUTO: 31.3 PG (ref 26–35)
MCHC RBC AUTO-ENTMCNC: 31.1 % (ref 32–34.5)
MCV RBC AUTO: 100.6 FL (ref 80–99.9)
MONOCYTES ABSOLUTE: 0.69 E9/L (ref 0.1–0.95)
MONOCYTES RELATIVE PERCENT: 8.8 % (ref 2–12)
NEUTROPHILS ABSOLUTE: 6.37 E9/L (ref 1.8–7.3)
NEUTROPHILS RELATIVE PERCENT: 81.2 % (ref 43–80)
PDW BLD-RTO: 13.2 FL (ref 11.5–15)
PLATELET # BLD: 116 E9/L (ref 130–450)
PMV BLD AUTO: 10.1 FL (ref 7–12)
POTASSIUM REFLEX MAGNESIUM: 3.8 MMOL/L (ref 3.5–5)
RBC # BLD: 3.42 E12/L (ref 3.5–5.5)
REASON FOR REJECTION: NORMAL
REJECTED TEST: NORMAL
SARS-COV-2, NAAT: NOT DETECTED
SODIUM BLD-SCNC: 143 MMOL/L (ref 132–146)
TOTAL CK: 93 U/L (ref 20–180)
TROPONIN, HIGH SENSITIVITY: 15 NG/L (ref 0–9)
TROPONIN, HIGH SENSITIVITY: 16 NG/L (ref 0–9)
WBC # BLD: 7.9 E9/L (ref 4.5–11.5)

## 2022-01-23 PROCEDURE — 72125 CT NECK SPINE W/O DYE: CPT

## 2022-01-23 PROCEDURE — 2580000003 HC RX 258: Performed by: STUDENT IN AN ORGANIZED HEALTH CARE EDUCATION/TRAINING PROGRAM

## 2022-01-23 PROCEDURE — 2700000000 HC OXYGEN THERAPY PER DAY

## 2022-01-23 PROCEDURE — 84484 ASSAY OF TROPONIN QUANT: CPT

## 2022-01-23 PROCEDURE — 6370000000 HC RX 637 (ALT 250 FOR IP): Performed by: INTERNAL MEDICINE

## 2022-01-23 PROCEDURE — G0378 HOSPITAL OBSERVATION PER HR: HCPCS

## 2022-01-23 PROCEDURE — 70450 CT HEAD/BRAIN W/O DYE: CPT

## 2022-01-23 PROCEDURE — 71045 X-RAY EXAM CHEST 1 VIEW: CPT

## 2022-01-23 PROCEDURE — 73521 X-RAY EXAM HIPS BI 2 VIEWS: CPT

## 2022-01-23 PROCEDURE — 73030 X-RAY EXAM OF SHOULDER: CPT

## 2022-01-23 PROCEDURE — 72131 CT LUMBAR SPINE W/O DYE: CPT

## 2022-01-23 PROCEDURE — 73120 X-RAY EXAM OF HAND: CPT

## 2022-01-23 PROCEDURE — 96360 HYDRATION IV INFUSION INIT: CPT

## 2022-01-23 PROCEDURE — 94640 AIRWAY INHALATION TREATMENT: CPT

## 2022-01-23 PROCEDURE — 73100 X-RAY EXAM OF WRIST: CPT

## 2022-01-23 PROCEDURE — 6360000002 HC RX W HCPCS: Performed by: INTERNAL MEDICINE

## 2022-01-23 PROCEDURE — 73552 X-RAY EXAM OF FEMUR 2/>: CPT

## 2022-01-23 PROCEDURE — 6370000000 HC RX 637 (ALT 250 FOR IP): Performed by: STUDENT IN AN ORGANIZED HEALTH CARE EDUCATION/TRAINING PROGRAM

## 2022-01-23 PROCEDURE — 73590 X-RAY EXAM OF LOWER LEG: CPT

## 2022-01-23 PROCEDURE — 73060 X-RAY EXAM OF HUMERUS: CPT

## 2022-01-23 PROCEDURE — 87635 SARS-COV-2 COVID-19 AMP PRB: CPT

## 2022-01-23 PROCEDURE — 73070 X-RAY EXAM OF ELBOW: CPT

## 2022-01-23 PROCEDURE — 73600 X-RAY EXAM OF ANKLE: CPT

## 2022-01-23 PROCEDURE — 94664 DEMO&/EVAL PT USE INHALER: CPT

## 2022-01-23 PROCEDURE — 96361 HYDRATE IV INFUSION ADD-ON: CPT

## 2022-01-23 RX ORDER — ONDANSETRON 2 MG/ML
4 INJECTION INTRAMUSCULAR; INTRAVENOUS EVERY 6 HOURS PRN
Status: DISCONTINUED | OUTPATIENT
Start: 2022-01-23 | End: 2022-01-26 | Stop reason: HOSPADM

## 2022-01-23 RX ORDER — ALBUTEROL SULFATE 2.5 MG/3ML
2.5 SOLUTION RESPIRATORY (INHALATION) EVERY 4 HOURS PRN
Status: DISCONTINUED | OUTPATIENT
Start: 2022-01-23 | End: 2022-01-26 | Stop reason: HOSPADM

## 2022-01-23 RX ORDER — ONDANSETRON 4 MG/1
4 TABLET, ORALLY DISINTEGRATING ORAL EVERY 8 HOURS PRN
Status: DISCONTINUED | OUTPATIENT
Start: 2022-01-23 | End: 2022-01-26 | Stop reason: HOSPADM

## 2022-01-23 RX ORDER — SODIUM CHLORIDE 9 MG/ML
25 INJECTION, SOLUTION INTRAVENOUS PRN
Status: DISCONTINUED | OUTPATIENT
Start: 2022-01-23 | End: 2022-01-26 | Stop reason: HOSPADM

## 2022-01-23 RX ORDER — TRAZODONE HYDROCHLORIDE 50 MG/1
100 TABLET ORAL NIGHTLY
Status: DISCONTINUED | OUTPATIENT
Start: 2022-01-23 | End: 2022-01-26 | Stop reason: HOSPADM

## 2022-01-23 RX ORDER — SODIUM CHLORIDE 0.9 % (FLUSH) 0.9 %
5-40 SYRINGE (ML) INJECTION PRN
Status: DISCONTINUED | OUTPATIENT
Start: 2022-01-23 | End: 2022-01-26 | Stop reason: HOSPADM

## 2022-01-23 RX ORDER — MONTELUKAST SODIUM 10 MG/1
10 TABLET ORAL DAILY
Status: DISCONTINUED | OUTPATIENT
Start: 2022-01-23 | End: 2022-01-26 | Stop reason: HOSPADM

## 2022-01-23 RX ORDER — FUROSEMIDE 40 MG/1
40 TABLET ORAL 2 TIMES DAILY
Status: DISCONTINUED | OUTPATIENT
Start: 2022-01-23 | End: 2022-01-26 | Stop reason: HOSPADM

## 2022-01-23 RX ORDER — LEVOCARNITINE 330 MG/1
330 TABLET ORAL 3 TIMES DAILY
Status: DISCONTINUED | OUTPATIENT
Start: 2022-01-23 | End: 2022-01-26 | Stop reason: HOSPADM

## 2022-01-23 RX ORDER — TOPIRAMATE 100 MG/1
200 TABLET, FILM COATED ORAL 2 TIMES DAILY
Status: DISCONTINUED | OUTPATIENT
Start: 2022-01-23 | End: 2022-01-26 | Stop reason: HOSPADM

## 2022-01-23 RX ORDER — POLYETHYLENE GLYCOL 3350 17 G/17G
17 POWDER, FOR SOLUTION ORAL DAILY PRN
Status: DISCONTINUED | OUTPATIENT
Start: 2022-01-23 | End: 2022-01-26 | Stop reason: HOSPADM

## 2022-01-23 RX ORDER — GABAPENTIN 600 MG/1
600 TABLET ORAL 4 TIMES DAILY
Status: DISCONTINUED | OUTPATIENT
Start: 2022-01-23 | End: 2022-01-26 | Stop reason: HOSPADM

## 2022-01-23 RX ORDER — ESCITALOPRAM OXALATE 10 MG/1
20 TABLET ORAL DAILY
Status: DISCONTINUED | OUTPATIENT
Start: 2022-01-23 | End: 2022-01-26 | Stop reason: HOSPADM

## 2022-01-23 RX ORDER — OXYCODONE AND ACETAMINOPHEN 10; 325 MG/1; MG/1
1 TABLET ORAL EVERY 8 HOURS PRN
Status: DISCONTINUED | OUTPATIENT
Start: 2022-01-23 | End: 2022-01-26 | Stop reason: HOSPADM

## 2022-01-23 RX ORDER — DOCUSATE SODIUM 100 MG/1
100 CAPSULE, LIQUID FILLED ORAL 2 TIMES DAILY
Status: DISCONTINUED | OUTPATIENT
Start: 2022-01-23 | End: 2022-01-26 | Stop reason: HOSPADM

## 2022-01-23 RX ORDER — METOPROLOL SUCCINATE 25 MG/1
12.5 TABLET, EXTENDED RELEASE ORAL DAILY
Status: DISCONTINUED | OUTPATIENT
Start: 2022-01-23 | End: 2022-01-26 | Stop reason: HOSPADM

## 2022-01-23 RX ORDER — ACETAMINOPHEN 650 MG/1
650 SUPPOSITORY RECTAL EVERY 6 HOURS PRN
Status: DISCONTINUED | OUTPATIENT
Start: 2022-01-23 | End: 2022-01-26 | Stop reason: HOSPADM

## 2022-01-23 RX ORDER — SODIUM CHLORIDE 0.9 % (FLUSH) 0.9 %
5-40 SYRINGE (ML) INJECTION EVERY 12 HOURS SCHEDULED
Status: DISCONTINUED | OUTPATIENT
Start: 2022-01-23 | End: 2022-01-26 | Stop reason: HOSPADM

## 2022-01-23 RX ORDER — ZIPRASIDONE HYDROCHLORIDE 20 MG/1
20 CAPSULE ORAL NIGHTLY
Status: DISCONTINUED | OUTPATIENT
Start: 2022-01-23 | End: 2022-01-26 | Stop reason: HOSPADM

## 2022-01-23 RX ORDER — PANTOPRAZOLE SODIUM 40 MG/1
40 TABLET, DELAYED RELEASE ORAL DAILY
Status: DISCONTINUED | OUTPATIENT
Start: 2022-01-23 | End: 2022-01-26 | Stop reason: HOSPADM

## 2022-01-23 RX ORDER — POTASSIUM CHLORIDE 20 MEQ/1
10 TABLET, EXTENDED RELEASE ORAL DAILY
Status: DISCONTINUED | OUTPATIENT
Start: 2022-01-23 | End: 2022-01-26 | Stop reason: HOSPADM

## 2022-01-23 RX ORDER — ALBUTEROL SULFATE 2.5 MG/3ML
2.5 SOLUTION RESPIRATORY (INHALATION) 3 TIMES DAILY
Status: DISCONTINUED | OUTPATIENT
Start: 2022-01-23 | End: 2022-01-26 | Stop reason: HOSPADM

## 2022-01-23 RX ORDER — AZELASTINE 1 MG/ML
1 SPRAY, METERED NASAL 2 TIMES DAILY
Status: DISCONTINUED | OUTPATIENT
Start: 2022-01-23 | End: 2022-01-23 | Stop reason: RX

## 2022-01-23 RX ORDER — ACETAMINOPHEN 325 MG/1
650 TABLET ORAL EVERY 6 HOURS PRN
Status: DISCONTINUED | OUTPATIENT
Start: 2022-01-23 | End: 2022-01-26 | Stop reason: HOSPADM

## 2022-01-23 RX ORDER — ALLOPURINOL 100 MG/1
200 TABLET ORAL 2 TIMES DAILY
Status: DISCONTINUED | OUTPATIENT
Start: 2022-01-23 | End: 2022-01-26 | Stop reason: HOSPADM

## 2022-01-23 RX ADMIN — POTASSIUM CHLORIDE 10 MEQ: 1500 TABLET, EXTENDED RELEASE ORAL at 20:25

## 2022-01-23 RX ADMIN — ZIPRASIDONE HYDROCHLORIDE 20 MG: 20 CAPSULE ORAL at 20:44

## 2022-01-23 RX ADMIN — ALLOPURINOL 200 MG: 100 TABLET ORAL at 20:45

## 2022-01-23 RX ADMIN — FUROSEMIDE 40 MG: 40 TABLET ORAL at 20:25

## 2022-01-23 RX ADMIN — MONTELUKAST SODIUM 10 MG: 10 TABLET ORAL at 20:44

## 2022-01-23 RX ADMIN — SODIUM CHLORIDE 500 ML: 9 INJECTION, SOLUTION INTRAVENOUS at 00:10

## 2022-01-23 RX ADMIN — LEVOCARNITINE 330 MG: 330 TABLET ORAL at 21:31

## 2022-01-23 RX ADMIN — GABAPENTIN 600 MG: 600 TABLET, FILM COATED ORAL at 20:25

## 2022-01-23 RX ADMIN — ESCITALOPRAM 20 MG: 10 TABLET, FILM COATED ORAL at 20:44

## 2022-01-23 RX ADMIN — DOCUSATE SODIUM 100 MG: 100 CAPSULE ORAL at 20:24

## 2022-01-23 RX ADMIN — Medication 200 MG: at 20:45

## 2022-01-23 RX ADMIN — ALBUTEROL SULFATE 2.5 MG: 2.5 SOLUTION RESPIRATORY (INHALATION) at 19:48

## 2022-01-23 RX ADMIN — OXYCODONE AND ACETAMINOPHEN 1 TABLET: 10; 325 TABLET ORAL at 20:24

## 2022-01-23 RX ADMIN — TRAZODONE HYDROCHLORIDE 100 MG: 50 TABLET ORAL at 20:44

## 2022-01-23 RX ADMIN — METOPROLOL SUCCINATE 12.5 MG: 25 TABLET, EXTENDED RELEASE ORAL at 20:25

## 2022-01-23 RX ADMIN — TOPIRAMATE 200 MG: 100 TABLET, FILM COATED ORAL at 20:44

## 2022-01-23 RX ADMIN — OXYCODONE AND ACETAMINOPHEN 1 TABLET: 5; 325 TABLET ORAL at 00:10

## 2022-01-23 ASSESSMENT — PAIN DESCRIPTION - ONSET
ONSET: ON-GOING
ONSET: ON-GOING

## 2022-01-23 ASSESSMENT — ENCOUNTER SYMPTOMS
VOMITING: 0
EYE REDNESS: 0
COUGH: 0
DIARRHEA: 0
SINUS PRESSURE: 0
NAUSEA: 0
SHORTNESS OF BREATH: 0
BACK PAIN: 1
ABDOMINAL DISTENTION: 0
SORE THROAT: 0
EYE DISCHARGE: 0
WHEEZING: 0
EYE PAIN: 0

## 2022-01-23 ASSESSMENT — PAIN SCALES - GENERAL
PAINLEVEL_OUTOF10: 7
PAINLEVEL_OUTOF10: 9
PAINLEVEL_OUTOF10: 7

## 2022-01-23 ASSESSMENT — PAIN DESCRIPTION - DESCRIPTORS
DESCRIPTORS: ACHING;CONSTANT;DISCOMFORT
DESCRIPTORS: ACHING;CONSTANT;DISCOMFORT

## 2022-01-23 ASSESSMENT — PAIN DESCRIPTION - FREQUENCY
FREQUENCY: CONTINUOUS
FREQUENCY: CONTINUOUS

## 2022-01-23 ASSESSMENT — PAIN DESCRIPTION - PAIN TYPE
TYPE: ACUTE PAIN
TYPE: ACUTE PAIN

## 2022-01-23 ASSESSMENT — PAIN - FUNCTIONAL ASSESSMENT: PAIN_FUNCTIONAL_ASSESSMENT: PREVENTS OR INTERFERES SOME ACTIVE ACTIVITIES AND ADLS

## 2022-01-23 NOTE — ED NOTES
Pt expressing concerns of returning home. Pt stating she needs to go to a nursing facility to receive care. Provider made aware.  Pt to see social work     Antwon Lim, [address] [facility]  01/23/22 1964

## 2022-01-23 NOTE — ED NOTES
Ccollar applied and intact. 20G IV started in pt LAC flushed with 10cc NSS positive blood return. Labs collected and sent. Provider at bedside for eval. ekg completed and given to provider.         Chelly Matthew RN  01/22/22 8447

## 2022-01-23 NOTE — ED NOTES
Assumed care of patient.   Pt screaming that she wants pain meds and water yet she is falling asleep when interviewed     Norma Riddle, [address] [facility]  01/23/22 7424

## 2022-01-23 NOTE — ED NOTES
Bed: 17A-17  Expected date:   Expected time:   Means of arrival:   Comments:  ems     Logan Menchaca RN  01/22/22 7414

## 2022-01-23 NOTE — ED PROVIDER NOTES
Patient presents after a fall. Patient states this occurred approximately 4 hours ago. She was walking to the bathroom when her tremors acted up and her legs gave out on her. Patient states that she fell and hit her head on the bathroom counter. She states that she did not herself out for approximately 30 minutes. She states that she currently has body wide pain. Patient rates her pain as severe. She states that palpation and movement make it worse. Nothing has made it better. Patient is also complaining of neck pain. Patient does mention she has some difficulty moving her left leg. She otherwise denies any fevers, vision changes, numbness, weakness, chest pain, shortness of breath, nausea, or vomiting. The history is provided by the patient. No  was used. Review of Systems   Constitutional: Negative for chills and fever. HENT: Negative for ear pain, sinus pressure and sore throat. Eyes: Negative for pain, discharge and redness. Respiratory: Negative for cough, shortness of breath and wheezing. Cardiovascular: Negative for chest pain. Gastrointestinal: Negative for abdominal distention, diarrhea, nausea and vomiting. Genitourinary: Negative for dysuria and frequency. Musculoskeletal: Positive for back pain and neck pain. Negative for arthralgias. Skin: Negative for rash and wound. Neurological: Negative for weakness, numbness and headaches. Hematological: Negative for adenopathy. All other systems reviewed and are negative. Physical Exam  Vitals and nursing note reviewed. Constitutional:       General: She is not in acute distress. Appearance: She is well-developed. She is obese. HENT:      Head: Normocephalic and atraumatic. Eyes:      Extraocular Movements: Extraocular movements intact. Conjunctiva/sclera: Conjunctivae normal.      Pupils: Pupils are equal, round, and reactive to light.    Cardiovascular:      Rate and Rhythm: Normal rate and regular rhythm. Heart sounds: Normal heart sounds. No murmur heard. Pulmonary:      Effort: Pulmonary effort is normal. No respiratory distress. Breath sounds: Normal breath sounds. No wheezing or rales. Abdominal:      General: Bowel sounds are normal.      Palpations: Abdomen is soft. Tenderness: There is no abdominal tenderness. There is no guarding or rebound. Musculoskeletal:      Cervical back: Normal range of motion and neck supple. No rigidity or tenderness. Comments: Right lower extremity shortened and externally rotated. Negative logroll on the right lower extremity above positive logroll on the left. Right upper extremity forearm splint in place. Skin:     General: Skin is warm and dry. Neurological:      General: No focal deficit present. Mental Status: She is alert and oriented to person, place, and time. Cranial Nerves: No cranial nerve deficit. Sensory: No sensory deficit. Coordination: Coordination normal.          Procedures     MDM  Number of Diagnoses or Management Options  Acute pain of both shoulders  Failure to thrive in adult  Fall, initial encounter  Leg pain, bilateral  Other closed intra-articular fracture of distal end of right radius with routine healing, subsequent encounter  Diagnosis management comments: Patient presents after a fall. Percocet given for pain. She states she was down for an unknown period of time. CK was wnl. CBC showed patient's baseline anemia. Patient was complaining of body wide pain. Xrs were obtained and were only positive for an old fracture of the right wrist. She is already in a splint and the fracture appears in line with previous Xrays. Patient was informed of results and plan and states that she is unable to care for herself she requested that she be placed in a rehab facility. Social work was consulted. Patient admitted for placement.         Amount and/or Complexity of Data Reviewed  Clinical lab tests: reviewed  Tests in the radiology section of CPT®: reviewed  Decide to obtain previous medical records or to obtain history from someone other than the patient: yes         ED Course as of 01/23/22 2056   Ulices Garcia Jan 23, 2022 0614 Patient states she would like to be placed in rehab facility. Social work consult was placed. [BB]      ED Course User Index  [BB] Marley Hancock DO        ED Course as of 01/23/22 2056   Summer Vazquez Patient states she would like to be placed in rehab facility. Social work consult was placed. [BB]      ED Course User Index  [BB] Marley Hancock DO       --------------------------------------------- PAST HISTORY ---------------------------------------------  Past Medical History:  has a past medical history of Adenoma of right adrenal gland, Anemia, Anxiety, Arthritis, Asthma, Benign essential tremor, Bipolar affective ([address] Chronic back pain, Chronic respiratory failure with hypoxia ([address] Depression, Difficulty swallowing, Environmental and seasonal allergies, Excessive physiologic tremor, Fatty liver, Full dentures, GERD (gastroesophageal reflux disease), Gout, Herniated cervical disc, History of swelling of feet, Lymphedema of lower extremity, Mitochondrial cytopathy ([address] Neuropathy, Obesity, PETR (obstructive sleep apnea), PONV (postoperative nausea and vomiting), Seizures ([address] Spinal headache, and Thyroid disease. Past Surgical History:  has a past surgical history that includes knee surgery (Bilateral); Gastric bypass surgery (1999); myomectomy; Tonsillectomy; Nerve Block (Left, 10 02 2013); Nerve Block (Left, 10 9 13); Nerve Block (Left, 10/16/13); other surgical history (Left, 11 25 13); Nerve Block (Left, 10/29/2014); Nerve Block (Left, 11/12/2014); Nerve Block (Left, 12 8 14); Nerve Block (Right, 3/30/15); Nerve Block (Right, 4/6/2015); Nerve Block (Right, 4/13/15); Nerve Block (Left, 7/6/15); Nerve Block (07/20/15); Nerve Block (Left, 10 1 15);  Nerve Block (10/26/15); other surgical history (3/28/2016); Endoscopy, colon, diagnostic; pr colonoscopy flx dx w/collj spec when pfrmd (N/A, 3/20/2018); pr egd transoral biopsy single/multiple (N/A, 3/20/2018); Cholecystectomy (1999); Hysterectomy (1988); Colonoscopy (N/A, 9/18/2018); Esophagus dilation (9/18/2018); back surgery (last one 1995); Cardiac catheterization (Right, 6-6-2013); Appendectomy; other surgical history (1995); joint replacement (Bilateral, [id number]); egd colonoscopy (N/A, 10/8/2019); Colonoscopy (N/A, 10/8/2019); and Nerve Block (Bilateral, 4/1/2021). Social History:  reports that she has been smoking cigarettes. She started smoking about 31 years ago. She has a 31.50 pack-year smoking history. She has never used smokeless tobacco. She reports current drug use. Drug: Marijuana Don Safer). She reports that she does not drink alcohol. Family History: family history includes Arthritis in an other family member; Cancer in her father and another family member; Depression in an other family member; Heart Disease in her mother and another family member; Hypertension in an other family member; Mental Illness in an other family member; Stroke in an other family member. The patients home medications have been reviewed.     Allergies: Bee pollen, Penicillins, Ropinirole hcl, Vistaril [hydroxyzine hcl], Aripiprazole, Hydroxyzine pamoate, and Tape [adhesive tape]    -------------------------------------------------- RESULTS -------------------------------------------------    LABS:  Results for orders placed or performed during the hospital encounter of 01/22/22   COVID-19, Rapid    Specimen: Nasopharyngeal Swab   Result Value Ref Range    SARS-CoV-2, NAAT Not Detected Not Detected   CBC Auto Differential   Result Value Ref Range    WBC 7.9 4.5 - 11.5 E9/L    RBC 3.42 (L) 3.50 - 5.50 E12/L    Hemoglobin 10.7 (L) 11.5 - 15.5 g/dL    Hematocrit 34.4 34.0 - 48.0 %    .6 (H) 80.0 - 99.9 fL    MCH 31.3 26.0 - 35.0 pg    MCHC 31.1 (L) 32.0 - 34.5 %    RDW 13.2 11.5 - 15.0 fL    Platelets 796 (L) 947 - 450 E9/L    MPV 10.1 7.0 - 12.0 fL    Neutrophils % 81.2 (H) 43.0 - 80.0 %    Immature Granulocytes % 0.5 0.0 - 5.0 %    Lymphocytes % 9.0 (L) 20.0 - 42.0 %    Monocytes % 8.8 2.0 - 12.0 %    Eosinophils % 0.4 0.0 - 6.0 %    Basophils % 0.1 0.0 - 2.0 %    Neutrophils Absolute 6.37 1.80 - 7.30 E9/L    Immature Granulocytes # 0.04 E9/L    Lymphocytes Absolute 0.71 (L) 1.50 - 4.00 E9/L    Monocytes Absolute 0.69 0.10 - 0.95 E9/L    Eosinophils Absolute 0.03 (L) 0.05 - 0.50 E9/L    Basophils Absolute 0.01 0.00 - 0.20 J0/U   Basic Metabolic Panel w/ Reflex to MG   Result Value Ref Range    Sodium 143 132 - 146 mmol/L    Potassium reflex Magnesium 3.8 3.5 - 5.0 mmol/L    Chloride 112 (H) 98 - 107 mmol/L    CO2 22 22 - 29 mmol/L    Anion Gap 9 7 - 16 mmol/L    Glucose 97 74 - 99 mg/dL    BUN 22 6 - 23 mg/dL    CREATININE 0.7 0.5 - 1.0 mg/dL    GFR Non-African American >60 >=60 mL/min/1.73    GFR African American >60     Calcium 6.8 (L) 8.6 - 10.2 mg/dL   CK   Result Value Ref Range    Total CK 93 20 - 180 U/L   Troponin   Result Value Ref Range    Troponin, High Sensitivity 16 (H) 0 - 9 ng/L   SPECIMEN REJECTION   Result Value Ref Range    Rejected Test TRP5     Reason for Rejection see below    Troponin   Result Value Ref Range    Troponin, High Sensitivity 15 (H) 0 - 9 ng/L       RADIOLOGY:  XR ANKLE RIGHT (2 VIEWS)   Final Result   No acute displaced fracture. Medial ankle soft tissue swelling. XR TIBIA FIBULA RIGHT (2 VIEWS)   Final Result   No acute displaced fracture. Medial ankle soft tissue swelling. XR WRIST RIGHT (2 VIEWS)   Final Result   Comminuted fracture of the distal radius, with suspected intra-articular   extension.   Evaluation limited due to overlying splint material.         XR HUMERUS RIGHT (MIN 2 VIEWS)   Final Result   Comminuted fracture of the distal radius, with suspected intra-articular extension. Evaluation limited due to overlying splint material.         XR SHOULDER RIGHT (MIN 2 VIEWS)   Final Result   Comminuted fracture of the distal radius, with suspected intra-articular   extension. Evaluation limited due to overlying splint material.         XR TIBIA FIBULA LEFT (2 VIEWS)   Final Result   No acute displaced fracture. Medial ankle soft tissue swelling. XR HIP BILATERAL W AP PELVIS (2 VIEWS)   Final Result   No acute abnormality of the hip. XR HAND RIGHT (2 VIEWS)   Final Result   Comminuted fracture of the distal radius, with suspected intra-articular   extension. Evaluation limited due to overlying splint material.         XR ELBOW RIGHT (2 VIEWS)   Final Result   Comminuted fracture of the distal radius, with suspected intra-articular   extension. Evaluation limited due to overlying splint material.         XR FEMUR RIGHT (MIN 2 VIEWS)   Final Result   No acute displaced fracture. Medial ankle soft tissue swelling. XR FEMUR LEFT (MIN 2 VIEWS)   Final Result   No acute displaced fracture. Medial ankle soft tissue swelling. XR CHEST PORTABLE   Final Result   No acute cardiopulmonary abnormality. XR ANKLE LEFT (2 VIEWS)   Final Result   No acute displaced fracture. Medial ankle soft tissue swelling. CT Head WO Contrast   Final Result   No acute intracranial abnormality. No acute cervical spine fracture or traumatic malalignment. CT Cervical Spine WO Contrast   Final Result   No acute intracranial abnormality. No acute cervical spine fracture or traumatic malalignment. CT LUMBAR SPINE WO CONTRAST   Final Result   No acute lumbar spine fracture or traumatic malalignment.   Lumbar spondylosis.               ------------------------- NURSING NOTES AND VITALS REVIEWED ---------------------------  Date / Time Roomed:  1/22/2022 11:05 PM  ED Bed Assignment:  [id number]    The nursing notes within the ED encounter and vital signs as below have been reviewed. Patient Vitals for the past 24 hrs:   BP Temp Temp src Pulse Resp SpO2   01/23/22 1948 -- -- -- -- -- 98 %   01/23/22 1930 (!) 178/100 98.4 °F (36.9 °C) Oral 98 16 98 %   01/23/22 1700 (!) 145/76 98.1 °F (36.7 °C) Temporal 82 16 98 %   01/23/22 1305 (!) 163/80 -- -- 103 -- 96 %   01/23/22 1044 120/71 -- -- 89 -- 94 %   01/23/22 0902 (!) 181/88 -- -- 93 11 93 %   01/23/22 0614 (!) 168/71 -- -- 81 22 94 %   01/23/22 0400 (!) 173/69 -- -- 95 22 94 %   01/23/22 0345 -- -- -- 91 20 93 %   01/23/22 0330 (!) 171/72 -- -- 92 19 93 %   01/23/22 0300 -- -- -- 94 19 93 %   01/23/22 0100 -- -- -- -- -- 94 %   01/23/22 0045 -- -- -- 87 16 94 %   01/23/22 0030 (!) 143/63 -- -- 90 19 (!) 87 %   01/23/22 0020 126/65 -- -- 88 12 91 %   01/22/22 2307 (!) 149/59 98.4 °F (36.9 °C) Oral 90 18 94 %       Oxygen Saturation Interpretation: Normal    ------------------------------------------ PROGRESS NOTES ------------------------------------------  Re-evaluation(s):  Time: 0654  Patients symptoms show no change  Repeat physical examination is not changed    Counseling:  I have spoken with the patient and discussed todays results, in addition to providing specific details for the plan of care and counseling regarding the diagnosis and prognosis. Their questions are answered at this time and they are agreeable with the plan of admission.    --------------------------------- ADDITIONAL PROVIDER NOTES ---------------------------------  Consultations:  Time: 1441. Dr. Catalina Adams with Dr. Nahum Ramirez. Discussed case. They will admit the patient. This patient's ED course included: a personal history and physicial examination and multiple bedside re-evaluations    This patient has remained hemodynamically stable during their ED course. Diagnosis:  1. Fall, initial encounter    2. Acute pain of both shoulders    3. Leg pain, bilateral    4.  Other closed intra-articular fracture of distal end of right radius with routine healing, subsequent encounter    5. Failure to thrive in adult        Disposition:  Patient's disposition: Admit to med/surg floor  Patient's condition is stable.       Patient was seen and evaluated by both myself and Sid Stephenson MD.         Sol Robles DO  Resident  01/23/22 2056

## 2022-01-23 NOTE — ED PROVIDER NOTES
ATTENDING PROVIDER ATTESTATION:     Magdalena Sevilla presented to the emergency department for evaluation of Fall (4hrs ago + head unknown thinners, c/o L hip and leg pain, R shoulder pain)   and was initially evaluated by the Medical Resident. See Original ED Note for H&P and ED course above. I have reviewed and discussed the case, including pertinent history (medical, surgical, family and social) and exam findings with the Medical Resident assigned to Magdalena Sevilla. I have personally performed and/or participated in the history, exam, medical decision making, and procedures and agree with all pertinent clinical information and any additional changes or corrections are noted below in my assessment and plan. I have discussed this patient in detail with the resident, and provided the instruction and education,     I have reviewed my findings and recommendations with the assigned Medical Resident, Magdalena Sevilla and members of family present at the time of disposition. I have performed a history and physical examination of this patient and directly supervised the resident caring for this patient    HPI  This is a 20-year-old female who presents to the emergency department after a fall. The patient states that she was having increased tremors all day as she has known mitochondrial disease. Because of this, she fell going into the bathroom. She did hit her head on the bathroom counter and believes she lost consciousness. This happened 4 hours ago. She was unable to get up or ambulate after the event. She is not on any blood thinners. She is experiencing head pain, neck pain, low back pain, bilateral shoulder pain, right hand pain, right wrist pain, bilateral hip pain, bilateral leg pain. The pain is worse with movement. She is not taking any medication for it. Its been constant. She denies any chest pain, shortness of breath, nausea, vomiting. Prior to the fall, she had no preceding symptoms. No recent dysuria or fevers. No vision change, numbness, tingling or weakness. ROS  A complete review of systems was performed and pertinent positives and negatives are stated within HPI, all other systems reviewed and are negative.    --------------------------------------------- PAST HISTORY ---------------------------------------------  Past Medical History:  has a past medical history of Adenoma of right adrenal gland, Anemia, Anxiety, Arthritis, Asthma, Benign essential tremor, Bipolar affective (HCC), Chronic back pain, Chronic respiratory failure with hypoxia ([address] Depression, Difficulty swallowing, Environmental and seasonal allergies, Excessive physiologic tremor, Fatty liver, Full dentures, GERD (gastroesophageal reflux disease), Gout, Herniated cervical disc, History of swelling of feet, Lymphedema of lower extremity, Mitochondrial cytopathy ([address] Neuropathy, Obesity, PETR (obstructive sleep apnea), PONV (postoperative nausea and vomiting), Seizures ([address] Spinal headache, and Thyroid disease. Past Surgical History:  has a past surgical history that includes knee surgery (Bilateral); Gastric bypass surgery (1999); myomectomy; Tonsillectomy; Nerve Block (Left, 10 02 2013); Nerve Block (Left, 10 9 13); Nerve Block (Left, 10/16/13); other surgical history (Left, 11 25 13); Nerve Block (Left, 10/29/2014); Nerve Block (Left, 11/12/2014); Nerve Block (Left, 12 8 14); Nerve Block (Right, 3/30/15); Nerve Block (Right, 4/6/2015); Nerve Block (Right, 4/13/15); Nerve Block (Left, 7/6/15); Nerve Block (07/20/15); Nerve Block (Left, 10 1 15); Nerve Block (10/26/15); other surgical history (3/28/2016); Endoscopy, colon, diagnostic; pr colonoscopy flx dx w/collj spec when pfrmd (N/A, 3/20/2018); pr egd transoral biopsy single/multiple (N/A, 3/20/2018); Cholecystectomy (1999); Hysterectomy (1988); Colonoscopy (N/A, 9/18/2018); Esophagus dilation (9/18/2018); back surgery (last one 1995);  Cardiac catheterization (Right, 6-6-2013); Appendectomy; other surgical history (1995); joint replacement (Bilateral, [id number]); egd colonoscopy (N/A, 10/8/2019); Colonoscopy (N/A, 10/8/2019); and Nerve Block (Bilateral, 4/1/2021). Social History:  reports that she has been smoking cigarettes. She started smoking about 31 years ago. She has a 31.50 pack-year smoking history. She has never used smokeless tobacco. She reports current drug use. Drug: Marijuana Vijay Brim). She reports that she does not drink alcohol. Family History: family history includes Arthritis in an other family member; Cancer in her father and another family member; Depression in an other family member; Heart Disease in her mother and another family member; Hypertension in an other family member; Mental Illness in an other family member; Stroke in an other family member. Unless otherwise noted, family history is non contributory    The patients home medications have been reviewed. Allergies: Bee pollen, Penicillins, Ropinirole hcl, Vistaril [hydroxyzine hcl], Aripiprazole, Hydroxyzine pamoate, and Tape [adhesive tape]    Physical Exam  General: The patient is conversational and lying comfortably in bed. Head: Normocephalic and atraumatic. Eyes: Sclera non-icteric and no conjunctival injection  ENT: Nasal and oral membranes appear mildly dry. No tenderness of the maxilla or mandible. No trismus  Neck: Trachea midline. No JVD  Respiratory: Lungs clear to auscultation bilaterally. Patient speaking in full sentences. Cardiovascular: Regular rate. No pedal edema. 2+ DP pulses. Good capillary refill of the right upper extremity. 2+ radial pulse of the left upper extremity  Gastrointestinal:  Abdomen is soft and nondistended. Bowel sounds present. There is no tenderness. There is no guarding or rebound. Patient does have diastases recti  Musculoskeletal: Midline tenderness to palpation of the cervical and lumbar spine. No tenderness of the thoracic spine. No step-offs or deformities. Bony tenderness of the right humerus, right elbow, right wrist, left chest wall, right shoulder, bilateral tib-fib's, bilateral femurs, left lateral and medial malleoli, and right second and third digits. Patient is able to wiggle her fingers, flex and extend at the elbows. She is in a right upper extremity splint for recent wrist fracture. Pelvis is stable although she has bilateral hip tenderness. Pain with bilateral leg rolling of the bilateral lower extremities. Patient able to wiggle her toes distally. Skin: Skin warm and dry. Patient has multiple areas of ecchymosis which appear from various stages on the extremities  Neurologic: No aphasia. Patient is alert and oriented to person, place and time. Pupils equal reactive to light. Extraocular eye movements intact. Symmetric facies. Sensation intact. Patient able to wiggle her toes distally although she refuses to lift her lower extremities off of the bed. Symmetric upper extremity strength. Psychiatric: Normal affect. MDM  This is a 64 y.o. female presenting to the ED for fall. On initial presentation, the patient's vital signs are interpreted as hypertensive, afebrile and minimally hypoxic. Review of EMR shows the patient is somewhat at baseline for saturations. She denies any chest pain or shortness of breath. Based on history and physical exam, we have a broad differential.  The patient describes a mechanical fall due to her tremors. She did hit her head and lost consciousness. She has multiple areas of tenderness concerning for fractures versus contusions. She is distally neurovascularly intact. We will obtain x-rays of the right hand, left ankle, bilateral femurs, bilateral tib-fib's, hips with pelvis, right shoulder, chest, right wrist, elbow and right humerus. We will obtain CT of the lumbar spine as well as CT of the head and cervical spine.   Her neurological exam is nonfocal.  She does have dry discharge as she has no acute fractures. Pain is somewhat improved. She is moving all extremities. However, the patient endorses that she does not feel she can care for herself at home. We will consult social work to assist with placement. Patient should follow with orthopedic surgery and her primary care physician. She is given strict return precautions. I directly supervised any procedures performed by the resident and was present for the procedure including all critical portions of the procedure    The cardiac monitor revealed sinus rhythm with a heart rate in the 100s as interpreted by me. The cardiac monitor was ordered secondary to the patient's fall and to monitor the patient for dysrhythmia. CPT [id number]    I, Dr. Jodi Wallace, am the primary provider of record    Impression  1. Fall, initial encounter    2. Acute pain of both shoulders    3. Leg pain, bilateral    4.  Other closed intra-articular fracture of distal end of right radius with routine healing, subsequent encounter              Jodi Wallace MD  01/23/22 9113

## 2022-01-24 PROCEDURE — G0378 HOSPITAL OBSERVATION PER HR: HCPCS

## 2022-01-24 PROCEDURE — 6370000000 HC RX 637 (ALT 250 FOR IP): Performed by: INTERNAL MEDICINE

## 2022-01-24 PROCEDURE — 6360000002 HC RX W HCPCS: Performed by: INTERNAL MEDICINE

## 2022-01-24 PROCEDURE — 2700000000 HC OXYGEN THERAPY PER DAY

## 2022-01-24 PROCEDURE — 96372 THER/PROPH/DIAG INJ SC/IM: CPT

## 2022-01-24 PROCEDURE — 97161 PT EVAL LOW COMPLEX 20 MIN: CPT

## 2022-01-24 PROCEDURE — 94640 AIRWAY INHALATION TREATMENT: CPT

## 2022-01-24 PROCEDURE — 2580000003 HC RX 258: Performed by: INTERNAL MEDICINE

## 2022-01-24 RX ORDER — BACLOFEN 20 MG/1
20 TABLET ORAL 4 TIMES DAILY
COMMUNITY

## 2022-01-24 RX ORDER — METHOCARBAMOL 500 MG/1
500 TABLET, FILM COATED ORAL 4 TIMES DAILY
Status: DISCONTINUED | OUTPATIENT
Start: 2022-01-24 | End: 2022-01-26 | Stop reason: HOSPADM

## 2022-01-24 RX ORDER — BACLOFEN 10 MG/1
20 TABLET ORAL 4 TIMES DAILY
Status: DISCONTINUED | OUTPATIENT
Start: 2022-01-24 | End: 2022-01-26 | Stop reason: HOSPADM

## 2022-01-24 RX ADMIN — ZIPRASIDONE HYDROCHLORIDE 20 MG: 20 CAPSULE ORAL at 20:44

## 2022-01-24 RX ADMIN — DOCUSATE SODIUM 100 MG: 100 CAPSULE ORAL at 08:21

## 2022-01-24 RX ADMIN — LEVOCARNITINE 330 MG: 330 TABLET ORAL at 08:21

## 2022-01-24 RX ADMIN — OXYCODONE AND ACETAMINOPHEN 1 TABLET: 10; 325 TABLET ORAL at 07:09

## 2022-01-24 RX ADMIN — BACLOFEN 20 MG: 10 TABLET ORAL at 17:03

## 2022-01-24 RX ADMIN — TRAZODONE HYDROCHLORIDE 100 MG: 50 TABLET ORAL at 20:44

## 2022-01-24 RX ADMIN — FUROSEMIDE 40 MG: 40 TABLET ORAL at 17:03

## 2022-01-24 RX ADMIN — METHOCARBAMOL TABLETS 500 MG: 500 TABLET, COATED ORAL at 20:45

## 2022-01-24 RX ADMIN — GABAPENTIN 600 MG: 600 TABLET, FILM COATED ORAL at 08:23

## 2022-01-24 RX ADMIN — ESCITALOPRAM 20 MG: 10 TABLET, FILM COATED ORAL at 08:22

## 2022-01-24 RX ADMIN — ENOXAPARIN SODIUM 40 MG: 100 INJECTION SUBCUTANEOUS at 08:24

## 2022-01-24 RX ADMIN — SODIUM CHLORIDE, PRESERVATIVE FREE 10 ML: 5 INJECTION INTRAVENOUS at 08:24

## 2022-01-24 RX ADMIN — PANTOPRAZOLE SODIUM 40 MG: 40 TABLET, DELAYED RELEASE ORAL at 07:09

## 2022-01-24 RX ADMIN — GABAPENTIN 600 MG: 600 TABLET, FILM COATED ORAL at 13:21

## 2022-01-24 RX ADMIN — METOPROLOL SUCCINATE 12.5 MG: 25 TABLET, EXTENDED RELEASE ORAL at 08:21

## 2022-01-24 RX ADMIN — OXYCODONE AND ACETAMINOPHEN 1 TABLET: 10; 325 TABLET ORAL at 20:44

## 2022-01-24 RX ADMIN — DOCUSATE SODIUM 100 MG: 100 CAPSULE ORAL at 20:49

## 2022-01-24 RX ADMIN — MONTELUKAST SODIUM 10 MG: 10 TABLET ORAL at 20:42

## 2022-01-24 RX ADMIN — Medication 200 MG: at 08:23

## 2022-01-24 RX ADMIN — TOPIRAMATE 200 MG: 100 TABLET, FILM COATED ORAL at 20:43

## 2022-01-24 RX ADMIN — SODIUM CHLORIDE, PRESERVATIVE FREE 10 ML: 5 INJECTION INTRAVENOUS at 20:49

## 2022-01-24 RX ADMIN — ALBUTEROL SULFATE 2.5 MG: 2.5 SOLUTION RESPIRATORY (INHALATION) at 14:03

## 2022-01-24 RX ADMIN — METHOCARBAMOL TABLETS 500 MG: 500 TABLET, COATED ORAL at 17:03

## 2022-01-24 RX ADMIN — LEVOCARNITINE 330 MG: 330 TABLET ORAL at 20:45

## 2022-01-24 RX ADMIN — ALLOPURINOL 200 MG: 100 TABLET ORAL at 20:44

## 2022-01-24 RX ADMIN — GABAPENTIN 600 MG: 600 TABLET, FILM COATED ORAL at 20:42

## 2022-01-24 RX ADMIN — TOPIRAMATE 200 MG: 100 TABLET, FILM COATED ORAL at 08:21

## 2022-01-24 RX ADMIN — FUROSEMIDE 40 MG: 40 TABLET ORAL at 08:21

## 2022-01-24 RX ADMIN — ALLOPURINOL 200 MG: 100 TABLET ORAL at 08:21

## 2022-01-24 RX ADMIN — ALBUTEROL SULFATE 2.5 MG: 2.5 SOLUTION RESPIRATORY (INHALATION) at 22:05

## 2022-01-24 RX ADMIN — BACLOFEN 20 MG: 10 TABLET ORAL at 20:45

## 2022-01-24 RX ADMIN — ALBUTEROL SULFATE 2.5 MG: 2.5 SOLUTION RESPIRATORY (INHALATION) at 09:39

## 2022-01-24 RX ADMIN — LEVOCARNITINE 330 MG: 330 TABLET ORAL at 13:21

## 2022-01-24 RX ADMIN — GABAPENTIN 600 MG: 600 TABLET, FILM COATED ORAL at 17:03

## 2022-01-24 RX ADMIN — Medication 200 MG: at 20:42

## 2022-01-24 ASSESSMENT — PAIN DESCRIPTION - LOCATION: LOCATION: ARM;HIP

## 2022-01-24 ASSESSMENT — PAIN DESCRIPTION - PAIN TYPE: TYPE: ACUTE PAIN

## 2022-01-24 ASSESSMENT — PAIN SCALES - GENERAL
PAINLEVEL_OUTOF10: 8
PAINLEVEL_OUTOF10: 6
PAINLEVEL_OUTOF10: 8

## 2022-01-24 NOTE — PLAN OF CARE
Problem: Falls - Risk of:  Goal: Will remain free from falls  Description: Will remain free from falls  1/24/2022 1016 by Rosey Ortiz RN  Outcome: Met This Shift  1/23/2022 2337 by Nina Chan RN  Outcome: Met This Shift  Goal: Absence of physical injury  Description: Absence of physical injury  Outcome: Met This Shift     Problem: Pain:  Goal: Pain level will decrease  Description: Pain level will decrease  1/24/2022 1016 by Rosey Ortiz RN  Outcome: Met This Shift  1/23/2022 2337 by Nina Chan RN  Outcome: Met This Shift  Goal: Control of acute pain  Description: Control of acute pain  1/24/2022 1016 by Rosey Ortiz RN  Outcome: Met This Shift  1/23/2022 2337 by Nina Chan RN  Outcome: Met This Shift  Goal: Control of chronic pain  Description: Control of chronic pain  Outcome: Met This Shift     Problem: Skin Integrity:  Goal: Will show no infection signs and symptoms  Description: Will show no infection signs and symptoms  Outcome: Met This Shift  Goal: Absence of new skin breakdown  Description: Absence of new skin breakdown  Outcome: Met This Shift

## 2022-01-24 NOTE — PLAN OF CARE
Problem: Falls - Risk of:  Goal: Will remain free from falls  Description: Will remain free from falls  Outcome: Met This Shift     Problem: Pain:  Goal: Pain level will decrease  Description: Pain level will decrease  Outcome: Met This Shift  Goal: Control of acute pain  Description: Control of acute pain  Outcome: Met This Shift

## 2022-01-24 NOTE — PROGRESS NOTES
Roxann Aguirre was ordered Linzess and Carnitor which are nonformulary medications. These medications will need to be supplied by the patient as the pharmacy does not carry these non-formulary medications. If the medications have not been administered by 1400 on the following day from the time the order was placed, a pharmacist will follow-up with the nurse of the patient to assess the capability of the patient to bring in the medications. If it is determined that the patient cannot supply the medications and they are not available to be dispensed from the pharmacy, the provider will be notified.     Arline Agarwal, [facility], 1/23/2022 7:34 PM

## 2022-01-24 NOTE — PROGRESS NOTES
Pharmacy Note    Bernabe More was ordered Astelin. Per the [address], this medication is non-formulary and not stocked by pharmacy for the reason indicated below. The medication can be reordered at discharge.      Medications in which risks outweigh benefits during hospitalization:         -  oral bisphosphonates         -  raloxifene (Evista)      Medications that lack necessity during an acute hospital stay:      -  nasal antihistamines        -  nasal ipratropium 0.03% and 0.06%        -  nasal miacalcin        -  acyclovir topical cream/ointment orders for herpes labialis (cold sores)    Ailyn St, [address] [place] [address], 1/23/2022 7:30 PM

## 2022-01-24 NOTE — PROGRESS NOTES
Physical Therapy  Physical Therapy Initial Assessment     Name: Charlette Ellison  : 1960  MRN: [id number]      Date of Service: 2022    Evaluating PT:  Diamante Alfonso PT, DPT [id number]    Room #:  4204/1898-A  Diagnosis:  Failure to thrive in adult [R62.7]  Leg pain, bilateral [M79.604, M79.605]  Fall, initial encounter [W19. XXXA]  Other closed intra-articular fracture of distal end of right radius with routine healing, subsequent encounter [S52.571D]  Acute pain of both shoulders [M25.511, M25.512]  PMHx/PSHx:  Anemia, anxiety, bipolar affective, chronic back pain, depression, chronic respiratory failure with hypoxia, excessive physiologic tremor, GERD, gout, lymphedema of LE, neuropathy, obesity, PETR seizures, thyroid disease  Procedure/Surgery:  None this admission  Precautions:  Falls, NWB RUE from recent radial fx, O2, leg length discrepancy (RLE shorter d/t injury with subsequent surgical intervention)   Equipment Needs:  Return to use of FWW with R platform attachment     SUBJECTIVE:    Pt lives alone in a 1 floor apartment with elevator access and no DANYELL. Pt ambulated with FWW with R platform attachment PTA. OBJECTIVE:   Initial Evaluation  Date: 22 Treatment Short Term/ Long Term   Goals   AM-PAC 6 Clicks 88/83     Was pt agreeable to Eval/treatment? yes     Does pt have pain? Global pain, does not rate     Bed Mobility  Rolling: SBA  Supine to sit: SBA  Sit to supine: SBA  Scooting: SBA  Rolling: Independent   Supine to sit: Independent   Sit to supine: Independent   Scooting: Independent    Transfers Sit to stand: NT  Stand to sit: NT  Stand pivot: NT  Sit to stand: Independent   Stand to sit: Independent   Stand pivot: . Mod I with [employer]   Ambulation    NT   50 feet with [employer] Mod I   Stair negotiation: ascended and descended  NT  TBD   ROM BUE:  Defer to OT note  BLE:  WFL     Strength BUE:  Defer to OT note  BLE:  3/5 grossly  WFL   Balance Sitting EOB:  SBA  Dynamic Standing:  NT Sitting EOB:  Independent   Dynamic Standing: Mod I with [employer]     Pt is A & O x 3  Sensation:  Pt denies numbness and tingling to extremities  Edema:  None noted    Vitals:  SPO2 monitored throughout session on 2L via NC: 96%  Patient education  Pt educated on role of PT, safety during mobility, potential need for rehabilitation at [place], NWB RUE    Patient response to education:   Pt verbalized understanding Pt demonstrated skill Pt requires further education in this area   yes yes yes     ASSESSMENT:    Conditions Requiring Skilled Therapeutic Intervention:    [x]Decreased strength     [x]Decreased ROM  [x]Decreased functional mobility  [x]Decreased balance   [x]Decreased endurance   [x]Decreased posture  []Decreased sensation  []Decreased coordination   []Decreased vision  []Decreased safety awareness   [x]Increased pain       Comments:  Patient semi-supine in bed upon entry and agreeable to PT evaluation. Patient able to perform bed mobility with no hands on assist. At EOB patient experienced significant dizziness with increased BUE tremor. Patient sat EOB for approximately 5 minutes with focus on static sitting balance. Dizziness progressively increased while at EOB requiring patient to return to semi-supine in bed. Patient requesting rehabilitation at [place] with discussion on acute rehabilitation versus subacute rehabilitation. Patient left semi-supine in bed at end of session with all needs met. RN aware of patient performance. Treatment:  Patient practiced and was instructed in the following treatment:     Bed Mobility: VCs provided for sequencing and safety during mobility. Manual assist provided for completion of task.  Therapeutic Activities:  Patient sat EOB for approximately 5 minutes with focus on static sitting balance    Pt's/ family goals   1. DC to rehabilitation     Prognosis is good for reaching above PT goals.     Patient and or family understand(s) diagnosis, prognosis, and plan of care.  yes    PHYSICAL THERAPY PLAN OF CARE:    PT POC is established based on physician order and patient diagnosis     Referring provider/PT Order:    01/23/22 1615  PT eval and treat  Start:  01/23/22 1615,   End:  01/23/22 1615,   ONE TIME,   Standing Count:  1 Occurrences,   R         Chen Quintanillakassiemallika Tara,        Diagnosis:  Failure to thrive in adult [R62.7]  Leg pain, bilateral [T04.009, M79.605]  Fall, initial encounter [W19. XXXA]  Other closed intra-articular fracture of distal end of right radius with routine healing, subsequent encounter [S52.571D]  Acute pain of both shoulders [M25.511, M25.512]  Specific instructions for next treatment:  Progress mobility as appropriate    Current Treatment Recommendations:     [x] Strengthening to improve independence with functional mobility   [x] ROM to improve independence with functional mobility   [x] Balance Training to improve static/dynamic balance and to reduce fall risk  [x] Endurance Training to improve activity tolerance during functional mobility   [x] Transfer Training to improve safety and independence with all functional transfers   [x] Gait Training to improve gait mechanics, endurance and assess need for appropriate assistive device  [] Stair Training in preparation for safe discharge home and/or into the community   [x] Positioning to prevent skin breakdown and contractures  [x] Safety and Education Training   [x] Patient/Caregiver Education   [x] HEP  [x] Other     PT long term treatment goals are located in above grid    Frequency of treatments: 2-5x/week x 1-2 weeks. Time in  1320  Time out  1335    Total Treatment Time  0 minutes     Evaluation Time includes thorough review of current medical information, gathering information on past medical history/social history and prior level of function, completion of standardized testing/informal observation of tasks, assessment of data and education on plan of care and goals.     CPT codes:  [x] Low Complexity PT evaluation 85767  [] Moderate Complexity PT evaluation 03937  [] High Complexity PT evaluation [id number]  [] PT Re-evaluation [id number]  [] Gait training 76510 - minutes  [] Manual therapy 40384 - minutes  [] Therapeutic activities 00320 - minutes  [] Therapeutic exercises 37266 - minutes  [] Neuromuscular reeducation 93508 - minutes     Diamante Alfonso PT, DPT  [id number]

## 2022-01-24 NOTE — CARE COORDINATION
Presents to ED after a Fall -(pt rolled out of bed around 0800 while sleeping. States she wasn't able to get out of bed due to weakness. c/o right hip and arm pain). Met with patient to discuss transition of care/discharge plan. From home - lives in 2nd floor apartment with ramp to enter. PCP is Dr Rafiq Mcpherson and uses [facility], or [facility] [address] or [facility] [address]. She owns walker and has oxygen ( unsure of whom thru ). She is awaiting help / aids from [facility]. She has history of rehab with [facility] and would like to go back there. Referral to [facility]- awaiting pt/ot evals. VM message to brother Claudetta Lincoln as per pt request. Also [facility] facility list given to patient . Cm/sw to follow. Electronically signed by Lina Austin RN on 1/24/2022 at 11:13 AM    PASRR completed and in envelope- level 2 eval needed. Loc started and faxed to to sign  will need completed.  Electronically signed by Lina Austin RN on 1/24/2022 at 3:18 PM\

## 2022-01-24 NOTE — H&P
[facility] Internal Medicine  History and Physical      CHIEF COMPLAINT: Complaining of pain at right arm cast site, falls    Reason for Admission: As above    History Obtained From: Patient    PCP :  Blake Perdomo MD    [address] [place] [address] 32. / Residence [place] [address]      HISTORY OF PRESENT ILLNESS:      The patient is a 64 y.o. female just released from subacute rehab facility presents to the emergency room because of inability to take care of herself. Patient was recently admitted to our institution under [facility] hospitalist service for distal radius fracture. She was also noted to have elevated CPK. She was seen by orthopedic service. Orthopedic recommended follow-up at [address] [place] [address]. She was then sent to subacute rehab. She was then discharged and went home. Where she had issues with falling. She was then admitted because of unsafe home environment and need for subacute rehab.     Past Medical History:        Diagnosis Date    Adenoma of right adrenal gland     Anemia     Anxiety     Arthritis     spinal    Asthma     controlled with inhalers     Benign essential tremor     Bipolar affective (HCC)     Chronic back pain     Spinal cord stimulator in place    Chronic respiratory failure with hypoxia (HCC)     at times dt diaphragm does not function properly dt Mitochondrial disorder    Depression     stable    Difficulty swallowing     for EGD 10-8-19     Environmental and seasonal allergies     Excessive physiologic tremor 5/24/2021    Fatty liver     Full dentures     GERD (gastroesophageal reflux disease)     Gout     past hx of    Herniated cervical disc     limited rom of head and neck    History of swelling of feet     Lymphedema of lower extremity 09/06/2012    Mitochondrial cytopathy (HCC)     s/s muscle and nerve pain, difficulty breathing, seizures, difficulty swallowing, digestive disorders- Dr. Sherwood Carrier [facility]    Neuropathy     at feet    Obesity     PETR (obstructive sleep apnea)     no CPAP dt insurance will not pay for    PONV (postoperative nausea and vomiting)     Seizures ([facility] [address]     last approx 6-13-19- f/u w/ PCP  Granmal, flares up in heat/ summer time - no recent issues as of 10-4-19     Spinal headache     Thyroid disease      Past Surgical History:        Procedure Laterality Date    APPENDECTOMY      with Hysterectomy / unknown    BACK SURGERY  last one 1995    lumbar x 2    CARDIAC CATHETERIZATION Right 6-6-2013    Dr. Patel Fat Radial, no stents placed, no blockages    [address]    open with gastric bypass    COLONOSCOPY N/A 9/18/2018    COLONOSCOPY POLYPECTOMY HOT BIOPSY performed by Bull Jordan MD at [address] COLONOSCOPY N/A 10/8/2019    COLONOSCOPY POLYPECTOMY SNARE/COLD BIOPSY performed by Bull Jordan MD at [address] EGD COLONOSCOPY N/A 10/8/2019    EGD ESOPHAGOGASTRODUODENOSCOPY DILATATION performed by Bull Jordan MD at [facility], DIAGNOSTIC      ESOPHAGEAL DILATATION  9/18/2018    ESOPHAGEAL DILATION [place] performed by Bull Jordan MD at [address] [facility] Bilateral 2007,2017    knees    KNEE SURGERY Bilateral     scope    MYOMECTOMY      NERVE BLOCK Left 10 02 2013    lumbar paravertebral facet #2    NERVE BLOCK Left 10 9 13    hip inj #1    NERVE BLOCK Left 10/16/13    hip injection    NERVE BLOCK Left 10/29/2014    left lumbar transforaminal nerve lbock  #1    NERVE BLOCK Left 11/12/2014    lumbar left transforaminal nerve block  #2    NERVE BLOCK Left 12 8 14    lumbar transforaminal #3    NERVE BLOCK Right 3/30/15    cervical transforaminal #1    NERVE BLOCK Right 4/6/2015    right cervical transforaminal nerve block  #2    NERVE BLOCK Right 4/13/15    cervical transforaminal #3    NERVE BLOCK Left 7/6/15    knee injection #1    NERVE BLOCK  07/20/15    left genicular nerve block/knee #2    NERVE BLOCK Left 10 1 15    lumb transforam #1    NERVE BLOCK  10/26/15    left lumbar transforaminal nerve block #3    NERVE BLOCK Bilateral 4/1/2021    #1 BILATERAL SACROILIAC JOINT INJECTION UNDER FLUORO performed by Heath Danielle MD at [facility] OTHER SURGICAL HISTORY Left 11 25 13    lumbar radiofreq    OTHER SURGICAL HISTORY  3/28/2016    stage 1, 3 day percutanous trial [employer] lumbar spinal cord stimulator    OTHER SURGICAL HISTORY  1995    racz procedure / lower back    MI COLONOSCOPY FLX DX W/COLLJ SPEC WHEN PFRMD N/A 3/20/2018    COLONOSCOPY DIAGNOSTIC OR SCREENING performed by Eddy Lepe MD at [place] [address] EGD TRANSORAL BIOPSY SINGLE/MULTIPLE N/A 3/20/2018    EGD BIOPSY performed by Eddy Lepe MD at [address] [place] [address]           Medications Prior to Admission:    Medications Prior to Admission: baclofen (LIORESAL) 20 MG tablet, Take 20 mg by mouth 4 times daily  methocarbamol (ROBAXIN) 500 MG tablet, Take 500 mg by mouth 4 times daily  metoprolol succinate (TOPROL XL) 25 MG extended release tablet, Take 0.5 tablets by mouth daily  NONFORMULARY, Medical marijuana  cetirizine (ZYRTEC) 10 MG tablet, take 1 tablet by mouth once daily as directed  clotrimazole-betamethasone (LOTRISONE) 1-0.05 % lotion,   Co-Enzyme Q10 100 MG CAPS, TAKE 1 CAPSULE BY MOUTH TWICE DAILY AS DIRECTED  cyanocobalamin 50 MCG tablet, Take 100 mcg by mouth daily  diclofenac sodium (VOLTAREN) 1 % GEL, APPLY 1 TO 2 GRAMS 3 TO 4 TIMES DAILY AS DIRECTED  ferrous sulfate (FE TABS 325) 325 (65 Fe) MG EC tablet, take 1 tablet by mouth once daily  Glucosamine-Chondroitin 750-600 MG CHEW, Take by mouth 2 times daily  lidocaine-prilocaine (EMLA) 2.5-2.5 % cream, APPLY 1 TO 2 GRAMS 3 TO 4 TIMES DAILY AS DIRECTED  potassium chloride (KLOR-CON) 10 MEQ extended release tablet, Take 10 mEq by mouth as needed  Pseudoephedrine-Naproxen Na (ALEVE-D SINUS & COLD PO),   famotidine (PEPCID) 20 MG tablet, Take 1 tablet by mouth 2 times daily  EPINEPHrine (EPIPEN 2-CARMEN) 0.3 MG/0.3ML SOAJ injection, Inject 0.3 mLs into the muscle once for 1 dose Use as directed for allergic reaction  traZODone (DESYREL) 100 MG tablet, Take 100 mg by mouth nightly  ondansetron (ZOFRAN-ODT) 4 MG disintegrating tablet, Take 1 tablet by mouth 3 times daily as needed for Nausea or Vomiting  albuterol (PROVENTIL) (5 MG/ML) 0.5% nebulizer solution, Take 2.5 mg by nebulization 3 times daily   B Complex Vitamins (VITAMIN-B COMPLEX PO), Take by mouth daily   magnesium 200 MG TABS tablet, Take 200 mg by mouth 2 times daily   calcium carbonate (CALCIUM 600) 600 MG TABS tablet, Take 1 tablet by mouth 2 times daily   azelastine (ASTELIN) 0.1 % nasal spray, 1 spray by Nasal route 2 times daily Use in each nostril as directed  montelukast (SINGULAIR) 10 MG tablet, Take 1 tablet by mouth daily  omeprazole (PRILOSEC) 20 MG delayed release capsule, Take 20 mg by mouth Daily   oxyCODONE-acetaminophen (PERCOCET)  MG per tablet, Take 1 tablet by mouth every 8 hours as needed for Pain (pt took 1 tablet at 6pm tonight). furosemide (LASIX) 40 MG tablet, Take 1 tablet by mouth 2 times daily  albuterol sulfate  (90 Base) MCG/ACT inhaler, Inhale 2 puffs into the lungs every 4 hours as needed for Wheezing or Shortness of Breath   Cholecalciferol (VITAMIN D3) 5000 units TABS, Take 1 tablet by mouth every other day   docusate sodium (COLACE) 100 MG capsule, Take 100 mg by mouth 2 times daily  linaclotide (LINZESS) 145 MCG capsule, Take 290 mcg by mouth every morning (before breakfast)   levOCARNitine (CARNITOR) 330 MG tablet, Take 330 mg by mouth 3 times daily For mitochondrial disease  allopurinol (ZYLOPRIM) 100 MG tablet, Take 200 mg by mouth 2 times daily   escitalopram (LEXAPRO) 20 MG tablet, Take 20 mg by mouth 2 times daily   gabapentin (NEURONTIN) 600 MG tablet, Take 1 tablet by mouth 4 times daily.   topiramate (TOPAMAX) 200 MG tablet, Take 1 tablet by mouth 2 times daily. ziprasidone (GEODON) 20 MG capsule, Take 20 mg by mouth nightly     Allergies:  Bee pollen, Penicillins, Ropinirole hcl, Vistaril [hydroxyzine hcl], Aripiprazole, Hydroxyzine pamoate, and Tape Shell Bue tape]    Social History:   Social History     Socioeconomic History    Marital status:      Spouse name: Not on file    Number of children: Not on file    Years of education: Not on file    Highest education level: Not on file   Occupational History    Not on file   Tobacco Use    Smoking status: Current Every Day Smoker     Packs/day: 0.75     Years: 42.00     Pack years: 31.50     Types: Cigarettes     Start date: 6/13/1990    Smokeless tobacco: Never Used   Vaping Use    Vaping Use: Never used   Substance and Sexual Activity    Alcohol use: No     Alcohol/week: 0.0 standard drinks    Drug use: Yes     Types: Marijuana Dolan Meckel)     Comment: edibles- through pain doctor    Sexual activity: Not on file   Other Topics Concern    Not on file   Social History Narrative    ** Merged History Encounter **          Social Determinants of Health     Financial Resource Strain:     Difficulty of Paying Living Expenses: Not on file   Food Insecurity:     Worried About [address] in the Last Year: Not on file    [address] in the Last Year: Not on file   Transportation Needs:     Lack of Transportation (Medical): Not on file    Lack of Transportation (Non-Medical):  Not on file   Physical Activity:     Days of Exercise per Week: Not on file    Minutes of Exercise per Session: Not on file   Stress:     Feeling of Stress : Not on file   Social Connections:     Frequency of Communication with Friends and Family: Not on file    Frequency of Social Gatherings with Friends and Family: Not on file    Attends Holiness Services: Not on file    Active Member of Clubs or Organizations: Not on file    Attends Club or Organization Meetings: Not on file    Marital Status: Not on file   Intimate Partner Violence:     Fear of Current or Ex-Partner: Not on file    Emotionally Abused: Not on file    Physically Abused: Not on file    Sexually Abused: Not on file   Housing Stability:     Unable to Pay for Housing in the Last Year: Not on file    Number of [place] in the Last Year: Not on file    Unstable Housing in the Last Year: Not on file         Family History:       Problem Relation Age of Onset    Heart Disease Mother     Cancer Father     Arthritis Other     Cancer Other     Depression Other     Heart Disease Other     Hypertension Other     Mental Illness Other     Stroke Other        REVIEW OF SYSTEMS:    General ROS: negative  Hematological and Lymphatic ROS: negative  Endocrine ROS: negative  Respiratory ROS: no cough,  wheezing  or shortness of breath,   Cardiovascular ROS: no chest pain or dyspnea on exertion  Gastrointestinal ROS: no abdominal pain, change in bowel habits, or black or bloody stools  Genito-Urinary ROS: no dysuria, trouble voiding, or hematuria  Neurological ROS: no TIA or stroke symptoms  negative    Vitals:  BP (!) 151/70   Pulse 84   Temp 98 °F (36.7 °C) (Oral)   Resp 17   LMP 08/31/1988   SpO2 97%     PHYSICAL EXAM:  General:  Awake, alert, oriented X 3. Well developed, well nourished, well groomed. No apparent distress. HEENT:  Normocephalic, atraumatic. Pupils equal, round, reactive to light. No scleral icterus. No conjunctival injection. Neck:  Supple, no carotid bruits  Heart:  RRR,   Lungs:  CTA bilaterally, bilat symmetrical expansion, no wheeze, rales, or rhonchi  Abdomen:   Bowel sounds present, soft, nontender, no masses, no organomegaly, no peritoneal signs  Extremities:  No clubbing, cyanosis, or edema  Skin:  Warm and dry, no open lesions or rash  Neuro:  Cranial nerves 2-12 intact, no focal deficits  Right arm is in a cast    DATA:     No results found for this or any previous visit (from the past 24 hour(s)). XR ANKLE RIGHT (2 VIEWS)   Final Result   No acute displaced fracture. Medial ankle soft tissue swelling. XR TIBIA FIBULA RIGHT (2 VIEWS)   Final Result   No acute displaced fracture. Medial ankle soft tissue swelling. XR WRIST RIGHT (2 VIEWS)   Final Result   Comminuted fracture of the distal radius, with suspected intra-articular   extension. Evaluation limited due to overlying splint material.         XR HUMERUS RIGHT (MIN 2 VIEWS)   Final Result   Comminuted fracture of the distal radius, with suspected intra-articular   extension. Evaluation limited due to overlying splint material.         XR SHOULDER RIGHT (MIN 2 VIEWS)   Final Result   Comminuted fracture of the distal radius, with suspected intra-articular   extension. Evaluation limited due to overlying splint material.         XR TIBIA FIBULA LEFT (2 VIEWS)   Final Result   No acute displaced fracture. Medial ankle soft tissue swelling. XR HIP BILATERAL W AP PELVIS (2 VIEWS)   Final Result   No acute abnormality of the hip. XR HAND RIGHT (2 VIEWS)   Final Result   Comminuted fracture of the distal radius, with suspected intra-articular   extension. Evaluation limited due to overlying splint material.         XR ELBOW RIGHT (2 VIEWS)   Final Result   Comminuted fracture of the distal radius, with suspected intra-articular   extension. Evaluation limited due to overlying splint material.         XR FEMUR RIGHT (MIN 2 VIEWS)   Final Result   No acute displaced fracture. Medial ankle soft tissue swelling. XR FEMUR LEFT (MIN 2 VIEWS)   Final Result   No acute displaced fracture. Medial ankle soft tissue swelling. XR CHEST PORTABLE   Final Result   No acute cardiopulmonary abnormality. XR ANKLE LEFT (2 VIEWS)   Final Result   No acute displaced fracture. Medial ankle soft tissue swelling.          CT Head WO Contrast   Final Result   No acute intracranial abnormality. No acute cervical spine fracture or traumatic malalignment. CT Cervical Spine WO Contrast   Final Result   No acute intracranial abnormality. No acute cervical spine fracture or traumatic malalignment. CT LUMBAR SPINE WO CONTRAST   Final Result   No acute lumbar spine fracture or traumatic malalignment. Lumbar spondylosis. ASSESSMENT :      Active Problems:    Failure to thrive in adult  Resolved Problems:    * No resolved hospital problems. *    Morbid obesity    History of anxiety  Underlying history of asthma well-controlled  Chronic back pain  Bipolar disorder  Spinal cord stimulator in situ  History of mitochondrial cytopathy  Obstructive sleep apnea  History of seizures  Hypothyroidism      Plan :    PT OT  Ortho consult  pain control  Social work regarding nursing home placement      Electronically signed by Lisa Messina MD on 1/24/2022 at 3:52 PM    NOTE: This report was transcribed using voice recognition software.  Every effort was made to ensure accuracy; however, inadvertent transcription errors may be present

## 2022-01-25 ENCOUNTER — TELEPHONE (OUTPATIENT)
Dept: ORTHOPEDIC SURGERY | Age: 62
End: 2022-01-25

## 2022-01-25 VITALS
TEMPERATURE: 98.7 F | SYSTOLIC BLOOD PRESSURE: 132 MMHG | OXYGEN SATURATION: 96 % | DIASTOLIC BLOOD PRESSURE: 63 MMHG | HEART RATE: 69 BPM | RESPIRATION RATE: 15 BRPM

## 2022-01-25 LAB — SARS-COV-2, NAAT: NOT DETECTED

## 2022-01-25 PROCEDURE — 97165 OT EVAL LOW COMPLEX 30 MIN: CPT

## 2022-01-25 PROCEDURE — 96372 THER/PROPH/DIAG INJ SC/IM: CPT

## 2022-01-25 PROCEDURE — 87635 SARS-COV-2 COVID-19 AMP PRB: CPT

## 2022-01-25 PROCEDURE — G0378 HOSPITAL OBSERVATION PER HR: HCPCS

## 2022-01-25 PROCEDURE — 2580000003 HC RX 258: Performed by: INTERNAL MEDICINE

## 2022-01-25 PROCEDURE — 6360000002 HC RX W HCPCS: Performed by: INTERNAL MEDICINE

## 2022-01-25 PROCEDURE — 2700000000 HC OXYGEN THERAPY PER DAY

## 2022-01-25 PROCEDURE — 6370000000 HC RX 637 (ALT 250 FOR IP): Performed by: INTERNAL MEDICINE

## 2022-01-25 PROCEDURE — 97535 SELF CARE MNGMENT TRAINING: CPT

## 2022-01-25 PROCEDURE — 94640 AIRWAY INHALATION TREATMENT: CPT

## 2022-01-25 RX ORDER — FUROSEMIDE 40 MG/1
TABLET ORAL
Status: DISCONTINUED
Start: 2022-01-25 | End: 2022-01-26 | Stop reason: HOSPADM

## 2022-01-25 RX ADMIN — TOPIRAMATE 200 MG: 100 TABLET, FILM COATED ORAL at 09:27

## 2022-01-25 RX ADMIN — GABAPENTIN 600 MG: 600 TABLET, FILM COATED ORAL at 12:28

## 2022-01-25 RX ADMIN — ACETAMINOPHEN 650 MG: 325 TABLET ORAL at 18:40

## 2022-01-25 RX ADMIN — ESCITALOPRAM 20 MG: 10 TABLET, FILM COATED ORAL at 09:27

## 2022-01-25 RX ADMIN — BACLOFEN 20 MG: 10 TABLET ORAL at 21:00

## 2022-01-25 RX ADMIN — METOPROLOL SUCCINATE 12.5 MG: 25 TABLET, EXTENDED RELEASE ORAL at 09:28

## 2022-01-25 RX ADMIN — SODIUM CHLORIDE, PRESERVATIVE FREE 10 ML: 5 INJECTION INTRAVENOUS at 09:35

## 2022-01-25 RX ADMIN — LEVOCARNITINE 330 MG: 330 TABLET ORAL at 21:03

## 2022-01-25 RX ADMIN — ZIPRASIDONE HYDROCHLORIDE 20 MG: 20 CAPSULE ORAL at 21:02

## 2022-01-25 RX ADMIN — DOCUSATE SODIUM 100 MG: 100 CAPSULE ORAL at 09:28

## 2022-01-25 RX ADMIN — ALBUTEROL SULFATE 2.5 MG: 2.5 SOLUTION RESPIRATORY (INHALATION) at 13:14

## 2022-01-25 RX ADMIN — GABAPENTIN 600 MG: 600 TABLET, FILM COATED ORAL at 17:40

## 2022-01-25 RX ADMIN — ONDANSETRON 4 MG: 4 TABLET, ORALLY DISINTEGRATING ORAL at 22:30

## 2022-01-25 RX ADMIN — METHOCARBAMOL TABLETS 500 MG: 500 TABLET, COATED ORAL at 12:28

## 2022-01-25 RX ADMIN — OXYCODONE AND ACETAMINOPHEN 1 TABLET: 10; 325 TABLET ORAL at 06:05

## 2022-01-25 RX ADMIN — OXYCODONE AND ACETAMINOPHEN 1 TABLET: 10; 325 TABLET ORAL at 14:24

## 2022-01-25 RX ADMIN — MONTELUKAST SODIUM 10 MG: 10 TABLET ORAL at 21:02

## 2022-01-25 RX ADMIN — PANTOPRAZOLE SODIUM 40 MG: 40 TABLET, DELAYED RELEASE ORAL at 06:06

## 2022-01-25 RX ADMIN — METHOCARBAMOL TABLETS 500 MG: 500 TABLET, COATED ORAL at 17:40

## 2022-01-25 RX ADMIN — ALBUTEROL SULFATE 2.5 MG: 2.5 SOLUTION RESPIRATORY (INHALATION) at 18:55

## 2022-01-25 RX ADMIN — ALBUTEROL SULFATE 2.5 MG: 2.5 SOLUTION RESPIRATORY (INHALATION) at 07:50

## 2022-01-25 RX ADMIN — ENOXAPARIN SODIUM 40 MG: 100 INJECTION SUBCUTANEOUS at 09:27

## 2022-01-25 RX ADMIN — POTASSIUM CHLORIDE 10 MEQ: 1500 TABLET, EXTENDED RELEASE ORAL at 09:28

## 2022-01-25 RX ADMIN — Medication 200 MG: at 09:27

## 2022-01-25 RX ADMIN — METHOCARBAMOL TABLETS 500 MG: 500 TABLET, COATED ORAL at 21:00

## 2022-01-25 RX ADMIN — GABAPENTIN 600 MG: 600 TABLET, FILM COATED ORAL at 21:00

## 2022-01-25 RX ADMIN — ALLOPURINOL 200 MG: 100 TABLET ORAL at 21:01

## 2022-01-25 RX ADMIN — LEVOCARNITINE 330 MG: 330 TABLET ORAL at 09:35

## 2022-01-25 RX ADMIN — BACLOFEN 20 MG: 10 TABLET ORAL at 12:28

## 2022-01-25 RX ADMIN — FUROSEMIDE 40 MG: 40 TABLET ORAL at 19:07

## 2022-01-25 RX ADMIN — BACLOFEN 20 MG: 10 TABLET ORAL at 17:41

## 2022-01-25 RX ADMIN — BACLOFEN 20 MG: 10 TABLET ORAL at 09:35

## 2022-01-25 RX ADMIN — TOPIRAMATE 200 MG: 100 TABLET, FILM COATED ORAL at 21:01

## 2022-01-25 RX ADMIN — GABAPENTIN 600 MG: 600 TABLET, FILM COATED ORAL at 09:28

## 2022-01-25 RX ADMIN — DOCUSATE SODIUM 100 MG: 100 CAPSULE ORAL at 21:00

## 2022-01-25 RX ADMIN — LEVOCARNITINE 330 MG: 330 TABLET ORAL at 14:25

## 2022-01-25 RX ADMIN — METHOCARBAMOL TABLETS 500 MG: 500 TABLET, COATED ORAL at 09:28

## 2022-01-25 RX ADMIN — FUROSEMIDE 40 MG: 40 TABLET ORAL at 09:28

## 2022-01-25 RX ADMIN — Medication 200 MG: at 21:05

## 2022-01-25 RX ADMIN — ALLOPURINOL 200 MG: 100 TABLET ORAL at 09:28

## 2022-01-25 ASSESSMENT — PAIN SCALES - GENERAL
PAINLEVEL_OUTOF10: 7
PAINLEVEL_OUTOF10: 5
PAINLEVEL_OUTOF10: 7
PAINLEVEL_OUTOF10: 7

## 2022-01-25 NOTE — PROGRESS NOTES
DO Tara    allopurinol (ZYLOPRIM) tablet 200 mg, 200 mg, Oral, BID, Dori Pacheco DO, 200 mg at 01/25/22 4745    docusate sodium (COLACE) capsule 100 mg, 100 mg, Oral, BID, Dori Pacheco DO, 100 mg at 01/25/22 6792    escitalopram (LEXAPRO) tablet 20 mg, 20 mg, Oral, Daily, Dori Pacheco DO, 20 mg at 01/25/22 3426    furosemide (LASIX) tablet 40 mg, 40 mg, Oral, BID, Dori Pacheco DO, 40 mg at 01/25/22 9054    gabapentin (NEURONTIN) tablet 600 mg, 600 mg, Oral, 4x Daily, Dori Pacheco DO, 600 mg at 01/25/22 1228    levOCARNitine (CARNITOR) tablet 330 mg, 330 mg, Oral, TID, Dori Pacheco DO, 330 mg at 01/25/22 0935    linaclotide (LINZESS) capsule 290 mcg, 290 mcg, Oral, QAM AC, Dori Pacheco DO    magnesium oxide (MAG-OX) tablet 200 mg, 200 mg, Oral, BID, Dori Pacheco DO, 200 mg at 01/25/22 0290    metoprolol succinate (TOPROL XL) extended release tablet 12.5 mg, 12.5 mg, Oral, Daily, Dori Pacheco DO, 12.5 mg at 01/25/22 0565    montelukast (SINGULAIR) tablet 10 mg, 10 mg, Oral, Daily, Dori Pacheco DO, 10 mg at 01/24/22 2042    pantoprazole (PROTONIX) tablet 40 mg, 40 mg, Oral, Daily, Dori Pacheco DO, 40 mg at 01/25/22 0606    oxyCODONE-acetaminophen (PERCOCET)  MG per tablet 1 tablet, 1 tablet, Oral, Q8H PRN, Dori Pacheco DO, 1 tablet at 01/25/22 0605    potassium chloride (KLOR-CON M) extended release tablet 10 mEq, 10 mEq, Oral, Daily, Dori Pacheco DO, 10 mEq at 01/25/22 9734    topiramate (TOPAMAX) tablet 200 mg, 200 mg, Oral, BID, Dori Pacheco DO, 200 mg at 01/25/22 4918    traZODone (DESYREL) tablet 100 mg, 100 mg, Oral, Nightly, Dori Pacheco DO, 100 mg at 01/24/22 2044    ziprasidone (GEODON) capsule 20 mg, 20 mg, Oral, Nightly, Dori Pacheco DO, 20 mg at 01/24/22 2044    sodium chloride flush 0.9 % injection 5-40 mL, 5-40 mL, IntraVENous, 2 times per day, Dori Pacheco DO, 10 mL at 01/25/22 0991    sodium chloride flush 0.9 % injection 5-40 mL, 5-40 mL, IntraVENous, PRN, Petra Pear Malmer, DO    0.9 % sodium chloride infusion, 25 mL, IntraVENous, PRN, Petra Pear Malmer, DO    enoxaparin (LOVENOX) injection 40 mg, 40 mg, SubCUTAneous, Daily, Petra Adi Malmer, DO, 40 mg at 01/25/22 3479    ondansetron (ZOFRAN-ODT) disintegrating tablet 4 mg, 4 mg, Oral, Q8H PRN **OR** ondansetron (ZOFRAN) injection 4 mg, 4 mg, IntraVENous, Q6H PRN, Petra Pear Malmer, DO    polyethylene glycol (GLYCOLAX) packet 17 g, 17 g, Oral, Daily PRN, Petra Pear Malmer, DO    acetaminophen (TYLENOL) tablet 650 mg, 650 mg, Oral, Q6H PRN **OR** acetaminophen (TYLENOL) suppository 650 mg, 650 mg, Rectal, Q6H PRN, Petra Adi Sosamer, DO    ADULT DIET; Regular    XR ANKLE RIGHT (2 VIEWS)   Final Result   No acute displaced fracture. Medial ankle soft tissue swelling. XR TIBIA FIBULA RIGHT (2 VIEWS)   Final Result   No acute displaced fracture. Medial ankle soft tissue swelling. XR WRIST RIGHT (2 VIEWS)   Final Result   Comminuted fracture of the distal radius, with suspected intra-articular   extension. Evaluation limited due to overlying splint material.         XR HUMERUS RIGHT (MIN 2 VIEWS)   Final Result   Comminuted fracture of the distal radius, with suspected intra-articular   extension. Evaluation limited due to overlying splint material.         XR SHOULDER RIGHT (MIN 2 VIEWS)   Final Result   Comminuted fracture of the distal radius, with suspected intra-articular   extension. Evaluation limited due to overlying splint material.         XR TIBIA FIBULA LEFT (2 VIEWS)   Final Result   No acute displaced fracture. Medial ankle soft tissue swelling. XR HIP BILATERAL W AP PELVIS (2 VIEWS)   Final Result   No acute abnormality of the hip. XR HAND RIGHT (2 VIEWS)   Final Result   Comminuted fracture of the distal radius, with suspected intra-articular   extension.   Evaluation limited due to overlying splint material.         XR ELBOW RIGHT (2 VIEWS)   Final Result   Comminuted fracture of the distal radius, with suspected intra-articular   extension. Evaluation limited due to overlying splint material.         XR FEMUR RIGHT (MIN 2 VIEWS)   Final Result   No acute displaced fracture. Medial ankle soft tissue swelling. XR FEMUR LEFT (MIN 2 VIEWS)   Final Result   No acute displaced fracture. Medial ankle soft tissue swelling. XR CHEST PORTABLE   Final Result   No acute cardiopulmonary abnormality. XR ANKLE LEFT (2 VIEWS)   Final Result   No acute displaced fracture. Medial ankle soft tissue swelling. CT Head WO Contrast   Final Result   No acute intracranial abnormality. No acute cervical spine fracture or traumatic malalignment. CT Cervical Spine WO Contrast   Final Result   No acute intracranial abnormality. No acute cervical spine fracture or traumatic malalignment. CT LUMBAR SPINE WO CONTRAST   Final Result   No acute lumbar spine fracture or traumatic malalignment. Lumbar spondylosis. Assessment:    Active Problems:    Failure to thrive in adult  Resolved Problems:    * No resolved hospital problems. *  Falls with right distal radius fracture  Morbid obesity  Mitochondrial cytopathy  Obstructive sleep apnea  Hypertension  Hypothyroidism  Asthma under good control  Anxiety    Plan: For cast replacement  For subacute rehab soon        Jose L Krishnamurthy MD  1:13 PM  1/25/2022    NOTE: This report was transcribed using voice recognition software.  Every effort was made to ensure accuracy; however, inadvertent transcription errors may be present

## 2022-01-25 NOTE — PROGRESS NOTES
Patient okay for DC from ortho standpoint. Follow up in office in 1 week. NWB to RUE, maintain splint.

## 2022-01-25 NOTE — PROGRESS NOTES
Occupational Therapy  OCCUPATIONAL THERAPY INITIAL EVALUATION    [facility] [address] [facility] [address] [facility]  [address] [facility], [address] [facility] [address]                                                Patient Name: Nola Vargas  MRN: [id number]  : 1960  Room: [facility] [place] [address] [id number]    Referring Provider: Maria Eugenia Loredo DO  Specific Provider Orders/Date: OT eval and treat 22    Diagnosis: Failure to thrive in adult [R62.7]  Leg pain, bilateral [M79.604, M79.605]  Fall, initial encounter [W19. XXXA]  Other closed intra-articular fracture of distal end of right radius with routine healing, subsequent encounter [S52.571D]  Acute pain of both shoulders [M25.511, M25.512]   · Closed right distal radius fracture, subsequent encounter  · Grade 1 right ankle sprain      Pertinent Medical History:  has a past medical history of Adenoma of right adrenal gland, Anemia, Anxiety, Arthritis, Asthma, Benign essential tremor, Bipolar affective ([address] Chronic back pain, Chronic respiratory failure with hypoxia ([address] Depression, Difficulty swallowing, Environmental and seasonal allergies, Excessive physiologic tremor, Fatty liver, Full dentures, GERD (gastroesophageal reflux disease), Gout, Herniated cervical disc, History of swelling of feet, Lymphedema of lower extremity, Mitochondrial cytopathy ([address] Neuropathy, Obesity, PETR (obstructive sleep apnea), PONV (postoperative nausea and vomiting), Seizures ([address] Spinal headache, and Thyroid disease.        Precautions:  Fall Risk, NWB R UE (splint), WBAT R LE, O2, bed alarm    Assessment of current deficits   [x] Functional mobility  [x]ADLs  [x] Strength               []Cognition    [x] Functional transfers   [x] IADLs         [x] Safety Awareness   [x]Endurance    [] Fine Coordination              [x] Balance      [] Vision/perception   []Sensation     []Gross Motor Coordination  [] ROM  [] Delirium [] Motor Control     OT PLAN OF CARE   OT POC based on physician orders, patient diagnosis and results of clinical assessment    Frequency/Duration 1-3 days/wk for 2 weeks PRN   Specific OT Treatment Interventions to include:   * Instruction/training on adapted ADL techniques and AE recommendations to increase functional independence within precautions       * Training on energy conservation strategies, correct breathing pattern and techniques to improve independence/tolerance for self-care routine  * Functional transfer/mobility training/DME recommendations for increased independence, safety, and fall prevention  * Patient/Family education to increase follow through with safety techniques and functional independence  * Recommendation of environmental modifications for increased safety with functional transfers/mobility and ADLs  * Splinting/positioning for increased function, prevention of contractures, and improve skin integrity  * Therapeutic exercise to improve motor endurance, ROM, and functional strength for ADLs/functional transfers  * Therapeutic activities to facilitate/challenge dynamic balance, stand tolerance for increased safety and independence with ADLs  * Therapeutic activities to facilitate gross/fine motor skills for increased independence with ADLs      Recommended Adaptive Equipment: AE for LB self-care tasks, TBD for additional AE    Home Living: Pt lives alone in 2nd floor apt. Elevator access.  0 DANYELL    Bathroom setup: walk in shower with grab bars and shower chair    Equipment owned: platform w/w    Prior Level of Function: independent/mod I with ADLs , independent/mod I with IADLs; ambulated w/ platform w/w recently, no AD prior to recent falls   Driving: no - friends or public transportation assist    Pain Level: Pt c/o 7/10 R UE/LE pain this session ; reinforced management strategies    Cognition: A&O: 4/4; Follows 1-2 step directions   Memory:  good    Sequencing:  good Problem solving:  fair    Judgement/safety:  fair      Functional Assessment:  AM-PAC Daily Activity Raw Score: 15/24   Initial Eval Status  Date: 1/25/22 Treatment Status  Date: STGs = LTGs  Time frame: 10-14 days   Feeding Stand by Assist      Grooming Stand by Assist   Seated position  Modified [place]    UB Dressing Minimal Assist   Gown  Modified [place]    LB Dressing Maximal Assist   Minimal Assist    Bathing Moderate Assist  Simulated  Stand by Assist    Toileting Moderate Assist   Stand by Assist    Bed Mobility  Supine to sit: Stand by Assist   Sit to supine: Minimal Assist   Supine to sit: Modified [place]   Sit to supine: Modified [place]    Functional Transfers Sit><stand: Minimal Assist   Stand pivot transfers: Min A  Modified [place]    Functional Mobility NT  Secondary to dizziness  Stand by Assist    Balance Sitting:     Static:  Supervision    Dynamic:SBA  Standing: Min A     Activity Tolerance Fair  Good   Visual/  Perceptual Glasses: no                  Hand Dominance R   AROM (PROM) Strength Additional Info:    RUE  WFL Proximal: deferred  R fingers AROM: WFL good  and wfl FMC/dexterity noted during ADL tasks       LUE WFL 4/5 good  and wfl FMC/dexterity noted during ADL tasks       Hearing: [facility]   Sensation:  c/o numbness  R LE, R fingers 3-5  Tone: WFL  Edema: R UE    Comments: Upon arrival patient lying in bed. Pt agreeable to OT session this date. Therapist educated pt on role of OT. At end of session, patient lying in bed (bed alarm on, R UE elevated) with call light and phone within reach, all lines and tubes intact. Overall patient demonstrated decreased independence and safety during completion of ADL/functional transfer/mobility tasks. Pt would benefit from continued skilled OT to increase safety and independence with completion of ADL/IADL tasks for functional independence and quality of life.     Treatment: OT treatment provided this date includes: Educated pt on R UE precautions prior to and during activity. Facilitation of bed mobility (education/cues for body mechanics, R UE NWB. Pt c/o dizziness upon sitting EOB - safety reinforced during all positional changes, decreased w/ rest break), unsupported sitting balance (addressing posture, weight shifting, dynamic reaching to prep for ADL's), functional transfers (various surfaces w/ education/cues for safety/hand placement. Pt continued to c/o mild dizziness w/ transfers - safety reinforced) and standing tolerance tasks (addressing posture, balance, R UE NWB and activity tolerance while incorporating light functional reaching impacting ADLs and functional activity). Therapist facilitated self-care retraining: UB/LB self-care tasks, simulated toileting task and standing grooming tasks while educating pt on modified techniques, posture, safety and energy conservation techniques. Skilled monitoring of HR, O2 sats and pts response to treatment. Rehab Potential: Good for established goals     Patient / Family Goal: to increase independence      Patient and/or family were instructed on functional diagnosis, prognosis/goals and OT plan of care. Demonstrated fair understanding. Eval Complexity: Low    Time In: 10:00  Time Out: 10:25  Total Treatment Time: 9 minutes    Min Units   OT Eval Low 97165  X  1   OT Eval Medium 21883      OT Eval High 06059      OT Re-Eval [id number]       Therapeutic Ex 25448       Therapeutic Activities 72874       ADL/Self Care 31608  9  1   Orthotic Management 72768       Manual [id number]     Neuro Re-Ed 14760       Non-Billable Time          Evaluation Time additionally includes thorough review of current medical information, gathering information on past medical history/social history and prior level of function, interpretation of standardized testing/informal observation of tasks, assessment of data and development of plan of care and goals.         Ty Lees, OTR/L #[id number] Detail Level: Simple Add 62306 Cpt? (Important Note: In 2017 The Use Of 23989 Is Being Tracked By Cms To Determine Future Global Period Reimbursement For Global Periods): no Wound Evaluated By: Antonio Mcmahon PA-C Additional Comments: Site cleansed with hibaclens and sterile saline, mupirocin and telfa Coban applied

## 2022-01-25 NOTE — CONSULTS
Department of Orthopedic Surgery  Resident Consult Note          Reason for Consult: Subacute right distal radius fracture    HISTORY OF PRESENT ILLNESS:       Patient is a 64 y.o. female who presents after a fall from standing. Patient was evaluated by orthopedics approximately 2 weeks ago when she had another fall out of bed at home. She suffered a closed right distal radius fracture, and underwent closed reduction and splinting in the ED. Patient previously was seen by an orthopedic provider at the [facility] clinic, and since she was to follow-up with them for an issue with her prior femoral shaft fracture, at that time she had plan on following up for her distal radius fracture with them as well. She states that she was not able to get a follow-up appointment for her wrist with the [facility] clinic, so she scheduled an appointment with Dr. Sinai Chawla instead, however her appointment was today and she missed it because she was hospitalized. States that when she fell she does think she hit her right wrist, she states she also broke her right ankle, however x-rays were reviewed and were negative. Denies numbness/tingling/paresthesias. Denies any other orthopedic complaints at this time.       Past Medical History:        Diagnosis Date    Adenoma of right adrenal gland     Anemia     Anxiety     Arthritis     spinal    Asthma     controlled with inhalers     Benign essential tremor     Bipolar affective (HCC)     Chronic back pain     Spinal cord stimulator in place    Chronic respiratory failure with hypoxia (HCC)     at times dt diaphragm does not function properly dt Mitochondrial disorder    Depression     stable    Difficulty swallowing     for EGD 10-8-19     Environmental and seasonal allergies     Excessive physiologic tremor 5/24/2021    Fatty liver     Full dentures     GERD (gastroesophageal reflux disease)     Gout     past hx of    Herniated cervical disc     limited rom of head and neck    History of swelling of feet     Lymphedema of lower extremity 09/06/2012    Mitochondrial cytopathy (HCC)     s/s muscle and nerve pain, difficulty breathing, seizures, difficulty swallowing, digestive disorders- Dr. Jackelin Rodriguez [facility]    Neuropathy     at feet    Obesity     PETR (obstructive sleep apnea)     no CPAP dt insurance will not pay for    PONV (postoperative nausea and vomiting)     Seizures ([facility] [address]     last approx 6-13-19- f/u w/ PCP  Granmal, flares up in heat/ summer time - no recent issues as of 10-4-19     Spinal headache     Thyroid disease      Past Surgical History:        Procedure Laterality Date    APPENDECTOMY      with Hysterectomy / unknown    BACK SURGERY  last one 1995    lumbar x 2    CARDIAC CATHETERIZATION Right 6-6-2013    Dr. Eliana Garrett, no stents placed, no blockages    [address]    open with gastric bypass    COLONOSCOPY N/A 9/18/2018    COLONOSCOPY POLYPECTOMY HOT BIOPSY performed by CHRISTOPHE Graves MD at [address] [facility] COLONOSCOPY N/A 10/8/2019    COLONOSCOPY POLYPECTOMY SNARE/COLD BIOPSY performed by Cale Fodr MD at [address] [facility] EGD COLONOSCOPY N/A 10/8/2019    EGD ESOPHAGOGASTRODUODENOSCOPY DILATATION performed by CHRISTOPHE Graves MD at [address] [facility], COLON, DIAGNOSTIC      ESOPHAGEAL DILATATION  9/18/2018    ESOPHAGEAL DILATION [place] performed by Cale Ford MD at [address] [facility] Bilateral 2007,2017    knees    KNEE SURGERY Bilateral     scope    MYOMECTOMY      NERVE BLOCK Left 10 02 2013    lumbar paravertebral facet #2    NERVE BLOCK Left 10 9 13    hip inj #1    NERVE BLOCK Left 10/16/13    hip injection    NERVE BLOCK Left 10/29/2014    left lumbar transforaminal nerve lbock  #1    NERVE BLOCK Left 11/12/2014    lumbar left transforaminal nerve block  #2    NERVE BLOCK Left 12 8 14    lumbar transforaminal #3    NERVE BLOCK Right 3/30/15    cervical transforaminal #1    NERVE BLOCK Right 4/6/2015    right cervical transforaminal nerve block  #2    NERVE BLOCK Right 4/13/15    cervical transforaminal #3    NERVE BLOCK Left 7/6/15    knee injection #1    NERVE BLOCK  07/20/15    left genicular nerve block/knee #2    NERVE BLOCK Left 10 1 15    lumb transforam #1    NERVE BLOCK  10/26/15    left lumbar transforaminal nerve block #3    NERVE BLOCK Bilateral 4/1/2021    #1 BILATERAL SACROILIAC JOINT INJECTION UNDER FLUORO performed by Heath Danielle MD at [facility] OTHER SURGICAL HISTORY Left 11 25 13    lumbar radiofreq    OTHER SURGICAL HISTORY  3/28/2016    stage 1, 3 day percutanous trial [employer] lumbar spinal cord stimulator    OTHER SURGICAL HISTORY  1995    racz procedure / lower back    [place] COLONOSCOPY FLX DX W/COLLJ SPEC WHEN PFRMD N/A 3/20/2018    COLONOSCOPY DIAGNOSTIC OR SCREENING performed by Eddy Lepe MD at [address] [place] [address] EGD TRANSORAL BIOPSY SINGLE/MULTIPLE N/A 3/20/2018    EGD BIOPSY performed by Eddy Lepe MD at [address] [place] [address]       Current Medications:   Current Facility-Administered Medications: methocarbamol (ROBAXIN) tablet 500 mg, 500 mg, Oral, 4x Daily  baclofen (LIORESAL) tablet 20 mg, 20 mg, Oral, 4x Daily  albuterol (PROVENTIL) nebulizer solution 2.5 mg, 2.5 mg, Nebulization, TID  albuterol (PROVENTIL) nebulizer solution 2.5 mg, 2.5 mg, Nebulization, Q4H PRN  allopurinol (ZYLOPRIM) tablet 200 mg, 200 mg, Oral, BID  docusate sodium (COLACE) capsule 100 mg, 100 mg, Oral, BID  escitalopram (LEXAPRO) tablet 20 mg, 20 mg, Oral, Daily  furosemide (LASIX) tablet 40 mg, 40 mg, Oral, BID  gabapentin (NEURONTIN) tablet 600 mg, 600 mg, Oral, 4x Daily  levOCARNitine (CARNITOR) tablet 330 mg, 330 mg, Oral, TID  linaclotide (LINZESS) capsule 290 mcg, 290 mcg, Oral, QAM AC  magnesium oxide (MAG-OX) tablet 200 mg, 200 mg, Oral, BID  metoprolol succinate (TOPROL XL) extended release tablet 12.5 mg, 12.5 mg, Oral, Daily  montelukast (SINGULAIR) tablet 10 mg, 10 mg, Oral, Daily  pantoprazole (PROTONIX) tablet 40 mg, 40 mg, Oral, Daily  oxyCODONE-acetaminophen (PERCOCET)  MG per tablet 1 tablet, 1 tablet, Oral, Q8H PRN  potassium chloride (KLOR-CON M) extended release tablet 10 mEq, 10 mEq, Oral, Daily  topiramate (TOPAMAX) tablet 200 mg, 200 mg, Oral, BID  traZODone (DESYREL) tablet 100 mg, 100 mg, Oral, Nightly  ziprasidone (GEODON) capsule 20 mg, 20 mg, Oral, Nightly  sodium chloride flush 0.9 % injection 5-40 mL, 5-40 mL, IntraVENous, 2 times per day  sodium chloride flush 0.9 % injection 5-40 mL, 5-40 mL, IntraVENous, PRN  0.9 % sodium chloride infusion, 25 mL, IntraVENous, PRN  enoxaparin (LOVENOX) injection 40 mg, 40 mg, SubCUTAneous, Daily  ondansetron (ZOFRAN-ODT) disintegrating tablet 4 mg, 4 mg, Oral, Q8H PRN **OR** ondansetron (ZOFRAN) injection 4 mg, 4 mg, IntraVENous, Q6H PRN  polyethylene glycol (GLYCOLAX) packet 17 g, 17 g, Oral, Daily PRN  acetaminophen (TYLENOL) tablet 650 mg, 650 mg, Oral, Q6H PRN **OR** acetaminophen (TYLENOL) suppository 650 mg, 650 mg, Rectal, Q6H PRN  Allergies:  Bee pollen, Penicillins, Ropinirole hcl, Vistaril [hydroxyzine hcl], Aripiprazole, Hydroxyzine pamoate, and Tape [adhesive tape]    Social History:   TOBACCO:   reports that she has been smoking cigarettes. She started smoking about 31 years ago. She has a 31.50 pack-year smoking history. She has never used smokeless tobacco.  ETOH:   reports no history of alcohol use. DRUGS:   reports current drug use. Drug: Marijuana Mayank Chau).   ACTIVITIES OF DAILY LIVING:    OCCUPATION:    Family History:       Problem Relation Age of Onset    Heart Disease Mother     Cancer Father     Arthritis Other     Cancer Other     Depression Other     Heart Disease Other     Hypertension Other     Mental Illness Other     Stroke Other        REVIEW OF SYSTEMS:  CONSTITUTIONAL:  negative for  fevers, chills  EYES:  negative for blurred vision, visual disturbance  HEENT:  negative for  hearing loss, voice change  RESPIRATORY:  negative for  dyspnea, wheezing  CARDIOVASCULAR:  negative for  chest pain, palpitations  GASTROINTESTINAL:  negative for nausea, vomiting  GENITOURINARY:  negative for frequency, urinary incontinence  HEMATOLOGIC/LYMPHATIC:  negative for bleeding and petechiae  MUSCULOSKELETAL:  positive for fall  NEUROLOGICAL:  negative for headaches, dizziness  BEHAVIOR/PSYCH:  negative for increased agitation and anxiety    PHYSICAL EXAM:    VITALS:  BP (!) 151/70   Pulse 84   Temp 98 °F (36.7 °C) (Oral)   Resp 17   LMP 08/31/1988   SpO2 97%   CONSTITUTIONAL:  awake, alert, cooperative, no apparent distress, and appears stated age  MUSCULOSKELETAL:  Right upper Extremity:  · Splint is maintained, C/D/I  · Patient is able to wiggle all fingers, motor intact AIN/PIN/ulnar  · Compartments of the hand and arm are soft and compressible  · Sensation to light touch is grossly intact in the terminal median, ulnar, and radial distributions    Secondary Exam:   · leftUE: No obvious signs of trauma. -TTP to fingers, hand, wrist, forearm, elbow, humerus, shoulder or clavicle. -- Patient able to flex/extend fingers, wrist, elbow and shoulder with active and passive ROM without pain, +2/4 Radial pulse, cap refill <3sec, +AIN/PIN/Radial/Ulnar/Median N, distal sensation grossly intact to C4-T1 dermatomes, compartments soft and compressible.     Right lower extremity:   · Skin intact circumferentially  · Compartments are soft compressible  · Mild TTP about the right ankle  · Sensation light touch is grossly intact in the sural, saphenous, tibial, peroneal distribution  · Motor intact DF/PF/EHL      DATA:    CBC:   Lab Results   Component Value Date    WBC 7.9 01/22/2022    RBC 3.42 01/22/2022    HGB 10.7 01/22/2022    HCT 34.4 01/22/2022    .6 01/22/2022    MCH 31.3 01/22/2022    [facility]    RDW 13.2 01/22/2022     01/22/2022    MPV 10.1 01/22/2022     PT/INR:    Lab Results   Component Value Date    PROTIME 9.7 09/23/2020    PROTIME 10.8 05/07/2012    INR 0.9 09/23/2020       Radiology Review:  2 views of the right wrist reviewed. There is a mildly displaced and comminuted distal radius fracture with some shortening. When compared with previous x-rays, there does not appear to be any interval displacement. Splint material is maintained with the wrist molded in slight flexion. 2 views of the right ankle reviewed. Chronic arthritic changes noted. No fractures or dislocations.     IMPRESSION:  · Closed right distal radius fracture, subsequent encounter  · Grade 1 right ankle sprain  PLAN:  · NWB-RUE  · WBAT-RLE  · Patient missed her appointment today because she was hospitalized  · Maintain splint at this time  · No plans for acute orthopedic intervention at this time  · There does not appear to be any interval displacement of her fracture  · Will discuss operative versus nonoperative treatment methods with Dr. Trell Shi, no plans for acute orthopedic intervention at this time  · Pain control per primary

## 2022-01-25 NOTE — CARE COORDINATION
Call Placed to Shane Acevedo regarding [facility]- await determination. PASSR completed and in envelope awaiting LOC- sent documentation this am.Spoke again with patient about BOB awaiting determination from [facility]. Per Shane Acevedo- OK for [facility]. Ambulance form and envelope in soft chart. Will need rapid covid. [facility] set up for 8 pm.Patient updated and she will notify her brother Kellen Laurent. She states she needs her arm casted- charge rn aware. Charge nurse and bedside rn aware of above. Electronically signed by Agata Martínez RN on 1/25/2022 at 11:54 AM    Loc returned. and placed in envelope Electronically signed by Agata Martínez RN on 1/25/2022 at 12:49 PM      Notified brother Kellen Laurent via vm message  per patient request of discharge to [address] and needs for her phone, clothes shoes and walker. Electronically signed by Agata Martínez RN on 1/25/2022 at 2:20 PM    Patient has an appointment with Dr Jessi Sierra Feb 1, 25 @ [phone]. Electronically signed by Agata Martínez RN on 1/25/2022 at 3:14 PM

## 2022-01-25 NOTE — DISCHARGE INSTR - COC
Continuity of Care Form    Patient Name: Nina Meckel   :  1960  MRN:  [id number]    Admit date:  2022  Discharge date:  22    Code Status Order: Full Code   Advance Directives:      Admitting Physician:  Flavia Evans MD  PCP: Perla York MD    Discharging Nurse: [facility] - BATH Unit/Room#: 7115/5592-B  Discharging Unit Phone Number: [phone]    Emergency Contact:   Extended Emergency Contact Information  Primary Emergency Contact: Alecia Paez  Home Phone: [phone]  Mobile Phone: [phone]  Relation: Other  Secondary Emergency Contact: Raymond Mackay Phone: [phone]  Relation: Brother/Sister  Preferred language: English   needed?  No    Past Surgical History:  Past Surgical History:   Procedure Laterality Date    APPENDECTOMY      with Hysterectomy / unknown    BACK SURGERY  last one     lumbar x 2    CARDIAC CATHETERIZATION Right 2013    Dr. Xiomara Juárez Radial, no stents placed, no blockages     CHOLECYSTECTOMY      open with gastric bypass    COLONOSCOPY N/A 2018    COLONOSCOPY POLYPECTOMY HOT BIOPSY performed by Emely Spivey MD at [address] [place] [address] N/A 10/8/2019    COLONOSCOPY POLYPECTOMY SNARE/COLD BIOPSY performed by Emely Spivey MD at [address] [facility]    EGD COLONOSCOPY N/A 10/8/2019    EGD ESOPHAGOGASTRODUODENOSCOPY DILATATION performed by Emely Spivey MD at [facility], DIAGNOSTIC      ESOPHAGEAL DILATATION  2018    ESOPHAGEAL DILATION [place] [facility] performed by Emely Spivey MD at [address] [place] [address] Bilateral ,    knees    KNEE SURGERY Bilateral     scope    MYOMECTOMY      NERVE BLOCK Left 10 2013    lumbar paravertebral facet #2    NERVE BLOCK Left 10 9     hip inj #1    NERVE BLOCK Left 10/16/13    hip injection    NERVE BLOCK Left 10/29/2014    left lumbar transforaminal nerve lbock  #1    NERVE BLOCK Left 11/12/2014    lumbar left transforaminal nerve block  #2    NERVE BLOCK Left 12 8 14    lumbar transforaminal #3    NERVE BLOCK Right 3/30/15    cervical transforaminal #1    NERVE BLOCK Right 4/6/2015    right cervical transforaminal nerve block  #2    NERVE BLOCK Right 4/13/15    cervical transforaminal #3    NERVE BLOCK Left 7/6/15    knee injection #1    NERVE BLOCK  07/20/15    left genicular nerve block/knee #2    NERVE BLOCK Left 10 1 15    lumb transforam #1    NERVE BLOCK  10/26/15    left lumbar transforaminal nerve block #3    NERVE BLOCK Bilateral 4/1/2021    #1 BILATERAL SACROILIAC JOINT INJECTION UNDER FLUORO performed by Bryant Salvador MD at [address] [address] Left 11 25 13    lumbar radiofreq    OTHER SURGICAL HISTORY  3/28/2016    stage 1, 3 day percutanous trial [employer] lumbar spinal cord stimulator    OTHER SURGICAL HISTORY  1995    racz procedure / lower back    [place] COLONOSCOPY FLX DX W/COLLJ SPEC WHEN PFRMD N/A 3/20/2018    COLONOSCOPY DIAGNOSTIC OR SCREENING performed by Patricia Bullock MD at [address] [place] [address] EGD TRANSORAL BIOPSY SINGLE/MULTIPLE N/A 3/20/2018    EGD BIOPSY performed by Patricia Bullock MD at [facility]         Immunization History:   Immunization History   Administered Date(s) Administered    Influenza Virus Vaccine 11/02/2017, 11/02/2017, 02/06/2019, 02/02/2021    Influenza Whole 11/03/2008    Influenza, Quadv, IM, PF (6 mo and older Fluzone, Flulaval, Fluarix, and 3 yrs and older Afluria) 09/01/2018    Pneumococcal Conjugate 13-valent (Vnupkil50) 02/03/2017    Pneumococcal Polysaccharide (Tjpvuhiut15) 11/03/2008, 09/01/2018    Td, unspecified formulation 11/02/2011    Tdap (Boostrix, Adacel) 02/16/2021       Active Problems:  Patient Active Problem List   Diagnosis Code    Debility R53.81    Obesity E66.9    Bipolar 1 disorder ([facility] [address] F31.9    Generalized seizure disorder ([facility] [address] G40.309 Cervicalgia M54.2    Asthma J45.909    Hyperlipidemia E78.5    Hypertension I10    Arthritis M19.90    Lymphedema of lower extremity I89.0    Degenerative Osteoarthritis of both knees M17.0    Status post total knee replacement, right Z96.651    Cervical spondylolysis M43.02    Degenerative Osteoarthritis thoracic spine M47.814    Thoracic facet syndrome M47.894    Cervical facet syndrome M47.812    Facet syndrome, lumbar M47.816    Neural foraminal stenosis of lumbar spine M48.061    Lumbar radiculopathy M54.16    DDD (degenerative disc disease), cervical M50.30    Neural foraminal stenosis of cervical spine M48.02    Protruded cervical disc M50.20    Cervical radiculopathy M54.12    Postlaminectomy syndrome, lumbar M96.1    Neuropathic pain syndrome (non-herpetic) M79.2    Chronic pain G89.29    Chronic respiratory failure with hypoxia (HCC) J96.11    Mitochondrial cytopathy ([facility]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 A    PETR (obstructive sleep apnea) G47.33    Chronic back pain M54.9, G89.29    Bipolar affective (HCC) F31.9    Adenoma of right adrenal gland D35.01    Chest pain R07.9    Lumbosacral spondylosis without myelopathy M47.817    Sacroiliac dysfunction M53.3    DDD (degenerative disc disease), lumbar M51.36    Thoracic degenerative disc disease M51.34    Excessive physiologic tremor R25.1    Rhabdomyolysis M62.82    Failure to thrive in adult R62.7       Isolation/Infection:   Isolation            No Isolation          Patient Infection Status       Infection Onset Added Last Indicated Last Indicated By Review Planned Expiration Resolved Resolved By    None active    Resolved    COVID-19 (Rule Out) 01/23/22 01/23/22 01/23/22 COVID-19, Rapid (Ordered)   01/23/22 Rule-Out Test Resulted    COVID-19 (Rule Out) 01/05/22 01/05/22 01/06/22 COVID-19, Rapid (Ordered)   01/06/22 Rule-Out Test Resulted    COVID-19 (Rule Out) 08/20/21 08/20/21 08/20/21 COVID-19, Rapid (Ordered)   08/20/21 Rule-Out Test Resulted    COVID-19 (Rule Out) 04/25/21 04/25/21 04/25/21 COVID-19, Rapid (Ordered)   04/25/21 Rule-Out Test Resulted    COVID-19 (Rule Out) 02/10/21 02/10/21 02/10/21 COVID-19 (Ordered)   02/11/21 Rule-Out Test Resulted    COVID-19 (Rule Out) 09/17/20 09/17/20 09/17/20 COVID-19 Ambulatory (Ordered)   09/19/20 Rule-Out Test Resulted    COVID-19 (Rule Out) 04/30/20 04/30/20 04/30/20 COVID-19 (Ordered)   05/01/20 Rule-Out Test Resulted    Not detected 4/30/2020            Nurse Assessment:  Last Vital Signs: /63   Pulse 69   Temp 98.7 °F (37.1 °C) (Oral)   Resp 15   LMP 08/31/1988   SpO2 96%     Last documented pain score (0-10 scale): Pain Level: 7  Last Weight:   Wt Readings from Last 1 Encounters:   01/06/22 258 lb 13.1 oz (117.4 kg)     Mental Status:  oriented and alert    IV Access:  - None    Nursing Mobility/ADLs:  Walking   Assisted  Transfer  Assisted  Bathing  Assisted  Dressing  Assisted  Toileting  Assisted  Feeding  Assisted  Med Admin  Assisted  Med Delivery   whole    Wound Care Documentation and Therapy:        Elimination:  Continence: Bowel: YES  Bladder: Yes  Urinary Catheter: None   Colostomy/Ileostomy/Ileal Conduit: No       Date of Last BM: ***    Intake/Output Summary (Last 24 hours) at 1/25/2022 1330  Last data filed at 1/25/2022 0959  Gross per 24 hour   Intake 720 ml   Output --   Net 720 ml     I/O last 3 completed shifts: In: 1140 [P.O.:1140]  Out: -     Safety Concerns:     None    Impairments/Disabilities:      None    Nutrition Therapy:  Current Nutrition Therapy:   - Oral Diet:  General    Routes of Feeding: Oral  Liquids: No Restrictions  Daily Fluid Restriction: no  Last Modified Barium Swallow with Video (Video Swallowing Test): \    Treatments at the Time of Hospital Discharge:   Respiratory Treatments: ***  Oxygen Therapy:  is on oxygen at 2 L/min per nasal cannula.   Ventilator:    - No ventilator support    Rehab Therapies: Physical Therapy and Occupational Therapy  Weight Bearing Status/Restrictions: NWB- RUE WBAT-RLE  Other Medical Equipment (for information only, NOT a DME order):  walker  Other Treatments: ***    Patient's personal belongings (please select all that are sent with patient):  None    RN SIGNATURE:  Electronically signed by Dewayne Goodson RN on 1/25/22 at 4:22 PM EST    CASE MANAGEMENT/SOCIAL WORK SECTION    Inpatient Status Date: ***    Readmission Risk Assessment Score:  Readmission Risk              Risk of Unplanned Readmission:  0           Discharging to Facility/ Agency   Name:   Address:  Phone:  Fax:    Dialysis Facility (if applicable)   Name:  Address:  Dialysis Schedule:  Phone:  Fax:    / signature: {Esignature:[id number]}    PHYSICIAN SECTION    Prognosis: {Prognosis:0921944385}    Condition at Discharge: Abrahan Helton Patient Condition:[id number]}    Rehab Potential (if transferring to Rehab): {Prognosis:9240488449}    Recommended Labs or Other Treatments After Discharge: ***    Physician Certification: I certify the above information and transfer of Tiffany Landry  is necessary for the continuing treatment of the diagnosis listed and that she requires {Admit to Appropriate Level of Care:24507} for {GREATER/LESS:681042547} 30 days.      Update Admission H&P: No change in H&P    PHYSICIAN SIGNATURE:  Electronically signed by Lisa Messina MD on 1/25/22 at 1:30 PM EST

## 2022-01-25 NOTE — PLAN OF CARE
Problem: Falls - Risk of:  Goal: Will remain free from falls  Description: Will remain free from falls  1/25/2022 1056 by Garland Carson RN  Outcome: Met This Shift  1/25/2022 0022 by Dalton Douglas RN  Outcome: Met This Shift

## 2022-01-26 ENCOUNTER — TELEPHONE (OUTPATIENT)
Dept: ORTHOPEDIC SURGERY | Age: 62
End: 2022-01-26

## 2022-01-26 DIAGNOSIS — S52.501A CLOSED FRACTURE OF DISTAL END OF RIGHT RADIUS, UNSPECIFIED FRACTURE MORPHOLOGY, INITIAL ENCOUNTER: Primary | ICD-10-CM

## 2022-01-26 LAB
EKG ATRIAL RATE: 86 BPM
EKG P AXIS: 81 DEGREES
EKG P-R INTERVAL: 208 MS
EKG Q-T INTERVAL: 376 MS
EKG QRS DURATION: 98 MS
EKG QTC CALCULATION (BAZETT): 449 MS
EKG R AXIS: 66 DEGREES
EKG T AXIS: 62 DEGREES
EKG VENTRICULAR RATE: 86 BPM

## 2022-01-26 PROCEDURE — 93010 ELECTROCARDIOGRAM REPORT: CPT | Performed by: INTERNAL MEDICINE

## 2022-01-26 NOTE — PLAN OF CARE
Problem: Falls - Risk of:  Goal: Will remain free from falls  Description: Will remain free from falls  1/25/2022 2347 by Ernedira Eldridge RN  Outcome: Completed  1/25/2022 1056 by Rai Bailey RN  Outcome: Met This Shift  Goal: Absence of physical injury  Description: Absence of physical injury  Outcome: Completed     Problem: Pain:  Goal: Pain level will decrease  Description: Pain level will decrease  Outcome: Completed  Goal: Control of acute pain  Description: Control of acute pain  Outcome: Completed  Goal: Control of chronic pain  Description: Control of chronic pain  Outcome: Completed     Problem: Skin Integrity:  Goal: Will show no infection signs and symptoms  Description: Will show no infection signs and symptoms  Outcome: Completed  Goal: Absence of new skin breakdown  Description: Absence of new skin breakdown  Outcome: Completed

## 2022-02-01 ENCOUNTER — OFFICE VISIT (OUTPATIENT)
Dept: ORTHOPEDIC SURGERY | Age: 62
End: 2022-02-01
Payer: MEDICAID

## 2022-02-01 ENCOUNTER — HOSPITAL ENCOUNTER (OUTPATIENT)
Dept: GENERAL RADIOLOGY | Age: 62
Discharge: HOME | End: 2022-02-03
Payer: MEDICAID

## 2022-02-01 VITALS — TEMPERATURE: 98.1 F

## 2022-02-01 DIAGNOSIS — S52.501A CLOSED FRACTURE OF DISTAL END OF RIGHT RADIUS, UNSPECIFIED FRACTURE MORPHOLOGY, INITIAL ENCOUNTER: ICD-10-CM

## 2022-02-01 DIAGNOSIS — S52.571A OTHER CLOSED INTRA-ARTICULAR FRACTURE OF DISTAL END OF RIGHT RADIUS, INITIAL ENCOUNTER: Primary | ICD-10-CM

## 2022-02-01 LAB
URIC ACID, SERUM: 5.4 MG/DL (ref 2.4–5.7)
VITAMIN B-12: 814 PG/ML (ref 211–946)
VITAMIN D 25-HYDROXY: 46 NG/ML (ref 30–100)

## 2022-02-01 PROCEDURE — 29075 APPL CST ELBW FNGR SHORT ARM: CPT | Performed by: ORTHOPAEDIC SURGERY

## 2022-02-01 PROCEDURE — 99204 OFFICE O/P NEW MOD 45 MIN: CPT | Performed by: ORTHOPAEDIC SURGERY

## 2022-02-01 PROCEDURE — 99203 OFFICE O/P NEW LOW 30 MIN: CPT | Performed by: ORTHOPAEDIC SURGERY

## 2022-02-01 PROCEDURE — 73110 X-RAY EXAM OF WRIST: CPT

## 2022-02-01 RX ORDER — ACETAMINOPHEN 325 MG/1
650 TABLET ORAL EVERY 6 HOURS PRN
Status: ON HOLD | COMMUNITY
End: 2022-02-14 | Stop reason: HOSPADM

## 2022-02-01 RX ORDER — POLYETHYLENE GLYCOL 3350 17 G/17G
17 POWDER, FOR SOLUTION ORAL DAILY
Status: ON HOLD | COMMUNITY
End: 2022-02-16 | Stop reason: HOSPADM

## 2022-02-01 RX ORDER — IPRATROPIUM BROMIDE AND ALBUTEROL SULFATE 2.5; .5 MG/3ML; MG/3ML
1 SOLUTION RESPIRATORY (INHALATION) EVERY 6 HOURS
Status: ON HOLD | COMMUNITY
End: 2022-02-16 | Stop reason: HOSPADM

## 2022-02-01 RX ORDER — BISACODYL 10 MG
10 SUPPOSITORY, RECTAL RECTAL DAILY
Status: ON HOLD | COMMUNITY
End: 2022-02-16 | Stop reason: HOSPADM

## 2022-02-01 RX ORDER — GUAIFENESIN 100 MG/5ML
200 SOLUTION ORAL EVERY 6 HOURS PRN
Status: ON HOLD | COMMUNITY
End: 2022-02-16 | Stop reason: HOSPADM

## 2022-02-01 RX ORDER — POLYVINYL ALCOHOL 14 MG/ML
1 SOLUTION/ DROPS OPHTHALMIC 2 TIMES DAILY
Status: ON HOLD | COMMUNITY
End: 2022-02-16 | Stop reason: HOSPADM

## 2022-02-01 NOTE — PATIENT INSTRUCTIONS
Cast applied in office today. Continue nonweightbearing to RUE. Ice and elevate. Follow up in 3 weeks for cast removal and repeat X-rays     Future Appointments   Date Time Provider Deysi Simms   2/21/2022 10:45 AM Saud Da Silva MD [facility]   2/21/2022 12:45 PM SCHEDULE, SE [facility]   3/7/2022 10:30 AM Chantal Pichardo, DO [facility]                 Orthopedic Surgery Cast Care Instructions:    What to do today:  -Elevate the extremity above the heart to reduce swelling  -If patient has an arm cast, do not use a sling while in bed  -Have a plan with your doctor for pain medicine and pain control. If the patient is having pain, make sure the arm is elevated and give the recommended pain medicine as needed  -Check the fingers to make sure they are pink and not swollen or blue/grey. You should be able to move all the fingers    Care Care:  -Keep the cast clean and dry  -Never put anything inside the cast, including fingers.  This can cause skin breakdown or even a severe infection  -If the cast gets wet, padding pulled out, or anything goes inside case, please call the office so it can be changed or repaired    Skin care  -Do not use lotions, oils, or powders on the skin under the cast  -If there is itching, use a blow dryer set on cool to blow air down the cast    Bathing  -Can have sponge baths until the cast is removed  Cast covers can be purchased from a medical supply store, but even with these, it is important to make sure they are properly sealed and the cast does not get wet    Safety  -Even while wearing a cast, your patient must always be in an appropriate car seat or wear a seat belt    When to Call  -Severe pain not controlled by pain medication  -Changes in the cast such as cracking, softening, or drainage from the inside  -A very bad odor is coming from the cast  -fingers or toes are blue, grey, or swollen  -New onset numbness or tingling in the fingers or toes  -The cast is slipping off

## 2022-02-01 NOTE — PROGRESS NOTES
Chief Complaint   Patient presents with   [facility] ED Follow-up     right radial fx 01/22/22. residing at [facility].  Arm Injury     right wrist, right radius fx; 01/05/2022, patient fell and fractured her right radius; 01/22/2022 patient fell again onto right wrist while in a splint; 4/10 pain       SUBJECTIVE: Patient is a very pleasant 59-year-old female comes in today for follow-up for a right distal radius fracture suffered on January 8, 2022. She is currently in a splint. She is comfortable. She is right-hand dominant. She does have some issues as far as psych as well as health issues. She is adamant that she does not want surgery. She is very pleasant in the office today and states that she has no other areas of pain after the fall except for some muscular pain that she sees a chiropractor for.     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 Thierry Lynn [facility]    Neuropathy     at feet    Obesity     PETR (obstructive sleep apnea)     no CPAP dt insurance will not pay for    PONV (postoperative nausea and vomiting)     Seizures (HCC)     last approx 6-13-19- f/u w/ PCP  Granmal, flares up in heat/ summer time - no recent issues as of 10-4-19     Spinal headache     Thyroid disease      Past Surgical History:   Procedure Laterality Date    APPENDECTOMY      with Hysterectomy / unknown    BACK SURGERY  last one 1995    lumbar x 2    CARDIAC CATHETERIZATION Right 6-6-2013    Dr. Albertina Riddle Radial, no stents placed, no blockages    [address]    open with gastric bypass    COLONOSCOPY N/A 9/18/2018    COLONOSCOPY POLYPECTOMY HOT BIOPSY performed by Janis Santos MD at [address] COLONOSCOPY N/A 10/8/2019    COLONOSCOPY POLYPECTOMY SNARE/COLD BIOPSY performed by Janis Santos MD at [address] EGD COLONOSCOPY N/A 10/8/2019    EGD ESOPHAGOGASTRODUODENOSCOPY DILATATION performed by Janis Santos MD at [address] [facility] [place], DIAGNOSTIC      ESOPHAGEAL DILATATION  9/18/2018    ESOPHAGEAL DILATION [place] performed by Janis Santos MD at [address] Bilateral 2007,2017    knees    KNEE SURGERY Bilateral     scope    MYOMECTOMY      NERVE BLOCK Left 10 02 2013    lumbar paravertebral facet #2    NERVE BLOCK Left 10 9 13    hip inj #1    NERVE BLOCK Left 10/16/13    hip injection    NERVE BLOCK Left 10/29/2014    left lumbar transforaminal nerve lbock  #1    NERVE BLOCK Left 11/12/2014    lumbar left transforaminal nerve block  #2    NERVE BLOCK Left 12 8 14    lumbar transforaminal #3    NERVE BLOCK Right 3/30/15    cervical transforaminal #1    NERVE BLOCK Right 4/6/2015    right cervical transforaminal nerve block  #2    NERVE BLOCK Right 4/13/15    cervical transforaminal #3    NERVE BLOCK Left 7/6/15    knee injection #1    NERVE BLOCK  07/20/15    left genicular nerve block/knee #2    NERVE BLOCK Left 10 1 15    lumb transforam #1    NERVE BLOCK  10/26/15 left lumbar transforaminal nerve block #3    NERVE BLOCK Bilateral 2021    #1 BILATERAL SACROILIAC JOINT INJECTION UNDER FLUORO performed by Abdullahi Aguirre MD at [facility] 22 OTHER SURGICAL HISTORY Left 11 25 13    lumbar radiofreq    OTHER SURGICAL HISTORY  3/28/2016    stage 1, 3 day percutanous trial [employer] lumbar spinal cord stimulator    OTHER SURGICAL HISTORY      racz procedure / lower back    SC COLONOSCOPY FLX DX W/COLLJ SPEC WHEN PFRMD N/A 3/20/2018    COLONOSCOPY DIAGNOSTIC OR SCREENING performed by Radha Cardenas MD at [place] [address] EGD TRANSORAL BIOPSY SINGLE/MULTIPLE N/A 3/20/2018    EGD BIOPSY performed by Radha Cardenas MD at [address] [place] [address]       Family History   Problem Relation Age of Onset    Heart Disease Mother     Cancer Father     Arthritis Other     Cancer Other     Depression Other     Heart Disease Other     Hypertension Other     Mental Illness Other     Stroke Other      Social History     Tobacco Use    Smoking status: Former Smoker     Packs/day: 0.75     Years: 42.00     Pack years: 31.50     Types: Cigarettes     Start date: 1990     Quit date: 2021     Years since quittin.0    Smokeless tobacco: Never Used   Vaping Use    Vaping Use: Never used   Substance Use Topics    Alcohol use: No     Alcohol/week: 0.0 standard drinks    Drug use: Not Currently     Types: Marijuana [employer])     Comment: edibles- through pain doctor     Allergies   Allergen Reactions    Bee Pollen Anaphylaxis, Shortness Of Breath and Swelling     And wasp    Penicillins Anaphylaxis    Ropinirole Hcl Anaphylaxis    Vistaril [Hydroxyzine Hcl] Anaphylaxis    Aripiprazole Other (See Comments)     muscle spasms and confusion    Hydroxyzine Pamoate Itching and Rash    Tape [place] Hel[place] Tape] Rash     Paper tape allergy    Fabric tape is OK to use          Review of Systems   Constitutional: Negative for fever, chills, diaphoresis, appetite change and fatigue. HENT: Negative for dental issues, hearing loss and tinnitus. Negative for congestion, sinus pressure, sneezing, sore throat. Negative for headache. Eyes: Negative for visual disturbance, blurred and double vision. Negative for pain, discharge, redness and itching  Respiratory: Negative for cough, shortness of breath and wheezing. Cardiovascular: Negative for chest pain, palpitations and leg swelling. No dyspnea on exertion   Gastrointestinal:   Negative for nausea, vomiting, abdominal pain, diarrhea, constipation  or black or bloody. Hematologic\Lymphatic:  negative for bleeding, petechiae,   Genitourinary: Negative for hematuria and difficulty urinating. Musculoskeletal: Negative for neck pain and stiffness. Negative for back pain, see HPI  Skin: Negative for pallor, rash and wound. Neurological: Negative for dizziness, tremors, seizures, weakness, light-headedness, no TIA or stroke symptoms. No numbness and headaches. Psychiatric/Behavioral: Negative. OBJECTIVE:      Physical Examination:   General appearance: alert, well appearing, and in no distress,  normal appearing weight. No visible signs of trauma   Mental status: alert, oriented to person, place, and time, normal mood, behavior, speech, dress, motor activity, and thought processes  Abdomen: soft, nondistended  Resp:   resp easy and unlabored, no audible wheezes note, normal symmetrical expansion of both hemithoraces  Cardiac: distal pulses palpable, skin and extremities well perfused  Neurological: alert, oriented X3, normal speech, no focal findings or movement disorder noted, motor and sensory grossly normal bilaterally, normal muscle tone, no tremors, strength 5/5, normal gait and station  HEENT: normochephalic atraumatic, external ears and eyes normal, sclera normal, neck supple, no nasal discharge.    Extremities:   peripheral pulses normal, no edema, redness or tenderness in the calves   Skin: normal coloration, no rashes or open wounds, no suspicious skin lesions noted  Psych: Affect euthymic   Musculoskeletal:   Extremity:  Right upper extremity  Skin intact-mild ecchymosis and mild swelling present  Tenderness palpation distal radius with crepitus  No tenderness palpation about the elbow or shoulder  2/4 radial pulse  Compartment soft compressible in the hand and forearm  Capillary refill less than 3 seconds  Good sensation intact distally  Able to fire her median ulnar and radial nerves  Extensor thumb      Temp 98.1 °F (36.7 °C)   LMP 08/31/1988      XR: 2/1/22 x-ray today shows a shortened right distal radius fracture with comminution. On the lateral view she is at neutral although she has significant shortening in the AP view. No other obvious fractures identified. ASSESSMENT:     Diagnosis Orders   1. Other closed intra-articular fracture of distal end of right radius, initial encounter          Discussion: Talked in detail about the fact that surgical fixation could increase her height and may be helped improve her function. She is adamant that she does not want surgery. I did go over the fact this would most likely heal although she may have a deformity with shortening and potential dinner fork deformity of her right wrist.  She understands this I went over this with her in detail for a significant amount of time. She would like to stay nonoperative. She is about 3-1/2 to 4 weeks out therefore we will transition her to a cast for now we will get x-rays out of the cast her next visit in 2-1/2 to 3 weeks if she has good callus we can transition to a Velcro wrist splint and start range of motion of the wrist.  She is agreeable with the plan. He understands the risk of skin breakdown in the deformity with casting and nonoperative treatment.   She would like to proceed with this treatment    PLAN:  Short arm cast    Ice and elevate    Follow-up in 2 and half to 3 weeks for removal of cast x-rays out of the cast and potential transition to a Velcro wrist splint    Call with any questions, concerns, or cast irritation.     ELECTRONICALLY signed by:    Jelena Hays MD  2/1/22

## 2022-02-01 NOTE — PROGRESS NOTES
Maggi Tovar is a 64 y.o. female who presents for evaluation of arms Right wrist    Referred from [facility]    Symptom onset: Date: 01/05/2022 and 01/22/2022  Mechanism of Injury: fall    Previous Treatments: rest, compression, brace/splint which have been effective    Patient working in not working    Weightbearing: right upper Non-weight bearing    Assistive device walker with platform for her arm and or wheel chair  Participating in therapy?  yes, at [facility]

## 2022-02-13 ENCOUNTER — APPOINTMENT (OUTPATIENT)
Dept: GENERAL RADIOLOGY | Age: 62
DRG: 313 | End: 2022-02-13
Payer: MEDICAID

## 2022-02-13 ENCOUNTER — APPOINTMENT (OUTPATIENT)
Dept: CT IMAGING | Age: 62
DRG: 313 | End: 2022-02-13
Payer: MEDICAID

## 2022-02-13 ENCOUNTER — HOSPITAL ENCOUNTER (INPATIENT)
Age: 62
LOS: 2 days | Discharge: NURSING, REHAB OR LONG-TERM CARE | DRG: 313 | End: 2022-02-16
Attending: EMERGENCY MEDICINE | Admitting: INTERNAL MEDICINE
Payer: MEDICAID

## 2022-02-13 DIAGNOSIS — S40.019A CONTUSION, SHOULDER AND UPPER ARM, MULTIPLE SITES, UNSPECIFIED LATERALITY, INITIAL ENCOUNTER: ICD-10-CM

## 2022-02-13 DIAGNOSIS — G89.18 POST-OP PAIN: ICD-10-CM

## 2022-02-13 DIAGNOSIS — S82.891A CLOSED FRACTURE OF RIGHT ANKLE, INITIAL ENCOUNTER: Primary | ICD-10-CM

## 2022-02-13 DIAGNOSIS — R26.9 GAIT DISTURBANCE: ICD-10-CM

## 2022-02-13 DIAGNOSIS — S40.029A CONTUSION, SHOULDER AND UPPER ARM, MULTIPLE SITES, UNSPECIFIED LATERALITY, INITIAL ENCOUNTER: ICD-10-CM

## 2022-02-13 LAB
ACETAMINOPHEN LEVEL: <5 MCG/ML (ref 10–30)
ALBUMIN SERPL-MCNC: 3.8 G/DL (ref 3.5–5.2)
ALP BLD-CCNC: 133 U/L (ref 35–104)
ALT SERPL-CCNC: 20 U/L (ref 0–32)
ANION GAP SERPL CALCULATED.3IONS-SCNC: 12 MMOL/L (ref 7–16)
AST SERPL-CCNC: 24 U/L (ref 0–31)
BACTERIA: ABNORMAL /HPF
BASOPHILS ABSOLUTE: 0.02 E9/L (ref 0–0.2)
BASOPHILS RELATIVE PERCENT: 0.3 % (ref 0–2)
BILIRUB SERPL-MCNC: 0.2 MG/DL (ref 0–1.2)
BILIRUBIN URINE: NEGATIVE
BLOOD, URINE: ABNORMAL
BUN BLDV-MCNC: 24 MG/DL (ref 6–23)
CALCIUM SERPL-MCNC: 8.7 MG/DL (ref 8.6–10.2)
CHLORIDE BLD-SCNC: 101 MMOL/L (ref 98–107)
CLARITY: CLEAR
CO2: 28 MMOL/L (ref 22–29)
COLOR: YELLOW
CREAT SERPL-MCNC: 1 MG/DL (ref 0.5–1)
EOSINOPHILS ABSOLUTE: 0.04 E9/L (ref 0.05–0.5)
EOSINOPHILS RELATIVE PERCENT: 0.6 % (ref 0–6)
EPITHELIAL CELLS, UA: ABNORMAL /HPF
ETHANOL: <10 MG/DL (ref 0–0.08)
GFR AFRICAN AMERICAN: >60
GFR NON-AFRICAN AMERICAN: 56 ML/MIN/1.73
GLUCOSE BLD-MCNC: 126 MG/DL (ref 74–99)
GLUCOSE URINE: NEGATIVE MG/DL
HCT VFR BLD CALC: 34.4 % (ref 34–48)
HEMOGLOBIN: 11.3 G/DL (ref 11.5–15.5)
IMMATURE GRANULOCYTES #: 0.04 E9/L
IMMATURE GRANULOCYTES %: 0.6 % (ref 0–5)
INR BLD: 1
KETONES, URINE: ABNORMAL MG/DL
LACTIC ACID: 1 MMOL/L (ref 0.5–2.2)
LEUKOCYTE ESTERASE, URINE: NEGATIVE
LYMPHOCYTES ABSOLUTE: 0.76 E9/L (ref 1.5–4)
LYMPHOCYTES RELATIVE PERCENT: 11.9 % (ref 20–42)
MCH RBC QN AUTO: 31.4 PG (ref 26–35)
MCHC RBC AUTO-ENTMCNC: 32.8 % (ref 32–34.5)
MCV RBC AUTO: 95.6 FL (ref 80–99.9)
MONOCYTES ABSOLUTE: 0.61 E9/L (ref 0.1–0.95)
MONOCYTES RELATIVE PERCENT: 9.6 % (ref 2–12)
NEUTROPHILS ABSOLUTE: 4.89 E9/L (ref 1.8–7.3)
NEUTROPHILS RELATIVE PERCENT: 77 % (ref 43–80)
NITRITE, URINE: NEGATIVE
PDW BLD-RTO: 13.3 FL (ref 11.5–15)
PH UA: 5.5 (ref 5–9)
PLATELET # BLD: 182 E9/L (ref 130–450)
PMV BLD AUTO: 8.9 FL (ref 7–12)
POTASSIUM SERPL-SCNC: 3.7 MMOL/L (ref 3.5–5)
PROTEIN UA: NEGATIVE MG/DL
PROTHROMBIN TIME: 10.8 SEC (ref 9.3–12.4)
RBC # BLD: 3.6 E12/L (ref 3.5–5.5)
RBC UA: ABNORMAL /HPF (ref 0–2)
SALICYLATE, SERUM: <0.3 MG/DL (ref 0–30)
SODIUM BLD-SCNC: 141 MMOL/L (ref 132–146)
SPECIFIC GRAVITY UA: 1.01 (ref 1–1.03)
TOTAL CK: 150 U/L (ref 20–180)
TOTAL PROTEIN: 6.5 G/DL (ref 6.4–8.3)
TRICYCLIC ANTIDEPRESSANTS SCREEN SERUM: NEGATIVE NG/ML
TROPONIN, HIGH SENSITIVITY: 15 NG/L (ref 0–9)
UROBILINOGEN, URINE: 0.2 E.U./DL
WBC # BLD: 6.4 E9/L (ref 4.5–11.5)
WBC UA: ABNORMAL /HPF (ref 0–5)

## 2022-02-13 PROCEDURE — 96376 TX/PRO/DX INJ SAME DRUG ADON: CPT

## 2022-02-13 PROCEDURE — 73590 X-RAY EXAM OF LOWER LEG: CPT

## 2022-02-13 PROCEDURE — 80053 COMPREHEN METABOLIC PANEL: CPT

## 2022-02-13 PROCEDURE — 80307 DRUG TEST PRSMV CHEM ANLYZR: CPT

## 2022-02-13 PROCEDURE — 96375 TX/PRO/DX INJ NEW DRUG ADDON: CPT

## 2022-02-13 PROCEDURE — 72170 X-RAY EXAM OF PELVIS: CPT

## 2022-02-13 PROCEDURE — 93005 ELECTROCARDIOGRAM TRACING: CPT | Performed by: EMERGENCY MEDICINE

## 2022-02-13 PROCEDURE — 71045 X-RAY EXAM CHEST 1 VIEW: CPT

## 2022-02-13 PROCEDURE — 82550 ASSAY OF CK (CPK): CPT

## 2022-02-13 PROCEDURE — 99285 EMERGENCY DEPT VISIT HI MDM: CPT

## 2022-02-13 PROCEDURE — 96374 THER/PROPH/DIAG INJ IV PUSH: CPT

## 2022-02-13 PROCEDURE — 2580000003 HC RX 258: Performed by: EMERGENCY MEDICINE

## 2022-02-13 PROCEDURE — 85610 PROTHROMBIN TIME: CPT

## 2022-02-13 PROCEDURE — 6360000002 HC RX W HCPCS: Performed by: EMERGENCY MEDICINE

## 2022-02-13 PROCEDURE — 82077 ASSAY SPEC XCP UR&BREATH IA: CPT

## 2022-02-13 PROCEDURE — 80179 DRUG ASSAY SALICYLATE: CPT

## 2022-02-13 PROCEDURE — 73610 X-RAY EXAM OF ANKLE: CPT

## 2022-02-13 PROCEDURE — 80143 DRUG ASSAY ACETAMINOPHEN: CPT

## 2022-02-13 PROCEDURE — 85025 COMPLETE CBC W/AUTO DIFF WBC: CPT

## 2022-02-13 PROCEDURE — 84484 ASSAY OF TROPONIN QUANT: CPT

## 2022-02-13 PROCEDURE — 81001 URINALYSIS AUTO W/SCOPE: CPT

## 2022-02-13 PROCEDURE — 73600 X-RAY EXAM OF ANKLE: CPT

## 2022-02-13 PROCEDURE — 83605 ASSAY OF LACTIC ACID: CPT

## 2022-02-13 RX ORDER — FENTANYL CITRATE 50 UG/ML
50 INJECTION, SOLUTION INTRAMUSCULAR; INTRAVENOUS ONCE
Status: COMPLETED | OUTPATIENT
Start: 2022-02-13 | End: 2022-02-13

## 2022-02-13 RX ORDER — ALBUTEROL SULFATE 2.5 MG/3ML
2.5 SOLUTION RESPIRATORY (INHALATION) EVERY 4 HOURS PRN
Status: DISCONTINUED | OUTPATIENT
Start: 2022-02-13 | End: 2022-02-17 | Stop reason: HOSPADM

## 2022-02-13 RX ORDER — CHOLECALCIFEROL (VITAMIN D3) 50 MCG
5000 TABLET ORAL EVERY OTHER DAY
Status: DISCONTINUED | OUTPATIENT
Start: 2022-02-14 | End: 2022-02-17 | Stop reason: HOSPADM

## 2022-02-13 RX ORDER — BACLOFEN 10 MG/1
20 TABLET ORAL 4 TIMES DAILY
Status: DISCONTINUED | OUTPATIENT
Start: 2022-02-13 | End: 2022-02-17 | Stop reason: HOSPADM

## 2022-02-13 RX ORDER — LEVOCARNITINE 330 MG/1
330 TABLET ORAL 3 TIMES DAILY
Status: DISCONTINUED | OUTPATIENT
Start: 2022-02-13 | End: 2022-02-17 | Stop reason: HOSPADM

## 2022-02-13 RX ORDER — FENTANYL CITRATE 50 UG/ML
25 INJECTION, SOLUTION INTRAMUSCULAR; INTRAVENOUS ONCE
Status: COMPLETED | OUTPATIENT
Start: 2022-02-13 | End: 2022-02-13

## 2022-02-13 RX ORDER — HYDROCODONE BITARTRATE AND ACETAMINOPHEN 10; 325 MG/1; MG/1
1 TABLET ORAL EVERY 6 HOURS PRN
Status: DISCONTINUED | OUTPATIENT
Start: 2022-02-13 | End: 2022-02-14

## 2022-02-13 RX ORDER — FAMOTIDINE 20 MG/1
20 TABLET, FILM COATED ORAL 2 TIMES DAILY
Status: DISCONTINUED | OUTPATIENT
Start: 2022-02-13 | End: 2022-02-17 | Stop reason: HOSPADM

## 2022-02-13 RX ORDER — GUAIFENESIN 100 MG/5ML
200 SOLUTION ORAL EVERY 6 HOURS PRN
Status: DISCONTINUED | OUTPATIENT
Start: 2022-02-13 | End: 2022-02-17 | Stop reason: HOSPADM

## 2022-02-13 RX ORDER — TOPIRAMATE 100 MG/1
200 TABLET, FILM COATED ORAL 2 TIMES DAILY
Status: DISCONTINUED | OUTPATIENT
Start: 2022-02-13 | End: 2022-02-17 | Stop reason: HOSPADM

## 2022-02-13 RX ORDER — POLYETHYLENE GLYCOL 3350 17 G/17G
17 POWDER, FOR SOLUTION ORAL DAILY
Status: DISCONTINUED | OUTPATIENT
Start: 2022-02-14 | End: 2022-02-17 | Stop reason: HOSPADM

## 2022-02-13 RX ORDER — ZIPRASIDONE HYDROCHLORIDE 20 MG/1
20 CAPSULE ORAL NIGHTLY
Status: DISCONTINUED | OUTPATIENT
Start: 2022-02-13 | End: 2022-02-17 | Stop reason: HOSPADM

## 2022-02-13 RX ORDER — CALCIUM CARBONATE 500(1250)
500 TABLET ORAL 2 TIMES DAILY
Status: DISCONTINUED | OUTPATIENT
Start: 2022-02-13 | End: 2022-02-17 | Stop reason: HOSPADM

## 2022-02-13 RX ORDER — BISACODYL 10 MG
10 SUPPOSITORY, RECTAL RECTAL DAILY
Status: DISCONTINUED | OUTPATIENT
Start: 2022-02-14 | End: 2022-02-17 | Stop reason: HOSPADM

## 2022-02-13 RX ORDER — ESCITALOPRAM OXALATE 10 MG/1
20 TABLET ORAL 2 TIMES DAILY
Status: DISCONTINUED | OUTPATIENT
Start: 2022-02-13 | End: 2022-02-17 | Stop reason: HOSPADM

## 2022-02-13 RX ORDER — METOPROLOL SUCCINATE 25 MG/1
12.5 TABLET, EXTENDED RELEASE ORAL DAILY
Status: DISCONTINUED | OUTPATIENT
Start: 2022-02-14 | End: 2022-02-17 | Stop reason: HOSPADM

## 2022-02-13 RX ORDER — DOCUSATE SODIUM 100 MG/1
100 CAPSULE, LIQUID FILLED ORAL 2 TIMES DAILY
Status: DISCONTINUED | OUTPATIENT
Start: 2022-02-13 | End: 2022-02-17 | Stop reason: HOSPADM

## 2022-02-13 RX ORDER — MONTELUKAST SODIUM 10 MG/1
10 TABLET ORAL DAILY
Status: DISCONTINUED | OUTPATIENT
Start: 2022-02-13 | End: 2022-02-17 | Stop reason: HOSPADM

## 2022-02-13 RX ORDER — EPINEPHRINE 0.3 MG/.3ML
0.3 INJECTION SUBCUTANEOUS ONCE
Status: DISCONTINUED | OUTPATIENT
Start: 2022-02-13 | End: 2022-02-13 | Stop reason: CLARIF

## 2022-02-13 RX ORDER — GABAPENTIN 300 MG/1
600 CAPSULE ORAL 4 TIMES DAILY
Status: DISCONTINUED | OUTPATIENT
Start: 2022-02-13 | End: 2022-02-17 | Stop reason: HOSPADM

## 2022-02-13 RX ORDER — UBIDECARENONE 75 MG
100 CAPSULE ORAL DAILY
Status: DISCONTINUED | OUTPATIENT
Start: 2022-02-14 | End: 2022-02-17 | Stop reason: HOSPADM

## 2022-02-13 RX ORDER — ONDANSETRON 4 MG/1
4 TABLET, ORALLY DISINTEGRATING ORAL 3 TIMES DAILY PRN
Status: DISCONTINUED | OUTPATIENT
Start: 2022-02-13 | End: 2022-02-14 | Stop reason: SDUPTHER

## 2022-02-13 RX ORDER — PANTOPRAZOLE SODIUM 40 MG/1
40 TABLET, DELAYED RELEASE ORAL
Status: DISCONTINUED | OUTPATIENT
Start: 2022-02-14 | End: 2022-02-17 | Stop reason: HOSPADM

## 2022-02-13 RX ORDER — FUROSEMIDE 20 MG/1
40 TABLET ORAL 2 TIMES DAILY
Status: DISCONTINUED | OUTPATIENT
Start: 2022-02-14 | End: 2022-02-17 | Stop reason: HOSPADM

## 2022-02-13 RX ORDER — METHOCARBAMOL 500 MG/1
500 TABLET, FILM COATED ORAL 4 TIMES DAILY
Status: DISCONTINUED | OUTPATIENT
Start: 2022-02-13 | End: 2022-02-17 | Stop reason: HOSPADM

## 2022-02-13 RX ORDER — ALLOPURINOL 100 MG/1
200 TABLET ORAL 2 TIMES DAILY
Status: DISCONTINUED | OUTPATIENT
Start: 2022-02-13 | End: 2022-02-17 | Stop reason: HOSPADM

## 2022-02-13 RX ORDER — TRAZODONE HYDROCHLORIDE 50 MG/1
100 TABLET ORAL NIGHTLY
Status: DISCONTINUED | OUTPATIENT
Start: 2022-02-13 | End: 2022-02-17 | Stop reason: HOSPADM

## 2022-02-13 RX ORDER — ACETAMINOPHEN 325 MG/1
650 TABLET ORAL EVERY 6 HOURS PRN
Status: DISCONTINUED | OUTPATIENT
Start: 2022-02-13 | End: 2022-02-14

## 2022-02-13 RX ADMIN — FENTANYL CITRATE 25 MCG: 50 INJECTION, SOLUTION INTRAMUSCULAR; INTRAVENOUS at 20:51

## 2022-02-13 RX ADMIN — FENTANYL CITRATE 50 MCG: 50 INJECTION, SOLUTION INTRAMUSCULAR; INTRAVENOUS at 21:27

## 2022-02-13 RX ADMIN — CEFAZOLIN SODIUM 1000 MG: 1 INJECTION, POWDER, FOR SOLUTION INTRAMUSCULAR; INTRAVENOUS at 21:26

## 2022-02-13 ASSESSMENT — PAIN SCALES - GENERAL
PAINLEVEL_OUTOF10: 8
PAINLEVEL_OUTOF10: 9

## 2022-02-13 ASSESSMENT — PAIN DESCRIPTION - LOCATION: LOCATION: LEG

## 2022-02-13 ASSESSMENT — PAIN DESCRIPTION - ORIENTATION: ORIENTATION: RIGHT;LOWER

## 2022-02-13 NOTE — LETTER
[place] Department Medicaid  CERTIFICATION OF NECESSITY  FOR NON-EMERGENCY TRANSPORTATION   BY GROUND AMBULANCE      Individual Information   1. Name: Neel Howard 2. [place] Medicaid Billing Number:    3. Address: [facility] [address]      Transportation Provider Information   4. Provider Name:    5. [place] Medicaid Provider Number:  National Provider Identifier (NPI):      Certification  7. Criteria:  During transport, this individual requires:  [x] Medical treatment or continuous     supervision by an EMT. [x] The administration or regulation of oxygen by another person. [] Supervised protective restraint. 8. Period Beginning Date: 2/16/22   9. Length  [x] Not more than 5 day(s)  [] One Year     Additional Information Relevant to Certification   10. Comments or Explanations, If Necessary or Appropriate     Gait Disturbance, Closed Fracture of Right Ankle, Closed Left Ankle Fracture, Contusion, shoulder and upper arm, multiple sites. 02 2L,      Certifying Practitioner Information   11. Name of Practitioner: Aimee Poole MD   12. [place] Medicaid Provider Number, If Applicable:  Yennifer 62 Provider Identifier (NPI):      Signature Information   14. Date of Signature: 2/16/22 15. Name of Person Signing: Electronically signed by Penny Carrillo on 2/16/2022 at 2:30 PM     16. Signature and Professional Designation: Electronically signed by Penny Carrillo, Discharge Planner on 2/16/2022 at 2:30 PM     [facility] [address]  Rev. 7/2015    [address] Encounter Date/Time: 2/13/2022 2026    Hospital Account: [de-identified]    MRN: [id number]    Patient: Jason Vincent Serial #: [id number]      ENCOUNTER          Patient Class: I Private Enc?   No Unit RM BD: SEYZ 5S NS 5215/5215-A   Hospital Service: Med/Surg   Encounter DX: Gait disturbance [R26.9]   ADM Provider: Aimee Poole MD   Procedure:     ATT Provider: Aimee Poole MD   REF Provider:        Admission DX: Gait disturbance, Closed fracture of right ankle, initial encounter, Closed left ankle fracture, initial encounter, Contusion, shoulder and upper arm, multiple sites, unspecified laterality, initial encounter and DX codes: R26.9, S82.891A, S82.892A, S40.019A, S40.029A      PATIENT                 Name: Jeffrey Correa : 1960 (61 yrs)   Address: [place] [address], * Sex: Female   E[facility] [address]         Marital Status:    Employer: RETIRED         Church: [employer]   Primary Care Provider: Rebekah Milligan MD         Primary Phone: [phone] [address]   Contact Name Legal Guardian? Relationship to Patient Home Phone Work Phone   1. Len Rizvi  2. Willy Batsheva    No Other  Brother/Sister [phone]              GUARANTOR            Guarantor: Jeffrey Correa     : 1960   Address: [address] [place] [address]* Sex: Female   [place] [address]     Relation to Patient: Self       Home Phone: [phone]   Guarantor ID: [id number]       Work Phone:     Guarantor Employer: RETIRED         Status: RETIRED      COVERAGE  PRIMARY INSURANCE   Payor: MEDICAID [place] Plan: MEDICAID [facility]*   Payor Address: [address] [place], [address]       Group Number:   Insurance Type: INDEMNITY   Subscriber Name: Yadiel Li : 1960   Subscriber ID: [id number] Pat. Rel. to Sub: Self   SECONDARY INSURANCE   Payor:   Plan:     Payor Address:  ,           Group Number:   Insurance Type:     Subscriber Name:   Subscriber :     Subscriber ID:   Pat.  Rel. to Sub:             CSN: [id number]

## 2022-02-14 ENCOUNTER — APPOINTMENT (OUTPATIENT)
Dept: CT IMAGING | Age: 62
DRG: 313 | End: 2022-02-14
Payer: MEDICAID

## 2022-02-14 ENCOUNTER — ANESTHESIA EVENT (OUTPATIENT)
Dept: OPERATING ROOM | Age: 62
DRG: 313 | End: 2022-02-14
Payer: MEDICAID

## 2022-02-14 ENCOUNTER — APPOINTMENT (OUTPATIENT)
Dept: GENERAL RADIOLOGY | Age: 62
DRG: 313 | End: 2022-02-14
Payer: MEDICAID

## 2022-02-14 ENCOUNTER — ANESTHESIA (OUTPATIENT)
Dept: OPERATING ROOM | Age: 62
DRG: 313 | End: 2022-02-14
Payer: MEDICAID

## 2022-02-14 VITALS — DIASTOLIC BLOOD PRESSURE: 55 MMHG | OXYGEN SATURATION: 87 % | SYSTOLIC BLOOD PRESSURE: 108 MMHG

## 2022-02-14 PROBLEM — S82.892A CLOSED LEFT ANKLE FRACTURE, INITIAL ENCOUNTER: Status: ACTIVE | Noted: 2022-02-14

## 2022-02-14 LAB
ABO/RH: NORMAL
ANTIBODY SCREEN: NORMAL
APTT: 23.4 SEC (ref 24.5–35.1)
EKG ATRIAL RATE: 64 BPM
EKG P AXIS: 39 DEGREES
EKG P-R INTERVAL: 380 MS
EKG Q-T INTERVAL: 448 MS
EKG QRS DURATION: 102 MS
EKG QTC CALCULATION (BAZETT): 462 MS
EKG R AXIS: 16 DEGREES
EKG T AXIS: 27 DEGREES
EKG VENTRICULAR RATE: 64 BPM

## 2022-02-14 PROCEDURE — 3600000015 HC SURGERY LEVEL 5 ADDTL 15MIN: Performed by: ORTHOPAEDIC SURGERY

## 2022-02-14 PROCEDURE — 20690 APPL UNIPLN UNI EXT FIXJ SYS: CPT | Performed by: ORTHOPAEDIC SURGERY

## 2022-02-14 PROCEDURE — 6360000002 HC RX W HCPCS: Performed by: ORTHOPAEDIC SURGERY

## 2022-02-14 PROCEDURE — 6360000002 HC RX W HCPCS

## 2022-02-14 PROCEDURE — 86901 BLOOD TYPING SEROLOGIC RH(D): CPT

## 2022-02-14 PROCEDURE — 2500000003 HC RX 250 WO HCPCS: Performed by: ORTHOPAEDIC SURGERY

## 2022-02-14 PROCEDURE — 3209999900 FLUORO FOR SURGICAL PROCEDURES

## 2022-02-14 PROCEDURE — 3600000005 HC SURGERY LEVEL 5 BASE: Performed by: ORTHOPAEDIC SURGERY

## 2022-02-14 PROCEDURE — C1713 ANCHOR/SCREW BN/BN,TIS/BN: HCPCS | Performed by: ORTHOPAEDIC SURGERY

## 2022-02-14 PROCEDURE — 6370000000 HC RX 637 (ALT 250 FOR IP): Performed by: INTERNAL MEDICINE

## 2022-02-14 PROCEDURE — 2700000000 HC OXYGEN THERAPY PER DAY

## 2022-02-14 PROCEDURE — 7100000000 HC PACU RECOVERY - FIRST 15 MIN: Performed by: ORTHOPAEDIC SURGERY

## 2022-02-14 PROCEDURE — 27825 TREAT LOWER LEG FRACTURE: CPT | Performed by: ORTHOPAEDIC SURGERY

## 2022-02-14 PROCEDURE — 86850 RBC ANTIBODY SCREEN: CPT

## 2022-02-14 PROCEDURE — 2500000003 HC RX 250 WO HCPCS

## 2022-02-14 PROCEDURE — 85730 THROMBOPLASTIN TIME PARTIAL: CPT

## 2022-02-14 PROCEDURE — 1200000000 HC SEMI PRIVATE

## 2022-02-14 PROCEDURE — 3700000001 HC ADD 15 MINUTES (ANESTHESIA): Performed by: ORTHOPAEDIC SURGERY

## 2022-02-14 PROCEDURE — 72125 CT NECK SPINE W/O DYE: CPT

## 2022-02-14 PROCEDURE — 0JQQ0ZZ REPAIR RIGHT FOOT SUBCUTANEOUS TISSUE AND FASCIA, OPEN APPROACH: ICD-10-PCS | Performed by: ORTHOPAEDIC SURGERY

## 2022-02-14 PROCEDURE — 0QSG35Z REPOSITION RIGHT TIBIA WITH EXTERNAL FIXATION DEVICE, PERCUTANEOUS APPROACH: ICD-10-PCS | Performed by: ORTHOPAEDIC SURGERY

## 2022-02-14 PROCEDURE — 6360000002 HC RX W HCPCS: Performed by: ANESTHESIOLOGY

## 2022-02-14 PROCEDURE — 6370000000 HC RX 637 (ALT 250 FOR IP): Performed by: ORTHOPAEDIC SURGERY

## 2022-02-14 PROCEDURE — 93010 ELECTROCARDIOGRAM REPORT: CPT | Performed by: INTERNAL MEDICINE

## 2022-02-14 PROCEDURE — 70450 CT HEAD/BRAIN W/O DYE: CPT

## 2022-02-14 PROCEDURE — 2709999900 HC NON-CHARGEABLE SUPPLY: Performed by: ORTHOPAEDIC SURGERY

## 2022-02-14 PROCEDURE — 6370000000 HC RX 637 (ALT 250 FOR IP): Performed by: ANESTHESIOLOGY

## 2022-02-14 PROCEDURE — 12002 RPR S/N/AX/GEN/TRNK2.6-7.5CM: CPT | Performed by: ORTHOPAEDIC SURGERY

## 2022-02-14 PROCEDURE — 86900 BLOOD TYPING SEROLOGIC ABO: CPT

## 2022-02-14 PROCEDURE — 7100000001 HC PACU RECOVERY - ADDTL 15 MIN: Performed by: ORTHOPAEDIC SURGERY

## 2022-02-14 PROCEDURE — 2580000003 HC RX 258: Performed by: ORTHOPAEDIC SURGERY

## 2022-02-14 PROCEDURE — 74176 CT ABD & PELVIS W/O CONTRAST: CPT

## 2022-02-14 PROCEDURE — 2580000003 HC RX 258

## 2022-02-14 PROCEDURE — 0QSJ35Z REPOSITION RIGHT FIBULA WITH EXTERNAL FIXATION DEVICE, PERCUTANEOUS APPROACH: ICD-10-PCS | Performed by: ORTHOPAEDIC SURGERY

## 2022-02-14 PROCEDURE — 3700000000 HC ANESTHESIA ATTENDED CARE: Performed by: ORTHOPAEDIC SURGERY

## 2022-02-14 PROCEDURE — 99253 IP/OBS CNSLTJ NEW/EST LOW 45: CPT | Performed by: ORTHOPAEDIC SURGERY

## 2022-02-14 PROCEDURE — 73700 CT LOWER EXTREMITY W/O DYE: CPT

## 2022-02-14 PROCEDURE — 2720000010 HC SURG SUPPLY STERILE: Performed by: ORTHOPAEDIC SURGERY

## 2022-02-14 RX ORDER — MEPERIDINE HYDROCHLORIDE 25 MG/ML
12.5 INJECTION INTRAMUSCULAR; INTRAVENOUS; SUBCUTANEOUS EVERY 5 MIN PRN
Status: DISCONTINUED | OUTPATIENT
Start: 2022-02-14 | End: 2022-02-14 | Stop reason: HOSPADM

## 2022-02-14 RX ORDER — OXYCODONE HYDROCHLORIDE 10 MG/1
10 TABLET ORAL EVERY 6 HOURS PRN
Status: DISCONTINUED | OUTPATIENT
Start: 2022-02-14 | End: 2022-02-17 | Stop reason: HOSPADM

## 2022-02-14 RX ORDER — MORPHINE SULFATE 2 MG/ML
2 INJECTION, SOLUTION INTRAMUSCULAR; INTRAVENOUS EVERY 4 HOURS PRN
Status: DISCONTINUED | OUTPATIENT
Start: 2022-02-14 | End: 2022-02-14

## 2022-02-14 RX ORDER — SODIUM CHLORIDE 0.9 % (FLUSH) 0.9 %
5-40 SYRINGE (ML) INJECTION EVERY 12 HOURS SCHEDULED
Status: DISCONTINUED | OUTPATIENT
Start: 2022-02-14 | End: 2022-02-17 | Stop reason: HOSPADM

## 2022-02-14 RX ORDER — MORPHINE SULFATE 2 MG/ML
2 INJECTION, SOLUTION INTRAMUSCULAR; INTRAVENOUS EVERY 4 HOURS PRN
Status: DISCONTINUED | OUTPATIENT
Start: 2022-02-14 | End: 2022-02-17 | Stop reason: HOSPADM

## 2022-02-14 RX ORDER — ONDANSETRON 2 MG/ML
4 INJECTION INTRAMUSCULAR; INTRAVENOUS EVERY 6 HOURS PRN
Status: DISCONTINUED | OUTPATIENT
Start: 2022-02-14 | End: 2022-02-17 | Stop reason: HOSPADM

## 2022-02-14 RX ORDER — SODIUM CHLORIDE 9 MG/ML
25 INJECTION, SOLUTION INTRAVENOUS PRN
Status: DISCONTINUED | OUTPATIENT
Start: 2022-02-14 | End: 2022-02-17 | Stop reason: HOSPADM

## 2022-02-14 RX ORDER — GLYCOPYRROLATE 1 MG/5 ML
SYRINGE (ML) INTRAVENOUS PRN
Status: DISCONTINUED | OUTPATIENT
Start: 2022-02-14 | End: 2022-02-14 | Stop reason: SDUPTHER

## 2022-02-14 RX ORDER — ROPIVACAINE HYDROCHLORIDE 5 MG/ML
30 INJECTION, SOLUTION EPIDURAL; INFILTRATION; PERINEURAL ONCE
Status: COMPLETED | OUTPATIENT
Start: 2022-02-14 | End: 2022-02-14

## 2022-02-14 RX ORDER — PROMETHAZINE HYDROCHLORIDE 25 MG/ML
6.25 INJECTION, SOLUTION INTRAMUSCULAR; INTRAVENOUS EVERY 10 MIN PRN
Status: DISCONTINUED | OUTPATIENT
Start: 2022-02-14 | End: 2022-02-14 | Stop reason: HOSPADM

## 2022-02-14 RX ORDER — DIAPER,BRIEF,INFANT-TODD,DISP
EACH MISCELLANEOUS PRN
Status: DISCONTINUED | OUTPATIENT
Start: 2022-02-14 | End: 2022-02-14 | Stop reason: ALTCHOICE

## 2022-02-14 RX ORDER — SODIUM CHLORIDE 0.9 % (FLUSH) 0.9 %
5-40 SYRINGE (ML) INJECTION PRN
Status: DISCONTINUED | OUTPATIENT
Start: 2022-02-14 | End: 2022-02-17 | Stop reason: HOSPADM

## 2022-02-14 RX ORDER — LIDOCAINE HYDROCHLORIDE 20 MG/ML
INJECTION, SOLUTION INTRAVENOUS PRN
Status: DISCONTINUED | OUTPATIENT
Start: 2022-02-14 | End: 2022-02-14 | Stop reason: SDUPTHER

## 2022-02-14 RX ORDER — MORPHINE SULFATE 2 MG/ML
2 INJECTION, SOLUTION INTRAMUSCULAR; INTRAVENOUS EVERY 5 MIN PRN
Status: DISCONTINUED | OUTPATIENT
Start: 2022-02-14 | End: 2022-02-14 | Stop reason: HOSPADM

## 2022-02-14 RX ORDER — SODIUM CHLORIDE 9 MG/ML
INJECTION, SOLUTION INTRAVENOUS CONTINUOUS
Status: DISCONTINUED | OUTPATIENT
Start: 2022-02-14 | End: 2022-02-17 | Stop reason: HOSPADM

## 2022-02-14 RX ORDER — SENNA PLUS 8.6 MG/1
1 TABLET ORAL DAILY PRN
Status: DISCONTINUED | OUTPATIENT
Start: 2022-02-14 | End: 2022-02-17 | Stop reason: HOSPADM

## 2022-02-14 RX ORDER — FENTANYL CITRATE 50 UG/ML
INJECTION, SOLUTION INTRAMUSCULAR; INTRAVENOUS PRN
Status: DISCONTINUED | OUTPATIENT
Start: 2022-02-14 | End: 2022-02-14 | Stop reason: SDUPTHER

## 2022-02-14 RX ORDER — SCOLOPAMINE TRANSDERMAL SYSTEM 1 MG/1
1 PATCH, EXTENDED RELEASE TRANSDERMAL
Status: DISCONTINUED | OUTPATIENT
Start: 2022-02-14 | End: 2022-02-17 | Stop reason: HOSPADM

## 2022-02-14 RX ORDER — DEXAMETHASONE SODIUM PHOSPHATE 10 MG/ML
INJECTION INTRAMUSCULAR; INTRAVENOUS PRN
Status: DISCONTINUED | OUTPATIENT
Start: 2022-02-14 | End: 2022-02-14 | Stop reason: SDUPTHER

## 2022-02-14 RX ORDER — OXYCODONE HYDROCHLORIDE 5 MG/1
5 TABLET ORAL EVERY 6 HOURS PRN
Status: DISCONTINUED | OUTPATIENT
Start: 2022-02-14 | End: 2022-02-17 | Stop reason: HOSPADM

## 2022-02-14 RX ORDER — ACETAMINOPHEN 325 MG/1
650 TABLET ORAL EVERY 4 HOURS PRN
Status: DISCONTINUED | OUTPATIENT
Start: 2022-02-14 | End: 2022-02-17 | Stop reason: HOSPADM

## 2022-02-14 RX ORDER — MORPHINE SULFATE 2 MG/ML
1 INJECTION, SOLUTION INTRAMUSCULAR; INTRAVENOUS EVERY 5 MIN PRN
Status: DISCONTINUED | OUTPATIENT
Start: 2022-02-14 | End: 2022-02-14 | Stop reason: HOSPADM

## 2022-02-14 RX ORDER — PROPOFOL 10 MG/ML
INJECTION, EMULSION INTRAVENOUS PRN
Status: DISCONTINUED | OUTPATIENT
Start: 2022-02-14 | End: 2022-02-14 | Stop reason: SDUPTHER

## 2022-02-14 RX ORDER — FENTANYL CITRATE 50 UG/ML
100 INJECTION, SOLUTION INTRAMUSCULAR; INTRAVENOUS ONCE
Status: COMPLETED | OUTPATIENT
Start: 2022-02-14 | End: 2022-02-14

## 2022-02-14 RX ORDER — HYDROCODONE BITARTRATE AND ACETAMINOPHEN 5; 325 MG/1; MG/1
1 TABLET ORAL PRN
Status: DISCONTINUED | OUTPATIENT
Start: 2022-02-14 | End: 2022-02-14 | Stop reason: HOSPADM

## 2022-02-14 RX ORDER — ASPIRIN 81 MG/1
81 TABLET ORAL 2 TIMES DAILY
Qty: 56 TABLET | Refills: 0 | Status: SHIPPED | OUTPATIENT
Start: 2022-02-14 | End: 2022-05-26

## 2022-02-14 RX ORDER — ESCITALOPRAM OXALATE 10 MG/1
20 TABLET ORAL ONCE
Status: DISCONTINUED | OUTPATIENT
Start: 2022-02-14 | End: 2022-02-17 | Stop reason: HOSPADM

## 2022-02-14 RX ORDER — SODIUM CHLORIDE 9 MG/ML
INJECTION, SOLUTION INTRAVENOUS CONTINUOUS PRN
Status: DISCONTINUED | OUTPATIENT
Start: 2022-02-14 | End: 2022-02-14 | Stop reason: SDUPTHER

## 2022-02-14 RX ORDER — MORPHINE SULFATE 4 MG/ML
4 INJECTION, SOLUTION INTRAMUSCULAR; INTRAVENOUS EVERY 4 HOURS PRN
Status: DISCONTINUED | OUTPATIENT
Start: 2022-02-14 | End: 2022-02-17 | Stop reason: HOSPADM

## 2022-02-14 RX ORDER — MIDAZOLAM HYDROCHLORIDE 2 MG/2ML
2 INJECTION, SOLUTION INTRAMUSCULAR; INTRAVENOUS ONCE
Status: COMPLETED | OUTPATIENT
Start: 2022-02-14 | End: 2022-02-14

## 2022-02-14 RX ORDER — ONDANSETRON 4 MG/1
4 TABLET, ORALLY DISINTEGRATING ORAL EVERY 8 HOURS PRN
Status: DISCONTINUED | OUTPATIENT
Start: 2022-02-14 | End: 2022-02-17 | Stop reason: HOSPADM

## 2022-02-14 RX ORDER — OXYCODONE HYDROCHLORIDE 5 MG/1
5 TABLET ORAL EVERY 6 HOURS PRN
Qty: 15 TABLET | Refills: 0 | Status: SHIPPED | OUTPATIENT
Start: 2022-02-14 | End: 2022-02-19

## 2022-02-14 RX ORDER — ONDANSETRON 2 MG/ML
INJECTION INTRAMUSCULAR; INTRAVENOUS PRN
Status: DISCONTINUED | OUTPATIENT
Start: 2022-02-14 | End: 2022-02-14 | Stop reason: SDUPTHER

## 2022-02-14 RX ORDER — MIDAZOLAM HYDROCHLORIDE 1 MG/ML
INJECTION INTRAMUSCULAR; INTRAVENOUS PRN
Status: DISCONTINUED | OUTPATIENT
Start: 2022-02-14 | End: 2022-02-14 | Stop reason: SDUPTHER

## 2022-02-14 RX ORDER — HYDROCODONE BITARTRATE AND ACETAMINOPHEN 5; 325 MG/1; MG/1
2 TABLET ORAL PRN
Status: DISCONTINUED | OUTPATIENT
Start: 2022-02-14 | End: 2022-02-14 | Stop reason: HOSPADM

## 2022-02-14 RX ADMIN — TRAZODONE HYDROCHLORIDE 100 MG: 50 TABLET ORAL at 21:17

## 2022-02-14 RX ADMIN — FENTANYL CITRATE 100 MCG: 50 INJECTION, SOLUTION INTRAMUSCULAR; INTRAVENOUS at 14:30

## 2022-02-14 RX ADMIN — GABAPENTIN 600 MG: 300 CAPSULE ORAL at 19:06

## 2022-02-14 RX ADMIN — CALCIUM 500 MG: 500 TABLET ORAL at 23:43

## 2022-02-14 RX ADMIN — ZIPRASIDONE HYDROCHLORIDE 20 MG: 20 CAPSULE ORAL at 02:01

## 2022-02-14 RX ADMIN — CALCIUM 500 MG: 500 TABLET ORAL at 02:03

## 2022-02-14 RX ADMIN — Medication 5000 UNITS: at 09:52

## 2022-02-14 RX ADMIN — GABAPENTIN 600 MG: 300 CAPSULE ORAL at 08:37

## 2022-02-14 RX ADMIN — FENTANYL CITRATE 50 MCG: 50 INJECTION, SOLUTION INTRAMUSCULAR; INTRAVENOUS at 14:32

## 2022-02-14 RX ADMIN — METHOCARBAMOL TABLETS 500 MG: 500 TABLET, COATED ORAL at 10:26

## 2022-02-14 RX ADMIN — ESCITALOPRAM 20 MG: 10 TABLET, FILM COATED ORAL at 09:52

## 2022-02-14 RX ADMIN — Medication 10 ML: at 20:57

## 2022-02-14 RX ADMIN — METHOCARBAMOL TABLETS 500 MG: 500 TABLET, COATED ORAL at 19:06

## 2022-02-14 RX ADMIN — DOCUSATE SODIUM 100 MG: 100 CAPSULE, LIQUID FILLED ORAL at 02:21

## 2022-02-14 RX ADMIN — OXYCODONE HYDROCHLORIDE 10 MG: 10 TABLET ORAL at 19:06

## 2022-02-14 RX ADMIN — FAMOTIDINE 20 MG: 20 TABLET ORAL at 08:37

## 2022-02-14 RX ADMIN — MIDAZOLAM 2 MG: 1 INJECTION INTRAMUSCULAR; INTRAVENOUS at 14:10

## 2022-02-14 RX ADMIN — BACLOFEN 20 MG: 10 TABLET ORAL at 20:47

## 2022-02-14 RX ADMIN — SODIUM CHLORIDE: 9 INJECTION, SOLUTION INTRAVENOUS at 14:11

## 2022-02-14 RX ADMIN — ZIPRASIDONE HYDROCHLORIDE 20 MG: 20 CAPSULE ORAL at 23:44

## 2022-02-14 RX ADMIN — ROPIVACAINE HYDROCHLORIDE 30 ML: 5 INJECTION, SOLUTION EPIDURAL; INFILTRATION; PERINEURAL at 14:13

## 2022-02-14 RX ADMIN — DOCUSATE SODIUM 100 MG: 100 CAPSULE, LIQUID FILLED ORAL at 08:37

## 2022-02-14 RX ADMIN — HYDROCODONE BITARTRATE AND ACETAMINOPHEN 1 TABLET: 10; 325 TABLET ORAL at 10:27

## 2022-02-14 RX ADMIN — LEVOCARNITINE 330 MG: 330 TABLET ORAL at 23:44

## 2022-02-14 RX ADMIN — Medication 100 MCG: at 09:51

## 2022-02-14 RX ADMIN — BACLOFEN 10 MG: 10 TABLET ORAL at 01:59

## 2022-02-14 RX ADMIN — Medication 400 MG: at 02:03

## 2022-02-14 RX ADMIN — ESCITALOPRAM 20 MG: 10 TABLET, FILM COATED ORAL at 23:44

## 2022-02-14 RX ADMIN — ESCITALOPRAM 20 MG: 10 TABLET, FILM COATED ORAL at 02:22

## 2022-02-14 RX ADMIN — Medication 400 MG: at 11:11

## 2022-02-14 RX ADMIN — CALCIUM 500 MG: 500 TABLET ORAL at 09:53

## 2022-02-14 RX ADMIN — ALLOPURINOL 200 MG: 100 TABLET ORAL at 09:53

## 2022-02-14 RX ADMIN — MORPHINE SULFATE 4 MG: 4 INJECTION, SOLUTION INTRAMUSCULAR; INTRAVENOUS at 20:52

## 2022-02-14 RX ADMIN — FUROSEMIDE 40 MG: 20 TABLET ORAL at 08:37

## 2022-02-14 RX ADMIN — LEVOCARNITINE 330 MG: 330 TABLET ORAL at 02:01

## 2022-02-14 RX ADMIN — MORPHINE SULFATE 2 MG: 2 INJECTION, SOLUTION INTRAMUSCULAR; INTRAVENOUS at 16:03

## 2022-02-14 RX ADMIN — Medication 0.2 MG: at 14:44

## 2022-02-14 RX ADMIN — ALLOPURINOL 200 MG: 100 TABLET ORAL at 02:03

## 2022-02-14 RX ADMIN — PANTOPRAZOLE SODIUM 40 MG: 40 TABLET, DELAYED RELEASE ORAL at 08:37

## 2022-02-14 RX ADMIN — GABAPENTIN 600 MG: 300 CAPSULE ORAL at 02:22

## 2022-02-14 RX ADMIN — ALLOPURINOL 200 MG: 100 TABLET ORAL at 23:43

## 2022-02-14 RX ADMIN — TOPIRAMATE 200 MG: 100 TABLET, FILM COATED ORAL at 02:02

## 2022-02-14 RX ADMIN — FENTANYL CITRATE 100 MCG: 50 INJECTION, SOLUTION INTRAMUSCULAR; INTRAVENOUS at 14:10

## 2022-02-14 RX ADMIN — Medication 2000 MG: at 23:45

## 2022-02-14 RX ADMIN — Medication 400 MG: at 23:43

## 2022-02-14 RX ADMIN — TRAZODONE HYDROCHLORIDE 100 MG: 50 TABLET ORAL at 02:23

## 2022-02-14 RX ADMIN — PROPOFOL 150 MG: 10 INJECTION, EMULSION INTRAVENOUS at 14:30

## 2022-02-14 RX ADMIN — TOPIRAMATE 200 MG: 100 TABLET, FILM COATED ORAL at 09:52

## 2022-02-14 RX ADMIN — LIDOCAINE HYDROCHLORIDE 100 MG: 20 INJECTION, SOLUTION INTRAVENOUS at 14:30

## 2022-02-14 RX ADMIN — Medication 0.2 MG: at 14:42

## 2022-02-14 RX ADMIN — DEXAMETHASONE SODIUM PHOSPHATE 10 MG: 10 INJECTION INTRAMUSCULAR; INTRAVENOUS at 14:43

## 2022-02-14 RX ADMIN — ONDANSETRON HYDROCHLORIDE 4 MG: 2 SOLUTION INTRAMUSCULAR; INTRAVENOUS at 14:30

## 2022-02-14 RX ADMIN — POLYETHYLENE GLYCOL 3350 17 G: 17 POWDER, FOR SOLUTION ORAL at 08:37

## 2022-02-14 RX ADMIN — LEVOCARNITINE 330 MG: 330 TABLET ORAL at 09:52

## 2022-02-14 RX ADMIN — MORPHINE SULFATE 2 MG: 2 INJECTION, SOLUTION INTRAMUSCULAR; INTRAVENOUS at 06:14

## 2022-02-14 RX ADMIN — MONTELUKAST SODIUM 10 MG: 10 TABLET ORAL at 02:23

## 2022-02-14 RX ADMIN — DOCUSATE SODIUM 100 MG: 100 CAPSULE, LIQUID FILLED ORAL at 21:17

## 2022-02-14 RX ADMIN — FAMOTIDINE 20 MG: 20 TABLET ORAL at 20:55

## 2022-02-14 RX ADMIN — FAMOTIDINE 20 MG: 20 TABLET ORAL at 02:22

## 2022-02-14 RX ADMIN — FUROSEMIDE 40 MG: 20 TABLET ORAL at 19:06

## 2022-02-14 RX ADMIN — METOPROLOL SUCCINATE 12.5 MG: 25 TABLET, FILM COATED, EXTENDED RELEASE ORAL at 10:26

## 2022-02-14 RX ADMIN — Medication 2000 MG: at 14:46

## 2022-02-14 RX ADMIN — BACLOFEN 20 MG: 10 TABLET ORAL at 09:52

## 2022-02-14 RX ADMIN — MONTELUKAST SODIUM 10 MG: 10 TABLET ORAL at 21:17

## 2022-02-14 ASSESSMENT — PAIN SCALES - GENERAL
PAINLEVEL_OUTOF10: 9
PAINLEVEL_OUTOF10: 8
PAINLEVEL_OUTOF10: 8
PAINLEVEL_OUTOF10: 6
PAINLEVEL_OUTOF10: 9
PAINLEVEL_OUTOF10: 8
PAINLEVEL_OUTOF10: 9
PAINLEVEL_OUTOF10: 7
PAINLEVEL_OUTOF10: 0
PAINLEVEL_OUTOF10: 8
PAINLEVEL_OUTOF10: 0

## 2022-02-14 ASSESSMENT — PAIN DESCRIPTION - ORIENTATION
ORIENTATION: RIGHT

## 2022-02-14 ASSESSMENT — PAIN DESCRIPTION - PAIN TYPE
TYPE: SURGICAL PAIN
TYPE: SURGICAL PAIN
TYPE: ACUTE PAIN
TYPE: SURGICAL PAIN

## 2022-02-14 ASSESSMENT — PULMONARY FUNCTION TESTS
PIF_VALUE: 1
PIF_VALUE: 5
PIF_VALUE: 9
PIF_VALUE: 3
PIF_VALUE: 4
PIF_VALUE: 6
PIF_VALUE: 5
PIF_VALUE: 1
PIF_VALUE: 3
PIF_VALUE: 6
PIF_VALUE: 7
PIF_VALUE: 5
PIF_VALUE: 4
PIF_VALUE: 0
PIF_VALUE: 4
PIF_VALUE: 8
PIF_VALUE: 8
PIF_VALUE: 0
PIF_VALUE: 8
PIF_VALUE: 0
PIF_VALUE: 7
PIF_VALUE: 4
PIF_VALUE: 2
PIF_VALUE: 7
PIF_VALUE: 6
PIF_VALUE: 5
PIF_VALUE: 0
PIF_VALUE: 0
PIF_VALUE: 6
PIF_VALUE: 0
PIF_VALUE: 5
PIF_VALUE: 5
PIF_VALUE: 7
PIF_VALUE: 5
PIF_VALUE: 6
PIF_VALUE: 5
PIF_VALUE: 5
PIF_VALUE: 1
PIF_VALUE: 1
PIF_VALUE: 6
PIF_VALUE: 4
PIF_VALUE: 6
PIF_VALUE: 6
PIF_VALUE: 7
PIF_VALUE: 5
PIF_VALUE: 2
PIF_VALUE: 5
PIF_VALUE: 23
PIF_VALUE: 0
PIF_VALUE: 6
PIF_VALUE: 7
PIF_VALUE: 5
PIF_VALUE: 4
PIF_VALUE: 6
PIF_VALUE: 5
PIF_VALUE: 5
PIF_VALUE: 4
PIF_VALUE: 5
PIF_VALUE: 8
PIF_VALUE: 24
PIF_VALUE: 6
PIF_VALUE: 5
PIF_VALUE: 19

## 2022-02-14 ASSESSMENT — PAIN DESCRIPTION - DESCRIPTORS
DESCRIPTORS: SHARP;ACHING;CONSTANT;DISCOMFORT
DESCRIPTORS: BURNING;DISCOMFORT
DESCRIPTORS: BURNING;DISCOMFORT
DESCRIPTORS: BURNING;DISCOMFORT;THROBBING

## 2022-02-14 ASSESSMENT — PAIN DESCRIPTION - LOCATION
LOCATION: LEG
LOCATION: ANKLE

## 2022-02-14 ASSESSMENT — PAIN DESCRIPTION - ONSET
ONSET: ON-GOING
ONSET: ON-GOING
ONSET: AWAKENED FROM SLEEP

## 2022-02-14 ASSESSMENT — PAIN DESCRIPTION - FREQUENCY
FREQUENCY: CONTINUOUS

## 2022-02-14 ASSESSMENT — PAIN DESCRIPTION - DIRECTION: RADIATING_TOWARDS: SHIN

## 2022-02-14 ASSESSMENT — PAIN DESCRIPTION - PROGRESSION: CLINICAL_PROGRESSION: NOT CHANGED

## 2022-02-14 ASSESSMENT — PAIN - FUNCTIONAL ASSESSMENT: PAIN_FUNCTIONAL_ASSESSMENT: PREVENTS OR INTERFERES SOME ACTIVE ACTIVITIES AND ADLS

## 2022-02-14 NOTE — CONSULTS
Department of Orthopedic Surgery  Resident Consult Note          Reason for Consult:  Right ankle pain    HISTORY OF PRESENT ILLNESS:       Patient is a 64 y.o. female who presents with the chief complaint of right ankle pain. The patient states that she tripped yesterday over her cat and fell injuring her right ankle. She was unable to ambulate afterwards and had immediate pain. Patient does live alone in apartment. Denies pain or had a loss of consciousness, although the patient does have ecchymosis of her left eye. She has had several falls recently. She is currently being treated nonoperatively for a right distal radius fracture and currently still has her cast in place. She was noted to have a laceration over her right leg and was given antibiotics and tetanus in the emergency department. Patient is somnolent during history and difficult to obtain a detailed history from. Denies numbness/tingling/paresthesias. Denies any other orthopedic complaints at this time.        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 Alyssa Jama Vick [facility]    Neuropathy     at feet    Obesity     PETR (obstructive sleep apnea)     no CPAP dt insurance will not pay for    PONV (postoperative nausea and vomiting)     Seizures ([address]     last approx 6-13-19- f/u w/ PCP  Granmal, flares up in heat/ summer time - no recent issues as of 10-4-19     Spinal headache     Thyroid disease      Past Surgical History:        Procedure Laterality Date    APPENDECTOMY      with Hysterectomy / unknown    BACK SURGERY  last one 1995    lumbar x 2    CARDIAC CATHETERIZATION Right 6-6-2013    Dr. Von Dickson, no stents placed, no blockages     CHOLECYSTECTOMY  1999    open with gastric bypass    COLONOSCOPY N/A 9/18/2018    COLONOSCOPY POLYPECTOMY HOT BIOPSY performed by CHRISTOPHE Stewart MD at [address] N/A 10/8/2019    COLONOSCOPY POLYPECTOMY SNARE/COLD BIOPSY performed by Bull Jordan MD at [address]    EGD COLONOSCOPY N/A 10/8/2019    EGD ESOPHAGOGASTRODUODENOSCOPY DILATATION performed by Bull Jordan MD at [address] [facility], COLON, DIAGNOSTIC      ESOPHAGEAL DILATATION  9/18/2018    [address] performed by Bull Jordan MD at [address] [facility] Bilateral 2007,2017    knees    KNEE SURGERY Bilateral     scope    MYOMECTOMY      NERVE BLOCK Left 10 02 2013    lumbar paravertebral facet #2    NERVE BLOCK Left 10 9 13    hip inj #1    NERVE BLOCK Left 10/16/13    hip injection    NERVE BLOCK Left 10/29/2014    left lumbar transforaminal nerve lbock  #1    NERVE BLOCK Left 11/12/2014    lumbar left transforaminal nerve block  #2    NERVE BLOCK Left 12 8 14    lumbar transforaminal #3    NERVE BLOCK Right 3/30/15    cervical transforaminal #1    NERVE BLOCK Right 4/6/2015    right cervical transforaminal nerve block  #2    NERVE BLOCK Right 4/13/15    cervical transforaminal #3    NERVE BLOCK Left 7/6/15    knee injection #1    NERVE BLOCK 07/20/15    left genicular nerve block/knee #2    NERVE BLOCK Left 10 1 15    lumb transforam #1    NERVE BLOCK  10/26/15    left lumbar transforaminal nerve block #3    NERVE BLOCK Bilateral 4/1/2021    #1 BILATERAL SACROILIAC JOINT INJECTION UNDER FLUORO performed by Christian Frederick MD at [address] Left 11 25 13    lumbar radiofreq    OTHER SURGICAL HISTORY  3/28/2016    stage 1, 3 day percutanous trial [employer] lumbar spinal cord stimulator    OTHER SURGICAL HISTORY  1995    racz procedure / lower back    IA COLONOSCOPY FLX DX W/COLLJ SPEC WHEN PFRMD N/A 3/20/2018    COLONOSCOPY DIAGNOSTIC OR SCREENING performed by Aubrey Martinez MD at [address] [place] [address] EGD TRANSORAL BIOPSY SINGLE/MULTIPLE N/A 3/20/2018    EGD BIOPSY performed by Aubrey Martinez MD at [facility]       Current Medications:   Current Facility-Administered Medications: escitalopram (LEXAPRO) tablet 20 mg, 20 mg, Oral, Once  ceFAZolin (ANCEF) 1,000 mg in sterile water 10 mL IV syringe, 1,000 mg, IntraVENous, Once  lidocaine 1 % injection 20 mL, 20 mL, Intra-artICUlar, See Admin Instructions  ziprasidone (GEODON) capsule 20 mg, 20 mg, Oral, Nightly  topiramate (TOPAMAX) tablet 200 mg, 200 mg, Oral, BID  escitalopram (LEXAPRO) tablet 20 mg, 20 mg, Oral, BID  gabapentin (NEURONTIN) capsule 600 mg, 600 mg, Oral, 4x Daily  allopurinol (ZYLOPRIM) tablet 200 mg, 200 mg, Oral, BID  levOCARNitine (CARNITOR) tablet 330 mg, 330 mg, Oral, TID  linaclotide (LINZESS) capsule 290 mcg, 290 mcg, Oral, QAM AC  Vitamin D (CHOLECALCIFEROL) tablet 5,000 Units, 5,000 Units, Oral, Every Other Day  docusate sodium (COLACE) capsule 100 mg, 100 mg, Oral, BID  furosemide (LASIX) tablet 40 mg, 40 mg, Oral, BID  pantoprazole (PROTONIX) tablet 40 mg, 40 mg, Oral, QAM AC  montelukast (SINGULAIR) tablet 10 mg, 10 mg, Oral, Daily  magnesium oxide (MAG-OX) tablet 400 mg, 400 mg, Oral, BID  calcium elemental (OSCAL) tablet 500 mg, 500 mg, Oral, BID  albuterol (PROVENTIL) nebulizer solution 2.5 mg, 2.5 mg, Nebulization, Q4H PRN  ondansetron (ZOFRAN-ODT) disintegrating tablet 4 mg, 4 mg, Oral, TID PRN  traZODone (DESYREL) tablet 100 mg, 100 mg, Oral, Nightly  famotidine (PEPCID) tablet 20 mg, 20 mg, Oral, BID  vitamin B-12 (CYANOCOBALAMIN) tablet 100 mcg, 100 mcg, Oral, Daily  metoprolol succinate (TOPROL XL) extended release tablet 12.5 mg, 12.5 mg, Oral, Daily  methocarbamol (ROBAXIN) tablet 500 mg, 500 mg, Oral, 4x Daily  baclofen (LIORESAL) tablet 20 mg, 20 mg, Oral, 4x daily  guaiFENesin (ROBITUSSIN) 100 MG/5ML oral solution 200 mg, 200 mg, Oral, Q6H PRN  acetaminophen (TYLENOL) tablet 650 mg, 650 mg, Oral, Q6H PRN  polyethylene glycol (GLYCOLAX) packet 17 g, 17 g, Oral, Daily  bisacodyl (DULCOLAX) suppository 10 mg, 10 mg, Rectal, Daily  magnesium hydroxide (MILK OF MAGNESIA) 400 MG/5ML suspension 30 mL, 30 mL, Oral, Daily PRN  HYDROcodone-acetaminophen (NORCO)  MG per tablet 1 tablet, 1 tablet, Oral, Q6H PRN  Allergies:  Bee pollen, Penicillins, Ropinirole hcl, Vistaril [hydroxyzine hcl], Aripiprazole, Hydroxyzine pamoate, and Tape [adhesive tape]    Social History:   TOBACCO:   reports that she quit smoking about 7 weeks ago. Her smoking use included cigarettes. She started smoking about 31 years ago. She has a 31.50 pack-year smoking history. She has never used smokeless tobacco.  ETOH:   reports no history of alcohol use. DRUGS:   reports previous drug use. Drug: Marijuana [employer] [facility]).   ACTIVITIES OF DAILY LIVING:    OCCUPATION:    Family History:       Problem Relation Age of Onset    Heart Disease Mother     Cancer Father     Arthritis Other     Cancer Other     Depression Other     Heart Disease Other     Hypertension Other     Mental Illness Other     Stroke Other        REVIEW OF SYSTEMS:  CONSTITUTIONAL:  negative for  fevers, chills  EYES:  negative for blurred vision, visual disturbance  HEENT:  negative for  hearing loss, voice change  RESPIRATORY:  negative for  dyspnea, wheezing  CARDIOVASCULAR:  negative for  chest pain, palpitations  GASTROINTESTINAL:  negative for nausea, vomiting  GENITOURINARY:  negative for frequency, urinary incontinence  HEMATOLOGIC/LYMPHATIC:  negative for bleeding and petechiae  MUSCULOSKELETAL:  positive for right ankle pain, right distal radius fracture with cast  NEUROLOGICAL:  negative for headaches, dizziness  BEHAVIOR/PSYCH:  negative for increased agitation and anxiety    PHYSICAL EXAM:    VITALS:  BP (!) 155/93   Pulse 62   Temp 97.9 °F (36.6 °C)   Resp 18   Ht 5' 6\" (1.676 m)   Wt 245 lb (111.1 kg)   LMP 08/31/1988   SpO2 100%   BMI 39.54 kg/m²   CONSTITUTIONAL: Somnolent, difficult in following directions and cooperating with exam.  MUSCULOSKELETAL:  Right lower Extremity:  There is a 4 cm laceration transversely over her distal third of her tibial shaft. At her level proximal to her fracture. This laceration was probed and extends into the subcutaneous tissue and fat. However does not probe all the way down to bone. She has significant swelling of her right ankle as well as ecchymosis. Patient has several areas of ecchymosis of both lower and upper extremities. Tenderness palpation about the right ankle. No tenderness about the knee. Pain with logroll though is at the ankle. Compartments soft and compressible, calf non-tender  +DP & PT pulses, Brisk Cap refill, Toes warm and perfused  States sensation is absent to her foot and toes, states this is her baseline from underlying neuropathy  +GS/TA/EHL, globally weak    Secondary Exam:   bilateralUE: No obvious signs of trauma. Patient is difficult to examine as she states everything hurts when she is touched even lightly in any portion of her upper or lower extremities. Cast intact to right upper extremity.     leftLE: Again patient is difficult to examine as she states everything hurts when she is touched even lightly on any extremity. She again does have several areas of ecchymosis distributed over the left lower extremity. No obvious crepitus noted with motion of the ankle, knee, hip. Pelvis: -TTP, -Log roll, -Heel strike     DATA:    CBC:   Lab Results   Component Value Date    WBC 6.4 02/13/2022    RBC 3.60 02/13/2022    HGB 11.3 02/13/2022    HCT 34.4 02/13/2022    MCV 95.6 02/13/2022    MCH 31.4 02/13/2022    MCHC 32.8 02/13/2022    RDW 13.3 02/13/2022     02/13/2022    MPV 8.9 02/13/2022     PT/INR:    Lab Results   Component Value Date    PROTIME 10.8 02/13/2022    PROTIME 10.8 05/07/2012    INR 1.0 02/13/2022     CRP/ESR:   Lab Results   Component Value Date    CRP 9.4 01/06/2022    SEDRATE 40 01/06/2022     Lactic Acid :   Lab Results   Component Value Date    LACTA 1.0 02/13/2022       Radiology Review:  02/14/22 - XR of the right ankle and tib-fib demonstrate a very distal tibia fracture about a centimeter proximal to the plafond. The actual plafond and articular surface appears to be intact there is a large medial spike. There is some medial displacement as well as a Rose B transverse fibular fracture. There are no acute fractures or dislocations about the proximal tibia and fibula however there is previous total knee arthroplasty noted with fixation for prior distal femur periprosthetic fracture with no apparent issues. IMPRESSION:   Right pilon fracture    PLAN:  NWB - RLE  After informed consent was verbally obtained,Closed reduction was then performed with application of well padded 3 sided splint splint. Patient tolerated well and remained neurovascularly intact pre and post reduction  Appreciate medical care. Patient will need medical clearance prior to any operative intervention. Pain Control p.o. and IV  PT/OT   Continue ice and elevation to decrease swelling  Patient will require operative intervention for her right pilon fracture.   N.p.o. now  Preop labs and imaging  Treatment consent  Discussed with Dr. Tiffany Burks Attending    I have seen and evaluated the patient and agree with the above assessment. I have performed the key components of the history and physical examination and concur completely with the findings as documented. CC: Right ankle pain    HPI:This is a 64 y.o. female who presents with the chief complaint of right ankle pain. The patient states that she tripped yesterday over her cat and fell injuring her right ankle. She was unable to ambulate afterwards and had immediate pain. Patient does live alone in apartment. Denies pain or had a loss of consciousness, although the patient does have ecchymosis of her left eye. She has had several falls recently. She is currently being treated nonoperatively for a right distal radius fracture and currently still has her cast in place. She was noted to have a laceration over her right leg and was given antibiotics and tetanus in the emergency department. Patient is somnolent during history and difficult to obtain a detailed history from. Denies numbness/tingling/paresthesias. Denies any other orthopedic complaints at this time. Patient missed there is slew of medical problems and chronic neck and back pain. 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 ROS, medications, allergies, past medical/surgical/social/family histories reviewed and as above    PE:  BP (!) 162/78   Pulse 74   Temp 97.4 °F (36.3 °C) (Temporal)   Resp 10   Ht 5' 6\" (1.676 m)   Wt 245 lb (111.1 kg)   LMP 08/31/1988   SpO2 100%   BMI 39.54 kg/m²   As above    Radiographic Review:  XR: Right distal tibial fracture involving the articular surface, displaced, separate displaced distal fibular fracture, osteopenic bone quality, previous TKA which appears to be in appropriate positioning with noted distal femur periprosthetic plate. ASSESSMENT:  Right tibial pilon and fibular shaft fractures  Transverse wound right leg    PLAN:  Had lengthy discussion with patient regarding their diagnosis, typical prognosis, and expected outcomes. We reviewed the possible complications from the injury itself despite treatment chosen. We also discussed treatment options including nonoperative managements versus surgical management, along with risks and benefits of each. Patient has elected for surgical management despite associated risks. Medical admit with surgical optimization. Planning for OR 2/14/2022 for right tibial pilon and fibula fractures closed reduction with application spanning external fixation, irrigation debridement closure of right leg wound  I have explained the risks and complications of the recommended surgery with the patient at length, as well as discussed potential treatment alternatives including nonoperative management. These risks include but are not limited to death or complication from anesthesia, continued pain, nerve tendon or vascular injury, infection, nonunion or malunion, symptomatic hardware or hardware failure, deep vein thrombosis or pulmonary embolism, wound healing complications, unforeseen complications, and need for further surgery, etc.  Patient understood this, asked appropriate questions, which were all answered, and she has elected to proceed with the procedure. No guarantees were provided. Electronically signed by   Peewee Billings DO  2/14/2022     NOTE: This report was transcribed using voice recognition software.  Every effort was made to ensure accuracy; however, inadvertent computerized transcription errors may be present

## 2022-02-14 NOTE — ED NOTES
Open fracture of right ankle; right leg is shortened and externally rotated     [employer] [facility], RN  02/13/22 [id number]

## 2022-02-14 NOTE — ED PROVIDER NOTES
HPI:  2/13/22, Time: 8:25 PM HERIBERTO Gonzalez is a 64 y.o. female presenting to the ED for fall with leg injury, beginning since yesterday ago. The complaint has been constant, moderate in severity, and worsened by movement of right leg. Patient reportedly had fallen believed of falling yesterday. Patient complaining of right leg pain. Patient reporting abdominal pain. Patient reports she believes she tripped over her cat yesterday. She has had fallen in the past she has a cast to her right arm. Patient does report abdominal pain there is no history of cough. Patient arrived here by EMS. Patient was on gurney c-collar was ordered here in the emergency department. Patient does have noted laceration to her right lateral leg. Patient reporting no vomiting or diarrhea there is no productive cough. ROS:   Pertinent positives and negatives are stated within HPI, all other systems reviewed and are negative.  --------------------------------------------- PAST HISTORY ---------------------------------------------  Past Medical History:  has a past medical history of Adenoma of right adrenal gland, Anemia, Anxiety, Arthritis, Asthma, Benign essential tremor, Bipolar affective (HCC), Chronic back pain, Chronic respiratory failure with hypoxia ([facility]), Depression, Difficulty swallowing, Environmental and seasonal allergies, Excessive physiologic tremor, Fatty liver, Full dentures, GERD (gastroesophageal reflux disease), Gout, Herniated cervical disc, History of swelling of feet, Lymphedema of lower extremity, Mitochondrial cytopathy ([facility]), Neuropathy, Obesity, PETR (obstructive sleep apnea), PONV (postoperative nausea and vomiting), Seizures ([facility]), Spinal headache, and Thyroid disease. Past Surgical History:  has a past surgical history that includes knee surgery (Bilateral); Gastric bypass surgery (1999); myomectomy; Tonsillectomy; Nerve Block (Left, 10 02 2013); Nerve Block (Left, 10 9 13);  Nerve Block (Left, 10/16/13); other surgical history (Left, 11 25 13); Nerve Block (Left, 10/29/2014); Nerve Block (Left, 11/12/2014); Nerve Block (Left, 12 8 14); Nerve Block (Right, 3/30/15); Nerve Block (Right, 4/6/2015); Nerve Block (Right, 4/13/15); Nerve Block (Left, 7/6/15); Nerve Block (07/20/15); Nerve Block (Left, 10 1 15); Nerve Block (10/26/15); other surgical history (3/28/2016); Endoscopy, colon, diagnostic; pr colonoscopy flx dx w/collj spec when pfrmd (N/A, 3/20/2018); pr egd transoral biopsy single/multiple (N/A, 3/20/2018); Cholecystectomy (1999); Hysterectomy (1988); Colonoscopy (N/A, 9/18/2018); Esophagus dilation (9/18/2018); back surgery (last one 1995); Cardiac catheterization (Right, 6-6-2013); Appendectomy; other surgical history (1995); joint replacement (Bilateral, [id number]); egd colonoscopy (N/A, 10/8/2019); Colonoscopy (N/A, 10/8/2019); and Nerve Block (Bilateral, 4/1/2021). Social History:  reports that she quit smoking about 7 weeks ago. Her smoking use included cigarettes. She started smoking about 31 years ago. She has a 31.50 pack-year smoking history. She has never used smokeless tobacco. She reports previous drug use. Drug: Marijuana Mateo Piper). She reports that she does not drink alcohol. Family History: family history includes Arthritis in an other family member; Cancer in her father and another family member; Depression in an other family member; Heart Disease in her mother and another family member; Hypertension in an other family member; Mental Illness in an other family member; Stroke in an other family member. The patients home medications have been reviewed.     Allergies: Bee pollen, Penicillins, Ropinirole hcl, Vistaril [hydroxyzine hcl], Aripiprazole, Hydroxyzine pamoate, and Tape [adhesive tape]    ---------------------------------------------------PHYSICAL EXAM--------------------------------------    Constitutional/General: Alert and oriented x3, mild to moderate distress  Head: Normocephalic and atraumatic  Eyes: PERRL, EOMI noted periorbital ecchymosis to right eye  Mouth: Oropharynx clear, handling secretions, no trismus  Neck: C-collar ordered in the emergency department  Pulmonary: Lungs clear to auscultation bilaterally, no wheezes, rales, or rhonchi. Not in respiratory distress  Cardiovascular:  Regular rate. Regular rhythm. No murmurs, gallops, or rubs. 2+ distal pulses  Chest: no chest wall tenderness  Abdomen: Soft. Tender mid abdomen noted hernia Non distended. +BS. No rebound, guarding, or rigidity. No pulsatile masses appreciated. Musculoskeletal: Moving upper extremities limb range of motion of lower extremities. Noted laceration to her right lower extremity. Pulses are intact range of motion limited secondary pain noted swelling to right lower extremity  Skin: warm and dry. Noted ecchymosis to skin I do not appreciate any bony exposure from wound site  Neurologic: GCS 15, CN 2-12 grossly intact, no focal deficits, moves upper extremities limited range of motion of lower extremities noted laceration to her right lower extremity    -------------------------------------------------- RESULTS -------------------------------------------------  I have personally reviewed all laboratory and imaging results for this patient. Results are listed below.      LABS:  Results for orders placed or performed during the hospital encounter of 02/13/22   CBC auto differential   Result Value Ref Range    WBC 6.4 4.5 - 11.5 E9/L    RBC 3.60 3.50 - 5.50 E12/L    Hemoglobin 11.3 (L) 11.5 - 15.5 g/dL    Hematocrit 34.4 34.0 - 48.0 %    MCV 95.6 80.0 - 99.9 fL    MCH 31.4 26.0 - 35.0 pg    MCHC 32.8 32.0 - 34.5 %    RDW 13.3 11.5 - 15.0 fL    Platelets 478 196 - 745 E9/L    MPV 8.9 7.0 - 12.0 fL    Neutrophils % 77.0 43.0 - 80.0 %    Immature Granulocytes % 0.6 0.0 - 5.0 %    Lymphocytes % 11.9 (L) 20.0 - 42.0 %    Monocytes % 9.6 2.0 - 12.0 %    Eosinophils % 0.6 0.0 - 6.0 % Basophils % 0.3 0.0 - 2.0 %    Neutrophils Absolute 4.89 1.80 - 7.30 E9/L    Immature Granulocytes # 0.04 E9/L    Lymphocytes Absolute 0.76 (L) 1.50 - 4.00 E9/L    Monocytes Absolute 0.61 0.10 - 0.95 E9/L    Eosinophils Absolute 0.04 (L) 0.05 - 0.50 E9/L    Basophils Absolute 0.02 0.00 - 0.20 E9/L   Comprehensive Metabolic Panel   Result Value Ref Range    Sodium 141 132 - 146 mmol/L    Potassium 3.7 3.5 - 5.0 mmol/L    Chloride 101 98 - 107 mmol/L    CO2 28 22 - 29 mmol/L    Anion Gap 12 7 - 16 mmol/L    Glucose 126 (H) 74 - 99 mg/dL    BUN 24 (H) 6 - 23 mg/dL    CREATININE 1.0 0.5 - 1.0 mg/dL    GFR Non-African American 56 >=60 mL/min/1.73    GFR African American >60     Calcium 8.7 8.6 - 10.2 mg/dL    Total Protein 6.5 6.4 - 8.3 g/dL    Albumin 3.8 3.5 - 5.2 g/dL    Total Bilirubin 0.2 0.0 - 1.2 mg/dL    Alkaline Phosphatase 133 (H) 35 - 104 U/L    ALT 20 0 - 32 U/L    AST 24 0 - 31 U/L   Troponin   Result Value Ref Range    Troponin, High Sensitivity 15 (H) 0 - 9 ng/L   CK   Result Value Ref Range    Total  20 - 180 U/L   Urinalysis   Result Value Ref Range    Color, UA Yellow Straw/Yellow    Clarity, UA Clear Clear    Glucose, Ur Negative Negative mg/dL    Bilirubin Urine Negative Negative    Ketones, Urine TRACE (A) Negative mg/dL    Specific Gravity, UA 1.015 1.005 - 1.030    Blood, Urine TRACE-INTACT Negative    pH, UA 5.5 5.0 - 9.0    Protein, UA Negative Negative mg/dL    Urobilinogen, Urine 0.2 <2.0 E.U./dL    Nitrite, Urine Negative Negative    Leukocyte Esterase, Urine Negative Negative   Lactic Acid, Plasma   Result Value Ref Range    Lactic Acid 1.0 0.5 - 2.2 mmol/L   Serum Drug Screen   Result Value Ref Range    Ethanol Lvl <10 mg/dL    Acetaminophen Level <5.0 (L) 10.0 - 48.4 mcg/mL    Salicylate, Serum <8.8 0.0 - 30.0 mg/dL    TCA Scrn NEGATIVE Cutoff:300 ng/mL   Protime-INR   Result Value Ref Range    Protime 10.8 9.3 - 12.4 sec    INR 1.0    Microscopic Urinalysis   Result Value Ref Range WBC, UA 0-1 0 - 5 /HPF    RBC, UA 2-5 0 - 2 /HPF    Epithelial Cells, UA RARE /HPF    Bacteria, UA RARE (A) None Seen /HPF       RADIOLOGY:  Interpreted by Radiologist.  CT CERVICAL SPINE WO CONTRAST   Final Result   No acute abnormality of the cervical spine. Mild multilevel degenerative changes. XR ANKLE RIGHT (2 VIEWS)   Final Result   Near anatomic alignment, post closed reduction and cast placement. XR ANKLE RIGHT (MIN 3 VIEWS)   Final Result   Distal tibia and fibular fractures. Disrupted ankle mortise. XR PELVIS (1-2 VIEWS)   Final Result   1. No signs of a displaced pelvic fracture. 2.  Joint space narrowing of each hip suggest early signs of degenerative   joint disease         XR TIBIA FIBULA LEFT (2 VIEWS)   Final Result   No acute bony abnormalities or complications of the total knee prosthesis. XR TIBIA FIBULA RIGHT (2 VIEWS)   Final Result   Distal tibia and fibular fractures. XR CHEST PORTABLE   Final Result   No acute process. Mild cardiomegaly. CT HEAD WO CONTRAST    (Results Pending)   CT ABDOMEN PELVIS WO CONTRAST Additional Contrast? None    (Results Pending)         EKG: This EKG is signed and interpreted by me. Rate: 59  Rhythm: Sinus  Interpretation: no acute changes  Comparison: stable as compared to patient's most recent EKG          ------------------------- NURSING NOTES AND VITALS REVIEWED ---------------------------   The nursing notes within the ED encounter and vital signs as below have been reviewed by myself. BP (!) 155/93   Pulse 62   Temp 97.9 °F (36.6 °C)   Resp 18   Ht 5' 6\" (1.676 m)   Wt 245 lb (111.1 kg)   LMP 08/31/1988   SpO2 100%   BMI 39.54 kg/m²   Oxygen Saturation Interpretation: Normal    The patients available past medical records and past encounters were reviewed.         ------------------------------ ED COURSE/MEDICAL DECISION MAKING----------------------  Medications   ceFAZolin MG/5ML oral solution 200 mg (has no administration in time range)   acetaminophen (TYLENOL) tablet 650 mg (has no administration in time range)   polyethylene glycol (GLYCOLAX) packet 17 g (has no administration in time range)   bisacodyl (DULCOLAX) suppository 10 mg (has no administration in time range)   magnesium hydroxide (MILK OF MAGNESIA) 400 MG/5ML suspension 30 mL (has no administration in time range)   HYDROcodone-acetaminophen (NORCO)  MG per tablet 1 tablet (has no administration in time range)   escitalopram (LEXAPRO) tablet 20 mg (has no administration in time range)   ceFAZolin (ANCEF) 1,000 mg in sterile water 10 mL IV syringe (1,000 mg IntraVENous Given 2/13/22 2126)   fentaNYL (SUBLIMAZE) injection 25 mcg (25 mcg IntraVENous Given 2/13/22 2051)   fentaNYL (SUBLIMAZE) injection 50 mcg (50 mcg IntraVENous Given 2/13/22 2127)             Medical Decision Making:    Presenting here because of fall that occurred yesterday. Patient reportedly tripped over her cat. Patient complaining of right ankle pain. There is noted deformity to the right ankle. There is noted laceration to her right lateral ankle. Pulses are intact distally. Patient does complain of abdominal pain. She reports no chest pain. She has noted ecchymosis to her eye that appears to be old. Patient labs noted reviewed as well as x-rays orthopedics was consulted. They requested the patient be admitted medically. Re-Evaluations:             Re-evaluation. Patients symptoms show no change  Patient reevaluated medicated. Patient made aware of findings and plan. Consultations:     Orthopedics and internal medicine            Critical Care: This patient's ED course included: a personal history and physicial eaxmination    This patient has been closely monitored during their ED course. Counseling:    The emergency provider has spoken with the patient and discussed todays results, in addition to providing specific details for the plan of care and counseling regarding the diagnosis and prognosis. Questions are answered at this time and they are agreeable with the plan.       --------------------------------- IMPRESSION AND DISPOSITION ---------------------------------    IMPRESSION  1. Closed fracture of right ankle, initial encounter    2. Contusion, shoulder and upper arm, multiple sites, unspecified laterality, initial encounter    3. Gait disturbance        DISPOSITION  Disposition: Admit  Patient condition is fair        NOTE: This report was transcribed using voice recognition software.  Every effort was made to ensure accuracy; however, inadvertent computerized transcription errors may be present          Matt Trejo MD  02/13/22 [id number]       Matt Trejo MD  02/14/22 [id number]

## 2022-02-14 NOTE — ANESTHESIA PRE PROCEDURE
Department of Anesthesiology  Preprocedure Note       Name:  Silke Lopez   Age:  64 y.o.  :  1960                                          MRN:  [id number]         Date:  2022      Surgeon: Salvador Dallas):  James Rodriguez MD    Procedure: Procedure(s):  RIGHT ANKLE IRRIGAITON AND DEBRIDEMENT WITH EXTERNAL FIXATOR -VS- OPEN REDUCTION INTERNAL FIXATION    Medications prior to admission:   Prior to Admission medications    Medication Sig Start Date End Date Taking?  Authorizing Provider   ipratropium-albuterol (DUONEB) 0.5-2.5 (3) MG/3ML SOLN nebulizer solution Inhale 1 vial into the lungs every 6 hours   Yes Historical Provider, MD   guaiFENesin (ROBITUSSIN) 100 MG/5ML SOLN oral solution Take 200 mg by mouth every 6 hours as needed for Cough   Yes Historical Provider, MD   acetaminophen (TYLENOL) 325 MG tablet Take 650 mg by mouth every 6 hours as needed for Pain   Yes Historical Provider, MD   polyethylene glycol (GLYCOLAX) 17 g packet Take 17 g by mouth daily   Yes Historical Provider, MD   polyvinyl alcohol (LIQUIFILM TEARS) 1.4 % ophthalmic solution Place 1 drop into both eyes 2 times daily   Yes Historical Provider, MD   bisacodyl (DULCOLAX) 10 MG suppository Place 10 mg rectally daily   Yes Historical Provider, MD   magnesium hydroxide (MILK OF MAGNESIA) 400 MG/5ML suspension Take 30 mLs by mouth daily as needed for Constipation   Yes Historical Provider, MD   baclofen (LIORESAL) 20 MG tablet Take 20 mg by mouth 4 times daily   Yes Historical Provider, MD   methocarbamol (ROBAXIN) 500 MG tablet Take 500 mg by mouth 4 times daily   Yes Historical Provider, MD   metoprolol succinate (TOPROL XL) 25 MG extended release tablet Take 0.5 tablets by mouth daily 21  Yes Ure Ace MD Giovanni   cetirizine (ZYRTEC) 10 MG tablet take 1 tablet by mouth once daily as directed 21  Yes Historical Provider, MD   Co-Enzyme Q10 100 MG CAPS TAKE 1 CAPSULE BY MOUTH TWICE DAILY AS DIRECTED 21 Yes Historical Provider, MD   cyanocobalamin 50 MCG tablet Take 100 mcg by mouth daily   Yes Historical Provider, MD   Glucosamine-Chondroitin 750-600 MG CHEW Take by mouth 2 times daily   Yes Historical Provider, MD   EPINEPHrine (EPIPEN 2-CARMEN) 0.3 MG/0.3ML SOAJ injection Inject 0.3 mLs into the muscle once for 1 dose Use as directed for allergic reaction 9/10/20 2/1/23 Yes Jose D Johnson MD   traZODone (DESYREL) 100 MG tablet Take 100 mg by mouth nightly   Yes Historical Provider, MD   ondansetron (ZOFRAN-ODT) 4 MG disintegrating tablet Take 1 tablet by mouth 3 times daily as needed for Nausea or Vomiting 2/26/20  Yes Larkin Schwab, MD   albuterol (PROVENTIL) (5 MG/ML) 0.5% nebulizer solution Take 2.5 mg by nebulization 3 times daily    Yes Historical Provider, MD   B Complex Vitamins (VITAMIN-B COMPLEX PO) Take by mouth daily    Yes Historical Provider, MD   magnesium 200 MG TABS tablet Take 200 mg by mouth 2 times daily    Yes Historical Provider, MD   calcium carbonate (CALCIUM 600) 600 MG TABS tablet Take 1 tablet by mouth 2 times daily    Yes Historical Provider, MD   azelastine (ASTELIN) 0.1 % nasal spray 1 spray by Nasal route 2 times daily Use in each nostril as directed 8/15/19  Yes Pola Murray DO   montelukast (SINGULAIR) 10 MG tablet Take 1 tablet by mouth daily 8/12/19  Yes Pola Murray,    omeprazole (PRILOSEC) 20 MG delayed release capsule Take 20 mg by mouth Daily    Yes Historical Provider, MD   oxyCODONE-acetaminophen (PERCOCET)  MG per tablet Take 1 tablet by mouth every 8 hours as needed for Pain (pt took 1 tablet at 6pm tonight).     Yes Historical Provider, MD   furosemide (LASIX) 40 MG tablet Take 1 tablet by mouth 2 times daily 6/14/19  Yes Phil Sanchez,    albuterol sulfate  (90 Base) MCG/ACT inhaler Inhale 2 puffs into the lungs every 4 hours as needed for Wheezing or Shortness of Breath    Yes Historical Provider, MD   Cholecalciferol (VITAMIN D3) 5000 units TABS Take 1 tablet by mouth every other day    Yes Historical Provider, MD   docusate sodium (COLACE) 100 MG capsule Take 100 mg by mouth 2 times daily   Yes Historical Provider, MD   levOCARNitine (CARNITOR) 330 MG tablet Take 330 mg by mouth 3 times daily For mitochondrial disease   Yes Historical Provider, MD   escitalopram (LEXAPRO) 20 MG tablet Take 20 mg by mouth 2 times daily    Yes Historical Provider, MD   gabapentin (NEURONTIN) 600 MG tablet Take 1 tablet by mouth 4 times daily. 10/31/13  Yes Germán Neal MD   topiramate (TOPAMAX) 200 MG tablet Take 1 tablet by mouth 2 times daily.  5/9/12  Yes Ab Benavides DO   ziprasidone (GEODON) 20 MG capsule Take 20 mg by mouth nightly    Yes Historical Provider, MD   clotrimazole-betamethasone (LOTRISONE) 1-0.05 % lotion     Historical Provider, MD   diclofenac sodium (VOLTAREN) 1 % GEL APPLY 1 TO 2 GRAMS 3 TO 4 TIMES DAILY AS DIRECTED  Patient not taking: Reported on 2/1/2022 10/31/20   Historical Provider, MD   ferrous sulfate (FE TABS 325) 325 (65 Fe) MG EC tablet take 1 tablet by mouth once daily  Patient not taking: Reported on 2/1/2022 1/12/21   Historical Provider, MD   lidocaine-prilocaine (EMLA) 2.5-2.5 % cream APPLY 1 TO 2 GRAMS 3 TO 4 TIMES DAILY AS DIRECTED  Patient not taking: Reported on 2/1/2022 10/31/20   Historical Provider, MD   potassium chloride (KLOR-CON) 10 MEQ extended release tablet Take 10 mEq by mouth as needed  Patient not taking: Reported on 2/1/2022    Historical Provider, MD   Pseudoephedrine-Naproxen Na (ALEVE-D SINUS & COLD PO)     Historical Provider, MD   famotidine (PEPCID) 20 MG tablet Take 1 tablet by mouth 2 times daily  Patient not taking: Reported on 2/1/2022 9/10/20   Binu Amaro MD   linaclotide [facility]) 145 MCG capsule Take 290 mcg by mouth every morning (before breakfast)   Patient not taking: Reported on 2/1/2022    Historical Provider, MD   allopurinol (ZYLOPRIM) 100 MG tablet Take 200 mg by mouth 2 times daily   Patient not taking: Reported on 2/1/2022    Historical Provider, MD       Current medications:    Current Facility-Administered Medications   Medication Dose Route Frequency Provider Last Rate Last Admin    escitalopram (LEXAPRO) tablet 20 mg  20 mg Oral Once Paula Godinez MD        morphine (PF) injection 2 mg  2 mg IntraVENous Q4H PRN Lizeth Macho, DO   2 mg at 02/14/22 5564    0.9 % sodium chloride infusion   IntraVENous Continuous Lizeth Macho, DO        ceFAZolin (ANCEF) 2000 mg in sterile water 20 mL IV syringe  2,000 mg IntraVENous Q8H Lizeth Macho, DO        morphine (PF) injection 2 mg  2 mg IntraVENous Q4H PRN Paula Godinez MD        fentaNYL (SUBLIMAZE) injection 100 mcg  100 mcg IntraVENous Once Ofelia Garcia MD        midazolam PF (VERSED) injection 2 mg  2 mg IntraVENous Once Ofelia Garcia MD        ropivacaine (NAROPIN) 0.5% injection 30 mL  30 mL Infiltration Once Ofelia Garcia MD        scopolamine (TRANSDERM-SCOP) transdermal patch 1 patch  1 patch TransDERmal Q72H Ofelia Garcia MD        ziprasidone (GEODON) capsule 20 mg  20 mg Oral Nightly Paula Godinez MD   20 mg at 02/14/22 0201    topiramate (TOPAMAX) tablet 200 mg  200 mg Oral BID Paula Godinez MD   200 mg at 02/14/22 9062    escitalopram (LEXAPRO) tablet 20 mg  20 mg Oral BID Paula Godinez MD   20 mg at 02/14/22 8169    gabapentin (NEURONTIN) capsule 600 mg  600 mg Oral 4x Daily Paula Godinez MD   600 mg at 02/14/22 5446    allopurinol (ZYLOPRIM) tablet 200 mg  200 mg Oral BID Paula Godinez MD   200 mg at 02/14/22 6420    levOCARNitine (CARNITOR) tablet 330 mg  330 mg Oral TID Paula Godinez MD   330 mg at 02/14/22 6534    linaclotide (LINZESS) capsule 290 mcg  290 mcg Oral QAM AC Paula Godinez MD        Vitamin D (CHOLECALCIFEROL) tablet 5,000 Units  5,000 Units Oral Every Other Saima Quinonez MD 5,000 Units at 02/14/22 4665    docusate sodium (COLACE) capsule 100 mg  100 mg Oral BID Josh Herndon MD   100 mg at 02/14/22 0837    furosemide (LASIX) tablet 40 mg  40 mg Oral BID Josh Herndon MD   40 mg at 02/14/22 0837    pantoprazole (PROTONIX) tablet 40 mg  40 mg Oral QAM AC Josh Herndon MD   40 mg at 02/14/22 0837    montelukast (SINGULAIR) tablet 10 mg  10 mg Oral Daily Josh Herndon MD   10 mg at 02/14/22 0223    magnesium oxide (MAG-OX) tablet 400 mg  400 mg Oral BID Josh Herndon MD   400 mg at 02/14/22 1111    calcium elemental (OSCAL) tablet 500 mg  500 mg Oral BID Josh Herndon MD   500 mg at 02/14/22 0953    albuterol (PROVENTIL) nebulizer solution 2.5 mg  2.5 mg Nebulization Q4H PRN Josh Herndon MD        ondansetron (ZOFRAN-ODT) disintegrating tablet 4 mg  4 mg Oral TID PRN Josh Herndon MD        traZODone (DESYREL) tablet 100 mg  100 mg Oral Nightly Josh Herndon MD   100 mg at 02/14/22 0223    famotidine (PEPCID) tablet 20 mg  20 mg Oral BID Josh Henrdon MD   20 mg at 02/14/22 1687    vitamin B-12 (CYANOCOBALAMIN) tablet 100 mcg  100 mcg Oral Daily Josh Herndon MD   100 mcg at 02/14/22 0951    metoprolol succinate (TOPROL XL) extended release tablet 12.5 mg  12.5 mg Oral Daily Josh Herndon MD   12.5 mg at 02/14/22 1026    methocarbamol (ROBAXIN) tablet 500 mg  500 mg Oral 4x Daily Josh Herndon MD   500 mg at 02/14/22 1026    baclofen (LIORESAL) tablet 20 mg  20 mg Oral 4x daily Josh Herndon MD   20 mg at 02/14/22 0952    guaiFENesin (ROBITUSSIN) 100 MG/5ML oral solution 200 mg  200 mg Oral Q6H PRN Josh Herndon MD        acetaminophen (TYLENOL) tablet 650 mg  650 mg Oral Q6H PRN Josh Herndon MD        polyethylene glycol (GLYCOLAX) packet 17 g  17 g Oral Daily Josh Herndon MD   17 g at 02/14/22 0837    bisacodyl (DULCOLAX) suppository 10 mg  10 mg Rectal Daily Angel Pham MD        magnesium hydroxide (MILK OF MAGNESIA) 400 MG/5ML suspension 30 mL  30 mL Oral Daily PRN Angel Pham MD        HYDROcodone-acetaminophen (NORCO)  MG per tablet 1 tablet  1 tablet Oral Q6H PRN Angel Pham MD   1 tablet at 02/14/22 1027       Allergies:     Allergies   Allergen Reactions    Bee Pollen Anaphylaxis, Shortness Of Breath and Swelling     And wasp    Penicillins Anaphylaxis    Ropinirole Hcl Anaphylaxis    Vistaril [Hydroxyzine Hcl] Anaphylaxis    Aripiprazole Other (See Comments)     muscle spasms and confusion    Hydroxyzine Pamoate Itching and Rash    Tape Ralene Vance Tape] Rash     Paper tape allergy    Fabric tape is OK to use        Problem List:    Patient Active Problem List   Diagnosis Code    Debility R53.81    Obesity E66.9    Bipolar 1 disorder ([facility] [address] F31.9    Generalized seizure disorder ([facility] [address] G40.309    Cervicalgia M54.2    Asthma J45.909    Hyperlipidemia E78.5    Hypertension I10    Arthritis M19.90    Lymphedema of lower extremity I89.0    Degenerative Osteoarthritis of both knees M17.0    Status post total knee replacement, right Z96.651    Cervical spondylolysis M43.02    Degenerative Osteoarthritis thoracic spine M47.814    Thoracic facet syndrome M47.894    Cervical facet syndrome M47.812    Facet syndrome, lumbar M47.816    Neural foraminal stenosis of lumbar spine M48.061    Lumbar radiculopathy M54.16    DDD (degenerative disc disease), cervical M50.30    Neural foraminal stenosis of cervical spine M48.02    Protruded cervical disc M50.20    Cervical radiculopathy M54.12    Postlaminectomy syndrome, lumbar M96.1    Neuropathic pain syndrome (non-herpetic) M79.2    Chronic pain G89.29    Chronic respiratory failure with hypoxia (HCC) J96.11    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 A    PETR (obstructive sleep apnea) G47.33    Chronic back pain M54.9, G89.29    Bipolar affective (HCC) F31.9    Adenoma of right adrenal gland D35.01    Chest pain R07.9    Lumbosacral spondylosis without myelopathy M47.817    Sacroiliac dysfunction M53.3    DDD (degenerative disc disease), lumbar M51.36    Thoracic degenerative disc disease M51.34    Excessive physiologic tremor R25.1    Rhabdomyolysis M62.82    Failure to thrive in adult R62.7    Closed fracture of right distal radius S52.501A    Closed left ankle fracture, initial encounter S82.894V       Past Medical History:        Diagnosis Date    Adenoma of right adrenal gland     Anemia     Anxiety     Arthritis     spinal    Asthma     controlled with inhalers     Benign essential tremor     Bipolar affective (HCC)     Chronic back pain     Spinal cord stimulator in place    Chronic respiratory failure with hypoxia (HCC)     at times dt diaphragm does not function properly dt Mitochondrial disorder    Depression     stable    Difficulty swallowing     for EGD 10-8-19     Environmental and seasonal allergies     Excessive physiologic tremor 5/24/2021    Fatty liver     Full dentures     GERD (gastroesophageal reflux disease)     Gout     past hx of    Herniated cervical disc     limited rom of head and neck    History of swelling of feet     Lymphedema of lower extremity 09/06/2012    Mitochondrial cytopathy (HCC)     s/s muscle and nerve pain, difficulty breathing, seizures, difficulty swallowing, digestive disorders- Dr. Kun Philip [facility]    Neuropathy     at feet    Obesity     PETR (obstructive sleep apnea)     no CPAP dt insurance will not pay for    PONV (postoperative nausea and vomiting)     Seizures (HCC)     last approx 6-13-19- f/u w/ PCP  Granmal, flares up in heat/ summer time - no recent issues as of 10-4-19     Spinal headache     Thyroid disease        Past Surgical History: Procedure Laterality Date    APPENDECTOMY      with Hysterectomy / unknown    BACK SURGERY  last one 1995    lumbar x 2    CARDIAC CATHETERIZATION Right 6-6-2013    Dr. Grisel Oneill Radial, no stents placed, no blockages    [address]    open with gastric bypass    COLONOSCOPY N/A 9/18/2018    COLONOSCOPY POLYPECTOMY HOT BIOPSY performed by Maeve Carson MD at [address] COLONOSCOPY N/A 10/8/2019    COLONOSCOPY POLYPECTOMY SNARE/COLD BIOPSY performed by Maeve Carson MD at [address] EGD COLONOSCOPY N/A 10/8/2019    EGD ESOPHAGOGASTRODUODENOSCOPY DILATATION performed by Maeve Carson MD at [address] [facility], COLON, DIAGNOSTIC      ESOPHAGEAL DILATATION  9/18/2018    ESOPHAGEAL DILATION Mayra Males performed by Maeve Carson MD at [address] [facility] Bilateral 2007,2017    knees    KNEE SURGERY Bilateral     scope    MYOMECTOMY      NERVE BLOCK Left 10 02 2013    lumbar paravertebral facet #2    NERVE BLOCK Left 10 9 13    hip inj #1    NERVE BLOCK Left 10/16/13    hip injection    NERVE BLOCK Left 10/29/2014    left lumbar transforaminal nerve lbock  #1    NERVE BLOCK Left 11/12/2014    lumbar left transforaminal nerve block  #2    NERVE BLOCK Left 12 8 14    lumbar transforaminal #3    NERVE BLOCK Right 3/30/15    cervical transforaminal #1    NERVE BLOCK Right 4/6/2015    right cervical transforaminal nerve block  #2    NERVE BLOCK Right 4/13/15    cervical transforaminal #3    NERVE BLOCK Left 7/6/15    knee injection #1    NERVE BLOCK  07/20/15    left genicular nerve block/knee #2    NERVE BLOCK Left 10 1 15    lumb transforam #1    NERVE BLOCK  10/26/15    left lumbar transforaminal nerve block #3    NERVE BLOCK Bilateral 4/1/2021    #1 BILATERAL SACROILIAC JOINT INJECTION UNDER FLUORO performed by Matt Orozco MD at [address] Left 11 25 13 lumbar radiofreq    OTHER SURGICAL HISTORY  3/28/2016    stage 1, 3 day percutanous trial Oxxy lumbar spinal cord stimulator    OTHER SURGICAL HISTORY      racz procedure / lower back    ND COLONOSCOPY FLX DX W/COLLJ SPEC WHEN PFRMD N/A 3/20/2018    COLONOSCOPY DIAGNOSTIC OR SCREENING performed by Cale Ford MD at [address] [place] EGD TRANSORAL BIOPSY SINGLE/MULTIPLE N/A 3/20/2018    EGD BIOPSY performed by Cale Ford MD at [address] History:    Social History     Tobacco Use    Smoking status: Former Smoker     Packs/day: 0.75     Years: 42.00     Pack years: 31.50     Types: Cigarettes     Start date: 1990     Quit date: 2021     Years since quittin.1    Smokeless tobacco: Never Used   Substance Use Topics    Alcohol use: No     Alcohol/week: 0.0 standard drinks                                Counseling given: Not Answered      Vital Signs (Current):   Vitals:    22 0332 22 0403 22 0600 22 0715   BP: (!) 147/80 (!) 103/49 (!) 133/52 117/65   Pulse: 64 69 89    Resp:     Temp:    99.2 °F (37.3 °C)   TempSrc:    Oral   SpO2: 97% 95% 98% 92%   Weight:       Height:                                                  BP Readings from Last 3 Encounters:   22 117/65   22 132/63   22 (!) 118/58       NPO Status:                                                                                 BMI:   Wt Readings from Last 3 Encounters:   22 245 lb (111.1 kg)   22 258 lb 13.1 oz (117.4 kg)   21 243 lb (110.2 kg)     Body mass index is 39.54 kg/m².     CBC:   Lab Results   Component Value Date    WBC 6.4 2022    RBC 3.60 2022    HGB 11.3 2022    HCT 34.4 2022    MCV 95.6 2022    RDW 13.3 2022     2022       CMP:   Lab Results   Component Value Date     2022    K 3.7 2022    K 3.8 2022     02/13/2022    CO2 28 02/13/2022    BUN 24 02/13/2022    CREATININE 1.0 02/13/2022    GFRAA >60 02/13/2022    LABGLOM 56 02/13/2022    GLUCOSE 126 02/13/2022    GLUCOSE 93 05/24/2012    PROT 6.5 02/13/2022    CALCIUM 8.7 02/13/2022    BILITOT 0.2 02/13/2022    ALKPHOS 133 02/13/2022    AST 24 02/13/2022    ALT 20 02/13/2022       POC Tests: No results for input(s): POCGLU, POCNA, POCK, POCCL, POCBUN, POCHEMO, POCHCT in the last 72 hours. Coags:   Lab Results   Component Value Date    PROTIME 10.8 02/13/2022    PROTIME 10.8 05/07/2012    INR 1.0 02/13/2022    APTT 23.4 02/14/2022       HCG (If Applicable): No results found for: PREGTESTUR, PREGSERUM, HCG, HCGQUANT     ABGs:   Lab Results   Component Value Date    G1DSAFDJ 94.0 05/06/2012        Type & Screen (If Applicable):  No results found for: LABABO, [address]    Drug/Infectious Status (If Applicable):  Lab Results   Component Value Date    HEPCAB NON REACT 05/21/2013       COVID-19 Screening (If Applicable):   Lab Results   Component Value Date    COVID19 Not Detected 01/25/2022    COVID19 Not Detected 02/10/2021           Anesthesia Evaluation  Patient summary reviewed   history of anesthetic complications: PONV. Airway: Mallampati: III  TM distance: >3 FB   Neck ROM: limited  Mouth opening: < 3 FB Dental:    (+) edentulous      Pulmonary:   (+) sleep apnea:  decreased breath sounds,  asthma:                            Cardiovascular:    (+) hypertension:, weak pulses, peripheral edema,         Rhythm: regular  Rate: normal           Beta Blocker:  Dose within 24 Hrs         Neuro/Psych:   (+) seizures:, neuromuscular disease:, headaches:, psychiatric history:             ROS comment: BIPOLAR  NEUROPATHY GI/Hepatic/Renal:   (+) GERD:, liver disease (FATTY):,          ROS comment: SWALLOW DISORDER. Endo/Other:    (+) hypothyroidism: arthritis: OA., . Pt had no PAT visit       Abdominal:   (+) obese,           Vascular:           Other Findings: Anesthesia Plan      general and regional     ASA 4       Induction: intravenous. BIS  MIPS: Postoperative opioids intended, Prophylactic antiemetics administered and Postoperative trial extubation. Anesthetic plan and risks discussed with patient. Plan discussed with CRNA.                   Latricia Villar MD   2/14/2022

## 2022-02-14 NOTE — ED NOTES
Bed: 35  Expected date:   Expected time:   Means of arrival:   Comments:  Room [address], [facility]  02/14/22 0240

## 2022-02-14 NOTE — ED NOTES
Bed: 15  Expected date:   Expected time:   Means of arrival:   Comments:  TEJ Britton, RN  02/13/22 2026

## 2022-02-14 NOTE — ED NOTES
A friend named Jadyn Acuna called to check on status of patient; she states that she has befriended her and checks on her since she has no family in area; Jadyn Acuna states that patient was living with someone last year and then they moved. She states she was homeless for awhile and then got placed at [facility] where she resided for last 7 months. States that [place] just got her apartment in January where she lives alone; She ambulates with a walker with poor balance and movement; Jadyn Acuna states she has fallen multiple times since moving in and she finds her on the floor unable to get up; States she was just in hospital two weeks ago after a fall and broke her right arm; patient refuses to use a wheelchair per Jocelyn's.      Sadie Jensen RN  02/13/22 [address] [place], RN  02/13/22 7139

## 2022-02-14 NOTE — OP NOTE
Operative Note      Patient: Jose Asher  YOB: 1960  MRN: [id number]    Date of Procedure: 2/14/2022    Pre-Op Diagnosis: Right closed tibial pilon and fibula fx;  right anterior ankle laceration    Post-Op Diagnosis: Same       Procedure(s):  1. Closed reduction and manipulation of right tibial pilon and fibula fractures  2. Application of right ankle spanning external fixator for right tibial pilon and fibula shaft fractures  3. Closure of 7 cm right anterior ankle laceration    Surgeon:  Grayson Gross DO    Assistant:   Resident: Deandre Perez DO    Anesthesia: General    Estimated Blood Loss (mL): Minimal    Complications: None    Specimens:   * No specimens in log *    Implants:  Implant Name Type Inv. Item Serial No.  Lot No. LRB No. Used Action   MODEL PT SPEC RICKY VIP 3D - [id number]  MODEL PT SPEC RICKY VIP 3D  [address] [id number]  Right 5 Implanted         Drains:   Urethral Catheter (Active)       [REMOVED] External Urinary Catheter (Removed)       Findings: Right superficial transverse anterior ankle laceration, right tibial pilon comminuted fracture, right distal fibula fracture      HISTORY: The patient is a 64y.o. year old female with history of above preop diagnosis. I explained the risk, benefits, expected outcome, and alternatives to the procedure. Patient understands and is in agreement. OPERATIVE COURSE:  The patient was seen and identified outside the operative suite, in which the operative site was marked as appropriate by patient, surgeon, staff, and anesthesia. The patient was then taken into the operative suite, transferred to the operative table with all bony prominences and neurovascular structures well padded and protected. The patient was sedated under the care of the anesthesia team. The operative site was prepped and draped in standard sterile fashion .  A time-out was then performed indicating appropriate identification of the patient and the procedure to be performed and the side to be performed upon. Keeping in mind the area of future definitive fixation surgical site, a small nick was made in the skin over the medial aspect of the right tibial crest anteriorly appropriately proximal to fracture site. Blunt dissection was spread down to bone. A 5-0 Synthes external fixator pin was then put into position. A 6-hole connector was then used to guide the second more distal pin in a similar fashion. Fluoroscopy was used to confirm these pins were in appropriate place. A 5-mm transcalcaneal pin was then placed from medial-to-lateral through a small nick in the skin, blunt dissection down to bone. The neurovascular structures medially were  avoided. Attention was then turned to the 1st metatarsal where a 4.0mm pin was then placed in a similar fashion. These were all confirmed under fluoroscopy. The distal tibial fracture involving articular weightbearing portion and fibular shaft fracture was then manipulated with reduction maneuvers and traction. Fluoroscopic guidance help confirm appropriate duction in all views. A Delta multiplanar external fixator frame was then assembled. The foot was placed in 90 degrees of dorsiflexion. The fractures remained reasonably reduced at this point in time. The frame connections were all securely tightened down and final x-rays were taken. Attention was then turned to the anterior transverse laceration over the right ankle. The laceration site was probed into the subcutaneous fat layer. The laceration site was copiously irrigated with normal saline and closed utilizing 3-0 nylon in simple interrupted fashion. The pin sites were all washed. Hibiclens soaked Curlex was placed at the base of all pin sites. Sterile dressings were then applied and then Webril dressing was then applied. Compartments were soft and compressible at the end of the case.  The patient was transferred back to the hospital room bed by multiple individuals in a safe fashion. The patient was taken to PACU in a stable condition. POSTOPERATIVE PLAN:   1. Nonweightbearing right lower extremity. 2. Pin care to start while in the hospital.   3. The patient will be on Lovenox for DVT prophylaxis, she will transition to aspirin outpatient. 4. The patient will follow up in the office in 1 week for a skin check as well as possible planned definitive surgery in the next 10-14 days. Of note Dr. Osmany Ruiz was present for the entirety of the case    Electronically signed by Tobi Meraz DO on 2/14/2022     Electronically Signed By  Zev Jaimes D.O.  2/14/2022      NOTE: This report was transcribed using voice recognition software.  Every effort was made to ensure accuracy; however, inadvertent computerized transcription errors may be present

## 2022-02-14 NOTE — PROGRESS NOTES
Floor Called, nurse to nurse given. Spoke with Stefano Salazar . Patients test results review, VS reported to receiving nurse. Any and all important information regarding patient disclosed. notified that patient will go to cat scan first and then to the floor.

## 2022-02-14 NOTE — ANESTHESIA POSTPROCEDURE EVALUATION
Department of Anesthesiology  Postprocedure Note    Patient: Florinda Scheuermann  MRN: [id number]  YOB: 1960  Date of evaluation: 2/15/2022  Time:  2:19 PM     Procedure Summary     Date: 02/14/22 Room / Location: [facility]  / [facility]    Anesthesia Start:  Anesthesia Stop:     Procedure: RIGHT ANKLE IRRIGAITON AND DEBRIDEMENT WITH EXTERNAL FIXATOR -VS- OPEN REDUCTION INTERNAL FIXATION (Right Leg Lower) Diagnosis: (fracture)    Surgeons: Lei Waters MD Responsible Provider:     Anesthesia Type: general, regional ASA Status: 4          Anesthesia Type: general, regional    Avelino Phase I: Avelino Score: 9    Avelino Phase II:      Last vitals: Reviewed and per EMR flowsheets.        Anesthesia Post Evaluation    Patient location during evaluation: PACU  Patient participation: complete - patient participated  Level of consciousness: awake  Pain score: 3  Airway patency: patent  Nausea & Vomiting: no nausea and no vomiting  Complications: no  Cardiovascular status: blood pressure returned to baseline  Respiratory status: acceptable  Hydration status: euvolemic

## 2022-02-14 NOTE — H&P
[facility]  History and Physical      CHIEF COMPLAINT: Fall with right ankle pain    Reason for Admission: Right ankle fracture    History Obtained From: Patient    PCP :  Andrews Dent MD    [address] [place] [address]. / [facility] [place] Sch[place] [address]      HISTORY OF PRESENT ILLNESS:      The patient is a 64 y.o. female presents to the emergency room after she tripped on her cat and fell. She then had pain in the right ankle. She presented to the emergency room via EMS. She does have a long complicated medical history. She was noted to have closed fracture right ankle. Admitted for possible orthopedic intervention. At the time of questioning patient is in a lot of pain.     Past Medical History:        Diagnosis Date    Adenoma of right adrenal gland     Anemia     Anxiety     Arthritis     spinal    Asthma     controlled with inhalers     Benign essential tremor     Bipolar affective (HCC)     Chronic back pain     Spinal cord stimulator in place    Chronic respiratory failure with hypoxia (HCC)     at times dt diaphragm does not function properly dt Mitochondrial disorder    Depression     stable    Difficulty swallowing     for EGD 10-8-19     Environmental and seasonal allergies     Excessive physiologic tremor 5/24/2021    Fatty liver     Full dentures     GERD (gastroesophageal reflux disease)     Gout     past hx of    Herniated cervical disc     limited rom of head and neck    History of swelling of feet     Lymphedema of lower extremity 09/06/2012    Mitochondrial cytopathy (HCC)     s/s muscle and nerve pain, difficulty breathing, seizures, difficulty swallowing, digestive disorders- Dr. Jesús Musa [facility]    Neuropathy     at feet    Obesity     PETR (obstructive sleep apnea)     no CPAP dt insurance will not pay for    PONV (postoperative nausea and vomiting)     Seizures ([facility] [address]     last approx 6-13-19- f/u w/ PCP  Granmal, flares up in heat/ summer time - no recent issues as of 10-4-19     Spinal headache     Thyroid disease      Past Surgical History:        Procedure Laterality Date    APPENDECTOMY      with Hysterectomy / unknown    BACK SURGERY  last one 1995    lumbar x 2    CARDIAC CATHETERIZATION Right 6-6-2013    Dr. Jeff Almonte Radial, no stents placed, no blockages    [address]    open with gastric bypass    COLONOSCOPY N/A 9/18/2018    COLONOSCOPY POLYPECTOMY HOT [facility]OPSY [facility] COLONOSCOPY N/A 10/8/2019    COLONOSCOPY POLYPECTOMY SNARE/COLD BIOPSY performed by Greta Arevalo MD at [address] EGD COLONOSCOPY N/A 10/8/2019    EGD ESOPHAGOGASTRODUODENOSCOPY DILATATION performed by Greta Arevalo MD at [facility], COLON, DIAGNOSTIC      ESOPHAGEAL DILATATION  9/18/2018    ESOPHAGEAL DILATION Lacy Hamm performed by Greta Arevalo MD at [address] [facility] Bilateral 2007,2017    knees    KNEE SURGERY Bilateral     scope    MYOMECTOMY      NERVE BLOCK Left 10 02 2013    lumbar paravertebral facet #2    NERVE BLOCK Left 10 9 13    hip inj #1    NERVE BLOCK Left 10/16/13    hip injection    NERVE BLOCK Left 10/29/2014    left lumbar transforaminal nerve lbock  #1    NERVE BLOCK Left 11/12/2014    lumbar left transforaminal nerve block  #2    NERVE BLOCK Left 12 8 14    lumbar transforaminal #3    NERVE BLOCK Right 3/30/15    cervical transforaminal #1    NERVE BLOCK Right 4/6/2015    right cervical transforaminal nerve block  #2    NERVE BLOCK Right 4/13/15    cervical transforaminal #3    NERVE BLOCK Left 7/6/15    knee injection #1    NERVE BLOCK  07/20/15    left genicular nerve block/knee #2    NERVE BLOCK Left 10 1 15    lumb transforam #1    NERVE BLOCK  10/26/15    left lumbar transforaminal nerve block #3    NERVE BLOCK Bilateral 4/1/2021    #1 BILATERAL SACROILIAC JOINT INJECTION UNDER FLUORO performed by Pernell Lanes, MD at [facility] 6 Left 11 25 13    lumbar radiofreq    OTHER SURGICAL HISTORY  3/28/2016    stage 1, 3 day percutanous trial [employer] lumbar spinal cord stimulator    OTHER SURGICAL HISTORY      racz procedure / lower back    AR COLONOSCOPY FLX DX W/COLLJ SPEC WHEN PFRMD N/A 3/20/2018    COLONOSCOPY DIAGNOSTIC OR SCREENING performed by Litzy Coughlin MD at [place] [address] EGD TRANSORAL BIOPSY SINGLE/MULTIPLE N/A 3/20/2018    EGD BIOPSY performed by Litzy Coughlin MD at [address] [place] [address]           Medications Prior to Admission:    Not in a hospital admission.     Allergies:  Bee pollen, Penicillins, Ropinirole hcl, Vistaril [hydroxyzine hcl], Aripiprazole, Hydroxyzine pamoate, and Tape Floyd Piles tape]    Social History:   Social History     Socioeconomic History    Marital status:      Spouse name: Not on file    Number of children: Not on file    Years of education: Not on file    Highest education level: Not on file   Occupational History    Not on file   Tobacco Use    Smoking status: Former Smoker     Packs/day: 0.75     Years: 42.00     Pack years: 31.50     Types: Cigarettes     Start date: 1990     Quit date: 2021     Years since quittin.1    Smokeless tobacco: Never Used   Vaping Use    Vaping Use: Never used   Substance and Sexual Activity    Alcohol use: No     Alcohol/week: 0.0 standard drinks    Drug use: Not Currently     Types: Marijuana Queta Bachelor)     Comment: edibles- through pain doctor    Sexual activity: Not on file   Other Topics Concern    Not on file   Social History Narrative    ** Merged History Encounter **          Social Determinants of Health     Financial Resource Strain:     Difficulty of Paying Living Expenses: Not on file   Food Insecurity:     Worried About [address] in the Last Year: Not on file    Deidra of Food in the Last Year: Not on file Transportation Needs:     Lack of Transportation (Medical): Not on file    Lack of Transportation (Non-Medical):  Not on file   Physical Activity:     Days of Exercise per Week: Not on file    Minutes of Exercise per Session: Not on file   Stress:     Feeling of Stress : Not on file   Social Connections:     Frequency of Communication with Friends and Family: Not on file    Frequency of Social Gatherings with Friends and Family: Not on file    Attends Baptism Services: Not on file    Active Member of [address] [place] [address] or Organizations: Not on file    Attends Club or Organization Meetings: Not on file    Marital Status: Not on file   Intimate Partner Violence:     Fear of Current or Ex-Partner: Not on file    Emotionally Abused: Not on file    Physically Abused: Not on file    Sexually Abused: Not on file   Housing Stability:     Unable to Pay for Housing in the Last Year: Not on file    Number of [place] in the Last Year: Not on file    Unstable Housing in the Last Year: Not on file         Family History:       Problem Relation Age of Onset    Heart Disease Mother     Cancer Father     Arthritis Other     Cancer Other     Depression Other     Heart Disease Other     Hypertension Other     Mental Illness Other     Stroke Other        REVIEW OF SYSTEMS:    General ROS: negative  Hematological and Lymphatic ROS: negative  Endocrine ROS: negative  Respiratory ROS: no cough,  wheezing  or shortness of breath,   Cardiovascular ROS: no chest pain or dyspnea on exertion  Gastrointestinal ROS: no abdominal pain, change in bowel habits, or black or bloody stools  Genito-Urinary ROS: no dysuria, trouble voiding, or hematuria  Neurological ROS: no TIA or stroke symptoms  negative    Vitals:  /65   Pulse 89   Temp 99.2 °F (37.3 °C) (Oral)   Resp 22   Ht 5' 6\" (1.676 m)   Wt 245 lb (111.1 kg)   LMP 08/31/1988   SpO2 92%   BMI 39.54 kg/m²     PHYSICAL EXAM:  General:  Awake, alert, oriented X Calcium 8.7 8.6 - 10.2 mg/dL    Total Protein 6.5 6.4 - 8.3 g/dL    Albumin 3.8 3.5 - 5.2 g/dL    Total Bilirubin 0.2 0.0 - 1.2 mg/dL    Alkaline Phosphatase 133 (H) 35 - 104 U/L    ALT 20 0 - 32 U/L    AST 24 0 - 31 U/L   Troponin    Collection Time: 02/13/22  8:45 PM   Result Value Ref Range    Troponin, High Sensitivity 15 (H) 0 - 9 ng/L   CK    Collection Time: 02/13/22  8:45 PM   Result Value Ref Range    Total  20 - 180 U/L   Urinalysis    Collection Time: 02/13/22  8:45 PM   Result Value Ref Range    Color, UA Yellow Straw/Yellow    Clarity, UA Clear Clear    Glucose, Ur Negative Negative mg/dL    Bilirubin Urine Negative Negative    Ketones, Urine TRACE (A) Negative mg/dL    Specific Gravity, UA 1.015 1.005 - 1.030    Blood, Urine TRACE-INTACT Negative    pH, UA 5.5 5.0 - 9.0    Protein, UA Negative Negative mg/dL    Urobilinogen, Urine 0.2 <2.0 E.U./dL    Nitrite, Urine Negative Negative    Leukocyte Esterase, Urine Negative Negative   Lactic Acid, Plasma    Collection Time: 02/13/22  8:45 PM   Result Value Ref Range    Lactic Acid 1.0 0.5 - 2.2 mmol/L   Serum Drug Screen    Collection Time: 02/13/22  8:45 PM   Result Value Ref Range    Ethanol Lvl <10 mg/dL    Acetaminophen Level <5.0 (L) 10.0 - 66.9 mcg/mL    Salicylate, Serum <9.9 0.0 - 30.0 mg/dL    TCA Scrn NEGATIVE Cutoff:300 ng/mL   Protime-INR    Collection Time: 02/13/22  8:45 PM   Result Value Ref Range    Protime 10.8 9.3 - 12.4 sec    INR 1.0    Microscopic Urinalysis    Collection Time: 02/13/22  8:45 PM   Result Value Ref Range    WBC, UA 0-1 0 - 5 /HPF    RBC, UA 2-5 0 - 2 /HPF    Epithelial Cells, UA RARE /HPF    Bacteria, UA RARE (A) None Seen /HPF   EKG 12 Lead    Collection Time: 02/13/22 10:21 PM   Result Value Ref Range    Ventricular Rate 64 BPM    Atrial Rate 64 BPM    P-R Interval 380 ms    QRS Duration 102 ms    Q-T Interval 448 ms    QTc Calculation (Bazett) 462 ms    P Axis 39 degrees    R Axis 16 degrees    T Axis 27 degrees   APTT    Collection Time: 02/14/22  6:05 AM   Result Value Ref Range    aPTT 23.4 (L) 24.5 - 35.1 sec   TYPE AND SCREEN    Collection Time: 02/14/22  6:05 AM   Result Value Ref Range    ABO/Rh O POS     Antibody Screen NEG        CT HEAD WO CONTRAST   Final Result   No evidence of acute intracranial trauma. Right frontal scalp hematoma. No fractures. CT CERVICAL SPINE WO CONTRAST   Final Result   No acute abnormality of the cervical spine. Mild multilevel degenerative changes. CT ABDOMEN PELVIS WO CONTRAST Additional Contrast? None   Final Result   Atraumatic appearance of the abdomen and pelvis. Two 6 mm nonobstructing right renal calculi. Cholecystectomy. XR ANKLE RIGHT (2 VIEWS)   Final Result   Near anatomic alignment, post closed reduction and cast placement. XR ANKLE RIGHT (MIN 3 VIEWS)   Final Result   Distal tibia and fibular fractures. Disrupted ankle mortise. XR PELVIS (1-2 VIEWS)   Final Result   1. No signs of a displaced pelvic fracture. 2.  Joint space narrowing of each hip suggest early signs of degenerative   joint disease         XR TIBIA FIBULA LEFT (2 VIEWS)   Final Result   No acute bony abnormalities or complications of the total knee prosthesis. XR TIBIA FIBULA RIGHT (2 VIEWS)   Final Result   Distal tibia and fibular fractures. XR CHEST PORTABLE   Final Result   No acute process. Mild cardiomegaly. ASSESSMENT :      Active Problems:    Closed left ankle fracture, initial encounter  Resolved Problems:    * No resolved hospital problems.  *    Morbid obesity  Underlying history of anemia  History of asthma control  Benign essential tremor  Bipolar disorder  Chronic back pain history of spinal cord stimulator  Underlying history of GERD  History of gout  Nonalcoholic fatty liver by history  Mitochondrial cytopathy by history-characterized by muscle no pain, seizures, dysphagia follows with [facility]  Obstructive sleep apnea  Hypothyroidism      Plan :    Patient is n.p.o. currently  Orthopedics has been consulted  Morphine for pain while she is n.p.o. She has no history of coronary disease, congestive heart failure, diabetes mellitus, renal insufficiency, cerebrovascular disease  Okay for surgery from my standpoint      Electronically signed by Salome Alegria MD on 2/14/2022 at 8:56 AM    NOTE: This report was transcribed using voice recognition software.  Every effort was made to ensure accuracy; however, inadvertent transcription errors may be present

## 2022-02-15 DIAGNOSIS — S52.571A OTHER CLOSED INTRA-ARTICULAR FRACTURE OF DISTAL END OF RIGHT RADIUS, INITIAL ENCOUNTER: Primary | ICD-10-CM

## 2022-02-15 DIAGNOSIS — S82.891A CLOSED FRACTURE OF RIGHT ANKLE, INITIAL ENCOUNTER: ICD-10-CM

## 2022-02-15 LAB
ANION GAP SERPL CALCULATED.3IONS-SCNC: 7 MMOL/L (ref 7–16)
BASOPHILS ABSOLUTE: 0.02 E9/L (ref 0–0.2)
BASOPHILS RELATIVE PERCENT: 0.3 % (ref 0–2)
BUN BLDV-MCNC: 11 MG/DL (ref 6–23)
CALCIUM SERPL-MCNC: 7.9 MG/DL (ref 8.6–10.2)
CHLORIDE BLD-SCNC: 101 MMOL/L (ref 98–107)
CO2: 29 MMOL/L (ref 22–29)
CREAT SERPL-MCNC: 0.7 MG/DL (ref 0.5–1)
EOSINOPHILS ABSOLUTE: 0.07 E9/L (ref 0.05–0.5)
EOSINOPHILS RELATIVE PERCENT: 1.2 % (ref 0–6)
GFR AFRICAN AMERICAN: >60
GFR NON-AFRICAN AMERICAN: >60 ML/MIN/1.73
GLUCOSE BLD-MCNC: 85 MG/DL (ref 74–99)
HCT VFR BLD CALC: 29.9 % (ref 34–48)
HCT VFR BLD CALC: 31.4 % (ref 34–48)
HEMOGLOBIN: 9.3 G/DL (ref 11.5–15.5)
HEMOGLOBIN: 9.8 G/DL (ref 11.5–15.5)
IMMATURE GRANULOCYTES #: 0.06 E9/L
IMMATURE GRANULOCYTES %: 1 % (ref 0–5)
LYMPHOCYTES ABSOLUTE: 1.23 E9/L (ref 1.5–4)
LYMPHOCYTES RELATIVE PERCENT: 20.5 % (ref 20–42)
MCH RBC QN AUTO: 31.3 PG (ref 26–35)
MCH RBC QN AUTO: 31.6 PG (ref 26–35)
MCHC RBC AUTO-ENTMCNC: 31.1 % (ref 32–34.5)
MCHC RBC AUTO-ENTMCNC: 31.2 % (ref 32–34.5)
MCV RBC AUTO: 100.3 FL (ref 80–99.9)
MCV RBC AUTO: 101.7 FL (ref 80–99.9)
MONOCYTES ABSOLUTE: 0.93 E9/L (ref 0.1–0.95)
MONOCYTES RELATIVE PERCENT: 15.5 % (ref 2–12)
NEUTROPHILS ABSOLUTE: 3.7 E9/L (ref 1.8–7.3)
NEUTROPHILS RELATIVE PERCENT: 61.5 % (ref 43–80)
PDW BLD-RTO: 13.8 FL (ref 11.5–15)
PDW BLD-RTO: 13.8 FL (ref 11.5–15)
PLATELET # BLD: 169 E9/L (ref 130–450)
PLATELET # BLD: 42 E9/L (ref 130–450)
PLATELET CONFIRMATION: NORMAL
PMV BLD AUTO: 11.9 FL (ref 7–12)
PMV BLD AUTO: 9.1 FL (ref 7–12)
POTASSIUM SERPL-SCNC: 4 MMOL/L (ref 3.5–5)
RBC # BLD: 2.94 E12/L (ref 3.5–5.5)
RBC # BLD: 3.13 E12/L (ref 3.5–5.5)
SODIUM BLD-SCNC: 137 MMOL/L (ref 132–146)
WBC # BLD: 4.7 E9/L (ref 4.5–11.5)
WBC # BLD: 6 E9/L (ref 4.5–11.5)

## 2022-02-15 PROCEDURE — 2580000003 HC RX 258: Performed by: ORTHOPAEDIC SURGERY

## 2022-02-15 PROCEDURE — 97165 OT EVAL LOW COMPLEX 30 MIN: CPT

## 2022-02-15 PROCEDURE — 97530 THERAPEUTIC ACTIVITIES: CPT

## 2022-02-15 PROCEDURE — 1200000000 HC SEMI PRIVATE

## 2022-02-15 PROCEDURE — 2700000000 HC OXYGEN THERAPY PER DAY

## 2022-02-15 PROCEDURE — 36415 COLL VENOUS BLD VENIPUNCTURE: CPT

## 2022-02-15 PROCEDURE — 6370000000 HC RX 637 (ALT 250 FOR IP): Performed by: ORTHOPAEDIC SURGERY

## 2022-02-15 PROCEDURE — 6360000002 HC RX W HCPCS: Performed by: ORTHOPAEDIC SURGERY

## 2022-02-15 PROCEDURE — 85027 COMPLETE CBC AUTOMATED: CPT

## 2022-02-15 PROCEDURE — 85025 COMPLETE CBC W/AUTO DIFF WBC: CPT

## 2022-02-15 PROCEDURE — 2500000003 HC RX 250 WO HCPCS: Performed by: ORTHOPAEDIC SURGERY

## 2022-02-15 PROCEDURE — 97535 SELF CARE MNGMENT TRAINING: CPT

## 2022-02-15 PROCEDURE — 97161 PT EVAL LOW COMPLEX 20 MIN: CPT

## 2022-02-15 PROCEDURE — 6370000000 HC RX 637 (ALT 250 FOR IP): Performed by: INTERNAL MEDICINE

## 2022-02-15 PROCEDURE — 80048 BASIC METABOLIC PNL TOTAL CA: CPT

## 2022-02-15 RX ORDER — MECOBALAMIN 5000 MCG
5 TABLET,DISINTEGRATING ORAL NIGHTLY
Status: DISCONTINUED | OUTPATIENT
Start: 2022-02-15 | End: 2022-02-17 | Stop reason: HOSPADM

## 2022-02-15 RX ADMIN — METHOCARBAMOL TABLETS 500 MG: 500 TABLET, COATED ORAL at 12:41

## 2022-02-15 RX ADMIN — BACLOFEN 20 MG: 10 TABLET ORAL at 21:37

## 2022-02-15 RX ADMIN — GABAPENTIN 600 MG: 300 CAPSULE ORAL at 12:41

## 2022-02-15 RX ADMIN — FUROSEMIDE 40 MG: 20 TABLET ORAL at 17:33

## 2022-02-15 RX ADMIN — Medication 400 MG: at 21:38

## 2022-02-15 RX ADMIN — OXYCODONE HYDROCHLORIDE 10 MG: 10 TABLET ORAL at 15:40

## 2022-02-15 RX ADMIN — BACLOFEN 20 MG: 10 TABLET ORAL at 15:39

## 2022-02-15 RX ADMIN — DOCUSATE SODIUM 100 MG: 100 CAPSULE, LIQUID FILLED ORAL at 21:45

## 2022-02-15 RX ADMIN — BISACODYL 10 MG: 10 SUPPOSITORY RECTAL at 08:36

## 2022-02-15 RX ADMIN — LEVOCARNITINE 330 MG: 330 TABLET ORAL at 21:39

## 2022-02-15 RX ADMIN — GABAPENTIN 600 MG: 300 CAPSULE ORAL at 08:35

## 2022-02-15 RX ADMIN — ZIPRASIDONE HYDROCHLORIDE 20 MG: 20 CAPSULE ORAL at 21:38

## 2022-02-15 RX ADMIN — Medication 2000 MG: at 13:59

## 2022-02-15 RX ADMIN — CALCIUM 500 MG: 500 TABLET ORAL at 08:36

## 2022-02-15 RX ADMIN — POLYETHYLENE GLYCOL 3350 17 G: 17 POWDER, FOR SOLUTION ORAL at 08:34

## 2022-02-15 RX ADMIN — FAMOTIDINE 20 MG: 20 TABLET ORAL at 21:38

## 2022-02-15 RX ADMIN — ALLOPURINOL 200 MG: 100 TABLET ORAL at 08:36

## 2022-02-15 RX ADMIN — TOPIRAMATE 200 MG: 100 TABLET, FILM COATED ORAL at 21:39

## 2022-02-15 RX ADMIN — OXYCODONE HYDROCHLORIDE 10 MG: 10 TABLET ORAL at 08:36

## 2022-02-15 RX ADMIN — ESCITALOPRAM 20 MG: 10 TABLET, FILM COATED ORAL at 08:35

## 2022-02-15 RX ADMIN — Medication 10 ML: at 21:40

## 2022-02-15 RX ADMIN — Medication 100 MCG: at 08:35

## 2022-02-15 RX ADMIN — Medication 2000 MG: at 05:37

## 2022-02-15 RX ADMIN — PANTOPRAZOLE SODIUM 40 MG: 40 TABLET, DELAYED RELEASE ORAL at 05:38

## 2022-02-15 RX ADMIN — MONTELUKAST SODIUM 10 MG: 10 TABLET ORAL at 21:39

## 2022-02-15 RX ADMIN — TOPIRAMATE 200 MG: 100 TABLET, FILM COATED ORAL at 08:35

## 2022-02-15 RX ADMIN — ENOXAPARIN SODIUM 30 MG: 100 INJECTION SUBCUTANEOUS at 08:35

## 2022-02-15 RX ADMIN — METHOCARBAMOL TABLETS 500 MG: 500 TABLET, COATED ORAL at 17:33

## 2022-02-15 RX ADMIN — OXYCODONE HYDROCHLORIDE 10 MG: 10 TABLET ORAL at 21:36

## 2022-02-15 RX ADMIN — MORPHINE SULFATE 4 MG: 4 INJECTION, SOLUTION INTRAMUSCULAR; INTRAVENOUS at 00:15

## 2022-02-15 RX ADMIN — METHOCARBAMOL TABLETS 500 MG: 500 TABLET, COATED ORAL at 08:37

## 2022-02-15 RX ADMIN — FUROSEMIDE 40 MG: 20 TABLET ORAL at 08:36

## 2022-02-15 RX ADMIN — GABAPENTIN 600 MG: 300 CAPSULE ORAL at 17:30

## 2022-02-15 RX ADMIN — DOCUSATE SODIUM 100 MG: 100 CAPSULE, LIQUID FILLED ORAL at 08:36

## 2022-02-15 RX ADMIN — Medication 10 ML: at 08:33

## 2022-02-15 RX ADMIN — MORPHINE SULFATE 4 MG: 4 INJECTION, SOLUTION INTRAMUSCULAR; INTRAVENOUS at 13:59

## 2022-02-15 RX ADMIN — CALCIUM 500 MG: 500 TABLET ORAL at 21:39

## 2022-02-15 RX ADMIN — Medication 2000 MG: at 21:45

## 2022-02-15 RX ADMIN — LEVOCARNITINE 330 MG: 330 TABLET ORAL at 13:59

## 2022-02-15 RX ADMIN — SODIUM CHLORIDE, PRESERVATIVE FREE 20 ML: 5 INJECTION INTRAVENOUS at 17:37

## 2022-02-15 RX ADMIN — ESCITALOPRAM 20 MG: 10 TABLET, FILM COATED ORAL at 21:38

## 2022-02-15 RX ADMIN — MORPHINE SULFATE 4 MG: 4 INJECTION, SOLUTION INTRAMUSCULAR; INTRAVENOUS at 05:43

## 2022-02-15 RX ADMIN — BACLOFEN 20 MG: 10 TABLET ORAL at 12:41

## 2022-02-15 RX ADMIN — ENOXAPARIN SODIUM 30 MG: 100 INJECTION SUBCUTANEOUS at 21:40

## 2022-02-15 RX ADMIN — METOPROLOL SUCCINATE 12.5 MG: 25 TABLET, FILM COATED, EXTENDED RELEASE ORAL at 08:36

## 2022-02-15 RX ADMIN — OXYCODONE HYDROCHLORIDE 10 MG: 10 TABLET ORAL at 01:40

## 2022-02-15 RX ADMIN — GABAPENTIN 600 MG: 300 CAPSULE ORAL at 21:37

## 2022-02-15 RX ADMIN — Medication 400 MG: at 08:37

## 2022-02-15 RX ADMIN — MORPHINE SULFATE 4 MG: 4 INJECTION, SOLUTION INTRAMUSCULAR; INTRAVENOUS at 17:37

## 2022-02-15 RX ADMIN — TRAZODONE HYDROCHLORIDE 100 MG: 50 TABLET ORAL at 21:37

## 2022-02-15 RX ADMIN — BACLOFEN 20 MG: 10 TABLET ORAL at 08:36

## 2022-02-15 RX ADMIN — METHOCARBAMOL TABLETS 500 MG: 500 TABLET, COATED ORAL at 21:37

## 2022-02-15 RX ADMIN — ALLOPURINOL 200 MG: 100 TABLET ORAL at 21:39

## 2022-02-15 RX ADMIN — FAMOTIDINE 20 MG: 20 TABLET ORAL at 08:36

## 2022-02-15 RX ADMIN — LEVOCARNITINE 330 MG: 330 TABLET ORAL at 08:36

## 2022-02-15 RX ADMIN — Medication 5 MG: at 21:37

## 2022-02-15 ASSESSMENT — PAIN DESCRIPTION - ONSET: ONSET: ON-GOING

## 2022-02-15 ASSESSMENT — PAIN SCALES - GENERAL
PAINLEVEL_OUTOF10: 9
PAINLEVEL_OUTOF10: 7
PAINLEVEL_OUTOF10: 8
PAINLEVEL_OUTOF10: 9
PAINLEVEL_OUTOF10: 9
PAINLEVEL_OUTOF10: 10
PAINLEVEL_OUTOF10: 8
PAINLEVEL_OUTOF10: 9
PAINLEVEL_OUTOF10: 7

## 2022-02-15 ASSESSMENT — PAIN DESCRIPTION - PAIN TYPE: TYPE: SURGICAL PAIN

## 2022-02-15 ASSESSMENT — PAIN - FUNCTIONAL ASSESSMENT: PAIN_FUNCTIONAL_ASSESSMENT: PREVENTS OR INTERFERES SOME ACTIVE ACTIVITIES AND ADLS

## 2022-02-15 ASSESSMENT — PAIN DESCRIPTION - ORIENTATION: ORIENTATION: RIGHT

## 2022-02-15 ASSESSMENT — PAIN DESCRIPTION - PROGRESSION: CLINICAL_PROGRESSION: NOT CHANGED

## 2022-02-15 ASSESSMENT — PAIN DESCRIPTION - DESCRIPTORS: DESCRIPTORS: BURNING;DISCOMFORT;THROBBING

## 2022-02-15 ASSESSMENT — PAIN DESCRIPTION - FREQUENCY: FREQUENCY: CONTINUOUS

## 2022-02-15 ASSESSMENT — PAIN DESCRIPTION - LOCATION: LOCATION: ANKLE

## 2022-02-15 NOTE — CARE COORDINATION
2/15/22 Update CM Note: [facility] not able to accept due to patient not having a secondary insurance and her Medicaid not paying for therapies. [facility] unable to accept as well.  Electronically signed by Wali Mahoney RN CM  on 2/15/2022 at 2:33 PM

## 2022-02-15 NOTE — PROGRESS NOTES
Department of Orthopedic Surgery  Resident Progress Note    Patient seen and examined, resting in bed. Admitting to burning in the operative leg this morning and difficulty falling asleep. Questions regarding surgery were addressed this morning.     VITALS:  BP (!) 108/51   Pulse 69   Temp 98.4 °F (36.9 °C) (Temporal)   Resp 16   Ht 5' 6\" (1.676 m)   Wt 245 lb (111.1 kg)   LMP 08/31/1988   SpO2 96%   BMI 39.54 kg/m²     General: alert and oriented to person, place and time, well-developed and well-nourished, in no acute distress    MUSCULOSKELETAL:   right lower extremity:  · Dressing/ ex fix C/D/I  · Compartments soft and compressible  · Actively wiggling toes  · +2/4 DP & PT pulses, Brisk Cap refill, Toes warm and perfused  · Distal sensation grossly intact to Peroneals, Sural, Saphenous, and tibial nrs although hypersensitive globally    CBC:   Lab Results   Component Value Date    WBC 6.4 02/13/2022    HGB 11.3 02/13/2022    HCT 34.4 02/13/2022     02/13/2022     PT/INR:    Lab Results   Component Value Date    PROTIME 10.8 02/13/2022    PROTIME 10.8 05/07/2012    INR 1.0 02/13/2022       ASSESSMENT  · S/P R tibial pilon and fibula fractures with external fixator placement on 2/14/22    PLAN      · Continue physical therapy and protocol: NWB - RLE  · 24 hour abx coverage  · Deep venous thrombosis prophylaxis - lovenox, early mobilization  · PT/OT  · Pain Control: IV and PO  · Monitor H&H  · D/C Planning- appreciate PT/OT, SW, medicine recommendations  · Discuss with attending

## 2022-02-15 NOTE — CARE COORDINATION
2/15/22 Transition of Care: POD 1 ORIF ankle with ex fix application. Met with patient at the bedside. She is alert and oriented. She is here due to fall at her apartment. She states she fell over the cat. She uses a walker with an arm rest as she currently has a fractured arm from a previous fall. She resides at the [facility] on the second floor. She does have an elevator. She uses only the walker. She wears oxygen and has a nebulizer but does not know the supplier. She does not have help other that a friend in the building. She is working currently with [facility] to obtain aide services. Her  is Parker Guerin. She also has a glucometer. Her pcp is Dr Fausto Hopkins and her pharmacy is [facility] in [facility]. She has a history of SNF at [address] [place] [address]. She wishes for referrals to [facility] or [facility] after a list of facilities were reviewed with her. Referral has been sent to Connie Jenkins for [facility] and Osvaldo Nicole for [facility]. PT/OT are pending. Will follow.  Electronically signed by Franchesca Kirby RN CM on 2/15/2022 at 1:44 PM

## 2022-02-15 NOTE — PROGRESS NOTES
[address], [address] [facility]                                                Patient Name: Alycia Whitmore  MRN: [id number]  : 1960  Room: [address] [place] [address]     Evaluating OT:Ila Underwood, OTR/L   License #  [id number]       Referring Provider: Curtis Butler DO    Specific Provider Orders/Date: OT evaluation & treatment        Diagnosis:  Right closed tibial pilon and fibula fx;  right anterior ankle laceration    Pertinent Medical History:  has a past medical history of Adenoma of right adrenal gland, Anemia, Anxiety, Arthritis, Asthma, Benign essential tremor, Bipolar affective ([address] Chronic back pain, Chronic respiratory failure with hypoxia ([address] Depression, Difficulty swallowing, Environmental and seasonal allergies, Excessive physiologic tremor, Fatty liver, Full dentures, GERD (gastroesophageal reflux disease), Gout, Herniated cervical disc, History of swelling of feet, Lymphedema of lower extremity, Mitochondrial cytopathy ([address] Neuropathy, Obesity, PETR (obstructive sleep apnea), PONV (postoperative nausea and vomiting), Seizures ([address] Spinal headache, and Thyroid disease. Surgery: 22:   1. Closed reduction and manipulation of right tibial pilon and fibula fractures  2. Application of right ankle spanning external fixator for right tibial pilon and fibula shaft fractures  3. Closure of 7 cm right anterior ankle laceration    Past Surgical History:  has a past surgical history that includes knee surgery (Bilateral); Gastric bypass surgery (); myomectomy; Tonsillectomy; Nerve Block (Left, 10 02 2013); Nerve Block (Left, 10 9 13); Nerve Block (Left, 10/16/13); other surgical history (Left, 13); Nerve Block (Left, 10/29/2014); Nerve Block (Left, 2014); Nerve Block (Left, 14); Nerve Block (Right, 3/30/15);  Nerve Block (Right, 2015); Nerve Block (Right, 4/13/15); Nerve Block (Left, 7/6/15); Nerve Block (07/20/15); Nerve Block (Left, 10 1 15); Nerve Block (10/26/15); other surgical history (3/28/2016); Endoscopy, colon, diagnostic; pr colonoscopy flx dx w/collj spec when pfrmd (N/A, 3/20/2018); pr egd transoral biopsy single/multiple (N/A, 3/20/2018); Cholecystectomy (1999); Hysterectomy (1988); Colonoscopy (N/A, 9/18/2018); Esophagus dilation (9/18/2018); back surgery (last one 1995); Cardiac catheterization (Right, 6-6-2013); Appendectomy; other surgical history (1995); joint replacement (Bilateral, [id number]); egd colonoscopy (N/A, 10/8/2019); Colonoscopy (N/A, 10/8/2019); Nerve Block (Bilateral, 4/1/2021); and Ankle fracture surgery (Right, 2/14/2022). Precautions:  Fall Risk, NWB R LE, elevate R LE, NWB R hand & wrist, able to WB through elbow, skin integrity, 3-4L O2 via NC      Assessment of current deficits    [] Functional mobility            [x]ADLs           [x] Strength                  [x]Cognition    [x] Functional transfers          [x] IADLs         [x] Safety Awareness   [x]Endurance    [x] Fine Coordination                         [x] Balance      [] Vision/perception   [x]Sensation      []Gross Motor Coordination             [] ROM           [] Delirium                   [] Motor Control      OT PLAN OF CARE   OT POC based on physician orders, patient diagnosis and results of clinical assessment     Frequency/Duration: 2-4 days/wk for 2 weeks PRN   Specific OT Treatment Interventions to include:    Instruction/training on adapted ADL techniques and AE recommendations to increase functional independence within precautions  Training on energy conservation strategies, correct breathing pattern and techniques to improve independence/tolerance for self-care routine  Functional transfer/mobility training/DME recommendations for increased independence, safety, and fall prevention  Patient/Family education to increase follow through with safety techniques and functional independence  Recommendation of environmental modifications for increased safety with functional transfers/mobility and ADLs  Cognitive retraining/development of therapeutic activities to improve problem solving, judgement, memory, and attention for increased safety/participation in ADL/IADL tasks  Splinting/positioning for increased function, prevention of contractures, and improve skin integrity  Therapeutic exercise to improve motor endurance, ROM, and functional strength for ADLs/functional transfers  Therapeutic activities to facilitate/challenge dynamic balance, stand tolerance for increased safety and independence with ADLs  Therapeutic activities to facilitate gross/fine motor skills for increased independence with ADLs  Positioning to improve skin integrity, interaction with environment and functional independence     Recommended Adaptive Equipment:  TBD      Home Living: Pt lives alone, with cat,  in a 1 story apt. In a Senior high rise with elevator access, with no steps to enter with no HR. B&B on main level. Bathroom setup: walk in shower, high commode   Equipment owned: [employer] ww, shower chair, grab bars in shower     Prior Level of Function:  Pt. States increased difficulty as of late with ADLs , states friends/neighbors assist with IADLs; ambulated [employer] ww  Driving: NA, relies on public transit or friends/neighbors  Occupation: retired, phlebotomist     Pain Level: 8/10 generalized pain, increased R knee>ankle pain; RN aware  Cognition: A&O: x3;  Follows 2 step directions              Memory:  good              Sequencing:  good              Problem solving:  fair              Judgement/safety:  fair with occasional cues                Functional Assessment:  AM-PAC Daily Activity Raw Score: 13/24    Initial Eval Status  Date: 2-15-22 Treatment Status  Date: STGs = LTGs  Time frame: 10-14 days   Feeding Set up, primarily using L hand with cup to mouth   Mod I/ Ind   Grooming Min A with primary use of L hand    Mod I   UB Dressing Min A to doff gown, carmelina clean gown    Set up   LB Dressing Dep   Min A   Bathing Max A seated EOB with bathing cloths   Min A   Toileting NT, meadows present   Min A   Bed Mobility  Logroll: Max A side <> side with additional support of R LE while change of linens  Supine to sit: Max A    Sit to supine: Max A   Logroll: Sup  Supine to sit: SBA  Sit to supine: SBA   Functional Transfers NT, pt. deferred d/t pain R LE   Min A with R   platform ww   Functional Mobility NT   Min A for short transfer distances with R platform ww   Balance Sitting:     Static:  SBA    Dynamic:Min A   Standing: NT       Activity Tolerance Poor+ with lt. ax. d/t pain   Fair+ with lt./mod. ax. Visual/  Perceptual Glasses: yes          Vitals spO2 97% on 3L   via NC  HR 74 bpm   WFL      Hand Dominance R    AROM (PROM) Strength Additional Info:    RUE  R shld. & elbow WFL  R wrist NT  R hand WFL R shld. & elbow 3+/5  R wrist NT  R hand functional grasp WFL  and wfl FMC/dexterity noted during ADL tasks      LUE WFL 4/5 good  and wfl FMC/dexterity noted during ADL tasks         [facility]   Sensation:  min. c/o numbness / tingling R radial sided- thumb, index finger  Tone: WFL B UE  Edema: none noted B UE     Comments: Upon arrival patient supine in bed, agreeable to OT, cleared by Nursing. Therapist facilitated bed mobility/ADLs at bedside/ UE ROM ex. with focus on safety, technique & review of precautions. Pt. Instructed RE: safe transfers/mobility, ADLs, role of OT, treatment plan, recs. , prec. At end of session, patient returned to supine with R LE elevated, all needs met, RN notified, with call light and phone within reach, all lines and tubes intact. Overall patient demonstrated decreased strength, balance, independence & safety during completion of ADL/functional transfer/mobility tasks.   Pt would benefit from continued skilled OT to increase safety and independence with completion of ADL/IADL tasks for functional independence and quality of life. Treatment: OT treatment provided this date includes:  ·  Instruction/training on safety and adapted techniques for completion of ADLs: to increase [place] in self care  ·  Instruction/training on safe functional mobility/transfer techniques: with focus on safety, technique & precautions  ·  Instruction/training on energy conservation/work simplification for completion of ADLs: techniques to increase [place] with self care ADLs & iADLs, work simplification to improve endurance  ·  Proper Positioning/Alignment: for optimal healing, skin integrity to prevent breakdown, decrease edema  · Skilled monitoring of vitals: to include BP, spO2 & HR during session  · Sitting/standing Balance/Tolerance- to increased balance & activity tolerance during ADLs as well as facilitate proper posture and/or positioning. Rehab Potential: Good for established goals     Patient / Family Goal: to get \"stronger\"       Patient and/or family were instructed on functional diagnosis, prognosis/goals and OT plan of care. Demonstrated fair+ understanding. Eval Complexity: Low     Time In: 14:25  Time Out: 14:55  Total Treatment Time: 10    Min Units   OT Eval Low 16318  x     OT Eval Medium 96795       OT Eval High 75844       OT Re-Eval [id number]       Therapeutic Ex 48333       Therapeutic Activities 74901  05     ADL/Self Care 64913  10  1   Orthotic Management 33365       Manual 72354       Neuro Re-Ed 46430       Non-Billable Time          Evaluation Time additionally includes thorough review of current medical information, gathering information on past medical history/social history and prior level of function, interpretation of standardized testing/informal observation of tasks, assessment of data and development of plan of care and goals. Ila Underwood, OTR/L   License #  [id number]

## 2022-02-15 NOTE — PROGRESS NOTES
Physical Therapy  Physical Therapy Initial Evaluation    Name: Tano Dickens  : 1960  MRN: [id number]      Date of Service: 2/15/2022    Evaluating PT:  Jacqueline Aponte, PT [id number]      Room #:  8901/8732-X  Diagnosis:  Gait disturbance [R26.9]  Closed fracture of right ankle, initial encounter [S82.891A]  Closed left ankle fracture, initial encounter [S82.892A]  Contusion, shoulder and upper arm, multiple sites, unspecified laterality, initial encounter [S40.019A, S40.029A]  PMHx/PSHx:     has a past medical history of Adenoma of right adrenal gland, Anemia, Anxiety, Arthritis, Asthma, Benign essential tremor, Bipolar affective ([address] Chronic back pain, Chronic respiratory failure with hypoxia ([address] Depression, Difficulty swallowing, Environmental and seasonal allergies, Excessive physiologic tremor, Fatty liver, Full dentures, GERD (gastroesophageal reflux disease), Gout, Herniated cervical disc, History of swelling of feet, Lymphedema of lower extremity, Mitochondrial cytopathy ([address] Neuropathy, Obesity, PETR (obstructive sleep apnea), PONV (postoperative nausea and vomiting), Seizures ([address] Spinal headache, and Thyroid disease. has a past surgical history that includes knee surgery (Bilateral); Gastric bypass surgery (); myomectomy; Tonsillectomy; Nerve Block (Left, 10 02 2013); Nerve Block (Left, 10 9 13); Nerve Block (Left, 10/16/13); other surgical history (Left, 13); Nerve Block (Left, 10/29/2014); Nerve Block (Left, 2014); Nerve Block (Left,  14); Nerve Block (Right, 3/30/15); Nerve Block (Right, 2015); Nerve Block (Right, 4/13/15); Nerve Block (Left, 7/6/15); Nerve Block (07/20/15); Nerve Block (Left, 10 1 15); Nerve Block (10/26/15); other surgical history (3/28/2016); Endoscopy, colon, diagnostic; pr colonoscopy flx dx w/collj spec when pfrmd (N/A, 3/20/2018); pr egd transoral biopsy single/multiple (N/A, 3/20/2018); Cholecystectomy ();  Hysterectomy (); Colonoscopy (N/A, 9/18/2018); Esophagus dilation (9/18/2018); back surgery (last one 1995); Cardiac catheterization (Right, 6-6-2013); Appendectomy; other surgical history (1995); joint replacement (Bilateral, [id number]); egd colonoscopy (N/A, 10/8/2019); Colonoscopy (N/A, 10/8/2019); Nerve Block (Bilateral, 4/1/2021); and Ankle fracture surgery (Right, 2/14/2022). Procedure/Surgery:    · S/P R tibial pilon and fibula fractures with external fixator placement on 2/14/22    Precautions:  Falls,  NWB (non-weight bearing) RLE and NWB R wrist, skin integrity, O2 3-4L  Equipment Needs: To be determined,    SUBJECTIVE:    Patient lives alone  in a apartment high rise  with Elevator access. No DANYELL. Bed is on 1 floor and bath is on 1 floor. Patient ambulated platform walker RUE  PTA. Equipment owned: R platform walker. ,  O2 at home. OBJECTIVE:   Initial Evaluation  Date: 2/15/22 Treatment Short Term/ Long Term   Goals   AM-PAC 6 Clicks 5/05     Was pt agreeable to Eval/treatment? yes     Does pt have pain? Yes 10//10     Bed Mobility  Rolling: Max    Supine to sit:   Max    Sit to supine: Max    Scooting: Max    Rolling: Min  Supine to sit: Min  Sit to supine: Min  Scooting: Min   Transfers Sit to stand: NT   Stand to sit: NT  Stand pivot: NT   Sit to stand: Min to platform walker R  Stand to sit: Min   Stand pivot: Mod  with R platform walker   Ambulation    NT  TBD   Stair negotiation: ascended and descended  NT  Not barrier. ROM BUE:  Defer to OT eval  BLE:  Decreased due BLE pain. Strength BUE: Defer to OT eval  RLE:  Grossly 3-/5  LLE:  Grossly  4-/5  4/5   Balance Sitting EOB:  Min  Dynamic Standing:  NT  Sitting EOB:  ind  Dynamic Standing: Mod with platform walker. Patient is Alert & Oriented x person, place, time and situation and follows directions   Sensation:  Pt denies numbness and tingling to extremities  Edema:  RLE    Therapeutic Exercises:  Functional activity as stated above. Patient education  Pt educated regarding need for OOB activity and weight bearing precautions for RLE and R wrist NWB strict elevate RLE    Patient response to education:   Pt verbalized understanding Pt demonstrated skill Pt requires further education in this area   yes yes Reminders     ASSESSMENT:    Conditions Requiring Skilled Therapeutic Intervention:    [x]Decreased strength     [x]Decreased ROM  [x]Decreased functional mobility  [x]Decreased balance   [x]Decreased endurance   [x]Decreased posture  []Decreased sensation  []Decreased coordination   []Decreased vision  [x]Decreased safety awareness   []Increased pain       Comments:    RN cleared patient for participation in therapy session. Patient was seen this date for PT evaluation. . Patient was agreeable to intervention. Results of the functional assessment are noted above. Upon entering the room patient was found supine in bed. Required increased assist to transition to EOB. Sat EOB x 10 minutes to increase dynamic sitting balance and activity tolerance. Cues for posture. Declined to attempt to complete STS. At end of session, patient in bed with  call light and phone within reach,  all lines and tubes intact, nursing notified. This patient can benefit from the continuation of skilled PT possibly in a rehab setting to maximize functional level and return to PLOF. Treatment:  Patient practiced and was instructed in the following treatment:    · Bed mobility training - pt given verbal and tactile cues to facilitate proper sequencing and safety during rolling and supine>sit as well as provided with physical assistance to complete task    o Skilled repositioning in bed. Pt's/ family goals   1. home    Prognosis is good  for reaching above PT goals.     Patient and or family understand(s) diagnosis, prognosis, and plan of care.  yes,     PHYSICAL THERAPY PLAN OF CARE:    PT POC is established based on physician order and patient diagnosis Referring provider/PT Order:  PT Eval and Treat   02/14/22 1830  PT eval and treat          Nicci Khris,        Diagnosis:  Gait disturbance [R26.9]  Closed fracture of right ankle, initial encounter [S82.891A]  Closed left ankle fracture, initial encounter [S82.892A]  Contusion, shoulder and upper arm, multiple sites, unspecified laterality, initial encounter [S40.019A, S40.029A]  Specific instructions for next treatment:  Sit EOB, postural exercises and Transfer to bedside chair  Current Treatment Recommendations:     [x] Strengthening to improve independence with functional mobility   [x] ROM to improve independence with functional mobility   [x] Balance Training to improve static/dynamic balance and to reduce fall risk  [x] Endurance Training to improve activity tolerance during functional mobility   [x] Transfer Training to improve safety and independence with all functional transfers   [x] Gait Training to improve gait mechanics, endurance and asses need for appropriate assistive device  [x] Stair Training in preparation for safe discharge home and/or into the community   [] Positioning to prevent skin breakdown and contractures  [x] Safety and Education Training   [] Patient/Caregiver Education   [] HEP  [] Other     PT long term treatment goals are located in above grid    Frequency of treatments: 2-5x/week x 1-2 weeks. Time in  1435  Time out  1500    Total Treatment Time  25 minutes     Evaluation Time includes thorough review of current medical information, gathering information on past medical history/social history and prior level of function, completion of standardized testing/informal observation of tasks, assessment of data and education on plan of care and goals.     CPT codes:  [x] Low Complexity PT evaluation 93989  [] Moderate Complexity PT evaluation 86217  [] High Complexity PT evaluation 17019  [] PT Re-evaluation 56281  [] Gait training 12931 - minutes  [] Manual therapy 14870 - minutes  [x] Therapeutic activities 08010 10 minutes  [] Therapeutic exercises 50607 - minutes  [] Neuromuscular reeducation 03769 - minutes     Elizabeth Sebastian, [address] [facility]

## 2022-02-15 NOTE — PROGRESS NOTES
Pt is refusing meadows catheter removal today, states she has severe neuropathy in her groin and will not be able to control urination as she is not able to feel when she has to void.

## 2022-02-15 NOTE — CARE COORDINATION
2/15/22 Update CM Note; Per Maite Suarez at [facility], they are not a medicaid provider. [facility] has no beds.  Referral made to [address] [place] [address]. Electronically signed by Gloria Eason RN CM on 2/15/2022 at 1:47 PM

## 2022-02-15 NOTE — PROGRESS NOTES
Subjective:    Chief complaint:    Complaining of neuropathic pain, insomnia issues      Objective:    BP (!) 126/54   Pulse 68   Temp 97.8 °F (36.6 °C) (Oral)   Resp 17   Ht 5' 6\" (1.676 m)   Wt 245 lb (111.1 kg)   LMP 08/31/1988   SpO2 96%   BMI 39.54 kg/m²   General : Awake ,alert,no distress. Heart:  RRR, no murmurs, gallops, or rubs. Lungs:  CTA bilaterally, no wheeze, rales or rhonchi  Abd: bowel sounds present, nontender, nondistended, no masses  Extrem:  No clubbing, cyanosis, or edema  Diffuse bruising noted  Dressing right ankle    CBC:   Lab Results   Component Value Date    WBC 4.7 02/15/2022    RBC 2.94 02/15/2022    HGB 9.3 02/15/2022    HCT 29.9 02/15/2022    .7 02/15/2022    MCH 31.6 02/15/2022    MCHC 31.1 02/15/2022    RDW 13.8 02/15/2022    PLT 42 02/15/2022    MPV 11.9 02/15/2022     BMP:    Lab Results   Component Value Date     02/15/2022    K 4.0 02/15/2022    K 3.8 01/22/2022     02/15/2022    CO2 29 02/15/2022    BUN 11 02/15/2022    LABALBU 3.8 02/13/2022    LABALBU 4.7 05/21/2012    CREATININE 0.7 02/15/2022    CALCIUM 7.9 02/15/2022    GFRAA >60 02/15/2022    LABGLOM >60 02/15/2022    GLUCOSE 85 02/15/2022    GLUCOSE 93 05/24/2012     PT/INR:    Lab Results   Component Value Date    PROTIME 10.8 02/13/2022    PROTIME 10.8 05/07/2012    INR 1.0 02/13/2022     Troponin:    Lab Results   Component Value Date    TROPONINI <0.01 04/25/2021       No results for input(s): LABURIN in the last 72 hours. No results for input(s): BC in the last 72 hours. No results for input(s): Christina Bustard in the last 72 hours.       Current Facility-Administered Medications:     escitalopram (LEXAPRO) tablet 20 mg, 20 mg, Oral, Once, Flavia Evans MD    0.9 % sodium chloride infusion, , IntraVENous, Continuous, Basia Vogel DO    ceFAZolin (ANCEF) 2000 mg in sterile water 20 mL IV syringe, 2,000 mg, IntraVENous, Q8H, Basia Vogel DO, 2,000 mg at 02/15/22 05   scopolamine (TRANSDERM-SCOP) transdermal patch 1 patch, 1 patch, TransDERmal, Q72H, Zulema Mccall MD, 1 patch at 02/14/22 2346    sodium chloride flush 0.9 % injection 5-40 mL, 5-40 mL, IntraVENous, 2 times per day, Carissa Disney, DO, 10 mL at 02/15/22 6135    sodium chloride flush 0.9 % injection 5-40 mL, 5-40 mL, IntraVENous, PRN, Carissa Disney, DO    0.9 % sodium chloride infusion, 25 mL, IntraVENous, PRN, Carissa Disney, DO    ondansetron (ZOFRAN-ODT) disintegrating tablet 4 mg, 4 mg, Oral, Q8H PRN **OR** ondansetron (ZOFRAN) injection 4 mg, 4 mg, IntraVENous, Q6H PRN, Carissa Disney, DO    acetaminophen (TYLENOL) tablet 650 mg, 650 mg, Oral, Q4H PRN, Carissa Disney, DO    oxyCODONE (ROXICODONE) immediate release tablet 5 mg, 5 mg, Oral, Q6H PRN **OR** oxyCODONE HCl (OXY-IR) immediate release tablet 10 mg, 10 mg, Oral, Q6H PRN, Carissa Kyle, DO, 10 mg at 02/15/22 0836    morphine (PF) injection 2 mg, 2 mg, IntraVENous, Q4H PRN **OR** morphine sulfate (PF) injection 4 mg, 4 mg, IntraVENous, Q4H PRN, Carissa Kyle, DO, 4 mg at 02/15/22 0543    magnesium hydroxide (MILK OF MAGNESIA) 400 MG/5ML suspension 30 mL, 30 mL, Oral, Daily PRN, Carissa Disney, DO    senna (SENOKOT) tablet 8.6 mg, 1 tablet, Oral, Daily PRN, Carissa Disney, DO    enoxaparin (LOVENOX) injection 30 mg, 30 mg, SubCUTAneous, BID, Shelley Esposito, DO, 30 mg at 02/15/22 0134    ziprasidone (GEODON) capsule 20 mg, 20 mg, Oral, Nightly, Angel Pham MD, 20 mg at 02/14/22 2344    topiramate (TOPAMAX) tablet 200 mg, 200 mg, Oral, BID, Angel Pham MD, 200 mg at 02/15/22 0835    escitalopram (LEXAPRO) tablet 20 mg, 20 mg, Oral, BID, Angel Pham MD, 20 mg at 02/15/22 0835    gabapentin (NEURONTIN) capsule 600 mg, 600 mg, Oral, 4x Daily, Angel Pham MD, 600 mg at 02/15/22 0835    allopurinol (ZYLOPRIM) tablet 200 mg, 200 mg, Oral, BID, Angel Pham MD, 200 mg at 02/15/22 0836   levOCARNitine (CARNITOR) tablet 330 mg, 330 mg, Oral, TID, Jose Alfredo Lares MD, 330 mg at 02/15/22 0836    linaclotide (LINZESS) capsule 290 mcg, 290 mcg, Oral, [facility], Jose Alfredo Lares MD    Vitamin D (CHOLECALCIFEROL) tablet 5,000 Units, 5,000 Units, Oral, Every Other Day, Jose Alfredo Lares MD, 5,000 Units at 02/14/22 1840    docusate sodium (COLACE) capsule 100 mg, 100 mg, Oral, BID, Jose Alfredo Lares MD, 100 mg at 02/15/22 0836    furosemide (LASIX) tablet 40 mg, 40 mg, Oral, BID, Jose Alfredo Lares MD, 40 mg at 02/15/22 0836    pantoprazole (PROTONIX) tablet 40 mg, 40 mg, Oral, [facility], Jose Alfredo Lares MD, 40 mg at 02/15/22 0538    montelukast (SINGULAIR) tablet 10 mg, 10 mg, Oral, Daily, Jose Alfredo Lares MD, 10 mg at 02/14/22 2117    magnesium oxide (MAG-OX) tablet 400 mg, 400 mg, Oral, BID, Jose Alfredo Lares MD, 400 mg at 02/15/22 1709    calcium elemental (OSCAL) tablet 500 mg, 500 mg, Oral, BID, Jose Alfredo Lares MD, 500 mg at 02/15/22 0836    albuterol (PROVENTIL) nebulizer solution 2.5 mg, 2.5 mg, Nebulization, Q4H PRN, Jose Alfredo Lares MD    traZODone (DESYREL) tablet 100 mg, 100 mg, Oral, Nightly, Jose Alfredo Lares MD, 100 mg at 02/14/22 2117    famotidine (PEPCID) tablet 20 mg, 20 mg, Oral, BID, Jose Alfredo Lares MD, 20 mg at 02/15/22 0836    vitamin B-12 (CYANOCOBALAMIN) tablet 100 mcg, 100 mcg, Oral, Daily, Jose Alfredo Lares MD, 100 mcg at 02/15/22 0835    metoprolol succinate (TOPROL XL) extended release tablet 12.5 mg, 12.5 mg, Oral, Daily, Jose Alfredo Lares MD, 12.5 mg at 02/15/22 0836    methocarbamol (ROBAXIN) tablet 500 mg, 500 mg, Oral, 4x Daily, Jose Alfredo Lares MD, 500 mg at 02/15/22 0837    baclofen (LIORESAL) tablet 20 mg, 20 mg, Oral, 4x daily, Jose Alfredo Lares MD, 20 mg at 02/15/22 0836    guaiFENesin (ROBITUSSIN) 100 MG/5ML oral solution 200 mg, 200 mg, Oral, Q6H PRN, MD Steven Calero polyethylene glycol (GLYCOLAX) packet 17 g, 17 g, Oral, Daily, Elver White MD, 17 g at 02/15/22 0834    bisacodyl (DULCOLAX) suppository 10 mg, 10 mg, Rectal, Daily, Elver White MD, 10 mg at 02/15/22 6216    ADULT DIET; Regular    CT ANKLE RIGHT WO CONTRAST   Final Result   Interval internal fixation. Stable appearance of distal tibial and fibular fractures. FLUORO FOR SURGICAL PROCEDURES   Final Result   Intraprocedural fluoroscopic spot images as above. See separate procedure   report for more information. CT HEAD WO CONTRAST   Final Result   No evidence of acute intracranial trauma. Right frontal scalp hematoma. No fractures. CT CERVICAL SPINE WO CONTRAST   Final Result   No acute abnormality of the cervical spine. Mild multilevel degenerative changes. CT ABDOMEN PELVIS WO CONTRAST Additional Contrast? None   Final Result   Atraumatic appearance of the abdomen and pelvis. Two 6 mm nonobstructing right renal calculi. Cholecystectomy. XR ANKLE RIGHT (2 VIEWS)   Final Result   Near anatomic alignment, post closed reduction and cast placement. XR ANKLE RIGHT (MIN 3 VIEWS)   Final Result   Distal tibia and fibular fractures. Disrupted ankle mortise. XR PELVIS (1-2 VIEWS)   Final Result   1. No signs of a displaced pelvic fracture. 2.  Joint space narrowing of each hip suggest early signs of degenerative   joint disease         XR TIBIA FIBULA LEFT (2 VIEWS)   Final Result   No acute bony abnormalities or complications of the total knee prosthesis. XR TIBIA FIBULA RIGHT (2 VIEWS)   Final Result   Distal tibia and fibular fractures. XR CHEST PORTABLE   Final Result   No acute process. Mild cardiomegaly. Assessment:    Active Problems:    Closed left ankle fracture, initial encounter  Resolved Problems:    * No resolved hospital problems.  *    Obesity  Mitochondrial cytopathy  Obstructive sleep apnea  Family history of asthma  Anemia likely from acute blood loss from surgery  Underlying history of gout  Essential tremor  Hypothyroidism    Plan:    Drop in hemoglobin noted  Platelet count dropped from around 82,000 42,000  Question of lab letter  Stat CBC  If platelet count is indeed low. Saleem Castano MD  11:12 AM  2/15/2022    NOTE: This report was transcribed using voice recognition software.  Every effort was made to ensure accuracy; however, inadvertent transcription errors may be present

## 2022-02-15 NOTE — PROGRESS NOTES
Physician Progress Note      Giancarlo Benedict  [facility] #:                  [id number]  :                       1960  ADMIT DATE:       2022 8:26 PM  DISCH DATE:  RESPONDING  PROVIDER #:        Janella Duane COHEN DO          QUERY TEXT:    Patient admitted with ankle fracture and lower leg laceration. Per procedure   note dated  documentation of laceration repair. To accurately reflect the   procedure performed please document the deepest depth of laceration which was   repaired: The medical record reflects the following:  Risk Factors: Laceration right ankle  Clinical Indicators: Per Op Note The laceration site was probed into the   subcutaneous fat layer. The laceration site was copiously irrigated with   normal saline and closed utilizing 3-0 nylon in simple interrupted fashion  Treatment: Laceration repair continued inpatient monitoring    Thank you  Barry MILLER, RN, CCDS  Clinical Documentation Improvement  Options provided:  -- Laceration repair of skin  -- Laceration repair of subcutaneous tissue  -- Laceration repair of fascia  -- Other - I will add my own diagnosis  -- Disagree - Not applicable / Not valid  -- Disagree - Clinically unable to determine / Unknown  -- Refer to Clinical Documentation Reviewer    PROVIDER RESPONSE TEXT:    Addendum to  procedure note: Laceration repair of skin was performed of   anterior transverse laceration over the right ankle during procedure on .     Query created by: Damian Seo on 3/17/5059 0:47 PM      Electronically signed by:  Cy Aggarwal DO 2/15/2022 6:12 PM

## 2022-02-16 VITALS
OXYGEN SATURATION: 98 % | DIASTOLIC BLOOD PRESSURE: 77 MMHG | RESPIRATION RATE: 18 BRPM | SYSTOLIC BLOOD PRESSURE: 118 MMHG | HEIGHT: 66 IN | TEMPERATURE: 98.2 F | WEIGHT: 245 LBS | HEART RATE: 73 BPM | BODY MASS INDEX: 39.37 KG/M2

## 2022-02-16 LAB — SARS-COV-2, NAAT: NOT DETECTED

## 2022-02-16 PROCEDURE — 2580000003 HC RX 258: Performed by: ORTHOPAEDIC SURGERY

## 2022-02-16 PROCEDURE — 97530 THERAPEUTIC ACTIVITIES: CPT

## 2022-02-16 PROCEDURE — 2500000003 HC RX 250 WO HCPCS: Performed by: ORTHOPAEDIC SURGERY

## 2022-02-16 PROCEDURE — 6370000000 HC RX 637 (ALT 250 FOR IP): Performed by: INTERNAL MEDICINE

## 2022-02-16 PROCEDURE — 6370000000 HC RX 637 (ALT 250 FOR IP): Performed by: ORTHOPAEDIC SURGERY

## 2022-02-16 PROCEDURE — 87635 SARS-COV-2 COVID-19 AMP PRB: CPT

## 2022-02-16 PROCEDURE — 6360000002 HC RX W HCPCS: Performed by: ORTHOPAEDIC SURGERY

## 2022-02-16 PROCEDURE — 2700000000 HC OXYGEN THERAPY PER DAY

## 2022-02-16 PROCEDURE — 97535 SELF CARE MNGMENT TRAINING: CPT

## 2022-02-16 RX ORDER — MECOBALAMIN 5000 MCG
5 TABLET,DISINTEGRATING ORAL NIGHTLY
Qty: 1 TABLET | Refills: 0 | COMMUNITY
Start: 2022-02-16 | End: 2022-07-14

## 2022-02-16 RX ADMIN — Medication 10 ML: at 09:33

## 2022-02-16 RX ADMIN — Medication 400 MG: at 09:22

## 2022-02-16 RX ADMIN — Medication 2000 MG: at 13:52

## 2022-02-16 RX ADMIN — Medication 400 MG: at 20:34

## 2022-02-16 RX ADMIN — GABAPENTIN 600 MG: 300 CAPSULE ORAL at 12:14

## 2022-02-16 RX ADMIN — LEVOCARNITINE 330 MG: 330 TABLET ORAL at 09:23

## 2022-02-16 RX ADMIN — ESCITALOPRAM 20 MG: 10 TABLET, FILM COATED ORAL at 20:33

## 2022-02-16 RX ADMIN — OXYCODONE HYDROCHLORIDE 10 MG: 10 TABLET ORAL at 20:31

## 2022-02-16 RX ADMIN — ESCITALOPRAM 20 MG: 10 TABLET, FILM COATED ORAL at 09:20

## 2022-02-16 RX ADMIN — OXYCODONE HYDROCHLORIDE 10 MG: 10 TABLET ORAL at 07:50

## 2022-02-16 RX ADMIN — MONTELUKAST SODIUM 10 MG: 10 TABLET ORAL at 20:34

## 2022-02-16 RX ADMIN — MORPHINE SULFATE 2 MG: 2 INJECTION, SOLUTION INTRAMUSCULAR; INTRAVENOUS at 12:14

## 2022-02-16 RX ADMIN — GABAPENTIN 600 MG: 300 CAPSULE ORAL at 09:21

## 2022-02-16 RX ADMIN — ENOXAPARIN SODIUM 30 MG: 100 INJECTION SUBCUTANEOUS at 09:22

## 2022-02-16 RX ADMIN — GABAPENTIN 600 MG: 300 CAPSULE ORAL at 16:23

## 2022-02-16 RX ADMIN — BACLOFEN 20 MG: 10 TABLET ORAL at 12:13

## 2022-02-16 RX ADMIN — CALCIUM 500 MG: 500 TABLET ORAL at 09:20

## 2022-02-16 RX ADMIN — LEVOCARNITINE 330 MG: 330 TABLET ORAL at 13:52

## 2022-02-16 RX ADMIN — ALLOPURINOL 200 MG: 100 TABLET ORAL at 20:34

## 2022-02-16 RX ADMIN — CALCIUM 500 MG: 500 TABLET ORAL at 20:31

## 2022-02-16 RX ADMIN — METHOCARBAMOL TABLETS 500 MG: 500 TABLET, COATED ORAL at 09:32

## 2022-02-16 RX ADMIN — OXYCODONE HYDROCHLORIDE 10 MG: 10 TABLET ORAL at 13:55

## 2022-02-16 RX ADMIN — METHOCARBAMOL TABLETS 500 MG: 500 TABLET, COATED ORAL at 20:31

## 2022-02-16 RX ADMIN — LEVOCARNITINE 330 MG: 330 TABLET ORAL at 20:33

## 2022-02-16 RX ADMIN — TOPIRAMATE 200 MG: 100 TABLET, FILM COATED ORAL at 09:21

## 2022-02-16 RX ADMIN — DOCUSATE SODIUM 100 MG: 100 CAPSULE, LIQUID FILLED ORAL at 20:31

## 2022-02-16 RX ADMIN — Medication 100 MCG: at 09:22

## 2022-02-16 RX ADMIN — METHOCARBAMOL TABLETS 500 MG: 500 TABLET, COATED ORAL at 16:23

## 2022-02-16 RX ADMIN — BACLOFEN 20 MG: 10 TABLET ORAL at 16:23

## 2022-02-16 RX ADMIN — MORPHINE SULFATE 2 MG: 2 INJECTION, SOLUTION INTRAMUSCULAR; INTRAVENOUS at 16:22

## 2022-02-16 RX ADMIN — BACLOFEN 20 MG: 10 TABLET ORAL at 20:42

## 2022-02-16 RX ADMIN — FAMOTIDINE 20 MG: 20 TABLET ORAL at 20:30

## 2022-02-16 RX ADMIN — PANTOPRAZOLE SODIUM 40 MG: 40 TABLET, DELAYED RELEASE ORAL at 06:37

## 2022-02-16 RX ADMIN — Medication 5 MG: at 20:31

## 2022-02-16 RX ADMIN — FUROSEMIDE 40 MG: 20 TABLET ORAL at 07:50

## 2022-02-16 RX ADMIN — FUROSEMIDE 40 MG: 20 TABLET ORAL at 16:30

## 2022-02-16 RX ADMIN — POLYETHYLENE GLYCOL 3350 17 G: 17 POWDER, FOR SOLUTION ORAL at 09:23

## 2022-02-16 RX ADMIN — GABAPENTIN 600 MG: 300 CAPSULE ORAL at 20:31

## 2022-02-16 RX ADMIN — FAMOTIDINE 20 MG: 20 TABLET ORAL at 09:23

## 2022-02-16 RX ADMIN — ALLOPURINOL 200 MG: 100 TABLET ORAL at 09:21

## 2022-02-16 RX ADMIN — DOCUSATE SODIUM 100 MG: 100 CAPSULE, LIQUID FILLED ORAL at 09:33

## 2022-02-16 RX ADMIN — ENOXAPARIN SODIUM 30 MG: 100 INJECTION SUBCUTANEOUS at 20:31

## 2022-02-16 RX ADMIN — Medication 2000 MG: at 06:37

## 2022-02-16 RX ADMIN — TRAZODONE HYDROCHLORIDE 100 MG: 50 TABLET ORAL at 20:34

## 2022-02-16 RX ADMIN — Medication 5000 UNITS: at 09:21

## 2022-02-16 RX ADMIN — BACLOFEN 20 MG: 10 TABLET ORAL at 07:50

## 2022-02-16 RX ADMIN — TOPIRAMATE 200 MG: 100 TABLET, FILM COATED ORAL at 20:34

## 2022-02-16 RX ADMIN — METOPROLOL SUCCINATE 12.5 MG: 25 TABLET, FILM COATED, EXTENDED RELEASE ORAL at 09:32

## 2022-02-16 RX ADMIN — ZIPRASIDONE HYDROCHLORIDE 20 MG: 20 CAPSULE ORAL at 20:33

## 2022-02-16 RX ADMIN — METHOCARBAMOL TABLETS 500 MG: 500 TABLET, COATED ORAL at 12:14

## 2022-02-16 ASSESSMENT — PAIN SCALES - GENERAL
PAINLEVEL_OUTOF10: 7
PAINLEVEL_OUTOF10: 0
PAINLEVEL_OUTOF10: 10
PAINLEVEL_OUTOF10: 0
PAINLEVEL_OUTOF10: 10
PAINLEVEL_OUTOF10: 0

## 2022-02-16 ASSESSMENT — PAIN DESCRIPTION - DESCRIPTORS: DESCRIPTORS: ACHING;CONSTANT;DISCOMFORT

## 2022-02-16 ASSESSMENT — PAIN DESCRIPTION - FREQUENCY: FREQUENCY: CONTINUOUS

## 2022-02-16 ASSESSMENT — PAIN DESCRIPTION - ORIENTATION: ORIENTATION: RIGHT

## 2022-02-16 ASSESSMENT — PAIN DESCRIPTION - PAIN TYPE: TYPE: SURGICAL PAIN

## 2022-02-16 ASSESSMENT — PAIN DESCRIPTION - LOCATION: LOCATION: LEG

## 2022-02-16 ASSESSMENT — PAIN DESCRIPTION - ONSET: ONSET: ON-GOING

## 2022-02-16 ASSESSMENT — PAIN SCALES - WONG BAKER: WONGBAKER_NUMERICALRESPONSE: 10

## 2022-02-16 ASSESSMENT — PAIN DESCRIPTION - PROGRESSION: CLINICAL_PROGRESSION: NOT CHANGED

## 2022-02-16 NOTE — DISCHARGE INSTR - COC
Continuity of Care Form    Patient Name: Kelli Villanueva   :  1960  MRN:  [id number]    Admit date:  2022  Discharge date:  22    Code Status Order: Full Code   Advance Directives:      Admitting Physician:  Regulo Robertson MD  PCP: Gino Navarro MD    Discharging Nurse: [facility] Unit/Room#: 0995/1645-L  Discharging Unit Phone Number: [phone]    Emergency Contact:   Extended Emergency Contact Information  Primary Emergency Contact: Jose Paez  Home Phone: [phone]  Mobile Phone: [phone]  Relation: Other  Secondary Emergency Contact: Raymond Frost Phone: [phone]  Relation: Brother/Sister  Preferred language: English   needed?  No    Past Surgical History:  Past Surgical History:   Procedure Laterality Date    ANKLE FRACTURE SURGERY Right 2022    RIGHT ANKLE IRRIGAITON AND DEBRIDEMENT WITH EXTERNAL FIXATOR AND LACERATION REPAIR performed by Amos Medina MD at [place] [address]      with Hysterectomy / unknown    BACK SURGERY  last one     lumbar x 2    CARDIAC CATHETERIZATION Right 2013    Dr. Duarte Mass Radial, no stents placed, no blockages     CHOLECYSTECTOMY      open with gastric bypass    COLONOSCOPY N/A 2018    COLONOSCOPY POLYPECTOMY HOT BIOPSY performed by CHRISTOPHE Tom MD at [address] N/A 10/8/2019    COLONOSCOPY POLYPECTOMY SNARE/COLD BIOPSY performed by Tunde Peterson MD at [address]    EGD COLONOSCOPY N/A 10/8/2019    EGD ESOPHAGOGASTRODUODENOSCOPY DILATATION performed by Tunde Petersno MD at [facility], DIAGNOSTIC      ESOPHAGEAL DILATATION  2018    ESOPHAGEAL DILATION Analy Peterson performed [facility] [address] [facility] Bilateral ,    knees    KNEE SURGERY Bilateral     scope    MYOMECTOMY      NERVE BLOCK Left 10 02 2013 lumbar paravertebral facet #2    NERVE BLOCK Left 10 9 13    hip inj #1    NERVE BLOCK Left 10/16/13    hip injection    NERVE BLOCK Left 10/29/2014    left lumbar transforaminal nerve lbock  #1    NERVE BLOCK Left 11/12/2014    lumbar left transforaminal nerve block  #2    NERVE BLOCK Left 12 8 14    lumbar transforaminal #3    NERVE BLOCK Right 3/30/15    cervical transforaminal #1    NERVE BLOCK Right 4/6/2015    right cervical transforaminal nerve block  #2    NERVE BLOCK Right 4/13/15    cervical transforaminal #3    NERVE BLOCK Left 7/6/15    knee injection #1    NERVE BLOCK  07/20/15    left genicular nerve block/knee #2    NERVE BLOCK Left 10 1 15    lumb transforam #1    NERVE BLOCK  10/26/15    left lumbar transforaminal nerve block #3    NERVE BLOCK Bilateral 4/1/2021    #1 BILATERAL SACROILIAC JOINT INJECTION UNDER FLUORO performed by Felisha Hodges MD at [address] [facility] Left 11 25 13    lumbar radiofreq    OTHER SURGICAL HISTORY  3/28/2016    stage 1, 3 day percutanous trial [place] [employer] lumbar spinal cord stimulator    OTHER SURGICAL HISTORY  1995    racz procedure / lower back    [place] COLONOSCOPY FLX DX W/COLLJ SPEC WHEN PFRMD N/A 3/20/2018    COLONOSCOPY DIAGNOSTIC OR SCREENING performed by Sherley Donaldson MD at [address] [place] [address] EGD TRANSORAL BIOPSY SINGLE/MULTIPLE N/A 3/20/2018    EGD BIOPSY performed by Sherley Donaldson MD at [facility]         Immunization History:   Immunization History   Administered Date(s) Administered    Influenza Virus Vaccine 11/02/2017, 11/02/2017, 02/06/2019, 02/02/2021    Influenza Whole 11/03/2008    Influenza, Quadv, IM, PF (6 mo and older Fluzone, Flulaval, Fluarix, and 3 yrs and older Afluria) 09/01/2018    Pneumococcal Conjugate 13-valent ([id number]) 02/03/2017    Pneumococcal Polysaccharide (Sqipaprsy75) 11/03/2008, 09/01/2018    Td, unspecified formulation 11/02/2011    Tdap (Boostrix, Adacel) 02/16/2021 Active Problems:  Patient Active Problem List   Diagnosis Code    Debility R53.81    Obesity E66.9    Bipolar 1 disorder ([facility] [address] F31.9    Generalized seizure disorder ([facility] [address] G40.309    Cervicalgia M54.2    Asthma J45.909    Hyperlipidemia E78.5    Hypertension I10    Arthritis M19.90    Lymphedema of lower extremity I89.0    Degenerative Osteoarthritis of both knees M17.0    Status post total knee replacement, right Z96.651    Cervical spondylolysis M43.02    Degenerative Osteoarthritis thoracic spine M47.814    Thoracic facet syndrome M47.894    Cervical facet syndrome M47.812    Facet syndrome, lumbar M47.816    Neural foraminal stenosis of lumbar spine M48.061    Lumbar radiculopathy M54.16    DDD (degenerative disc disease), cervical M50.30    Neural foraminal stenosis of cervical spine M48.02    Protruded cervical disc M50.20    Cervical radiculopathy M54.12    Postlaminectomy syndrome, lumbar M96.1    Neuropathic pain syndrome (non-herpetic) M79.2    Chronic pain G89.29    Chronic respiratory failure with hypoxia (HCC) J96.11    Mitochondrial cytopathy (HCC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 A    PETR (obstructive sleep apnea) G47.33    Chronic back pain M54.9, G89.29    Bipolar affective (HCC) F31.9    Adenoma of right adrenal gland D35.01    Chest pain R07.9    Lumbosacral spondylosis without myelopathy M47.817    Sacroiliac dysfunction M53.3    DDD (degenerative disc disease), lumbar M51.36    Thoracic degenerative disc disease M51.34    Excessive physiologic tremor R25.1    Rhabdomyolysis M62.82    Failure to thrive in adult R62.7    Closed fracture of right distal radius S52.501A    Closed left ankle fracture, initial encounter S82.892A       Isolation/Infection:   Isolation            No Isolation          Patient Infection Status       Infection Onset Added Last Indicated Last Indicated By Review Planned Expiration Resolved Resolved By None active    Resolved    COVID-19 (Rule Out) 01/23/22 01/23/22 01/23/22 COVID-19, Rapid (Ordered)   01/23/22 Rule-Out Test Resulted    COVID-19 (Rule Out) 01/05/22 01/05/22 01/06/22 COVID-19, Rapid (Ordered)   01/06/22 Rule-Out Test Resulted    COVID-19 (Rule Out) 08/20/21 08/20/21 08/20/21 COVID-19, Rapid (Ordered)   08/20/21 Rule-Out Test Resulted    COVID-19 (Rule Out) 04/25/21 04/25/21 04/25/21 COVID-19, Rapid (Ordered)   04/25/21 Rule-Out Test Resulted    COVID-19 (Rule Out) 02/10/21 02/10/21 02/10/21 COVID-19 (Ordered)   02/11/21 Rule-Out Test Resulted    COVID-19 (Rule Out) 09/17/20 09/17/20 09/17/20 COVID-19 Ambulatory (Ordered)   09/19/20 Rule-Out Test Resulted    COVID-19 (Rule Out) 04/30/20 04/30/20 04/30/20 COVID-19 (Ordered)   05/01/20 Rule-Out Test Resulted    Not detected 4/30/2020            Nurse Assessment:  Last Vital Signs: BP (!) 103/54   Pulse 72   Temp 97.4 °F (36.3 °C) (Temporal)   Resp 18   Ht 5' 6\" (1.676 m)   Wt 245 lb (111.1 kg)   LMP 08/31/1988   SpO2 98%   BMI 39.54 kg/m²     Last documented pain score (0-10 scale): Pain Level: 0  Last Weight:   Wt Readings from Last 1 Encounters:   02/13/22 245 lb (111.1 kg)     Mental Status:  oriented and alert    IV Access:  - None    Nursing Mobility/ADLs:  Walking   Assisted  Transfer  Assisted  Bathing  Assisted  Dressing  Assisted  Toileting  Assisted  Feeding  Assisted  Med Admin  Assisted  Med Delivery   whole    Wound Care Documentation and Therapy:        Elimination:  Continence: Bowel: Yes  Bladder: Yes  Urinary Catheter: Removal Date 2/16/22    Colostomy/Ileostomy/Ileal Conduit: No       Date of Last BM: 2/15/22    Intake/Output Summary (Last 24 hours) at 2/16/2022 1246  Last data filed at 2/16/2022 0936  Gross per 24 hour   Intake 1320 ml   Output 1700 ml   Net -380 ml     I/O last 3 completed shifts: In: 1260 [P.O.:1260]  Out: Don [Urine:4150]    Safety Concerns:      At Risk for Falls    Impairments/Disabilities: None    Nutrition Therapy:  Current Nutrition Therapy:   - Oral Diet:  General    Routes of Feeding: Oral  Liquids: No Restrictions  Daily Fluid Restriction: no  Last Modified Barium Swallow with Video (Video Swallowing Test): not done    Treatments at the Time of Hospital Discharge:   Respiratory Treatments: n/a  Oxygen Therapy:  is on oxygen at 3 L/min per nasal cannula. Ventilator:    - No ventilator support    Rehab Therapies: Physical Therapy and Occupational Therapy  Weight Bearing Status/Restrictions: No weight bearing restirctions  Other Medical Equipment (for information only, NOT a DME order):  walker  Other Treatments: n/a    Patient's personal belongings (please select all that are sent with patient):  Glasses    RN SIGNATURE:  Electronically signed by Eleni Pichardo RN on 2/16/22 at 3:25 PM EST    CASE MANAGEMENT/SOCIAL WORK SECTION    Inpatient Status Date: ***    Readmission Risk Assessment Score:  Readmission Risk              Risk of Unplanned Readmission:  44           Discharging to Facility/ Agency   Name: [facility]  Address:  Phone:  Fax:    Dialysis Facility (if applicable)   Name:  Address:  Dialysis Schedule:  Phone:  Fax:    / signature: Electronically signed by Darylene Familia, [place] on 2/16/22 at 2:51 PM EST    PHYSICIAN SECTION    Prognosis: Good    Condition at Discharge: Stable    Rehab Potential (if transferring to Rehab): Good    Recommended Labs or Other Treatments After Discharge: ***    Physician Certification: I certify the above information and transfer of Jn Flores  is necessary for the continuing treatment of the diagnosis listed and that she requires [facility] for less 30 days.      Update Admission H&P: No change in H&P    PHYSICIAN SIGNATURE:  Electronically signed by Jose Alfredo Lares MD on 2/16/22 at 12:47 PM EST

## 2022-02-16 NOTE — CARE COORDINATION
2/16, SW received LOC review PAS. Patient can go to [facility] later today since LOC has been received from [facility] [address], [address]. Patient is set up for a 7pm  by [facility]. Those informed:  Patient, nursing and Emy Persons from facility. VM have been left with Teresa Schmid and Kiel-brother to patient on patient leaving later for [facility]. PAS/RR, ambulance form (PAS/Letter faxed), face sheet and envelope on soft chart. COVID test from today is negative. SW to follow for further discharge planning needs.       Roland Adam [facility] SURGICAL [facility] Case Management  [phone]

## 2022-02-16 NOTE — CARE COORDINATION
2/16/22 Update CM note; Patient is POD 2 of ORIF ankle. She is on iv ancef/iv flds. She is NWB on RLE. She is accepted at [facility] and bed is available for today. Spoke with patient and she is in agreement. Covid is negative. SW is working on Level of Care for discharge. Will await orthopedics for readiness to discharge.  Electronically signed by Alex Velazquez RN on 2/16/2022 at 12:28 PM

## 2022-02-16 NOTE — PROGRESS NOTES
Subjective:    Chief complaint:    Denies new issues  Melatonin did help with sleep  Objective:    BP (!) 103/54   Pulse 72   Temp 97.4 °F (36.3 °C) (Temporal)   Resp 18   Ht 5' 6\" (1.676 m)   Wt 245 lb (111.1 kg)   LMP 08/31/1988   SpO2 98%   BMI 39.54 kg/m²   General : Awake ,alert,no distress. Heart:  RRR, no murmurs, gallops, or rubs. Lungs:  CTA bilaterally, no wheeze, rales or rhonchi  Abd: bowel sounds present, nontender, nondistended, no masses  Extrem:  No clubbing, cyanosis, or edema  Diffuse bruising noted  Dressing right ankle    CBC:   Lab Results   Component Value Date    WBC 6.0 02/15/2022    RBC 3.13 02/15/2022    HGB 9.8 02/15/2022    HCT 31.4 02/15/2022    .3 02/15/2022    MCH 31.3 02/15/2022    MCHC 31.2 02/15/2022    RDW 13.8 02/15/2022     02/15/2022    MPV 9.1 02/15/2022     BMP:    Lab Results   Component Value Date     02/15/2022    K 4.0 02/15/2022    K 3.8 01/22/2022     02/15/2022    CO2 29 02/15/2022    BUN 11 02/15/2022    LABALBU 3.8 02/13/2022    LABALBU 4.7 05/21/2012    CREATININE 0.7 02/15/2022    CALCIUM 7.9 02/15/2022    GFRAA >60 02/15/2022    LABGLOM >60 02/15/2022    GLUCOSE 85 02/15/2022    GLUCOSE 93 05/24/2012     PT/INR:    Lab Results   Component Value Date    PROTIME 10.8 02/13/2022    PROTIME 10.8 05/07/2012    INR 1.0 02/13/2022     Troponin:    Lab Results   Component Value Date    TROPONINI <0.01 04/25/2021       No results for input(s): LABURIN in the last 72 hours. No results for input(s): BC in the last 72 hours. No results for input(s): Yudi Coleman in the last 72 hours.       Current Facility-Administered Medications:     melatonin disintegrating tablet 5 mg, 5 mg, Oral, Nightly, Mena Torres MD, 5 mg at 02/15/22 2137    escitalopram (LEXAPRO) tablet 20 mg, 20 mg, Oral, Once, Mena Torres MD    0.9 % sodium chloride infusion, , IntraVENous, Continuous, Joanne Potts DO    ceFAZolin (ANCEF) 2000 mg in sterile water 20 mL IV syringe, 2,000 mg, IntraVENous, Q8H, Sully Girard, DO, 2,000 mg at 02/16/22 3950    scopolamine (TRANSDERM-SCOP) transdermal patch 1 patch, 1 patch, TransDERmal, Q72H, Stephany Marquis MD, 1 patch at 02/14/22 2346    sodium chloride flush 0.9 % injection 5-40 mL, 5-40 mL, IntraVENous, 2 times per day, Mckeon Shyanne, DO, 10 mL at 02/16/22 0933    sodium chloride flush 0.9 % injection 5-40 mL, 5-40 mL, IntraVENous, PRN, Mckeon Shyanne, DO, 20 mL at 02/15/22 1737    0.9 % sodium chloride infusion, 25 mL, IntraVENous, PRN, Mckeon Shyanne, DO    ondansetron (ZOFRAN-ODT) disintegrating tablet 4 mg, 4 mg, Oral, Q8H PRN **OR** ondansetron (ZOFRAN) injection 4 mg, 4 mg, IntraVENous, Q6H PRN, Mckeon Shyanne, DO    acetaminophen (TYLENOL) tablet 650 mg, 650 mg, Oral, Q4H PRN, Mckeon Shyanne, DO    oxyCODONE (ROXICODONE) immediate release tablet 5 mg, 5 mg, Oral, Q6H PRN **OR** oxyCODONE HCl (OXY-IR) immediate release tablet 10 mg, 10 mg, Oral, Q6H PRN, Mckeon Shyanne, DO, 10 mg at 02/16/22 0750    morphine (PF) injection 2 mg, 2 mg, IntraVENous, Q4H PRN, 2 mg at 02/16/22 1214 **OR** morphine sulfate (PF) injection 4 mg, 4 mg, IntraVENous, Q4H PRN, Mckeon Shyanne, DO, 4 mg at 02/15/22 1737    magnesium hydroxide (MILK OF MAGNESIA) 400 MG/5ML suspension 30 mL, 30 mL, Oral, Daily PRN, Mckeon Shyanne, DO    senna (SENOKOT) tablet 8.6 mg, 1 tablet, Oral, Daily PRN, Mckeon Shyanne, DO    enoxaparin (LOVENOX) injection 30 mg, 30 mg, SubCUTAneous, BID, Merritt Esposito, DO, 30 mg at 02/16/22 5315    ziprasidone (GEODON) capsule 20 mg, 20 mg, Oral, Nightly, Lucia Mario MD, 20 mg at 02/15/22 2138    topiramate (TOPAMAX) tablet 200 mg, 200 mg, Oral, BID, Lucia Mario MD, 200 mg at 02/16/22 7846    escitalopram (LEXAPRO) tablet 20 mg, 20 mg, Oral, BID, Lucia Mario MD, 20 mg at 02/16/22 0920    gabapentin (NEURONTIN) capsule 600 mg, 600 mg, Oral, 4x Daily, Osvaldo HOFFMAN Jose Rea MD, 600 mg at 02/16/22 1214    allopurinol (ZYLOPRIM) tablet 200 mg, 200 mg, Oral, BID, Jesus Peraza MD, 200 mg at 02/16/22 0052    levOCARNitine (CARNITOR) tablet 330 mg, 330 mg, Oral, TID, Jesus Peraza MD, 330 mg at 02/16/22 0168    linaclotide (LINZESS) capsule 290 mcg, 290 mcg, Oral, QAM AC, Jesus Peraza MD    Vitamin D (CHOLECALCIFEROL) tablet 5,000 Units, 5,000 Units, Oral, Every Other Day, Jesus Peraza MD, 5,000 Units at 02/16/22 0921    docusate sodium (COLACE) capsule 100 mg, 100 mg, Oral, BID, Jesus Peraza MD, 100 mg at 02/16/22 0933    furosemide (LASIX) tablet 40 mg, 40 mg, Oral, BID, Jesus Peraza MD, 40 mg at 02/16/22 0750    pantoprazole (PROTONIX) tablet 40 mg, 40 mg, Oral, QAM AC, Jesus Peraza MD, 40 mg at 02/16/22 8646    montelukast (SINGULAIR) tablet 10 mg, 10 mg, Oral, Daily, Jesus Peraza MD, 10 mg at 02/15/22 2139    magnesium oxide (MAG-OX) tablet 400 mg, 400 mg, Oral, BID, Jesus Peraza MD, 400 mg at 02/16/22 9456    calcium elemental (OSCAL) tablet 500 mg, 500 mg, Oral, BID, Jesus Peraza MD, 500 mg at 02/16/22 0920    albuterol (PROVENTIL) nebulizer solution 2.5 mg, 2.5 mg, Nebulization, Q4H PRN, Jesus Peraza MD    traZODone (DESYREL) tablet 100 mg, 100 mg, Oral, Nightly, Jesus Peraza MD, 100 mg at 02/15/22 2137    famotidine (PEPCID) tablet 20 mg, 20 mg, Oral, BID, Jesus Peraza MD, 20 mg at 02/16/22 4918    vitamin B-12 (CYANOCOBALAMIN) tablet 100 mcg, 100 mcg, Oral, Daily, Jesus Peraza MD, 100 mcg at 02/16/22 7210    metoprolol succinate (TOPROL XL) extended release tablet 12.5 mg, 12.5 mg, Oral, Daily, Jesus Peraza MD, 12.5 mg at 02/16/22 0932    methocarbamol (ROBAXIN) tablet 500 mg, 500 mg, Oral, 4x Daily, Jesus Peraza MD, 500 mg at 02/16/22 1214    baclofen (LIORESAL) tablet 20 mg, 20 mg, Oral, 4x daily, Osvaldo HOFFMAN Katrinka Buerger, MD, 20 mg at 02/16/22 1213    guaiFENesin (ROBITUSSIN) 100 MG/5ML oral solution 200 mg, 200 mg, Oral, Q6H PRN, Gladis Moralez MD    polyethylene glycol (GLYCOLAX) packet 17 g, 17 g, Oral, Daily, Gladis Moralez MD, 17 g at 02/16/22 7737    bisacodyl (DULCOLAX) suppository 10 mg, 10 mg, Rectal, Daily, Gladis Moralez MD, 10 mg at 02/15/22 2132    ADULT DIET; Regular    CT ANKLE RIGHT WO CONTRAST   Final Result   Interval internal fixation. Stable appearance of distal tibial and fibular fractures. FLUORO FOR SURGICAL PROCEDURES   Final Result   Intraprocedural fluoroscopic spot images as above. See separate procedure   report for more information. CT HEAD WO CONTRAST   Final Result   No evidence of acute intracranial trauma. Right frontal scalp hematoma. No fractures. CT CERVICAL SPINE WO CONTRAST   Final Result   No acute abnormality of the cervical spine. Mild multilevel degenerative changes. CT ABDOMEN PELVIS WO CONTRAST Additional Contrast? None   Final Result   Atraumatic appearance of the abdomen and pelvis. Two 6 mm nonobstructing right renal calculi. Cholecystectomy. XR ANKLE RIGHT (2 VIEWS)   Final Result   Near anatomic alignment, post closed reduction and cast placement. XR ANKLE RIGHT (MIN 3 VIEWS)   Final Result   Distal tibia and fibular fractures. Disrupted ankle mortise. XR PELVIS (1-2 VIEWS)   Final Result   1. No signs of a displaced pelvic fracture. 2.  Joint space narrowing of each hip suggest early signs of degenerative   joint disease         XR TIBIA FIBULA LEFT (2 VIEWS)   Final Result   No acute bony abnormalities or complications of the total knee prosthesis. XR TIBIA FIBULA RIGHT (2 VIEWS)   Final Result   Distal tibia and fibular fractures. XR CHEST PORTABLE   Final Result   No acute process. Mild cardiomegaly. Assessment:    Active Problems:    Closed left ankle fracture, initial encounter  Resolved Problems:    * No resolved hospital problems. *    Obesity  Mitochondrial cytopathy  Obstructive sleep apnea  Family history of asthma  Anemia likely from acute blood loss from surgery  Underlying history of gout  Essential tremor  Hypothyroidism    Plan: To subacute rehab today  Repeat CBC results noted    Jose Alfredo Lares MD  12:46 PM  2/16/2022    NOTE: This report was transcribed using voice recognition software.  Every effort was made to ensure accuracy; however, inadvertent transcription errors may be present

## 2022-02-16 NOTE — PROGRESS NOTES
Department of Orthopedic Surgery  Resident Progress Note    Patient seen and examined, resting in bed. Feeling better this morning.  Denies numbness, paresthesias    VITALS:  BP (!) 115/52   Pulse 71   Temp 98 °F (36.7 °C) (Temporal)   Resp 16   Ht 5' 6\" (1.676 m)   Wt 245 lb (111.1 kg)   LMP 08/31/1988   SpO2 98%   BMI 39.54 kg/m²     General: alert and oriented to person, place and time, well-developed and well-nourished, in no acute distress    MUSCULOSKELETAL:   right lower extremity:  · Dressing/ex fix C/D/I  · Compartments soft and compressible  · Actively wiggling toes  · +2/4 DP & PT pulses, Brisk Cap refill, Toes warm and perfused  · Distal sensation grossly intact to Peroneals, Sural, Saphenous, and tibial nrs although hypersensitive globally    CBC:   Lab Results   Component Value Date    WBC 6.0 02/15/2022    HGB 9.8 02/15/2022    HCT 31.4 02/15/2022     02/15/2022     PT/INR:    Lab Results   Component Value Date    PROTIME 10.8 02/13/2022    PROTIME 10.8 05/07/2012    INR 1.0 02/13/2022       ASSESSMENT  · S/P R tibial pilon and fibula fractures with external fixator placement on 2/14/22    PLAN      · Continue physical therapy and protocol: KIKAB - RLE  · 24 hour abx coverage completed  · Deep venous thrombosis prophylaxis - lovenox, early mobilization  · PT/OT  · Pain Control: IV and PO  · Monitor H&H  · D/C Planning- appreciate PT/OT, SW, medicine recommendations  · Discuss with attending

## 2022-02-16 NOTE — PROGRESS NOTES
Occupational Therapy  OT BEDSIDE TREATMENT NOTE   [facility] [address] [facility]  [address] [place] [address]        Date:2022  Patient Name: Kelli Villanueva  MRN: [id number]  : 1960  Room: [address] [place] [address]     Per OT Eval:    Evaluating OT:Ila Underwood, OTR/L   License #  [id number]        Referring Provider: Priti Mayorga DO    Specific Provider Orders/Date: OT evaluation & treatment        Diagnosis:  Right closed tibial pilon and fibula fx;  right anterior ankle laceration    Pertinent Medical History:  has a past medical history of Adenoma of right adrenal gland, Anemia, Anxiety, Arthritis, Asthma, Benign essential tremor, Bipolar affective ([address] Chronic back pain, Chronic respiratory failure with hypoxia ([address] Depression, Difficulty swallowing, Environmental and seasonal allergies, Excessive physiologic tremor, Fatty liver, Full dentures, GERD (gastroesophageal reflux disease), Gout, Herniated cervical disc, History of swelling of feet, Lymphedema of lower extremity, Mitochondrial cytopathy ([address] Neuropathy, Obesity, PETR (obstructive sleep apnea), PONV (postoperative nausea and vomiting), Seizures ([address] Spinal headache, and Thyroid disease. QLIKXFR: 0-31-22:   1.  Closed reduction and manipulation of right tibial pilon and fibula fractures  2. Application of right ankle spanning external fixator for right tibial pilon and fibula shaft fractures  3. Closure of 7 cm right anterior ankle laceration    Past Surgical History:  has a past surgical history that includes knee surgery (Bilateral); Gastric bypass surgery (); myomectomy; Tonsillectomy; Nerve Block (Left, 10 02 2013); Nerve Block (Left, 10 9 13); Nerve Block (Left, 10/16/13); other surgical history (Left, 13); Nerve Block (Left, 10/29/2014); Nerve Block (Left, 2014); Nerve Block (Left,  14); Nerve Block (Right, 3/30/15); Nerve Block (Right, 2015);  Nerve Block (Right, 4/13/15); Nerve Block (Left, 7/6/15); Nerve Block (07/20/15); Nerve Block (Left, 10 1 15); Nerve Block (10/26/15); other surgical history (3/28/2016); Endoscopy, colon, diagnostic; pr colonoscopy flx dx w/collj spec when pfrmd (N/A, 3/20/2018); pr egd transoral biopsy single/multiple (N/A, 3/20/2018); Cholecystectomy (1999); Hysterectomy (1988); Colonoscopy (N/A, 9/18/2018); Esophagus dilation (9/18/2018); back surgery (last one 1995); Cardiac catheterization (Right, 6-6-2013); Appendectomy; other surgical history (1995); joint replacement (Bilateral, [id number]); egd colonoscopy (N/A, 10/8/2019); Colonoscopy (N/A, 10/8/2019); Nerve Block (Bilateral, 4/1/2021); and Ankle fracture surgery (Right, 2/14/2022).      Precautions:  Fall Risk, NWB R LE, elevate R LE, NWB R hand & wrist, able to WB through elbow, skin integrity, 3-4L O2 via NC      Assessment of current deficits    []? ? Functional mobility            [x]? ?ADLs           [x]? ? Strength                  [x]? ? Cognition    [x]? ? Functional transfers          [x]? ? IADLs         [x]? ? Safety Awareness   [x]? ?Endurance    [x]? ? Fine Coordination                         [x]? ? Balance      []? ? Vision/perception   [x]? ? Sensation      []? ?Gross Motor Coordination             []? ? ROM           []? ? Delirium                   []? ? Motor Control      OT PLAN OF CARE   OT POC based on physician orders, patient diagnosis and results of clinical assessment     Frequency/Duration: 2-4 days/wk for 2 weeks PRN   Specific OT Treatment Interventions to include:   Instruction/training on adapted ADL techniques and AE recommendations to increase functional independence within precautions  Training on energy conservation strategies, correct breathing pattern and techniques to improve independence/tolerance for self-care routine  Functional transfer/mobility training/DME recommendations for increased independence, safety, and fall prevention  Patient/Family education to increase follow through with safety techniques and functional independence  Recommendation of environmental modifications for increased safety with functional transfers/mobility and ADLs  Cognitive retraining/development of therapeutic activities to improve problem solving, judgement, memory, and attention for increased safety/participation in ADL/IADL tasks  Splinting/positioning for increased function, prevention of contractures, and improve skin integrity  Therapeutic exercise to improve motor endurance, ROM, and functional strength for ADLs/functional transfers  Therapeutic activities to facilitate/challenge dynamic balance, stand tolerance for increased safety and independence with ADLs  Therapeutic activities to facilitate gross/fine motor skills for increased independence with ADLs  Positioning to improve skin integrity, interaction with environment and functional independence     Recommended Adaptive Equipment:  TBD      Home Living: Pt lives alone, with cat,  in a 1 story apt. In a Senior high rise with elevator access, with no steps to enter with no HR.  B&B on main level. Bathroom setup: walk in shower, high commode   Equipment owned: R rVue ww, shower chair, grab bars in shower     Prior Level of Function:  Pt.  States increased difficulty as of late with ADLs , states friends/neighbors assist with IADLs; ambulated [employer]  Driving: NA, relies on public transit or friends/neighbors  Occupation: retired, phlebotomist     Pain Level: Pt complained of BLE pain this session  Cognition: A&O: x3; Follows 2 step directions              Memory:  good              Sequencing:  good              Problem solving:  fair              Judgement/safety: Cade Kunz with occasional cues                Functional Assessment:  AM-PAC Daily Activity Raw Score: 13/24    Initial Eval Status  Date: 2-15-22 Treatment Status  Date: 2/16/22 STGs = LTGs  Time frame: 10-14 days   Feeding Set up, primarily using L hand with cup to mouth  Setup  Pt able to grasp cup, bring to mouth with use of LUE Mod I/ Ind   Grooming Min A with primary use of L hand   SBA  Pt washed face, applied deodorant seated upright in bed Mod I   UB Dressing Min A to doff gown, carmelina clean gown  Reynaldo  [place]/[facility] hospital gown seated upright in bed  Set up   LB Dressing Dep  Dependent  [place]/[facility] sock to L foot Min A   Bathing Max A seated EOB with bathing cloths  maxA  Sponge bathing task seated upright in bed, with pt able to wash of UB, assistance to wash of jose area, LB, and roll to wash of buttocks Min A   Toileting NT, meadows present  Dependent  Pt using of bed pan upon arrival, assistance with hygiene. Pt having of meadows catheter Min A   Bed Mobility  Logroll: Max A side <> side with additional support of R LE while change of linens  Supine to sit: Max A    Sit to supine: Max A maxA  Supine<>EOB  EOB<>Supine  Logroll: Sup  Supine to sit: SBA  Sit to supine: SBA   Functional Transfers NT, pt. deferred d/t pain R LE  maxAx2  Sit to Stand  Stand to Sit  x2 stands    max cueing to maintain of NWB RUE/RLE Min A with R   platform ww   Functional Mobility NT  DNT Min A for short transfer distances with R platform ww   Balance Sitting:     Static:  SBA    Dynamic:Min A   Standing: NT  Sitting EOB:  SBA    Standing:  maxAx2     Activity Tolerance Poor+ with lt. ax. d/t pain  Fair- Fair+ with lt./mod. ax. Visual/  Perceptual Glasses: yes          Vitals spO2 97% on 3L   via NC  HR 74 bpm   WFL      Hand Dominance R    AROM (PROM) Strength Additional Info:    RUE  R shld. & elbow WFL  R wrist NT  R hand WFL R shld. & elbow 3+/5  R wrist NT  R hand functional grasp WFL  and wfl FMC/dexterity noted during ADL tasks      LUE WFL 4/5 good  and wfl FMC/dexterity noted during ADL tasks         Hearing: WFL   Sensation:  min.  c/o numbness / tingling R radial sided- thumb, index finger  Tone: WFL B UE  Edema: none noted B UE      Education:  Pt was educated on role of OT, goals to be reached, importance of OOB activity, precautions to follow, safety with bed mobility, safety and hand placement during transfers, and compensatory strategies to assist with ADL tasks    Comments: Upon arrival pt supine in bed, requesting to be removed from bedpan, agreeable to therapy, and nursing okaying pt to be seen this session. Pt completed of bed mobility, transfers and ADL tasks this session. Rest breaks provided throughout session as needed, monitoring of BP due to pt stating of feeling dizzy a times. At end of session, pt supine in bed, all lines and tubes intact, call light within reach. · Pt has made slow progress towards set goals.    · Continue with current plan of care focusing on increasing of independency with transfers and ADL tasks      Treatment Time In: 10:05am         Treatment Time Out: 10:45am            Treatment Charges: Mins Units   Ther Ex  [id number]     Manual Therapy [id number]     Thera Activities [id number] 17 1   ADL/Home Mgt [id number] 23 2   Neuro Re-ed [id number]     Group Therapy      Orthotic manage/training  [id number]     Non-Billable Time     Total Timed Treatment 40 3        Riya Senior BROTHERS/L [id number]

## 2022-02-16 NOTE — CARE COORDINATION
2/16, Discharge Acknowledged. All paperwork has been faxed over to [facility] for a LOC to be obtained. Anticipating the LOC to be received today patient has been set up for a 7pm  later today with [facility] to go to [facility]. Those informed of  time is:  Nursing, patient and Leonard Olmos at [facility]. COVID test is negative. Ambulance form (PAS/Letter Faxed), face sheet and envelope along with PASRR on soft chart. SW to follow for further discharge planning needs.       Lizbeth Alejandra, GAGE  [address] [facility]  [phone]

## 2022-02-16 NOTE — PROGRESS NOTES
Faxed AVS, MAR report, H&P, and Oxycodone and ASA script to [facility] and placed in soft chart for transport.

## 2022-02-16 NOTE — PROGRESS NOTES
Physical Therapy  Physical Therapy Session    Name: Antonio Diego  : 1960  MRN: [id number]      Date of Service: 2022    Evaluating PT:  Reed Montalvo PT [id number]      Room #:  6792/5313-S  Diagnosis:  Gait disturbance [R26.9]  Closed fracture of right ankle, initial encounter [S82.891A]  Closed left ankle fracture, initial encounter [S82.892A]  Contusion, shoulder and upper arm, multiple sites, unspecified laterality, initial encounter [S40.019A, S40.029A]  PMHx/PSHx:     has a past medical history of Adenoma of right adrenal gland, Anemia, Anxiety, Arthritis, Asthma, Benign essential tremor, Bipolar affective ([address] Chronic back pain, Chronic respiratory failure with hypoxia ([address] Depression, Difficulty swallowing, Environmental and seasonal allergies, Excessive physiologic tremor, Fatty liver, Full dentures, GERD (gastroesophageal reflux disease), Gout, Herniated cervical disc, History of swelling of feet, Lymphedema of lower extremity, Mitochondrial cytopathy ([address] Neuropathy, Obesity, PETR (obstructive sleep apnea), PONV (postoperative nausea and vomiting), Seizures ([address] Spinal headache, and Thyroid disease. has a past surgical history that includes knee surgery (Bilateral); Gastric bypass surgery (); myomectomy; Tonsillectomy; Nerve Block (Left, 10 02 2013); Nerve Block (Left, 10 9 13); Nerve Block (Left, 10/16/13); other surgical history (Left, 13); Nerve Block (Left, 10/29/2014); Nerve Block (Left, 2014); Nerve Block (Left,  14); Nerve Block (Right, 3/30/15); Nerve Block (Right, 2015); Nerve Block (Right, 4/13/15); Nerve Block (Left, 7/6/15); Nerve Block (07/20/15); Nerve Block (Left, 10 1 15); Nerve Block (10/26/15); other surgical history (3/28/2016); Endoscopy, colon, diagnostic; pr colonoscopy flx dx w/collj spec when pfrmd (N/A, 3/20/2018); pr egd transoral biopsy single/multiple (N/A, 3/20/2018); Cholecystectomy (); Hysterectomy ();  Colonoscopy (N/A, 9/18/2018); Esophagus dilation (9/18/2018); back surgery (last one 1995); Cardiac catheterization (Right, 6-6-2013); Appendectomy; other surgical history (1995); joint replacement (Bilateral, [id number]); egd colonoscopy (N/A, 10/8/2019); Colonoscopy (N/A, 10/8/2019); Nerve Block (Bilateral, 4/1/2021); and Ankle fracture surgery (Right, 2/14/2022). Procedure/Surgery:    · S/P R tibial pilon and fibula fractures with external fixator placement on 2/14/22    Precautions:  Falls,  NWB (non-weight bearing) RLE and NWB R wrist, skin integrity, O2 3-4L  Equipment Needs: To be determined,    SUBJECTIVE:    Patient lives alone  in a apartment high rise  with Elevator access. No DANYELL. Bed is on 1 floor and bath is on 1 floor. Patient ambulated platform walker RUE  PTA. Equipment owned: R platform walker. ,  O2 at home. OBJECTIVE:   Initial Evaluation  Date: 2/15/22 Treatment  2/16 Short Term/ Long Term   Goals   AM-PAC 6 Clicks 7/66 9/19    Was pt agreeable to Eval/treatment? yes Yes     Does pt have pain? Yes 10//10 Moderate pain RLE    Bed Mobility  Rolling: Max    Supine to sit:   Max    Sit to supine: Max    Scooting: Max   Rolling: Max    Supine to sit:   Max    Sit to supine: Max    Scooting: Max   Rolling: Min  Supine to sit: Min  Sit to supine: Min  Scooting: Min   Transfers Sit to stand: NT   Stand to sit: NT  Stand pivot: NT  MaxA x2 Sit to stand: Min to platform walker R  Stand to sit: Min   Stand pivot: Mod  with R platform walker   Ambulation    NT NT   TBD   Stair negotiation: ascended and descended  NT NT Not barrier. ROM BUE:  Defer to OT eval  BLE:  Decreased due BLE pain. Strength BUE: Defer to OT eval  RLE:  Grossly 3-/5  LLE:  Grossly  4-/5  4/5   Balance Sitting EOB:  Min  Dynamic Standing:  NT Sitting SBA  Standing MaxA x2 Sitting EOB:  ind  Dynamic Standing: Mod with platform walker.       Patient is Alert & Oriented x person, place, time and situation and follows directions   Sensation: Pt denies numbness and tingling to extremities  Edema:  RLE    Therapeutic Exercises:  Functional activity as stated above. Patient education  Pt educated regarding need for OOB activity and weight bearing precautions for RLE and R wrist NWB strict elevate RLE    Patient response to education:   Pt verbalized understanding Pt demonstrated skill Pt requires further education in this area   yes yes Reminders     ASSESSMENT:    Conditions Requiring Skilled Therapeutic Intervention:    [x]Decreased strength     [x]Decreased ROM  [x]Decreased functional mobility  [x]Decreased balance   [x]Decreased endurance   [x]Decreased posture  []Decreased sensation  []Decreased coordination   []Decreased vision  [x]Decreased safety awareness   []Increased pain       Comments:    Found supine in bed, agreeable to session  Reports being tired and having pain this session which limits pts participation. She requires heavy assist for all mobility and cues to maintain weight bearing restrictions. Her sitting balance was fair. She was able to stand from EOB with near total lift assist of B sides and assist to keep R foot off floor. Attempted to stand a second time but was unable despite assist. Returned to bed to end session    Treatment:  Patient practiced and was instructed in the following treatment:    · Bed mobility training - pt given verbal and tactile cues to facilitate proper sequencing and safety during rolling and supine>sit as well as provided with physical assistance to complete task    · Sit to stand from EOB. Attempted x2.  Held stand x 15 seconds        PHYSICAL THERAPY PLAN OF CARE:    PT POC is established based on physician order and patient diagnosis     Referring provider/PT Order:  PT Eval and Treat   02/14/22 1830  PT eval and treat          Jessica Castañeda DO       Diagnosis:  Gait disturbance [R26.9]  Closed fracture of right ankle, initial encounter [B51.242N]  Closed left ankle fracture, initial encounter [O86.525H]  Contusion, shoulder and upper arm, multiple sites, unspecified laterality, initial encounter [S40.019A, S40.029A]  Specific instructions for next treatment:  Sit EOB, postural exercises and Transfer to bedside chair  Current Treatment Recommendations:     [x] Strengthening to improve independence with functional mobility   [x] ROM to improve independence with functional mobility   [x] Balance Training to improve static/dynamic balance and to reduce fall risk  [x] Endurance Training to improve activity tolerance during functional mobility   [x] Transfer Training to improve safety and independence with all functional transfers   [x] Gait Training to improve gait mechanics, endurance and asses need for appropriate assistive device  [x] Stair Training in preparation for safe discharge home and/or into the community   [] Positioning to prevent skin breakdown and contractures  [x] Safety and Education Training   [] Patient/Caregiver Education   [] HEP  [] Other     PT long term treatment goals are located in above grid    Frequency of treatments: 2-5x/week x 1-2 weeks. Time in  1030  Time out  1048    Total Treatment Time  18 minutes     Evaluation Time includes thorough review of current medical information, gathering information on past medical history/social history and prior level of function, completion of standardized testing/informal observation of tasks, assessment of data and education on plan of care and goals.     CPT codes:  [] Low Complexity PT evaluation 78989  [] Moderate Complexity PT evaluation 61211  [] High Complexity PT evaluation 22310  [] PT Re-evaluation 62598  [] Gait training 09091 - minutes  [] Manual therapy 01556 - minutes  [x] Therapeutic activities 61390 18 minutes  [] Therapeutic exercises 08504 - minutes  [] Neuromuscular reeducation 12786 - minutes     Kamari Torres, PT, DPT  [id number]

## 2022-02-17 ENCOUNTER — TELEPHONE (OUTPATIENT)
Dept: ORTHOPEDIC SURGERY | Age: 62
End: 2022-02-17

## 2022-02-17 NOTE — TELEPHONE ENCOUNTER
Future Appointments   Date Time Provider Deysi Simms   2/21/2022 10:45 AM Gerardo Holland MD [facility]   2/21/2022 12:45 PM SCHEDULE, SE ORTH[facility]   3/7/2022 10:30 AM DO Priyanka Veliz ENT [facility]     Can be seen Thursday, 2/24/22 with JVG to discuss surgery to remove external fixator

## 2022-02-17 NOTE — TELEPHONE ENCOUNTER
RIGHT ANKLE IRRIGAITON AND DEBRIDEMENT WITH EXTERNAL FIXATOR AND LACERATION REPAIR done 2/14/22 . Patient already scheduled 2/21/22 for ED follow up 3 wk follow up R distal radius fracture nonop (casted) DOI: 1/22/22; TTS. Please advise [place] at [facility] for post op follow up appointment for RT ankle sx that was done on 2/14/22 [phone].

## 2022-02-17 NOTE — TELEPHONE ENCOUNTER
Can patient's appointment 2/21 work for the post op, or should the appointment be moved back a week?

## 2022-02-21 ENCOUNTER — OFFICE VISIT (OUTPATIENT)
Dept: ORTHOPEDIC SURGERY | Age: 62
End: 2022-02-21
Payer: MEDICAID

## 2022-02-21 ENCOUNTER — HOSPITAL ENCOUNTER (OUTPATIENT)
Dept: GENERAL RADIOLOGY | Age: 62
Discharge: HOME | End: 2022-02-23
Payer: MEDICAID

## 2022-02-21 DIAGNOSIS — S82.871A CLOSED RIGHT PILON FRACTURE, INITIAL ENCOUNTER: ICD-10-CM

## 2022-02-21 DIAGNOSIS — S82.891A CLOSED FRACTURE OF RIGHT ANKLE, INITIAL ENCOUNTER: ICD-10-CM

## 2022-02-21 DIAGNOSIS — S52.571D OTHER CLOSED INTRA-ARTICULAR FRACTURE OF DISTAL END OF RIGHT RADIUS WITH ROUTINE HEALING, SUBSEQUENT ENCOUNTER: Primary | ICD-10-CM

## 2022-02-21 DIAGNOSIS — S52.571A OTHER CLOSED INTRA-ARTICULAR FRACTURE OF DISTAL END OF RIGHT RADIUS, INITIAL ENCOUNTER: ICD-10-CM

## 2022-02-21 PROCEDURE — 73610 X-RAY EXAM OF ANKLE: CPT

## 2022-02-21 PROCEDURE — 73110 X-RAY EXAM OF WRIST: CPT

## 2022-02-21 PROCEDURE — 6370000000 HC RX 637 (ALT 250 FOR IP)

## 2022-02-21 PROCEDURE — 99024 POSTOP FOLLOW-UP VISIT: CPT | Performed by: PHYSICIAN ASSISTANT

## 2022-02-21 PROCEDURE — L3809 WHFO W/O JOINTS PRE OTS: HCPCS | Performed by: PHYSICIAN ASSISTANT

## 2022-02-21 PROCEDURE — 99212 OFFICE O/P EST SF 10 MIN: CPT | Performed by: PHYSICIAN ASSISTANT

## 2022-02-21 RX ORDER — OXYCODONE HYDROCHLORIDE 5 MG/1
5 CAPSULE ORAL EVERY 6 HOURS PRN
COMMUNITY
End: 2022-05-05

## 2022-02-21 RX ORDER — LORAZEPAM 0.5 MG/1
0.5 TABLET ORAL EVERY 6 HOURS PRN
COMMUNITY
End: 2022-03-24

## 2022-02-21 NOTE — PATIENT INSTRUCTIONS
New Orders for Renae Fishman  nonweightbearing on right lower extremity  Ok to weightbear through the elbow on the right upper extremity, no weightbearing through the wrist/hand    Velcro brace to the wrist to be removed for hygiene, therapy, skin checks daily    PT/OT to work with patient, even just if at EOB due to balance issues/fall risk  Distal radius protocol at the 2 week ozzy for right upper extremity    Pin care daily to right lower extremity external fixator    SURGICAL PREOP ORDERS  1. Harman Yang's surgery is scheduled for Right Ankle Pilon ORIF on Wednesday 3/2/2022 at 9:30 AM at the [facility] in [place] with Dr. Eli Lawrence DO. They will need to report to Preop area  that morning at 7:30 AM.  2. Preadmission Testing (PAT) department at [facility] will contact you with all the details prior to surgery. 3.  Nothing to eat or drink after midnight the night before surgery. They may take a pain pill and any other medicine PAT instructs you to take with small sip of water if needed. 4. Hold Lovenox the day of surgery   5. Patient is having outpatient surgery so they will be returning back to your facility the same day     ORDERS  · Daily pin care to right lower extremity  · Continue with ice and elevation. · NWB right lower extremity use assistive devices  · Pain medication as per facility physician  · Continue with DVT prophylaxis as ordered except for day of surgery  · Call office with any question or concerns: [phone]  · Continue with calcium + vitamin D supplementation         Orthopaedic Trauma Pin Care Instructions        Care of the External Fixator Guidelines    Listed below are some general instructions on dressing, pin care and possible complications that can occur with an external fixator. Also, listed are steps to follow if a complication occurs. General Instructions  Dressings  1.  Post operatively the external fixator pins and wires will have dressings. The first dressing change will be initiated by the Ortho Trauma Team and is usually not until 7 days after surgery. Home Health Nursing or a Facility Nurse may change your dressing if they are fully saturated. 2. The pins and wires may drain a clear yellow/pinkish fluid; this is normal the first couple of weeks. If the dressings become completely saturated see above and contact our office. 3. A pin/wire dressing consists of small gauze pad wrapped around the pin/wire and secured with medical tape to the skin and pin with slight gentle pressure; or secured with red or white clips. This assures good absorption of the drainage and prevents the skin from growing up the pin/wires. 4. Once the pins/wires are dry, with no drainage, leave them open to air without dressings but continue pin care. Pin Care  1. Daily: clean skin around the pins with sterile saline (normal saline) using a sterile Q-tip, sterile cotton tipped applicator, or small gauze pad. A new Q-tip or gauze pad is needed for each pin to prevent cross contamination. 2. If there are crusted areas around the pin/wire, gently remove them when cleaning. 3. Next apply a THIN layer of Bactroban (mupirocin) 2% ointment to the pin/wire site. This may not be ordered for your pin care  4. If the pin is draining, cover with a dressing as described above. 5. DO NOT USE HYDROGEN PEROXIDE. Complications: Call the office with any of the following  1. Infections: This is the most common complication with an external fixator. Signs and symptoms of infection include:   A. Large rings of \"angry\" looking redness around the pins/wires. A few millimeters of pink is OK and will usually respond to better pin care twice a day. B. \"Snotty\" looking discharge. A little clear yellow/pink drainage is okay. 2. Loosening of struts, wires of pins:   A. If a pin backs out, DO NOT PUSH IT BACK IN, call the office immediately   B.  If a strut come loose, or falls off, keep all hardware and call the office Immediately. [facility]      Phone: [phone]  [address] [place] [address]. [place], [address]  [phone]  Fax [phone]    DISTAL RADIUS PROTOCOL (ORIF, EX FIX, Non-operative)    PRESLEY-OPERATIVE   Occupational Therapy  Begin A/PROM of fingers and instruction  WBAT on elbows w platform if needed and not contra-indicated   ROM elbows, shoulders as william. ADL instruction  EX-FIX: pin care protocol beginning between day 3-5 after surgery   Cast: Cast care, shower instructions  ORIF: Dressing on discharge instructions  On discharge OT needed only for ROM and ADL (2 x week)  Ensure there is Hand Therapy for ROM   Ok to shower in 3-4 days after surgery    TWO WEEKS   Xray: non-operative-wrist (XIP). If ORIF- wrist (OOP)  Must ensure that there is adequate therapy for ROM ! If not, must call hand therapist.  Diane Arias on home exercise (1-2 x week only).  Include ROM fingers  If ORIF, begin PROM of wrist    SIX WEEKS   Xray: wrist  (OOP)  Remove cast, ex-fix, and K-wires,  use cock up wrist splint  Hand therapy begins more aggressive ROM and strengthening of wrist and fingers, 2-3 x                 week for 6 weeks   Home exercise program

## 2022-02-21 NOTE — PROGRESS NOTES
9/18/2018    COLONOSCOPY POLYPECTOMY HOT BIOPSY performed by Barry Garcia MD at [address] [facility] COLONOSCOPY N/A 10/8/2019    COLONOSCOPY POLYPECTOMY SNARE/COLD BIOPSY performed by Barry Garcia MD at [address] [facility] EGD COLONOSCOPY N/A 10/8/2019    EGD ESOPHAGOGASTRODUODENOSCOPY DILATATION performed by Barry Garcia MD at [address], COLON, DIAGNOSTIC      ESOPHAGEAL DILATATION  9/18/2018    ESOPHAGEAL DILATION Gwenette Jin performed by Barry Garcia MD at [facility] Bilateral 2007,2017    knees    KNEE SURGERY Bilateral     scope    MYOMECTOMY      NERVE BLOCK Left 10 02 2013    lumbar paravertebral facet #2    NERVE BLOCK Left 10 9 13    hip inj #1    NERVE BLOCK Left 10/16/13    hip injection    NERVE BLOCK Left 10/29/2014    left lumbar transforaminal nerve lbock  #1    NERVE BLOCK Left 11/12/2014    lumbar left transforaminal nerve block  #2    NERVE BLOCK Left 12 8 14    lumbar transforaminal #3    NERVE BLOCK Right 3/30/15    cervical transforaminal #1    NERVE BLOCK Right 4/6/2015    right cervical transforaminal nerve block  #2    NERVE BLOCK Right 4/13/15    cervical transforaminal #3    NERVE BLOCK Left 7/6/15    knee injection #1    NERVE BLOCK  07/20/15    left genicular nerve block/knee #2    NERVE BLOCK Left 10 1 15    lumb transforam #1    NERVE BLOCK  10/26/15    left lumbar transforaminal nerve block #3    NERVE BLOCK Bilateral 4/1/2021    #1 BILATERAL SACROILIAC JOINT INJECTION UNDER FLUORO performed by Kendall Ortega MD at [facility] Left 11 25 13    lumbar radiofreq    OTHER SURGICAL HISTORY  3/28/2016    stage 1, 3 day percutanous trial [employer] lumbar spinal cord stimulator    OTHER SURGICAL HISTORY  1995    racz procedure / lower back    NV COLONOSCOPY FLX DX W/COLLJ SPEC WHEN PFRMD N/A 3/20/2018    COLONOSCOPY DIAGNOSTIC OR linaclotide (LINZESS) 145 MCG capsule Take 290 mcg by mouth every morning (before breakfast)       levOCARNitine (CARNITOR) 330 MG tablet Take 330 mg by mouth 3 times daily For mitochondrial disease      allopurinol (ZYLOPRIM) 100 MG tablet Take 200 mg by mouth 2 times daily       escitalopram (LEXAPRO) 20 MG tablet Take 20 mg by mouth 2 times daily       gabapentin (NEURONTIN) 600 MG tablet Take 1 tablet by mouth 4 times daily. 120 tablet 5    topiramate (TOPAMAX) 200 MG tablet Take 1 tablet by mouth 2 times daily. 1 tablet 0    ziprasidone (GEODON) 20 MG capsule Take 20 mg by mouth nightly       potassium chloride (KLOR-CON) 10 MEQ extended release tablet Take 10 mEq by mouth as needed (Patient not taking: Reported on 2/1/2022)       No current facility-administered medications on file prior to visit. Allergies   Allergen Reactions    Bee Pollen Anaphylaxis, Shortness Of Breath and Swelling     And wasp    Penicillins Anaphylaxis    Ropinirole Hcl Anaphylaxis    Vistaril [Hydroxyzine Hcl] Anaphylaxis    Prednisone     Restoril [Temazepam]     Aripiprazole Other (See Comments)     muscle spasms and confusion    Hydroxyzine Pamoate Itching and Rash    Tape Priyank Sluder Tape] Rash     Paper tape allergy    Fabric tape is OK to use        R distal Radius Fracture- Nonop  DOI 1/6/22    OP:SURGEON: Dr. De Hernández DO  DATE OF PROCEDURE: 2/14/2022  PROCEDURE: 1.  Closed reduction and manipulation of right tibial pilon and fibula fractures  2. Application of right ankle spanning external fixator for right tibial pilon and fibula shaft fractures  3. Closure of 7 cm right anterior ankle laceration    POD: 1 week    Subjective:  Michelle Barron is following up from the above surgery. She is NWB on right lower extremity, maintained external fixator and dressings. She ambulates with assistive device, walker/wheelchair. Pain to extremity is moderate and is  taking prescribed pain medication, see MAR.  They denies numbness, tingling, weakness to the right lower extremity. Denies calf pain, chest pain, or shortness of breath. Patient continues to use DVT prophylaxis, Lovenox 30 mg BID. Patient is  participating in therapy, SNF. Patient also being treated for right distal radius fracture, nonop management. Patient states right wrist doing well. The patient's facility comprehensive medical history transfer sheets were evaluated, and their history, medications, allergies, treatments were updated in [facility] today. Review of Systems -  All pertinent positives/negative in HPI     Objective:    General: Alert and oriented X 3, normocephalic atraumatic, external ears and eye normal, sclera clear, no acute distress, respirations easy and unlabored with no audible wheezes, skin warm and dry, speech and dress appropriate for noted age, affect euthymic. Extremity:  Right Lower Extremity  Skin clean dry and intact, without signs of infection   Incision healing well, no significant drainage, no dehiscence, no significant erythema- sutures not yet ready for removal   moderate edema noted but + wrinkle sign and the distal tibia and ankle  Compartments supple throughout thigh and leg  Calf supple and not tender  Demonstrates active knee flexion/extension, wiggles all toes  States sensation intact to touch in sural, deep peroneal, superficial peroneal, saphenous, posterior tibial  nerve distributions to foot/ankle. Palpable dorsalis pedis and posterior tibialis pulses, cap refill brisk in toes, foot warm/perfused.   External fixator intact without signs of loosening  No loosening or irritation or pin sites    right Upper Extremity  · Velcro brace taken down  ·  Skin intact circumferentially  · No TTP to the distal radius  · Compartments soft and compressible  · +AIN/PIN/Ulnar nerve function intact grossly  · Arc of motion to the wrist without significant discomfort  · +Radial pulse, Brisk Cap refill, hand warm and perfused  · Sensation intact to touch in radial/ulnar/median nerve distributions to hand    LMP 08/31/1988     XR:   THREE XRAY VIEWS OF THE RIGHT ANKLE       2/21/2022 11:27 am       COMPARISON:   None       HISTORY:   ORDERING SYSTEM PROVIDED HISTORY: Closed fracture of right ankle, initial   encounter   TECHNOLOGIST PROVIDED HISTORY:   Reason for exam:->fx   What reading provider will be dictating this exam?->CRC       FINDINGS:   The patient is status post fracture of the distal fibula and distal tibia. External fixator hardware is seen traversing the fractures of the distal   tibia and fibula.  A fixating renny traverses the right great toe, calcaneus   and tibia.       There is near anatomic alignment of the ankle fractures.           Impression   1. There is near anatomic alignment of the fractures through the distal tibia   and distal fibula status post external fixator placement. XRAY VIEWS OF THE RIGHT WRIST       2/21/2022 11:27 am       COMPARISON:   02/01/2022       HISTORY:   ORDERING SYSTEM PROVIDED HISTORY: Other closed intra-articular fracture of   distal end of right radius, initial encounter   TECHNOLOGIST PROVIDED HISTORY:   Reason for exam:->fx   What reading provider will be dictating this exam?->CRC       FINDINGS:   Interval removal of splint material.  Impacted fracture deformity of the   distal radial metaphysis demonstrates some increase callus formation   consistent with healing.  Fracture is still visualized.  Some dorsal   angulation of the distal fracture fragments appear unchanged.  No other   interval change.           Impression   Healing distal radius fracture. Assessment:   Diagnosis Orders   1. Other closed intra-articular fracture of distal end of right radius with routine healing, subsequent encounter     2.  Closed right pilon fracture, initial encounter         Plan:  New Orders for [facility]  nonweightbearing on right lower extremity  Ok to [employer] through the elbow on the right upper extremity, no weightbearing through the wrist/hand    Velcro brace to the wrist to be removed for hygiene, therapy, skin checks daily    PT/OT to work with patient, even just if at EOB due to balance issues/fall risk  Distal radius protocol at the 2 week ozzy for right upper extremity    Pin care daily to right lower extremity external fixator    SURGICAL PREOP ORDERS  1. Loraine Yang's surgery is scheduled for Right Ankle Pilon ORIF on Wednesday 3/2/2022 at 9:30 AM at the main [address]  [address] on [facility] in [facility] with Dr. Kristal Mcintosh DO. They will need to report to Preop area  that morning at 7:30 AM.  2. Preadmission Testing (PAT) department at [facility] will contact you with all the details prior to surgery. 3.  Nothing to eat or drink after midnight the night before surgery. They may take a pain pill and any other medicine PAT instructs you to take with small sip of water if needed. 4. Hold Lovenox the day of surgery   5. Patient is having outpatient surgery so they will be returning back to your facility the same day     ORDERS  · Daily pin care to right lower extremity  · Continue with ice and elevation. · NWB right lower extremity use assistive devices  · Pain medication as per facility physician  · Continue with DVT prophylaxis as ordered except for day of surgery- deescalate from Lovenox 30 BID to Lovenox 40 Q Day  · Call office with any question or concerns: [phone]  · Continue with calcium + vitamin D supplementation      Electronically signed by Raquel Suazo PA-C on 2/21/2022 at 12:47 PM  Note: This report was completed using [employer] voiced recognition software. Every effort has been made to ensure accuracy; however, inadvertent computerized transcription errors may be present.

## 2022-02-22 ENCOUNTER — PREP FOR PROCEDURE (OUTPATIENT)
Dept: ORTHOPEDIC SURGERY | Age: 62
End: 2022-02-22

## 2022-02-22 DIAGNOSIS — S82.871A CLOSED RIGHT PILON FRACTURE, INITIAL ENCOUNTER: Primary | ICD-10-CM

## 2022-02-22 RX ORDER — SODIUM CHLORIDE 0.9 % (FLUSH) 0.9 %
5-40 SYRINGE (ML) INJECTION EVERY 12 HOURS SCHEDULED
Status: CANCELLED | OUTPATIENT
Start: 2022-02-22

## 2022-02-22 RX ORDER — SODIUM CHLORIDE 0.9 % (FLUSH) 0.9 %
5-40 SYRINGE (ML) INJECTION PRN
Status: CANCELLED | OUTPATIENT
Start: 2022-02-22

## 2022-02-22 RX ORDER — SODIUM CHLORIDE 9 MG/ML
25 INJECTION, SOLUTION INTRAVENOUS PRN
Status: CANCELLED | OUTPATIENT
Start: 2022-02-22

## 2022-02-22 NOTE — DISCHARGE SUMMARY
Physician Discharge Summary     Patient ID:  Stan Tate  [id number]  89 y.o.  1960    Admit date: 1/22/2022    Discharge date and time: 1/25/2022 11:51 PM     Admission Diagnoses: Active Problems:    Failure to thrive in adult  Resolved Problems:    * No resolved hospital problems. *      Discharge Diagnoses: Active Problems:    Failure to thrive in adult  Resolved Problems:    * No resolved hospital problems. *      Condition at discharge : Stable    Consults: IP CONSULT TO SOCIAL WORK  IP CONSULT TO INTERNAL MEDICINE  IP CONSULT TO SOCIAL WORK  IP CONSULT TO ORTHOPEDIC SURGERY    Procedures: None    Hospital Course: Patient is 75-year-old lady that was recently released from subacute rehab facility presents to the emergency room because of inability to take care of herself. Recently admitted to our institution for distal radius fracture. She was noted to have elevated CPK. She was seen by orthopedic service. They recommended follow-up at [address] [place] [address]. She was then discharged to subacute rehab. Went home. She had issues with falling and very presented to the emergency room. She was then admitted for further evaluation treatment. Social work was consulted for placement issues. Patient did have her cast changed. She was then discharged to subacute rehab. XR ANKLE RIGHT (2 VIEWS)   Final Result   No acute displaced fracture. Medial ankle soft tissue swelling. XR TIBIA FIBULA RIGHT (2 VIEWS)   Final Result   No acute displaced fracture. Medial ankle soft tissue swelling. XR WRIST RIGHT (2 VIEWS)   Final Result   Comminuted fracture of the distal radius, with suspected intra-articular   extension. Evaluation limited due to overlying splint material.         XR HUMERUS RIGHT (MIN 2 VIEWS)   Final Result   Comminuted fracture of the distal radius, with suspected intra-articular   extension.   Evaluation limited due to overlying splint material.         XR SHOULDER RIGHT (MIN 2 VIEWS)   Final Result   Comminuted fracture of the distal radius, with suspected intra-articular   extension. Evaluation limited due to overlying splint material.         XR TIBIA FIBULA LEFT (2 VIEWS)   Final Result   No acute displaced fracture. Medial ankle soft tissue swelling. XR HIP BILATERAL W AP PELVIS (2 VIEWS)   Final Result   No acute abnormality of the hip. XR HAND RIGHT (2 VIEWS)   Final Result   Comminuted fracture of the distal radius, with suspected intra-articular   extension. Evaluation limited due to overlying splint material.         XR ELBOW RIGHT (2 VIEWS)   Final Result   Comminuted fracture of the distal radius, with suspected intra-articular   extension. Evaluation limited due to overlying splint material.         XR FEMUR RIGHT (MIN 2 VIEWS)   Final Result   No acute displaced fracture. Medial ankle soft tissue swelling. XR FEMUR LEFT (MIN 2 VIEWS)   Final Result   No acute displaced fracture. Medial ankle soft tissue swelling. XR CHEST PORTABLE   Final Result   No acute cardiopulmonary abnormality. XR ANKLE LEFT (2 VIEWS)   Final Result   No acute displaced fracture. Medial ankle soft tissue swelling. CT Head WO Contrast   Final Result   No acute intracranial abnormality. No acute cervical spine fracture or traumatic malalignment. CT Cervical Spine WO Contrast   Final Result   No acute intracranial abnormality. No acute cervical spine fracture or traumatic malalignment. CT LUMBAR SPINE WO CONTRAST   Final Result   No acute lumbar spine fracture or traumatic malalignment. Lumbar spondylosis.              Results for orders placed or performed during the hospital encounter of 02/13/22 (from the past 336 hour(s))   CBC auto differential    Collection Time: 02/13/22  8:45 PM   Result Value Ref Range    WBC 6.4 4.5 - 11.5 E9/L    RBC 3.60 3.50 - 5.50 E12/L    Hemoglobin 11.3 (L) 11.5 - 15.5 g/dL Hematocrit 34.4 34.0 - 48.0 %    MCV 95.6 80.0 - 99.9 fL    MCH 31.4 26.0 - 35.0 pg    MCHC 32.8 32.0 - 34.5 %    RDW 13.3 11.5 - 15.0 fL    Platelets 563 182 - 742 E9/L    MPV 8.9 7.0 - 12.0 fL    Neutrophils % 77.0 43.0 - 80.0 %    Immature Granulocytes % 0.6 0.0 - 5.0 %    Lymphocytes % 11.9 (L) 20.0 - 42.0 %    Monocytes % 9.6 2.0 - 12.0 %    Eosinophils % 0.6 0.0 - 6.0 %    Basophils % 0.3 0.0 - 2.0 %    Neutrophils Absolute 4.89 1.80 - 7.30 E9/L    Immature Granulocytes # 0.04 E9/L    Lymphocytes Absolute 0.76 (L) 1.50 - 4.00 E9/L    Monocytes Absolute 0.61 0.10 - 0.95 E9/L    Eosinophils Absolute 0.04 (L) 0.05 - 0.50 E9/L    Basophils Absolute 0.02 0.00 - 0.20 E9/L   Comprehensive Metabolic Panel    Collection Time: 02/13/22  8:45 PM   Result Value Ref Range    Sodium 141 132 - 146 mmol/L    Potassium 3.7 3.5 - 5.0 mmol/L    Chloride 101 98 - 107 mmol/L    CO2 28 22 - 29 mmol/L    Anion Gap 12 7 - 16 mmol/L    Glucose 126 (H) 74 - 99 mg/dL    BUN 24 (H) 6 - 23 mg/dL    CREATININE 1.0 0.5 - 1.0 mg/dL    GFR Non-African American 56 >=60 mL/min/1.73    GFR African American >60     Calcium 8.7 8.6 - 10.2 mg/dL    Total Protein 6.5 6.4 - 8.3 g/dL    Albumin 3.8 3.5 - 5.2 g/dL    Total Bilirubin 0.2 0.0 - 1.2 mg/dL    Alkaline Phosphatase 133 (H) 35 - 104 U/L    ALT 20 0 - 32 U/L    AST 24 0 - 31 U/L   Troponin    Collection Time: 02/13/22  8:45 PM   Result Value Ref Range    Troponin, High Sensitivity 15 (H) 0 - 9 ng/L   CK    Collection Time: 02/13/22  8:45 PM   Result Value Ref Range    Total  20 - 180 U/L   Urinalysis    Collection Time: 02/13/22  8:45 PM   Result Value Ref Range    Color, UA Yellow Straw/Yellow    Clarity, UA Clear Clear    Glucose, Ur Negative Negative mg/dL    Bilirubin Urine Negative Negative    Ketones, Urine TRACE (A) Negative mg/dL    Specific Gravity, UA 1.015 1.005 - 1.030    Blood, Urine TRACE-INTACT Negative    pH, UA 5.5 5.0 - 9.0    Protein, UA Negative Negative mg/dL Urobilinogen, Urine 0.2 <2.0 E.U./dL    Nitrite, Urine Negative Negative    Leukocyte Esterase, Urine Negative Negative   Lactic Acid, Plasma    Collection Time: 02/13/22  8:45 PM   Result Value Ref Range    Lactic Acid 1.0 0.5 - 2.2 mmol/L   Serum Drug Screen    Collection Time: 02/13/22  8:45 PM   Result Value Ref Range    Ethanol Lvl <10 mg/dL    Acetaminophen Level <5.0 (L) 10.0 - 43.9 mcg/mL    Salicylate, Serum <2.9 0.0 - 30.0 mg/dL    TCA Scrn NEGATIVE Cutoff:300 ng/mL   Protime-INR    Collection Time: 02/13/22  8:45 PM   Result Value Ref Range    Protime 10.8 9.3 - 12.4 sec    INR 1.0    Microscopic Urinalysis    Collection Time: 02/13/22  8:45 PM   Result Value Ref Range    WBC, UA 0-1 0 - 5 /HPF    RBC, UA 2-5 0 - 2 /HPF    Epithelial Cells, UA RARE /HPF    Bacteria, UA RARE (A) None Seen /HPF   EKG 12 Lead    Collection Time: 02/13/22 10:21 PM   Result Value Ref Range    Ventricular Rate 64 BPM    Atrial Rate 64 BPM    P-R Interval 380 ms    QRS Duration 102 ms    Q-T Interval 448 ms    QTc Calculation (Bazett) 462 ms    P Axis 39 degrees    R Axis 16 degrees    T Axis 27 degrees   APTT    Collection Time: 02/14/22  6:05 AM   Result Value Ref Range    aPTT 23.4 (L) 24.5 - 35.1 sec   TYPE AND SCREEN    Collection Time: 02/14/22  6:05 AM   Result Value Ref Range    ABO/Rh O POS     Antibody Screen NEG    Basic Metabolic Panel    Collection Time: 02/15/22  6:32 AM   Result Value Ref Range    Sodium 137 132 - 146 mmol/L    Potassium 4.0 3.5 - 5.0 mmol/L    Chloride 101 98 - 107 mmol/L    CO2 29 22 - 29 mmol/L    Anion Gap 7 7 - 16 mmol/L    Glucose 85 74 - 99 mg/dL    BUN 11 6 - 23 mg/dL    CREATININE 0.7 0.5 - 1.0 mg/dL    GFR Non-African American >60 >=60 mL/min/1.73    GFR African American >60     Calcium 7.9 (L) 8.6 - 10.2 mg/dL   CBC    Collection Time: 02/15/22  6:32 AM   Result Value Ref Range    WBC 4.7 4.5 - 11.5 E9/L    RBC 2.94 (L) 3.50 - 5.50 E12/L    Hemoglobin 9.3 (L) 11.5 - 15.5 g/dL Hematocrit 29.9 (L) 34.0 - 48.0 %    .7 (H) 80.0 - 99.9 fL    MCH 31.6 26.0 - 35.0 pg    MCHC 31.1 (L) 32.0 - 34.5 %    RDW 13.8 11.5 - 15.0 fL    Platelets 42 (L) 770 - 450 E9/L    MPV 11.9 7.0 - 12.0 fL   Platelet Confirmation    Collection Time: 02/15/22  6:32 AM   Result Value Ref Range    Platelet Confirmation CONFIRMED    CBC WITH AUTO DIFFERENTIAL    Collection Time: 02/15/22 11:36 AM   Result Value Ref Range    WBC 6.0 4.5 - 11.5 E9/L    RBC 3.13 (L) 3.50 - 5.50 E12/L    Hemoglobin 9.8 (L) 11.5 - 15.5 g/dL    Hematocrit 31.4 (L) 34.0 - 48.0 %    .3 (H) 80.0 - 99.9 fL    MCH 31.3 26.0 - 35.0 pg    MCHC 31.2 (L) 32.0 - 34.5 %    RDW 13.8 11.5 - 15.0 fL    Platelets 317 200 - 924 E9/L    MPV 9.1 7.0 - 12.0 fL    Neutrophils % 61.5 43.0 - 80.0 %    Immature Granulocytes % 1.0 0.0 - 5.0 %    Lymphocytes % 20.5 20.0 - 42.0 %    Monocytes % 15.5 (H) 2.0 - 12.0 %    Eosinophils % 1.2 0.0 - 6.0 %    Basophils % 0.3 0.0 - 2.0 %    Neutrophils Absolute 3.70 1.80 - 7.30 E9/L    Immature Granulocytes # 0.06 E9/L    Lymphocytes Absolute 1.23 (L) 1.50 - 4.00 E9/L    Monocytes Absolute 0.93 0.10 - 0.95 E9/L    Eosinophils Absolute 0.07 0.05 - 0.50 E9/L    Basophils Absolute 0.02 0.00 - 0.20 E9/L   COVID-19, Rapid    Collection Time: 02/16/22 10:29 AM    Specimen: Nasopharyngeal Swab   Result Value Ref Range    SARS-CoV-2, NAAT Not Detected Not Detected         Discharge Exam:  See progress note from today    Disposition: Subacute rehab    Patient Instructions:   Discharge Medication List as of 1/25/2022  6:46 PM      CONTINUE these medications which have NOT CHANGED    Details   baclofen (LIORESAL) 20 MG tablet Take 20 mg by mouth 4 times dailyHistorical Med      methocarbamol (ROBAXIN) 500 MG tablet Take 500 mg by mouth 4 times dailyHistorical Med      metoprolol succinate (TOPROL XL) 25 MG extended release tablet Take 0.5 tablets by mouth daily, Disp-30 tablet, R-3Normal      cyanocobalamin 50 MCG tablet Take 100 mcg by mouth dailyHistorical Med      ferrous sulfate (FE TABS 325) 325 (65 Fe) MG EC tablet take 1 tablet by mouth once dailyHistorical Med      potassium chloride (KLOR-CON) 10 MEQ extended release tablet Take 10 mEq by mouth as neededHistorical Med      cetirizine (ZYRTEC) 10 MG tablet take 1 tablet by mouth once daily as directedHistorical Med      clotrimazole-betamethasone (LOTRISONE) 1-0.05 % lotion Historical Med      Co-Enzyme Q10 100 MG CAPS TAKE 1 CAPSULE BY MOUTH TWICE DAILY AS DIRECTEDHistorical Med      diclofenac sodium (VOLTAREN) 1 % GEL APPLY 1 TO 2 GRAMS 3 TO 4 TIMES DAILY AS DIRECTED, Historical Med      Glucosamine-Chondroitin 750-600 MG CHEW Take by mouth 2 times dailyHistorical Med      lidocaine-prilocaine (EMLA) 2.5-2.5 % cream APPLY 1 TO 2 GRAMS 3 TO 4 TIMES DAILY AS DIRECTED, Historical Med      Pseudoephedrine-Naproxen Na (ALEVE-D SINUS & COLD PO) Historical Med      famotidine (PEPCID) 20 MG tablet Take 1 tablet by mouth 2 times daily, Disp-60 tablet,R-0Print      EPINEPHrine (EPIPEN 2-CARMEN) 0.3 MG/0.3ML SOAJ injection Inject 0.3 mLs into the muscle once for 1 dose Use as directed for allergic reaction, Disp-1 each, R-0Print      traZODone (DESYREL) 100 MG tablet Take 100 mg by mouth nightlyHistorical Med      ondansetron (ZOFRAN-ODT) 4 MG disintegrating tablet Take 1 tablet by mouth 3 times daily as needed for Nausea or Vomiting, Disp-21 tablet, R-0Print      albuterol (PROVENTIL) (5 MG/ML) 0.5% nebulizer solution Take 2.5 mg by nebulization 3 times daily Historical Med      magnesium 200 MG TABS tablet Take 200 mg by mouth 2 times daily Historical Med      calcium carbonate (CALCIUM 600) 600 MG TABS tablet Take 1 tablet by mouth 2 times daily Historical Med      B Complex Vitamins (VITAMIN-B COMPLEX PO) Take by mouth daily Historical Med      azelastine (ASTELIN) 0.1 % nasal spray 1 spray by Nasal route 2 times daily Use in each nostril as directed, Disp-1 Bottle, R-3Normal [phone] 51    Schedule an appointment as soon as possible for a visit in 1 week  22 at 9:45am        Note that over 30 minutes was spent in preparing discharge papers, discussing discharge with patient, medication review, etc.    Signed:  Eleanora Apgar, MD  2022  3:42 PM

## 2022-02-25 NOTE — PROGRESS NOTES
[address] PRE-ADMISSION TESTING GENERAL INSTRUCTIONS- [facility]-phone number:[phone]    GENERAL INSTRUCTIONS  [x] Antibacterial Soap shower Night before and/or AM of Surgery    [x] Nothing by mouth after midnight, including gum, candy, mints, or water.  [] You may brush your teeth, gargle, but do NOT swallow water. [x]No smoking, chewing tobacco, illegal drugs, marijuana or alcohol within 24 hours of your surgery. [x] Jewelry, valuables or body piercing's should not be brought to the hospital. All body and/or tongue piercing's must be removed prior to arriving to hospital.  ALL hair pins must be removed. [x] Do not wear makeup, lotions, powders, deodorant. Nail polish as directed by the nurse. [x] Arrange transportation with a responsible adult  to and from the hospital. If you do not have a responsible adult  to transport you, you will need to make arrangements with a medical transportation company (i.e. [employer]. A Uber/taxi/bus is not appropriate unless you are accompanied by a responsible adult ). Arrange for someone to be with you for the remainder of the day and for 24 hours after your procedure due to having had anesthesia. Who will be your  for transportation? Facility to arrange transportation  Who will be staying with you for 24 hrs after your procedure? Returning to facility  [x] Bring insurance card and photo ID. PARKING INSTRUCTIONS:   [x] Arrival Time: 7:30 am,    Two people may accompany you. Everyone will need to wear a mask. · [x] Parking lot '\"I\"  is located on [facility] (the corner of [facility] and [facility]). To enter, press the button and the gate will lift. A free token will be provided to exit the lot. One car per patient is allowed to park in this lot. All other cars are to park on [address] [place] [address] either in the parking garage or the handicap lot.       Walk up the front walk to the [facility] Entrance, the door will be locked an employee will greet you and let you in. EDUCATION INSTRUCTIONS:      .    [x]Pain: Post-op pain is normal and to be expected. You will be asked to rate your pain from 0-10 (a zero is not acceptable-education is needed). Your post-op pain goal is:   [x] Ask your nurse for your pain medication. MEDICATION INSTRUCTIONS:  [x]Bring a complete list of your medications, please write the last time you took the medicine, give this list to the nurse. [x] Take the following medications the morning of surgery with 1-2 ounces of water: gabapentin, topiramate, escitalopram, metoprolol succinate, famotidine, omeprazole      May take the morning of surgery if needed with a sip of water:   oxycodone, lorazepam    [x] Stop herbal supplements, ibuprofen (Nsaids)  and vitamins 5 days before your surgery. [x] DO NOT take any diabetic medicine the morning of surgery. Follow instructions for insulin the day before surgery. Currently not prescribed insulin   [x] If you are diabetic and your blood sugar is low or you feel symptomatic, you may drink 1-2 ounces of apple juice or take a glucose tablet. The morning of your procedure, you may call the pre-op area if you have concerns about your blood sugar [phone]. [x] Use your nebulizer solution  the morning of surgery if needed. .  May use your emergency inhaler the morning of surgery if needed and bring with you the day of surgery. Currently not prescribed emergency inhaler   [x] Follow physician instructions regarding any blood thinners you may be taking. WHAT TO EXPECT:  [x] The day of surgery you will be greeted and checked in by the [employer]. Please bring your photo ID and insurance card. A nurse will greet you in accordance to the time you are needed in the pre-op area to prepare you for surgery.  Please do not be discouraged if you are not greeted in the order you arrive as there are many variables that are involved in patient preparation. Your patience is greatly appreciated as you wait for your nurse. Please bring in items such as: books, magazines, newspapers, electronics, or any other items  to occupy your time in the waiting area. [x]  Delays may occur with surgery and staff will make a sincere effort to keep you informed of delays. If any delays occur with your procedure, we apologize ahead of time for your inconvenience as we recognize the value of your time. 2/25/22 14:02 above faxed to [facility] [employer] received fax confirmation.

## 2022-03-01 ENCOUNTER — ANESTHESIA EVENT (OUTPATIENT)
Dept: OPERATING ROOM | Age: 62
End: 2022-03-01
Payer: MEDICAID

## 2022-03-02 ENCOUNTER — HOSPITAL ENCOUNTER (OUTPATIENT)
Age: 62
Setting detail: OBSERVATION
Discharge: NURSING, REHAB OR LONG-TERM CARE | End: 2022-03-03
Attending: ORTHOPAEDIC SURGERY | Admitting: ORTHOPAEDIC SURGERY
Payer: MEDICAID

## 2022-03-02 ENCOUNTER — APPOINTMENT (OUTPATIENT)
Dept: GENERAL RADIOLOGY | Age: 62
End: 2022-03-02
Attending: ORTHOPAEDIC SURGERY
Payer: MEDICAID

## 2022-03-02 ENCOUNTER — ANESTHESIA (OUTPATIENT)
Dept: OPERATING ROOM | Age: 62
End: 2022-03-02
Payer: MEDICAID

## 2022-03-02 VITALS — TEMPERATURE: 97 F | OXYGEN SATURATION: 100 % | DIASTOLIC BLOOD PRESSURE: 58 MMHG | SYSTOLIC BLOOD PRESSURE: 110 MMHG

## 2022-03-02 DIAGNOSIS — M25.571 ACUTE RIGHT ANKLE PAIN: ICD-10-CM

## 2022-03-02 DIAGNOSIS — S82.871A CLOSED RIGHT PILON FRACTURE, INITIAL ENCOUNTER: ICD-10-CM

## 2022-03-02 DIAGNOSIS — Z01.812 PRE-OPERATIVE LABORATORY EXAMINATION: Primary | ICD-10-CM

## 2022-03-02 DIAGNOSIS — S82.141A CLOSED FRACTURE OF RIGHT TIBIAL PLATEAU, INITIAL ENCOUNTER: ICD-10-CM

## 2022-03-02 LAB
ALBUMIN SERPL-MCNC: 3.8 G/DL (ref 3.5–5.2)
ALP BLD-CCNC: 176 U/L (ref 35–104)
ALT SERPL-CCNC: 10 U/L (ref 0–32)
ANION GAP SERPL CALCULATED.3IONS-SCNC: 14 MMOL/L (ref 7–16)
AST SERPL-CCNC: 17 U/L (ref 0–31)
BASOPHILS ABSOLUTE: 0.03 E9/L (ref 0–0.2)
BASOPHILS RELATIVE PERCENT: 0.7 % (ref 0–2)
BILIRUB SERPL-MCNC: 0.3 MG/DL (ref 0–1.2)
BUN BLDV-MCNC: 16 MG/DL (ref 6–23)
CALCIUM SERPL-MCNC: 9.5 MG/DL (ref 8.6–10.2)
CHLORIDE BLD-SCNC: 101 MMOL/L (ref 98–107)
CHP ED QC CHECK: NORMAL
CO2: 27 MMOL/L (ref 22–29)
CREAT SERPL-MCNC: 0.7 MG/DL (ref 0.5–1)
EOSINOPHILS ABSOLUTE: 0.13 E9/L (ref 0.05–0.5)
EOSINOPHILS RELATIVE PERCENT: 3.1 % (ref 0–6)
GFR AFRICAN AMERICAN: >60
GFR NON-AFRICAN AMERICAN: >60 ML/MIN/1.73
GLUCOSE BLD-MCNC: 90 MG/DL (ref 74–99)
GLUCOSE BLD-MCNC: 94 MG/DL
HCT VFR BLD CALC: 38.8 % (ref 34–48)
HEMOGLOBIN: 12.4 G/DL (ref 11.5–15.5)
IMMATURE GRANULOCYTES #: 0.04 E9/L
IMMATURE GRANULOCYTES %: 1 % (ref 0–5)
INR BLD: 0.9
LYMPHOCYTES ABSOLUTE: 1.08 E9/L (ref 1.5–4)
LYMPHOCYTES RELATIVE PERCENT: 26 % (ref 20–42)
MCH RBC QN AUTO: 31 PG (ref 26–35)
MCHC RBC AUTO-ENTMCNC: 32 % (ref 32–34.5)
MCV RBC AUTO: 97 FL (ref 80–99.9)
METER GLUCOSE: 93 MG/DL (ref 74–99)
MONOCYTES ABSOLUTE: 0.41 E9/L (ref 0.1–0.95)
MONOCYTES RELATIVE PERCENT: 9.9 % (ref 2–12)
NEUTROPHILS ABSOLUTE: 2.47 E9/L (ref 1.8–7.3)
NEUTROPHILS RELATIVE PERCENT: 59.3 % (ref 43–80)
PDW BLD-RTO: 13.2 FL (ref 11.5–15)
PLATELET # BLD: 346 E9/L (ref 130–450)
PMV BLD AUTO: 9 FL (ref 7–12)
POTASSIUM REFLEX MAGNESIUM: 4.3 MMOL/L (ref 3.5–5)
PROTHROMBIN TIME: 10.3 SEC (ref 9.3–12.4)
RBC # BLD: 4 E12/L (ref 3.5–5.5)
SODIUM BLD-SCNC: 142 MMOL/L (ref 132–146)
TOTAL PROTEIN: 7.1 G/DL (ref 6.4–8.3)
WBC # BLD: 4.2 E9/L (ref 4.5–11.5)

## 2022-03-02 PROCEDURE — 3209999900 FLUORO FOR SURGICAL PROCEDURES

## 2022-03-02 PROCEDURE — 2709999900 HC NON-CHARGEABLE SUPPLY: Performed by: ORTHOPAEDIC SURGERY

## 2022-03-02 PROCEDURE — C1713 ANCHOR/SCREW BN/BN,TIS/BN: HCPCS | Performed by: ORTHOPAEDIC SURGERY

## 2022-03-02 PROCEDURE — 3700000000 HC ANESTHESIA ATTENDED CARE: Performed by: ORTHOPAEDIC SURGERY

## 2022-03-02 PROCEDURE — 36415 COLL VENOUS BLD VENIPUNCTURE: CPT

## 2022-03-02 PROCEDURE — 73610 X-RAY EXAM OF ANKLE: CPT

## 2022-03-02 PROCEDURE — 6370000000 HC RX 637 (ALT 250 FOR IP): Performed by: ORTHOPAEDIC SURGERY

## 2022-03-02 PROCEDURE — 2500000003 HC RX 250 WO HCPCS

## 2022-03-02 PROCEDURE — 6360000002 HC RX W HCPCS: Performed by: ANESTHESIOLOGY

## 2022-03-02 PROCEDURE — 2580000003 HC RX 258: Performed by: PHYSICIAN ASSISTANT

## 2022-03-02 PROCEDURE — 2500000003 HC RX 250 WO HCPCS: Performed by: ORTHOPAEDIC SURGERY

## 2022-03-02 PROCEDURE — 6360000002 HC RX W HCPCS: Performed by: PHYSICIAN ASSISTANT

## 2022-03-02 PROCEDURE — 6360000002 HC RX W HCPCS: Performed by: PHYSICAL THERAPY ASSISTANT

## 2022-03-02 PROCEDURE — 2580000003 HC RX 258

## 2022-03-02 PROCEDURE — 6360000002 HC RX W HCPCS

## 2022-03-02 PROCEDURE — 76942 ECHO GUIDE FOR BIOPSY: CPT | Performed by: ANESTHESIOLOGY

## 2022-03-02 PROCEDURE — 3600000005 HC SURGERY LEVEL 5 BASE: Performed by: ORTHOPAEDIC SURGERY

## 2022-03-02 PROCEDURE — 6360000002 HC RX W HCPCS: Performed by: ORTHOPAEDIC SURGERY

## 2022-03-02 PROCEDURE — 80053 COMPREHEN METABOLIC PANEL: CPT

## 2022-03-02 PROCEDURE — 7100000000 HC PACU RECOVERY - FIRST 15 MIN: Performed by: ORTHOPAEDIC SURGERY

## 2022-03-02 PROCEDURE — 2720000010 HC SURG SUPPLY STERILE: Performed by: ORTHOPAEDIC SURGERY

## 2022-03-02 PROCEDURE — 3700000001 HC ADD 15 MINUTES (ANESTHESIA): Performed by: ORTHOPAEDIC SURGERY

## 2022-03-02 PROCEDURE — 85610 PROTHROMBIN TIME: CPT

## 2022-03-02 PROCEDURE — G0378 HOSPITAL OBSERVATION PER HR: HCPCS

## 2022-03-02 PROCEDURE — 7100000001 HC PACU RECOVERY - ADDTL 15 MIN: Performed by: ORTHOPAEDIC SURGERY

## 2022-03-02 PROCEDURE — 20694 RMVL EXT FIXJ SYS UNDER ANES: CPT | Performed by: ORTHOPAEDIC SURGERY

## 2022-03-02 PROCEDURE — 6370000000 HC RX 637 (ALT 250 FOR IP): Performed by: PHYSICAL THERAPY ASSISTANT

## 2022-03-02 PROCEDURE — 3600000015 HC SURGERY LEVEL 5 ADDTL 15MIN: Performed by: ORTHOPAEDIC SURGERY

## 2022-03-02 PROCEDURE — 27828 TREAT LOWER LEG FRACTURE: CPT | Performed by: ORTHOPAEDIC SURGERY

## 2022-03-02 PROCEDURE — 85025 COMPLETE CBC W/AUTO DIFF WBC: CPT

## 2022-03-02 PROCEDURE — 82962 GLUCOSE BLOOD TEST: CPT

## 2022-03-02 DEVICE — SCREW BNE L20MM DIA3.5MM CORT S STL ST LOK FULL THRD: Type: IMPLANTABLE DEVICE | Status: FUNCTIONAL

## 2022-03-02 DEVICE — SCREW BNE L40MM DIA3.5MM CORT S STL ST LOK FULL THRD: Type: IMPLANTABLE DEVICE | Status: FUNCTIONAL

## 2022-03-02 DEVICE — SCREW BNE L32MM DIA3.5MM CORT S STL ST NONCANNULATED LOK: Type: IMPLANTABLE DEVICE | Status: FUNCTIONAL

## 2022-03-02 DEVICE — SCREW BNE L45MM DIA3.5MM CORT S STL ST LOK FULL THRD: Type: IMPLANTABLE DEVICE | Status: FUNCTIONAL

## 2022-03-02 DEVICE — SCREW BNE L30MM DIA3.5MM CORT S STL ST NONCANNULATED LOK: Type: IMPLANTABLE DEVICE | Status: FUNCTIONAL

## 2022-03-02 DEVICE — SCREW BNE L34MM DIA3.5MM CORT S STL ST NONCANNULATED LOK: Type: IMPLANTABLE DEVICE | Status: FUNCTIONAL

## 2022-03-02 DEVICE — SCREW BNE L24MM DIA3.5MM CORT S STL ST LOK FULL THRD: Type: IMPLANTABLE DEVICE | Status: FUNCTIONAL

## 2022-03-02 DEVICE — GII QUICKANCHOR PLUS SIZE 2 (5 METRIC) PANACRYL BRAIDED ABSORBABLE SUTURE 36 INCHES (91CM), DOUBLE ARMED WITH CP-2 NEEDLES, WITH DISPOSABLE INSERTER.
Type: IMPLANTABLE DEVICE | Status: FUNCTIONAL
Brand: GII QUICKANCHOR PANACRYL

## 2022-03-02 DEVICE — IMPLANTABLE DEVICE: Type: IMPLANTABLE DEVICE | Status: FUNCTIONAL

## 2022-03-02 DEVICE — SCREW BNE L36MM DIA3.5MM CORT S STL ST NONCANNULATED LOK: Type: IMPLANTABLE DEVICE | Status: FUNCTIONAL

## 2022-03-02 DEVICE — SCREW BNE L36MM DIA3.5MM CORT S STL ST LOK FULL THRD: Type: IMPLANTABLE DEVICE | Status: FUNCTIONAL

## 2022-03-02 DEVICE — SCREW BNE L48MM DIA3.5MM CORT S STL ST LOK FULL THRD: Type: IMPLANTABLE DEVICE | Status: FUNCTIONAL

## 2022-03-02 DEVICE — SCREW BNE L110MM DIA3.5MM HD DIA6MM CORT S STL ST W/ SM HEX: Type: IMPLANTABLE DEVICE | Status: FUNCTIONAL

## 2022-03-02 RX ORDER — MIDAZOLAM HYDROCHLORIDE 2 MG/2ML
2 INJECTION, SOLUTION INTRAMUSCULAR; INTRAVENOUS EVERY 10 MIN PRN
Status: DISCONTINUED | OUTPATIENT
Start: 2022-03-02 | End: 2022-03-02 | Stop reason: HOSPADM

## 2022-03-02 RX ORDER — ROPIVACAINE HYDROCHLORIDE 5 MG/ML
40 INJECTION, SOLUTION EPIDURAL; INFILTRATION; PERINEURAL ONCE
Status: COMPLETED | OUTPATIENT
Start: 2022-03-02 | End: 2022-03-02

## 2022-03-02 RX ORDER — SODIUM CHLORIDE 9 MG/ML
25 INJECTION, SOLUTION INTRAVENOUS PRN
Status: DISCONTINUED | OUTPATIENT
Start: 2022-03-02 | End: 2022-03-02 | Stop reason: HOSPADM

## 2022-03-02 RX ORDER — SODIUM CHLORIDE 9 MG/ML
25 INJECTION, SOLUTION INTRAVENOUS PRN
Status: DISCONTINUED | OUTPATIENT
Start: 2022-03-02 | End: 2022-03-03 | Stop reason: HOSPADM

## 2022-03-02 RX ORDER — LIDOCAINE HYDROCHLORIDE 20 MG/ML
INJECTION, SOLUTION INTRAVENOUS PRN
Status: DISCONTINUED | OUTPATIENT
Start: 2022-03-02 | End: 2022-03-02 | Stop reason: SDUPTHER

## 2022-03-02 RX ORDER — SODIUM CHLORIDE, SODIUM LACTATE, POTASSIUM CHLORIDE, CALCIUM CHLORIDE 600; 310; 30; 20 MG/100ML; MG/100ML; MG/100ML; MG/100ML
INJECTION, SOLUTION INTRAVENOUS CONTINUOUS
Status: DISCONTINUED | OUTPATIENT
Start: 2022-03-02 | End: 2022-03-02

## 2022-03-02 RX ORDER — MORPHINE SULFATE 4 MG/ML
4 INJECTION, SOLUTION INTRAMUSCULAR; INTRAVENOUS
Status: DISCONTINUED | OUTPATIENT
Start: 2022-03-02 | End: 2022-03-03 | Stop reason: HOSPADM

## 2022-03-02 RX ORDER — ROPIVACAINE HYDROCHLORIDE 5 MG/ML
INJECTION, SOLUTION EPIDURAL; INFILTRATION; PERINEURAL
Status: COMPLETED | OUTPATIENT
Start: 2022-03-02 | End: 2022-03-02

## 2022-03-02 RX ORDER — SODIUM CHLORIDE 9 MG/ML
INJECTION, SOLUTION INTRAVENOUS CONTINUOUS PRN
Status: DISCONTINUED | OUTPATIENT
Start: 2022-03-02 | End: 2022-03-02 | Stop reason: SDUPTHER

## 2022-03-02 RX ORDER — ACETAMINOPHEN 325 MG/1
650 TABLET ORAL EVERY 4 HOURS PRN
Status: DISCONTINUED | OUTPATIENT
Start: 2022-03-02 | End: 2022-03-03 | Stop reason: HOSPADM

## 2022-03-02 RX ORDER — SODIUM CHLORIDE 0.9 % (FLUSH) 0.9 %
5-40 SYRINGE (ML) INJECTION EVERY 12 HOURS SCHEDULED
Status: DISCONTINUED | OUTPATIENT
Start: 2022-03-02 | End: 2022-03-02 | Stop reason: SDUPTHER

## 2022-03-02 RX ORDER — SODIUM PHOSPHATE, DIBASIC AND SODIUM PHOSPHATE, MONOBASIC 7; 19 G/133ML; G/133ML
1 ENEMA RECTAL DAILY PRN
Status: DISCONTINUED | OUTPATIENT
Start: 2022-03-02 | End: 2022-03-03 | Stop reason: HOSPADM

## 2022-03-02 RX ORDER — GLYCOPYRROLATE 1 MG/5 ML
SYRINGE (ML) INTRAVENOUS PRN
Status: DISCONTINUED | OUTPATIENT
Start: 2022-03-02 | End: 2022-03-02 | Stop reason: SDUPTHER

## 2022-03-02 RX ORDER — SODIUM CHLORIDE 0.9 % (FLUSH) 0.9 %
5-40 SYRINGE (ML) INJECTION PRN
Status: DISCONTINUED | OUTPATIENT
Start: 2022-03-02 | End: 2022-03-02 | Stop reason: HOSPADM

## 2022-03-02 RX ORDER — KETOROLAC TROMETHAMINE 30 MG/ML
30 INJECTION, SOLUTION INTRAMUSCULAR; INTRAVENOUS
Status: COMPLETED | OUTPATIENT
Start: 2022-03-02 | End: 2022-03-02

## 2022-03-02 RX ORDER — NEOSTIGMINE METHYLSULFATE 1 MG/ML
INJECTION, SOLUTION INTRAVENOUS PRN
Status: DISCONTINUED | OUTPATIENT
Start: 2022-03-02 | End: 2022-03-02 | Stop reason: SDUPTHER

## 2022-03-02 RX ORDER — LIDOCAINE HYDROCHLORIDE 10 MG/ML
INJECTION, SOLUTION INFILTRATION; PERINEURAL PRN
Status: DISCONTINUED | OUTPATIENT
Start: 2022-03-02 | End: 2022-03-02 | Stop reason: ALTCHOICE

## 2022-03-02 RX ORDER — SODIUM CHLORIDE 0.9 % (FLUSH) 0.9 %
5-40 SYRINGE (ML) INJECTION EVERY 12 HOURS SCHEDULED
Status: DISCONTINUED | OUTPATIENT
Start: 2022-03-02 | End: 2022-03-02 | Stop reason: HOSPADM

## 2022-03-02 RX ORDER — SODIUM CHLORIDE 0.9 % (FLUSH) 0.9 %
5-40 SYRINGE (ML) INJECTION PRN
Status: DISCONTINUED | OUTPATIENT
Start: 2022-03-02 | End: 2022-03-03 | Stop reason: HOSPADM

## 2022-03-02 RX ORDER — SODIUM CHLORIDE 0.9 % (FLUSH) 0.9 %
5-40 SYRINGE (ML) INJECTION PRN
Status: DISCONTINUED | OUTPATIENT
Start: 2022-03-02 | End: 2022-03-02 | Stop reason: SDUPTHER

## 2022-03-02 RX ORDER — ROCURONIUM BROMIDE 10 MG/ML
INJECTION, SOLUTION INTRAVENOUS PRN
Status: DISCONTINUED | OUTPATIENT
Start: 2022-03-02 | End: 2022-03-02 | Stop reason: SDUPTHER

## 2022-03-02 RX ORDER — SULFAMETHOXAZOLE AND TRIMETHOPRIM 400; 80 MG/1; MG/1
1 TABLET ORAL 2 TIMES DAILY
Qty: 20 TABLET | Refills: 0 | Status: SHIPPED | OUTPATIENT
Start: 2022-03-02 | End: 2022-03-02 | Stop reason: HOSPADM

## 2022-03-02 RX ORDER — DEXAMETHASONE SODIUM PHOSPHATE 10 MG/ML
INJECTION INTRAMUSCULAR; INTRAVENOUS PRN
Status: DISCONTINUED | OUTPATIENT
Start: 2022-03-02 | End: 2022-03-02 | Stop reason: SDUPTHER

## 2022-03-02 RX ORDER — FENTANYL CITRATE 50 UG/ML
100 INJECTION, SOLUTION INTRAMUSCULAR; INTRAVENOUS ONCE
Status: COMPLETED | OUTPATIENT
Start: 2022-03-02 | End: 2022-03-02

## 2022-03-02 RX ORDER — ONDANSETRON 2 MG/ML
INJECTION INTRAMUSCULAR; INTRAVENOUS PRN
Status: DISCONTINUED | OUTPATIENT
Start: 2022-03-02 | End: 2022-03-02 | Stop reason: SDUPTHER

## 2022-03-02 RX ORDER — MORPHINE SULFATE 2 MG/ML
2 INJECTION, SOLUTION INTRAMUSCULAR; INTRAVENOUS
Status: DISCONTINUED | OUTPATIENT
Start: 2022-03-02 | End: 2022-03-03 | Stop reason: HOSPADM

## 2022-03-02 RX ORDER — MEPERIDINE HYDROCHLORIDE 25 MG/ML
12.5 INJECTION INTRAMUSCULAR; INTRAVENOUS; SUBCUTANEOUS EVERY 5 MIN PRN
Status: DISCONTINUED | OUTPATIENT
Start: 2022-03-02 | End: 2022-03-02 | Stop reason: HOSPADM

## 2022-03-02 RX ORDER — PROPOFOL 10 MG/ML
INJECTION, EMULSION INTRAVENOUS PRN
Status: DISCONTINUED | OUTPATIENT
Start: 2022-03-02 | End: 2022-03-02 | Stop reason: SDUPTHER

## 2022-03-02 RX ORDER — ONDANSETRON 2 MG/ML
4 INJECTION INTRAMUSCULAR; INTRAVENOUS EVERY 6 HOURS PRN
Status: DISCONTINUED | OUTPATIENT
Start: 2022-03-02 | End: 2022-03-03 | Stop reason: HOSPADM

## 2022-03-02 RX ORDER — ONDANSETRON 4 MG/1
4 TABLET, ORALLY DISINTEGRATING ORAL EVERY 8 HOURS PRN
Status: DISCONTINUED | OUTPATIENT
Start: 2022-03-02 | End: 2022-03-03 | Stop reason: HOSPADM

## 2022-03-02 RX ORDER — OXYCODONE HYDROCHLORIDE 5 MG/1
5 TABLET ORAL EVERY 4 HOURS PRN
Status: DISCONTINUED | OUTPATIENT
Start: 2022-03-02 | End: 2022-03-03 | Stop reason: HOSPADM

## 2022-03-02 RX ORDER — OXYCODONE HYDROCHLORIDE 10 MG/1
10 TABLET ORAL EVERY 4 HOURS PRN
Status: DISCONTINUED | OUTPATIENT
Start: 2022-03-02 | End: 2022-03-03 | Stop reason: HOSPADM

## 2022-03-02 RX ORDER — POLYETHYLENE GLYCOL 3350 17 G/17G
17 POWDER, FOR SOLUTION ORAL DAILY PRN
Status: DISCONTINUED | OUTPATIENT
Start: 2022-03-02 | End: 2022-03-03 | Stop reason: HOSPADM

## 2022-03-02 RX ORDER — OXYCODONE HYDROCHLORIDE 5 MG/1
5 TABLET ORAL
Status: DISCONTINUED | OUTPATIENT
Start: 2022-03-02 | End: 2022-03-02 | Stop reason: HOSPADM

## 2022-03-02 RX ORDER — SODIUM CHLORIDE 0.9 % (FLUSH) 0.9 %
5-40 SYRINGE (ML) INJECTION EVERY 12 HOURS SCHEDULED
Status: DISCONTINUED | OUTPATIENT
Start: 2022-03-02 | End: 2022-03-03 | Stop reason: HOSPADM

## 2022-03-02 RX ORDER — TOBRAMYCIN 1.2 G/30ML
INJECTION, POWDER, LYOPHILIZED, FOR SOLUTION INTRAVENOUS PRN
Status: DISCONTINUED | OUTPATIENT
Start: 2022-03-02 | End: 2022-03-02 | Stop reason: ALTCHOICE

## 2022-03-02 RX ORDER — OXYCODONE HYDROCHLORIDE AND ACETAMINOPHEN 5; 325 MG/1; MG/1
1 TABLET ORAL EVERY 6 HOURS PRN
Qty: 28 TABLET | Refills: 0 | Status: SHIPPED | OUTPATIENT
Start: 2022-03-02 | End: 2022-03-09

## 2022-03-02 RX ORDER — DIAPER,BRIEF,INFANT-TODD,DISP
EACH MISCELLANEOUS PRN
Status: DISCONTINUED | OUTPATIENT
Start: 2022-03-02 | End: 2022-03-02 | Stop reason: ALTCHOICE

## 2022-03-02 RX ORDER — SODIUM CHLORIDE 9 MG/ML
25 INJECTION, SOLUTION INTRAVENOUS PRN
Status: DISCONTINUED | OUTPATIENT
Start: 2022-03-02 | End: 2022-03-02 | Stop reason: SDUPTHER

## 2022-03-02 RX ADMIN — FENTANYL CITRATE 100 MCG: 50 INJECTION, SOLUTION INTRAMUSCULAR; INTRAVENOUS at 09:11

## 2022-03-02 RX ADMIN — ROPIVACAINE HYDROCHLORIDE 30 ML: 5 INJECTION EPIDURAL; INFILTRATION; PERINEURAL at 09:26

## 2022-03-02 RX ADMIN — ONDANSETRON HYDROCHLORIDE 4 MG: 2 SOLUTION INTRAMUSCULAR; INTRAVENOUS at 11:04

## 2022-03-02 RX ADMIN — MORPHINE SULFATE 2 MG: 2 INJECTION, SOLUTION INTRAMUSCULAR; INTRAVENOUS at 21:49

## 2022-03-02 RX ADMIN — PROPOFOL 30 MG: 10 INJECTION, EMULSION INTRAVENOUS at 10:40

## 2022-03-02 RX ADMIN — PROPOFOL 20 MG: 10 INJECTION, EMULSION INTRAVENOUS at 11:16

## 2022-03-02 RX ADMIN — SODIUM CHLORIDE 25 ML: 9 INJECTION, SOLUTION INTRAVENOUS at 08:40

## 2022-03-02 RX ADMIN — PROPOFOL 20 MG: 10 INJECTION, EMULSION INTRAVENOUS at 11:26

## 2022-03-02 RX ADMIN — SODIUM CHLORIDE: 9 INJECTION, SOLUTION INTRAVENOUS at 09:36

## 2022-03-02 RX ADMIN — KETOROLAC TROMETHAMINE 30 MG: 30 INJECTION, SOLUTION INTRAMUSCULAR at 13:01

## 2022-03-02 RX ADMIN — PROPOFOL 30 MG: 10 INJECTION, EMULSION INTRAVENOUS at 10:50

## 2022-03-02 RX ADMIN — Medication 2000 MG: at 19:07

## 2022-03-02 RX ADMIN — POLYETHYLENE GLYCOL 3350 17 G: 17 POWDER, FOR SOLUTION ORAL at 19:10

## 2022-03-02 RX ADMIN — OXYCODONE HYDROCHLORIDE 10 MG: 10 TABLET ORAL at 15:14

## 2022-03-02 RX ADMIN — DEXAMETHASONE SODIUM PHOSPHATE 10 MG: 10 INJECTION INTRAMUSCULAR; INTRAVENOUS at 09:36

## 2022-03-02 RX ADMIN — ROPIVACAINE HYDROCHLORIDE 40 ML: 5 INJECTION, SOLUTION EPIDURAL; INFILTRATION; PERINEURAL at 09:16

## 2022-03-02 RX ADMIN — LIDOCAINE HYDROCHLORIDE 100 MG: 20 INJECTION, SOLUTION INTRAVENOUS at 09:36

## 2022-03-02 RX ADMIN — OXYCODONE HYDROCHLORIDE 10 MG: 10 TABLET ORAL at 19:18

## 2022-03-02 RX ADMIN — PROPOFOL 150 MG: 10 INJECTION, EMULSION INTRAVENOUS at 09:36

## 2022-03-02 RX ADMIN — Medication 2000 MG: at 09:40

## 2022-03-02 RX ADMIN — Medication 0.4 MG: at 11:31

## 2022-03-02 RX ADMIN — ROCURONIUM BROMIDE 20 MG: 10 INJECTION, SOLUTION INTRAVENOUS at 10:11

## 2022-03-02 RX ADMIN — ACETAMINOPHEN 650 MG: 325 TABLET ORAL at 15:13

## 2022-03-02 RX ADMIN — HYDROMORPHONE HYDROCHLORIDE 0.5 MG: 1 INJECTION, SOLUTION INTRAMUSCULAR; INTRAVENOUS; SUBCUTANEOUS at 12:29

## 2022-03-02 RX ADMIN — MIDAZOLAM 2 MG: 1 INJECTION INTRAMUSCULAR; INTRAVENOUS at 09:11

## 2022-03-02 RX ADMIN — PROPOFOL 30 MG: 10 INJECTION, EMULSION INTRAVENOUS at 10:30

## 2022-03-02 RX ADMIN — ROCURONIUM BROMIDE 50 MG: 10 INJECTION, SOLUTION INTRAVENOUS at 09:36

## 2022-03-02 RX ADMIN — HYDROMORPHONE HYDROCHLORIDE 0.5 MG: 1 INJECTION, SOLUTION INTRAMUSCULAR; INTRAVENOUS; SUBCUTANEOUS at 12:15

## 2022-03-02 RX ADMIN — ENOXAPARIN SODIUM 40 MG: 100 INJECTION SUBCUTANEOUS at 15:15

## 2022-03-02 RX ADMIN — Medication 2 MG: at 11:31

## 2022-03-02 RX ADMIN — ROPIVACAINE HYDROCHLORIDE 15 ML: 5 INJECTION, SOLUTION EPIDURAL; INFILTRATION; PERINEURAL at 09:27

## 2022-03-02 RX ADMIN — BISACODYL 5 MG: 5 TABLET, COATED ORAL at 15:14

## 2022-03-02 ASSESSMENT — PULMONARY FUNCTION TESTS
PIF_VALUE: 25
PIF_VALUE: 21
PIF_VALUE: 26
PIF_VALUE: 34
PIF_VALUE: 27
PIF_VALUE: 25
PIF_VALUE: 25
PIF_VALUE: 22
PIF_VALUE: 4
PIF_VALUE: 25
PIF_VALUE: 26
PIF_VALUE: 27
PIF_VALUE: 27
PIF_VALUE: 25
PIF_VALUE: 2
PIF_VALUE: 0
PIF_VALUE: 27
PIF_VALUE: 25
PIF_VALUE: 26
PIF_VALUE: 26
PIF_VALUE: 27
PIF_VALUE: 25
PIF_VALUE: 26
PIF_VALUE: 4
PIF_VALUE: 27
PIF_VALUE: 26
PIF_VALUE: 0
PIF_VALUE: 27
PIF_VALUE: 27
PIF_VALUE: 29
PIF_VALUE: 26
PIF_VALUE: 27
PIF_VALUE: 29
PIF_VALUE: 23
PIF_VALUE: 30
PIF_VALUE: 25
PIF_VALUE: 27
PIF_VALUE: 26
PIF_VALUE: 1
PIF_VALUE: 26
PIF_VALUE: 27
PIF_VALUE: 26
PIF_VALUE: 27
PIF_VALUE: 27
PIF_VALUE: 25
PIF_VALUE: 26
PIF_VALUE: 27
PIF_VALUE: 0
PIF_VALUE: 25
PIF_VALUE: 0
PIF_VALUE: 25
PIF_VALUE: 0
PIF_VALUE: 27
PIF_VALUE: 22
PIF_VALUE: 4
PIF_VALUE: 26
PIF_VALUE: 25
PIF_VALUE: 27
PIF_VALUE: 26
PIF_VALUE: 27
PIF_VALUE: 27
PIF_VALUE: 26
PIF_VALUE: 27
PIF_VALUE: 27
PIF_VALUE: 26
PIF_VALUE: 24
PIF_VALUE: 27
PIF_VALUE: 26
PIF_VALUE: 29
PIF_VALUE: 27
PIF_VALUE: 25
PIF_VALUE: 26
PIF_VALUE: 27
PIF_VALUE: 23
PIF_VALUE: 25
PIF_VALUE: 27
PIF_VALUE: 27
PIF_VALUE: 25
PIF_VALUE: 27
PIF_VALUE: 25
PIF_VALUE: 26
PIF_VALUE: 25
PIF_VALUE: 26
PIF_VALUE: 27
PIF_VALUE: 0
PIF_VALUE: 26
PIF_VALUE: 27
PIF_VALUE: 26
PIF_VALUE: 26
PIF_VALUE: 27
PIF_VALUE: 26
PIF_VALUE: 26
PIF_VALUE: 27
PIF_VALUE: 25
PIF_VALUE: 27
PIF_VALUE: 27
PIF_VALUE: 25
PIF_VALUE: 26
PIF_VALUE: 28
PIF_VALUE: 26
PIF_VALUE: 22
PIF_VALUE: 26
PIF_VALUE: 26
PIF_VALUE: 27
PIF_VALUE: 26
PIF_VALUE: 26
PIF_VALUE: 25
PIF_VALUE: 25
PIF_VALUE: 20
PIF_VALUE: 27
PIF_VALUE: 23
PIF_VALUE: 27
PIF_VALUE: 27
PIF_VALUE: 25
PIF_VALUE: 26
PIF_VALUE: 27
PIF_VALUE: 26

## 2022-03-02 ASSESSMENT — PAIN DESCRIPTION - ORIENTATION
ORIENTATION: RIGHT;LOWER
ORIENTATION: RIGHT
ORIENTATION: RIGHT;LOWER

## 2022-03-02 ASSESSMENT — PAIN DESCRIPTION - DESCRIPTORS
DESCRIPTORS: DISCOMFORT
DESCRIPTORS: ACHING;DISCOMFORT

## 2022-03-02 ASSESSMENT — PAIN DESCRIPTION - PAIN TYPE
TYPE: SURGICAL PAIN

## 2022-03-02 ASSESSMENT — PAIN SCALES - GENERAL
PAINLEVEL_OUTOF10: 7
PAINLEVEL_OUTOF10: 8
PAINLEVEL_OUTOF10: 7
PAINLEVEL_OUTOF10: 7
PAINLEVEL_OUTOF10: 5
PAINLEVEL_OUTOF10: 7
PAINLEVEL_OUTOF10: 3

## 2022-03-02 ASSESSMENT — PAIN DESCRIPTION - LOCATION
LOCATION: LEG

## 2022-03-02 ASSESSMENT — PAIN DESCRIPTION - PROGRESSION: CLINICAL_PROGRESSION: NOT CHANGED

## 2022-03-02 ASSESSMENT — LIFESTYLE VARIABLES: SMOKING_STATUS: 0

## 2022-03-02 ASSESSMENT — PAIN SCALES - WONG BAKER: WONGBAKER_NUMERICALRESPONSE: 10

## 2022-03-02 ASSESSMENT — PAIN - FUNCTIONAL ASSESSMENT: PAIN_FUNCTIONAL_ASSESSMENT: 0-10

## 2022-03-02 NOTE — ANESTHESIA PRE PROCEDURE
Department of Anesthesiology  Preprocedure Note       Name:  Satish Rodriguez   Age:  64 y.o.  :  1960                                          MRN:  [id number]         Date:  3/2/2022      Surgeon: Amaya Daniels):  Cheryln Lefort, DO    Procedure: Procedure(s):  RIGHT LOWER EXTREMITY REMOVAL EXTERNAL FIXATOR, RIGHT PILON OPEN REDUCTION INTERNAL FIXATOR--[facility] (FACILITY)    Medications prior to admission:   Prior to Admission medications    Medication Sig Start Date End Date Taking? Authorizing Provider   LORazepam (ATIVAN) 0.5 MG tablet Take 0.5 mg by mouth every 6 hours as needed for Anxiety. Yes Historical Provider, MD   oxyCODONE 5 MG capsule Take 5 mg by mouth every 6 hours as needed for Pain.    Yes Historical Provider, MD   enoxaparin (LOVENOX) 30 MG/0.3ML injection Inject 0.3 mLs into the skin 2 times daily  Patient taking differently: Inject 40 mg into the skin 2 times daily  22  Yes Maritza Galvez MD   melatonin 5 MG TBDP disintegrating tablet Take 1 tablet by mouth nightly  Patient taking differently: Take 10 mg by mouth nightly  22  Yes Maritza Galvez MD   aspirin EC 81 MG EC tablet Take 1 tablet by mouth 2 times daily for 28 days 2/14/22 3/14/22 Yes Ace Rain DO   baclofen (LIORESAL) 20 MG tablet Take 20 mg by mouth 4 times daily   Yes Historical Provider, MD   metoprolol succinate (TOPROL XL) 25 MG extended release tablet Take 0.5 tablets by mouth daily 21  Yes Christi Love MD   cyanocobalamin 50 MCG tablet Take 100 mcg by mouth daily   Yes Historical Provider, MD   ferrous sulfate (FE TABS 325) 325 (65 Fe) MG EC tablet Take 325 mg by mouth daily  21  Yes Historical Provider, MD   famotidine (PEPCID) 20 MG tablet Take 1 tablet by mouth 2 times daily 9/10/20  Yes Randy Daniels MD   traZODone (DESYREL) 100 MG tablet Take 100 mg by mouth nightly   Yes Historical Provider, MD   albuterol (PROVENTIL) (5 MG/ML) 0.5% nebulizer solution Take 2.5 mg by nebulization 3 times daily    Yes Historical Provider, MD   magnesium 200 MG TABS tablet Take 200 mg by mouth 2 times daily    Yes Historical Provider, MD   calcium carbonate (CALCIUM 600) 600 MG TABS tablet Take 1 tablet by mouth 2 times daily    Yes Historical Provider, MD   montelukast (SINGULAIR) 10 MG tablet Take 1 tablet by mouth daily 8/12/19  Yes Pola Murray, DO   omeprazole (PRILOSEC) 20 MG delayed release capsule Take 20 mg by mouth Daily    Yes Historical Provider, MD   furosemide (LASIX) 40 MG tablet Take 1 tablet by mouth 2 times daily 6/14/19  Yes Phil Sanchez, DO   Cholecalciferol (VITAMIN D3) 5000 units TABS Take 1 tablet by mouth every other day    Yes Historical Provider, MD   docusate sodium (COLACE) 100 MG capsule Take 100 mg by mouth 2 times daily   Yes Historical Provider, MD   linaclotide (LINZESS) 145 MCG capsule Take 290 mcg by mouth every morning (before breakfast)    Yes Historical Provider, MD   levOCARNitine (CARNITOR) 330 MG tablet Take 330 mg by mouth 3 times daily For mitochondrial disease   Yes Historical Provider, MD   allopurinol (ZYLOPRIM) 100 MG tablet Take 200 mg by mouth 2 times daily    Yes Historical Provider, MD   escitalopram (LEXAPRO) 20 MG tablet Take 20 mg by mouth 2 times daily    Yes Historical Provider, MD   gabapentin (NEURONTIN) 600 MG tablet Take 1 tablet by mouth 4 times daily. 10/31/13  Yes Rina Loomis MD   topiramate (TOPAMAX) 200 MG tablet Take 1 tablet by mouth 2 times daily.  5/9/12  Yes Ab Benavides, DO   ziprasidone (GEODON) 20 MG capsule Take 20 mg by mouth nightly    Yes Historical Provider, MD       Current medications:    Current Facility-Administered Medications   Medication Dose Route Frequency Provider Last Rate Last Admin    0.9 % sodium chloride infusion  25 mL IntraVENous PRN RADHA Munoz        ceFAZolin (ANCEF) 2000 mg in sterile water 20 mL IV syringe  2,000 mg IntraVENous On Call to [address] [place]  sodium chloride flush 0.9 % injection 5-40 mL  5-40 mL IntraVENous 2 times per day RADHA Faria        sodium chloride flush 0.9 % injection 5-40 mL  5-40 mL IntraVENous PRN RADHA Faria        lactated ringers infusion   IntraVENous Continuous Seema Alfaro MD        sodium chloride flush 0.9 % injection 5-40 mL  5-40 mL IntraVENous 2 times per day Seema Lovelace MD        sodium chloride flush 0.9 % injection 5-40 mL  5-40 mL IntraVENous PRN Seema Alfaro MD        midazolam PF (VERSED) injection 2 mg  2 mg IntraVENous Q10 Min PRN Seema Alfaro MD        fentaNYL (SUBLIMAZE) injection 100 mcg  100 mcg IntraVENous Once Rosalita Distance, MD        ropivacaine (NAROPIN) 0.5% injection 40 mL  40 mL Infiltration Once Rosalita Distance, MD           Allergies:     Allergies   Allergen Reactions    Bee Pollen Anaphylaxis, Shortness Of Breath and Swelling     And wasp    Penicillins Anaphylaxis    Ropinirole Hcl Anaphylaxis    Vistaril [Hydroxyzine Hcl] Anaphylaxis    Prednisone     Restoril [Temazepam]     Aripiprazole Other (See Comments)     muscle spasms and confusion    Hydroxyzine Pamoate Itching and Rash    Tape Emily Mon Tape] Rash     Paper tape allergy    Fabric tape is OK to use        Problem List:    Patient Active Problem List   Diagnosis Code    Debility R53.81    Obesity E66.9    Bipolar 1 disorder ([address] F31.9    Generalized seizure disorder ([facility] G40.309    Cervicalgia M54.2    Asthma J45.909    Hyperlipidemia E78.5    Hypertension I10    Arthritis M19.90    Lymphedema of lower extremity I89.0    Degenerative Osteoarthritis of both knees M17.0    Status post total knee replacement, right Z96.651    Cervical spondylolysis M43.02    Degenerative Osteoarthritis thoracic spine M47.814    Thoracic facet syndrome M47.894    Cervical facet syndrome M47.812    Facet syndrome, lumbar M47.816    Neural foraminal stenosis of lumbar spine M48.061  Lumbar radiculopathy M54.16    DDD (degenerative disc disease), cervical M50.30    Neural foraminal stenosis of cervical spine M48.02    Protruded cervical disc M50.20    Cervical radiculopathy M54.12    Postlaminectomy syndrome, lumbar M96.1    Neuropathic pain syndrome (non-herpetic) M79.2    Chronic pain G89.29    Chronic respiratory failure with hypoxia (HCC) J96.11    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 A    PETR (obstructive sleep apnea) G47.33    Chronic back pain M54.9, G89.29    Bipolar affective (HCC) F31.9    Adenoma of right adrenal gland D35.01    Chest pain R07.9    Lumbosacral spondylosis without myelopathy M47.817    Sacroiliac dysfunction M53.3    DDD (degenerative disc disease), lumbar M51.36    Thoracic degenerative disc disease M51.34    Excessive physiologic tremor R25.1    Rhabdomyolysis M62.82    Failure to thrive in adult R62.7    Closed fracture of right distal radius S52.501A    Closed left ankle fracture, initial encounter S82.892A       Past Medical History:        Diagnosis Date    Adenoma of right adrenal gland     Anemia     Anxiety     Arthritis     spinal    Asthma     controlled with inhalers     Benign essential tremor     Bipolar affective (HCC)     Chronic back pain     Spinal cord stimulator in place    Chronic respiratory failure with hypoxia (HCC)     at times dt diaphragm does not function properly dt Mitochondrial disorder    Depression     stable    Diabetes mellitus ([facility]     Difficulty swallowing     for EGD 10-8-19     Environmental and seasonal allergies     Excessive physiologic tremor 5/24/2021    Fatty liver     Full dentures     GERD (gastroesophageal reflux disease)     Gout     past hx of    Herniated cervical disc     limited rom of head and neck    History of swelling of feet     Hypertension     Lymphedema of lower extremity 09/06/2012    Mitochondrial cytopathy (HCC)     s/s muscle and nerve pain, difficulty breathing, seizures, difficulty swallowing, digestive disorders- Dr. Marylen Dame [facility]    Muscle weakness (generalized)     Information obtained from [address] [place] [address] Need for assistance with personal care     Information obtained from [address] [place] [address] Neuropathy     at feet    Obesity     PETR (obstructive sleep apnea)     no CPAP dt insurance will not pay for    Osteoarthritis of left knee     Information obtained from [address] [place] [address] PONV (postoperative nausea and vomiting)     Seizures ([address]     last approx 6-13-19- f/u w/ PCP  Granmal, flares up in heat/ summer time - no recent issues as of 10-4-19     Spinal headache     Thyroid disease        Past Surgical History:        Procedure Laterality Date    ANKLE FRACTURE SURGERY Right 2/14/2022    RIGHT ANKLE IRRIGAITON AND DEBRIDEMENT WITH EXTERNAL FIXATOR AND LACERATION REPAIR performed by Myrna Stroud MD at [facility]      with Hysterectomy / unknown    BACK SURGERY  last one 1995    lumbar x 2    CARDIAC CATHETERIZATION Right 6-6-2013    Dr. Ofelia Cleveland Radial, no stents placed, no blockages    [address]    open with gastric bypass    COLONOSCOPY N/A 9/18/2018    COLONOSCOPY POLYPECTOMY HOT BIOPSY performed by Sherley Donaldson MD at [address] [facility] COLONOSCOPY N/A 10/8/2019    COLONOSCOPY POLYPECTOMY SNARE/COLD BIOPSY performed by Sherley Donaldson MD at [address] [facility] EGD COLONOSCOPY N/A 10/8/2019    EGD ESOPHAGOGASTRODUODENOSCOPY DILATATION performed by Sherley Donaldson MD at [address] [facility], DIAGNOSTIC      ESOPHAGEAL DILATATION  9/18/2018    ESOPHAGEAL DILATION Hermes Bring performed by Sherley Donaldson MD at [facility] Bilateral 2007,2017    knees    KNEE SURGERY Bilateral     scope    MYOMECTOMY      NERVE BLOCK Left 10 02 2013    lumbar paravertebral facet #2    NERVE BLOCK Left 10 9 13    hip inj #1    NERVE BLOCK Left 10/16/13    hip injection    NERVE BLOCK Left 10/29/2014    left lumbar transforaminal nerve lbock  #1    NERVE BLOCK Left 2014    lumbar left transforaminal nerve block  #2    NERVE BLOCK Left 12 8 14    lumbar transforaminal #3    NERVE BLOCK Right 3/30/15    cervical transforaminal #1    NERVE BLOCK Right 2015    right cervical transforaminal nerve block  #2    NERVE BLOCK Right 4/13/15    cervical transforaminal #3    NERVE BLOCK Left 7/6/15    knee injection #1    NERVE BLOCK  07/20/15    left genicular nerve block/knee #2    NERVE BLOCK Left 10 1 15    lumb transforam #1    NERVE BLOCK  10/26/15    left lumbar transforaminal nerve block #3    NERVE BLOCK Bilateral 2021    #1 BILATERAL SACROILIAC JOINT INJECTION UNDER FLUORO performed by Jerri Santiago MD at [facility] 6 Left 11 25 13    lumbar radiofreq    OTHER SURGICAL HISTORY  3/28/2016    stage 1, 3 day percutanous trial [employer] plugin lumbar spinal cord stimulator    OTHER SURGICAL HISTORY      racz procedure / lower back    IA COLONOSCOPY FLX DX W/COLLJ SPEC WHEN PFRMD N/A 3/20/2018    COLONOSCOPY DIAGNOSTIC OR SCREENING performed by Rex Cr MD at [place] [address] EGD TRANSORAL BIOPSY SINGLE/MULTIPLE N/A 3/20/2018    EGD BIOPSY performed by Rex Cr MD at [address] [place] [address]         Social History:    Social History     Tobacco Use    Smoking status: Former Smoker     Packs/day: 0.75     Years: 42.00     Pack years: 31.50     Types: Cigarettes     Start date: 1990     Quit date: 2021     Years since quittin.1    Smokeless tobacco: Never Used   Substance Use Topics    Alcohol use: No     Alcohol/week: 0.0 standard drinks                                Counseling given: Not Answered      Vital Signs (Current):   Vitals:    22 1458 03/02/22 0759   TempSrc:  Temporal   Weight: 238 lb (108 kg) 238 lb (108 kg)   Height: 5' 7\" (1.702 m) 5' 7\" (1.702 m)                                              BP Readings from Last 3 Encounters:   02/16/22 118/77   02/14/22 (!) 108/55   01/25/22 132/63       NPO Status: Time of last liquid consumption: 2200                        Time of last solid consumption: 2200                        Date of last liquid consumption: 03/01/22                        Date of last solid food consumption: 03/01/22    BMI:   Wt Readings from Last 3 Encounters:   03/02/22 238 lb (108 kg)   02/13/22 245 lb (111.1 kg)   01/06/22 258 lb 13.1 oz (117.4 kg)     Body mass index is 37.28 kg/m². CBC:   Lab Results   Component Value Date    WBC 6.0 02/15/2022    RBC 3.13 02/15/2022    HGB 9.8 02/15/2022    HCT 31.4 02/15/2022    .3 02/15/2022    RDW 13.8 02/15/2022     02/15/2022       CMP:   Lab Results   Component Value Date     02/15/2022    K 4.0 02/15/2022    K 3.8 01/22/2022     02/15/2022    CO2 29 02/15/2022    BUN 11 02/15/2022    CREATININE 0.7 02/15/2022    GFRAA >60 02/15/2022    LABGLOM >60 02/15/2022    GLUCOSE 85 02/15/2022    GLUCOSE 93 05/24/2012    PROT 6.5 02/13/2022    CALCIUM 7.9 02/15/2022    BILITOT 0.2 02/13/2022    ALKPHOS 133 02/13/2022    AST 24 02/13/2022    ALT 20 02/13/2022       POC Tests: No results for input(s): POCGLU, POCNA, POCK, POCCL, POCBUN, POCHEMO, POCHCT in the last 72 hours.     Coags:   Lab Results   Component Value Date    PROTIME 10.8 02/13/2022    PROTIME 10.8 05/07/2012    INR 1.0 02/13/2022    APTT 23.4 02/14/2022       HCG (If Applicable): No results found for: PREGTESTUR, PREGSERUM, HCG, HCGQUANT     ABGs:   Lab Results   Component Value Date    T9GAHEXF 94.0 05/06/2012        Type & Screen (If Applicable):  No results found for: LABABO, LABRH    Drug/Infectious Status (If Applicable):  Lab Results   Component Value Date    HEPCAB NON REACT 05/21/2013 COVID-19 Screening (If Applicable):   Lab Results   Component Value Date    COVID19 Not Detected 2022    COVID19 Not Detected 02/10/2021       EK2022  Normal sinus rhythm with premature supraventricular complexes  RSR' or QR pattern in V1 suggests right ventricular conduction delay  Abnormal ECG  When compared with ECG of 2022 23:26,  Significant changes have occurred  Confirmed by Teri Bach ([id number]) on 2022 9:23:43 PM      Anesthesia Evaluation  Patient summary reviewed and Nursing notes reviewed   history of anesthetic complications: PONV. Airway: Mallampati: III  TM distance: >3 FB   Neck ROM: limited  Mouth opening: > = 3 FB Dental:          Pulmonary: breath sounds clear to auscultation  (+) sleep apnea: on noncompliant,  asthma:     (-) not a current smoker          Patient did not smoke on day of surgery. Cardiovascular:  Exercise tolerance: poor (<4 METS),   (+) hypertension:, hyperlipidemia      ECG reviewed  Rhythm: regular  Rate: normal           Beta Blocker:  Dose within 24 Hrs         Neuro/Psych:   (+) seizures: well controlled, psychiatric history:depression/anxiety              ROS comment: Cervical spondylolysis  Degenerative Osteoarthritis thoracic spine  Thoracic facet syndrome  Cervical facet syndrome  Facet syndrome, lumbar  Neural foraminal stenosis of lumbar spine  Lumbar radiculopathy  DDD (degenerative disc disease), cervical  Neural foraminal stenosis of cervical spine  Protruded cervical disc  Cervical radiculopathy  Postlaminectomy syndrome, lumbar  Neuropathic pain syndrome (non-herpetic)  Chronic pain     GI/Hepatic/Renal:   (+) GERD:,           Endo/Other:    (+) Diabetes, : arthritis: OA., .                  ROS comment: Mitochondrial cytopathy ([facility] Abdominal:             Vascular: negative vascular ROS.          Other Findings:           Anesthesia Plan      general     ASA 4     (#20 Left Forearm)  Induction: intravenous. MIPS: Postoperative opioids intended and Prophylactic antiemetics administered. Anesthetic plan and risks discussed with patient. Use of blood products discussed with patient whom consented to blood products. Plan discussed with attending and CRNA. Kiarra Navas RN   3/2/2022  Chart reviewed pt id agree with plan Deon Harada, APRN - CRNA      DOS STAFF ADDENDUM:    Patient seen and chart reviewed. Physical exam and history updated as indicated. NPO status confirmed. Anesthesia options and plan discussed including risks benefits with patient/legal guardian and family as available. Concerns and questions addressed. Consent verbalized to proceed.   Anesthesia plan, options and intraoperative/postoperative concerns discussed with care team.    Shawn Henning MD, MD  3/2/2022  9:30 AM

## 2022-03-02 NOTE — OP NOTE
Operative Note      Patient: Angelic Melissa  YOB: 1960  MRN: [id number]    Date of Procedure: 3/2/2022    Pre-Op Diagnosis:  1. Open right tibial pilon and fibular shaft fractures status post I&D, spanning external fixation  2. Right distal radius fracture    Post-Op Diagnosis: Same       Procedure(s):  1. Right tibial pilon and fibular shaft fractures open reduction internal fixation of tibia and fibula   2. Removal right ankle spanning external fixation     Surgeon(s):  Giacomo Townsend DO    Assistant:   Resident: Kaitlin Esparza DO; Stew Akhtar DO    Anesthesia: General    Estimated Blood Loss (mL): 235     Complications: None    Specimens:   * No specimens in log *    Implants:  Implant Name Type Inv.  Item Serial No.  Lot No. LRB No. Used Action   SCREW BNE L30MM DIA3.5MM REAGAN S STL ST NONCANNULATED RAPHAEL - [id number]  SCREW BNE L30MM DIA3.5MM REAGAN S STL ST NONCANNULATED RAPHAEL  DEPU[employer]  Right 1 Implanted   SCREW BNE L32MM DIA3.5MM REAGAN S STL ST NONCANNULATED RAPHAEL - [id number]  SCREW BNE L32MM DIA3.5MM REAGAN S STL ST NONCANNULATED RAPHAEL  DEPU[employer]WD  Right 3 Implanted   PLATE BNE [id number] 10 H ST R MED DST TIB S STL RAPHAEL COMPR LO - [id number]  PLATE BNE [id number] 10 H ST R MED DST TIB S STL RAPHAEL COMPR LO  DEPUY [employer]WD  Right 1 Implanted   SCREW BNE L34MM DIA3.5MM REAGAN S STL ST NONCANNULATED RAPHAEL - [id number]  SCREW BNE L34MM DIA3.5MM REAGAN S STL ST NONCANNULATED RAPHAEL  DEPUY [employer]WD  Right 1 Implanted   SCREW BNE L40MM DIA3.5MM REAGAN S STL ST RAPHAEL FULL THRD - [id number]  SCREW BNE L40MM DIA3.5MM REAGAN S STL ST RAPHAEL FULL THRD  DEPUY [employer]WD  Right 1 Implanted   SCREW BNE L48MM DIA3.5MM REAGAN S STL ST RAPHAEL FULL THRD - [id number]  SCREW BNE L48MM DIA3.5MM REAGAN S STL ST RAPHAEL FULL THRD  DEPUY [employer]WD  Right 1 Implanted   SCREW BNE L36MM DIA3.5MM REAGAN S STL ST RAPHAEL FULL THRD - [id number]  SCREW BNE L36MM DIA3.5MM REAGAN S STL ST RAPHAEL FULL THRD  DEPUY [employer]WD  Right 2 Implanted   SCREW BNE L20MM DIA3.5MM REAGAN S STL ST RAPHAEL FULL THRD - [id number]  SCREW BNE L20MM DIA3.5MM REAGAN S STL ST RAPHAEL FULL THRD  DEPUY [employer]WD  Right 2 Implanted   SCREW BNE L45MM DIA3.5MM REAGAN S STL ST RAPHAEL FULL THRD - [id number]  SCREW BNE L45MM DIA3.5MM REAGAN S STL ST RAPHAEL FULL THRD  DEPUY [employer]WD  Right 1 Implanted   SCREW BNE L44MM DIA3. 5MM S STL ST FULL THRD T15 STARDRV - [id number]  SCREW BNE L44MM DIA3. 5MM S STL ST FULL THRD T15 STARDRV  DEPUY SYNTHES [facility]-WD  Right 1 Implanted   SCREW BNE L24MM DIA3.5MM REAGAN S STL ST RAPHAEL FULL THRD - [id number]  SCREW BNE L24MM DIA3.5MM REAGAN S STL ST RAPHAEL FULL THRD  DEPUY [employer]WD  Right 1 Implanted   SCREW BNE L110MM DIA3. 5MM HD DIA6MM REAGAN S STL ST W/ SM HEX - [id number]  SCREW BNE L110MM DIA3. 5MM HD DIA6MM REAGAN S STL ST W/ SM HEX  DEPU[employer]WD  Right 2 Implanted   SCREW BNE L36MM DIA3.5MM REAGAN S STL ST NONCANNULATED RAPHAEL - [id number]  SCREW BNE L36MM DIA3.5MM REAGAN S STL ST NONCANNULATED RAPHAEL  DEPUY [employer]WD  Right 1 Implanted         Drains:   [REMOVED] Urethral Catheter (Removed)   Urine Color Yellow 02/16/22 2000   Urine Appearance Clear 02/16/22 2000   Output (mL) 2000 mL 02/16/22 0936       [REMOVED] External Urinary Catheter (Removed)       Detailed Description of Procedure:   Patient brought to the operating suite where she was placed on upper table supine position. She received a general anesthetic by the department of anesthesia as well as 2 g of Ancef intravenously. Surgical timeout was performed per protocol by all members of the surgical team.  The right lower extremity was now prepped with Betadine and the external fixator bars and clamps were now removed. The leg was then reprepped with ChloraPrep. Longitudinal incision was then made over the distal medial tibia skin was incised with a scalpel electrocautery taken to subtends tissues. Patient had very dense adipose layer that was highly venous. ointment Xeroform 4 x 4 fluffs web roll and a well-padded 3 sided short leg splint with ankle neutral position. Patient was then awoken uneventfully from anesthetic transferred onto the hospital [facility] to the postanesthesia care in stable condition. Postoperative plans:  Patient be admitted overnight for 24 hours of postoperative antibiotics, pain control. She will see PT evaluation be strict nonweightbearing affected extremity. She will maintain her brace for her right distal radius fracture which we have been treating closed. She will be restarted chemical DVT prophylaxis postop day 1. Once her pain is appropriate controlled she like to be discharged back to her rehab facility at [facility]. She will then follow-up in office in 2 weeks for repeat evaluation. Electronically signed by Bernabe Calderon DO on 3/2/2022     NOTE: This report was transcribed using voice recognition software.  Every effort was made to ensure accuracy; however, inadvertent computerized transcription errors may be present

## 2022-03-02 NOTE — LETTER
[place] Department Medicaid  CERTIFICATION OF NECESSITY  FOR NON-EMERGENCY TRANSPORTATION   BY GROUND AMBULANCE      Individual Information   1. Name: Kacie Proper 2. [place] Medicaid Billing Number:    3. Address: [place] [address]       Transportation Provider Information   4. Provider Name: Benitez Teodoro   5. [place] Provider Number:  National Provider Identifier (NPI):      Certification  7. Criteria:  During transport, this individual requires:  [x] Medical treatment or continuous     supervision by an EMT. [] The administration or regulation of oxygen by another person. [] Supervised protective restraint. 8. Period Beginning Date: 03/03/2022   9. Length  [x] Not more than 3 day(s)  [] One Year     Additional Information Relevant to Certification   10. Comments or Explanations, If Necessary or Appropriate   HARDWARE REMOVAL RIGHT ANKLE. PLATE PLACEMENT. NON-WEIGHT BEARING ON RIGHT LEG     Certifying Practitioner Information   11. Name of Practitioner: DR Cristine Schaumann    12. [place] Medicaid Provider Number, If Applicable:  [address] Provider Identifier (NPI):      Signature Information   14. Date of Signature: 03/2/2022 15. Name of Person Signing: Electronically signed by Jaquelin Castro RN on 3/2/2022 at 3:03 PM     16. Signature and Professional Designation: DR BUSHRA MARTIN/Electronically signed by Jaquelin Castro RN  DISCHARGE PLANNER on 3/2/2022 at 3:04 PM      [facility] [address]  Rev. 7/2015                                                                    [address] Admission Date/Time: 3/2/22 [address] [place] [address] Account: [de-identified]    MRN: [id number]    Patient:  Preston Huizar Serial #: [id number]            ENCOUNTER          Patient Class: Observation Private Enc?   No Unit RM BD: SEYZ 5S NS 5217/5217-A   Hospital Service: ORT   ADM DX: Closed fracture of right*   ADM Provider: Bhavana Kyle DO   Procedure: OPEN TREATMENT FRACTURE *   ATT Provider: Randal Fuentes DO   REF Provider:        PATIENT  Name: Kavon Miller : 1960 (61 yrs)   [place] [address], [address] Sex: Female   [place]: [place] [id number]         Marital Status:    Employer: RETIRED         Church: [employer]   Primary Care Provider: Kathryn Villalobos MD         Primary Phone: [phone] [address]   Contact Name Legal Guardian? Relationship to Patient Home Phone Work Phone   1. Estefania Rizvi  2. Cristela Honor    No Other  Brother/Sister [phone]              GUARANTOR            Guarantor: Kavon Miller     : 1960   Address: [address] [place] [address];[address] Sex: Female     [place] [address]     Relation to Patient: Self       Home Phone: [phone]   Guarantor ID: [id number]       Work Phone:     Guarantor Employer: RETIRED         Status: RETIRED      COVERAGE        PRIMARY INSURANCE   Payor: [place]ICAID [place] Plan: MEDICAID [facility] DEPT OF*   Payor Address: [place] [address]       Group Number:   Insurance Type: INDEMNITY   Subscriber Name: Ambrose Gordon : 1960   Subscriber ID: [id number] Pat. Rel. to Sub: Self   SECONDARY INSURANCE   Payor:   Plan:     Payor Address:  ,           Group Number:   Insurance Type:     Subscriber Name:   Subscriber :     Subscriber ID:   Pat.  Rel. to Sub:

## 2022-03-02 NOTE — ANESTHESIA PROCEDURE NOTES
Peripheral Block    Patient location during procedure: pre-op  End time: 3/2/2022 9:26 AM  Staffing  Performed: anesthesiologist   Anesthesiologist: Shira Hood MD  Preanesthetic Checklist  Completed: patient identified, IV checked, site marked, risks and benefits discussed, surgical consent, monitors and equipment checked, pre-op evaluation, timeout performed, anesthesia consent given, oxygen available and patient being monitored  Peripheral Block  Patient position: supine  Prep: ChloraPrep  Patient monitoring: cardiac monitor, continuous pulse ox, frequent blood pressure checks and IV access  Block type: Sciatic  Laterality: right  Injection technique: single-shot  Guidance: ultrasound guided  Local infiltration: lidocaine  Infiltration strength: 1 %  Dose: 3 mL  Popliteal  Provider prep: mask and sterile gloves  Local infiltration: lidocaine  Needle  Needle gauge: 21 G  Needle length: 10 cm  Needle localization: ultrasound guidance  Assessment  Injection assessment: negative aspiration for heme, no paresthesia on injection and local visualized surrounding nerve on ultrasound  Paresthesia pain: none  Slow fractionated injection: yes  Hemodynamics: stable  Additional Notes  U/S 57925.  Time out performed.  Well tolerated      Medications Administered  Ropivacaine (NAROPIN) injection 0.5%, 30 mL  Reason for block: post-op pain management and at surgeon's request

## 2022-03-02 NOTE — ANESTHESIA PROCEDURE NOTES
Peripheral Block    Patient location during procedure: pre-op  End time: 3/2/2022 9:27 AM  Staffing  Performed: anesthesiologist   Anesthesiologist: Kathy Ellsworth MD  Preanesthetic Checklist  Completed: patient identified, IV checked, site marked, risks and benefits discussed, surgical consent, monitors and equipment checked, pre-op evaluation, timeout performed, anesthesia consent given, oxygen available and patient being monitored  Peripheral Block  Patient position: supine  Prep: ChloraPrep  Patient monitoring: cardiac monitor, continuous pulse ox, frequent blood pressure checks and IV access  Block type: Femoral  Laterality: right  Injection technique: single-shot  Guidance: ultrasound guided  Local infiltration: lidocaine  Infiltration strength: 1 %  Dose: 3 mL  Adductor canal  Provider prep: mask and sterile gloves  Local infiltration: lidocaine  Needle  Needle gauge: 21 G  Needle length: 10 cm  Needle localization: ultrasound guidance  Assessment  Injection assessment: negative aspiration for heme, no paresthesia on injection and local visualized surrounding nerve on ultrasound  Paresthesia pain: none  Slow fractionated injection: yes  Hemodynamics: stable  Additional Notes  U/S 00769.  Time out performed. Well tolerated.         Medications Administered  Ropivacaine (NAROPIN) injection 0.5%, 15 mL  Reason for block: post-op pain management and at surgeon's request

## 2022-03-02 NOTE — H&P
Orthopaedic H&P Note        Sarai Mari is a 64 y.o. female, her YOB: 1960 with the following history as recorded in [facility]:    Patient Active Problem List    Diagnosis Date Noted    Neuropathic pain syndrome (non-herpetic) 08/18/2016    Chronic pain 08/18/2016    Postlaminectomy syndrome, lumbar 06/10/2015    Lumbar radiculopathy 11/12/2014    Neural foraminal stenosis of lumbar spine 10/29/2014    Closed left ankle fracture, initial encounter 02/14/2022    Closed fracture of right distal radius 02/01/2022    Failure to thrive in adult 01/23/2022    Rhabdomyolysis 01/06/2022    Excessive physiologic tremor 05/24/2021    Lumbosacral spondylosis without myelopathy 03/10/2021    Sacroiliac dysfunction 03/10/2021    DDD (degenerative disc disease), lumbar 03/10/2021    Thoracic degenerative disc disease 03/10/2021    Chest pain 09/09/2020    Adenoma of right adrenal gland     Chronic respiratory failure with hypoxia (HCC)     Mitochondrial cytopathy ([address]     GERD (gastroesophageal reflux disease)     Fatty liver     Depression     PETR (obstructive sleep apnea)     Chronic back pain     Bipolar affective ([address]     Protruded cervical disc 03/30/2015    Cervical radiculopathy 03/30/2015    DDD (degenerative disc disease), cervical 03/12/2015    Neural foraminal stenosis of cervical spine 03/12/2015    Status post total knee replacement, right 07/24/2013    Cervical spondylolysis 07/24/2013    Degenerative Osteoarthritis thoracic spine 07/24/2013    Thoracic facet syndrome 07/24/2013    Cervical facet syndrome 07/24/2013    Facet syndrome, lumbar 07/24/2013    Degenerative Osteoarthritis of both knees 07/15/2013    Lymphedema of lower extremity 09/06/2012    Debility 05/06/2012    Obesity 05/06/2012    Bipolar 1 disorder ([address] 05/06/2012    Generalized seizure disorder ([address] 05/06/2012    Cervicalgia 11/02/2011    Asthma     Hyperlipidemia     difficulty swallowing, digestive disorders- Dr. Naomi Flores [facility]    Muscle weakness (generalized)     Information obtained from [address] [place] [address] Need for assistance with personal care     Information obtained from [address] [place] [address] Neuropathy     at feet    Obesity     PETR (obstructive sleep apnea)     no CPAP dt insurance will not pay for    Osteoarthritis of left knee     Information obtained from [address] [place] [address] PONV (postoperative nausea and vomiting)     Seizures ([address]     last approx 6-13-19- f/u w/ PCP  Granmal, flares up in heat/ summer time - no recent issues as of 10-4-19     Spinal headache     Thyroid disease      Past Surgical History:   Procedure Laterality Date    ANKLE FRACTURE SURGERY Right 2/14/2022    RIGHT ANKLE IRRIGAITON AND DEBRIDEMENT WITH EXTERNAL FIXATOR AND LACERATION REPAIR performed by Delphine Sevilla MD at [address]      with Hysterectomy / unknown    BACK SURGERY  last one 1995    lumbar x 2    CARDIAC CATHETERIZATION Right 6-6-2013    Dr. Rosa Perdomo Radial, no stents placed, no blockages    [address]    open with gastric bypass    COLONOSCOPY N/A 9/18/2018    COLONOSCOPY POLYPECTOMY HOT BIOPSY performed by Derrick Burgess MD at [address] [facility] COLONOSCOPY N/A 10/8/2019    COLONOSCOPY POLYPECTOMY SNARE/COLD BIOPSY performed by Derrick Burgess MD at [address] [facility] EGD COLONOSCOPY N/A 10/8/2019    EGD ESOPHAGOGASTRODUODENOSCOPY DILATATION performed by Derrick Burgess MD at [address] [facility] [place], DIAGNOSTIC      ESOPHAGEAL DILATATION  9/18/2018    ESOPHAGEAL DILATION Homero Riojas performed by Derrick Burgess MD at [facility] Bilateral 2007,2017    knees    KNEE SURGERY Bilateral     scope    MYOMECTOMY      NERVE BLOCK Left 10 02 2013    lumbar paravertebral facet #2    NERVE BLOCK Left 10 9 13    hip inj #1    NERVE BLOCK Left 10/16/13 hip injection    NERVE BLOCK Left 10/29/2014    left lumbar transforaminal nerve lbock  #1    NERVE BLOCK Left 2014    lumbar left transforaminal nerve block  #2    NERVE BLOCK Left 12 8 14    lumbar transforaminal #3    NERVE BLOCK Right 3/30/15    cervical transforaminal #1    NERVE BLOCK Right 2015    right cervical transforaminal nerve block  #2    NERVE BLOCK Right 4/13/15    cervical transforaminal #3    NERVE BLOCK Left 7/6/15    knee injection #1    NERVE BLOCK  07/20/15    left genicular nerve block/knee #2    NERVE BLOCK Left 10 1 15    lumb transforam #1    NERVE BLOCK  10/26/15    left lumbar transforaminal nerve block #3    NERVE BLOCK Bilateral 2021    #1 BILATERAL SACROILIAC JOINT INJECTION UNDER FLUORO performed by Alfie Jacinto MD at [facility] OTHER SURGICAL HISTORY Left 11 25 13    lumbar radiofreq    OTHER SURGICAL HISTORY  3/28/2016    stage 1, 3 day percutanous trial [employer] lumbar spinal cord stimulator    OTHER SURGICAL HISTORY      racz procedure / lower back    TX COLONOSCOPY FLX DX W/COLLJ SPEC WHEN PFRMD N/A 3/20/2018    COLONOSCOPY DIAGNOSTIC OR SCREENING performed by Nimo Monroe MD at [facility] EGD TRANSORAL BIOPSY SINGLE/MULTIPLE N/A 3/20/2018    EGD BIOPSY performed by Nimo Monroe MD at [address] [place] [address]       Family History   Problem Relation Age of Onset    Heart Disease Mother     Cancer Father     Arthritis Other     Cancer Other     Depression Other     Heart Disease Other     Hypertension Other     Mental Illness Other     Stroke Other      Social History     Tobacco Use    Smoking status: Former Smoker     Packs/day: 0.75     Years: 42.00     Pack years: 31.50     Types: Cigarettes     Start date: 1990     Quit date: 2021     Years since quittin.1    Smokeless tobacco: Never Used   Substance Use Topics    Alcohol use: No     Alcohol/week: 0.0 standard drinks Review of Systems   Constitutional: Negative for fever and chills. HENT: Negative for ear pain, sore throat and sinus pressure. Eyes: Negative for pain, discharge and redness. Respiratory: Negative for cough, shortness of breath and wheezing. Cardiovascular: Negative for chest pain. Gastrointestinal: Negative for nausea, vomiting, diarrhea and abdominal distention. Genitourinary: Negative for dysuria and frequency. Musculoskeletal: Negative for back pain and arthralgias. All other systems reviewed and negative. R distal Radius Fracture- Nonop  DOI 1/6/22     OP:SURGEON: Dr. Eric Waters DO  DATE OF PROCEDURE: 2/14/2022  PROCEDURE: 1.  Closed reduction and manipulation of right tibial pilon and fibula fractures  2. Application of right ankle spanning external fixator for right tibial pilon and fibula shaft fractures  3. Closure of 7 cm right anterior ankle laceration    Subjective:  Anne Mchugh is following up from the above surgery. She is NWB on right lower extremity, maintained external fixator and dressings. She ambulates with assistive device, walker/wheelchair. Pain to extremity is moderate and is  taking prescribed pain medication, see MAR. They denies numbness, tingling, weakness to the right lower extremity. Denies calf pain, chest pain, or shortness of breath. Patient continues to use DVT prophylaxis, Lovenox 30 mg BID. Patient is  participating in therapy, SNF. Patient also being treated for right distal radius fracture, nonop management. Patient states right wrist doing well. Patient is here today for surgical treatment for her right pilon and fibula fractures.     Physical Exam  /70   Pulse 63   Temp 97.6 °F (36.4 °C) (Temporal)   Resp 20   Ht 5' 7\" (1.702 m)   Wt 238 lb (108 kg)   LMP 08/31/1988   SpO2 93%   BMI 37.28 kg/m²   O:   CONSTITUTIONAL: awake, alert, cooperative, no apparent distress, and appears stated age  EYES: Lids and lashes normal, pupils equal, round and reactive to light, extra ocular muscles intact, sclera clear, conjunctiva normal  ENT: Normocephalic, without obvious abnormality, atraumatic, external ears without lesions, oral pharynx with moist mucus membranes  NECK: Trachea midline, skin normal  LUNGS: No increased work of breathing, good air exchange  CARDIOVASCULAR: 2+ pulses throughout and capillary Refill less than 2 seconds  ABDOMEN: soft, non-distended, non-tender and no rebound tenderness or guarding  NEUROLOGIC: Awake, alert, oriented to name, place and time. Cranial nerves II-XII are grossly intact. Motor is 5 out of 5 bilaterally. Sensory is intact. Right Lower Extremity  Skin clean dry and intact, without signs of infection   Incision healing well, no significant drainage, no dehiscence, no significant erythema- sutures not yet ready for removal   moderate edema noted but + wrinkle sign and the distal tibia and ankle  Compartments supple throughout thigh and leg  Calf supple and not tender  Demonstrates active knee flexion/extension, wiggles all toes  States sensation intact to touch in sural, deep peroneal, superficial peroneal, saphenous, posterior tibial  nerve distributions to foot/ankle. Palpable dorsalis pedis and posterior tibialis pulses, cap refill brisk in toes, foot warm/perfused.   External fixator intact without signs of loosening  No loosening or irritation or pin sites  Right Upper Extremity  Velcro brace taken down  Skin intact circumferentially  No TTP to the distal radius  Compartments soft and compressible  +AIN/PIN/Ulnar nerve function intact grossly  Arc of motion to the wrist without significant discomfort  +Radial pulse, Brisk Cap refill, hand warm and perfused  Sensation intact to touch in radial/ulnar/median nerve distributions to hand     Xrays Reviewed  XR:   THREE XRAY VIEWS OF THE RIGHT ANKLE       2/21/2022 11:27 am       COMPARISON:   None       HISTORY:   ORDERING SYSTEM PROVIDED HISTORY: Closed fracture of right ankle, initial   encounter   TECHNOLOGIST PROVIDED HISTORY:   Reason for exam:->fx   What reading provider will be dictating this exam?->CRC       FINDINGS:   The patient is status post fracture of the distal fibula and distal tibia. External fixator hardware is seen traversing the fractures of the distal   tibia and fibula.  A fixating renny traverses the right great toe, calcaneus   and tibia.       There is near anatomic alignment of the ankle fractures.           Impression   1. There is near anatomic alignment of the fractures through the distal tibia   and distal fibula status post external fixator placement.         XRAY VIEWS OF THE RIGHT WRIST       2/21/2022 11:27 am       COMPARISON:   02/01/2022       HISTORY:   ORDERING SYSTEM PROVIDED HISTORY: Other closed intra-articular fracture of   distal end of right radius, initial encounter   TECHNOLOGIST PROVIDED HISTORY:   Reason for exam:->fx   What reading provider will be dictating this exam?->CRC       FINDINGS:   Interval removal of splint material.  Impacted fracture deformity of the   distal radial metaphysis demonstrates some increase callus formation   consistent with healing.  Fracture is still visualized.  Some dorsal   angulation of the distal fracture fragments appear unchanged.  No other   interval change.           Impression   Healing distal radius fracture.          Assessment:    Diagnosis Orders   1.  Other closed intra-articular fracture of distal end of right radius with routine healing, subsequent encounter      2. Closed right pilon fracture, initial encounter              PLAN:   OR today for right tibial pilon and fibular fractures open reduction with internal fixation, possible bone allograft or bone substrate application, removal ankle spanning external fixator    I have explained the risks and complications of the recommended surgery with the patient at length, as well as discussed potential treatment alternatives including nonoperative management. These risks include but are not limited to death or complication from anesthesia, continued pain, nerve tendon or vascular injury, infection, nonunion or malunion, symptomatic hardware or hardware failure, deep vein thrombosis or pulmonary embolism, wound healing complications, unforeseen complications, and need for further surgery, etc.  Patient understood this, asked appropriate questions, which were all answered, and she has elected to proceed with the procedure. Electronically Signed By  Cristo Lu DO  3/2/22    NOTE: This report was transcribed using voice recognition software.  Every effort was made to ensure accuracy; however, inadvertent computerized transcription errors may be present

## 2022-03-02 NOTE — CARE COORDINATION
3/2/22 Transition of Care: patient is here observation after having hardware removed from right ankle. She is from [facility] and will return at discharge. Envelope/ambulance form completed and placed in soft chart.  Electronically signed by Marie Norman RN CM on 3/2/2022 at 3:07 PM

## 2022-03-02 NOTE — ANESTHESIA POSTPROCEDURE EVALUATION
Department of Anesthesiology  Postprocedure Note    Patient: Nola Vargsa  MRN: [id number]  YOB: 1960  Date of evaluation: 3/2/2022  Time:  2:19 PM     Procedure Summary     Date: 03/02/22 Room / Location: [facility] OR 09 / [facility]    Anesthesia Start: 0930 Anesthesia Stop: 8146    Procedure: RIGHT LOWER EXTREMITY REMOVAL EXTERNAL FIXATOR, RIGHT PILON OPEN REDUCTION INTERNAL FIXATOR--SYNTHES (FACILITY) (Right Leg Lower) Diagnosis: (RIGHT CLOSED PILON FRACTURE)    Surgeons: Helen Dang DO Responsible Provider: Kathy Ellsworth MD    Anesthesia Type: general ASA Status: 4          Anesthesia Type: general    Avelino Phase I: Avelino Score: 9    Avelino Phase II:      Last vitals: Reviewed and per EMR flowsheets.        Anesthesia Post Evaluation    Patient location during evaluation: PACU  Patient participation: complete - patient participated  Level of consciousness: awake and alert  Airway patency: patent  Nausea & Vomiting: no nausea and no vomiting  Complications: no  Cardiovascular status: hemodynamically stable and blood pressure returned to baseline  Respiratory status: acceptable  Hydration status: euvolemic

## 2022-03-03 VITALS
OXYGEN SATURATION: 95 % | RESPIRATION RATE: 18 BRPM | WEIGHT: 238 LBS | TEMPERATURE: 97.4 F | DIASTOLIC BLOOD PRESSURE: 80 MMHG | BODY MASS INDEX: 37.35 KG/M2 | SYSTOLIC BLOOD PRESSURE: 95 MMHG | HEART RATE: 62 BPM | HEIGHT: 67 IN

## 2022-03-03 LAB
ANION GAP SERPL CALCULATED.3IONS-SCNC: 7 MMOL/L (ref 7–16)
BUN BLDV-MCNC: 16 MG/DL (ref 6–23)
CALCIUM SERPL-MCNC: 9 MG/DL (ref 8.6–10.2)
CHLORIDE BLD-SCNC: 101 MMOL/L (ref 98–107)
CO2: 31 MMOL/L (ref 22–29)
CREAT SERPL-MCNC: 0.8 MG/DL (ref 0.5–1)
GFR AFRICAN AMERICAN: >60
GFR NON-AFRICAN AMERICAN: >60 ML/MIN/1.73
GLUCOSE BLD-MCNC: 79 MG/DL (ref 74–99)
HCT VFR BLD CALC: 35 % (ref 34–48)
HEMOGLOBIN: 11.1 G/DL (ref 11.5–15.5)
POTASSIUM REFLEX MAGNESIUM: 4.1 MMOL/L (ref 3.5–5)
SODIUM BLD-SCNC: 139 MMOL/L (ref 132–146)

## 2022-03-03 PROCEDURE — 6370000000 HC RX 637 (ALT 250 FOR IP): Performed by: INTERNAL MEDICINE

## 2022-03-03 PROCEDURE — 85018 HEMOGLOBIN: CPT

## 2022-03-03 PROCEDURE — 80048 BASIC METABOLIC PNL TOTAL CA: CPT

## 2022-03-03 PROCEDURE — 6370000000 HC RX 637 (ALT 250 FOR IP): Performed by: PHYSICAL THERAPY ASSISTANT

## 2022-03-03 PROCEDURE — 97530 THERAPEUTIC ACTIVITIES: CPT

## 2022-03-03 PROCEDURE — G0378 HOSPITAL OBSERVATION PER HR: HCPCS

## 2022-03-03 PROCEDURE — 97161 PT EVAL LOW COMPLEX 20 MIN: CPT

## 2022-03-03 PROCEDURE — 96372 THER/PROPH/DIAG INJ SC/IM: CPT

## 2022-03-03 PROCEDURE — 97535 SELF CARE MNGMENT TRAINING: CPT

## 2022-03-03 PROCEDURE — 96375 TX/PRO/DX INJ NEW DRUG ADDON: CPT

## 2022-03-03 PROCEDURE — 2580000003 HC RX 258: Performed by: PHYSICAL THERAPY ASSISTANT

## 2022-03-03 PROCEDURE — 6360000002 HC RX W HCPCS: Performed by: PHYSICAL THERAPY ASSISTANT

## 2022-03-03 PROCEDURE — 96376 TX/PRO/DX INJ SAME DRUG ADON: CPT

## 2022-03-03 PROCEDURE — 96374 THER/PROPH/DIAG INJ IV PUSH: CPT

## 2022-03-03 PROCEDURE — 85014 HEMATOCRIT: CPT

## 2022-03-03 PROCEDURE — 97165 OT EVAL LOW COMPLEX 30 MIN: CPT

## 2022-03-03 PROCEDURE — 36415 COLL VENOUS BLD VENIPUNCTURE: CPT

## 2022-03-03 RX ORDER — ALBUTEROL SULFATE 2.5 MG/3ML
2.5 SOLUTION RESPIRATORY (INHALATION) 3 TIMES DAILY
Status: DISCONTINUED | OUTPATIENT
Start: 2022-03-03 | End: 2022-03-03 | Stop reason: HOSPADM

## 2022-03-03 RX ORDER — BACLOFEN 10 MG/1
20 TABLET ORAL 4 TIMES DAILY
Status: DISCONTINUED | OUTPATIENT
Start: 2022-03-03 | End: 2022-03-03 | Stop reason: HOSPADM

## 2022-03-03 RX ORDER — TOPIRAMATE 100 MG/1
200 TABLET, FILM COATED ORAL 2 TIMES DAILY
Status: DISCONTINUED | OUTPATIENT
Start: 2022-03-03 | End: 2022-03-03 | Stop reason: HOSPADM

## 2022-03-03 RX ORDER — METOPROLOL SUCCINATE 25 MG/1
12.5 TABLET, EXTENDED RELEASE ORAL DAILY
Status: DISCONTINUED | OUTPATIENT
Start: 2022-03-03 | End: 2022-03-03 | Stop reason: HOSPADM

## 2022-03-03 RX ORDER — ESCITALOPRAM OXALATE 10 MG/1
20 TABLET ORAL 2 TIMES DAILY
Status: DISCONTINUED | OUTPATIENT
Start: 2022-03-03 | End: 2022-03-03 | Stop reason: HOSPADM

## 2022-03-03 RX ORDER — MECLIZINE HCL 12.5 MG/1
12.5 TABLET ORAL 3 TIMES DAILY PRN
Status: DISCONTINUED | OUTPATIENT
Start: 2022-03-03 | End: 2022-03-03 | Stop reason: HOSPADM

## 2022-03-03 RX ORDER — ZIPRASIDONE HYDROCHLORIDE 20 MG/1
20 CAPSULE ORAL NIGHTLY
Status: DISCONTINUED | OUTPATIENT
Start: 2022-03-03 | End: 2022-03-03 | Stop reason: HOSPADM

## 2022-03-03 RX ORDER — PANTOPRAZOLE SODIUM 40 MG/1
40 TABLET, DELAYED RELEASE ORAL
Status: DISCONTINUED | OUTPATIENT
Start: 2022-03-04 | End: 2022-03-03 | Stop reason: HOSPADM

## 2022-03-03 RX ORDER — LEVOCARNITINE 330 MG/1
330 TABLET ORAL 3 TIMES DAILY
Status: DISCONTINUED | OUTPATIENT
Start: 2022-03-03 | End: 2022-03-03 | Stop reason: HOSPADM

## 2022-03-03 RX ORDER — TRAZODONE HYDROCHLORIDE 50 MG/1
100 TABLET ORAL NIGHTLY
Status: DISCONTINUED | OUTPATIENT
Start: 2022-03-03 | End: 2022-03-03 | Stop reason: HOSPADM

## 2022-03-03 RX ORDER — MECOBALAMIN 5000 MCG
5 TABLET,DISINTEGRATING ORAL NIGHTLY
Status: DISCONTINUED | OUTPATIENT
Start: 2022-03-03 | End: 2022-03-03 | Stop reason: HOSPADM

## 2022-03-03 RX ORDER — DOCUSATE SODIUM 100 MG/1
100 CAPSULE, LIQUID FILLED ORAL 2 TIMES DAILY
Status: DISCONTINUED | OUTPATIENT
Start: 2022-03-03 | End: 2022-03-03 | Stop reason: HOSPADM

## 2022-03-03 RX ORDER — ALLOPURINOL 100 MG/1
200 TABLET ORAL 2 TIMES DAILY
Status: DISCONTINUED | OUTPATIENT
Start: 2022-03-03 | End: 2022-03-03 | Stop reason: HOSPADM

## 2022-03-03 RX ORDER — LORAZEPAM 0.5 MG/1
0.5 TABLET ORAL EVERY 6 HOURS PRN
Status: DISCONTINUED | OUTPATIENT
Start: 2022-03-03 | End: 2022-03-03 | Stop reason: HOSPADM

## 2022-03-03 RX ORDER — ASPIRIN 81 MG/1
81 TABLET ORAL 2 TIMES DAILY
Status: DISCONTINUED | OUTPATIENT
Start: 2022-03-03 | End: 2022-03-03 | Stop reason: HOSPADM

## 2022-03-03 RX ORDER — GABAPENTIN 100 MG/1
100 CAPSULE ORAL 3 TIMES DAILY
Status: DISCONTINUED | OUTPATIENT
Start: 2022-03-03 | End: 2022-03-03 | Stop reason: ALTCHOICE

## 2022-03-03 RX ORDER — UBIDECARENONE 75 MG
100 CAPSULE ORAL DAILY
Status: DISCONTINUED | OUTPATIENT
Start: 2022-03-03 | End: 2022-03-03 | Stop reason: HOSPADM

## 2022-03-03 RX ORDER — FAMOTIDINE 20 MG/1
20 TABLET, FILM COATED ORAL 2 TIMES DAILY
Status: DISCONTINUED | OUTPATIENT
Start: 2022-03-03 | End: 2022-03-03 | Stop reason: HOSPADM

## 2022-03-03 RX ORDER — MONTELUKAST SODIUM 10 MG/1
10 TABLET ORAL DAILY
Status: DISCONTINUED | OUTPATIENT
Start: 2022-03-03 | End: 2022-03-03 | Stop reason: HOSPADM

## 2022-03-03 RX ORDER — FUROSEMIDE 40 MG/1
40 TABLET ORAL 2 TIMES DAILY
Status: DISCONTINUED | OUTPATIENT
Start: 2022-03-03 | End: 2022-03-03 | Stop reason: HOSPADM

## 2022-03-03 RX ORDER — CHOLECALCIFEROL (VITAMIN D3) 50 MCG
5000 TABLET ORAL EVERY OTHER DAY
Status: DISCONTINUED | OUTPATIENT
Start: 2022-03-03 | End: 2022-03-03 | Stop reason: HOSPADM

## 2022-03-03 RX ORDER — FERROUS SULFATE 325(65) MG
325 TABLET ORAL
Status: DISCONTINUED | OUTPATIENT
Start: 2022-03-04 | End: 2022-03-03 | Stop reason: HOSPADM

## 2022-03-03 RX ORDER — CALCIUM CARBONATE 500(1250)
500 TABLET ORAL 2 TIMES DAILY
Status: DISCONTINUED | OUTPATIENT
Start: 2022-03-03 | End: 2022-03-03 | Stop reason: HOSPADM

## 2022-03-03 RX ORDER — GABAPENTIN 600 MG/1
600 TABLET ORAL 4 TIMES DAILY
Status: DISCONTINUED | OUTPATIENT
Start: 2022-03-03 | End: 2022-03-03 | Stop reason: HOSPADM

## 2022-03-03 RX ADMIN — OXYCODONE HYDROCHLORIDE 10 MG: 10 TABLET ORAL at 00:38

## 2022-03-03 RX ADMIN — POLYETHYLENE GLYCOL 3350 17 G: 17 POWDER, FOR SOLUTION ORAL at 12:46

## 2022-03-03 RX ADMIN — MORPHINE SULFATE 4 MG: 4 INJECTION, SOLUTION INTRAMUSCULAR; INTRAVENOUS at 13:34

## 2022-03-03 RX ADMIN — LEVOCARNITINE 330 MG: 330 TABLET ORAL at 12:39

## 2022-03-03 RX ADMIN — OXYCODONE HYDROCHLORIDE 10 MG: 10 TABLET ORAL at 10:50

## 2022-03-03 RX ADMIN — Medication 2000 MG: at 10:50

## 2022-03-03 RX ADMIN — ACETAMINOPHEN 650 MG: 325 TABLET ORAL at 12:47

## 2022-03-03 RX ADMIN — OXYCODONE HYDROCHLORIDE 10 MG: 10 TABLET ORAL at 05:28

## 2022-03-03 RX ADMIN — GABAPENTIN 100 MG: 100 CAPSULE ORAL at 07:57

## 2022-03-03 RX ADMIN — ALLOPURINOL 200 MG: 100 TABLET ORAL at 12:39

## 2022-03-03 RX ADMIN — TOPIRAMATE 200 MG: 100 TABLET, FILM COATED ORAL at 12:39

## 2022-03-03 RX ADMIN — ESCITALOPRAM 20 MG: 10 TABLET, FILM COATED ORAL at 12:40

## 2022-03-03 RX ADMIN — BACLOFEN 20 MG: 10 TABLET ORAL at 12:46

## 2022-03-03 RX ADMIN — ASPIRIN 81 MG: 81 TABLET, COATED ORAL at 12:46

## 2022-03-03 RX ADMIN — MORPHINE SULFATE 2 MG: 2 INJECTION, SOLUTION INTRAMUSCULAR; INTRAVENOUS at 02:33

## 2022-03-03 RX ADMIN — VITAMIN B12 0.1 MG ORAL TABLET 100 MCG: 0.1 TABLET ORAL at 12:40

## 2022-03-03 RX ADMIN — FAMOTIDINE 20 MG: 20 TABLET, FILM COATED ORAL at 12:47

## 2022-03-03 RX ADMIN — MONTELUKAST SODIUM 10 MG: 10 TABLET ORAL at 12:39

## 2022-03-03 RX ADMIN — Medication 2000 MG: at 02:39

## 2022-03-03 RX ADMIN — GABAPENTIN 600 MG: 600 TABLET, FILM COATED ORAL at 12:48

## 2022-03-03 RX ADMIN — MORPHINE SULFATE 4 MG: 4 INJECTION, SOLUTION INTRAMUSCULAR; INTRAVENOUS at 07:52

## 2022-03-03 RX ADMIN — BISACODYL 5 MG: 5 TABLET, COATED ORAL at 07:56

## 2022-03-03 RX ADMIN — BACLOFEN 20 MG: 10 TABLET ORAL at 07:57

## 2022-03-03 RX ADMIN — SODIUM CHLORIDE, PRESERVATIVE FREE 10 ML: 5 INJECTION INTRAVENOUS at 13:35

## 2022-03-03 RX ADMIN — CALCIUM 500 MG: 500 TABLET ORAL at 12:40

## 2022-03-03 RX ADMIN — ENOXAPARIN SODIUM 40 MG: 100 INJECTION SUBCUTANEOUS at 07:57

## 2022-03-03 RX ADMIN — SODIUM CHLORIDE, PRESERVATIVE FREE 10 ML: 5 INJECTION INTRAVENOUS at 07:52

## 2022-03-03 RX ADMIN — DOCUSATE SODIUM 100 MG: 100 CAPSULE, LIQUID FILLED ORAL at 12:46

## 2022-03-03 RX ADMIN — SODIUM CHLORIDE, PRESERVATIVE FREE 10 ML: 5 INJECTION INTRAVENOUS at 10:50

## 2022-03-03 RX ADMIN — Medication 200 MG: at 12:40

## 2022-03-03 RX ADMIN — MECLIZINE 12.5 MG: 12.5 TABLET ORAL at 12:47

## 2022-03-03 ASSESSMENT — PAIN SCALES - GENERAL
PAINLEVEL_OUTOF10: 10
PAINLEVEL_OUTOF10: 8
PAINLEVEL_OUTOF10: 9

## 2022-03-03 ASSESSMENT — PAIN DESCRIPTION - LOCATION
LOCATION: LEG

## 2022-03-03 ASSESSMENT — PAIN DESCRIPTION - PAIN TYPE
TYPE: SURGICAL PAIN

## 2022-03-03 ASSESSMENT — PAIN DESCRIPTION - ORIENTATION
ORIENTATION: RIGHT
ORIENTATION: RIGHT;LOWER
ORIENTATION: RIGHT;LOWER

## 2022-03-03 ASSESSMENT — PAIN DESCRIPTION - ONSET: ONSET: ON-GOING

## 2022-03-03 ASSESSMENT — PAIN DESCRIPTION - DESCRIPTORS
DESCRIPTORS: CRAMPING;THROBBING
DESCRIPTORS: CONSTANT;CRAMPING;ACHING
DESCRIPTORS: CONSTANT;STABBING;SHOOTING
DESCRIPTORS: CONSTANT;SHARP;SHOOTING

## 2022-03-03 ASSESSMENT — PAIN DESCRIPTION - FREQUENCY
FREQUENCY: CONTINUOUS

## 2022-03-03 NOTE — PROGRESS NOTES
Physical Therapy  Physical Therapy Initial Assessment     Name: Bar Christiansen  : 1960  MRN: [id number]      Date of Service: 3/3/2022    Evaluating PT:  Karla Caldwell, PT, DPT [id number]     Room #:  3125/6690-Z  Diagnosis:  Closed fracture of right tibial plateau, initial encounter [X69.935U]  Reason for admission: follow up s/p fall   Precautions:  Falls, RLE NWB, RUE NWB to wrist  Procedure/Surgery:  3/2 ex fix removal and tibial ORIF   Equipment Recommendations:  TBD at rehab    SUBJECTIVE:  Pt admitted from [facility]. Pt has been non ambulatory since fall using wheelchair - states completed slideboard or SPT with assist.     OBJECTIVE:   Initial Evaluation  Date: 3/3 Treatment   Short Term/ Long Term   Goals   AM-PAC 6 Clicks      Was pt agreeable to Eval/treatment? Yes      Does pt have pain?  10/10 RLE      Bed Mobility  Rolling: Keren  Supine to sit: ModA x2  Sit to supine: ModA x2  Scooting: ModA x2  SBA   Transfers Sit to stand: NT  Stand to sit: NT  Stand pivot: NT  ModA   Ambulation    NT    >50 feet with wheelchair Keren   Stair negotiation: ascended and descended  NT  TBD     LE ROM: WFL    LE Strength:   RLE NT  LLE grossly /    Balance:   Sitting static: SBA  Sitting dynamic: Keren  Standing static: NT  Standing dynamic: NT      -Pt is A & O x 3  -Sensation:  Pt reports numbness in R foot   -Edema:  unremarkable     Therapeutic Exercises:  Functional activity     Patient education  Pt educated on safety, sequencing of transfers, and role of PT    Patient response to education:   Pt verbalized understanding Pt demonstrated skill Pt requires further education in this area   Yes  Partial  Yes      ASSESSMENT:  Conditions Requiring Skilled Therapeutic Intervention:  [x]Decreased strength     []Decreased ROM  [x]Decreased functional mobility  [x]Decreased balance   [x]Decreased endurance   []Decreased posture  [x]Decreased sensation  []Decreased coordination   []Decreased vision  [x]Decreased safety awareness   [x]Increased pain       Comments:  Pt received supine in bed and agreeable to PT session   Pt requires heavy assist and guidance for all activity. She understands her weight bearing precautions. Apprehensive to movement d/t significant pain in her RLE. Able to sit EOB for a few minutes without support but poor tolerance d/t headache, dizziness, and LE pain. Returned to bed and repositioned. Pt with all needs met and call light in reach. Pt would benefit from continued PT POC to address functional deficits described above. Treatment:  Patient practiced and was instructed in the following treatment:     Therapeutic activity  o Patient education provided continuously throughout session for sequencing, safety maintenance, and improving any deficits found during the evaluation. o Bed mobility training - pt given verbal and tactile cues to facilitate proper sequencing and safety during rolling and supine>sit as well as provided with physical assistance to complete task    o Sitting EOB for >5 minutes for upright tolerance, postural awareness and BLE ROM     Pt's/ family goals   1. Return to rehab    Patient and or family understand(s) diagnosis, prognosis, and plan of care. yes    Prognosis is fair for reaching above PT goals. PHYSICAL THERAPY PLAN OF CARE:    PT POC is established based on physician order and patient diagnosis     Referring provider/PT Order:  03/02/22 1500   PT eval and treat Start: 03/02/22 1500, End: 03/02/22 1500, ONE TIME, Standing Count: 1 Occurrences, R    Shaka Soto DO    Diagnosis:  Closed fracture of right tibial plateau, initial encounter [S82.141A]  Specific instructions for next treatment:  Progress transfers as able.  wc training    Current Treatment Recommendations:   [] Strengthening to improve independence with functional mobility   [] ROM to improve independence with functional mobility   [x] Balance Training to improve static/dynamic balance and to reduce fall risk  [x] Endurance Training to improve activity tolerance during functional mobility   [x] Transfer Training to improve safety and independence with all functional transfers   [] Gait Training to improve gait mechanics, endurance and asses need for appropriate assistive device  [x] Stair Training in preparation for safe discharge home and/or into the community   [x] Positioning to prevent skin breakdown and contractures  [x] Safety and Education Training   [] Patient/Caregiver Education   [] HEP  [] Other     PT long term treatment goals are located in above grid    Frequency of treatments: 2-5x/week x 1-2 weeks. Time in  0735  Time out  0800    Total Treatment Time  10 minutes     Evaluation Time includes thorough review of current medical information, gathering information on past medical history/social history and prior level of function, completion of standardized testing/informal observation of tasks, assessment of data and education on plan of care and goals.     CPT codes:  [x] Low Complexity PT evaluation 51241  [] Moderate Complexity PT evaluation 29600  [] High Complexity PT evaluation 85341  [] PT Re-evaluation 61734  [] Gait training 01837 - minutes  [] Manual therapy 19338 - minutes  [x] Therapeutic activities 81694 10 minutes  [] Therapeutic exercises 19896 - minutes  [] Neuromuscular reeducation 55156 - minutes     Michelle Baca, PT, DPT  [id number]

## 2022-03-03 NOTE — PROGRESS NOTES
[address]                                                Patient Name: Chao Qureshi  MRN: [id number]  : 1960  Room: [address] [place] [address]     Evaluating OT:Ila Underwood, OTR/L   License #  [id number]       Referring Provider: Karthik Langley DO    Specific Provider Orders/Date: OT evaluation & treatment        Diagnosis:  1. Open right tibial pilon and fibular shaft fractures status post I&D, spanning external fixation  2. Right distal radius fracture    Pertinent Medical History:  has a past medical history of Adenoma of right adrenal gland, Anemia, Anxiety, Arthritis, Asthma, Benign essential tremor, Bipolar affective (HCC), Chronic back pain, Chronic respiratory failure with hypoxia ([address] Depression, Diabetes mellitus ([address] Difficulty swallowing, Environmental and seasonal allergies, Excessive physiologic tremor, Fatty liver, Full dentures, GERD (gastroesophageal reflux disease), Gout, Herniated cervical disc, History of swelling of feet, Hypertension, Lymphedema of lower extremity, Mitochondrial cytopathy ([address] Muscle weakness (generalized), Need for assistance with personal care, Neuropathy, Obesity, PETR (obstructive sleep apnea), Osteoarthritis of left knee, PONV (postoperative nausea and vomiting), Seizures ([address] Spinal headache, and Thyroid disease. Surgery: 3-2-22: 1. Right tibial pilon and fibular shaft fractures open reduction internal fixation of tibia and fibula   2. Removal right ankle spanning external fixation     Past Surgical History:  has a past surgical history that includes knee surgery (Bilateral); Gastric bypass surgery (); myomectomy; Tonsillectomy; Nerve Block (Left, 10 02 2013); Nerve Block (Left, 10 9 13); Nerve Block (Left, 10/16/13); other surgical history (Left, 13); Nerve Block (Left, 10/29/2014);  Nerve Block (Left, 11/12/2014); Nerve Block (Left, 12 8 14); Nerve Block (Right, 3/30/15); Nerve Block (Right, 4/6/2015); Nerve Block (Right, 4/13/15); Nerve Block (Left, 7/6/15); Nerve Block (07/20/15); Nerve Block (Left, 10 1 15); Nerve Block (10/26/15); other surgical history (3/28/2016); Endoscopy, colon, diagnostic; pr colonoscopy flx dx w/collj spec when pfrmd (N/A, 3/20/2018); pr egd transoral biopsy single/multiple (N/A, 3/20/2018); Cholecystectomy (1999); Hysterectomy (1988); Colonoscopy (N/A, 9/18/2018); Esophagus dilation (9/18/2018); back surgery (last one 1995); Cardiac catheterization (Right, 6-6-2013); Appendectomy; other surgical history (1995); joint replacement (Bilateral, [id number]); egd colonoscopy (N/A, 10/8/2019); Colonoscopy (N/A, 10/8/2019); Nerve Block (Bilateral, 4/1/2021); Ankle fracture surgery (Right, 2/14/2022); and Ankle fracture surgery (Right, 3/2/2022). Precautions:  Fall Risk, NWB R LE, NWB R hand & wrist (OK for platform), O2 at 3L via NC, elevate R LE at rest      Assessment of current deficits   [] Functional mobility            [x]ADLs           [x] Strength                  [x]Cognition    [x] Functional transfers          [x] IADLs         [x] Safety Awareness   [x]Endurance    [x] Fine Coordination                         [x] Balance      [] Vision/perception   [x]Sensation      []Gross Motor Coordination             [] ROM           [] Delirium                   [] Motor Control      OT PLAN OF CARE   OT POC based on physician orders, patient diagnosis and results of clinical assessment     Frequency/Duration: 2-4 days/wk for 2 weeks PRN   Specific OT Treatment Interventions to include:    Instruction/training on adapted ADL techniques and AE recommendations to increase functional independence within precautions  Training on energy conservation strategies, correct breathing pattern and techniques to improve independence/tolerance for self-care routine  Functional transfer/mobility training/DME recommendations for increased independence, safety, and fall prevention  Patient/Family education to increase follow through with safety techniques and functional independence  Recommendation of environmental modifications for increased safety with functional transfers/mobility and ADLs  Cognitive retraining/development of therapeutic activities to improve problem solving, judgement, memory, and attention for increased safety/participation in ADL/IADL tasks  Therapeutic exercise to improve motor endurance, ROM, and functional strength for ADLs/functional transfers  Therapeutic activities to facilitate/challenge dynamic balance, stand tolerance for increased safety and independence with ADLs  Therapeutic activities to facilitate gross/fine motor skills for increased independence with ADLs  Positioning to improve skin integrity, interaction with environment and functional independence     Recommended Adaptive Equipment:  TBD      Home Living: Pt lives alone,  in a 1 story apt. with no steps to enter with no HR. B&B on main level. Elevator access  Bathroom setup: walk in shower, high commode   Equipment owned: R [employer], shower chair, grab bars in shower  Comments: Pt. Has been at facility x3 wks. , accessable     Prior Level of Function: assist at facility with ADLs , full assist with IADLs; ambulated SPT vs. Slide board transfers [place] a few times. \"  Driving: NA, public transit  Occupation: phlebotomist     Pain Level: 9/10 R LE  Cognition: A&O: 4/4; Follows multi- step directions              Memory:  good              Sequencing:  good              Problem solving:  good              Judgement/safety:  fair+                Functional Assessment:  AM-PAC Daily Activity Raw Score: 13/24    Initial Eval Status  Date: 3-3-22 Treatment Status  Date: STGs = LTGs  Time frame: 10-14 days   Feeding Set up with breakfast tray   Mod I/ Ind   Grooming Min A   Modified [place]    UB Dressing Min A to change gown   Stand by Assist    LB Dressing Dep   Minimal Assist    Bathing Max A bed level   Minimal Assist    Toileting NT   Minimal Assist    Bed Mobility  Logroll: Mod A  Supine to sit:   Mod A x2   Sit to supine: Mod A x2   Supine to sit: Minimal Assist   Sit to supine: Minimal Assist    Functional Transfers NT, pt. deferred d/t pain and lightheadedness with seated position   Minimal Assist    Functional Mobility NT   Minimal Assist    Balance Sitting:     Static:  SBA    Dynamic:Min A  Standing: NT       Activity Tolerance Fair- lt. Ax. Fair with lt./mod. ax. Visual/  Perceptual Glasses: yes          Vitals spO2 on 3L WFL   WFL      Hand Dominance R    AROM (PROM) Strength Additional Info:    RUE  WFL 4/5 good  and wfl FMC/dexterity noted during ADL tasks      LUE WFL 4/5 good  and wfl FMC/dexterity noted during ADL tasks         Hearing: [facility]   Sensation:  No c/o numbness or tingling B UE  Tone: WFL   Edema: none noted B UE     Comments: Upon arrival patient supine in bed, agreeable to OT, cleared by Nursing. Therapist facilitated bed mobility/ADLs/functional transfers with focus on safety, technique & precautions. Pt. Instructed RE: safe transfers/mobility, ADLs, role of OT, treatment plan, recs. , prec. At end of session, patient returned to supine with HOB raised for self-feeding, all needs met, RN notified, with call light and phone within reach, all lines and tubes intact. Overall patient demonstrated decreased strength, balance, independence & safety during completion of ADL/functional transfer/mobility tasks. Pt would benefit from continued skilled OT to increase safety and independence with completion of ADL/IADL tasks for functional independence and quality of life.      Treatment: OT treatment provided this date includes:  ·  Instruction/training on safety and adapted techniques for completion of ADLs: to increase [place] in self care  ·  Instruction/training on safe functional mobility/transfer techniques: with focus on safety, technique & precautions  ·  Instruction/training on energy conservation/work simplification for completion of ADLs: techniques to increase [place] with self care ADLs & iADLs, work simplification to improve endurance  ·  Proper Positioning/Alignment: for optimal healing, skin integrity to prevent breakdown, decrease edema  · Skilled monitoring of vitals: to include BP, spO2 & HR during session  · Sitting/standing Balance/Tolerance- to increased balance & activity tolerance during ADLs as well as facilitate proper posture and/or positioning. · Therapeutic exercise- Instruction on B UE ROM exercises to improve strength/function for increased [place] with ADLs & iADLs. R UE shld.,elbow & hand ROM ex. 3 reps of 10 to promote shld. gliding, decrease edema     Rehab Potential: Good for established goals     Patient / Family Goal: to regain strength       Patient and/or family were instructed on functional diagnosis, prognosis/goals and OT plan of care. Demonstrated good understanding. Eval Complexity: Low     Time In: 7:35  Time Out: 8:00  Total Treatment Time: 10    Min Units   OT Eval Low 09014  x     OT Eval Medium 37432       OT Eval High 22406       OT Re-Eval [id number]       Therapeutic Ex 19158       Therapeutic Activities 86908       ADL/Self Care 17793  10  1   Orthotic Management 09376       Manual 09500       Neuro Re-Ed 69036       Non-Billable Time          Evaluation Time additionally includes thorough review of current medical information, gathering information on past medical history/social history and prior level of function, interpretation of standardized testing/informal observation of tasks, assessment of data and development of plan of care and goals. Ila Underwood, OTR/L   License #  [id number]

## 2022-03-03 NOTE — CONSULTS
[facility]  Kenji Mcguire MD Consultation      REASON FOR CONSULTATION: Medical evaluation    ATTENDING/ADMITTING PHYSICIAN:No att. providers found    HISTORY OF PRESENT ILLNESS:      The patient is a 64 y.o. female patient of Malik Miller MD who presents for right tibial pilon and fibular shaft fracture open reduction internal fixation of tibia and fibula, removal of right ankle spanning external fixator device. Patient was seen postoperatively. She was doing okay except for pain issues. She also has been having neck issues since falling. CT scan at the time was negative for fracture.     Past Medical History:    Past Medical History:   Diagnosis Date    Adenoma of right adrenal gland     Anemia     Anxiety     Arthritis     spinal    Asthma     controlled with inhalers     Benign essential tremor     Bipolar affective (HCC)     Chronic back pain     Spinal cord stimulator in place    Chronic respiratory failure with hypoxia (HCC)     at times dt diaphragm does not function properly dt Mitochondrial disorder    Depression     stable    Diabetes mellitus ([facility]     Difficulty swallowing     for EGD 10-8-19     Environmental and seasonal allergies     Excessive physiologic tremor 5/24/2021    Fatty liver     Full dentures     GERD (gastroesophageal reflux disease)     Gout     past hx of    Herniated cervical disc     limited rom of head and neck    History of swelling of feet     Hypertension     Lymphedema of lower extremity 09/06/2012    Mitochondrial cytopathy (HCC)     s/s muscle and nerve pain, difficulty breathing, seizures, difficulty swallowing, digestive disorders- Dr. Rory Spear [facility]    Muscle weakness (generalized)     Information obtained from [address] [place] [address] Need for assistance with personal care     Information obtained from [address] [place] [address] Neuropathy     at feet    Obesity     PETR (obstructive sleep apnea)     no CPAP dt insurance will not pay for    Osteoarthritis of left knee     Information obtained from [address] [facility] PONV (postoperative nausea and vomiting)     Seizures ([facility]     last approx 6-13-19- f/u w/ PCP  Granmal, flares up in heat/ summer time - no recent issues as of 10-4-19     Spinal headache     Thyroid disease        Past Surgical History:    Past Surgical History:   Procedure Laterality Date    ANKLE FRACTURE SURGERY Right 2/14/2022    RIGHT ANKLE IRRIGAITON AND DEBRIDEMENT WITH EXTERNAL FIXATOR AND LACERATION REPAIR performed by Allyn Bob MD at [place] [address] Right 3/2/2022    RIGHT LOWER EXTREMITY REMOVAL EXTERNAL FIXATOR, RIGHT PILON OPEN REDUCTION INTERNAL FIXATOR--SYNTHES (FACILITY) performed by Hernan Taylor DO at [address] [facility]      with Hysterectomy / unknown    BACK SURGERY  last one 1995    lumbar x 2    CARDIAC CATHETERIZATION Right 6-6-2013    Dr. Lieutenant Duke Radial, no stents placed, no blockages    [address]    open with gastric bypass    COLONOSCOPY N/A 9/18/2018    COLONOSCOPY POLYPECTOMY HOT BIOPSY performed by Blade Phillips MD at [address] [facility] COLONOSCOPY N/A 10/8/2019    COLONOSCOPY POLYPECTOMY SNARE/COLD BIOPSY performed by Balde Phillips MD at [address] [facility] EGD COLONOSCOPY N/A 10/8/2019    EGD ESOPHAGOGASTRODUODENOSCOPY DILATATION performed by Blade Phillips MD at [address] [facility], DIAGNOSTIC      ESOPHAGEAL DILATATION  9/18/2018    ESOPHAGEAL DILATION [place] performed by Blade Phillips MD at [address] [facility] Bilateral 2007,2017    knees    KNEE SURGERY Bilateral     scope    MYOMECTOMY      NERVE BLOCK Left 10 02 2013    lumbar paravertebral facet #2    NERVE BLOCK Left 10 9 13    hip inj #1    NERVE BLOCK Left 10/16/13    hip injection    NERVE BLOCK Left 10/29/2014    left lumbar transforaminal nerve lbock  #1    NERVE BLOCK Left 11/12/2014    lumbar left transforaminal nerve block  #2    NERVE BLOCK Left 12 8 14    lumbar transforaminal #3    NERVE BLOCK Right 3/30/15    cervical transforaminal #1    NERVE BLOCK Right 4/6/2015    right cervical transforaminal nerve block  #2    NERVE BLOCK Right 4/13/15    cervical transforaminal #3    NERVE BLOCK Left 7/6/15    knee injection #1    NERVE BLOCK  07/20/15    left genicular nerve block/knee #2    NERVE BLOCK Left 10 1 15    lumb transforam #1    NERVE BLOCK  10/26/15    left lumbar transforaminal nerve block #3    NERVE BLOCK Bilateral 4/1/2021    #1 BILATERAL SACROILIAC JOINT INJECTION UNDER FLUORO performed by Deandre Schreiber MD at [address] [facility] Left 11 25 13    lumbar radiofreq    OTHER SURGICAL HISTORY  3/28/2016    stage 1, 3 day percutanous trial [employer] lumbar spinal cord stimulator    OTHER SURGICAL HISTORY  1995    racz procedure / lower back    NC COLONOSCOPY FLX DX W/COLLJ SPEC WHEN PFRMD N/A 3/20/2018    COLONOSCOPY DIAGNOSTIC OR SCREENING performed by Blade Phillips MD at [place] [address] EGD TRANSORAL BIOPSY SINGLE/MULTIPLE N/A 3/20/2018    EGD BIOPSY performed by Blade Pihllips MD at [address] [place] [address]         Medications Prior to Admission:    No medications prior to admission. Allergies:    Bee pollen, Penicillins, Ropinirole hcl, Vistaril [hydroxyzine hcl], Prednisone, Restoril [temazepam], Aripiprazole, Hydroxyzine pamoate, and Tape [adhesive tape]    Social History:    reports that she quit smoking about 2 months ago. Her smoking use included cigarettes. She started smoking about 31 years ago. She has a 31.50 pack-year smoking history. She has never used smokeless tobacco. She reports previous drug use. Drug: Marijuana Darliss Meeter). She reports that she does not drink alcohol.     Family History:   family history includes Arthritis in an other family member; Cancer in her father and another family member; Depression in an other family member; Heart Disease in her mother and another family member; Hypertension in an other family member; Mental Illness in an other family member; Stroke in an other family member. REVIEW OF SYSTEMS:  As above in the HPI, otherwise negative    PHYSICAL EXAM:    Vitals:  BP 95/80   Pulse 62   Temp 97.4 °F (36.3 °C) (Temporal)   Resp 18   Ht 5' 7\" (1.702 m)   Wt 238 lb (108 kg)   LMP 08/31/1988   SpO2 95%   BMI 37.28 kg/m²     General:  Awake, alert, oriented X 3. Well developed, well nourished, well groomed. No apparent distress. HEENT:  Normocephalic, atraumatic. Pupils equal, round, reactive to light. No scleral icterus. No conjunctival injection. Normal lips, teeth, and gums. No nasal discharge. Neck:  Supple  Heart:  RRR, no murmurs, gallops, rubs  Lungs:  CTA bilaterally, bilat symmetrical expansion, no wheeze, rales, or rhonchi  Abdomen:   Bowel sounds present, soft, nontender, no masses, no organomegaly, no peritoneal signs  Extremities:  No clubbing, cyanosis, or edema  Skin:  Warm and dry, no open lesions or rash  Neuro:  Cranial nerves 2-12 intact, no focal deficits    LABS:    Recent Results (from the past 24 hour(s))   Basic Metabolic Panel w/ Reflex to MG    Collection Time: 03/03/22  7:13 AM   Result Value Ref Range    Sodium 139 132 - 146 mmol/L    Potassium reflex Magnesium 4.1 3.5 - 5.0 mmol/L    Chloride 101 98 - 107 mmol/L    CO2 31 (H) 22 - 29 mmol/L    Anion Gap 7 7 - 16 mmol/L    Glucose 79 74 - 99 mg/dL    BUN 16 6 - 23 mg/dL    CREATININE 0.8 0.5 - 1.0 mg/dL    GFR Non-African American >60 >=60 mL/min/1.73    GFR African American >60     Calcium 9.0 8.6 - 10.2 mg/dL   Hemoglobin and Hematocrit    Collection Time: 03/03/22  7:13 AM   Result Value Ref Range    Hemoglobin 11.1 (L) 11.5 - 15.5 g/dL    Hematocrit 35.0 34.0 - 48.0 %       XR ANKLE RIGHT (MIN 3 VIEWS)   Final Result   Intact tibial and fibular fixation hardware with near anatomic alignment of   fracture fragments. FLUORO FOR SURGICAL PROCEDURES   Final Result   Intraprocedural fluoroscopic spot images as above. See separate procedure   report for more information.              ASSESSMENT:      Principal Problem:    Closed fracture of right tibial plateau    Closed displaced pilon fracture of right tibia  Morbid obesity  Underlying history of anxiety  Asthma  Bipolar disorder  Chronic back pain  Mitochondrial cytopathy  History of gout  GERD  Elevated liver functions possible fatty liver  Obstructive sleep apnea    PLAN:    Home medications have been reviewed  Okay for discharge from my standpoint  Abdi Trivedi MD  2:34 PM  3/3/2022

## 2022-03-03 NOTE — DISCHARGE INSTR - COC
Continuity of Care Form    Patient Name: Lashonda Madrigal   :  1960  MRN:  [id number]    Admit date:  3/2/2022  Discharge date:  2022    Code Status Order: Prior   Advance Directives:   [facility] Documentation       Date/Time Healthcare Directive Type of Healthcare Directive Copy in [address] [facility] [address] Agent's Name Healthcare Agent's Phone Number    [phone] No, patient does not have an advance directive for healthcare treatment -- -- -- -- --            Admitting Physician:  Patric Salvador DO  PCP: Nelida Ellis MD    Discharging Nurse: Arsenio Gomez 23 Unit/Room#: 7871/3214-L  Discharging Unit Phone Number: [phone]    Emergency Contact:   Extended Emergency Contact Information  Primary Emergency Contact: Bethel Paez  [place] Phone: [phone]  Mobile Phone: [phone]  Relation: Other  Secondary Emergency Contact: [address] [facility] Phone: [phone]  Relation: Brother/Sister  Preferred language: English   needed?  No    Past Surgical History:  Past Surgical History:   Procedure Laterality Date    ANKLE FRACTURE SURGERY Right 2022    RIGHT ANKLE IRRIGAITON AND DEBRIDEMENT WITH EXTERNAL FIXATOR AND LACERATION REPAIR performed by Liyah Gasca MD at  [address] Right 3/2/2022    RIGHT LOWER EXTREMITY REMOVAL EXTERNAL FIXATOR, RIGHT PILON OPEN REDUCTION INTERNAL FIXATOR--SYNTHES (FACILITY) performed by Patric Salvador DO at [facility]      with Hysterectomy / unknown    BACK SURGERY  last one     lumbar x 2    CARDIAC CATHETERIZATION Right 2013    Dr. Gabi Henry Radial, no stents placed, no blockages     CHOLECYSTECTOMY      open with gastric bypass    COLONOSCOPY N/A 2018    COLONOSCOPY POLYPECTOMY HOT BIOPSY performed by Zoë Magallanes MD at [address] [place] [address] N/A 10/8/2019    COLONOSCOPY POLYPECTOMY SNARE/COLD BIOPSY performed by Fatimah Baca MD at [address]    EGD COLONOSCOPY N/A 10/8/2019    EGD ESOPHAGOGASTRODUODENOSCOPY DILATATION performed by Fatimah Baca MD at [facility], DIAGNOSTIC      ESOPHAGEAL DILATATION  9/18/2018    ESOPHAGEAL DILATION Jorden Spatz performed by Fatimah Baca MD at [address] [facility] Bilateral 2007,2017    knees    KNEE SURGERY Bilateral     scope    MYOMECTOMY      NERVE BLOCK Left 10 02 2013    lumbar paravertebral facet #2    NERVE BLOCK Left 10 9 13    hip inj #1    NERVE BLOCK Left 10/16/13    hip injection    NERVE BLOCK Left 10/29/2014    left lumbar transforaminal nerve lbock  #1    NERVE BLOCK Left 11/12/2014    lumbar left transforaminal nerve block  #2    NERVE BLOCK Left 12 8 14    lumbar transforaminal #3    NERVE BLOCK Right 3/30/15    cervical transforaminal #1    NERVE BLOCK Right 4/6/2015    right cervical transforaminal nerve block  #2    NERVE BLOCK Right 4/13/15    cervical transforaminal #3    NERVE BLOCK Left 7/6/15    knee injection #1    NERVE BLOCK  07/20/15    left genicular nerve block/knee #2    NERVE BLOCK Left 10 1 15    lumb transforam #1    NERVE BLOCK  10/26/15    left lumbar transforaminal nerve block #3    NERVE BLOCK Bilateral 4/1/2021    #1 BILATERAL SACROILIAC JOINT INJECTION UNDER FLUORO performed by Kelly Malik MD at [address] [facility] Left 11 25 13    lumbar radiofreq    OTHER SURGICAL HISTORY  3/28/2016    stage 1, 3 day percutanous trial [employer] lumbar spinal cord stimulator    OTHER SURGICAL HISTORY  1995    racz procedure / lower back    IL COLONOSCOPY FLX DX W/COLLJ SPEC WHEN PFRMD N/A 3/20/2018    COLONOSCOPY DIAGNOSTIC OR SCREENING performed by Fatimah Baca MD at [address] [place] [address] EGD TRANSORAL BIOPSY SINGLE/MULTIPLE N/A 3/20/2018    EGD BIOPSY performed by Fatimah Baca MD at [facility] Immunization History:   Immunization History   Administered Date(s) Administered    Influenza Virus Vaccine 11/02/2017, 11/02/2017, 02/06/2019, 02/02/2021    Influenza Whole 11/03/2008    Influenza, Quadv, IM, PF (6 mo and older Fluzone, Flulaval, Fluarix, and 3 yrs and older Afluria) 09/01/2018    Pneumococcal Conjugate 13-valent ([id number]) 02/03/2017    Pneumococcal Polysaccharide ([id number]) 11/03/2008, 09/01/2018    Td, unspecified formulation 11/02/2011    Tdap (Boostrix, Adacel) 02/16/2021       Active Problems:  Patient Active Problem List   Diagnosis Code    Debility R53.81    Obesity E66.9    Bipolar 1 disorder ([facility] F31.9    Generalized seizure disorder ([facility] G40.309    Cervicalgia M54.2    Asthma J45.909    Hyperlipidemia E78.5    Hypertension I10    Arthritis M19.90    Lymphedema of lower extremity I89.0    Degenerative Osteoarthritis of both knees M17.0    Status post total knee replacement, right Z96.651    Cervical spondylolysis M43.02    Degenerative Osteoarthritis thoracic spine M47.814    Thoracic facet syndrome M47.894    Cervical facet syndrome M47.812    Facet syndrome, lumbar M47.816    Neural foraminal stenosis of lumbar spine M48.061    Lumbar radiculopathy M54.16    DDD (degenerative disc disease), cervical M50.30    Neural foraminal stenosis of cervical spine M48.02    Protruded cervical disc M50.20    Cervical radiculopathy M54.12    Postlaminectomy syndrome, lumbar M96.1    Neuropathic pain syndrome (non-herpetic) M79.2    Chronic pain G89.29    Chronic respiratory failure with hypoxia (HCC) J96.11    Mitochondrial cytopathy (HCC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 A    PETR (obstructive sleep apnea) G47.33    Chronic back pain M54.9, G89.29    Bipolar affective (HCC) F31.9    Adenoma of right adrenal gland D35.01    Chest pain R07.9    Lumbosacral spondylosis without myelopathy M47.817    Sacroiliac dysfunction M53.3 DDD (degenerative disc disease), lumbar M51.36    Thoracic degenerative disc disease M51.34    Excessive physiologic tremor R25.1    Rhabdomyolysis M62.82    Failure to thrive in adult R62.7    Closed fracture of right distal radius S52.501A    Closed left ankle fracture, initial encounter S82.892A    Closed fracture of right tibial plateau P40.933F    Closed displaced pilon fracture of right tibia S82.871A       Isolation/Infection:   Isolation            No Isolation          Patient Infection Status       Infection Onset Added Last Indicated Last Indicated By Review Planned Expiration Resolved Resolved By    None active    Resolved    COVID-19 (Rule Out) 01/23/22 01/23/22 01/23/22 COVID-19, Rapid (Ordered)   01/23/22 Rule-Out Test Resulted    COVID-19 (Rule Out) 01/05/22 01/05/22 01/06/22 COVID-19, Rapid (Ordered)   01/06/22 Rule-Out Test Resulted    COVID-19 (Rule Out) 08/20/21 08/20/21 08/20/21 COVID-19, Rapid (Ordered)   08/20/21 Rule-Out Test Resulted    COVID-19 (Rule Out) 04/25/21 04/25/21 04/25/21 COVID-19, Rapid (Ordered)   04/25/21 Rule-Out Test Resulted    COVID-19 (Rule Out) 02/10/21 02/10/21 02/10/21 COVID-19 (Ordered)   02/11/21 Rule-Out Test Resulted    COVID-19 (Rule Out) 09/17/20 09/17/20 09/17/20 COVID-19 Ambulatory (Ordered)   09/19/20 Rule-Out Test Resulted    COVID-19 (Rule Out) 04/30/20 04/30/20 04/30/20 COVID-19 (Ordered)   05/01/20 Rule-Out Test Resulted    Not detected 4/30/2020            Nurse Assessment:  Last Vital Signs: BP 95/80   Pulse 62   Temp 97.4 °F (36.3 °C) (Temporal)   Resp 18   Ht 5' 7\" (1.702 m)   Wt 238 lb (108 kg)   LMP 08/31/1988   SpO2 95%   BMI 37.28 kg/m²     Last documented pain score (0-10 scale): Pain Level: 8  Last Weight:   Wt Readings from Last 1 Encounters:   03/02/22 238 lb (108 kg)     Mental Status:  oriented and alert    IV Access:  - None    Nursing Mobility/ADLs:  Walking   Assisted  Transfer  Assisted  Bathing  Assisted  Dressing Assisted  Toileting  Assisted  Feeding  Independent  Med Admin  Assisted  Med Delivery   whole    Wound Care Documentation and Therapy:        Elimination:  Continence: Bowel: Yes  Bladder: Yes  Urinary Catheter: None   Colostomy/Ileostomy/Ileal Conduit: No       Date of Last BM: 03/03/2022    Intake/Output Summary (Last 24 hours) at 3/3/2022 1055  Last data filed at 3/3/2022 0910  Gross per 24 hour   Intake 1780 ml   Output 50 ml   Net 1730 ml     I/O last 3 completed shifts: In: 1600 [I.V.:1600]  Out: 48 [Blood:50]    Safety Concerns:     History of Falls (last 30 days) and At Risk for Falls    Impairments/Disabilities:      NWB RLE    Nutrition Therapy:  Current Nutrition Therapy:   - Oral Diet:  General    Routes of Feeding: Oral  Liquids: No Restrictions  Daily Fluid Restriction: no  Last Modified Barium Swallow with Video (Video Swallowing Test): not done    Treatments at the Time of Hospital Discharge:   Respiratory Treatments: resume   Oxygen Therapy:  is on oxygen at 3 L/min per nasal cannula.   Ventilator:    - No ventilator support    Rehab Therapies: Physical Therapy and Occupational Therapy  Weight Bearing Status/Restrictions: Non-weight bearing on right leg  Other Medical Equipment (for information only, NOT a DME order):  wheelchair and hospital bed  Other Treatments: ***    Patient's personal belongings (please select all that are sent with patient):  None    RN SIGNATURE:  Electronically signed by Wesley Elizondo RN on 3/3/22 at 11:29 AM EST    CASE MANAGEMENT/SOCIAL WORK SECTION    Inpatient Status Date: ***    Readmission Risk Assessment Score:  Readmission Risk              Risk of Unplanned Readmission:  0           Discharging to Facility/ Agency   Name: [facility]   Address:  Phone:  Fax:    Dialysis Facility (if applicable)   Name:  Address:  Dialysis Schedule:  Phone:  Fax:    / signature: Electronically signed by Nancy Segundo [facility] on 3/3/22 at 10:55 AM EST    PHYSICIAN SECTION    Prognosis: Good    Condition at Discharge: Stable    Rehab Potential (if transferring to Rehab): Good    Recommended Labs or Other Treatments After Discharge: ***    Physician Certification: I certify the above information and transfer of Camila Benoit  is necessary for the continuing treatment of the diagnosis listed and that she requires [place] for less 30 days.      Update Admission H&P: No change in H&P    PHYSICIAN SIGNATURE:  {Esignature:[id number]}

## 2022-03-03 NOTE — CARE COORDINATION
3/3,  Discharge Acknowledged. Transportation has been set up for patient with [facility] for today at 1:00pm for patient to return to [facility]. Those informed:  Nursing and Leyla Trimble from [facility]. Ambulance form, face sheet and envelope on soft chart. SW to follow for further discharge planning needs.       GAGE Pratt  [facility] Case Management  [phone]

## 2022-03-03 NOTE — PROGRESS NOTES
Department of Orthopedic Surgery  Resident Progress Note    POD 1. Patient seen and examined. Pain moderately controlled. Patient requesting home gabapentin and baclofen. Denies chest pain, shortness of breath, dizziness/lightheadedness. VITALS:  /62   Pulse 62   Temp 97.3 °F (36.3 °C) (Oral)   Resp 16   Ht 5' 7\" (1.702 m)   Wt 238 lb (108 kg)   LMP 08/31/1988   SpO2 99%   BMI 37.28 kg/m²     General: in no acute distress and alert    MUSCULOSKELETAL:   right lower extremity:  · Splint C/D/I  · Compartments soft and compressible  · Calf is soft and nontender  · + EHL  · Brisk Cap refill, Toes warm and perfused  · Distal sensation grossly intact to Peroneals, Sural, Saphenous, and tibial nrs    CBC:   Lab Results   Component Value Date    WBC 4.2 03/02/2022    HGB 12.4 03/02/2022    HCT 38.8 03/02/2022     03/02/2022     PT/INR:    Lab Results   Component Value Date    PROTIME 10.3 03/02/2022    PROTIME 10.8 05/07/2012    INR 0.9 03/02/2022       ASSESSMENT  · S/P right pilon ORIF-3/2/2022    PLAN      · Continue physical therapy and protocol: NWB -right LE  · 24 hour abx coverage to be completed today  · Deep venous thrombosis prophylaxis -Lovenox, early mobilization  · PT/OT  · Pain Control: IV and PO  · Monitor H&H, pending-asymptomatic  · D/C Plan: Back to facility later today, appreciate sw/cm and therapy recommendations. Ok to discharge from orthopedic standpoint once appropriate discharge plan is in place    Electronically signed by Gabi Canales DO on 3/3/2022 at 6:06 AM    Orthopaedic Attending    I have seen and evaluated the patient with the resident and agree with the above assessments on today's visit. I have performed the key components of the history and physical examination and concur completely with the findings and plans as documented above.     Electronically signed by   Imani Guzman DO  3/3/2022

## 2022-03-06 NOTE — DISCHARGE SUMMARY
Physician Discharge Summary     Patient ID:  Marshal Expose  [id number]  32 y.o.  1960    Admit date: 2/13/2022    Discharge date and time: 2/16/2022 11:51 PM     Admission Diagnoses: Active Problems:    Closed left ankle fracture, initial encounter  Resolved Problems:    * No resolved hospital problems. *      Discharge Diagnoses: Active Problems:    Closed left ankle fracture, initial encounter  Resolved Problems:    * No resolved hospital problems. *      Condition at discharge : Stable    Consults: IP CONSULT TO CASE MANAGEMENT    Procedures: External fixator application    Hospital Course: 28-year-old lady presents to the emergency room after she tripped on her cat and fell. She had pain in the right ankle. Noted to have closed fracture of right ankle. Admitted for orthopedic intervention. Started on pain medications. Patient did have acute anemia from blood loss from surgery. She had external fixator placement. Patient was then discharged to subacute rehab. CT ANKLE RIGHT WO CONTRAST   Final Result   Interval internal fixation. Stable appearance of distal tibial and fibular fractures. FLUORO FOR SURGICAL PROCEDURES   Final Result   Intraprocedural fluoroscopic spot images as above. See separate procedure   report for more information. CT HEAD WO CONTRAST   Final Result   No evidence of acute intracranial trauma. Right frontal scalp hematoma. No fractures. CT CERVICAL SPINE WO CONTRAST   Final Result   No acute abnormality of the cervical spine. Mild multilevel degenerative changes. CT ABDOMEN PELVIS WO CONTRAST Additional Contrast? None   Final Result   Atraumatic appearance of the abdomen and pelvis. Two 6 mm nonobstructing right renal calculi. Cholecystectomy. XR ANKLE RIGHT (2 VIEWS)   Final Result   Near anatomic alignment, post closed reduction and cast placement.          XR ANKLE RIGHT (MIN 3 VIEWS)   Final Result   Distal tibia and fibular fractures. Disrupted ankle mortise. XR PELVIS (1-2 VIEWS)   Final Result   1. No signs of a displaced pelvic fracture. 2.  Joint space narrowing of each hip suggest early signs of degenerative   joint disease         XR TIBIA FIBULA LEFT (2 VIEWS)   Final Result   No acute bony abnormalities or complications of the total knee prosthesis. XR TIBIA FIBULA RIGHT (2 VIEWS)   Final Result   Distal tibia and fibular fractures. XR CHEST PORTABLE   Final Result   No acute process. Mild cardiomegaly. Results for orders placed or performed during the hospital encounter of 03/02/22 (from the past 336 hour(s))   POCT Glucose    Collection Time: 03/02/22 12:00 AM   Result Value Ref Range    Glucose 94 mg/dL    QC OK?      CBC with Auto Differential    Collection Time: 03/02/22  8:30 AM   Result Value Ref Range    WBC 4.2 (L) 4.5 - 11.5 E9/L    RBC 4.00 3.50 - 5.50 E12/L    Hemoglobin 12.4 11.5 - 15.5 g/dL    Hematocrit 38.8 34.0 - 48.0 %    MCV 97.0 80.0 - 99.9 fL    MCH 31.0 26.0 - 35.0 pg    MCHC 32.0 32.0 - 34.5 %    RDW 13.2 11.5 - 15.0 fL    Platelets 429 139 - 715 E9/L    MPV 9.0 7.0 - 12.0 fL    Neutrophils % 59.3 43.0 - 80.0 %    Immature Granulocytes % 1.0 0.0 - 5.0 %    Lymphocytes % 26.0 20.0 - 42.0 %    Monocytes % 9.9 2.0 - 12.0 %    Eosinophils % 3.1 0.0 - 6.0 %    Basophils % 0.7 0.0 - 2.0 %    Neutrophils Absolute 2.47 1.80 - 7.30 E9/L    Immature Granulocytes # 0.04 E9/L    Lymphocytes Absolute 1.08 (L) 1.50 - 4.00 E9/L    Monocytes Absolute 0.41 0.10 - 0.95 E9/L    Eosinophils Absolute 0.13 0.05 - 0.50 E9/L    Basophils Absolute 0.03 0.00 - 0.20 E9/L   Protime-INR    Collection Time: 03/02/22  8:30 AM   Result Value Ref Range    Protime 10.3 9.3 - 12.4 sec    INR 0.9    Comprehensive Metabolic Panel w/ Reflex to MG    Collection Time: 03/02/22  8:30 AM   Result Value Ref Range    Sodium 142 132 - 146 mmol/L    Potassium reflex Magnesium 4.3 3.5 - 5.0 mmol/L    Chloride 101 98 - 107 mmol/L    CO2 27 22 - 29 mmol/L    Anion Gap 14 7 - 16 mmol/L    Glucose 90 74 - 99 mg/dL    BUN 16 6 - 23 mg/dL    CREATININE 0.7 0.5 - 1.0 mg/dL    GFR Non-African American >60 >=60 mL/min/1.73    GFR African American >60     Calcium 9.5 8.6 - 10.2 mg/dL    Total Protein 7.1 6.4 - 8.3 g/dL    Albumin 3.8 3.5 - 5.2 g/dL    Total Bilirubin 0.3 0.0 - 1.2 mg/dL    Alkaline Phosphatase 176 (H) 35 - 104 U/L    ALT 10 0 - 32 U/L    AST 17 0 - 31 U/L   POCT Glucose    Collection Time: 03/02/22  8:37 AM   Result Value Ref Range    Meter Glucose 93 74 - 99 mg/dL   Basic Metabolic Panel w/ Reflex to MG    Collection Time: 03/03/22  7:13 AM   Result Value Ref Range    Sodium 139 132 - 146 mmol/L    Potassium reflex Magnesium 4.1 3.5 - 5.0 mmol/L    Chloride 101 98 - 107 mmol/L    CO2 31 (H) 22 - 29 mmol/L    Anion Gap 7 7 - 16 mmol/L    Glucose 79 74 - 99 mg/dL    BUN 16 6 - 23 mg/dL    CREATININE 0.8 0.5 - 1.0 mg/dL    GFR Non-African American >60 >=60 mL/min/1.73    GFR African American >60     Calcium 9.0 8.6 - 10.2 mg/dL   Hemoglobin and Hematocrit    Collection Time: 03/03/22  7:13 AM   Result Value Ref Range    Hemoglobin 11.1 (L) 11.5 - 15.5 g/dL    Hematocrit 35.0 34.0 - 48.0 %         Discharge Exam:  See progress note from today    Disposition: Subacute rehab    Patient Instructions:   Discharge Medication List as of 2/16/2022  3:25 PM      START taking these medications    Details   enoxaparin (LOVENOX) 30 MG/0.3ML injection Inject 0.3 mLs into the skin 2 times daily, Disp-1 each, R-0NO PRINT      melatonin 5 MG TBDP disintegrating tablet Take 1 tablet by mouth nightly, Disp-1 tablet, R-0OTC      oxyCODONE (ROXICODONE) 5 MG immediate release tablet Take 1 tablet by mouth every 6 hours as needed for Pain for up to 5 days. Intended supply: 5 days.  Take lowest dose possible to manage pain, Disp-15 tablet, R-0Print      aspirin EC 81 MG EC tablet Take 1 tablet by mouth 2 times escitalopram (LEXAPRO) 20 MG tablet Take 20 mg by mouth 2 times daily Historical Med      gabapentin (NEURONTIN) 600 MG tablet Take 1 tablet by mouth 4 times daily. , Disp-120 tablet, R-5Normal      topiramate (TOPAMAX) 200 MG tablet Take 1 tablet by mouth 2 times daily. , Disp-1 tablet, R-0NO PRINT      ziprasidone (GEODON) 20 MG capsule Take 20 mg by mouth nightly Historical Med         STOP taking these medications       ipratropium-albuterol (DUONEB) 0.5-2.5 (3) MG/3ML SOLN nebulizer solution Comments:   Reason for Stopping:         guaiFENesin (ROBITUSSIN) 100 MG/5ML SOLN oral solution Comments:   Reason for Stopping:         acetaminophen (TYLENOL) 325 MG tablet Comments:   Reason for Stopping:         polyethylene glycol (GLYCOLAX) 17 g packet Comments:   Reason for Stopping:         polyvinyl alcohol (LIQUIFILM TEARS) 1.4 % ophthalmic solution Comments:   Reason for Stopping:         bisacodyl (DULCOLAX) 10 MG suppository Comments:   Reason for Stopping:         magnesium hydroxide (MILK OF MAGNESIA) 400 MG/5ML suspension Comments:   Reason for Stopping:         methocarbamol (ROBAXIN) 500 MG tablet Comments:   Reason for Stopping:         cetirizine (ZYRTEC) 10 MG tablet Comments:   Reason for Stopping:         clotrimazole-betamethasone (LOTRISONE) 1-0.05 % lotion Comments:   Reason for Stopping:         Co-Enzyme Q10 100 MG CAPS Comments:   Reason for Stopping:         diclofenac sodium (VOLTAREN) 1 % GEL Comments:   Reason for Stopping:         Glucosamine-Chondroitin 750-600 MG CHEW Comments:   Reason for Stopping:         lidocaine-prilocaine (EMLA) 2.5-2.5 % cream Comments:   Reason for Stopping:         Pseudoephedrine-Naproxen Na (ALEVE-D SINUS & COLD PO) Comments:   Reason for Stopping:         EPINEPHrine (EPIPEN 2-CARMEN) 0.3 MG/0.3ML SOAJ injection Comments:   Reason for Stopping:         ondansetron (ZOFRAN-ODT) 4 MG disintegrating tablet Comments:   Reason for Stopping:         B Complex Vitamins (VITAMIN-B COMPLEX PO) Comments:   Reason for Stopping:         azelastine (ASTELIN) 0.1 % nasal spray Comments:   Reason for Stopping:         oxyCODONE-acetaminophen (PERCOCET)  MG per tablet Comments:   Reason for Stopping:         albuterol sulfate  (90 Base) MCG/ACT inhaler Comments:   Reason for Stopping:               Activity: As tolerated    Diet: Cardiac    Follow-up with DO Jeffrey Diaz [id number] [phone]    Schedule an appointment as soon as possible for a visit in 10 days  post op follow up    [address] [place] [address]  [phone]            Note that over 30 minutes was spent in preparing discharge papers, discussing discharge with patient, medication review, etc.    Signed:  Julissa Mirza MD  3/6/2022  6:45 PM

## 2022-03-07 DIAGNOSIS — S82.871A CLOSED RIGHT PILON FRACTURE, INITIAL ENCOUNTER: ICD-10-CM

## 2022-03-07 DIAGNOSIS — S52.571D OTHER CLOSED INTRA-ARTICULAR FRACTURE OF DISTAL END OF RIGHT RADIUS WITH ROUTINE HEALING, SUBSEQUENT ENCOUNTER: Primary | ICD-10-CM

## 2022-03-11 ENCOUNTER — TELEPHONE (OUTPATIENT)
Dept: ORTHOPEDIC SURGERY | Age: 62
End: 2022-03-11

## 2022-03-11 NOTE — TELEPHONE ENCOUNTER
Call from pt requesting what day are appt for ankle and wrist.    Next appt is Monday 3/14/22 @ 12:30pm for ankle w/JVG. She will see the PA at this appt. No appt scheduled for wrist/TTS is treating provider. Per Darvin they will also address the rt radius/wrist at this appt.

## 2022-03-14 ENCOUNTER — TELEPHONE (OUTPATIENT)
Dept: ADMINISTRATIVE | Age: 62
End: 2022-03-14

## 2022-03-14 NOTE — TELEPHONE ENCOUNTER
Call to [facility] s/w Niru Abler, r/s missed appt to Fri 3/18 @ 9:30 am    Call to pt advised appt set for Fri 3/18 @ 9:30 am.  She is going to get her own transport since [facility] keeps having issues w/transport. Gave directions to office.

## 2022-03-14 NOTE — TELEPHONE ENCOUNTER
Pt called to r/s her appt for today with Dr Juliann Spence. She said that she is staying at [facility] and they forgot to set up her transportation. She would like a call to rs.

## 2022-03-16 ENCOUNTER — OFFICE VISIT (OUTPATIENT)
Dept: ENDOCRINOLOGY | Age: 62
End: 2022-03-16
Payer: MEDICAID

## 2022-03-16 VITALS
DIASTOLIC BLOOD PRESSURE: 67 MMHG | OXYGEN SATURATION: 99 % | HEIGHT: 67 IN | BODY MASS INDEX: 37.39 KG/M2 | WEIGHT: 238.2 LBS | RESPIRATION RATE: 16 BRPM | HEART RATE: 68 BPM | SYSTOLIC BLOOD PRESSURE: 92 MMHG

## 2022-03-16 DIAGNOSIS — E55.9 VITAMIN D DEFICIENCY: ICD-10-CM

## 2022-03-16 DIAGNOSIS — E27.8 ADRENAL INCIDENTALOMA (HCC): ICD-10-CM

## 2022-03-16 DIAGNOSIS — E04.2 MULTINODULAR GOITER: Primary | ICD-10-CM

## 2022-03-16 PROCEDURE — 99214 OFFICE O/P EST MOD 30 MIN: CPT | Performed by: INTERNAL MEDICINE

## 2022-03-16 RX ORDER — DEXAMETHASONE 1 MG
1 TABLET ORAL ONCE
Qty: 1 TABLET | Refills: 0 | Status: SHIPPED | OUTPATIENT
Start: 2022-03-16 | End: 2022-03-16

## 2022-03-16 NOTE — PATIENT INSTRUCTIONS
Recommendations for today's visit  · Adrenal labs next weeks. Please make sure to take 1 mg dexamethasone at 11 pm the night before blood work and do blood work next morning around 8 AM   · Will need thyroid ultrasound in few weeks     I you have any questions please call Dr. Vaca Prince office     [facility] MD  Endocrinologist, [facility])   [address] [facility], [address] [place] [address],[facility] [id number]   Phone: [phone]  Fax: [phone]  Email: [email or web address] Is This A New Presentation, Or A Follow-Up?: Skin Lesion What Type Of Note Output Would You Prefer (Optional)?: Bullet Format How Severe Is Your Skin Lesion?: moderate Has Your Skin Lesion Been Treated?: not been treated

## 2022-03-16 NOTE — PROGRESS NOTES
[address] [place] [address] Department of Endocrinology Diabetes and Metabolism   [address] [facility] [address]   Phone: [phone]  Fax: [phone]    Date of Service: 3/16/2022  Primary Care Physician: Blank Tracey MD  Provider: Disha Roman MD            Reason for the visit:  Multinodular goiter     History of Present Illness: The history is provided by the patient. No  was used. Accuracy of the patient data is excellent. Stan Tate is a very pleasant 64 y.o. female seen today for evaluation and management of multinodular goiter     The patient was found to have thyroid nodule on a routine physical examination   The pt was diagnosed with MNG in 5/19 5/22/19 Discovered incidentally on CT of neck 5/22/19 ordered by Dr. Marilou Laboy for evaluation of swelling of the neck  7/31/19 US of thyroid: 1.3cm hypoechoic nodule in the isthmus  8/02/19 CT-guided core biopsy was negative for malignancy; flow cytometry was attempted but the cellularity of the specimen was inadequate.   2/03/20 US of thyroid: no significant change from 7/31/19; two nodules reported, one in each lobe located near the isthmus, both are 1.2 cm; calcification now noted in the right  Thyroid US 2/2021  Right thyroid lobe:  8.0 x 2.6 x 2.8 cm, Left thyroid lobe:  5.8 x 2.8 x 2.6 cm, Isthmus:  1.0 cm  Mildly heterogeneous in echotexture with normal vascularity  Nodules  9 mm cystic and solid nodule in the right thyroid lobe  3 mm focus of dystrophic calcification without associated nodule  3 mm predominantly cystic lesion in the left thyroid lobe      6/13/19  TSH 0.239, fT4 1.18  5/11/20  TSH 0.809, T3 114,  fT4 1.16     Right adrenal nodule   12/28/19  CT of abd : Stable small nodule in the right adrenal gland likely reflecting adenoma  02/06/20  24 urine metanephrines and catecholamines neg for pheo; urine cortisol nml, PRA and serum dean normal                 02/06/20 Serum Cortisol level 6.06 mcg/dl, but was checked at 4:38 p  5/11/20  Dexamethasone suppression test: Cortisol 3.03, ACTH 5.8    Due for labs now      PAST MEDICAL HISTORY   Past Medical History:   Diagnosis Date    Adenoma of right adrenal gland     Anemia     Anxiety     Arthritis     spinal    Asthma     controlled with inhalers     Benign essential tremor     Bipolar affective ([facility]     Chronic back pain     Spinal cord stimulator in place    Chronic respiratory failure with hypoxia (HCC)     at times dt diaphragm does not function properly dt Mitochondrial disorder    Depression     stable    Diabetes mellitus ([facility]     Difficulty swallowing     for EGD 10-8-19     Environmental and seasonal allergies     Excessive physiologic tremor 5/24/2021    Fatty liver     Full dentures     GERD (gastroesophageal reflux disease)     Gout     past hx of    Herniated cervical disc     limited rom of head and neck    History of swelling of feet     Hypertension     Lymphedema of lower extremity 09/06/2012    Mitochondrial cytopathy (HCC)     s/s muscle and nerve pain, difficulty breathing, seizures, difficulty swallowing, digestive disorders- Dr. Connie Evans [facility]    Muscle weakness (generalized)     Information obtained from  [facility] for assistance with personal care     Information obtained from [address] [place] [address] Neuropathy     at feet    Obesity     PETR (obstructive sleep apnea)     no CPAP dt insurance will not pay for    Osteoarthritis of left knee     Information obtained from [address] [place] [address] PONV (postoperative nausea and vomiting)     Seizures ([facility]     last approx 6-13-19- f/u w/ PCP  Granmal, flares up in heat/ summer time - no recent issues as of 10-4-19     Spinal headache     Thyroid disease        PAST SURGICAL HISTORY   Past Surgical History:   Procedure Laterality Date    ANKLE FRACTURE SURGERY Right 2/14/2022    RIGHT ANKLE IRRIGAITON AND DEBRIDEMENT WITH EXTERNAL FIXATOR AND LACERATION REPAIR performed by Sophia Magallon MD at [facility] Right 3/2/2022    RIGHT LOWER EXTREMITY REMOVAL EXTERNAL FIXATOR, RIGHT PILON OPEN REDUCTION INTERNAL FIXATOR--SYNTHES (FACILITY) performed by Marty Arcos DO at [facility]      with Hysterectomy / unknown    BACK SURGERY  last one 1995    lumbar x 2    CARDIAC CATHETERIZATION Right 6-6-2013    Dr. Grisel Oneill Radial, no stents placed, no blockages    [address]    open with gastric bypass    COLONOSCOPY N/A 9/18/2018    COLONOSCOPY POLYPECTOMY HOT BIOPSY performed by Maeve Carson MD at [address] COLONOSCOPY N/A 10/8/2019    COLONOSCOPY POLYPECTOMY SNARE/COLD BIOPSY performed by Maeve Carson MD at [address] EGD COLONOSCOPY N/A 10/8/2019    EGD ESOPHAGOGASTRODUODENOSCOPY DILATATION performed by Maeve Carson MD at [facility], DIAGNOSTIC      ESOPHAGEAL DILATATION  9/18/2018    ESOPHAGEAL DILATION [place] performed by Maeve Carson MD at [facility] Bilateral 2007,2017    knees    KNEE SURGERY Bilateral     scope    MYOMECTOMY      NERVE BLOCK Left 10 02 2013    lumbar paravertebral facet #2    NERVE BLOCK Left 10 9 13    hip inj #1    NERVE BLOCK Left 10/16/13    hip injection    NERVE BLOCK Left 10/29/2014    left lumbar transforaminal nerve lbock  #1    NERVE BLOCK Left 11/12/2014    lumbar left transforaminal nerve block  #2    NERVE BLOCK Left 12 8 14    lumbar transforaminal #3    NERVE BLOCK Right 3/30/15    cervical transforaminal #1    NERVE BLOCK Right 4/6/2015    right cervical transforaminal nerve block  #2    NERVE BLOCK Right 4/13/15    cervical transforaminal #3    NERVE BLOCK Left 7/6/15    knee injection #1    NERVE BLOCK  07/20/15    left genicular nerve block/knee #2    NERVE BLOCK Left 10 1 15    lumb transforam #1    NERVE BLOCK 10/26/15    left lumbar transforaminal nerve block #3    NERVE BLOCK Bilateral 4/1/2021    #1 BILATERAL SACROILIAC JOINT INJECTION UNDER FLUORO performed by Madi Diop MD at [address] [place] [address] Danie Ne Left 11 25 13    lumbar radiofreq    OTHER SURGICAL HISTORY  3/28/2016    stage 1, 3 day percutanous trial [employer] lumbar spinal cord stimulator    OTHER SURGICAL HISTORY  1995    racz procedure / lower back    RI COLONOSCOPY FLX DX W/COLLJ SPEC WHEN PFRMD N/A 3/20/2018    COLONOSCOPY DIAGNOSTIC OR SCREENING performed by Ginny Gaspar MD at [place] [address] EGD TRANSORAL BIOPSY SINGLE/MULTIPLE N/A 3/20/2018    EGD BIOPSY performed by Ginny Gaspar MD at [address] [place] [address]:   reports that she quit smoking about 2 months ago. Her smoking use included cigarettes. She started smoking about 31 years ago. She has a 31.50 pack-year smoking history. She has never used smokeless tobacco.  Alcohol:   reports no history of alcohol use. Drugs:   reports previous drug use. Drug: Marijuana David Clark).     FAMILY HISTORY   Family History   Problem Relation Age of Onset    Heart Disease Mother     Cancer Father     Arthritis Other     Cancer Other     Depression Other     Heart Disease Other     Hypertension Other     Mental Illness Other     Stroke Other        ALLERGIES AND DRUG REACTIONS   Allergies   Allergen Reactions    Bee Pollen Anaphylaxis, Shortness Of Breath and Swelling     And wasp    Penicillins Anaphylaxis    Ropinirole Hcl Anaphylaxis    Vistaril [Hydroxyzine Hcl] Anaphylaxis    Prednisone     Restoril [Temazepam]     Aripiprazole Other (See Comments)     muscle spasms and confusion    Hydroxyzine Pamoate Itching and Rash    Tape Annabel Bicker Tape] Rash     Paper tape allergy    Fabric tape is OK to use        CURRENT MEDICATIONS   Current Outpatient Medications   Medication Sig Dispense Refill    LORazepam (ATIVAN) 0.5 MG tablet Take 0.5 mg by mouth every 6 hours as needed for Anxiety.  oxyCODONE 5 MG capsule Take 5 mg by mouth every 6 hours as needed for Pain.       enoxaparin (LOVENOX) 30 MG/0.3ML injection Inject 0.3 mLs into the skin 2 times daily (Patient taking differently: Inject 40 mg into the skin 2 times daily ) 1 each 0    melatonin 5 MG TBDP disintegrating tablet Take 1 tablet by mouth nightly (Patient taking differently: Take 10 mg by mouth nightly ) 1 tablet 0    baclofen (LIORESAL) 20 MG tablet Take 20 mg by mouth 4 times daily      metoprolol succinate (TOPROL XL) 25 MG extended release tablet Take 0.5 tablets by mouth daily 30 tablet 3    cyanocobalamin 50 MCG tablet Take 100 mcg by mouth daily      ferrous sulfate (FE TABS 325) 325 (65 Fe) MG EC tablet Take 325 mg by mouth daily       famotidine (PEPCID) 20 MG tablet Take 1 tablet by mouth 2 times daily 60 tablet 0    traZODone (DESYREL) 100 MG tablet Take 100 mg by mouth nightly      albuterol (PROVENTIL) (5 MG/ML) 0.5% nebulizer solution Take 2.5 mg by nebulization 3 times daily       magnesium 200 MG TABS tablet Take 200 mg by mouth 2 times daily       calcium carbonate (CALCIUM 600) 600 MG TABS tablet Take 1 tablet by mouth 2 times daily       montelukast (SINGULAIR) 10 MG tablet Take 1 tablet by mouth daily 30 tablet 3    omeprazole (PRILOSEC) 20 MG delayed release capsule Take 20 mg by mouth Daily       furosemide (LASIX) 40 MG tablet Take 1 tablet by mouth 2 times daily 60 tablet 0    Cholecalciferol (VITAMIN D3) 5000 units TABS Take 1 tablet by mouth every other day       docusate sodium (COLACE) 100 MG capsule Take 100 mg by mouth 2 times daily      linaclotide (LINZESS) 145 MCG capsule Take 290 mcg by mouth every morning (before breakfast)       levOCARNitine (CARNITOR) 330 MG tablet Take 330 mg by mouth 3 times daily For mitochondrial disease      allopurinol (ZYLOPRIM) 100 MG tablet Take 200 mg by mouth 2 times daily       escitalopram (LEXAPRO) 20 MG tablet Take 20 mg by mouth 2 times daily       gabapentin (NEURONTIN) 600 MG tablet Take 1 tablet by mouth 4 times daily. 120 tablet 5    topiramate (TOPAMAX) 200 MG tablet Take 1 tablet by mouth 2 times daily. 1 tablet 0    ziprasidone (GEODON) 20 MG capsule Take 20 mg by mouth nightly       aspirin EC 81 MG EC tablet Take 1 tablet by mouth 2 times daily for 28 days 56 tablet 0     No current facility-administered medications for this visit. Review of Systems  Constitutional: No fever, no chills, no diaphoresis, no generalized weakness. HEENT: No blurred vision, No sore throat, no ear pain, no hair loss  Neck: denied any neck swelling, difficulty swallowing,   Cadrdiopulomary: No CP, SOB or palpitation, No orthopnea or PND. No cough or wheezing. GI: No N/V/D, no constipation, No abdominal pain, no melena or hematochezia   : Denied any dysuria, hematuria, flank pain, discharge, or incontinence. Skin: denied any rash, ulcer, Hirsute, or hyperpigmentation. MSK: denied any joint deformity, joint pain/swelling, muscle pain, or back pain. Neuro: no numbess, no tingling, no weakness,     OBJECTIVE    BP 92/67   Pulse 68   Resp 16   Ht 5' 7\" (1.702 m)   Wt 238 lb 3.2 oz (108 kg)   LMP 08/31/1988   SpO2 99%   BMI 37.31 kg/m²   BP Readings from Last 4 Encounters:   03/16/22 92/67   03/03/22 95/80   03/02/22 (!) 110/58   02/16/22 118/77     Wt Readings from Last 6 Encounters:   03/16/22 238 lb 3.2 oz (108 kg)   03/02/22 238 lb (108 kg)   02/13/22 245 lb (111.1 kg)   01/06/22 258 lb 13.1 oz (117.4 kg)   09/02/21 243 lb (110.2 kg)   08/20/21 243 lb (110.2 kg)       Physical examination:  General: awake alert, oriented x3, no abnormal position or movements. HEENT: normocephalic non traumatic  Neck: supple, no LN enlargement, no thyromegaly, no thyroid tenderness, no JVD.   Pulm: Clear equal air entry no added sounds, no wheezing or rhonchi    CVS: S1 + S2, no murmur, no asa. Dorsalis pedis pulse palpable   Abd: soft lax, no tenderness, no organomegaly, audible bowel sounds. Skin: warm, no lesions, no rash. No Palmar erythema  Musculoskeletal: No back tenderness, no kyphosis/scoliosis     Neuro: CN intact, sensation normal , muscle power normal. No tremors   Psych: normal mood, and affect    Review of Laboratory Data:  I personally reviewed the following labs:   Lab Results   Component Value Date/Time    WBC 4.2 (L) 03/02/2022 08:30 AM    RBC 4.00 03/02/2022 08:30 AM    HGB 11.1 (L) 03/03/2022 07:13 AM    HCT 35.0 03/03/2022 07:13 AM    MCV 97.0 03/02/2022 08:30 AM    MCH 31.0 03/02/2022 08:30 AM    MCHC 32.0 03/02/2022 08:30 AM    RDW 13.2 03/02/2022 08:30 AM     03/02/2022 08:30 AM    MPV 9.0 03/02/2022 08:30 AM      Lab Results   Component Value Date/Time     03/03/2022 07:13 AM    K 4.1 03/03/2022 07:13 AM    CO2 31 (H) 03/03/2022 07:13 AM    BUN 16 03/03/2022 07:13 AM    CREATININE 0.8 03/03/2022 07:13 AM    CALCIUM 9.0 03/03/2022 07:13 AM    LABGLOM >60 03/03/2022 07:13 AM    GFRAA >60 03/03/2022 07:13 AM      Lab Results   Component Value Date/Time    TSH 0.548 03/16/2021 12:16 PM    T4FREE 1.09 03/16/2021 12:16 PM    M9LRPNX 10.0 05/21/2012 01:48 PM    J4TWMLC 107.30 03/03/2021 01:46 PM     Lab Results   Component Value Date    LABA1C 5.7 12/13/2013    GLUCOSE 79 03/03/2022    GLUCOSE 93 05/24/2012    LABCREA 27 02/06/2020     Lab Results   Component Value Date    TRIG 99 03/16/2021    HDL 66 03/16/2021    LDLCALC 63 03/16/2021    CHOL 149 03/16/2021     Lab Results   Component Value Date    VITD25 46 02/01/2022    VITD25 50 03/16/2021       ASSESSMENT & RECOMMENDATIONS   Gilberto Hodges, a 64 y.o.-old female seen in for the following issues     Multinodular goiter   · Prevalence of thyroid nodule on thyroid ultrasound is 50% and 95 % of these nodules are benign.   · Ordered thyroid US to be done in few weeks     vitD deficiency   · Continue vitD supplement Rt adrenal incidentaloma   · 12/28/19  CT of abd : Stable small nodule in the right adrenal gland likely reflecting adenoma  · 02/06/20  24 urine metanephrines and catecholamines neg for pheo; urine cortisol nml, PRA and serum dean normal                 · 02/06/20 Serum Cortisol level 6.06 mcg/dl, but was checked at 4:38 p  · 5/11/20  Dexamethasone suppression test: Cortisol 3.03, ACTH 5.8  · Will obtain adrenal labs in few days     I personally reviewed external notes from PCP and other patient's care team providers, and personally interpreted labs associated with the above diagnosis. I also ordered labs to further assess and manage the above addressed medical conditions. Return in about 1 year (around 3/16/2023) for Multinodular goiter, VitD deficiency adrenal incidentaloma . The above issues were reviewed with the patient who understood and agreed with the plan. Thank you for allowing us to participate in the care of this patient. Please do not hesitate to contact us with any additional questions. Diagnosis Orders   1. Multinodular goiter  TSH    T4   2. Adrenal incidentaloma (HCC)  Cortisol Total    Renin Activity and Aldosterone    Metanephrines Plasma Free    dexamethasone (DECADRON) 1 MG tablet   3. Vitamin D deficiency  Basic Metabolic Panel    Vitamin D 25 Hydroxy     Natividad Mead MD  Endocrinologist, [facility])   [address] [place] [address]   Phone: [phone]  Fax: [phone]  ------------------------------  An electronic signature was used to authenticate this note.  Jeff Renee MD on 3/16/2022 at 8:53 AM

## 2022-03-18 ENCOUNTER — HOSPITAL ENCOUNTER (OUTPATIENT)
Dept: GENERAL RADIOLOGY | Age: 62
Discharge: HOME | End: 2022-03-20
Payer: MEDICAID

## 2022-03-18 ENCOUNTER — OFFICE VISIT (OUTPATIENT)
Dept: ORTHOPEDIC SURGERY | Age: 62
End: 2022-03-18
Payer: MEDICAID

## 2022-03-18 DIAGNOSIS — S52.571D OTHER CLOSED INTRA-ARTICULAR FRACTURE OF DISTAL END OF RIGHT RADIUS WITH ROUTINE HEALING, SUBSEQUENT ENCOUNTER: ICD-10-CM

## 2022-03-18 DIAGNOSIS — S82.871A CLOSED RIGHT PILON FRACTURE, INITIAL ENCOUNTER: ICD-10-CM

## 2022-03-18 DIAGNOSIS — S52.571D OTHER CLOSED INTRA-ARTICULAR FRACTURE OF DISTAL END OF RIGHT RADIUS WITH ROUTINE HEALING, SUBSEQUENT ENCOUNTER: Primary | ICD-10-CM

## 2022-03-18 PROCEDURE — 99213 OFFICE O/P EST LOW 20 MIN: CPT | Performed by: ORTHOPAEDIC SURGERY

## 2022-03-18 PROCEDURE — 73610 X-RAY EXAM OF ANKLE: CPT

## 2022-03-18 PROCEDURE — 29515 APPLICATION SHORT LEG SPLINT: CPT | Performed by: ORTHOPAEDIC SURGERY

## 2022-03-18 PROCEDURE — 99024 POSTOP FOLLOW-UP VISIT: CPT | Performed by: ORTHOPAEDIC SURGERY

## 2022-03-18 PROCEDURE — 73110 X-RAY EXAM OF WRIST: CPT

## 2022-03-18 RX ORDER — MECLIZINE HYDROCHLORIDE 25 MG/1
25 TABLET ORAL EVERY 6 HOURS PRN
COMMUNITY

## 2022-03-18 RX ORDER — OXYCODONE HYDROCHLORIDE AND ACETAMINOPHEN 5; 325 MG/1; MG/1
1 TABLET ORAL EVERY 4 HOURS PRN
COMMUNITY
End: 2022-03-24

## 2022-03-18 RX ORDER — CEPHALEXIN 500 MG/1
500 CAPSULE ORAL 4 TIMES DAILY
Qty: 28 CAPSULE | Refills: 0 | Status: SHIPPED
Start: 2022-03-18 | End: 2022-03-18

## 2022-03-18 RX ORDER — ACETAMINOPHEN 325 MG/1
650 TABLET ORAL EVERY 4 HOURS PRN
Status: ON HOLD | COMMUNITY
End: 2022-08-19 | Stop reason: HOSPADM

## 2022-03-18 RX ORDER — CEPHALEXIN 500 MG/1
500 CAPSULE ORAL 4 TIMES DAILY
Qty: 28 CAPSULE | Refills: 0 | Status: SHIPPED | OUTPATIENT
Start: 2022-03-18 | End: 2022-03-25

## 2022-03-18 NOTE — PATIENT INSTRUCTIONS
Nonweightbearing right lower extremity, keep splint clean and dry  Nonweightbearing right upper extremity through the wrist, okay to bear weight through the elbow, maintain splint except to come out for gentle range of motion exercises  Take Keflex 4 times daily for 7 days  Return to clinic in 1 week for repeat skin check and potential cast application.   Continue taking medications as prescribed  Call the clinic if you have any questions or concerns for your appointment

## 2022-03-18 NOTE — PROGRESS NOTES
Chief Complaint   Patient presents with    Post-Op Check     Right pilon ORIF 3/2/2022        OP:SURGEON: Dr. De Hernández DO  DATE OF PROCEDURE: 3/2/22  PROCEDURE:ORIF right pilon fracture with plate and screws and associated lateral malleolus with retrograde IM screw    Right distal radius treated with nonoperative management    POD: 3 weeks    Subjective:  Michelle Barron is following up from the above surgery. She is NWB on right upper and right lower extremity was able to bear weight to the right elbow. She ambulates with assistive device, wheelchair and walker with armrest.  Pain to extremity is mild and is  taking prescribed pain medication, Percocet. They complains of numbness, tingling to the right lower extremity. Denies calf pain, chest pain, or shortness of breath. Patient continues to use DVT prophylaxis, ASA 81 mg BID. Patient is  participating in therapy, home health care  At assisted living facility. Patient states that overall she is feeling well. Review of Systems -  All pertinent positives/negative in HPI     Objective:    General: Alert and oriented X 3, normocephalic atraumatic, external ears and eye normal, sclera clear, no acute distress, respirations easy and unlabored with no audible wheezes, skin warm and dry, speech and dress appropriate for noted age, affect euthymic. Extremity:  Right upper extremity  Skin clean dry intact, without signs of infection  Mild edema noted  Compartments supple throughout the arm and forearm hand  Demonstrates active wrist flexion and extension as well as ulnar and radial deviation. Pain with extremes of motion. Sensation grossly intact in median, radial, ulnar nerve distributions  Motor function grossly intact distally in AIN, PIN, median, radial, ulnar nerve distributions.   +2/4 radial and ulnar pulses, hand warm well-perfused, brisk cap refill in all fingers    Right Lower Extremity  Skin clean dry and intact, without signs of infection, erythema or drainage  Incision no significant drainage, no dehiscence, no significant erythema, slow healing   moderate edema noted  Compartments supple throughout thigh and leg  Calf supple and not tender  negative Homans  Demonstrates active EHL/TA/GS  Ankle range of motion appropriate for stage    States sensation intact to touch in sural, deep peroneal, superficial peroneal, saphenous, posterior tibial  nerve distributions to foot/ankle. Palpable dorsalis pedis and posterior tibialis pulses, cap refill brisk in toes, foot warm/perfused. LMP 08/31/1988     XR:   3 views of the right ankle reviewed demonstrating status post ORIF right pilon fracture in right Rose C type fibular fracture. No evidence of cysts hardware failure, screw cut out. Overall alignment consistent with immediate postoperative x-rays. No other acute fracture dislocations noted. No other bony or soft tissue abnormalities noted. 3 views of the right wrist reviewed demonstrating continued healing of the right extra-articular distal radius fracture. Compared to the interval films there is bony remodeling occurring with decreased visibility of the fracture line. No other acute fractures or dislocations noted. No other bony or soft tissue abnormalities noted. Assessment:   Diagnosis Orders   1. Other closed intra-articular fracture of distal end of right radius with routine healing, subsequent encounter     2. Closed right pilon fracture, initial encounter         Plan: Nonweightbearing right lower extremity, keep splint clean and dry  Nonweightbearing right upper extremity through the wrist, okay to bear weight through the elbow, maintain splint except to come out for gentle range of motion exercises  Take Keflex 4 times daily for 7 days  Return to clinic in 1 week for repeat skin check and potential cast application.   Continue taking medications as prescribed  Call the clinic if you have any questions or concerns for your appointment    Follow up in 1  weeks for repeat skin evaluation and potential cath application    Electronically signed by Gabrielle Balderas DO on 3/18/2022 at 11:27 AM     Orthopaedic Attending    I have seen and evaluated the patient with the resident and agree with the above assessments on today's visit. I have performed the key components of the history and physical examination and concur completely with the findings and plans as documented above. With poor soft tissue envelope and delayed healing evident by surgical incision and exfix sites. We will start 1 week oral antibiotics trying to improve her mild serous drainage from her exfix pin site. Some sutures removed today however we are unable to completely remove them due to the delayed healing, Steri-Strips applied. Wounds redressed and limb immobilized. Discussed elevation nonweightbearing. Continue DVT prophylaxis and antibiotics. Follow-up in office in 1 week for repeat evaluation. Electronically signed by   Napoleon Hunter DO  3/18/2022       Note: This report was completed using [employer] voiced recognition software. Every effort has been made to ensure accuracy; however, inadvertent computerized transcription errors may be present.

## 2022-03-18 NOTE — PROGRESS NOTES
Juliana Henry is a 64 y.o. female who presents for follow up of right ankle    SURGEON: Dr. London Henry,   Date of Injury/Surgery: 3/2/2022  Date last seen in office: 2/21/2022    Symptoms: better  New complaints: patient still having some pain and soreness. She is having lots of numbness on the toes and on the bottom of the foot. She is also experiencing \"dropping of the toes\" since surgery    Cast/Splint, Brace, or Dressings: Clean, dry and intact, Well fitting and Taken down for visit    Incisions: Edges approximated, sutures intact, no redness or drainage noted. Weightbearing: right lower Non-weight bearing      Assistive device Wheelchair  Participating in therapy (location if yes)?  yes, in facility    Refills Needed: None  Order/Referral Needed: N/A

## 2022-03-24 ENCOUNTER — OFFICE VISIT (OUTPATIENT)
Dept: ORTHOPEDIC SURGERY | Age: 62
End: 2022-03-24
Payer: MEDICAID

## 2022-03-24 VITALS — TEMPERATURE: 98.3 F

## 2022-03-24 DIAGNOSIS — S52.571A OTHER CLOSED INTRA-ARTICULAR FRACTURE OF DISTAL END OF RIGHT RADIUS, INITIAL ENCOUNTER: ICD-10-CM

## 2022-03-24 DIAGNOSIS — S82.871A CLOSED RIGHT PILON FRACTURE, INITIAL ENCOUNTER: Primary | ICD-10-CM

## 2022-03-24 PROCEDURE — 29405 APPL SHORT LEG CAST: CPT | Performed by: PHYSICIAN ASSISTANT

## 2022-03-24 PROCEDURE — 99213 OFFICE O/P EST LOW 20 MIN: CPT | Performed by: PHYSICIAN ASSISTANT

## 2022-03-24 PROCEDURE — 99024 POSTOP FOLLOW-UP VISIT: CPT | Performed by: PHYSICIAN ASSISTANT

## 2022-03-24 NOTE — PROGRESS NOTES
Chief Complaint   Patient presents with    Follow-up     4wk post-op R pilon ORIF. Pain 3/10, c/o numbness and tingling to toes. Denies fever or chills. States compliant with NWB status    Other     XR in EPIC        OP:SURGEON: Dr. Sergio Falcon DO  DATE OF PROCEDURE: 3-2-22  PROCEDURE: 1. Right tibial pilon and fibular shaft fractures open reduction internal fixation of tibia and fibula   2. Removal right ankle spanning external fixation      POD: 3 weeks    Subjective:  Roxann Aguirre is following up from the above surgery. She is NWB on right upper and right lower extremity. She ambulates with assistive device, wheelchair. Pain to extremity is none and is not taking prescribed pain medication. They denies numbness or tingling to the right upper and right lower extremity. Denies calf pain, chest pain, or shortness of breath. Patient continues to use DVT prophylaxis, ASA 81 mg BID. Patient is  participating in therapy at the skilled nursing facility. She presents today for wound check and possible removal of remaining sutures. She is taking Keflex. Review of Systems -  All pertinent positives/negative in HPI     Objective:    General: Alert and oriented X 3, normocephalic atraumatic, external ears and eye normal, sclera clear, no acute distress, respirations easy and unlabored with no audible wheezes, skin warm and dry, speech and dress appropriate for noted age, affect euthymic. Extremity:  Right Lower Extremity  Skin clean dry and intact, without signs of infection   Incisions healing well. Remaining sutures were removed. There are still healing wounds where the pin sites from the external fixator were. No active drainage, erythema, dehiscence or warmth.   There is generalized discoloration/venous stasis changes in her right lower leg area  moderate edema noted to the lower leg  Compartments supple throughout thigh and leg  Calf supple and not tender  negative Homans  Demonstrates active motion with knee flexion/extension, wiggles all toes. She presents in a wheelchair. States sensation intact to touch in sural, deep peroneal, superficial peroneal, saphenous, posterior tibial  nerve distributions to foot/ankle. Palpable dorsalis pedis and posterior tibialis pulses, cap refill brisk in toes, foot warm/perfused. Right Upper Extremity  Skin is clean dry and intact  no edema noted  Radial pulse palpable, fingers warm with BCR  Flex/extension intact to wrist, thumb and fingers  Finger opposition intact  Finger adduction/abduction intact  Finger crossover intact  Subjectively states sensation intact to radial/medial/ulnar distribution    Temp 98.3 °F (36.8 °C)   LMP 08/31/1988     XR:   Not taken today. Assessment:  1. Closed right pilon fracture, subsequent encounter  2. Closed intra-articular fracture of distal end of right radius, subsequent encounter    Plan:  Continue nonweightbearing right lower and right upper extremities. Remaining sutures were removed from right lower extremity and Steri-Strips applied. Patient was placed into a short leg cast today. Keep cast clean, dry and intact. Okay to weight-bear through the elbow on the right arm. Continue Keflex until dose is finished. Continue removable right wrist splint for comfort. Okay to remove for hygiene and therapy. Follow-up in 3 weeks for reevaluation, x-rays and possible transition to a boot. Call if any questions or concerns. Electronically signed by RADHA Moore on 3/24/2022 at 3:42 PM  Note: This report was completed using [employer] voiced recognition software. Every effort has been made to ensure accuracy; however, inadvertent computerized transcription errors may be present.

## 2022-03-24 NOTE — PATIENT INSTRUCTIONS
Continue nonweightbearing right lower and right upper extremities. Patient was placed into a short leg cast today. Keep cast clean, dry and intact. Okay to weight-bear through the elbow on the right arm. Continue Keflex until dose is finished. Continue removable right wrist splint for comfort. Okay to remove for hygiene and therapy. Follow-up in 3 weeks for reevaluation, x-rays and possible transition to a boot. Call if any questions or concerns.

## 2022-04-05 ENCOUNTER — TELEPHONE (OUTPATIENT)
Dept: ORTHOPEDIC SURGERY | Age: 62
End: 2022-04-05

## 2022-04-05 DIAGNOSIS — S82.871A CLOSED RIGHT PILON FRACTURE, INITIAL ENCOUNTER: Primary | ICD-10-CM

## 2022-04-05 DIAGNOSIS — S52.571A OTHER CLOSED INTRA-ARTICULAR FRACTURE OF DISTAL END OF RIGHT RADIUS, INITIAL ENCOUNTER: ICD-10-CM

## 2022-04-10 ENCOUNTER — HOSPITAL ENCOUNTER (INPATIENT)
Age: 62
LOS: 4 days | Discharge: ANOTHER HOSPITAL | DRG: 282 | End: 2022-04-15
Attending: EMERGENCY MEDICINE | Admitting: INTERNAL MEDICINE
Payer: MEDICAID

## 2022-04-10 ENCOUNTER — APPOINTMENT (OUTPATIENT)
Dept: CT IMAGING | Age: 62
DRG: 282 | End: 2022-04-10
Payer: MEDICAID

## 2022-04-10 DIAGNOSIS — K85.90 ACUTE PANCREATITIS WITHOUT INFECTION OR NECROSIS, UNSPECIFIED PANCREATITIS TYPE: Primary | ICD-10-CM

## 2022-04-10 LAB
ALBUMIN SERPL-MCNC: 3.9 G/DL (ref 3.5–5.2)
ALP BLD-CCNC: 133 U/L (ref 35–104)
ALT SERPL-CCNC: 14 U/L (ref 0–32)
ANION GAP SERPL CALCULATED.3IONS-SCNC: 10 MMOL/L (ref 7–16)
AST SERPL-CCNC: 17 U/L (ref 0–31)
BASOPHILS ABSOLUTE: 0.01 E9/L (ref 0–0.2)
BASOPHILS RELATIVE PERCENT: 0.1 % (ref 0–2)
BILIRUB SERPL-MCNC: 0.3 MG/DL (ref 0–1.2)
BUN BLDV-MCNC: 15 MG/DL (ref 6–23)
CALCIUM SERPL-MCNC: 9.5 MG/DL (ref 8.6–10.2)
CHLORIDE BLD-SCNC: 102 MMOL/L (ref 98–107)
CHP ED QC CHECK: NORMAL
CO2: 26 MMOL/L (ref 22–29)
CREAT SERPL-MCNC: 0.7 MG/DL (ref 0.5–1)
EOSINOPHILS ABSOLUTE: 0 E9/L (ref 0.05–0.5)
EOSINOPHILS RELATIVE PERCENT: 0 % (ref 0–6)
GFR AFRICAN AMERICAN: >60
GFR NON-AFRICAN AMERICAN: >60 ML/MIN/1.73
GLUCOSE BLD-MCNC: 141 MG/DL
GLUCOSE BLD-MCNC: 141 MG/DL (ref 74–99)
HCT VFR BLD CALC: 43.6 % (ref 34–48)
HEMOGLOBIN: 13.8 G/DL (ref 11.5–15.5)
IMMATURE GRANULOCYTES #: 0.07 E9/L
IMMATURE GRANULOCYTES %: 0.7 % (ref 0–5)
LACTIC ACID, SEPSIS: 3 MMOL/L (ref 0.5–1.9)
LIPASE: >3000 U/L (ref 13–60)
LYMPHOCYTES ABSOLUTE: 0.55 E9/L (ref 1.5–4)
LYMPHOCYTES RELATIVE PERCENT: 5.1 % (ref 20–42)
MCH RBC QN AUTO: 31.6 PG (ref 26–35)
MCHC RBC AUTO-ENTMCNC: 31.7 % (ref 32–34.5)
MCV RBC AUTO: 99.8 FL (ref 80–99.9)
MONOCYTES ABSOLUTE: 0.51 E9/L (ref 0.1–0.95)
MONOCYTES RELATIVE PERCENT: 4.7 % (ref 2–12)
NEUTROPHILS ABSOLUTE: 9.62 E9/L (ref 1.8–7.3)
NEUTROPHILS RELATIVE PERCENT: 89.4 % (ref 43–80)
PDW BLD-RTO: 13.4 FL (ref 11.5–15)
PLATELET # BLD: 253 E9/L (ref 130–450)
PMV BLD AUTO: 8.8 FL (ref 7–12)
POTASSIUM SERPL-SCNC: 4.2 MMOL/L (ref 3.5–5)
RBC # BLD: 4.37 E12/L (ref 3.5–5.5)
RBC # BLD: NORMAL 10*6/UL
SODIUM BLD-SCNC: 138 MMOL/L (ref 132–146)
TOTAL PROTEIN: 6.7 G/DL (ref 6.4–8.3)
TROPONIN, HIGH SENSITIVITY: 10 NG/L (ref 0–9)
TROPONIN, HIGH SENSITIVITY: 13 NG/L (ref 0–9)
WBC # BLD: 10.8 E9/L (ref 4.5–11.5)

## 2022-04-10 PROCEDURE — 83690 ASSAY OF LIPASE: CPT

## 2022-04-10 PROCEDURE — 96374 THER/PROPH/DIAG INJ IV PUSH: CPT

## 2022-04-10 PROCEDURE — 84484 ASSAY OF TROPONIN QUANT: CPT

## 2022-04-10 PROCEDURE — 99285 EMERGENCY DEPT VISIT HI MDM: CPT

## 2022-04-10 PROCEDURE — 2580000003 HC RX 258: Performed by: EMERGENCY MEDICINE

## 2022-04-10 PROCEDURE — 83605 ASSAY OF LACTIC ACID: CPT

## 2022-04-10 PROCEDURE — 81003 URINALYSIS AUTO W/O SCOPE: CPT

## 2022-04-10 PROCEDURE — 6360000002 HC RX W HCPCS: Performed by: EMERGENCY MEDICINE

## 2022-04-10 PROCEDURE — 85025 COMPLETE CBC W/AUTO DIFF WBC: CPT

## 2022-04-10 PROCEDURE — 80053 COMPREHEN METABOLIC PANEL: CPT

## 2022-04-10 PROCEDURE — 93005 ELECTROCARDIOGRAM TRACING: CPT | Performed by: EMERGENCY MEDICINE

## 2022-04-10 PROCEDURE — 6360000004 HC RX CONTRAST MEDICATION: Performed by: RADIOLOGY

## 2022-04-10 PROCEDURE — 74177 CT ABD & PELVIS W/CONTRAST: CPT

## 2022-04-10 PROCEDURE — 96375 TX/PRO/DX INJ NEW DRUG ADDON: CPT

## 2022-04-10 RX ORDER — 0.9 % SODIUM CHLORIDE 0.9 %
1000 INTRAVENOUS SOLUTION INTRAVENOUS ONCE
Status: COMPLETED | OUTPATIENT
Start: 2022-04-10 | End: 2022-04-10

## 2022-04-10 RX ORDER — SODIUM CHLORIDE 9 MG/ML
INJECTION, SOLUTION INTRAVENOUS ONCE
Status: COMPLETED | OUTPATIENT
Start: 2022-04-10 | End: 2022-04-10

## 2022-04-10 RX ORDER — ONDANSETRON 2 MG/ML
4 INJECTION INTRAMUSCULAR; INTRAVENOUS ONCE
Status: COMPLETED | OUTPATIENT
Start: 2022-04-10 | End: 2022-04-10

## 2022-04-10 RX ADMIN — SODIUM CHLORIDE 1000 ML: 9 INJECTION, SOLUTION INTRAVENOUS at 19:53

## 2022-04-10 RX ADMIN — IOPAMIDOL 75 ML: 755 INJECTION, SOLUTION INTRAVENOUS at 21:02

## 2022-04-10 RX ADMIN — ONDANSETRON 4 MG: 2 INJECTION INTRAMUSCULAR; INTRAVENOUS at 19:54

## 2022-04-10 RX ADMIN — HYDROMORPHONE HYDROCHLORIDE 0.5 MG: 1 INJECTION, SOLUTION INTRAMUSCULAR; INTRAVENOUS; SUBCUTANEOUS at 23:09

## 2022-04-10 RX ADMIN — SODIUM CHLORIDE: 9 INJECTION, SOLUTION INTRAVENOUS at 23:06

## 2022-04-10 RX ADMIN — HYDROMORPHONE HYDROCHLORIDE 0.5 MG: 1 INJECTION, SOLUTION INTRAMUSCULAR; INTRAVENOUS; SUBCUTANEOUS at 19:54

## 2022-04-10 ASSESSMENT — PAIN DESCRIPTION - DESCRIPTORS: DESCRIPTORS: ACHING;STABBING;THROBBING

## 2022-04-10 ASSESSMENT — PAIN - FUNCTIONAL ASSESSMENT: PAIN_FUNCTIONAL_ASSESSMENT: PREVENTS OR INTERFERES WITH MANY ACTIVE NOT PASSIVE ACTIVITIES

## 2022-04-10 ASSESSMENT — PAIN SCALES - GENERAL
PAINLEVEL_OUTOF10: 10
PAINLEVEL_OUTOF10: 7
PAINLEVEL_OUTOF10: 10
PAINLEVEL_OUTOF10: 10
PAINLEVEL_OUTOF10: 7

## 2022-04-10 ASSESSMENT — PAIN DESCRIPTION - FREQUENCY: FREQUENCY: CONTINUOUS

## 2022-04-10 ASSESSMENT — PAIN DESCRIPTION - ONSET: ONSET: ON-GOING

## 2022-04-10 ASSESSMENT — PAIN DESCRIPTION - PROGRESSION: CLINICAL_PROGRESSION: NOT CHANGED

## 2022-04-10 ASSESSMENT — PAIN DESCRIPTION - LOCATION: LOCATION: ABDOMEN

## 2022-04-10 ASSESSMENT — PAIN DESCRIPTION - PAIN TYPE: TYPE: ACUTE PAIN

## 2022-04-10 NOTE — ED PROVIDER NOTES
HPI:  4/10/22,   Time: 7:23 PM EDT       Ricardo Jacobs is a 64 y.o. female presenting to the ED for abdominal pain and nausea, beginning 4 days ago. The complaint has been persistent, moderate in severity, and worsened by nothing. Patient 80-year-old female that comes in from [facility]. She had recent surgery on her right lower extremity and is a cast in place. She is on Lovenox blood thinners. She has multiple abdominal surgeries in the past cholecystectomy appendectomy hysterectomy. She states she feels very bloated and diffuse diffuse abdominal pain is been going on all week. Last normal bowel movement was about 4 days ago she had a few small bowel movements since then. No diarrhea she had nausea but no vomiting no fevers or chills. No back pain no urinary complaints no chest pain or shortness of breath. Review of Systems:   Pertinent positives and negatives are stated within HPI, all other systems reviewed and are negative.          --------------------------------------------- PAST HISTORY ---------------------------------------------  Past Medical History:  has a past medical history of Adenoma of right adrenal gland, Anemia, Anxiety, Arthritis, Asthma, Benign essential tremor, Bipolar affective (HCC), Chronic back pain, Chronic respiratory failure with hypoxia ([address] Depression, Diabetes mellitus ([address] Difficulty swallowing, Environmental and seasonal allergies, Excessive physiologic tremor, Fatty liver, Full dentures, GERD (gastroesophageal reflux disease), Gout, Herniated cervical disc, History of swelling of feet, Hypertension, Lymphedema of lower extremity, Mitochondrial cytopathy ([address] Muscle weakness (generalized), Need for assistance with personal care, Neuropathy, Obesity, PETR (obstructive sleep apnea), Osteoarthritis of left knee, PONV (postoperative nausea and vomiting), Seizures ([address] Spinal headache, and Thyroid disease.     Past Surgical History:  has a past surgical history that includes knee surgery (Bilateral); Gastric bypass surgery (1999); myomectomy; Tonsillectomy; Nerve Block (Left, 10 02 2013); Nerve Block (Left, 10 9 13); Nerve Block (Left, 10/16/13); other surgical history (Left, 11 25 13); Nerve Block (Left, 10/29/2014); Nerve Block (Left, 11/12/2014); Nerve Block (Left, 12 8 14); Nerve Block (Right, 3/30/15); Nerve Block (Right, 4/6/2015); Nerve Block (Right, 4/13/15); Nerve Block (Left, 7/6/15); Nerve Block (07/20/15); Nerve Block (Left, 10 1 15); Nerve Block (10/26/15); other surgical history (3/28/2016); Endoscopy, colon, diagnostic; pr colonoscopy flx dx w/collj spec when pfrmd (N/A, 3/20/2018); pr egd transoral biopsy single/multiple (N/A, 3/20/2018); Cholecystectomy (1999); Hysterectomy (1988); Colonoscopy (N/A, 9/18/2018); Esophagus dilation (9/18/2018); back surgery (last one 1995); Cardiac catheterization (Right, 6-6-2013); Appendectomy; other surgical history (1995); joint replacement (Bilateral, [id number]); egd colonoscopy (N/A, 10/8/2019); Colonoscopy (N/A, 10/8/2019); Nerve Block (Bilateral, 4/1/2021); Ankle fracture surgery (Right, 2/14/2022); and Ankle fracture surgery (Right, 3/2/2022). Social History:  reports that she quit smoking about 3 months ago. Her smoking use included cigarettes. She started smoking about 31 years ago. She has a 31.50 pack-year smoking history. She has never used smokeless tobacco. She reports previous drug use. Drug: Marijuana Shadn November). She reports that she does not drink alcohol. Family History: family history includes Arthritis in an other family member; Cancer in her father and another family member; Depression in an other family member; Heart Disease in her mother and another family member; Hypertension in an other family member; Mental Illness in an other family member; Stroke in an other family member. The patients home medications have been reviewed.     Allergies: Bee pollen, Penicillins, Ropinirole, Ropinirole hcl, Vistaril [hydroxyzine hcl], Aripiprazole, Prednisone, Restoril [temazepam], Hydroxyzine pamoate, and Tape [adhesive tape]        ---------------------------------------------------PHYSICAL EXAM--------------------------------------    Constitutional/General: Alert and oriented x3, ill appearing, non toxic in NAD; morbidly obese  Head: Normocephalic and atraumatic  Eyes: PERRL, EOMI, conjunctive normal, sclera non icteric  Mouth: Oropharynx clear, handling secretions, no trismus, no asymmetry of the posterior oropharynx or uvular edema  Neck: Supple, full ROM, non tender to palpation in the midline, no stridor, no crepitus, no meningeal signs  Respiratory: Lungs clear to auscultation bilaterally, no wheezes, rales, or rhonchi. Not in respiratory distress  Cardiovascular:  Regular rate. Regular rhythm. No murmurs, gallops, or rubs. 2+ distal pulses  Chest: No chest wall tenderness  GI:  Abdomen Soft, moderate diffuse tenderness, mildly distended. +BS. No organomegaly, no palpable masses,  No rebound, guarding, or rigidity. Small amount of bruising lateral lower abdomen area from Lovenox injections. Musculoskeletal: Moves all extremities x 4. Warm and well perfused, no clubbing, cyanosis, or edema. Capillary refill <3 seconds; cast in place to right lower extremity from the foot to just below the knee area. Integument: skin warm and dry. No rashes. Lymphatic: no lymphadenopathy noted  Neurologic: GCS 15, no focal deficits, symmetric strength 5/5 in the upper and lower extremities bilaterally  Psychiatric: Normal Affect    -------------------------------------------------- RESULTS -------------------------------------------------  I have personally reviewed all laboratory and imaging results for this patient. Results are listed below.      LABS:  Results for orders placed or performed during the hospital encounter of 04/10/22   Comprehensive Metabolic Panel   Result Value Ref Range    Sodium 138 132 - 146 mmol/L    Potassium 4.2 3.5 - 5.0 mmol/L    Chloride 102 98 - 107 mmol/L    CO2 26 22 - 29 mmol/L    Anion Gap 10 7 - 16 mmol/L    Glucose 141 (H) 74 - 99 mg/dL    BUN 15 6 - 23 mg/dL    CREATININE 0.7 0.5 - 1.0 mg/dL    GFR Non-African American >60 >=60 mL/min/1.73    GFR African American >60     Calcium 9.5 8.6 - 10.2 mg/dL    Total Protein 6.7 6.4 - 8.3 g/dL    Albumin 3.9 3.5 - 5.2 g/dL    Total Bilirubin 0.3 0.0 - 1.2 mg/dL    Alkaline Phosphatase 133 (H) 35 - 104 U/L    ALT 14 0 - 32 U/L    AST 17 0 - 31 U/L   CBC with Auto Differential   Result Value Ref Range    WBC 10.8 4.5 - 11.5 E9/L    RBC 4.37 3.50 - 5.50 E12/L    Hemoglobin 13.8 11.5 - 15.5 g/dL    Hematocrit 43.6 34.0 - 48.0 %    MCV 99.8 80.0 - 99.9 fL    MCH 31.6 26.0 - 35.0 pg    MCHC 31.7 (L) 32.0 - 34.5 %    RDW 13.4 11.5 - 15.0 fL    Platelets 102 116 - 909 E9/L    MPV 8.8 7.0 - 12.0 fL    Neutrophils % 89.4 (H) 43.0 - 80.0 %    Immature Granulocytes % 0.7 0.0 - 5.0 %    Lymphocytes % 5.1 (L) 20.0 - 42.0 %    Monocytes % 4.7 2.0 - 12.0 %    Eosinophils % 0.0 0.0 - 6.0 %    Basophils % 0.1 0.0 - 2.0 %    Neutrophils Absolute 9.62 (H) 1.80 - 7.30 E9/L    Immature Granulocytes # 0.07 E9/L    Lymphocytes Absolute 0.55 (L) 1.50 - 4.00 E9/L    Monocytes Absolute 0.51 0.10 - 0.95 E9/L    Eosinophils Absolute 0.00 (L) 0.05 - 0.50 E9/L    Basophils Absolute 0.01 0.00 - 0.20 E9/L    RBC Morphology Normal    Lipase   Result Value Ref Range    Lipase >3,000 (H) 13 - 60 U/L   Troponin   Result Value Ref Range    Troponin, High Sensitivity 10 (H) 0 - 9 ng/L   Lactate, Sepsis   Result Value Ref Range    Lactic Acid, Sepsis 3.0 (H) 0.5 - 1.9 mmol/L   Troponin   Result Value Ref Range    Troponin, High Sensitivity 13 (H) 0 - 9 ng/L   POCT Glucose   Result Value Ref Range    Glucose 141 mg/dL    QC OK? pass    EKG 12 Lead   Result Value Ref Range    Ventricular Rate 69 BPM    Atrial Rate 69 BPM    P-R Interval 166 ms    QRS Duration 98 ms    Q-T Interval cast in place. Patient describes 4 days of abdominal pain nausea but no vomiting decreased bowel movements concern for bowel obstruction patient will CT imaging lab work and be given pain and nausea medicine    Differential diagnosis ileus small bowel obstruction constipation ACS MI dysrhythmia diverticulitis        This patient has remained hemodynamically stable during their ED course. EKG shows normal sinus rhythm at 69 beats a minute no signs of any ST changes. Patient had a first troponin of 10-second 13 delta of only 3. Chemistry was normal.  CBC was normal white count was normal but lipase is markedly elevated greater than 3000. Urinalysis negative for acute infection lactic acid was 3. She was given 2 L of IV fluids and IV pain and nausea medication. She was kept NPO. CT abdomen pelvis showed findings consistent with acute pancreatitis. Patient's pain was improved. I spoke to Dr. Akbar Higuera who admit the patient to her service. Patient be admitted to monitored bed in stable condition. Re-Evaluations:             Re-evaluation. Patients symptoms are improving    Re-examination  4/10/22   7:23 PM EDT          Vital Signs:   Vitals:    04/10/22 1855 04/10/22 2311   BP: (!) 149/90 136/78   Pulse: 70 72   Resp: 16 16   Temp: 97.6 °F (36.4 °C)    TempSrc: Oral    SpO2: 94% 96%   Weight: 240 lb (108.9 kg)    Height: 5' 7\" (1.702 m)            Counseling: The emergency provider has spoken with the patient and discussed todays results, in addition to providing specific details for the plan of care and counseling regarding the diagnosis and prognosis. Questions are answered at this time and they are agreeable with the plan.       --------------------------------- IMPRESSION AND DISPOSITION ---------------------------------    IMPRESSION  1.  Acute pancreatitis without infection or necrosis, unspecified pancreatitis type        DISPOSITION  Disposition: Admit to telemetry  Patient condition is fair    NOTE: This report was transcribed using voice recognition software.  Every effort was made to ensure accuracy; however, inadvertent computerized transcription errors may be present        Shadi Reeves MD  04/12/22 [id number]

## 2022-04-10 NOTE — DISCHARGE INSTR - COC
Continuity of Care Form    Patient Name: Kaylin Saenz   :  1960  MRN:  [id number]    Admit date:  4/10/2022  Discharge date:  4/15/22    Code Status Order: Prior - Full Code  Advance Directives:      Admitting Physician:  No admitting provider for patient encounter. PCP: Esau Lan MD    Discharging Nurse: Jerzy Feliz [facility] Unit/Room#:   Discharging Unit Phone Number: [phone]    Emergency Contact:   Extended Emergency Contact Information  Primary Emergency Contact: Cameron Paez  Home Phone: [phone]  Mobile Phone: [phone]  Relation: Other  Secondary Emergency Contact: Raymond Carvajal Phone: [phone]  Relation: Brother/Sister  Preferred language: English   needed?  No    Past Surgical History:  Past Surgical History:   Procedure Laterality Date    ANKLE FRACTURE SURGERY Right 2022    RIGHT ANKLE IRRIGAITON AND DEBRIDEMENT WITH EXTERNAL FIXATOR AND LACERATION REPAIR performed by Aditi Keating MD at [address] Right 3/2/2022    RIGHT LOWER EXTREMITY REMOVAL EXTERNAL FIXATOR, RIGHT PILON OPEN REDUCTION INTERNAL FIXATOR--SYNTHES (FACILITY) performed by Ailyn Emery DO at [facility]      with Hysterectomy / unknown    BACK SURGERY  last one     lumbar x 2    CARDIAC CATHETERIZATION Right 2013    Dr. Connie Zhao Radial, no stents placed, no blockages     CHOLECYSTECTOMY      open with gastric bypass    COLONOSCOPY N/A 2018    COLONOSCOPY POLYPECTOMY HOT BIOPSY performed by Suha Robetro MD at [address] [place] [address] N/A 10/8/2019    COLONOSCOPY POLYPECTOMY SNARE/COLD BIOPSY performed by uSha Roberto MD at [address] [facility]    EGD COLONOSCOPY N/A 10/8/2019    EGD ESOPHAGOGASTRODUODENOSCOPY DILATATION performed by Suha Roberto MD at [facility], DIAGNOSTIC      ESOPHAGEAL DILATATION  2018    ESOPHAGEAL DILATION Noah Franklin performed by Aiyana Hutchinson MD Harshal at [address] [facility] [address] Bilateral 2007,2017    knees    KNEE SURGERY Bilateral     scope    MYOMECTOMY      NERVE BLOCK Left 10 02 2013    lumbar paravertebral facet #2    NERVE BLOCK Left 10 9 13    hip inj #1    NERVE BLOCK Left 10/16/13    hip injection    NERVE BLOCK Left 10/29/2014    left lumbar transforaminal nerve lbock  #1    NERVE BLOCK Left 11/12/2014    lumbar left transforaminal nerve block  #2    NERVE BLOCK Left 12 8 14    lumbar transforaminal #3    NERVE BLOCK Right 3/30/15    cervical transforaminal #1    NERVE BLOCK Right 4/6/2015    right cervical transforaminal nerve block  #2    NERVE BLOCK Right 4/13/15    cervical transforaminal #3    NERVE BLOCK Left 7/6/15    knee injection #1    NERVE BLOCK  07/20/15    left genicular nerve block/knee #2    NERVE BLOCK Left 10 1 15    lumb transforam #1    NERVE BLOCK  10/26/15    left lumbar transforaminal nerve block #3    NERVE BLOCK Bilateral 4/1/2021    #1 BILATERAL SACROILIAC JOINT INJECTION UNDER FLUORO performed by Weston Patino MD at [address] [facility] Left 11 25 13    lumbar radiofreq    OTHER SURGICAL HISTORY  3/28/2016    stage 1, 3 day percutanous trial [employer] lumbar spinal cord stimulator    OTHER SURGICAL HISTORY  1995    racz procedure / lower back    GA COLONOSCOPY FLX DX W/COLLJ SPEC WHEN PFRMD N/A 3/20/2018    COLONOSCOPY DIAGNOSTIC OR SCREENING performed by Mt Morales MD at [address] [place] [address] EGD TRANSORAL BIOPSY SINGLE/MULTIPLE N/A 3/20/2018    EGD BIOPSY performed by CHRISTOPHE Juarez MD at [facility]         Immunization History:   Immunization History   Administered Date(s) Administered    Influenza Virus Vaccine 11/02/2017, 11/02/2017, 02/06/2019, 02/02/2021    Influenza Whole 11/03/2008    Influenza, Quadv, IM, PF (6 mo and older Fluzone, Flulaval, Fluarix, and 3 yrs and older Afluria) 09/01/2018    Pneumococcal Conjugate 13-valent ([id number]) 02/03/2017    Pneumococcal Polysaccharide ([id number]) 11/03/2008, 09/01/2018    Td, unspecified formulation 11/02/2011    Tdap (Boostrix, Adacel) 02/16/2021       Active Problems:  Patient Active Problem List   Diagnosis Code    Debility R53.81    Obesity E66.9    Bipolar 1 disorder ([facility] F31.9    Generalized seizure disorder ([facility] G40.309    Cervicalgia M54.2    Asthma J45.909    Hyperlipidemia E78.5    Hypertension I10    Arthritis M19.90    Lymphedema of lower extremity I89.0    Degenerative Osteoarthritis of both knees M17.0    Status post total knee replacement, right Z96.651    Cervical spondylolysis M43.02    Degenerative Osteoarthritis thoracic spine M47.814    Thoracic facet syndrome M47.894    Cervical facet syndrome M47.812    Facet syndrome, lumbar M47.816    Neural foraminal stenosis of lumbar spine M48.061    Lumbar radiculopathy M54.16    DDD (degenerative disc disease), cervical M50.30    Neural foraminal stenosis of cervical spine M48.02    Protruded cervical disc M50.20    Cervical radiculopathy M54.12    Postlaminectomy syndrome, lumbar M96.1    Neuropathic pain syndrome (non-herpetic) M79.2    Chronic pain G89.29    Chronic respiratory failure with hypoxia (HCC) J96.11    Mitochondrial cytopathy (HCC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 A    PETR (obstructive sleep apnea) G47.33    Chronic back pain M54.9, G89.29    Bipolar affective (HCC) F31.9    Adenoma of right adrenal gland D35.01    Chest pain R07.9    Lumbosacral spondylosis without myelopathy M47.817    Sacroiliac dysfunction M53.3    DDD (degenerative disc disease), lumbar M51.36    Thoracic degenerative disc disease M51.34    Excessive physiologic tremor R25.1    Rhabdomyolysis M62.82    Failure to thrive in adult R62.7    Closed fracture of right distal radius S52.501A    Closed left ankle fracture, initial encounter L26.245L    Closed fracture of right tibial plateau L93.181F    Closed displaced pilon fracture of right tibia S82.871A       Isolation/Infection:   Isolation            No Isolation          Patient Infection Status       Infection Onset Added Last Indicated Last Indicated By Review Planned Expiration Resolved Resolved By    None active    Resolved    COVID-19 (Rule Out) 01/23/22 01/23/22 01/23/22 COVID-19, Rapid (Ordered)   01/23/22 Rule-Out Test Resulted    COVID-19 (Rule Out) 01/05/22 01/05/22 01/06/22 COVID-19, Rapid (Ordered)   01/06/22 Rule-Out Test Resulted    COVID-19 (Rule Out) 08/20/21 08/20/21 08/20/21 COVID-19, Rapid (Ordered)   08/20/21 Rule-Out Test Resulted    COVID-19 (Rule Out) 04/25/21 04/25/21 04/25/21 COVID-19, Rapid (Ordered)   04/25/21 Rule-Out Test Resulted    COVID-19 (Rule Out) 02/10/21 02/10/21 02/10/21 COVID-19 (Ordered)   02/11/21 Rule-Out Test Resulted    COVID-19 (Rule Out) 09/17/20 09/17/20 09/17/20 COVID-19 Ambulatory (Ordered)   09/19/20 Rule-Out Test Resulted    COVID-19 (Rule Out) 04/30/20 04/30/20 04/30/20 COVID-19 (Ordered)   05/01/20 Rule-Out Test Resulted    Not detected 4/30/2020            Nurse Assessment:  Last Vital Signs: BP (!) 149/90   Pulse 70   Temp 97.6 °F (36.4 °C) (Oral)   Resp 16   Ht 5' 7\" (1.702 m)   Wt 240 lb (108.9 kg)   LMP 08/31/1988   SpO2 94%   BMI 37.59 kg/m²     Last documented pain score (0-10 scale): Pain Level: 10  Last Weight:   Wt Readings from Last 1 Encounters:   04/10/22 240 lb (108.9 kg)     Mental Status:  oriented and alert    IV Access:  - None    Nursing Mobility/ADLs:  Walking   Assisted  Transfer  Assisted  Bathing  Assisted  Dressing  Assisted  Toileting  Assisted  Feeding  Independent  Med Admin  Independent  Med Delivery   whole    Wound Care Documentation and Therapy:        Elimination:  Continence:    Bowel: Yes  Bladder: No  Urinary Catheter: None (Indwelling meadows catheter was removed 4/14/22)  Colostomy/Ileostomy/Ileal Conduit: No       Date of Last BM: 4/10/22  No intake or output data in the 24 hours ending 04/10/22 1929  No intake/output data recorded. Safety Concerns:     History of Falls (last 30 days) and At Risk for Falls    Impairments/Disabilities:      None    Nutrition Therapy:  Current Nutrition Therapy:   - Oral Diet:  ADULT DIET; Regular; Low Fat (less than or equal to 50 gm/day)  ADULT ORAL NUTRITION SUPPLEMENT; HS Snack, PM Snack, Dinner, Lunch, AM Snack, Breakfast; Wound Healing Oral Supplement     Routes of Feeding: Oral  Liquids: No Restrictions  Daily Fluid Restriction: no  Last Modified Barium Swallow with Video (Video Swallowing Test): not done    Treatments at the Time of Hospital Discharge:   Respiratory Treatments: ***  Oxygen Therapy:  is not on home oxygen therapy.   Ventilator:    - No ventilator support    Rehab Therapies: PT and OT eval and treat  Weight Bearing Status/Restrictions: Non-weight bearing on right leg & Non-weight bearing on RUE  Other Medical Equipment (for information only, NOT a DME order):  wheelchair, walker, bath bench, bedside commode, and hospital bed  Other Treatments: Cast RLE & mary LACKEY      RN SIGNATURE:  Electronically signed by Red Conrad RN on 4/15/2022 at 10:22 AM      CASE MANAGEMENT/SOCIAL WORK SECTION    Inpatient Status Date: ***    Readmission Risk Assessment Score:  Readmission Risk              Risk of Unplanned Readmission:  0           Discharging to Facility/ Agency   Name: [address] [place] [address]  JCCKE:[phone]  Fax:[phone]    Dialysis Facility (if applicable)   Name:  Address:  Dialysis Schedule:  Phone:  Fax:    / signature: {Esignature:[id number]}Electronically signed by Lorita Halsted, LSW on 4/11/22 at 1:54 PM EDT     PHYSICIAN SECTION    Prognosis: {Prognosis:0473982406}    Condition at Discharge: Stable    Rehab Potential (if transferring to Rehab): {Prognosis:7849694136}    Recommended Labs or Other Treatments After Discharge: ***    Physician Certification: I certify the above information and transfer of Kade Gold  is necessary for the continuing treatment of the diagnosis listed and that she requires [facility] for Less than 30 days.      Update Admission H&P: {CHP DME Changes in RQPIW:[id number]}    PHYSICIAN SIGNATURE:  Jimmie Maravilla MD

## 2022-04-11 PROBLEM — K85.90 ACUTE PANCREATITIS: Status: ACTIVE | Noted: 2022-04-11

## 2022-04-11 PROBLEM — R62.7 FAILURE TO THRIVE IN ADULT: Status: RESOLVED | Noted: 2022-01-23 | Resolved: 2022-04-11

## 2022-04-11 PROBLEM — M62.82 RHABDOMYOLYSIS: Status: RESOLVED | Noted: 2022-01-06 | Resolved: 2022-04-11

## 2022-04-11 PROBLEM — R07.9 CHEST PAIN: Status: RESOLVED | Noted: 2020-09-09 | Resolved: 2022-04-11

## 2022-04-11 LAB
ALBUMIN SERPL-MCNC: 3.5 G/DL (ref 3.5–5.2)
ALP BLD-CCNC: 116 U/L (ref 35–104)
ALT SERPL-CCNC: 12 U/L (ref 0–32)
ANION GAP SERPL CALCULATED.3IONS-SCNC: 11 MMOL/L (ref 7–16)
AST SERPL-CCNC: 17 U/L (ref 0–31)
BASOPHILS ABSOLUTE: 0.01 E9/L (ref 0–0.2)
BASOPHILS RELATIVE PERCENT: 0.1 % (ref 0–2)
BILIRUB SERPL-MCNC: 0.3 MG/DL (ref 0–1.2)
BILIRUBIN URINE: NEGATIVE
BLOOD, URINE: NEGATIVE
BUN BLDV-MCNC: 14 MG/DL (ref 6–23)
CALCIUM SERPL-MCNC: 8.8 MG/DL (ref 8.6–10.2)
CHLORIDE BLD-SCNC: 109 MMOL/L (ref 98–107)
CHOLESTEROL, TOTAL: 154 MG/DL (ref 0–199)
CLARITY: CLEAR
CO2: 24 MMOL/L (ref 22–29)
COLOR: YELLOW
CREAT SERPL-MCNC: 0.6 MG/DL (ref 0.5–1)
EKG ATRIAL RATE: 69 BPM
EKG P AXIS: 4 DEGREES
EKG P-R INTERVAL: 166 MS
EKG Q-T INTERVAL: 408 MS
EKG QRS DURATION: 98 MS
EKG QTC CALCULATION (BAZETT): 437 MS
EKG R AXIS: -2 DEGREES
EKG T AXIS: 31 DEGREES
EKG VENTRICULAR RATE: 69 BPM
EOSINOPHILS ABSOLUTE: 0.01 E9/L (ref 0.05–0.5)
EOSINOPHILS RELATIVE PERCENT: 0.1 % (ref 0–6)
GFR AFRICAN AMERICAN: >60
GFR NON-AFRICAN AMERICAN: >60 ML/MIN/1.73
GLUCOSE BLD-MCNC: 121 MG/DL (ref 74–99)
GLUCOSE URINE: NEGATIVE MG/DL
HCT VFR BLD CALC: 38.3 % (ref 34–48)
HDLC SERPL-MCNC: 49 MG/DL
HEMOGLOBIN: 12.3 G/DL (ref 11.5–15.5)
IMMATURE GRANULOCYTES #: 0.06 E9/L
IMMATURE GRANULOCYTES %: 0.6 % (ref 0–5)
KETONES, URINE: NEGATIVE MG/DL
LACTIC ACID: 1.2 MMOL/L (ref 0.5–2.2)
LDL CHOLESTEROL CALCULATED: 72 MG/DL (ref 0–99)
LEUKOCYTE ESTERASE, URINE: NEGATIVE
LIPASE: 1256 U/L (ref 13–60)
LYMPHOCYTES ABSOLUTE: 0.62 E9/L (ref 1.5–4)
LYMPHOCYTES RELATIVE PERCENT: 6 % (ref 20–42)
MCH RBC QN AUTO: 31.8 PG (ref 26–35)
MCHC RBC AUTO-ENTMCNC: 32.1 % (ref 32–34.5)
MCV RBC AUTO: 99 FL (ref 80–99.9)
METER GLUCOSE: 128 MG/DL (ref 74–99)
MONOCYTES ABSOLUTE: 0.5 E9/L (ref 0.1–0.95)
MONOCYTES RELATIVE PERCENT: 4.9 % (ref 2–12)
NEUTROPHILS ABSOLUTE: 9.1 E9/L (ref 1.8–7.3)
NEUTROPHILS RELATIVE PERCENT: 88.3 % (ref 43–80)
NITRITE, URINE: NEGATIVE
PDW BLD-RTO: 13.5 FL (ref 11.5–15)
PH UA: 6.5 (ref 5–9)
PLATELET # BLD: 229 E9/L (ref 130–450)
PMV BLD AUTO: 8.4 FL (ref 7–12)
POTASSIUM REFLEX MAGNESIUM: 4.1 MMOL/L (ref 3.5–5)
PROTEIN UA: NEGATIVE MG/DL
RBC # BLD: 3.87 E12/L (ref 3.5–5.5)
SODIUM BLD-SCNC: 144 MMOL/L (ref 132–146)
SPECIFIC GRAVITY UA: 1.02 (ref 1–1.03)
TOTAL PROTEIN: 5.6 G/DL (ref 6.4–8.3)
TRIGL SERPL-MCNC: 163 MG/DL (ref 0–149)
UROBILINOGEN, URINE: 0.2 E.U./DL
VLDLC SERPL CALC-MCNC: 33 MG/DL
WBC # BLD: 10.3 E9/L (ref 4.5–11.5)

## 2022-04-11 PROCEDURE — 85025 COMPLETE CBC W/AUTO DIFF WBC: CPT

## 2022-04-11 PROCEDURE — 6370000000 HC RX 637 (ALT 250 FOR IP): Performed by: INTERNAL MEDICINE

## 2022-04-11 PROCEDURE — 36415 COLL VENOUS BLD VENIPUNCTURE: CPT

## 2022-04-11 PROCEDURE — 6360000002 HC RX W HCPCS: Performed by: STUDENT IN AN ORGANIZED HEALTH CARE EDUCATION/TRAINING PROGRAM

## 2022-04-11 PROCEDURE — 2500000003 HC RX 250 WO HCPCS: Performed by: STUDENT IN AN ORGANIZED HEALTH CARE EDUCATION/TRAINING PROGRAM

## 2022-04-11 PROCEDURE — 6360000002 HC RX W HCPCS: Performed by: INTERNAL MEDICINE

## 2022-04-11 PROCEDURE — 2700000000 HC OXYGEN THERAPY PER DAY

## 2022-04-11 PROCEDURE — 82787 IGG 1 2 3 OR 4 EACH: CPT

## 2022-04-11 PROCEDURE — 2580000003 HC RX 258: Performed by: INTERNAL MEDICINE

## 2022-04-11 PROCEDURE — 51702 INSERT TEMP BLADDER CATH: CPT

## 2022-04-11 PROCEDURE — 83690 ASSAY OF LIPASE: CPT

## 2022-04-11 PROCEDURE — 2580000003 HC RX 258: Performed by: STUDENT IN AN ORGANIZED HEALTH CARE EDUCATION/TRAINING PROGRAM

## 2022-04-11 PROCEDURE — A4216 STERILE WATER/SALINE, 10 ML: HCPCS | Performed by: STUDENT IN AN ORGANIZED HEALTH CARE EDUCATION/TRAINING PROGRAM

## 2022-04-11 PROCEDURE — 99254 IP/OBS CNSLTJ NEW/EST MOD 60: CPT | Performed by: TRANSPLANT SURGERY

## 2022-04-11 PROCEDURE — 80053 COMPREHEN METABOLIC PANEL: CPT

## 2022-04-11 PROCEDURE — 80061 LIPID PANEL: CPT

## 2022-04-11 PROCEDURE — 1200000000 HC SEMI PRIVATE

## 2022-04-11 PROCEDURE — 83605 ASSAY OF LACTIC ACID: CPT

## 2022-04-11 PROCEDURE — 82962 GLUCOSE BLOOD TEST: CPT

## 2022-04-11 RX ORDER — MONTELUKAST SODIUM 10 MG/1
10 TABLET ORAL DAILY
Status: DISCONTINUED | OUTPATIENT
Start: 2022-04-11 | End: 2022-04-15 | Stop reason: HOSPADM

## 2022-04-11 RX ORDER — SODIUM CHLORIDE 0.9 % (FLUSH) 0.9 %
10 SYRINGE (ML) INJECTION EVERY 12 HOURS SCHEDULED
Status: DISCONTINUED | OUTPATIENT
Start: 2022-04-11 | End: 2022-04-15 | Stop reason: HOSPADM

## 2022-04-11 RX ORDER — ACETAMINOPHEN 650 MG/1
650 SUPPOSITORY RECTAL EVERY 6 HOURS PRN
Status: DISCONTINUED | OUTPATIENT
Start: 2022-04-11 | End: 2022-04-15 | Stop reason: HOSPADM

## 2022-04-11 RX ORDER — SODIUM CHLORIDE 9 MG/ML
INJECTION, SOLUTION INTRAVENOUS PRN
Status: DISCONTINUED | OUTPATIENT
Start: 2022-04-11 | End: 2022-04-15 | Stop reason: HOSPADM

## 2022-04-11 RX ORDER — TRAZODONE HYDROCHLORIDE 50 MG/1
100 TABLET ORAL NIGHTLY
Status: DISCONTINUED | OUTPATIENT
Start: 2022-04-11 | End: 2022-04-15 | Stop reason: HOSPADM

## 2022-04-11 RX ORDER — ACETAMINOPHEN 325 MG/1
650 TABLET ORAL EVERY 6 HOURS PRN
Status: DISCONTINUED | OUTPATIENT
Start: 2022-04-11 | End: 2022-04-15 | Stop reason: HOSPADM

## 2022-04-11 RX ORDER — ESCITALOPRAM OXALATE 10 MG/1
20 TABLET ORAL 2 TIMES DAILY
Status: DISCONTINUED | OUTPATIENT
Start: 2022-04-11 | End: 2022-04-15 | Stop reason: HOSPADM

## 2022-04-11 RX ORDER — HYDROCODONE BITARTRATE AND ACETAMINOPHEN 5; 325 MG/1; MG/1
1 TABLET ORAL EVERY 8 HOURS PRN
Status: ON HOLD | COMMUNITY
End: 2022-08-19 | Stop reason: HOSPADM

## 2022-04-11 RX ORDER — GABAPENTIN 300 MG/1
600 CAPSULE ORAL 4 TIMES DAILY
Status: DISCONTINUED | OUTPATIENT
Start: 2022-04-11 | End: 2022-04-15 | Stop reason: HOSPADM

## 2022-04-11 RX ORDER — TOPIRAMATE 100 MG/1
200 TABLET, FILM COATED ORAL 2 TIMES DAILY
Status: DISCONTINUED | OUTPATIENT
Start: 2022-04-11 | End: 2022-04-15 | Stop reason: HOSPADM

## 2022-04-11 RX ORDER — SODIUM CHLORIDE 9 MG/ML
INJECTION, SOLUTION INTRAVENOUS CONTINUOUS
Status: DISCONTINUED | OUTPATIENT
Start: 2022-04-11 | End: 2022-04-11

## 2022-04-11 RX ORDER — SODIUM CHLORIDE, SODIUM LACTATE, POTASSIUM CHLORIDE, CALCIUM CHLORIDE 600; 310; 30; 20 MG/100ML; MG/100ML; MG/100ML; MG/100ML
INJECTION, SOLUTION INTRAVENOUS CONTINUOUS
Status: DISCONTINUED | OUTPATIENT
Start: 2022-04-11 | End: 2022-04-14

## 2022-04-11 RX ORDER — DOCUSATE SODIUM 100 MG/1
100 CAPSULE, LIQUID FILLED ORAL 2 TIMES DAILY
Status: DISCONTINUED | OUTPATIENT
Start: 2022-04-11 | End: 2022-04-15 | Stop reason: HOSPADM

## 2022-04-11 RX ORDER — MORPHINE SULFATE 2 MG/ML
2 INJECTION, SOLUTION INTRAMUSCULAR; INTRAVENOUS EVERY 4 HOURS PRN
Status: DISCONTINUED | OUTPATIENT
Start: 2022-04-11 | End: 2022-04-11

## 2022-04-11 RX ORDER — ASPIRIN 81 MG/1
81 TABLET ORAL 2 TIMES DAILY
Status: DISCONTINUED | OUTPATIENT
Start: 2022-04-11 | End: 2022-04-15 | Stop reason: HOSPADM

## 2022-04-11 RX ORDER — CALCIUM CARBONATE 500(1250)
600 TABLET ORAL 2 TIMES DAILY
Status: DISCONTINUED | OUTPATIENT
Start: 2022-04-11 | End: 2022-04-15 | Stop reason: HOSPADM

## 2022-04-11 RX ORDER — METOPROLOL SUCCINATE 25 MG/1
12.5 TABLET, EXTENDED RELEASE ORAL DAILY
Status: DISCONTINUED | OUTPATIENT
Start: 2022-04-11 | End: 2022-04-15 | Stop reason: HOSPADM

## 2022-04-11 RX ORDER — ALLOPURINOL 100 MG/1
200 TABLET ORAL 2 TIMES DAILY
Status: DISCONTINUED | OUTPATIENT
Start: 2022-04-11 | End: 2022-04-15 | Stop reason: HOSPADM

## 2022-04-11 RX ORDER — ZIPRASIDONE HYDROCHLORIDE 20 MG/1
20 CAPSULE ORAL NIGHTLY
Status: DISCONTINUED | OUTPATIENT
Start: 2022-04-11 | End: 2022-04-15 | Stop reason: HOSPADM

## 2022-04-11 RX ORDER — FAMOTIDINE 20 MG/1
20 TABLET, FILM COATED ORAL 2 TIMES DAILY
Status: DISCONTINUED | OUTPATIENT
Start: 2022-04-11 | End: 2022-04-11

## 2022-04-11 RX ORDER — PANTOPRAZOLE SODIUM 40 MG/1
40 TABLET, DELAYED RELEASE ORAL
Status: DISCONTINUED | OUTPATIENT
Start: 2022-04-11 | End: 2022-04-12

## 2022-04-11 RX ORDER — ONDANSETRON 4 MG/1
4 TABLET, ORALLY DISINTEGRATING ORAL EVERY 8 HOURS PRN
Status: DISCONTINUED | OUTPATIENT
Start: 2022-04-11 | End: 2022-04-15 | Stop reason: HOSPADM

## 2022-04-11 RX ORDER — POLYETHYLENE GLYCOL 3350 17 G/17G
17 POWDER, FOR SOLUTION ORAL 2 TIMES DAILY
Status: DISCONTINUED | OUTPATIENT
Start: 2022-04-11 | End: 2022-04-15 | Stop reason: HOSPADM

## 2022-04-11 RX ORDER — SODIUM CHLORIDE 0.9 % (FLUSH) 0.9 %
10 SYRINGE (ML) INJECTION PRN
Status: DISCONTINUED | OUTPATIENT
Start: 2022-04-11 | End: 2022-04-15 | Stop reason: HOSPADM

## 2022-04-11 RX ORDER — SENNA PLUS 8.6 MG/1
1 TABLET ORAL DAILY PRN
Status: DISCONTINUED | OUTPATIENT
Start: 2022-04-11 | End: 2022-04-15 | Stop reason: HOSPADM

## 2022-04-11 RX ORDER — ACETAMINOPHEN 325 MG/1
650 TABLET ORAL EVERY 4 HOURS PRN
Status: DISCONTINUED | OUTPATIENT
Start: 2022-04-11 | End: 2022-04-11 | Stop reason: SDUPTHER

## 2022-04-11 RX ORDER — POLYETHYLENE GLYCOL 3350 17 G/17G
17 POWDER, FOR SOLUTION ORAL 2 TIMES DAILY
COMMUNITY

## 2022-04-11 RX ORDER — LEVOCARNITINE 330 MG/1
330 TABLET ORAL 3 TIMES DAILY
Status: DISCONTINUED | OUTPATIENT
Start: 2022-04-11 | End: 2022-04-15 | Stop reason: HOSPADM

## 2022-04-11 RX ORDER — POTASSIUM CHLORIDE 20 MEQ/1
40 TABLET, EXTENDED RELEASE ORAL PRN
Status: DISCONTINUED | OUTPATIENT
Start: 2022-04-11 | End: 2022-04-15 | Stop reason: HOSPADM

## 2022-04-11 RX ORDER — VITAMIN B COMPLEX
5000 TABLET ORAL EVERY OTHER DAY
Status: DISCONTINUED | OUTPATIENT
Start: 2022-04-11 | End: 2022-04-15 | Stop reason: HOSPADM

## 2022-04-11 RX ORDER — KETOROLAC TROMETHAMINE 30 MG/ML
15 INJECTION, SOLUTION INTRAMUSCULAR; INTRAVENOUS EVERY 6 HOURS
Status: DISCONTINUED | OUTPATIENT
Start: 2022-04-11 | End: 2022-04-15 | Stop reason: HOSPADM

## 2022-04-11 RX ORDER — ONDANSETRON 2 MG/ML
4 INJECTION INTRAMUSCULAR; INTRAVENOUS EVERY 6 HOURS PRN
Status: DISCONTINUED | OUTPATIENT
Start: 2022-04-11 | End: 2022-04-15 | Stop reason: HOSPADM

## 2022-04-11 RX ORDER — BACLOFEN 10 MG/1
20 TABLET ORAL 4 TIMES DAILY
Status: DISCONTINUED | OUTPATIENT
Start: 2022-04-11 | End: 2022-04-15 | Stop reason: HOSPADM

## 2022-04-11 RX ORDER — UBIDECARENONE 75 MG
100 CAPSULE ORAL DAILY
Status: DISCONTINUED | OUTPATIENT
Start: 2022-04-11 | End: 2022-04-15 | Stop reason: HOSPADM

## 2022-04-11 RX ORDER — FUROSEMIDE 40 MG/1
40 TABLET ORAL DAILY
Status: DISCONTINUED | OUTPATIENT
Start: 2022-04-11 | End: 2022-04-15 | Stop reason: HOSPADM

## 2022-04-11 RX ORDER — LANOLIN ALCOHOL/MO/W.PET/CERES
9 CREAM (GRAM) TOPICAL NIGHTLY
Status: DISCONTINUED | OUTPATIENT
Start: 2022-04-11 | End: 2022-04-15 | Stop reason: HOSPADM

## 2022-04-11 RX ORDER — POTASSIUM CHLORIDE 7.45 MG/ML
10 INJECTION INTRAVENOUS PRN
Status: DISCONTINUED | OUTPATIENT
Start: 2022-04-11 | End: 2022-04-15 | Stop reason: HOSPADM

## 2022-04-11 RX ORDER — HYDROCODONE BITARTRATE AND ACETAMINOPHEN 5; 325 MG/1; MG/1
1 TABLET ORAL EVERY 6 HOURS PRN
Status: DISCONTINUED | OUTPATIENT
Start: 2022-04-11 | End: 2022-04-11

## 2022-04-11 RX ADMIN — GABAPENTIN 600 MG: 300 CAPSULE ORAL at 10:45

## 2022-04-11 RX ADMIN — Medication 5000 UNITS: at 10:49

## 2022-04-11 RX ADMIN — TRAZODONE HYDROCHLORIDE 100 MG: 50 TABLET ORAL at 22:20

## 2022-04-11 RX ADMIN — Medication 100 MCG: at 10:50

## 2022-04-11 RX ADMIN — ESCITALOPRAM OXALATE 20 MG: 10 TABLET ORAL at 22:19

## 2022-04-11 RX ADMIN — HYDROMORPHONE HYDROCHLORIDE 0.5 MG: 1 INJECTION, SOLUTION INTRAMUSCULAR; INTRAVENOUS; SUBCUTANEOUS at 20:20

## 2022-04-11 RX ADMIN — HYDROMORPHONE HYDROCHLORIDE 0.5 MG: 1 INJECTION, SOLUTION INTRAMUSCULAR; INTRAVENOUS; SUBCUTANEOUS at 13:30

## 2022-04-11 RX ADMIN — GABAPENTIN 600 MG: 300 CAPSULE ORAL at 22:19

## 2022-04-11 RX ADMIN — SODIUM CHLORIDE, PRESERVATIVE FREE 10 ML: 5 INJECTION INTRAVENOUS at 20:20

## 2022-04-11 RX ADMIN — MONTELUKAST SODIUM 10 MG: 10 TABLET, FILM COATED ORAL at 10:52

## 2022-04-11 RX ADMIN — HYDROCODONE BITARTRATE AND ACETAMINOPHEN 1 TABLET: 5; 325 TABLET ORAL at 05:33

## 2022-04-11 RX ADMIN — DOCUSATE SODIUM 100 MG: 100 CAPSULE, LIQUID FILLED ORAL at 22:20

## 2022-04-11 RX ADMIN — CALCIUM 500 MG: 500 TABLET ORAL at 10:52

## 2022-04-11 RX ADMIN — ALLOPURINOL 200 MG: 100 TABLET ORAL at 22:20

## 2022-04-11 RX ADMIN — BACLOFEN 20 MG: 10 TABLET ORAL at 17:14

## 2022-04-11 RX ADMIN — BACLOFEN 20 MG: 10 TABLET ORAL at 13:29

## 2022-04-11 RX ADMIN — KETOROLAC TROMETHAMINE 15 MG: 30 INJECTION, SOLUTION INTRAMUSCULAR; INTRAVENOUS at 22:21

## 2022-04-11 RX ADMIN — GABAPENTIN 600 MG: 300 CAPSULE ORAL at 13:29

## 2022-04-11 RX ADMIN — PANTOPRAZOLE SODIUM 40 MG: 40 TABLET, DELAYED RELEASE ORAL at 05:30

## 2022-04-11 RX ADMIN — BACLOFEN 20 MG: 10 TABLET ORAL at 10:50

## 2022-04-11 RX ADMIN — ASPIRIN 81 MG: 81 TABLET, COATED ORAL at 10:51

## 2022-04-11 RX ADMIN — TOPIRAMATE 200 MG: 100 TABLET, FILM COATED ORAL at 22:29

## 2022-04-11 RX ADMIN — Medication 200 MG: at 10:51

## 2022-04-11 RX ADMIN — MORPHINE SULFATE 2 MG: 2 INJECTION, SOLUTION INTRAMUSCULAR; INTRAVENOUS at 03:42

## 2022-04-11 RX ADMIN — DOCUSATE SODIUM 100 MG: 100 CAPSULE, LIQUID FILLED ORAL at 10:53

## 2022-04-11 RX ADMIN — HYDROMORPHONE HYDROCHLORIDE 0.5 MG: 1 INJECTION, SOLUTION INTRAMUSCULAR; INTRAVENOUS; SUBCUTANEOUS at 10:56

## 2022-04-11 RX ADMIN — KETOROLAC TROMETHAMINE 15 MG: 30 INJECTION, SOLUTION INTRAMUSCULAR; INTRAVENOUS at 17:15

## 2022-04-11 RX ADMIN — SODIUM CHLORIDE: 9 INJECTION, SOLUTION INTRAVENOUS at 03:43

## 2022-04-11 RX ADMIN — ENOXAPARIN SODIUM 30 MG: 100 INJECTION SUBCUTANEOUS at 10:45

## 2022-04-11 RX ADMIN — SODIUM CHLORIDE, PRESERVATIVE FREE 20 MG: 5 INJECTION INTRAVENOUS at 22:21

## 2022-04-11 RX ADMIN — METOPROLOL SUCCINATE 12.5 MG: 25 TABLET, EXTENDED RELEASE ORAL at 10:52

## 2022-04-11 RX ADMIN — POLYETHYLENE GLYCOL 3350 17 G: 17 POWDER, FOR SOLUTION ORAL at 22:19

## 2022-04-11 RX ADMIN — POLYETHYLENE GLYCOL 3350 17 G: 17 POWDER, FOR SOLUTION ORAL at 10:53

## 2022-04-11 RX ADMIN — Medication 9 MG: at 22:20

## 2022-04-11 RX ADMIN — SODIUM CHLORIDE, PRESERVATIVE FREE 10 ML: 5 INJECTION INTRAVENOUS at 11:00

## 2022-04-11 RX ADMIN — SODIUM CHLORIDE, POTASSIUM CHLORIDE, SODIUM LACTATE AND CALCIUM CHLORIDE: 600; 310; 30; 20 INJECTION, SOLUTION INTRAVENOUS at 10:43

## 2022-04-11 RX ADMIN — SODIUM CHLORIDE, PRESERVATIVE FREE 20 MG: 5 INJECTION INTRAVENOUS at 10:44

## 2022-04-11 RX ADMIN — SODIUM CHLORIDE, POTASSIUM CHLORIDE, SODIUM LACTATE AND CALCIUM CHLORIDE: 600; 310; 30; 20 INJECTION, SOLUTION INTRAVENOUS at 20:22

## 2022-04-11 RX ADMIN — ALLOPURINOL 200 MG: 100 TABLET ORAL at 10:52

## 2022-04-11 RX ADMIN — Medication 200 MG: at 22:20

## 2022-04-11 RX ADMIN — ESCITALOPRAM OXALATE 20 MG: 10 TABLET ORAL at 10:47

## 2022-04-11 RX ADMIN — ZIPRASIDONE HYDROCHLORIDE 20 MG: 20 CAPSULE ORAL at 22:20

## 2022-04-11 RX ADMIN — GABAPENTIN 600 MG: 300 CAPSULE ORAL at 17:15

## 2022-04-11 RX ADMIN — CALCIUM 500 MG: 500 TABLET ORAL at 17:15

## 2022-04-11 RX ADMIN — TOPIRAMATE 200 MG: 100 TABLET, FILM COATED ORAL at 10:50

## 2022-04-11 RX ADMIN — SENNOSIDES 8.6 MG: 8.6 TABLET, COATED ORAL at 13:41

## 2022-04-11 RX ADMIN — BACLOFEN 20 MG: 10 TABLET ORAL at 22:19

## 2022-04-11 ASSESSMENT — PAIN - FUNCTIONAL ASSESSMENT: PAIN_FUNCTIONAL_ASSESSMENT: PREVENTS OR INTERFERES WITH MANY ACTIVE NOT PASSIVE ACTIVITIES

## 2022-04-11 ASSESSMENT — PAIN DESCRIPTION - FREQUENCY: FREQUENCY: CONTINUOUS

## 2022-04-11 ASSESSMENT — PAIN DESCRIPTION - DESCRIPTORS
DESCRIPTORS: ACHING;STABBING
DESCRIPTORS: ACHING;STABBING

## 2022-04-11 ASSESSMENT — PAIN SCALES - GENERAL
PAINLEVEL_OUTOF10: 10
PAINLEVEL_OUTOF10: 7
PAINLEVEL_OUTOF10: 6
PAINLEVEL_OUTOF10: 7
PAINLEVEL_OUTOF10: 4
PAINLEVEL_OUTOF10: 10
PAINLEVEL_OUTOF10: 4
PAINLEVEL_OUTOF10: 7

## 2022-04-11 ASSESSMENT — ENCOUNTER SYMPTOMS
BACK PAIN: 0
COLOR CHANGE: 0
APNEA: 0
NAUSEA: 1
CONSTIPATION: 0
EYE DISCHARGE: 0
ABDOMINAL PAIN: 1
ABDOMINAL DISTENTION: 0
CHEST TIGHTNESS: 0
DIARRHEA: 0
VOMITING: 0
EYE ITCHING: 0
CHOKING: 0

## 2022-04-11 ASSESSMENT — PAIN DESCRIPTION - LOCATION
LOCATION: ABDOMEN
LOCATION: ABDOMEN

## 2022-04-11 ASSESSMENT — PAIN DESCRIPTION - PAIN TYPE
TYPE: ACUTE PAIN
TYPE: ACUTE PAIN

## 2022-04-11 ASSESSMENT — PAIN DESCRIPTION - PROGRESSION: CLINICAL_PROGRESSION: NOT CHANGED

## 2022-04-11 ASSESSMENT — PAIN DESCRIPTION - ORIENTATION: ORIENTATION: RIGHT;LEFT;MID;LOWER;UPPER

## 2022-04-11 ASSESSMENT — PAIN DESCRIPTION - ONSET: ONSET: ON-GOING

## 2022-04-11 NOTE — PROGRESS NOTES
Patient ordered MRI, patient has neurostimulator but has no information on it. I spoke with [facility], they also do not have any information on this. MRI unable to be completed.    Notified Dr. Treva Velasquez (covering for Dr. Meliton Grajeda) via [employer]

## 2022-04-11 NOTE — PROGRESS NOTES
[facility] was ordered linaclotide Soren Pastel) which is a nonformulary medication. This medication will need to be supplied by the patient as the pharmacy does not carry this non-formulary medication. If the medication has not been administered by 1400 on the following day from the time the order was placed, a pharmacist will follow-up with the nurse of the patient to assess the capability of the patient to bring in the medication. If it is determined that the patient cannot supply the medication and it is not available to be dispensed from the pharmacy, the provider will be notified.   KIRIT Szymanski [facility]  4/11/2022  3:16 AM

## 2022-04-11 NOTE — PROGRESS NOTES
CHP Quality Flow/Interdisciplinary Rounds Progress Note        Quality Flow Rounds held on April 11, 2022    Disciplines Attending:  Bedside Nurse,  and Nursing Unit Leadership    Georges Moulton was admitted on 4/10/2022  6:42 PM    Anticipated Discharge Date:  Expected Discharge Date: 04/13/22    Disposition:    Misha Score:  Misha Scale Score: 14    Readmission Risk              Risk of Unplanned Readmission:  27           Discussed patient goal for the day, patient clinical progression, and barriers to discharge. The following Goal(s) of the Day/Commitment(s) have been identified:  IV LR @ 125.       Liat Wilcox RN  April 11, 2022

## 2022-04-11 NOTE — PROGRESS NOTES
Marixa Kim was ordered levOCARNitine [facility]) which is a nonformulary medication. This medication will need to be supplied by the patient as the pharmacy does not carry this non-formulary medication. If the medication has not been administered by 1400 on the following day from the time the order was placed, a pharmacist will follow-up with the nurse of the patient to assess the capability of the patient to bring in the medication. If it is determined that the patient cannot supply the medication and it is not available to be dispensed from the pharmacy, the provider will be notified.   Olinda Laurent [facility]  4/11/2022  3:43 AM

## 2022-04-11 NOTE — H&P
Internal Medicine History & Physical     Name: Henrik Everett  : 1960  Chief Complaint: Abdominal Pain (Generalized abdominal pain for the past 3 days; Sent in from [place])  Primary Care Physician: Maria C Figueroa MD  Admission date: 4/10/2022  Date of service: 2022     History of Present Illness  Kirstin Tineo is a 64y.o. year old female. She presents to the hospital from the skilled nursing facility after a trauma in January that caused an injury to her left leg and right radius. .  She states that she has had abdominal pain for about 5 days. She was treated for gas. She thinks that she was constipated as well. Nothing particular made her symptoms better or worse. Overall symptoms are moderate in severity. She thinks that she had her gallbladder removed about 10 years ago. She does not drink alcohol. She thinks that she has had a lot of new medications recently since being at the facility. There were no family or friends present at bedside. History is provided by the patient. She is felt to be a good historian. ED course:   Initial blood work and imaging studies performed. Admission recommended by ED physician. Dr. Denver Lin discussed with ED provider. Meds in ED consisted of the following: IVF, IV Dilaudid, IV Zofran.      Past Medical History:   Diagnosis Date    Adenoma of right adrenal gland     Anemia     Anxiety     Arthritis     spinal    Asthma     controlled with inhalers     Benign essential tremor     Bipolar affective (HCC)     Chronic back pain     Spinal cord stimulator in place    Chronic respiratory failure with hypoxia (HCC)     at times dt diaphragm does not function properly dt Mitochondrial disorder    Depression     stable    Diabetes mellitus ([address]     Difficulty swallowing     for EGD 10-8-19     Environmental and seasonal allergies     Excessive physiologic tremor 2021    Fatty liver     Full dentures     GERD (gastroesophageal reflux disease)  Gout     past hx of    Herniated cervical disc     limited rom of head and neck    History of swelling of feet     Hypertension     Lymphedema of lower extremity 09/06/2012    Mitochondrial cytopathy (HCC)     s/s muscle and nerve pain, difficulty breathing, seizures, difficulty swallowing, digestive disorders- Dr. Bhavana Damon Muscle weakness (generalized)     Information obtained from [address] [place] [address] Need for assistance with personal care     Information obtained from [address] [place] [address] Neuropathy     at feet    Obesity     PETR (obstructive sleep apnea)     no CPAP dt insurance will not pay for    Osteoarthritis of left knee     Information obtained from [address] [place] [address] PONV (postoperative nausea and vomiting)     Seizures ([facility] [address]     last approx 6-13-19- f/u w/ PCP  Granmal, flares up in heat/ summer time - no recent issues as of 10-4-19     Spinal headache     Thyroid disease        Past Surgical History:   Procedure Laterality Date    ANKLE FRACTURE SURGERY Right 2/14/2022    RIGHT ANKLE IRRIGAITON AND DEBRIDEMENT WITH EXTERNAL FIXATOR AND LACERATION REPAIR performed by Eulalio Beckett MD at [place] [address] Right 3/2/2022    RIGHT LOWER EXTREMITY REMOVAL EXTERNAL FIXATOR, RIGHT PILON OPEN REDUCTION INTERNAL FIXATOR--SYNTHES (FACILITY) performed by Shobha Valdez DO at [facility]      with Hysterectomy / unknown    BACK SURGERY  last one 1995    lumbar x 2    CARDIAC CATHETERIZATION Right 6-6-2013    Dr. Adri Archuleta Radial, no stents placed, no blockages    [address]    open with gastric bypass    COLONOSCOPY N/A 9/18/2018    COLONOSCOPY POLYPECTOMY HOT BIOPSY performed by Jacklyn Flood MD at [id number] [place] [address] COLONOSCOPY N/A 10/8/2019    COLONOSCOPY POLYPECTOMY SNARE/COLD BIOPSY performed by Jacklyn Flood MD at [address] [facility] EGD COLONOSCOPY N/A 10/8/2019    EGD ESOPHAGOGASTRODUODENOSCOPY DILATATION performed by CHRISTOPHE Xochitl Arredondo MD at [address] [facility] [place], DIAGNOSTIC      ESOPHAGEAL DILATATION  9/18/2018    ESOPHAGEAL DILATION [place] Dasen performed by Erika Kelly MD at [address] [facility] Bilateral 2007,2017    knees    KNEE SURGERY Bilateral     scope    MYOMECTOMY      NERVE BLOCK Left 10 02 2013    lumbar paravertebral facet #2    NERVE BLOCK Left 10 9 13    hip inj #1    NERVE BLOCK Left 10/16/13    hip injection    NERVE BLOCK Left 10/29/2014    left lumbar transforaminal nerve lbock  #1    NERVE BLOCK Left 11/12/2014    lumbar left transforaminal nerve block  #2    NERVE BLOCK Left 12 8 14    lumbar transforaminal #3    NERVE BLOCK Right 3/30/15    cervical transforaminal #1    NERVE BLOCK Right 4/6/2015    right cervical transforaminal nerve block  #2    NERVE BLOCK Right 4/13/15    cervical transforaminal #3    NERVE BLOCK Left 7/6/15    knee injection #1    NERVE BLOCK  07/20/15    left genicular nerve block/knee #2    NERVE BLOCK Left 10 1 15    lumb transforam #1    NERVE BLOCK  10/26/15    left lumbar transforaminal nerve block #3    NERVE BLOCK Bilateral 4/1/2021    #1 BILATERAL SACROILIAC JOINT INJECTION UNDER FLUORO performed by Jason Jones MD at [facility]    OTHER SURGICAL HISTORY Left 11 25 13    lumbar radiofreq    OTHER SURGICAL HISTORY  3/28/2016    stage 1, 3 day percutanous trial [employer] lumbar spinal cord stimulator    OTHER SURGICAL HISTORY  1995    rac procedure / lower back    UT COLONOSCOPY FLX DX W/COLLJ SPEC WHEN PFRMD N/A 3/20/2018    COLONOSCOPY DIAGNOSTIC OR SCREENING performed by Erika Kelly MD at [facility] ENDOSCOPY    UT EGD TRANSORAL BIOPSY SINGLE/MULTIPLE N/A 3/20/2018    EGD BIOPSY performed by Erika Kelly MD at [facility] ENDOSCOPY    TONSILLECTOMY         Family Medical History:  Family History   Problem Relation Age of Onset    Heart Disease Mother     Cancer Father     Arthritis Other     Cancer Other     Depression Other     Heart Disease Other     Hypertension Other     Mental Illness Other     Stroke Other        Social History  Patient lives at a skilled nursing facility since January, but prior to that she was at home. Employment: None currently  Illicit drug use- denies  TOBACCO:   reports that she quit smoking about 3 months ago. Her smoking use included cigarettes. She started smoking about 31 years ago. She has a 31.50 pack-year smoking history. She has never used smokeless tobacco.  ETOH:   reports no history of alcohol use. Home Medications  Prior to Admission medications    Medication Sig Start Date End Date Taking? Authorizing Provider   polyethylene glycol (GLYCOLAX) 17 g packet Take 17 g by mouth 2 times daily   Yes Historical Provider, MD   HYDROcodone-acetaminophen (NORCO) 5-325 MG per tablet Take 1 tablet by mouth every 6 hours as needed for Pain. Yes Historical Provider, MD   acetaminophen (TYLENOL) 325 MG tablet Take 650 mg by mouth every 4 hours as needed for Pain     Historical Provider, MD   meclizine (ANTIVERT) 25 MG tablet Take 25 mg by mouth every 6 hours as needed    Historical Provider, MD   diclofenac sodium (VOLTAREN) 1 % GEL Apply 4 g topically 2 times daily    Historical Provider, MD   oxyCODONE 5 MG capsule Take 5 mg by mouth every 6 hours as needed for Pain.     Historical Provider, MD   enoxaparin (LOVENOX) 30 MG/0.3ML injection Inject 0.3 mLs into the skin 2 times daily  Patient taking differently: Inject 40 mg into the skin daily  2/16/22   Colleen Girard MD   melatonin 5 MG TBDP disintegrating tablet Take 1 tablet by mouth nightly  Patient taking differently: Take 10 mg by mouth nightly  2/16/22   Colleen Girard MD   aspirin EC 81 MG EC tablet Take 1 tablet by mouth 2 times daily for 28 days 2/14/22 3/24/22  [place] DO   baclofen (LIORESAL) 20 MG tablet Take 20 mg by mouth 4 times daily Historical Provider, MD   metoprolol succinate (TOPROL XL) 25 MG extended release tablet Take 0.5 tablets by mouth daily 7/2/21   Chan Camp MD   cyanocobalamin 50 MCG tablet Take 100 mcg by mouth daily    Historical Provider, MD   ferrous sulfate (FE TABS 325) 325 (65 Fe) MG EC tablet Take 325 mg by mouth daily  1/12/21   Historical Provider, MD   famotidine (PEPCID) 20 MG tablet Take 1 tablet by mouth 2 times daily 9/10/20   Tita Santoro MD   traZODone (DESYREL) 100 MG tablet Take 100 mg by mouth nightly    Historical Provider, MD   albuterol (PROVENTIL) (5 MG/ML) 0.5% nebulizer solution Take 2.5 mg by nebulization 3 times daily     Historical Provider, MD   magnesium 200 MG TABS tablet Take 200 mg by mouth 2 times daily     Historical Provider, MD   calcium carbonate (CALCIUM 600) 600 MG TABS tablet Take 1 tablet by mouth 2 times daily     Historical Provider, MD   montelukast (SINGULAIR) 10 MG tablet Take 1 tablet by mouth daily 8/12/19   Jason Murray,    omeprazole (PRILOSEC) 20 MG delayed release capsule Take 20 mg by mouth Daily     Historical Provider, MD   furosemide (LASIX) 40 MG tablet Take 1 tablet by mouth 2 times daily  Patient taking differently: Take 20 mg by mouth three times a week MWF 6/14/19   Phil Sanchez DO   Cholecalciferol (VITAMIN D3) 5000 units TABS Take 1 tablet by mouth every other day     Historical Provider, MD   docusate sodium (COLACE) 100 MG capsule Take 100 mg by mouth 2 times daily    Historical Provider, MD   linaclotide (LINZESS) 145 MCG capsule Take 290 mcg by mouth every morning (before breakfast)     Historical Provider, MD   levOCARNitine (CARNITOR) 330 MG tablet Take 330 mg by mouth 3 times daily For mitochondrial disease    Historical Provider, MD   allopurinol (ZYLOPRIM) 100 MG tablet Take 200 mg by mouth 2 times daily     Historical Provider, MD   escitalopram (LEXAPRO) 20 MG tablet Take 20 mg by mouth 2 times daily     Historical Provider, MD gabapentin (NEURONTIN) 600 MG tablet Take 1 tablet by mouth 4 times daily. 10/31/13   Milagros Sandoval MD   topiramate (TOPAMAX) 200 MG tablet Take 1 tablet by mouth 2 times daily. 5/9/12   Ab Benavides DO   ziprasidone (GEODON) 20 MG capsule Take 20 mg by mouth nightly     Historical Provider, MD       Allergies  Allergies   Allergen Reactions    Bee Pollen Anaphylaxis, Shortness Of Breath and Swelling     And wasp    Penicillins Anaphylaxis    Ropinirole Swelling and Anaphylaxis     swelling of throat    Ropinirole Hcl Anaphylaxis    Vistaril [Hydroxyzine Hcl] Anaphylaxis    Aripiprazole Other (See Comments)     muscle spasms and confusion    Prednisone     Restoril [Temazepam]     Hydroxyzine Pamoate Itching and Rash    Tape [Adhesive Tape] Rash     Paper tape allergy    Fabric tape is OK to use        Review of Systems:   Please see HPI above. All bolded are positive. All un-bolded are negative.   Constitutional Symptoms: fever, chills, fatigue, generalized weakness, diaphoresis, increase in thirst, loss of appetite  Eyes: vision change   Ears, Nose, Mouth, Throat: hearing loss, nasal congestion, sores in the mouth  Cardiovascular: chest pain, chest heaviness, palpitations  Respiratory: shortness of breath, wheezing, coughing  Gastrointestinal: abdominal pain, nausea, vomiting, diarrhea, constipation, melena, hematochezia, hematemesis  Genitourinary: dysuria, hematuria, increased frequency  Musculoskeletal: lower extremity edema, myalgias, arthralgias, back pain  Integumentary: rashes, itching   Neurological: headache, lightheadedness, dizziness, confusion, syncope, numbness, tingling, focal weakness  Psychiatric: depression, suicidal ideation, anxiety  Endocrine: unintentional weight change  Hematologic/Lymphatic: lymphadenopathy, easy bruising, easy bleeding   Allergic/Immunologic: recurrent infections      Objective  VITALS:  /63   Pulse 72   Temp 98.1 °F (36.7 °C) (Oral)   Resp 18 Ht 5' 7\" (1.702 m)   Wt 240 lb (108.9 kg)   LMP 08/31/1988   SpO2 94%   BMI 37.59 kg/m²     Physical Exam:  General: awake, alert, oriented to person, place, time, and purpose, appears stated age, cooperative, no acute distress, pleasant, appropriate mood, 2 L/min nasal cannula oxygen  Eyes: conjunctivae/corneas clear, sclera non icteric, EOMI  Ears: no obvious scars, no lesions, no masses, hearing intact  Mouth: mucous membranes moist, no obvious oral sores  Head: normocephalic, atraumatic  Neck: no JVD, no adenopathy, no thyromegaly, neck is supple, trachea is midline  Back: ROM normal, no CVA tenderness.   Chest: no pain on palpation  Lungs: clear to auscultation bilaterally, without rhonchi, crackle, wheezing, or rale, no retractions or use of accessory muscles  Heart: regular rate and regular rhythm, no murmur, normal S1, S2  Abdomen: soft, obese, tender to palpation in the epigastric and left lower quadrant; bowel sounds normal; no masses, no organomegaly  : Deferred   Extremities: no lower extremity edema, extremities atraumatic, no cyanosis, no clubbing, 2+ pedal pulses palpated  Skin: normal color, normal texture, normal turgor, no rashes, no lesions  Neurologic:5/5 muscle strength throughout, normal muscle tone throughout, face symmetric, hearing intact, tongue midline, speech appropriate without slurring, sensation to fine touch intact in upper and lower extremities    Labs-   Lab Results   Component Value Date    WBC 10.3 04/11/2022    HGB 12.3 04/11/2022    HCT 38.3 04/11/2022     04/11/2022     04/11/2022    K 4.1 04/11/2022     (H) 04/11/2022    CREATININE 0.6 04/11/2022    BUN 14 04/11/2022    CO2 24 04/11/2022    GLUCOSE 121 (H) 04/11/2022    ALT 12 04/11/2022    AST 17 04/11/2022    INR 0.9 03/02/2022     Lab Results   Component Value Date    CKTOTAL 150 02/13/2022    CKMB 1.5 08/01/2015    TROPONINI <0.01 04/25/2021       Recent Radiological Studies:  CT ABDOMEN PELVIS W IV CONTRAST Additional Contrast? None   Final Result   Finding consistent with acute pancreatitis. There is secondary duodenitis,   but no bowel obstruction is seen. MRI ABDOMEN W WO CONTRAST MRCP    (Results Pending)       Assessment  Active Hospital Problems    Diagnosis     Acute pancreatitis [K85.90]      Priority: High    PETR (obstructive sleep apnea) [G47.33]     GERD (gastroesophageal reflux disease) [K21.9]     Fatty liver [K76.0]     Depression [F32. A]     Chronic back pain [M54.9, G89.29]     Chronic respiratory failure with hypoxia (HCC) [J96.11]     Neuropathic pain syndrome (non-herpetic) [M79.2]     Neural foraminal stenosis of cervical spine [M48.02]     Neural foraminal stenosis of lumbar spine [M48.061]     Lymphedema of lower extremity [I89.0]     Obesity [E66.9]     Generalized seizure disorder ([address] [G40.309]     Debility [R53.81]     Bipolar 1 disorder ([address] [F31.9]     Cervicalgia [M54.2]     Hypertension [I10]     Hyperlipidemia [E78.5]     Asthma [J45.909]        Patient Active Problem List    Diagnosis Date Noted    Acute pancreatitis 04/11/2022     Priority: High    Closed fracture of right tibial plateau 56/06/5425    Closed displaced pilon fracture of right tibia     Closed left ankle fracture, initial encounter 02/14/2022    Closed fracture of right distal radius 02/01/2022    Excessive physiologic tremor 05/24/2021    Lumbosacral spondylosis without myelopathy 03/10/2021    Sacroiliac dysfunction 03/10/2021    DDD (degenerative disc disease), lumbar 03/10/2021    Thoracic degenerative disc disease 03/10/2021    Adenoma of right adrenal gland     Chronic respiratory failure with hypoxia (HCC)      at times dt diaphragm does not function properly dt Mitochondrial disorder      Mitochondrial cytopathy ([facility] [address]      s/s muscle and nerve pain, difficulty breathing, seizures, difficulty swallowing, digestive disorders      GERD (gastroesophageal reflux disease)     Fatty liver     Depression      stable      PETR (obstructive sleep apnea)      CPAP      Chronic back pain      Spinal cord stimulator in place      Neuropathic pain syndrome (non-herpetic) 08/18/2016    Postlaminectomy syndrome, lumbar 06/10/2015    Protruded cervical disc 03/30/2015    Cervical radiculopathy 03/30/2015    DDD (degenerative disc disease), cervical 03/12/2015    Neural foraminal stenosis of cervical spine 03/12/2015    Lumbar radiculopathy 11/12/2014    Neural foraminal stenosis of lumbar spine 10/29/2014    Status post total knee replacement, right 07/24/2013    Cervical spondylolysis 07/24/2013    Degenerative Osteoarthritis thoracic spine 07/24/2013    Thoracic facet syndrome 07/24/2013    Cervical facet syndrome 07/24/2013    Facet syndrome, lumbar 07/24/2013    Degenerative Osteoarthritis of both knees 07/15/2013    Lymphedema of lower extremity 09/06/2012    Debility 05/06/2012    Obesity 05/06/2012    Bipolar 1 disorder ([facility] [address] 05/06/2012    Generalized seizure disorder ([facility] [address] 05/06/2012    Cervicalgia 11/02/2011    Asthma     Hyperlipidemia     Hypertension     Arthritis        Plan  · Acute pancreatitis w/ secondary duodenitis:   · SP cholecystectomy in 1999  · CT abd/pel noted. · Follow lipase/transaminases. · Lipid panel pending. · NPO except sips with meds. · IVF. Hold Lasix. Cherl Spinner · IV Morphine for pain. · Gen surg consultation. · History of fall with right radius and left ankle fractures:  · Cast left lower extremity  · Outpatient follow-up with orthopedic surgery-asked them for weightbearing recommendations  · PT/OT  · Continue home medications other than as noted above. · Follow labs  · DVT prophylaxis. · Please see orders for further management and care.   ·  for discharge planning   · Discharge plan: Back to skilled nursing facility once clinically stable    Case discussed with Dr. Frank Grullon from [facility] on 4/11/22    [address] [place]  4/11/2022  10:52 AM    I can be reached through [employer]. NOTE:  This report was transcribed using voice recognition software. Every effort was made to ensure accuracy; however, inadvertent computerized transcription errors may be present.

## 2022-04-11 NOTE — CONSULTS
Hepatobiliary and Pancreatic Surgery   Consult Note     Patient's Name/Date of Birth: Lashonda Madrigal /1960 (81 y.o.)    Date: April 11, 2022     CC: Acute pancreatitis    HPI:  78-year-old female presenting from a nursing home with epigastric abdominal pain that has been progressive over the last 4 days. Patient states that this has been associated with severe nausea but without any vomiting. She has had poor p.o. intake over this time secondary to the nausea. On presentation patient was having this epigastric tenderness as described, CT imaging revealed evidence of acute pancreatitis with a lipase greater than 3000. Patient states that she does not drink any alcohol and quit tobacco use last year. Patient does have a significant past surgical history of a Diane-en-Y roughly 12 years ago followed by a laparoscopic cholecystectomy 6 years ago. Patient also has a past history of an mitochondrial autoimmune disorder. Patient does endorse having gas and having a last bowel movement today. She endorses being nausea without any associated vomiting.     Past Medical History:   Diagnosis Date    Adenoma of right adrenal gland     Anemia     Anxiety     Arthritis     spinal    Asthma     controlled with inhalers     Benign essential tremor     Bipolar affective (HCC)     Chronic back pain     Spinal cord stimulator in place    Chronic respiratory failure with hypoxia (HCC)     at times dt diaphragm does not function properly dt Mitochondrial disorder    Depression     stable    Diabetes mellitus ([facility]     Difficulty swallowing     for EGD 10-8-19     Environmental and seasonal allergies     Excessive physiologic tremor 5/24/2021    Fatty liver     Full dentures     GERD (gastroesophageal reflux disease)     Gout     past hx of    Herniated cervical disc     limited rom of head and neck    History of swelling of feet     Hypertension     Lymphedema of lower extremity 09/06/2012    Mitochondrial cytopathy ([facility]     s/s muscle and nerve pain, difficulty breathing, seizures, difficulty swallowing, digestive disorders- Dr. Lizett Herzog Muscle weakness (generalized)     Information obtained from [address] [place] [address] Need for assistance with personal care     Information obtained from [address] [place] [address] Neuropathy     at feet    Obesity     PETR (obstructive sleep apnea)     no CPAP dt insurance will not pay for    Osteoarthritis of left knee     Information obtained from [address] [place] [address] PONV (postoperative nausea and vomiting)     Seizures ([facility]     last approx 6-13-19- f/u w/ PCP  Granmal, flares up in heat/ summer time - no recent issues as of 10-4-19     Spinal headache     Thyroid disease        Past Surgical History:   Procedure Laterality Date    ANKLE FRACTURE SURGERY Right 2/14/2022    RIGHT ANKLE IRRIGAITON AND DEBRIDEMENT WITH EXTERNAL FIXATOR AND LACERATION REPAIR performed by Shira Gregory MD at [place] [address] Right 3/2/2022    RIGHT LOWER EXTREMITY REMOVAL EXTERNAL FIXATOR, RIGHT PILON OPEN REDUCTION INTERNAL FIXATOR--SYNTHES (FACILITY) performed by Paula Salinas DO at [facility]      with Hysterectomy / unknown    BACK SURGERY  last one 1995    lumbar x 2    CARDIAC CATHETERIZATION Right 6-6-2013    Dr. Jose Hendricks Radial, no stents placed, no blockages    [address]    open with gastric bypass    COLONOSCOPY N/A 9/18/2018    COLONOSCOPY POLYPECTOMY HOT BIOPSY performed by Leonarda Laird MD at [id number] [place] [address] COLONOSCOPY N/A 10/8/2019    COLONOSCOPY POLYPECTOMY SNARE/COLD BIOPSY performed by Leonarda Laird MD at [id number] [place] [address] EGD COLONOSCOPY N/A 10/8/2019    EGD ESOPHAGOGASTRODUODENOSCOPY DILATATION performed by Leonarda Laird MD at [address] [place] [address], DIAGNOSTIC      ESOPHAGEAL DILATATION  9/18/2018    ESOPHAGEAL DILATION P[place] performed by Leonarda Laird MD at [facility] ENDOSCOPY    GASTRIC BYPASS SURGERY  1999    HYSTERECTOMY  1988    JOINT REPLACEMENT Bilateral 2007,2017    knees    KNEE SURGERY Bilateral     scope    MYOMECTOMY      NERVE BLOCK Left 10 02 2013    lumbar paravertebral facet #2    NERVE BLOCK Left 10 9 13    hip inj #1    NERVE BLOCK Left 10/16/13    hip injection    NERVE BLOCK Left 10/29/2014    left lumbar transforaminal nerve lbock  #1    NERVE BLOCK Left 11/12/2014    lumbar left transforaminal nerve block  #2    NERVE BLOCK Left 12 8 14    lumbar transforaminal #3    NERVE BLOCK Right 3/30/15    cervical transforaminal #1    NERVE BLOCK Right 4/6/2015    right cervical transforaminal nerve block  #2    NERVE BLOCK Right 4/13/15    cervical transforaminal #3    NERVE BLOCK Left 7/6/15    knee injection #1    NERVE BLOCK  07/20/15    left genicular nerve block/knee #2    NERVE BLOCK Left 10 1 15    lumb transforam #1    NERVE BLOCK  10/26/15    left lumbar transforaminal nerve block #3    NERVE BLOCK Bilateral 4/1/2021    #1 BILATERAL SACROILIAC JOINT INJECTION UNDER FLUORO performed by Gailen Cockayne, MD at [facility] OR    OTHER SURGICAL HISTORY Left 11 25 13    lumbar radiofreq    OTHER SURGICAL HISTORY  3/28/2016    stage 1, 3 day percutanous trial [employer] lumbar spinal cord stimulator    OTHER SURGICAL HISTORY  1995    racz procedure / lower back    ME COLONOSCOPY FLX DX W/COLLJ SPEC WHEN PFRMD N/A 3/20/2018    COLONOSCOPY DIAGNOSTIC OR SCREENING performed by Enoc Gonzalez MD at [facility] EGD TRANSORAL BIOPSY SINGLE/MULTIPLE N/A 3/20/2018    EGD BIOPSY performed by Enoc Gonzalez MD at [address] [place] [address]         Current Facility-Administered Medications   Medication Dose Route Frequency Provider Last Rate Last Admin    albuterol (PROVENTIL) nebulizer solution 2.5 mg  2.5 mg Nebulization Q6H PRN Frances Vela MD        allopurinol (ZYLOPRIM) tablet 200 mg  200 mg Oral BID Frances Vela MD  aspirin EC tablet 81 mg  81 mg Oral BID Zeynep Sutherland MD        baclofen (LIORESAL) tablet 20 mg  20 mg Oral 4x daily Zeynep Sutherland MD        calcium elemental (OSCAL) tablet 500 mg  500 mg Oral BID Zeynep Sutherland MD        Vitamin D (CHOLECALCIFEROL) tablet 5,000 Units  5,000 Units Oral Every Other Day Zeynep Sutherland MD        vitamin B-12 (CYANOCOBALAMIN) tablet 100 mcg  100 mcg Oral Daily Zeynep Sutherland MD        docusate sodium (COLACE) capsule 100 mg  100 mg Oral BID Zeynep Sutherland MD        escitalopram (LEXAPRO) tablet 20 mg  20 mg Oral BID Zeynep Sutherland MD        gabapentin (NEURONTIN) capsule 600 mg  600 mg Oral 4x Daily Zeynep Sutherland MD        levOCARNitine (CARNITOR) tablet 330 mg  330 mg Oral TID Zeynep Sutherland MD        linaclotide (LINZESS) capsule 290 mcg  290 mcg Oral QAM AC Zeynep Sutherland MD        magnesium oxide (MAG-OX) tablet 200 mg  200 mg Oral BID Zeynep Sutherland MD        melatonin tablet 9 mg  9 mg Oral Nightly Zeynep Sutherland MD        metoprolol succinate (TOPROL XL) extended release tablet 12.5 mg  12.5 mg Oral Daily Zeynep Sutherland MD        montelukast (SINGULAIR) tablet 10 mg  10 mg Oral Daily Zeynep Sutherland MD        pantoprazole (PROTONIX) tablet 40 mg  40 mg Oral QAM AC Zeynep Sutherland MD   40 mg at 04/11/22 0530    topiramate (TOPAMAX) tablet 200 mg  200 mg Oral BID Zeynep Sutherland MD        traZODone (DESYREL) tablet 100 mg  100 mg Oral Nightly Zeynep Sutherland MD        ziprasidone (GEODON) capsule 20 mg  20 mg Oral Nightly Zeynep Sutherland MD        polyethylene glycol (GLYCOLAX) packet 17 g  17 g Oral BID Zeynep Sutherland MD        sodium chloride flush 0.9 % injection 10 mL  10 mL IntraVENous 2 times per day Zeynep Sutherland MD        sodium chloride flush 0.9 % injection 10 mL  10 mL IntraVENous PRN Zeynep Sutherland MD        0.9 % sodium chloride infusion   IntraVENous PRN Zeynep Sutherland MD        potassium chloride (KLOR-CON M) extended release tablet 40 mEq 40 mEq Oral PRN Arsalan Pederson MD        Or    potassium bicarb-citric acid (EFFER-K) effervescent tablet 40 mEq  40 mEq Oral PRN Arsalan Pederson MD        Or   [facility]ing potassium chloride 10 mEq/100 mL IVPB (Peripheral Line)  10 mEq IntraVENous PRN Arsalan Pederson MD        ondansetron (ZOFRAN-ODT) disintegrating tablet 4 mg  4 mg Oral Q8H PRN Arsalan Pederson MD        Or    ondansetron (ZOFRAN) injection 4 mg  4 mg IntraVENous Q6H PRN Arsalan Pederson MD        senna (SENOKOT) tablet 8.6 mg  1 tablet Oral Daily PRN Arsalan Pederson MD        acetaminophen (TYLENOL) tablet 650 mg  650 mg Oral Q6H PRN Arsalan Pederson MD        Or   [facility]ing acetaminophen (TYLENOL) suppository 650 mg  650 mg Rectal Q6H PRN Arsalan Pederson MD        [Held by provider] furosemide (LASIX) tablet 40 mg  40 mg Oral Daily Arsalan Pederson MD        enoxaparin (LOVENOX) injection 30 mg  30 mg SubCUTAneous BID Arsalan Pederson MD        lactated ringers infusion   IntraVENous Continuous Phil Sanchez DO        famotidine (PEPCID) 20 mg in sodium chloride (PF) 10 mL injection  20 mg IntraVENous BID Gianmarino C Gianfrate, DO        HYDROmorphone (DILAUDID) injection 0.5 mg  0.5 mg IntraVENous Q2H PRN Gianmarino C Gianfrate, DO        Or    HYDROmorphone (DILAUDID) injection 1 mg  1 mg IntraVENous Q2H PRN Gianmarino C Gianfrate, DO           Allergies   Allergen Reactions    Bee Pollen Anaphylaxis, Shortness Of Breath and Swelling     And wasp    Penicillins Anaphylaxis    Ropinirole Swelling and Anaphylaxis     swelling of throat    Ropinirole Hcl Anaphylaxis    Vistaril [Hydroxyzine Hcl] Anaphylaxis    Aripiprazole Other (See Comments)     muscle spasms and confusion    Prednisone     Restoril [Temazepam]     Hydroxyzine Pamoate Itching and Rash    Tape Karly Tina Tape] Rash     Paper tape allergy    Fabric tape is OK to use        Family History   Problem Relation Age of Onset    Heart Disease Mother     Cancer Father     Arthritis Other     Cancer Other     Depression Other     Heart Disease Other     Hypertension Other     Mental Illness Other     Stroke Other        Social History     Socioeconomic History    Marital status:      Spouse name: Not on file    Number of children: Not on file    Years of education: Not on file    Highest education level: Not on file   Occupational History    Not on file   Tobacco Use    Smoking status: Former Smoker     Packs/day: 0.75     Years: 42.00     Pack years: 31.50     Types: Cigarettes     Start date: 1990     Quit date: 2021     Years since quittin.2    Smokeless tobacco: Never Used   Vaping Use    Vaping Use: Never used   Substance and Sexual Activity    Alcohol use: No     Alcohol/week: 0.0 standard drinks    Drug use: Not Currently     Types: Marijuana Zollie Axe)     Comment: edibles- through pain doctor    Sexual activity: Not on file   Other Topics Concern    Not on file   Social History Narrative    ** Merged History Encounter **          Social Determinants of Health     Financial Resource Strain:     Difficulty of Paying Living Expenses: Not on file   Food Insecurity:     Worried About [address] in the Last Year: Not on file    [address] in the Last Year: Not on file   Transportation Needs:     Lack of Transportation (Medical): Not on file    Lack of Transportation (Non-Medical):  Not on file   Physical Activity:     Days of Exercise per Week: Not on file    Minutes of Exercise per Session: Not on file   Stress:     Feeling of Stress : Not on file   Social Connections:     Frequency of Communication with Friends and Family: Not on file    Frequency of Social Gatherings with Friends and Family: Not on file    Attends [employer] Services: Not on file    Active Member of Clubs or Organizations: Not on file    Attends Club or Organization Meetings: Not on file    Marital Status: Not on file   Intimate Partner Violence:     Fear of Current or guarding, no peritoneal signs, obese  MSK: no clubbing/ no cyanosis/ gaitnormal       Assessment/Plan:  70-year-old female with acute pancreatitis s/p Diane-en-Y and lap joi (possible biliary vs. Medication induced)       N.p.o., IV fluids LR at 125  Strict monitoring of inputs and outputs  Pain control  Nausea control  Cant have MRI due to nerve stimulator  Given history of autoimmune disease we will also check IgG panel for possible autoimmune pancreatitis  Follow-up lipid panel  Continue to monitor lipase  Per patient she has started new medications but is unsure what she has started.   Will need repeat CT scan in 3 days    Electronically signed by Fritz Walsh DO on 4/11/2022 at 8:59 AM  4/11/2022  8:59 AM

## 2022-04-11 NOTE — PROGRESS NOTES
Dr Dany Ormond,   The patient is ordered Pepcid IV bid and Protonox 40 oral daily. Does patient need to be on both? We could do protonix in the am and Pepcid at night. Thank Simon Conley Pharm. D 4/11/2022 9:05 AM

## 2022-04-11 NOTE — CARE COORDINATION
Social Work / Discharge Planning :SW met with patient and explained role as discharge planner/ transition of care. Patient admitted from [facility]. SW did speak to Mounabharathi Silva from [facility] and they can accept back. Patient is a medicaid bed hold. Will need COVID. Patient verified plan at discharge is to return back to [facility] but did ask about transitioning to a different facility. SNF discussed and interested in [place]. Wants to return back to [facility] due to all her belonging are there. SW did reach out to zain from [employer] and made refrral for her to follow up with patient at discharge for possible SNF transfer. N 17 generated and transport forms completed. SW to follow.  Electronically signed by GAGE Leonrado on 4/11/22 at 10:38 AM EDT

## 2022-04-12 LAB
ALBUMIN SERPL-MCNC: 3.1 G/DL (ref 3.5–5.2)
ALP BLD-CCNC: 106 U/L (ref 35–104)
ALT SERPL-CCNC: 10 U/L (ref 0–32)
ANION GAP SERPL CALCULATED.3IONS-SCNC: 8 MMOL/L (ref 7–16)
AST SERPL-CCNC: 15 U/L (ref 0–31)
BASOPHILS ABSOLUTE: 0.02 E9/L (ref 0–0.2)
BASOPHILS RELATIVE PERCENT: 0.2 % (ref 0–2)
BILIRUB SERPL-MCNC: 0.5 MG/DL (ref 0–1.2)
BUN BLDV-MCNC: 17 MG/DL (ref 6–23)
CALCIUM SERPL-MCNC: 9 MG/DL (ref 8.6–10.2)
CHLORIDE BLD-SCNC: 104 MMOL/L (ref 98–107)
CO2: 26 MMOL/L (ref 22–29)
CREAT SERPL-MCNC: 0.8 MG/DL (ref 0.5–1)
EOSINOPHILS ABSOLUTE: 0.03 E9/L (ref 0.05–0.5)
EOSINOPHILS RELATIVE PERCENT: 0.3 % (ref 0–6)
GFR AFRICAN AMERICAN: >60
GFR NON-AFRICAN AMERICAN: >60 ML/MIN/1.73
GLUCOSE BLD-MCNC: 114 MG/DL (ref 74–99)
HCT VFR BLD CALC: 38.4 % (ref 34–48)
HEMOGLOBIN: 11.9 G/DL (ref 11.5–15.5)
IMMATURE GRANULOCYTES #: 0.06 E9/L
IMMATURE GRANULOCYTES %: 0.5 % (ref 0–5)
LACTIC ACID: 1.3 MMOL/L (ref 0.5–2.2)
LIPASE: 336 U/L (ref 13–60)
LYMPHOCYTES ABSOLUTE: 0.67 E9/L (ref 1.5–4)
LYMPHOCYTES RELATIVE PERCENT: 5.8 % (ref 20–42)
MCH RBC QN AUTO: 32 PG (ref 26–35)
MCHC RBC AUTO-ENTMCNC: 31 % (ref 32–34.5)
MCV RBC AUTO: 103.2 FL (ref 80–99.9)
MONOCYTES ABSOLUTE: 0.66 E9/L (ref 0.1–0.95)
MONOCYTES RELATIVE PERCENT: 5.7 % (ref 2–12)
NEUTROPHILS ABSOLUTE: 10.12 E9/L (ref 1.8–7.3)
NEUTROPHILS RELATIVE PERCENT: 87.5 % (ref 43–80)
PDW BLD-RTO: 13.6 FL (ref 11.5–15)
PLATELET # BLD: 174 E9/L (ref 130–450)
PMV BLD AUTO: 8.5 FL (ref 7–12)
POTASSIUM REFLEX MAGNESIUM: 3.9 MMOL/L (ref 3.5–5)
RBC # BLD: 3.72 E12/L (ref 3.5–5.5)
SODIUM BLD-SCNC: 138 MMOL/L (ref 132–146)
TOTAL PROTEIN: 5.9 G/DL (ref 6.4–8.3)
WBC # BLD: 11.6 E9/L (ref 4.5–11.5)

## 2022-04-12 PROCEDURE — 83605 ASSAY OF LACTIC ACID: CPT

## 2022-04-12 PROCEDURE — 6370000000 HC RX 637 (ALT 250 FOR IP): Performed by: INTERNAL MEDICINE

## 2022-04-12 PROCEDURE — 36415 COLL VENOUS BLD VENIPUNCTURE: CPT

## 2022-04-12 PROCEDURE — 6360000002 HC RX W HCPCS: Performed by: INTERNAL MEDICINE

## 2022-04-12 PROCEDURE — 2700000000 HC OXYGEN THERAPY PER DAY

## 2022-04-12 PROCEDURE — 2500000003 HC RX 250 WO HCPCS: Performed by: STUDENT IN AN ORGANIZED HEALTH CARE EDUCATION/TRAINING PROGRAM

## 2022-04-12 PROCEDURE — 6370000000 HC RX 637 (ALT 250 FOR IP)

## 2022-04-12 PROCEDURE — 2580000003 HC RX 258: Performed by: INTERNAL MEDICINE

## 2022-04-12 PROCEDURE — 6360000002 HC RX W HCPCS: Performed by: STUDENT IN AN ORGANIZED HEALTH CARE EDUCATION/TRAINING PROGRAM

## 2022-04-12 PROCEDURE — 1200000000 HC SEMI PRIVATE

## 2022-04-12 PROCEDURE — 2580000003 HC RX 258: Performed by: STUDENT IN AN ORGANIZED HEALTH CARE EDUCATION/TRAINING PROGRAM

## 2022-04-12 PROCEDURE — C9113 INJ PANTOPRAZOLE SODIUM, VIA: HCPCS | Performed by: STUDENT IN AN ORGANIZED HEALTH CARE EDUCATION/TRAINING PROGRAM

## 2022-04-12 PROCEDURE — A4216 STERILE WATER/SALINE, 10 ML: HCPCS | Performed by: STUDENT IN AN ORGANIZED HEALTH CARE EDUCATION/TRAINING PROGRAM

## 2022-04-12 PROCEDURE — 83690 ASSAY OF LIPASE: CPT

## 2022-04-12 PROCEDURE — 85025 COMPLETE CBC W/AUTO DIFF WBC: CPT

## 2022-04-12 PROCEDURE — 80053 COMPREHEN METABOLIC PANEL: CPT

## 2022-04-12 PROCEDURE — 99232 SBSQ HOSP IP/OBS MODERATE 35: CPT | Performed by: TRANSPLANT SURGERY

## 2022-04-12 RX ORDER — BISACODYL 10 MG
10 SUPPOSITORY, RECTAL RECTAL ONCE
Status: COMPLETED | OUTPATIENT
Start: 2022-04-12 | End: 2022-04-12

## 2022-04-12 RX ORDER — OXYCODONE HYDROCHLORIDE 5 MG/1
5 TABLET ORAL EVERY 4 HOURS PRN
Status: DISCONTINUED | OUTPATIENT
Start: 2022-04-12 | End: 2022-04-15 | Stop reason: HOSPADM

## 2022-04-12 RX ORDER — OXYCODONE HYDROCHLORIDE 5 MG/1
10 TABLET ORAL EVERY 4 HOURS PRN
Status: DISCONTINUED | OUTPATIENT
Start: 2022-04-12 | End: 2022-04-15 | Stop reason: HOSPADM

## 2022-04-12 RX ADMIN — BACLOFEN 20 MG: 10 TABLET ORAL at 16:46

## 2022-04-12 RX ADMIN — KETOROLAC TROMETHAMINE 15 MG: 30 INJECTION, SOLUTION INTRAMUSCULAR; INTRAVENOUS at 04:06

## 2022-04-12 RX ADMIN — METOPROLOL SUCCINATE 12.5 MG: 25 TABLET, EXTENDED RELEASE ORAL at 08:25

## 2022-04-12 RX ADMIN — ESCITALOPRAM OXALATE 20 MG: 10 TABLET ORAL at 08:25

## 2022-04-12 RX ADMIN — KETOROLAC TROMETHAMINE 15 MG: 30 INJECTION, SOLUTION INTRAMUSCULAR; INTRAVENOUS at 16:46

## 2022-04-12 RX ADMIN — POLYETHYLENE GLYCOL 3350 17 G: 17 POWDER, FOR SOLUTION ORAL at 20:33

## 2022-04-12 RX ADMIN — BACLOFEN 20 MG: 10 TABLET ORAL at 20:33

## 2022-04-12 RX ADMIN — KETOROLAC TROMETHAMINE 15 MG: 30 INJECTION, SOLUTION INTRAMUSCULAR; INTRAVENOUS at 23:22

## 2022-04-12 RX ADMIN — GABAPENTIN 600 MG: 300 CAPSULE ORAL at 16:46

## 2022-04-12 RX ADMIN — OXYCODONE 5 MG: 5 TABLET ORAL at 08:26

## 2022-04-12 RX ADMIN — ALLOPURINOL 200 MG: 100 TABLET ORAL at 08:26

## 2022-04-12 RX ADMIN — ALLOPURINOL 200 MG: 100 TABLET ORAL at 20:33

## 2022-04-12 RX ADMIN — SODIUM CHLORIDE, PRESERVATIVE FREE 40 MG: 5 INJECTION INTRAVENOUS at 20:48

## 2022-04-12 RX ADMIN — TOPIRAMATE 200 MG: 100 TABLET, FILM COATED ORAL at 08:25

## 2022-04-12 RX ADMIN — SODIUM CHLORIDE, PRESERVATIVE FREE 20 MG: 5 INJECTION INTRAVENOUS at 20:33

## 2022-04-12 RX ADMIN — CALCIUM 500 MG: 500 TABLET ORAL at 08:26

## 2022-04-12 RX ADMIN — Medication 100 MCG: at 08:26

## 2022-04-12 RX ADMIN — PANTOPRAZOLE SODIUM 40 MG: 40 TABLET, DELAYED RELEASE ORAL at 06:28

## 2022-04-12 RX ADMIN — GABAPENTIN 600 MG: 300 CAPSULE ORAL at 20:33

## 2022-04-12 RX ADMIN — DOCUSATE SODIUM 100 MG: 100 CAPSULE, LIQUID FILLED ORAL at 08:26

## 2022-04-12 RX ADMIN — ENOXAPARIN SODIUM 30 MG: 100 INJECTION SUBCUTANEOUS at 08:25

## 2022-04-12 RX ADMIN — MONTELUKAST SODIUM 10 MG: 10 TABLET, FILM COATED ORAL at 08:26

## 2022-04-12 RX ADMIN — ZIPRASIDONE HYDROCHLORIDE 20 MG: 20 CAPSULE ORAL at 20:48

## 2022-04-12 RX ADMIN — OXYCODONE 5 MG: 5 TABLET ORAL at 12:13

## 2022-04-12 RX ADMIN — Medication 9 MG: at 20:33

## 2022-04-12 RX ADMIN — ESCITALOPRAM OXALATE 20 MG: 10 TABLET ORAL at 20:33

## 2022-04-12 RX ADMIN — ASPIRIN 81 MG: 81 TABLET, COATED ORAL at 08:25

## 2022-04-12 RX ADMIN — SODIUM CHLORIDE, PRESERVATIVE FREE 20 MG: 5 INJECTION INTRAVENOUS at 08:26

## 2022-04-12 RX ADMIN — POLYETHYLENE GLYCOL 3350 17 G: 17 POWDER, FOR SOLUTION ORAL at 10:49

## 2022-04-12 RX ADMIN — CALCIUM 500 MG: 500 TABLET ORAL at 16:46

## 2022-04-12 RX ADMIN — GABAPENTIN 600 MG: 300 CAPSULE ORAL at 12:09

## 2022-04-12 RX ADMIN — TRAZODONE HYDROCHLORIDE 100 MG: 50 TABLET ORAL at 20:48

## 2022-04-12 RX ADMIN — BISACODYL 10 MG: 10 SUPPOSITORY RECTAL at 12:09

## 2022-04-12 RX ADMIN — BACLOFEN 20 MG: 10 TABLET ORAL at 12:08

## 2022-04-12 RX ADMIN — KETOROLAC TROMETHAMINE 15 MG: 30 INJECTION, SOLUTION INTRAMUSCULAR; INTRAVENOUS at 10:48

## 2022-04-12 RX ADMIN — DOCUSATE SODIUM 100 MG: 100 CAPSULE, LIQUID FILLED ORAL at 20:33

## 2022-04-12 RX ADMIN — SODIUM CHLORIDE, POTASSIUM CHLORIDE, SODIUM LACTATE AND CALCIUM CHLORIDE: 600; 310; 30; 20 INJECTION, SOLUTION INTRAVENOUS at 06:30

## 2022-04-12 RX ADMIN — GABAPENTIN 600 MG: 300 CAPSULE ORAL at 08:26

## 2022-04-12 RX ADMIN — SODIUM CHLORIDE, PRESERVATIVE FREE 10 ML: 5 INJECTION INTRAVENOUS at 20:55

## 2022-04-12 RX ADMIN — Medication 200 MG: at 08:26

## 2022-04-12 RX ADMIN — TOPIRAMATE 200 MG: 100 TABLET, FILM COATED ORAL at 20:48

## 2022-04-12 RX ADMIN — Medication 200 MG: at 20:32

## 2022-04-12 RX ADMIN — BACLOFEN 20 MG: 10 TABLET ORAL at 08:25

## 2022-04-12 ASSESSMENT — PAIN DESCRIPTION - LOCATION
LOCATION: ABDOMEN

## 2022-04-12 ASSESSMENT — PAIN DESCRIPTION - DESCRIPTORS
DESCRIPTORS: THROBBING

## 2022-04-12 ASSESSMENT — PAIN DESCRIPTION - PAIN TYPE
TYPE: ACUTE PAIN

## 2022-04-12 ASSESSMENT — PAIN - FUNCTIONAL ASSESSMENT: PAIN_FUNCTIONAL_ASSESSMENT: PREVENTS OR INTERFERES SOME ACTIVE ACTIVITIES AND ADLS

## 2022-04-12 ASSESSMENT — PAIN SCALES - GENERAL
PAINLEVEL_OUTOF10: 10
PAINLEVEL_OUTOF10: 6
PAINLEVEL_OUTOF10: 3
PAINLEVEL_OUTOF10: 7
PAINLEVEL_OUTOF10: 3
PAINLEVEL_OUTOF10: 6
PAINLEVEL_OUTOF10: 0
PAINLEVEL_OUTOF10: 10
PAINLEVEL_OUTOF10: 5

## 2022-04-12 ASSESSMENT — PAIN DESCRIPTION - ORIENTATION
ORIENTATION: RIGHT;LEFT
ORIENTATION: RIGHT;LEFT

## 2022-04-12 ASSESSMENT — PAIN DESCRIPTION - FREQUENCY: FREQUENCY: CONTINUOUS

## 2022-04-12 NOTE — PROGRESS NOTES
[facility] SURGERY  DAILY PROGRESS NOTE  4/12/2022    CHIEF COMPLAINT:  Chief Complaint   Patient presents with    Abdominal Pain     Generalized abdominal pain for the past 3 days; Sent in from [address]:  No pain this morning, a little groggy and difficult to rouse. Some nuasea overnight no emesis she is improving     OBJECTIVE:  BP (!) 153/65   Pulse 90   Temp 100.4 °F (38 °C) (Oral)   Resp 20   Ht 5' 7\" (1.702 m)   Wt 251 lb 14.4 oz (114.3 kg)   LMP 08/31/1988   SpO2 95%   BMI 39.45 kg/m²     GENERAL:  NAD. A&Ox3. LUNGS:  No increased work of breathing. CARDIOVASCULAR: RR  ABDOMEN:  Soft, non-distended, minimally tender in epigastrium . No guarding, rigidity, rebound.     ASSESSMENT/PLAN:  57-year-old female with acute pancreatitis s/p Diane-en-Y and lap joi (possible biliary vs. Medication induced)     N.p.o., IV fluids LR at 125  Strict monitoring of inputs and outputs  Pain control and Nausea control  Cant have MRI due to nerve stimulator  IgG panel pending   Normal lipid panel   Continue to monitor lipase  Per patient she has started new medications but is unsure what she has started-- could be the cause of her pancreatitis   Will need repeat CT scan in 3 days    Electronically signed by Bhavana Cormier DO on 4/12/2022 at 12:09 PM     Ambulate  Repeat CT scan tomorrow with body radiologist reading to rule out necrosis  Start clears    Electronically signed by Azeem Briceño MD on 4/12/2022 at 2:51 PM

## 2022-04-12 NOTE — PROGRESS NOTES
Internal Medicine Progress Note    Patient's name: Henrik Everett  : 1960  Chief complaints (on day of admission): Abdominal Pain (Generalized abdominal pain for the past 3 days; Sent in from [place])  Admission date: 4/10/2022  Date of service: 2022   Room: [address] INTERMEDIATE  Primary care physician: Tomasz Woodward   Reason for visit: Follow-up for pancreatitis     Subjective  Kirstin Tineo was seen and examined. She was lying in bed. She denies new complaints or issues. Abdominal pain is improved. Breathing is stable. She feels constipated. Her nasal cannula oxygen was on but there was no oxygen running. Overall she is feeling better today than yesterday. Review of Systems  There are no new complaints of chest pain, shortness of breath, nausea, vomiting, diarrhea, constipation.     Hospital Medications  Current Facility-Administered Medications   Medication Dose Route Frequency Provider Last Rate Last Admin    oxyCODONE (ROXICODONE) immediate release tablet 5 mg  5 mg Oral Q4H PRN Yonis Schuler MD   5 mg at 22 3071    Or    oxyCODONE (ROXICODONE) immediate release tablet 10 mg  10 mg Oral Q4H PRN Yonis Schuler MD        HYDROmorphone (DILAUDID) injection 0.25 mg  0.25 mg IntraVENous Q4H PRN Yonis Schuler MD        pantoprazole (PROTONIX) 40 mg in sodium chloride (PF) 10 mL injection  40 mg IntraVENous Q12H Gitony Christopherfrate, DO        albuterol (PROVENTIL) nebulizer solution 2.5 mg  2.5 mg Nebulization Q6H PRN Yani Fraser MD        allopurinol (ZYLOPRIM) tablet 200 mg  200 mg Oral BID Yani Fraser MD   200 mg at 22 0826    aspirin EC tablet 81 mg  81 mg Oral BID Yani Fraser MD   81 mg at 22 0825    baclofen (LIORESAL) tablet 20 mg  20 mg Oral 4x daily Yani Fraser MD   20 mg at 22 0825    calcium elemental (OSCAL) tablet 500 mg  500 mg Oral BID Yani Fraser MD   500 mg at 22 0826    Vitamin D (CHOLECALCIFEROL) tablet 5,000 Units  5,000 Units Oral Every Other Day Kve Canales MD   5,000 Units at 04/11/22 1049    vitamin B-12 (CYANOCOBALAMIN) tablet 100 mcg  100 mcg Oral Daily Kev Canales MD   100 mcg at 04/12/22 7466    docusate sodium (COLACE) capsule 100 mg  100 mg Oral BID Kev Canales MD   100 mg at 04/12/22 0826    escitalopram (LEXAPRO) tablet 20 mg  20 mg Oral BID Kev Canales MD   20 mg at 04/12/22 0825    gabapentin (NEURONTIN) capsule 600 mg  600 mg Oral 4x Daily Kev Canales MD   600 mg at 04/12/22 0826    [Held by provider] levOCARNitine (CARNITOR) tablet 330 mg  330 mg Oral TID Kev Canales MD        [Held by provider] linaclotide Gregary Xochitl) capsule 290 mcg  290 mcg Oral QAM AC Kev Canales MD        magnesium oxide (MAG-OX) tablet 200 mg  200 mg Oral BID Kev Canales MD   200 mg at 04/12/22 0826    melatonin tablet 9 mg  9 mg Oral Nightly Kev Canales MD   9 mg at 04/11/22 2220    metoprolol succinate (TOPROL XL) extended release tablet 12.5 mg  12.5 mg Oral Daily Kev Canales MD   12.5 mg at 04/12/22 0825    montelukast (SINGULAIR) tablet 10 mg  10 mg Oral Daily Kev Canales MD   10 mg at 04/12/22 5557    topiramate (TOPAMAX) tablet 200 mg  200 mg Oral BID Kev Canales MD   200 mg at 04/12/22 0825    traZODone (DESYREL) tablet 100 mg  100 mg Oral Nightly Kev Canales MD   100 mg at 04/11/22 2220    ziprasidone (GEODON) capsule 20 mg  20 mg Oral Nightly Kev Canales MD   20 mg at 04/11/22 2220    polyethylene glycol (GLYCOLAX) packet 17 g  17 g Oral BID Kev Canales MD   17 g at 04/12/22 1049    sodium chloride flush 0.9 % injection 10 mL  10 mL IntraVENous 2 times per day Kev Canales MD   10 mL at 04/11/22 2020    sodium chloride flush 0.9 % injection 10 mL  10 mL IntraVENous PRN Kev Canales MD        0.9 % sodium chloride infusion   IntraVENous PRN Kev Canales MD        potassium chloride (KLOR-CON M) extended release tablet 40 mEq  40 mEq Oral PRN Kev Canales, MD        Or    potassium bicarb-citric acid (EFFER-K) effervescent tablet 40 mEq  40 mEq Oral PRN Sola Taylor MD        Or   [facility] potassium chloride 10 mEq/100 mL IVPB (Peripheral Line)  10 mEq IntraVENous PRN Sola Taylor MD        ondansetron (ZOFRAN-ODT) disintegrating tablet 4 mg  4 mg Oral Q8H PRN Sola Taylor MD        Or    ondansetron (ZOFRAN) injection 4 mg  4 mg IntraVENous Q6H PRN Sola Taylor MD        senna (SENOKOT) tablet 8.6 mg  1 tablet Oral Daily PRN Sola Taylor MD   8.6 mg at 04/11/22 1341    acetaminophen (TYLENOL) tablet 650 mg  650 mg Oral Q6H PRN Sola Taylor MD        Or   [facility] acetaminophen (TYLENOL) suppository 650 mg  650 mg Rectal Q6H PRN Sola Taylor MD        [Held by provider] furosemide (LASIX) tablet 40 mg  40 mg Oral Daily Sola Taylor MD        enoxaparin (LOVENOX) injection 30 mg  30 mg SubCUTAneous BID Sola Taylor MD   30 mg at 04/12/22 0825    lactated ringers infusion   IntraVENous Continuous Phil E Volino,  mL/hr at 04/12/22 0630 New Bag at 04/12/22 0630    famotidine (PEPCID) 20 mg in sodium chloride (PF) 10 mL injection  20 mg IntraVENous BID Gianmarino C Gianfrate, DO   20 mg at 04/12/22 0826    ketorolac (TORADOL) injection 15 mg  15 mg IntraVENous Q6H Gianmarino C Gianfrate, DO   15 mg at 04/12/22 1048       PRN Medications  oxyCODONE **OR** oxyCODONE, HYDROmorphone, albuterol, sodium chloride flush, sodium chloride, potassium chloride **OR** potassium alternative oral replacement **OR** potassium chloride, ondansetron **OR** ondansetron, senna, acetaminophen **OR** acetaminophen    Objective  Most Recent Recorded Vitals  BP (!) 153/57   Pulse 75   Temp 99.4 °F (37.4 °C) (Oral)   Resp 18   Ht 5' 7\" (1.702 m)   Wt 251 lb 14.4 oz (114.3 kg)   LMP 08/31/1988   SpO2 96%   BMI 39.45 kg/m²   I/O last 3 completed shifts:  In: -   Out: 895 [Urine:895]  No intake/output data recorded.     Physical Exam:  General: AAO to person/place/time/purpose, NAD, no labored breathing, nasal cannula oxygen tubing in place, but on room  Eyes: conjunctivae/corneas clear, sclera non icteric  Skin: color/texture/turgor normal, no rashes or lesions  Lungs: CTAB, no retractions/use of accessory muscles, no vocal fremitus, no rhonchi, no crackle, no rales  Heart: regular rate, regular rhythm, no murmur  Abdomen: obese, soft, diffusely tender but better than yesterday, bowel sounds normal  Extremities: atraumatic, truncal edema, cast right ankle, decreased range of motion right wrist without a splint in place  Neurologic: cranial nerves 2-12 grossly intact, no slurred speech    Most Recent Labs  Lab Results   Component Value Date    WBC 11.6 (H) 04/12/2022    HGB 11.9 04/12/2022    HCT 38.4 04/12/2022     04/12/2022     04/12/2022    K 3.9 04/12/2022     04/12/2022    CREATININE 0.8 04/12/2022    BUN 17 04/12/2022    CO2 26 04/12/2022    GLUCOSE 114 (H) 04/12/2022    ALT 10 04/12/2022    AST 15 04/12/2022    INR 0.9 03/02/2022    TSH 0.548 03/16/2021    LABA1C 5.7 12/13/2013       CT ABDOMEN PELVIS W IV CONTRAST Additional Contrast? None   Final Result   Finding consistent with acute pancreatitis. There is secondary duodenitis,   but no bowel obstruction is seen. MRI ABDOMEN W WO CONTRAST MRCP    (Results Pending)       Assessment   Active Hospital Problems    Diagnosis     Acute pancreatitis [K85.90]      Priority: High    PETR (obstructive sleep apnea) [G47.33]     GERD (gastroesophageal reflux disease) [K21.9]     Fatty liver [K76.0]     Depression [F32. A]     Chronic back pain [M54.9, G89.29]     Chronic respiratory failure with hypoxia (HCC) [J96.11]     Neuropathic pain syndrome (non-herpetic) [M79.2]     Neural foraminal stenosis of cervical spine [M48.02]     Neural foraminal stenosis of lumbar spine [M48.061]     Lymphedema of lower extremity [I89.0]     Obesity [E66.9]     Generalized seizure disorder ([facility] [X80.383]     Debility [R53.81]     Bipolar 1 disorder ([facility] [F31.9]     Cervicalgia [M54.2]     Hypertension [I10]     Hyperlipidemia [E78.5]     Asthma [J45.909]          Plan  · Acute pancreatitis w/ secondary duodenitis:   ? SP cholecystectomy in 1999  ? CT abd/pel noted-- repeat on 4/14 per gen surg   ? Follow lipase/transaminases. ? Lipid panel noted-- not the culprit . ? NPO except sips with meds--defer advancement of diet to general surgery  ? IVF. Hold Lasix. .   ? IV Morphine for pain. ? Gen surg consultation appreciated-- checking for autoimmune pancreatitis   ? Unable to do MRCP due to nerve stimulator   · History of fall with right radius and right ankle fractures:  ? Cast left lower extremity  ? Outpatient follow-up with orthopedic surgery-asked them for weightbearing recommendations  ? PT/OT  · Constipation:  · Dulcolax suppository  · PT AM-PAC-- TBD  · Follow labs   · DVT prophylaxis  · Please see orders for further management and care. · Discharge plan: back to SNF when stable     Electronically signed by Priyanka Keyes DO on 4/12/2022 at 11:18 AM    I can be reached through [facility].

## 2022-04-12 NOTE — PROGRESS NOTES
[facility] Quality Flow/Interdisciplinary Rounds Progress Note        Quality Flow Rounds held on April 12, 2022    Disciplines Attending:  Bedside Nurse, ,  and Nursing Unit Leadership    Robert Valenzuela was admitted on 4/10/2022  6:42 PM    Anticipated Discharge Date:  Expected Discharge Date: 04/13/22    Disposition:    Misha Score:  Misha Scale Score: 14    Readmission Risk              Risk of Unplanned Readmission:  21           Discussed patient goal for the day, patient clinical progression, and barriers to discharge. The following Goal(s) of the Day/Commitment(s) have been identified:  IVF, monitor labs.       Dawna Nogueira RN  April 12, 2022

## 2022-04-13 ENCOUNTER — APPOINTMENT (OUTPATIENT)
Dept: CT IMAGING | Age: 62
DRG: 282 | End: 2022-04-13
Payer: MEDICAID

## 2022-04-13 LAB
ALBUMIN SERPL-MCNC: 3.2 G/DL (ref 3.5–5.2)
ALP BLD-CCNC: 128 U/L (ref 35–104)
ALT SERPL-CCNC: 10 U/L (ref 0–32)
ANION GAP SERPL CALCULATED.3IONS-SCNC: 10 MMOL/L (ref 7–16)
AST SERPL-CCNC: 23 U/L (ref 0–31)
BASOPHILS ABSOLUTE: 0.01 E9/L (ref 0–0.2)
BASOPHILS RELATIVE PERCENT: 0.1 % (ref 0–2)
BILIRUB SERPL-MCNC: 0.5 MG/DL (ref 0–1.2)
BUN BLDV-MCNC: 19 MG/DL (ref 6–23)
CALCIUM SERPL-MCNC: 9.2 MG/DL (ref 8.6–10.2)
CHLORIDE BLD-SCNC: 103 MMOL/L (ref 98–107)
CO2: 25 MMOL/L (ref 22–29)
CREAT SERPL-MCNC: 0.7 MG/DL (ref 0.5–1)
EOSINOPHILS ABSOLUTE: 0.11 E9/L (ref 0.05–0.5)
EOSINOPHILS RELATIVE PERCENT: 1.2 % (ref 0–6)
GFR AFRICAN AMERICAN: >60
GFR NON-AFRICAN AMERICAN: >60 ML/MIN/1.73
GLUCOSE BLD-MCNC: 100 MG/DL (ref 74–99)
HCT VFR BLD CALC: 39.6 % (ref 34–48)
HEMOGLOBIN: 12.3 G/DL (ref 11.5–15.5)
IMMATURE GRANULOCYTES #: 0.06 E9/L
IMMATURE GRANULOCYTES %: 0.6 % (ref 0–5)
LIPASE: 87 U/L (ref 13–60)
LYMPHOCYTES ABSOLUTE: 0.79 E9/L (ref 1.5–4)
LYMPHOCYTES RELATIVE PERCENT: 8.5 % (ref 20–42)
MCH RBC QN AUTO: 31.9 PG (ref 26–35)
MCHC RBC AUTO-ENTMCNC: 31.1 % (ref 32–34.5)
MCV RBC AUTO: 102.6 FL (ref 80–99.9)
MONOCYTES ABSOLUTE: 0.66 E9/L (ref 0.1–0.95)
MONOCYTES RELATIVE PERCENT: 7.1 % (ref 2–12)
NEUTROPHILS ABSOLUTE: 7.67 E9/L (ref 1.8–7.3)
NEUTROPHILS RELATIVE PERCENT: 82.5 % (ref 43–80)
PDW BLD-RTO: 13.3 FL (ref 11.5–15)
PLATELET # BLD: 180 E9/L (ref 130–450)
PMV BLD AUTO: 8.8 FL (ref 7–12)
POTASSIUM REFLEX MAGNESIUM: 4.1 MMOL/L (ref 3.5–5)
RBC # BLD: 3.86 E12/L (ref 3.5–5.5)
SODIUM BLD-SCNC: 138 MMOL/L (ref 132–146)
TOTAL PROTEIN: 5.5 G/DL (ref 6.4–8.3)
WBC # BLD: 9.3 E9/L (ref 4.5–11.5)

## 2022-04-13 PROCEDURE — 36415 COLL VENOUS BLD VENIPUNCTURE: CPT

## 2022-04-13 PROCEDURE — 6370000000 HC RX 637 (ALT 250 FOR IP)

## 2022-04-13 PROCEDURE — C9113 INJ PANTOPRAZOLE SODIUM, VIA: HCPCS | Performed by: STUDENT IN AN ORGANIZED HEALTH CARE EDUCATION/TRAINING PROGRAM

## 2022-04-13 PROCEDURE — 2500000003 HC RX 250 WO HCPCS: Performed by: STUDENT IN AN ORGANIZED HEALTH CARE EDUCATION/TRAINING PROGRAM

## 2022-04-13 PROCEDURE — 6360000004 HC RX CONTRAST MEDICATION: Performed by: RADIOLOGY

## 2022-04-13 PROCEDURE — 6360000002 HC RX W HCPCS: Performed by: INTERNAL MEDICINE

## 2022-04-13 PROCEDURE — 85025 COMPLETE CBC W/AUTO DIFF WBC: CPT

## 2022-04-13 PROCEDURE — 80053 COMPREHEN METABOLIC PANEL: CPT

## 2022-04-13 PROCEDURE — A4216 STERILE WATER/SALINE, 10 ML: HCPCS | Performed by: STUDENT IN AN ORGANIZED HEALTH CARE EDUCATION/TRAINING PROGRAM

## 2022-04-13 PROCEDURE — 99232 SBSQ HOSP IP/OBS MODERATE 35: CPT | Performed by: TRANSPLANT SURGERY

## 2022-04-13 PROCEDURE — 6360000002 HC RX W HCPCS: Performed by: STUDENT IN AN ORGANIZED HEALTH CARE EDUCATION/TRAINING PROGRAM

## 2022-04-13 PROCEDURE — 2580000003 HC RX 258: Performed by: INTERNAL MEDICINE

## 2022-04-13 PROCEDURE — 83690 ASSAY OF LIPASE: CPT

## 2022-04-13 PROCEDURE — 6370000000 HC RX 637 (ALT 250 FOR IP): Performed by: INTERNAL MEDICINE

## 2022-04-13 PROCEDURE — 1200000000 HC SEMI PRIVATE

## 2022-04-13 PROCEDURE — 6360000002 HC RX W HCPCS

## 2022-04-13 PROCEDURE — 2580000003 HC RX 258: Performed by: STUDENT IN AN ORGANIZED HEALTH CARE EDUCATION/TRAINING PROGRAM

## 2022-04-13 PROCEDURE — 74177 CT ABD & PELVIS W/CONTRAST: CPT

## 2022-04-13 PROCEDURE — 2700000000 HC OXYGEN THERAPY PER DAY

## 2022-04-13 RX ADMIN — TRAZODONE HYDROCHLORIDE 100 MG: 50 TABLET ORAL at 21:22

## 2022-04-13 RX ADMIN — Medication 100 MCG: at 11:15

## 2022-04-13 RX ADMIN — SODIUM CHLORIDE, PRESERVATIVE FREE 40 MG: 5 INJECTION INTRAVENOUS at 21:31

## 2022-04-13 RX ADMIN — POLYETHYLENE GLYCOL 3350 17 G: 17 POWDER, FOR SOLUTION ORAL at 10:00

## 2022-04-13 RX ADMIN — SODIUM CHLORIDE, PRESERVATIVE FREE 10 ML: 5 INJECTION INTRAVENOUS at 21:34

## 2022-04-13 RX ADMIN — DOCUSATE SODIUM 100 MG: 100 CAPSULE, LIQUID FILLED ORAL at 10:01

## 2022-04-13 RX ADMIN — GABAPENTIN 600 MG: 300 CAPSULE ORAL at 13:47

## 2022-04-13 RX ADMIN — Medication 5000 UNITS: at 10:03

## 2022-04-13 RX ADMIN — POLYETHYLENE GLYCOL 3350 17 G: 17 POWDER, FOR SOLUTION ORAL at 21:23

## 2022-04-13 RX ADMIN — DOCUSATE SODIUM 100 MG: 100 CAPSULE, LIQUID FILLED ORAL at 21:22

## 2022-04-13 RX ADMIN — BACLOFEN 20 MG: 10 TABLET ORAL at 21:22

## 2022-04-13 RX ADMIN — CALCIUM 500 MG: 500 TABLET ORAL at 18:23

## 2022-04-13 RX ADMIN — ONDANSETRON 4 MG: 2 INJECTION INTRAMUSCULAR; INTRAVENOUS at 05:17

## 2022-04-13 RX ADMIN — ALLOPURINOL 200 MG: 100 TABLET ORAL at 10:02

## 2022-04-13 RX ADMIN — TOPIRAMATE 200 MG: 100 TABLET, FILM COATED ORAL at 11:16

## 2022-04-13 RX ADMIN — OXYCODONE 5 MG: 5 TABLET ORAL at 10:04

## 2022-04-13 RX ADMIN — ALLOPURINOL 200 MG: 100 TABLET ORAL at 21:21

## 2022-04-13 RX ADMIN — HYDROMORPHONE HYDROCHLORIDE 0.25 MG: 1 INJECTION, SOLUTION INTRAMUSCULAR; INTRAVENOUS; SUBCUTANEOUS at 05:12

## 2022-04-13 RX ADMIN — BACLOFEN 20 MG: 10 TABLET ORAL at 10:00

## 2022-04-13 RX ADMIN — IOPAMIDOL 75 ML: 755 INJECTION, SOLUTION INTRAVENOUS at 06:50

## 2022-04-13 RX ADMIN — TOPIRAMATE 200 MG: 100 TABLET, FILM COATED ORAL at 21:30

## 2022-04-13 RX ADMIN — BACLOFEN 20 MG: 10 TABLET ORAL at 13:48

## 2022-04-13 RX ADMIN — SODIUM CHLORIDE, PRESERVATIVE FREE 20 MG: 5 INJECTION INTRAVENOUS at 21:23

## 2022-04-13 RX ADMIN — ENOXAPARIN SODIUM 30 MG: 100 INJECTION SUBCUTANEOUS at 10:00

## 2022-04-13 RX ADMIN — Medication 200 MG: at 21:22

## 2022-04-13 RX ADMIN — KETOROLAC TROMETHAMINE 15 MG: 30 INJECTION, SOLUTION INTRAMUSCULAR; INTRAVENOUS at 16:51

## 2022-04-13 RX ADMIN — BACLOFEN 20 MG: 10 TABLET ORAL at 16:50

## 2022-04-13 RX ADMIN — SODIUM CHLORIDE, POTASSIUM CHLORIDE, SODIUM LACTATE AND CALCIUM CHLORIDE: 600; 310; 30; 20 INJECTION, SOLUTION INTRAVENOUS at 03:45

## 2022-04-13 RX ADMIN — ASPIRIN 81 MG: 81 TABLET, COATED ORAL at 10:01

## 2022-04-13 RX ADMIN — MONTELUKAST SODIUM 10 MG: 10 TABLET, FILM COATED ORAL at 11:15

## 2022-04-13 RX ADMIN — ESCITALOPRAM OXALATE 20 MG: 10 TABLET ORAL at 10:01

## 2022-04-13 RX ADMIN — Medication 9 MG: at 21:22

## 2022-04-13 RX ADMIN — GABAPENTIN 600 MG: 300 CAPSULE ORAL at 16:51

## 2022-04-13 RX ADMIN — KETOROLAC TROMETHAMINE 15 MG: 30 INJECTION, SOLUTION INTRAMUSCULAR; INTRAVENOUS at 11:15

## 2022-04-13 RX ADMIN — KETOROLAC TROMETHAMINE 15 MG: 30 INJECTION, SOLUTION INTRAMUSCULAR; INTRAVENOUS at 03:45

## 2022-04-13 RX ADMIN — KETOROLAC TROMETHAMINE 15 MG: 30 INJECTION, SOLUTION INTRAMUSCULAR; INTRAVENOUS at 21:34

## 2022-04-13 RX ADMIN — SODIUM CHLORIDE, PRESERVATIVE FREE 20 MG: 5 INJECTION INTRAVENOUS at 10:04

## 2022-04-13 RX ADMIN — ESCITALOPRAM OXALATE 20 MG: 10 TABLET ORAL at 21:30

## 2022-04-13 RX ADMIN — GABAPENTIN 600 MG: 300 CAPSULE ORAL at 21:23

## 2022-04-13 RX ADMIN — ZIPRASIDONE HYDROCHLORIDE 20 MG: 20 CAPSULE ORAL at 21:51

## 2022-04-13 RX ADMIN — GABAPENTIN 600 MG: 300 CAPSULE ORAL at 10:03

## 2022-04-13 RX ADMIN — ASPIRIN 81 MG: 81 TABLET, COATED ORAL at 21:31

## 2022-04-13 RX ADMIN — METOPROLOL SUCCINATE 12.5 MG: 25 TABLET, EXTENDED RELEASE ORAL at 10:02

## 2022-04-13 RX ADMIN — Medication 200 MG: at 10:00

## 2022-04-13 RX ADMIN — OXYCODONE 5 MG: 5 TABLET ORAL at 13:48

## 2022-04-13 RX ADMIN — ENOXAPARIN SODIUM 30 MG: 100 INJECTION SUBCUTANEOUS at 21:30

## 2022-04-13 RX ADMIN — CALCIUM 500 MG: 500 TABLET ORAL at 11:15

## 2022-04-13 ASSESSMENT — PAIN SCALES - GENERAL
PAINLEVEL_OUTOF10: 2
PAINLEVEL_OUTOF10: 6
PAINLEVEL_OUTOF10: 6
PAINLEVEL_OUTOF10: 4
PAINLEVEL_OUTOF10: 3
PAINLEVEL_OUTOF10: 6
PAINLEVEL_OUTOF10: 5
PAINLEVEL_OUTOF10: 6
PAINLEVEL_OUTOF10: 4
PAINLEVEL_OUTOF10: 7
PAINLEVEL_OUTOF10: 5

## 2022-04-13 ASSESSMENT — PAIN DESCRIPTION - ORIENTATION
ORIENTATION: LOWER
ORIENTATION: RIGHT;LEFT;LOWER

## 2022-04-13 ASSESSMENT — PAIN DESCRIPTION - DESCRIPTORS
DESCRIPTORS: ACHING;DULL
DESCRIPTORS: THROBBING
DESCRIPTORS: DULL

## 2022-04-13 ASSESSMENT — PAIN DESCRIPTION - PAIN TYPE
TYPE: ACUTE PAIN
TYPE: ACUTE PAIN
TYPE: CHRONIC PAIN
TYPE: CHRONIC PAIN

## 2022-04-13 ASSESSMENT — PAIN DESCRIPTION - LOCATION
LOCATION: BACK;LEG
LOCATION: ABDOMEN
LOCATION: BACK
LOCATION: ABDOMEN
LOCATION: BACK

## 2022-04-13 ASSESSMENT — PAIN DESCRIPTION - ONSET
ONSET: ON-GOING
ONSET: ON-GOING

## 2022-04-13 ASSESSMENT — PAIN DESCRIPTION - FREQUENCY
FREQUENCY: CONTINUOUS

## 2022-04-13 NOTE — PROGRESS NOTES
Internal Medicine Progress Note    Patient's name: Robert Valenzuela  : 1960  Chief complaints (on day of admission): Abdominal Pain (Generalized abdominal pain for the past 3 days; Sent in from [place])  Admission date: 4/10/2022  Date of service: 2022   Room: [address] INTERMEDIATE  Primary care physician: Concha Silverman   Reason for visit: Follow-up for pancreatitis     Subjective  [facility] was seen and examined. She told me that she is feeling better today. She had a bowel movement yesterday. She is still requiring oxygen. I encouraged her to get out of bed. I told her that I am going to consult orthopedic surgery for her fractures. Her main complaints today were head pain and left shoulder pain. She also states that she still does have some abdominal pain but it is improving. Review of Systems  There are no new complaints of chest pain, shortness of breath, nausea, vomiting, diarrhea, constipation.     Hospital Medications  Current Facility-Administered Medications   Medication Dose Route Frequency Provider Last Rate Last Admin    oxyCODONE (ROXICODONE) immediate release tablet 5 mg  5 mg Oral Q4H PRN Abimael Gaines MD   5 mg at 22 1004    Or    oxyCODONE (ROXICODONE) immediate release tablet 10 mg  10 mg Oral Q4H PRN Abimael Gaines MD        HYDROmorphone (DILAUDID) injection 0.25 mg  0.25 mg IntraVENous Q4H PRN Abimael Gaines MD   0.25 mg at 22 0512    pantoprazole (PROTONIX) 40 mg in sodium chloride (PF) 10 mL injection  40 mg IntraVENous Q12H Te Che, DO   40 mg at 22    albuterol (PROVENTIL) nebulizer solution 2.5 mg  2.5 mg Nebulization Q6H PRN Faviola Garcia MD        allopurinol (ZYLOPRIM) tablet 200 mg  200 mg Oral BID Faviola Garcia MD   200 mg at 22 1002    aspirin EC tablet 81 mg  81 mg Oral BID Faviola Garcia MD   81 mg at 22 1001    baclofen (LIORESAL) tablet 20 mg  20 mg Oral 4x daily Faviola Garcia MD   20 mg at 04/13/22 1000    calcium elemental (OSCAL) tablet 500 mg  500 mg Oral BID Rex Osgood, MD   500 mg at 04/12/22 1646    Vitamin D (CHOLECALCIFEROL) tablet 5,000 Units  5,000 Units Oral Every Other Day Rex Osgood, MD   5,000 Units at 04/13/22 1003    vitamin B-12 (CYANOCOBALAMIN) tablet 100 mcg  100 mcg Oral Daily Rex Osgood, MD   100 mcg at 04/12/22 6221    docusate sodium (COLACE) capsule 100 mg  100 mg Oral BID Rex Osgood, MD   100 mg at 04/13/22 1001    escitalopram (LEXAPRO) tablet 20 mg  20 mg Oral BID Rex Osgood, MD   20 mg at 04/13/22 1001    gabapentin (NEURONTIN) capsule 600 mg  600 mg Oral 4x Daily Rex Osgood, MD   600 mg at 04/13/22 1003    [Held by provider] levOCARNitine (CARNITOR) tablet 330 mg  330 mg Oral TID Rex Osgood, MD        [Held by provider] linaclotide (LINZESS) capsule 290 mcg  290 mcg Oral QAM AC Rex Osgood, MD        magnesium oxide (MAG-OX) tablet 200 mg  200 mg Oral BID Rex Osgood, MD   200 mg at 04/13/22 1000    melatonin tablet 9 mg  9 mg Oral Nightly Rex Osgood, MD   9 mg at 04/12/22 2033    metoprolol succinate (TOPROL XL) extended release tablet 12.5 mg  12.5 mg Oral Daily Rex Osgood, MD   12.5 mg at 04/13/22 1002    montelukast (SINGULAIR) tablet 10 mg  10 mg Oral Daily Rex Osgood, MD   10 mg at 04/12/22 4032    topiramate (TOPAMAX) tablet 200 mg  200 mg Oral BID Rex Osgood, MD   200 mg at 04/12/22 2048    traZODone (DESYREL) tablet 100 mg  100 mg Oral Nightly Rex Osgood, MD   100 mg at 04/12/22 2048    ziprasidone (GEODON) capsule 20 mg  20 mg Oral Nightly Rex Osgood, MD   20 mg at 04/12/22 2048    polyethylene glycol (GLYCOLAX) packet 17 g  17 g Oral BID Rex Osgood, MD   17 g at 04/13/22 1000    sodium chloride flush 0.9 % injection 10 mL  10 mL IntraVENous 2 times per day Rex Osgood, MD   10 mL at 04/12/22 2055    sodium chloride flush 0.9 % injection 10 mL  10 mL IntraVENous PRN Rex Osgood, MD        0.9 % sodium chloride infusion   IntraVENous PRN Nicci Donald MD        potassium chloride (KLOR-CON M) extended release tablet 40 mEq  40 mEq Oral PRN Nicci Donald MD        Or    potassium bicarb-citric acid (EFFER-K) effervescent tablet 40 mEq  40 mEq Oral PRN Nicci Donald MD        Or   [facility] potassium chloride 10 mEq/100 mL IVPB (Peripheral Line)  10 mEq IntraVENous PRN Nicci Donald MD        ondansetron (ZOFRAN-ODT) disintegrating tablet 4 mg  4 mg Oral Q8H PRN Nicci Donald MD        Or    ondansetron [facility]) injection 4 mg  4 mg IntraVENous Q6H PRN Nicci Donald MD   4 mg at 04/13/22 0517    senna (SENOKOT) tablet 8.6 mg  1 tablet Oral Daily PRN Nicci Donald MD   8.6 mg at 04/11/22 1341    acetaminophen (TYLENOL) tablet 650 mg  650 mg Oral Q6H PRN Nicci Donald MD        Or   [facility] acetaminophen (TYLENOL) suppository 650 mg  650 mg Rectal Q6H PRN Nicci Donald MD        [Held by provider] furosemide (LASIX) tablet 40 mg  40 mg Oral Daily Nicci Dnoald MD        enoxaparin (LOVENOX) injection 30 mg  30 mg SubCUTAneous BID Nicci Donald MD   30 mg at 04/13/22 1000    lactated ringers infusion   IntraVENous Continuous Phil TIFFANIE Voldiego,  mL/hr at 04/13/22 0345 New Bag at 04/13/22 0345    famotidine (PEPCID) 20 mg in sodium chloride (PF) 10 mL injection  20 mg IntraVENous BID Gianmarino C Gianfrate, DO   20 mg at 04/13/22 1004    ketorolac (TORADOL) injection 15 mg  15 mg IntraVENous Q6H Gianmarino C Gianfrate, DO   15 mg at 04/13/22 0345       PRN Medications  oxyCODONE **OR** oxyCODONE, HYDROmorphone, albuterol, sodium chloride flush, sodium chloride, potassium chloride **OR** potassium alternative oral replacement **OR** potassium chloride, ondansetron **OR** ondansetron, senna, acetaminophen **OR** acetaminophen    Objective  Most Recent Recorded Vitals  /63   Pulse 74   Temp 98.4 °F (36.9 °C) (Oral)   Resp 18   Ht 5' 7\" (1.702 m)   Wt 251 lb 14.4 oz (114.3 kg)   LMP 08/31/1988 SpO2 91%   BMI 39.45 kg/m²   I/O last 3 completed shifts:  In: -   Out: 2600 [Urine:2600]  No intake/output data recorded. Physical Exam:  General: AAO to person/place/time/purpose, NAD, no labored breathing, nasal cannula oxygen  Eyes: conjunctivae/corneas clear, sclera non icteric  Skin: color/texture/turgor normal, no rashes or lesions  Lungs: CTAB, no retractions/use of accessory muscles, no vocal fremitus, no rhonchi, no crackle, no rales  Heart: regular rate, regular rhythm, no murmur  Abdomen: obese, soft, diffusely tender but better than yesterday, bowel sounds normal  Extremities: atraumatic, truncal edema, cast right ankle, decreased range of motion right wrist without a splint in place  Neurologic: cranial nerves 2-12 grossly intact, no slurred speech    Most Recent Labs  Lab Results   Component Value Date    WBC 9.3 04/13/2022    HGB 12.3 04/13/2022    HCT 39.6 04/13/2022     04/13/2022     04/13/2022    K 4.1 04/13/2022     04/13/2022    CREATININE 0.7 04/13/2022    BUN 19 04/13/2022    CO2 25 04/13/2022    GLUCOSE 100 (H) 04/13/2022    ALT 10 04/13/2022    AST 23 04/13/2022    INR 0.9 03/02/2022    TSH 0.548 03/16/2021    LABA1C 5.7 12/13/2013       CT ABDOMEN PELVIS W IV CONTRAST Additional Contrast? None   Final Result   Findings compatible with ongoing pancreatitis. No pancreatic necrosis. Peripancreatic inflammation/fluid appears mildly worsened/increased. No   fluid collections with well-defined wall. Increased amount of free fluid identified in the pelvis, likely reactive. Small bilateral pleural effusions greater than right are partially imaged. RECOMMENDATIONS:   Unavailable         CT ABDOMEN PELVIS W IV CONTRAST Additional Contrast? None   Final Result   Finding consistent with acute pancreatitis. There is secondary duodenitis,   but no bowel obstruction is seen.              Assessment   Active Hospital Problems    Diagnosis     Acute pancreatitis [K85.90]      Priority: High    PETR (obstructive sleep apnea) [G47.33]     GERD (gastroesophageal reflux disease) [K21.9]     Fatty liver [K76.0]     Depression [F32. A]     Chronic back pain [M54.9, G89.29]     Chronic respiratory failure with hypoxia (HCC) [J96.11]     Neuropathic pain syndrome (non-herpetic) [M79.2]     Neural foraminal stenosis of cervical spine [M48.02]     Neural foraminal stenosis of lumbar spine [M48.061]     Lymphedema of lower extremity [I89.0]     Obesity [E66.9]     Generalized seizure disorder ([facility] [G40.309]     Debility [R53.81]     Bipolar 1 disorder ([facility] [F31.9]     Cervicalgia [M54.2]     Hypertension [I10]     Hyperlipidemia [E78.5]     Asthma [J45.909]          Plan  · Acute pancreatitis w/ secondary duodenitis:   ? SP cholecystectomy in 1999  ? CT abd/pel noted-- repeat on 4/13 per gen surg noted  ? Follow lipase/transaminases. ? Lipid panel noted-- not the culprit . ? Defer advancement of diet to general surgery  ? Cut back IVF. Continue to hold Lasix. .   ? IV Morphine for pain. ? Gen surg consultation appreciated-- checking for autoimmune pancreatitis   ? Unable to do MRCP due to nerve stimulator   · History of fall with right radius and right ankle fractures:  ? Cast left lower extremity  ? Orthopedic surgery consultation  ? PT/OT  · Constipation:  · Dulcolax suppository  · Multifactorial hypoxia:  · Wean oxygen as able  · Incentive spirometer. · PT AM-PAC-- TBD  · Follow labs   · DVT prophylaxis  · Please see orders for further management and care. · Discharge plan: back to SNF when stable-not today    Electronically signed by Taqueria Fernandez DO on 4/13/2022 at 10:43 AM    I can be reached through [email or web address].

## 2022-04-13 NOTE — PROGRESS NOTES
[facility] SURGERY  DAILY PROGRESS NOTE  4/13/2022    CHIEF COMPLAINT:  Chief Complaint   Patient presents with    Abdominal Pain     Generalized abdominal pain for the past 3 days; Sent in from [address]:  More awake this am. Pain mainly periumbilical 1/93. No nausea or vomiting is having some flatus no BM yet. OBJECTIVE:  BP (!) 120/54   Pulse 81   Temp 98.5 °F (36.9 °C) (Oral)   Resp 18   Ht 5' 7\" (1.702 m)   Wt 251 lb 14.4 oz (114.3 kg)   LMP 08/31/1988   SpO2 90%   BMI 39.45 kg/m²     GENERAL:  NAD. A&Ox3. LUNGS:  No increased work of breathing. CARDIOVASCULAR: RR  ABDOMEN:  Soft, non-distended, minimally tender in epigastrium . No guarding, rigidity, rebound.     ASSESSMENT/PLAN:  22-year-old female with acute pancreatitis s/p Diane-en-Y and lap joi (possible biliary vs. Medication induced)     Clear liquid diet, continue fluids for now  Strict monitoring of inputs and outputs  Pain control and Nausea control  Cant have MRI due to nerve stimulator  IgG panel pending   Normal lipid panel   Continue to monitor lipase  Per patient she has started new medications but is unsure what she has started-- could be the cause of her pancreatitis   Out of bed to ambulate  CTAP this morning to rule out pancreatic nerosis     Electronically signed by Maribell Moody MD on 4/13/2022 at 5:51 AM     CT scan reviewed  Still with peripancreatic fluid collections - slightly worse  Continue clears with supplements - do not advance    Electronically signed by Lionel Temple MD on 4/13/2022 at 11:05 AM

## 2022-04-13 NOTE — PROGRESS NOTES
Orthopedic Attending    Patient is to be nonweightbearing on the right lower extremity. She is to maintain her splint and elevation to the right leg.   She is to maintain her brace for the right wrist and is to be nonweightbearing to the right wrist.  Patient to follow-up in orthopedic office in 1 week from discharge from the hospital    Electronically Signed By  Clemencia Berry D.O.  4/13/2022  10:58 AM

## 2022-04-13 NOTE — PROGRESS NOTES
Notified Dr. Aura Turner via perfect serve.  Clarifying the patient is Nonweightbearing in the right lower extremity

## 2022-04-14 LAB
ALBUMIN SERPL-MCNC: 2.6 G/DL (ref 3.5–5.2)
ALP BLD-CCNC: 226 U/L (ref 35–104)
ALT SERPL-CCNC: 6 U/L (ref 0–32)
ANION GAP SERPL CALCULATED.3IONS-SCNC: 6 MMOL/L (ref 7–16)
AST SERPL-CCNC: 15 U/L (ref 0–31)
BASOPHILS ABSOLUTE: 0.01 E9/L (ref 0–0.2)
BASOPHILS RELATIVE PERCENT: 0.1 % (ref 0–2)
BILIRUB SERPL-MCNC: 0.4 MG/DL (ref 0–1.2)
BUN BLDV-MCNC: 20 MG/DL (ref 6–23)
CALCIUM SERPL-MCNC: 9 MG/DL (ref 8.6–10.2)
CHLORIDE BLD-SCNC: 105 MMOL/L (ref 98–107)
CO2: 28 MMOL/L (ref 22–29)
CREAT SERPL-MCNC: 0.7 MG/DL (ref 0.5–1)
EOSINOPHILS ABSOLUTE: 0.1 E9/L (ref 0.05–0.5)
EOSINOPHILS RELATIVE PERCENT: 1.4 % (ref 0–6)
GFR AFRICAN AMERICAN: >60
GFR NON-AFRICAN AMERICAN: >60 ML/MIN/1.73
GLUCOSE BLD-MCNC: 113 MG/DL (ref 74–99)
HCT VFR BLD CALC: 30.2 % (ref 34–48)
HCT VFR BLD CALC: 31.6 % (ref 34–48)
HEMOGLOBIN: 9.5 G/DL (ref 11.5–15.5)
HEMOGLOBIN: 9.9 G/DL (ref 11.5–15.5)
IGG 1: 214 MG/DL (ref 240–1118)
IGG 2: 123 MG/DL (ref 124–549)
IGG 3: 49 MG/DL (ref 21–134)
IGG 4: 5 MG/DL (ref 1–123)
IMMATURE GRANULOCYTES #: 0.05 E9/L
IMMATURE GRANULOCYTES %: 0.7 % (ref 0–5)
LIPASE: 50 U/L (ref 13–60)
LYMPHOCYTES ABSOLUTE: 0.69 E9/L (ref 1.5–4)
LYMPHOCYTES RELATIVE PERCENT: 9.6 % (ref 20–42)
MCH RBC QN AUTO: 31.1 PG (ref 26–35)
MCHC RBC AUTO-ENTMCNC: 31.3 % (ref 32–34.5)
MCV RBC AUTO: 99.4 FL (ref 80–99.9)
MONOCYTES ABSOLUTE: 0.72 E9/L (ref 0.1–0.95)
MONOCYTES RELATIVE PERCENT: 10 % (ref 2–12)
NEUTROPHILS ABSOLUTE: 5.65 E9/L (ref 1.8–7.3)
NEUTROPHILS RELATIVE PERCENT: 78.2 % (ref 43–80)
PDW BLD-RTO: 13 FL (ref 11.5–15)
PLATELET # BLD: 176 E9/L (ref 130–450)
PMV BLD AUTO: 8.8 FL (ref 7–12)
POTASSIUM REFLEX MAGNESIUM: 3.7 MMOL/L (ref 3.5–5)
RBC # BLD: 3.18 E12/L (ref 3.5–5.5)
SODIUM BLD-SCNC: 139 MMOL/L (ref 132–146)
TOTAL PROTEIN: 5.5 G/DL (ref 6.4–8.3)
WBC # BLD: 7.2 E9/L (ref 4.5–11.5)

## 2022-04-14 PROCEDURE — C9113 INJ PANTOPRAZOLE SODIUM, VIA: HCPCS | Performed by: STUDENT IN AN ORGANIZED HEALTH CARE EDUCATION/TRAINING PROGRAM

## 2022-04-14 PROCEDURE — 2580000003 HC RX 258: Performed by: STUDENT IN AN ORGANIZED HEALTH CARE EDUCATION/TRAINING PROGRAM

## 2022-04-14 PROCEDURE — 6360000002 HC RX W HCPCS: Performed by: INTERNAL MEDICINE

## 2022-04-14 PROCEDURE — 85018 HEMOGLOBIN: CPT

## 2022-04-14 PROCEDURE — 83690 ASSAY OF LIPASE: CPT

## 2022-04-14 PROCEDURE — 80053 COMPREHEN METABOLIC PANEL: CPT

## 2022-04-14 PROCEDURE — 36415 COLL VENOUS BLD VENIPUNCTURE: CPT

## 2022-04-14 PROCEDURE — 85025 COMPLETE CBC W/AUTO DIFF WBC: CPT

## 2022-04-14 PROCEDURE — 6370000000 HC RX 637 (ALT 250 FOR IP)

## 2022-04-14 PROCEDURE — A4216 STERILE WATER/SALINE, 10 ML: HCPCS | Performed by: STUDENT IN AN ORGANIZED HEALTH CARE EDUCATION/TRAINING PROGRAM

## 2022-04-14 PROCEDURE — 2500000003 HC RX 250 WO HCPCS: Performed by: STUDENT IN AN ORGANIZED HEALTH CARE EDUCATION/TRAINING PROGRAM

## 2022-04-14 PROCEDURE — 2580000003 HC RX 258: Performed by: INTERNAL MEDICINE

## 2022-04-14 PROCEDURE — 6360000002 HC RX W HCPCS: Performed by: STUDENT IN AN ORGANIZED HEALTH CARE EDUCATION/TRAINING PROGRAM

## 2022-04-14 PROCEDURE — 6370000000 HC RX 637 (ALT 250 FOR IP): Performed by: INTERNAL MEDICINE

## 2022-04-14 PROCEDURE — 85014 HEMATOCRIT: CPT

## 2022-04-14 PROCEDURE — 1200000000 HC SEMI PRIVATE

## 2022-04-14 RX ADMIN — SODIUM CHLORIDE, PRESERVATIVE FREE 20 MG: 5 INJECTION INTRAVENOUS at 21:32

## 2022-04-14 RX ADMIN — DOCUSATE SODIUM 100 MG: 100 CAPSULE, LIQUID FILLED ORAL at 21:30

## 2022-04-14 RX ADMIN — OXYCODONE 10 MG: 5 TABLET ORAL at 04:40

## 2022-04-14 RX ADMIN — POLYETHYLENE GLYCOL 3350 17 G: 17 POWDER, FOR SOLUTION ORAL at 08:56

## 2022-04-14 RX ADMIN — OXYCODONE 5 MG: 5 TABLET ORAL at 18:44

## 2022-04-14 RX ADMIN — SODIUM CHLORIDE, PRESERVATIVE FREE 40 MG: 5 INJECTION INTRAVENOUS at 21:37

## 2022-04-14 RX ADMIN — DOCUSATE SODIUM 100 MG: 100 CAPSULE, LIQUID FILLED ORAL at 08:55

## 2022-04-14 RX ADMIN — GABAPENTIN 600 MG: 300 CAPSULE ORAL at 12:31

## 2022-04-14 RX ADMIN — Medication 9 MG: at 21:30

## 2022-04-14 RX ADMIN — SODIUM CHLORIDE, PRESERVATIVE FREE 10 ML: 5 INJECTION INTRAVENOUS at 10:47

## 2022-04-14 RX ADMIN — ALLOPURINOL 200 MG: 100 TABLET ORAL at 21:30

## 2022-04-14 RX ADMIN — ALLOPURINOL 200 MG: 100 TABLET ORAL at 08:56

## 2022-04-14 RX ADMIN — TOPIRAMATE 200 MG: 100 TABLET, FILM COATED ORAL at 21:38

## 2022-04-14 RX ADMIN — KETOROLAC TROMETHAMINE 15 MG: 30 INJECTION, SOLUTION INTRAMUSCULAR; INTRAVENOUS at 21:32

## 2022-04-14 RX ADMIN — SODIUM CHLORIDE, PRESERVATIVE FREE 40 MG: 5 INJECTION INTRAVENOUS at 08:55

## 2022-04-14 RX ADMIN — ASPIRIN 81 MG: 81 TABLET, COATED ORAL at 08:55

## 2022-04-14 RX ADMIN — Medication 100 MCG: at 08:55

## 2022-04-14 RX ADMIN — ENOXAPARIN SODIUM 30 MG: 100 INJECTION SUBCUTANEOUS at 21:31

## 2022-04-14 RX ADMIN — Medication 200 MG: at 21:30

## 2022-04-14 RX ADMIN — BACLOFEN 20 MG: 10 TABLET ORAL at 16:34

## 2022-04-14 RX ADMIN — TOPIRAMATE 200 MG: 100 TABLET, FILM COATED ORAL at 08:55

## 2022-04-14 RX ADMIN — ESCITALOPRAM OXALATE 20 MG: 10 TABLET ORAL at 08:56

## 2022-04-14 RX ADMIN — CALCIUM 500 MG: 500 TABLET ORAL at 08:56

## 2022-04-14 RX ADMIN — BACLOFEN 20 MG: 10 TABLET ORAL at 12:31

## 2022-04-14 RX ADMIN — ENOXAPARIN SODIUM 30 MG: 100 INJECTION SUBCUTANEOUS at 08:56

## 2022-04-14 RX ADMIN — SODIUM CHLORIDE, PRESERVATIVE FREE 10 ML: 5 INJECTION INTRAVENOUS at 16:35

## 2022-04-14 RX ADMIN — TRAZODONE HYDROCHLORIDE 100 MG: 50 TABLET ORAL at 21:30

## 2022-04-14 RX ADMIN — ESCITALOPRAM OXALATE 20 MG: 10 TABLET ORAL at 21:37

## 2022-04-14 RX ADMIN — KETOROLAC TROMETHAMINE 15 MG: 30 INJECTION, SOLUTION INTRAMUSCULAR; INTRAVENOUS at 16:34

## 2022-04-14 RX ADMIN — ASPIRIN 81 MG: 81 TABLET, COATED ORAL at 21:30

## 2022-04-14 RX ADMIN — SODIUM CHLORIDE, PRESERVATIVE FREE 20 MG: 5 INJECTION INTRAVENOUS at 08:56

## 2022-04-14 RX ADMIN — GABAPENTIN 600 MG: 300 CAPSULE ORAL at 16:34

## 2022-04-14 RX ADMIN — SODIUM CHLORIDE, PRESERVATIVE FREE 10 ML: 5 INJECTION INTRAVENOUS at 21:33

## 2022-04-14 RX ADMIN — BACLOFEN 20 MG: 10 TABLET ORAL at 08:55

## 2022-04-14 RX ADMIN — CALCIUM 500 MG: 500 TABLET ORAL at 16:34

## 2022-04-14 RX ADMIN — OXYCODONE 5 MG: 5 TABLET ORAL at 12:36

## 2022-04-14 RX ADMIN — Medication 200 MG: at 08:56

## 2022-04-14 RX ADMIN — KETOROLAC TROMETHAMINE 15 MG: 30 INJECTION, SOLUTION INTRAMUSCULAR; INTRAVENOUS at 10:47

## 2022-04-14 RX ADMIN — ZIPRASIDONE HYDROCHLORIDE 20 MG: 20 CAPSULE ORAL at 21:31

## 2022-04-14 RX ADMIN — KETOROLAC TROMETHAMINE 15 MG: 30 INJECTION, SOLUTION INTRAMUSCULAR; INTRAVENOUS at 04:38

## 2022-04-14 RX ADMIN — GABAPENTIN 600 MG: 300 CAPSULE ORAL at 08:55

## 2022-04-14 RX ADMIN — MONTELUKAST SODIUM 10 MG: 10 TABLET, FILM COATED ORAL at 08:55

## 2022-04-14 RX ADMIN — GABAPENTIN 600 MG: 300 CAPSULE ORAL at 21:30

## 2022-04-14 RX ADMIN — METOPROLOL SUCCINATE 12.5 MG: 25 TABLET, EXTENDED RELEASE ORAL at 08:56

## 2022-04-14 RX ADMIN — POLYETHYLENE GLYCOL 3350 17 G: 17 POWDER, FOR SOLUTION ORAL at 21:37

## 2022-04-14 RX ADMIN — BACLOFEN 20 MG: 10 TABLET ORAL at 21:30

## 2022-04-14 ASSESSMENT — PAIN DESCRIPTION - PAIN TYPE
TYPE: CHRONIC PAIN
TYPE: ACUTE PAIN

## 2022-04-14 ASSESSMENT — PAIN DESCRIPTION - ONSET
ONSET: PROGRESSIVE
ONSET: PROGRESSIVE
ONSET: ON-GOING
ONSET: PROGRESSIVE

## 2022-04-14 ASSESSMENT — PAIN DESCRIPTION - LOCATION
LOCATION: ABDOMEN
LOCATION: BACK;LEG;ABDOMEN

## 2022-04-14 ASSESSMENT — PAIN DESCRIPTION - DESCRIPTORS
DESCRIPTORS: CONSTANT;DISCOMFORT
DESCRIPTORS: DISCOMFORT;ACHING
DESCRIPTORS: DISCOMFORT
DESCRIPTORS: ACHING;DISCOMFORT

## 2022-04-14 ASSESSMENT — PAIN DESCRIPTION - PROGRESSION
CLINICAL_PROGRESSION: NOT CHANGED
CLINICAL_PROGRESSION: NOT CHANGED
CLINICAL_PROGRESSION: GRADUALLY WORSENING
CLINICAL_PROGRESSION: NOT CHANGED

## 2022-04-14 ASSESSMENT — PAIN SCALES - GENERAL
PAINLEVEL_OUTOF10: 5
PAINLEVEL_OUTOF10: 6
PAINLEVEL_OUTOF10: 0
PAINLEVEL_OUTOF10: 3
PAINLEVEL_OUTOF10: 0
PAINLEVEL_OUTOF10: 6
PAINLEVEL_OUTOF10: 7
PAINLEVEL_OUTOF10: 3
PAINLEVEL_OUTOF10: 6

## 2022-04-14 ASSESSMENT — PAIN DESCRIPTION - FREQUENCY
FREQUENCY: CONTINUOUS

## 2022-04-14 ASSESSMENT — PAIN - FUNCTIONAL ASSESSMENT
PAIN_FUNCTIONAL_ASSESSMENT: PREVENTS OR INTERFERES SOME ACTIVE ACTIVITIES AND ADLS

## 2022-04-14 ASSESSMENT — PAIN DESCRIPTION - ORIENTATION
ORIENTATION: LEFT;RIGHT;MID
ORIENTATION: RIGHT;LEFT;MID

## 2022-04-14 NOTE — PROGRESS NOTES
Internal Medicine Progress Note    Patient's name: Lashonda Madrigal  : 1960  Chief complaints (on day of admission): Abdominal Pain (Generalized abdominal pain for the past 3 days; Sent in from [place])  Admission date: 4/10/2022  Date of service: 2022   Room: [address] INTERMEDIATE  Primary care physician: Torrie Connor   Reason for visit: Follow-up for pancreatitis     Subjective  Claudia was seen and examined. She was lying in bed. She still has abdominal pain. She states that she is having a \"bad mitochondrial day\". This means that she is tired and weak. She denies any other new issues or problems. She tolerated all current medication. She is tolerating her current diet. Review of Systems  There are no new complaints of chest pain, shortness of breath, nausea, vomiting, diarrhea, constipation.     Hospital Medications  Current Facility-Administered Medications   Medication Dose Route Frequency Provider Last Rate Last Admin    oxyCODONE (ROXICODONE) immediate release tablet 5 mg  5 mg Oral Q4H PRN Isauro Cesar MD   5 mg at 22 1348    Or    oxyCODONE (ROXICODONE) immediate release tablet 10 mg  10 mg Oral Q4H PRN Isauro Cesar MD   10 mg at 22 0440    HYDROmorphone (DILAUDID) injection 0.25 mg  0.25 mg IntraVENous Q4H PRN Isauro Cesar MD   0.25 mg at 22 0512    pantoprazole (PROTONIX) 40 mg in sodium chloride (PF) 10 mL injection  40 mg IntraVENous Q12H Te BENZ Gianfrate, DO   40 mg at 22 0855    albuterol (PROVENTIL) nebulizer solution 2.5 mg  2.5 mg Nebulization Q6H PRN Rex Osgood, MD        allopurinol (ZYLOPRIM) tablet 200 mg  200 mg Oral BID Rex Osgood, MD   200 mg at 22 0856    aspirin EC tablet 81 mg  81 mg Oral BID Rex Osgood, MD   81 mg at 22 0855    baclofen (LIORESAL) tablet 20 mg  20 mg Oral 4x daily Rex Osgood, MD   20 mg at 22 0855    calcium elemental (OSCAL) tablet 500 mg  500 mg Oral BID Olivia TOMLINSON Ely Barnett MD   500 mg at 04/14/22 0856    Vitamin D (CHOLECALCIFEROL) tablet 5,000 Units  5,000 Units Oral Every Other Day Domenick Bumpers, MD   5,000 Units at 04/13/22 1003    vitamin B-12 (CYANOCOBALAMIN) tablet 100 mcg  100 mcg Oral Daily Domenick Bumpers, MD   100 mcg at 04/14/22 0855    docusate sodium (COLACE) capsule 100 mg  100 mg Oral BID Domenick Bumpers, MD   100 mg at 04/14/22 0855    escitalopram (LEXAPRO) tablet 20 mg  20 mg Oral BID Domenick Bumpers, MD   20 mg at 04/14/22 0856    gabapentin (NEURONTIN) capsule 600 mg  600 mg Oral 4x Daily Domenick Bumpers, MD   600 mg at 04/14/22 0855    [Held by provider] levOCARNitine (CARNITOR) tablet 330 mg  330 mg Oral TID Domenick Bumpers, MD        [Held by provider] linaclotide (LINZESS) capsule 290 mcg  290 mcg Oral QAM AC Domenick Bumpers, MD        magnesium oxide (MAG-OX) tablet 200 mg  200 mg Oral BID Domenick Bumpers, MD   200 mg at 04/14/22 0856    melatonin tablet 9 mg  9 mg Oral Nightly Domenick Bumpers, MD   9 mg at 04/13/22 2122    metoprolol succinate (TOPROL XL) extended release tablet 12.5 mg  12.5 mg Oral Daily Domenick Bumpers, MD   12.5 mg at 04/14/22 0856    montelukast (SINGULAIR) tablet 10 mg  10 mg Oral Daily Domenick Bumpers, MD   10 mg at 04/14/22 0855    topiramate (TOPAMAX) tablet 200 mg  200 mg Oral BID Domenick Bumpers, MD   200 mg at 04/14/22 0855    traZODone (DESYREL) tablet 100 mg  100 mg Oral Nightly Domenick Bumpers, MD   100 mg at 04/13/22 2122    ziprasidone (GEODON) capsule 20 mg  20 mg Oral Nightly Domenick Bumpers, MD   20 mg at 04/13/22 2151    polyethylene glycol (GLYCOLAX) packet 17 g  17 g Oral BID Domenick Bumpers, MD   17 g at 04/14/22 0856    sodium chloride flush 0.9 % injection 10 mL  10 mL IntraVENous 2 times per day Domenick Bumpers, MD   10 mL at 04/13/22 2134    sodium chloride flush 0.9 % injection 10 mL  10 mL IntraVENous PRN Domenick Bumpers, MD   10 mL at 04/14/22 1047    0.9 % sodium chloride infusion   IntraVENous PRN Domenick Bumpers, MD  potassium chloride (KLOR-CON M) extended release tablet 40 mEq  40 mEq Oral PRN Vickie Jimenez MD        Or    potassium bicarb-citric acid (EFFER-K) effervescent tablet 40 mEq  40 mEq Oral PRN Vickie Jimenez MD        Or   [place] potassium chloride 10 mEq/100 mL IVPB (Peripheral Line)  10 mEq IntraVENous PRN Vickie Jimenez MD        ondansetron (ZOFRAN-ODT) disintegrating tablet 4 mg  4 mg Oral Q8H PRN Vickie Jimenez MD        Or    ondansetron [facility]) injection 4 mg  4 mg IntraVENous Q6H PRN Vickie Jimenez MD   4 mg at 04/13/22 0517    senna (SENOKOT) tablet 8.6 mg  1 tablet Oral Daily PRN Vickie Jimenez MD   8.6 mg at 04/11/22 1341    acetaminophen (TYLENOL) tablet 650 mg  650 mg Oral Q6H PRN Vickie Jimenez MD        Or   [place] acetaminophen (TYLENOL) suppository 650 mg  650 mg Rectal Q6H PRN Vickie Jimenez MD        furosemide (LASIX) tablet 40 mg  40 mg Oral Daily Vickie Jimenez MD        enoxaparin (LOVENOX) injection 30 mg  30 mg SubCUTAneous BID Vickie Jimenez MD   30 mg at 04/14/22 0856    famotidine (PEPCID) 20 mg in sodium chloride (PF) 10 mL injection  20 mg IntraVENous BID Gianmarino C Gianfrate, DO   20 mg at 04/14/22 0856    ketorolac (TORADOL) injection 15 mg  15 mg IntraVENous Q6H Gianmarino C Gianfrate, DO   15 mg at 04/14/22 1047       PRN Medications  oxyCODONE **OR** oxyCODONE, HYDROmorphone, albuterol, sodium chloride flush, sodium chloride, potassium chloride **OR** potassium alternative oral replacement **OR** potassium chloride, ondansetron **OR** ondansetron, senna, acetaminophen **OR** acetaminophen    Objective  Most Recent Recorded Vitals  BP (!) 122/57   Pulse 71   Temp 98.7 °F (37.1 °C) (Oral)   Resp 20   Ht 5' 7\" (1.702 m)   Wt 251 lb 14.4 oz (114.3 kg)   LMP 08/31/1988   SpO2 97%   BMI 39.45 kg/m²   I/O last 3 completed shifts: In: 400 [P.O.:400]  Out: 5100 [Urine:5100]  No intake/output data recorded.     Physical Exam:   General: AAO to person/place/time/purpose, NAD, no labored breathing  Eyes: conjunctivae/corneas clear, sclera non icteric  Skin: color/texture/turgor normal, no rashes or lesions  Lungs: CTAB, no retractions/use of accessory muscles, no vocal fremitus, no rhonchi, no crackle, no rales  Heart: regular rate, regular rhythm, no murmur  Abdomen: obese, soft, abdominal tenderness improved from yesterday, bowel sounds normal  Extremities: atraumatic, truncal edema, cast right ankle, decreased range of motion right wrist without a splint in place  Neurologic: cranial nerves 2-12 grossly intact, no slurred speech    Most Recent Labs  Lab Results   Component Value Date    WBC 7.2 04/14/2022    HGB 9.9 (L) 04/14/2022    HCT 31.6 (L) 04/14/2022     04/14/2022     04/14/2022    K 3.7 04/14/2022     04/14/2022    CREATININE 0.7 04/14/2022    BUN 20 04/14/2022    CO2 28 04/14/2022    GLUCOSE 113 (H) 04/14/2022    ALT 6 04/14/2022    AST 15 04/14/2022    INR 0.9 03/02/2022    TSH 0.548 03/16/2021    LABA1C 5.7 12/13/2013       CT ABDOMEN PELVIS W IV CONTRAST Additional Contrast? None   Final Result   Findings compatible with ongoing pancreatitis. No pancreatic necrosis. Peripancreatic inflammation/fluid appears mildly worsened/increased. No   fluid collections with well-defined wall. Increased amount of free fluid identified in the pelvis, likely reactive. Small bilateral pleural effusions greater than right are partially imaged. RECOMMENDATIONS:   Unavailable         CT ABDOMEN PELVIS W IV CONTRAST Additional Contrast? None   Final Result   Finding consistent with acute pancreatitis. There is secondary duodenitis,   but no bowel obstruction is seen.              Assessment   Active Hospital Problems    Diagnosis     Acute pancreatitis [K85.90]      Priority: High    Pancreatic pseudocyst [K86.3]     PETR (obstructive sleep apnea) [G47.33]     GERD (gastroesophageal reflux disease) [K21.9]     Fatty liver [K76.0]     Depression [F32.A]     Chronic back pain [M54.9, G89.29]     Chronic respiratory failure with hypoxia (HCC) [J96.11]     Neuropathic pain syndrome (non-herpetic) [M79.2]     Neural foraminal stenosis of cervical spine [M48.02]     Neural foraminal stenosis of lumbar spine [M48.061]     Lymphedema of lower extremity [I89.0]     Obesity [E66.9]     Generalized seizure disorder ([facility] [address] [G40.309]     Debility [R53.81]     Bipolar 1 disorder ([facility] [address] [F31.9]     Cervicalgia [M54.2]     Hypertension [I10]     Hyperlipidemia [E78.5]     Asthma [J45.909]          Plan  · Acute pancreatitis w/ secondary duodenitis:   ? SP cholecystectomy in 1999  ? CT abd/pel noted-- repeat on 4/13 per gen surg noted  ? Follow lipase/transaminases. ? Lipid panel noted-- not the culprit . ? Defer advancement of diet to general surgery  ? IV Dilaudid and PO Oxy for pain. ? Gen surg consultation appreciated-- checking for autoimmune pancreatitis   ? Unable to do MRCP due to nerve stimulator   · Volume overload  · Stopped IVF. · Restart Lasix. · Daily weights   · History of fall with right radius and right ankle fractures:  ? Cast left lower extremity  ? Orthopedic surgery input appreciated-- NWB RLE and RUE-- f/u 1 week after DC  ? PT/OT  · Constipation:  · Dulcolax suppository  · Multifactorial hypoxia:  · Wean oxygen as able  · Incentive spirometer. · New anemia (4/14):  · Follow H&H  · PT AM-PAC-- TBD  · Follow labs   · DVT prophylaxis  · Please see orders for further management and care. · Discharge plan: back to SNF when stable-not today but maybe tomorrow     Electronically signed by Keren Rawls, DO on 4/14/2022 at 11:03 AM    I can be reached through [employer].

## 2022-04-14 NOTE — PROGRESS NOTES
P Quality Flow/Interdisciplinary Rounds Progress Note        Quality Flow Rounds held on April 14, 2022    Disciplines Attending:  Bedside Nurse, ,  and Nursing Unit Leadership    Minoo Xiong was admitted on 4/10/2022  6:42 PM    Anticipated Discharge Date:  Expected Discharge Date: 04/13/22    Disposition:    Misha Score:  Misha Scale Score: 14    Readmission Risk              Risk of Unplanned Readmission:  24           Discussed patient goal for the day, patient clinical progression, and barriers to discharge. The following Goal(s) of the Day/Commitment(s) have been identified:  labs at noon, discharge planning?        Abril Reynoso RN  April 14, 2022

## 2022-04-14 NOTE — PROGRESS NOTES
Secure message sent to Deysi Pedersen NP notified that this pt is not currently being monitored on telemetry due to current issues.

## 2022-04-14 NOTE — PROGRESS NOTES
[facility] SURGERY  DAILY PROGRESS NOTE  4/14/2022    CHIEF COMPLAINT:  Chief Complaint   Patient presents with    Abdominal Pain     Generalized abdominal pain for the past 3 days; Sent in from [address]:  No overnight events. No nausea but with abdominal pain still. Passing flatus. OBJECTIVE:  BP (!) 122/57   Pulse 71   Temp 98.7 °F (37.1 °C) (Oral)   Resp 20   Ht 5' 7\" (1.702 m)   Wt 251 lb 14.4 oz (114.3 kg)   LMP 08/31/1988   SpO2 97%   BMI 39.45 kg/m²     GENERAL:  NAD. A&Ox3. LUNGS:  No increased work of breathing. CARDIOVASCULAR: RR  ABDOMEN:  Soft, non-distended, diffuse tenderness worse in epigastrium. No guarding, rigidity, rebound. ASSESSMENT/PLAN:  78-year-old female with acute pancreatitis s/p Diane-en-Y and lap joi (possible biliary vs. Medication induced).  IGG 4 and triglyceride 163     Stay on clear liquids with supplements  Continue to monitor lipase  CT yesterday showed peripancreatic fluid collections    Electronically signed by Jennifer Henderson MD on 4/14/2022 at 3:54 PM

## 2022-04-14 NOTE — PROGRESS NOTES
[facility] SURGERY  DAILY PROGRESS NOTE  4/14/2022    CHIEF COMPLAINT:  Chief Complaint   Patient presents with    Abdominal Pain     Generalized abdominal pain for the past 3 days; Sent in from [address]:  Increasing pain today, still becoming nauseous with liquids. Denies BM but is having latus today. OBJECTIVE:  BP (!) 122/57   Pulse 71   Temp 98.7 °F (37.1 °C) (Oral)   Resp 20   Ht 5' 7\" (1.702 m)   Wt 251 lb 14.4 oz (114.3 kg)   LMP 08/31/1988   SpO2 97%   BMI 39.45 kg/m²     GENERAL:  NAD. A&Ox3. LUNGS:  No increased work of breathing. CARDIOVASCULAR: RR  ABDOMEN:  Soft, distended, moderately tender in epigastrium . No guarding, rigidity, rebound.     ASSESSMENT/PLAN:  26-year-old female with acute pancreatitis s/p Diane-en-Y and lap joi (possible biliary vs. Medication induced)     Clear liquid diet, continue fluids for now  Strict monitoring of inputs and outputs  Pain control and Nausea control  Cant have MRI due to nerve stimulator  IgG panel pending   Normal lipid panel   Continue to monitor lipase  Per patient she has started new medications but is unsure what she has started-- could be the cause of her pancreatitis   Out of bed to ambulate  Slightly worsened pancreatic fluid collection on CTAP    Electronically signed by Adelso Foote MD on 4/14/2022 at 3:53 PM

## 2022-04-15 VITALS
HEART RATE: 78 BPM | WEIGHT: 258.4 LBS | DIASTOLIC BLOOD PRESSURE: 59 MMHG | RESPIRATION RATE: 18 BRPM | BODY MASS INDEX: 40.56 KG/M2 | OXYGEN SATURATION: 94 % | TEMPERATURE: 97.7 F | HEIGHT: 67 IN | SYSTOLIC BLOOD PRESSURE: 128 MMHG

## 2022-04-15 LAB
ALBUMIN SERPL-MCNC: 2.6 G/DL (ref 3.5–5.2)
ALP BLD-CCNC: 119 U/L (ref 35–104)
ALT SERPL-CCNC: 7 U/L (ref 0–32)
ANION GAP SERPL CALCULATED.3IONS-SCNC: 9 MMOL/L (ref 7–16)
AST SERPL-CCNC: 11 U/L (ref 0–31)
BASOPHILS ABSOLUTE: 0.01 E9/L (ref 0–0.2)
BASOPHILS RELATIVE PERCENT: 0.2 % (ref 0–2)
BILIRUB SERPL-MCNC: 0.2 MG/DL (ref 0–1.2)
BUN BLDV-MCNC: 27 MG/DL (ref 6–23)
CALCIUM SERPL-MCNC: 8.7 MG/DL (ref 8.6–10.2)
CHLORIDE BLD-SCNC: 103 MMOL/L (ref 98–107)
CO2: 27 MMOL/L (ref 22–29)
CREAT SERPL-MCNC: 0.7 MG/DL (ref 0.5–1)
EOSINOPHILS ABSOLUTE: 0.14 E9/L (ref 0.05–0.5)
EOSINOPHILS RELATIVE PERCENT: 2.1 % (ref 0–6)
GFR AFRICAN AMERICAN: >60
GFR NON-AFRICAN AMERICAN: >60 ML/MIN/1.73
GLUCOSE BLD-MCNC: 104 MG/DL (ref 74–99)
HCT VFR BLD CALC: 30.5 % (ref 34–48)
HEMOGLOBIN: 9.5 G/DL (ref 11.5–15.5)
IMMATURE GRANULOCYTES #: 0.03 E9/L
IMMATURE GRANULOCYTES %: 0.5 % (ref 0–5)
LIPASE: 40 U/L (ref 13–60)
LYMPHOCYTES ABSOLUTE: 0.75 E9/L (ref 1.5–4)
LYMPHOCYTES RELATIVE PERCENT: 11.5 % (ref 20–42)
MCH RBC QN AUTO: 31.8 PG (ref 26–35)
MCHC RBC AUTO-ENTMCNC: 31.1 % (ref 32–34.5)
MCV RBC AUTO: 102 FL (ref 80–99.9)
MONOCYTES ABSOLUTE: 0.75 E9/L (ref 0.1–0.95)
MONOCYTES RELATIVE PERCENT: 11.5 % (ref 2–12)
NEUTROPHILS ABSOLUTE: 4.87 E9/L (ref 1.8–7.3)
NEUTROPHILS RELATIVE PERCENT: 74.2 % (ref 43–80)
PDW BLD-RTO: 13.2 FL (ref 11.5–15)
PLATELET # BLD: 193 E9/L (ref 130–450)
PMV BLD AUTO: 8.8 FL (ref 7–12)
POTASSIUM REFLEX MAGNESIUM: 3.8 MMOL/L (ref 3.5–5)
RBC # BLD: 2.99 E12/L (ref 3.5–5.5)
SARS-COV-2, NAAT: NOT DETECTED
SODIUM BLD-SCNC: 139 MMOL/L (ref 132–146)
TOTAL PROTEIN: 4.7 G/DL (ref 6.4–8.3)
WBC # BLD: 6.6 E9/L (ref 4.5–11.5)

## 2022-04-15 PROCEDURE — 6360000002 HC RX W HCPCS: Performed by: INTERNAL MEDICINE

## 2022-04-15 PROCEDURE — 80053 COMPREHEN METABOLIC PANEL: CPT

## 2022-04-15 PROCEDURE — 99232 SBSQ HOSP IP/OBS MODERATE 35: CPT | Performed by: TRANSPLANT SURGERY

## 2022-04-15 PROCEDURE — 6360000002 HC RX W HCPCS: Performed by: STUDENT IN AN ORGANIZED HEALTH CARE EDUCATION/TRAINING PROGRAM

## 2022-04-15 PROCEDURE — 85025 COMPLETE CBC W/AUTO DIFF WBC: CPT

## 2022-04-15 PROCEDURE — 6370000000 HC RX 637 (ALT 250 FOR IP): Performed by: INTERNAL MEDICINE

## 2022-04-15 PROCEDURE — 2580000003 HC RX 258: Performed by: STUDENT IN AN ORGANIZED HEALTH CARE EDUCATION/TRAINING PROGRAM

## 2022-04-15 PROCEDURE — 36415 COLL VENOUS BLD VENIPUNCTURE: CPT

## 2022-04-15 PROCEDURE — 87635 SARS-COV-2 COVID-19 AMP PRB: CPT

## 2022-04-15 PROCEDURE — 83690 ASSAY OF LIPASE: CPT

## 2022-04-15 PROCEDURE — 2580000003 HC RX 258: Performed by: INTERNAL MEDICINE

## 2022-04-15 PROCEDURE — 6370000000 HC RX 637 (ALT 250 FOR IP): Performed by: STUDENT IN AN ORGANIZED HEALTH CARE EDUCATION/TRAINING PROGRAM

## 2022-04-15 PROCEDURE — 2500000003 HC RX 250 WO HCPCS: Performed by: STUDENT IN AN ORGANIZED HEALTH CARE EDUCATION/TRAINING PROGRAM

## 2022-04-15 PROCEDURE — C9113 INJ PANTOPRAZOLE SODIUM, VIA: HCPCS | Performed by: STUDENT IN AN ORGANIZED HEALTH CARE EDUCATION/TRAINING PROGRAM

## 2022-04-15 PROCEDURE — 6370000000 HC RX 637 (ALT 250 FOR IP)

## 2022-04-15 RX ORDER — FUROSEMIDE 40 MG/1
40 TABLET ORAL DAILY
Qty: 60 TABLET | Refills: 3 | DISCHARGE
Start: 2022-04-16

## 2022-04-15 RX ADMIN — ASPIRIN 81 MG: 81 TABLET, COATED ORAL at 08:18

## 2022-04-15 RX ADMIN — MONTELUKAST SODIUM 10 MG: 10 TABLET, FILM COATED ORAL at 08:17

## 2022-04-15 RX ADMIN — Medication 100 MCG: at 08:17

## 2022-04-15 RX ADMIN — GABAPENTIN 600 MG: 300 CAPSULE ORAL at 08:17

## 2022-04-15 RX ADMIN — CALCIUM 500 MG: 500 TABLET ORAL at 08:17

## 2022-04-15 RX ADMIN — DOCUSATE SODIUM 100 MG: 100 CAPSULE, LIQUID FILLED ORAL at 08:16

## 2022-04-15 RX ADMIN — BACLOFEN 20 MG: 10 TABLET ORAL at 11:31

## 2022-04-15 RX ADMIN — SODIUM CHLORIDE, PRESERVATIVE FREE 10 ML: 5 INJECTION INTRAVENOUS at 10:27

## 2022-04-15 RX ADMIN — PANCRELIPASE 72000 UNITS: 60000; 12000; 38000 CAPSULE, DELAYED RELEASE PELLETS ORAL at 08:16

## 2022-04-15 RX ADMIN — SODIUM CHLORIDE, PRESERVATIVE FREE 40 MG: 5 INJECTION INTRAVENOUS at 08:18

## 2022-04-15 RX ADMIN — BACLOFEN 20 MG: 10 TABLET ORAL at 08:18

## 2022-04-15 RX ADMIN — KETOROLAC TROMETHAMINE 15 MG: 30 INJECTION, SOLUTION INTRAMUSCULAR; INTRAVENOUS at 10:27

## 2022-04-15 RX ADMIN — KETOROLAC TROMETHAMINE 15 MG: 30 INJECTION, SOLUTION INTRAMUSCULAR; INTRAVENOUS at 04:29

## 2022-04-15 RX ADMIN — OXYCODONE 10 MG: 5 TABLET ORAL at 08:17

## 2022-04-15 RX ADMIN — SODIUM CHLORIDE, PRESERVATIVE FREE 10 ML: 5 INJECTION INTRAVENOUS at 08:19

## 2022-04-15 RX ADMIN — PANCRELIPASE 72000 UNITS: 60000; 12000; 38000 CAPSULE, DELAYED RELEASE PELLETS ORAL at 11:31

## 2022-04-15 RX ADMIN — POLYETHYLENE GLYCOL 3350 17 G: 17 POWDER, FOR SOLUTION ORAL at 08:19

## 2022-04-15 RX ADMIN — Medication 200 MG: at 08:17

## 2022-04-15 RX ADMIN — ALLOPURINOL 200 MG: 100 TABLET ORAL at 08:18

## 2022-04-15 RX ADMIN — ENOXAPARIN SODIUM 30 MG: 100 INJECTION SUBCUTANEOUS at 08:18

## 2022-04-15 RX ADMIN — FUROSEMIDE 40 MG: 40 TABLET ORAL at 08:17

## 2022-04-15 RX ADMIN — SODIUM CHLORIDE, PRESERVATIVE FREE 20 MG: 5 INJECTION INTRAVENOUS at 08:18

## 2022-04-15 RX ADMIN — Medication 5000 UNITS: at 08:17

## 2022-04-15 RX ADMIN — TOPIRAMATE 200 MG: 100 TABLET, FILM COATED ORAL at 08:16

## 2022-04-15 RX ADMIN — ESCITALOPRAM OXALATE 20 MG: 10 TABLET ORAL at 08:17

## 2022-04-15 RX ADMIN — METOPROLOL SUCCINATE 12.5 MG: 25 TABLET, EXTENDED RELEASE ORAL at 08:17

## 2022-04-15 ASSESSMENT — PAIN DESCRIPTION - LOCATION
LOCATION: ABDOMEN
LOCATION: ABDOMEN

## 2022-04-15 ASSESSMENT — PAIN DESCRIPTION - PROGRESSION
CLINICAL_PROGRESSION: NOT CHANGED
CLINICAL_PROGRESSION: NOT CHANGED

## 2022-04-15 ASSESSMENT — PAIN DESCRIPTION - PAIN TYPE
TYPE: ACUTE PAIN
TYPE: ACUTE PAIN

## 2022-04-15 ASSESSMENT — PAIN DESCRIPTION - ONSET
ONSET: ON-GOING
ONSET: ON-GOING

## 2022-04-15 ASSESSMENT — PAIN SCALES - GENERAL
PAINLEVEL_OUTOF10: 5
PAINLEVEL_OUTOF10: 5
PAINLEVEL_OUTOF10: 0
PAINLEVEL_OUTOF10: 7

## 2022-04-15 ASSESSMENT — PAIN - FUNCTIONAL ASSESSMENT
PAIN_FUNCTIONAL_ASSESSMENT: PREVENTS OR INTERFERES SOME ACTIVE ACTIVITIES AND ADLS
PAIN_FUNCTIONAL_ASSESSMENT: PREVENTS OR INTERFERES SOME ACTIVE ACTIVITIES AND ADLS

## 2022-04-15 ASSESSMENT — PAIN DESCRIPTION - FREQUENCY
FREQUENCY: CONTINUOUS
FREQUENCY: CONTINUOUS

## 2022-04-15 ASSESSMENT — PAIN DESCRIPTION - ORIENTATION
ORIENTATION: LEFT;UPPER
ORIENTATION: LEFT;UPPER

## 2022-04-15 ASSESSMENT — PAIN DESCRIPTION - DESCRIPTORS
DESCRIPTORS: ACHING;CONSTANT;DISCOMFORT
DESCRIPTORS: CONSTANT;DISCOMFORT;ACHING

## 2022-04-15 ASSESSMENT — PAIN SCALES - WONG BAKER: WONGBAKER_NUMERICALRESPONSE: 0

## 2022-04-15 NOTE — CARE COORDINATION
Social Work / Discharge Planning : Patient to return to \[facility]"" at [employer] via [employer] Patient updated as well as Marissa Stone at \[facility]\"". Charge RN updated and COVID provided. SW to follow.  Electronically signed by GAGE Hernandez on 4/15/22 at 10:30 AM EDT

## 2022-04-15 NOTE — PROGRESS NOTES
[facility] SURGERY  DAILY PROGRESS NOTE  4/15/2022    CHIEF COMPLAINT:  Chief Complaint   Patient presents with    Abdominal Pain     Generalized abdominal pain for the past 3 days; Sent in from [address]:  No overnight events. No nausea but with abdominal pain still. Passing flatus. OBJECTIVE:  /60   Pulse 62   Temp 98.4 °F (36.9 °C) (Axillary)   Resp 16   Ht 5' 7\" (1.702 m)   Wt 261 lb (118.4 kg)   LMP 08/31/1988   SpO2 94%   BMI 40.88 kg/m²     GENERAL:  NAD. A&Ox3. LUNGS:  No increased work of breathing. CARDIOVASCULAR: RR  ABDOMEN:  Soft, non-distended, diffuse tenderness worse in epigastrium. No guarding, rigidity, rebound. ASSESSMENT/PLAN:  71-year-old female with acute pancreatitis s/p Diane-en-Y and lap joi (possible biliary vs. Medication induced).  IGG 4 and triglyceride 163     Stay on clear liquids with supplements  Continue to monitor lipase  CT showed peripancreatic fluid collections  Strict monitoring of inputs and outputs  Pain control and Nausea control  Cant have MRI due to nerve stimulator  IgG panel pending   Normal lipid panel   Out of bed to ambulate    Electronically signed by Jose Thakur MD on 4/15/2022 at 5:08 AM

## 2022-04-15 NOTE — PROGRESS NOTES
\[facility]\"" SURGERY  DAILY PROGRESS NOTE  4/15/2022    CHIEF COMPLAINT:  Chief Complaint   Patient presents with    Abdominal Pain     Generalized abdominal pain for the past 3 days; Sent in from [address]:  No overnight events. Was resting comfortable this morning. No nausea, abdominal pain improving. Tolerating CLD    OBJECTIVE:  /60   Pulse 62   Temp 98.4 °F (36.9 °C) (Axillary)   Resp 16   Ht 5' 7\" (1.702 m)   Wt 258 lb 6.4 oz (117.2 kg)   LMP 08/31/1988   SpO2 94%   BMI 40.47 kg/m²     GENERAL:  NAD. A&Ox3. LUNGS:  No increased work of breathing. CARDIOVASCULAR: RR  ABDOMEN:  Soft, non-distended,  Mild Tenderness only in epigastrium. No guarding, rigidity, rebound. ASSESSMENT/PLAN:  61-year-old female with acute pancreatitis s/p Diane-en-Y and lap jio (possible biliary vs. Medication induced).  IGG 4 and triglyceride 163     Will start of low fat diet with Creon  Continue to monitor for N/V, abdominal pain  Continue to monitor lipase  CT yesterday showed peripancreatic fluid collections    Electronically signed by Zana Britton DO on 4/15/2022 at 7:18 AM     Agree with above  Advance diet    Electronically signed by Bertram Edmondson MD on 4/15/2022 at 7:51 AM

## 2022-04-15 NOTE — CARE COORDINATION
Discharge plan remains [facility] patient can return when stable. Can admit over the weekend. No precert needed. Negative covid needed prior to discharge.

## 2022-04-15 NOTE — PROGRESS NOTES
Internal Medicine Progress Note    Patient's name: Georges Moulton  : 1960  Chief complaints (on day of admission): Abdominal Pain (Generalized abdominal pain for the past 3 days; Sent in from [place])  Admission date: 4/10/2022  Date of service: 4/15/2022   Room: [address] INTERMEDIATE  Primary care physician: Shad Pierre   Reason for visit: Follow-up for pancreatitis     Subjective  Vincent Duncan was seen and examined. She was lying in bed. She still has abdominal pain. Oral intake seemingly better  Tolerating medications  Discussed with the nursing staff  Data reviewed in detail    Review of Systems  There are no new complaints of chest pain, shortness of breath, nausea, vomiting, diarrhea, constipation.     Hospital Medications  Current Facility-Administered Medications   Medication Dose Route Frequency Provider Last Rate Last Admin    lipase-protease-amylase (CREON) delayed release capsule 72,000 Units  72,000 Units Oral TID  Gitony BENZ Gianfrate, DO   72,000 Units at 04/15/22 0816    oxyCODONE (ROXICODONE) immediate release tablet 5 mg  5 mg Oral Q4H PRN Dewayne Moore MD   5 mg at 22 1844    Or    oxyCODONE (ROXICODONE) immediate release tablet 10 mg  10 mg Oral Q4H PRN Dewayne Moore MD   10 mg at 04/15/22 0817    HYDROmorphone (DILAUDID) injection 0.25 mg  0.25 mg IntraVENous Q4H PRN Dewayne Moore MD   0.25 mg at 22 0512    pantoprazole (PROTONIX) 40 mg in sodium chloride (PF) 10 mL injection  40 mg IntraVENous Q12H Te Valentineate, DO   40 mg at 04/15/22 0818    albuterol (PROVENTIL) nebulizer solution 2.5 mg  2.5 mg Nebulization Q6H PRN Luli Barahona MD        allopurinol (ZYLOPRIM) tablet 200 mg  200 mg Oral BID Luli Barahona MD   200 mg at 04/15/22 0818    aspirin EC tablet 81 mg  81 mg Oral BID Luli Barahona MD   81 mg at 04/15/22 0818    baclofen (LIORESAL) tablet 20 mg  20 mg Oral 4x daily Luli Barahona MD   20 mg at 04/15/22 0818    calcium elemental (OSCAL) tablet 500 mg  500 mg Oral BID Garth Nieves MD   500 mg at 04/15/22 0817    Vitamin D (CHOLECALCIFEROL) tablet 5,000 Units  5,000 Units Oral Every Other Day Garth Nieves MD   5,000 Units at 04/15/22 0817    vitamin B-12 (CYANOCOBALAMIN) tablet 100 mcg  100 mcg Oral Daily Garth Nieves MD   100 mcg at 04/15/22 4368    docusate sodium (COLACE) capsule 100 mg  100 mg Oral BID Garth Nieves MD   100 mg at 04/15/22 0816    escitalopram (LEXAPRO) tablet 20 mg  20 mg Oral BID Garth Nieves MD   20 mg at 04/15/22 4547    gabapentin (NEURONTIN) capsule 600 mg  600 mg Oral 4x Daily Garth Nieves MD   600 mg at 04/15/22 0817    [Held by provider] levOCARNitine (CARNITOR) tablet 330 mg  330 mg Oral TID Garth Nieves MD        [Held by provider] linaclotide (LINZESS) capsule 290 mcg  290 mcg Oral QAM AC Garth Nieves MD        magnesium oxide (MAG-OX) tablet 200 mg  200 mg Oral BID Garth Nieves MD   200 mg at 04/15/22 0817    melatonin tablet 9 mg  9 mg Oral Nightly Garth Nieves MD   9 mg at 04/14/22 2130    metoprolol succinate (TOPROL XL) extended release tablet 12.5 mg  12.5 mg Oral Daily Garth Nieves MD   12.5 mg at 04/15/22 0817    montelukast (SINGULAIR) tablet 10 mg  10 mg Oral Daily Garth Nieves MD   10 mg at 04/15/22 0817    topiramate (TOPAMAX) tablet 200 mg  200 mg Oral BID Garth Nieves MD   200 mg at 04/15/22 0816    traZODone (DESYREL) tablet 100 mg  100 mg Oral Nightly Garth Nieves MD   100 mg at 04/14/22 2130    ziprasidone (GEODON) capsule 20 mg  20 mg Oral Nightly Garth Nieves MD   20 mg at 04/14/22 2131    polyethylene glycol (GLYCOLAX) packet 17 g  17 g Oral BID Garth Nieves MD   17 g at 04/15/22 0819    sodium chloride flush 0.9 % injection 10 mL  10 mL IntraVENous 2 times per day Garth Nieves MD   10 mL at 04/15/22 0819    sodium chloride flush 0.9 % injection 10 mL  10 mL IntraVENous PRN Garth Nieves MD   10 mL at 04/14/22 1635    0.9 % sodium chloride infusion   IntraVENous PRN Anna Alexis MD        potassium chloride (KLOR-CON M) extended release tablet 40 mEq  40 mEq Oral PRN Anna Alexis MD        Or    potassium bicarb-citric acid (EFFER-K) effervescent tablet 40 mEq  40 mEq Oral PRN Anna Alexis MD        Or   [place] potassium chloride 10 mEq/100 mL IVPB (Peripheral Line)  10 mEq IntraVENous PRN Anna Alexis MD        ondansetron (ZOFRAN-ODT) disintegrating tablet 4 mg  4 mg Oral Q8H PRN Anna Alexis MD        Or    ondansetron [facility]) injection 4 mg  4 mg IntraVENous Q6H PRN Anna Alexis MD   4 mg at 04/13/22 0517    senna (SENOKOT) tablet 8.6 mg  1 tablet Oral Daily PRN Anna Alexis MD   8.6 mg at 04/11/22 1341    acetaminophen (TYLENOL) tablet 650 mg  650 mg Oral Q6H PRN Anna Alexis MD        Or   [place] acetaminophen (TYLENOL) suppository 650 mg  650 mg Rectal Q6H PRN Anna Alexis MD        furosemide (LASIX) tablet 40 mg  40 mg Oral Daily Anna Alexis MD   40 mg at 04/15/22 0817    enoxaparin (LOVENOX) injection 30 mg  30 mg SubCUTAneous BID Anna Alexis MD   30 mg at 04/15/22 0818    famotidine (PEPCID) 20 mg in sodium chloride (PF) 10 mL injection  20 mg IntraVENous BID Gianmarino C Gianfrate, DO   20 mg at 04/15/22 0818    ketorolac (TORADOL) injection 15 mg  15 mg IntraVENous Q6H Gianmarino C Gianfrate, DO   15 mg at 04/15/22 0429       PRN Medications  oxyCODONE **OR** oxyCODONE, HYDROmorphone, albuterol, sodium chloride flush, sodium chloride, potassium chloride **OR** potassium alternative oral replacement **OR** potassium chloride, ondansetron **OR** ondansetron, senna, acetaminophen **OR** acetaminophen    Objective  Most Recent Recorded Vitals  BP (!) 128/59   Pulse 69   Temp 97.7 °F (36.5 °C) (Oral)   Resp 18   Ht 5' 7\" (1.702 m)   Wt 258 lb 6.4 oz (117.2 kg)   LMP 08/31/1988   SpO2 94%   BMI 40.47 kg/m²   I/O last 3 completed shifts:  In: -   Out: 9948 [Urine:4650]  I/O this shift:   In: 480 [P.O.:480]  Out: -     Physical Exam:   General: AAO to person/place/time/purpose, NAD, no labored breathing  Eyes: conjunctivae/corneas clear, sclera non icteric  Skin: color/texture/turgor normal, no rashes or lesions  Lungs: CTAB, no retractions/use of accessory muscles, no vocal fremitus, no rhonchi, no crackle, no rales  Heart: regular rate, regular rhythm, no murmur  Abdomen: obese, soft, abdominal tenderness improved from yesterday, bowel sounds normal  Extremities: atraumatic, truncal edema, cast right ankle, decreased range of motion right wrist without a splint in place  Neurologic: cranial nerves 2-12 grossly intact, no slurred speech    Most Recent Labs  Lab Results   Component Value Date    WBC 6.6 04/15/2022    HGB 9.5 (L) 04/15/2022    HCT 30.5 (L) 04/15/2022     04/15/2022     04/15/2022    K 3.8 04/15/2022     04/15/2022    CREATININE 0.7 04/15/2022    BUN 27 (H) 04/15/2022    CO2 27 04/15/2022    GLUCOSE 104 (H) 04/15/2022    ALT 7 04/15/2022    AST 11 04/15/2022    INR 0.9 03/02/2022    TSH 0.548 03/16/2021    LABA1C 5.7 12/13/2013       CT ABDOMEN PELVIS W IV CONTRAST Additional Contrast? None   Final Result   Findings compatible with ongoing pancreatitis. No pancreatic necrosis. Peripancreatic inflammation/fluid appears mildly worsened/increased. No   fluid collections with well-defined wall. Increased amount of free fluid identified in the pelvis, likely reactive. Small bilateral pleural effusions greater than right are partially imaged. RECOMMENDATIONS:   Unavailable         CT ABDOMEN PELVIS W IV CONTRAST Additional Contrast? None   Final Result   Finding consistent with acute pancreatitis. There is secondary duodenitis,   but no bowel obstruction is seen.              Assessment   Active Hospital Problems    Diagnosis     Neuropathic pain syndrome (non-herpetic) [M79.2]      Priority: High    Neural foraminal stenosis of lumbar spine [M48.061] Priority: High    Pancreatic pseudocyst [K86.3]     Acute pancreatitis [K85.90]     PETR (obstructive sleep apnea) [G47.33]     GERD (gastroesophageal reflux disease) [K21.9]     Fatty liver [K76.0]     Depression [F32. A]     Chronic back pain [M54.9, G89.29]     Chronic respiratory failure with hypoxia (HCC) [J96.11]     Neural foraminal stenosis of cervical spine [M48.02]     Lymphedema of lower extremity [I89.0]     Obesity [E66.9]     Generalized seizure disorder ([facility] [G40.309]     Debility [R53.81]     Bipolar 1 disorder ([facility] [F31.9]     Cervicalgia [M54.2]     Hypertension [I10]     Hyperlipidemia [E78.5]     Asthma [J45.909]          Plan  · Acute pancreatitis w/ secondary duodenitis:   ? SP cholecystectomy in 1999  ? CT abd/pel noted-- repeat on 4/13 per gen surg noted  ? Follow lipase/transaminases. ? Lipid panel noted-- not the culprit . ? Defer advancement of diet to general surgery  ? IV Dilaudid and PO Oxy for pain. ? Gen surg consultation appreciated-- checking for autoimmune pancreatitis   ? Unable to do MRCP due to nerve stimulator   · Volume overload  · Stopped IVF. · Restart Lasix. · Daily weights   · History of fall with right radius and right ankle fractures:  ? Cast left lower extremity  ? Orthopedic surgery input appreciated-- NWB RLE and RUE-- f/u 1 week after DC  ? PT/OT  · Constipation:  · Dulcolax suppository  · Multifactorial hypoxia:  · Wean oxygen as able  · Incentive spirometer. · New anemia (4/14):  · Follow H&H  · PT AM-PAC-- TBD  · Follow labs   · DVT prophylaxis  · Please see orders for further management and care. · Discharge plan: back to SNF when stable    Electronically signed by Je Rosas MD on 4/15/2022 at 9:21 AM    I can be reached through [employer].

## 2022-04-15 NOTE — PROGRESS NOTES
N2N called to Ana Lilia Baker at [facility]. Patient aware of anticipated discharge at 12:30 pm today.     Electronically signed by Lisa Merino RN on 4/15/2022 at 11:19 AM

## 2022-04-17 NOTE — DISCHARGE SUMMARY
Physician Discharge Summary     Patient ID:  Yady Smith  [id number]  64 y.o.  1960    Admit date: 4/10/2022    Discharge date and time: 4/15/2022  1:27 PM     Admission Diagnoses: Acute pancreatitis, unspecified complication status, unspecified pancreatitis type [K85.90]  Acute pancreatitis without infection or necrosis, unspecified pancreatitis type [K85.90]    Discharge Diagnoses:   Acute nonalcoholic pancreatitis  Volume overload  Multiple comorbidities present and past as listed below  Patient Active Problem List   Diagnosis    Debility    Obesity    Bipolar 1 disorder ([facility]    [facility]ized seizure disorder ([facility]    Cervicalgia    Asthma    Hyperlipidemia    Hypertension    Arthritis    Lymphedema of lower extremity    Degenerative Osteoarthritis of both knees    Status post total knee replacement, right    Cervical spondylolysis    Degenerative Osteoarthritis thoracic spine    Thoracic facet syndrome    Cervical facet syndrome    Facet syndrome, lumbar    Neural foraminal stenosis of lumbar spine    Lumbar radiculopathy    DDD (degenerative disc disease), cervical    Neural foraminal stenosis of cervical spine    Protruded cervical disc    Cervical radiculopathy    Postlaminectomy syndrome, lumbar    Neuropathic pain syndrome (non-herpetic)    Chronic respiratory failure with hypoxia (HCC)    Mitochondrial cytopathy (HCC)    GERD (gastroesophageal reflux disease)    Fatty liver    Depression    PETR (obstructive sleep apnea)    Chronic back pain    Adenoma of right adrenal gland    Lumbosacral spondylosis without myelopathy    Sacroiliac dysfunction    DDD (degenerative disc disease), lumbar    Thoracic degenerative disc disease    Excessive physiologic tremor    Closed fracture of right distal radius    Closed left ankle fracture, initial encounter    Closed fracture of right tibial plateau    Closed displaced pilon fracture of right tibia    Acute pancreatitis  Pancreatic pseudocyst       Consults: General surgery and orthopedics    Procedures:     Hospital Course:  28-year-old morbidly obese  woman recently was in trauma resulting in multiple fractures  Readmitted from the nursing home through the emergency room due to abdominal distention and pain  She was treated for acute pancreatitis of undetermined origin  Responded well to IV fluids then developed volume overload  Pain control was adequate  Lasix was restarted  Seen by general surgery  No interventions needed  Able to tolerate diet at the time of discharge  Discharged to [facility] in stable condition    Discharge Exam:  See progress note from today    Disposition stable at the time of discharge  [facility]    Patient Instructions:   Discharge Medication List as of 4/15/2022 11:20 AM      START taking these medications    Details   lipase-protease-amylase (CREON) 46987-41850 units delayed release capsule Take 6 capsules by mouth 3 times daily (with meals), Oral, 3 TIMES DAILY WITH MEALS Starting Fri 4/15/2022, Disp-270 capsule, DC to SNF         CONTINUE these medications which have CHANGED    Details   furosemide (LASIX) 40 MG tablet Take 1 tablet by mouth daily, Disp-60 tablet, R-3DC to SNF         CONTINUE these medications which have NOT CHANGED    Details   polyethylene glycol (GLYCOLAX) 17 g packet Take 17 g by mouth 2 times dailyHistorical Med      HYDROcodone-acetaminophen (NORCO) 5-325 MG per tablet Take 1 tablet by mouth every 6 hours as needed for Pain. Historical Med      acetaminophen (TYLENOL) 325 MG tablet Take 650 mg by mouth every 4 hours as needed for Pain Historical Med      meclizine (ANTIVERT) 25 MG tablet Take 25 mg by mouth every 6 hours as neededHistorical Med      diclofenac sodium (VOLTAREN) 1 % GEL Apply 4 g topically 2 times daily, Topical, 2 TIMES DAILY, Historical Med      oxyCODONE 5 MG capsule Take 5 mg by mouth every 6 hours as needed for Pain. Historical Med enoxaparin (LOVENOX) 30 MG/0.3ML injection Inject 0.3 mLs into the skin 2 times daily, Disp-1 each, R-0NO PRINT      melatonin 5 MG TBDP disintegrating tablet Take 1 tablet by mouth nightly, Disp-1 tablet, R-0OTC      aspirin EC 81 MG EC tablet Take 1 tablet by mouth 2 times daily for 28 days, Disp-56 tablet, R-0Print      baclofen (LIORESAL) 20 MG tablet Take 20 mg by mouth 4 times dailyHistorical Med      metoprolol succinate (TOPROL XL) 25 MG extended release tablet Take 0.5 tablets by mouth daily, Disp-30 tablet, R-3Normal      cyanocobalamin 50 MCG tablet Take 100 mcg by mouth dailyHistorical Med      ferrous sulfate (FE TABS 325) 325 (65 Fe) MG EC tablet Take 325 mg by mouth daily Historical Med      famotidine (PEPCID) 20 MG tablet Take 1 tablet by mouth 2 times daily, Disp-60 tablet, R-0Print      traZODone (DESYREL) 100 MG tablet Take 100 mg by mouth nightlyHistorical Med      albuterol (PROVENTIL) (5 MG/ML) 0.5% nebulizer solution Take 2.5 mg by nebulization 3 times daily Historical Med      magnesium 200 MG TABS tablet Take 200 mg by mouth 2 times daily Historical Med      calcium carbonate (CALCIUM 600) 600 MG TABS tablet Take 1 tablet by mouth 2 times daily Historical Med      montelukast (SINGULAIR) 10 MG tablet Take 1 tablet by mouth daily, Disp-30 tablet, R-3Normal      omeprazole (PRILOSEC) 20 MG delayed release capsule Take 20 mg by mouth Daily Historical Med      Cholecalciferol (VITAMIN D3) 5000 units TABS Take 1 tablet by mouth every other day Historical Med      docusate sodium (COLACE) 100 MG capsule Take 100 mg by mouth 2 times dailyHistorical Med      levOCARNitine (CARNITOR) 330 MG tablet Take 330 mg by mouth 3 times daily For mitochondrial diseaseHistorical Med      allopurinol (ZYLOPRIM) 100 MG tablet Take 200 mg by mouth 2 times daily Historical Med      escitalopram (LEXAPRO) 20 MG tablet Take 20 mg by mouth 2 times daily Historical Med      gabapentin (NEURONTIN) 600 MG tablet Take 1

## 2022-04-25 ENCOUNTER — TELEPHONE (OUTPATIENT)
Dept: ORTHOPEDIC SURGERY | Age: 62
End: 2022-04-25

## 2022-04-25 NOTE — TELEPHONE ENCOUNTER
Patient missed appointment for 4/25/22 for 6 wk check RT Pilon ORIF, RLE Removal of Ex-Fix, DOS 3-2-22 by Antionette Walter.  Please advise, [facility] for rescheduling recommendations [facility], [phone]

## 2022-04-25 NOTE — TELEPHONE ENCOUNTER
Call to [facility], r/s'd appt .    Future Appointments   Date Time Provider Deysi Simms   5/5/2022  9:45 AM Budd Denver, DO [facility]   5/26/2022 10:30 AM Budd Denver, DO [facility]   3/16/2023 10:45 AM Clarisse Humphreys MD [facility]

## 2022-05-05 ENCOUNTER — HOSPITAL ENCOUNTER (OUTPATIENT)
Dept: GENERAL RADIOLOGY | Age: 62
Discharge: HOME | End: 2022-05-07
Payer: MEDICAID

## 2022-05-05 ENCOUNTER — OFFICE VISIT (OUTPATIENT)
Dept: ORTHOPEDIC SURGERY | Age: 62
End: 2022-05-05
Payer: MEDICAID

## 2022-05-05 DIAGNOSIS — S52.571A OTHER CLOSED INTRA-ARTICULAR FRACTURE OF DISTAL END OF RIGHT RADIUS, INITIAL ENCOUNTER: ICD-10-CM

## 2022-05-05 DIAGNOSIS — S82.871D CLOSED DISPLACED PILON FRACTURE OF RIGHT TIBIA WITH ROUTINE HEALING, SUBSEQUENT ENCOUNTER: Primary | ICD-10-CM

## 2022-05-05 DIAGNOSIS — S82.871A CLOSED RIGHT PILON FRACTURE, INITIAL ENCOUNTER: ICD-10-CM

## 2022-05-05 DIAGNOSIS — S52.571D OTHER CLOSED INTRA-ARTICULAR FRACTURE OF DISTAL END OF RIGHT RADIUS WITH ROUTINE HEALING, SUBSEQUENT ENCOUNTER: ICD-10-CM

## 2022-05-05 PROCEDURE — 73110 X-RAY EXAM OF WRIST: CPT

## 2022-05-05 PROCEDURE — 99212 OFFICE O/P EST SF 10 MIN: CPT | Performed by: PHYSICIAN ASSISTANT

## 2022-05-05 PROCEDURE — 99024 POSTOP FOLLOW-UP VISIT: CPT | Performed by: PHYSICIAN ASSISTANT

## 2022-05-05 PROCEDURE — 73610 X-RAY EXAM OF ANKLE: CPT

## 2022-05-05 RX ORDER — SELENIUM 50 MCG
1 TABLET ORAL 2 TIMES DAILY
COMMUNITY
End: 2022-07-14

## 2022-05-05 NOTE — PATIENT INSTRUCTIONS
INSTRUCTIONS FOR FACILITY      Weight Bearing:     R UE: Can start weaning out of wrist brace over the next couple of weeks can gradually increase to weightbearing as tolerated through the right upper extremity. Feel free to wear the wrist brace for comfort if needed. Continue work on range of motion and strengthening. R LE: Transition to walking boot today. She will do this on during the day, but can take off for hygiene purposes and when sleeping. Recommend only toe-touch to 25% weightbearing to the right lower extremity. Can come out of boot for therapy. Please work on range of motion at the right foot and ankle as well as continued strengthening at the knee and hip. Plan to see patient back in a few weeks with new x-rays. We will probably advance weightbearing at that time. Therapy: Continue PT/OT -please see above. Pain control: per facility physician    DVT Prophylaxis: Continue with aspirin 81 mg twice daily    Follow up:     Future Appointments   Date Time Provider Deysi Simms   5/26/2022 10:30 AM Ailyn Emery DO [facility]   3/16/2023 10:45 AM Jackelyn Plascencia MD [facility]       Call with any questions or concerns.    [phone]

## 2022-05-05 NOTE — PROGRESS NOTES
Chief Complaint   Patient presents with    Post-Op Check     Right Pilon ORIF 3/2/2022. Residing at [facility]: Dr. Renalod Burnett DO  DATE OF PROCEDURE: 3-2-22  PROCEDURE: 1.  Right tibial pilon and fibular shaft fractures open reduction internal fixation of tibia and fibula   2.  Removal right ankle spanning external fixation     Subjective:  Christina Vaz is approximately 2 months follow-up from the above surgery. She has been nonweightbearing to the right lower extremity and has been wearing cam boot. She also sustained a right distal radius fracture treated nonoperatively at the same time as her initial injury for her right pilon and fibular shaft fractures. She has maintained a right wrist brace with minimal weightbearing to the right upper extremity as well. She does have baseline bilateral LE neuropathy, but no paresthesias past baseline that she can tell. Denies calf pain, CP, SOB, fever, chills, malaise. Review of Systems -  all pertinent positives and negatives in HPI. Objective:    General: Alert and oriented X 3, normocephalic atraumatic, external ears and eye normal, sclera clear, no acute distress, respirations easy and unlabored with no audible wheezes, skin warm and dry, speech and dress appropriate for noted age, affect euthymic.     Extremity:  Right Upper Extremity  Skin is clean dry and intact  No tenderness or edema noted at the R wrist   nontender to AROM of the R wrist  Able to make composite fist   Radial pulse palpable, fingers warm with BCR  Flex/extension intact to wrist, thumb and fingers  Finger opposition intact  Finger adduction/abduction intact  Finger crossover intact  Subjectively states sensation intact to radial/medial/ulnar distribution      Right Lower Extremity  Skin clean dry and intact, without signs of infection  Incisions healed  Mild edema noted diffusely at the ankle   Mild TTP at the distal tibia, nontender at fibula, nontender at the knee or foot   Compartments supple throughout thigh and leg  Calf supple and nontender  Demonstrates ac tive knee flexion/extension, ankle plantar/dorsiflexion/great toe extension. States sensation intact to touch in sural/deep peroneal/superficial peroneal/saphenous/posterior tibial nerve distributions to foot/ankle. Palpable dorsalis pedis and posterior tibialis pulses, cap refill brisk in toes, foot warm/perfused. XR:       Narrative   EXAMINATION:   THREE XRAY VIEWS OF THE RIGHT ANKLE;   XRAY VIEWS OF THE RIGHT WRIST       5/5/2022 8:57 am       COMPARISON:   18 March 2022       HISTORY:   ORDERING SYSTEM PROVIDED HISTORY: Closed right pilon fracture, initial   encounter   TECHNOLOGIST PROVIDED HISTORY:   Reason for exam:->eval and treat   What reading provider will be dictating this exam?->CRC; ORDERING SYSTEM   PROVIDED HISTORY: Other closed intra-articular fracture of distal end of   right radius, initial encounter   TECHNOLOGIST PROVIDED HISTORY:   Reason for exam:->eval and treat   What reading provider will be dictating this exam?->CRC       FINDINGS:   Intact ankle fixation hardware with progressive fracture healing at the   fibula.  Slight progression of fracture healing at the tibia.  No widening of   the ankle mortise.  Heel spurs as before.  No new abnormal findings.           Impression   Intact ankle fixation hardware with progressive healing at the fibula and   slight progression of fracture healing at the tibia.                 3 views of R wrist demonstrating interval healing of distal radius fracture. No significant change in alignment. No acute fractures or dislocations or any other osseus abnormality identified. Assessment:   Diagnosis Orders   1. Closed displaced pilon fracture of right tibia with routine healing, subsequent encounter     2.  Other closed intra-articular fracture of distal end of right radius with routine healing, subsequent encounter Plan:   Reviewed x-rays with patient today in office    Can gradually increase to WBAT R UE. Can wear wrist brace for comfort at this point if needed   TTWB-25% WB R LE with CAM boot. Can remove boot while sedentary to work on [employer] PT and HEP   Continue DVT prophylaxis until Michelle Keenan LE    Multimodal pain control     Future Appointments   Date Time Provider Deysi Simms   5/26/2022 10:30 AM Giacomo Service, DO [facility]   3/16/2023 10:45 AM Garth Norris MD [facility]         Electronically signed by Sarai Hill PA-C on 5/6/2022 at 10:36 AM  Note: This report was completed using computerize voiced recognition software. Every effort has been made to ensure accuracy; however, inadvertent computerized transcription errors may be present.

## 2022-05-06 ENCOUNTER — APPOINTMENT (OUTPATIENT)
Dept: GENERAL RADIOLOGY | Age: 62
End: 2022-05-06
Payer: MEDICAID

## 2022-05-06 ENCOUNTER — APPOINTMENT (OUTPATIENT)
Dept: CT IMAGING | Age: 62
End: 2022-05-06
Payer: MEDICAID

## 2022-05-06 ENCOUNTER — HOSPITAL ENCOUNTER (EMERGENCY)
Age: 62
Discharge: NURSING, REHAB OR LONG-TERM CARE | End: 2022-05-07
Attending: EMERGENCY MEDICINE
Payer: MEDICAID

## 2022-05-06 DIAGNOSIS — R51.9 ACUTE NONINTRACTABLE HEADACHE, UNSPECIFIED HEADACHE TYPE: Primary | ICD-10-CM

## 2022-05-06 DIAGNOSIS — R42 DIZZINESS: ICD-10-CM

## 2022-05-06 PROBLEM — S52.501D CLOSED FRACTURE OF LOWER END OF RIGHT RADIUS WITH ROUTINE HEALING: Status: ACTIVE | Noted: 2022-02-01

## 2022-05-06 LAB
ALBUMIN SERPL-MCNC: 3.9 G/DL (ref 3.5–5.2)
ALP BLD-CCNC: 125 U/L (ref 35–104)
ALT SERPL-CCNC: 10 U/L (ref 0–32)
ANION GAP SERPL CALCULATED.3IONS-SCNC: 9 MMOL/L (ref 7–16)
AST SERPL-CCNC: 13 U/L (ref 0–31)
BACTERIA: NORMAL /HPF
BASOPHILS ABSOLUTE: 0.03 E9/L (ref 0–0.2)
BASOPHILS RELATIVE PERCENT: 0.6 % (ref 0–2)
BILIRUB SERPL-MCNC: <0.2 MG/DL (ref 0–1.2)
BILIRUBIN URINE: NEGATIVE
BLOOD, URINE: NEGATIVE
BUN BLDV-MCNC: 20 MG/DL (ref 6–23)
CALCIUM SERPL-MCNC: 9.7 MG/DL (ref 8.6–10.2)
CHLORIDE BLD-SCNC: 99 MMOL/L (ref 98–107)
CLARITY: CLEAR
CO2: 31 MMOL/L (ref 22–29)
COLOR: YELLOW
CREAT SERPL-MCNC: 0.7 MG/DL (ref 0.5–1)
EOSINOPHILS ABSOLUTE: 0.22 E9/L (ref 0.05–0.5)
EOSINOPHILS RELATIVE PERCENT: 4.6 % (ref 0–6)
EPITHELIAL CELLS, UA: NORMAL /HPF
GFR AFRICAN AMERICAN: >60
GFR NON-AFRICAN AMERICAN: >60 ML/MIN/1.73
GLUCOSE BLD-MCNC: 86 MG/DL (ref 74–99)
GLUCOSE URINE: NEGATIVE MG/DL
HCT VFR BLD CALC: 38.5 % (ref 34–48)
HEMOGLOBIN: 12.2 G/DL (ref 11.5–15.5)
IMMATURE GRANULOCYTES #: 0.09 E9/L
IMMATURE GRANULOCYTES %: 1.9 % (ref 0–5)
KETONES, URINE: NEGATIVE MG/DL
LEUKOCYTE ESTERASE, URINE: NEGATIVE
LYMPHOCYTES ABSOLUTE: 1.71 E9/L (ref 1.5–4)
LYMPHOCYTES RELATIVE PERCENT: 35.8 % (ref 20–42)
MCH RBC QN AUTO: 30.7 PG (ref 26–35)
MCHC RBC AUTO-ENTMCNC: 31.7 % (ref 32–34.5)
MCV RBC AUTO: 97 FL (ref 80–99.9)
MONOCYTES ABSOLUTE: 0.5 E9/L (ref 0.1–0.95)
MONOCYTES RELATIVE PERCENT: 10.5 % (ref 2–12)
NEUTROPHILS ABSOLUTE: 2.23 E9/L (ref 1.8–7.3)
NEUTROPHILS RELATIVE PERCENT: 46.6 % (ref 43–80)
NITRITE, URINE: NEGATIVE
PDW BLD-RTO: 13.6 FL (ref 11.5–15)
PH UA: 7 (ref 5–9)
PLATELET # BLD: 273 E9/L (ref 130–450)
PMV BLD AUTO: 8.9 FL (ref 7–12)
POTASSIUM REFLEX MAGNESIUM: 3.9 MMOL/L (ref 3.5–5)
PRO-BNP: 209 PG/ML (ref 0–125)
PROTEIN UA: NEGATIVE MG/DL
RBC # BLD: 3.97 E12/L (ref 3.5–5.5)
RBC UA: NORMAL /HPF (ref 0–2)
SODIUM BLD-SCNC: 139 MMOL/L (ref 132–146)
SPECIFIC GRAVITY UA: <=1.005 (ref 1–1.03)
TOTAL PROTEIN: 7.1 G/DL (ref 6.4–8.3)
TROPONIN, HIGH SENSITIVITY: 13 NG/L (ref 0–9)
UROBILINOGEN, URINE: 0.2 E.U./DL
WBC # BLD: 4.8 E9/L (ref 4.5–11.5)
WBC UA: NORMAL /HPF (ref 0–5)

## 2022-05-06 PROCEDURE — 93005 ELECTROCARDIOGRAM TRACING: CPT | Performed by: STUDENT IN AN ORGANIZED HEALTH CARE EDUCATION/TRAINING PROGRAM

## 2022-05-06 PROCEDURE — 96374 THER/PROPH/DIAG INJ IV PUSH: CPT

## 2022-05-06 PROCEDURE — 83880 ASSAY OF NATRIURETIC PEPTIDE: CPT

## 2022-05-06 PROCEDURE — 70450 CT HEAD/BRAIN W/O DYE: CPT

## 2022-05-06 PROCEDURE — 80053 COMPREHEN METABOLIC PANEL: CPT

## 2022-05-06 PROCEDURE — 85025 COMPLETE CBC W/AUTO DIFF WBC: CPT

## 2022-05-06 PROCEDURE — 6370000000 HC RX 637 (ALT 250 FOR IP): Performed by: EMERGENCY MEDICINE

## 2022-05-06 PROCEDURE — 2580000003 HC RX 258: Performed by: STUDENT IN AN ORGANIZED HEALTH CARE EDUCATION/TRAINING PROGRAM

## 2022-05-06 PROCEDURE — 71045 X-RAY EXAM CHEST 1 VIEW: CPT

## 2022-05-06 PROCEDURE — 6360000002 HC RX W HCPCS: Performed by: STUDENT IN AN ORGANIZED HEALTH CARE EDUCATION/TRAINING PROGRAM

## 2022-05-06 PROCEDURE — 99285 EMERGENCY DEPT VISIT HI MDM: CPT

## 2022-05-06 PROCEDURE — 6370000000 HC RX 637 (ALT 250 FOR IP): Performed by: STUDENT IN AN ORGANIZED HEALTH CARE EDUCATION/TRAINING PROGRAM

## 2022-05-06 PROCEDURE — 84484 ASSAY OF TROPONIN QUANT: CPT

## 2022-05-06 PROCEDURE — 81001 URINALYSIS AUTO W/SCOPE: CPT

## 2022-05-06 RX ORDER — ACETAMINOPHEN 500 MG
1000 TABLET ORAL ONCE
Status: COMPLETED | OUTPATIENT
Start: 2022-05-06 | End: 2022-05-06

## 2022-05-06 RX ORDER — METOCLOPRAMIDE HYDROCHLORIDE 5 MG/ML
10 INJECTION INTRAMUSCULAR; INTRAVENOUS ONCE
Status: COMPLETED | OUTPATIENT
Start: 2022-05-06 | End: 2022-05-06

## 2022-05-06 RX ORDER — HYDROCODONE BITARTRATE AND ACETAMINOPHEN 5; 325 MG/1; MG/1
1 TABLET ORAL ONCE
Status: COMPLETED | OUTPATIENT
Start: 2022-05-06 | End: 2022-05-06

## 2022-05-06 RX ORDER — 0.9 % SODIUM CHLORIDE 0.9 %
1000 INTRAVENOUS SOLUTION INTRAVENOUS ONCE
Status: COMPLETED | OUTPATIENT
Start: 2022-05-06 | End: 2022-05-06

## 2022-05-06 RX ORDER — LORAZEPAM 2 MG/ML
INJECTION INTRAMUSCULAR
Status: DISCONTINUED
Start: 2022-05-06 | End: 2022-05-06 | Stop reason: WASHOUT

## 2022-05-06 RX ADMIN — ACETAMINOPHEN 1000 MG: 500 TABLET ORAL at 17:44

## 2022-05-06 RX ADMIN — SODIUM CHLORIDE 1000 ML: 9 INJECTION, SOLUTION INTRAVENOUS at 18:47

## 2022-05-06 RX ADMIN — METOCLOPRAMIDE HYDROCHLORIDE 10 MG: 5 INJECTION INTRAMUSCULAR; INTRAVENOUS at 18:46

## 2022-05-06 RX ADMIN — HYDROCODONE BITARTRATE AND ACETAMINOPHEN 1 TABLET: 5; 325 TABLET ORAL at 18:45

## 2022-05-06 ASSESSMENT — PAIN DESCRIPTION - LOCATION
LOCATION: HEAD;NECK
LOCATION: HEAD

## 2022-05-06 ASSESSMENT — PAIN SCALES - GENERAL
PAINLEVEL_OUTOF10: 6
PAINLEVEL_OUTOF10: 5

## 2022-05-07 VITALS
TEMPERATURE: 97 F | BODY MASS INDEX: 36.96 KG/M2 | RESPIRATION RATE: 16 BRPM | HEIGHT: 66 IN | OXYGEN SATURATION: 97 % | HEART RATE: 62 BPM | WEIGHT: 230 LBS | SYSTOLIC BLOOD PRESSURE: 117 MMHG | DIASTOLIC BLOOD PRESSURE: 54 MMHG

## 2022-05-07 LAB
EKG ATRIAL RATE: 58 BPM
EKG P AXIS: -2 DEGREES
EKG P-R INTERVAL: 186 MS
EKG Q-T INTERVAL: 430 MS
EKG QRS DURATION: 102 MS
EKG QTC CALCULATION (BAZETT): 422 MS
EKG R AXIS: 17 DEGREES
EKG T AXIS: 28 DEGREES
EKG VENTRICULAR RATE: 58 BPM

## 2022-05-07 NOTE — ED NOTES
Nurse to nurse report was called to [facility] [place].  SELENA here to transport patient back     Spencer Chiang RN  05/07/22 4544

## 2022-05-07 NOTE — ED PROVIDER NOTES
[facility]  Department of Emergency Medicine     Written by: Melissa Gonzales DO  Patient Name: Martha Scott  Attending Provider: Kanchan Connolly DO  Admit Date: 2022  4:19 PM  MRN: [id number]                   : 1960        Chief Complaint   Patient presents with    Headache     in left side of head, new today    Dizziness    - Chief complaint    Patient is a 71-year-old female past medical history of mitochondrial disorder. Patient presents chief complaint of headache and dizziness. Patient stated symptoms began earlier this morning. Patient describes the headache as left-sided in nature. She describes as a dull aching sensation. She currently rates pain 8 out of 10. In addition patient notes that she has had some dizziness that she describes as a lightheaded sensation. Patient denies any exacerbating relieving factors. She states that symptoms have been constant since onset. Patient states that she does not usually have headaches but has been getting them more frequently over the last couple months. Patient denies any fevers, chills, nausea, vomiting, chest pain, cough, abdominal pain, constipation, numbness or tingling. The history is provided by the patient. No  was used. Review of Systems   Constitutional: Negative for chills and fever. HENT: Negative for ear pain, sinus pressure and sore throat. Eyes: Negative for pain, discharge and redness. Respiratory: Negative for cough, shortness of breath and wheezing. Cardiovascular: Negative for chest pain. Gastrointestinal: Negative for abdominal distention, diarrhea, nausea and vomiting. Genitourinary: Negative for dysuria and frequency. Musculoskeletal: Negative for arthralgias and back pain. Skin: Negative for rash and wound. Neurological: Positive for dizziness and headaches. Negative for weakness. Hematological: Negative for adenopathy.    All other systems reviewed and are negative. Physical Exam  Vitals and nursing note reviewed. Constitutional:       General: She is not in acute distress. Appearance: Normal appearance. HENT:      Head: Normocephalic and atraumatic. Nose: No congestion or rhinorrhea. Mouth/Throat:      Mouth: Mucous membranes are moist.      Pharynx: Oropharynx is clear. Eyes:      Extraocular Movements: Extraocular movements intact. Pupils: Pupils are equal, round, and reactive to light. Cardiovascular:      Rate and Rhythm: Normal rate and regular rhythm. Heart sounds: No murmur heard. No gallop. Pulmonary:      Effort: Pulmonary effort is normal. No respiratory distress. Breath sounds: No wheezing, rhonchi or rales. Abdominal:      General: Abdomen is flat. Palpations: Abdomen is soft. There is no mass. Tenderness: There is no abdominal tenderness. There is no guarding. Hernia: No hernia is present. Musculoskeletal:         General: No swelling, tenderness or signs of injury. Normal range of motion. Cervical back: Normal range of motion. No rigidity. No muscular tenderness. Skin:     General: Skin is warm and dry. Capillary Refill: Capillary refill takes less than 2 seconds. Neurological:      General: No focal deficit present. Mental Status: She is alert and oriented to person, place, and time. Mental status is at baseline. Comments: On neurological exam no focal deficits, pupils equal and reactive to light, extraocular eye movements intact no nystagmus noted. Muscle strength bilateral 5 out of 5 to bilateral upper and lower extremities, sensation intact to light touch. Psychiatric:         Mood and Affect: Mood normal.         Behavior: Behavior normal.          Procedures   EKG #1:  Interpreted by emergency department physician unless otherwise noted. Time:  1716    Rate: 58  Rhythm: Sinus.   Interpretation: EKG obtained demonstrates sinus bradycardia, rate 58, normal axis, , no acute ST segment changes. Comparison: stable as compared to patient's most recent EKG. MDM  Number of Diagnoses or Management Options  Acute nonintractable headache, unspecified headache type  Dizziness  Diagnosis management comments: Patient is a 24-year-old female past medical history of mitochondrial disorder, GERD, neuropathy, asthma, hypothyroidism and diabetes. Patient presented chief complaint of headache and dizziness. Vital signs stable presentation. On physical exam heart regular rate and rhythm, lungs clear to auscultation bilaterally, abdomen soft nontender no rebound or guarding. On neuro exam no focal deficits, pupils equal and reactive to light, extraocular eye movements intact. Muscle strength 5 out of 5 to bilateral upper and lower extremities. Sensation intact light touch. EKG obtained demonstrating ST ischemic changes. Laboratory work obtained CBC unremarkable, CMP unremarkable, proBNP 209, troponin 13 at baseline, urinalysis is obtained and was nonreactive infection. Chest x-ray obtained demonstrated T abnormalities, CT scan of the head demonstrated no acute abnormalities. Patient given IV fluids, Reglan and tylenol. on reevaluation patient notes significant provement in symptoms. findings consistent with headache in setting of dizziness. Patient's vitals reassuring cardiac work-up reassuring CT scans reassuring. Decision made to discharge patient. Patient was instructed to follow-up with primary care doctor soon as possible. In addition patient notes any new worrisome symptoms she should return to emergency department for evaluation. Plan of care discussed with patient clearing discharge, all questions were answered, patient was in agreement plan. Discharged home in stable condition.        Amount and/or Complexity of Data Reviewed  Clinical lab tests: ordered and reviewed  Tests in the radiology section of CPT®: ordered and reviewed  Decide to obtain previous medical records or to obtain history from someone other than the patient: yes    Risk of Complications, Morbidity, and/or Mortality  Presenting problems: moderate  Diagnostic procedures: moderate  Management options: moderate    Patient Progress  Patient progress: stable             --------------------------------------------- PAST HISTORY ---------------------------------------------  Past Medical History:  has a past medical history of Adenoma of right adrenal gland, Anemia, Anxiety, Arthritis, Asthma, Benign essential tremor, Bipolar affective ([address] Chronic back pain, Chronic respiratory failure with hypoxia ([address] Depression, Diabetes mellitus ([address] Difficulty swallowing, Environmental and seasonal allergies, Excessive physiologic tremor, Fatty liver, Full dentures, GERD (gastroesophageal reflux disease), Gout, Herniated cervical disc, History of swelling of feet, Hypertension, Lymphedema of lower extremity, Mitochondrial cytopathy ([address] Muscle weakness (generalized), Need for assistance with personal care, Neuropathy, Obesity, PETR (obstructive sleep apnea), Osteoarthritis of left knee, PONV (postoperative nausea and vomiting), Seizures ([address] Spinal headache, and Thyroid disease. Past Surgical History:  has a past surgical history that includes knee surgery (Bilateral); Gastric bypass surgery (1999); myomectomy; Tonsillectomy; Nerve Block (Left, 10 02 2013); Nerve Block (Left, 10 9 13); Nerve Block (Left, 10/16/13); other surgical history (Left, 11 25 13); Nerve Block (Left, 10/29/2014); Nerve Block (Left, 11/12/2014); Nerve Block (Left, 12 8 14); Nerve Block (Right, 3/30/15); Nerve Block (Right, 4/6/2015); Nerve Block (Right, 4/13/15); Nerve Block (Left, 7/6/15); Nerve Block (07/20/15); Nerve Block (Left, 10 1 15); Nerve Block (10/26/15); other surgical history (3/28/2016);  Endoscopy, colon, diagnostic; pr colonoscopy flx dx w/collj spec when pfrmd (N/A, 3/20/2018); pr egd transoral biopsy single/multiple (N/A, 3/20/2018); Cholecystectomy (1999); Hysterectomy (1988); Colonoscopy (N/A, 9/18/2018); Esophagus dilation (9/18/2018); back surgery (last one 1995); Cardiac catheterization (Right, 6-6-2013); Appendectomy; other surgical history (1995); joint replacement (Bilateral, [id number]); egd colonoscopy (N/A, 10/8/2019); Colonoscopy (N/A, 10/8/2019); Nerve Block (Bilateral, 4/1/2021); Ankle fracture surgery (Right, 2/14/2022); and Ankle fracture surgery (Right, 3/2/2022). Social History:  reports that she quit smoking about 4 months ago. Her smoking use included cigarettes. She started smoking about 31 years ago. She has a 31.50 pack-year smoking history. She has never used smokeless tobacco. She reports previous drug use. Drug: Marijuana [place]). She reports that she does not drink alcohol. Family History: family history includes Arthritis in an other family member; Cancer in her father and another family member; Depression in an other family member; Heart Disease in her mother and another family member; Hypertension in an other family member; Mental Illness in an other family member; Stroke in an other family member. The patients home medications have been reviewed.     Allergies: Bee pollen, Penicillins, Ropinirole, Ropinirole hcl, Vistaril [hydroxyzine hcl], Aripiprazole, Prednisone, Restoril [temazepam], Hydroxyzine pamoate, and Tape [adhesive tape]    -------------------------------------------------- RESULTS -------------------------------------------------  Labs:  Results for orders placed or performed during the hospital encounter of 05/06/22   CBC with Auto Differential   Result Value Ref Range    WBC 4.8 4.5 - 11.5 E9/L    RBC 3.97 3.50 - 5.50 E12/L    Hemoglobin 12.2 11.5 - 15.5 g/dL    Hematocrit 38.5 34.0 - 48.0 %    MCV 97.0 80.0 - 99.9 fL    MCH 30.7 26.0 - 35.0 pg    MCHC 31.7 (L) 32.0 - 34.5 %    RDW 13.6 11.5 - 15.0 fL    Platelets 252 415 - 808 E9/L MPV 8.9 7.0 - 12.0 fL    Neutrophils % 46.6 43.0 - 80.0 %    Immature Granulocytes % 1.9 0.0 - 5.0 %    Lymphocytes % 35.8 20.0 - 42.0 %    Monocytes % 10.5 2.0 - 12.0 %    Eosinophils % 4.6 0.0 - 6.0 %    Basophils % 0.6 0.0 - 2.0 %    Neutrophils Absolute 2.23 1.80 - 7.30 E9/L    Immature Granulocytes # 0.09 E9/L    Lymphocytes Absolute 1.71 1.50 - 4.00 E9/L    Monocytes Absolute 0.50 0.10 - 0.95 E9/L    Eosinophils Absolute 0.22 0.05 - 0.50 E9/L    Basophils Absolute 0.03 0.00 - 0.20 E9/L   Comprehensive Metabolic Panel w/ Reflex to MG   Result Value Ref Range    Sodium 139 132 - 146 mmol/L    Potassium reflex Magnesium 3.9 3.5 - 5.0 mmol/L    Chloride 99 98 - 107 mmol/L    CO2 31 (H) 22 - 29 mmol/L    Anion Gap 9 7 - 16 mmol/L    Glucose 86 74 - 99 mg/dL    BUN 20 6 - 23 mg/dL    CREATININE 0.7 0.5 - 1.0 mg/dL    GFR Non-African American >60 >=60 mL/min/1.73    GFR African American >60     Calcium 9.7 8.6 - 10.2 mg/dL    Total Protein 7.1 6.4 - 8.3 g/dL    Albumin 3.9 3.5 - 5.2 g/dL    Total Bilirubin <0.2 0.0 - 1.2 mg/dL    Alkaline Phosphatase 125 (H) 35 - 104 U/L    ALT 10 0 - 32 U/L    AST 13 0 - 31 U/L   Brain Natriuretic Peptide   Result Value Ref Range    Pro- (H) 0 - 125 pg/mL   Troponin   Result Value Ref Range    Troponin, High Sensitivity 13 (H) 0 - 9 ng/L   Urinalysis with Microscopic   Result Value Ref Range    Color, UA Yellow Straw/Yellow    Clarity, UA Clear Clear    Glucose, Ur Negative Negative mg/dL    Bilirubin Urine Negative Negative    Ketones, Urine Negative Negative mg/dL    Specific Gravity, UA <=1.005 1.005 - 1.030    Blood, Urine Negative Negative    pH, UA 7.0 5.0 - 9.0    Protein, UA Negative Negative mg/dL    Urobilinogen, Urine 0.2 <2.0 E.U./dL    Nitrite, Urine Negative Negative    Leukocyte Esterase, Urine Negative Negative    WBC, UA NONE 0 - 5 /HPF    RBC, UA NONE 0 - 2 /HPF    Epithelial Cells, UA RARE /HPF    Bacteria, UA NONE SEEN None Seen /HPF   EKG 12 Lead   Result Value Ref Range    Ventricular Rate 58 BPM    Atrial Rate 58 BPM    P-R Interval 186 ms    QRS Duration 102 ms    Q-T Interval 430 ms    QTc Calculation (Bazett) 422 ms    P Axis -2 degrees    R Axis 17 degrees    T Axis 28 degrees       Radiology:  XR CHEST PORTABLE   Final Result   No acute process. CT Head WO Contrast   Final Result   No acute intracranial abnormality. RECOMMENDATIONS:   Unavailable             ------------------------- NURSING NOTES AND VITALS REVIEWED ---------------------------  Date / Time Roomed:  5/6/2022  4:19 PM  ED Bed Assignment:  [id number]    The nursing notes within the ED encounter and vital signs as below have been reviewed. BP (!) 117/54   Pulse 62   Temp 97 °F (36.1 °C) (Oral)   Resp 16   Ht 5' 6\" (1.676 m)   Wt 230 lb (104.3 kg)   LMP 08/31/1988   SpO2 97%   BMI 37.12 kg/m²   Oxygen Saturation Interpretation: Normal      ------------------------------------------ PROGRESS NOTES ------------------------------------------  6:19 AM EDT  I have spoken with the patient and discussed todays results, in addition to providing specific details for the plan of care and counseling regarding the diagnosis and prognosis. Their questions are answered at this time and they are agreeable with the plan. I discussed at length with them reasons for immediate return here for re evaluation. They will followup with their primary care physician by calling their office tomorrow. --------------------------------- ADDITIONAL PROVIDER NOTES ---------------------------------  At this time the patient is without objective evidence of an acute process requiring hospitalization or inpatient management. They have remained hemodynamically stable throughout their entire ED visit and are stable for discharge with outpatient follow-up.      The plan has been discussed in detail and they are aware of the specific conditions for emergent return, as well as the importance of follow-up. Discharge Medication List as of 5/7/2022 12:48 AM          Diagnosis:  1. Acute nonintractable headache, unspecified headache type    2. Dizziness        Disposition:  Patient's disposition: Discharge to home  Patient's condition is stable. Patient was seen and evaluated by myself and my attending Senthil Tobin DO. Assessment and Plan discussed with attending provider, please see attestation for final plan of care.      DO Sonia Alvarado DO  Resident  05/10/22 [id number]

## 2022-05-10 ASSESSMENT — ENCOUNTER SYMPTOMS
EYE DISCHARGE: 0
SHORTNESS OF BREATH: 0
SORE THROAT: 0
DIARRHEA: 0
BACK PAIN: 0
SINUS PRESSURE: 0
NAUSEA: 0
COUGH: 0
VOMITING: 0
WHEEZING: 0
EYE REDNESS: 0
ABDOMINAL DISTENTION: 0
EYE PAIN: 0

## 2022-05-26 ENCOUNTER — OFFICE VISIT (OUTPATIENT)
Dept: ORTHOPEDIC SURGERY | Age: 62
End: 2022-05-26
Payer: MEDICAID

## 2022-05-26 ENCOUNTER — HOSPITAL ENCOUNTER (OUTPATIENT)
Dept: GENERAL RADIOLOGY | Age: 62
Discharge: HOME | End: 2022-05-28
Payer: MEDICAID

## 2022-05-26 DIAGNOSIS — S82.871D CLOSED DISPLACED PILON FRACTURE OF RIGHT TIBIA WITH ROUTINE HEALING, SUBSEQUENT ENCOUNTER: Primary | ICD-10-CM

## 2022-05-26 DIAGNOSIS — S82.871D CLOSED DISPLACED PILON FRACTURE OF RIGHT TIBIA WITH ROUTINE HEALING, SUBSEQUENT ENCOUNTER: ICD-10-CM

## 2022-05-26 PROCEDURE — 99024 POSTOP FOLLOW-UP VISIT: CPT | Performed by: ORTHOPAEDIC SURGERY

## 2022-05-26 PROCEDURE — 99212 OFFICE O/P EST SF 10 MIN: CPT

## 2022-05-26 PROCEDURE — 99212 OFFICE O/P EST SF 10 MIN: CPT | Performed by: ORTHOPAEDIC SURGERY

## 2022-05-26 PROCEDURE — 73610 X-RAY EXAM OF ANKLE: CPT

## 2022-05-26 RX ORDER — PROMETHAZINE HYDROCHLORIDE 25 MG/1
25 TABLET ORAL EVERY 6 HOURS PRN
COMMUNITY

## 2022-05-26 NOTE — PATIENT INSTRUCTIONS
Patient seen and evaluated today. Doing well for her right ankle fracture. Recommend she continues progressive weightbearing 25% of her body weight and increase 25% every 5 days of tolerable. Patient to continue with her walking boot when she is up and ambulating. She is not required otherwise. Therapy to work with her for daily active and passive ROM of her ankle and toes. We are also referring the patient to podiatry for further evaluation of right great toe stiffness. She will follow-up with us in office in 3 months for repeat evaluation.

## 2022-06-08 ENCOUNTER — TELEPHONE (OUTPATIENT)
Dept: ENT CLINIC | Age: 62
End: 2022-06-08

## 2022-06-08 DIAGNOSIS — E04.1 THYROID NODULE: Primary | ICD-10-CM

## 2022-06-08 NOTE — TELEPHONE ENCOUNTER
Pt was a no show LOV and cacelled previous for 1 year F/U US, Pt never had US. Found Pt in rehab fac. Will schedule US and F/U then let rehab know to schedule transport.

## 2022-06-23 ENCOUNTER — TELEPHONE (OUTPATIENT)
Dept: ORTHOPEDIC SURGERY | Age: 62
End: 2022-06-23

## 2022-06-23 NOTE — TELEPHONE ENCOUNTER
Can try to move her up and see in 2-3 weeks. If she wants sooner she can go to [facility] for x-rays.

## 2022-06-23 NOTE — TELEPHONE ENCOUNTER
Patient states that she is experiencing pain in the leg she had sx on and PT was very concerned. She is asking if she can have the appointment with Dr Rachele Fink moved to sooner date and time. Please follow up with patient.  Thank you

## 2022-06-24 NOTE — TELEPHONE ENCOUNTER
Call to pt gave choice of 3 wk appt or [facility] for X-rays, She chose to schedule appt.    Future Appointments   Date Time Provider Deysi Simms   7/1/2022 10:00 AM SEB US RM 2 [facility] US [facility] Radiolog   7/14/2022 10:15 AM Ney Triplett DO [facility]   7/18/2022  8:45 AM DO Priyanka Monroe [facility]   8/25/2022 10:30 AM Ney Triplett [facility]   3/16/2023 10:45 AM Eloisa Adame MD [facility]

## 2022-07-07 ENCOUNTER — TELEPHONE (OUTPATIENT)
Dept: ENT CLINIC | Age: 62
End: 2022-07-07

## 2022-07-14 ENCOUNTER — OFFICE VISIT (OUTPATIENT)
Dept: ORTHOPEDIC SURGERY | Age: 62
End: 2022-07-14
Payer: MEDICAID

## 2022-07-14 ENCOUNTER — HOSPITAL ENCOUNTER (OUTPATIENT)
Dept: GENERAL RADIOLOGY | Age: 62
Discharge: HOME | End: 2022-07-16
Payer: MEDICAID

## 2022-07-14 DIAGNOSIS — S82.871D CLOSED DISPLACED PILON FRACTURE OF RIGHT TIBIA WITH ROUTINE HEALING, SUBSEQUENT ENCOUNTER: Primary | ICD-10-CM

## 2022-07-14 DIAGNOSIS — S82.871D CLOSED DISPLACED PILON FRACTURE OF RIGHT TIBIA WITH ROUTINE HEALING, SUBSEQUENT ENCOUNTER: ICD-10-CM

## 2022-07-14 DIAGNOSIS — R29.898 WEAKNESS OF RIGHT FOOT: ICD-10-CM

## 2022-07-14 PROCEDURE — 99212 OFFICE O/P EST SF 10 MIN: CPT | Performed by: ORTHOPAEDIC SURGERY

## 2022-07-14 PROCEDURE — 99213 OFFICE O/P EST LOW 20 MIN: CPT | Performed by: PHYSICIAN ASSISTANT

## 2022-07-14 PROCEDURE — 73610 X-RAY EXAM OF ANKLE: CPT

## 2022-07-14 NOTE — PATIENT INSTRUCTIONS
Continue weightbearing as tolerated right lower extremity. Patient will be set up for podiatry referral, Dr. Liane Duverney regarding weakness in the toes of her right foot. Follow-up in 4 months for reevaluation and x-rays. Call if any questions or concerns.

## 2022-07-14 NOTE — PROGRESS NOTES
Chief Complaint   Patient presents with    Ankle Pain     Right pilon ORIF 3/2/2022. Patient states that she is having some increased pain mid-lower leg where her tibial renny is at. Patient doing well in PT but still has good and bad days. Patient residing at [facility] for Rehab. OP:SURGEON: Dr. Ceasar Mar,   DATE OF PROCEDURE: 3-2-22  PROCEDURE: 1. Right tibial pilon and fibular shaft fractures open reduction internal fixation of tibia and fibula   2. Removal right ankle spanning external fixation      POD: 4.5 months    Subjective:  Amisha Conner is following up from the above surgery. She is WBAT on right lower extremity. She ambulates with assistive device, walker. Pain to extremity is mild and is not taking prescribed pain medication. They denies numbness or tingling to the right lower extremity. Denies calf pain, chest pain, or shortness of breath. Patient has finished DVT prophylaxis. Patient is  participating in therapy at the rehab center. She is doing okay after surgery. She does complain of some intermittent soreness in the right calf area. She states that she does have some limitation with movement of the toes of her right foot and difficulty with extension of the toes. She states that there was discussion at her last office visit regarding possible referral to podiatry which she is interested in. Review of Systems -  All pertinent positives/negative in HPI     Objective:    General: Alert and oriented X 3, normocephalic atraumatic, external ears and eye normal, sclera clear, no acute distress, respirations easy and unlabored with no audible wheezes, skin warm and dry, speech and dress appropriate for noted age, affect euthymic.     Extremity:  Right Lower Extremity  Skin clean dry and intact, without signs of infection   Incision well-healed  no edema noted  Compartments supple throughout thigh and leg  Calf supple and not tender  negative Homans  Demonstrates active motion with knee flexion/extension, ankle dorsi/plantar flexion  Presents in a wheelchair. States sensation intact to touch in sural, deep peroneal, superficial peroneal, saphenous, posterior tibial  nerve distributions to foot/ankle. Palpable dorsalis pedis and posterior tibialis pulses, cap refill brisk in toes, foot warm/perfused. LMP 08/31/1988     XR:   3 views right ankle demonstrate ORIF right tibial pilon and fibular shaft fractures with the hardware in stable position and alignment. No evidence of hardware loosening or failure. Interval healing noted at the fracture sites. Assessment:   Diagnosis Orders   1. Closed displaced pilon fracture of right tibia with routine healing, subsequent encounter       Plan:  X-rays reviewed and discussed. Continue weightbearing as tolerated right lower extremity. Patient will be set up for podiatry referral, Dr. Ana Lilia Torres regarding weakness in the toes of her right foot. Follow-up in 4 months for reevaluation and x-rays. Call if any questions or concerns. Electronically signed by ARDHA Rivera on 7/14/2022 at 11:15 AM  Note: This report was completed using [employer] voiced recognition software. Every effort has been made to ensure accuracy; however, inadvertent computerized transcription errors may be present.

## 2022-07-18 ENCOUNTER — TELEPHONE (OUTPATIENT)
Dept: ENT CLINIC | Age: 62
End: 2022-07-18

## 2022-07-18 NOTE — TELEPHONE ENCOUNTER
Pt called after missed appt. Pt states the facility she is in has not been telling her about her appointments and from now on to contact her directly to schedule anything. Pt provided her cell number. Pt was given the contact to reschedule US and will call back to reschedule F/U after that.

## 2022-08-17 ENCOUNTER — APPOINTMENT (OUTPATIENT)
Dept: CT IMAGING | Age: 62
DRG: 344 | End: 2022-08-17
Payer: MEDICAID

## 2022-08-17 ENCOUNTER — APPOINTMENT (OUTPATIENT)
Dept: GENERAL RADIOLOGY | Age: 62
DRG: 344 | End: 2022-08-17
Payer: MEDICAID

## 2022-08-17 ENCOUNTER — HOSPITAL ENCOUNTER (INPATIENT)
Age: 62
LOS: 2 days | Discharge: HOME | DRG: 344 | End: 2022-08-19
Attending: EMERGENCY MEDICINE | Admitting: FAMILY MEDICINE
Payer: MEDICAID

## 2022-08-17 DIAGNOSIS — L03.115 CELLULITIS OF RIGHT LOWER EXTREMITY: ICD-10-CM

## 2022-08-17 DIAGNOSIS — R53.1 GENERAL WEAKNESS: Primary | ICD-10-CM

## 2022-08-17 PROBLEM — L03.90 CELLULITIS: Status: ACTIVE | Noted: 2022-08-17

## 2022-08-17 LAB
ALBUMIN SERPL-MCNC: 3.1 G/DL (ref 3.5–5.2)
ALP BLD-CCNC: 97 U/L (ref 35–104)
ALT SERPL-CCNC: 10 U/L (ref 0–32)
AMPHETAMINE SCREEN, URINE: NOT DETECTED
ANION GAP SERPL CALCULATED.3IONS-SCNC: 13 MMOL/L (ref 7–16)
ANTISTREPTOLYSIN-O: 34 IU/ML (ref 0–200)
AST SERPL-CCNC: 14 U/L (ref 0–31)
BARBITURATE SCREEN URINE: NOT DETECTED
BASOPHILS ABSOLUTE: 0.01 E9/L (ref 0–0.2)
BASOPHILS RELATIVE PERCENT: 0.1 % (ref 0–2)
BENZODIAZEPINE SCREEN, URINE: NOT DETECTED
BILIRUB SERPL-MCNC: 0.4 MG/DL (ref 0–1.2)
BILIRUBIN URINE: NEGATIVE
BLOOD, URINE: NEGATIVE
BUN BLDV-MCNC: 15 MG/DL (ref 6–23)
C-REACTIVE PROTEIN: 43.8 MG/DL (ref 0–0.4)
CALCIUM SERPL-MCNC: 8.8 MG/DL (ref 8.6–10.2)
CANNABINOID SCREEN URINE: NOT DETECTED
CHLORIDE BLD-SCNC: 96 MMOL/L (ref 98–107)
CHP ED QC CHECK: YES
CLARITY: CLEAR
CO2: 24 MMOL/L (ref 22–29)
COCAINE METABOLITE SCREEN URINE: NOT DETECTED
COLOR: YELLOW
CREAT SERPL-MCNC: 0.7 MG/DL (ref 0.5–1)
EKG ATRIAL RATE: 86 BPM
EKG P AXIS: 8 DEGREES
EKG P-R INTERVAL: 170 MS
EKG Q-T INTERVAL: 376 MS
EKG QRS DURATION: 104 MS
EKG QTC CALCULATION (BAZETT): 449 MS
EKG R AXIS: 14 DEGREES
EKG T AXIS: 21 DEGREES
EKG VENTRICULAR RATE: 86 BPM
EOSINOPHILS ABSOLUTE: 0.02 E9/L (ref 0.05–0.5)
EOSINOPHILS RELATIVE PERCENT: 0.2 % (ref 0–6)
FENTANYL SCREEN, URINE: NOT DETECTED
GFR AFRICAN AMERICAN: >60
GFR NON-AFRICAN AMERICAN: >60 ML/MIN/1.73
GLUCOSE BLD-MCNC: 127 MG/DL (ref 74–99)
GLUCOSE BLD-MCNC: 147 MG/DL
GLUCOSE URINE: NEGATIVE MG/DL
HCT VFR BLD CALC: 45.9 % (ref 34–48)
HEMOGLOBIN: 15.2 G/DL (ref 11.5–15.5)
IMMATURE GRANULOCYTES #: 0.03 E9/L
IMMATURE GRANULOCYTES %: 0.3 % (ref 0–5)
KETONES, URINE: NEGATIVE MG/DL
LACTIC ACID, SEPSIS: 0.8 MMOL/L (ref 0.5–1.9)
LEUKOCYTE ESTERASE, URINE: NEGATIVE
LYMPHOCYTES ABSOLUTE: 0.53 E9/L (ref 1.5–4)
LYMPHOCYTES RELATIVE PERCENT: 6 % (ref 20–42)
Lab: ABNORMAL
MCH RBC QN AUTO: 32.1 PG (ref 26–35)
MCHC RBC AUTO-ENTMCNC: 33.1 % (ref 32–34.5)
MCV RBC AUTO: 97 FL (ref 80–99.9)
METER GLUCOSE: 147 MG/DL (ref 74–99)
METHADONE SCREEN, URINE: NOT DETECTED
MONOCYTES ABSOLUTE: 1.02 E9/L (ref 0.1–0.95)
MONOCYTES RELATIVE PERCENT: 11.6 % (ref 2–12)
NEUTROPHILS ABSOLUTE: 7.18 E9/L (ref 1.8–7.3)
NEUTROPHILS RELATIVE PERCENT: 81.8 % (ref 43–80)
NITRITE, URINE: NEGATIVE
OPIATE SCREEN URINE: NOT DETECTED
OXYCODONE URINE: POSITIVE
PDW BLD-RTO: 13.1 FL (ref 11.5–15)
PH UA: 6.5 (ref 5–9)
PHENCYCLIDINE SCREEN URINE: NOT DETECTED
PLATELET # BLD: 144 E9/L (ref 130–450)
PMV BLD AUTO: 9.1 FL (ref 7–12)
POLYCHROMASIA: ABNORMAL
POTASSIUM SERPL-SCNC: 3.5 MMOL/L (ref 3.5–5)
PROTEIN UA: NEGATIVE MG/DL
RBC # BLD: 4.73 E12/L (ref 3.5–5.5)
SEDIMENTATION RATE, ERYTHROCYTE: 80 MM/HR (ref 0–20)
SODIUM BLD-SCNC: 133 MMOL/L (ref 132–146)
SPECIFIC GRAVITY UA: <=1.005 (ref 1–1.03)
TOTAL PROTEIN: 6.9 G/DL (ref 6.4–8.3)
TROPONIN, HIGH SENSITIVITY: 13 NG/L (ref 0–9)
TROPONIN, HIGH SENSITIVITY: 15 NG/L (ref 0–9)
UROBILINOGEN, URINE: 1 E.U./DL
WBC # BLD: 8.8 E9/L (ref 4.5–11.5)

## 2022-08-17 PROCEDURE — 83605 ASSAY OF LACTIC ACID: CPT

## 2022-08-17 PROCEDURE — 96375 TX/PRO/DX INJ NEW DRUG ADDON: CPT

## 2022-08-17 PROCEDURE — 6370000000 HC RX 637 (ALT 250 FOR IP): Performed by: SPECIALIST

## 2022-08-17 PROCEDURE — 2580000003 HC RX 258: Performed by: FAMILY MEDICINE

## 2022-08-17 PROCEDURE — 81003 URINALYSIS AUTO W/O SCOPE: CPT

## 2022-08-17 PROCEDURE — 73590 X-RAY EXAM OF LOWER LEG: CPT

## 2022-08-17 PROCEDURE — 6370000000 HC RX 637 (ALT 250 FOR IP): Performed by: FAMILY MEDICINE

## 2022-08-17 PROCEDURE — 80053 COMPREHEN METABOLIC PANEL: CPT

## 2022-08-17 PROCEDURE — 85025 COMPLETE CBC W/AUTO DIFF WBC: CPT

## 2022-08-17 PROCEDURE — 86140 C-REACTIVE PROTEIN: CPT

## 2022-08-17 PROCEDURE — 6360000002 HC RX W HCPCS: Performed by: EMERGENCY MEDICINE

## 2022-08-17 PROCEDURE — 6360000002 HC RX W HCPCS: Performed by: FAMILY MEDICINE

## 2022-08-17 PROCEDURE — 87040 BLOOD CULTURE FOR BACTERIA: CPT

## 2022-08-17 PROCEDURE — 71045 X-RAY EXAM CHEST 1 VIEW: CPT

## 2022-08-17 PROCEDURE — 70450 CT HEAD/BRAIN W/O DYE: CPT

## 2022-08-17 PROCEDURE — 86060 ANTISTREPTOLYSIN O TITER: CPT

## 2022-08-17 PROCEDURE — 96365 THER/PROPH/DIAG IV INF INIT: CPT

## 2022-08-17 PROCEDURE — 87077 CULTURE AEROBIC IDENTIFY: CPT

## 2022-08-17 PROCEDURE — 82962 GLUCOSE BLOOD TEST: CPT

## 2022-08-17 PROCEDURE — 87186 SC STD MICRODIL/AGAR DIL: CPT

## 2022-08-17 PROCEDURE — 2060000000 HC ICU INTERMEDIATE R&B

## 2022-08-17 PROCEDURE — 93005 ELECTROCARDIOGRAM TRACING: CPT | Performed by: EMERGENCY MEDICINE

## 2022-08-17 PROCEDURE — 36415 COLL VENOUS BLD VENIPUNCTURE: CPT

## 2022-08-17 PROCEDURE — 84484 ASSAY OF TROPONIN QUANT: CPT

## 2022-08-17 PROCEDURE — 2580000003 HC RX 258: Performed by: EMERGENCY MEDICINE

## 2022-08-17 PROCEDURE — 80307 DRUG TEST PRSMV CHEM ANLYZR: CPT

## 2022-08-17 PROCEDURE — 85651 RBC SED RATE NONAUTOMATED: CPT

## 2022-08-17 PROCEDURE — 87150 DNA/RNA AMPLIFIED PROBE: CPT

## 2022-08-17 PROCEDURE — 99285 EMERGENCY DEPT VISIT HI MDM: CPT

## 2022-08-17 RX ORDER — SODIUM CHLORIDE 0.9 % (FLUSH) 0.9 %
5-40 SYRINGE (ML) INJECTION PRN
Status: DISCONTINUED | OUTPATIENT
Start: 2022-08-17 | End: 2022-08-19 | Stop reason: HOSPADM

## 2022-08-17 RX ORDER — 0.9 % SODIUM CHLORIDE 0.9 %
500 INTRAVENOUS SOLUTION INTRAVENOUS ONCE
Status: COMPLETED | OUTPATIENT
Start: 2022-08-17 | End: 2022-08-17

## 2022-08-17 RX ORDER — FERROUS SULFATE 325(65) MG
325 TABLET ORAL DAILY
Status: DISCONTINUED | OUTPATIENT
Start: 2022-08-17 | End: 2022-08-19 | Stop reason: HOSPADM

## 2022-08-17 RX ORDER — ONDANSETRON 4 MG/1
4 TABLET, ORALLY DISINTEGRATING ORAL EVERY 8 HOURS PRN
Status: DISCONTINUED | OUTPATIENT
Start: 2022-08-17 | End: 2022-08-19 | Stop reason: HOSPADM

## 2022-08-17 RX ORDER — POLYETHYLENE GLYCOL 3350 17 G/17G
17 POWDER, FOR SOLUTION ORAL DAILY PRN
Status: DISCONTINUED | OUTPATIENT
Start: 2022-08-17 | End: 2022-08-19 | Stop reason: HOSPADM

## 2022-08-17 RX ORDER — DOXYCYCLINE HYCLATE 100 MG/1
100 CAPSULE ORAL EVERY 12 HOURS SCHEDULED
Status: DISCONTINUED | OUTPATIENT
Start: 2022-08-17 | End: 2022-08-18

## 2022-08-17 RX ORDER — 0.9 % SODIUM CHLORIDE 0.9 %
1000 INTRAVENOUS SOLUTION INTRAVENOUS ONCE
Status: COMPLETED | OUTPATIENT
Start: 2022-08-17 | End: 2022-08-17

## 2022-08-17 RX ORDER — ACETAMINOPHEN 325 MG/1
650 TABLET ORAL EVERY 6 HOURS PRN
Status: DISCONTINUED | OUTPATIENT
Start: 2022-08-17 | End: 2022-08-17 | Stop reason: SDUPTHER

## 2022-08-17 RX ORDER — LEVOCARNITINE 330 MG/1
330 TABLET ORAL 3 TIMES DAILY
Status: DISCONTINUED | OUTPATIENT
Start: 2022-08-17 | End: 2022-08-19 | Stop reason: HOSPADM

## 2022-08-17 RX ORDER — BACLOFEN 10 MG/1
20 TABLET ORAL 4 TIMES DAILY
Status: DISCONTINUED | OUTPATIENT
Start: 2022-08-17 | End: 2022-08-19 | Stop reason: HOSPADM

## 2022-08-17 RX ORDER — FAMOTIDINE 20 MG/1
20 TABLET, FILM COATED ORAL 2 TIMES DAILY
Status: DISCONTINUED | OUTPATIENT
Start: 2022-08-17 | End: 2022-08-19 | Stop reason: HOSPADM

## 2022-08-17 RX ORDER — CEPHALEXIN 500 MG/1
1000 CAPSULE ORAL EVERY 8 HOURS SCHEDULED
Status: DISCONTINUED | OUTPATIENT
Start: 2022-08-17 | End: 2022-08-18

## 2022-08-17 RX ORDER — ALLOPURINOL 100 MG/1
200 TABLET ORAL 2 TIMES DAILY
Status: DISCONTINUED | OUTPATIENT
Start: 2022-08-17 | End: 2022-08-19 | Stop reason: HOSPADM

## 2022-08-17 RX ORDER — ACETAMINOPHEN 650 MG/1
650 SUPPOSITORY RECTAL EVERY 6 HOURS PRN
Status: DISCONTINUED | OUTPATIENT
Start: 2022-08-17 | End: 2022-08-19 | Stop reason: HOSPADM

## 2022-08-17 RX ORDER — ONDANSETRON 2 MG/ML
4 INJECTION INTRAMUSCULAR; INTRAVENOUS EVERY 6 HOURS PRN
Status: DISCONTINUED | OUTPATIENT
Start: 2022-08-17 | End: 2022-08-19 | Stop reason: HOSPADM

## 2022-08-17 RX ORDER — GABAPENTIN 300 MG/1
600 CAPSULE ORAL 4 TIMES DAILY
Status: DISCONTINUED | OUTPATIENT
Start: 2022-08-17 | End: 2022-08-19 | Stop reason: HOSPADM

## 2022-08-17 RX ORDER — ENOXAPARIN SODIUM 100 MG/ML
30 INJECTION SUBCUTANEOUS 2 TIMES DAILY
Status: DISCONTINUED | OUTPATIENT
Start: 2022-08-17 | End: 2022-08-19 | Stop reason: HOSPADM

## 2022-08-17 RX ORDER — SODIUM CHLORIDE 0.9 % (FLUSH) 0.9 %
5-40 SYRINGE (ML) INJECTION EVERY 12 HOURS SCHEDULED
Status: DISCONTINUED | OUTPATIENT
Start: 2022-08-17 | End: 2022-08-19 | Stop reason: HOSPADM

## 2022-08-17 RX ORDER — ESCITALOPRAM OXALATE 10 MG/1
20 TABLET ORAL 2 TIMES DAILY
Status: DISCONTINUED | OUTPATIENT
Start: 2022-08-17 | End: 2022-08-19 | Stop reason: HOSPADM

## 2022-08-17 RX ORDER — SODIUM CHLORIDE 9 MG/ML
INJECTION, SOLUTION INTRAVENOUS PRN
Status: DISCONTINUED | OUTPATIENT
Start: 2022-08-17 | End: 2022-08-19 | Stop reason: HOSPADM

## 2022-08-17 RX ORDER — FUROSEMIDE 40 MG/1
40 TABLET ORAL DAILY
Status: DISCONTINUED | OUTPATIENT
Start: 2022-08-17 | End: 2022-08-19 | Stop reason: HOSPADM

## 2022-08-17 RX ORDER — ACETAMINOPHEN 325 MG/1
650 TABLET ORAL EVERY 6 HOURS PRN
Status: DISCONTINUED | OUTPATIENT
Start: 2022-08-17 | End: 2022-08-19 | Stop reason: HOSPADM

## 2022-08-17 RX ADMIN — SODIUM CHLORIDE 500 ML: 9 INJECTION, SOLUTION INTRAVENOUS at 06:10

## 2022-08-17 RX ADMIN — ACETAMINOPHEN 650 MG: 325 TABLET ORAL at 19:34

## 2022-08-17 RX ADMIN — GABAPENTIN 600 MG: 300 CAPSULE ORAL at 14:26

## 2022-08-17 RX ADMIN — FAMOTIDINE 20 MG: 20 TABLET ORAL at 09:33

## 2022-08-17 RX ADMIN — GABAPENTIN 600 MG: 300 CAPSULE ORAL at 19:33

## 2022-08-17 RX ADMIN — BACLOFEN 20 MG: 10 TABLET ORAL at 19:34

## 2022-08-17 RX ADMIN — GABAPENTIN 600 MG: 300 CAPSULE ORAL at 16:10

## 2022-08-17 RX ADMIN — CEFEPIME 2000 MG: 2 INJECTION, POWDER, FOR SOLUTION INTRAVENOUS at 02:59

## 2022-08-17 RX ADMIN — SODIUM CHLORIDE, PRESERVATIVE FREE 10 ML: 5 INJECTION INTRAVENOUS at 09:36

## 2022-08-17 RX ADMIN — BACLOFEN 20 MG: 10 TABLET ORAL at 13:00

## 2022-08-17 RX ADMIN — GABAPENTIN 600 MG: 300 CAPSULE ORAL at 09:32

## 2022-08-17 RX ADMIN — FAMOTIDINE 20 MG: 20 TABLET ORAL at 19:33

## 2022-08-17 RX ADMIN — SODIUM CHLORIDE, PRESERVATIVE FREE 10 ML: 5 INJECTION INTRAVENOUS at 19:34

## 2022-08-17 RX ADMIN — ENOXAPARIN SODIUM 30 MG: 100 INJECTION SUBCUTANEOUS at 19:34

## 2022-08-17 RX ADMIN — ESCITALOPRAM OXALATE 20 MG: 10 TABLET ORAL at 19:33

## 2022-08-17 RX ADMIN — ENOXAPARIN SODIUM 30 MG: 100 INJECTION SUBCUTANEOUS at 09:35

## 2022-08-17 RX ADMIN — FERROUS SULFATE TAB 325 MG (65 MG ELEMENTAL FE) 325 MG: 325 (65 FE) TAB at 09:33

## 2022-08-17 RX ADMIN — BACLOFEN 20 MG: 10 TABLET ORAL at 16:10

## 2022-08-17 RX ADMIN — DOXYCYCLINE HYCLATE 100 MG: 100 CAPSULE ORAL at 19:34

## 2022-08-17 RX ADMIN — VANCOMYCIN HYDROCHLORIDE 2500 MG: 5 INJECTION, POWDER, LYOPHILIZED, FOR SOLUTION INTRAVENOUS at 03:33

## 2022-08-17 RX ADMIN — CEPHALEXIN 1000 MG: 500 CAPSULE ORAL at 14:26

## 2022-08-17 RX ADMIN — BACLOFEN 20 MG: 10 TABLET ORAL at 09:32

## 2022-08-17 RX ADMIN — ESCITALOPRAM OXALATE 20 MG: 10 TABLET ORAL at 09:33

## 2022-08-17 RX ADMIN — CEPHALEXIN 1000 MG: 500 CAPSULE ORAL at 22:16

## 2022-08-17 RX ADMIN — ACETAMINOPHEN 650 MG: 325 TABLET ORAL at 09:32

## 2022-08-17 RX ADMIN — SODIUM CHLORIDE 1000 ML: 9 INJECTION, SOLUTION INTRAVENOUS at 02:06

## 2022-08-17 RX ADMIN — ALLOPURINOL 200 MG: 100 TABLET ORAL at 19:33

## 2022-08-17 RX ADMIN — FUROSEMIDE 40 MG: 40 TABLET ORAL at 09:33

## 2022-08-17 RX ADMIN — ALLOPURINOL 200 MG: 100 TABLET ORAL at 09:32

## 2022-08-17 ASSESSMENT — PAIN - FUNCTIONAL ASSESSMENT: PAIN_FUNCTIONAL_ASSESSMENT: NONE - DENIES PAIN

## 2022-08-17 NOTE — CARE COORDINATION
Social work / Discharge Planning:        Patient is a Medicaid bed hold from [facility]. No precert needed to return. Patient confirmed her plan is to return. [facility] and transport form completed. Per liaison, no covid test is needed for return.    Electronically signed by GAGE Lyles on 8/17/2022 at 10:08 AM

## 2022-08-17 NOTE — PLAN OF CARE
Problem: Discharge Planning  Goal: Discharge to home or other facility with appropriate resources  Outcome: Progressing  Flowsheets (Taken 8/17/2022 1234 by Jaskaran Miles RN)  Discharge to home or other facility with appropriate resources: Refer to discharge planning if patient needs post-hospital services based on physician order or complex needs related to functional status, cognitive ability or social support system     Problem: Skin/Tissue Integrity  Goal: Absence of new skin breakdown  Description: 1. Monitor for areas of redness and/or skin breakdown  2. Assess vascular access sites hourly  3. Every 4-6 hours minimum:  Change oxygen saturation probe site  4. Every 4-6 hours:  If on nasal continuous positive airway pressure, respiratory therapy assess nares and determine need for appliance change or resting period.   Outcome: Progressing     Problem: Safety - Adult  Goal: Free from fall injury  Outcome: Progressing     Problem: Pain  Goal: Verbalizes/displays adequate comfort level or baseline comfort level  Outcome: Progressing

## 2022-08-17 NOTE — ED NOTES
Livan Beard gave orders that patient can be transported without cardiac monitor to the floor.      Francoise Held  08/17/22 0942

## 2022-08-17 NOTE — DISCHARGE INSTR - COC
Continuity of Care Form    Patient Name: Maliha Quinones   :  1960  MRN:  [id number]    Admit date:  2022  Discharge date:  ***    Code Status Order: Full Code   Advance Directives:     Admitting Physician:  Celeste Cowan DO  PCP: Celeste Cowan DO    Discharging Nurse: [facility] Unit/Room#: 1844/9549-E  Discharging Unit Phone Number: ***    Emergency Contact:   Extended Emergency Contact Information  Primary Emergency Contact: Tavo Yang  [place] Phone: [phone]  Mobile Phone: [phone]  Relation: Brother/Sister  Preferred language: English   needed? No  Secondary Emergency Contact: [address] [place] [address] Phone: [phone]  Mobile Phone: [phone]  Relation: Other  Preferred language: English   needed?  No    Past Surgical History:  Past Surgical History:   Procedure Laterality Date    ANKLE FRACTURE SURGERY Right 2022    RIGHT ANKLE IRRIGAITON AND DEBRIDEMENT WITH EXTERNAL FIXATOR AND LACERATION REPAIR performed by Payton Casey MD at  [address] Right 3/2/2022    RIGHT LOWER EXTREMITY REMOVAL EXTERNAL FIXATOR, RIGHT PILON OPEN REDUCTION INTERNAL FIXATOR--SYNTHES (FACILITY) performed by Yudelka Dyer DO at [facility]      with Hysterectomy / unknown    BACK SURGERY  last one     lumbar x 2    CARDIAC CATHETERIZATION Right 2013    Dr. Katherine Hatfield Radial, no stents placed, no blockages     CHOLECYSTECTOMY      open with gastric bypass    COLONOSCOPY N/A 2018    COLONOSCOPY POLYPECTOMY HOT BIOPSY performed by Kathie Irby MD at [facility] N/A 10/8/2019    COLONOSCOPY POLYPECTOMY SNARE/COLD BIOPSY performed by Kathie Irby MD at [address]    EGD COLONOSCOPY N/A 10/8/2019    EGD ESOPHAGOGASTRODUODENOSCOPY DILATATION performed by Kathie Irby MD at [facility], DIAGNOSTIC      ESOPHAGEAL DILATATION  2018    ESOPHAGEAL DILATION NILAY performed by Yayo Alford MD at [address] [facility] (CERVIX STATUS UNKNOWN)  1988    JOINT REPLACEMENT Bilateral 2007,2017    knees    KNEE SURGERY Bilateral     scope    MYOMECTOMY      NERVE BLOCK Left 10 02 2013    lumbar paravertebral facet #2    NERVE BLOCK Left 10 9 13    hip inj #1    NERVE BLOCK Left 10/16/13    hip injection    NERVE BLOCK Left 10/29/2014    left lumbar transforaminal nerve lbock  #1    NERVE BLOCK Left 11/12/2014    lumbar left transforaminal nerve block  #2    NERVE BLOCK Left 12 8 14    lumbar transforaminal #3    NERVE BLOCK Right 3/30/15    cervical transforaminal #1    NERVE BLOCK Right 4/6/2015    right cervical transforaminal nerve block  #2    NERVE BLOCK Right 4/13/15    cervical transforaminal #3    NERVE BLOCK Left 7/6/15    knee injection #1    NERVE BLOCK  07/20/15    left genicular nerve block/knee #2    NERVE BLOCK Left 10 1 15    lumb transforam #1    NERVE BLOCK  10/26/15    left lumbar transforaminal nerve block #3    NERVE BLOCK Bilateral 4/1/2021    #1 BILATERAL SACROILIAC JOINT INJECTION UNDER FLUORO performed by Celia Torres MD at [address]  [facility] Left 11 25 13    lumbar radiofreq    OTHER SURGICAL HISTORY  3/28/2016    stage 1, 3 day percutanous trial [employer] lumbar spinal cord stimulator    OTHER SURGICAL HISTORY  1995    racz procedure / lower back    [place] COLONOSCOPY FLX DX W/COLLJ SPEC WHEN PFRMD N/A 3/20/2018    COLONOSCOPY DIAGNOSTIC OR SCREENING performed by Yayo Alford MD at [address] [place] [address] EGD TRANSORAL BIOPSY SINGLE/MULTIPLE N/A 3/20/2018    EGD BIOPSY performed by Yayo Alford MD at [facility]         Immunization History:   Immunization History   Administered Date(s) Administered    Influenza Virus Vaccine 11/02/2017, 11/02/2017, 02/06/2019, 02/02/2021    Influenza Whole 11/03/2008    Influenza, FLUARIX, FLULAVAL, (age 10 mo+) AND Amadeo [facility] (age 1 y+), PF 09/01/2018    Pneumococcal Conjugate 13-valent ([id number]) 02/03/2017    Pneumococcal Polysaccharide ([id number]) 11/03/2008, 09/01/2018    Td, unspecified formulation 11/02/2011    Tdap (Boostrix, Adacel) 02/16/2021       Active Problems:  Patient Active Problem List   Diagnosis Code    Debility R53.81    Obesity E66.9    Bipolar 1 disorder ([address] F31.9    Generalized seizure disorder ([facility] [address] G40.309    Cervicalgia M54.2    Asthma J45.909    Hyperlipidemia E78.5    Hypertension I10    Arthritis M19.90    Lymphedema of lower extremity I89.0    Degenerative Osteoarthritis of both knees M17.0    Status post total knee replacement, right Z96.651    Cervical spondylolysis M43.02    Degenerative Osteoarthritis thoracic spine M47.814    Thoracic facet syndrome M47.894    Cervical facet syndrome M47.812    Facet syndrome, lumbar M47.816    Neural foraminal stenosis of lumbar spine M48.061    Lumbar radiculopathy M54.16    DDD (degenerative disc disease), cervical M50.30    Neural foraminal stenosis of cervical spine M48.02    Protruded cervical disc M50.20    Cervical radiculopathy M54.12    Postlaminectomy syndrome, lumbar M96.1    Neuropathic pain syndrome (non-herpetic) M79.2    Chronic respiratory failure with hypoxia (HCC) J96.11    Mitochondrial cytopathy (HCC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 A    PETR (obstructive sleep apnea) G47.33    Chronic back pain M54.9, G89.29    Adenoma of right adrenal gland D35.01    Lumbosacral spondylosis without myelopathy M47.817    Sacroiliac dysfunction M53.3    DDD (degenerative disc disease), lumbar M51.36    Thoracic degenerative disc disease M51.34    Excessive physiologic tremor R25.1    Closed fracture of lower end of right radius with routine healing S52.501D    Closed left ankle fracture, initial encounter R00.797I    Closed fracture of right tibial plateau V46.848T    Closed displaced pilon fracture of right tibia S82.871A    Acute pancreatitis K85.90    Pancreatic pseudocyst K86.3    Cellulitis of right lower extremity L03.115    Cellulitis L03.90       Isolation/Infection:   Isolation            No Isolation          Patient Infection Status       Infection Onset Added Last Indicated Last Indicated By Review Planned Expiration Resolved Resolved By    None active    Resolved    COVID-19 (Rule Out) 01/23/22 01/23/22 01/23/22 COVID-19, Rapid (Ordered)   01/23/22 Rule-Out Test Resulted    COVID-19 (Rule Out) 01/05/22 01/05/22 01/06/22 COVID-19, Rapid (Ordered)   01/06/22 Rule-Out Test Resulted    COVID-19 (Rule Out) 08/20/21 08/20/21 08/20/21 COVID-19, Rapid (Ordered)   08/20/21 Rule-Out Test Resulted    COVID-19 (Rule Out) 04/25/21 04/25/21 04/25/21 COVID-19, Rapid (Ordered)   04/25/21 Rule-Out Test Resulted    COVID-19 (Rule Out) 02/10/21 02/10/21 02/10/21 COVID-19 (Ordered)   02/11/21 Rule-Out Test Resulted    COVID-19 (Rule Out) 09/17/20 09/17/20 09/17/20 COVID-19 Ambulatory (Ordered)   09/19/20 Rule-Out Test Resulted    COVID-19 (Rule Out) 04/30/20 04/30/20 04/30/20 COVID-19 (Ordered)   05/01/20 Rule-Out Test Resulted    Not detected 4/30/2020            Nurse Assessment:  Last Vital Signs: /71   Pulse 84   Temp 99.6 °F (37.6 °C) (Oral)   Resp 16   Ht 5' 7\" (1.702 m)   Wt 232 lb 8 oz (105.5 kg)   LMP 08/31/1988   SpO2 96%   BMI 36.41 kg/m²     Last documented pain score (0-10 scale):    Last Weight:   Wt Readings from Last 1 Encounters:   08/17/22 232 lb 8 oz (105.5 kg)     Mental Status:  {IP PT MENTAL STATUS:20030}    IV Access:  {[facility] [place]ESS:677968362}    Nursing Mobility/ADLs:  Walking   {P DME MQVP:919048261}  Transfer  {CHP DME CZBN:033114911}  Bathing  {CHP DME VSKD:041501122}  Dressing  {CHP DME AMFJ:313835787}  Toileting  {CHP DME UASR:318201427}  Feeding  {CHP DME BBMN:775453006}  Med Admin  {CHP DME HTBN:384146635}  Med Delivery   { [facility] MED KBYYJFPS:004530144}    Wound Care Documentation and Therapy:  Wound 22 Pretibial Right; Anterior;Posterior RLE cellulititis red, warm and swollen no open areas (Active)   Dressing Status Other (Comment) 22 07   Wound Cleansed Cleansed with saline 22 07   Dressing/Treatment Open to air 22 0700   Wound Assessment Erythema;Pink/red 22 0700   Drainage Amount None 22 0700   Odor None 22 0700   Number of days: 0        Elimination:  Continence: Bowel: {YES / VB:97603}  Bladder: {YES / IN:77928}  Urinary Catheter: {Urinary Catheter:584392923}   Colostomy/Ileostomy/Ileal Conduit: {YES / SB:47244}       Date of Last BM: ***    Intake/Output Summary (Last 24 hours) at 2022 1009  Last data filed at 2022 0827  Gross per 24 hour   Intake 120 ml   Output --   Net 120 ml     No intake/output data recorded.     Safety Concerns:     [employer] Safety Concerns:170624159}    Impairments/Disabilities:      [employer] Impairments/Disabilities:338169797}    Nutrition Therapy:  Current Nutrition Therapy:   [employer] Diet List:584021443}    Routes of Feeding: {CHP DME Other Feedings:456571385}  Liquids: {Slp liquid thickness:58797}  Daily Fluid Restriction: {CHP DME Yes amt example:004981467}  Last Modified Barium Swallow with Video (Video Swallowing Test): {Done Not Done EISB:789772029}    Treatments at the Time of Hospital Discharge:   Respiratory Treatments: ***  Oxygen Therapy:  {Therapy; copd oxygen:64086}  Ventilator:    { CC Vent USZK:299605826}    Rehab Therapies: {THERAPEUTIC INTERVENTION:2963244270}  Weight Bearing Status/Restrictions: [address] [employer] Weight [employer]in}  Other Medical Equipment (for information only, NOT a DME order):  {EQUIPMENT:498719981}  Other Treatments: ***    Patient's personal belongings (please select all that are sent with patient):  {P DME Belongings:228624736}    RN SIGNATURE:  {Esignature:148745832}    CASE MANAGEMENT/SOCIAL WORK SECTION    Inpatient Status Date: ***    Readmission Risk Assessment Score:  Readmission Risk              Risk of Unplanned Readmission:  22           Discharging to Facility/ Agency   Name: [facility]  Address:[facility] [address], [address]  Fax:[phone]    Dialysis Facility (if applicable)   Name  Address  Dialysis Schedule:  Phone:  Fax:    / signature: Electronically signed by GAGE Mars on 8/17/22 at 10:10 AM EDT    PHYSICIAN SECTION    Prognosis: {Prognosis:0468373558}    Condition at Discharge: Stable    Rehab Potential (if transferring to Rehab): {Prognosis:6337592735}    Recommended Labs or Other Treatments After Discharge: ***    Physician Certification: I certify the above information and transfer of Yecenia Rosenberg  is necessary for the continuing treatment of the diagnosis listed and that she requires Intermediate Nursing Care for greater 30 days.      Update Admission H&P: {CHP DME Changes in XNNPH:074208863}    PHYSICIAN SIGNATURE:  {Esignature:771073512}

## 2022-08-17 NOTE — PROGRESS NOTES
Call to Dr. Luis Fletcher this AM for admission orders on patient arrived to unit 6W. Will await orders to be placed in chart.

## 2022-08-17 NOTE — CONSULTS
[address] [place] [address] [facility]  Consultation Note     Admit Date: 8/17/2022 12:03 AM    Reason for Consult: Cellulitis    Attending Physician:  Moose Thakkar DO    HISTORY OF PRESENT ILLNESS:             The history is obtained from extensive review of available past medical records. The patient is a 64 y.o. female who is not known to the ID service. The patient fractured her right ankle and underwent an ORIF in January 2022 by Dr. Karl Pizarro. She resides at [facility]. She developed redness, swelling and pain in the right leg. He denies any trauma. She says she has been dealing with a chronic callus and an ulcer on the distal part of her right first toe. She has been seen by a podiatrist who has been debriding this ulcer at the facility. She patient was sent to the ED at [facility] on 8/17/2022 with pain in the left lower extremity.     Past Medical History:        Diagnosis Date    Adenoma of right adrenal gland     Anemia     Anxiety     Arthritis     spinal    Asthma     controlled with inhalers     Benign essential tremor     Bipolar affective ([address]     Chronic back pain     Spinal cord stimulator in place    Chronic respiratory failure with hypoxia (HCC)     at times dt diaphragm does not function properly dt Mitochondrial disorder    Depression     stable    Diabetes mellitus ([address]     Difficulty swallowing     for EGD 10-8-19     Environmental and seasonal allergies     Excessive physiologic tremor 5/24/2021    Fatty liver     Full dentures     GERD (gastroesophageal reflux disease)     Gout     past hx of    Herniated cervical disc     limited rom of head and neck    History of swelling of feet     Hypertension     Lymphedema of lower extremity 09/06/2012    Mitochondrial cytopathy ([address]     s/s muscle and nerve pain, difficulty breathing, seizures, difficulty swallowing, digestive disorders- Dr. Ailyn Rodas [facility]    Muscle weakness (generalized)     Information obtained from [facility]    Need for assistance with personal care     Information obtained from [place] [address]    Neuropathy     at feet    Obesity     PETR (obstructive sleep apnea)     no CPAP dt insurance will not pay for    Osteoarthritis of left knee     Information obtained from [place] [address]    PONV (postoperative nausea and vomiting)     Seizures ([facility] [address]     last approx 6-13-19- f/u w/ PCP  Granmal, flares up in heat/ summer time - no recent issues as of 10-4-19     Spinal headache     Thyroid disease      Past Surgical History:        Procedure Laterality Date    ANKLE FRACTURE SURGERY Right 2/14/2022    RIGHT ANKLE IRRIGAITON AND DEBRIDEMENT WITH EXTERNAL FIXATOR AND LACERATION REPAIR performed by Saima Ambrosio MD at [address] Right 3/2/2022    RIGHT LOWER EXTREMITY REMOVAL EXTERNAL FIXATOR, RIGHT PILON OPEN REDUCTION INTERNAL FIXATOR--SYNTHES (FACILITY) performed by Alicia Zamarripa DO at [facility] 83      with Hysterectomy / unknown    BACK SURGERY  last one 1995    lumbar x 2    CARDIAC CATHETERIZATION Right 6-6-2013    Dr. Lorne Rizvi Radial, no stents placed, no blockages     CHOLECYSTECTOMY  1999    open with gastric bypass    COLONOSCOPY N/A 9/18/2018    COLONOSCOPY POLYPECTOMY HOT BIOPSY performed by CHRISTOPHE Levin MD at [address] N/A 10/8/2019    COLONOSCOPY POLYPECTOMY SNARE/COLD BIOPSY performed by Maritza Villeda MD at [address]    EGD COLONOSCOPY N/A 10/8/2019    EGD ESOPHAGOGASTRODUODENOSCOPY DILATATION performed by Maritza Villeda MD at [facility], DIAGNOSTIC      ESOPHAGEAL DILATATION  9/18/2018    ESOPHAGEAL DILATION Adele Nearing performed by CHRISTOPHE Levin MD at [address] ([facility])  [address] [address] Bilateral 2007,2017    knees    KNEE SURGERY Bilateral     scope    MYOMECTOMY      NERVE BLOCK Left 10 02 2013    lumbar paravertebral facet #2    NERVE BLOCK Left 10 9 13    hip inj #1    NERVE BLOCK Left 10/16/13    hip injection    NERVE BLOCK Left 10/29/2014    left lumbar transforaminal nerve lbock  #1    NERVE BLOCK Left 11/12/2014    lumbar left transforaminal nerve block  #2    NERVE BLOCK Left 12 8 14    lumbar transforaminal #3    NERVE BLOCK Right 3/30/15    cervical transforaminal #1    NERVE BLOCK Right 4/6/2015    right cervical transforaminal nerve block  #2    NERVE BLOCK Right 4/13/15    cervical transforaminal #3    NERVE BLOCK Left 7/6/15    knee injection #1    NERVE BLOCK  07/20/15    left genicular nerve block/knee #2    NERVE BLOCK Left 10 1 15    lumb transforam #1    NERVE BLOCK  10/26/15    left lumbar transforaminal nerve block #3    NERVE BLOCK Bilateral 4/1/2021    #1 BILATERAL SACROILIAC JOINT INJECTION UNDER FLUORO performed by Laura Martin MD at [address] [facility] [place] Left 11 25 13    lumbar radiofreq    OTHER SURGICAL HISTORY  3/28/2016    stage 1, 3 day percutanous trial [employer] lumbar spinal cord stimulator    OTHER SURGICAL HISTORY  1995    racz procedure / lower back    DE COLONOSCOPY FLX DX W/COLLJ SPEC WHEN PFRMD N/A 3/20/2018    COLONOSCOPY DIAGNOSTIC OR SCREENING performed by Traci Carnes MD at [address] [place] [address] EGD TRANSORAL BIOPSY SINGLE/MULTIPLE N/A 3/20/2018    EGD BIOPSY performed by Traci Carnes MD at [facility] ENDOSCOPY    TONSILLECTOMY       Current Medications:   Scheduled Meds:   allopurinol  200 mg Oral BID    baclofen  20 mg Oral 4x daily    famotidine  20 mg Oral BID    escitalopram  20 mg Oral BID    ferrous sulfate  325 mg Oral Daily    furosemide  40 mg Oral Daily    gabapentin  600 mg Oral 4x Daily    levOCARNitine  330 mg Oral TID    sodium chloride flush  5-40 mL IntraVENous 2 times per day    enoxaparin  30 mg SubCUTAneous BID     Continuous Infusions:   sodium chloride       PRN Meds:albuterol, sodium chloride flush, sodium chloride, ondansetron **OR** ondansetron, polyethylene glycol, acetaminophen **OR** acetaminophen    Allergies:  Bee pollen, Penicillins, Ropinirole, Ropinirole hcl, Vistaril [hydroxyzine hcl], Aripiprazole, Prednisone, Restoril [temazepam], Hydroxyzine pamoate, and Tape Genella Bakes tape]    Social History:   Social History     Socioeconomic History    Marital status:    Tobacco Use    Smoking status: Former     Packs/day: 0.75     Years: 42.00     Pack years: 31.50     Types: Cigarettes     Start date: 1990     Quit date: 2021     Years since quittin.6    Smokeless tobacco: Never   Vaping Use    Vaping Use: Never used   Substance and Sexual Activity    Alcohol use: No     Alcohol/week: 0.0 standard drinks    Drug use: Not Currently     Types: Marijuana Shelly Roderick)     Comment: edibles- through pain doctor   Social History Narrative    ** Merged History Encounter **           Nursing home resident    Family History:       Problem Relation Age of Onset    Heart Disease Mother     Cancer Father     Arthritis Other     Cancer Other     Depression Other     Heart Disease Other     Hypertension Other     Mental Illness Other     Stroke Other    . Otherwise non-pertinent to the chief complaint. REVIEW OF SYSTEMS:    Constitutional: Negative for fevers, chills, diaphoresis  Neurologic: Negative   Psychiatric: Negative  Rheumatologic: Negative   Endocrine: Negative  Hematologic: Negative  Immunologic: Negative  ENT: Negative  Respiratory: Negative   Cardiovascular: Negative  GI: Negative  : Negative  Musculoskeletal: As in HPI  Skin: As in HPI    PHYSICAL EXAM:    Vitals:   /71   Pulse 84   Temp 99.6 °F (37.6 °C) (Oral)   Resp 16   Ht 5' 7\" (1.702 m)   Wt 232 lb 8 oz (105.5 kg)   LMP 1988   SpO2 96%   BMI 36.41 kg/m²   Constitutional: The patient is lying in bed. She is asleep but arousable. No distress  Skin: Warm and dry. No rashes were noted. HEENT: Eyes show round, and reactive pupils. No jaundice.  Moist mucous membranes, no ulcerations, no thrush. Neck: Supple to movements. No lymphadenopathy. Chest: No use of accessory muscles to breathe. Symmetrical expansion. Auscultation reveals no wheezing, crackles, or rhonchi. Cardiovascular: S1 and S2 are rhythmic and regular. No murmurs appreciated. Abdomen: Positive bowel sounds to auscultation. Benign to palpation. No masses felt. No hepatosplenomegaly. Extremities: Left TKR surgical wound well-healed. The right leg is erythematous. There is dark erythema that does not lexi. It is warm and tender to touch. Well-healed surgical wound medial aspect of the ankle. Callus over the tip of the right first toe.   Lines: Peripheral.      CBC+dif:  Recent Labs     08/17/22  0130   WBC 8.8   HGB 15.2   HCT 45.9   MCV 97.0      NEUTROABS 7.18     Lab Results   Component Value Date    CRP 9.4 (H) 01/06/2022    CRP 0.6 (H) 09/02/2021    CRP 0.2 05/11/2020      No results found for: CRPHS  Lab Results   Component Value Date    SEDRATE 40 (H) 01/06/2022    SEDRATE 50 (H) 09/02/2021    SEDRATE 47 (H) 03/16/2021     Lab Results   Component Value Date    ALT 10 08/17/2022    AST 14 08/17/2022    ALKPHOS 97 08/17/2022    BILITOT 0.4 08/17/2022     Lab Results   Component Value Date/Time     08/17/2022 01:30 AM    K 3.5 08/17/2022 01:30 AM    K 3.9 05/06/2022 05:28 PM    CL 96 08/17/2022 01:30 AM    CO2 24 08/17/2022 01:30 AM    BUN 15 08/17/2022 01:30 AM    CREATININE 0.7 08/17/2022 01:30 AM    GFRAA >60 08/17/2022 01:30 AM    LABGLOM >60 08/17/2022 01:30 AM    GLUCOSE 127 08/17/2022 01:30 AM    GLUCOSE 93 05/24/2012 11:11 AM    PROT 6.9 08/17/2022 01:30 AM    LABALBU 3.1 08/17/2022 01:30 AM    LABALBU 4.7 05/21/2012 01:48 PM    CALCIUM 8.8 08/17/2022 01:30 AM    BILITOT 0.4 08/17/2022 01:30 AM    ALKPHOS 97 08/17/2022 01:30 AM    AST 14 08/17/2022 01:30 AM    ALT 10 08/17/2022 01:30 AM       Lab Results   Component Value Date/Time    PROTIME 10.3 03/02/2022 08:30 AM    PROTIME 10.8 05/07/2012 03:45 AM    INR 0.9 03/02/2022 08:30 AM       Lab Results   Component Value Date/Time    TSH 0.548 03/16/2021 12:16 PM       Lab Results   Component Value Date/Time    COLORU Yellow 08/17/2022 05:21 AM    PHUR 6.5 08/17/2022 05:21 AM    LABCAST MODERATE 05/06/2012 04:00 AM    WBCUA NONE 05/06/2022 07:21 PM    WBCUA 1-3 05/06/2012 04:00 AM    RBCUA NONE 05/06/2022 07:21 PM    RBCUA 0-1 12/13/2013 10:15 AM    BACTERIA NONE SEEN 05/06/2022 07:21 PM    CLARITYU Clear 08/17/2022 05:21 AM    SPECGRAV <=1.005 08/17/2022 05:21 AM    LEUKOCYTESUR Negative 08/17/2022 05:21 AM    UROBILINOGEN 1.0 08/17/2022 05:21 AM    BILIRUBINUR Negative 08/17/2022 05:21 AM    BILIRUBINUR NEGATIVE 05/06/2012 04:00 AM    BLOODU Negative 08/17/2022 05:21 AM    GLUCOSEU Negative 08/17/2022 05:21 AM    GLUCOSEU NEGATIVE 05/06/2012 04:00 AM       Lab Results   Component Value Date/Time    HCO3 25.9 06/13/2019 11:16 AM    BE 0.9 06/13/2019 11:16 AM    O2SAT 93.7 06/13/2019 11:16 AM    PH 7.403 06/13/2019 11:16 AM    PH 7.291 05/06/2012 10:00 AM    PCO2 42.4 06/13/2019 11:16 AM    PO2 64.7 06/13/2019 11:16 AM     Radiology:  Noted    Microbiology:  Pending  No results for input(s): BC in the last 72 hours. No results for input(s): ORG in the last 72 hours. No results for input(s): Giovanni Bidding in the last 72 hours. No results for input(s): STREPNEUMAGU in the last 72 hours. No results for input(s): LP1UAG in the last 72 hours. No results for input(s): ASO in the last 72 hours. No results for input(s): CULTRESP in the last 72 hours. No results for input(s): PROCAL in the last 72 hours. Assessment:  Cellulitis right lower extremity. The port of entry might have been a chronic callus/ulcer over the first distal toe  Status post right ankle fracture ORIF.   There does not seem to be hardware infection    Plan:    Start oral Doxycycline and Cephalexin  Check cultures, baseline ESR, CRP, ASO titer  Monitor labs  Will follow with you    Thank you for having us see this patient in consultation. I will be discussing this case with the treating physicians.     Mónica Sanders MD  9:18 AM  8/17/2022

## 2022-08-17 NOTE — H&P
[address] [address]                              HISTORY AND PHYSICAL    PATIENT NAME: Charmaine Gonzalez                   :        1960  MED REC NO:   [id number]                            ROOM:       2658  ACCOUNT NO:   [de-identified]                           ADMIT DATE: 2022  PROVIDER:     Cortney Segura DO    CHIEF COMPLAINT:  Pain, right lower extremity. HISTORY OF PRESENT ILLNESS:  This is a 70-year-old female who presented  to the emergency room with chief complaint of fatigue and pain in the  right leg further. The patient has a questionable history of  mitochondrial disease and also visits and is being treated by pain  clinic for chronic neuropathy. After evaluation in the emergency room,  she was found to have a rather extensive cellulitis of the right lower  extremity and she is being admitted for further workup and evaluation. ID is consulted at this time. _____ does not at this point have any  specific wounds. PAST MEDICAL HISTORY:  Significant for anxiety, psych issues, bipolar  disorder, chronic back pain, chronic respiratory failure, mitochondrial  cytopathy, and muscle weakness. PAST SURGICAL HISTORY:  Includes bilateral knee surgery, gastric bypass  surgery, myomectomy, and tonsillectomy. SOCIAL HISTORY:  She states she quit smoking about seven months ago,  smoked about 32 years. FAMILY HISTORY:  Includes arthritis, cancer in her father, depression in  multiple family members, and heart disease in her mother. ALLERGIES:  PENICILLIN. REVIEW OF SYSTEMS:  Other than what is mentioned, noncontributory. PHYSICAL EXAMINATION:  GENERAL:  A 70-year-old  female, alert, and oriented x3. Obese. Does not appear to be in any distress at the time of this  examination. HEENT:  Head:  Normocephalic and atraumatic.   Ears, eyes, nose, and  throat grossly normal.  NECK:

## 2022-08-17 NOTE — ED PROVIDER NOTES
HPI:  8/17/22,   Time: 12:41 AM EDT       Yulisa Arias is a 64 y.o. female presenting to the ED for increased fatigue and right leg pain, beginning 1 day ago. The complaint has been persistent, moderate in severity, and worsened by nothing. Patient is a 58-year-old female presents from [address] [place] [address]. 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 Patient is here because the nursing home stated she was more fatigued and lethargic today. Patient has not had any fevers or chills. She is arousable and answers questions here in the department. She is arousable to loud voice. She is able to then be conversive and carries on conversation answering questions stating that her only pain is new to the right leg she has increased redness and warmth to that leg that is developing today. She states is not unusual for her with her mitochondrial disorder to have waxing and waning fatigue and periods of lethargy. That is why she is in the nursing home at baseline. Patient denies any fevers or chills. Patient denies any wounds to that leg. Patient denies any fall or trauma. Patient states she does not walk a lot at baseline due to her chronic weakness and fatigue. She denies any chest pain or shortness of breath denies any headache. Denies any cough. Denies any urinary complaints no abdominal pain no nausea vomiting or diarrhea.     Review of Systems:   Pertinent positives and negatives are stated within HPI, all other systems reviewed and are negative.          --------------------------------------------- PAST HISTORY ---------------------------------------------  Past Medical History:  has a past medical history of Adenoma of right adrenal gland, Anemia, Anxiety, Arthritis, Asthma, Benign essential tremor, Bipolar affective ([address] Chronic back pain, Chronic respiratory failure with hypoxia ([address] Depression, Diabetes mellitus ([address] Difficulty swallowing, Environmental and seasonal allergies, Excessive physiologic tremor, Fatty liver, Full dentures, GERD (gastroesophageal reflux disease), Gout, Herniated cervical disc, History of swelling of feet, Hypertension, Lymphedema of lower extremity, Mitochondrial cytopathy ([address] Muscle weakness (generalized), Need for assistance with personal care, Neuropathy, Obesity, PETR (obstructive sleep apnea), Osteoarthritis of left knee, PONV (postoperative nausea and vomiting), Seizures ([address] Spinal headache, and Thyroid disease. Past Surgical History:  has a past surgical history that includes knee surgery (Bilateral); Gastric bypass surgery (1999); myomectomy; Tonsillectomy; Nerve Block (Left, 10 02 2013); Nerve Block (Left, 10 9 13); Nerve Block (Left, 10/16/13); other surgical history (Left, 11 25 13); Nerve Block (Left, 10/29/2014); Nerve Block (Left, 11/12/2014); Nerve Block (Left, 12 8 14); Nerve Block (Right, 3/30/15); Nerve Block (Right, 4/6/2015); Nerve Block (Right, 4/13/15); Nerve Block (Left, 7/6/15); Nerve Block (07/20/15); Nerve Block (Left, 10 1 15); Nerve Block (10/26/15); other surgical history (3/28/2016); Endoscopy, colon, diagnostic; pr colonoscopy flx dx w/collj spec when pfrmd (N/A, 3/20/2018); pr egd transoral biopsy single/multiple (N/A, 3/20/2018); Cholecystectomy (1999); Hysterectomy (1988); Colonoscopy (N/A, 9/18/2018); Esophagus dilation (9/18/2018); back surgery (last one 1995); Cardiac catheterization (Right, 6-6-2013); Appendectomy; other surgical history (1995); joint replacement (Bilateral, [id number]); egd colonoscopy (N/A, 10/8/2019); Colonoscopy (N/A, 10/8/2019); Nerve Block (Bilateral, 4/1/2021); Ankle fracture surgery (Right, 2/14/2022); and Ankle fracture surgery (Right, 3/2/2022). Social History:  reports that she quit smoking about 7 months ago. Her smoking use included cigarettes.  She started smoking about 32 years ago. She has a 31.50 pack-year smoking history. She has never used smokeless tobacco. She reports that she does not currently use drugs after having used the following drugs: Marijuana [place]). She reports that she does not drink alcohol. Family History: family history includes Arthritis in an other family member; Cancer in her father and another family member; Depression in an other family member; Heart Disease in her mother and another family member; Hypertension in an other family member; Mental Illness in an other family member; Stroke in an other family member. The patients home medications have been reviewed. Allergies: Bee pollen, Penicillins, Ropinirole, Ropinirole hcl, Vistaril [hydroxyzine hcl], Aripiprazole, Prednisone, Restoril [temazepam], Hydroxyzine pamoate, and Tape [adhesive tape]        ---------------------------------------------------PHYSICAL EXAM--------------------------------------    Constitutional/General: Alert and oriented x3, morbidly obese no distress. Arousable to voice. Answers all questions appropriately. Appears somewhat fatigued and chronically ill. Head: Normocephalic and atraumatic  Eyes: PERRL, EOMI, conjunctive normal, sclera non icteric  Mouth: Oropharynx clear, handling secretions, no trismus, no asymmetry of the posterior oropharynx or uvular edema  Neck: Supple, full ROM, non tender to palpation in the midline, no stridor, no crepitus, no meningeal signs  Respiratory: Lungs clear to auscultation bilaterally, no wheezes, rales, or rhonchi. Not in respiratory distress  Cardiovascular:  Regular rate. Regular rhythm. No murmurs, gallops, or rubs. 2+ distal pulses  Chest: No chest wall tenderness  GI:  Abdomen Soft, Non tender, Non distended. +BS. No organomegaly, no palpable masses,  No rebound, guarding, or rigidity. Musculoskeletal: Moves all extremities x 4. Warm and well perfused, no clubbing, cyanosis, or edema.  Capillary refill <3 seconds  Integument: skin warm and dry. It was cellulitic rash and ankle circumferentially around the right leg and up to the knee proximally. No pain to the knee itself. No pain to the ankle or foot itself no open wound. The area of redness is warm to the touch. No focal tenderness. Normal dorsalis pedis posterior tibial pulse to the right lower extremity. Lymphatic: no lymphadenopathy noted  Neurologic: GCS 15, no focal deficits, symmetric strength 5/5 in the upper and lower extremities bilaterally  Psychiatric: Normal Affect    -------------------------------------------------- RESULTS -------------------------------------------------  I have personally reviewed all laboratory and imaging results for this patient. Results are listed below.      LABS:  Results for orders placed or performed during the hospital encounter of 08/17/22   CBC with Auto Differential   Result Value Ref Range    WBC 8.8 4.5 - 11.5 E9/L    RBC 4.73 3.50 - 5.50 E12/L    Hemoglobin 15.2 11.5 - 15.5 g/dL    Hematocrit 45.9 34.0 - 48.0 %    MCV 97.0 80.0 - 99.9 fL    MCH 32.1 26.0 - 35.0 pg    MCHC 33.1 32.0 - 34.5 %    RDW 13.1 11.5 - 15.0 fL    Platelets 312 807 - 292 E9/L    MPV 9.1 7.0 - 12.0 fL    Neutrophils % 81.8 (H) 43.0 - 80.0 %    Immature Granulocytes % 0.3 0.0 - 5.0 %    Lymphocytes % 6.0 (L) 20.0 - 42.0 %    Monocytes % 11.6 2.0 - 12.0 %    Eosinophils % 0.2 0.0 - 6.0 %    Basophils % 0.1 0.0 - 2.0 %    Neutrophils Absolute 7.18 1.80 - 7.30 E9/L    Immature Granulocytes # 0.03 E9/L    Lymphocytes Absolute 0.53 (L) 1.50 - 4.00 E9/L    Monocytes Absolute 1.02 (H) 0.10 - 0.95 E9/L    Eosinophils Absolute 0.02 (L) 0.05 - 0.50 E9/L    Basophils Absolute 0.01 0.00 - 0.20 E9/L    Polychromasia 1+    Comprehensive Metabolic Panel   Result Value Ref Range    Sodium 133 132 - 146 mmol/L    Potassium 3.5 3.5 - 5.0 mmol/L    Chloride 96 (L) 98 - 107 mmol/L    CO2 24 22 - 29 mmol/L    Anion Gap 13 7 - 16 mmol/L    Glucose 127 (H) 74 - 99 mg/dL    BUN 15 6 - 23 mg/dL    Creatinine 0.7 0.5 - 1.0 mg/dL    GFR Non-African American >60 >=60 mL/min/1.73    GFR African American >60     Calcium 8.8 8.6 - 10.2 mg/dL    Total Protein 6.9 6.4 - 8.3 g/dL    Albumin 3.1 (L) 3.5 - 5.2 g/dL    Total Bilirubin 0.4 0.0 - 1.2 mg/dL    Alkaline Phosphatase 97 35 - 104 U/L    ALT 10 0 - 32 U/L    AST 14 0 - 31 U/L   Troponin   Result Value Ref Range    Troponin, High Sensitivity 15 (H) 0 - 9 ng/L   Lactate, Sepsis   Result Value Ref Range    Lactic Acid, Sepsis 0.8 0.5 - 1.9 mmol/L   Urinalysis   Result Value Ref Range    Color, UA Yellow Straw/Yellow    Clarity, UA Clear Clear    Glucose, Ur Negative Negative mg/dL    Bilirubin Urine Negative Negative    Ketones, Urine Negative Negative mg/dL    Specific Gravity, UA <=1.005 1.005 - 1.030    Blood, Urine Negative Negative    pH, UA 6.5 5.0 - 9.0    Protein, UA Negative Negative mg/dL    Urobilinogen, Urine 1.0 <2.0 E.U./dL    Nitrite, Urine Negative Negative    Leukocyte Esterase, Urine Negative Negative   Troponin   Result Value Ref Range    Troponin, High Sensitivity 13 (H) 0 - 9 ng/L   POCT Glucose   Result Value Ref Range    Meter Glucose 147 (H) 74 - 99 mg/dL   POCT Glucose   Result Value Ref Range    Glucose 147 mg/dL    QC OK? yes        RADIOLOGY:  Interpreted by Radiologist.  [address]   Final Result   No acute intracranial abnormality. XR CHEST PORTABLE   Final Result   Atherosclerotic disease and mild prominence of the cardiac silhouette. Central pulmonary vascular congestion. No pneumothorax. XR TIBIA FIBULA RIGHT (2 VIEWS)   Final Result   1. Extensive postsurgical changes about the distal right femur, right knee,   and distal right tibia and fibula. No hardware complications. 2.  Diffuse soft tissue stranding and more focal anterior soft tissue edema.    There is no definite evidence of osseous destruction or erosion about the   right tibia or fibula on this exam. EKG:  EKG is normal sinus rhythm 86 beats minute no signs of any ST changes . No ST elevation no change in prior EKG EKG interpreted myself.    ------------------------- NURSING NOTES AND VITALS REVIEWED ---------------------------   The nursing notes within the ED encounter and vital signs as below have been reviewed by myself. BP 99/63 Comment: Pt asleep  Pulse 80   Temp 98.4 °F (36.9 °C) (Oral)   Resp 14   Wt 232 lb (105.2 kg)   LMP 08/31/1988   SpO2 94%   BMI 37.45 kg/m²   Oxygen Saturation Interpretation: Normal    The patients available past medical records and past encounters were reviewed. ------------------------------ ED COURSE/MEDICAL DECISION MAKING----------------------  Medications   vancomycin (VANCOCIN) 2,500 mg in dextrose 5 % 500 mL IVPB (2,500 mg IntraVENous New Bag 8/17/22 0333)   0.9 % sodium chloride bolus (has no administration in time range)   0.9 % sodium chloride bolus (0 mLs IntraVENous Stopped 8/17/22 0334)   cefepime (MAXIPIME) 2000 mg IVPB minibag (0 mg IntraVENous Stopped 8/17/22 0334)         ED COURSE:  ED Course as of 08/18/22 1118   Wed Aug 17, 2022   0535 Patient is agreeable to plan for admission. I spoke to Dr. Stan Prince red she will admit the patient to his service antibiotics were started. [KK]      ED Course User Index  [KK] Shamir Marie MD       Medical Decision Making:    Patient 66-year-old female presents from nursing home history of mitochondrial disease causing chronic nerve and muscle weakness. Patient has difficulty with ambulation at sign denies fall or trauma. Nursing home sent her in for increased fatigue and lethargy today. She is arousable to voice states that this is chronic waxing and waning due to her mitochondrial disease but she complains of right leg pain and redness concerns for infection no fever or chills.     Differential diagnosis being cellulitis osteomyelitis sepsis pneumonia intracranial abnormality electrolyte abnormality    This patient has remained hemodynamically stable during their ED course. EKG shows normal sinus rhythm 86 beats minute no signs of any ST changes. Patient was given IV fluids in the emergency department she had stable vital signs she was afebrile urinalysis is negative for acute infection. First troponin was 15-second was 13 delta of only 2 CBC was normal white count was 8.8 chemistry was normal creatinine was normal. Lactic acid was normal.  Blood cultures were sent. X-ray of the tib-fib did show postsurgical changes but no hardware complications there is diffuse soft tissue edema but no bony erosion no signs for osteomyelitis. CT head showed no acute abnormality chest x-ray showed no acute process. Signs for clinical cellulitis of the right lower extremity as well as chronic neurologic issues, patient decision was made to admit the patient for ID consult and broad-spectrum antibiotics were started in emergency department. Cefepime and Vanco was started. I spoke to Dr. Avery Reese to admit the patient service patient stable for admission. Re-Evaluations:             Re-evaluation. Patients symptoms are improving    Re-examination  8/17/22   12:41 AM EDT          Vital Signs:   Vitals:    08/17/22 0012 08/17/22 0015 08/17/22 0234 08/17/22 0336   BP:  (!) 110/52 (!) 114/56 99/63   Pulse: 86  77 80   Resp: 16  18 14   Temp: 98.4 °F (36.9 °C)      TempSrc: Oral      SpO2: 93%  95% 94%   Weight: 232 lb (105.2 kg)              Counseling: The emergency provider has spoken with the patient and discussed todays results, in addition to providing specific details for the plan of care and counseling regarding the diagnosis and prognosis. Questions are answered at this time and they are agreeable with the plan.       --------------------------------- IMPRESSION AND DISPOSITION ---------------------------------    IMPRESSION  1. General weakness    2.  Cellulitis of right lower extremity DISPOSITION  Disposition: Admit to telemetry  Patient condition is fair    NOTE: This report was transcribed using voice recognition software.  Every effort was made to ensure accuracy; however, inadvertent computerized transcription errors may be present        Pepe Alatorre MD  08/18/22 3162

## 2022-08-17 NOTE — PROGRESS NOTES
Dr Aries Smith on unit and orders received orders in chart. Call to ID for consult for RLE Cellulitis and antibiotics.

## 2022-08-18 VITALS
HEART RATE: 86 BPM | TEMPERATURE: 100.8 F | SYSTOLIC BLOOD PRESSURE: 116 MMHG | BODY MASS INDEX: 37.73 KG/M2 | HEIGHT: 67 IN | WEIGHT: 240.4 LBS | RESPIRATION RATE: 20 BRPM | DIASTOLIC BLOOD PRESSURE: 80 MMHG | OXYGEN SATURATION: 95 %

## 2022-08-18 LAB
ACINETOBACTER CALCOAC BAUMANNII COMPLEX BY PCR: NOT DETECTED
BACTEROIDES FRAGILIS BY PCR: NOT DETECTED
BOTTLE TYPE: ABNORMAL
CANDIDA ALBICANS BY PCR: NOT DETECTED
CANDIDA AURIS BY PCR: NOT DETECTED
CANDIDA GLABRATA BY PCR: NOT DETECTED
CANDIDA KRUSEI BY PCR: NOT DETECTED
CANDIDA PARAPSILOSIS BY PCR: NOT DETECTED
CANDIDA TROPICALIS BY PCR: NOT DETECTED
CRYPTOCOCCUS NEOFORMANS/GATTII BY PCR: NOT DETECTED
ENTEROBACTER CLOACAE COMPLEX BY PCR: NOT DETECTED
ENTEROBACTERALES BY PCR: NOT DETECTED
ENTEROCOCCUS FAECALIS BY PCR: NOT DETECTED
ENTEROCOCCUS FAECIUM BY PCR: NOT DETECTED
ESCHERICHIA COLI BY PCR: NOT DETECTED
HAEMOPHILUS INFLUENZAE BY PCR: NOT DETECTED
KLEBSIELLA AEROGENES BY PCR: NOT DETECTED
KLEBSIELLA OXYTOCA BY PCR: NOT DETECTED
KLEBSIELLA PNEUMONIAE GROUP BY PCR: NOT DETECTED
LISTERIA MONOCYTOGENES BY PCR: NOT DETECTED
METHICILLIN RESISTANCE MECA/C AND MREJ BY PCR: DETECTED
NEISSERIA MENINGITIDIS BY PCR: NOT DETECTED
ORDER NUMBER: ABNORMAL
PROTEUS SPECIES BY PCR: NOT DETECTED
PSEUDOMONAS AERUGINOSA BY PCR: NOT DETECTED
SALMONELLA SPECIES BY PCR: NOT DETECTED
SERRATIA MARCESCENS BY PCR: NOT DETECTED
SOURCE OF BLOOD CULTURE: ABNORMAL
STAPHYLOCOCCUS AUREUS BY PCR: DETECTED
STAPHYLOCOCCUS EPIDERMIDIS BY PCR: NOT DETECTED
STAPHYLOCOCCUS LUGDUNENSIS BY PCR: NOT DETECTED
STAPHYLOCOCCUS SPECIES BY PCR: DETECTED
STENOTROPHOMONAS MALTOPHILIA BY PCR: NOT DETECTED
STREPTOCOCCUS AGALACTIAE BY PCR: NOT DETECTED
STREPTOCOCCUS PNEUMONIAE BY PCR: NOT DETECTED
STREPTOCOCCUS PYOGENES  BY PCR: NOT DETECTED
STREPTOCOCCUS SPECIES BY PCR: NOT DETECTED

## 2022-08-18 PROCEDURE — 6370000000 HC RX 637 (ALT 250 FOR IP): Performed by: FAMILY MEDICINE

## 2022-08-18 PROCEDURE — 6360000002 HC RX W HCPCS: Performed by: FAMILY MEDICINE

## 2022-08-18 PROCEDURE — 6360000002 HC RX W HCPCS

## 2022-08-18 PROCEDURE — 2580000003 HC RX 258

## 2022-08-18 PROCEDURE — 36415 COLL VENOUS BLD VENIPUNCTURE: CPT

## 2022-08-18 PROCEDURE — 97161 PT EVAL LOW COMPLEX 20 MIN: CPT

## 2022-08-18 PROCEDURE — 87040 BLOOD CULTURE FOR BACTERIA: CPT

## 2022-08-18 PROCEDURE — 2060000000 HC ICU INTERMEDIATE R&B

## 2022-08-18 PROCEDURE — 97165 OT EVAL LOW COMPLEX 30 MIN: CPT

## 2022-08-18 PROCEDURE — 6370000000 HC RX 637 (ALT 250 FOR IP): Performed by: SPECIALIST

## 2022-08-18 PROCEDURE — 2580000003 HC RX 258: Performed by: FAMILY MEDICINE

## 2022-08-18 RX ORDER — ENOXAPARIN SODIUM 100 MG/ML
30 INJECTION SUBCUTANEOUS 2 TIMES DAILY
Status: CANCELLED | OUTPATIENT
Start: 2022-08-19

## 2022-08-18 RX ORDER — SODIUM CHLORIDE 9 MG/ML
INJECTION, SOLUTION INTRAVENOUS PRN
Status: CANCELLED | OUTPATIENT
Start: 2022-08-18

## 2022-08-18 RX ORDER — BACLOFEN 10 MG/1
20 TABLET ORAL 4 TIMES DAILY
Status: CANCELLED | OUTPATIENT
Start: 2022-08-19

## 2022-08-18 RX ORDER — ONDANSETRON 2 MG/ML
4 INJECTION INTRAMUSCULAR; INTRAVENOUS EVERY 6 HOURS PRN
Status: CANCELLED | OUTPATIENT
Start: 2022-08-18

## 2022-08-18 RX ORDER — ONDANSETRON 4 MG/1
4 TABLET, ORALLY DISINTEGRATING ORAL EVERY 8 HOURS PRN
Status: CANCELLED | OUTPATIENT
Start: 2022-08-18

## 2022-08-18 RX ORDER — LEVOCARNITINE 330 MG/1
330 TABLET ORAL 3 TIMES DAILY
Status: CANCELLED | OUTPATIENT
Start: 2022-08-19

## 2022-08-18 RX ORDER — ALLOPURINOL 100 MG/1
200 TABLET ORAL 2 TIMES DAILY
Status: CANCELLED | OUTPATIENT
Start: 2022-08-19

## 2022-08-18 RX ORDER — SODIUM CHLORIDE 0.9 % (FLUSH) 0.9 %
5-40 SYRINGE (ML) INJECTION EVERY 12 HOURS SCHEDULED
Status: CANCELLED | OUTPATIENT
Start: 2022-08-19

## 2022-08-18 RX ORDER — FAMOTIDINE 20 MG/1
20 TABLET, FILM COATED ORAL 2 TIMES DAILY
Status: CANCELLED | OUTPATIENT
Start: 2022-08-19

## 2022-08-18 RX ORDER — SODIUM CHLORIDE 0.9 % (FLUSH) 0.9 %
5-40 SYRINGE (ML) INJECTION PRN
Status: CANCELLED | OUTPATIENT
Start: 2022-08-18

## 2022-08-18 RX ORDER — POLYETHYLENE GLYCOL 3350 17 G/17G
17 POWDER, FOR SOLUTION ORAL DAILY PRN
Status: CANCELLED | OUTPATIENT
Start: 2022-08-18

## 2022-08-18 RX ORDER — ESCITALOPRAM OXALATE 10 MG/1
20 TABLET ORAL 2 TIMES DAILY
Status: CANCELLED | OUTPATIENT
Start: 2022-08-19

## 2022-08-18 RX ORDER — FUROSEMIDE 40 MG/1
40 TABLET ORAL DAILY
Status: CANCELLED | OUTPATIENT
Start: 2022-08-19

## 2022-08-18 RX ORDER — GABAPENTIN 300 MG/1
600 CAPSULE ORAL 4 TIMES DAILY
Status: CANCELLED | OUTPATIENT
Start: 2022-08-19

## 2022-08-18 RX ORDER — FERROUS SULFATE 325(65) MG
325 TABLET ORAL DAILY
Status: CANCELLED | OUTPATIENT
Start: 2022-08-19

## 2022-08-18 RX ORDER — ACETAMINOPHEN 325 MG/1
650 TABLET ORAL EVERY 6 HOURS PRN
Status: CANCELLED | OUTPATIENT
Start: 2022-08-18

## 2022-08-18 RX ORDER — ACETAMINOPHEN 650 MG/1
650 SUPPOSITORY RECTAL EVERY 6 HOURS PRN
Status: CANCELLED | OUTPATIENT
Start: 2022-08-18

## 2022-08-18 RX ADMIN — GABAPENTIN 600 MG: 300 CAPSULE ORAL at 12:42

## 2022-08-18 RX ADMIN — GABAPENTIN 600 MG: 300 CAPSULE ORAL at 18:00

## 2022-08-18 RX ADMIN — BACLOFEN 20 MG: 10 TABLET ORAL at 09:00

## 2022-08-18 RX ADMIN — FAMOTIDINE 20 MG: 20 TABLET ORAL at 21:33

## 2022-08-18 RX ADMIN — ACETAMINOPHEN 650 MG: 325 TABLET ORAL at 21:33

## 2022-08-18 RX ADMIN — CEPHALEXIN 1000 MG: 500 CAPSULE ORAL at 06:08

## 2022-08-18 RX ADMIN — ALLOPURINOL 200 MG: 100 TABLET ORAL at 08:59

## 2022-08-18 RX ADMIN — BACLOFEN 20 MG: 10 TABLET ORAL at 18:00

## 2022-08-18 RX ADMIN — FAMOTIDINE 20 MG: 20 TABLET ORAL at 08:59

## 2022-08-18 RX ADMIN — BACLOFEN 20 MG: 10 TABLET ORAL at 21:33

## 2022-08-18 RX ADMIN — ESCITALOPRAM OXALATE 20 MG: 10 TABLET ORAL at 21:33

## 2022-08-18 RX ADMIN — GABAPENTIN 600 MG: 300 CAPSULE ORAL at 08:59

## 2022-08-18 RX ADMIN — SODIUM CHLORIDE, PRESERVATIVE FREE 10 ML: 5 INJECTION INTRAVENOUS at 09:59

## 2022-08-18 RX ADMIN — ACETAMINOPHEN 650 MG: 325 TABLET ORAL at 06:13

## 2022-08-18 RX ADMIN — CEPHALEXIN 1000 MG: 500 CAPSULE ORAL at 13:54

## 2022-08-18 RX ADMIN — VANCOMYCIN HYDROCHLORIDE 2500 MG: 5 INJECTION, POWDER, LYOPHILIZED, FOR SOLUTION INTRAVENOUS at 12:30

## 2022-08-18 RX ADMIN — ESCITALOPRAM OXALATE 20 MG: 10 TABLET ORAL at 08:59

## 2022-08-18 RX ADMIN — SODIUM CHLORIDE, PRESERVATIVE FREE 10 ML: 5 INJECTION INTRAVENOUS at 21:34

## 2022-08-18 RX ADMIN — ENOXAPARIN SODIUM 30 MG: 100 INJECTION SUBCUTANEOUS at 08:57

## 2022-08-18 RX ADMIN — ALLOPURINOL 200 MG: 100 TABLET ORAL at 21:33

## 2022-08-18 RX ADMIN — GABAPENTIN 600 MG: 300 CAPSULE ORAL at 21:33

## 2022-08-18 RX ADMIN — FUROSEMIDE 40 MG: 40 TABLET ORAL at 06:08

## 2022-08-18 RX ADMIN — FERROUS SULFATE TAB 325 MG (65 MG ELEMENTAL FE) 325 MG: 325 (65 FE) TAB at 09:58

## 2022-08-18 RX ADMIN — DOXYCYCLINE HYCLATE 100 MG: 100 CAPSULE ORAL at 08:59

## 2022-08-18 RX ADMIN — ENOXAPARIN SODIUM 30 MG: 100 INJECTION SUBCUTANEOUS at 21:33

## 2022-08-18 RX ADMIN — BACLOFEN 20 MG: 10 TABLET ORAL at 12:42

## 2022-08-18 ASSESSMENT — PAIN SCALES - GENERAL
PAINLEVEL_OUTOF10: 9
PAINLEVEL_OUTOF10: 8

## 2022-08-18 ASSESSMENT — PAIN - FUNCTIONAL ASSESSMENT: PAIN_FUNCTIONAL_ASSESSMENT: PREVENTS OR INTERFERES SOME ACTIVE ACTIVITIES AND ADLS

## 2022-08-18 ASSESSMENT — PAIN DESCRIPTION - ORIENTATION: ORIENTATION: LEFT;RIGHT

## 2022-08-18 ASSESSMENT — PAIN DESCRIPTION - DESCRIPTORS: DESCRIPTORS: ACHING

## 2022-08-18 NOTE — CARE COORDINATION
Social work / Discharge Planning:        Plan is to return to [facility]. No precert needed. No covid testing needed. [facility] and transport form completed.   Electronically signed by GAGE Koenig on 8/18/2022 at 9:05 AM

## 2022-08-18 NOTE — PROGRESS NOTES
Ortho MD returned call for consult of possible septic right knee. Options to [address] [place] [address] to [facility] to have Ortho follow closely or have IR eval and treat patient at current facility. Call to Dr. Dayron Aiken to report details. Option to have IR eval and treat at current facility fr septic knee. Order for IR placed for aspiration of right knee to r/o sepsis.

## 2022-08-18 NOTE — PROGRESS NOTES
[facility] Quality Flow/Interdisciplinary Rounds Progress Note        Quality Flow Rounds held on August 18, 2022    Disciplines Attending:  Bedside Nurse, , , and Nursing Unit Leadership    Karlos Mccann was admitted on 8/17/2022 12:03 AM    Anticipated Discharge Date:  Expected Discharge Date: 08/19/22    Disposition:    Misha Score:  Misha Scale Score: 18    Readmission Risk              Risk of Unplanned Readmission:  22           Discussed patient goal for the day, patient clinical progression, and barriers to discharge. The following Goal(s) of the Day/Commitment(s) have been identified:  Check ID plan re: course of antibiotics.       Vasquez Smith RN  August 18, 2022

## 2022-08-18 NOTE — PROGRESS NOTES
Physical Therapy  Facility/Department: [facility] SURG  Physical Therapy Initial Assessment    Name: Amisha Conner  : 1960  MRN: [id number]  Date of Service: 2022      Attending Provider:  Cande Foote DO    Evaluating PT:  Adam Holder P.TRudolph Room #:  1742/7276-  Diagnosis:  General weakness [R53.1]  Cellulitis of right lower extremity [S72.962]  Cellulitis [L03.90]  Pertinent PMHx/PSHx:  22 R ankle I and D with ex fix, 3/2/22 removal of ex fix and R pilon ORIF  Precautions:  Falls, bed/chair alarm, pt is WBAT RLE    SUBJECTIVE:    Pt lives at [facility], up until 2 days ago she was walking with ww or used her WC for mobility. OBJECTIVE:   Initial Evaluation  Date: 22 Treatment Short Term/ Long Term   Goals   Was pt agreeable to Eval/treatment? yes     Does pt have pain? C/o 8/10 BLE pain more on RLE and increased to 10/10 with sitting on EOB     Bed Mobility  Rolling: SBA  Supine to sit: MOD A  Sit to supine: MIN A  Scooting: MIN A to EOB  Independent   Transfers Sit to stand: NA, pt was unable due to severe RLE pain  Stand to sit: NA  Stand pivot: NA  SBA   Ambulation   NA  50 feet with ww SBA   Stair negotiation: ascended and descended NA  NA   AM-PAC 6 Clicks 4/71       LLE ROM is WFL, RLE ROM very limited due to pain. LLE strength is grossly 4/5 and RLE NA due to pain.    Sensation:  Pt c/o numbness and tingling to BLE  Balance: sitting is supervision  Endurance: fair-    Patient education  Pt educated on bed mobility    Patient response to education:   Pt verbalized understanding Pt demonstrated skill Pt requires further education in this area   yes Yes with assist yes     ASSESSMENT:    Conditions Requiring Skilled Therapeutic Intervention:    [x]Decreased strength     [x]Decreased ROM  [x]Decreased functional mobility  [x]Decreased balance   [x]Decreased endurance   []Decreased posture  []Decreased sensation  []Decreased coordination   []Decreased vision  []Decreased safety awareness   [x]Increased pain       Comments:  Pt was found in bed and c/o RLE pain. Pt has redness R proximal calf area and above and below R lateral knee. Pt has very limited ROM and active movement RLE due to pain. She sat on EOB and began to cry because the pain was so intense and after 3 min asked to lie back in bed. Pt was unable to attempt standing at this time due to severe pain in RLE. Pt was left supine in bed R 1/4 turn as found with call light left by patient. Chair/bed alarm: bed alarm was re-activated. Pt's/ family goals   1. To decrease her pain. Patient and or family understand(s) diagnosis, prognosis, and plan of care. PHYSICAL THERAPY PLAN OF CARE:    PT POC is established based on physician order and patient diagnosis     Referring provider/PT Order:  PT eval and treat  Diagnosis:  General weakness [R53.1]  Cellulitis of right lower extremity [D76.353]  Cellulitis [L03.90]  Specific instructions for next treatment:  progress functional mobility as pt is able.      Current Treatment Recommendations:     [x] Strengthening to improve independence with functional mobility   [x] ROM to improve ROM and decrease spasm and pain which will help promote independence with functional mobility   [x] Balance Training to improve static/dynamic balance and to reduce fall risk  [x] Endurance Training to improve activity tolerance during functional mobility   [x] Transfer Training to improve safety and independence with all functional transfers   [x] Gait Training to improve gait mechanics, endurance and assess need for appropriate assistive device  [] Stair Training in preparation for safe discharge home and/or into the community   [] Positioning to prevent skin breakdown and contractures  [] Safety and Education Training   [x] Patient/Caregiver Education   [] HEP  [] Other     PT long term treatment goals are located in above grid    Frequency of treatments: 2-5x/week x 1-2 weeks.    Time in  10:00  Time out  10:15    Evaluation Time includes thorough review of current medical information, gathering information on past medical history/social history and prior level of function, completion of standardized testing/informal observation of tasks, assessment of data and education on plan of care and goals. CPT codes:  [x] Low Complexity PT evaluation 55520  [] Moderate Complexity PT evaluation 60200  [] High Complexity PT evaluation 30953  [] PT Re-evaluation 33698  [] Gait training 26644 ** minutes  [] Manual therapy 45642 ** minutes  [] Therapeutic activities 52409 ** minutes  [] Therapeutic exercises 27860 ** minutes  [] Neuromuscular reeducation 64837 ** minutes     Ab Chawla., P.T.   License Number: [id number]

## 2022-08-18 NOTE — PROGRESS NOTES
Case presented to interventional radiologist, Dr. Irish Solis. Per Dr. Irish Solis, the best option for a timely manner for immediate evaluation would be to send patient downtown to have orthopedic evaluate patient as stated in Dr. Yazmin Jensen progress note. This information relayed to floor RN Lulu.

## 2022-08-18 NOTE — PROGRESS NOTES
Subjective: The patient is awake and alert. No problems overnight. Continues to ask for pain meds  Appears comfottable    Objective:    /67   Pulse 98   Temp 98.2 °F (36.8 °C) (Oral)   Resp 16   Ht 5' 7\" (1.702 m)   Wt 240 lb 6.4 oz (109 kg)   LMP 08/31/1988   SpO2 95%   BMI 37.65 kg/m²     Heart:  RRR, no murmurs, gallops, or rubs.   Lungs:  CTA bilaterally, no wheeze, rales or rhonchi  Abd: bowel sounds present, nontender, nondistended, no masses  Extrem: As described    Labs:  CBC:   Lab Results   Component Value Date/Time    WBC 8.8 08/17/2022 01:30 AM    RBC 4.73 08/17/2022 01:30 AM    HGB 15.2 08/17/2022 01:30 AM    HCT 45.9 08/17/2022 01:30 AM    MCV 97.0 08/17/2022 01:30 AM    MCH 32.1 08/17/2022 01:30 AM    MCHC 33.1 08/17/2022 01:30 AM    RDW 13.1 08/17/2022 01:30 AM     08/17/2022 01:30 AM    MPV 9.1 08/17/2022 01:30 AM     CMP:    Lab Results   Component Value Date/Time     08/17/2022 01:30 AM    K 3.5 08/17/2022 01:30 AM    K 3.9 05/06/2022 05:28 PM    CL 96 08/17/2022 01:30 AM    CO2 24 08/17/2022 01:30 AM    BUN 15 08/17/2022 01:30 AM    CREATININE 0.7 08/17/2022 01:30 AM    GFRAA >60 08/17/2022 01:30 AM    LABGLOM >60 08/17/2022 01:30 AM    GLUCOSE 127 08/17/2022 01:30 AM    GLUCOSE 93 05/24/2012 11:11 AM    PROT 6.9 08/17/2022 01:30 AM    LABALBU 3.1 08/17/2022 01:30 AM    LABALBU 4.7 05/21/2012 01:48 PM    CALCIUM 8.8 08/17/2022 01:30 AM    BILITOT 0.4 08/17/2022 01:30 AM    ALKPHOS 97 08/17/2022 01:30 AM    AST 14 08/17/2022 01:30 AM    ALT 10 08/17/2022 01:30 AM     PT/INR:    Lab Results   Component Value Date/Time    PROTIME 10.3 03/02/2022 08:30 AM    PROTIME 10.8 05/07/2012 03:45 AM    INR 0.9 03/02/2022 08:30 AM          Current Facility-Administered Medications:     albuterol (PROVENTIL) nebulizer solution 2.5 mg, 2.5 mg, Nebulization, Q4H PRN, Celeste Cowan DO    allopurinol (ZYLOPRIM) tablet 200 mg, 200 mg, Oral, BID, Jaime Caraballo DO, 200 mg at 08/17/22 1933    baclofen (LIORESAL) tablet 20 mg, 20 mg, Oral, 4x daily, Jaime A Rich, DO, 20 mg at 08/17/22 1934    famotidine (PEPCID) tablet 20 mg, 20 mg, Oral, BID, Enrique Beers A Rich, DO, 20 mg at 08/17/22 1933    escitalopram (LEXAPRO) tablet 20 mg, 20 mg, Oral, BID, Enrique Beers A Rich, DO, 20 mg at 08/17/22 1933    ferrous sulfate (IRON 325) tablet 325 mg, 325 mg, Oral, Daily, Enrique Beers A Rich, DO, 325 mg at 08/17/22 3302    furosemide (LASIX) tablet 40 mg, 40 mg, Oral, Daily, Enrique Beers A Rich, DO, 40 mg at 08/18/22 7340    gabapentin (NEURONTIN) capsule 600 mg, 600 mg, Oral, 4x Daily, Enrique Beers A Rich, DO, 600 mg at 08/17/22 1933    levOCARNitine (CARNITOR) tablet 330 mg (Patient Supplied), 330 mg, Oral, TID, Enrique Beers A Rich, DO    sodium chloride flush 0.9 % injection 5-40 mL, 5-40 mL, IntraVENous, 2 times per day, Enrique Beers A Rich, DO, 10 mL at 08/17/22 1934    sodium chloride flush 0.9 % injection 5-40 mL, 5-40 mL, IntraVENous, PRN, Enrique Beers A Rich, DO    0.9 % sodium chloride infusion, , IntraVENous, PRN, Enrique Beers A Rich, DO    enoxaparin Sodium (LOVENOX) injection 30 mg, 30 mg, SubCUTAneous, BID, Jaime A Rich, DO, 30 mg at 08/17/22 1934    ondansetron (ZOFRAN-ODT) disintegrating tablet 4 mg, 4 mg, Oral, Q8H PRN **OR** ondansetron (ZOFRAN) injection 4 mg, 4 mg, IntraVENous, Q6H PRN, Enrique Beers A Rich, DO    polyethylene glycol (GLYCOLAX) packet 17 g, 17 g, Oral, Daily PRN, Ruthell Numbers, DO    acetaminophen (TYLENOL) tablet 650 mg, 650 mg, Oral, Q6H PRN, 650 mg at 08/18/22 1183 **OR** acetaminophen (TYLENOL) suppository 650 mg, 650 mg, Rectal, Q6H PRN, Brinda Armstrong, DO    doxycycline hyclate (VIBRAMYCIN) capsule 100 mg, 100 mg, Oral, 2 times per day, Dorita Burrell MD, 100 mg at 08/17/22 1934    cephALEXin (KEFLEX) capsule 1,000 mg, 1,000 mg, Oral, 3 times per day, Dorita Burrell MD, 1,000 mg at 08/18/22 0608    Assessment:  See below, Cellulitis RLE        Patient Active Problem List   Diagnosis    Debility    Obesity    Bipolar 1 disorder ([facility] [address] Generalized seizure disorder (HCC)    Cervicalgia    Asthma    Hyperlipidemia    Hypertension    Arthritis    Lymphedema of lower extremity    Degenerative Osteoarthritis of both knees    Status post total knee replacement, right    Cervical spondylolysis    Degenerative Osteoarthritis thoracic spine    Thoracic facet syndrome    Cervical facet syndrome    Facet syndrome, lumbar    Neural foraminal stenosis of lumbar spine    Lumbar radiculopathy    DDD (degenerative disc disease), cervical    Neural foraminal stenosis of cervical spine    Protruded cervical disc    Cervical radiculopathy    Postlaminectomy syndrome, lumbar    Neuropathic pain syndrome (non-herpetic)    Chronic respiratory failure with hypoxia (HCC)    Mitochondrial cytopathy (HCC)    GERD (gastroesophageal reflux disease)    Fatty liver    Depression    PETR (obstructive sleep apnea)    Chronic back pain    Adenoma of right adrenal gland    Lumbosacral spondylosis without myelopathy    Sacroiliac dysfunction    DDD (degenerative disc disease), lumbar    Thoracic degenerative disc disease    Excessive physiologic tremor    Closed fracture of lower end of right radius with routine healing    Closed left ankle fracture, initial encounter    Closed fracture of right tibial plateau    Closed displaced pilon fracture of right tibia    Acute pancreatitis    Pancreatic pseudocyst    Cellulitis of right lower extremity    Cellulitis       Plan:  See orders  PT/OT    ATB per ID   Labs,meds noted         Jenny Hood DO  7:47 AM  8/18/2022

## 2022-08-18 NOTE — PROGRESS NOTES
[address] [place] [address] [facility]  YULIET  Progress Note    SUBJECTIVE:  Chief Complaint   Patient presents with    Fatigue     [facility] states she is more lethargic today. Patient is tolerating medications. No nausea, vomiting, diarrhea. In bed, complaining of generalized pain  She feels like her legs are looking worse today   Tmax 100.1 last night     Review of systems:  As stated above in the chief complaint, otherwise negative. Medications:  Scheduled Meds:   allopurinol  200 mg Oral BID    baclofen  20 mg Oral 4x daily    famotidine  20 mg Oral BID    escitalopram  20 mg Oral BID    ferrous sulfate  325 mg Oral Daily    furosemide  40 mg Oral Daily    gabapentin  600 mg Oral 4x Daily    levOCARNitine  330 mg Oral TID    sodium chloride flush  5-40 mL IntraVENous 2 times per day    enoxaparin  30 mg SubCUTAneous BID    doxycycline hyclate  100 mg Oral 2 times per day    cephALEXin  1,000 mg Oral 3 times per day     Continuous Infusions:   sodium chloride       PRN Meds:albuterol, sodium chloride flush, sodium chloride, ondansetron **OR** ondansetron, polyethylene glycol, acetaminophen **OR** acetaminophen    OBJECTIVE:  /67   Pulse 93   Temp 98.2 °F (36.8 °C) (Oral)   Resp 17   Ht 5' 7\" (1.702 m)   Wt 240 lb 6.4 oz (109 kg)   LMP 1988   SpO2 95%   BMI 37.65 kg/m²   Temp  Av.5 °F (37.5 °C)  Min: 98.2 °F (36.8 °C)  Max: 100.1 °F (37.8 °C)  Constitutional: The patient is awake and alert, has generalized pain  Skin: Warm and dry. No rashes were noted. HEENT: Round and reactive pupils. Moist mucous membranes. No ulcerations or thrush. Neck: Supple to movements. Chest: No use of accessory muscles to breathe. Symmetrical expansion. No wheezing, crackles or rhonchi. Cardiovascular: S1 and S2 are rhythmic and regular. No murmurs appreciated. Abdomen: Positive bowel sounds to auscultation. Benign to palpation. Extremities: Left TKR surgical wound well-healed. The right leg is erythematous. There is dark erythema that does not lexi. It is warm and tender to touch. Well-healed surgical wound medial aspect of the ankle. Callus over the tip of the right first toe. Lines: peripheral    Laboratory and Tests Review:  Lab Results   Component Value Date    WBC 8.8 08/17/2022    WBC 4.8 05/06/2022    WBC 6.6 04/15/2022    HGB 15.2 08/17/2022    HCT 45.9 08/17/2022    MCV 97.0 08/17/2022     08/17/2022     Lab Results   Component Value Date    NEUTROABS 7.18 08/17/2022    NEUTROABS 2.23 05/06/2022    NEUTROABS 4.87 04/15/2022     No results found for: [facility]  Lab Results   Component Value Date    ALT 10 08/17/2022    AST 14 08/17/2022    ALKPHOS 97 08/17/2022    BILITOT 0.4 08/17/2022     Lab Results   Component Value Date/Time     08/17/2022 01:30 AM    K 3.5 08/17/2022 01:30 AM    K 3.9 05/06/2022 05:28 PM    CL 96 08/17/2022 01:30 AM    CO2 24 08/17/2022 01:30 AM    BUN 15 08/17/2022 01:30 AM    CREATININE 0.7 08/17/2022 01:30 AM    CREATININE 0.7 05/06/2022 05:28 PM    CREATININE 0.7 04/15/2022 05:20 AM    GFRAA >60 08/17/2022 01:30 AM    LABGLOM >60 08/17/2022 01:30 AM    GLUCOSE 127 08/17/2022 01:30 AM    GLUCOSE 93 05/24/2012 11:11 AM    PROT 6.9 08/17/2022 01:30 AM    LABALBU 3.1 08/17/2022 01:30 AM    LABALBU 4.7 05/21/2012 01:48 PM    CALCIUM 8.8 08/17/2022 01:30 AM    BILITOT 0.4 08/17/2022 01:30 AM    ALKPHOS 97 08/17/2022 01:30 AM    AST 14 08/17/2022 01:30 AM    ALT 10 08/17/2022 01:30 AM     Lab Results   Component Value Date    CRP 43.8 (H) 08/17/2022    CRP 9.4 (H) 01/06/2022    CRP 0.6 (H) 09/02/2021     Lab Results   Component Value Date    SEDRATE 80 (H) 08/17/2022    SEDRATE 40 (H) 01/06/2022    SEDRATE 50 (H) 09/02/2021     Radiology:      Microbiology:   Blood Cultures 8/17/22: MRSA in 1 set by molecular ID     ASSESSMENT:  Cellulitis right lower extremity.   The port of entry might have been a chronic callus/ulcer over the first distal toe  Status post right ankle fracture ORIF. There does not seem to be hardware infection  Septic arthritis right knee    PLAN:  Start IV Vancomycin (PTD)  Continue oral Doxycycline and Cephalexin  Will get repeat blood cultures  Baseline ESR=80, CRP=43.8, ASO titer=34  Check final cultures  Monitor labs  DC plan [place], no precert needed     Clementine Guthrie, APRN - CNP  9:50 AM  8/18/2022     Patient seen and examined. I had a face to face encounter with the patient. Agree with exam.  Assessment and plan as outlined above and directed by me. Addition and corrections were done as deemed appropriate. My exam and plan include: The patient thinks that she is doing better. Blood cultures are now growing MRSA. Doxycycline and Cephalexin will be discontinued. Repeat blood cultures x2 this afternoon. We will consult orthopedic surgery for a right knee septic arthritis.     Heather Everett MD  8/18/2022  1:56 PM

## 2022-08-18 NOTE — PROGRESS NOTES
improve functional performance during ADLs. Therapeutic exercise to improve tolerance and functional strength for ADLs    Balance retraining/tolerance tasks for facilitation of postural control with dynamic challenges during ADLs . Positioning to improve functional independence  C    Recommended Adaptive Equipment: TBD     Home Living: Pt has been at [place] [address] since Feb.   States she primaily uses a w/c for mobility. Obed [place] for shorter distance. She does most of her own ADLs - cause staff does not assist her.         Pain Level: R LE and back pain ;   Cognition: A&O: pleasant, conversing and following commands   Memory:  fair +    Sequencing:  fair+   Problem solving:  fair+   Judgement/safety:  fair+     Functional Assessment:  AM-PAC Daily Activity Raw Score: 12/24   Initial Eval Status  Date: 8/18/22 Treatment Status  Date: STGs = [facility]  Time frame: 10-14 days   Feeding Independent      Grooming Min A ,seated   Supervision    UB Dressing Mod A   Supervision    LB Dressing Dependent   Mod A    Bathing Max A   Min A   Toileting Dependent   Mod A   Bed Mobility  Patient declined sitting EOB - reports she is still having R LE pain and now it is on her back     Attempted to log sitting - using bedrails to assist herself up - patient only able to lift up less then partial way   Mod A   Functional Transfers NT   Mod A    Functional Mobility NT   SBA at w/c level  with good tolerance  Mod A at walker     Balance Sitting:     Static:  attempt long sit -    Dynamic:dependent   Standing: NT   SBA- sitting   Mod A- standign    Activity Tolerance Pain limiting tolerance   No SOB   Good  with ADL activity    Visual/  Perceptual Glasses: none by bedside         UE ROM /strength  UE AROM present throughout     Fair+ strength   Tolerate UE therapeutic activity/exercise to increase strength/endurance for ADL and xfer activity      Hand Dominance right    Hearing: WFL   Sensation:  No c/o numbness or tingling   Tone: WFL   Edema: LE    Comments: Upon arrival patient lying in bed . At end of session, patient remained in bed  with call light and phone within reach, all lines and tubes intact. *ALARM ON     Overall patient demonstrated  decreased independence and safety during completion of ADL/functional transfer/mobility tasks. Pt would benefit from continued skilled OT to increase safety and independence with completion of ADL/IADL tasks for functional independence and quality of life. Rehab Potential: fair +for established goals     Patient / Family Goal: [facility] to [facility]       Patient and/or family were instructed on functional diagnosis, prognosis/goals and OT plan of care. Demonstrated good  understanding. Eval Complexity: Low    Time In: 1200  Time Out: 1217      Min Units   OT Eval Low 97165 x  1   OT Eval Medium 14841      OT Eval High 58959      OT Re-Eval [id number]       Therapeutic Ex 33682      Therapeutic Activities 98834       ADL/Self Care 75982       Orthotic Management 40107       Manual 14909     Neuro Re-Ed 03629       Non-Billable Time          Evaluation Time additionally includes thorough review of current medical information, gathering information on past medical history/social history and prior level of function, interpretation of standardized testing/informal observation of tasks, assessment of data and development of plan of care and goals.             Everardo López  OTR/L  OT [id number]

## 2022-08-18 NOTE — PROGRESS NOTES
Call placed to Dr. Ashu Garza answering service re: transfer and physician to physician call required with accepting physician required for initiation of transfer to [facility]. The  at the answering service was unsure if she had a number to sent him the information-the number for the [facility] is [phone]. Addendum: Call placed to initiate transfer for Dr. Jose Irby needed a direct phone number for Dr. Ashu Garza, call placed to his answering service to request a number to call into the access center-however they were not permitted to give out the number. Unsure how to proceed, exhausted all options on this end .     Electronically signed by Donny Coburn RN on 8/18/2022 at 4:04 PM

## 2022-08-18 NOTE — PROGRESS NOTES
Call received from IR MD. Ramón Acosta to perform procedure today or tomorrow. Patient will have to go downtown to have ortho perform knee aspiration. Call to dr. Jose Tavarez to report information. Answering service notified of details, Cristian Ferrell RN picking up patient at this time. Given verbal report regarding procedure and all details for transfer.

## 2022-08-18 NOTE — PROGRESS NOTES
Patient refusing turns with wedges. Patient was educated on importance of turning understands risks.      Electronically signed by Maulik Burton RN on 8/18/2022 at 4:55 PM

## 2022-08-19 ENCOUNTER — HOSPITAL ENCOUNTER (INPATIENT)
Age: 62
LOS: 6 days | Discharge: NURSING, REHAB OR LONG-TERM CARE | DRG: 313 | End: 2022-08-25
Attending: FAMILY MEDICINE | Admitting: FAMILY MEDICINE
Payer: MEDICAID

## 2022-08-19 ENCOUNTER — ANESTHESIA EVENT (OUTPATIENT)
Dept: OPERATING ROOM | Age: 62
DRG: 313 | End: 2022-08-19
Payer: MEDICAID

## 2022-08-19 ENCOUNTER — ANESTHESIA (OUTPATIENT)
Dept: OPERATING ROOM | Age: 62
DRG: 313 | End: 2022-08-19
Payer: MEDICAID

## 2022-08-19 ENCOUNTER — APPOINTMENT (OUTPATIENT)
Dept: GENERAL RADIOLOGY | Age: 62
DRG: 313 | End: 2022-08-19
Attending: FAMILY MEDICINE
Payer: MEDICAID

## 2022-08-19 DIAGNOSIS — Z96.659 INFECTION OF TOTAL KNEE REPLACEMENT, INITIAL ENCOUNTER (HCC): Primary | ICD-10-CM

## 2022-08-19 DIAGNOSIS — T14.8XXA WOUND INFECTION: ICD-10-CM

## 2022-08-19 DIAGNOSIS — L08.9 WOUND INFECTION: ICD-10-CM

## 2022-08-19 DIAGNOSIS — T84.59XA INFECTION OF TOTAL KNEE REPLACEMENT, INITIAL ENCOUNTER (HCC): Primary | ICD-10-CM

## 2022-08-19 PROBLEM — T84.53XA INFECTION OF PROSTHETIC RIGHT KNEE JOINT (HCC): Status: ACTIVE | Noted: 2022-08-19

## 2022-08-19 PROBLEM — M00.9 SEPTIC JOINT (HCC): Status: ACTIVE | Noted: 2022-08-19

## 2022-08-19 LAB
APPEARANCE FLUID: NORMAL
APPEARANCE FLUID: NORMAL
CELL COUNT FLUID TYPE: NORMAL
CELL COUNT FLUID TYPE: NORMAL
COLOR FLUID: NORMAL
COLOR FLUID: NORMAL
FLUID TYPE: NORMAL
GLUCOSE, FLUID: 15 MG/DL
GRAM STAIN ORDERABLE: NORMAL
INR BLD: 1.2
METER GLUCOSE: 88 MG/DL (ref 74–99)
MONOCYTE, FLUID: 27 %
MONOCYTE, FLUID: 54 %
NEUTROPHIL, FLUID: 46 %
NEUTROPHIL, FLUID: 73 %
NUCLEATED CELLS FLUID: 9135 /UL
NUCLEATED CELLS FLUID: NORMAL /UL
PROTHROMBIN TIME: 13.1 SEC (ref 9.3–12.4)
RBC FLUID: NORMAL /UL
RBC FLUID: NORMAL /UL

## 2022-08-19 PROCEDURE — 2709999900 HC NON-CHARGEABLE SUPPLY: Performed by: ORTHOPAEDIC SURGERY

## 2022-08-19 PROCEDURE — 6360000002 HC RX W HCPCS: Performed by: FAMILY MEDICINE

## 2022-08-19 PROCEDURE — 6370000000 HC RX 637 (ALT 250 FOR IP): Performed by: FAMILY MEDICINE

## 2022-08-19 PROCEDURE — 7100000000 HC PACU RECOVERY - FIRST 15 MIN: Performed by: ORTHOPAEDIC SURGERY

## 2022-08-19 PROCEDURE — 87102 FUNGUS ISOLATION CULTURE: CPT

## 2022-08-19 PROCEDURE — 87206 SMEAR FLUORESCENT/ACID STAI: CPT

## 2022-08-19 PROCEDURE — 20610 DRAIN/INJ JOINT/BURSA W/O US: CPT | Performed by: ORTHOPAEDIC SURGERY

## 2022-08-19 PROCEDURE — 6360000002 HC RX W HCPCS

## 2022-08-19 PROCEDURE — 0S993ZX DRAINAGE OF RIGHT HIP JOINT, PERCUTANEOUS APPROACH, DIAGNOSTIC: ICD-10-PCS | Performed by: ORTHOPAEDIC SURGERY

## 2022-08-19 PROCEDURE — 87015 SPECIMEN INFECT AGNT CONCNTJ: CPT

## 2022-08-19 PROCEDURE — 3600000016 HC SURGERY LEVEL 6 ADDTL 15MIN: Performed by: ORTHOPAEDIC SURGERY

## 2022-08-19 PROCEDURE — 3700000001 HC ADD 15 MINUTES (ANESTHESIA): Performed by: ORTHOPAEDIC SURGERY

## 2022-08-19 PROCEDURE — 27301 DRAIN THIGH/KNEE LESION: CPT | Performed by: ORTHOPAEDIC SURGERY

## 2022-08-19 PROCEDURE — 2580000003 HC RX 258: Performed by: STUDENT IN AN ORGANIZED HEALTH CARE EDUCATION/TRAINING PROGRAM

## 2022-08-19 PROCEDURE — 3209999900 FLUORO FOR SURGICAL PROCEDURES

## 2022-08-19 PROCEDURE — 6360000002 HC RX W HCPCS: Performed by: STUDENT IN AN ORGANIZED HEALTH CARE EDUCATION/TRAINING PROGRAM

## 2022-08-19 PROCEDURE — 87070 CULTURE OTHR SPECIMN AEROBIC: CPT

## 2022-08-19 PROCEDURE — 0SBC0ZZ EXCISION OF RIGHT KNEE JOINT, OPEN APPROACH: ICD-10-PCS | Performed by: ORTHOPAEDIC SURGERY

## 2022-08-19 PROCEDURE — 3E1U38Z IRRIGATION OF JOINTS USING IRRIGATING SUBSTANCE, PERCUTANEOUS APPROACH: ICD-10-PCS | Performed by: ORTHOPAEDIC SURGERY

## 2022-08-19 PROCEDURE — 6370000000 HC RX 637 (ALT 250 FOR IP): Performed by: STUDENT IN AN ORGANIZED HEALTH CARE EDUCATION/TRAINING PROGRAM

## 2022-08-19 PROCEDURE — 7100000001 HC PACU RECOVERY - ADDTL 15 MIN: Performed by: ORTHOPAEDIC SURGERY

## 2022-08-19 PROCEDURE — 1200000000 HC SEMI PRIVATE

## 2022-08-19 PROCEDURE — 87116 MYCOBACTERIA CULTURE: CPT

## 2022-08-19 PROCEDURE — 2720000010 HC SURG SUPPLY STERILE: Performed by: ORTHOPAEDIC SURGERY

## 2022-08-19 PROCEDURE — 87205 SMEAR GRAM STAIN: CPT

## 2022-08-19 PROCEDURE — 27310 EXPLORATION OF KNEE JOINT: CPT | Performed by: ORTHOPAEDIC SURGERY

## 2022-08-19 PROCEDURE — 99254 IP/OBS CNSLTJ NEW/EST MOD 60: CPT | Performed by: ORTHOPAEDIC SURGERY

## 2022-08-19 PROCEDURE — 82947 ASSAY GLUCOSE BLOOD QUANT: CPT

## 2022-08-19 PROCEDURE — 3700000000 HC ANESTHESIA ATTENDED CARE: Performed by: ORTHOPAEDIC SURGERY

## 2022-08-19 PROCEDURE — 87186 SC STD MICRODIL/AGAR DIL: CPT

## 2022-08-19 PROCEDURE — 73562 X-RAY EXAM OF KNEE 3: CPT

## 2022-08-19 PROCEDURE — 87075 CULTR BACTERIA EXCEPT BLOOD: CPT

## 2022-08-19 PROCEDURE — 89051 BODY FLUID CELL COUNT: CPT

## 2022-08-19 PROCEDURE — 6360000002 HC RX W HCPCS: Performed by: ORTHOPAEDIC SURGERY

## 2022-08-19 PROCEDURE — 3600000006 HC SURGERY LEVEL 6 BASE: Performed by: ORTHOPAEDIC SURGERY

## 2022-08-19 PROCEDURE — 2580000003 HC RX 258: Performed by: FAMILY MEDICINE

## 2022-08-19 PROCEDURE — 82962 GLUCOSE BLOOD TEST: CPT

## 2022-08-19 PROCEDURE — 36415 COLL VENOUS BLD VENIPUNCTURE: CPT

## 2022-08-19 PROCEDURE — 85610 PROTHROMBIN TIME: CPT

## 2022-08-19 PROCEDURE — 73552 X-RAY EXAM OF FEMUR 2/>: CPT

## 2022-08-19 PROCEDURE — 2580000003 HC RX 258

## 2022-08-19 PROCEDURE — 6360000002 HC RX W HCPCS: Performed by: ANESTHESIOLOGY

## 2022-08-19 PROCEDURE — 87077 CULTURE AEROBIC IDENTIFY: CPT

## 2022-08-19 PROCEDURE — 89060 EXAM SYNOVIAL FLUID CRYSTALS: CPT

## 2022-08-19 RX ORDER — SODIUM CHLORIDE 9 MG/ML
INJECTION, SOLUTION INTRAVENOUS CONTINUOUS PRN
Status: DISCONTINUED | OUTPATIENT
Start: 2022-08-19 | End: 2022-08-19 | Stop reason: SDUPTHER

## 2022-08-19 RX ORDER — FUROSEMIDE 40 MG/1
40 TABLET ORAL DAILY
Status: DISCONTINUED | OUTPATIENT
Start: 2022-08-19 | End: 2022-08-20

## 2022-08-19 RX ORDER — MEPERIDINE HYDROCHLORIDE 25 MG/ML
12.5 INJECTION INTRAMUSCULAR; INTRAVENOUS; SUBCUTANEOUS EVERY 5 MIN PRN
Status: DISCONTINUED | OUTPATIENT
Start: 2022-08-19 | End: 2022-08-19 | Stop reason: SDUPTHER

## 2022-08-19 RX ORDER — SODIUM CHLORIDE 0.9 % (FLUSH) 0.9 %
5-40 SYRINGE (ML) INJECTION PRN
Status: DISCONTINUED | OUTPATIENT
Start: 2022-08-19 | End: 2022-08-19 | Stop reason: SDUPTHER

## 2022-08-19 RX ORDER — HYDROCODONE BITARTRATE AND ACETAMINOPHEN 10; 325 MG/1; MG/1
1 TABLET ORAL EVERY 6 HOURS PRN
Status: DISCONTINUED | OUTPATIENT
Start: 2022-08-19 | End: 2022-08-25 | Stop reason: HOSPADM

## 2022-08-19 RX ORDER — VANCOMYCIN HYDROCHLORIDE 1 G/20ML
INJECTION, POWDER, LYOPHILIZED, FOR SOLUTION INTRAVENOUS PRN
Status: DISCONTINUED | OUTPATIENT
Start: 2022-08-19 | End: 2022-08-19 | Stop reason: ALTCHOICE

## 2022-08-19 RX ORDER — FENTANYL CITRATE 50 UG/ML
25 INJECTION, SOLUTION INTRAMUSCULAR; INTRAVENOUS EVERY 5 MIN PRN
Status: DISCONTINUED | OUTPATIENT
Start: 2022-08-19 | End: 2022-08-19 | Stop reason: SDUPTHER

## 2022-08-19 RX ORDER — LIDOCAINE HYDROCHLORIDE 20 MG/ML
INJECTION, SOLUTION INTRAVENOUS PRN
Status: DISCONTINUED | OUTPATIENT
Start: 2022-08-19 | End: 2022-08-19 | Stop reason: SDUPTHER

## 2022-08-19 RX ORDER — GABAPENTIN 300 MG/1
600 CAPSULE ORAL 4 TIMES DAILY
Status: DISCONTINUED | OUTPATIENT
Start: 2022-08-19 | End: 2022-08-25 | Stop reason: HOSPADM

## 2022-08-19 RX ORDER — HYDROCODONE BITARTRATE AND ACETAMINOPHEN 5; 325 MG/1; MG/1
1 TABLET ORAL EVERY 6 HOURS PRN
Status: DISCONTINUED | OUTPATIENT
Start: 2022-08-19 | End: 2022-08-25 | Stop reason: HOSPADM

## 2022-08-19 RX ORDER — SODIUM CHLORIDE 0.9 % (FLUSH) 0.9 %
5-40 SYRINGE (ML) INJECTION EVERY 12 HOURS SCHEDULED
Status: DISCONTINUED | OUTPATIENT
Start: 2022-08-19 | End: 2022-08-19 | Stop reason: SDUPTHER

## 2022-08-19 RX ORDER — ONDANSETRON 2 MG/ML
4 INJECTION INTRAMUSCULAR; INTRAVENOUS EVERY 6 HOURS PRN
Status: DISCONTINUED | OUTPATIENT
Start: 2022-08-19 | End: 2022-08-25 | Stop reason: HOSPADM

## 2022-08-19 RX ORDER — FENTANYL CITRATE 50 UG/ML
25 INJECTION, SOLUTION INTRAMUSCULAR; INTRAVENOUS EVERY 5 MIN PRN
Status: DISCONTINUED | OUTPATIENT
Start: 2022-08-19 | End: 2022-08-19 | Stop reason: HOSPADM

## 2022-08-19 RX ORDER — ONDANSETRON 2 MG/ML
4 INJECTION INTRAMUSCULAR; INTRAVENOUS
Status: DISCONTINUED | OUTPATIENT
Start: 2022-08-19 | End: 2022-08-19 | Stop reason: SDUPTHER

## 2022-08-19 RX ORDER — HYDROCODONE BITARTRATE AND ACETAMINOPHEN 5; 325 MG/1; MG/1
1 TABLET ORAL EVERY 6 HOURS PRN
Status: DISCONTINUED | OUTPATIENT
Start: 2022-08-19 | End: 2022-08-19 | Stop reason: SDUPTHER

## 2022-08-19 RX ORDER — ENOXAPARIN SODIUM 100 MG/ML
30 INJECTION SUBCUTANEOUS 2 TIMES DAILY
Status: DISCONTINUED | OUTPATIENT
Start: 2022-08-19 | End: 2022-08-19 | Stop reason: SDUPTHER

## 2022-08-19 RX ORDER — PROPOFOL 10 MG/ML
INJECTION, EMULSION INTRAVENOUS PRN
Status: DISCONTINUED | OUTPATIENT
Start: 2022-08-19 | End: 2022-08-19 | Stop reason: SDUPTHER

## 2022-08-19 RX ORDER — SODIUM CHLORIDE 0.9 % (FLUSH) 0.9 %
5-40 SYRINGE (ML) INJECTION PRN
Status: DISCONTINUED | OUTPATIENT
Start: 2022-08-19 | End: 2022-08-22 | Stop reason: SDUPTHER

## 2022-08-19 RX ORDER — ASPIRIN 81 MG/1
81 TABLET ORAL 2 TIMES DAILY
Qty: 56 TABLET | Refills: 0 | Status: SHIPPED | OUTPATIENT
Start: 2022-08-19 | End: 2022-09-16

## 2022-08-19 RX ORDER — FAMOTIDINE 20 MG/1
20 TABLET, FILM COATED ORAL 2 TIMES DAILY
Status: DISCONTINUED | OUTPATIENT
Start: 2022-08-19 | End: 2022-08-20

## 2022-08-19 RX ORDER — ACETAMINOPHEN 650 MG/1
650 SUPPOSITORY RECTAL EVERY 6 HOURS PRN
Status: DISCONTINUED | OUTPATIENT
Start: 2022-08-19 | End: 2022-08-19

## 2022-08-19 RX ORDER — FENTANYL CITRATE 50 UG/ML
50 INJECTION, SOLUTION INTRAMUSCULAR; INTRAVENOUS EVERY 5 MIN PRN
Status: DISCONTINUED | OUTPATIENT
Start: 2022-08-19 | End: 2022-08-19 | Stop reason: HOSPADM

## 2022-08-19 RX ORDER — POLYETHYLENE GLYCOL 3350 17 G/17G
17 POWDER, FOR SOLUTION ORAL DAILY PRN
Status: DISCONTINUED | OUTPATIENT
Start: 2022-08-19 | End: 2022-08-25 | Stop reason: HOSPADM

## 2022-08-19 RX ORDER — ESCITALOPRAM OXALATE 10 MG/1
20 TABLET ORAL 2 TIMES DAILY
Status: DISCONTINUED | OUTPATIENT
Start: 2022-08-19 | End: 2022-08-20

## 2022-08-19 RX ORDER — TOBRAMYCIN 1.2 G/30ML
INJECTION, POWDER, LYOPHILIZED, FOR SOLUTION INTRAVENOUS PRN
Status: DISCONTINUED | OUTPATIENT
Start: 2022-08-19 | End: 2022-08-19 | Stop reason: ALTCHOICE

## 2022-08-19 RX ORDER — SODIUM CHLORIDE 9 MG/ML
INJECTION, SOLUTION INTRAVENOUS PRN
Status: DISCONTINUED | OUTPATIENT
Start: 2022-08-19 | End: 2022-08-22 | Stop reason: SDUPTHER

## 2022-08-19 RX ORDER — CEFAZOLIN SODIUM 1 G/3ML
INJECTION, POWDER, FOR SOLUTION INTRAMUSCULAR; INTRAVENOUS PRN
Status: DISCONTINUED | OUTPATIENT
Start: 2022-08-19 | End: 2022-08-19 | Stop reason: SDUPTHER

## 2022-08-19 RX ORDER — ALLOPURINOL 100 MG/1
200 TABLET ORAL 2 TIMES DAILY
Status: DISCONTINUED | OUTPATIENT
Start: 2022-08-19 | End: 2022-08-20

## 2022-08-19 RX ORDER — BACLOFEN 10 MG/1
20 TABLET ORAL 4 TIMES DAILY
Status: DISCONTINUED | OUTPATIENT
Start: 2022-08-19 | End: 2022-08-20

## 2022-08-19 RX ORDER — MEPERIDINE HYDROCHLORIDE 25 MG/ML
12.5 INJECTION INTRAMUSCULAR; INTRAVENOUS; SUBCUTANEOUS EVERY 5 MIN PRN
Status: DISCONTINUED | OUTPATIENT
Start: 2022-08-19 | End: 2022-08-19 | Stop reason: HOSPADM

## 2022-08-19 RX ORDER — FENTANYL CITRATE 50 UG/ML
50 INJECTION, SOLUTION INTRAMUSCULAR; INTRAVENOUS
Status: DISCONTINUED | OUTPATIENT
Start: 2022-08-19 | End: 2022-08-19

## 2022-08-19 RX ORDER — ALBUTEROL SULFATE 2.5 MG/3ML
2.5 SOLUTION RESPIRATORY (INHALATION) EVERY 4 HOURS PRN
Status: DISCONTINUED | OUTPATIENT
Start: 2022-08-19 | End: 2022-08-25 | Stop reason: HOSPADM

## 2022-08-19 RX ORDER — SODIUM CHLORIDE 9 MG/ML
INJECTION, SOLUTION INTRAVENOUS PRN
Status: DISCONTINUED | OUTPATIENT
Start: 2022-08-19 | End: 2022-08-19 | Stop reason: SDUPTHER

## 2022-08-19 RX ORDER — ENOXAPARIN SODIUM 100 MG/ML
40 INJECTION SUBCUTANEOUS DAILY
Status: DISCONTINUED | OUTPATIENT
Start: 2022-08-20 | End: 2022-08-21

## 2022-08-19 RX ORDER — FENTANYL CITRATE 50 UG/ML
INJECTION, SOLUTION INTRAMUSCULAR; INTRAVENOUS PRN
Status: DISCONTINUED | OUTPATIENT
Start: 2022-08-19 | End: 2022-08-19 | Stop reason: SDUPTHER

## 2022-08-19 RX ORDER — ACETAMINOPHEN 650 MG
TABLET, EXTENDED RELEASE ORAL PRN
Status: DISCONTINUED | OUTPATIENT
Start: 2022-08-19 | End: 2022-08-19 | Stop reason: ALTCHOICE

## 2022-08-19 RX ORDER — HYDROCODONE BITARTRATE AND ACETAMINOPHEN 10; 325 MG/1; MG/1
1 TABLET ORAL EVERY 6 HOURS PRN
Status: DISCONTINUED | OUTPATIENT
Start: 2022-08-19 | End: 2022-08-19 | Stop reason: SDUPTHER

## 2022-08-19 RX ORDER — SODIUM CHLORIDE 0.9 % (FLUSH) 0.9 %
5-40 SYRINGE (ML) INJECTION EVERY 12 HOURS SCHEDULED
Status: DISCONTINUED | OUTPATIENT
Start: 2022-08-19 | End: 2022-08-22 | Stop reason: SDUPTHER

## 2022-08-19 RX ORDER — ONDANSETRON 4 MG/1
4 TABLET, ORALLY DISINTEGRATING ORAL EVERY 8 HOURS PRN
Status: DISCONTINUED | OUTPATIENT
Start: 2022-08-19 | End: 2022-08-25 | Stop reason: HOSPADM

## 2022-08-19 RX ORDER — FENTANYL CITRATE 50 UG/ML
50 INJECTION, SOLUTION INTRAMUSCULAR; INTRAVENOUS EVERY 5 MIN PRN
Status: DISCONTINUED | OUTPATIENT
Start: 2022-08-19 | End: 2022-08-19 | Stop reason: SDUPTHER

## 2022-08-19 RX ORDER — FERROUS SULFATE 325(65) MG
325 TABLET ORAL DAILY
Status: DISCONTINUED | OUTPATIENT
Start: 2022-08-19 | End: 2022-08-25 | Stop reason: HOSPADM

## 2022-08-19 RX ORDER — ACETAMINOPHEN 325 MG/1
650 TABLET ORAL EVERY 6 HOURS PRN
Status: DISCONTINUED | OUTPATIENT
Start: 2022-08-19 | End: 2022-08-19

## 2022-08-19 RX ORDER — OXYCODONE HYDROCHLORIDE AND ACETAMINOPHEN 5; 325 MG/1; MG/1
1 TABLET ORAL EVERY 6 HOURS PRN
Qty: 28 TABLET | Refills: 0 | Status: SHIPPED | OUTPATIENT
Start: 2022-08-19 | End: 2022-08-26

## 2022-08-19 RX ORDER — HYDROCODONE BITARTRATE AND ACETAMINOPHEN 5; 325 MG/1; MG/1
1 TABLET ORAL EVERY 8 HOURS PRN
Status: DISCONTINUED | OUTPATIENT
Start: 2022-08-19 | End: 2022-08-19

## 2022-08-19 RX ORDER — ONDANSETRON 2 MG/ML
4 INJECTION INTRAMUSCULAR; INTRAVENOUS
Status: DISCONTINUED | OUTPATIENT
Start: 2022-08-19 | End: 2022-08-19 | Stop reason: HOSPADM

## 2022-08-19 RX ORDER — LEVOCARNITINE 330 MG/1
330 TABLET ORAL 3 TIMES DAILY
Status: DISCONTINUED | OUTPATIENT
Start: 2022-08-19 | End: 2022-08-25 | Stop reason: HOSPADM

## 2022-08-19 RX ADMIN — HYDROMORPHONE HYDROCHLORIDE 0.5 MG: 1 INJECTION, SOLUTION INTRAMUSCULAR; INTRAVENOUS; SUBCUTANEOUS at 17:05

## 2022-08-19 RX ADMIN — BACLOFEN 20 MG: 10 TABLET ORAL at 20:58

## 2022-08-19 RX ADMIN — ESCITALOPRAM OXALATE 20 MG: 10 TABLET ORAL at 20:58

## 2022-08-19 RX ADMIN — HYDROCODONE BITARTRATE AND ACETAMINOPHEN 1 TABLET: 10; 325 TABLET ORAL at 19:11

## 2022-08-19 RX ADMIN — FENTANYL CITRATE 50 MCG: 50 INJECTION, SOLUTION INTRAMUSCULAR; INTRAVENOUS at 13:41

## 2022-08-19 RX ADMIN — CEFAZOLIN 2000 MG: 1 INJECTION, POWDER, FOR SOLUTION INTRAMUSCULAR; INTRAVENOUS at 13:30

## 2022-08-19 RX ADMIN — HYDROCODONE BITARTRATE AND ACETAMINOPHEN 1 TABLET: 5; 325 TABLET ORAL at 02:00

## 2022-08-19 RX ADMIN — GABAPENTIN 600 MG: 300 CAPSULE ORAL at 20:58

## 2022-08-19 RX ADMIN — FENTANYL CITRATE 50 MCG: 50 INJECTION, SOLUTION INTRAMUSCULAR; INTRAVENOUS at 13:57

## 2022-08-19 RX ADMIN — FENTANYL CITRATE 50 MCG: 50 INJECTION, SOLUTION INTRAMUSCULAR; INTRAVENOUS at 13:12

## 2022-08-19 RX ADMIN — HYDROCODONE BITARTRATE AND ACETAMINOPHEN 1 TABLET: 5; 325 TABLET ORAL at 11:48

## 2022-08-19 RX ADMIN — HYDROMORPHONE HYDROCHLORIDE 0.5 MG: 1 INJECTION, SOLUTION INTRAMUSCULAR; INTRAVENOUS; SUBCUTANEOUS at 15:35

## 2022-08-19 RX ADMIN — HYDROMORPHONE HYDROCHLORIDE 0.5 MG: 1 INJECTION, SOLUTION INTRAMUSCULAR; INTRAVENOUS; SUBCUTANEOUS at 16:05

## 2022-08-19 RX ADMIN — LIDOCAINE HYDROCHLORIDE 50 MG: 20 INJECTION, SOLUTION INTRAVENOUS at 13:12

## 2022-08-19 RX ADMIN — BACLOFEN 20 MG: 10 TABLET ORAL at 17:00

## 2022-08-19 RX ADMIN — FENTANYL CITRATE 50 MCG: 50 INJECTION, SOLUTION INTRAMUSCULAR; INTRAVENOUS at 15:03

## 2022-08-19 RX ADMIN — VANCOMYCIN HYDROCHLORIDE 1250 MG: 10 INJECTION, POWDER, LYOPHILIZED, FOR SOLUTION INTRAVENOUS at 03:58

## 2022-08-19 RX ADMIN — HYDROMORPHONE HYDROCHLORIDE 0.5 MG: 1 INJECTION, SOLUTION INTRAMUSCULAR; INTRAVENOUS; SUBCUTANEOUS at 08:29

## 2022-08-19 RX ADMIN — PROPOFOL 150 MG: 10 INJECTION, EMULSION INTRAVENOUS at 13:12

## 2022-08-19 RX ADMIN — FENTANYL CITRATE 50 MCG: 50 INJECTION, SOLUTION INTRAMUSCULAR; INTRAVENOUS at 14:31

## 2022-08-19 RX ADMIN — FAMOTIDINE 20 MG: 20 TABLET, FILM COATED ORAL at 20:58

## 2022-08-19 RX ADMIN — ALLOPURINOL 200 MG: 100 TABLET ORAL at 20:58

## 2022-08-19 RX ADMIN — SODIUM CHLORIDE, PRESERVATIVE FREE 10 ML: 5 INJECTION INTRAVENOUS at 20:58

## 2022-08-19 RX ADMIN — FENTANYL CITRATE 50 MCG: 50 INJECTION, SOLUTION INTRAMUSCULAR; INTRAVENOUS at 04:21

## 2022-08-19 RX ADMIN — HYDROMORPHONE HYDROCHLORIDE 0.5 MG: 1 INJECTION, SOLUTION INTRAMUSCULAR; INTRAVENOUS; SUBCUTANEOUS at 21:05

## 2022-08-19 RX ADMIN — SODIUM CHLORIDE: 9 INJECTION, SOLUTION INTRAVENOUS at 13:04

## 2022-08-19 RX ADMIN — GABAPENTIN 600 MG: 300 CAPSULE ORAL at 17:01

## 2022-08-19 ASSESSMENT — PAIN DESCRIPTION - DESCRIPTORS
DESCRIPTORS: ACHING;DISCOMFORT;SORE
DESCRIPTORS: ACHING;THROBBING;TIGHTNESS
DESCRIPTORS: ACHING;BURNING;DISCOMFORT
DESCRIPTORS: ACHING;BURNING;DISCOMFORT
DESCRIPTORS: ACHING;DISCOMFORT;BURNING
DESCRIPTORS: BURNING;DISCOMFORT
DESCRIPTORS: BURNING
DESCRIPTORS: BURNING;THROBBING
DESCRIPTORS: ACHING;CRAMPING
DESCRIPTORS: ACHING;DISCOMFORT;SORE

## 2022-08-19 ASSESSMENT — PAIN - FUNCTIONAL ASSESSMENT
PAIN_FUNCTIONAL_ASSESSMENT: PREVENTS OR INTERFERES SOME ACTIVE ACTIVITIES AND ADLS

## 2022-08-19 ASSESSMENT — PAIN DESCRIPTION - LOCATION
LOCATION: LEG;KNEE
LOCATION: LEG;KNEE
LOCATION: KNEE
LOCATION: LEG;KNEE
LOCATION: KNEE
LOCATION: KNEE
LOCATION: LEG
LOCATION: KNEE

## 2022-08-19 ASSESSMENT — PAIN SCALES - GENERAL
PAINLEVEL_OUTOF10: 9
PAINLEVEL_OUTOF10: 8
PAINLEVEL_OUTOF10: 6
PAINLEVEL_OUTOF10: 4
PAINLEVEL_OUTOF10: 10
PAINLEVEL_OUTOF10: 8
PAINLEVEL_OUTOF10: 5
PAINLEVEL_OUTOF10: 7
PAINLEVEL_OUTOF10: 8
PAINLEVEL_OUTOF10: 8
PAINLEVEL_OUTOF10: 7
PAINLEVEL_OUTOF10: 6
PAINLEVEL_OUTOF10: 10
PAINLEVEL_OUTOF10: 6
PAINLEVEL_OUTOF10: 9
PAINLEVEL_OUTOF10: 7
PAINLEVEL_OUTOF10: 10

## 2022-08-19 ASSESSMENT — PAIN DESCRIPTION - FREQUENCY
FREQUENCY: INTERMITTENT
FREQUENCY: CONTINUOUS
FREQUENCY: INTERMITTENT
FREQUENCY: CONTINUOUS
FREQUENCY: INTERMITTENT
FREQUENCY: INTERMITTENT

## 2022-08-19 ASSESSMENT — PAIN DESCRIPTION - DIRECTION
RADIATING_TOWARDS: ENTIRE LEG
RADIATING_TOWARDS: ENTIRE LEG

## 2022-08-19 ASSESSMENT — PAIN DESCRIPTION - PAIN TYPE
TYPE: ACUTE PAIN;SURGICAL PAIN
TYPE: ACUTE PAIN
TYPE: SURGICAL PAIN
TYPE: SURGICAL PAIN
TYPE: ACUTE PAIN
TYPE: SURGICAL PAIN;ACUTE PAIN

## 2022-08-19 ASSESSMENT — PAIN DESCRIPTION - ORIENTATION
ORIENTATION: RIGHT

## 2022-08-19 ASSESSMENT — ENCOUNTER SYMPTOMS: SHORTNESS OF BREATH: 1

## 2022-08-19 ASSESSMENT — PAIN DESCRIPTION - ONSET
ONSET: ON-GOING
ONSET: GRADUAL

## 2022-08-19 ASSESSMENT — LIFESTYLE VARIABLES: SMOKING_STATUS: 1

## 2022-08-19 NOTE — ANESTHESIA PRE PROCEDURE
Department of Anesthesiology  Preprocedure Note       Name:  Darya Dawn   Age:  64 y.o.  :  1960                                          MRN:  [id number]         Date:  2022      Surgeon: Nasra Zamora):  Ghazal Schuler DO    Procedure: Procedure(s):  RIGHT KNEE INCISION AND DRAINAGE    Medications prior to admission:   Prior to Admission medications    Medication Sig Start Date End Date Taking? Authorizing Provider   promethazine (PHENERGAN) 25 MG tablet Take 25 mg by mouth every 6 hours as needed for Nausea    Historical Provider, MD   lipase-protease-amylase (CREON) 65307-74188 units delayed release capsule Take 6 capsules by mouth 3 times daily (with meals) 4/15/22   Mandy Mendes MD   furosemide (LASIX) 40 MG tablet Take 1 tablet by mouth daily 22   Mandy Mendes MD   polyethylene glycol (GLYCOLAX) 17 g packet Take 17 g by mouth 2 times daily    Historical Provider, MD   HYDROcodone-acetaminophen (NORCO) 5-325 MG per tablet Take 1 tablet by mouth every 8 hours as needed for Pain.      Historical Provider, MD   acetaminophen (TYLENOL) 325 MG tablet Take 650 mg by mouth every 4 hours as needed for Pain     Historical Provider, MD   meclizine (ANTIVERT) 25 MG tablet Take 25 mg by mouth every 6 hours as needed    Historical Provider, MD   diclofenac sodium (VOLTAREN) 1 % GEL Apply 4 g topically 2 times daily    Historical Provider, MD   baclofen (LIORESAL) 20 MG tablet Take 20 mg by mouth 4 times daily    Historical Provider, MD   metoprolol succinate (TOPROL XL) 25 MG extended release tablet Take 0.5 tablets by mouth daily 21   Jg Camp MD   cyanocobalamin 50 MCG tablet Take 100 mcg by mouth daily    Historical Provider, MD   ferrous sulfate (FE TABS 325) 325 (65 Fe) MG EC tablet Take 325 mg by mouth daily  21   Historical Provider, MD   famotidine (PEPCID) 20 MG tablet Take 1 tablet by mouth 2 times daily 9/10/20   Keyanna Casiano MD   traZODone (DESYREL) 100 MG tablet Take 100 mg by mouth nightly    Historical Provider, MD   albuterol (PROVENTIL) (5 MG/ML) 0.5% nebulizer solution Take 2.5 mg by nebulization 3 times daily     Historical Provider, MD   magnesium 200 MG TABS tablet Take 200 mg by mouth 2 times daily     Historical Provider, MD   calcium carbonate 600 MG TABS tablet Take 1 tablet by mouth 2 times daily     Historical Provider, MD   montelukast (SINGULAIR) 10 MG tablet Take 1 tablet by mouth daily 8/12/19   Shameka Murray,    omeprazole (PRILOSEC) 20 MG delayed release capsule Take 20 mg by mouth Daily     Historical Provider, MD   Cholecalciferol (VITAMIN D3) 5000 units TABS Take 1 tablet by mouth every other day     Historical Provider, MD   docusate sodium (COLACE) 100 MG capsule Take 100 mg by mouth 2 times daily    Historical Provider, MD   levOCARNitine (CARNITOR) 330 MG tablet Take 330 mg by mouth 3 times daily For mitochondrial disease    Historical Provider, MD   allopurinol (ZYLOPRIM) 100 MG tablet Take 200 mg by mouth 2 times daily     Historical Provider, MD   escitalopram (LEXAPRO) 20 MG tablet Take 20 mg by mouth 2 times daily     Historical Provider, MD   gabapentin (NEURONTIN) 600 MG tablet Take 1 tablet by mouth 4 times daily. 10/31/13   Clara Lynch MD   topiramate (TOPAMAX) 200 MG tablet Take 1 tablet by mouth 2 times daily.  5/9/12   Ab Benavides,    ziprasidone (GEODON) 20 MG capsule Take 20 mg by mouth nightly     Historical Provider, MD       Current medications:    Current Facility-Administered Medications   Medication Dose Route Frequency Provider Last Rate Last Admin    albuterol (PROVENTIL) nebulizer solution 2.5 mg  2.5 mg Nebulization Q4H PRN Shonda Silverman DO        allopurinol (ZYLOPRIM) tablet 200 mg  200 mg Oral BID Shonda Silverman DO        baclofen (LIORESAL) tablet 20 mg  20 mg Oral 4x daily Jaime Caraballo, DO        famotidine (PEPCID) tablet 20 mg  20 mg Oral BID Shonda Silverman DO        escitalopram (LEXAPRO) tablet 20 mg  20 mg Oral BID Nicholas Kapadia, DO        ferrous sulfate (IRON 325) tablet 325 mg  325 mg Oral Daily Nicholas Kapadia, DO        furosemide (LASIX) tablet 40 mg  40 mg Oral Daily Nicholas Kapadia, DO        gabapentin (NEURONTIN) capsule 600 mg  600 mg Oral 4x Daily Nicholas Kapadia, DO        levOCARNitine (CARNITOR) tablet 330 mg (Patient Supplied)  330 mg Oral TID Nicholas Kapadia, DO        sodium chloride flush 0.9 % injection 5-40 mL  5-40 mL IntraVENous 2 times per day Nicholas Kapadia, DO        sodium chloride flush 0.9 % injection 5-40 mL  5-40 mL IntraVENous PRN Nicholas Kapadia, DO        0.9 % sodium chloride infusion   IntraVENous PRN Nicholas Kapadia, DO        enoxaparin Sodium (LOVENOX) injection 30 mg  30 mg SubCUTAneous BID Nicholas Kapadia, DO        ondansetron (ZOFRAN-ODT) disintegrating tablet 4 mg  4 mg Oral Q8H PRN Nicholas Kapadia, DO        Or    ondansetron (ZOFRAN) injection 4 mg  4 mg IntraVENous Q6H PRN Nicholas Kapadia, DO        polyethylene glycol (GLYCOLAX) packet 17 g  17 g Oral Daily PRN Nicholas Kapadia, DO        vancomycin (VANCOCIN) 1,250 mg in dextrose 5 % 250 mL IVPB  1,250 mg IntraVENous Q12H Jaime Caraballo DO   Stopped at 08/19/22 0528    HYDROmorphone (DILAUDID) injection 0.5 mg  0.5 mg IntraVENous Q4H PRN Kerri Nogueira MD   0.5 mg at 08/19/22 0829    HYDROcodone-acetaminophen (NORCO) 5-325 MG per tablet 1 tablet  1 tablet Oral Q6H PRN Kerri Nogueira MD   1 tablet at 08/19/22 1148    Or    HYDROcodone-acetaminophen (NORCO)  MG per tablet 1 tablet  1 tablet Oral Q6H PRN Kerri Nogueira MD        tobramycin (NEBCIN) injection    PRN [place], DO   1.2 g at 08/19/22 1234    vancomycin (VANCOCIN) injection    PRN [place], DO   1,000 mg at 08/19/22 1234       Allergies:     Allergies   Allergen Reactions    Bee Pollen Anaphylaxis, Shortness Of Breath and Swelling     And wasp    Penicillins Anaphylaxis    Ropinirole Swelling and Anaphylaxis swelling of throat    Ropinirole Hcl Anaphylaxis    Vistaril [Hydroxyzine Hcl] Anaphylaxis    Aripiprazole Other (See Comments)     muscle spasms and confusion    Prednisone     Restoril [Temazepam]     Hydroxyzine Pamoate Itching and Rash    Tape Jeaneen Eastern Tape] Rash     Paper tape allergy    Fabric tape is OK to use        Problem List:    Patient Active Problem List   Diagnosis Code    Debility R53.81    Obesity E66.9    Bipolar 1 disorder ([facility] F31.9    Generalized seizure disorder ([facility] G40.309    Cervicalgia M54.2    Asthma J45.909    Hyperlipidemia E78.5    Hypertension I10    Arthritis M19.90    Lymphedema of lower extremity I89.0    Degenerative Osteoarthritis of both knees M17.0    Status post total knee replacement, right Z96.651    Cervical spondylolysis M43.02    Degenerative Osteoarthritis thoracic spine M47.814    Thoracic facet syndrome M47.894    Cervical facet syndrome M47.812    Facet syndrome, lumbar M47.816    Neural foraminal stenosis of lumbar spine M48.061    Lumbar radiculopathy M54.16    DDD (degenerative disc disease), cervical M50.30    Neural foraminal stenosis of cervical spine M48.02    Protruded cervical disc M50.20    Cervical radiculopathy M54.12    Postlaminectomy syndrome, lumbar M96.1    Neuropathic pain syndrome (non-herpetic) M79.2    Chronic respiratory failure with hypoxia (HCC) J96.11    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    Closed fracture of lower end of right radius with routine healing S52.501D    Closed left ankle fracture, initial encounter S82.892A    Closed fracture of right tibial plateau Y41.426K    Closed displaced pilon fracture of right tibia S82.871A    Acute pancreatitis K85.90    Pancreatic pseudocyst K86.3    Cellulitis of right lower extremity L03.115    Cellulitis L03.90    Septic joint (HCC) M00.9       Past Medical History:        Diagnosis Date    Adenoma of right adrenal gland     Anemia     Anxiety     Arthritis     spinal    Asthma     controlled with inhalers     Benign essential tremor     Bipolar affective (HCC)     Chronic back pain     Spinal cord stimulator in place    Chronic respiratory failure with hypoxia (HCC)     at times dt diaphragm does not function properly dt Mitochondrial disorder    Depression     stable    Diabetes mellitus (HCC)     Difficulty swallowing     for EGD 10-8-19     Environmental and seasonal allergies     Excessive physiologic tremor 5/24/2021    Fatty liver     Full dentures     GERD (gastroesophageal reflux disease)     Gout     past hx of    Herniated cervical disc     limited rom of head and neck    History of swelling of feet     Hypertension     Lymphedema of lower extremity 09/06/2012    Mitochondrial cytopathy (HCC)     s/s muscle and nerve pain, difficulty breathing, seizures, difficulty swallowing, digestive disorders- Dr. Erik Torres [facility]    Muscle weakness (generalized)     Information obtained from  [employer] [facility] for assistance with personal care     Information obtained from [address] [place], [place] [address] [place] Neuropathy     at feet    Obesity     PETR (obstructive sleep apnea)     no CPAP dt insurance will not pay for    Osteoarthritis of left knee     Information obtained from  [facility] [place] PONV (postoperative nausea and vomiting)     Seizures (HCC)     last approx 6-13-19- f/u w/ PCP  Granmal, flares up in heat/ summer time - no recent issues as of 10-4-19     Spinal headache     Thyroid disease        Past Surgical History:        Procedure Laterality Date    ANKLE FRACTURE SURGERY Right 2/14/2022    RIGHT ANKLE IRRIGAITON AND DEBRIDEMENT WITH EXTERNAL FIXATOR AND LACERATION REPAIR performed by Nilesh Brooks MD at [address] Right 3/2/2022    RIGHT LOWER EXTREMITY REMOVAL EXTERNAL FIXATOR, RIGHT PILON OPEN REDUCTION INTERNAL FIXATOR--SYNTHES (FACILITY) performed by Christel Moran DO at [address] [facility]      with Hysterectomy / unknown    BACK SURGERY  last one 1995    lumbar x 2    CARDIAC CATHETERIZATION Right 6-6-2013    Dr. Feliberto Gitelman Radial, no stents placed, no blockages    [address]    open with gastric bypass    COLONOSCOPY N/A 9/18/2018    COLONOSCOPY POLYPECTOMY HOT BIOPSY [facility] [address] [facility] COLONOSCOPY N/A 10/8/2019    COLONOSCOPY POLYPECTOMY SNARE/COLD BIOPSY performed by Shahida Nevarez MD at [address] [facility] EGD COLONOSCOPY N/A 10/8/2019    EGD ESOPHAGOGASTRODUODENOSCOPY DILATATION performed by CHRISTOPHE Sutton MD at [facility], DIAGNOSTIC      ESOPHAGEAL DILATATION  9/18/2018    ESOPHAGEAL DILATION [place] performed by Shahida Nevarez MD at [address] [place] [address] (CERVIX STATUS UNKNOWN)  1988    JOINT REPLACEMENT Bilateral 2007,2017    knees    KNEE SURGERY Bilateral     scope    MYOMECTOMY      NERVE BLOCK Left 10 02 2013    lumbar paravertebral facet #2    NERVE BLOCK Left 10 9 13    hip inj #1    NERVE BLOCK Left 10/16/13    hip injection    NERVE BLOCK Left 10/29/2014    left lumbar transforaminal nerve lbock  #1    NERVE BLOCK Left 11/12/2014    lumbar left transforaminal nerve block  #2    NERVE BLOCK Left 12 8 14    lumbar transforaminal #3    NERVE BLOCK Right 3/30/15    cervical transforaminal #1    NERVE BLOCK Right 4/6/2015    right cervical transforaminal nerve block  #2    NERVE BLOCK Right 4/13/15    cervical transforaminal #3    NERVE BLOCK Left 7/6/15    knee injection #1    NERVE BLOCK  07/20/15    left genicular nerve block/knee #2    NERVE BLOCK Left 10 1 15    lumb transforam #1    NERVE BLOCK  10/26/15    left lumbar transforaminal nerve block #3    NERVE BLOCK Bilateral 2021    #1 BILATERAL SACROILIAC JOINT INJECTION UNDER FLUORO performed by Tiffani Lomas MD at [address] [facility] [address] Left 11 25 13    lumbar radiofreq    OTHER SURGICAL HISTORY  3/28/2016    stage 1, 3 day percutanous trial [employer] lumbar spinal cord stimulator    OTHER SURGICAL HISTORY      racz procedure / lower back    AL COLONOSCOPY FLX DX W/COLLJ SPEC WHEN PFRMD N/A 3/20/2018    COLONOSCOPY DIAGNOSTIC OR SCREENING performed by Marilyn Betancourt MD at [address] EGD TRANSORAL BIOPSY SINGLE/MULTIPLE N/A 3/20/2018    EGD BIOPSY performed by Marilyn Betancourt MD at [address] History:    Social History     Tobacco Use    Smoking status: Former     Packs/day: 0.75     Years: 42.00     Pack years: 31.50     Types: Cigarettes     Start date: 1990     Quit date: 2021     Years since quittin.6    Smokeless tobacco: Never   Substance Use Topics    Alcohol use: No     Alcohol/week: 0.0 standard drinks                                Counseling given: Not Answered      Vital Signs (Current):   Vitals:    22 0120 22 0715   BP: 111/69 112/64   Pulse: 81 72   Resp: 18 18   Temp: 97.3 °F (36.3 °C) 96.9 °F (36.1 °C)   TempSrc: Temporal Temporal   SpO2: 95% 97%                                              BP Readings from Last 3 Encounters:   22 112/64   22 116/80   22 (!) 117/54       NPO Status: Time of last liquid consumption: 200 (took pain med with a sip of water)                        Time of last solid consumption:                         Date of last liquid consumption: 22                        Date of last solid food consumption: 08/17/22    BMI:   Wt Readings from Last 3 Encounters:   08/18/22 240 lb 6.4 oz (109 kg)   05/06/22 230 lb (104.3 kg)   04/15/22 258 lb 6.4 oz (117.2 kg)     There is no height or weight on file to calculate BMI.    CBC:   Lab Results   Component Value Date/Time    WBC 8.8 08/17/2022 01:30 AM    RBC 4.73 08/17/2022 01:30 AM    HGB 15.2 08/17/2022 01:30 AM    HCT 45.9 08/17/2022 01:30 AM    MCV 97.0 08/17/2022 01:30 AM    RDW 13.1 08/17/2022 01:30 AM     08/17/2022 01:30 AM       CMP:   Lab Results   Component Value Date/Time     08/17/2022 01:30 AM    K 3.5 08/17/2022 01:30 AM    K 3.9 05/06/2022 05:28 PM    CL 96 08/17/2022 01:30 AM    CO2 24 08/17/2022 01:30 AM    BUN 15 08/17/2022 01:30 AM    CREATININE 0.7 08/17/2022 01:30 AM    GFRAA >60 08/17/2022 01:30 AM    LABGLOM >60 08/17/2022 01:30 AM    GLUCOSE 127 08/17/2022 01:30 AM    GLUCOSE 93 05/24/2012 11:11 AM    PROT 6.9 08/17/2022 01:30 AM    CALCIUM 8.8 08/17/2022 01:30 AM    BILITOT 0.4 08/17/2022 01:30 AM    ALKPHOS 97 08/17/2022 01:30 AM    AST 14 08/17/2022 01:30 AM    ALT 10 08/17/2022 01:30 AM       POC Tests: No results for input(s): POCGLU, POCNA, POCK, POCCL, POCBUN, POCHEMO, POCHCT in the last 72 hours.     Coags:   Lab Results   Component Value Date/Time    PROTIME 13.1 08/19/2022 08:25 AM    PROTIME 10.8 05/07/2012 03:45 AM    INR 1.2 08/19/2022 08:25 AM    APTT 23.4 02/14/2022 06:05 AM       HCG (If Applicable): No results found for: PREGTESTUR, PREGSERUM, HCG, HCGQUANT     ABGs:   Lab Results   Component Value Date/Time    W6ZQYTWK 94.0 05/06/2012 10:00 AM        Type & Screen (If Applicable):  No results found for: LABABO, LABRH    Drug/Infectious Status (If Applicable):  Lab Results   Component Value Date/Time    HEPCAB NON REACT 05/21/2013 01:42 PM       COVID-19 Screening (If Applicable):   Lab Results   Component Value Date/Time    COVID19 Not Detected 04/15/2022 10:20 AM    COVID19 Not Detected 02/10/2021 02:29 PM           Anesthesia Evaluation  Patient summary reviewed   history of anesthetic complications: PONV. Airway: Mallampati: III  TM distance: >3 FB   Neck ROM: full  Mouth opening: > = 3 FB   Dental: normal exam         Pulmonary: breath sounds clear to auscultation  (+) sleep apnea: on CPAP,  asthma: current smoker                           Cardiovascular:    (+) hypertension:,         Rhythm: regular  Rate: normal           Beta Blocker:  Dose within 24 Hrs         Neuro/Psych:   (+) neuromuscular disease:, headaches:, psychiatric history:depression/anxiety             GI/Hepatic/Renal:   (+) GERD: well controlled, liver disease:, morbid obesity          Endo/Other:    (+) DiabetesType II DM, , .                 Abdominal:   (+) obese,     Abdomen: soft. Vascular: Other Findings:           Anesthesia Plan      general     ASA 3       Induction: intravenous. Anesthetic plan and risks discussed with patient. Plan discussed with CRNA.                     Rukhsana Diop MD   8/19/2022

## 2022-08-19 NOTE — PROGRESS NOTES
Patient was seen and admitted earlier this morning by one of my partners. Labs and images are reviewed. Concern for septic joint of the right knee and right lower extremity cellulitis. Infectious disease orthopedic currently on board. Also concern for bacteremia with blood cultures with staph aureus. She was started on IV vancomycin.     Hannah Fernandez MD

## 2022-08-19 NOTE — PROGRESS NOTES
[address] called with bed number at [facility] room 5923. [facility] ETA 0000 on 8/19.

## 2022-08-19 NOTE — CARE COORDINATION
8/19/2022 social work transition of care planning  Sw followed up with pt at bedside. [facility]. Envelope will be in soft chart. Sw will follow.   Electronically signed by GAGE Miller on 8/19/2022 at 10:44 AM

## 2022-08-19 NOTE — DISCHARGE INSTRUCTIONS
Department of Orthopaedic Trauma  [address] MD Dr. Glory Peed, MD Mai Colt, PA-C Chevis Locks PA-C Conny Stack PA-C    Orthopaedics Discharge Instructions   Weight bearing Status - Weight bearing as tolerated - on right lower Extremity  Pain medication Per Prescriptions  Contact Office for Medication Refill- [phone]  Office can refill pain med every 7 days  If patient discharging to facility then pain control will be continued per facility physician  Ice to operative/injured site for 15-30 minutes of each hour for next 5 days    Recommend that you continue to ice the area 2-3 times per day after this   Elevate operative/injured limb on 2 pillows at home  Goal is to have limb above the heart if able  Continue DVT Prophylaxis (blood clot prevention) as Prescribed: aspirin   Wound care - leave dressing intact for 1 week. Dry clean dressing after as needed for drainage    Follow Up in Office in 2 weeks. Your first post op appointment is often with one of our PAs. Call the office at [phone] for directions or with any questions. Watch for these significant complications. Call physician if they or any other problems occur:  Fever over 101°, redness, swelling or warmth at the operative site  Unrelieved nausea    Foul smelling or cloudy drainage at the operative site   Unrelieved pain    Blood soaked dressing.  (Some oozing may be normal)     Numb, pale, blue, cold or tingling extremity    Future Appointments   Date Time Provider Deysi Simms   8/29/2022  8:00 AM DO Priyanka Castro ENT [facility]   9/1/2022  9:00 AM Dimitrios Ornelas DO SE Ortho [facility]   9/1/2022  2:45 PM CAROL Nevarez POD [facility]   11/17/2022 10:45 AM Dimitrios Ornelas DO SE Ortho [facility]   3/16/2023 10:45 AM Amita Liriano MD BDM ENDO [facility]       Directions to Orthopaedic Trauma Office at [facility] [address] [facility]:   [address] [place] [address], [facility] [address] through the ED parking lot off [address]  At far side of the parking lot is [address] (large blue awning)-- Enter here  Our office is straight ahead through this entrance and check in will be on your left        It is the Department of Orthopaedic Trauma's standard of practice that providers will de-escalate(wean) all patients from narcotic(opioid) medications during the post-operative period. We provide multimodal pain control but opioid medications are tapered in all of our patients. If patient requires referral to pain management for prolonged taper off of opioid pain medication we will facilitate this process.

## 2022-08-19 NOTE — ANESTHESIA POSTPROCEDURE EVALUATION
Department of Anesthesiology  Postprocedure Note    Patient: Karlos Mccann  MRN: [id number]  YOB: 1960  Date of evaluation: 8/19/2022      Procedure Summary     Date: 08/19/22 Room / Location: [facility] OR 09 / [facility]    Anesthesia Start: 1304 Anesthesia Stop: 1448    Procedure: RIGHT KNEE INCISION AND DRAINAGE (Right: Knee) Diagnosis:       Wound infection      (Wound infection [T14. 8XXA, L08.9])    Surgeons: Elizabeth Gill DO Responsible Provider: Clara Moser MD    Anesthesia Type: general ASA Status: 3          Anesthesia Type: No value filed.     Avelino Phase I:      Avelino Phase II:        Anesthesia Post Evaluation    Patient location during evaluation: PACU  Patient participation: complete - patient participated  Level of consciousness: awake  Pain score: 3  Airway patency: patent  Nausea & Vomiting: no nausea  Complications: no  Cardiovascular status: blood pressure returned to baseline  Respiratory status: acceptable  Hydration status: euvolemic

## 2022-08-19 NOTE — ANESTHESIA PRE PROCEDURE
Department of Anesthesiology  Preprocedure Note       Name:  Cleveland Retana   Age:  64 y.o.  :  1960                                          MRN:  [id number]         Date:  2022      Surgeon: Caterina Cardoza):  Ilir Castaneda DO    Procedure: Procedure(s):  RIGHT KNEE INCISION AND DRAINAGE    Medications prior to admission:   Prior to Admission medications    Medication Sig Start Date End Date Taking? Authorizing Provider   promethazine (PHENERGAN) 25 MG tablet Take 25 mg by mouth every 6 hours as needed for Nausea    Historical Provider, MD   lipase-protease-amylase (CREON) 08452-00148 units delayed release capsule Take 6 capsules by mouth 3 times daily (with meals) 4/15/22   Corbin Nichols MD   furosemide (LASIX) 40 MG tablet Take 1 tablet by mouth daily 22   Corbin Nichols MD   polyethylene glycol (GLYCOLAX) 17 g packet Take 17 g by mouth 2 times daily    Historical Provider, MD   HYDROcodone-acetaminophen (NORCO) 5-325 MG per tablet Take 1 tablet by mouth every 8 hours as needed for Pain.      Historical Provider, MD   acetaminophen (TYLENOL) 325 MG tablet Take 650 mg by mouth every 4 hours as needed for Pain     Historical Provider, MD   meclizine (ANTIVERT) 25 MG tablet Take 25 mg by mouth every 6 hours as needed    Historical Provider, MD   diclofenac sodium (VOLTAREN) 1 % GEL Apply 4 g topically 2 times daily    Historical Provider, MD   baclofen (LIORESAL) 20 MG tablet Take 20 mg by mouth 4 times daily    Historical Provider, MD   metoprolol succinate (TOPROL XL) 25 MG extended release tablet Take 0.5 tablets by mouth daily 21   Micaela Camp MD   cyanocobalamin 50 MCG tablet Take 100 mcg by mouth daily    Historical Provider, MD   ferrous sulfate (FE TABS 325) 325 (65 Fe) MG EC tablet Take 325 mg by mouth daily  21   Historical Provider, MD   famotidine (PEPCID) 20 MG tablet Take 1 tablet by mouth 2 times daily 9/10/20   Mona Garcia MD   traZODone (DESYREL) 100 MG tablet Take 100 mg by mouth nightly    Historical Provider, MD   albuterol (PROVENTIL) (5 MG/ML) 0.5% nebulizer solution Take 2.5 mg by nebulization 3 times daily     Historical Provider, MD   magnesium 200 MG TABS tablet Take 200 mg by mouth 2 times daily     Historical Provider, MD   calcium carbonate 600 MG TABS tablet Take 1 tablet by mouth 2 times daily     Historical Provider, MD   montelukast (SINGULAIR) 10 MG tablet Take 1 tablet by mouth daily 8/12/19   Faby Murray,    omeprazole (PRILOSEC) 20 MG delayed release capsule Take 20 mg by mouth Daily     Historical Provider, MD   Cholecalciferol (VITAMIN D3) 5000 units TABS Take 1 tablet by mouth every other day     Historical Provider, MD   docusate sodium (COLACE) 100 MG capsule Take 100 mg by mouth 2 times daily    Historical Provider, MD   levOCARNitine (CARNITOR) 330 MG tablet Take 330 mg by mouth 3 times daily For mitochondrial disease    Historical Provider, MD   allopurinol (ZYLOPRIM) 100 MG tablet Take 200 mg by mouth 2 times daily     Historical Provider, MD   escitalopram (LEXAPRO) 20 MG tablet Take 20 mg by mouth 2 times daily     Historical Provider, MD   gabapentin (NEURONTIN) 600 MG tablet Take 1 tablet by mouth 4 times daily. 10/31/13   Gustavo Jeffries., MD   topiramate (TOPAMAX) 200 MG tablet Take 1 tablet by mouth 2 times daily. 5/9/12   Ab Benavides DO   ziprasidone (GEODON) 20 MG capsule Take 20 mg by mouth nightly     Historical Provider, MD       Current medications:    No current facility-administered medications for this visit. No current outpatient medications on file.      Facility-Administered Medications Ordered in Other Visits   Medication Dose Route Frequency Provider Last Rate Last Admin    albuterol (PROVENTIL) nebulizer solution 2.5 mg  2.5 mg Nebulization Q4H PRN Les Ozuna DO        allopurinol (ZYLOPRIM) tablet 200 mg  200 mg Oral BID Les Ozuna DO        baclofen (LIORESAL) tablet 20 mg  20 mg Oral 4x daily Catherin Sleight, DO        famotidine (PEPCID) tablet 20 mg  20 mg Oral BID Catherin Sleight, DO        escitalopram (LEXAPRO) tablet 20 mg  20 mg Oral BID Catherin Sleight, DO        ferrous sulfate (IRON 325) tablet 325 mg  325 mg Oral Daily Catherin Sleight, DO        furosemide (LASIX) tablet 40 mg  40 mg Oral Daily Catherin Sleight, DO        gabapentin (NEURONTIN) capsule 600 mg  600 mg Oral 4x Daily Catherin Sleight, DO        levOCARNitine (CARNITOR) tablet 330 mg (Patient Supplied)  330 mg Oral TID Catherin Sleight, DO        sodium chloride flush 0.9 % injection 5-40 mL  5-40 mL IntraVENous 2 times per day Catherin Sleight, DO        sodium chloride flush 0.9 % injection 5-40 mL  5-40 mL IntraVENous PRN Catherin Sleight, DO        0.9 % sodium chloride infusion   IntraVENous PRN Catherin Sleight, DO        enoxaparin Sodium (LOVENOX) injection 30 mg  30 mg SubCUTAneous BID Catherin Sleight, DO        ondansetron (ZOFRAN-ODT) disintegrating tablet 4 mg  4 mg Oral Q8H PRN Catherin Sleight, DO        Or    ondansetron (ZOFRAN) injection 4 mg  4 mg IntraVENous Q6H PRN Catherin Sleight, DO        polyethylene glycol (GLYCOLAX) packet 17 g  17 g Oral Daily PRN Catherin Sleight, DO        acetaminophen (TYLENOL) tablet 650 mg  650 mg Oral Q6H PRN Catherin Sleight, DO        Or    acetaminophen (TYLENOL) suppository 650 mg  650 mg Rectal Q6H PRN Catherin Sleight, DO        vancomycin (VANCOCIN) 1,250 mg in dextrose 5 % 250 mL IVPB  1,250 mg IntraVENous Q12H Catherin Sleight, DO   Stopped at 08/19/22 0528    HYDROcodone-acetaminophen (NORCO) 5-325 MG per tablet 1 tablet  1 tablet Oral Q8H PRN Larayne Litter, DO   1 tablet at 08/19/22 0200    fentaNYL (SUBLIMAZE) injection 50 mcg  50 mcg IntraVENous Q2H PRN Larayne Litter, DO   50 mcg at 08/19/22 0421       Allergies:     Allergies   Allergen Reactions    Bee Pollen Anaphylaxis, Shortness Of Breath and Swelling     And wasp    Penicillins Anaphylaxis    Ropinirole Swelling and Anaphylaxis     swelling of fracture, initial encounter S82.892A    Closed fracture of right tibial plateau I23.645L    Closed displaced pilon fracture of right tibia S82.871A    Acute pancreatitis K85.90    Pancreatic pseudocyst K86.3    Cellulitis of right lower extremity L03.115    Cellulitis L03.90    Septic joint (HCC) M00.9       Past Medical History:        Diagnosis Date    Adenoma of right adrenal gland     Anemia     Anxiety     Arthritis     spinal    Asthma     controlled with inhalers     Benign essential tremor     Bipolar affective (HCC)     Chronic back pain     Spinal cord stimulator in place    Chronic respiratory failure with hypoxia (HCC)     at times dt diaphragm does not function properly dt Mitochondrial disorder    Depression     stable    Diabetes mellitus (HCC)     Difficulty swallowing     for EGD 10-8-19     Environmental and seasonal allergies     Excessive physiologic tremor 5/24/2021    Fatty liver     Full dentures     GERD (gastroesophageal reflux disease)     Gout     past hx of    Herniated cervical disc     limited rom of head and neck    History of swelling of feet     Hypertension     Lymphedema of lower extremity 09/06/2012    Mitochondrial cytopathy (HCC)     s/s muscle and nerve pain, difficulty breathing, seizures, difficulty swallowing, digestive disorders- Dr. Carly Donaldson [facility]    Muscle weakness (generalized)     Information obtained from  [facility] [place] Need for assistance with personal care     Information obtained from [address] [place], [place] [address] Neuropathy     at feet    Obesity     PETR (obstructive sleep apnea)     no CPAP dt insurance will not pay for    Osteoarthritis of left knee     Information obtained from  [facility] PONV (postoperative nausea and vomiting)     Seizures (HCC)     last approx 6-13-19- f/u w/ PCP  Granmal, flares up in heat/ summer time - no recent issues as of 10-4-19     Spinal headache     Thyroid disease        Past Surgical History: Procedure Laterality Date    ANKLE FRACTURE SURGERY Right 2/14/2022    RIGHT ANKLE IRRIGAITON AND DEBRIDEMENT WITH EXTERNAL FIXATOR AND LACERATION REPAIR performed by Morene Primrose, MD at [place] [address] Right 3/2/2022    RIGHT LOWER EXTREMITY REMOVAL EXTERNAL FIXATOR, RIGHT PILON OPEN REDUCTION INTERNAL FIXATOR--SYNTHES (FACILITY) performed by Salvatore Connor DO at [address] [facility]      with Hysterectomy / unknown    BACK SURGERY  last one 1995    lumbar x 2    CARDIAC CATHETERIZATION Right 6-6-2013    Dr. Ramesh Ground Radial, no stents placed, no blockages    [address]    open with gastric bypass    COLONOSCOPY N/A 9/18/2018    COLONOSCOPY POLYPECTOMY HOT BIOPSY performed by Z Azalia Halsted, MD at [address] COLONOSCOPY N/A 10/8/2019    COLONOSCOPY POLYPECTOMY SNARE/COLD BIOPSY performed by Gayathri Jaquez MD at [address] EGD COLONOSCOPY N/A 10/8/2019    EGD ESOPHAGOGASTRODUODENOSCOPY DILATATION performed by Z Azalia Halsted, MD at [address] [facility] [place], DIAGNOSTIC      ESOPHAGEAL DILATATION  9/18/2018    ESOPHAGEAL DILATION [facility] performed by Gayathri Jaquez MD at [address] [facility] (CERVIX STATUS UNKNOWN)  1988    JOINT REPLACEMENT Bilateral 2007,2017    knees    KNEE SURGERY Bilateral     scope    MYOMECTOMY      NERVE BLOCK Left 10 02 2013    lumbar paravertebral facet #2    NERVE BLOCK Left 10 9 13    hip inj #1    NERVE BLOCK Left 10/16/13    hip injection    NERVE BLOCK Left 10/29/2014    left lumbar transforaminal nerve lbock  #1    NERVE BLOCK Left 11/12/2014    lumbar left transforaminal nerve block  #2    NERVE BLOCK Left 12 8 14    lumbar transforaminal #3    NERVE BLOCK Right 3/30/15    cervical transforaminal #1    NERVE BLOCK Right 4/6/2015    right cervical transforaminal nerve block  #2    NERVE BLOCK Right 4/13/15    cervical transforaminal #3  NERVE BLOCK Left 7/6/15    knee injection #1    NERVE BLOCK  07/20/15    left genicular nerve block/knee #2    NERVE BLOCK Left 10 1 15    lumb transforam #1    NERVE BLOCK  10/26/15    left lumbar transforaminal nerve block #3    NERVE BLOCK Bilateral 2021    #1 BILATERAL SACROILIAC JOINT INJECTION UNDER FLUORO performed by Albertina Acosta MD at [address]  [facility] Left 11 25 13    lumbar radiofreq    OTHER SURGICAL HISTORY  3/28/2016    stage 1, 3 day percutanous trial [employer] lumbar spinal cord stimulator    OTHER SURGICAL HISTORY      racz procedure / lower back    NH COLONOSCOPY FLX DX W/COLLJ SPEC WHEN PFRMD N/A 3/20/2018    COLONOSCOPY DIAGNOSTIC OR SCREENING performed by Hoang Mckinnon MD at [facility] EGD TRANSORAL BIOPSY SINGLE/MULTIPLE N/A 3/20/2018    EGD BIOPSY performed by Hoang Mckinnon MD at [address] History:    Social History     Tobacco Use    Smoking status: Former     Packs/day: 0.75     Years: 42.00     Pack years: 31.50     Types: Cigarettes     Start date: 1990     Quit date: 2021     Years since quittin.6    Smokeless tobacco: Never   Substance Use Topics    Alcohol use: No     Alcohol/week: 0.0 standard drinks                                Counseling given: Not Answered      Vital Signs (Current): There were no vitals filed for this visit.                                            BP Readings from Last 3 Encounters:   22 112/64   22 116/80   22 (!) 117/54       NPO Status:  >8 Hours                                                                               BMI:   Wt Readings from Last 3 Encounters:   22 240 lb 6.4 oz (109 kg)   22 230 lb (104.3 kg)   04/15/22 258 lb 6.4 oz (117.2 kg)     There is no height or weight on file to calculate BMI.    CBC:   Lab Results   Component Value Date/Time    WBC 8.8 2022 01:30 AM    RBC 4.73 2022 01:30 AM    HGB 15.2 2022 01:30 AM    HCT 45.9 2022 01:30 AM    MCV 97.0 2022 01:30 AM    RDW 13.1 2022 01:30 AM     2022 01:30 AM       CMP:   Lab Results   Component Value Date/Time     2022 01:30 AM    K 3.5 2022 01:30 AM    K 3.9 2022 05:28 PM    CL 96 2022 01:30 AM    CO2 24 2022 01:30 AM    BUN 15 2022 01:30 AM    CREATININE 0.7 2022 01:30 AM    GFRAA >60 2022 01:30 AM    LABGLOM >60 2022 01:30 AM    GLUCOSE 127 2022 01:30 AM    GLUCOSE 93 2012 11:11 AM    PROT 6.9 2022 01:30 AM    CALCIUM 8.8 2022 01:30 AM    BILITOT 0.4 2022 01:30 AM    ALKPHOS 97 2022 01:30 AM    AST 14 2022 01:30 AM    ALT 10 2022 01:30 AM       POC Tests: No results for input(s): POCGLU, POCNA, POCK, POCCL, POCBUN, POCHEMO, POCHCT in the last 72 hours. Coags:   Lab Results   Component Value Date/Time    PROTIME 10.3 2022 08:30 AM    PROTIME 10.8 2012 03:45 AM    INR 0.9 2022 08:30 AM    APTT 23.4 2022 06:05 AM       HCG (If Applicable): No results found for: PREGTESTUR, PREGSERUM, HCG, HCGQUANT     ABGs:   Lab Results   Component Value Date/Time    O0WSGOOK 94.0 2012 10:00 AM        Type & Screen (If Applicable):  No results found for: LABABO, LABRH    Drug/Infectious Status (If Applicable):  Lab Results   Component Value Date/Time    HEPCAB NON REACT 2013 01:42 PM       COVID-19 Screening (If Applicable):   Lab Results   Component Value Date/Time    COVID19 Not Detected 04/15/2022 10:20 AM    COVID19 Not Detected 02/10/2021 02:29 PM       EK2022  Narrative & Impression    Normal sinus rhythm  Incomplete right bundle branch block  Borderline ECG  When compared with ECG of 06-MAY-2022 17:16,  Vent.  rate has increased BY  28 BPM  Confirmed by Faby Andres ([id number]) on 2022 7:47:42 PM     CXR 2022  Impression   Atherosclerotic disease and mild prominence of the cardiac silhouette. Central pulmonary vascular congestion.  No pneumothorax. Anesthesia Evaluation  Patient summary reviewed and Nursing notes reviewed   history of anesthetic complications: PONV. Airway: Mallampati: II  TM distance: >3 FB   Neck ROM: limited  Mouth opening: > = 3 FB   Dental:          Pulmonary: breath sounds clear to auscultation  (+) shortness of breath (intermittent diaphragm insufficency d/t mitochondrial disease): chronic,  sleep apnea: on noncompliant,  asthma: current smoker          Patient did not smoke on day of surgery. ROS comment: Seasonal allergies   Cardiovascular:  Exercise tolerance: poor (<4 METS),   (+) hypertension:, past MI (>10 years):, hyperlipidemia    (-) CABG/stent and  angina    ECG reviewed  Rhythm: regular  Rate: normal           Beta Blocker:  Dose within 24 Hrs         Neuro/Psych:   (+) seizures (Last seizure 6/2022):, psychiatric history (Bipolar 1 disorder):depression/anxiety              ROS comment: Chronic neck pain  Cervical spondylolysis  Degenerative Osteoarthritis thoracic spine  Thoracic facet syndrome  Cervical facet syndrome  Facet syndrome, lumbar  Neural foraminal stenosis of lumbar spine  Lumbar radiculopathy  DDD (degenerative disc disease), cervical  Neural foraminal stenosis of cervical spine  Mitochondrial cytopathy  Protruded cervical disc  Cervical radiculopathy  Postlaminectomy syndrome, lumbar  Neuropathic pain syndrome (non-herpetic)     GI/Hepatic/Renal:   (+) GERD:, liver disease:,          ROS comment: Adenoma of R adrenal gland. Endo/Other:    (+) Diabetes, blood dyscrasia: anemia, arthritis: OA., .                  ROS comment: Septic joint  Hx. Thyroid nodules Abdominal:             Vascular: negative vascular ROS. Other Findings:             Anesthesia Plan      general     ASA 4       Induction: intravenous.     MIPS: Postoperative opioids intended and Prophylactic antiemetics administered. Anesthetic plan and risks discussed with patient. Use of blood products discussed with patient whom consented to blood products. Plan discussed with attending and CRNA.           Post-op pain plan if not by surgeon: single peripheral nerve block            Nhi Turk RN   8/19/2022

## 2022-08-19 NOTE — PROGRESS NOTES
Patient to the PACU. Appropriate monitors placed on patient. Cart locked and in lowst position with side rails up.

## 2022-08-19 NOTE — LETTER
[address] Medicaid  CERTIFICATION OF NECESSITY  FOR TRANSPORTATION   BY WHEELCHAIR VAN     Individual Information   1. Name: Yecenia Rosenberg 2. [address] [place] [address] Dr Medicaid Billing Number:    3. Address: [address]      Transportation Provider Information   4. Provider Name:    5. [address] [place] [address] Dr Medicaid Provider Number:  National Provider Identifier (NPI):      Certification  7. Criteria:  By signing this document, the practitioner certifies that two statements are true:  A. This individual must be accompanied by a mobility-related assistive device from the point of pick-up to the point of drop-off. B. Transport of this individual by standard passenger vehicle or common carrier is precluded or contraindicated. 8. Period Beginning Date: 8/19/2022   9. Length  [x] Not more than 7 day(s)  [] One Year     Additional Information Relevant to Certification   10. Comments or Explanations, If Necessary or Appropriate   [facility], [address] Practitioner Information   11. Name of Practitioner: Sultana Merlos MD   12. [address] [place] [address] Dr Medicaid Provider Number, If Applicable:  [id number] Provider Identifier (NPI):      Signature Information   14. Date of Signature: 8/19/2022 15. Name of Person Signing: Cuong Lu   16. Signature and Professional Designation: Electronically signed by GAGE Kyle on 8/19/2022 at 11:43 AM       OD [id number]  Rev. 7/2015     [address] Encounter Date/Time: 8/19/2022 [address] Account: [de-identified]    MRN: [id number]    Patient: Christi Hood Serial #: [id number]      ENCOUNTER          Patient Class: I Private Enc? No Unit RM BD: SEYZ 5WE [id number]-[id number]   Hospital Service: MED   Encounter DX: Septic joint ([facility] [address] [M00. *   ADM Provider: Sahara Akhtar MD   Procedure:     ATT Provider: Sahara Akhtar MD   REF Provider: Duwaine Collet      Admission DX: Septic joint [facility]) and DX codes: M00.9      PATIENT                 Name: Vahid Su : 1960 (61 yrs)   Address: [facility] Sex: Female   [place] city: [facility] [address]         Marital Status:    Employer: RETIRED         Buddhism: Zoroastrian   Primary Care Provider: Sol Olivera DO         Primary Phone: [phone]   EMERGENCY CONTACT   Contact Name Legal Guardian? Relationship to Patient Home Phone Work Phone   1. Tavo Yang  2. Anne Encinas No  No Brother/Sister  Other [phone]              GUARANTOR            Guarantor: Vahid Su     : 1960   Address: [address] Sex: Female   [place] [address]     Relation to Patient: Self       Home Phone: [phone]   Guarantor ID: [id number]       Work Phone:     Guarantor Employer: RETIRED         Status: RETIRED      COVERAGE        PRIMARY INSURANCE   Payor: MEDICAID [place] Plan: MEDICAID [facility] DEPT OF*   Payor Address: [address] [address]       Group Number:   Insurance Type: INDEMNITY   Subscriber Name: Kelli Kelly : 1960   Subscriber ID: [id number] Pat. Rel. to Sub: Self   SECONDARY INSURANCE   Payor:   Plan:     Payor Address: ,          Group Number:   Insurance Type:     Subscriber Name:   Subscriber :     Subscriber ID:   Pat.  Rel. to Sub:             CSN: [id number]

## 2022-08-19 NOTE — CONSULTS
Department of Orthopedic Surgery  Resident Consult Note    Reason for Consult:  R knee pain    HISTORY OF PRESENT ILLNESS:       Patient is a 64 y.o. female who was transferred to [facility] from [facility] with 3-day history of right lower extremity pain. Patient reports that she first noticed a small red dot about her right leg that progressively worsened. Notes that the redness a send approximately upper leg to her knee joint. Reports that she has been experiencing right knee pain for the past few days as well. Denies any inciting trauma. Denies fevers, chills, nausea/vomiting, chest pain, or shortness of breath. Denies numbness/tingling/paresthesias. Denies any other orthopedic complaints at this time.       Past Medical History:        Diagnosis Date    Adenoma of right adrenal gland     Anemia     Anxiety     Arthritis     spinal    Asthma     controlled with inhalers     Benign essential tremor     Bipolar affective ([address]     Chronic back pain     Spinal cord stimulator in place    Chronic respiratory failure with hypoxia (HCC)     at times dt diaphragm does not function properly dt Mitochondrial disorder    Depression     stable    Diabetes mellitus ([address]     Difficulty swallowing     for EGD 10-8-19     Environmental and seasonal allergies     Excessive physiologic tremor 5/24/2021    Fatty liver     Full dentures     GERD (gastroesophageal reflux disease)     Gout     past hx of    Herniated cervical disc     limited rom of head and neck    History of swelling of feet     Hypertension     Lymphedema of lower extremity 09/06/2012    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 Annabel Stevenson [facility]    Muscle weakness (generalized)     Information obtained from [facility]    Need for assistance with personal care     Information obtained from [facility]    Neuropathy     at feet    Obesity PETR (obstructive sleep apnea)     no CPAP dt insurance will not pay for    Osteoarthritis of left knee     Information obtained from [facility]    PONV (postoperative nausea and vomiting)     Seizures ([address]     last approx 6-13-19- f/u w/ PCP  Gerda, flares up in heat/ summer time - no recent issues as of 10-4-19     Spinal headache     Thyroid disease      Past Surgical History:        Procedure Laterality Date    ANKLE FRACTURE SURGERY Right 2/14/2022    RIGHT ANKLE IRRIGAITON AND DEBRIDEMENT WITH EXTERNAL FIXATOR AND LACERATION REPAIR performed by Mynor Maldonado MD at [address] Right 3/2/2022    RIGHT LOWER EXTREMITY REMOVAL EXTERNAL FIXATOR, RIGHT PILON OPEN REDUCTION INTERNAL FIXATOR--[facility] (FACILITY) performed by Kaylene Singletary DO at [address] [facility]      with Hysterectomy / unknown    BACK SURGERY  last one 1995    lumbar x 2    CARDIAC CATHETERIZATION Right 6-6-2013    Dr. Neil Sharif Radial, no stents placed, no blockages     CHOLECYSTECTOMY  1999    open with gastric bypass    COLONOSCOPY N/A 9/18/2018    COLONOSCOPY POLYPECTOMY HOT BIOPSY performed by Yamileth Khalil MD at [address] N/A 10/8/2019    COLONOSCOPY POLYPECTOMY SNARE/COLD BIOPSY performed by Yamileth Khalil MD at [address]    EGD COLONOSCOPY N/A 10/8/2019    EGD ESOPHAGOGASTRODUODENOSCOPY DILATATION performed by Yamileth Khalil MD at [facility] [place], DIAGNOSTIC      ESOPHAGEAL DILATATION  9/18/2018    ESOPHAGEAL DILATION [place] performed by Yamileth Khalil MD at [address] [facility] (CERVIX STATUS UNKNOWN)  Reg King 1998 Bilateral 2007,2017    knees    KNEE SURGERY Bilateral     scope    MYOMECTOMY      NERVE BLOCK Left 10 02 2013    lumbar paravertebral facet #2    NERVE BLOCK Left 10 9 13    hip inj #1    NERVE BLOCK Left 10/16/13    hip injection    NERVE BLOCK Left 10/29/2014    left lumbar transforaminal nerve lbock  #1    NERVE BLOCK Left 11/12/2014    lumbar left transforaminal nerve block  #2    NERVE BLOCK Left 12 8 14    lumbar transforaminal #3    NERVE BLOCK Right 3/30/15    cervical transforaminal #1    NERVE BLOCK Right 4/6/2015    right cervical transforaminal nerve block  #2    NERVE BLOCK Right 4/13/15    cervical transforaminal #3    NERVE BLOCK Left 7/6/15    knee injection #1    NERVE BLOCK  07/20/15    left genicular nerve block/knee #2    NERVE BLOCK Left 10 1 15    lumb transforam #1    NERVE BLOCK  10/26/15    left lumbar transforaminal nerve block #3    NERVE BLOCK Bilateral 4/1/2021    #1 BILATERAL SACROILIAC JOINT INJECTION UNDER FLUORO performed by Tiffani Lomas MD at [facility] [address] Left 11 25 13    lumbar radiofreq    OTHER SURGICAL HISTORY  3/28/2016    stage 1, 3 day percutanous trial [place] [address] lumbar spinal cord stimulator    OTHER SURGICAL HISTORY  1995    racz procedure / lower back    SD COLONOSCOPY FLX DX W/COLLJ SPEC WHEN PFRMD N/A 3/20/2018    COLONOSCOPY DIAGNOSTIC OR SCREENING performed by Marilyn Betancourt MD at [address] [place] [address] EGD TRANSORAL BIOPSY SINGLE/MULTIPLE N/A 3/20/2018    EGD BIOPSY performed by Marilyn Betancourt MD at [facility] ENDOSCOPY    TONSILLECTOMY       Current Medications:   Current Facility-Administered Medications: albuterol (PROVENTIL) nebulizer solution 2.5 mg, 2.5 mg, Nebulization, Q4H PRN  allopurinol (ZYLOPRIM) tablet 200 mg, 200 mg, Oral, BID  baclofen (LIORESAL) tablet 20 mg, 20 mg, Oral, 4x daily  famotidine (PEPCID) tablet 20 mg, 20 mg, Oral, BID  escitalopram (LEXAPRO) tablet 20 mg, 20 mg, Oral, BID  ferrous sulfate (IRON 325) tablet 325 mg, 325 mg, Oral, Daily  furosemide (LASIX) tablet 40 mg, 40 mg, Oral, Daily  gabapentin (NEURONTIN) capsule 600 mg, 600 mg, Oral, 4x Daily  levOCARNitine (CARNITOR) tablet 330 mg (Patient Supplied), 330 mg, Oral, TID  sodium chloride flush 0.9 % injection 5-40 mL, 5-40 mL, IntraVENous, 2 times per day  sodium chloride flush 0.9 % injection 5-40 mL, 5-40 mL, IntraVENous, PRN  0.9 % sodium chloride infusion, , IntraVENous, PRN  enoxaparin Sodium (LOVENOX) injection 30 mg, 30 mg, SubCUTAneous, BID  ondansetron (ZOFRAN-ODT) disintegrating tablet 4 mg, 4 mg, Oral, Q8H PRN **OR** ondansetron (ZOFRAN) injection 4 mg, 4 mg, IntraVENous, Q6H PRN  polyethylene glycol (GLYCOLAX) packet 17 g, 17 g, Oral, Daily PRN  acetaminophen (TYLENOL) tablet 650 mg, 650 mg, Oral, Q6H PRN **OR** acetaminophen (TYLENOL) suppository 650 mg, 650 mg, Rectal, Q6H PRN  vancomycin (VANCOCIN) 1,250 mg in dextrose 5 % 250 mL IVPB, 1,250 mg, IntraVENous, Q12H  HYDROcodone-acetaminophen (NORCO) 5-325 MG per tablet 1 tablet, 1 tablet, Oral, Q8H PRN  Allergies:  Bee pollen, Penicillins, Ropinirole, Ropinirole hcl, Vistaril [hydroxyzine hcl], Aripiprazole, Prednisone, Restoril [temazepam], Hydroxyzine pamoate, and Tape [adhesive tape]    Social History:   TOBACCO:   reports that she quit smoking about 7 months ago. Her smoking use included cigarettes. She started smoking about 32 years ago. She has a 31.50 pack-year smoking history. She has never used smokeless tobacco.  ETOH:   reports no history of alcohol use. DRUGS:   reports that she does not currently use drugs after having used the following drugs: Marijuana [place]).   ACTIVITIES OF DAILY LIVING:    OCCUPATION:    Family History:       Problem Relation Age of Onset    Heart Disease Mother     Cancer Father     Arthritis Other     Cancer Other     Depression Other     Heart Disease Other     Hypertension Other     Mental Illness Other     Stroke Other        REVIEW OF SYSTEMS:  CONSTITUTIONAL:  negative for fevers, chills  EYES:  negative for acute changes  HEENT:  negative for acute changes  RESPIRATORY:  negative for dyspnea  CARDIOVASCULAR:  negative for chest pain, palpitations  GASTROINTESTINAL:  negative for nausea, vomiting  GENITOURINARY:  negative for frequency  HEMATOLOGIC/LYMPHATIC:  negative for bleeding and petechiae  MUSCULOSKELETAL:  positive for right knee erythema and effusion  NEUROLOGICAL:  negative for head trauma or LOC  BEHAVIOR/PSYCH:  negative for increased agitation and anxiety    PHYSICAL EXAM:    VITALS:  /69   Pulse 81   Temp 97.3 °F (36.3 °C) (Temporal)   Resp 18   LMP 08/31/1988   SpO2 95%   CONSTITUTIONAL:  awake, alert, cooperative, no apparent distress, and appears stated age  EYES: Lids and lashes normal, extra ocular muscles intact, sclera clear, conjunctiva normal  ENT: Normocephalic, without obvious abnormality, atraumatic, external ears without lesions, oral pharynx with moist mucus membranes  NECK: Trachea midline, skin normal  LUNGS: No increased work of breathing, good air exchange  CARDIOVASCULAR: 2+ pulses throughout and capillary Refill less than 2 seconds  ABDOMEN: soft, non-distended, non-tender and no rebound tenderness or guarding  NEUROLOGIC: Awake, alert, oriented to name, place and time. Motor and sensory abnormalities noted below, otherwise motor is 5 out of 5 bilaterally and sensory is intact. MUSCULOSKELETAL:  Right lower Extremity:  Discomfort with log roll to the groin and knee, Effusion about the knee  Erythema about the knee mostly anterior/lateral/posterior  + TTP about the knee and lower leg  Unable to tolerate knee range of motion  Compartments soft and compressible  Calf soft and non tender  +PF/DF/EHL  +2/4 DP & PT pulses, Brisk Cap refill, Toes warm and perfused  Distal sensation grossly intact to Peroneals, Sural, Saphenous, and tibial nrs    Secondary Exam:   Bilateral UE: No obvious signs of trauma. -TTP to fingers, hand, wrist, forearm, elbow, humerus, shoulder or clavicle. -- Patient able to flex/extend fingers, wrist, elbow and shoulder with active and passive ROM without pain, compartments soft and compressible. Left LE: No obvious signs of trauma.    -TTP to foot, ankle, leg, knee, thigh, hip.-- Patient able to flex/extend toes, ankle, knee and hip with active and passive ROM without pain, compartments soft and compressible. DATA:    CBC:   Lab Results   Component Value Date/Time    WBC 8.8 08/17/2022 01:30 AM    RBC 4.73 08/17/2022 01:30 AM    HGB 15.2 08/17/2022 01:30 AM    HCT 45.9 08/17/2022 01:30 AM    MCV 97.0 08/17/2022 01:30 AM    MCH 32.1 08/17/2022 01:30 AM    MCHC 33.1 08/17/2022 01:30 AM    RDW 13.1 08/17/2022 01:30 AM     08/17/2022 01:30 AM    MPV 9.1 08/17/2022 01:30 AM     PT/INR:    Lab Results   Component Value Date/Time    PROTIME 10.3 03/02/2022 08:30 AM    PROTIME 10.8 05/07/2012 03:45 AM    INR 0.9 03/02/2022 08:30 AM       Radiology Review:  X-ray right tibia-fibula: S/p right TKA. S/p distal femur ORIF and ankle/pilon ORIF. No acute osseous abnormalities appreciated. IMPRESSION:  Right knee effusion  Rule out right knee septic arthritis    PLAN:  Right knee was positioned and prepped using betadine and alcohol. Due to erythema of the lateral knee, a medial parapatellar approach was used. An 18 ga needle was inserted into the patellofemoral joint. 65 cc of cloudy yellow fluid was subsequently aspirated from the knee. The site was dressed with a Band-Aid. The patient tolerated the procedure well without complication. The fluid will be sent to the lab for appropriate studies. X-ray right knee  Patient will be NPO now  Further orthopedic recommendations to follow aspiration study results  Plan was discussed with attending    All questions were sought and answered during encounter    Electronically signed by Corrine Hale DO on 8/19/2022 at 3:41 AM    Orthopaedic Trauma Attending    I have seen and evaluated the patient and agree with the above assessment. I have performed the key components of the history and physical examination and concur completely with the findings as documented.     CC: Right leg infection, pain    HPI:This is a 64 y.o. female who was initially admitted over at [facility] for right leg pain and redness. Concern for sepsis and AMS. She was diagnosed with right leg cellulitis. I know the patient in the past due to right ankle surgery which she has gone on to heal from appropriately without any interval issue. There was apparently concern for right knee septic arthritis however she had a right total knee arthroplasty and right distal femur ORIF by different surgeon in the remote past, these were done at the [facility]. Nonetheless, I was consulted for further management this patient. Decision was made then to transfer patient to [facility] for more immediate evaluation. Patient states that she has had right leg and shin pain for the past 3 days which is progressed. She noticed that redness about the lower leg that she felt had progressed up about the knee joints. Now complaining of increasing right knee pain with attempted ROM. Also complaining of some right-sided hip pain as well as low back pain. States she does have a nerve stimulator in her back and has been having some discomfort for this as well, states Dr. Watson Khan put this in. Denies any inciting trauma. Denies fevers, chills, nausea/vomiting, chest pain, or shortness of breath. Denies numbness/tingling/paresthesias. Denies any other orthopedic complaints at this time. ROS, medications, allergies, past medical/surgical/social/family histories reviewed and as above    PE:  /64   Pulse 72   Temp 96.9 °F (36.1 °C) (Temporal)   Resp 18   LMP 08/31/1988   SpO2 97%   As above    Radiographic Review:  S/p right TKA. S/p distal femur ORIF and ankle/pilon ORIF. No acute osseous abnormalities appreciated.     R knee  Component Ref Range & Units 8/19/22 0348    Cell Count Fluid Type  Synovial    Color, Fluid  Brown    Appearance, Fluid  Cloudy    Nucl Cell, Fluid /uL 52,730    Comment: x10 DILUTION WITH DCL PERFORMED   RBC, Fluid /uL 20,000    Comment: x10 DILUTION WITH DCL PERFORMED   Neutrophil Count, Fluid % 73    Comment: Neutrophils, Body Fluid = Polymorphonuclear Leukocytes   Monocyte Count, Fluid % 27    Comment: Monocyte Count, Fluid includes all Mononuclear cells. Resulting Agency       ASSESSMENT:  Right leg cellulitis  Right knee periprosthetic infection  Possible Right lateral thigh abscess  Possible Right hip septic arthritis    PLAN:  Right knee aspiration, this was positive for periprosthetic infection  Had lengthy discussion with patient regarding their diagnosis, typical prognosis, and expected outcomes. We also discussed treatment options including nonoperative managements versus surgical management, along with risks and benefits of each. Have recommended for urgent right leg and knee I&D, possible antibiotic beads which patient is agreeable to. NPO, OR today for right knee arthrotomy with I&D, right lateral thigh and femur I&D, insertion antibiotic beads, right hip aspiration  Medical admit with surgical optimization, ID consultation  Discussed with patient plans for urgent decompression of her infection, we will obtain cultures and involve infectious disease, discussed if this route is ineffective at controlling her infection she may ultimately require hardware removal of the distal femur and or TKA explant which is much more involved which she understands. No guarantees were provided. I have explained the risks and complications of the recommended surgery with the patient at length, as well as discussed potential treatment alternatives including nonoperative management.  These risks include but are not limited to death or complication from anesthesia, continued pain, nerve tendon or vascular injury, infection, fracture, symptomatic hardware or hardware failure, deep vein thrombosis or pulmonary embolism, and need for further surgery, etc.  Patient understood this, asked appropriate questions, which were all answered, and she has elected to proceed with the procedure. Electronically signed by   Dyana Ma DO  8/19/2022     NOTE: This report was transcribed using voice recognition software.  Every effort was made to ensure accuracy; however, inadvertent computerized transcription errors may be present

## 2022-08-19 NOTE — LETTER
[place] Department Medicaid  CERTIFICATION OF NECESSITY  FOR NON-EMERGENCY TRANSPORTATION   BY GROUND AMBULANCE      Individual Information   1. Name: Sharmaine Alvarez 2. [place] Medicaid Billing Number:    3. Address: [address]      Transportation Provider Information   4. Provider Name:    5. [place] Medicaid Provider Number:  National Provider Identifier (NPI):      Certification  7. Criteria:  During transport, this individual requires:  [x] Medical treatment or continuous     supervision by an EMT. [] The administration or regulation of oxygen by another person. [] Supervised protective restraint. 8. Period Beginning Date: 8/24/22   9. Length  [x] Not more than 7 day(s)  [] One Year     Additional Information Relevant to Certification   10. Comments or Explanations, If Necessary or Appropriate   SEPTIC R HIP AND R KNEE JOINT, HIGH FALL RISK, INABILITY TO SAFELY TRANSFER TO W/C     Certifying Practitioner Information   11. Name of Practitioner: Edgar Langford MD   12. [place] Medicaid Provider Number, If Applicable:  [address] Provider Identifier (NPI):      Signature Information   14. Date of Signature: 8/24/2022 15. Name of Person Signing: Cody Caceres   12.  Signature and Professional Designation: Electronically signed by GAGE Johnson on 8/24/2022 at 3:45 PM       ODM [id number]  Rev. 7/2015

## 2022-08-19 NOTE — PROGRESS NOTES
Pharmacy Consultation Note  (Antibiotic Dosing and Monitoring)    Initial consult date: 8-  Consulting physician/provider: Dr. Angella Fernandez  Drug: Vancomycin  Indication: ABSSSI; septic R knee    Age/  Gender Height Weight IBW  Allergy Information   61 y.o./female 170.2 cm  109 kg   Ideal body weight: 61.6 kg (135 lb 12.9 oz)  Adjusted ideal body weight: 80.6 kg (177 lb 10.3 oz)   Bee pollen, Penicillins, Ropinirole, Ropinirole hcl, Vistaril [hydroxyzine hcl], Aripiprazole, Prednisone, Restoril [temazepam], Hydroxyzine pamoate, and Tape [adhesive tape]      Renal Function:  Recent Labs     08/17/22  0130   BUN 15   CREATININE 0.7     No intake or output data in the 24 hours ending 08/19/22 1049    Vancomycin Monitoring:  Trough:  No results for input(s): VANCOTROUGH in the last 72 hours. Random:  No results for input(s): VANCORANDOM in the last 72 hours. Vancomycin Administration Times:  Recent vancomycin administrations                     vancomycin (VANCOCIN) 1,250 mg in dextrose 5 % 250 mL IVPB (mg) 1,250 mg New Bag 08/19/22 0358    vancomycin (VANCOCIN) 2,500 mg in dextrose 5 % 500 mL IVPB (mg) 2,500 mg New Bag 08/18/22 1230    vancomycin (VANCOCIN) 2,500 mg in dextrose 5 % 500 mL IVPB (mg) 2,500 mg New Bag 08/17/22 0333                    Assessment:  60 yo/F transferred from [facility] to [facility] for Ortho eval for suspected R knee septic arthritis. ID was following patient @ [facility], patient received PO doxy and cephalexin at [facility] but was switched to vancomycin (2500 mg LD and 1250 mg q12h on 8/18 prior to transfer). S/P needle aspiration of R knee early 8/19 AM; Cx's pending. Estimated Creatinine Clearance: 107 mL/min (based on SCr of 0.7 mg/dL). To dose vancomycin, pharmacy will be utilizing [employer] calculation software for goal AUC/NAVARRO 400-600 mg/L-hr. Plan:  Continue vancomycin 1250 mg q12h: AUC/NAVARRO = 481, Tr = 15.2 mCg/mL.   Will check creatinine and random vancomycin level on 8/20 AM.  Will continue to monitor renal function. Clinical pharmacy to follow.     Moise Rawls, [facility] 8/19/2022 10:49 AM

## 2022-08-19 NOTE — H&P
Hospitalist History & Physical      PCP: Moose Thakkar DO    Date of Service: Pt seen/examined on 8/19/2022     Chief Complaint:  had no chief complaint listed for this encounter. History Of Present Illness:    Ms. Monse Laird, a 64y.o. year old female  who  has a past medical history of Adenoma of right adrenal gland, Anemia, Anxiety, Arthritis, Asthma, Benign essential tremor, Bipolar affective ([address] Chronic back pain, Chronic respiratory failure with hypoxia ([address] Depression, Diabetes mellitus ([address] Difficulty swallowing, Environmental and seasonal allergies, Excessive physiologic tremor, Fatty liver, Full dentures, GERD (gastroesophageal reflux disease), Gout, Herniated cervical disc, History of swelling of feet, Hypertension, Lymphedema of lower extremity, Mitochondrial cytopathy ([address] Muscle weakness (generalized), Need for assistance with personal care, Neuropathy, Obesity, PETR (obstructive sleep apnea), Osteoarthritis of left knee, PONV (postoperative nausea and vomiting), Seizures ([address] Spinal headache, and Thyroid disease. The patient fractured her right ankle and underwent an ORIF in January 2022 by Dr. Karl Pizarro. She resides at [facility]. She developed redness, swelling and pain in the right leg. He denies any trauma. She says she has been dealing with a chronic callus and an ulcer on the distal part of her right first toe. She has been seen by a podiatrist who has been debriding this ulcer at the facility. She patient was sent to the ED at [facility] on 8/17/2022 with pain in the left lower extremity. He was seen by infectious disease for cellulitis of the right lower extremity. There is also some concern for septic joint. Orthopedics was consulted and advised the patient be transferred to [facility] for interventional radiology aspiration of the suspect joint.       Past Medical History:   Diagnosis Date    Adenoma of right adrenal gland     Anemia Anxiety     Arthritis     spinal    Asthma     controlled with inhalers     Benign essential tremor     Bipolar affective (HCC)     Chronic back pain     Spinal cord stimulator in place    Chronic respiratory failure with hypoxia (HCC)     at times dt diaphragm does not function properly dt Mitochondrial disorder    Depression     stable    Diabetes mellitus ([facility]     Difficulty swallowing     for EGD 10-8-19     Environmental and seasonal allergies     Excessive physiologic tremor 5/24/2021    Fatty liver     Full dentures     GERD (gastroesophageal reflux disease)     Gout     past hx of    Herniated cervical disc     limited rom of head and neck    History of swelling of feet     Hypertension     Lymphedema of lower extremity 09/06/2012    Mitochondrial cytopathy ([facility]     s/s muscle and nerve pain, difficulty breathing, seizures, difficulty swallowing, digestive disorders- Dr. Madelaine Ellington [facility]    Muscle weakness (generalized)     Information obtained from [facility]    Need for assistance with personal care     Information obtained from [facility]    Neuropathy     at feet    Obesity     PETR (obstructive sleep apnea)     no CPAP dt insurance will not pay for    Osteoarthritis of left knee     Information obtained from [facility]    PONV (postoperative nausea and vomiting)     Seizures ([facility]     last approx 6-13-19- f/u w/ PCP  Granmal, flares up in heat/ summer time - no recent issues as of 10-4-19     Spinal headache     Thyroid disease        Past Surgical History:   Procedure Laterality Date    ANKLE FRACTURE SURGERY Right 2/14/2022    RIGHT ANKLE IRRIGAITON AND DEBRIDEMENT WITH EXTERNAL FIXATOR AND LACERATION REPAIR performed by Grace Birch MD at [address] Right 3/2/2022    RIGHT LOWER EXTREMITY REMOVAL EXTERNAL FIXATOR, RIGHT PILON OPEN REDUCTION INTERNAL FIXATOR--[facility] (FACILITY) performed by Gerber Powell DO at [facility]      with Hysterectomy / unknown    BACK SURGERY  last one 1995    lumbar x 2    CARDIAC CATHETERIZATION Right 6-6-2013    Dr. Thania Arroyo Radial, no stents placed, no blockages     CHOLECYSTECTOMY  1999    open with gastric bypass    COLONOSCOPY N/A 9/18/2018    COLONOSCOPY POLYPECTOMY HOT BIOPSY performed by Christina Vaughn MD at [facility] N/A 10/8/2019    COLONOSCOPY POLYPECTOMY SNARE/COLD BIOPSY performed by Christina Vaughn MD at [facility]    EGD COLONOSCOPY N/A 10/8/2019    EGD ESOPHAGOGASTRODUODENOSCOPY DILATATION performed by Christina Vaughn MD at [facility], COLON, DIAGNOSTIC      ESOPHAGEAL DILATATION  9/18/2018    ESOPHAGEAL DILATION Liane Dopp performed by Christina Vaughn MD at [facility] ([facility])  Reg Papjacia 1998 Bilateral 2007,2017    knees    KNEE SURGERY Bilateral     scope    MYOMECTOMY      NERVE BLOCK Left 10 02 2013    lumbar paravertebral facet #2    NERVE BLOCK Left 10 9 13    hip inj #1    NERVE BLOCK Left 10/16/13    hip injection    NERVE BLOCK Left 10/29/2014    left lumbar transforaminal nerve lbock  #1    NERVE BLOCK Left 11/12/2014    lumbar left transforaminal nerve block  #2    NERVE BLOCK Left 12 8 14    lumbar transforaminal #3    NERVE BLOCK Right 3/30/15    cervical transforaminal #1    NERVE BLOCK Right 4/6/2015    right cervical transforaminal nerve block  #2    NERVE BLOCK Right 4/13/15    cervical transforaminal #3    NERVE BLOCK Left 7/6/15    knee injection #1    NERVE BLOCK  07/20/15    left genicular nerve block/knee #2    NERVE BLOCK Left 10 1 15    lumb transforam #1    NERVE BLOCK  10/26/15    left lumbar transforaminal nerve block #3    NERVE BLOCK Bilateral 4/1/2021    #1 BILATERAL SACROILIAC JOINT INJECTION UNDER FLUORO performed by Doss Schilder, MD at [address] Left 11 25 13    lumbar radiofreq    OTHER SURGICAL HISTORY  3/28/2016 stage 1, 3 day percutanous trial Shots lumbar spinal cord stimulator    OTHER SURGICAL HISTORY  1995    racz procedure / lower back    OR COLONOSCOPY FLX DX W/COLLJ SPEC WHEN PFRMD N/A 3/20/2018    COLONOSCOPY DIAGNOSTIC OR SCREENING performed by Tate Naqvi MD at [address] [place] [address] EGD TRANSORAL BIOPSY SINGLE/MULTIPLE N/A 3/20/2018    EGD BIOPSY performed by Tate Naqvi MD at [facility]         Prior to Admission medications    Medication Sig Start Date End Date Taking? Authorizing Provider   promethazine (PHENERGAN) 25 MG tablet Take 25 mg by mouth every 6 hours as needed for Nausea    Historical Provider, MD   lipase-protease-amylase (CREON) 44067-07585 units delayed release capsule Take 6 capsules by mouth 3 times daily (with meals) 4/15/22   Susan Anderson MD   furosemide (LASIX) 40 MG tablet Take 1 tablet by mouth daily 4/16/22   Susan Anderson MD   polyethylene glycol (GLYCOLAX) 17 g packet Take 17 g by mouth 2 times daily    Historical Provider, MD   HYDROcodone-acetaminophen (NORCO) 5-325 MG per tablet Take 1 tablet by mouth every 8 hours as needed for Pain.      Historical Provider, MD   acetaminophen (TYLENOL) 325 MG tablet Take 650 mg by mouth every 4 hours as needed for Pain     Historical Provider, MD   meclizine (ANTIVERT) 25 MG tablet Take 25 mg by mouth every 6 hours as needed    Historical Provider, MD   diclofenac sodium (VOLTAREN) 1 % GEL Apply 4 g topically 2 times daily    Historical Provider, MD   baclofen (LIORESAL) 20 MG tablet Take 20 mg by mouth 4 times daily    Historical Provider, MD   metoprolol succinate (TOPROL XL) 25 MG extended release tablet Take 0.5 tablets by mouth daily 7/2/21   Katalina Camp MD   cyanocobalamin 50 MCG tablet Take 100 mcg by mouth daily    Historical Provider, MD   ferrous sulfate (FE TABS 325) 325 (65 Fe) MG EC tablet Take 325 mg by mouth daily  1/12/21   Historical Provider, MD   famotidine (PEPCID) 20 MG tablet Take 1 tablet by mouth 2 times daily 9/10/20   Noel Washington MD   traZODone (DESYREL) 100 MG tablet Take 100 mg by mouth nightly    Historical Provider, MD   albuterol (PROVENTIL) (5 MG/ML) 0.5% nebulizer solution Take 2.5 mg by nebulization 3 times daily     Historical Provider, MD   magnesium 200 MG TABS tablet Take 200 mg by mouth 2 times daily     Historical Provider, MD   calcium carbonate 600 MG TABS tablet Take 1 tablet by mouth 2 times daily     Historical Provider, MD   montelukast (SINGULAIR) 10 MG tablet Take 1 tablet by mouth daily 8/12/19   Laurent Murray,    omeprazole (PRILOSEC) 20 MG delayed release capsule Take 20 mg by mouth Daily     Historical Provider, MD   Cholecalciferol (VITAMIN D3) 5000 units TABS Take 1 tablet by mouth every other day     Historical Provider, MD   docusate sodium (COLACE) 100 MG capsule Take 100 mg by mouth 2 times daily    Historical Provider, MD   levOCARNitine (CARNITOR) 330 MG tablet Take 330 mg by mouth 3 times daily For mitochondrial disease    Historical Provider, MD   allopurinol (ZYLOPRIM) 100 MG tablet Take 200 mg by mouth 2 times daily     Historical Provider, MD   escitalopram (LEXAPRO) 20 MG tablet Take 20 mg by mouth 2 times daily     Historical Provider, MD   gabapentin (NEURONTIN) 600 MG tablet Take 1 tablet by mouth 4 times daily. 10/31/13   Lanette Chacko MD   topiramate (TOPAMAX) 200 MG tablet Take 1 tablet by mouth 2 times daily. 5/9/12   Ab Benavides, DO   ziprasidone (GEODON) 20 MG capsule Take 20 mg by mouth nightly     Historical Provider, MD         Allergies:  Bee pollen, Penicillins, Ropinirole, Ropinirole hcl, Vistaril [hydroxyzine hcl], Aripiprazole, Prednisone, Restoril [temazepam], Hydroxyzine pamoate, and Tape [adhesive tape]    Social History:    TOBACCO:   reports that she quit smoking about 7 months ago. Her smoking use included cigarettes. She started smoking about 32 years ago.  She has a 31.50 pack-year smoking history. She has never used smokeless tobacco.  ETOH:   reports no history of alcohol use. Family History:    Reviewed in detail and negative for DM, CAD, Cancer, CVA. Positive as follows\"      Problem Relation Age of Onset    Heart Disease Mother     Cancer Father     Arthritis Other     Cancer Other     Depression Other     Heart Disease Other     Hypertension Other     Mental Illness Other     Stroke Other        REVIEW OF SYSTEMS:   Pertinent positives as noted in the HPI. All other systems reviewed and negative. PHYSICAL EXAM:  /69   Pulse 81   Temp 97.3 °F (36.3 °C) (Temporal)   Resp 18   LMP 08/31/1988   SpO2 95%   General appearance: No apparent distress, appears stated age and cooperative. HEENT: Normal cephalic, atraumatic without obvious deformity. Pupils equal, round, and reactive to light. Extra ocular muscles intact. Conjunctivae/corneas clear. Neck: Supple, with full range of motion. No jugular venous distention. Trachea midline. Respiratory: CTA  Cardiovascular: RRR  Abdomen: S/NT/ND  Musculoskeletal: Right lower extremity erythema, warmth  Skin: Normal skin color. No rashes or lesions. Neurologic:  Neurovascularly intact without any focal sensory/motor deficits. Cranial nerves: II-XII intact, grossly non-focal.      CBC:   Recent Labs     08/17/22  0130   WBC 8.8   RBC 4.73   HGB 15.2   HCT 45.9   MCV 97.0   RDW 13.1        BMP:   Recent Labs     08/17/22  0130      K 3.5   CL 96*   CO2 24   BUN 15   CREATININE 0.7     LFT:  Recent Labs     08/17/22  0130   PROT 6.9   ALKPHOS 97   ALT 10   AST 14   BILITOT 0.4     CE:  No results for input(s): Ramona Kras in the last 72 hours. PT/INR: No results for input(s): INR, APTT in the last 72 hours. BNP: No results for input(s): BNP in the last 72 hours.   ESR:   Lab Results   Component Value Date    SEDRATE 80 (H) 08/17/2022     CRP:   Lab Results   Component Value Date    CRP 43.8 (H) 08/17/2022     D Dimer: Lab Results   Component Value Date    DDIMER 482 08/20/2021      Folate and B12:   Lab Results   Component Value Date    PEARLQJF66 255 02/01/2022   ,   Lab Results   Component Value Date    FOLATE >20.0 05/11/2020     Lactic Acid:   Lab Results   Component Value Date    LACTA 1.3 04/12/2022     Thyroid Studies:   Lab Results   Component Value Date    TSH 0.548 03/16/2021    U8PJDDW 107.30 03/03/2021    A5WZLTJ 10.0 05/21/2012       Oupatient labs:  Lab Results   Component Value Date    CHOL 154 04/11/2022    TRIG 163 (H) 04/11/2022    HDL 49 04/11/2022    LDLCALC 72 04/11/2022    TSH 0.548 03/16/2021    INR 0.9 03/02/2022    LABA1C 5.7 12/13/2013       Urinalysis:    Lab Results   Component Value Date/Time    NITRU Negative 08/17/2022 05:21 AM    WBCUA NONE 05/06/2022 07:21 PM    WBCUA 1-3 05/06/2012 04:00 AM    BACTERIA NONE SEEN 05/06/2022 07:21 PM    RBCUA NONE 05/06/2022 07:21 PM    RBCUA 0-1 12/13/2013 10:15 AM    BLOODU Negative 08/17/2022 05:21 AM    SPECGRAV <=1.005 08/17/2022 05:21 AM    GLUCOSEU Negative 08/17/2022 05:21 AM    GLUCOSEU NEGATIVE 05/06/2012 04:00 AM       Imaging:  XR TIBIA FIBULA RIGHT (2 VIEWS)    Result Date: 8/17/2022  EXAMINATION: 4 XRAY VIEWS OF THE RIGHT TIBIA AND FIBULA 8/17/2022 1:02 am COMPARISON: Right tibia/fibula series from February 13, 2022 HISTORY: ORDERING SYSTEM PROVIDED HISTORY: infection; r/o osteomyelitis TECHNOLOGIST PROVIDED HISTORY: Reason for exam:->infection; r/o osteomyelitis FINDINGS: Extensive postsurgical changes to the partially imaged distal right femur, right knee, and mid to distal right tibia and fibula. There are no obvious hardware complications on the images provided. Diffuse mild soft tissue stranding. No obvious joint effusion about the right knee joint or right ankle joint. There is no definite evidence of osseous destruction on these images. Overall osseous mineralization is decreased. There is mild tibiotalar joint degenerative spurring. Anterior soft tissue swelling of the proximal to mid tibia. 1.  Extensive postsurgical changes about the distal right femur, right knee, and distal right tibia and fibula. No hardware complications. 2.  Diffuse soft tissue stranding and more focal anterior soft tissue edema. There is no definite evidence of osseous destruction or erosion about the right tibia or fibula on this exam.     CT HEAD WO CONTRAST    Result Date: 8/17/2022  EXAMINATION: CT OF THE HEAD WITHOUT CONTRAST  8/17/2022 1:01 am TECHNIQUE: CT of the head was performed without the administration of intravenous contrast. Automated exposure control, iterative reconstruction, and/or weight based adjustment of the mA/kV was utilized to reduce the radiation dose to as low as reasonably achievable. COMPARISON: CT head without contrast May 6, 2022 HISTORY: ORDERING SYSTEM PROVIDED HISTORY: confusion TECHNOLOGIST PROVIDED HISTORY: Has a \"code stroke\" or \"stroke alert\" been called? ->No Reason for exam:->confusion Decision Support Exception - unselect if not a suspected or confirmed emergency medical condition->Emergency Medical Condition (MA) FINDINGS: BRAIN/VENTRICLES: There is no acute intracranial hemorrhage, mass effect or midline shift. No abnormal extra-axial fluid collection. The gray-white differentiation is maintained without evidence of an acute infarct. There is no evidence of hydrocephalus. ORBITS: The visualized portion of the orbits demonstrate no acute abnormality. SINUSES: The visualized paranasal sinuses and mastoid air cells demonstrate no acute abnormality. SOFT TISSUES/SKULL:  No acute abnormality of the visualized skull or soft tissues. No acute intracranial abnormality.      XR CHEST PORTABLE    Result Date: 8/17/2022  EXAMINATION: ONE XRAY VIEW OF THE CHEST 8/17/2022 1:02 am COMPARISON: Chest series from May 6, 2022 HISTORY: ORDERING SYSTEM PROVIDED HISTORY: altered mental status TECHNOLOGIST PROVIDED HISTORY: Reason for exam:->altered mental status FINDINGS: Symmetric lung inflation. Coarsened interstitial markings. There is atherosclerotic disease in the thoracic aorta. Central pulmonary vascular congestion. No formed consolidation, pleural effusion, or pneumothorax. There is a lead projecting over the thoracic spine. Osseous and thoracic soft tissue structures demonstrate no acute findings. Atherosclerotic disease and mild prominence of the cardiac silhouette. Central pulmonary vascular congestion. No pneumothorax. ASSESSMENT:  -Septic joint/arthritis  -Right lower extremity cellulitis  -Bipolar 1 disorder  -Asthma  -Depression  -GERD      PLAN:  -Admit to medicine  -Consult orthopedic surgery  -Consult infectious disease  -Consult interventional radiology  -Vancomycin  -Monitor cultures  -Continue home medications        Diet: ADULT DIET; Regular  Code Status: Full Code  Surrogate decision maker confirmed with patient:   Extended Emergency Contact Information  Primary Emergency Contact: Tavo Yang  [place] Phone: [phone]  Mobile Phone: [phone]  Relation: Brother/Sister  Preferred language: English   needed? No  Secondary Emergency Contact: [address] [place] [address] Phone: [phone]  Mobile Phone: [phone]  Relation: Other  Preferred language: English   needed? No    DVT Prophylaxis: []Lovenox []Heparin []PCD [] [address] []Encouraged ambulation  Disposition: []Med/Surg [] Intermediate [] ICU/CCU  Admit status: [] Observation [] Inpatient     +++++++++++++++++++++++++++++++++++++++++++++++++  Ginny Bojorquez DO  +++++++++++++++++++++++++++++++++++++++++++++++++  NOTE: This report was transcribed using voice recognition software. Every effort was made to ensure accuracy; however, inadvertent computerized transcription errors may be present.

## 2022-08-19 NOTE — PROGRESS NOTES
IR called, hold Lovenox and get a stat PTINR.  Lovenox held and stat PT INR placed     Stat lab called in at this time

## 2022-08-19 NOTE — DISCHARGE INSTR - COC
Continuity of Care Form    Patient Name: Radhames Velasquez   :  1960  MRN:  [id number]    Admit date:  2022  Discharge date:  ***    Code Status Order: Full Code   Advance Directives:     Admitting Physician:  German Mann MD  PCP: Nicole Velásquez DO    Discharging Nurse: [facility] Unit/Room#: 3018/5361-C  Discharging Unit Phone Number: ***    Emergency Contact:   Extended Emergency Contact Information  Primary Emergency Contact: [address] Phone: [phone]  Mobile Phone: [phone]  Relation: Brother/Sister  Preferred language: English   needed? No  Secondary Emergency Contact: [address] [place] [address] Phone: [phone]  Mobile Phone: [phone]  Relation: Other  Preferred language: English   needed?  No    Past Surgical History:  Past Surgical History:   Procedure Laterality Date    ANKLE FRACTURE SURGERY Right 2022    RIGHT ANKLE IRRIGAITON AND DEBRIDEMENT WITH EXTERNAL FIXATOR AND LACERATION REPAIR performed by Dagoberto Marion MD at  [address] [place] [address] Right 3/2/2022    RIGHT LOWER EXTREMITY REMOVAL EXTERNAL FIXATOR, RIGHT PILON OPEN REDUCTION INTERNAL FIXATOR--SYNTHES (FACILITY) performed by Shobha Alatorre DO at [facility]      with Hysterectomy / unknown    BACK SURGERY  last one     lumbar x 2    CARDIAC CATHETERIZATION Right 2013    Dr. Yue Rosas Radial, no stents placed, no blockages     CHOLECYSTECTOMY      open with gastric bypass    COLONOSCOPY N/A 2018    COLONOSCOPY POLYPECTOMY HOT BIOPSY performed by Marilyn Betancourt MD at [address] [place] [address] N/A 10/8/2019    COLONOSCOPY POLYPECTOMY SNARE/COLD BIOPSY performed by Marilyn Betancourt MD at [facility]    EGD COLONOSCOPY N/A 10/8/2019    EGD ESOPHAGOGASTRODUODENOSCOPY DILATATION performed by Marilyn Betancourt MD at [address], DIAGNOSTIC      ESOPHAGEAL DILATATION  2018    ESOPHAGEAL DILATION PEMBERTON performed by Gayathri Jaquze MD at [facility] (CERVIX STATUS UNKNOWN)  [address] [place] 1998 Bilateral 2007,2017    knees    KNEE SURGERY Bilateral     scope    MYOMECTOMY      NERVE BLOCK Left 10 02 2013    lumbar paravertebral facet #2    NERVE BLOCK Left 10 9 13    hip inj #1    NERVE BLOCK Left 10/16/13    hip injection    NERVE BLOCK Left 10/29/2014    left lumbar transforaminal nerve lbock  #1    NERVE BLOCK Left 11/12/2014    lumbar left transforaminal nerve block  #2    NERVE BLOCK Left 12 8 14    lumbar transforaminal #3    NERVE BLOCK Right 3/30/15    cervical transforaminal #1    NERVE BLOCK Right 4/6/2015    right cervical transforaminal nerve block  #2    NERVE BLOCK Right 4/13/15    cervical transforaminal #3    NERVE BLOCK Left 7/6/15    knee injection #1    NERVE BLOCK  07/20/15    left genicular nerve block/knee #2    NERVE BLOCK Left 10 1 15    lumb transforam #1    NERVE BLOCK  10/26/15    left lumbar transforaminal nerve block #3    NERVE BLOCK Bilateral 4/1/2021    #1 BILATERAL SACROILIAC JOINT INJECTION UNDER FLUORO performed by Kimmy Mohamud MD at [facility] Left 11 25 13    lumbar radiofreq    OTHER SURGICAL HISTORY  3/28/2016    stage 1, 3 day percutanous trial [employer] lumbar spinal cord stimulator    OTHER SURGICAL HISTORY  1995    racz procedure / lower back    RI COLONOSCOPY FLX DX W/COLLJ SPEC WHEN PFRMD N/A 3/20/2018    COLONOSCOPY DIAGNOSTIC OR SCREENING performed by Gayathri Jaquez MD at [address] [place] [address] EGD TRANSORAL BIOPSY SINGLE/MULTIPLE N/A 3/20/2018    EGD BIOPSY performed by Gayathri Jaquez MD at [facility]         Immunization History:   Immunization History   Administered Date(s) Administered    Influenza Virus Vaccine 11/02/2017, 11/02/2017, 02/06/2019, 02/02/2021    Influenza Whole 11/03/2008    Influenza, FLUARIX, FLULAVAL, (age 10 mo+) AND WHITAnant (age 1 y+), PF 09/01/2018    Pneumococcal Conjugate 13-valent ([id number]) 02/03/2017    Pneumococcal Polysaccharide (Goaooxsmt36) 11/03/2008, 09/01/2018    Td, unspecified formulation 11/02/2011    Tdap (Boostrix, Adacel) 02/16/2021       Active Problems:  Patient Active Problem List   Diagnosis Code    Debility R53.81    Obesity E66.9    Bipolar 1 disorder ([address] F31.9    Generalized seizure disorder ([facility] [address] G40.309    Cervicalgia M54.2    Asthma J45.909    Hyperlipidemia E78.5    Hypertension I10    Arthritis M19.90    Lymphedema of lower extremity I89.0    Degenerative Osteoarthritis of both knees M17.0    Status post total knee replacement, right Z96.651    Cervical spondylolysis M43.02    Degenerative Osteoarthritis thoracic spine M47.814    Thoracic facet syndrome M47.894    Cervical facet syndrome M47.812    Facet syndrome, lumbar M47.816    Neural foraminal stenosis of lumbar spine M48.061    Lumbar radiculopathy M54.16    DDD (degenerative disc disease), cervical M50.30    Neural foraminal stenosis of cervical spine M48.02    Protruded cervical disc M50.20    Cervical radiculopathy M54.12    Postlaminectomy syndrome, lumbar M96.1    Neuropathic pain syndrome (non-herpetic) M79.2    Chronic respiratory failure with hypoxia (HCC) J96.11    Mitochondrial cytopathy (HCC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 A    PETR (obstructive sleep apnea) G47.33    Chronic back pain M54.9, G89.29    Adenoma of right adrenal gland D35.01    Lumbosacral spondylosis without myelopathy M47.817    Sacroiliac dysfunction M53.3    DDD (degenerative disc disease), lumbar M51.36    Thoracic degenerative disc disease M51.34    Excessive physiologic tremor R25.1    Closed fracture of lower end of right radius with routine healing S52.501D    Closed left ankle fracture, initial encounter H65.337H    Closed fracture of right tibial plateau Z44.343T    Closed displaced pilon fracture of right tibia S82.871A    Acute pancreatitis K85.90    Pancreatic pseudocyst K86.3    Cellulitis of right lower extremity L03.115    Cellulitis L03.90    Septic joint (HCC) M00.9       Isolation/Infection:   Isolation            No Isolation          Patient Infection Status       Infection Onset Added Last Indicated Last Indicated By Review Planned Expiration Resolved Resolved By    None active    Resolved    COVID-19 (Rule Out) 01/23/22 01/23/22 01/23/22 COVID-19, Rapid (Ordered)   01/23/22 Rule-Out Test Resulted    COVID-19 (Rule Out) 01/05/22 01/05/22 01/06/22 COVID-19, Rapid (Ordered)   01/06/22 Rule-Out Test Resulted    COVID-19 (Rule Out) 08/20/21 08/20/21 08/20/21 COVID-19, Rapid (Ordered)   08/20/21 Rule-Out Test Resulted    COVID-19 (Rule Out) 04/25/21 04/25/21 04/25/21 COVID-19, Rapid (Ordered)   04/25/21 Rule-Out Test Resulted    COVID-19 (Rule Out) 02/10/21 02/10/21 02/10/21 COVID-19 (Ordered)   02/11/21 Rule-Out Test Resulted    COVID-19 (Rule Out) 09/17/20 09/17/20 09/17/20 COVID-19 Ambulatory (Ordered)   09/19/20 Rule-Out Test Resulted    COVID-19 (Rule Out) 04/30/20 04/30/20 04/30/20 COVID-19 (Ordered)   05/01/20 Rule-Out Test Resulted    Not detected 4/30/2020            Nurse Assessment:  Last Vital Signs: /64   Pulse 72   Temp 96.9 °F (36.1 °C) (Temporal)   Resp 18   LMP 08/31/1988   SpO2 97%     Last documented pain score (0-10 scale): Pain Level: 8  Last Weight:   Wt Readings from Last 1 Encounters:   08/18/22 240 lb 6.4 oz (109 kg)     Mental Status:  {IP PT MENTAL STATUS:20030}    IV Access:  {[facility] IV ACCESS:994117681}    Nursing Mobility/ADLs:  Walking   {[facility] QCZW:984364057}  Transfer  {CHP DME IYBZ:086189577}  Bathing  {CHP DME BVPT:043392109}  Dressing  {CHP DME BWVX:936087470}  Toileting  {CHP DME QUJA:883698794}  Feeding  {CHP DME QESQ:655311092}  Med Admin  {CHP DME NVRO:800036177}  Med Delivery   { [facility] MED Delivery:607312758}    Wound Care Documentation and Therapy:  Wound 22 Pretibial Right; Anterior;Posterior RLE cellulititis red, warm and swollen no open areas (Active)   Dressing Status Other (Comment) 22 07   Wound Cleansed Cleansed with saline 22 07   Dressing/Treatment Open to air 22 07   Wound Assessment Erythema;Pink/red 22 0730   Drainage Amount None 22 0730   Odor None 22 0730   Number of days: 2        Elimination:  Continence: Bowel: {YES / EQ:67506}  Bladder: {YES / KR:89275}  Urinary Catheter: {Urinary Catheter:720445852}   Colostomy/Ileostomy/Ileal Conduit: {YES / KU:98362}       Date of Last BM: ***  No intake or output data in the 24 hours ending 22 0831  No intake/output data recorded.     Safety Concerns:     [employer] Safety Concerns:994659111}    Impairments/Disabilities:      [employer] Impairments/Disabilities:609154344}    Nutrition Therapy:  Current Nutrition Therapy:   [employer] Diet List:909602948}    Routes of Feeding: {CHP DME Other Feedings:811069800}  Liquids: {Slp liquid thickness:67973}  Daily Fluid Restriction: {CHP DME Yes amt example:792206177}  Last Modified Barium Swallow with Video (Video Swallowing Test): {Done Not Done QYZQ:854253111}    Treatments at the Time of Hospital Discharge:   Respiratory Treatments: ***  Oxygen Therapy:  {Therapy; copd oxygen:23058}  Ventilator:    { CC Vent NJSU:372509658}    Rehab Therapies: {THERAPEUTIC INTERVENTION:5551774832}  Weight Bearing Status/Restrictions: [address] Weight Bearin}  Other Medical Equipment (for information only, NOT a DME order):  {EQUIPMENT:730328485}  Other Treatments: ***    Patient's personal belongings (please select all that are sent with patient):  {CHP DME Belongings:939832465}    RN SIGNATURE:  {Esignature:149001772}    CASE MANAGEMENT/SOCIAL WORK SECTION    Inpatient Status Date: ***    Readmission Risk Assessment Score:  Readmission Risk              Risk of Unplanned Readmission:  22 Discharging to Facility/ Agency   Name: [facility]  Address:  Phone:  Fax:    Dialysis Facility (if applicable)   Name:  Address:  Dialysis Schedule:  Phone:  Fax:    / signature: Electronically signed by GAGE Santillan on 8/19/22 at 8:31 AM EDT    PHYSICIAN SECTION    Prognosis: {Prognosis:7178863366}    Condition at Discharge: [address] Patient Condition:941665422}    Rehab Potential (if transferring to Rehab): {Prognosis:5993385209}    Recommended Labs or Other Treatments After Discharge: ***    Physician Certification: I certify the above information and transfer of Vahid Su  is necessary for the continuing treatment of the diagnosis listed and that she requires Intermediate Nursing Care for *** 30 days.      Update Admission H&P: No change in H&P    PHYSICIAN SIGNATURE:  Electronically signed by Jose D Patino MD on 8/25/22 at 12:20 PM EDT

## 2022-08-19 NOTE — OP NOTE
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  Right lateral thigh suspected abscess formation with retained deep hardware  Right hip suspected septic arthritis    Post-Op Diagnosis: Same       Procedure(s):  Right knee arthrotomy with exploration irrigation debridement of infection, insertion absorbable antibiotic beads  Right distal lateral thigh suspected abscess incision, debridement and irrigation  Right hip aspiration with fluoroscopic guidance    Surgeon(s):  Yudelka Dyer DO    Assistant:   Resident: Nallely Calvin DO    Anesthesia: General    Estimated Blood Loss (mL): less than 625     Complications: None    Specimens:   ID Type Source Tests Collected by Time Destination   1 : RIGHT HIP Body Fluid Fluid CELL COUNT WITH DIFFERENTIAL, BODY FLUID, CULTURE, ANAEROBIC, CULTURE, FUNGUS, CULTURE, CSF, CULTURE, BODY FLUID, CULTURE WITH SMEAR, ACID FAST [address],  8/19/2022 1330    2 : RIGHT LATERAL KNEE Body Fluid Fluid CULTURE, ANAEROBIC, CULTURE, FUNGUS, GRAM STAIN, CULTURE, BODY FLUID, CULTURE WITH SMEAR, ACID FAST [address],  8/19/2022 1348    3 : RIGHT KNEE JOINT Body Fluid Fluid CULTURE, ANAEROBIC, CULTURE, FUNGUS, GRAM STAIN, CULTURE, BODY FLUID, CULTURE WITH SMEAR, ACID FAST [address], [place] 8/19/2022 1351        Implants:  * No implants in log *      Drains: * No LDAs found *    Cultures and specimens:  1. Right hip aspiration demonstrated approximately 5 cc of serosanguineous fluid, this was sent to microbiology for analysis   2. Right lateral distal thigh soft tissue and sanguinous fluid sent to microbiology for analysis   3.   Right knee joint internal fluid collection and soft tissue, mucoserous in nature sent to microbiology for analysis     Detailed Description of Procedure:   Patient brought to the operating suite where she was placed on upper table supine position. She received a general anesthetic by jemma stringer well 2 g of Ancef intravenously. Surgical timeout not performed per protocol by member surgical team.  Procedure first began with right hip aspiration. This was prepped with ChloraPrep using fluoroscopic localization we now inserted a 18-gauge spinal needle into the hip joint. Once we confirmed appropriate needle placement we were able to aspirate 5 cc of what appeared to be serosanguineous fluid, this was not obviously infected however this was sent to microbiology for [facility] analysis. The right lower extremity was then prepped and draped out in standard sterile fashion, was elevated tourniquet was set at 280 mmHg pressure. We first turned over the distal lateral thigh, there was significant erythema to this region as well as some fluctuance however is difficult to truly assess due to her obese body habitus. This was centered over the distal lateral incision where previous hardware is placed about the distal femur. Skin was incised with a scalpel electrocautery taken to subcutaneous tissues. The iliotibial band was then divided longitudinally in line with its fibers. The soft tissue layers were normal at this point no fluid collection. Once through the fascial layer and down to the retained hardware over the lateral distal femur there is a small amount of sanguinous fluid collection however this was not obviously infected. This was now thoroughly debrided including the distal extent of the hardware using curettes rongeurs removing soft tissue and fluid. This was now thoroughly irrigated. This is now closed in standard layered fashion. Next attention was turned to the infected knee replacement. We utilized the anterior incision over the knee this was incised with a scalpel eschars take this obtains tissues.   We now made a formal medial arthrotomy about the patella allowing us to enter the knee joint. There is mucoserous fluid which is obviously infected within the knee joint. This was collected for culture analysis. We slightly extended the arthrotomy for appropriate visualization of the knee components. We now performed extensive debridement using sharp excision curettes and rongeurs to debride the synovial tissue about the knee components. These were well fixated. The knee was now thoroughly irrigated normal saline solution. Any further attenuated tissue was debrided or any film was removed from the synovial layer. Betadine solution soak was now performed. Knee was again irrigated. This point the knee looked clean and appropriate. Verbal antibiotic beads were made inserted within the knee joint as well as in the superficial soft tissue layers. Standard layered closure was utilized with monofilament suture. Island dressings now applied about the knee and the lateral thigh. Patient was then awoken uneventfully from anesthetic transfer onto the [facility] to the postanesthesia care in stable condition. Postoperative plans:  Patient admitted back to the medical surgical [place] on the medicine team service. Her intraoperative cultures from her hip/thigh/knee will be followed up. Infectious disease will be involved for definitive antibiotics. We will continue to monitor clinically to ensure interval improvement. We will defer DVT prophylaxis to the medical team.  Patient can be weightbearing as tolerated on the affected extremity. She would benefit from continued PT. Electronically signed by Jamir Zhao DO on 8/19/2022     NOTE: This report was transcribed using voice recognition software.  Every effort was made to ensure accuracy; however, inadvertent computerized transcription errors may be present

## 2022-08-20 LAB
CREAT SERPL-MCNC: 0.6 MG/DL (ref 0.5–1)
GFR AFRICAN AMERICAN: >60
GFR NON-AFRICAN AMERICAN: >60 ML/MIN/1.73
GRAM STAIN ORDERABLE: NORMAL
GRAM STAIN ORDERABLE: NORMAL
VANCOMYCIN RANDOM: 19.2 MCG/ML (ref 5–40)

## 2022-08-20 PROCEDURE — 80202 ASSAY OF VANCOMYCIN: CPT

## 2022-08-20 PROCEDURE — 6370000000 HC RX 637 (ALT 250 FOR IP): Performed by: STUDENT IN AN ORGANIZED HEALTH CARE EDUCATION/TRAINING PROGRAM

## 2022-08-20 PROCEDURE — 2580000003 HC RX 258: Performed by: STUDENT IN AN ORGANIZED HEALTH CARE EDUCATION/TRAINING PROGRAM

## 2022-08-20 PROCEDURE — 2700000000 HC OXYGEN THERAPY PER DAY

## 2022-08-20 PROCEDURE — 6370000000 HC RX 637 (ALT 250 FOR IP): Performed by: INTERNAL MEDICINE

## 2022-08-20 PROCEDURE — 1200000000 HC SEMI PRIVATE

## 2022-08-20 PROCEDURE — 97530 THERAPEUTIC ACTIVITIES: CPT

## 2022-08-20 PROCEDURE — 6360000002 HC RX W HCPCS: Performed by: STUDENT IN AN ORGANIZED HEALTH CARE EDUCATION/TRAINING PROGRAM

## 2022-08-20 PROCEDURE — 36415 COLL VENOUS BLD VENIPUNCTURE: CPT

## 2022-08-20 PROCEDURE — 97165 OT EVAL LOW COMPLEX 30 MIN: CPT

## 2022-08-20 PROCEDURE — 82565 ASSAY OF CREATININE: CPT

## 2022-08-20 PROCEDURE — 97535 SELF CARE MNGMENT TRAINING: CPT

## 2022-08-20 PROCEDURE — 97162 PT EVAL MOD COMPLEX 30 MIN: CPT

## 2022-08-20 RX ORDER — ESCITALOPRAM OXALATE 10 MG/1
20 TABLET ORAL 2 TIMES DAILY
Status: DISCONTINUED | OUTPATIENT
Start: 2022-08-20 | End: 2022-08-25 | Stop reason: HOSPADM

## 2022-08-20 RX ORDER — METOPROLOL SUCCINATE 25 MG/1
12.5 TABLET, EXTENDED RELEASE ORAL DAILY
Status: DISCONTINUED | OUTPATIENT
Start: 2022-08-20 | End: 2022-08-25 | Stop reason: HOSPADM

## 2022-08-20 RX ORDER — ALBUTEROL SULFATE 2.5 MG/3ML
2.5 SOLUTION RESPIRATORY (INHALATION) EVERY 6 HOURS PRN
Status: DISCONTINUED | OUTPATIENT
Start: 2022-08-20 | End: 2022-08-21

## 2022-08-20 RX ORDER — ALLOPURINOL 100 MG/1
200 TABLET ORAL 2 TIMES DAILY
Status: DISCONTINUED | OUTPATIENT
Start: 2022-08-20 | End: 2022-08-25 | Stop reason: HOSPADM

## 2022-08-20 RX ORDER — DOCUSATE SODIUM 100 MG/1
100 CAPSULE, LIQUID FILLED ORAL 2 TIMES DAILY
Status: DISCONTINUED | OUTPATIENT
Start: 2022-08-20 | End: 2022-08-25 | Stop reason: HOSPADM

## 2022-08-20 RX ORDER — FUROSEMIDE 40 MG/1
40 TABLET ORAL DAILY
Status: DISCONTINUED | OUTPATIENT
Start: 2022-08-20 | End: 2022-08-20

## 2022-08-20 RX ORDER — TRAZODONE HYDROCHLORIDE 50 MG/1
100 TABLET ORAL NIGHTLY
Status: DISCONTINUED | OUTPATIENT
Start: 2022-08-20 | End: 2022-08-25 | Stop reason: HOSPADM

## 2022-08-20 RX ORDER — BISACODYL 10 MG
10 SUPPOSITORY, RECTAL RECTAL DAILY PRN
Status: DISCONTINUED | OUTPATIENT
Start: 2022-08-20 | End: 2022-08-25 | Stop reason: HOSPADM

## 2022-08-20 RX ORDER — TOPIRAMATE 100 MG/1
200 TABLET, FILM COATED ORAL 2 TIMES DAILY
Status: DISCONTINUED | OUTPATIENT
Start: 2022-08-20 | End: 2022-08-25 | Stop reason: HOSPADM

## 2022-08-20 RX ORDER — ZIPRASIDONE HYDROCHLORIDE 20 MG/1
20 CAPSULE ORAL NIGHTLY
Status: DISCONTINUED | OUTPATIENT
Start: 2022-08-20 | End: 2022-08-25 | Stop reason: HOSPADM

## 2022-08-20 RX ORDER — FUROSEMIDE 20 MG/1
20 TABLET ORAL DAILY
Status: DISCONTINUED | OUTPATIENT
Start: 2022-08-20 | End: 2022-08-25 | Stop reason: HOSPADM

## 2022-08-20 RX ORDER — FAMOTIDINE 20 MG/1
20 TABLET, FILM COATED ORAL 2 TIMES DAILY
Status: DISCONTINUED | OUTPATIENT
Start: 2022-08-20 | End: 2022-08-25 | Stop reason: HOSPADM

## 2022-08-20 RX ORDER — BACLOFEN 10 MG/1
10 TABLET ORAL 4 TIMES DAILY
Status: DISCONTINUED | OUTPATIENT
Start: 2022-08-20 | End: 2022-08-25 | Stop reason: HOSPADM

## 2022-08-20 RX ADMIN — TOPIRAMATE 200 MG: 100 TABLET, FILM COATED ORAL at 21:54

## 2022-08-20 RX ADMIN — HYDROMORPHONE HYDROCHLORIDE 0.5 MG: 1 INJECTION, SOLUTION INTRAMUSCULAR; INTRAVENOUS; SUBCUTANEOUS at 17:34

## 2022-08-20 RX ADMIN — ZIPRASIDONE HYDROCHLORIDE 20 MG: 20 CAPSULE ORAL at 21:54

## 2022-08-20 RX ADMIN — ESCITALOPRAM OXALATE 20 MG: 10 TABLET ORAL at 09:39

## 2022-08-20 RX ADMIN — HYDROMORPHONE HYDROCHLORIDE 0.5 MG: 1 INJECTION, SOLUTION INTRAMUSCULAR; INTRAVENOUS; SUBCUTANEOUS at 09:04

## 2022-08-20 RX ADMIN — FAMOTIDINE 20 MG: 20 TABLET, FILM COATED ORAL at 21:53

## 2022-08-20 RX ADMIN — HYDROMORPHONE HYDROCHLORIDE 0.5 MG: 1 INJECTION, SOLUTION INTRAMUSCULAR; INTRAVENOUS; SUBCUTANEOUS at 13:32

## 2022-08-20 RX ADMIN — ESCITALOPRAM OXALATE 20 MG: 10 TABLET ORAL at 21:53

## 2022-08-20 RX ADMIN — GABAPENTIN 600 MG: 300 CAPSULE ORAL at 13:23

## 2022-08-20 RX ADMIN — HYDROCODONE BITARTRATE AND ACETAMINOPHEN 1 TABLET: 10; 325 TABLET ORAL at 00:07

## 2022-08-20 RX ADMIN — FERROUS SULFATE TAB 325 MG (65 MG ELEMENTAL FE) 325 MG: 325 (65 FE) TAB at 09:40

## 2022-08-20 RX ADMIN — BACLOFEN 10 MG: 10 TABLET ORAL at 16:34

## 2022-08-20 RX ADMIN — TOPIRAMATE 200 MG: 100 TABLET, FILM COATED ORAL at 15:38

## 2022-08-20 RX ADMIN — SODIUM CHLORIDE, PRESERVATIVE FREE 10 ML: 5 INJECTION INTRAVENOUS at 09:40

## 2022-08-20 RX ADMIN — DOCUSATE SODIUM 100 MG: 100 CAPSULE, LIQUID FILLED ORAL at 13:23

## 2022-08-20 RX ADMIN — GABAPENTIN 600 MG: 300 CAPSULE ORAL at 09:39

## 2022-08-20 RX ADMIN — HYDROCODONE BITARTRATE AND ACETAMINOPHEN 1 TABLET: 10; 325 TABLET ORAL at 10:32

## 2022-08-20 RX ADMIN — TRAZODONE HYDROCHLORIDE 100 MG: 50 TABLET ORAL at 21:53

## 2022-08-20 RX ADMIN — FAMOTIDINE 20 MG: 20 TABLET, FILM COATED ORAL at 09:39

## 2022-08-20 RX ADMIN — ALLOPURINOL 200 MG: 100 TABLET ORAL at 21:55

## 2022-08-20 RX ADMIN — ENOXAPARIN SODIUM 40 MG: 100 INJECTION SUBCUTANEOUS at 09:40

## 2022-08-20 RX ADMIN — HYDROMORPHONE HYDROCHLORIDE 0.5 MG: 1 INJECTION, SOLUTION INTRAMUSCULAR; INTRAVENOUS; SUBCUTANEOUS at 03:25

## 2022-08-20 RX ADMIN — SODIUM CHLORIDE, PRESERVATIVE FREE 10 ML: 5 INJECTION INTRAVENOUS at 21:55

## 2022-08-20 RX ADMIN — VANCOMYCIN HYDROCHLORIDE 1250 MG: 10 INJECTION, POWDER, LYOPHILIZED, FOR SOLUTION INTRAVENOUS at 15:43

## 2022-08-20 RX ADMIN — BACLOFEN 10 MG: 10 TABLET ORAL at 21:53

## 2022-08-20 RX ADMIN — GABAPENTIN 600 MG: 300 CAPSULE ORAL at 21:54

## 2022-08-20 RX ADMIN — ALLOPURINOL 200 MG: 100 TABLET ORAL at 09:39

## 2022-08-20 RX ADMIN — BACLOFEN 20 MG: 10 TABLET ORAL at 09:40

## 2022-08-20 RX ADMIN — PANCRELIPASE 72000 UNITS: 36000; 180000; 114000 CAPSULE, DELAYED RELEASE PELLETS ORAL at 16:33

## 2022-08-20 RX ADMIN — METOPROLOL SUCCINATE 12.5 MG: 25 TABLET, EXTENDED RELEASE ORAL at 13:23

## 2022-08-20 RX ADMIN — GABAPENTIN 600 MG: 300 CAPSULE ORAL at 16:34

## 2022-08-20 RX ADMIN — HYDROCODONE BITARTRATE AND ACETAMINOPHEN 1 TABLET: 5; 325 TABLET ORAL at 16:34

## 2022-08-20 RX ADMIN — VANCOMYCIN HYDROCHLORIDE 1250 MG: 10 INJECTION, POWDER, LYOPHILIZED, FOR SOLUTION INTRAVENOUS at 03:12

## 2022-08-20 RX ADMIN — FUROSEMIDE 40 MG: 40 TABLET ORAL at 06:20

## 2022-08-20 RX ADMIN — BACLOFEN 10 MG: 10 TABLET ORAL at 13:23

## 2022-08-20 RX ADMIN — RIFAMPIN 300 MG: 300 CAPSULE ORAL at 21:54

## 2022-08-20 RX ADMIN — DOCUSATE SODIUM 100 MG: 100 CAPSULE, LIQUID FILLED ORAL at 21:53

## 2022-08-20 ASSESSMENT — PAIN SCALES - GENERAL
PAINLEVEL_OUTOF10: 9
PAINLEVEL_OUTOF10: 10
PAINLEVEL_OUTOF10: 8
PAINLEVEL_OUTOF10: 9
PAINLEVEL_OUTOF10: 9
PAINLEVEL_OUTOF10: 10
PAINLEVEL_OUTOF10: 8

## 2022-08-20 ASSESSMENT — PAIN DESCRIPTION - DESCRIPTORS
DESCRIPTORS: ACHING;SORE;NAGGING
DESCRIPTORS: ACHING;BURNING;SHARP
DESCRIPTORS: ACHING;BURNING;THROBBING
DESCRIPTORS: ACHING;DULL;DISCOMFORT
DESCRIPTORS: ACHING;BURNING;SHARP
DESCRIPTORS: THROBBING;SHOOTING;POUNDING
DESCRIPTORS: ACHING;THROBBING;SHOOTING

## 2022-08-20 ASSESSMENT — PAIN DESCRIPTION - PAIN TYPE
TYPE: ACUTE PAIN
TYPE: ACUTE PAIN

## 2022-08-20 ASSESSMENT — PAIN DESCRIPTION - ONSET
ONSET: ON-GOING
ONSET: ON-GOING

## 2022-08-20 ASSESSMENT — PAIN DESCRIPTION - FREQUENCY: FREQUENCY: INTERMITTENT

## 2022-08-20 ASSESSMENT — PAIN DESCRIPTION - ORIENTATION
ORIENTATION: RIGHT

## 2022-08-20 ASSESSMENT — PAIN DESCRIPTION - LOCATION
LOCATION: KNEE
LOCATION: LEG;HIP
LOCATION: LEG;HIP
LOCATION: LEG
LOCATION: LEG

## 2022-08-20 NOTE — CONSULTS
Department of Internal Medicine  Infectious Diseases   Consult Note      Reason for Consult:  MRSA bacteremia, right TKA infection       Requesting Physician:  Dr Kala Tay:       This is a 64 yrs old female with hx of mod obesity, asthma, chronic low back pain s/p bilateral knee replacement ( 20 yrs ago at [facility] ) , right ankle ORIF ( 3/2022) presented to the [place] ER with right knee pain, right leg swelling and rednesss X 1 day. She reported fever and chills . Reported weakness,right hip pain .   WBC was 8.8 K Sed rate 80 CRP 43.8  Right knee joint aspirated - WBC was  06543, N 73 %, , RBC 53509- Cx S aureus   Blood cx - MRSA   Pt was started on IV vancomycin   Pt was transferred to [facility]   Pt underwent I & D  (8/19)       Past Medical History:      Past Medical History:   Diagnosis Date    Adenoma of right adrenal gland     Anemia     Anxiety     Arthritis     spinal    Asthma     controlled with inhalers     Benign essential tremor     Bipolar affective (HCC)     Chronic back pain     Spinal cord stimulator in place    Chronic respiratory failure with hypoxia (HCC)     at times dt diaphragm does not function properly dt Mitochondrial disorder    Depression     stable    Diabetes mellitus ([address]     Difficulty swallowing     for EGD 10-8-19     Environmental and seasonal allergies     Excessive physiologic tremor 5/24/2021    Fatty liver     Full dentures     GERD (gastroesophageal reflux disease)     Gout     past hx of    Herniated cervical disc     limited rom of head and neck    History of swelling of feet     Hypertension     Lymphedema of lower extremity 09/06/2012    Mitochondrial cytopathy ([facility]     s/s muscle and nerve pain, difficulty breathing, seizures, difficulty swallowing, digestive disorders- Dr. Yokasta Collins [facility]    Muscle weakness (generalized)     Information obtained from [facility]    Need for assistance with personal care     Information obtained from Agnes Rojas    Neuropathy     at feet    Obesity     PETR (obstructive sleep apnea)     no CPAP dt insurance will not pay for    Osteoarthritis of left knee     Information obtained from [facility]    PONV (postoperative nausea and vomiting)     Seizures ([address]     last approx 6-13-19- f/u w/ PCP  Granmal, flares up in heat/ summer time - no recent issues as of 10-4-19     Spinal headache     Thyroid disease        Past Surgical History:      Past Surgical History:   Procedure Laterality Date    ANKLE FRACTURE SURGERY Right 2/14/2022    RIGHT ANKLE IRRIGAITON AND DEBRIDEMENT WITH EXTERNAL FIXATOR AND LACERATION REPAIR performed by Dagoberto Marion MD at [address] Right 3/2/2022    RIGHT LOWER EXTREMITY REMOVAL EXTERNAL FIXATOR, RIGHT PILON OPEN REDUCTION INTERNAL FIXATOR--SYNTHES (FACILITY) performed by Shobha Alatorre DO at [facility]      with Hysterectomy / unknown    BACK SURGERY  last one 1995    lumbar x 2    CARDIAC CATHETERIZATION Right 6-6-2013    Dr. Yue Rosas Radial, no stents placed, no blockages     CHOLECYSTECTOMY  1999    open with gastric bypass    COLONOSCOPY N/A 9/18/2018    COLONOSCOPY POLYPECTOMY HOT BIOPSY performed by Marilyn Betancourt MD at [address] [place] N/A 10/8/2019    COLONOSCOPY POLYPECTOMY SNARE/COLD BIOPSY performed by Marilyn Betancourt MD at [address]    EGD COLONOSCOPY N/A 10/8/2019    EGD ESOPHAGOGASTRODUODENOSCOPY DILATATION performed by Marilyn Betancourt MD at [facility] [place], DIAGNOSTIC      ESOPHAGEAL DILATATION  9/18/2018    ESOPHAGEAL DILATION Gracia Leloren performed by Marilyn Betancourt MD at [address] [facility] ([address] [facility])  Reg King 1998 Bilateral 2007,2017    knees    KNEE SURGERY Bilateral     scope    MYOMECTOMY      NERVE BLOCK Left 10 02 2013    lumbar paravertebral facet #2    NERVE BLOCK Left 10 9 13    hip inj #1    NERVE BLOCK Left 10/16/13    hip injection    NERVE BLOCK Left 10/29/2014    left lumbar transforaminal nerve lbock  #1    NERVE BLOCK Left 11/12/2014    lumbar left transforaminal nerve block  #2    NERVE BLOCK Left 12 8 14    lumbar transforaminal #3    NERVE BLOCK Right 3/30/15    cervical transforaminal #1    NERVE BLOCK Right 4/6/2015    right cervical transforaminal nerve block  #2    NERVE BLOCK Right 4/13/15    cervical transforaminal #3    NERVE BLOCK Left 7/6/15    knee injection #1    NERVE BLOCK  07/20/15    left genicular nerve block/knee #2    NERVE BLOCK Left 10 1 15    lumb transforam #1    NERVE BLOCK  10/26/15    left lumbar transforaminal nerve block #3    NERVE BLOCK Bilateral 4/1/2021    #1 BILATERAL SACROILIAC JOINT INJECTION UNDER FLUORO performed by Maurilio Padilla MD at [address] [address] Left 11 25 13    lumbar radiofreq    OTHER SURGICAL HISTORY  3/28/2016    stage 1, 3 day percutanous trial [employer] lumbar spinal cord stimulator    OTHER SURGICAL HISTORY  1995    racz procedure / lower back    OR COLONOSCOPY FLX DX W/COLLJ SPEC WHEN PFRMD N/A 3/20/2018    COLONOSCOPY DIAGNOSTIC OR SCREENING performed by Sang Anaya MD at [address] EGD TRANSORAL BIOPSY SINGLE/MULTIPLE N/A 3/20/2018    EGD BIOPSY performed by Sang Anaya MD at [facility]           Current Medications:      Current Facility-Administered Medications   Medication Dose Route Frequency Provider Last Rate Last Admin    albuterol (PROVENTIL) nebulizer solution 2.5 mg  2.5 mg Nebulization Q6H PRN Liudmila Casillas MD        allopurinol (ZYLOPRIM) tablet 200 mg  200 mg Oral BID Liudmila Casillas MD        baclofen (LIORESAL) tablet 10 mg  10 mg Oral 4x daily Liudmila Casillas MD        docusate sodium (COLACE) capsule 100 mg  100 mg Oral BID Liudmila Casillas MD        escitalopram (LEXAPRO) tablet 20 mg  20 mg Oral BID Liudmila Casillas MD        famotidine (PEPCID) tablet 20 mg  20 mg Oral BID Shane Marino MD        lipase-protease-amylase (CREON) delayed release capsule 72,000 Units  72,000 Units Oral TID  Shane Marino MD        metoprolol succinate (TOPROL XL) extended release tablet 12.5 mg  12.5 mg Oral Daily Shane Marino MD        topiramate (TOPAMAX) tablet 200 mg  200 mg Oral BID Shane Marino MD        traZODone (DESYREL) tablet 100 mg  100 mg Oral Nightly Shane Marino MD        ziprasidone (GEODON) capsule 20 mg  20 mg Oral Nightly Shane Marino MD        furosemide (LASIX) tablet 20 mg  20 mg Oral Daily Shane Marino MD        albuterol (PROVENTIL) nebulizer solution 2.5 mg  2.5 mg Nebulization Q4H PRN [facility]        ferrous sulfate (IRON 325) tablet 325 mg  325 mg Oral Daily [facility], DO   325 mg at 08/20/22 0940    gabapentin (NEURONTIN) capsule 600 mg  600 mg Oral 4x Daily [facility] Donny, DO   600 mg at 08/20/22 8974    levOCARNitine (CARNITOR) tablet 330 mg (Patient Supplied)  330 mg Oral TID [facility] Donny, DO        sodium chloride flush 0.9 % injection 5-40 mL  5-40 mL IntraVENous 2 times per day [facility] Donny, DO   10 mL at 08/20/22 0940    sodium chloride flush 0.9 % injection 5-40 mL  5-40 mL IntraVENous PRN [facility]        0.9 % sodium chloride infusion   IntraVENous PRN [facility] DO        ondansetron (ZOFRAN-ODT) disintegrating tablet 4 mg  4 mg Oral Q8H PRN [facility] DO        Or    ondansetron [facility]) injection 4 mg  4 mg IntraVENous Q6H PRN [facility]        polyethylene glycol (GLYCOLAX) packet 17 g  17 g Oral Daily PRN [facility] DO        vancomycin (VANCOCIN) 1,250 mg in dextrose 5 % 250 mL IVPB  1,250 mg IntraVENous Q12H Elvis Donny, DO   Stopped at 08/20/22 0450    HYDROmorphone (DILAUDID) injection 0.5 mg  0.5 mg IntraVENous Q4H PRN Elvis Hines DO   0.5 mg at 08/20/22 9099    HYDROcodone-acetaminophen (NORCO) 5-325 MG per tablet 1 tablet  1 tablet Oral Q6H PRN Mani Stewart, DO   1 tablet at 22 1148    Or    HYDROcodone-acetaminophen (NORCO)  MG per tablet 1 tablet  1 tablet Oral Q6H PRN Mani Stewart DO   1 tablet at 22 1032    enoxaparin (LOVENOX) injection 40 mg  40 mg SubCUTAneous Daily Mani Stewart DO   40 mg at 22 4502       Allergies:  Bee pollen, Penicillins, Ropinirole, Ropinirole hcl, Vistaril [hydroxyzine hcl], Aripiprazole, Prednisone, Restoril [temazepam], Hydroxyzine pamoate, and Tape Ricci Ishihara tape]    Social History:      Social History     Socioeconomic History    Marital status:      Spouse name: Not on file    Number of children: Not on file    Years of education: Not on file    Highest education level: Not on file   Occupational History    Not on file   Tobacco Use    Smoking status: Former     Packs/day: 0.75     Years: 42.00     Pack years: 31.50     Types: Cigarettes     Start date: 1990     Quit date: 2021     Years since quittin.6    Smokeless tobacco: Never   Vaping Use    Vaping Use: Never used   Substance and Sexual Activity    Alcohol use: No     Alcohol/week: 0.0 standard drinks    Drug use: Not Currently     Types: Marijuana Timothy Blow)     Comment: edibles- through pain doctor    Sexual activity: Not on file   Other Topics Concern    Not on file   Social History Narrative    ** Merged History Encounter **          Social Determinants of Health     Financial Resource Strain: Not on file   Food Insecurity: Not on file   Transportation Needs: Not on file   Physical Activity: Not on file   Stress: Not on file   Social Connections: Not on file   Intimate Partner Violence: Not on file   Housing Stability: Not on file         Family History:     Family History   Problem Relation Age of Onset    Heart Disease Mother     Cancer Father     Arthritis Other     Cancer Other     Depression Other     Heart Disease Other     Hypertension Other     Mental Illness Other     Stroke Other        REVIEW OF SYSTEMS:    CONSTITUTIONAL:  fever  HEENT: denies blurring of vision or double vision, denies hearing problem  RESPIRATORY: denies cough, shortness of breath, sputum expectoration, chest pain. CARDIOVASCULAR:  Denies palpitation  GASTROINTESTINAL:  Denies abdomen pain, diarrhea or constipation. GENITOURINARY:  Denies burning urination or frequency of urination  INTEGUMENT: denies wound , rash  HEMATOLOGIC/LYMPHATIC:  Denies lymph node swelling, gum bleeding or easy bruising. MUSCULOSKELETAL:  right knee, right hip pain , leg swelling   NEUROLOGICAL:  Denies light headed, dizziness, loss of consciousness, weakness of lower extremities, bowel or bladder incontinence. PHYSICAL EXAM:      Vitals:     /63   Pulse 73   Temp 98.3 °F (36.8 °C) (Temporal)   Resp 17   LMP 08/31/1988   SpO2 98%     General Appearance:    Awake, alert , no acute distress. Head:    Normocephalic, atraumatic   Eyes:    No pallor, no icterus,   Ears:    No obvious deformity or drainage.    Nose:   No nasal drainage   Throat:   Mucosa moist, no oral thrush   Neck:   Supple, no lymphadenopathy   Back:     no CVA tenderness   Lungs:     Clear to auscultation bilaterally, no wheeze    Heart:    Regular rate and rhythm, no murmur   Abdomen:     Soft, non-tender, bowel sounds present    Extremities:    + edema, right knee - wrapped    Pulses:   Dorsalis pedis palpable    Skin:   no rashes     CBC with Differential:      Lab Results   Component Value Date/Time    WBC 8.8 08/17/2022 01:30 AM    RBC 4.73 08/17/2022 01:30 AM    HGB 15.2 08/17/2022 01:30 AM    HCT 45.9 08/17/2022 01:30 AM     08/17/2022 01:30 AM    MCV 97.0 08/17/2022 01:30 AM    MCH 32.1 08/17/2022 01:30 AM    MCHC 33.1 08/17/2022 01:30 AM    RDW 13.1 08/17/2022 01:30 AM    SEGSPCT 64 12/27/2013 03:00 PM    LYMPHOPCT 6.0 08/17/2022 01:30 AM    MONOPCT 11.6 08/17/2022 01:30 AM    BASOPCT 0.1 08/17/2022 01:30 AM    MONOSABS 1.02 08/17/2022 01:30 AM    TULIO 0.53 08/17/2022 01:30 AM    EOSABS 0.02 08/17/2022 01:30 AM    BASOSABS 0.01 08/17/2022 01:30 AM       CMP     Lab Results   Component Value Date/Time     08/17/2022 01:30 AM    K 3.5 08/17/2022 01:30 AM    K 3.9 05/06/2022 05:28 PM    CL 96 08/17/2022 01:30 AM    CO2 24 08/17/2022 01:30 AM    BUN 15 08/17/2022 01:30 AM    CREATININE 0.6 08/20/2022 07:16 AM    GFRAA >60 08/20/2022 07:16 AM    LABGLOM >60 08/20/2022 07:16 AM    GLUCOSE 127 08/17/2022 01:30 AM    GLUCOSE 93 05/24/2012 11:11 AM    PROT 6.9 08/17/2022 01:30 AM    LABALBU 3.1 08/17/2022 01:30 AM    LABALBU 4.7 05/21/2012 01:48 PM    CALCIUM 8.8 08/17/2022 01:30 AM    BILITOT 0.4 08/17/2022 01:30 AM    ALKPHOS 97 08/17/2022 01:30 AM    AST 14 08/17/2022 01:30 AM    ALT 10 08/17/2022 01:30 AM         Hepatic Function Panel:    Lab Results   Component Value Date/Time    ALKPHOS 97 08/17/2022 01:30 AM    ALT 10 08/17/2022 01:30 AM    AST 14 08/17/2022 01:30 AM    PROT 6.9 08/17/2022 01:30 AM    BILITOT 0.4 08/17/2022 01:30 AM    BILIDIR <0.2 03/16/2021 12:16 PM    IBILI see below 03/16/2021 12:16 PM    LABALBU 3.1 08/17/2022 01:30 AM    LABALBU 4.7 05/21/2012 01:48 PM       PT/INR:    Lab Results   Component Value Date/Time    PROTIME 13.1 08/19/2022 08:25 AM    PROTIME 10.8 05/07/2012 03:45 AM    INR 1.2 08/19/2022 08:25 AM       TSH:    Lab Results   Component Value Date/Time    TSH 0.548 03/16/2021 12:16 PM       U/A:    Lab Results   Component Value Date/Time    COLORU Yellow 08/17/2022 05:21 AM    PHUR 6.5 08/17/2022 05:21 AM    LABCAST MODERATE 05/06/2012 04:00 AM    WBCUA NONE 05/06/2022 07:21 PM    WBCUA 1-3 05/06/2012 04:00 AM    RBCUA NONE 05/06/2022 07:21 PM    RBCUA 0-1 12/13/2013 10:15 AM    BACTERIA NONE SEEN 05/06/2022 07:21 PM    CLARITYU Clear 08/17/2022 05:21 AM    SPECGRAV <=1.005 08/17/2022 05:21 AM    LEUKOCYTESUR Negative 08/17/2022 05:21 AM    UROBILINOGEN 1.0 08/17/2022 05:21 AM    BILIRUBINUR Negative 08/17/2022 05:21 AM

## 2022-08-20 NOTE — PROGRESS NOTES
OCCUPATIONAL THERAPY INITIAL EVALUATION    [address] [address] [facility]  [address] [facility], [facility]                                                  Patient Name: Magnolia Hutchinson    MRN: [id number]    : 1960    Room: [address] [place] [address]      Evaluating OT: Nasra Cleveland, [address] Leie Benjamin Krueger OTR/L; [id number]      Referring Provider: Nhung Cantrell DO    Specific Provider Orders/Date: OT Eval and Treat 22      Diagnosis: Septic Joint     Surgery:    22  Right knee arthrotomy with exploration irrigation debridement of infection, insertion absorbable antibiotic beads  Right distal lateral thigh suspected abscess incision, debridement and irrigation  Right hip aspiration with fluoroscopic guidance  Sx History:   RIGHT ANKLE IRRIGAITON AND DEBRIDEMENT WITH EXTERNAL FIXATOR AND LACERATION REPAIR (22)  RIGHT LOWER EXTREMITY REMOVAL EXTERNAL FIXATOR, RIGHT PILON OPEN REDUCTION INTERNAL FIXATOR--SYNTHES (3/2/22)    Pertinent Medical History:  has a past medical history of Adenoma of right adrenal gland, Anemia, Anxiety, Arthritis, Asthma, Benign essential tremor, Bipolar affective ([address] Chronic back pain, Chronic respiratory failure with hypoxia ([address] Depression, Diabetes mellitus ([address] Difficulty swallowing, Environmental and seasonal allergies, Excessive physiologic tremor, Fatty liver, Full dentures, GERD (gastroesophageal reflux disease), Gout, Herniated cervical disc, History of swelling of feet, Hypertension, Lymphedema of lower extremity, Mitochondrial cytopathy ([address] Muscle weakness (generalized), Need for assistance with personal care, Neuropathy, Obesity, PETR (obstructive sleep apnea), Osteoarthritis of left knee, PONV (postoperative nausea and vomiting), Seizures ([address] Spinal headache, and Thyroid disease.       Reason for admission: increased fatigue and RLE pain at SNF    Recommended Adaptive Equipment:  TBD pending progression/discharge - AE for LE bathing and dressing      Precautions:  Fall Risk, Bed Alarm, WBAT RLE, O2 (baseline 2L NC at night only)     Assessment of current deficits:    [x] Functional mobility  [x]ADLs  [x] Strength               [x]Cognition    [x] Functional transfers   [x] IADLs         [x] Safety Awareness   [x]Endurance    [x] Fine Coordination              [x] Balance      [] Vision/perception   []Sensation     []Gross Motor Coordination  [] ROM  [] Delirium                   [] Motor Control     OT PLAN OF CARE   OT POC based on physician orders, patient diagnosis and results of clinical assessment    Frequency/Duration: 3-5 days/wk for 2 weeks PRN   Specific OT Treatment Interventions to include:   * Instruction/training on adapted ADL techniques and AE recommendations to increase functional independence within precautions       * Training on energy conservation strategies, correct breathing pattern and techniques to improve independence/tolerance for self-care routine  * Functional transfer/mobility training/DME recommendations for increased independence, safety, and fall prevention  * Patient/Family education to increase follow through with safety techniques and functional independence  * Recommendation of environmental modifications for increased safety with functional transfers/mobility and ADLs  * Therapeutic exercise to improve motor endurance, ROM, and functional strength for ADLs/functional transfers  * Therapeutic activities to facilitate/challenge dynamic balance, stand tolerance for increased safety and independence with ADLs    Home Living: Pt BOB stay since Feburary 2022.    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    Pain Level: 9/10 pain in RLE - RN notified and present providing pain medication  Cognition: A&O: 4/4; Follows multi step directions   Memory:  good   Sequencing:  fair   Problem solving:  fair   Judgement/safety:  fair     Functional Assessment:  AM-PAC Daily Activity Raw Score: 13/24   Initial Eval Status  Date: 8/20/22 Treatment Status  Date: STGs = LTGs  Time frame: 10-14 days   Feeding Stand by Assist   Demonstrated functional reach for all items from tray seated in semi supine position  Independent    Grooming Stand by Assist   Brushing hair seated EOB and washing face  Independent    UB Dressing Minimal Assist   Doff soiled gown/lake new gown  Independent    LB Dressing Dependent   Lake socks lying supine   Increased pain with slight RLE leg raise to lake sock   unable to simulated figure 4 technique d/t pain  Requested to return to bed - LB AE not addressed this session  Minimal Assist    Bathing Maximal Assist  Decreased functional reach for LB bathing supine/stead EOB   UB bathing tasks completed supine with task set up  Minimal Assist    Toileting Dependent   Pure wick upon entering room however bed soiled of urine  Required assist for all pericare using log roll technique    Pt requested to not reapply at end of session d/t multiple leaks  RN notified   Pt continent and will call for use of bed pan for all bowel/bladder movements  Minimal Assist    Bed Mobility  Log Roll: Mod A to L side (with support for RLE)  Min A to R side  Supine to sit: Maximal Assist x 2  Progressing RLE to EOB and assist with trunk (HOB elevated)  Sit to supine: Maximal Assist x2  BLE assist and trunk support  Supine to sit: Minimal Assist   Sit to supine: Minimal Assist    Functional Transfers Sit to stand: NT   Stand to sit:NT  Stand pivot: NT  Commode: NT  Declined d/t pain in RLE  Sit to stand: Mod A   Stand to sit: Mod A   Stand pivot: Mod A   Commode:  Mod A     Functional Mobility NT  Moderate Assist with AD   Balance Sitting:     Static - Min A <> SBA Educated on BUE support using bed rail - posterior lean d/t RLE pain     Dynamic - Min A  Standing: NT  Sitting:  Static: Sup  Dynamic:  Sup  Standing: Mod A with AD   Activity Tolerance Fair -   Limited d/t RLE pain with all functional mobility   Required increased time to complete all mobility/ADLs  GOOD   Visual/  Perceptual Glasses: yes        Safety FAIR  Educated on WBAT RLE prior to functional mobility  GOOD   during ADL completion following WBAT RLE   Vitals   SpO2 during session on 2L NC: 97-99%           Hand Dominance: Right   AROM (PROM) Strength Additional Info:  Goal:   RUE  WFL 4-/5 good  and wfl FMC/dexterity noted during ADL tasks   Improve overall RUE strength to 4/5 for participation in functional tasks       LUE WFL 4-/5 Good  and wfl FMC/dexterity noted during ADL tasks   Improve overall LUE strength to 4/5  for participation in functional tasks       [facility]   Sensation:  chronic BUE and BLE numbness - neuropathy   Tone: WFL   Edema: unremarkable    Comment: Cleared by RN to see pt. Upon arrival patient lying supine in bed and agreeable to OT session. At end of session, patient lying supine in bed with call light and phone within reach, all lines and tubes intact. Overall patient demonstrated  decreased independence and safety during completion of ADL/functional transfer/mobility tasks. Pt would benefit from continued skilled OT to increase safety and independence with completion of ADL/IADL tasks for functional independence and quality of life. Treatment: Pt required vc's for proper technique/safety with hand placement/body mechanics/posture for bed mobility/ADLs/functional tranfers/mobility/ww management. Pt required vc's for sequencing/initiation of ADLs/functional transfers. Pt able to  sit EOB ~10 mins to increase core strength/balance/activity tolerance for ease with ADLs. Pt educated on WBAT RLE.  Pt required increased time to complete ADLs/functional transfers due to pain with all functional mobility of RLE. Required assist to progress RLE to EOB and continued support/ use of leg rest EOB d/t increased pain when RLE not elevated. Pt completed face washing and brushing hair seated EOB, requested to return supine declined standing EOB this date. Pt linens/pads soiled of urine, completed log rolls for posterior pericare and pads replaced. Doff/carmelina gown lying supine and completed anterior pericare/UB bathing tasks. Placed in semi supine position at end of session with RLE elevated. Pt required skilled monitoring of SpO2 during session and education on energy conservation techniques. Pt required rest breaks during session and educated on pursed lip breathing. Pt appeared to have tolerated session well and appears motivated/cooperative/pleasant . Pt instructed on use of call light for assistance and fall prevention. Pt demo'ing fair understanding of education provided. Continue to educate. Rehab Potential: Good  for established goals     LTG: maximize independence with ADLs to return to PLOF    Patient and/or family were instructed on functional diagnosis, prognosis/goals and OT plan of care. Demonstrated FAIR understanding. [] Malnutrition indicators have been identified and nursing has been notified to ensure a dietitian consult is ordered. Eval Complexity: Low     Evaluation time includes thorough review of current medical information, gathering information on past medical & social history & PLOF, completion of standardized testing, informal observation of tasks, consultation with other medical professions/disciplines, assessment of data & development of POC/goals.      Time In: 8701  Time Out: 0930  Total Treatment Time: 25    Min Units   OT Eval Low 08534  x     OT Eval Medium 52663      OT Eval High 29023      OT Re-Eval [id number]       Therapeutic Ex 25540       Therapeutic Activities 08058  10  1   ADL/Self Care 51631  15  1   Orthotic Management 93308       Manual

## 2022-08-20 NOTE — PROGRESS NOTES
Physical Therapy  Physical Therapy Initial Assessment     Name: Amisha Conner  : 1960  MRN: [id number]      Date of Service: 2022    Evaluating PT:  Kehinde Oh PT, DPT [id number]    Room #:  8279/2039-  Diagnosis:  Septic joint ([facility] [M00.9]  PMHx/PSHx:    Past Medical History:   Diagnosis Date    Adenoma of right adrenal gland     Anemia     Anxiety     Arthritis     spinal    Asthma     controlled with inhalers     Benign essential tremor     Bipolar affective ([facility]     Chronic back pain     Spinal cord stimulator in place    Chronic respiratory failure with hypoxia ([facility]     at times dt diaphragm does not function properly dt Mitochondrial disorder    Depression     stable    Diabetes mellitus ([facility]     Difficulty swallowing     for EGD 10-8-19     Environmental and seasonal allergies     Excessive physiologic tremor 2021    Fatty liver     Full dentures     GERD (gastroesophageal reflux disease)     Gout     past hx of    Herniated cervical disc     limited rom of head and neck    History of swelling of feet     Hypertension     Lymphedema of lower extremity 2012    Mitochondrial cytopathy ([facility]     s/s muscle and nerve pain, difficulty breathing, seizures, difficulty swallowing, digestive disorders- Dr. Golden [facility]    Muscle weakness (generalized)     Information obtained from [facility]    Need for assistance with personal care     Information obtained from [facility]    Neuropathy     at feet    Obesity     PETR (obstructive sleep apnea)     no CPAP dt insurance will not pay for    Osteoarthritis of left knee     Information obtained from [facility]    PONV (postoperative nausea and vomiting)     Seizures ([facility]     last approx 19- f/u w/ PCP  Granmal, flares up in heat/ summer time - no recent issues as of 10-4-19     Spinal headache     Thyroid disease      Procedure/Surgery:   Right knee arthrotomy with exploration irrigation debridement of infection, insertion absorbable antibiotic beads, Right distal lateral thigh suspected abscess incision, debridement and irrigation, Right hip aspiration with fluoroscopic guidance  Precautions:  Falls, WBAT RLE, O2  Equipment Needs:  TBD    SUBJECTIVE:    Pt was admitted from [place] [address]. Pt ambulated with rollator for short distances and required assistance PTA. OBJECTIVE:   Initial Evaluation  Date: 8/20/22 Treatment Short Term/ Long Term   Goals   AM-PAC 6 Clicks 2/29     Was pt agreeable to Eval/treatment? Yes     Does pt have pain? 9/10 RLE pain - RN notified and pt was medicated     Bed Mobility  Rolling: Keren to R; ModA to L  Supine to sit: MaxA x 2 with HOB elevated  Sit to supine: MaxA x 2  Scooting: MaxA  Keren   Transfers Sit to stand: NT  Stand to sit: NT  Stand pivot: NT  Keren with [employer]   Ambulation   NT  >50 feet with Keren with [employer]   Stair negotiation: ascended and descended NT     ROM BUE:  WFL  BLE:  WFL     Strength BUE:  WFL  LLE:  4/5 grossly  Increase by 1/3 MMT grade   Balance Sitting EOB:  Keren dynamic  Dynamic Standing:  NT  Sitting EOB:  Independent  Dynamic Standing:  Keren with [employer]     Pt is A & O x 4  Sensation:  chronic paresthesias due to neuropathy  Edema:  None    Therapeutic Exercises:  NA    Patient education  Pt educated on safety    Patient response to education:   Pt verbalized understanding Pt demonstrated skill Pt requires further education in this area   x x x     ASSESSMENT:    Conditions Requiring Skilled Therapeutic Intervention:    []Decreased strength     []Decreased ROM  [x]Decreased functional mobility  [x]Decreased balance   [x]Decreased endurance   []Decreased posture  []Decreased sensation  []Decreased coordination   []Decreased vision  []Decreased safety awareness   [x]Increased pain       Comments:  Pt was in bed upon arrival, agreeable to initial evaluation. Increased time required for all mobility due to reported pain levels.   Pt was unable to advance RLE and required increased assistance to reach EOB. RLE needed to be elevated on stool. Extended time given at EOB as pt declined attempt to stand. Upon return to bed, pt rolled to R and L for hygiene care and changing of soiled linens. Pt was left in bed with all needs met and call light in reach. Treatment:  Patient practiced and was instructed in the following treatment:    Bed mobility training - pt given verbal and tactile cues to facilitate proper sequencing and safety during rolling and supine>sit as well as provided with physical assistance. Sitting EOB for >10 minutes for upright tolerance, postural awareness and BLE ROM  Skilled positioning - Pt placed in reclined position with pillows utilized to facilitate upright posture, joint and skin integrity, and interaction with environment. Pt's/ family goals   1. Return to PLOF    Prognosis is good for reaching above PT goals. Patient and or family understand(s) diagnosis, prognosis, and plan of care.   yes    PHYSICAL THERAPY PLAN OF CARE:    PT POC is established based on physician order and patient diagnosis     Referring provider/PT Order:  Nhung Cantrell DO /08/19/22 1700 PT eval and treat  Diagnosis:  Septic joint ([facility] [address] [M00.9]  Specific instructions for next treatment:  Progress activity    Current Treatment Recommendations:     [x] Strengthening to improve independence with functional mobility   [x] ROM to improve independence with functional mobility   [x] Balance Training to improve static/dynamic balance and to reduce fall risk  [x] Endurance Training to improve activity tolerance during functional mobility   [x] Transfer Training to improve safety and independence with all functional transfers   [x] Gait Training to improve gait mechanics, endurance and asses need for appropriate assistive device  [] Stair Training in preparation for safe discharge home and/or into the community   [x] Positioning to prevent skin breakdown and contractures  [x] Safety and Education Training   [x] Patient/Caregiver Education   [] HEP  [] Other     PT long term treatment goals are located in above grid    Frequency of treatments: 2-5x/week x 1-2 weeks. Time in  0840  Time out  0922    Total Treatment Time  25 minutes     Evaluation Time includes thorough review of current medical information, gathering information on past medical history/social history and prior level of function, completion of standardized testing/informal observation of tasks, assessment of data and education on plan of care and goals.     CPT codes:  [] Low Complexity PT evaluation 70269  [x] Moderate Complexity PT evaluation 54267  [] High Complexity PT evaluation 74869  [] PT Re-evaluation 36559  [] Gait training 41250 - minutes  [] Manual therapy 11003 - minutes  [x] Therapeutic activities 16015 25 minutes  [] Therapeutic exercises 73223 - minutes  [] Neuromuscular reeducation 10904 - minutes     Avery Spence, PT, DPT  [id number]

## 2022-08-20 NOTE — PROGRESS NOTES
Hospitalist Progress Note      Synopsis: Patient admitted on 8/19/2022     Ms. Monse Laird, a 64y.o. year old female  who  has a past medical history of Adenoma of right adrenal gland, Anemia, Anxiety, Arthritis, Asthma, Benign essential tremor, Bipolar affective ([address] Chronic back pain, Chronic respiratory failure with hypoxia ([address] Depression, Diabetes mellitus ([address] Difficulty swallowing, Environmental and seasonal allergies, Excessive physiologic tremor, Fatty liver, Full dentures, GERD (gastroesophageal reflux disease), Gout, Herniated cervical disc, History of swelling of feet, Hypertension, Lymphedema of lower extremity, Mitochondrial cytopathy ([address] Muscle weakness (generalized), Need for assistance with personal care, Neuropathy, Obesity, PETR (obstructive sleep apnea), Osteoarthritis of left knee, PONV (postoperative nausea and vomiting), Seizures ([address] Spinal headache, and Thyroid disease. The patient fractured her right ankle and underwent an ORIF in January 2022 by Dr. Karl Pizarro. She resides at [facility]. She developed redness, swelling and pain in the right leg. sHe denies any trauma. She says she has been dealing with a chronic callus and an ulcer on the distal part of her right first toe. She has been seen by a podiatrist who has been debriding this ulcer at the facility. She patient was sent to the ED at [facility] on 8/17/2022 with pain in the lright lower extremity. sHe was seen by infectious disease for cellulitis of the right lower extremity. There is also some concern for septic joint. Orthopedics was consulted and advised the patient be transferred to [facility] for interventional radiology aspiration of the suspect joint.   Subjective    \  Patient is now postop from having her right knee arthrotomy with exploration irrigation and debridement of infection and insertion of absorbable antibiotic beads and right distal lateral thigh suspected abscess with irrigation and incision and debridement and also a right hip aspiration under fluoroscopy guidance. This took place yesterday. She is in her own room now    Exam:  /63   Pulse 73   Temp 98.3 °F (36.8 °C) (Temporal)   Resp 17   LMP 08/31/1988   SpO2 98%   General appearance: No apparent distress, appears stated age and cooperative. HEENT: Pupils equal, round, and reactive to light. Conjunctivae/corneas clear. Neck: Trachea midline. Respiratory: Distant but clear cardiovascular: Regular rate and rhythm with normal S1/S2 without murmurs, rubs or gallops. Abdomen: Soft, non-tender, non-distended with normal bowel sounds. Musculoskeletal: Bilateral lower extremities feet are deformed\  Right knee has a postop dressing in place. Left leg with no open areas. Right knee is dressed  Skin:  No rashes    Neurologic: awake, alert and following commands     Medications:  Reviewed    Infusion Medications    sodium chloride       Scheduled Medications    allopurinol  200 mg Oral BID    baclofen  20 mg Oral 4x daily    famotidine  20 mg Oral BID    escitalopram  20 mg Oral BID    ferrous sulfate  325 mg Oral Daily    furosemide  40 mg Oral Daily    gabapentin  600 mg Oral 4x Daily    levOCARNitine  330 mg Oral TID    sodium chloride flush  5-40 mL IntraVENous 2 times per day    vancomycin  1,250 mg IntraVENous Q12H    enoxaparin  40 mg SubCUTAneous Daily     PRN Meds: albuterol, sodium chloride flush, sodium chloride, ondansetron **OR** ondansetron, polyethylene glycol, HYDROmorphone, HYDROcodone 5 mg - acetaminophen **OR** HYDROcodone-acetaminophen    I/O    Intake/Output Summary (Last 24 hours) at 8/20/2022 1035  Last data filed at 8/20/2022 0807  Gross per 24 hour   Intake 600 ml   Output --   Net 600 ml       Labs:   No results for input(s): WBC, HGB, HCT, PLT in the last 72 hours.     Recent Labs     08/20/22  0716   CREATININE 0.6       No results for input(s): PROT, ALB, ALKPHOS, ALT, AST, BILITOT, AMYLASE, LIPASE in the last 72 hours. Recent Labs     08/19/22  0825   INR 1.2       No results for input(s): Patrisha Meigs in the last 72 hours. Chronic labs:  Lab Results   Component Value Date    CHOL 154 04/11/2022    TRIG 163 (H) 04/11/2022    HDL 49 04/11/2022    LDLCALC 72 04/11/2022    TSH 0.548 03/16/2021    INR 1.2 08/19/2022    LABA1C 5.7 12/13/2013       Microbiology:  Pending  Recent Labs     08/18/22  1250   BC 24 Hours no growth     No results for input(s): ORG in the last 72 hours. Recent Labs     08/18/22  1250   BLOODCULT2 24 Hours no growth     No results for input(s): STREPNEUMAGU in the last 72 hours. No results for input(s): LP1UAG in the last 72 hours. Recent Labs     08/17/22  1532   ASO 34     No results for input(s): CULTRESP in the last 72 hours. No results for input(s): PROCAL in the last 72 hours. Radiology:  XR FEMUR RIGHT (MIN 2 VIEWS)    Result Date: 8/19/2022  Extensive postsurgical changes about the distal right femur, right knee and distal right tibia and fibula. No gross hardware complications. No acute fracture or subluxation. XR KNEE RIGHT (3 VIEWS)    Result Date: 8/19/2022  Extensive postsurgical changes about the distal right femur, right knee and distal right tibia and fibula. No gross hardware complications. No acute fracture or subluxation. XR TIBIA FIBULA RIGHT (2 VIEWS)    Result Date: 8/17/2022  1. Extensive postsurgical changes about the distal right femur, right knee, and distal right tibia and fibula. No hardware complications. 2.  Diffuse soft tissue stranding and more focal anterior soft tissue edema. There is no definite evidence of osseous destruction or erosion about the right tibia or fibula on this exam.     CT HEAD WO CONTRAST    Result Date: 8/17/2022  No acute intracranial abnormality. XR CHEST PORTABLE    Result Date: 8/17/2022  Atherosclerotic disease and mild prominence of the cardiac silhouette.  Central pulmonary vascular congestion. No pneumothorax. FLUORO FOR SURGICAL PROCEDURES    Result Date: 8/19/2022  Intraprocedural fluoroscopic spot images as above. See separate procedure report for more information. 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 *  Asthma   bipolar disorder    Diabetes  Arthritis     PLAN:    1. Status post surgery for her right knee  2. Started on vancomycin. We need to get infectious disease because of her complex history. She was seen by infectious disease in [place]  3. Reorder her extensive list of medications including Geodon and Topamax and Lexapro and trazodone and of course her metoprolol and pancreatic replacement  4. Continue to follow her labs and electrolytes      Diet: ADULT DIET; Regular  Code Status: Full Code  PT/OT Eval Status:   Once okay with surgery  DVT Prophylaxis:   In place  Recommended disposition at discharge: To be determined    +++++++++++++++++++++++++++++++++++++++++++++++++  Syl Watkins MD   [address]  +++++++++++++++++++++++++++++++++++++++++++++++++  NOTE: This report was transcribed using voice recognition software. Every effort was made to ensure accuracy; however, inadvertent computerized transcription errors may be present.

## 2022-08-20 NOTE — PROGRESS NOTES
Pharmacy Consultation Note  (Antibiotic Dosing and Monitoring)    Initial consult date: 8-  Consulting physician/provider: Dr. Julia Avila  Drug: Vancomycin  Indication: ABSSSI; septic R knee    Age/  Gender Height Weight IBW  Allergy Information   61 y.o./female 170.2 cm  109 kg   Ideal body weight: 61.6 kg (135 lb 12.9 oz)  Adjusted ideal body weight: 80.6 kg (177 lb 10.3 oz)   Bee pollen, Penicillins, Ropinirole, Ropinirole hcl, Vistaril [hydroxyzine hcl], Aripiprazole, Prednisone, Restoril [temazepam], Hydroxyzine pamoate, and Tape [adhesive tape]      Renal Function:  Recent Labs     08/20/22  0716   CREATININE 0.6         Intake/Output Summary (Last 24 hours) at 8/20/2022 0906  Last data filed at 8/19/2022 2000  Gross per 24 hour   Intake 420 ml   Output --   Net 420 ml       Vancomycin Monitoring:  Trough:  No results for input(s): VANCOTROUGH in the last 72 hours. Random:    Recent Labs     08/20/22  0716   VANCORANDOM 19.2       Vancomycin Administration Times:  Recent vancomycin administrations                     vancomycin (VANCOCIN) 1,250 mg in dextrose 5 % 250 mL IVPB (mg) 1,250 mg New Bag 08/20/22 0312     1,250 mg New Bag 08/19/22 0358    vancomycin (VANCOCIN) injection (mg) 1,000 mg Given 08/19/22 1407    vancomycin (VANCOCIN) 2,500 mg in dextrose 5 % 500 mL IVPB (mg) 2,500 mg New Bag 08/18/22 1230                      Assessment:  62 yo/F transferred from [facility] to [facility] for Ortho eval for suspected R knee septic arthritis. ID was following patient @ [facility], patient received PO doxy and cephalexin at [facility] but was switched to vancomycin (2500 mg LD and 1250 mg q12h on 8/18 prior to transfer). S/P needle aspiration of R knee early 8/19 AM; Cx's pending. Estimated Creatinine Clearance: 125 mL/min (based on SCr of 0.6 mg/dL). To dose vancomycin, pharmacy will be utilizing [employer] calculation software for goal AUC/NAVARRO 400-600 mg/L-hr.   Afternoon dose held yesterday while patient in OR; had topical vancomycin/tobramycin beads placed intra-op  Vancomycin random 19.2 mcg/mL ~4 hrs post-dose    Plan:  Continue vancomycin 1250 mg q12h: AUC/NAVARRO = 485, Tr = 15.2 mCg/mL. Will continue to check vancomycin levels as needed  Will continue to monitor renal function. Clinical pharmacy to follow.     Kalpesh Enrique, PharmD, BCPS, BCCCP  8/20/2022  9:11 AM

## 2022-08-20 NOTE — PROGRESS NOTES
Department of Orthopedic Surgery  Resident Progress Note    Patient seen and examined at bedside this morning. She has pain to the right lower extremity which is mostly to the knee. Medication does provide some relief. No chest pain or shortness of breath. No nausea or vomiting. No fevers or chills. She has diminished sensation to the right foot compared to the contralateral side but overall no complete numbness. Tolerating a diet. VITALS:  BP (!) 104/59   Pulse 63   Temp 97.6 °F (36.4 °C) (Temporal)   Resp 16   LMP 08/31/1988   SpO2 99%     General: alert and oriented to person, place and time, obese    MUSCULOSKELETAL:   right lower extremity:  Ace dressing C/D/I about the extremity  Compartments soft and compressible  +PF/DF/EHL  +2/4 DP & PT pulses, Brisk Cap refill, Toes warm and perfused  Distal sensation grossly intact to Peroneals, Sural, Saphenous, and tibial nerve distributions, subjectively diminished per patient    CBC:   Lab Results   Component Value Date/Time    WBC 8.8 08/17/2022 01:30 AM    HGB 15.2 08/17/2022 01:30 AM    HCT 45.9 08/17/2022 01:30 AM     08/17/2022 01:30 AM     PT/INR:    Lab Results   Component Value Date/Time    PROTIME 13.1 08/19/2022 08:25 AM    PROTIME 10.8 05/07/2012 03:45 AM    INR 1.2 08/19/2022 08:25 AM       Intraop Cultures: Pending    ASSESSMENT  S/P irrigation and debridement of right knee with insertion of antibiotic beads, right thigh, and aspiration of right hip with fluoroscopy on 8/19/2022    PLAN      Continue physical therapy and protocol: WBAT - RLE  24 hour abx coverage, further antibiotics as indicated by intraoperative cultures  Initial synovial fluid analysis of the right hip is not strongly indicative of infection  Deep venous thrombosis prophylaxis -per medical service, early mobilization  PT/OT appreciated  Pain Control: IV and PO  Monitor H&H, 15.2 on 8/17/2022  D/C Plan: Appreciate PT/OT and social work evaluations.   We will continue to follow patient as we await cultures from her recent surgical debridement. Orthopaedic Attending    I have seen and evaluated the patient with the resident and agree with the above assessments on today's visit. I have performed the key components of the history and physical examination and concur completely with the findings and plans as documented above. Right hip aspiration results less than 10,000 nucleated cells, gram stain negative. Recommend patient continues with antibiotics as per ID, follow-up culture results from knee and thigh and tailor antibiotics as appropriate.   PT eval-weightbearing as tolerated  DVT prophylaxis    Electronically signed by   Marta Lemus DO  8/20/2022

## 2022-08-21 LAB
ALBUMIN SERPL-MCNC: 2.5 G/DL (ref 3.5–5.2)
ALP BLD-CCNC: 230 U/L (ref 35–104)
ALT SERPL-CCNC: 63 U/L (ref 0–32)
ANAEROBIC CULTURE: NORMAL
ANION GAP SERPL CALCULATED.3IONS-SCNC: 11 MMOL/L (ref 7–16)
AST SERPL-CCNC: 42 U/L (ref 0–31)
BILIRUB SERPL-MCNC: 0.5 MG/DL (ref 0–1.2)
BODY FLUID CULTURE, STERILE: ABNORMAL
BUN BLDV-MCNC: 12 MG/DL (ref 6–23)
CALCIUM SERPL-MCNC: 9.6 MG/DL (ref 8.6–10.2)
CHLORIDE BLD-SCNC: 102 MMOL/L (ref 98–107)
CO2: 27 MMOL/L (ref 22–29)
CREAT SERPL-MCNC: 0.6 MG/DL (ref 0.5–1)
GFR AFRICAN AMERICAN: >60
GFR NON-AFRICAN AMERICAN: >60 ML/MIN/1.73
GLUCOSE BLD-MCNC: 140 MG/DL (ref 74–99)
GRAM STAIN RESULT: ABNORMAL
HCT VFR BLD CALC: 38 % (ref 34–48)
HEMOGLOBIN: 12.6 G/DL (ref 11.5–15.5)
MCH RBC QN AUTO: 32.2 PG (ref 26–35)
MCHC RBC AUTO-ENTMCNC: 33.2 % (ref 32–34.5)
MCV RBC AUTO: 97.2 FL (ref 80–99.9)
ORGANISM: ABNORMAL
PDW BLD-RTO: 13.6 FL (ref 11.5–15)
PLATELET # BLD: 326 E9/L (ref 130–450)
PMV BLD AUTO: 9 FL (ref 7–12)
POTASSIUM SERPL-SCNC: 3.4 MMOL/L (ref 3.5–5)
RBC # BLD: 3.91 E12/L (ref 3.5–5.5)
SODIUM BLD-SCNC: 140 MMOL/L (ref 132–146)
TOTAL PROTEIN: 6.9 G/DL (ref 6.4–8.3)
WBC # BLD: 5.5 E9/L (ref 4.5–11.5)

## 2022-08-21 PROCEDURE — 36415 COLL VENOUS BLD VENIPUNCTURE: CPT

## 2022-08-21 PROCEDURE — 6370000000 HC RX 637 (ALT 250 FOR IP): Performed by: INTERNAL MEDICINE

## 2022-08-21 PROCEDURE — 2580000003 HC RX 258: Performed by: STUDENT IN AN ORGANIZED HEALTH CARE EDUCATION/TRAINING PROGRAM

## 2022-08-21 PROCEDURE — 2700000000 HC OXYGEN THERAPY PER DAY

## 2022-08-21 PROCEDURE — 80053 COMPREHEN METABOLIC PANEL: CPT

## 2022-08-21 PROCEDURE — 6360000002 HC RX W HCPCS: Performed by: STUDENT IN AN ORGANIZED HEALTH CARE EDUCATION/TRAINING PROGRAM

## 2022-08-21 PROCEDURE — 85027 COMPLETE CBC AUTOMATED: CPT

## 2022-08-21 PROCEDURE — 6370000000 HC RX 637 (ALT 250 FOR IP): Performed by: STUDENT IN AN ORGANIZED HEALTH CARE EDUCATION/TRAINING PROGRAM

## 2022-08-21 PROCEDURE — 1200000000 HC SEMI PRIVATE

## 2022-08-21 RX ORDER — ALBUTEROL SULFATE 2.5 MG/3ML
2.5 SOLUTION RESPIRATORY (INHALATION) EVERY 6 HOURS PRN
Status: DISCONTINUED | OUTPATIENT
Start: 2022-08-21 | End: 2022-08-21

## 2022-08-21 RX ORDER — ENOXAPARIN SODIUM 100 MG/ML
30 INJECTION SUBCUTANEOUS 2 TIMES DAILY
Status: DISCONTINUED | OUTPATIENT
Start: 2022-08-21 | End: 2022-08-25 | Stop reason: HOSPADM

## 2022-08-21 RX ADMIN — FERROUS SULFATE TAB 325 MG (65 MG ELEMENTAL FE) 325 MG: 325 (65 FE) TAB at 08:19

## 2022-08-21 RX ADMIN — HYDROMORPHONE HYDROCHLORIDE 0.5 MG: 1 INJECTION, SOLUTION INTRAMUSCULAR; INTRAVENOUS; SUBCUTANEOUS at 08:43

## 2022-08-21 RX ADMIN — FAMOTIDINE 20 MG: 20 TABLET, FILM COATED ORAL at 21:56

## 2022-08-21 RX ADMIN — GABAPENTIN 600 MG: 300 CAPSULE ORAL at 21:56

## 2022-08-21 RX ADMIN — ALLOPURINOL 200 MG: 100 TABLET ORAL at 08:18

## 2022-08-21 RX ADMIN — BACLOFEN 10 MG: 10 TABLET ORAL at 13:03

## 2022-08-21 RX ADMIN — GABAPENTIN 600 MG: 300 CAPSULE ORAL at 13:03

## 2022-08-21 RX ADMIN — ESCITALOPRAM OXALATE 20 MG: 10 TABLET ORAL at 08:19

## 2022-08-21 RX ADMIN — ESCITALOPRAM OXALATE 20 MG: 10 TABLET ORAL at 21:55

## 2022-08-21 RX ADMIN — TOPIRAMATE 200 MG: 100 TABLET, FILM COATED ORAL at 08:20

## 2022-08-21 RX ADMIN — HYDROMORPHONE HYDROCHLORIDE 0.5 MG: 1 INJECTION, SOLUTION INTRAMUSCULAR; INTRAVENOUS; SUBCUTANEOUS at 14:41

## 2022-08-21 RX ADMIN — BISACODYL 10 MG: 10 SUPPOSITORY RECTAL at 13:04

## 2022-08-21 RX ADMIN — BACLOFEN 10 MG: 10 TABLET ORAL at 15:49

## 2022-08-21 RX ADMIN — BACLOFEN 10 MG: 10 TABLET ORAL at 21:56

## 2022-08-21 RX ADMIN — PANCRELIPASE 72000 UNITS: 36000; 180000; 114000 CAPSULE, DELAYED RELEASE PELLETS ORAL at 18:11

## 2022-08-21 RX ADMIN — GABAPENTIN 600 MG: 300 CAPSULE ORAL at 18:11

## 2022-08-21 RX ADMIN — POLYETHYLENE GLYCOL 3350 17 G: 17 POWDER, FOR SOLUTION ORAL at 08:19

## 2022-08-21 RX ADMIN — FAMOTIDINE 20 MG: 20 TABLET, FILM COATED ORAL at 08:18

## 2022-08-21 RX ADMIN — RIFAMPIN 300 MG: 300 CAPSULE ORAL at 21:56

## 2022-08-21 RX ADMIN — TOPIRAMATE 200 MG: 100 TABLET, FILM COATED ORAL at 21:56

## 2022-08-21 RX ADMIN — RIFAMPIN 300 MG: 300 CAPSULE ORAL at 08:20

## 2022-08-21 RX ADMIN — VANCOMYCIN HYDROCHLORIDE 1250 MG: 10 INJECTION, POWDER, LYOPHILIZED, FOR SOLUTION INTRAVENOUS at 15:53

## 2022-08-21 RX ADMIN — FUROSEMIDE 20 MG: 20 TABLET ORAL at 08:18

## 2022-08-21 RX ADMIN — DOCUSATE SODIUM 100 MG: 100 CAPSULE, LIQUID FILLED ORAL at 21:56

## 2022-08-21 RX ADMIN — GABAPENTIN 600 MG: 300 CAPSULE ORAL at 08:18

## 2022-08-21 RX ADMIN — ZIPRASIDONE HYDROCHLORIDE 20 MG: 20 CAPSULE ORAL at 21:56

## 2022-08-21 RX ADMIN — ENOXAPARIN SODIUM 40 MG: 100 INJECTION SUBCUTANEOUS at 08:19

## 2022-08-21 RX ADMIN — PANCRELIPASE 72000 UNITS: 36000; 180000; 114000 CAPSULE, DELAYED RELEASE PELLETS ORAL at 08:21

## 2022-08-21 RX ADMIN — ALLOPURINOL 200 MG: 100 TABLET ORAL at 21:56

## 2022-08-21 RX ADMIN — VANCOMYCIN HYDROCHLORIDE 1250 MG: 10 INJECTION, POWDER, LYOPHILIZED, FOR SOLUTION INTRAVENOUS at 04:26

## 2022-08-21 RX ADMIN — DOCUSATE SODIUM 100 MG: 100 CAPSULE, LIQUID FILLED ORAL at 08:19

## 2022-08-21 RX ADMIN — HYDROCODONE BITARTRATE AND ACETAMINOPHEN 1 TABLET: 10; 325 TABLET ORAL at 13:03

## 2022-08-21 RX ADMIN — SODIUM CHLORIDE, PRESERVATIVE FREE 10 ML: 5 INJECTION INTRAVENOUS at 08:18

## 2022-08-21 RX ADMIN — METOPROLOL SUCCINATE 12.5 MG: 25 TABLET, EXTENDED RELEASE ORAL at 08:20

## 2022-08-21 RX ADMIN — HYDROCODONE BITARTRATE AND ACETAMINOPHEN 1 TABLET: 10; 325 TABLET ORAL at 18:51

## 2022-08-21 RX ADMIN — BACLOFEN 10 MG: 10 TABLET ORAL at 08:19

## 2022-08-21 RX ADMIN — TRAZODONE HYDROCHLORIDE 100 MG: 50 TABLET ORAL at 21:56

## 2022-08-21 RX ADMIN — PANCRELIPASE 72000 UNITS: 36000; 180000; 114000 CAPSULE, DELAYED RELEASE PELLETS ORAL at 13:03

## 2022-08-21 RX ADMIN — HYDROCODONE BITARTRATE AND ACETAMINOPHEN 1 TABLET: 10; 325 TABLET ORAL at 06:04

## 2022-08-21 RX ADMIN — SODIUM CHLORIDE, PRESERVATIVE FREE 10 ML: 5 INJECTION INTRAVENOUS at 21:56

## 2022-08-21 RX ADMIN — ENOXAPARIN SODIUM 30 MG: 100 INJECTION SUBCUTANEOUS at 21:55

## 2022-08-21 ASSESSMENT — PAIN DESCRIPTION - DESCRIPTORS
DESCRIPTORS: ACHING;DISCOMFORT;THROBBING
DESCRIPTORS: BURNING;THROBBING;SQUEEZING
DESCRIPTORS: TIGHTNESS;THROBBING;ACHING
DESCRIPTORS: TIGHTNESS;THROBBING;ACHING
DESCRIPTORS: ACHING;BURNING;DISCOMFORT

## 2022-08-21 ASSESSMENT — PAIN SCALES - GENERAL
PAINLEVEL_OUTOF10: 6
PAINLEVEL_OUTOF10: 8
PAINLEVEL_OUTOF10: 9
PAINLEVEL_OUTOF10: 8
PAINLEVEL_OUTOF10: 8
PAINLEVEL_OUTOF10: 6
PAINLEVEL_OUTOF10: 8

## 2022-08-21 ASSESSMENT — PAIN - FUNCTIONAL ASSESSMENT: PAIN_FUNCTIONAL_ASSESSMENT: PREVENTS OR INTERFERES SOME ACTIVE ACTIVITIES AND ADLS

## 2022-08-21 ASSESSMENT — PAIN DESCRIPTION - PAIN TYPE
TYPE: ACUTE PAIN
TYPE: ACUTE PAIN

## 2022-08-21 ASSESSMENT — PAIN DESCRIPTION - ORIENTATION
ORIENTATION: RIGHT

## 2022-08-21 ASSESSMENT — PAIN DESCRIPTION - LOCATION
LOCATION: KNEE
LOCATION: HEAD;LEG;HIP
LOCATION: LEG
LOCATION: LEG
LOCATION: KNEE

## 2022-08-21 ASSESSMENT — PAIN DESCRIPTION - ONSET
ONSET: ON-GOING
ONSET: ON-GOING

## 2022-08-21 ASSESSMENT — PAIN DESCRIPTION - FREQUENCY: FREQUENCY: CONTINUOUS

## 2022-08-21 NOTE — PROGRESS NOTES
Pharmacy Consultation Note  (Antibiotic Dosing and Monitoring)    Initial consult date: 8-  Consulting physician/provider: Dr. Karlee Nolen  Drug: Vancomycin  Indication: ABSSSI; septic R knee    Age/  Gender Height Weight IBW  Allergy Information   61 y.o./female 170.2 cm  109 kg   Ideal body weight: 61.6 kg (135 lb 12.9 oz)  Adjusted ideal body weight: 80.6 kg (177 lb 10.3 oz)   Bee pollen, Penicillins, Ropinirole, Ropinirole hcl, Vistaril [hydroxyzine hcl], Aripiprazole, Prednisone, Restoril [temazepam], Hydroxyzine pamoate, and Tape [adhesive tape]      Renal Function:  Recent Labs     08/20/22  0716   CREATININE 0.6         Intake/Output Summary (Last 24 hours) at 8/21/2022 0827  Last data filed at 8/20/2022 1402  Gross per 24 hour   Intake 180 ml   Output --   Net 180 ml         Vancomycin Monitoring:  Trough:  No results for input(s): VANCOTROUGH in the last 72 hours. Random:    Recent Labs     08/20/22  0716   VANCORANDOM 19.2         Vancomycin Administration Times:  Recent vancomycin administrations                     vancomycin (VANCOCIN) 1,250 mg in dextrose 5 % 250 mL IVPB (mg) 1,250 mg New Bag 08/21/22 0426     1,250 mg New Bag 08/20/22 1543     1,250 mg New Bag  0312     1,250 mg New Bag 08/19/22 0358    vancomycin (VANCOCIN) injection (mg) 1,000 mg Given 08/19/22 1407    vancomycin (VANCOCIN) 2,500 mg in dextrose 5 % 500 mL IVPB (mg) 2,500 mg New Bag 08/18/22 1230                  Assessment:  60 yo/F transferred from [facility] to [facility] for Ortho eval for suspected R knee septic arthritis. ID was following patient @ [facility], patient received PO doxy and cephalexin at [facility] but was switched to vancomycin (2500 mg LD and 1250 mg q12h on 8/18 prior to transfer). S/P needle aspiration of R knee early 8/19 AM; Cx's pending. Estimated Creatinine Clearance: 125 mL/min (based on SCr of 0.6 mg/dL).   To dose vancomycin, pharmacy will be utilizing [employer] calculation software for goal AUC/NAVARRO 400-600 mg/L-hr. Afternoon dose held yesterday while patient in OR; had topical vancomycin/tobramycin beads placed intra-op  Vancomycin random 19.2 mcg/mL ~4 hrs post-dose on 8/20    Plan:  Continue vancomycin 1250 mg q12h for predicted AUC/NAVARRO = 485, Tr = 15.2 mCg/mL. Will continue to check vancomycin levels as needed  Will continue to monitor renal function. Clinical pharmacy to follow.     Kalpesh Enrique, PharmD, BCPS, BCCCP  8/21/2022  8:28 AM

## 2022-08-21 NOTE — PROGRESS NOTES
Department of Orthopedic Surgery  Resident Progress Note    Patient seen and examined at bedside this morning. She has pain to the right lower extremity which is mostly to the knee, which she feels is improving after surgery. Medication does provide some relief. No chest pain or shortness of breath. No nausea or vomiting. No fevers or chills. She has diminished sensation to the right foot compared to the contralateral side but overall no complete numbness. Tolerating a diet. No acute events overnight. VITALS:  /66   Pulse 80   Temp 98.5 °F (36.9 °C) (Temporal)   Resp 20   LMP 08/31/1988   SpO2 98%     General: alert and oriented to person, place and time, obese    MUSCULOSKELETAL:   right lower extremity:  Ace dressing C/D/I about the extremity  Compartments soft and compressible  +PF/DF/EHL  +2/4 DP & PT pulses, Brisk Cap refill, Toes warm and perfused  Distal sensation grossly intact to Peroneals, Sural, Saphenous, and tibial nerve distributions, subjectively diminished per patient    CBC:   Lab Results   Component Value Date/Time    WBC 8.8 08/17/2022 01:30 AM    HGB 15.2 08/17/2022 01:30 AM    HCT 45.9 08/17/2022 01:30 AM     08/17/2022 01:30 AM     PT/INR:    Lab Results   Component Value Date/Time    PROTIME 13.1 08/19/2022 08:25 AM    PROTIME 10.8 05/07/2012 03:45 AM    INR 1.2 08/19/2022 08:25 AM       Intraop Cultures: S.  Aureus, sensitivities pending    ASSESSMENT  S/P irrigation and debridement of right knee with insertion of antibiotic beads, right thigh, and aspiration of right hip with fluoroscopy on 8/19/2022    PLAN      Continue physical therapy and protocol: WBAT - RLE  24 hour abx coverage, further antibiotics as indicated by intraoperative cultures  Initial synovial fluid analysis of the right hip is not strongly indicative of infection  Deep venous thrombosis prophylaxis -per medical service, early mobilization  PT/OT appreciated  Pain Control: IV and PO  Monitor H&H, 15.2 on 8/17/2022, no new labs  D/C Plan: Appreciate PT/OT and social work evaluations. We will continue to follow patient as we await cultures from her recent surgical debridement. Orthopaedic Attending    I have seen and evaluated the patient with the resident and agree with the above assessments on today's visit. I have performed the key components of the history and physical examination and concur completely with the findings and plans as documented above. Continue with antibiotics per ID. Patient would benefit from neurosurgery evaluation she is still complaining of some low back pain he does have history of spinal cord stimulator. Recommend consultation. Orthopedics will continue to follow along.     Electronically signed by   Joan Thomas DO  8/21/2022

## 2022-08-21 NOTE — PROGRESS NOTES
DVT Prophylaxis Adjustment Policy (DVT Prophylaxis)     This patient is on DVT Prophylaxis medication that requires a dose adjustment      Date Body Weight IBW  Adjusted BW SCr  CrCl Dialysis status   8/21/2022   Ideal body weight: 61.6 kg (135 lb 12.9 oz)  Adjusted ideal body weight: 80.6 kg (177 lb 10.3 oz) Serum creatinine: 0.6 mg/dL 08/21/22 1020  Estimated creatinine clearance: 125 mL/min N/a       Pharmacy has dose-adjusted the DVT Prophylaxis regimen to match   the recommendations from the following table        Ordered Medication:Lovenox 40mg daily    Order Changed/converted to: Lovenox 30mg BID      These changes were made per protocol according to the [facility] Pharmacist   Review for Appropriate Use and Automatic Dose Adjustments of   Subcutaneous Anticoagulants Policy     *Please note this dose may need readjusted if patient's condition changes. Please contact pharmacy with any questions regarding these changes.     KIRIT [facility]  8/21/2022  1:58 PM

## 2022-08-21 NOTE — PROGRESS NOTES
Hospitalist Progress Note      Synopsis: Patient admitted on 8/19/2022     Ms. Yulisa Arias, a 64y.o. year old female  who  has a past medical history of Adenoma of right adrenal gland, Anemia, Anxiety, Arthritis, Asthma, Benign essential tremor, Bipolar affective ([address] Chronic back pain, Chronic respiratory failure with hypoxia ([address] Depression, Diabetes mellitus ([address] Difficulty swallowing, Environmental and seasonal allergies, Excessive physiologic tremor, Fatty liver, Full dentures, GERD (gastroesophageal reflux disease), Gout, Herniated cervical disc, History of swelling of feet, Hypertension, Lymphedema of lower extremity, Mitochondrial cytopathy ([address] Muscle weakness (generalized), Need for assistance with personal care, Neuropathy, Obesity, PETR (obstructive sleep apnea), Osteoarthritis of left knee, PONV (postoperative nausea and vomiting), Seizures ([address] Spinal headache, and Thyroid disease. The patient fractured her right ankle and underwent an ORIF in January 2022 by Dr. Jus Velasquez. She resides at [facility]. She developed redness, swelling and pain in the right leg. sHe denies any trauma. She says she has been dealing with a chronic callus and an ulcer on the distal part of her right first toe. She has been seen by a podiatrist who has been debriding this ulcer at the facility. She patient was sent to the ED at [facility] on 8/17/2022 with pain in the lright lower extremity. sHe was seen by infectious disease for cellulitis of the right lower extremity. There is also some concern for septic joint. Orthopedics was consulted and advised the patient be transferred to [facility] for interventional radiology aspiration of the suspect joint.   Subjective    \  Patient is now postop from having her right knee arthrotomy with exploration irrigation and debridement of infection and insertion of absorbable antibiotic beads and right distal lateral thigh suspected abscess with irrigation and incision and debridement and also a right hip aspiration under fluoroscopy guidance. This took place yesterday. She is in her own room now  August 21  Feeling quite comfortable  States she is calling her family to update them about what is happening with her but she has no complaints    Exam:  /65   Pulse 82   Temp 97.9 °F (36.6 °C) (Temporal)   Resp 18   LMP 08/31/1988   SpO2 98%   General appearance: No apparent distress, appears stated age and cooperative. HEENT: Pupils equal, round, and reactive to light. Conjunctivae/corneas clear. Neck: Trachea midline. Respiratory: Distant but mild wheezing cardiovascular: Regular rate and rhythm with normal S1/S2 without murmurs, rubs or gallops. Abdomen: Soft, non-tender, non-distended with normal bowel sounds. Musculoskeletal: Bilateral lower extremities feet are deformed\  Right knee has a postop dressing in place. Left leg with no open areas.   Right knee is dressed  Skin:  No rashes    Neurologic: awake, alert and following commands     Medications:  Reviewed    Infusion Medications    sodium chloride       Scheduled Medications    allopurinol  200 mg Oral BID    baclofen  10 mg Oral 4x daily    docusate sodium  100 mg Oral BID    escitalopram  20 mg Oral BID    famotidine  20 mg Oral BID    lipase-protease-amylase  72,000 Units Oral TID WC    metoprolol succinate  12.5 mg Oral Daily    topiramate  200 mg Oral BID    traZODone  100 mg Oral Nightly    ziprasidone  20 mg Oral Nightly    furosemide  20 mg Oral Daily    rifAMPin  300 mg Oral 2 times per day    ferrous sulfate  325 mg Oral Daily    gabapentin  600 mg Oral 4x Daily    levOCARNitine  330 mg Oral TID    sodium chloride flush  5-40 mL IntraVENous 2 times per day    vancomycin  1,250 mg IntraVENous Q12H    enoxaparin  40 mg SubCUTAneous Daily     PRN Meds: albuterol, bisacodyl, albuterol, sodium chloride flush, sodium chloride, ondansetron **OR** ondansetron, polyethylene glycol, HYDROmorphone, HYDROcodone 5 mg - acetaminophen **OR** HYDROcodone-acetaminophen    I/O    Intake/Output Summary (Last 24 hours) at 8/21/2022 0941  Last data filed at 8/20/2022 1402  Gross per 24 hour   Intake 180 ml   Output --   Net 180 ml         Labs:   No results for input(s): WBC, HGB, HCT, PLT in the last 72 hours. Recent Labs     08/20/22  0716   CREATININE 0.6         No results for input(s): PROT, ALB, ALKPHOS, ALT, AST, BILITOT, AMYLASE, LIPASE in the last 72 hours. Recent Labs     08/19/22  0825   INR 1.2         No results for input(s): Georgina Barnett in the last 72 hours. Chronic labs:  Lab Results   Component Value Date    CHOL 154 04/11/2022    TRIG 163 (H) 04/11/2022    HDL 49 04/11/2022    LDLCALC 72 04/11/2022    TSH 0.548 03/16/2021    INR 1.2 08/19/2022    LABA1C 5.7 12/13/2013       Microbiology:  Pending  Recent Labs     08/18/22  1250   BC 24 Hours no growth       Recent Labs     08/19/22  0348 08/19/22  1351   ORG Staphylococcus aureus* Staphylococcus aureus*     Recent Labs     08/18/22  1250   BLOODCULT2 24 Hours no growth       No results for input(s): STREPNEUMAGU in the last 72 hours. No results for input(s): LP1UAG in the last 72 hours. No results for input(s): ASO in the last 72 hours. No results for input(s): CULTRESP in the last 72 hours. No results for input(s): PROCAL in the last 72 hours. Radiology:  XR FEMUR RIGHT (MIN 2 VIEWS)    Result Date: 8/19/2022  Extensive postsurgical changes about the distal right femur, right knee and distal right tibia and fibula. No gross hardware complications. No acute fracture or subluxation. XR KNEE RIGHT (3 VIEWS)    Result Date: 8/19/2022  Extensive postsurgical changes about the distal right femur, right knee and distal right tibia and fibula. No gross hardware complications. No acute fracture or subluxation. XR TIBIA FIBULA RIGHT (2 VIEWS)    Result Date: 8/17/2022  1.   Extensive postsurgical changes about the distal right femur, right knee, and distal right tibia and fibula. No hardware complications. 2.  Diffuse soft tissue stranding and more focal anterior soft tissue edema. There is no definite evidence of osseous destruction or erosion about the right tibia or fibula on this exam.     CT HEAD WO CONTRAST    Result Date: 8/17/2022  No acute intracranial abnormality. XR CHEST PORTABLE    Result Date: 8/17/2022  Atherosclerotic disease and mild prominence of the cardiac silhouette. Central pulmonary vascular congestion. No pneumothorax. FLUORO FOR SURGICAL PROCEDURES    Result Date: 8/19/2022  Intraprocedural fluoroscopic spot images as above. See separate procedure report for more information. 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 *  Asthma   bipolar disorder    Diabetes  Arthritis     PLAN:    1. Status post surgery for her right knee  2. Started on vancomycin. We need to get infectious disease because of her complex history. She was seen by infectious disease in [place]  3. Reorder her extensive list of medications including Geodon and Topamax and Lexapro and trazodone and of course her metoprolol and pancreatic replacement  4. Continue to follow her labs and electrolytes  August 21  Quite calm. Vitals are stable. She is now on rifampin and vancomycin. Intraoperative cultures should be finalized soon. She is now allowed weightbearing on her right lower extremity. Likely starting planning discharge for tomorrow at some point. May be a skilled facility or home? Albuterol aerosols on board    Diet: ADULT DIET;  Regular  Code Status: Full Code  PT/OT Eval Status:   Once okay with surgery  DVT Prophylaxis:   In place  Recommended disposition at discharge: To be determined    +++++++++++++++++++++++++++++++++++++++++++++++++  Krystin Mahoney MD   [facility].  +++++++++++++++++++++++++++++++++++++++++++++++++  NOTE: This report was transcribed using voice recognition software. Every effort was made to ensure accuracy; however, inadvertent computerized transcription errors may be present.

## 2022-08-21 NOTE — PROGRESS NOTES
Department of Internal Medicine  Infectious Diseases  Progress  Note      C/C :  MRSA bacteremia, right TKA infection     Pt is awake and alert  Denies fever or chills  Reports right hip and knee pain   Afebrile       Current Facility-Administered Medications   Medication Dose Route Frequency Provider Last Rate Last Admin    albuterol (PROVENTIL) nebulizer solution 2.5 mg  2.5 mg Nebulization Q6H PRN Janette Field MD        allopurinol (ZYLOPRIM) tablet 200 mg  200 mg Oral BID Janette Field MD   200 mg at 08/21/22 0818    baclofen (LIORESAL) tablet 10 mg  10 mg Oral 4x daily Janette Field MD   10 mg at 08/21/22 0819    docusate sodium (COLACE) capsule 100 mg  100 mg Oral BID Janette Field MD   100 mg at 08/21/22 0819    escitalopram (LEXAPRO) tablet 20 mg  20 mg Oral BID Janette Field MD   20 mg at 08/21/22 0819    famotidine (PEPCID) tablet 20 mg  20 mg Oral BID Janette Field MD   20 mg at 08/21/22 0818    lipase-protease-amylase (CREON) delayed release capsule 72,000 Units  72,000 Units Oral TID WC Janette Field MD   72,000 Units at 08/21/22 9068    metoprolol succinate (TOPROL XL) extended release tablet 12.5 mg  12.5 mg Oral Daily Janette Field MD   12.5 mg at 08/21/22 0820    topiramate (TOPAMAX) tablet 200 mg  200 mg Oral BID Janette Field MD   200 mg at 08/21/22 0820    traZODone (DESYREL) tablet 100 mg  100 mg Oral Nightly Janette Field MD   100 mg at 08/20/22 2153    ziprasidone (GEODON) capsule 20 mg  20 mg Oral Nightly Janette Field MD   20 mg at 08/20/22 2154    furosemide (LASIX) tablet 20 mg  20 mg Oral Daily Janette Field MD   20 mg at 08/21/22 0818    rifAMPin (RIFADIN) capsule 300 mg  300 mg Oral 2 times per day Shana Figueroa MD   300 mg at 08/21/22 0820    bisacodyl (DULCOLAX) suppository 10 mg  10 mg Rectal Daily PRN Janette Field MD        albuterol (PROVENTIL) nebulizer solution 2.5 mg  2.5 mg Nebulization Q4H PRN Dorie Shoemaker DO        ferrous sulfate (IRON 325) tablet 325 mg  325 mg Oral Daily Wynonia Alexanders, DO   325 mg at 08/21/22 5651    gabapentin (NEURONTIN) capsule 600 mg  600 mg Oral 4x Daily Wynonia Alexanders, DO   600 mg at 08/21/22 0818    levOCARNitine (CARNITOR) tablet 330 mg (Patient Supplied)  330 mg Oral TID Wynonia Alexanders, DO        sodium chloride flush 0.9 % injection 5-40 mL  5-40 mL IntraVENous 2 times per day Wynonia Alexanders, DO   10 mL at 08/21/22 0818    sodium chloride flush 0.9 % injection 5-40 mL  5-40 mL IntraVENous PRN Wynonia Alexanders, DO        0.9 % sodium chloride infusion   IntraVENous PRN Wynonia Alexanders, DO        ondansetron (ZOFRAN-ODT) disintegrating tablet 4 mg  4 mg Oral Q8H PRN Wynonia Alexanders, DO        Or    ondansetron [facility]) injection 4 mg  4 mg IntraVENous Q6H PRN Wynonia Alexanders, DO        polyethylene glycol (GLYCOLAX) packet 17 g  17 g Oral Daily PRN Wynonia Alexanders, DO   17 g at 08/21/22 0819    vancomycin (VANCOCIN) 1,250 mg in dextrose 5 % 250 mL IVPB  1,250 mg IntraVENous Q12H Wynonia Alexanders, DO   Stopped at 08/21/22 0556    HYDROmorphone (DILAUDID) injection 0.5 mg  0.5 mg IntraVENous Q4H PRN Wynonia Alexanders, DO   0.5 mg at 08/21/22 0843    HYDROcodone-acetaminophen (NORCO) 5-325 MG per tablet 1 tablet  1 tablet Oral Q6H PRN Wynonia Alexanders, DO   1 tablet at 08/20/22 1634    Or    HYDROcodone-acetaminophen (NORCO)  MG per tablet 1 tablet  1 tablet Oral Q6H PRN Wynonia Alexanders, DO   1 tablet at 08/21/22 0604    enoxaparin (LOVENOX) injection 40 mg  40 mg SubCUTAneous Daily Wynonia Alexanders, DO   40 mg at 08/21/22 8761             REVIEW OF SYSTEMS:    CONSTITUTIONAL:  fever  HEENT: denies blurring of vision or double vision, denies hearing problem  RESPIRATORY: denies cough, shortness of breath, sputum expectoration, chest pain. CARDIOVASCULAR:  Denies palpitation  GASTROINTESTINAL:  Denies abdomen pain, diarrhea or constipation.   GENITOURINARY:  Denies burning urination or frequency of urination  INTEGUMENT: denies wound , rash  HEMATOLOGIC/LYMPHATIC:  Denies lymph node swelling, gum bleeding or easy bruising. MUSCULOSKELETAL:  right knee, right hip pain , leg swelling   NEUROLOGICAL:  Denies light headed, dizziness, loss of consciousness, weakness of lower extremities, bowel or bladder incontinence. PHYSICAL EXAM:      Vitals:     /65   Pulse 82   Temp 97.9 °F (36.6 °C) (Temporal)   Resp 18   LMP 08/31/1988   SpO2 98%     General Appearance:    Awake, alert , no acute distress. Head:    Normocephalic, atraumatic   Eyes:    No pallor, no icterus,   Ears:    No obvious deformity or drainage.    Nose:   No nasal drainage   Throat:   Mucosa moist, no oral thrush   Neck:   Supple, no lymphadenopathy   Back:     no CVA tenderness   Lungs:     Clear to auscultation bilaterally, no wheeze    Heart:    Regular rate and rhythm, no murmur   Abdomen:     Soft, non-tender, bowel sounds present    Extremities:    + edema, right knee - wrapped    Pulses:   Dorsalis pedis palpable    Skin:   no rashes     CBC with Differential:      Lab Results   Component Value Date/Time    WBC 8.8 08/17/2022 01:30 AM    RBC 4.73 08/17/2022 01:30 AM    HGB 15.2 08/17/2022 01:30 AM    HCT 45.9 08/17/2022 01:30 AM     08/17/2022 01:30 AM    MCV 97.0 08/17/2022 01:30 AM    MCH 32.1 08/17/2022 01:30 AM    MCHC 33.1 08/17/2022 01:30 AM    RDW 13.1 08/17/2022 01:30 AM    SEGSPCT 64 12/27/2013 03:00 PM    LYMPHOPCT 6.0 08/17/2022 01:30 AM    MONOPCT 11.6 08/17/2022 01:30 AM    BASOPCT 0.1 08/17/2022 01:30 AM    MONOSABS 1.02 08/17/2022 01:30 AM    LYMPHSABS 0.53 08/17/2022 01:30 AM    EOSABS 0.02 08/17/2022 01:30 AM    BASOSABS 0.01 08/17/2022 01:30 AM       CMP     Lab Results   Component Value Date/Time     08/17/2022 01:30 AM    K 3.5 08/17/2022 01:30 AM    K 3.9 05/06/2022 05:28 PM    CL 96 08/17/2022 01:30 AM    CO2 24 08/17/2022 01:30 AM    BUN 15 08/17/2022 01:30 AM    CREATININE 0.6 08/20/2022 (1)    Antibiotic Interpretation Microscan  Method Status    clindamycin Resistant >=^4 mcg/mL BACTERIAL SUSCEPTIBILITY PANEL BY NAVARRO     DAPTOmycin Sensitive ^1 mcg/mL BACTERIAL SUSCEPTIBILITY PANEL BY NAVARRO     doxycycline Sensitive <=^0.5 mcg/mL BACTERIAL SUSCEPTIBILITY PANEL BY NAVARRO     erythromycin Resistant >=^8 mcg/mL BACTERIAL SUSCEPTIBILITY PANEL BY NAVARRO     gentamicin Sensitive <=^0.5 mcg/mL BACTERIAL SUSCEPTIBILITY PANEL BY NAVARRO     oxacillin Resistant >=^4 mcg/mL BACTERIAL SUSCEPTIBILITY PANEL BY NAVARRO     tigecycline Sensitive <=^0.12 mcg/mL BACTERIAL SUSCEPTIBILITY PANEL BY NAVARRO     trimethoprim-sulfamethoxazole Sensitive <=^10 mcg/mL BACTERIAL SUSCEPTIBILITY PANEL BY NAVARRO     vancomycin Sensitive ^1 mcg/mL BACTERIAL SUSCEPTIBILITY PANEL BY NAVARRO            Wound cx -     Gram Stain Result Refer to ordered Gram stain for results    Organism Staphylococcus aureus Abnormal     Body Fluid Culture, Sterile --    Heavy growth            Radiology :    Right knee -    Extensive postsurgical changes about the distal right femur, right knee and   distal right tibia and fibula. No gross hardware complications. No acute fracture or subluxation.          IMPRESSION:     MRSA bacteremia - follow up blood cx non  8/18- neg   S aureus right TKA infection s/p I & D   Mod obesity       RECOMMENDATIONS:       Vancomycin 1250 mg IV q 12 hrs ( pharmacy following )   Follow up blood cx ( 8/18) neg to date   Rifampin 300 mg po q 12 hrs   PICC line

## 2022-08-22 ENCOUNTER — APPOINTMENT (OUTPATIENT)
Dept: CT IMAGING | Age: 62
DRG: 313 | End: 2022-08-22
Attending: FAMILY MEDICINE
Payer: MEDICAID

## 2022-08-22 LAB
ALBUMIN SERPL-MCNC: 2.6 G/DL (ref 3.5–5.2)
ALP BLD-CCNC: 196 U/L (ref 35–104)
ALT SERPL-CCNC: 40 U/L (ref 0–32)
ANION GAP SERPL CALCULATED.3IONS-SCNC: 10 MMOL/L (ref 7–16)
AST SERPL-CCNC: 22 U/L (ref 0–31)
BILIRUB SERPL-MCNC: 0.6 MG/DL (ref 0–1.2)
BLOOD CULTURE, ROUTINE: ABNORMAL
BODY FLUID CULTURE, STERILE: ABNORMAL
BODY FLUID CULTURE, STERILE: ABNORMAL
BUN BLDV-MCNC: 12 MG/DL (ref 6–23)
CALCIUM SERPL-MCNC: 9.3 MG/DL (ref 8.6–10.2)
CHLORIDE BLD-SCNC: 103 MMOL/L (ref 98–107)
CO2: 28 MMOL/L (ref 22–29)
CREAT SERPL-MCNC: 0.5 MG/DL (ref 0.5–1)
CULTURE, BLOOD 2: NORMAL
GFR AFRICAN AMERICAN: >60
GFR NON-AFRICAN AMERICAN: >60 ML/MIN/1.73
GLUCOSE BLD-MCNC: 116 MG/DL (ref 74–99)
GRAM STAIN RESULT: ABNORMAL
HCT VFR BLD CALC: 36.5 % (ref 34–48)
HEMOGLOBIN: 12 G/DL (ref 11.5–15.5)
MCH RBC QN AUTO: 31.9 PG (ref 26–35)
MCHC RBC AUTO-ENTMCNC: 32.9 % (ref 32–34.5)
MCV RBC AUTO: 97.1 FL (ref 80–99.9)
ORGANISM: ABNORMAL
ORGANISM: ABNORMAL
PDW BLD-RTO: 13.8 FL (ref 11.5–15)
PLATELET # BLD: 340 E9/L (ref 130–450)
PMV BLD AUTO: 8.8 FL (ref 7–12)
POTASSIUM SERPL-SCNC: 3.6 MMOL/L (ref 3.5–5)
RBC # BLD: 3.76 E12/L (ref 3.5–5.5)
SODIUM BLD-SCNC: 141 MMOL/L (ref 132–146)
TOTAL PROTEIN: 6.5 G/DL (ref 6.4–8.3)
WBC # BLD: 5 E9/L (ref 4.5–11.5)

## 2022-08-22 PROCEDURE — 6370000000 HC RX 637 (ALT 250 FOR IP): Performed by: STUDENT IN AN ORGANIZED HEALTH CARE EDUCATION/TRAINING PROGRAM

## 2022-08-22 PROCEDURE — 80053 COMPREHEN METABOLIC PANEL: CPT

## 2022-08-22 PROCEDURE — 6360000002 HC RX W HCPCS

## 2022-08-22 PROCEDURE — 36415 COLL VENOUS BLD VENIPUNCTURE: CPT

## 2022-08-22 PROCEDURE — 6370000000 HC RX 637 (ALT 250 FOR IP): Performed by: INTERNAL MEDICINE

## 2022-08-22 PROCEDURE — 2580000003 HC RX 258: Performed by: INTERNAL MEDICINE

## 2022-08-22 PROCEDURE — 6360000004 HC RX CONTRAST MEDICATION: Performed by: RADIOLOGY

## 2022-08-22 PROCEDURE — 2700000000 HC OXYGEN THERAPY PER DAY

## 2022-08-22 PROCEDURE — 93308 TTE F-UP OR LMTD: CPT

## 2022-08-22 PROCEDURE — 6360000002 HC RX W HCPCS: Performed by: STUDENT IN AN ORGANIZED HEALTH CARE EDUCATION/TRAINING PROGRAM

## 2022-08-22 PROCEDURE — 1200000000 HC SEMI PRIVATE

## 2022-08-22 PROCEDURE — 85027 COMPLETE CBC AUTOMATED: CPT

## 2022-08-22 PROCEDURE — 2580000003 HC RX 258: Performed by: STUDENT IN AN ORGANIZED HEALTH CARE EDUCATION/TRAINING PROGRAM

## 2022-08-22 PROCEDURE — 74177 CT ABD & PELVIS W/CONTRAST: CPT

## 2022-08-22 RX ORDER — HEPARIN SODIUM (PORCINE) LOCK FLUSH IV SOLN 100 UNIT/ML 100 UNIT/ML
3 SOLUTION INTRAVENOUS EVERY 12 HOURS SCHEDULED
Status: DISCONTINUED | OUTPATIENT
Start: 2022-08-22 | End: 2022-08-25 | Stop reason: HOSPADM

## 2022-08-22 RX ORDER — SODIUM CHLORIDE 0.9 % (FLUSH) 0.9 %
5-40 SYRINGE (ML) INJECTION PRN
Status: DISCONTINUED | OUTPATIENT
Start: 2022-08-22 | End: 2022-08-22 | Stop reason: SDUPTHER

## 2022-08-22 RX ORDER — HEPARIN SODIUM (PORCINE) LOCK FLUSH IV SOLN 100 UNIT/ML 100 UNIT/ML
3 SOLUTION INTRAVENOUS PRN
Status: DISCONTINUED | OUTPATIENT
Start: 2022-08-22 | End: 2022-08-25 | Stop reason: HOSPADM

## 2022-08-22 RX ORDER — LIDOCAINE HYDROCHLORIDE 10 MG/ML
5 INJECTION, SOLUTION EPIDURAL; INFILTRATION; INTRACAUDAL; PERINEURAL ONCE
Status: DISCONTINUED | OUTPATIENT
Start: 2022-08-22 | End: 2022-08-25 | Stop reason: HOSPADM

## 2022-08-22 RX ORDER — SODIUM CHLORIDE 0.9 % (FLUSH) 0.9 %
5-40 SYRINGE (ML) INJECTION EVERY 12 HOURS SCHEDULED
Status: DISCONTINUED | OUTPATIENT
Start: 2022-08-22 | End: 2022-08-25 | Stop reason: HOSPADM

## 2022-08-22 RX ORDER — SODIUM CHLORIDE 0.9 % (FLUSH) 0.9 %
5-40 SYRINGE (ML) INJECTION PRN
Status: DISCONTINUED | OUTPATIENT
Start: 2022-08-22 | End: 2022-08-25 | Stop reason: HOSPADM

## 2022-08-22 RX ORDER — SODIUM CHLORIDE 0.9 % (FLUSH) 0.9 %
5-40 SYRINGE (ML) INJECTION EVERY 12 HOURS SCHEDULED
Status: DISCONTINUED | OUTPATIENT
Start: 2022-08-22 | End: 2022-08-22 | Stop reason: SDUPTHER

## 2022-08-22 RX ORDER — SODIUM CHLORIDE 9 MG/ML
INJECTION, SOLUTION INTRAVENOUS PRN
Status: DISCONTINUED | OUTPATIENT
Start: 2022-08-22 | End: 2022-08-22 | Stop reason: SDUPTHER

## 2022-08-22 RX ORDER — SODIUM CHLORIDE 9 MG/ML
INJECTION, SOLUTION INTRAVENOUS PRN
Status: DISCONTINUED | OUTPATIENT
Start: 2022-08-22 | End: 2022-08-25 | Stop reason: HOSPADM

## 2022-08-22 RX ORDER — HEPARIN SODIUM (PORCINE) LOCK FLUSH IV SOLN 100 UNIT/ML 100 UNIT/ML
3 SOLUTION INTRAVENOUS EVERY 12 HOURS SCHEDULED
Status: DISCONTINUED | OUTPATIENT
Start: 2022-08-22 | End: 2022-08-22 | Stop reason: SDUPTHER

## 2022-08-22 RX ORDER — HEPARIN SODIUM (PORCINE) LOCK FLUSH IV SOLN 100 UNIT/ML 100 UNIT/ML
3 SOLUTION INTRAVENOUS PRN
Status: DISCONTINUED | OUTPATIENT
Start: 2022-08-22 | End: 2022-08-22 | Stop reason: SDUPTHER

## 2022-08-22 RX ORDER — LIDOCAINE HYDROCHLORIDE 10 MG/ML
5 INJECTION, SOLUTION EPIDURAL; INFILTRATION; INTRACAUDAL; PERINEURAL ONCE
Status: DISCONTINUED | OUTPATIENT
Start: 2022-08-22 | End: 2022-08-22 | Stop reason: SDUPTHER

## 2022-08-22 RX ADMIN — ZIPRASIDONE HYDROCHLORIDE 20 MG: 20 CAPSULE ORAL at 22:41

## 2022-08-22 RX ADMIN — GABAPENTIN 600 MG: 300 CAPSULE ORAL at 08:43

## 2022-08-22 RX ADMIN — BACLOFEN 10 MG: 10 TABLET ORAL at 22:40

## 2022-08-22 RX ADMIN — PANCRELIPASE 72000 UNITS: 36000; 180000; 114000 CAPSULE, DELAYED RELEASE PELLETS ORAL at 17:04

## 2022-08-22 RX ADMIN — HYDROMORPHONE HYDROCHLORIDE 0.5 MG: 1 INJECTION, SOLUTION INTRAMUSCULAR; INTRAVENOUS; SUBCUTANEOUS at 06:48

## 2022-08-22 RX ADMIN — VANCOMYCIN HYDROCHLORIDE 1250 MG: 10 INJECTION, POWDER, LYOPHILIZED, FOR SOLUTION INTRAVENOUS at 03:23

## 2022-08-22 RX ADMIN — SODIUM CHLORIDE, PRESERVATIVE FREE 10 ML: 5 INJECTION INTRAVENOUS at 08:47

## 2022-08-22 RX ADMIN — RIFAMPIN 300 MG: 300 CAPSULE ORAL at 22:41

## 2022-08-22 RX ADMIN — GABAPENTIN 600 MG: 300 CAPSULE ORAL at 13:00

## 2022-08-22 RX ADMIN — ENOXAPARIN SODIUM 30 MG: 100 INJECTION SUBCUTANEOUS at 22:41

## 2022-08-22 RX ADMIN — HYDROCODONE BITARTRATE AND ACETAMINOPHEN 1 TABLET: 10; 325 TABLET ORAL at 17:04

## 2022-08-22 RX ADMIN — ESCITALOPRAM OXALATE 20 MG: 10 TABLET ORAL at 22:40

## 2022-08-22 RX ADMIN — HYDROCODONE BITARTRATE AND ACETAMINOPHEN 1 TABLET: 5; 325 TABLET ORAL at 03:23

## 2022-08-22 RX ADMIN — GABAPENTIN 600 MG: 300 CAPSULE ORAL at 17:04

## 2022-08-22 RX ADMIN — RIFAMPIN 300 MG: 300 CAPSULE ORAL at 08:43

## 2022-08-22 RX ADMIN — DOCUSATE SODIUM 100 MG: 100 CAPSULE, LIQUID FILLED ORAL at 08:42

## 2022-08-22 RX ADMIN — VANCOMYCIN HYDROCHLORIDE 1250 MG: 10 INJECTION, POWDER, LYOPHILIZED, FOR SOLUTION INTRAVENOUS at 17:06

## 2022-08-22 RX ADMIN — PANCRELIPASE 72000 UNITS: 36000; 180000; 114000 CAPSULE, DELAYED RELEASE PELLETS ORAL at 13:50

## 2022-08-22 RX ADMIN — BACLOFEN 10 MG: 10 TABLET ORAL at 08:42

## 2022-08-22 RX ADMIN — DOCUSATE SODIUM 100 MG: 100 CAPSULE, LIQUID FILLED ORAL at 22:41

## 2022-08-22 RX ADMIN — FAMOTIDINE 20 MG: 20 TABLET, FILM COATED ORAL at 08:42

## 2022-08-22 RX ADMIN — HYDROCODONE BITARTRATE AND ACETAMINOPHEN 1 TABLET: 10; 325 TABLET ORAL at 08:43

## 2022-08-22 RX ADMIN — METOPROLOL SUCCINATE 12.5 MG: 25 TABLET, EXTENDED RELEASE ORAL at 08:43

## 2022-08-22 RX ADMIN — BACLOFEN 10 MG: 10 TABLET ORAL at 13:48

## 2022-08-22 RX ADMIN — IOPAMIDOL 90 ML: 755 INJECTION, SOLUTION INTRAVENOUS at 14:34

## 2022-08-22 RX ADMIN — TRAZODONE HYDROCHLORIDE 100 MG: 50 TABLET ORAL at 22:41

## 2022-08-22 RX ADMIN — SODIUM CHLORIDE, PRESERVATIVE FREE 10 ML: 5 INJECTION INTRAVENOUS at 22:41

## 2022-08-22 RX ADMIN — HYDROMORPHONE HYDROCHLORIDE 0.5 MG: 1 INJECTION, SOLUTION INTRAMUSCULAR; INTRAVENOUS; SUBCUTANEOUS at 22:40

## 2022-08-22 RX ADMIN — HYDROCODONE BITARTRATE AND ACETAMINOPHEN 1 TABLET: 10; 325 TABLET ORAL at 23:44

## 2022-08-22 RX ADMIN — ALLOPURINOL 200 MG: 100 TABLET ORAL at 08:43

## 2022-08-22 RX ADMIN — ENOXAPARIN SODIUM 30 MG: 100 INJECTION SUBCUTANEOUS at 08:41

## 2022-08-22 RX ADMIN — ESCITALOPRAM OXALATE 20 MG: 10 TABLET ORAL at 08:42

## 2022-08-22 RX ADMIN — FAMOTIDINE 20 MG: 20 TABLET, FILM COATED ORAL at 22:42

## 2022-08-22 RX ADMIN — TOPIRAMATE 200 MG: 100 TABLET, FILM COATED ORAL at 08:43

## 2022-08-22 RX ADMIN — IOHEXOL 50 ML: 240 INJECTION, SOLUTION INTRATHECAL; INTRAVASCULAR; INTRAVENOUS; ORAL at 14:35

## 2022-08-22 RX ADMIN — FERROUS SULFATE TAB 325 MG (65 MG ELEMENTAL FE) 325 MG: 325 (65 FE) TAB at 08:42

## 2022-08-22 RX ADMIN — GABAPENTIN 600 MG: 300 CAPSULE ORAL at 22:41

## 2022-08-22 RX ADMIN — TOPIRAMATE 200 MG: 100 TABLET, FILM COATED ORAL at 22:40

## 2022-08-22 RX ADMIN — PANCRELIPASE 72000 UNITS: 36000; 180000; 114000 CAPSULE, DELAYED RELEASE PELLETS ORAL at 08:41

## 2022-08-22 RX ADMIN — ALLOPURINOL 200 MG: 100 TABLET ORAL at 22:41

## 2022-08-22 RX ADMIN — FUROSEMIDE 20 MG: 20 TABLET ORAL at 08:42

## 2022-08-22 RX ADMIN — HYDROMORPHONE HYDROCHLORIDE 0.5 MG: 1 INJECTION, SOLUTION INTRAMUSCULAR; INTRAVENOUS; SUBCUTANEOUS at 09:30

## 2022-08-22 RX ADMIN — BACLOFEN 10 MG: 10 TABLET ORAL at 17:04

## 2022-08-22 ASSESSMENT — PAIN DESCRIPTION - FREQUENCY
FREQUENCY: CONTINUOUS
FREQUENCY: CONTINUOUS

## 2022-08-22 ASSESSMENT — PAIN - FUNCTIONAL ASSESSMENT: PAIN_FUNCTIONAL_ASSESSMENT: PREVENTS OR INTERFERES SOME ACTIVE ACTIVITIES AND ADLS

## 2022-08-22 ASSESSMENT — PAIN DESCRIPTION - DESCRIPTORS
DESCRIPTORS: ACHING
DESCRIPTORS: THROBBING;ACHING
DESCRIPTORS: STABBING;THROBBING;POUNDING
DESCRIPTORS: STABBING;ACHING;THROBBING
DESCRIPTORS: ACHING;DISCOMFORT;SHOOTING
DESCRIPTORS: ACHING;DISCOMFORT;THROBBING

## 2022-08-22 ASSESSMENT — PAIN DESCRIPTION - LOCATION
LOCATION: KNEE
LOCATION: LEG
LOCATION: KNEE;BACK;LEG
LOCATION: LEG;BACK;HIP
LOCATION: LEG
LOCATION: KNEE

## 2022-08-22 ASSESSMENT — PAIN DESCRIPTION - ORIENTATION
ORIENTATION: RIGHT

## 2022-08-22 ASSESSMENT — PAIN DESCRIPTION - ONSET
ONSET: ON-GOING
ONSET: ON-GOING

## 2022-08-22 ASSESSMENT — PAIN DESCRIPTION - PAIN TYPE
TYPE: ACUTE PAIN
TYPE: ACUTE PAIN

## 2022-08-22 ASSESSMENT — PAIN SCALES - GENERAL
PAINLEVEL_OUTOF10: 8
PAINLEVEL_OUTOF10: 6
PAINLEVEL_OUTOF10: 9
PAINLEVEL_OUTOF10: 8
PAINLEVEL_OUTOF10: 9
PAINLEVEL_OUTOF10: 8
PAINLEVEL_OUTOF10: 8
PAINLEVEL_OUTOF10: 10

## 2022-08-22 NOTE — PROGRESS NOTES
remains benign  Deep venous thrombosis prophylaxis -per medical service, early mobilization  PT/OT appreciated  Pain Control: IV and PO  Monitor H&H, 12.0 on 8/22/2022, no new labs  D/C Plan: Appreciate PT/OT and social work evaluations. We will continue to follow patient as we await cultures from her recent surgical debridement. Based on exam today, we suspect her lumbar spine is also contributory to her pain she is experiencing to the lower extremity. Recommend neurosurgery consultation for input regarding the lumbar spine.

## 2022-08-22 NOTE — PROGRESS NOTES
Hospitalist Progress Note      Synopsis: Patient admitted on 8/19/2022     Ms. Darya Dawn, a 64y.o. year old female  who  has a past medical history of Adenoma of right adrenal gland, Anemia, Anxiety, Arthritis, Asthma, Benign essential tremor, Bipolar affective ([address] Chronic back pain, Chronic respiratory failure with hypoxia ([address] Depression, Diabetes mellitus ([address] Difficulty swallowing, Environmental and seasonal allergies, Excessive physiologic tremor, Fatty liver, Full dentures, GERD (gastroesophageal reflux disease), Gout, Herniated cervical disc, History of swelling of feet, Hypertension, Lymphedema of lower extremity, Mitochondrial cytopathy ([address] Muscle weakness (generalized), Need for assistance with personal care, Neuropathy, Obesity, PETR (obstructive sleep apnea), Osteoarthritis of left knee, PONV (postoperative nausea and vomiting), Seizures ([address] Spinal headache, and Thyroid disease. The patient fractured her right ankle and underwent an ORIF in January 2022 by Dr. Tena Pollard. She resides at [facility]. She developed redness, swelling and pain in the right leg. sHe denies any trauma. She says she has been dealing with a chronic callus and an ulcer on the distal part of her right first toe. She has been seen by a podiatrist who has been debriding this ulcer at the facility. She patient was sent to the ED at [facility] on 8/17/2022 with pain in the lright lower extremity. sHe was seen by infectious disease for cellulitis of the right lower extremity. There is also some concern for septic joint. Orthopedics was consulted and advised the patient be transferred to [facility] for interventional radiology aspiration of the suspect joint. Subjective  Patient assessed at bedside, alert, oriented, answering questions appropriately. Patient states new onset right leg numbness and pain since surgery. Noted decreased PMS compared to left lower extremity.     Patient states right lower abdominal pain   Right lower extremity US ordered  CT abdomen pelvis w/contrast ordered  PT/OT eval/treat ordered  1 time dose dilaudid 1mg IVP ordered    Exam:  /66   Pulse 77   Temp 98 °F (36.7 °C) (Temporal)   Resp 18   LMP 08/31/1988   SpO2 98%   General appearance: No apparent distress, appears stated age and cooperative. HEENT: Pupils equal, round, and reactive to light. Conjunctivae/corneas clear. Neck: Trachea midline. Respiratory: Distant but mild wheezing   cardiovascular: Regular rate and rhythm with normal S1/S2 without murmurs, rubs or gallops. Abdomen: Soft, non-tender, non-distended with normal bowel sounds. Musculoskeletal: Bilateral lower extremities feet are deformed\  Right knee has a postop dressing in place. Left leg with no open areas.   Right knee is dressed, right leg decreased PMS   Skin:  No rashes    Neurologic: awake, alert and following commands     Medications:  Reviewed    Infusion Medications    sodium chloride       Scheduled Medications    enoxaparin  30 mg SubCUTAneous BID    allopurinol  200 mg Oral BID    baclofen  10 mg Oral 4x daily    docusate sodium  100 mg Oral BID    escitalopram  20 mg Oral BID    famotidine  20 mg Oral BID    lipase-protease-amylase  72,000 Units Oral TID WC    metoprolol succinate  12.5 mg Oral Daily    topiramate  200 mg Oral BID    traZODone  100 mg Oral Nightly    ziprasidone  20 mg Oral Nightly    furosemide  20 mg Oral Daily    rifAMPin  300 mg Oral 2 times per day    ferrous sulfate  325 mg Oral Daily    gabapentin  600 mg Oral 4x Daily    levOCARNitine  330 mg Oral TID    sodium chloride flush  5-40 mL IntraVENous 2 times per day    vancomycin  1,250 mg IntraVENous Q12H     PRN Meds: bisacodyl, albuterol, sodium chloride flush, sodium chloride, ondansetron **OR** ondansetron, polyethylene glycol, HYDROmorphone, HYDROcodone 5 mg - acetaminophen **OR** HYDROcodone-acetaminophen    I/O    Intake/Output Summary (Last 24 hours) at 8/22/2022 0803  Last data filed at 8/21/2022 1816  Gross per 24 hour   Intake 840 ml   Output --   Net 840 ml       Labs:   Recent Labs     08/21/22  1020 08/22/22  0501   WBC 5.5 5.0   HGB 12.6 12.0   HCT 38.0 36.5    340       Recent Labs     08/20/22  0716 08/21/22  1020 08/22/22  0501   NA  --  140 141   K  --  3.4* 3.6   CL  --  102 103   CO2  --  27 28   BUN  --  12 12   CREATININE 0.6 0.6 0.5   CALCIUM  --  9.6 9.3       Recent Labs     08/21/22  1020 08/22/22  0501   PROT 6.9 6.5   ALKPHOS 230* 196*   ALT 63* 40*   AST 42* 22   BILITOT 0.5 0.6       Recent Labs     08/19/22  0825   INR 1.2       No results for input(s): [place] in the last 72 hours. Chronic labs:  Lab Results   Component Value Date    CHOL 154 04/11/2022    TRIG 163 (H) 04/11/2022    HDL 49 04/11/2022    LDLCALC 72 04/11/2022    TSH 0.548 03/16/2021    INR 1.2 08/19/2022    LABA1C 5.7 12/13/2013       Microbiology:  Pending  No results for input(s): BC in the last 72 hours. Recent Labs     08/19/22  1351   ORG Staphylococcus aureus*     No results for input(s): Gerhardt Hones in the last 72 hours. No results for input(s): STREPNEUMAGU in the last 72 hours. No results for input(s): LP1UAG in the last 72 hours. No results for input(s): ASO in the last 72 hours. No results for input(s): CULTRESP in the last 72 hours. No results for input(s): PROCAL in the last 72 hours. Radiology:  XR FEMUR RIGHT (MIN 2 VIEWS)    Result Date: 8/19/2022  Extensive postsurgical changes about the distal right femur, right knee and distal right tibia and fibula. No gross hardware complications. No acute fracture or subluxation. XR KNEE RIGHT (3 VIEWS)    Result Date: 8/19/2022  Extensive postsurgical changes about the distal right femur, right knee and distal right tibia and fibula. No gross hardware complications. No acute fracture or subluxation. XR TIBIA FIBULA RIGHT (2 VIEWS)    Result Date: 8/17/2022  1. present.

## 2022-08-22 NOTE — PROGRESS NOTES
Pharmacy Consultation Note  (Antibiotic Dosing and Monitoring)    Initial consult date: 8-  Consulting physician/provider: Dr. Erika Dominguez  Drug: Vancomycin  Indication: ABSSSI; septic R knee    Age/  Gender Height Weight IBW  Allergy Information   61 y.o./female 170.2 cm  109 kg   Ideal body weight: 61.6 kg (135 lb 12.9 oz)  Adjusted ideal body weight: 80.6 kg (177 lb 10.3 oz)   Bee pollen, Penicillins, Ropinirole, Ropinirole hcl, Vistaril [hydroxyzine hcl], Aripiprazole, Prednisone, Restoril [temazepam], Hydroxyzine pamoate, and Tape [adhesive tape]      Renal Function:  Recent Labs     08/20/22  0716 08/21/22  1020 08/22/22  0501   BUN  --  12 12   CREATININE 0.6 0.6 0.5         Intake/Output Summary (Last 24 hours) at 8/22/2022 0956  Last data filed at 8/21/2022 1816  Gross per 24 hour   Intake 420 ml   Output --   Net 420 ml         Vancomycin Monitoring:  Trough:  No results for input(s): VANCOTROUGH in the last 72 hours. Random:    Recent Labs     08/20/22  0716   VANCORANDOM 19.2         Vancomycin Administration Times:  Recent vancomycin administrations                     vancomycin (VANCOCIN) 1,250 mg in dextrose 5 % 250 mL IVPB (mg) 1,250 mg New Bag 08/22/22 0323     1,250 mg New Bag 08/21/22 1553     1,250 mg New Bag  0426     1,250 mg New Bag 08/20/22 1543     1,250 mg New Bag  0312    vancomycin (VANCOCIN) injection (mg) 1,000 mg Given 08/19/22 1407             Assessment:  60 yo/F transferred from [address] [place] [address] to [facility] for Ortho eval for suspected R knee septic arthritis. ID was following patient @ [address] [place] [address], patient received PO doxy and cephalexin at [facility] but was switched to vancomycin (2500 mg LD and 1250 mg q12h on 8/18 prior to transfer). S/P needle aspiration of R knee early 8/19 AM; grew MRSA. Estimated Creatinine Clearance: 150 mL/min (based on SCr of 0.5 mg/dL). To dose vancomycin, pharmacy will be utilizing [employer] calculation software for goal AUC/NAVARRO 400-600 mg/L-hr.   Afternoon dose held

## 2022-08-22 NOTE — PROGRESS NOTES
Department of Internal Medicine  Infectious Diseases  Progress  Note      C/C :  MRSA bacteremia, right TKA infection     Pt is awake and alert  Denies fever or chills  Reports right hip and knee pain   Afebrile       Current Facility-Administered Medications   Medication Dose Route Frequency Provider Last Rate Last Admin    HYDROmorphone (DILAUDID) injection 0.5 mg  0.5 mg IntraVENous Once Vicky Gorman, APRN - [facility]        lidocaine PF 1 % injection 5 mL  5 mL IntraDERmal Once Bill Olivo MD        sodium chloride flush 0.9 % injection 5-40 mL  5-40 mL IntraVENous 2 times per day Bill P MD Pallavi        sodium chloride flush 0.9 % injection 5-40 mL  5-40 mL IntraVENous PRN Bill Olivo MD        0.9 % sodium chloride infusion   IntraVENous PRN Bill Olivo MD        heparin flush 100 UNIT/ML injection 300 Units  3 mL IntraVENous 2 times per day Bill Olivo MD        heparin flush 100 UNIT/ML injection 300 Units  3 mL IntraCATHeter PRN Bill Olivo MD        enoxaparin Sodium (LOVENOX) injection 30 mg  30 mg SubCUTAneous BID Blayne Ozuna, DO   30 mg at 08/22/22 0841    allopurinol (ZYLOPRIM) tablet 200 mg  200 mg Oral BID Jeff Bucio MD   200 mg at 08/22/22 0843    baclofen (LIORESAL) tablet 10 mg  10 mg Oral 4x daily Jeff Bucio MD   10 mg at 08/22/22 0842    docusate sodium (COLACE) capsule 100 mg  100 mg Oral BID Jeff Bucio MD   100 mg at 08/22/22 0842    escitalopram (LEXAPRO) tablet 20 mg  20 mg Oral BID Jeff Bucio MD   20 mg at 08/22/22 0842    famotidine (PEPCID) tablet 20 mg  20 mg Oral BID Jeff Bucio MD   20 mg at 08/22/22 0842    lipase-protease-amylase (CREON) delayed release capsule 72,000 Units  72,000 Units Oral TID  Jeff Bucio MD   72,000 Units at 08/22/22 0841    metoprolol succinate (TOPROL XL) extended release tablet 12.5 mg  12.5 mg Oral Daily Jeff uBcio MD   12.5 mg at 08/22/22 0843    topiramate (TOPAMAX) tablet 200 mg  200 mg Oral BID Jeff Bucio MD   200 mg at 08/22/22 0843    traZODone (DESYREL) tablet 100 mg  100 mg Oral Nightly Shane Marino MD   100 mg at 08/21/22 2156    ziprasidone (GEODON) capsule 20 mg  20 mg Oral Nightly Shane Marino MD   20 mg at 08/21/22 2156    furosemide (LASIX) tablet 20 mg  20 mg Oral Daily Shane Marino MD   20 mg at 08/22/22 1197    rifAMPin (RIFADIN) capsule 300 mg  300 mg Oral 2 times per day Rowan Ramirez MD   300 mg at 08/22/22 0843    bisacodyl (DULCOLAX) suppository 10 mg  10 mg Rectal Daily PRN Shane Marino MD   10 mg at 08/21/22 1304    albuterol (PROVENTIL) nebulizer solution 2.5 mg  2.5 mg Nebulization Q4H PRN [facility] [place], DO        ferrous sulfate (IRON 325) tablet 325 mg  325 mg Oral Daily Elvis [place], DO   325 mg at 08/22/22 6507    gabapentin (NEURONTIN) capsule 600 mg  600 mg Oral 4x Daily Orman [place], DO   600 mg at 08/22/22 2880    levOCARNitine (CARNITOR) tablet 330 mg (Patient Supplied)  330 mg Oral TID Elvis Donny, DO        ondansetron (ZOFRAN-ODT) disintegrating tablet 4 mg  4 mg Oral Q8H PRN [facility] [place], DO        Or    ondansetron [facility]) injection 4 mg  4 mg IntraVENous Q6H PRN [facility] [place], DO        polyethylene glycol (GLYCOLAX) packet 17 g  17 g Oral Daily PRN Elvis Donny, DO   17 g at 08/21/22 0819    vancomycin (VANCOCIN) 1,250 mg in dextrose 5 % 250 mL IVPB  1,250 mg IntraVENous Q12H Orman [place], DO   Stopped at 08/22/22 0453    HYDROmorphone (DILAUDID) injection 0.5 mg  0.5 mg IntraVENous Q4H PRN [facility] [place], DO   0.5 mg at 08/22/22 0648    HYDROcodone-acetaminophen (NORCO) 5-325 MG per tablet 1 tablet  1 tablet Oral Q6H PRN Elvis Hines, DO   1 tablet at 08/22/22 8490    Or    HYDROcodone-acetaminophen (NORCO)  MG per tablet 1 tablet  1 tablet Oral Q6H PRN Elvis [place], DO   1 tablet at 08/22/22 8806             REVIEW OF SYSTEMS:    CONSTITUTIONAL:  fever  HEENT: denies blurring of vision or double vision, denies hearing problem  RESPIRATORY: denies cough, shortness of breath, sputum expectoration, chest pain. CARDIOVASCULAR:  Denies palpitation  GASTROINTESTINAL:  Denies abdomen pain, diarrhea or constipation. GENITOURINARY:  Denies burning urination or frequency of urination  INTEGUMENT: denies wound , rash  HEMATOLOGIC/LYMPHATIC:  Denies lymph node swelling, gum bleeding or easy bruising. MUSCULOSKELETAL:  right knee, right hip pain , leg swelling   NEUROLOGICAL:  Denies light headed, dizziness, loss of consciousness, weakness of lower extremities, bowel or bladder incontinence. PHYSICAL EXAM:      Vitals:     /66   Pulse 77   Temp 96.8 °F (36 °C) (Temporal)   Resp 18   LMP 08/31/1988   SpO2 98%     General Appearance:    Awake, alert , no acute distress. Head:    Normocephalic, atraumatic   Eyes:    No pallor, no icterus,   Ears:    No obvious deformity or drainage.    Nose:   No nasal drainage   Throat:   Mucosa moist, no oral thrush   Neck:   Supple, no lymphadenopathy   Back:     no CVA tenderness   Lungs:     Clear to auscultation bilaterally, no wheeze    Heart:    Regular rate and rhythm, no murmur   Abdomen:     Soft, non-tender, bowel sounds present    Extremities:    + edema, right knee - wrapped    Pulses:   Dorsalis pedis palpable    Skin:   no rashes     CBC with Differential:      Lab Results   Component Value Date/Time    WBC 5.0 08/22/2022 05:01 AM    RBC 3.76 08/22/2022 05:01 AM    HGB 12.0 08/22/2022 05:01 AM    HCT 36.5 08/22/2022 05:01 AM     08/22/2022 05:01 AM    MCV 97.1 08/22/2022 05:01 AM    MCH 31.9 08/22/2022 05:01 AM    MCHC 32.9 08/22/2022 05:01 AM    RDW 13.8 08/22/2022 05:01 AM    SEGSPCT 64 12/27/2013 03:00 PM    LYMPHOPCT 6.0 08/17/2022 01:30 AM    MONOPCT 11.6 08/17/2022 01:30 AM    BASOPCT 0.1 08/17/2022 01:30 AM    MONOSABS 1.02 08/17/2022 01:30 AM    LYMPHSABS 0.53 08/17/2022 01:30 AM    EOSABS 0.02 08/17/2022 01:30 AM    BASOSABS 0.01 08/17/2022 01:30 AM       CMP Lab Results   Component Value Date/Time     08/22/2022 05:01 AM    K 3.6 08/22/2022 05:01 AM    K 3.9 05/06/2022 05:28 PM     08/22/2022 05:01 AM    CO2 28 08/22/2022 05:01 AM    BUN 12 08/22/2022 05:01 AM    CREATININE 0.5 08/22/2022 05:01 AM    GFRAA >60 08/22/2022 05:01 AM    LABGLOM >60 08/22/2022 05:01 AM    GLUCOSE 116 08/22/2022 05:01 AM    GLUCOSE 93 05/24/2012 11:11 AM    PROT 6.5 08/22/2022 05:01 AM    LABALBU 2.6 08/22/2022 05:01 AM    LABALBU 4.7 05/21/2012 01:48 PM    CALCIUM 9.3 08/22/2022 05:01 AM    BILITOT 0.6 08/22/2022 05:01 AM    ALKPHOS 196 08/22/2022 05:01 AM    AST 22 08/22/2022 05:01 AM    ALT 40 08/22/2022 05:01 AM         Hepatic Function Panel:    Lab Results   Component Value Date/Time    ALKPHOS 196 08/22/2022 05:01 AM    ALT 40 08/22/2022 05:01 AM    AST 22 08/22/2022 05:01 AM    PROT 6.5 08/22/2022 05:01 AM    BILITOT 0.6 08/22/2022 05:01 AM    BILIDIR <0.2 03/16/2021 12:16 PM    IBILI see below 03/16/2021 12:16 PM    LABALBU 2.6 08/22/2022 05:01 AM    LABALBU 4.7 05/21/2012 01:48 PM       PT/INR:    Lab Results   Component Value Date/Time    PROTIME 13.1 08/19/2022 08:25 AM    PROTIME 10.8 05/07/2012 03:45 AM    INR 1.2 08/19/2022 08:25 AM       TSH:    Lab Results   Component Value Date/Time    TSH 0.548 03/16/2021 12:16 PM       U/A:    Lab Results   Component Value Date/Time    COLORU Yellow 08/17/2022 05:21 AM    PHUR 6.5 08/17/2022 05:21 AM    LABCAST MODERATE 05/06/2012 04:00 AM    WBCUA NONE 05/06/2022 07:21 PM    WBCUA 1-3 05/06/2012 04:00 AM    RBCUA NONE 05/06/2022 07:21 PM    RBCUA 0-1 12/13/2013 10:15 AM    BACTERIA NONE SEEN 05/06/2022 07:21 PM    CLARITYU Clear 08/17/2022 05:21 AM    SPECGRAV <=1.005 08/17/2022 05:21 AM    LEUKOCYTESUR Negative 08/17/2022 05:21 AM    UROBILINOGEN 1.0 08/17/2022 05:21 AM    BILIRUBINUR Negative 08/17/2022 05:21 AM    BILIRUBINUR NEGATIVE 05/06/2012 04:00 AM    BLOODU Negative 08/17/2022 05:21 AM    GLUCOSEU Negative 08/17/2022 05:21 AM    GLUCOSEU NEGATIVE 05/06/2012 04:00 AM       ABG:    Lab Results   Component Value Date/Time    X2XSUTWC 94.0 05/06/2012 10:00 AM       MICROBIOLOGY:     Blood cx -  ( 8/17/2022)     Susceptibility    Staphylococcus aureus (1)    Antibiotic Interpretation Microscan  Method Status    clindamycin Resistant >=^4 mcg/mL BACTERIAL SUSCEPTIBILITY PANEL BY NAVARRO     DAPTOmycin Sensitive ^1 mcg/mL BACTERIAL SUSCEPTIBILITY PANEL BY NAVARRO     doxycycline Sensitive <=^0.5 mcg/mL BACTERIAL SUSCEPTIBILITY PANEL BY NAVARRO     erythromycin Resistant >=^8 mcg/mL BACTERIAL SUSCEPTIBILITY PANEL BY NAVARRO     gentamicin Sensitive <=^0.5 mcg/mL BACTERIAL SUSCEPTIBILITY PANEL BY NAVARRO     oxacillin Resistant >=^4 mcg/mL BACTERIAL SUSCEPTIBILITY PANEL BY NAVARRO     tigecycline Sensitive <=^0.12 mcg/mL BACTERIAL SUSCEPTIBILITY PANEL BY NAVARRO     trimethoprim-sulfamethoxazole Sensitive <=^10 mcg/mL BACTERIAL SUSCEPTIBILITY PANEL BY NAVARRO     vancomycin Sensitive ^1 mcg/mL BACTERIAL SUSCEPTIBILITY PANEL BY NAVARRO            Wound cx -     Gram Stain Result Refer to ordered Gram stain for results    Organism Staphylococcus aureus Abnormal     Body Fluid Culture, Sterile --    Heavy growth        Vanco random  19.2      Radiology :    Right knee -    Extensive postsurgical changes about the distal right femur, right knee and   distal right tibia and fibula. No gross hardware complications. No acute fracture or subluxation.          IMPRESSION:     MRSA bacteremia - follow up blood cx non  8/18- neg   MRSA  right TKA infection s/p I & D   Mod obesity       RECOMMENDATIONS:       Vancomycin 1250 mg IV q 12 hrs ( pharmacy following )   Follow up blood cx ( 8/18) neg to date   Rifampin 300 mg po q 12 hrs   PICC line   Echo

## 2022-08-23 ENCOUNTER — APPOINTMENT (OUTPATIENT)
Dept: ULTRASOUND IMAGING | Age: 62
DRG: 313 | End: 2022-08-23
Attending: FAMILY MEDICINE
Payer: MEDICAID

## 2022-08-23 ENCOUNTER — APPOINTMENT (OUTPATIENT)
Dept: CT IMAGING | Age: 62
DRG: 313 | End: 2022-08-23
Attending: FAMILY MEDICINE
Payer: MEDICAID

## 2022-08-23 LAB
ALBUMIN SERPL-MCNC: 2.5 G/DL (ref 3.5–5.2)
ALP BLD-CCNC: 168 U/L (ref 35–104)
ALT SERPL-CCNC: 28 U/L (ref 0–32)
ANION GAP SERPL CALCULATED.3IONS-SCNC: 9 MMOL/L (ref 7–16)
AST SERPL-CCNC: 14 U/L (ref 0–31)
BILIRUB SERPL-MCNC: 0.4 MG/DL (ref 0–1.2)
BLOOD CULTURE, ROUTINE: NORMAL
BUN BLDV-MCNC: 11 MG/DL (ref 6–23)
CALCIUM SERPL-MCNC: 9.6 MG/DL (ref 8.6–10.2)
CHLORIDE BLD-SCNC: 102 MMOL/L (ref 98–107)
CO2: 28 MMOL/L (ref 22–29)
CREAT SERPL-MCNC: 0.6 MG/DL (ref 0.5–1)
CULTURE, BLOOD 2: NORMAL
GFR AFRICAN AMERICAN: >60
GFR NON-AFRICAN AMERICAN: >60 ML/MIN/1.73
GLUCOSE BLD-MCNC: 120 MG/DL (ref 74–99)
HCT VFR BLD CALC: 37.4 % (ref 34–48)
HEMOGLOBIN: 12.3 G/DL (ref 11.5–15.5)
MCH RBC QN AUTO: 32.5 PG (ref 26–35)
MCHC RBC AUTO-ENTMCNC: 32.9 % (ref 32–34.5)
MCV RBC AUTO: 98.9 FL (ref 80–99.9)
PDW BLD-RTO: 13.7 FL (ref 11.5–15)
PLATELET # BLD: 365 E9/L (ref 130–450)
PMV BLD AUTO: 8.8 FL (ref 7–12)
POTASSIUM SERPL-SCNC: 3.9 MMOL/L (ref 3.5–5)
RBC # BLD: 3.78 E12/L (ref 3.5–5.5)
SODIUM BLD-SCNC: 139 MMOL/L (ref 132–146)
TOTAL PROTEIN: 6.7 G/DL (ref 6.4–8.3)
VANCOMYCIN TROUGH: 16.4 MCG/ML (ref 5–16)
WBC # BLD: 5 E9/L (ref 4.5–11.5)

## 2022-08-23 PROCEDURE — 6360000002 HC RX W HCPCS: Performed by: STUDENT IN AN ORGANIZED HEALTH CARE EDUCATION/TRAINING PROGRAM

## 2022-08-23 PROCEDURE — 1200000000 HC SEMI PRIVATE

## 2022-08-23 PROCEDURE — 93971 EXTREMITY STUDY: CPT

## 2022-08-23 PROCEDURE — 6370000000 HC RX 637 (ALT 250 FOR IP): Performed by: STUDENT IN AN ORGANIZED HEALTH CARE EDUCATION/TRAINING PROGRAM

## 2022-08-23 PROCEDURE — 36569 INSJ PICC 5 YR+ W/O IMAGING: CPT

## 2022-08-23 PROCEDURE — 6370000000 HC RX 637 (ALT 250 FOR IP): Performed by: INTERNAL MEDICINE

## 2022-08-23 PROCEDURE — 02HV33Z INSERTION OF INFUSION DEVICE INTO SUPERIOR VENA CAVA, PERCUTANEOUS APPROACH: ICD-10-PCS | Performed by: FAMILY MEDICINE

## 2022-08-23 PROCEDURE — 6360000002 HC RX W HCPCS: Performed by: INTERNAL MEDICINE

## 2022-08-23 PROCEDURE — 2580000003 HC RX 258: Performed by: INTERNAL MEDICINE

## 2022-08-23 PROCEDURE — 93971 EXTREMITY STUDY: CPT | Performed by: RADIOLOGY

## 2022-08-23 PROCEDURE — 72129 CT CHEST SPINE W/DYE: CPT

## 2022-08-23 PROCEDURE — 80053 COMPREHEN METABOLIC PANEL: CPT

## 2022-08-23 PROCEDURE — 6360000004 HC RX CONTRAST MEDICATION: Performed by: RADIOLOGY

## 2022-08-23 PROCEDURE — C1751 CATH, INF, PER/CENT/MIDLINE: HCPCS

## 2022-08-23 PROCEDURE — 2700000000 HC OXYGEN THERAPY PER DAY

## 2022-08-23 PROCEDURE — 72132 CT LUMBAR SPINE W/DYE: CPT

## 2022-08-23 PROCEDURE — 2580000003 HC RX 258: Performed by: STUDENT IN AN ORGANIZED HEALTH CARE EDUCATION/TRAINING PROGRAM

## 2022-08-23 PROCEDURE — 85027 COMPLETE CBC AUTOMATED: CPT

## 2022-08-23 PROCEDURE — 36415 COLL VENOUS BLD VENIPUNCTURE: CPT

## 2022-08-23 PROCEDURE — 80202 ASSAY OF VANCOMYCIN: CPT

## 2022-08-23 PROCEDURE — 76937 US GUIDE VASCULAR ACCESS: CPT

## 2022-08-23 RX ADMIN — IOPAMIDOL 75 ML: 755 INJECTION, SOLUTION INTRAVENOUS at 20:23

## 2022-08-23 RX ADMIN — BACLOFEN 10 MG: 10 TABLET ORAL at 16:52

## 2022-08-23 RX ADMIN — HYDROCODONE BITARTRATE AND ACETAMINOPHEN 1 TABLET: 10; 325 TABLET ORAL at 20:49

## 2022-08-23 RX ADMIN — FERROUS SULFATE TAB 325 MG (65 MG ELEMENTAL FE) 325 MG: 325 (65 FE) TAB at 08:42

## 2022-08-23 RX ADMIN — HYDROMORPHONE HYDROCHLORIDE 0.5 MG: 1 INJECTION, SOLUTION INTRAMUSCULAR; INTRAVENOUS; SUBCUTANEOUS at 18:52

## 2022-08-23 RX ADMIN — TOPIRAMATE 200 MG: 100 TABLET, FILM COATED ORAL at 08:42

## 2022-08-23 RX ADMIN — ENOXAPARIN SODIUM 30 MG: 100 INJECTION SUBCUTANEOUS at 08:43

## 2022-08-23 RX ADMIN — HEPARIN 300 UNITS: 100 SYRINGE at 21:07

## 2022-08-23 RX ADMIN — DOCUSATE SODIUM 100 MG: 100 CAPSULE, LIQUID FILLED ORAL at 08:42

## 2022-08-23 RX ADMIN — ESCITALOPRAM OXALATE 20 MG: 10 TABLET ORAL at 08:42

## 2022-08-23 RX ADMIN — BACLOFEN 10 MG: 10 TABLET ORAL at 08:42

## 2022-08-23 RX ADMIN — HYDROCODONE BITARTRATE AND ACETAMINOPHEN 1 TABLET: 10; 325 TABLET ORAL at 12:02

## 2022-08-23 RX ADMIN — ENOXAPARIN SODIUM 30 MG: 100 INJECTION SUBCUTANEOUS at 20:42

## 2022-08-23 RX ADMIN — GABAPENTIN 600 MG: 300 CAPSULE ORAL at 12:26

## 2022-08-23 RX ADMIN — BISACODYL 10 MG: 10 SUPPOSITORY RECTAL at 10:48

## 2022-08-23 RX ADMIN — TOPIRAMATE 200 MG: 100 TABLET, FILM COATED ORAL at 20:42

## 2022-08-23 RX ADMIN — DOCUSATE SODIUM 100 MG: 100 CAPSULE, LIQUID FILLED ORAL at 20:42

## 2022-08-23 RX ADMIN — ZIPRASIDONE HYDROCHLORIDE 20 MG: 20 CAPSULE ORAL at 20:43

## 2022-08-23 RX ADMIN — RIFAMPIN 300 MG: 300 CAPSULE ORAL at 08:43

## 2022-08-23 RX ADMIN — ALLOPURINOL 200 MG: 100 TABLET ORAL at 20:42

## 2022-08-23 RX ADMIN — VANCOMYCIN HYDROCHLORIDE 1250 MG: 10 INJECTION, POWDER, LYOPHILIZED, FOR SOLUTION INTRAVENOUS at 17:45

## 2022-08-23 RX ADMIN — GABAPENTIN 600 MG: 300 CAPSULE ORAL at 16:52

## 2022-08-23 RX ADMIN — ESCITALOPRAM OXALATE 20 MG: 10 TABLET ORAL at 20:42

## 2022-08-23 RX ADMIN — METOPROLOL SUCCINATE 12.5 MG: 25 TABLET, EXTENDED RELEASE ORAL at 08:42

## 2022-08-23 RX ADMIN — BACLOFEN 10 MG: 10 TABLET ORAL at 20:42

## 2022-08-23 RX ADMIN — PANCRELIPASE 72000 UNITS: 36000; 180000; 114000 CAPSULE, DELAYED RELEASE PELLETS ORAL at 08:43

## 2022-08-23 RX ADMIN — ALLOPURINOL 200 MG: 100 TABLET ORAL at 08:41

## 2022-08-23 RX ADMIN — BACLOFEN 10 MG: 10 TABLET ORAL at 12:26

## 2022-08-23 RX ADMIN — GABAPENTIN 600 MG: 300 CAPSULE ORAL at 20:42

## 2022-08-23 RX ADMIN — PANCRELIPASE 72000 UNITS: 36000; 180000; 114000 CAPSULE, DELAYED RELEASE PELLETS ORAL at 16:52

## 2022-08-23 RX ADMIN — HYDROMORPHONE HYDROCHLORIDE 0.5 MG: 1 INJECTION, SOLUTION INTRAMUSCULAR; INTRAVENOUS; SUBCUTANEOUS at 05:17

## 2022-08-23 RX ADMIN — HYDROMORPHONE HYDROCHLORIDE 0.5 MG: 1 INJECTION, SOLUTION INTRAMUSCULAR; INTRAVENOUS; SUBCUTANEOUS at 13:28

## 2022-08-23 RX ADMIN — SODIUM CHLORIDE, PRESERVATIVE FREE 10 ML: 5 INJECTION INTRAVENOUS at 08:50

## 2022-08-23 RX ADMIN — FAMOTIDINE 20 MG: 20 TABLET, FILM COATED ORAL at 20:42

## 2022-08-23 RX ADMIN — SODIUM CHLORIDE, PRESERVATIVE FREE 10 ML: 5 INJECTION INTRAVENOUS at 05:17

## 2022-08-23 RX ADMIN — SODIUM CHLORIDE, PRESERVATIVE FREE 10 ML: 5 INJECTION INTRAVENOUS at 21:07

## 2022-08-23 RX ADMIN — PANCRELIPASE 72000 UNITS: 36000; 180000; 114000 CAPSULE, DELAYED RELEASE PELLETS ORAL at 12:26

## 2022-08-23 RX ADMIN — GABAPENTIN 600 MG: 300 CAPSULE ORAL at 08:42

## 2022-08-23 RX ADMIN — TRAZODONE HYDROCHLORIDE 100 MG: 50 TABLET ORAL at 20:42

## 2022-08-23 RX ADMIN — FUROSEMIDE 20 MG: 20 TABLET ORAL at 08:41

## 2022-08-23 RX ADMIN — RIFAMPIN 300 MG: 300 CAPSULE ORAL at 20:43

## 2022-08-23 RX ADMIN — FAMOTIDINE 20 MG: 20 TABLET, FILM COATED ORAL at 08:43

## 2022-08-23 RX ADMIN — HEPARIN 300 UNITS: 100 SYRINGE at 08:44

## 2022-08-23 RX ADMIN — VANCOMYCIN HYDROCHLORIDE 1250 MG: 10 INJECTION, POWDER, LYOPHILIZED, FOR SOLUTION INTRAVENOUS at 05:07

## 2022-08-23 ASSESSMENT — PAIN DESCRIPTION - DESCRIPTORS
DESCRIPTORS: ACHING;DISCOMFORT;THROBBING
DESCRIPTORS: ACHING;DISCOMFORT;THROBBING
DESCRIPTORS: ACHING;DISCOMFORT;POUNDING
DESCRIPTORS: ACHING;DISCOMFORT;THROBBING

## 2022-08-23 ASSESSMENT — PAIN DESCRIPTION - LOCATION
LOCATION: KNEE;BACK;LEG
LOCATION: LEG;KNEE
LOCATION: LEG

## 2022-08-23 ASSESSMENT — PAIN DESCRIPTION - FREQUENCY: FREQUENCY: CONTINUOUS

## 2022-08-23 ASSESSMENT — PAIN DESCRIPTION - ORIENTATION
ORIENTATION: RIGHT

## 2022-08-23 ASSESSMENT — PAIN SCALES - GENERAL
PAINLEVEL_OUTOF10: 7
PAINLEVEL_OUTOF10: 9
PAINLEVEL_OUTOF10: 4
PAINLEVEL_OUTOF10: 3
PAINLEVEL_OUTOF10: 9
PAINLEVEL_OUTOF10: 7
PAINLEVEL_OUTOF10: 6
PAINLEVEL_OUTOF10: 5
PAINLEVEL_OUTOF10: 9

## 2022-08-23 ASSESSMENT — PAIN DESCRIPTION - ONSET: ONSET: ON-GOING

## 2022-08-23 ASSESSMENT — PAIN DESCRIPTION - PAIN TYPE: TYPE: ACUTE PAIN

## 2022-08-23 NOTE — PROGRESS NOTES
Hospitalist Progress Note      Synopsis: Patient admitted on 8/19/2022     Ms. Alysia Worthington, a 64y.o. year old female  who  has a past medical history of Adenoma of right adrenal gland, Anemia, Anxiety, Arthritis, Asthma, Benign essential tremor, Bipolar affective ([address] Chronic back pain, Chronic respiratory failure with hypoxia ([address] Depression, Diabetes mellitus ([address] Difficulty swallowing, Environmental and seasonal allergies, Excessive physiologic tremor, Fatty liver, Full dentures, GERD (gastroesophageal reflux disease), Gout, Herniated cervical disc, History of swelling of feet, Hypertension, Lymphedema of lower extremity, Mitochondrial cytopathy ([address] Muscle weakness (generalized), Need for assistance with personal care, Neuropathy, Obesity, PETR (obstructive sleep apnea), Osteoarthritis of left knee, PONV (postoperative nausea and vomiting), Seizures ([address] Spinal headache, and Thyroid disease. The patient fractured her right ankle and underwent an ORIF in January 2022 by Dr. Jose L Gamble. She resides at [facility]. She developed redness, swelling and pain in the right leg. sHe denies any trauma. She says she has been dealing with a chronic callus and an ulcer on the distal part of her right first toe. She has been seen by a podiatrist who has been debriding this ulcer at the facility. She patient was sent to the ED at [facility] on 8/17/2022 with pain in the lright lower extremity. sHe was seen by infectious disease for cellulitis of the right lower extremity. There is also some concern for septic joint. Orthopedics was consulted and advised the patient be transferred to [facility] for interventional radiology aspiration of the suspect joint. Subjective  Patient assessed at bedside, alert, oriented, answering questions appropriately.     Patient states continued numbness in right leg  Right lower extremity US resulted no evidence of deep venous thrombosis  CT abdomen pelvis w/contrast unremarkable   PT/OT eval/treat reordered  Patient continues to state pain is not well controlled with current medication regimen     Exam:  /65   Pulse 80   Temp 97.1 °F (36.2 °C) (Temporal)   Resp 16   LMP 08/31/1988   SpO2 95%   General appearance: No apparent distress, appears stated age and cooperative. HEENT: Pupils equal, round, and reactive to light. Conjunctivae/corneas clear. Neck: Trachea midline. Respiratory: Distant but mild wheezing   cardiovascular: Regular rate and rhythm with normal S1/S2 without murmurs, rubs or gallops. Abdomen: Soft, non-tender, non-distended with normal bowel sounds. Musculoskeletal: Bilateral lower extremities feet are deformed\  Right knee has a postop dressing in place. Left leg with no open areas.   Right knee is dressed, right leg decreased PMS   Skin:  No rashes    Neurologic: awake, alert and following commands     Medications:  Reviewed    Infusion Medications    sodium chloride       Scheduled Medications    lidocaine PF  5 mL IntraDERmal Once    sodium chloride flush  5-40 mL IntraVENous 2 times per day    heparin flush  3 mL IntraVENous 2 times per day    enoxaparin  30 mg SubCUTAneous BID    allopurinol  200 mg Oral BID    baclofen  10 mg Oral 4x daily    docusate sodium  100 mg Oral BID    escitalopram  20 mg Oral BID    famotidine  20 mg Oral BID    lipase-protease-amylase  72,000 Units Oral TID WC    metoprolol succinate  12.5 mg Oral Daily    topiramate  200 mg Oral BID    traZODone  100 mg Oral Nightly    ziprasidone  20 mg Oral Nightly    furosemide  20 mg Oral Daily    rifAMPin  300 mg Oral 2 times per day    ferrous sulfate  325 mg Oral Daily    gabapentin  600 mg Oral 4x Daily    levOCARNitine  330 mg Oral TID    vancomycin  1,250 mg IntraVENous Q12H     PRN Meds: sodium chloride flush, sodium chloride, heparin flush, bisacodyl, albuterol, ondansetron **OR** ondansetron, polyethylene glycol, HYDROmorphone, HYDROcodone 5 mg - acetaminophen **OR** HYDROcodone-acetaminophen    I/O    Intake/Output Summary (Last 24 hours) at 8/23/2022 0829  Last data filed at 8/23/2022 0517  Gross per 24 hour   Intake 20 ml   Output --   Net 20 ml       Labs:   Recent Labs     08/21/22  1020 08/22/22  0501   WBC 5.5 5.0   HGB 12.6 12.0   HCT 38.0 36.5    340       Recent Labs     08/21/22  1020 08/22/22  0501    141   K 3.4* 3.6    103   CO2 27 28   BUN 12 12   CREATININE 0.6 0.5   CALCIUM 9.6 9.3       Recent Labs     08/21/22  1020 08/22/22  0501   PROT 6.9 6.5   ALKPHOS 230* 196*   ALT 63* 40*   AST 42* 22   BILITOT 0.5 0.6       No results for input(s): INR in the last 72 hours. No results for input(s): Seun Hernandez in the last 72 hours. Chronic labs:  Lab Results   Component Value Date    CHOL 154 04/11/2022    TRIG 163 (H) 04/11/2022    HDL 49 04/11/2022    LDLCALC 72 04/11/2022    TSH 0.548 03/16/2021    INR 1.2 08/19/2022    LABA1C 5.7 12/13/2013       Microbiology:  Pending  No results for input(s): BC in the last 72 hours. No results for input(s): ORG in the last 72 hours. No results for input(s): Erby Vowinckel in the last 72 hours. No results for input(s): STREPNEUMAGU in the last 72 hours. No results for input(s): LP1UAG in the last 72 hours. No results for input(s): ASO in the last 72 hours. No results for input(s): CULTRESP in the last 72 hours. No results for input(s): PROCAL in the last 72 hours. Radiology:  XR FEMUR RIGHT (MIN 2 VIEWS)    Result Date: 8/19/2022  Extensive postsurgical changes about the distal right femur, right knee and distal right tibia and fibula. No gross hardware complications. No acute fracture or subluxation. XR KNEE RIGHT (3 VIEWS)    Result Date: 8/19/2022  Extensive postsurgical changes about the distal right femur, right knee and distal right tibia and fibula. No gross hardware complications. No acute fracture or subluxation.      XR TIBIA FIBULA RIGHT (2 VIEWS)    Result Date: 8/17/2022  1. Extensive postsurgical changes about the distal right femur, right knee, and distal right tibia and fibula. No hardware complications. 2.  Diffuse soft tissue stranding and more focal anterior soft tissue edema. There is no definite evidence of osseous destruction or erosion about the right tibia or fibula on this exam.     CT HEAD WO CONTRAST    Result Date: 8/17/2022  No acute intracranial abnormality. XR CHEST PORTABLE    Result Date: 8/17/2022  Atherosclerotic disease and mild prominence of the cardiac silhouette. Central pulmonary vascular congestion. No pneumothorax. FLUORO FOR SURGICAL PROCEDURES    Result Date: 8/19/2022  Intraprocedural fluoroscopic spot images as above. See separate procedure report for more information. 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 *  Asthma   bipolar disorder    Diabetes  Arthritis     PLAN:    1. Status post surgery for her right knee  2. Started on vancomycin. ID following  3. Reorder her extensive list of medications including Geodon and Topamax and Lexapro and trazodone and of course her metoprolol and pancreatic replacement  4. Continue to follow her labs and electrolytes    Diet: ADULT DIET; Regular  Code Status: Full Code  PT/OT Eval Status:   Once okay with surgery  DVT Prophylaxis:   In place  Recommended disposition at discharge: To be determined    +++++++++++++++++++++++++++++++++++++++++++++++++  Kael Guaman, [id number] [facility].  +++++++++++++++++++++++++++++++++++++++++++++++++  NOTE: This report was transcribed using voice recognition software.  Every effort was made to ensure accuracy; however, inadvertent computerized transcription errors may be present.

## 2022-08-23 NOTE — PROGRESS NOTES
Pharmacy Consultation Note  (Antibiotic Dosing and Monitoring)    Initial consult date: 8-  Consulting physician/provider: Dr. Hortensia Trinidad  Drug: Vancomycin  Indication: ABSSSI; septic R knee    Age/  Gender Height Weight IBW  Allergy Information   61 y.o./female 170.2 cm  109 kg   Ideal body weight: 61.6 kg (135 lb 12.9 oz)  Adjusted ideal body weight: 80.6 kg (177 lb 10.3 oz)   Bee pollen, Penicillins, Ropinirole, Ropinirole hcl, Vistaril [hydroxyzine hcl], Aripiprazole, Prednisone, Restoril [temazepam], Hydroxyzine pamoate, and Tape [adhesive tape]      Renal Function:  Recent Labs     08/21/22  1020 08/22/22  0501 08/23/22  0714   BUN 12 12 11   CREATININE 0.6 0.5 0.6         Intake/Output Summary (Last 24 hours) at 8/23/2022 1143  Last data filed at 8/23/2022 0517  Gross per 24 hour   Intake 20 ml   Output --   Net 20 ml         Vancomycin Monitoring:  Trough:  No results for input(s): VANCOTROUGH in the last 72 hours. Random:  No results for input(s): VANCORANDOM in the last 72 hours. Vancomycin Administration Times:  Recent vancomycin administrations                     vancomycin (VANCOCIN) 1,250 mg in dextrose 5 % 250 mL IVPB (mg) 1,250 mg New Bag 08/23/22 0507     1,250 mg New Bag 08/22/22 1706     1,250 mg New Bag  0323     1,250 mg New Bag 08/21/22 1553     1,250 mg New Bag  0426     1,250 mg New Bag 08/20/22 1543             Assessment:  60 yo/F transferred from [facility] to [facility] for Ortho eval for suspected R knee septic arthritis. ID was following patient @ [facility], patient received PO doxy and cephalexin at [facility] but was switched to vancomycin (2500 mg LD and 1250 mg q12h on 8/18 prior to transfer). S/P needle aspiration of R knee early 8/19 AM; grew MRSA. Estimated Creatinine Clearance: 125 mL/min (based on SCr of 0.6 mg/dL). To dose vancomycin, pharmacy will be utilizing [employer] calculation software for goal AUC/NAVARRO 400-600 mg/L-hr.   Afternoon dose held yesterday while patient in OR; had topical vancomycin/tobramycin beads placed intra-op. Vancomycin random 19.2 mcg/mL ~4 hrs post-dose on 8/20.  8/22: Day #6 of vanco. SCr 0.5. PICC line order placed by ID.  8/23: Day #7 of vanco. SCr 0.6. Trough level (8/23 @ 1554) = 16.4 mcg/mL (~ 9 hours post-dose). PICC line placed today. Plan:  Continue vancomycin 1250 mg q12h for predicted AUC/NAVARRO = 485, Tr = 15.3 mCg/mL. Will re-check vancomycin levels when appropriate. Will continue to monitor renal function. Clinical pharmacy to follow.     Walt Neri, PharmD  PGY1 Pharmacy Resident 8/23/2022 11:43 AM

## 2022-08-23 NOTE — CARE COORDINATION
8/23/2022 social work transition of care planning  Pt plan is to return to [facility] medically stable.   Electronically signed by GAGE Johnson on 8/23/2022 at 9:33 AM

## 2022-08-23 NOTE — PROGRESS NOTES
Department of Orthopedic Surgery  Resident Progress Note    Patient seen and examined at bedside this morning. No significant changes overnight. Pain primarily to the right knee but also involves the anterior aspect of the thigh. No chest pain or shortness of breath. No nausea or vomiting. No fevers or chills. No changes in sensation from previous exam and no acute events overnight. VITALS:  /65   Pulse 80   Temp 97.1 °F (36.2 °C) (Temporal)   Resp 16   LMP 08/31/1988   SpO2 95%     General: alert and oriented to person, place and time, obese    MUSCULOSKELETAL:   Right lower extremity:  ACE dressing C/D/I about the extremity  Limited knee flexion and extension, motion remains limited, near full extension is possible but flexion limited to 40 degrees  Pain with flexion of the hip, lower back pain produced with passive straight leg raise  Compartments soft and compressible  +PF/DF/EHL, 4 - / 5  +2/4 DP & PT pulses, Brisk Cap refill, Toes warm and perfused  Distal sensation grossly intact to Peroneals, Sural, Saphenous, and tibial nerve distributions, subjectively diminished per patient    CBC:   Lab Results   Component Value Date/Time    WBC 5.0 08/22/2022 05:01 AM    HGB 12.0 08/22/2022 05:01 AM    HCT 36.5 08/22/2022 05:01 AM     08/22/2022 05:01 AM     PT/INR:    Lab Results   Component Value Date/Time    PROTIME 13.1 08/19/2022 08:25 AM    PROTIME 10.8 05/07/2012 03:45 AM    INR 1.2 08/19/2022 08:25 AM       Intraop Cultures: S.  Aureus with respect to knee, cultures from hip aspiration remain negative    ASSESSMENT  S/P irrigation and debridement of right knee with insertion of antibiotic beads, right thigh, and aspiration of right hip with fluoroscopy on 8/19/2022    PLAN      Continue physical therapy and protocol: WBAT - RLE  Appreciate infectious disease recommendations regarding further antibiotic therapy  Deep venous thrombosis prophylaxis -per medical service, early mobilization  PT/OT appreciated and recommended to help prevent stiffness to the right lower extremity  Pain Control: IV and PO  D/C Plan: Appreciate PT/OT and social work evaluations. Appreciate recommendations of neurosurgery.

## 2022-08-23 NOTE — PLAN OF CARE
Problem: Chronic Conditions and Co-morbidities  Goal: Patient's chronic conditions and co-morbidity symptoms are monitored and maintained or improved  Outcome: Not Progressing     Problem: Discharge Planning  Goal: Discharge to home or other facility with appropriate resources  Outcome: Progressing     Problem: Skin/Tissue Integrity  Goal: Absence of new skin breakdown  Description: 1. Monitor for areas of redness and/or skin breakdown  2. Assess vascular access sites hourly  3. Every 4-6 hours minimum:  Change oxygen saturation probe site  4. Every 4-6 hours:  If on nasal continuous positive airway pressure, respiratory therapy assess nares and determine need for appliance change or resting period.   Outcome: Progressing     Problem: Safety - Adult  Goal: Free from fall injury  Outcome: Progressing     Problem: Pain  Goal: Verbalizes/displays adequate comfort level or baseline comfort level  Outcome: Not Progressing     Problem: Skin/Tissue Integrity - Adult  Goal: Incisions, wounds, or drain sites healing without S/S of infection  Outcome: Progressing     Problem: Musculoskeletal - Adult  Goal: Return mobility to safest level of function  Outcome: Not Progressing  Goal: Maintain proper alignment of affected body part  Outcome: Progressing  Goal: Return ADL status to a safe level of function  Outcome: Not Progressing     Problem: Infection - Adult  Goal: Absence of infection at discharge  Outcome: Progressing

## 2022-08-23 NOTE — PROGRESS NOTES
5821    traZODone (DESYREL) tablet 100 mg  100 mg Oral Nightly Jennifer Cross MD   100 mg at 08/22/22 2241    ziprasidone (GEODON) capsule 20 mg  20 mg Oral Nightly Jennifer Cross MD   20 mg at 08/22/22 2241    furosemide (LASIX) tablet 20 mg  20 mg Oral Daily Jennifer Cross MD   20 mg at 08/23/22 0841    rifAMPin (RIFADIN) capsule 300 mg  300 mg Oral 2 times per day Leatha Ragsdale MD   300 mg at 08/23/22 0843    bisacodyl (DULCOLAX) suppository 10 mg  10 mg Rectal Daily PRN Jennifer Cross MD   10 mg at 08/21/22 1304    albuterol (PROVENTIL) nebulizer solution 2.5 mg  2.5 mg Nebulization Q4H PRN Tedra Poot, DO        ferrous sulfate (IRON 325) tablet 325 mg  325 mg Oral Daily Tedra Poot, DO   325 mg at 08/23/22 3357    gabapentin (NEURONTIN) capsule 600 mg  600 mg Oral 4x Daily Tedra Poot, DO   600 mg at 08/23/22 9738    levOCARNitine (CARNITOR) tablet 330 mg (Patient Supplied)  330 mg Oral TID Tedra Poot, DO        ondansetron (ZOFRAN-ODT) disintegrating tablet 4 mg  4 mg Oral Q8H PRN Tedra Poot, DO        Or    ondansetron [facility]) injection 4 mg  4 mg IntraVENous Q6H PRN Tedra Poot, DO        polyethylene glycol (GLYCOLAX) packet 17 g  17 g Oral Daily PRN Tedra Poot, DO   17 g at 08/21/22 0819    vancomycin (VANCOCIN) 1,250 mg in dextrose 5 % 250 mL IVPB  1,250 mg IntraVENous Q12H Tedra Poot, DO   Stopped at 08/23/22 7548    HYDROmorphone (DILAUDID) injection 0.5 mg  0.5 mg IntraVENous Q4H PRN Tedra Poot, DO   0.5 mg at 08/23/22 0517    HYDROcodone-acetaminophen (NORCO) 5-325 MG per tablet 1 tablet  1 tablet Oral Q6H PRN Tedra Poot, DO   1 tablet at 08/22/22 8809    Or    HYDROcodone-acetaminophen (NORCO)  MG per tablet 1 tablet  1 tablet Oral Q6H PRN Jenny Crum, DO   1 tablet at 08/22/22 3327             REVIEW OF SYSTEMS:    CONSTITUTIONAL:  fever  HEENT: denies blurring of vision or double vision, denies hearing problem  RESPIRATORY: denies cough, shortness of breath, sputum expectoration, chest pain. CARDIOVASCULAR:  Denies palpitation  GASTROINTESTINAL:  Denies abdomen pain, diarrhea or constipation. GENITOURINARY:  Denies burning urination or frequency of urination  INTEGUMENT: denies wound , rash  HEMATOLOGIC/LYMPHATIC:  Denies lymph node swelling, gum bleeding or easy bruising. MUSCULOSKELETAL: back pain,  right knee, right hip pain , leg swelling   NEUROLOGICAL:  Denies light headed, dizziness, loss of consciousness, weakness of lower extremities, bowel or bladder incontinence. PHYSICAL EXAM:      Vitals:     /65   Pulse 80   Temp 97.1 °F (36.2 °C) (Temporal)   Resp 16   LMP 08/31/1988   SpO2 95%     General Appearance:    Awake, alert , no acute distress. Head:    Normocephalic, atraumatic   Eyes:    No pallor, no icterus,   Ears:    No obvious deformity or drainage.    Nose:   No nasal drainage   Throat:   Mucosa moist, no oral thrush   Neck:   Supple, no lymphadenopathy   Back:      L spine tenderness    Lungs:     Clear to auscultation bilaterally, no wheeze    Heart:    Regular rate and rhythm, no murmur   Abdomen:     Soft, non-tender, bowel sounds present    Extremities:    ++ edema, right knee - wrapped    Pulses:   Dorsalis pedis palpable    Skin:   no rashes     CBC with Differential:      Lab Results   Component Value Date/Time    WBC 5.0 08/23/2022 07:14 AM    RBC 3.78 08/23/2022 07:14 AM    HGB 12.3 08/23/2022 07:14 AM    HCT 37.4 08/23/2022 07:14 AM     08/23/2022 07:14 AM    MCV 98.9 08/23/2022 07:14 AM    MCH 32.5 08/23/2022 07:14 AM    MCHC 32.9 08/23/2022 07:14 AM    RDW 13.7 08/23/2022 07:14 AM    SEGSPCT 64 12/27/2013 03:00 PM    LYMPHOPCT 6.0 08/17/2022 01:30 AM    MONOPCT 11.6 08/17/2022 01:30 AM    BASOPCT 0.1 08/17/2022 01:30 AM    MONOSABS 1.02 08/17/2022 01:30 AM    LYMPHSABS 0.53 08/17/2022 01:30 AM    EOSABS 0.02 08/17/2022 01:30 AM    BASOSABS 0.01 08/17/2022 01:30 AM       CMP     Lab Results   Component Value Date/Time     08/23/2022 07:14 AM    K 3.9 08/23/2022 07:14 AM    K 3.9 05/06/2022 05:28 PM     08/23/2022 07:14 AM    CO2 28 08/23/2022 07:14 AM    BUN 11 08/23/2022 07:14 AM    CREATININE 0.6 08/23/2022 07:14 AM    GFRAA >60 08/23/2022 07:14 AM    LABGLOM >60 08/23/2022 07:14 AM    GLUCOSE 120 08/23/2022 07:14 AM    GLUCOSE 93 05/24/2012 11:11 AM    PROT 6.7 08/23/2022 07:14 AM    LABALBU 2.5 08/23/2022 07:14 AM    LABALBU 4.7 05/21/2012 01:48 PM    CALCIUM 9.6 08/23/2022 07:14 AM    BILITOT 0.4 08/23/2022 07:14 AM    ALKPHOS 168 08/23/2022 07:14 AM    AST 14 08/23/2022 07:14 AM    ALT 28 08/23/2022 07:14 AM         Hepatic Function Panel:    Lab Results   Component Value Date/Time    ALKPHOS 168 08/23/2022 07:14 AM    ALT 28 08/23/2022 07:14 AM    AST 14 08/23/2022 07:14 AM    PROT 6.7 08/23/2022 07:14 AM    BILITOT 0.4 08/23/2022 07:14 AM    BILIDIR <0.2 03/16/2021 12:16 PM    IBILI see below 03/16/2021 12:16 PM    LABALBU 2.5 08/23/2022 07:14 AM    LABALBU 4.7 05/21/2012 01:48 PM       PT/INR:    Lab Results   Component Value Date/Time    PROTIME 13.1 08/19/2022 08:25 AM    PROTIME 10.8 05/07/2012 03:45 AM    INR 1.2 08/19/2022 08:25 AM       TSH:    Lab Results   Component Value Date/Time    TSH 0.548 03/16/2021 12:16 PM       U/A:    Lab Results   Component Value Date/Time    COLORU Yellow 08/17/2022 05:21 AM    PHUR 6.5 08/17/2022 05:21 AM    LABCAST MODERATE 05/06/2012 04:00 AM    WBCUA NONE 05/06/2022 07:21 PM    WBCUA 1-3 05/06/2012 04:00 AM    RBCUA NONE 05/06/2022 07:21 PM    RBCUA 0-1 12/13/2013 10:15 AM    BACTERIA NONE SEEN 05/06/2022 07:21 PM    CLARITYU Clear 08/17/2022 05:21 AM    SPECGRAV <=1.005 08/17/2022 05:21 AM    LEUKOCYTESUR Negative 08/17/2022 05:21 AM    UROBILINOGEN 1.0 08/17/2022 05:21 AM    BILIRUBINUR Negative 08/17/2022 05:21 AM    BILIRUBINUR NEGATIVE 05/06/2012 04:00 AM    BLOODU Negative 08/17/2022 05:21 AM infection s/p I & D   Mod obesity       RECOMMENDATIONS:       Vancomycin 1250 mg IV q 12 hrs ( pharmacy following )   Follow up blood cx ( 8/18) neg to date   Rifampin 300 mg po q 12 hrs   PICC line   CT  L spine with contrast ( d/w Neurosurgery )

## 2022-08-23 NOTE — PROGRESS NOTES
Power picc line  Placement 8/23/2022    Product number: [id number]   Lot Number: [id number]      Ultrasound: yes   Right Brachial vein:                Upper Arm Circumference: 42cm    Size: 5.5frdl    Exposed Length: 2cm    Internal Length: 37cm   Cut: 16cm   Vein Measurement: 0.59cm    Rivera Cristina RN  8/23/2022  11:23 AM          Power picc line placed with vps tip conformation.  Tip in lower 1/3 svc/caj

## 2022-08-23 NOTE — CONSULTS
Neurosurgery Consult Note      CHIEF COMPLAINT: Right leg numbness and weakness   This is a 64 y.o. female patient being seen for complaints of weakness of right hip flexion right knee extension and flexion.   Patient underwent right knee arthrotomy and exploration irrigation debridement of a right lateral distal thigh infection as well as aspiration under fluoroscopic guidance of her right hip any movement of the leg is causing her severe pain described as decreased sensation of the entire leg from the hip down to the knee    Past Medical History:   Diagnosis Date    Adenoma of right adrenal gland     Anemia     Anxiety     Arthritis     spinal    Asthma     controlled with inhalers     Benign essential tremor     Bipolar affective (HCC)     Chronic back pain     Spinal cord stimulator in place    Chronic respiratory failure with hypoxia ([address]     at times dt diaphragm does not function properly dt Mitochondrial disorder    Depression     stable    Diabetes mellitus ([address]     Difficulty swallowing     for EGD 10-8-19     Environmental and seasonal allergies     Excessive physiologic tremor 5/24/2021    Fatty liver     Full dentures     GERD (gastroesophageal reflux disease)     Gout     past hx of    Herniated cervical disc     limited rom of head and neck    History of swelling of feet     Hypertension     Lymphedema of lower extremity 09/06/2012    Mitochondrial cytopathy ([address]     s/s muscle and nerve pain, difficulty breathing, seizures, difficulty swallowing, digestive disorders- Dr. Mejia [facility]    Muscle weakness (generalized)     Information obtained from [facility]    Need for assistance with personal care     Information obtained from [facility]    Neuropathy     at feet    Obesity     PETR (obstructive sleep apnea)     no CPAP dt insurance will not pay for    Osteoarthritis of left knee     Information obtained from [facility]    PONV (postoperative nausea and vomiting)     Seizures ([address] last approx 6-13-19- f/u w/ PCP  Granmal, flares up in heat/ summer time - no recent issues as of 10-4-19     Spinal headache     Thyroid disease      Past Surgical History:   Procedure Laterality Date    ANKLE FRACTURE SURGERY Right 2/14/2022    RIGHT ANKLE IRRIGAITON AND DEBRIDEMENT WITH EXTERNAL FIXATOR AND LACERATION REPAIR performed by Nilesh Brooks MD at [address] Right 3/2/2022    RIGHT LOWER EXTREMITY REMOVAL EXTERNAL FIXATOR, RIGHT PILON OPEN REDUCTION INTERNAL FIXATOR--[facility] (FACILITY) performed by Christel Moran DO at [place] [address]      with Hysterectomy / unknown    BACK SURGERY  last one 1995    lumbar x 2    CARDIAC CATHETERIZATION Right 6-6-2013    Dr. Feliberto Gitelman Radial, no stents placed, no blockages     CHOLECYSTECTOMY  1999    open with gastric bypass    COLONOSCOPY N/A 9/18/2018    COLONOSCOPY POLYPECTOMY HOT BIOPSY performed by Shahida Nevarez MD at [address] [place] N/A 10/8/2019    COLONOSCOPY POLYPECTOMY SNARE/COLD BIOPSY performed by Shahida Nevarez MD at [address]    EGD COLONOSCOPY N/A 10/8/2019    EGD ESOPHAGOGASTRODUODENOSCOPY DILATATION performed by Shahida Nevarez MD at [facility], COLON, DIAGNOSTIC      ESOPHAGEAL DILATATION  9/18/2018    ESOPHAGEAL DILATION Babak Come performed by Shahida Nevarez MD at [facility] (CERVIX STATUS UNKNOWN)  Reg King 1998 Bilateral 2007,2017    knees    KNEE SURGERY Bilateral     scope    MYOMECTOMY      NERVE BLOCK Left 10 02 2013    lumbar paravertebral facet #2    NERVE BLOCK Left 10 9 13    hip inj #1    NERVE BLOCK Left 10/16/13    hip injection    NERVE BLOCK Left 10/29/2014    left lumbar transforaminal nerve lbock  #1    NERVE BLOCK Left 11/12/2014    lumbar left transforaminal nerve block  #2    NERVE BLOCK Left 12 8 14    lumbar transforaminal #3    NERVE BLOCK Right 3/30/15    cervical transforaminal #1    NERVE BLOCK Right 4/6/2015    right cervical transforaminal nerve block  #2    NERVE BLOCK Right 4/13/15    cervical transforaminal #3    NERVE BLOCK Left 7/6/15    knee injection #1    NERVE BLOCK  07/20/15    left genicular nerve block/knee #2    NERVE BLOCK Left 10 1 15    lumb transforam #1    NERVE BLOCK  10/26/15    left lumbar transforaminal nerve block #3    NERVE BLOCK Bilateral 4/1/2021    #1 BILATERAL SACROILIAC JOINT INJECTION UNDER FLUORO performed by Melly Butterfield MD at [facility] 6 Left 11 25 13    lumbar radiofreq    OTHER SURGICAL HISTORY  3/28/2016    stage 1, 3 day percutanous trial [employer] lumbar spinal cord stimulator    OTHER SURGICAL HISTORY  1995    racz procedure / lower back    [place] COLONOSCOPY FLX DX W/COLLJ SPEC WHEN PFRMD N/A 3/20/2018    COLONOSCOPY DIAGNOSTIC OR SCREENING performed by Gopal Marie MD at [address] [place] [address] EGD TRANSORAL BIOPSY SINGLE/MULTIPLE N/A 3/20/2018    EGD BIOPSY performed by Gopal Marie MD at [facility]       Bee pollen, Penicillins, Ropinirole, Ropinirole hcl, Vistaril [hydroxyzine hcl], Aripiprazole, Prednisone, Restoril [temazepam], Hydroxyzine pamoate, and Tape [adhesive tape]  Prior to Admission medications    Medication Sig Start Date End Date Taking? Authorizing Provider   oxyCODONE-acetaminophen (PERCOCET) 5-325 MG per tablet Take 1 tablet by mouth every 6 hours as needed for Pain for up to 7 days. Intended supply: 7 days.  Take lowest dose possible to manage pain 8/19/22 8/26/22 Yes Mack Martin DO   aspirin EC 81 MG EC tablet Take 1 tablet by mouth 2 times daily for 28 days 8/19/22 9/16/22 Yes Mack Martin DO   promethazine (PHENERGAN) 25 MG tablet Take 25 mg by mouth every 6 hours as needed for Nausea    Historical Provider, MD   lipase-protease-amylase (CREON) 13073-94685 units delayed release capsule Take 6 capsules by mouth 3 times daily (with meals) 4/15/22   Anne Dumont MD   furosemide (LASIX) 40 MG tablet Take 1 tablet by mouth daily 4/16/22   Anne Dumont MD   polyethylene glycol (GLYCOLAX) 17 g packet Take 17 g by mouth 2 times daily    Historical Provider, MD   meclizine (ANTIVERT) 25 MG tablet Take 25 mg by mouth every 6 hours as needed    Historical Provider, MD   diclofenac sodium (VOLTAREN) 1 % GEL Apply 4 g topically 2 times daily    Historical Provider, MD   baclofen (LIORESAL) 20 MG tablet Take 20 mg by mouth 4 times daily    Historical Provider, MD   metoprolol succinate (TOPROL XL) 25 MG extended release tablet Take 0.5 tablets by mouth daily 7/2/21   Melchor Camp MD   cyanocobalamin 50 MCG tablet Take 100 mcg by mouth daily    Historical Provider, MD   ferrous sulfate (FE TABS 325) 325 (65 Fe) MG EC tablet Take 325 mg by mouth daily  1/12/21   Historical Provider, MD   famotidine (PEPCID) 20 MG tablet Take 1 tablet by mouth 2 times daily 9/10/20   Tyrell Galvan MD   traZODone (DESYREL) 100 MG tablet Take 100 mg by mouth nightly    Historical Provider, MD   albuterol (PROVENTIL) (5 MG/ML) 0.5% nebulizer solution Take 2.5 mg by nebulization 3 times daily     Historical Provider, MD   magnesium 200 MG TABS tablet Take 200 mg by mouth 2 times daily     Historical Provider, MD   calcium carbonate 600 MG TABS tablet Take 1 tablet by mouth 2 times daily     Historical Provider, MD   montelukast (SINGULAIR) 10 MG tablet Take 1 tablet by mouth daily 8/12/19   Idella Boas Bunevich, DO   omeprazole (PRILOSEC) 20 MG delayed release capsule Take 20 mg by mouth Daily     Historical Provider, MD   Cholecalciferol (VITAMIN D3) 5000 units TABS Take 1 tablet by mouth every other day     Historical Provider, MD   docusate sodium (COLACE) 100 MG capsule Take 100 mg by mouth 2 times daily    Historical Provider, MD   levOCARNitine (CARNITOR) 330 MG tablet Take 330 mg by mouth 3 times daily For mitochondrial disease    Historical Provider, MD   allopurinol (ZYLOPRIM) 100 MG tablet Take 200 mg by mouth 2 times daily     Historical Provider, MD   escitalopram (LEXAPRO) 20 MG tablet Take 20 mg by mouth 2 times daily     Historical Provider, MD   gabapentin (NEURONTIN) 600 MG tablet Take 1 tablet by mouth 4 times daily. 10/31/13   Nicole Agosto MD   topiramate (TOPAMAX) 200 MG tablet Take 1 tablet by mouth 2 times daily. 12   Ab Benavides DO   ziprasidone (GEODON) 20 MG capsule Take 20 mg by mouth nightly     Historical Provider, MD     Outpatient Medications Marked as Taking for the 22 encounter [facility] Encounter)   Medication Sig Dispense Refill    oxyCODONE-acetaminophen (PERCOCET) 5-325 MG per tablet Take 1 tablet by mouth every 6 hours as needed for Pain for up to 7 days. Intended supply: 7 days.  Take lowest dose possible to manage pain 28 tablet 0    aspirin EC 81 MG EC tablet Take 1 tablet by mouth 2 times daily for 28 days 56 tablet 0     Social History     Socioeconomic History    Marital status:      Spouse name: Not on file    Number of children: Not on file    Years of education: Not on file    Highest education level: Not on file   Occupational History    Not on file   Tobacco Use    Smoking status: Former     Packs/day: 0.75     Years: 42.00     Pack years: 31.50     Types: Cigarettes     Start date: 1990     Quit date: 2021     Years since quittin.6    Smokeless tobacco: Never   Vaping Use    Vaping Use: Never used   Substance and Sexual Activity    Alcohol use: No     Alcohol/week: 0.0 standard drinks    Drug use: Not Currently     Types: Marijuana Danitzayolanda Castillo)     Comment: edibles- through pain doctor    Sexual activity: Not on file   Other Topics Concern    Not on file   Social History Narrative    ** Merged History Encounter **          Social Determinants of Health     Financial Resource Strain: Not on file   Food Insecurity: Not on file   Transportation Needs: Not on file   Physical Activity: Not on file   Stress: Not on file   Social Connections: Not on file   Intimate Partner Violence: Not on file   Housing Stability: Not on file     Family History   Problem Relation Age of Onset    Heart Disease Mother     Cancer Father     Arthritis Other     Cancer Other     Depression Other     Heart Disease Other     Hypertension Other     Mental Illness Other     Stroke Other        ROS: Complete 10 point ROS was obtained with positives in HPI, otherwise:  Pt denies fevers, denies chills. Pt denies chest pain, denies SOB, denies nausea, denies vomiting, denies headache. VITALS/DRAINS:   VITALS:  /62   Pulse 80   Temp 98.5 °F (36.9 °C) (Temporal)   Resp 18   LMP 08/31/1988   SpO2 95%   24HR INTAKE/OUTPUT:    Intake/Output Summary (Last 24 hours) at 8/23/2022 1604  Last data filed at 8/23/2022 0900  Gross per 24 hour   Intake 260 ml   Output --   Net 260 ml       EXAMINATION: She is awake and alert and oriented friendly and cooperative any movement of the right hip or knee causes severe pain and she is guarding and does not want to move she did have diminished sensation of the right anterior and lateral thigh to light touch cranial nerves II through XII are intact motor no movement at the hip or knee but could be due to severe pain Ace wrap over the knee and distal thigh  :    IMAGING STUDIES:  XR FEMUR RIGHT (MIN 2 VIEWS)    Result Date: 8/19/2022  EXAMINATION: THREE XRAY VIEWS OF THE RIGHT KNEE; 4 XRAY VIEWS OF THE RIGHT FEMUR 8/19/2022 5:00 am COMPARISON: 2 September 2021 HISTORY: ORDERING SYSTEM PROVIDED HISTORY: Pain TECHNOLOGIST PROVIDED HISTORY: Reason for exam:->Pain What reading provider will be dictating this exam?->CRC FINDINGS: Three views of the right knee and four views of the right femur demonstrate extensive postsurgical changes without evidence of gross hardware complication. There is no evidence of acute fracture or subluxation. The right hip appears unremarkable. The right femur appears intact. Extensive postsurgical changes about the distal right femur, right knee and distal right tibia and fibula. No gross hardware complications. No acute fracture or subluxation. XR KNEE RIGHT (3 VIEWS)    Result Date: 8/19/2022  EXAMINATION: THREE XRAY VIEWS OF THE RIGHT KNEE; 4 XRAY VIEWS OF THE RIGHT FEMUR 8/19/2022 5:00 am COMPARISON: 2 September 2021 HISTORY: ORDERING SYSTEM PROVIDED HISTORY: Pain TECHNOLOGIST PROVIDED HISTORY: Reason for exam:->Pain What reading provider will be dictating this exam?->CRC FINDINGS: Three views of the right knee and four views of the right femur demonstrate extensive postsurgical changes without evidence of gross hardware complication. There is no evidence of acute fracture or subluxation. The right hip appears unremarkable. The right femur appears intact. Extensive postsurgical changes about the distal right femur, right knee and distal right tibia and fibula. No gross hardware complications. No acute fracture or subluxation. FLUORO FOR SURGICAL PROCEDURES    Result Date: 8/19/2022  EXAMINATION: SPOT FLUOROSCOPIC IMAGES 8/19/2022 2:51 pm TECHNIQUE: Fluoroscopy was provided by the radiology department for procedure. Radiologist was not present during examination. FLUOROSCOPY DOSE AND TYPE OR TIME AND EXPOSURES: 1 image FLUOROSCOPY TIME: 22.6 seconds COMPARISON: None HISTORY: ORDERING SYSTEM PROVIDED HISTORY: rt.hip aspiration TECHNOLOGIST PROVIDED HISTORY: Reason for exam:->rt.hip aspiration What reading provider will be dictating this exam?->CRC Intraprocedural imaging. FINDINGS: 1 spot images of the hip were obtained. Fluoroscopic support provided to subspecialty service for right hip aspiration. No obvious complication on the images provided. Please see subspecialty report for full details and interpretation of real time imaging. Intraprocedural fluoroscopic spot images as above. See separate procedure report for more information.        LABS:  CBC:   Lab Results   Component Value Date/Time    WBC 5.0 08/23/2022 07:14 AM    RBC 3.78 08/23/2022 07:14 AM    HGB 12.3 08/23/2022 07:14 AM    HCT 37.4 08/23/2022 07:14 AM    MCV 98.9 08/23/2022 07:14 AM    MCH 32.5 08/23/2022 07:14 AM    MCHC 32.9 08/23/2022 07:14 AM    RDW 13.7 08/23/2022 07:14 AM     08/23/2022 07:14 AM    MPV 8.8 08/23/2022 07:14 AM     BMP:    Lab Results   Component Value Date/Time     08/23/2022 07:14 AM    K 3.9 08/23/2022 07:14 AM    K 3.9 05/06/2022 05:28 PM     08/23/2022 07:14 AM    CO2 28 08/23/2022 07:14 AM    BUN 11 08/23/2022 07:14 AM    LABALBU 2.5 08/23/2022 07:14 AM    LABALBU 4.7 05/21/2012 01:48 PM    CREATININE 0.6 08/23/2022 07:14 AM    CALCIUM 9.6 08/23/2022 07:14 AM    GFRAA >60 08/23/2022 07:14 AM    LABGLOM >60 08/23/2022 07:14 AM    GLUCOSE 120 08/23/2022 07:14 AM    GLUCOSE 93 05/24/2012 11:11 AM     PT/INR:    Lab Results   Component Value Date/Time    PROTIME 13.1 08/19/2022 08:25 AM    PROTIME 10.8 05/07/2012 03:45 AM    INR 1.2 08/19/2022 08:25 AM       IMPRESSION: Numbness and weakness of the right leg nondermatomal distribution  RECOMMENDATIONS: To be thorough we will order CT scan of the lumbar spine with contrast as well as CT scan of the thoracic spine with contrast as patient has had multiple back surgeries in the past and does have a spinal cord stimulator would like to be certain that there is no infection within the thoracic or lumbar spine    Thank you again for this consultation.       Keri Figueroa MD  8/23/2022

## 2022-08-23 NOTE — DISCHARGE SUMMARY
[address] [address]                               DISCHARGE SUMMARY    PATIENT NAME: Yulisa Pimentel                   :        1960  MED REC NO:   [id number]                            ROOM:       1192  ACCOUNT NO:   [de-identified]                           ADMIT DATE: 2022  PROVIDER:     Marjan Reyna DO                  [facility] DATE: 2022    FINAL DIAGNOSES:  1. Septic joint. 2.  Possible septic arthritis. 3.  Right lower extremity cellulitis. 4.  Extensive bipolar disorder. 5.  Long history of GERD. 6.  Major psych issues. 7.  Depression. 8.  History of asthma. HISTORY AND HOSPITAL COURSE:  This is a 57-year-old  female who  is a resident at [facility], presented to the  emergency room initially at [facility] for multiple issues, but mainly  increased swelling and redness of her right lower extremity. The  patient does have an appliance in her right knee. She was admitted for  further workup and evaluation. She was seen by ID. Please review their  consult for specific details and management. She was placed on oral  antibiotics by Dr. Agustín Da Silva. Because of suspicion of the infection  being involving the knee and the joint, arrangements were made for the  patient to transferred downtown [address] [place] [address] to see her attending  orthopedist, Dr. Karl Pizarro. She was continued on antibiotics. She was  transferred to [address] [place] [address] on 2022 for further evaluation  and possible procedures involving both by _____ and surgical procedures  involving the right knee joint to rule out a septic joint. The patient  was discharged from [facility] in satisfactory condition. Please review  sequential laboratory and imaging studies as well as discharge med  reconciliation sheet. Discharge status was stable.         Rigoberto Kessler DO    D: 2022 9:09:26       T: 2022 9:13:23     [email or web address]  Job#: [id number]     Doc#: [id number]    CC:

## 2022-08-24 LAB
ANAEROBIC CULTURE: NORMAL
ANAEROBIC CULTURE: NORMAL
BODY FLUID CULTURE, STERILE: NORMAL
BODY FLUID CULTURE, STERILE: NORMAL
CRYSTALS, FLUID: NORMAL
GRAM STAIN RESULT: NORMAL
GRAM STAIN RESULT: NORMAL
SOURCE BODY FLUID: NORMAL

## 2022-08-24 PROCEDURE — 6370000000 HC RX 637 (ALT 250 FOR IP): Performed by: STUDENT IN AN ORGANIZED HEALTH CARE EDUCATION/TRAINING PROGRAM

## 2022-08-24 PROCEDURE — 6370000000 HC RX 637 (ALT 250 FOR IP): Performed by: INTERNAL MEDICINE

## 2022-08-24 PROCEDURE — 2580000003 HC RX 258: Performed by: STUDENT IN AN ORGANIZED HEALTH CARE EDUCATION/TRAINING PROGRAM

## 2022-08-24 PROCEDURE — 97530 THERAPEUTIC ACTIVITIES: CPT

## 2022-08-24 PROCEDURE — 2580000003 HC RX 258: Performed by: INTERNAL MEDICINE

## 2022-08-24 PROCEDURE — 6360000002 HC RX W HCPCS: Performed by: STUDENT IN AN ORGANIZED HEALTH CARE EDUCATION/TRAINING PROGRAM

## 2022-08-24 PROCEDURE — 1200000000 HC SEMI PRIVATE

## 2022-08-24 PROCEDURE — 97535 SELF CARE MNGMENT TRAINING: CPT

## 2022-08-24 PROCEDURE — 6360000002 HC RX W HCPCS: Performed by: INTERNAL MEDICINE

## 2022-08-24 RX ORDER — MECLIZINE HCL 12.5 MG/1
12.5 TABLET ORAL 3 TIMES DAILY PRN
Status: DISCONTINUED | OUTPATIENT
Start: 2022-08-24 | End: 2022-08-25 | Stop reason: HOSPADM

## 2022-08-24 RX ADMIN — HEPARIN 300 UNITS: 100 SYRINGE at 21:34

## 2022-08-24 RX ADMIN — METOPROLOL SUCCINATE 12.5 MG: 25 TABLET, EXTENDED RELEASE ORAL at 07:58

## 2022-08-24 RX ADMIN — PANCRELIPASE 72000 UNITS: 36000; 180000; 114000 CAPSULE, DELAYED RELEASE PELLETS ORAL at 16:25

## 2022-08-24 RX ADMIN — HEPARIN 300 UNITS: 100 SYRINGE at 09:00

## 2022-08-24 RX ADMIN — VANCOMYCIN HYDROCHLORIDE 1250 MG: 10 INJECTION, POWDER, LYOPHILIZED, FOR SOLUTION INTRAVENOUS at 03:44

## 2022-08-24 RX ADMIN — FAMOTIDINE 20 MG: 20 TABLET, FILM COATED ORAL at 21:36

## 2022-08-24 RX ADMIN — BACLOFEN 10 MG: 10 TABLET ORAL at 20:04

## 2022-08-24 RX ADMIN — ENOXAPARIN SODIUM 30 MG: 100 INJECTION SUBCUTANEOUS at 21:34

## 2022-08-24 RX ADMIN — POLYETHYLENE GLYCOL 3350 17 G: 17 POWDER, FOR SOLUTION ORAL at 08:11

## 2022-08-24 RX ADMIN — SODIUM CHLORIDE, PRESERVATIVE FREE 10 ML: 5 INJECTION INTRAVENOUS at 05:37

## 2022-08-24 RX ADMIN — ESCITALOPRAM OXALATE 20 MG: 10 TABLET ORAL at 08:10

## 2022-08-24 RX ADMIN — HYDROMORPHONE HYDROCHLORIDE 0.5 MG: 1 INJECTION, SOLUTION INTRAMUSCULAR; INTRAVENOUS; SUBCUTANEOUS at 05:38

## 2022-08-24 RX ADMIN — ENOXAPARIN SODIUM 30 MG: 100 INJECTION SUBCUTANEOUS at 07:59

## 2022-08-24 RX ADMIN — HYDROMORPHONE HYDROCHLORIDE 0.5 MG: 1 INJECTION, SOLUTION INTRAMUSCULAR; INTRAVENOUS; SUBCUTANEOUS at 21:36

## 2022-08-24 RX ADMIN — GABAPENTIN 600 MG: 300 CAPSULE ORAL at 12:45

## 2022-08-24 RX ADMIN — DOCUSATE SODIUM 100 MG: 100 CAPSULE, LIQUID FILLED ORAL at 07:58

## 2022-08-24 RX ADMIN — HYDROCODONE BITARTRATE AND ACETAMINOPHEN 1 TABLET: 10; 325 TABLET ORAL at 07:58

## 2022-08-24 RX ADMIN — RIFAMPIN 300 MG: 300 CAPSULE ORAL at 21:35

## 2022-08-24 RX ADMIN — ALLOPURINOL 200 MG: 100 TABLET ORAL at 21:36

## 2022-08-24 RX ADMIN — FERROUS SULFATE TAB 325 MG (65 MG ELEMENTAL FE) 325 MG: 325 (65 FE) TAB at 07:59

## 2022-08-24 RX ADMIN — FAMOTIDINE 20 MG: 20 TABLET, FILM COATED ORAL at 07:59

## 2022-08-24 RX ADMIN — TOPIRAMATE 200 MG: 100 TABLET, FILM COATED ORAL at 08:01

## 2022-08-24 RX ADMIN — VANCOMYCIN HYDROCHLORIDE 1250 MG: 10 INJECTION, POWDER, LYOPHILIZED, FOR SOLUTION INTRAVENOUS at 16:29

## 2022-08-24 RX ADMIN — FUROSEMIDE 20 MG: 20 TABLET ORAL at 07:58

## 2022-08-24 RX ADMIN — SODIUM CHLORIDE, PRESERVATIVE FREE 10 ML: 5 INJECTION INTRAVENOUS at 09:00

## 2022-08-24 RX ADMIN — DOCUSATE SODIUM 100 MG: 100 CAPSULE, LIQUID FILLED ORAL at 21:35

## 2022-08-24 RX ADMIN — HYDROCODONE BITARTRATE AND ACETAMINOPHEN 1 TABLET: 10; 325 TABLET ORAL at 20:04

## 2022-08-24 RX ADMIN — BACLOFEN 10 MG: 10 TABLET ORAL at 07:59

## 2022-08-24 RX ADMIN — SODIUM CHLORIDE, PRESERVATIVE FREE 10 ML: 5 INJECTION INTRAVENOUS at 21:35

## 2022-08-24 RX ADMIN — SODIUM CHLORIDE, PRESERVATIVE FREE 10 ML: 5 INJECTION INTRAVENOUS at 03:44

## 2022-08-24 RX ADMIN — RIFAMPIN 300 MG: 300 CAPSULE ORAL at 08:00

## 2022-08-24 RX ADMIN — BACLOFEN 10 MG: 10 TABLET ORAL at 16:26

## 2022-08-24 RX ADMIN — PANCRELIPASE 72000 UNITS: 36000; 180000; 114000 CAPSULE, DELAYED RELEASE PELLETS ORAL at 12:45

## 2022-08-24 RX ADMIN — HYDROCODONE BITARTRATE AND ACETAMINOPHEN 1 TABLET: 10; 325 TABLET ORAL at 14:07

## 2022-08-24 RX ADMIN — TRAZODONE HYDROCHLORIDE 100 MG: 50 TABLET ORAL at 21:36

## 2022-08-24 RX ADMIN — GABAPENTIN 600 MG: 300 CAPSULE ORAL at 16:25

## 2022-08-24 RX ADMIN — PANCRELIPASE 72000 UNITS: 36000; 180000; 114000 CAPSULE, DELAYED RELEASE PELLETS ORAL at 07:59

## 2022-08-24 RX ADMIN — TOPIRAMATE 200 MG: 100 TABLET, FILM COATED ORAL at 21:35

## 2022-08-24 RX ADMIN — ZIPRASIDONE HYDROCHLORIDE 20 MG: 20 CAPSULE ORAL at 21:36

## 2022-08-24 RX ADMIN — GABAPENTIN 600 MG: 300 CAPSULE ORAL at 21:36

## 2022-08-24 RX ADMIN — ALLOPURINOL 200 MG: 100 TABLET ORAL at 07:58

## 2022-08-24 RX ADMIN — HYDROMORPHONE HYDROCHLORIDE 0.5 MG: 1 INJECTION, SOLUTION INTRAMUSCULAR; INTRAVENOUS; SUBCUTANEOUS at 15:28

## 2022-08-24 RX ADMIN — GABAPENTIN 600 MG: 300 CAPSULE ORAL at 07:59

## 2022-08-24 RX ADMIN — ESCITALOPRAM OXALATE 20 MG: 10 TABLET ORAL at 21:35

## 2022-08-24 RX ADMIN — HYDROMORPHONE HYDROCHLORIDE 0.5 MG: 1 INJECTION, SOLUTION INTRAMUSCULAR; INTRAVENOUS; SUBCUTANEOUS at 09:58

## 2022-08-24 RX ADMIN — BACLOFEN 10 MG: 10 TABLET ORAL at 12:45

## 2022-08-24 ASSESSMENT — PAIN DESCRIPTION - DESCRIPTORS
DESCRIPTORS: ACHING;DISCOMFORT;THROBBING
DESCRIPTORS: ACHING;POUNDING;THROBBING
DESCRIPTORS: BURNING;STABBING;THROBBING
DESCRIPTORS: ACHING;POUNDING;THROBBING
DESCRIPTORS: ACHING;CRAMPING;THROBBING
DESCRIPTORS: POUNDING;THROBBING
DESCRIPTORS: BURNING;STABBING;THROBBING

## 2022-08-24 ASSESSMENT — PAIN DESCRIPTION - ORIENTATION
ORIENTATION: RIGHT

## 2022-08-24 ASSESSMENT — PAIN DESCRIPTION - ONSET
ONSET: AWAKENED FROM SLEEP
ONSET: ON-GOING
ONSET: ON-GOING

## 2022-08-24 ASSESSMENT — PAIN SCALES - GENERAL
PAINLEVEL_OUTOF10: 8
PAINLEVEL_OUTOF10: 5
PAINLEVEL_OUTOF10: 7
PAINLEVEL_OUTOF10: 8
PAINLEVEL_OUTOF10: 9
PAINLEVEL_OUTOF10: 8
PAINLEVEL_OUTOF10: 9
PAINLEVEL_OUTOF10: 10

## 2022-08-24 ASSESSMENT — PAIN DESCRIPTION - FREQUENCY
FREQUENCY: CONTINUOUS
FREQUENCY: INTERMITTENT
FREQUENCY: CONTINUOUS

## 2022-08-24 ASSESSMENT — PAIN DESCRIPTION - PAIN TYPE
TYPE: ACUTE PAIN

## 2022-08-24 ASSESSMENT — PAIN DESCRIPTION - LOCATION
LOCATION: LEG
LOCATION: LEG;KNEE
LOCATION: LEG
LOCATION: LEG;KNEE

## 2022-08-24 ASSESSMENT — PAIN - FUNCTIONAL ASSESSMENT: PAIN_FUNCTIONAL_ASSESSMENT: PREVENTS OR INTERFERES SOME ACTIVE ACTIVITIES AND ADLS

## 2022-08-24 NOTE — PROGRESS NOTES
Pharmacy Consultation Note  (Antibiotic Dosing and Monitoring)    Initial consult date: 8-  Consulting physician/provider: Dr. Mike Hadley  Drug: Vancomycin  Indication: ABSSSI; septic R knee    Age/  Gender Height Weight IBW  Allergy Information   61 y.o./female 170.2 cm  109 kg   Ideal body weight: 61.6 kg (135 lb 12.9 oz)  Adjusted ideal body weight: 80.6 kg (177 lb 10.3 oz)   Bee pollen, Penicillins, Ropinirole, Ropinirole hcl, Vistaril [hydroxyzine hcl], Aripiprazole, Prednisone, Restoril [temazepam], Hydroxyzine pamoate, and Tape [adhesive tape]      Renal Function:  Recent Labs     08/22/22  0501 08/23/22  0714   BUN 12 11   CREATININE 0.5 0.6         Intake/Output Summary (Last 24 hours) at 8/24/2022 1106  Last data filed at 8/24/2022 0537  Gross per 24 hour   Intake 390 ml   Output --   Net 390 ml         Vancomycin Monitoring:  Trough:    Recent Labs     08/23/22  1554   [address] 16.4*     Random:  No results for input(s): VANCORANDOM in the last 72 hours. Vancomycin Administration Times:  Recent vancomycin administrations                     vancomycin (VANCOCIN) 1,250 mg in dextrose 5 % 250 mL IVPB (mg) 1,250 mg New Bag 08/24/22 0344     1,250 mg New Bag 08/23/22 1745     1,250 mg New Bag  0507     1,250 mg New Bag 08/22/22 1706     1,250 mg New Bag  0323     1,250 mg New Bag 08/21/22 1553             Assessment:  62 yo/F transferred from [facility] to [facility] for Ortho eval for suspected R knee septic arthritis. ID was following patient @ [facility], patient received PO doxy and cephalexin at [facility] but was switched to vancomycin (2500 mg LD and 1250 mg q12h on 8/18 prior to transfer). S/P needle aspiration of R knee early 8/19 AM; grew MRSA. Estimated Creatinine Clearance: 125 mL/min (based on SCr of 0.6 mg/dL). To dose vancomycin, pharmacy will be utilizing [employer] for [employer] calculation software for goal AUC/NAVARRO 400-600 mg/L-hr.   Afternoon dose held yesterday while patient in OR; had topical vancomycin/tobramycin beads placed intra-op. Vancomycin random 19.2 mcg/mL ~4 hrs post-dose on 8/20.  8/22: Day #6 of vanco. SCr 0.5. PICC line order placed by ID.  8/23: Day #7 of vanco. SCr 0.6. Trough level (8/23 @ 1554) = 16.4 mcg/mL (~ 9 hours post-dose). PICC line placed today. 8/24: Day #8 of vanco.     Plan:  Continue vancomycin 1250 mg q12h for predicted AUC/NAVARRO = 485, Tr = 15.3 mCg/mL. Will re-check vancomycin levels when appropriate. Will continue to monitor renal function. Clinical pharmacy to follow.     Marisol Arcos, PharmD  PGY1 Pharmacy Resident 8/24/2022 11:06 AM

## 2022-08-24 NOTE — PROGRESS NOTES
Department of Internal Medicine  Infectious Diseases  Progress  Note      C/C :  MRSA bacteremia, right TKA infection     Pt is awake and alert  Denies fever or chills  Right hip, thigh, knee pain   Afebrile       Current Facility-Administered Medications   Medication Dose Route Frequency Provider Last Rate Last Admin    meclizine (ANTIVERT) tablet 12.5 mg  12.5 mg Oral TID PRN Yashira Wu MD        lidocaine PF 1 % injection 5 mL  5 mL IntraDERmal Once Bill Olivo MD        sodium chloride flush 0.9 % injection 5-40 mL  5-40 mL IntraVENous 2 times per day Bill Olivo MD   10 mL at 08/24/22 0900    sodium chloride flush 0.9 % injection 5-40 mL  5-40 mL IntraVENous PRN Bill Olivo MD   10 mL at 08/24/22 0537    0.9 % sodium chloride infusion   IntraVENous PRN Bill Olivo MD        heparin flush 100 UNIT/ML injection 300 Units  3 mL IntraVENous 2 times per day Mynor Erickson MD   300 Units at 08/24/22 0900    heparin flush 100 UNIT/ML injection 300 Units  3 mL IntraCATHeter PRN Bill Olivo MD        enoxaparin Sodium (LOVENOX) injection 30 mg  30 mg SubCUTAneous BID [place], DO   30 mg at 08/24/22 0759    allopurinol (ZYLOPRIM) tablet 200 mg  200 mg Oral BID Yuki Stevens MD   200 mg at 08/24/22 0758    baclofen (LIORESAL) tablet 10 mg  10 mg Oral 4x daily Yuki Stevens MD   10 mg at 08/24/22 1245    docusate sodium (COLACE) capsule 100 mg  100 mg Oral BID Yuki Stevens MD   100 mg at 08/24/22 0758    escitalopram (LEXAPRO) tablet 20 mg  20 mg Oral BID Yuki Stevens MD   20 mg at 08/24/22 0810    famotidine (PEPCID) tablet 20 mg  20 mg Oral BID Yuki Stevens MD   20 mg at 08/24/22 0759    lipase-protease-amylase (CREON) delayed release capsule 72,000 Units  72,000 Units Oral TID WC Yuki Stevens MD   72,000 Units at 08/24/22 1245    metoprolol succinate (TOPROL XL) extended release tablet 12.5 mg  12.5 mg Oral Daily Yuki Stevens MD   12.5 mg at 08/24/22 0758    topiramate (TOPAMAX) tablet 200 mg  200 mg Oral BID Rosalio Marquez MD   200 mg at 08/24/22 0801    traZODone (DESYREL) tablet 100 mg  100 mg Oral Nightly Rosalio Marquez MD   100 mg at 08/23/22 2042    ziprasidone (GEODON) capsule 20 mg  20 mg Oral Nightly Rosalio Marquez MD   20 mg at 08/23/22 2043    furosemide (LASIX) tablet 20 mg  20 mg Oral Daily Rosalio Marquez MD   20 mg at 08/24/22 0758    rifAMPin (RIFADIN) capsule 300 mg  300 mg Oral 2 times per day Dixie Solares MD   300 mg at 08/24/22 0800    bisacodyl (DULCOLAX) suppository 10 mg  10 mg Rectal Daily PRN Rosalio Marquze MD   10 mg at 08/23/22 1048    albuterol (PROVENTIL) nebulizer solution 2.5 mg  2.5 mg Nebulization Q4H PRN Steffanie Young, DO        ferrous sulfate (IRON 325) tablet 325 mg  325 mg Oral Daily Steffanie Young, DO   325 mg at 08/24/22 0759    gabapentin (NEURONTIN) capsule 600 mg  600 mg Oral 4x Daily Shravania Derians, DO   600 mg at 08/24/22 1245    levOCARNitine (CARNITOR) tablet 330 mg (Patient Supplied)  330 mg Oral TID Steffanie Menards, DO        ondansetron (ZOFRAN-ODT) disintegrating tablet 4 mg  4 mg Oral Q8H PRN Steffanie Young, DO        Or    ondansetron [facility]) injection 4 mg  4 mg IntraVENous Q6H PRN Steffanie Young, DO        polyethylene glycol (GLYCOLAX) packet 17 g  17 g Oral Daily PRN Steffanie Young, DO   17 g at 08/24/22 0811    vancomycin (VANCOCIN) 1,250 mg in dextrose 5 % 250 mL IVPB  1,250 mg IntraVENous Q12H Steffanie Young, DO   Stopped at 08/24/22 0514    HYDROmorphone (DILAUDID) injection 0.5 mg  0.5 mg IntraVENous Q4H PRN Steffanie Young, DO   0.5 mg at 08/24/22 0958    HYDROcodone-acetaminophen (NORCO) 5-325 MG per tablet 1 tablet  1 tablet Oral Q6H PRN Steffanie Young, DO   1 tablet at 08/22/22 0323    Or    HYDROcodone-acetaminophen (NORCO)  MG per tablet 1 tablet  1 tablet Oral Q6H PRN Wyliannaia Alexanders, DO   1 tablet at 08/24/22 1592             REVIEW OF SYSTEMS:    CONSTITUTIONAL:  fever  HEENT: denies blurring of vision or double vision, denies hearing problem  RESPIRATORY: denies cough, shortness of breath, sputum expectoration, chest pain. CARDIOVASCULAR:  Denies palpitation  GASTROINTESTINAL:  Denies abdomen pain, diarrhea or constipation. GENITOURINARY:  Denies burning urination or frequency of urination  INTEGUMENT: denies wound , rash  HEMATOLOGIC/LYMPHATIC:  Denies lymph node swelling, gum bleeding or easy bruising. MUSCULOSKELETAL: back pain,  right knee, right hip pain , leg swelling   NEUROLOGICAL:  Denies light headed, dizziness, loss of consciousness, weakness of lower extremities, bowel or bladder incontinence. PHYSICAL EXAM:      Vitals:     /70   Pulse 85   Temp (!) 96.6 °F (35.9 °C) (Temporal)   Resp 17   LMP 08/31/1988   SpO2 95%     General Appearance:    Awake, alert , no acute distress. Head:    Normocephalic, atraumatic   Eyes:    No pallor, no icterus,   Ears:    No obvious deformity or drainage.    Nose:   No nasal drainage   Throat:   Mucosa moist, no oral thrush   Neck:   Supple, no lymphadenopathy   Back:      L spine tenderness    Lungs:     Clear to auscultation bilaterally, no wheeze    Heart:    Regular rate and rhythm, no murmur   Abdomen:     Soft, non-tender, bowel sounds present    Extremities:    ++ edema, right knee - wrapped    Pulses:   Dorsalis pedis palpable    Skin:   no rashes     CBC with Differential:      Lab Results   Component Value Date/Time    WBC 5.0 08/23/2022 07:14 AM    RBC 3.78 08/23/2022 07:14 AM    HGB 12.3 08/23/2022 07:14 AM    HCT 37.4 08/23/2022 07:14 AM     08/23/2022 07:14 AM    MCV 98.9 08/23/2022 07:14 AM    MCH 32.5 08/23/2022 07:14 AM    MCHC 32.9 08/23/2022 07:14 AM    RDW 13.7 08/23/2022 07:14 AM    SEGSPCT 64 12/27/2013 03:00 PM    LYMPHOPCT 6.0 08/17/2022 01:30 AM    MONOPCT 11.6 08/17/2022 01:30 AM    BASOPCT 0.1 08/17/2022 01:30 AM    MONOSABS 1.02 08/17/2022 01:30 AM    LYMPHSABS 0.53 08/17/2022 01:30 AM    EOSABS 0.02 08/17/2022 01:30 AM    BASOSABS 0.01 08/17/2022 01:30 AM       CMP     Lab Results   Component Value Date/Time     08/23/2022 07:14 AM    K 3.9 08/23/2022 07:14 AM    K 3.9 05/06/2022 05:28 PM     08/23/2022 07:14 AM    CO2 28 08/23/2022 07:14 AM    BUN 11 08/23/2022 07:14 AM    CREATININE 0.6 08/23/2022 07:14 AM    GFRAA >60 08/23/2022 07:14 AM    LABGLOM >60 08/23/2022 07:14 AM    GLUCOSE 120 08/23/2022 07:14 AM    GLUCOSE 93 05/24/2012 11:11 AM    PROT 6.7 08/23/2022 07:14 AM    LABALBU 2.5 08/23/2022 07:14 AM    LABALBU 4.7 05/21/2012 01:48 PM    CALCIUM 9.6 08/23/2022 07:14 AM    BILITOT 0.4 08/23/2022 07:14 AM    ALKPHOS 168 08/23/2022 07:14 AM    AST 14 08/23/2022 07:14 AM    ALT 28 08/23/2022 07:14 AM         Hepatic Function Panel:    Lab Results   Component Value Date/Time    ALKPHOS 168 08/23/2022 07:14 AM    ALT 28 08/23/2022 07:14 AM    AST 14 08/23/2022 07:14 AM    PROT 6.7 08/23/2022 07:14 AM    BILITOT 0.4 08/23/2022 07:14 AM    BILIDIR <0.2 03/16/2021 12:16 PM    IBILI see below 03/16/2021 12:16 PM    LABALBU 2.5 08/23/2022 07:14 AM    LABALBU 4.7 05/21/2012 01:48 PM       PT/INR:    Lab Results   Component Value Date/Time    PROTIME 13.1 08/19/2022 08:25 AM    PROTIME 10.8 05/07/2012 03:45 AM    INR 1.2 08/19/2022 08:25 AM       TSH:    Lab Results   Component Value Date/Time    TSH 0.548 03/16/2021 12:16 PM       U/A:    Lab Results   Component Value Date/Time    COLORU Yellow 08/17/2022 05:21 AM    PHUR 6.5 08/17/2022 05:21 AM    LABCAST MODERATE 05/06/2012 04:00 AM    WBCUA NONE 05/06/2022 07:21 PM    WBCUA 1-3 05/06/2012 04:00 AM    RBCUA NONE 05/06/2022 07:21 PM    RBCUA 0-1 12/13/2013 10:15 AM    BACTERIA NONE SEEN 05/06/2022 07:21 PM    CLARITYU Clear 08/17/2022 05:21 AM    SPECGRAV <=1.005 08/17/2022 05:21 AM    LEUKOCYTESUR Negative 08/17/2022 05:21 AM    UROBILINOGEN 1.0 08/17/2022 05:21 AM    BILIRUBINUR Negative 08/17/2022 05:21 AM    BILIRUBINUR NEGATIVE 05/06/2012 04:00 AM    BLOODU Negative 08/17/2022 05:21 AM    GLUCOSEU Negative 08/17/2022 05:21 AM    GLUCOSEU NEGATIVE 05/06/2012 04:00 AM       ABG:    Lab Results   Component Value Date/Time    F3PWENOU 94.0 05/06/2012 10:00 AM       MICROBIOLOGY:     Blood cx -  ( 8/17/2022)     Susceptibility    Staphylococcus aureus (1)    Antibiotic Interpretation Microscan  Method Status    clindamycin Resistant >=^4 mcg/mL BACTERIAL SUSCEPTIBILITY PANEL BY NAVARRO     DAPTOmycin Sensitive ^1 mcg/mL BACTERIAL SUSCEPTIBILITY PANEL BY NAVARRO     doxycycline Sensitive <=^0.5 mcg/mL BACTERIAL SUSCEPTIBILITY PANEL BY NAVARRO     erythromycin Resistant >=^8 mcg/mL BACTERIAL SUSCEPTIBILITY PANEL BY NAVARRO     gentamicin Sensitive <=^0.5 mcg/mL BACTERIAL SUSCEPTIBILITY PANEL BY NAVARRO     oxacillin Resistant >=^4 mcg/mL BACTERIAL SUSCEPTIBILITY PANEL BY NAVARRO     tigecycline Sensitive <=^0.12 mcg/mL BACTERIAL SUSCEPTIBILITY PANEL BY NAVARRO     trimethoprim-sulfamethoxazole Sensitive <=^10 mcg/mL BACTERIAL SUSCEPTIBILITY PANEL BY NAVARRO     vancomycin Sensitive ^1 mcg/mL BACTERIAL SUSCEPTIBILITY PANEL BY NAVARRO            Wound cx -     Gram Stain Result Refer to ordered Gram stain for results    Organism Staphylococcus aureus Abnormal     Body Fluid Culture, Sterile --    Heavy growth        Vanco random  19.2      Radiology :    Right knee -    Extensive postsurgical changes about the distal right femur, right knee and   distal right tibia and fibula. No gross hardware complications. No acute fracture or subluxation. TTE -     Limited study to r/o Endocarditis. Technically sub-optimal images. No gross indication of Endocarditis -Consider FELICIANO to r/o Endocarditis. Normal left ventricular chamber size. Abnormal septal motion, EF 50- 55%. Valves not well visualized. There is mild mitral regurgitation. There is trace tricuspid regurgitation. No intra cardiac thrombus. No previous echo for comparison.      CT scan -    THORACIC SPINE: No vertebral compression fracture. No CT evidence of discitis-osteomyelitis. No high-grade spinal or neural foraminal stenosis. No abnormal enhancement. Spinal stimulator. LUMBAR SPINE:     No vertebral compression fracture. No CT evidence of discitis-osteomyelitis. Degenerative changes result in mild-to-moderate spinal stenosis at T12-L1,   and milder spinal stenosis at other levels. Moderate to severe left neural   foraminal stenosis at L5-S1. See details above. No abnormal enhancement. Spinal stimulator.        IMPRESSION:     MRSA bacteremia - follow up blood cx non  8/18- neg   MRSA  right TKA infection s/p I & D   Mod obesity       RECOMMENDATIONS:       Vancomycin 1250 mg IV q 12 hrs ( pharmacy following )   Rifampin 300 mg po q 12 hrs   Needs at least  6 weeks of IV vancomycin

## 2022-08-24 NOTE — PROGRESS NOTES
Department of Orthopedic Surgery  Resident Progress Note    Patient seen and examined at bedside this morning. No significant changes overnight. Continued pain predominantly to the right knee, worsened with motion. No chest pain or shortness of breath. No nausea or vomiting. No fevers or chills. No changes in sensation from previous exam and no acute events overnight. VITALS:  /80   Pulse 92   Temp 98.7 °F (37.1 °C) (Temporal)   Resp 16   LMP 08/31/1988   SpO2 94%     General: alert and oriented to person, place and time, obese    MUSCULOSKELETAL:   Right lower extremity:  ACE dressing C/D/I about the extremity  Limited knee flexion and extension, motion remains limited, flexion of the knee improving passively, approximately 70 degrees  Pain with flexion of the hip, lower back pain produced with passive straight leg raise, no pain with internal and external rotation of the hip  Compartments soft and compressible  +PF/DF/EHL, 4 - / 5  +2/4 DP & PT pulses, Brisk Cap refill, Toes warm and perfused  Distal sensation grossly intact to Peroneals, Sural, Saphenous, and tibial nerve distributions, subjectively diminished per patient    CBC:   Lab Results   Component Value Date/Time    WBC 5.0 08/23/2022 07:14 AM    HGB 12.3 08/23/2022 07:14 AM    HCT 37.4 08/23/2022 07:14 AM     08/23/2022 07:14 AM     PT/INR:    Lab Results   Component Value Date/Time    PROTIME 13.1 08/19/2022 08:25 AM    PROTIME 10.8 05/07/2012 03:45 AM    INR 1.2 08/19/2022 08:25 AM       Intraop Cultures: S.  Aureus with respect to knee, cultures from hip aspiration remain negative    ASSESSMENT  S/P irrigation and debridement of right knee with insertion of antibiotic beads, right thigh, and aspiration of right hip with fluoroscopy on 8/19/2022    PLAN      Continue physical therapy and protocol: WBAT - RLE  Appreciate infectious disease recommendations regarding further antibiotic therapy  Deep venous thrombosis prophylaxis -per medical service, early mobilization  PT/OT appreciated and recommended to help prevent stiffness to the right lower extremity  Pain Control: IV and PO  D/C Plan: Appreciate PT/OT and social work evaluations. Appreciate recommendations of neurosurgery from recent CT of the thoracic and lumbar spine.

## 2022-08-24 NOTE — PROGRESS NOTES
Occupational Therapy  OT BEDSIDE TREATMENT NOTE    [address]      Date:2022  Patient Name: Yaron Singletary  MRN: [id number]  : 1960  Room: [address] [place] [address]       Evaluating OT: Cedric Pendleton, [address] Annabi OTR/L; [id number]       Referring Provider: Dorie Shoemaker DO    Specific Provider Orders/Date: OT Eval and Treat 22       Diagnosis: Septic Joint     Surgery:    22  Right knee arthrotomy with exploration irrigation debridement of infection, insertion absorbable antibiotic beads  Right distal lateral thigh suspected abscess incision, debridement and irrigation  Right hip aspiration with fluoroscopic guidance  Sx History:   RIGHT ANKLE IRRIGAITON AND DEBRIDEMENT WITH EXTERNAL FIXATOR AND LACERATION REPAIR (22)  RIGHT LOWER EXTREMITY REMOVAL EXTERNAL FIXATOR, RIGHT PILON OPEN REDUCTION INTERNAL FIXATOR--SYNTHES (3/2/22)    Pertinent Medical History:  has a past medical history of Adenoma of right adrenal gland, Anemia, Anxiety, Arthritis, Asthma, Benign essential tremor, Bipolar affective ([address] Chronic back pain, Chronic respiratory failure with hypoxia ([address] Depression, Diabetes mellitus ([address] Difficulty swallowing, Environmental and seasonal allergies, Excessive physiologic tremor, Fatty liver, Full dentures, GERD (gastroesophageal reflux disease), Gout, Herniated cervical disc, History of swelling of feet, Hypertension, Lymphedema of lower extremity, Mitochondrial cytopathy ([address] Muscle weakness (generalized), Need for assistance with personal care, Neuropathy, Obesity, PETR (obstructive sleep apnea), Osteoarthritis of left knee, PONV (postoperative nausea and vomiting), Seizures ([address] Spinal headache, and Thyroid disease.        Reason for admission: increased fatigue and RLE pain at SNF     Recommended Adaptive Equipment:  TBD pending progression/discharge - AE for LE bathing and dressing Precautions:  Fall Risk, Bed Alarm, WBAT RLE, O2 (baseline 2L NC at night only)      Assessment of current deficits:    [x] Functional mobility            [x]ADLs           [x] Strength                  [x]Cognition    [x] Functional transfers          [x] IADLs         [x] Safety Awareness   [x]Endurance    [x] Fine Coordination                         [x] Balance      [] Vision/perception   []Sensation      []Gross Motor Coordination             [] ROM           [] Delirium                   [] Motor Control      OT PLAN OF CARE   OT POC based on physician orders, patient diagnosis and results of clinical assessment     Frequency/Duration: 3-5 days/wk for 2 weeks PRN   Specific OT Treatment Interventions to include:   * Instruction/training on adapted ADL techniques and AE recommendations to increase functional independence within precautions       * Training on energy conservation strategies, correct breathing pattern and techniques to improve independence/tolerance for self-care routine  * Functional transfer/mobility training/DME recommendations for increased independence, safety, and fall prevention  * Patient/Family education to increase follow through with safety techniques and functional independence  * Recommendation of environmental modifications for increased safety with functional transfers/mobility and ADLs  * Therapeutic exercise to improve motor endurance, ROM, and functional strength for ADLs/functional transfers  * Therapeutic activities to facilitate/challenge dynamic balance, stand tolerance for increased safety and independence with ADLs     Home Living: Pt BOB stay since Feburary 2022.    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     Pain Level: BLE pain reported, educated on positioning  Cognition: A&O: 4/4; Follows multi step directions              Memory:  good              Sequencing:  fair              Problem solving:  fair              Judgement/safety:  fair                Functional Assessment:  AM-PAC Daily Activity Raw Score: 14/24    Initial Eval Status  Date: 8/20/22 Treatment Status  Date: 8/24/22 STGs = LTGs  Time frame: 10-14 days   Feeding Stand by Assist   Demonstrated functional reach for all items from tray seated in semi supine position  set up Independent    Grooming Stand by Assist   Brushing hair seated EOB and washing face  set up  To wash face seated EOB Independent    UB Dressing Minimal Assist   Doff soiled gown/lake new gown  Min A  To don/doff gown seated EOB Independent    LB Dressing Dependent   Lake socks lying supine   Increased pain with slight RLE leg raise to lake sock   unable to simulated figure 4 technique d/t pain  Requested to return to bed - LB AE not addressed this session Dependent  To don/doff socks seated EOB  Minimal Assist    Bathing Maximal Assist  Decreased functional reach for LB bathing supine/stead EOB   UB bathing tasks completed supine with task set up  Mod A  Seated EOB, assist required for below knee and posterior Minimal Assist    Toileting Dependent   Pure wick upon entering room however bed soiled of urine  Required assist for all pericare using log roll technique     Pt requested to not reapply at end of session d/t multiple leaks  RN notified   Pt continent and will call for use of bed pan for all bowel/bladder movements Max A- hygiene  Minimal Assist    Bed Mobility  Log Roll:  Mod A to L side (with support for RLE)  Min A to R side  Supine to sit: Maximal Assist x 2  Progressing RLE to EOB and assist with trunk (HOB elevated)  Sit to supine: Maximal Assist x2  BLE assist and trunk support Mod A- supine<->sit  Mod A- scooting  Supine to sit: Minimal Assist   Sit to supine: Minimal Assist    Functional Transfers Sit to stand: NT   Stand to sit:NT  Stand pivot: NT  Commode: NT  Declined d/t pain in RLE Max A- sit<->stand  Cuing for hand placement and body mechanics  Sit to stand: Mod A   Stand to sit: Mod A   Stand pivot: Mod A   Commode: Mod A     Functional Mobility NT N/T  Moderate Assist with AD   Balance Sitting:     Static - Min A <> SBA   Educated on BUE support using bed rail - posterior lean d/t RLE pain     Dynamic - Min A  Standing: NT Sitting:     Static - Ind      Dynamic - SBA  Standing: Max A Sitting:  Static: Sup  Dynamic:  Sup  Standing: Mod A with AD   Activity Tolerance Fair -   Limited d/t RLE pain with all functional mobility   Required increased time to complete all mobility/ADLs  Fair-  Decreased by pain and dizziness GOOD   Visual/  Perceptual Glasses: yes          Safety FAIR  Educated on WBAT RLE prior to functional mobility  Fair GOOD   during ADL completion following WBAT RLE   Vitals    SpO2 during session on 2L NC: 97-99%        /93 seated EOB  112/72 standing            Comments: Upon arrival pt supine in bed. Pt educated on techniques to increase independence and safety during ADL's, bed mobility, and functional transfers. At end of session pt left seated upright in bed, call light within reach. Pt has made fair+ progress towards set goals.      Continue with current plan of care    Treatment Time In: 9:17            Treatment Time Out: 9:41             Treatment Charges: Mins Units   Ther Ex  [id number]     Manual Therapy [phone]     Thera Activities [id number] 10 1   ADL/Home Mgt [id number] 14 1   Neuro Re-ed [id number]     Group Therapy      Orthotic manage/training  [id number]     Non-Billable Time     Total Timed Treatment 24 2     Ashok Leggett

## 2022-08-24 NOTE — PROGRESS NOTES
Hospitalist Progress Note      Synopsis: Patient admitted on 8/19/2022     Ms. Margot Wyatt, a 64y.o. year old female  who  has a past medical history of Adenoma of right adrenal gland, Anemia, Anxiety, Arthritis, Asthma, Benign essential tremor, Bipolar affective ([address] Chronic back pain, Chronic respiratory failure with hypoxia ([address] Depression, Diabetes mellitus ([address] Difficulty swallowing, Environmental and seasonal allergies, Excessive physiologic tremor, Fatty liver, Full dentures, GERD (gastroesophageal reflux disease), Gout, Herniated cervical disc, History of swelling of feet, Hypertension, Lymphedema of lower extremity, Mitochondrial cytopathy ([address] Muscle weakness (generalized), Need for assistance with personal care, Neuropathy, Obesity, PETR (obstructive sleep apnea), Osteoarthritis of left knee, PONV (postoperative nausea and vomiting), Seizures ([address] Spinal headache, and Thyroid disease. The patient fractured her right ankle and underwent an ORIF in January 2022 by Dr. Angelito Cox. She resides at [facility]. She developed redness, swelling and pain in the right leg. sHe denies any trauma. She says she has been dealing with a chronic callus and an ulcer on the distal part of her right first toe. She has been seen by a podiatrist who has been debriding this ulcer at the facility. She patient was sent to the ED at [facility] on 8/17/2022 with pain in the lright lower extremity. sHe was seen by infectious disease for cellulitis of the right lower extremity. There is also some concern for septic joint. Orthopedics was consulted and advised the patient be transferred to [facility] for interventional radiology aspiration of the suspect joint.   Subjective    Patient complaining of pain issues  Complaining of dizziness  Does have intermittent vertigo symptoms  Requesting Antivert        Exam:  /70   Pulse 85   Temp (!) 96.6 °F (35.9 °C) (Temporal)   Resp 17   LMP 08/31/1988   SpO2 95%   General appearance: No apparent distress, appears stated age and cooperative. HEENT: Pupils equal, round, and reactive to light. Conjunctivae/corneas clear. Neck: Trachea midline. Respiratory: Distant but mild wheezing   cardiovascular: Regular rate and rhythm with normal S1/S2 without murmurs, rubs or gallops. Abdomen: Soft, non-tender, non-distended with normal bowel sounds. Musculoskeletal: Bilateral lower extremities feet are deformed\  Right knee has a postop dressing in place. Left leg with no open areas.   Right knee is dressed, right leg decreased PMS   Skin:  No rashes    Neurologic: awake, alert and following commands     Medications:  Reviewed    Infusion Medications    sodium chloride       Scheduled Medications    lidocaine PF  5 mL IntraDERmal Once    sodium chloride flush  5-40 mL IntraVENous 2 times per day    heparin flush  3 mL IntraVENous 2 times per day    enoxaparin  30 mg SubCUTAneous BID    allopurinol  200 mg Oral BID    baclofen  10 mg Oral 4x daily    docusate sodium  100 mg Oral BID    escitalopram  20 mg Oral BID    famotidine  20 mg Oral BID    lipase-protease-amylase  72,000 Units Oral TID WC    metoprolol succinate  12.5 mg Oral Daily    topiramate  200 mg Oral BID    traZODone  100 mg Oral Nightly    ziprasidone  20 mg Oral Nightly    furosemide  20 mg Oral Daily    rifAMPin  300 mg Oral 2 times per day    ferrous sulfate  325 mg Oral Daily    gabapentin  600 mg Oral 4x Daily    levOCARNitine  330 mg Oral TID    vancomycin  1,250 mg IntraVENous Q12H     PRN Meds: sodium chloride flush, sodium chloride, heparin flush, bisacodyl, albuterol, ondansetron **OR** ondansetron, polyethylene glycol, HYDROmorphone, HYDROcodone 5 mg - acetaminophen **OR** HYDROcodone-acetaminophen    I/O    Intake/Output Summary (Last 24 hours) at 8/24/2022 1020  Last data filed at 8/24/2022 0537  Gross per 24 hour   Intake 390 ml   Output --   Net 390 ml       Labs:   Recent Labs 08/22/22  0501 08/23/22  0714   WBC 5.0 5.0   HGB 12.0 12.3   HCT 36.5 37.4    365       Recent Labs     08/22/22  0501 08/23/22  0714    139   K 3.6 3.9    102   CO2 28 28   BUN 12 11   CREATININE 0.5 0.6   CALCIUM 9.3 9.6       Recent Labs     08/22/22  0501 08/23/22  0714   PROT 6.5 6.7   ALKPHOS 196* 168*   ALT 40* 28   AST 22 14   BILITOT 0.6 0.4       No results for input(s): INR in the last 72 hours. No results for input(s): Marjorie Dross in the last 72 hours. Chronic labs:  Lab Results   Component Value Date    CHOL 154 04/11/2022    TRIG 163 (H) 04/11/2022    HDL 49 04/11/2022    LDLCALC 72 04/11/2022    TSH 0.548 03/16/2021    INR 1.2 08/19/2022    LABA1C 5.7 12/13/2013       Microbiology:  Pending  No results for input(s): BC in the last 72 hours. No results for input(s): ORG in the last 72 hours. No results for input(s): Rosales Kappa in the last 72 hours. No results for input(s): STREPNEUMAGU in the last 72 hours. No results for input(s): LP1UAG in the last 72 hours. No results for input(s): ASO in the last 72 hours. No results for input(s): CULTRESP in the last 72 hours. No results for input(s): PROCAL in the last 72 hours. Radiology:  XR FEMUR RIGHT (MIN 2 VIEWS)    Result Date: 8/19/2022  Extensive postsurgical changes about the distal right femur, right knee and distal right tibia and fibula. No gross hardware complications. No acute fracture or subluxation. XR KNEE RIGHT (3 VIEWS)    Result Date: 8/19/2022  Extensive postsurgical changes about the distal right femur, right knee and distal right tibia and fibula. No gross hardware complications. No acute fracture or subluxation. XR TIBIA FIBULA RIGHT (2 VIEWS)    Result Date: 8/17/2022  1. Extensive postsurgical changes about the distal right femur, right knee, and distal right tibia and fibula. No hardware complications.  2.  Diffuse soft tissue stranding and more focal anterior soft tissue edema. There is no definite evidence of osseous destruction or erosion about the right tibia or fibula on this exam.     CT HEAD WO CONTRAST    Result Date: 8/17/2022  No acute intracranial abnormality. XR CHEST PORTABLE    Result Date: 8/17/2022  Atherosclerotic disease and mild prominence of the cardiac silhouette. Central pulmonary vascular congestion. No pneumothorax. FLUORO FOR SURGICAL PROCEDURES    Result Date: 8/19/2022  Intraprocedural fluoroscopic spot images as above. See separate procedure report for more information. 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 *  Asthma   bipolar disorder    Diabetes  Arthritis     PLAN:    Infectious disease following  Currently on vancomycin/rifampin  S/p surgery I&D on her right knee  Follow-up blood cultures awaited  CAT scan results noted      Diet: ADULT DIET; Regular  Code Status: Full Code  PT/OT Eval Status:   Once okay with surgery  DVT Prophylaxis:   In place  Recommended disposition at discharge: Discharge pending awaiting cultures    +++++++++++++++++++++++++++++++++++++++++++++++++  Rico Alberto MD   [facility].  +++++++++++++++++++++++++++++++++++++++++++++++++  NOTE: This report was transcribed using voice recognition software. Every effort was made to ensure accuracy; however, inadvertent computerized transcription errors may be present.

## 2022-08-24 NOTE — PROGRESS NOTES
White lumen of PICC still very hard to flush even after heparin (Can run IVF through the lumen at lower rates without setting off the occluded pump alarm). Likely needs some Cathflo.

## 2022-08-24 NOTE — CARE COORDINATION
8/24/2022 social work transition of care planning  Pt plan is to return to [facility], once medically stable. Electronically signed by GAGE Santillan on 8/24/2022 at 8:41 AM    Addendum: set up will call to [facility] with [facility] [phone]. NO COVID TEST NEEDED.   Electronically signed by GAGE Santillan on 8/24/2022 at 3:48 PM

## 2022-08-24 NOTE — PROGRESS NOTES
Physical Therapy  Physical Therapy Initial Assessment     Name: Brooklyn Madden  : 1960  MRN: [id number]      Date of Service: 2022    Evaluating PT:  Renata Bell, PT, DPT [id number]    Room #:  0641/8488-N  Diagnosis:  Septic joint ([facility] [M00.9]  PMHx/PSHx:    Past Medical History:   Diagnosis Date    Adenoma of right adrenal gland     Anemia     Anxiety     Arthritis     spinal    Asthma     controlled with inhalers     Benign essential tremor     Bipolar affective ([facility]     Chronic back pain     Spinal cord stimulator in place    Chronic respiratory failure with hypoxia ([facility]     at times dt diaphragm does not function properly dt Mitochondrial disorder    Depression     stable    Diabetes mellitus ([facility]     Difficulty swallowing     for EGD 10-8-19     Environmental and seasonal allergies     Excessive physiologic tremor 2021    Fatty liver     Full dentures     GERD (gastroesophageal reflux disease)     Gout     past hx of    Herniated cervical disc     limited rom of head and neck    History of swelling of feet     Hypertension     Lymphedema of lower extremity 2012    Mitochondrial cytopathy ([facility]     s/s muscle and nerve pain, difficulty breathing, seizures, difficulty swallowing, digestive disorders- Dr. Keren Arteaga [facility]    Muscle weakness (generalized)     Information obtained from [facility]    Need for assistance with personal care     Information obtained from [facility]    Neuropathy     at feet    Obesity     PETR (obstructive sleep apnea)     no CPAP dt insurance will not pay for    Osteoarthritis of left knee     Information obtained from [facility]    PONV (postoperative nausea and vomiting)     Seizures ([facility]     last approx 19- f/u w/ PCP  Granmal, flares up in heat/ summer time - no recent issues as of 10-4-19     Spinal headache     Thyroid disease      Procedure/Surgery:   Right knee arthrotomy with exploration irrigation debridement of infection, insertion absorbable antibiotic beads, Right distal lateral thigh suspected abscess incision, debridement and irrigation, Right hip aspiration with fluoroscopic guidance  Precautions:  Falls, WBAT RLE, O2  Equipment Needs:  TBD    SUBJECTIVE:    Pt was admitted from [place] [address]. Pt ambulated with rollator for short distances and required assistance PTA. OBJECTIVE:   Initial Evaluation  Date: 8/20/22 Treatment 8/24/22 Short Term/ Long Term   Goals   AM-PAC 6 Clicks 2/93 47/86    Was pt agreeable to Eval/treatment? Yes yes    Does pt have pain?  9/10 RLE pain - RN notified and pt was medicated 7/10     Bed Mobility  Rolling: Keren to R; ModA to L  Supine to sit: MaxA x 2 with HOB elevated  Sit to supine: MaxA x 2  Scooting: MaxA Rolling Keren  Supine to sit ModA  Sit to supine ModA  Scooting ModA Keren   Transfers Sit to stand: NT  Stand to sit: NT  Stand pivot: NT Sit to stand MaxA  Stand to sit MaxA Keren with [employer]   Ambulation   NT N/T >50 feet with Keren with [employer]   Stair negotiation: ascended and descended NT N/T    ROM BUE:  WFL  BLE:  WFL     Strength BUE:  WFL  LLE:  4/5 grossly  Increase by 1/3 MMT grade   Balance Sitting EOB:  Keren dynamic  Dynamic Standing:  NT Sitting EOB SBA standing MaxA with ww Sitting EOB:  Independent  Dynamic Standing:  Keren with [employer]     Pt is A & O x 4  Sensation:  chronic paresthesias due to neuropathy  Edema:  None    Therapeutic Exercises:  NA    Patient education  Pt educated on safety    Patient response to education:   Pt verbalized understanding Pt demonstrated skill Pt requires further education in this area   x x x     ASSESSMENT:    Conditions Requiring Skilled Therapeutic Intervention:    []Decreased strength     []Decreased ROM  [x]Decreased functional mobility  [x]Decreased balance   [x]Decreased endurance   []Decreased posture  []Decreased sensation  []Decreased coordination   []Decreased vision  []Decreased safety awareness   [x]Increased pain       Comments: Pt cleared for tx session. Pt found in bed and agreed to tx. Pt received meds. Pt c/o pain R LE. Pt required ModA for bed mobility and cues for technique. Decreased assistance for bed mobility. Pt sat EOB for over 10 minutes. Pt reports dizziness. Pt's BP checked 110/93 sitting EOB. Pt stood to ww. Pt's BP standing 112/72. Pt unable to advance B LEs to amb. Pt returned to sitting EOB. Pt sidescooted toward [address] [facility] [address]. Increased time required due to pt slow moving and c/o pain. Pt assisted back to bed and skillfully repositioned. Pt given call litght. Treatment:  Patient practiced and was instructed in the following treatment:    Bed mobility training - pt given verbal and tactile cues to facilitate proper sequencing and safety during rolling and supine>sit as well as provided with physical assistance. Sitting EOB for >10 minutes for upright tolerance, postural awareness   Transfers: Pt required cues for technique and heavy assist.   Skilled positioning - Pt placed in reclined position with pillows utilized to facilitate upright posture, joint and skin integrity, and interaction with environment. Pt's/ family goals   1. Return to PLOF    Prognosis is good for reaching above PT goals. Patient and or family understand(s) diagnosis, prognosis, and plan of care.   yes    PHYSICAL THERAPY PLAN OF CARE:    PT POC is established based on physician order and patient diagnosis     Referring provider/PT Order:  Richar Castano DO /08/19/22 1700 PT eval and treat  Diagnosis:  Septic joint ([facility] [M00.9]  Specific instructions for next treatment:  Progress activity    Current Treatment Recommendations:     [x] Strengthening to improve independence with functional mobility   [x] ROM to improve independence with functional mobility   [x] Balance Training to improve static/dynamic balance and to reduce fall risk  [x] Endurance Training to improve activity tolerance during functional mobility   [x] Transfer Training to improve safety and independence with all functional transfers   [x] Gait Training to improve gait mechanics, endurance and asses need for appropriate assistive device  [] Stair Training in preparation for safe discharge home and/or into the community   [x] Positioning to prevent skin breakdown and contractures  [x] Safety and Education Training   [x] Patient/Caregiver Education   [] HEP  [] Other     PT long term treatment goals are located in above grid    Frequency of treatments: 2-5x/week x 1-2 weeks. Time in  9:15  Time out  9:40    Total Treatment Time  25 minutes     Evaluation Time includes thorough review of current medical information, gathering information on past medical history/social history and prior level of function, completion of standardized testing/informal observation of tasks, assessment of data and education on plan of care and goals.     CPT codes:  [] Low Complexity PT evaluation 33495  [] Moderate Complexity PT evaluation 83049  [] High Complexity PT evaluation 14495  [] PT Re-evaluation 84566  [] Gait training 58054 - minutes  [] Manual therapy 77442 - minutes  [x] Therapeutic activities 38903 25 minutes  [] Therapeutic exercises 32760 - minutes  [] Neuromuscular reeducation 31486 - minutes   Alyssa Bowling [id number]

## 2022-08-24 NOTE — PLAN OF CARE
Problem: Chronic Conditions and Co-morbidities  Goal: Patient's chronic conditions and co-morbidity symptoms are monitored and maintained or improved  Outcome: Progressing     Problem: Discharge Planning  Goal: Discharge to home or other facility with appropriate resources  Outcome: Progressing     Problem: Skin/Tissue Integrity  Goal: Absence of new skin breakdown  Description: 1. Monitor for areas of redness and/or skin breakdown  2. Assess vascular access sites hourly  3. Every 4-6 hours minimum:  Change oxygen saturation probe site  4. Every 4-6 hours:  If on nasal continuous positive airway pressure, respiratory therapy assess nares and determine need for appliance change or resting period.   Outcome: Progressing     Problem: Safety - Adult  Goal: Free from fall injury  Outcome: Progressing     Problem: Pain  Goal: Verbalizes/displays adequate comfort level or baseline comfort level  Outcome: Progressing     Problem: Skin/Tissue Integrity - Adult  Goal: Incisions, wounds, or drain sites healing without S/S of infection  Outcome: Progressing     Problem: Musculoskeletal - Adult  Goal: Return mobility to safest level of function  Outcome: Not Progressing  Goal: Maintain proper alignment of affected body part  Outcome: Progressing  Goal: Return ADL status to a safe level of function  Outcome: Not Progressing     Problem: Infection - Adult  Goal: Absence of infection at discharge  Outcome: Progressing

## 2022-08-25 VITALS
SYSTOLIC BLOOD PRESSURE: 106 MMHG | OXYGEN SATURATION: 95 % | TEMPERATURE: 97.5 F | DIASTOLIC BLOOD PRESSURE: 62 MMHG | RESPIRATION RATE: 17 BRPM | HEART RATE: 80 BPM

## 2022-08-25 PROCEDURE — 2580000003 HC RX 258: Performed by: STUDENT IN AN ORGANIZED HEALTH CARE EDUCATION/TRAINING PROGRAM

## 2022-08-25 PROCEDURE — 6360000002 HC RX W HCPCS: Performed by: STUDENT IN AN ORGANIZED HEALTH CARE EDUCATION/TRAINING PROGRAM

## 2022-08-25 PROCEDURE — 97530 THERAPEUTIC ACTIVITIES: CPT

## 2022-08-25 PROCEDURE — 6370000000 HC RX 637 (ALT 250 FOR IP): Performed by: INTERNAL MEDICINE

## 2022-08-25 PROCEDURE — 2580000003 HC RX 258: Performed by: INTERNAL MEDICINE

## 2022-08-25 PROCEDURE — 6370000000 HC RX 637 (ALT 250 FOR IP): Performed by: STUDENT IN AN ORGANIZED HEALTH CARE EDUCATION/TRAINING PROGRAM

## 2022-08-25 PROCEDURE — 6360000002 HC RX W HCPCS: Performed by: INTERNAL MEDICINE

## 2022-08-25 PROCEDURE — 97535 SELF CARE MNGMENT TRAINING: CPT

## 2022-08-25 RX ADMIN — HYDROMORPHONE HYDROCHLORIDE 0.5 MG: 1 INJECTION, SOLUTION INTRAMUSCULAR; INTRAVENOUS; SUBCUTANEOUS at 05:16

## 2022-08-25 RX ADMIN — DOCUSATE SODIUM 100 MG: 100 CAPSULE, LIQUID FILLED ORAL at 08:15

## 2022-08-25 RX ADMIN — VANCOMYCIN HYDROCHLORIDE 1250 MG: 10 INJECTION, POWDER, LYOPHILIZED, FOR SOLUTION INTRAVENOUS at 17:10

## 2022-08-25 RX ADMIN — HYDROCODONE BITARTRATE AND ACETAMINOPHEN 1 TABLET: 10; 325 TABLET ORAL at 03:41

## 2022-08-25 RX ADMIN — RIFAMPIN 300 MG: 300 CAPSULE ORAL at 08:16

## 2022-08-25 RX ADMIN — SODIUM CHLORIDE, PRESERVATIVE FREE 10 ML: 5 INJECTION INTRAVENOUS at 13:13

## 2022-08-25 RX ADMIN — BACLOFEN 10 MG: 10 TABLET ORAL at 17:00

## 2022-08-25 RX ADMIN — FAMOTIDINE 20 MG: 20 TABLET, FILM COATED ORAL at 08:15

## 2022-08-25 RX ADMIN — BACLOFEN 10 MG: 10 TABLET ORAL at 12:51

## 2022-08-25 RX ADMIN — GABAPENTIN 600 MG: 300 CAPSULE ORAL at 12:51

## 2022-08-25 RX ADMIN — ALLOPURINOL 200 MG: 100 TABLET ORAL at 08:18

## 2022-08-25 RX ADMIN — HEPARIN 300 UNITS: 100 SYRINGE at 13:14

## 2022-08-25 RX ADMIN — FUROSEMIDE 20 MG: 20 TABLET ORAL at 08:14

## 2022-08-25 RX ADMIN — GABAPENTIN 600 MG: 300 CAPSULE ORAL at 17:00

## 2022-08-25 RX ADMIN — ENOXAPARIN SODIUM 30 MG: 100 INJECTION SUBCUTANEOUS at 08:18

## 2022-08-25 RX ADMIN — GABAPENTIN 600 MG: 300 CAPSULE ORAL at 08:15

## 2022-08-25 RX ADMIN — VANCOMYCIN HYDROCHLORIDE 1250 MG: 10 INJECTION, POWDER, LYOPHILIZED, FOR SOLUTION INTRAVENOUS at 05:18

## 2022-08-25 RX ADMIN — HYDROMORPHONE HYDROCHLORIDE 0.5 MG: 1 INJECTION, SOLUTION INTRAMUSCULAR; INTRAVENOUS; SUBCUTANEOUS at 13:13

## 2022-08-25 RX ADMIN — PANCRELIPASE 72000 UNITS: 36000; 180000; 114000 CAPSULE, DELAYED RELEASE PELLETS ORAL at 08:16

## 2022-08-25 RX ADMIN — ESCITALOPRAM OXALATE 20 MG: 10 TABLET ORAL at 08:14

## 2022-08-25 RX ADMIN — POLYETHYLENE GLYCOL 3350 17 G: 17 POWDER, FOR SOLUTION ORAL at 08:24

## 2022-08-25 RX ADMIN — TOPIRAMATE 200 MG: 100 TABLET, FILM COATED ORAL at 08:17

## 2022-08-25 RX ADMIN — PANCRELIPASE 72000 UNITS: 36000; 180000; 114000 CAPSULE, DELAYED RELEASE PELLETS ORAL at 12:52

## 2022-08-25 RX ADMIN — HYDROCODONE BITARTRATE AND ACETAMINOPHEN 1 TABLET: 10; 325 TABLET ORAL at 11:54

## 2022-08-25 RX ADMIN — METOPROLOL SUCCINATE 12.5 MG: 25 TABLET, EXTENDED RELEASE ORAL at 08:15

## 2022-08-25 RX ADMIN — BACLOFEN 10 MG: 10 TABLET ORAL at 08:14

## 2022-08-25 RX ADMIN — PANCRELIPASE 72000 UNITS: 36000; 180000; 114000 CAPSULE, DELAYED RELEASE PELLETS ORAL at 17:00

## 2022-08-25 RX ADMIN — FERROUS SULFATE TAB 325 MG (65 MG ELEMENTAL FE) 325 MG: 325 (65 FE) TAB at 08:15

## 2022-08-25 ASSESSMENT — PAIN DESCRIPTION - LOCATION
LOCATION: LEG
LOCATION: KNEE;LEG
LOCATION: KNEE

## 2022-08-25 ASSESSMENT — PAIN DESCRIPTION - ONSET
ONSET: GRADUAL
ONSET: ON-GOING

## 2022-08-25 ASSESSMENT — PAIN SCALES - GENERAL
PAINLEVEL_OUTOF10: 10
PAINLEVEL_OUTOF10: 10
PAINLEVEL_OUTOF10: 8
PAINLEVEL_OUTOF10: 8
PAINLEVEL_OUTOF10: 6

## 2022-08-25 ASSESSMENT — PAIN DESCRIPTION - FREQUENCY
FREQUENCY: CONTINUOUS
FREQUENCY: INTERMITTENT

## 2022-08-25 ASSESSMENT — PAIN DESCRIPTION - DESCRIPTORS
DESCRIPTORS: ACHING;SORE;THROBBING
DESCRIPTORS: ACHING;DISCOMFORT;SHARP
DESCRIPTORS: ACHING;SHOOTING;DISCOMFORT
DESCRIPTORS: ACHING;SHOOTING;THROBBING

## 2022-08-25 ASSESSMENT — PAIN DESCRIPTION - ORIENTATION
ORIENTATION: RIGHT

## 2022-08-25 ASSESSMENT — PAIN DESCRIPTION - PAIN TYPE
TYPE: SURGICAL PAIN
TYPE: ACUTE PAIN

## 2022-08-25 ASSESSMENT — PAIN - FUNCTIONAL ASSESSMENT: PAIN_FUNCTIONAL_ASSESSMENT: ACTIVITIES ARE NOT PREVENTED

## 2022-08-25 NOTE — PROGRESS NOTES
Neurosurg progress note  VITALS:  /62   Pulse 80   Temp 97.5 °F (36.4 °C) (Temporal)   Resp 16   LMP 08/31/1988   SpO2 95%   24HR INTAKE/OUTPUT:    Intake/Output Summary (Last 24 hours) at 8/25/2022 0842  Last data filed at 8/24/2022 1111  Gross per 24 hour   Intake 180 ml   Output --   Net 180 ml     XR FEMUR RIGHT (MIN 2 VIEWS)    Result Date: 8/19/2022  EXAMINATION: THREE XRAY VIEWS OF THE RIGHT KNEE; 4 XRAY VIEWS OF THE RIGHT FEMUR 8/19/2022 5:00 am COMPARISON: 2 September 2021 HISTORY: ORDERING SYSTEM PROVIDED HISTORY: Pain TECHNOLOGIST PROVIDED HISTORY: Reason for exam:->Pain What reading provider will be dictating this exam?->CRC FINDINGS: Three views of the right knee and four views of the right femur demonstrate extensive postsurgical changes without evidence of gross hardware complication. There is no evidence of acute fracture or subluxation. The right hip appears unremarkable. The right femur appears intact. Extensive postsurgical changes about the distal right femur, right knee and distal right tibia and fibula. No gross hardware complications. No acute fracture or subluxation. XR KNEE RIGHT (3 VIEWS)    Result Date: 8/19/2022  EXAMINATION: THREE XRAY VIEWS OF THE RIGHT KNEE; 4 XRAY VIEWS OF THE RIGHT FEMUR 8/19/2022 5:00 am COMPARISON: 2 September 2021 HISTORY: ORDERING SYSTEM PROVIDED HISTORY: Pain TECHNOLOGIST PROVIDED HISTORY: Reason for exam:->Pain What reading provider will be dictating this exam?->CRC FINDINGS: Three views of the right knee and four views of the right femur demonstrate extensive postsurgical changes without evidence of gross hardware complication. There is no evidence of acute fracture or subluxation. The right hip appears unremarkable. The right femur appears intact. Extensive postsurgical changes about the distal right femur, right knee and distal right tibia and fibula. No gross hardware complications. No acute fracture or subluxation.      FLUORO FOR SURGICAL PROCEDURES    Result Date: 8/19/2022  EXAMINATION: SPOT FLUOROSCOPIC IMAGES 8/19/2022 2:51 pm TECHNIQUE: Fluoroscopy was provided by the radiology department for procedure. Radiologist was not present during examination. FLUOROSCOPY DOSE AND TYPE OR TIME AND EXPOSURES: 1 image FLUOROSCOPY TIME: 22.6 seconds COMPARISON: None HISTORY: ORDERING SYSTEM PROVIDED HISTORY: rt.hip aspiration TECHNOLOGIST PROVIDED HISTORY: Reason for exam:->rt.hip aspiration What reading provider will be dictating this exam?->CRC Intraprocedural imaging. FINDINGS: 1 spot images of the hip were obtained. Fluoroscopic support provided to subspecialty service for right hip aspiration. No obvious complication on the images provided. Please see subspecialty report for full details and interpretation of real time imaging. Intraprocedural fluoroscopic spot images as above. See separate procedure report for more information.      CBC:   Lab Results   Component Value Date/Time    WBC 5.0 08/23/2022 07:14 AM    RBC 3.78 08/23/2022 07:14 AM    HGB 12.3 08/23/2022 07:14 AM    HCT 37.4 08/23/2022 07:14 AM    MCV 98.9 08/23/2022 07:14 AM    MCH 32.5 08/23/2022 07:14 AM    MCHC 32.9 08/23/2022 07:14 AM    RDW 13.7 08/23/2022 07:14 AM     08/23/2022 07:14 AM    MPV 8.8 08/23/2022 07:14 AM     BMP:    Lab Results   Component Value Date/Time     08/23/2022 07:14 AM    K 3.9 08/23/2022 07:14 AM    K 3.9 05/06/2022 05:28 PM     08/23/2022 07:14 AM    CO2 28 08/23/2022 07:14 AM    BUN 11 08/23/2022 07:14 AM    LABALBU 2.5 08/23/2022 07:14 AM    LABALBU 4.7 05/21/2012 01:48 PM    CREATININE 0.6 08/23/2022 07:14 AM    CALCIUM 9.6 08/23/2022 07:14 AM    GFRAA >60 08/23/2022 07:14 AM    LABGLOM >60 08/23/2022 07:14 AM    GLUCOSE 120 08/23/2022 07:14 AM    GLUCOSE 93 05/24/2012 11:11 AM      lidocaine PF  5 mL IntraDERmal Once    sodium chloride flush  5-40 mL IntraVENous 2 times per day    heparin flush  3 mL IntraVENous 2 times per day    enoxaparin  30 mg SubCUTAneous BID    allopurinol  200 mg Oral BID    baclofen  10 mg Oral 4x daily    docusate sodium  100 mg Oral BID    escitalopram  20 mg Oral BID    famotidine  20 mg Oral BID    lipase-protease-amylase  72,000 Units Oral TID     metoprolol succinate  12.5 mg Oral Daily    topiramate  200 mg Oral BID    traZODone  100 mg Oral Nightly    ziprasidone  20 mg Oral Nightly    furosemide  20 mg Oral Daily    rifAMPin  300 mg Oral 2 times per day    ferrous sulfate  325 mg Oral Daily    gabapentin  600 mg Oral 4x Daily    levOCARNitine  330 mg Oral TID    vancomycin  1,250 mg IntraVENous Q12H     Remains awake and alert oriented friendly and cooperative still complaining of right leg pain no change in the neuro exam able to wiggle toes and dorsi and plantarflex at the ankles  Assessment:  Patient Active Problem List   Diagnosis    Debility    Obesity    Bipolar 1 disorder ([facility]    Generalized seizure disorder ([facility]    Cervicalgia    Asthma    Hyperlipidemia    Hypertension    Arthritis    Lymphedema of lower extremity    Degenerative Osteoarthritis of both knees    Status post total knee replacement, right    Cervical spondylolysis    Degenerative Osteoarthritis thoracic spine    Thoracic facet syndrome    Cervical facet syndrome    Facet syndrome, lumbar    Neural foraminal stenosis of lumbar spine    Lumbar radiculopathy    DDD (degenerative disc disease), cervical    Neural foraminal stenosis of cervical spine    Protruded cervical disc    Cervical radiculopathy    Postlaminectomy syndrome, lumbar    Neuropathic pain syndrome (non-herpetic)    Chronic respiratory failure with hypoxia (HCC)    Mitochondrial cytopathy (HCC)    GERD (gastroesophageal reflux disease)    Fatty liver    Depression    PETR (obstructive sleep apnea)    Chronic back pain    Adenoma of right adrenal gland    Lumbosacral spondylosis without myelopathy    Sacroiliac dysfunction    DDD (degenerative disc disease), lumbar    Thoracic degenerative disc disease    Excessive physiologic tremor    Closed fracture of lower end of right radius with routine healing    Closed left ankle fracture, initial encounter    Closed fracture of right tibial plateau    Closed displaced pilon fracture of right tibia    Acute pancreatitis    Pancreatic pseudocyst    Cellulitis of right lower extremity    Cellulitis    Septic joint ([address]    Infection of prosthetic right knee joint (HCC)     Plan:Continue current care  Royal Barbour MD M.D.

## 2022-08-25 NOTE — PROGRESS NOTES
Physical Therapy  Treatment Note    Name: Mesha Cuenca  : 1960  MRN: [id number]      Date of Service: 2022    Evaluating PT:  Brittany Morfin PT, DPT [id number]    Room #:  9381/5273-B  Diagnosis:  Septic joint ([facility] [M00.9]  PMHx/PSHx:    Past Medical History:   Diagnosis Date    Adenoma of right adrenal gland     Anemia     Anxiety     Arthritis     spinal    Asthma     controlled with inhalers     Benign essential tremor     Bipolar affective ([facility]     Chronic back pain     Spinal cord stimulator in place    Chronic respiratory failure with hypoxia ([facility]     at times dt diaphragm does not function properly dt Mitochondrial disorder    Depression     stable    Diabetes mellitus ([facility]     Difficulty swallowing     for EGD 10-8-19     Environmental and seasonal allergies     Excessive physiologic tremor 2021    Fatty liver     Full dentures     GERD (gastroesophageal reflux disease)     Gout     past hx of    Herniated cervical disc     limited rom of head and neck    History of swelling of feet     Hypertension     Lymphedema of lower extremity 2012    Mitochondrial cytopathy ([facility]     s/s muscle and nerve pain, difficulty breathing, seizures, difficulty swallowing, digestive disorders- Dr. Ginny Romero [facility]    Muscle weakness (generalized)     Information obtained from [facility]    Need for assistance with personal care     Information obtained from [facility]    Neuropathy     at feet    Obesity     PETR (obstructive sleep apnea)     no CPAP dt insurance will not pay for    Osteoarthritis of left knee     Information obtained from [facility]    PONV (postoperative nausea and vomiting)     Seizures ([facility]     last approx 19- f/u w/ PCP  Granmal, flares up in heat/ summer time - no recent issues as of 10-4-19     Spinal headache     Thyroid disease      Procedure/Surgery:   Right knee arthrotomy with exploration irrigation debridement of infection, insertion absorbable antibiotic beads, Right distal lateral thigh suspected abscess incision, debridement and irrigation, Right hip aspiration with fluoroscopic guidance  Precautions:  Falls, WBAT RLE, O2  Equipment Needs:  TBD    SUBJECTIVE:    Pt was admitted from [place] [address]. Pt ambulated with rollator for short distances and required assistance PTA. OBJECTIVE:   Initial Evaluation  Date: 8/20/22 Treatment   Date: 8/25/22 Short Term/ Long Term   Goals   AM-PAC 6 Clicks 8/36 33/39    Was pt agreeable to Eval/treatment? Yes yes    Does pt have pain? 9/10 RLE pain - RN notified and pt was medicated 8/10 RLE and back    Bed Mobility  Rolling: Keren to R; ModA to L  Supine to sit: MaxA x 2 with HOB elevated  Sit to supine: MaxA x 2  Scooting: MaxA Rolling: NT  Supine to sit: min A (HOB elevated)  Sit to supine: NT  Scooting: min A Keren   Transfers Sit to stand: NT  Stand to sit: NT  Stand pivot: NT Sit to stand: min A  Stand to sit: min A  Stand pivot: min A with ww Keren with [facility]   Ambulation   NT 3' with ww min A >50 feet with Keren with [facility]   Stair negotiation: ascended and descended NT NT    ROM BUE:  WFL  BLE:  WFL     Strength BUE:  WFL  LLE:  4/5 grossly  Increase by 1/3 MMT grade   Balance Sitting EOB:  Keren dynamic  Dynamic Standing:  NT Sitting EOB: SBA  Dynamic Standing: min A with ww Sitting EOB:  Independent  Dynamic Standing:  Keren with [facility]     Pt is A & O x 4  Sensation:  chronic paresthesias due to neuropathy  Edema:  None    Therapeutic Exercises:  NA    Patient education  Pt educated on safety    Patient response to education:   Pt verbalized understanding Pt demonstrated skill Pt requires further education in this area   x x x     ASSESSMENT:  Comments:   PT supine in bed upon entering, pt agreeable to participate. Pt instructed to transfer to EOB, completing transfer with assist of trunk, HOB elevated. Pt sitting upright with good static sitting balance. Pt reporting no dizziness with position change.  Pt cued for hand placement and instructed to stand from EOB. Pt standing with ww, demonstrating good static standing balance. Pt instructed to transfer to bedside chair. Pt cued for hand placement and positioning. Pt's RLE was elevated on EOB, all needs met and call bell in reach prior to exiting. Treatment:  Patient practiced and was instructed in the following treatment:    Bed mobility training - pt given verbal and tactile cues to facilitate proper sequencing and safety during rolling and supine>sit as well as provided with physical assistance to complete task   STS and pivot transfer training - pt educated on proper hand and foot placement, safety and sequencing, and use of verbal and tactile cues to safely complete sit<>stand and pivot transfers with physical assistance to complete task safely   Skilled positioning - Pt positioned in bedside chair with pillows utilized to facilitate upright posture, joint and skin integrity, and interaction with environment. PLAN:    Patient is making good progress towards established goals. Will continue with current POC.       Time in  1325  Time out  1348    Total Treatment Time  23 minutes     CPT codes:  [] Gait training 19521 -- minutes  [] Manual therapy [id number] -- minutes  [x] Therapeutic activities 67703 23 minutes  [] Therapeutic exercises 01526 -- minutes  [] Neuromuscular reeducation 95314 -- minutes    Mary Phelps PT, DPT  [id number]

## 2022-08-25 NOTE — PROGRESS NOTES
Pharmacy Consultation Note  (Antibiotic Dosing and Monitoring)    Initial consult date: 8-  Consulting physician/provider: Dr. Brenda Boyce  Drug: Vancomycin  Indication: ABSSSI; septic R knee    Age/  Gender Height Weight IBW  Allergy Information   61 y.o./female 170.2 cm  109 kg   Patient weight not recorded   Bee pollen, Penicillins, Ropinirole, Ropinirole hcl, Vistaril [hydroxyzine hcl], Aripiprazole, Prednisone, Restoril [temazepam], Hydroxyzine pamoate, and Tape [adhesive tape]      Renal Function:  Recent Labs     08/23/22  0714   BUN 11   CREATININE 0.6         Intake/Output Summary (Last 24 hours) at 8/25/2022 1149  Last data filed at 8/25/2022 1018  Gross per 24 hour   Intake 240 ml   Output --   Net 240 ml         Vancomycin Monitoring:  Trough:    Recent Labs     08/23/22  1554   [address] 16.4*       Random:  No results for input(s): VANCORANDOM in the last 72 hours. Vancomycin Administration Times:  Recent vancomycin administrations                     vancomycin (VANCOCIN) 1,250 mg in dextrose 5 % 250 mL IVPB (mg) 1,250 mg New Bag 08/25/22 0518     1,250 mg New Bag 08/24/22 1629     1,250 mg New Bag  0344     1,250 mg New Bag 08/23/22 1745     1,250 mg New Bag  0507     1,250 mg New Bag 08/22/22 1706               Assessment:  60 yo/F transferred from [facility] to [facility] for Ortho eval for suspected R knee septic arthritis. ID was following patient @ [facility], patient received PO doxy and cephalexin at [facility] but was switched to vancomycin (2500 mg LD and 1250 mg q12h on 8/18 prior to transfer). S/P needle aspiration of R knee early 8/19 AM; grew MRSA. CrCl cannot be calculated (Unknown ideal weight.). To dose vancomycin, pharmacy will be utilizing [employer] calculation software for goal AUC/NAVARRO 400-600 mg/L-hr. Afternoon dose held yesterday while patient in OR; had topical vancomycin/tobramycin beads placed intra-op.   Vancomycin random 19.2 mcg/mL ~4 hrs post-dose on 8/20.  8/22: Day #6 of vanco. SCr 0.5. PICC line order placed by ID.  8/23: Day #7 of vanco. SCr 0.6. Trough level (8/23 @ 1554) = 16.4 mcg/mL (~ 9 hours post-dose). PICC line placed today. 8/24: Day #8 of vanco.   8/25: Day #9 of vanco. Discharge order placed today. Plan:  Continue vancomycin 1250 mg q12h for predicted AUC/NAVARRO = 486, Tr = 15.3 mCg/mL. Will re-check vancomycin levels when appropriate. Will continue to monitor renal function. Clinical pharmacy to follow.     Ann Marie De La Vega, PharmD  PGY1 Pharmacy Resident 8/25/2022 11:49 AM

## 2022-08-25 NOTE — CARE COORDINATION
8/25/2022 social work transition of care planning  Pt plan is to return to [place] medically stable. No covid test needed,per Chuyita Montemayor. Electronically signed by GAGE Mckeon on 8/25/2022 at 8:03 AM    Addendum; N[facility] set up [facility] ambulance for 4:30 pm to [place]. Nitza updated Nurse and facility liaison.   Electronically signed by GAGE Mckeon on 8/25/2022 at 2:13 PM

## 2022-08-25 NOTE — PROGRESS NOTES
Department of Internal Medicine  Infectious Diseases  Progress  Note      C/C :  MRSA bacteremia, right TKA infection     Pt is awake and alert  Denies fever or chills  Right hip, thigh, knee pain   Afebrile       Current Facility-Administered Medications   Medication Dose Route Frequency Provider Last Rate Last Admin    meclizine (ANTIVERT) tablet 12.5 mg  12.5 mg Oral TID PRN Juan Valdez MD        lidocaine PF 1 % injection 5 mL  5 mL IntraDERmal Once Billdestinee Olivo MD        sodium chloride flush 0.9 % injection 5-40 mL  5-40 mL IntraVENous 2 times per day Laura Diaz MD   10 mL at 08/24/22 2135    sodium chloride flush 0.9 % injection 5-40 mL  5-40 mL IntraVENous PRN Bill Olivo MD   10 mL at 08/24/22 0537    0.9 % sodium chloride infusion   IntraVENous PRN Bill Olivo MD        heparin flush 100 UNIT/ML injection 300 Units  3 mL IntraVENous 2 times per day Laura Diaz MD   300 Units at 08/24/22 2134    heparin flush 100 UNIT/ML injection 300 Units  3 mL IntraCATHeter PRN Bill Olivo MD        enoxaparin Sodium (LOVENOX) injection 30 mg  30 mg SubCUTAneous BID Canales Overall, DO   30 mg at 08/25/22 0818    allopurinol (ZYLOPRIM) tablet 200 mg  200 mg Oral BID Krystin Mahoney MD   200 mg at 08/25/22 0818    baclofen (LIORESAL) tablet 10 mg  10 mg Oral 4x daily Krystin Mahoney MD   10 mg at 08/25/22 0814    docusate sodium (COLACE) capsule 100 mg  100 mg Oral BID Krystin Mahoney MD   100 mg at 08/25/22 0815    escitalopram (LEXAPRO) tablet 20 mg  20 mg Oral BID Krystin Mahoney MD   20 mg at 08/25/22 0814    famotidine (PEPCID) tablet 20 mg  20 mg Oral BID Krystin Mahoney MD   20 mg at 08/25/22 0815    lipase-protease-amylase (CREON) delayed release capsule 72,000 Units  72,000 Units Oral TID  Krystin Mahoney MD   72,000 Units at 08/25/22 0816    metoprolol succinate (TOPROL XL) extended release tablet 12.5 mg  12.5 mg Oral Daily Krystin Mahoney MD   12.5 mg at 08/25/22 0815    topiramate (TOPAMAX) tablet 200 mg  200 mg Oral BID Lalo Maldonado MD   200 mg at 08/25/22 0817    traZODone (DESYREL) tablet 100 mg  100 mg Oral Nightly Lalo Maldonado MD   100 mg at 08/24/22 2136    ziprasidone (GEODON) capsule 20 mg  20 mg Oral Nightly Lalo Maldonado MD   20 mg at 08/24/22 2136    furosemide (LASIX) tablet 20 mg  20 mg Oral Daily Lalo Maldonado MD   20 mg at 08/25/22 9441    rifAMPin (RIFADIN) capsule 300 mg  300 mg Oral 2 times per day Zuleyma Freedman MD   300 mg at 08/25/22 0816    bisacodyl (DULCOLAX) suppository 10 mg  10 mg Rectal Daily PRN Lalo Maldonado MD   10 mg at 08/23/22 1048    albuterol (PROVENTIL) nebulizer solution 2.5 mg  2.5 mg Nebulization Q4H PRN Mack Martin, DO        ferrous sulfate (IRON 325) tablet 325 mg  325 mg Oral Daily Mack Martin, DO   325 mg at 08/25/22 0815    gabapentin (NEURONTIN) capsule 600 mg  600 mg Oral 4x Daily Mack Martin, DO   600 mg at 08/25/22 0815    levOCARNitine (CARNITOR) tablet 330 mg (Patient Supplied)  330 mg Oral TID Mack Martin DO        ondansetron (ZOFRAN-ODT) disintegrating tablet 4 mg  4 mg Oral Q8H PRN Mack Martin DO        Or    ondansetron [facility] injection 4 mg  4 mg IntraVENous Q6H PRN Mack Martin, DO        polyethylene glycol (GLYCOLAX) packet 17 g  17 g Oral Daily PRN Mack Martin, DO   17 g at 08/25/22 0824    vancomycin (VANCOCIN) 1,250 mg in dextrose 5 % 250 mL IVPB  1,250 mg IntraVENous Q12H Mack Martin, DO   Stopped at 08/25/22 1125    HYDROmorphone (DILAUDID) injection 0.5 mg  0.5 mg IntraVENous Q4H PRN Mack Martin, DO   0.5 mg at 08/25/22 0516    HYDROcodone-acetaminophen (NORCO) 5-325 MG per tablet 1 tablet  1 tablet Oral Q6H PRN Mack Martin, DO   1 tablet at 08/22/22 0323    Or    HYDROcodone-acetaminophen (NORCO)  MG per tablet 1 tablet  1 tablet Oral Q6H PRN Mack Martin, DO   1 tablet at 08/25/22 0341             REVIEW OF SYSTEMS:    CONSTITUTIONAL:  fever  HEENT: denies blurring of vision or double vision, denies hearing problem  RESPIRATORY: denies cough, shortness of breath, sputum expectoration, chest pain. CARDIOVASCULAR:  Denies palpitation  GASTROINTESTINAL:  Denies abdomen pain, diarrhea or constipation. GENITOURINARY:  Denies burning urination or frequency of urination  INTEGUMENT: denies wound , rash  HEMATOLOGIC/LYMPHATIC:  Denies lymph node swelling, gum bleeding or easy bruising. MUSCULOSKELETAL: back pain,  right knee, right hip pain , leg swelling   NEUROLOGICAL:  Denies light headed, dizziness, loss of consciousness, weakness of lower extremities, bowel or bladder incontinence. PHYSICAL EXAM:      Vitals:     /62   Pulse 80   Temp 97.5 °F (36.4 °C) (Temporal)   Resp 16   LMP 08/31/1988   SpO2 95%     General Appearance:    Awake, alert , no acute distress. Head:    Normocephalic, atraumatic   Eyes:    No pallor, no icterus,   Ears:    No obvious deformity or drainage.    Nose:   No nasal drainage   Throat:   Mucosa moist, no oral thrush   Neck:   Supple, no lymphadenopathy   Back:      L spine tenderness    Lungs:     Clear to auscultation bilaterally, no wheeze    Heart:    Regular rate and rhythm, no murmur   Abdomen:     Soft, non-tender, bowel sounds present    Extremities:    ++ edema, right knee - wrapped    Pulses:   Dorsalis pedis palpable    Skin:   no rashes     CBC with Differential:      Lab Results   Component Value Date/Time    WBC 5.0 08/23/2022 07:14 AM    RBC 3.78 08/23/2022 07:14 AM    HGB 12.3 08/23/2022 07:14 AM    HCT 37.4 08/23/2022 07:14 AM     08/23/2022 07:14 AM    MCV 98.9 08/23/2022 07:14 AM    MCH 32.5 08/23/2022 07:14 AM    MCHC 32.9 08/23/2022 07:14 AM    RDW 13.7 08/23/2022 07:14 AM    SEGSPCT 64 12/27/2013 03:00 PM    LYMPHOPCT 6.0 08/17/2022 01:30 AM    MONOPCT 11.6 08/17/2022 01:30 AM    BASOPCT 0.1 08/17/2022 01:30 AM    MONOSABS 1.02 08/17/2022 01:30 AM    LYMPHSABS 0.53 08/17/2022 01:30 AM    EOSABS 0.02 08/17/2022 01:30 AM    BASOSABS 0.01 08/17/2022 01:30 AM       CMP     Lab Results   Component Value Date/Time     08/23/2022 07:14 AM    K 3.9 08/23/2022 07:14 AM    K 3.9 05/06/2022 05:28 PM     08/23/2022 07:14 AM    CO2 28 08/23/2022 07:14 AM    BUN 11 08/23/2022 07:14 AM    CREATININE 0.6 08/23/2022 07:14 AM    GFRAA >60 08/23/2022 07:14 AM    LABGLOM >60 08/23/2022 07:14 AM    GLUCOSE 120 08/23/2022 07:14 AM    GLUCOSE 93 05/24/2012 11:11 AM    PROT 6.7 08/23/2022 07:14 AM    LABALBU 2.5 08/23/2022 07:14 AM    LABALBU 4.7 05/21/2012 01:48 PM    CALCIUM 9.6 08/23/2022 07:14 AM    BILITOT 0.4 08/23/2022 07:14 AM    ALKPHOS 168 08/23/2022 07:14 AM    AST 14 08/23/2022 07:14 AM    ALT 28 08/23/2022 07:14 AM         Hepatic Function Panel:    Lab Results   Component Value Date/Time    ALKPHOS 168 08/23/2022 07:14 AM    ALT 28 08/23/2022 07:14 AM    AST 14 08/23/2022 07:14 AM    PROT 6.7 08/23/2022 07:14 AM    BILITOT 0.4 08/23/2022 07:14 AM    BILIDIR <0.2 03/16/2021 12:16 PM    IBILI see below 03/16/2021 12:16 PM    LABALBU 2.5 08/23/2022 07:14 AM    LABALBU 4.7 05/21/2012 01:48 PM       PT/INR:    Lab Results   Component Value Date/Time    PROTIME 13.1 08/19/2022 08:25 AM    PROTIME 10.8 05/07/2012 03:45 AM    INR 1.2 08/19/2022 08:25 AM       TSH:    Lab Results   Component Value Date/Time    TSH 0.548 03/16/2021 12:16 PM       U/A:    Lab Results   Component Value Date/Time    COLORU Yellow 08/17/2022 05:21 AM    PHUR 6.5 08/17/2022 05:21 AM    LABCAST MODERATE 05/06/2012 04:00 AM    WBCUA NONE 05/06/2022 07:21 PM    WBCUA 1-3 05/06/2012 04:00 AM    RBCUA NONE 05/06/2022 07:21 PM    RBCUA 0-1 12/13/2013 10:15 AM    BACTERIA NONE SEEN 05/06/2022 07:21 PM    CLARITYU Clear 08/17/2022 05:21 AM    SPECGRAV <=1.005 08/17/2022 05:21 AM    LEUKOCYTESUR Negative 08/17/2022 05:21 AM    UROBILINOGEN 1.0 08/17/2022 05:21 AM    BILIRUBINUR Negative 08/17/2022 05:21 AM    BILIRUBINUR NEGATIVE 05/06/2012 04:00 AM BLOODU Negative 08/17/2022 05:21 AM    GLUCOSEU Negative 08/17/2022 05:21 AM    GLUCOSEU NEGATIVE 05/06/2012 04:00 AM       ABG:    Lab Results   Component Value Date/Time    A6BRRQZS 94.0 05/06/2012 10:00 AM       MICROBIOLOGY:     Blood cx -  ( 8/17/2022)     Susceptibility    Staphylococcus aureus (1)    Antibiotic Interpretation Microscan  Method Status    clindamycin Resistant >=^4 mcg/mL BACTERIAL SUSCEPTIBILITY PANEL BY NAVARRO     DAPTOmycin Sensitive ^1 mcg/mL BACTERIAL SUSCEPTIBILITY PANEL BY NAVARRO     doxycycline Sensitive <=^0.5 mcg/mL BACTERIAL SUSCEPTIBILITY PANEL BY NAVARRO     erythromycin Resistant >=^8 mcg/mL BACTERIAL SUSCEPTIBILITY PANEL BY NAVARRO     gentamicin Sensitive <=^0.5 mcg/mL BACTERIAL SUSCEPTIBILITY PANEL BY NAVARRO     oxacillin Resistant >=^4 mcg/mL BACTERIAL SUSCEPTIBILITY PANEL BY NAVARRO     tigecycline Sensitive <=^0.12 mcg/mL BACTERIAL SUSCEPTIBILITY PANEL BY NAVARRO     trimethoprim-sulfamethoxazole Sensitive <=^10 mcg/mL BACTERIAL SUSCEPTIBILITY PANEL BY NAVARRO     vancomycin Sensitive ^1 mcg/mL BACTERIAL SUSCEPTIBILITY PANEL BY NAVARRO            Wound cx -     Gram Stain Result Refer to ordered Gram stain for results    Organism Staphylococcus aureus Abnormal     Body Fluid Culture, Sterile --    Heavy growth        Vanco random  19.2      Radiology :    Right knee -    Extensive postsurgical changes about the distal right femur, right knee and   distal right tibia and fibula. No gross hardware complications. No acute fracture or subluxation. TTE -     Limited study to r/o Endocarditis. Technically sub-optimal images. No gross indication of Endocarditis -Consider FELICIANO to r/o Endocarditis. Normal left ventricular chamber size. Abnormal septal motion, EF 50- 55%. Valves not well visualized. There is mild mitral regurgitation. There is trace tricuspid regurgitation. No intra cardiac thrombus. No previous echo for comparison.      CT scan -    THORACIC SPINE:     No vertebral compression fracture. No CT evidence of discitis-osteomyelitis. No high-grade spinal or neural foraminal stenosis. No abnormal enhancement. Spinal stimulator. LUMBAR SPINE:     No vertebral compression fracture. No CT evidence of discitis-osteomyelitis. Degenerative changes result in mild-to-moderate spinal stenosis at T12-L1,   and milder spinal stenosis at other levels. Moderate to severe left neural   foraminal stenosis at L5-S1. See details above. No abnormal enhancement. Spinal stimulator.        IMPRESSION:     MRSA bacteremia - follow up blood cx non  8/18- neg   MRSA  right TKA infection s/p I & D   Mod obesity       RECOMMENDATIONS:       Vancomycin 1250 mg IV q 12 hrs ( pharmacy following ) / rifampin 300 mg po q 12 hrs for at least next 5 weeks , follow up labs, ID follow up in 1-2 weeks

## 2022-08-25 NOTE — PROGRESS NOTES
Hospitalist Progress Note      Synopsis: Patient admitted on 8/19/2022     Ms. Yulisa Arias, a 64y.o. year old female  who  has a past medical history of Adenoma of right adrenal gland, Anemia, Anxiety, Arthritis, Asthma, Benign essential tremor, Bipolar affective ([address] Chronic back pain, Chronic respiratory failure with hypoxia ([address] Depression, Diabetes mellitus ([address] Difficulty swallowing, Environmental and seasonal allergies, Excessive physiologic tremor, Fatty liver, Full dentures, GERD (gastroesophageal reflux disease), Gout, Herniated cervical disc, History of swelling of feet, Hypertension, Lymphedema of lower extremity, Mitochondrial cytopathy ([address] Muscle weakness (generalized), Need for assistance with personal care, Neuropathy, Obesity, PETR (obstructive sleep apnea), Osteoarthritis of left knee, PONV (postoperative nausea and vomiting), Seizures ([address] Spinal headache, and Thyroid disease. The patient fractured her right ankle and underwent an ORIF in January 2022 by Dr. Jus Velasquez. She resides at [facility]. She developed redness, swelling and pain in the right leg. sHe denies any trauma. She says she has been dealing with a chronic callus and an ulcer on the distal part of her right first toe. She has been seen by a podiatrist who has been debriding this ulcer at the facility. She patient was sent to the ED at [facility] on 8/17/2022 with pain in the lright lower extremity. sHe was seen by infectious disease for cellulitis of the right lower extremity. There is also some concern for septic joint. Orthopedics was consulted and advised the patient be transferred to [facility] for interventional radiology aspiration of the suspect joint.   Subjective    Feeling about the same      Exam:  /62   Pulse 80   Temp 97.5 °F (36.4 °C) (Temporal)   Resp 17   LMP 08/31/1988   SpO2 95%   General appearance: No apparent distress, appears stated age and cooperative. HEENT: Pupils equal, round, and reactive to light. Conjunctivae/corneas clear. Neck: Trachea midline. Respiratory: Distant but mild wheezing   cardiovascular: Regular rate and rhythm with normal S1/S2 without murmurs, rubs or gallops. Abdomen: Soft, non-tender, non-distended with normal bowel sounds. Musculoskeletal: Bilateral lower extremities feet are deformed\  Right knee has a postop dressing in place. Left leg with no open areas.   Right knee is dressed, right leg decreased PMS   Skin:  No rashes    Neurologic: awake, alert and following commands     Medications:  Reviewed    Infusion Medications    sodium chloride       Scheduled Medications    lidocaine PF  5 mL IntraDERmal Once    sodium chloride flush  5-40 mL IntraVENous 2 times per day    heparin flush  3 mL IntraVENous 2 times per day    enoxaparin  30 mg SubCUTAneous BID    allopurinol  200 mg Oral BID    baclofen  10 mg Oral 4x daily    docusate sodium  100 mg Oral BID    escitalopram  20 mg Oral BID    famotidine  20 mg Oral BID    lipase-protease-amylase  72,000 Units Oral TID WC    metoprolol succinate  12.5 mg Oral Daily    topiramate  200 mg Oral BID    traZODone  100 mg Oral Nightly    ziprasidone  20 mg Oral Nightly    furosemide  20 mg Oral Daily    rifAMPin  300 mg Oral 2 times per day    ferrous sulfate  325 mg Oral Daily    gabapentin  600 mg Oral 4x Daily    levOCARNitine  330 mg Oral TID    vancomycin  1,250 mg IntraVENous Q12H     PRN Meds: meclizine, sodium chloride flush, sodium chloride, heparin flush, bisacodyl, albuterol, ondansetron **OR** ondansetron, polyethylene glycol, HYDROmorphone, HYDROcodone 5 mg - acetaminophen **OR** HYDROcodone-acetaminophen    I/O    Intake/Output Summary (Last 24 hours) at 8/25/2022 1220  Last data filed at 8/25/2022 1018  Gross per 24 hour   Intake 240 ml   Output --   Net 240 ml       Labs:   Recent Labs     08/23/22  0714   WBC 5.0   HGB 12.3   HCT 37.4          Recent Labs 08/23/22  0714      K 3.9      CO2 28   BUN 11   CREATININE 0.6   CALCIUM 9.6       Recent Labs     08/23/22  0714   PROT 6.7   ALKPHOS 168*   ALT 28   AST 14   BILITOT 0.4       No results for input(s): INR in the last 72 hours. No results for input(s): Danielle Yang in the last 72 hours. Chronic labs:  Lab Results   Component Value Date    CHOL 154 04/11/2022    TRIG 163 (H) 04/11/2022    HDL 49 04/11/2022    LDLCALC 72 04/11/2022    TSH 0.548 03/16/2021    INR 1.2 08/19/2022    LABA1C 5.7 12/13/2013       Microbiology:  Pending  No results for input(s): BC in the last 72 hours. No results for input(s): ORG in the last 72 hours. No results for input(s): Dhiraj Puff in the last 72 hours. No results for input(s): STREPNEUMAGU in the last 72 hours. No results for input(s): LP1UAG in the last 72 hours. No results for input(s): ASO in the last 72 hours. No results for input(s): CULTRESP in the last 72 hours. No results for input(s): PROCAL in the last 72 hours. Radiology:  XR FEMUR RIGHT (MIN 2 VIEWS)    Result Date: 8/19/2022  Extensive postsurgical changes about the distal right femur, right knee and distal right tibia and fibula. No gross hardware complications. No acute fracture or subluxation. XR KNEE RIGHT (3 VIEWS)    Result Date: 8/19/2022  Extensive postsurgical changes about the distal right femur, right knee and distal right tibia and fibula. No gross hardware complications. No acute fracture or subluxation. XR TIBIA FIBULA RIGHT (2 VIEWS)    Result Date: 8/17/2022  1. Extensive postsurgical changes about the distal right femur, right knee, and distal right tibia and fibula. No hardware complications. 2.  Diffuse soft tissue stranding and more focal anterior soft tissue edema.  There is no definite evidence of osseous destruction or erosion about the right tibia or fibula on this exam.     CT HEAD WO CONTRAST    Result Date: 8/17/2022  No acute intracranial abnormality. XR CHEST PORTABLE    Result Date: 8/17/2022  Atherosclerotic disease and mild prominence of the cardiac silhouette. Central pulmonary vascular congestion. No pneumothorax. FLUORO FOR SURGICAL PROCEDURES    Result Date: 8/19/2022  Intraprocedural fluoroscopic spot images as above. See separate procedure report for more information. 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 *  Asthma   bipolar disorder    Diabetes  Arthritis     PLAN:    Infectious disease following  Currently on vancomycin/rifampin  S/p surgery I&D on her right knee  Dc today      Diet: ADULT DIET; Regular  Code Status: Full Code  PT/OT Eval Status:   Once okay with surgery  DVT Prophylaxis:   In place  Recommended disposition at discharge: to [facility]    +++++++++++++++++++++++++++++++++++++++++++++++++  Derek Harper MD   [facility].  +++++++++++++++++++++++++++++++++++++++++++++++++  NOTE: This report was transcribed using voice recognition software. Every effort was made to ensure accuracy; however, inadvertent computerized transcription errors may be present.

## 2022-08-25 NOTE — PROGRESS NOTES
Department of Orthopedic Surgery  Resident Progress Note    Patient seen and examined at bedside this morning. Patient denies any fevers or chills overnight. Does state that she was having some calf pain. States she has some burning and numbness pain along the lateral leg over the front of the knee and down to the medial shin. VITALS:  /62   Pulse 80   Temp 97.5 °F (36.4 °C) (Temporal)   Resp 16   LMP 08/31/1988   SpO2 95%     General: alert and oriented to person, place and time, obese    MUSCULOSKELETAL:   Right lower extremity:  ACE dressing C/D/I about the extremity  Limited knee flexion and extension, motion remains limited, flexion of the knee improving passively, approximately 70 degrees without significant pain. Pain with flexion of the hip, patient able to sit up in bed so hip is 90 degrees flexion without significant pain. Compartments soft and compressible  +PF/DF/EHL, 4 - / 5  +2/4 DP & PT pulses, Brisk Cap refill, Toes warm and perfused  Distal sensation grossly intact to Peroneals, Sural, Saphenous, and tibial nerve distributions, subjectively diminished per patient    CBC:   Lab Results   Component Value Date/Time    WBC 5.0 08/23/2022 07:14 AM    HGB 12.3 08/23/2022 07:14 AM    HCT 37.4 08/23/2022 07:14 AM     08/23/2022 07:14 AM     PT/INR:    Lab Results   Component Value Date/Time    PROTIME 13.1 08/19/2022 08:25 AM    PROTIME 10.8 05/07/2012 03:45 AM    INR 1.2 08/19/2022 08:25 AM       Intraop Cultures: S.  Aureus with respect to knee, cultures from hip aspiration remain negative    ASSESSMENT  S/P irrigation and debridement of right knee with insertion of antibiotic beads, right thigh, and aspiration of right hip with fluoroscopy on 8/19/2022    PLAN      Continue physical therapy and protocol: WBAT - RLE  Clinically does not examine like a septic hip or knee, hip pain seems to be coming from a more lumbar spine origin  Appreciate infectious disease recommendations regarding further antibiotic therapy  Deep venous thrombosis prophylaxis -per medical service, early mobilization  PT/OT appreciated and recommended to help prevent stiffness to the right lower extremity  Pain Control: IV and PO  No further orthopedic intervention planned  Will follow from the periphery at this time, please reach out with questions or concerns  D/C Plan: Appreciate PT/OT and social work evaluations. Appreciate recommendations of neurosurgery from recent CT of the thoracic and lumbar spine.

## 2022-08-25 NOTE — PROGRESS NOTES
OCCUPATIONAL THERAPY TREATMENT NOTE     [facility]  OT BEDSIDE TREATMENT NOTE      Date:2022  Patient Name: Yecenia Rosenberg  MRN: [id number]  : 1960  Room: [address] [place] [address]       Per OT Eval:    Evaluating OT: Annie Gallagher, [address]ueger OTR/L; [id number]       Referring Provider: Phillip Soto DO    Specific Provider Orders/Date: OT Eval and Treat 22       Diagnosis: Septic Joint     Surgery:    22  Right knee arthrotomy with exploration irrigation debridement of infection, insertion absorbable antibiotic beads  Right distal lateral thigh suspected abscess incision, debridement and irrigation  Right hip aspiration with fluoroscopic guidance  Sx History:   RIGHT ANKLE IRRIGAITON AND DEBRIDEMENT WITH EXTERNAL FIXATOR AND LACERATION REPAIR (22)  RIGHT LOWER EXTREMITY REMOVAL EXTERNAL FIXATOR, RIGHT PILON OPEN REDUCTION INTERNAL FIXATOR--SYNTHES (3/2/22)    Pertinent Medical History:  has a past medical history of Adenoma of right adrenal gland, Anemia, Anxiety, Arthritis, Asthma, Benign essential tremor, Bipolar affective ([address] Chronic back pain, Chronic respiratory failure with hypoxia ([address] Depression, Diabetes mellitus ([address] Difficulty swallowing, Environmental and seasonal allergies, Excessive physiologic tremor, Fatty liver, Full dentures, GERD (gastroesophageal reflux disease), Gout, Herniated cervical disc, History of swelling of feet, Hypertension, Lymphedema of lower extremity, Mitochondrial cytopathy ([address] Muscle weakness (generalized), Need for assistance with personal care, Neuropathy, Obesity, PETR (obstructive sleep apnea), Osteoarthritis of left knee, PONV (postoperative nausea and vomiting), Seizures ([address] Spinal headache, and Thyroid disease.        Reason for admission: increased fatigue and RLE pain at SNF     Recommended Adaptive Equipment:  TBD pending progression/discharge - AE for LE bathing and dressing       Precautions:  Fall Risk, Bed Alarm, WBAT RLE, O2 (baseline 2L NC at night only)      Assessment of current deficits:    [x] Functional mobility            [x]ADLs           [x] Strength                  [x]Cognition    [x] Functional transfers          [x] IADLs         [x] Safety Awareness   [x]Endurance    [x] Fine Coordination                         [x] Balance      [] Vision/perception   []Sensation      []Gross Motor Coordination             [] ROM           [] Delirium                   [] Motor Control      OT PLAN OF CARE   OT POC based on physician orders, patient diagnosis and results of clinical assessment     Frequency/Duration: 3-5 days/wk for 2 weeks PRN   Specific OT Treatment Interventions to include:   * Instruction/training on adapted ADL techniques and AE recommendations to increase functional independence within precautions       * Training on energy conservation strategies, correct breathing pattern and techniques to improve independence/tolerance for self-care routine  * Functional transfer/mobility training/DME recommendations for increased independence, safety, and fall prevention  * Patient/Family education to increase follow through with safety techniques and functional independence  * Recommendation of environmental modifications for increased safety with functional transfers/mobility and ADLs  * Therapeutic exercise to improve motor endurance, ROM, and functional strength for ADLs/functional transfers  * Therapeutic activities to facilitate/challenge dynamic balance, stand tolerance for increased safety and independence with ADLs     Home Living: Pt BOB stay since Feburary 2022.    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     Pain Level: Pt c/o RLE pain throughout session; therapist provided repositioning techniques  Cognition: A&O: 4/4; Follows multi step directions              Memory:  good              Sequencing:  fair              Problem solving:  fair              Judgement/safety:  fair                Functional Assessment:  AM-PAC Daily Activity Raw Score: 15/24    Initial Eval Status  Date: 8/20/22 Treatment Status  Date: 8/25/22 STGs = LTGs  Time frame: 10-14 days   Feeding Stand by Assist   Demonstrated functional reach for all items from tray seated in semi supine position  Per previous session Setup Independent    Grooming Stand by Assist   Brushing hair seated EOB and washing face  Per previous session Setup Independent    UB Dressing Minimal Assist   Doff soiled gown/lake new gown Min A  Assist to tie gown seated EOB. Independent    LB Dressing Dependent   Lake socks lying supine   Increased pain with slight RLE leg raise to lake sock   unable to simulated figure 4 technique d/t pain  Requested to return to bed - LB AE not addressed this session Maximal Assist to don pants seated EOB & manage over hips once standing. Minimal Assist to don socks seated EOB with use of sockaid. Minimal Assist    Bathing Maximal Assist  Decreased functional reach for LB bathing supine/stead EOB   UB bathing tasks completed supine with task set up  Max A  Pt required assist for posterior hygiene care while seated & standing. Minimal Assist    Toileting Dependent   Pure wick upon entering room however bed soiled of urine  Required assist for all pericare using log roll technique     Pt requested to not reapply at end of session d/t multiple leaks  RN notified   Pt continent and will call for use of bed pan for all bowel/bladder movements Max A  Assist for posterior hygiene care. Minimal Assist    Bed Mobility  Log Roll:  Mod A to L side (with support for RLE)  Min A to R side  Supine to sit: Maximal Assist x 2  Progressing RLE to EOB and assist with trunk (HOB elevated)  Sit to supine: Maximal Assist x2  BLE assist and trunk support Log Roll: SBA  Supine to sit: SBA   Supine to sit: Minimal Assist   Sit to supine: Minimal Assist    Functional Transfers Sit to stand: NT   Stand to sit:NT  Stand pivot: NT  Commode: NT  Declined d/t pain in RLE Sit to stand: Minimal Assist  Stand to sit: Minimal Assist  Stand pivot: Minimal Assist   Sit to stand: Mod A   Stand to sit: Mod A   Stand pivot: Mod A   Commode: Mod A     Functional Mobility NT Minimal Assist  Use of ww from bed to bedside chair ~6 steps. Moderate Assist with AD   Balance Sitting:     Static - Min A <> SBA   Educated on BUE support using bed rail - posterior lean d/t RLE pain     Dynamic - Min A  Standing: NT Sitting:     Static - Ind      Dynamic - SBA  Standing: Min A   Sitting:  Static: Sup  Dynamic:  Sup  Standing: Mod A with AD   Activity Tolerance Fair -   Limited d/t RLE pain with all functional mobility   Required increased time to complete all mobility/ADLs  Fair-  Decreased by pain and dizziness GOOD   Visual/  Perceptual Glasses: yes          Safety FAIR  Educated on WBAT RLE prior to functional mobility  Fair GOOD   during ADL completion following WBAT RLE   Vitals    SpO2 during session on 2L NC: 97-99%        /93 seated EOB  112/72 standing            Comments: Upon arrival pt supine in bed & agreeable to OT session. Pt required increased time to engage in functional activity d/t c/o RLE pain. Pt educated on activity modifications/adaptations to promote independence & proper body mechanics throughout ADLs. Pt educated on use of AE to promote independence of LB dressing tasks. Pt required assist for light bathing task seated EOB to complete posterior hygiene care. Pt utilized ww to maintain dynamic standing balance throughout session with cuing for sequencing of safe transfer progression. At end of session pt seated in bedside chair with RLE elevated, all lines and tubes intact, call light within reach.      Pt has made fair progress towards set goals.    Continue with current plan of care      Treatment Time In:1:20p            Treatment Time Out: 1:50p                Treatment Charges: Mins Units   Ther Ex  [id number]     Manual Therapy 46933     Thera Activities [id number] 15 1   ADL/Home Mgt [id number] 15 1   Neuro Re-ed [id number]     Group Therapy      Orthotic manage/training  [id number]     Non-Billable Time     Total Timed Treatment 30 2         Micca Adam OTR/L; [id number]

## 2022-08-26 DIAGNOSIS — R52 PAIN: Primary | ICD-10-CM

## 2022-08-29 ENCOUNTER — TELEPHONE (OUTPATIENT)
Dept: ADMINISTRATIVE | Age: 62
End: 2022-08-29

## 2022-08-29 NOTE — TELEPHONE ENCOUNTER
Pt called to confirm her appointment on 09/01 at 9:00 am 2wk PO R knee I&D 8/19/22; JVG. She stated she was discharged 08/25 and has some questions. Pt would like you to know that she missed 6 doses of the Vancomycin before she went into [place]. She is having increased in hip, knee and thigh right side. Please contact pt.

## 2022-08-29 NOTE — TELEPHONE ENCOUNTER
Call placed to patient at this time. Patient states she is concerned with her pain after surgery and was inquiring if she should get x-rays before her appointment. Informed patient that we will do x-rays in office at her visit this week. Instructed patient to ice and elevate as much as possible. Instructed patient to contact office if symptoms worsen. Patient also states that there was a delay in the ordering of her medication at [address] [place] [address] so her infusion antibiotics and oral antibiotics were delayed by 6 days. Informed patient that Dr. Cale Tom with infectious disease will be managing those medications and that she will need to schedule a follow up with that office. Patient verbalized understanding of this.      Future Appointments   Date Time Provider Deysi Simms   9/1/2022  9:00 AM Jearl Rubinstein, DO [facility]   9/1/2022  2:45 PM Mendel Calix, DPM [facility]   9/13/2022  9:45 AM Franco Halsted, APRN - CNP [facility]   11/17/2022 10:45 AM Jearl Rubinstein, DO [facility]   3/16/2023 10:45 AM [facility]

## 2022-09-01 ENCOUNTER — OFFICE VISIT (OUTPATIENT)
Dept: ORTHOPEDIC SURGERY | Age: 62
End: 2022-09-01
Payer: MEDICAID

## 2022-09-01 ENCOUNTER — HOSPITAL ENCOUNTER (OUTPATIENT)
Dept: GENERAL RADIOLOGY | Age: 62
Discharge: HOME | End: 2022-09-03
Payer: MEDICAID

## 2022-09-01 DIAGNOSIS — R52 PAIN: ICD-10-CM

## 2022-09-01 DIAGNOSIS — L02.415 ABSCESS OF RIGHT THIGH: Primary | ICD-10-CM

## 2022-09-01 PROCEDURE — 99212 OFFICE O/P EST SF 10 MIN: CPT | Performed by: PHYSICIAN ASSISTANT

## 2022-09-01 PROCEDURE — 73552 X-RAY EXAM OF FEMUR 2/>: CPT

## 2022-09-01 PROCEDURE — 73560 X-RAY EXAM OF KNEE 1 OR 2: CPT

## 2022-09-01 PROCEDURE — 99024 POSTOP FOLLOW-UP VISIT: CPT | Performed by: PHYSICIAN ASSISTANT

## 2022-09-01 RX ORDER — OXYCODONE AND ACETAMINOPHEN 10; 325 MG/1; MG/1
1 TABLET ORAL EVERY 6 HOURS PRN
COMMUNITY
End: 2022-10-06

## 2022-09-01 RX ORDER — OXYCODONE HYDROCHLORIDE AND ACETAMINOPHEN 5; 325 MG/1; MG/1
1 TABLET ORAL EVERY 6 HOURS PRN
COMMUNITY

## 2022-09-01 NOTE — PROGRESS NOTES
Radhames Velasquez is a 64 y.o. female who presents for follow up of Post-Op 2 Week R Knee. I&D 8-    SURGEON: Dr. Zulma Drummond DO  Date of Injury/Surgery:  8-  Date last seen in office:  9-1-2022    Symptoms: worse  New complaints: Pt expressed having 9/10 pain daily. Noted having increased pain at the end of the day. Pt has swelling in the Right Knee and Right Lower leg . Pt noted having difficulty elevating leg at the facility due to limited supplies. Pt stated having numbness and tingling in the Right Foot that has been present prior to the surgical intervention. Pt expressed having an increase of tingling in the arch of the Right foot after the surgery. Pt noted having most of the pain is located within the Right knee joint and the medial aspect of the Right knee. Pt has pain in the right hip and the lower back. Cast/Splint, Brace, or Dressings: Clean, dry and intact and Well fitting    Weightbearing: right lower Non-weight bearing      Assistive device Wheelchair  Participating in therapy (location if yes)?  no    Refills Needed: None  Order/Referral Needed: N/A
Compartments supple throughout thigh and leg  Calf supple and nontender  Demonstrates active ankle plantar/dorsiflexion/great toe extension. States sensation intact to touch in sural/deep peroneal/superficial peroneal/saphenous/posterior tibial nerve distributions to foot/ankle. Palpable dorsalis pedis and posterior tibialis pulses, cap refill brisk in toes, foot warm/perfused. XR:   Narrative   EXAMINATION:   2 XRAY VIEWS OF THE RIGHT FEMUR       9/1/2022 8:54 am       COMPARISON:   08/19/2022       HISTORY:   ORDERING SYSTEM PROVIDED HISTORY: Pain   TECHNOLOGIST PROVIDED HISTORY:   Reason for exam:->eval and treat   What reading provider will be dictating this exam?->CRC       FINDINGS:   Two views of the right femur reveal cortical plate and screws aligning a   distal femur shaft fracture. There is hardware from total right knee   replacement. There is callus formation identified with healing in the   interval.           Impression   Continued healing of distal shaft fracture of the right femur with   satisfactory alignment. No hardware complication. Narrative   EXAMINATION:   TWO XRAY VIEWS OF THE RIGHT KNEE       9/1/2022 7:54 am       COMPARISON:   19 August 2022       HISTORY:   ORDERING SYSTEM PROVIDED HISTORY: Pain   TECHNOLOGIST PROVIDED HISTORY:   Reason for exam:->eval and treat   What reading provider will be dictating this exam?->CRC       FINDINGS:   Fixation hardware at the femur is partially imaged with no signs of hardware   failure. Stable appearing partially healed fracture at the distal femoral   metadiaphysis. Anatomically aligned right TKA. No new abnormal findings. Impression   Partially healed femur fracture with indwelling fixation hardware and   anatomically aligned right TKA. Assessment:   Diagnosis Orders   1.  Abscess of right thigh            Plan:  Reviewed x-rays with patient today in office     Weight Bearing: Weight

## 2022-09-01 NOTE — PATIENT INSTRUCTIONS
INSTRUCTIONS FOR FACILITY      Weight Bearing: Weight bearing as tolerated right lower extremity. Use assistive devices when needed. Recommend transfers until her pain is better under control, then can gradually progress over the next couple of weeks to full weightbearing to the right lower extremity. Therapy: Continue PT/OT. Recommend range of motion and strengthening modalities, home exercise plan, gait, balance, transfers. Pain control: per facility physician -can increase dose of Percocet. Also can consider changing baclofen to Robaxin 750 mg 4 times daily as needed to see if this is more effective than her chronic baclofen that she has been on for a number of years now and states that it does not really work at this point. Recommend frequent ice throughout the day and frequent compression with Ace wrap. Patient also needs to frequently elevate the right lower extremity. Incisional Care: sutures removed today in office. Steri strips placed. Steri strips can be removed in 7-10 days if they do not fall off sooner. Continue to monitor for signs or symptoms of infection until skin has completely healed. Okay to shower. No scrubbing near incision until skin has completely healed. Thoroughly pat dry with clean towel. Follow up:     Future Appointments   Date Time Provider Deysi Simms   9/1/2022  2:45 PM CAROL Guzman POD [facility]   9/13/2022  9:45 AM ADEEL Graham CNP [facility]   9/29/2022  9:15 AM Christel Moran DO [facility]   11/17/2022 10:45 AM Christel Moran DO [facility]   3/16/2023 10:45 AM Dannie Gonzalez MD [facility]         Call with any questions or concerns.    [phone]           Future Appointments   Date Time Provider Deysi Simms   9/1/2022  2:45 PM CAROL Guzman [facility]   9/13/2022  9:45 AM ADEEL Graham CNP [facility]   9/29/2022  9:15 AM Christel Moran DO [facility]   11/17/2022 10:45 AM DO KATTY Osman [facility]   3/16/2023 10:45 AM Emily Meléndez MD [facility]

## 2022-09-02 PROBLEM — L02.415 ABSCESS OF RIGHT THIGH: Status: ACTIVE | Noted: 2022-09-02

## 2022-09-24 NOTE — DISCHARGE SUMMARY
Physician Discharge Summary     Patient ID:  Sharmaine Alvarez  [id number]  39 y.o.  1960    Admit date: 8/19/2022    Discharge date and time: 8/25/2022  5:49 PM     Admission Diagnoses: Principal Problem:    Septic joint ([address]  Active Problems:    Infection of prosthetic right knee joint ([address]    Obesity    Bipolar 1 disorder ([address]    Degenerative Osteoarthritis of both knees    Cervical spondylolysis    Chronic back pain  Resolved Problems:    * No resolved hospital problems. *      Discharge Diagnoses: Principal Problem:    Septic joint ([address]  Active Problems:    Infection of prosthetic right knee joint ([address]    Obesity    Bipolar 1 disorder (HCC)    Degenerative Osteoarthritis of both knees    Cervical spondylolysis    Chronic back pain  Resolved Problems:    * No resolved hospital problems. *      Condition at discharge : Stable    Consults: IP CONSULT TO INTERNAL MEDICINE  PHARMACY TO DOSE VANCOMYCIN  IP CONSULT TO ORTHOPEDIC SURGERY  IP CONSULT TO INFECTIOUS DISEASES  IP CONSULT TO NEUROSURGERY    Procedures: Incision and drainage of right knee    Hospital Course: Patient is a 57-year-old lady from a nursing home sent to the emergency room at [facility] for pain in left lower extremity. She was seen by infectious disease. There was concern for septic joint. Orthopedics was consulted. She was then transferred to our institution. Patient was placed on empiric antibiotics. Patient did have surgery on her  knee. Patient was then sent back to subacute rehab. CT LUMBAR SPINE W CONTRAST   Final Result   THORACIC SPINE:      No vertebral compression fracture. No CT evidence of discitis-osteomyelitis. No high-grade spinal or neural foraminal stenosis. No abnormal enhancement. Spinal stimulator. LUMBAR SPINE:      No vertebral compression fracture. No CT evidence of discitis-osteomyelitis.       Degenerative changes result in mild-to-moderate spinal stenosis at T12-L1,   and milder spinal stenosis at other levels. Moderate to severe left neural   foraminal stenosis at L5-S1. See details above. No abnormal enhancement. Spinal stimulator. CT THORACIC SPINE W CONTRAST   Final Result   THORACIC SPINE:      No vertebral compression fracture. No CT evidence of discitis-osteomyelitis. No high-grade spinal or neural foraminal stenosis. No abnormal enhancement. Spinal stimulator. LUMBAR SPINE:      No vertebral compression fracture. No CT evidence of discitis-osteomyelitis. Degenerative changes result in mild-to-moderate spinal stenosis at T12-L1,   and milder spinal stenosis at other levels. Moderate to severe left neural   foraminal stenosis at L5-S1. See details above. No abnormal enhancement. Spinal stimulator. US DUP LOWER EXTREMITY RIGHT KRAIG   Final Result   Within the visualized vessels there is no evidence for deep venous   thrombosis               CT ABDOMEN PELVIS W IV CONTRAST Additional Contrast? Oral   Final Result   1. Presently there are no signs for acute pancreatitis. 2.  Status post gastric bypass procedure. The gastro jejunal anastomosis   patent. 3..  The apparent loop to the small-bowel anastomosis is dilated and there   are food residua at the a friend side of the anastomosis. This can indicate   a component of partial small-bowel obstruction. Some degree of edema in the   folds are seen in that area. 4.  Recommended the is evaluation with a small-bowel series as these findings   are difficult to be fully evaluated with CT scan single time event   examination. 2.  Presently there is no signs for acute pancreatitis. FLUORO FOR SURGICAL PROCEDURES   Final Result   Intraprocedural fluoroscopic spot images as above. See separate procedure   report for more information.          XR KNEE RIGHT (3 VIEWS)   Final Result   Extensive postsurgical changes about the distal right femur, right knee and   distal right tibia and fibula. No gross hardware complications. No acute fracture or subluxation. XR FEMUR RIGHT (MIN 2 VIEWS)   Final Result   Extensive postsurgical changes about the distal right femur, right knee and   distal right tibia and fibula. No gross hardware complications. No acute fracture or subluxation. No results found for this or any previous visit (from the past 336 hour(s)). Discharge Exam:  See progress note from today    Disposition: Subacute rehab    Patient Instructions:   Discharge Medication List as of 8/25/2022  3:22 PM        START taking these medications    Details   rifAMPin (RIFADIN) 300 MG capsule Take 1 capsule by mouth every 12 hours, Disp-70 capsule, R-0Print      vancomycin (VANCOCIN) infusion Infuse 1,250 mg intravenously in the morning and 1,250 mg in the evening. For 5 weeks. , Disp-75 g, R-1Print      oxyCODONE-acetaminophen (PERCOCET) 5-325 MG per tablet Take 1 tablet by mouth every 6 hours as needed for Pain for up to 7 days. Intended supply: 7 days.  Take lowest dose possible to manage pain, Disp-28 tablet, R-0Print      aspirin EC 81 MG EC tablet Take 1 tablet by mouth 2 times daily for 28 days, Disp-56 tablet, R-0Print           CONTINUE these medications which have NOT CHANGED    Details   promethazine (PHENERGAN) 25 MG tablet Take 25 mg by mouth every 6 hours as needed for NauseaHistorical Med      lipase-protease-amylase (CREON) 44531-69121 units delayed release capsule Take 6 capsules by mouth 3 times daily (with meals), Oral, 3 TIMES DAILY WITH MEALS Starting Fri 4/15/2022, Disp-270 capsule, DC to SNF      furosemide (LASIX) 40 MG tablet Take 1 tablet by mouth daily, Disp-60 tablet, R-3DC to SNF      polyethylene glycol (GLYCOLAX) 17 g packet Take 17 g by mouth 2 times dailyHistorical Med      meclizine (ANTIVERT) 25 MG tablet Take 25 mg by mouth every 6 hours as neededHistorical Med      diclofenac sodium (VOLTAREN) 1 % GEL Apply 4 g topically 2 times daily, Topical, 2 TIMES DAILY, Historical Med      baclofen (LIORESAL) 20 MG tablet Take 20 mg by mouth 4 times dailyHistorical Med      metoprolol succinate (TOPROL XL) 25 MG extended release tablet Take 0.5 tablets by mouth daily, Disp-30 tablet, R-3Normal      cyanocobalamin 50 MCG tablet Take 100 mcg by mouth dailyHistorical Med      ferrous sulfate (FE TABS 325) 325 (65 Fe) MG EC tablet Take 325 mg by mouth daily Historical Med      famotidine (PEPCID) 20 MG tablet Take 1 tablet by mouth 2 times daily, Disp-60 tablet, R-0Print      traZODone (DESYREL) 100 MG tablet Take 100 mg by mouth nightlyHistorical Med      albuterol (PROVENTIL) (5 MG/ML) 0.5% nebulizer solution Take 2.5 mg by nebulization 3 times daily Historical Med      magnesium 200 MG TABS tablet Take 200 mg by mouth 2 times daily Historical Med      calcium carbonate 600 MG TABS tablet Take 1 tablet by mouth 2 times daily Historical Med      montelukast (SINGULAIR) 10 MG tablet Take 1 tablet by mouth daily, Disp-30 tablet, R-3Normal      omeprazole (PRILOSEC) 20 MG delayed release capsule Take 20 mg by mouth Daily Historical Med      Cholecalciferol (VITAMIN D3) 5000 units TABS Take 1 tablet by mouth every other day Historical Med      docusate sodium (COLACE) 100 MG capsule Take 100 mg by mouth 2 times dailyHistorical Med      levOCARNitine (CARNITOR) 330 MG tablet Take 330 mg by mouth 3 times daily For mitochondrial diseaseHistorical Med      allopurinol (ZYLOPRIM) 100 MG tablet Take 200 mg by mouth 2 times daily Historical Med      escitalopram (LEXAPRO) 20 MG tablet Take 20 mg by mouth 2 times daily Historical Med      gabapentin (NEURONTIN) 600 MG tablet Take 1 tablet by mouth 4 times daily. , Disp-120 tablet, R-5Normal      topiramate (TOPAMAX) 200 MG tablet Take 1 tablet by mouth 2 times daily. , Disp-1 tablet, R-0NO PRINT      ziprasidone (GEODON) 20 MG capsule Take 20 mg by mouth nightly

## 2022-09-26 ENCOUNTER — TELEPHONE (OUTPATIENT)
Dept: ORTHOPEDIC SURGERY | Age: 62
End: 2022-09-26

## 2022-09-26 LAB
FUNGUS (MYCOLOGY) CULTURE: NORMAL
FUNGUS STAIN: NORMAL

## 2022-09-28 NOTE — TELEPHONE ENCOUNTER
Call to pt r/s'd appt   Future Appointments   Date Time Provider Deysi Demi   10/6/2022 10:15 AM Elyssa Kapadia DO [facility]   10/11/2022  3:30 PM CAROL Maloney [facility]   10/17/2022  9:15 AM Jhoana Leahy APRN - CNP [facility]   11/17/2022 10:45 AM Elyssa Kapadia DO [facility]   3/16/2023 10:45 AM Jerel Rain MD [facility]

## 2022-10-06 ENCOUNTER — HOSPITAL ENCOUNTER (OUTPATIENT)
Dept: GENERAL RADIOLOGY | Age: 62
Discharge: HOME | End: 2022-10-08
Payer: MEDICAID

## 2022-10-06 ENCOUNTER — OFFICE VISIT (OUTPATIENT)
Dept: ORTHOPEDIC SURGERY | Age: 62
End: 2022-10-06
Payer: MEDICAID

## 2022-10-06 DIAGNOSIS — S82.871D CLOSED DISPLACED PILON FRACTURE OF RIGHT TIBIA WITH ROUTINE HEALING, SUBSEQUENT ENCOUNTER: ICD-10-CM

## 2022-10-06 DIAGNOSIS — R52 PAIN: ICD-10-CM

## 2022-10-06 DIAGNOSIS — R52 PAIN: Primary | ICD-10-CM

## 2022-10-06 DIAGNOSIS — R29.898 WEAKNESS OF RIGHT FOOT: ICD-10-CM

## 2022-10-06 DIAGNOSIS — L02.415 ABSCESS OF RIGHT THIGH: ICD-10-CM

## 2022-10-06 PROCEDURE — 73562 X-RAY EXAM OF KNEE 3: CPT

## 2022-10-06 PROCEDURE — 99212 OFFICE O/P EST SF 10 MIN: CPT

## 2022-10-06 PROCEDURE — 99024 POSTOP FOLLOW-UP VISIT: CPT | Performed by: PHYSICIAN ASSISTANT

## 2022-10-06 RX ORDER — IPRATROPIUM BROMIDE AND ALBUTEROL SULFATE 2.5; .5 MG/3ML; MG/3ML
1 SOLUTION RESPIRATORY (INHALATION) EVERY 4 HOURS PRN
COMMUNITY

## 2022-10-06 NOTE — PROGRESS NOTES
Chief Complaint   Patient presents with    Post-Op Check     Pt being seen for 7 week post op visit for right knee I&D, DOS 08/19/22. Pt c/o a lot of pain in right knee, hip and thigh, and having a lot of problems lifting leg. Pt cant lift right toes up. Pt currently at [facility] and  doing PT, range of motion and weight baring exercises. OP:SURGEON: Dr. Mattie Franklin,   DATE OF PROCEDURE: 8-19-22  PROCEDURE: 1. Right knee arthrotomy with exploration irrigation debridement of infection, insertion absorbable antibiotic beads  2. Right distal lateral thigh suspected abscess incision, debridement and irrigation  3. Right hip aspiration with fluoroscopic guidance     POD: 7 weeks    Subjective:  Abida Diehl is following up from the above surgery. She is WBAT on right lower extremity. She ambulates with assistive device, walker. Pain to extremity is mild and is not taking prescribed pain medication. They denies numbness or tingling to the right lower extremity. Denies calf pain, chest pain, or shortness of breath. Patient has finished DVT prophylaxis. [facility] States that she does still have weakness in the right leg. She states that she was doing well after her initial surgery but the infection set her back. Patient states that her PICC line was removed by infectious disease and she is now on suppressive antibiotics. 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 Review of Systems -  All pertinent positives/negative in HPI     Objective:    General: Alert and oriented X 3, normocephalic atraumatic, external ears and eye normal, sclera clear, no acute distress, respirations easy and unlabored with no audible wheezes, skin warm and dry, speech and dress appropriate for noted age, affect euthymic.     Extremity:  Right Lower Extremity  Skin clean dry and intact, without signs of infection   Incision well-healed  no edema noted  Compartments supple throughout thigh and leg  Calf supple and not tender  negative Homans  Demonstrates active knee ROM 5-95 without pain  Mild weakness with dorsiflexion graded at 4+/5. She does have weakness in the EHL and all toe extensors graded at 1/5. Presents in a wheelchair  States sensation intact to touch in sural, deep peroneal, superficial peroneal, saphenous, posterior tibial  nerve distributions to foot/ankle. Palpable dorsalis pedis and posterior tibialis pulses, cap refill brisk in toes, foot warm/perfused. LMP 08/31/1988     XR:   3 views right knee demonstrate stable right knee arthroplasty with the hardware in stable position and alignment. Distal femoral plate and screws visualized in stable position and alignment. No evidence of broken hardware. Proximal femur is not visualized on these films. Assessment:   Diagnosis Orders   1. Pain  XR KNEE RIGHT (3 VIEWS)      2. Closed displaced pilon fracture of right tibia with routine healing, subsequent encounter        3. Weakness of right foot        4. Abscess of right thigh          Plan:  X-rays reviewed and discussed. Weightbearing as tolerated right lower extremity. Continue PT at the facility. Recommend gait training using the parallel bars, walker and assistance 6 days a week 2-3 times a day. Continue following with ID for suppressive antibiotic therapy. Referral to podiatry for right foot EHL and toe extensor weakness    Follow-up in 2 months for reevaluation and x-rays. Call if any questions or concerns. Electronically signed by RADHA Alvarado on 10/6/2022 at 11:04 AM  Note: This report was completed using [employer] voiced recognition software. Every effort has been made to ensure accuracy; however, inadvertent computerized transcription errors may be present.

## 2022-10-06 NOTE — PATIENT INSTRUCTIONS
Weightbearing as tolerated right lower extremity. Continue PT at the facility. Recommend gait training using the parallel bars, walker and assistance 6 days a week 2-3 times a day. Continue following with ID for suppressive antibiotic therapy. Referral to podiatry for right foot EHL and toe extensor weakness    Follow-up in 11/17/22 for reevaluation and x-rays. Call if any questions or concerns.     Future Appointments   Date Time Provider Deysi Simms   10/11/2022  3:30 PM CAROL Cortes [facility]   10/17/2022  9:15 AM ADEEL Tafoya - CNP [facility]   11/17/2022 10:45 AM Alfred Ernst DO [facility]   3/16/2023 10:45 AM Jayjay Edmonds MD [facility]

## 2022-10-11 LAB
AFB CULTURE (MYCOBACTERIA): NORMAL
AFB SMEAR: NORMAL

## 2022-11-16 DIAGNOSIS — L02.415 ABSCESS OF RIGHT THIGH: Primary | ICD-10-CM

## 2022-11-16 DIAGNOSIS — T84.53XS INFECTION ASSOCIATED WITH INTERNAL RIGHT KNEE PROSTHESIS, SEQUELA: ICD-10-CM

## 2022-11-17 ENCOUNTER — HOSPITAL ENCOUNTER (OUTPATIENT)
Dept: GENERAL RADIOLOGY | Age: 62
Discharge: HOME | End: 2022-11-19
Payer: MEDICAID

## 2022-11-17 ENCOUNTER — OFFICE VISIT (OUTPATIENT)
Dept: ORTHOPEDIC SURGERY | Age: 62
End: 2022-11-17
Payer: MEDICAID

## 2022-11-17 VITALS — WEIGHT: 217 LBS | HEIGHT: 67 IN | BODY MASS INDEX: 34.06 KG/M2

## 2022-11-17 DIAGNOSIS — L02.415 ABSCESS OF RIGHT THIGH: ICD-10-CM

## 2022-11-17 DIAGNOSIS — T84.53XS INFECTION ASSOCIATED WITH INTERNAL RIGHT KNEE PROSTHESIS, SEQUELA: ICD-10-CM

## 2022-11-17 DIAGNOSIS — S82.871S CLOSED RIGHT PILON FRACTURE, SEQUELA: ICD-10-CM

## 2022-11-17 DIAGNOSIS — S82.871D CLOSED DISPLACED PILON FRACTURE OF RIGHT TIBIA WITH ROUTINE HEALING, SUBSEQUENT ENCOUNTER: Primary | ICD-10-CM

## 2022-11-17 DIAGNOSIS — S82.871S CLOSED RIGHT PILON FRACTURE, SEQUELA: Primary | ICD-10-CM

## 2022-11-17 PROCEDURE — 99213 OFFICE O/P EST LOW 20 MIN: CPT | Performed by: PHYSICIAN ASSISTANT

## 2022-11-17 PROCEDURE — 73610 X-RAY EXAM OF ANKLE: CPT

## 2022-11-17 PROCEDURE — 99212 OFFICE O/P EST SF 10 MIN: CPT

## 2022-11-17 PROCEDURE — 73560 X-RAY EXAM OF KNEE 1 OR 2: CPT

## 2022-11-17 PROCEDURE — 73552 X-RAY EXAM OF FEMUR 2/>: CPT

## 2022-11-17 NOTE — PATIENT INSTRUCTIONS
Weightbearing as tolerated right lower extremity. Continue PT at [address] starting Fosamax. Follow-up as needed. Call if any questions or concerns.

## 2022-11-17 NOTE — PROGRESS NOTES
Chief Complaint   Patient presents with    Follow-up     Closed displaced pilon fx of rt tibia. States she is going to PT and walked 200 ft with walker. OP:SURGEON: Dr. Javi Kumar, DO  DATE OF PROCEDURE: 8-19-22  PROCEDURE: 1. Right knee arthrotomy with exploration irrigation debridement of infection, insertion absorbable antibiotic beads  2. Right distal lateral thigh suspected abscess incision, debridement and irrigation  3. Right hip aspiration with fluoroscopic guidance    OP:SURGEON: Dr. Javi Kumar, DO  DATE OF PROCEDURE: 3-2-22  PROCEDURE: 1. Right tibial pilon and fibular shaft fractures open reduction internal fixation of tibia and fibula   2. Removal right ankle spanning external fixation     Subjective:  Angelina Aviles is following up from the above surgery. She is WBAT on right lower extremity. She ambulates with assistive device, walker. Pain to extremity is none and is not taking prescribed pain medication. They denies numbness or tingling to the right lower extremity. Denies calf pain, chest pain, or shortness of breath. Patient has finished DVT prophylaxis. Patient is participating in therapy at [facility]. She is doing well after surgery. States that she is walking better using a walker. She does use a motorized wheelchair due to her multiple orthopedic injuries and history of mitochondrial disease. Review of Systems -  All pertinent positives/negative in HPI     Objective:    General: Alert and oriented X 3, normocephalic atraumatic, external ears and eye normal, sclera clear, no acute distress, respirations easy and unlabored with no audible wheezes, skin warm and dry, speech and dress appropriate for noted age, affect euthymic.     Extremity:  Right Lower Extremity  Skin clean dry and intact, without signs of infection   Incision well-healed  no edema noted  Compartments supple throughout thigh and leg  Calf supple and not tender  negative Homans  Demonstrates active motion with

## 2023-01-24 ENCOUNTER — APPOINTMENT (OUTPATIENT)
Dept: GENERAL RADIOLOGY | Age: 63
DRG: 325 | End: 2023-01-24
Payer: MEDICAID

## 2023-01-24 ENCOUNTER — HOSPITAL ENCOUNTER (INPATIENT)
Age: 63
LOS: 7 days | Discharge: HOME HEALTH | DRG: 325 | End: 2023-01-31
Attending: EMERGENCY MEDICINE | Admitting: INTERNAL MEDICINE
Payer: MEDICAID

## 2023-01-24 ENCOUNTER — APPOINTMENT (OUTPATIENT)
Dept: CT IMAGING | Age: 63
DRG: 325 | End: 2023-01-24
Payer: MEDICAID

## 2023-01-24 ENCOUNTER — APPOINTMENT (OUTPATIENT)
Dept: ULTRASOUND IMAGING | Age: 63
DRG: 325 | End: 2023-01-24
Payer: MEDICAID

## 2023-01-24 DIAGNOSIS — M00.9 PYOGENIC ARTHRITIS OF RIGHT KNEE JOINT, DUE TO UNSPECIFIED ORGANISM (HCC): Primary | ICD-10-CM

## 2023-01-24 DIAGNOSIS — T84.53XA INFECTION ASSOCIATED WITH INTERNAL RIGHT KNEE PROSTHESIS, INITIAL ENCOUNTER (HCC): ICD-10-CM

## 2023-01-24 DIAGNOSIS — B99.9 INFECTION: ICD-10-CM

## 2023-01-24 PROBLEM — M25.569 KNEE PAIN: Status: ACTIVE | Noted: 2023-01-24

## 2023-01-24 LAB
ABO/RH: NORMAL
ACETAMINOPHEN LEVEL: <5 MCG/ML (ref 10–30)
ALBUMIN SERPL-MCNC: 3.6 G/DL (ref 3.5–5.2)
ALP BLD-CCNC: 102 U/L (ref 35–104)
ALT SERPL-CCNC: 14 U/L (ref 0–32)
AMPHETAMINE SCREEN, URINE: NOT DETECTED
ANION GAP SERPL CALCULATED.3IONS-SCNC: 13 MMOL/L (ref 7–16)
ANTIBODY SCREEN: NORMAL
APPEARANCE FLUID: NORMAL
APTT: 29.9 SEC (ref 24.5–35.1)
AST SERPL-CCNC: 19 U/L (ref 0–31)
BACTERIA: NORMAL /HPF
BARBITURATE SCREEN URINE: NOT DETECTED
BASOPHILS ABSOLUTE: 0.03 E9/L (ref 0–0.2)
BASOPHILS RELATIVE PERCENT: 0.3 % (ref 0–2)
BENZODIAZEPINE SCREEN, URINE: NOT DETECTED
BILIRUB SERPL-MCNC: 0.3 MG/DL (ref 0–1.2)
BILIRUBIN URINE: NEGATIVE
BLOOD, URINE: NEGATIVE
BUN BLDV-MCNC: 20 MG/DL (ref 6–23)
C-REACTIVE PROTEIN: 28.4 MG/DL (ref 0–0.4)
CALCIUM SERPL-MCNC: 8.6 MG/DL (ref 8.6–10.2)
CANNABINOID SCREEN URINE: NOT DETECTED
CELL COUNT FLUID TYPE: NORMAL
CHLORIDE BLD-SCNC: 100 MMOL/L (ref 98–107)
CLARITY: CLEAR
CO2: 23 MMOL/L (ref 22–29)
COCAINE METABOLITE SCREEN URINE: NOT DETECTED
COLOR FLUID: NORMAL
COLOR: YELLOW
CREAT SERPL-MCNC: 0.7 MG/DL (ref 0.5–1)
EKG ATRIAL RATE: 98 BPM
EKG P AXIS: -4 DEGREES
EKG P-R INTERVAL: 154 MS
EKG Q-T INTERVAL: 328 MS
EKG QRS DURATION: 98 MS
EKG QTC CALCULATION (BAZETT): 418 MS
EKG R AXIS: 8 DEGREES
EKG T AXIS: 28 DEGREES
EKG VENTRICULAR RATE: 98 BPM
EOSINOPHILS ABSOLUTE: 0.04 E9/L (ref 0.05–0.5)
EOSINOPHILS RELATIVE PERCENT: 0.3 % (ref 0–6)
ETHANOL: <10 MG/DL (ref 0–0.08)
FENTANYL SCREEN, URINE: NOT DETECTED
FLUID TYPE: NORMAL
GFR SERPL CREATININE-BSD FRML MDRD: >60 ML/MIN/1.73
GLUCOSE BLD-MCNC: 125 MG/DL (ref 74–99)
GLUCOSE URINE: NEGATIVE MG/DL
GLUCOSE, FLUID: <2 MG/DL
HCT VFR BLD CALC: 36 % (ref 34–48)
HCT VFR BLD CALC: 42 % (ref 34–48)
HEMOGLOBIN: 12 G/DL (ref 11.5–15.5)
HEMOGLOBIN: 13.7 G/DL (ref 11.5–15.5)
IMMATURE GRANULOCYTES #: 0.09 E9/L
IMMATURE GRANULOCYTES %: 0.8 % (ref 0–5)
INFLUENZA A: NOT DETECTED
INFLUENZA B: NOT DETECTED
KETONES, URINE: NEGATIVE MG/DL
LACTIC ACID, SEPSIS: 0.4 MMOL/L (ref 0.5–1.9)
LACTIC ACID, SEPSIS: 0.9 MMOL/L (ref 0.5–1.9)
LEUKOCYTE ESTERASE, URINE: NEGATIVE
LYMPHOCYTES ABSOLUTE: 0.93 E9/L (ref 1.5–4)
LYMPHOCYTES RELATIVE PERCENT: 8 % (ref 20–42)
Lab: ABNORMAL
MCH RBC QN AUTO: 32.2 PG (ref 26–35)
MCH RBC QN AUTO: 32.4 PG (ref 26–35)
MCHC RBC AUTO-ENTMCNC: 32.6 % (ref 32–34.5)
MCHC RBC AUTO-ENTMCNC: 33.3 % (ref 32–34.5)
MCV RBC AUTO: 97.3 FL (ref 80–99.9)
MCV RBC AUTO: 98.6 FL (ref 80–99.9)
METHADONE SCREEN, URINE: NOT DETECTED
MONOCYTE, FLUID: 10 %
MONOCYTES ABSOLUTE: 1.16 E9/L (ref 0.1–0.95)
MONOCYTES RELATIVE PERCENT: 10 % (ref 2–12)
NEUTROPHIL, FLUID: 91 %
NEUTROPHILS ABSOLUTE: 9.32 E9/L (ref 1.8–7.3)
NEUTROPHILS RELATIVE PERCENT: 80.6 % (ref 43–80)
NITRITE, URINE: NEGATIVE
NUCLEATED CELLS FLUID: NORMAL /UL
OPIATE SCREEN URINE: NOT DETECTED
OXYCODONE URINE: POSITIVE
PDW BLD-RTO: 13.8 FL (ref 11.5–15)
PDW BLD-RTO: 14.1 FL (ref 11.5–15)
PH UA: 6 (ref 5–9)
PHENCYCLIDINE SCREEN URINE: NOT DETECTED
PLATELET # BLD: 177 E9/L (ref 130–450)
PLATELET # BLD: 187 E9/L (ref 130–450)
PMV BLD AUTO: 9.2 FL (ref 7–12)
PMV BLD AUTO: 9.4 FL (ref 7–12)
POTASSIUM SERPL-SCNC: 4.1 MMOL/L (ref 3.5–5)
PROCALCITONIN: 0.41 NG/ML (ref 0–0.08)
PROTEIN UA: NORMAL MG/DL
RBC # BLD: 3.7 E12/L (ref 3.5–5.5)
RBC # BLD: 4.26 E12/L (ref 3.5–5.5)
RBC FLUID: <2000 /UL
RBC UA: NORMAL /HPF (ref 0–2)
SALICYLATE, SERUM: <0.3 MG/DL (ref 0–30)
SARS-COV-2 RNA, RT PCR: NOT DETECTED
SEDIMENTATION RATE, ERYTHROCYTE: 74 MM/HR (ref 0–20)
SODIUM BLD-SCNC: 136 MMOL/L (ref 132–146)
SPECIFIC GRAVITY UA: 1.02 (ref 1–1.03)
TOTAL PROTEIN: 6.9 G/DL (ref 6.4–8.3)
TRICYCLIC ANTIDEPRESSANTS SCREEN SERUM: NEGATIVE NG/ML
TROPONIN, HIGH SENSITIVITY: 24 NG/L (ref 0–9)
TROPONIN, HIGH SENSITIVITY: 24 NG/L (ref 0–9)
UROBILINOGEN, URINE: 1 E.U./DL
WBC # BLD: 11.6 E9/L (ref 4.5–11.5)
WBC # BLD: 8.4 E9/L (ref 4.5–11.5)
WBC UA: NORMAL /HPF (ref 0–5)

## 2023-01-24 PROCEDURE — 36415 COLL VENOUS BLD VENIPUNCTURE: CPT

## 2023-01-24 PROCEDURE — 87186 SC STD MICRODIL/AGAR DIL: CPT

## 2023-01-24 PROCEDURE — 93005 ELECTROCARDIOGRAM TRACING: CPT | Performed by: EMERGENCY MEDICINE

## 2023-01-24 PROCEDURE — 6370000000 HC RX 637 (ALT 250 FOR IP): Performed by: STUDENT IN AN ORGANIZED HEALTH CARE EDUCATION/TRAINING PROGRAM

## 2023-01-24 PROCEDURE — 82077 ASSAY SPEC XCP UR&BREATH IA: CPT

## 2023-01-24 PROCEDURE — 99254 IP/OBS CNSLTJ NEW/EST MOD 60: CPT | Performed by: ORTHOPAEDIC SURGERY

## 2023-01-24 PROCEDURE — 87040 BLOOD CULTURE FOR BACTERIA: CPT

## 2023-01-24 PROCEDURE — 87070 CULTURE OTHR SPECIMN AEROBIC: CPT

## 2023-01-24 PROCEDURE — 86901 BLOOD TYPING SEROLOGIC RH(D): CPT

## 2023-01-24 PROCEDURE — 84145 PROCALCITONIN (PCT): CPT

## 2023-01-24 PROCEDURE — 82947 ASSAY GLUCOSE BLOOD QUANT: CPT

## 2023-01-24 PROCEDURE — 87205 SMEAR GRAM STAIN: CPT

## 2023-01-24 PROCEDURE — 86850 RBC ANTIBODY SCREEN: CPT

## 2023-01-24 PROCEDURE — 80179 DRUG ASSAY SALICYLATE: CPT

## 2023-01-24 PROCEDURE — 85651 RBC SED RATE NONAUTOMATED: CPT

## 2023-01-24 PROCEDURE — 93010 ELECTROCARDIOGRAM REPORT: CPT | Performed by: INTERNAL MEDICINE

## 2023-01-24 PROCEDURE — 81001 URINALYSIS AUTO W/SCOPE: CPT

## 2023-01-24 PROCEDURE — 73562 X-RAY EXAM OF KNEE 3: CPT

## 2023-01-24 PROCEDURE — 84484 ASSAY OF TROPONIN QUANT: CPT

## 2023-01-24 PROCEDURE — 2580000003 HC RX 258

## 2023-01-24 PROCEDURE — 86900 BLOOD TYPING SEROLOGIC ABO: CPT

## 2023-01-24 PROCEDURE — 85025 COMPLETE CBC W/AUTO DIFF WBC: CPT

## 2023-01-24 PROCEDURE — 85027 COMPLETE CBC AUTOMATED: CPT

## 2023-01-24 PROCEDURE — 89051 BODY FLUID CELL COUNT: CPT

## 2023-01-24 PROCEDURE — 87636 SARSCOV2 & INF A&B AMP PRB: CPT

## 2023-01-24 PROCEDURE — 85730 THROMBOPLASTIN TIME PARTIAL: CPT

## 2023-01-24 PROCEDURE — 2060000000 HC ICU INTERMEDIATE R&B

## 2023-01-24 PROCEDURE — 80053 COMPREHEN METABOLIC PANEL: CPT

## 2023-01-24 PROCEDURE — 6370000000 HC RX 637 (ALT 250 FOR IP): Performed by: INTERNAL MEDICINE

## 2023-01-24 PROCEDURE — 2580000003 HC RX 258: Performed by: STUDENT IN AN ORGANIZED HEALTH CARE EDUCATION/TRAINING PROGRAM

## 2023-01-24 PROCEDURE — 99285 EMERGENCY DEPT VISIT HI MDM: CPT

## 2023-01-24 PROCEDURE — 2700000000 HC OXYGEN THERAPY PER DAY

## 2023-01-24 PROCEDURE — 89060 EXAM SYNOVIAL FLUID CRYSTALS: CPT

## 2023-01-24 PROCEDURE — 73700 CT LOWER EXTREMITY W/O DYE: CPT

## 2023-01-24 PROCEDURE — 80143 DRUG ASSAY ACETAMINOPHEN: CPT

## 2023-01-24 PROCEDURE — 86140 C-REACTIVE PROTEIN: CPT

## 2023-01-24 PROCEDURE — 83605 ASSAY OF LACTIC ACID: CPT

## 2023-01-24 PROCEDURE — 6360000002 HC RX W HCPCS

## 2023-01-24 PROCEDURE — 71045 X-RAY EXAM CHEST 1 VIEW: CPT

## 2023-01-24 PROCEDURE — 87088 URINE BACTERIA CULTURE: CPT

## 2023-01-24 PROCEDURE — 80307 DRUG TEST PRSMV CHEM ANLYZR: CPT

## 2023-01-24 PROCEDURE — 87077 CULTURE AEROBIC IDENTIFY: CPT

## 2023-01-24 PROCEDURE — 93971 EXTREMITY STUDY: CPT | Performed by: RADIOLOGY

## 2023-01-24 PROCEDURE — 93971 EXTREMITY STUDY: CPT

## 2023-01-24 RX ORDER — ACETAMINOPHEN 500 MG
1000 TABLET ORAL ONCE
Status: COMPLETED | OUTPATIENT
Start: 2023-01-24 | End: 2023-01-24

## 2023-01-24 RX ORDER — CARBOXYMETHYLCELLULOSE SODIUM 0.5 %
250 DROPPERETTE, SINGLE-USE DROP DISPENSER OPHTHALMIC (EYE) 3 TIMES DAILY
Status: ON HOLD | COMMUNITY
Start: 2022-12-18 | End: 2023-01-30 | Stop reason: SDUPTHER

## 2023-01-24 RX ORDER — ASCORBIC ACID
200 CRYSTALS ORAL DAILY
Status: ON HOLD | COMMUNITY
End: 2023-01-30 | Stop reason: HOSPADM

## 2023-01-24 RX ORDER — LIDOCAINE HYDROCHLORIDE 10 MG/ML
INJECTION, SOLUTION INFILTRATION; PERINEURAL
Status: DISPENSED
Start: 2023-01-24 | End: 2023-01-24

## 2023-01-24 RX ORDER — 0.9 % SODIUM CHLORIDE 0.9 %
500 INTRAVENOUS SOLUTION INTRAVENOUS ONCE
Status: COMPLETED | OUTPATIENT
Start: 2023-01-24 | End: 2023-01-24

## 2023-01-24 RX ORDER — LORATADINE 10 MG/1
10 TABLET ORAL DAILY
COMMUNITY
Start: 2022-12-07

## 2023-01-24 RX ORDER — FENTANYL CITRATE 50 UG/ML
50 INJECTION, SOLUTION INTRAMUSCULAR; INTRAVENOUS
Status: COMPLETED | OUTPATIENT
Start: 2023-01-24 | End: 2023-01-25

## 2023-01-24 RX ORDER — ZIPRASIDONE HYDROCHLORIDE 20 MG/1
20 CAPSULE ORAL NIGHTLY
COMMUNITY

## 2023-01-24 RX ORDER — ACETAMINOPHEN 160 MG
1 TABLET,DISINTEGRATING ORAL 3 TIMES DAILY
Status: ON HOLD | COMMUNITY
Start: 2023-01-09 | End: 2023-01-30 | Stop reason: HOSPADM

## 2023-01-24 RX ORDER — GABAPENTIN 300 MG/1
600 CAPSULE ORAL 4 TIMES DAILY
COMMUNITY

## 2023-01-24 RX ORDER — PANTOPRAZOLE SODIUM 40 MG/1
40 TABLET, DELAYED RELEASE ORAL DAILY PRN
Status: ON HOLD | COMMUNITY
End: 2023-01-30 | Stop reason: SDUPTHER

## 2023-01-24 RX ORDER — M-VIT,TX,IRON,MINS/CALC/FOLIC 27MG-0.4MG
1 TABLET ORAL DAILY
COMMUNITY

## 2023-01-24 RX ORDER — ZIPRASIDONE HYDROCHLORIDE 40 MG/1
40 CAPSULE ORAL EVERY EVENING
COMMUNITY

## 2023-01-24 RX ORDER — POTASSIUM CHLORIDE 20 MEQ/1
20 TABLET, EXTENDED RELEASE ORAL DAILY
COMMUNITY

## 2023-01-24 RX ORDER — OXYCODONE HYDROCHLORIDE 5 MG/1
5 TABLET ORAL EVERY 4 HOURS PRN
Status: DISCONTINUED | OUTPATIENT
Start: 2023-01-24 | End: 2023-01-25

## 2023-01-24 RX ORDER — 0.9 % SODIUM CHLORIDE 0.9 %
1000 INTRAVENOUS SOLUTION INTRAVENOUS ONCE
Status: COMPLETED | OUTPATIENT
Start: 2023-01-24 | End: 2023-01-24

## 2023-01-24 RX ORDER — MULTIVITAMIN WITH IRON
250 TABLET ORAL 3 TIMES DAILY
Status: ON HOLD | COMMUNITY
End: 2023-01-30 | Stop reason: HOSPADM

## 2023-01-24 RX ORDER — FERROUS SULFATE 325(65) MG
325 TABLET ORAL
COMMUNITY

## 2023-01-24 RX ORDER — AZITHROMYCIN 250 MG/1
250 TABLET, FILM COATED ORAL DAILY
Status: ON HOLD | COMMUNITY
Start: 2023-01-20 | End: 2023-01-30 | Stop reason: HOSPADM

## 2023-01-24 RX ORDER — ALBUTEROL SULFATE 90 UG/1
2 AEROSOL, METERED RESPIRATORY (INHALATION) 4 TIMES DAILY
COMMUNITY

## 2023-01-24 RX ORDER — DOCUSATE SODIUM 100 MG/1
100 CAPSULE, LIQUID FILLED ORAL 2 TIMES DAILY
COMMUNITY

## 2023-01-24 RX ORDER — TRAZODONE HYDROCHLORIDE 50 MG/1
25 TABLET ORAL 2 TIMES DAILY
Status: ON HOLD | COMMUNITY
End: 2023-01-30 | Stop reason: HOSPADM

## 2023-01-24 RX ORDER — TRAZODONE HYDROCHLORIDE 100 MG/1
100 TABLET ORAL NIGHTLY
Status: ON HOLD | COMMUNITY
Start: 2022-12-18 | End: 2023-01-30 | Stop reason: HOSPADM

## 2023-01-24 RX ORDER — IBUPROFEN 200 MG
1 CAPSULE ORAL 3 TIMES DAILY
COMMUNITY

## 2023-01-24 RX ADMIN — SODIUM CHLORIDE 500 ML: 9 INJECTION, SOLUTION INTRAVENOUS at 20:20

## 2023-01-24 RX ADMIN — VANCOMYCIN HYDROCHLORIDE 2000 MG: 10 INJECTION, POWDER, LYOPHILIZED, FOR SOLUTION INTRAVENOUS at 13:52

## 2023-01-24 RX ADMIN — SODIUM CHLORIDE 1000 ML: 9 INJECTION, SOLUTION INTRAVENOUS at 06:04

## 2023-01-24 RX ADMIN — CEFEPIME 2000 MG: 2 INJECTION, POWDER, FOR SOLUTION INTRAVENOUS at 12:47

## 2023-01-24 RX ADMIN — OXYCODONE 5 MG: 5 TABLET ORAL at 14:55

## 2023-01-24 RX ADMIN — FENTANYL CITRATE 50 MCG: 50 INJECTION, SOLUTION INTRAMUSCULAR; INTRAVENOUS at 20:09

## 2023-01-24 RX ADMIN — ACETAMINOPHEN 1000 MG: 500 TABLET ORAL at 06:05

## 2023-01-24 ASSESSMENT — LIFESTYLE VARIABLES
HOW MANY STANDARD DRINKS CONTAINING ALCOHOL DO YOU HAVE ON A TYPICAL DAY: PATIENT DOES NOT DRINK
HOW OFTEN DO YOU HAVE A DRINK CONTAINING ALCOHOL: NEVER
HOW OFTEN DO YOU HAVE A DRINK CONTAINING ALCOHOL: NEVER
HOW MANY STANDARD DRINKS CONTAINING ALCOHOL DO YOU HAVE ON A TYPICAL DAY: PATIENT DOES NOT DRINK

## 2023-01-24 ASSESSMENT — PAIN SCALES - GENERAL
PAINLEVEL_OUTOF10: 8
PAINLEVEL_OUTOF10: 7
PAINLEVEL_OUTOF10: 8

## 2023-01-24 ASSESSMENT — PAIN - FUNCTIONAL ASSESSMENT: PAIN_FUNCTIONAL_ASSESSMENT: 0-10

## 2023-01-24 ASSESSMENT — PAIN DESCRIPTION - ORIENTATION: ORIENTATION: RIGHT

## 2023-01-24 ASSESSMENT — PAIN DESCRIPTION - LOCATION: LOCATION: LEG

## 2023-01-24 NOTE — CONSULTS
Department of Orthopedic Surgery  Consult Note    Reason for Consult:  Right knee Pain    HISTORY OF PRESENT ILLNESS:       Patient is a 58 y.o. female who presents with sudden onset pain extending from the right ankle to the right hip, worse in the knee. She does not recall any inciting event or trauma preceding her pain. She states that she woke up yesterday with pain in the right knee which has worsened since. States that she has been unable to ambulate since and was sent to the ED by her primary care this morning. Patient orthopedic history is significant for right knee arthrotomy I&D of infection, right lateral distal thigh abscess I&D and right hip aspiration all done by Dr Makenna Mendoza in AUGUST 2022, a right pilon and fibular shaft fracture ORIF in March 2022, by Dr Makenna Mendoza as well. She underwent a right TKA in [place] many years ago. Also has a left TKA which was done locally couple of years ago. She completed a course of antibiotics few weeks ago and has been taking amoxicillin 7-day course for a respiratory tract infection. Denies numbness/tingling/paresthesias. Denies any other orthopedic complaints at this time.       Past Medical History:        Diagnosis Date    Adenoma of right adrenal gland     Anemia     Anxiety     Arthritis     spinal    Asthma     controlled with inhalers     Benign essential tremor     Bipolar affective ([address]     Chronic back pain     Spinal cord stimulator in place    Chronic respiratory failure with hypoxia (HCC)     at times dt diaphragm does not function properly dt Mitochondrial disorder    Depression     stable    Diabetes mellitus ([address]     Difficulty swallowing     for EGD 10-8-19     Environmental and seasonal allergies     Excessive physiologic tremor 5/24/2021    Fatty liver     Full dentures     GERD (gastroesophageal reflux disease)     Gout     past hx of    Herniated cervical disc     limited rom of head and neck    History of swelling of feet     Hypertension Lymphedema of lower extremity 09/06/2012    Mitochondrial cytopathy (HCC)     s/s muscle and nerve pain, difficulty breathing, seizures, difficulty swallowing, digestive disorders- Dr. Yasmine Lemus CC    Muscle weakness (generalized)     Information obtained from [facility]    Need for assistance with personal care     Information obtained from [facility]    Neuropathy     at feet    Obesity     PETR (obstructive sleep apnea)     no CPAP dt insurance will not pay for    Osteoarthritis of left knee     Information obtained from [facility]    PONV (postoperative nausea and vomiting)     Seizures ([address]     last approx 6-13-19- f/u w/ PCP  Granmal, flares up in heat/ summer time - no recent issues as of 10-4-19     Spinal headache     Thyroid disease      Past Surgical History:        Procedure Laterality Date    ANKLE FRACTURE SURGERY Right 2/14/2022    RIGHT ANKLE IRRIGAITON AND DEBRIDEMENT WITH EXTERNAL FIXATOR AND LACERATION REPAIR performed by Sravan Still MD at [address] Right 3/2/2022    RIGHT LOWER EXTREMITY REMOVAL EXTERNAL FIXATOR, RIGHT PILON OPEN REDUCTION INTERNAL FIXATOR--SYNTHES (FACILITY) performed by Alfred Ernst DO at [facility]      with Hysterectomy / unknown    BACK SURGERY  last one 1995    lumbar x 2    CARDIAC CATHETERIZATION Right 6-6-2013    Dr. Gregoria Barragan Radial, no stents placed, no blockages     CHOLECYSTECTOMY  1999    open with gastric bypass    COLONOSCOPY N/A 9/18/2018    COLONOSCOPY POLYPECTOMY HOT BIOPSY performed by CHRISTOPHE Torres MD at [address] N/A 10/8/2019    COLONOSCOPY POLYPECTOMY SNARE/COLD BIOPSY performed by Rhonda Mason MD at [address]    EGD COLONOSCOPY N/A 10/8/2019    EGD ESOPHAGOGASTRODUODENOSCOPY DILATATION performed by Rhonda Mason MD at [facility] [place], operated by [facility], DIAGNOSTIC      ESOPHAGEAL DILATATION  9/18/2018    ESOPHAGEAL DILATION [facility] performed by Merissa Kelly MD [place] at [facility] (CERVIX STATUS UNKNOWN)  1988    JOINT REPLACEMENT Bilateral 2007,2017    knees    KNEE SURGERY Bilateral     scope    KNEE SURGERY Right 8/19/2022    RIGHT KNEE INCISION AND DRAINAGE performed by Francesco Carson DO at [address] Left 10 02 2013    lumbar paravertebral facet #2    NERVE BLOCK Left 10 9 13    hip inj #1    NERVE BLOCK Left 10/16/13    hip injection    NERVE BLOCK Left 10/29/2014    left lumbar transforaminal nerve lbock  #1    NERVE BLOCK Left 11/12/2014    lumbar left transforaminal nerve block  #2    NERVE BLOCK Left 12 8 14    lumbar transforaminal #3    NERVE BLOCK Right 3/30/15    cervical transforaminal #1    NERVE BLOCK Right 4/6/2015    right cervical transforaminal nerve block  #2    NERVE BLOCK Right 4/13/15    cervical transforaminal #3    NERVE BLOCK Left 7/6/15    knee injection #1    NERVE BLOCK  07/20/15    left genicular nerve block/knee #2    NERVE BLOCK Left 10 1 15    lumb transforam #1    NERVE BLOCK  10/26/15    left lumbar transforaminal nerve block #3    NERVE BLOCK Bilateral 4/1/2021    #1 BILATERAL SACROILIAC JOINT INJECTION UNDER FLUORO performed by Meron Nascimento MD at [address] Left 11 25 13    lumbar radiofreq    OTHER SURGICAL HISTORY  3/28/2016    stage 1, 3 day percutanous trial [place] [employer] lumbar spinal cord stimulator    OTHER SURGICAL HISTORY  1995    racz procedure / lower back    [place] COLONOSCOPY FLX DX W/COLLJ SPEC WHEN PFRMD N/A 3/20/2018    COLONOSCOPY DIAGNOSTIC OR SCREENING performed by Landy Bruno MD at [address] [place] [address] EGD TRANSORAL BIOPSY SINGLE/MULTIPLE N/A 3/20/2018    EGD BIOPSY performed by Landy Bruno MD at [facility] ENDOSCOPY    TONSILLECTOMY       Current Medications:   Current Facility-Administered Medications: lidocaine 1 % injection, , ,   Allergies:  Bee pollen, Penicillins, Ropinirole, Ropinirole hcl, Vistaril [hydroxyzine hcl], Aripiprazole, Prednisone, Restoril [temazepam], Wax [beeswax], Hydroxyzine pamoate, and Tape [adhesive tape]    Social History:   TOBACCO:   reports that she quit smoking about 12 months ago. Her smoking use included cigarettes. She started smoking about 32 years ago. She has a 31.50 pack-year smoking history. She has never used smokeless tobacco.  ETOH:   reports no history of alcohol use. DRUGS:   reports that she does not currently use drugs after having used the following drugs: Marijuana Yonis Radon).   ACTIVITIES OF DAILY LIVING:    OCCUPATION:    Family History:       Problem Relation Age of Onset    Heart Disease Mother     Cancer Father     Arthritis Other     Cancer Other     Depression Other     Heart Disease Other     Hypertension Other     Mental Illness Other     Stroke Other        REVIEW OF SYSTEMS:  CONSTITUTIONAL:  negative for acute fevers, chills  EYES:  negative for acute blurred vision, visual disturbance  HEENT:  negative for acute hearing loss, voice change  RESPIRATORY:  positive for  dyspnea  CARDIOVASCULAR:  negative for  chest pain, palpitations  GASTROINTESTINAL:  negative for acute nausea, vomiting  GENITOURINARY:  negative for acute frequency, urinary incontinence  HEMATOLOGIC/LYMPHATIC:  negative for acute bleeding and petechiae  MUSCULOSKELETAL:  See HPI  NEUROLOGICAL:  negative for acute headaches, dizziness  BEHAVIOR/PSYCH:  negative for acute increased agitation and anxiety    PHYSICAL EXAM:    VITALS:  BP (!) 154/66   Pulse (!) 105   Temp 98.5 °F (36.9 °C) (Oral)   Resp 18   Ht 5' 7\" (1.702 m)   Wt 218 lb (98.9 kg)   LMP 08/31/1988   SpO2 94%   BMI 34.14 kg/m²   CONSTITUTIONAL:  awake, alert, cooperative, apparent distress, and appears stated age  EYES: Lids and lashes normal, pupils equal, round and reactive to light, extra ocular muscles intact, sclera clear, conjunctiva normal  ENT: Normocephalic, without obvious abnormality, atraumatic, external ears without lesions, oral pharynx with moist mucus membranes  NECK: Trachea midline, skin normal  LUNGS: No increased work of breathing, good air exchange  CARDIOVASCULAR: 2+ pulses throughout and capillary Refill less than 2 seconds  ABDOMEN: soft, non-distended, non-tender and no rebound tenderness or guarding  NEUROLOGIC: Awake, alert, oriented to name, place and time. Cranial nerves II-XII are grossly intact. Motor is 5 out of 5 bilaterally. Sensory is intact. Babinski down going  MUSCULOSKELETAL:  Right lower Extremity:  Skin remarkable for a midline parapatellar skin incision  Significant swelling by right knee  Severe tenderness to palpation by right knee throughout  Some tenderness over right leg anteriorly and right tight   Compartments soft and compressible  Palpable dorsalis pedis and posterior tibialis pulse, brisk cap refill to toes, foot warm and perfused  Sensation intact to light touch in sural/deep peroneal/superficial peroneal/saphenous/posterior tibial nerve distributions to foot/ankle  Demonstrates active ankle plantar/dorsiflexion/great toe extension     Secondary Exam:   bilateralUE: No obvious signs of trauma. -TTP to fingers, hand, wrist, forearm, elbow, humerus, shoulder or clavicle. -- Patient able to flex/extend fingers, wrist, elbow and shoulder with active and passive ROM without pain, +2/4 Radial pulse, cap refill <3sec, +AIN/PIN/Radial/Ulnar/Median N, distal sensation grossly intact to C4-T1 dermatomes, compartments soft and compressible. leftLE: No obvious signs of trauma. Leg lymphedema noted -TTP to foot, ankle, leg, knee, thigh, hip.-- Patient able to flex/extend toes, ankle, knee and hip with active and passive ROM without pain,+2/4 DP & PT pulses, cap refill <3sec, +5/5 PF/DF/EHL, distal sensation grossly intact to L4-S1 dermatomes, compartments soft and compressible.       DATA:    CBC:   Lab Results   Component Value Date/Time    WBC 11.6 01/24/2023 05:26 AM    RBC 4.26 01/24/2023 05:26 AM    HGB 13.7 01/24/2023 05:26 AM    HCT 42.0 01/24/2023 05:26 AM    MCV 98.6 01/24/2023 05:26 AM    MCH 32.2 01/24/2023 05:26 AM    MCHC 32.6 01/24/2023 05:26 AM    RDW 14.1 01/24/2023 05:26 AM     01/24/2023 05:26 AM    MPV 9.2 01/24/2023 05:26 AM     PT/INR:    Lab Results   Component Value Date/Time    PROTIME 13.1 08/19/2022 08:25 AM    PROTIME 10.8 05/07/2012 03:45 AM    INR 1.2 08/19/2022 08:25 AM       Radiology Review:  X ray right knee demonstrate s/p right knee TKA with a periprosthetic ORIF. Callus formation noted in the distal right femur  CT right femur demonstrates same findings described above. Apparent knee effusion noted. IMPRESSION:  Right Knee TKA septic joint    Procedure  Right knee was positioned and prepped using betadine and alcohol. Using a superolateral approach, an 18 ga needle was inserted into the patellofemoral joint. A dry tap resulted An inferomedial tap resulted in a dry tap as well. The site was dressed with a Band-Aid. The patient tolerated the procedure well without complication. PLAN:  Weight Bearing as tolerated RLE  Antibiotics   Pain Control  DVT prophylaxis  Plan for I&D/explant/Poly exchange with Dr. Lynnette Santana tomorrow now  N.p.o. midnight   Treatment Consent  Pre-op lab and imaging  Discuss with Dr Vijay Mills DO  Orthopaedic Surgery Resident PGY-1    Orthopaedic Trauma Attending    I have seen and evaluated the patient and agree with the above assessment. I have performed the key components of the history and physical examination and concur completely with the findings as documented. CC: Right knee and leg pain    HPI:This is a 58 y.o. female who presents with sudden onset pain extending from the right ankle to the right hip, worse in the knee. She does not recall any inciting event or trauma preceding her pain. She states that she woke up yesterday with pain in the right knee which has worsened since. States that she has been unable to ambulate since and was sent to the ED by her primary care this morning. Patient orthopedic history is significant for right knee arthrotomy I&D of infection, right lateral distal thigh abscess I&D and right hip aspiration all done back in August of 2022, a right pilon and fibular shaft fracture ORIF in March 2022, as well. She underwent a right TKA in [place] in 2007, she also sustained periprosthetic fracture which she had fixed with a lateral plate and screws in [facility] [place] in June 2021. Also has a left TKA which was done locally couple of years ago. States she was on prophylactic antibiotics over the approximately 1 month ago they stopped her 2 antibiotics and she noticed since that time things started to worsen about the knee. States she is feeling similar to how she felt back in 2022 when she required the I&D's. She completed a course of antibiotics few weeks ago and has been taking amoxicillin 7-day course for a respiratory tract infection, states that after this she felt that the knee pain started to progress as well as the swelling. .  Denies numbness/tingling/paresthesias. Denies any other orthopedic complaints at this time. ROS, medications, allergies, past medical/surgical/social/family histories reviewed and as above    PE:  BP (!) 142/74   Pulse 89   Temp 97.8 °F (36.6 °C) (Oral)   Resp 18   Ht 5' 7\" (1.702 m)   Wt 218 lb (98.9 kg)   LMP 08/31/1988   SpO2 97%   BMI 34.14 kg/m²   As above    Radiographic Review:  Right knee with retained cemented TKA, PS components, no significant loosening or obvious bony erosions. Retained lateral distal femoral plate for supracondylar femur fracture which appears to be healed with bridging callus with slight medial translation of the distal articular segment but overall alignment maintained. Apparent knee effusion noted.      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 We reviewed the possible complications from the diagnosis despite treatment chosen. We also discussed treatment options including nonoperative managements versus surgical management, along with risks and benefits of each. Patient has elected for surgical management despite associated risks. 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 at this point with involvement of both the knee prosthesis and the distal femoral plate, discussed at this point would recommend for formal explantation due to chronicity of her infection along with removal of the retained femoral plate and screws from the femur, also discussed we would reaspirated her hip again today to ensure no occult infection of the right hip   medical admit with surgical optimization   Planning for OR 1/25/2023 for right total knee arthroplasty explant with excisional irrigation debridement, reimplantation with articulating spacer, removal right femur plate and screw construct, right hip aspiration. Also verbalized that this is only a temporizing surgery, this is not a permanent surgery for her and that she would absolutely require another surgery in order to provide more stable components for long-term function. I have explained the risks and complications of the recommended surgery with the patient at length, as well as discussed potential treatment alternatives including nonoperative management.  These risks include but are not limited to death or complication from anesthesia, continued pain, nerve tendon or vascular injury, infection, fracture, disruption of extensor mechanism, loss of motion and function, instability, symptomatic hardware or hardware failure, deep vein thrombosis or pulmonary embolism, refracture through the femur, unforeseen complications, continued infection and loss of limb to include above-knee amputation, and need for further surgery, etc.  Patient understood this, asked appropriate questions, which were all answered, and she has elected to proceed with the procedure. No guarantees were provided. We also discussed she will require at least 6 weeks of IV antibiotics postoperatively to try and eradicate her infection then we will need to ensure that her infection is eradicated with serial blood work as well as repeat aspiration prior to considering any reimplantation. Also discussion the will certainly be limited weightbearing and limited function postoperatively due to the previous fracture as well as the antibiotic spacer. Electronically signed by   Jimmie Ruelas DO  1/25/2023    NOTE: This report was transcribed using voice recognition software.  Every effort was made to ensure accuracy; however, inadvertent computerized transcription errors may be present

## 2023-01-24 NOTE — ED NOTES
Pt states that she wears 3L NC at home at all times and 5L NC at night.  Pt placed on 3L NC      Liss Altagracia  01/24/23 [id number]

## 2023-01-24 NOTE — LETTER
[address] Medicaid  CERTIFICATION OF NECESSITY  FOR TRANSPORTATION   BY WHEELCHAIR VAN     Individual Information   1. Name: Candace Post 2. [place] Medicaid Billing Number:    3. Address: [address]       Transportation Provider Information   4. Provider Name:    5. [place] Medicaid Provider Number:  National Provider Identifier (NPI):      Certification  7. Criteria:  By signing this document, the practitioner certifies that two statements are true:  A. This individual must be accompanied by a mobility-related assistive device from the point of pick-up to the point of drop-off. B. Transport of this individual by standard passenger vehicle or common carrier is precluded or contraindicated. 8. Period Beginning Date:    5. Length  [x] Not more than 1 day(s)  [] One Year     Additional Information Relevant to Certification   10. Comments or Explanations, If Necessary or Appropriate   SEPTIC R KNEE, FALL RISK     Certifying Practitioner Information   11. Name of Practitioner:    12. [place] Medicaid Provider Number, If Applicable:  [id number] Provider Identifier (NPI):      Signature Information   14. Date of Signature:  13. Name of Person Signin. Signature and Professional Designation:      [facility] [id number]  Rev. 2015    [address] Encounter Date/Time: 2023 [address] [facility] [address] Account: [de-identified]    MRN: [id number]    Patient: Jacqueline Drew Serial #: [id number]      ENCOUNTER          Patient Class: I Private Enc? No Unit RM BD: SEYZ 5WE 5415/5415-A   Hospital Service: MED   Encounter DX: Septic joint ([facility] [M00. *   ADM Provider: Madisyn Wood MD   Procedure:     ATT Provider: Brady Kenyon MD   REF Provider:        Admission DX: Septic joint [facility]), Knee pain and DX codes: M00.9, M25.569      PATIENT  Name: Candace Post : 1960 (62 yrs)   Address: [address] [place], [place] [address] Sex: Female   City: [place] [id number]         Marital Status:    Employer: RETIRED         [employer]   Primary Care Provider: Jessica Wray DO         Primary Phone: [phone]   EMERGENCY CONTACT   Contact Name Legal Guardian? Relationship to Patient Home Phone Work Phone   1. Tavo Yang  2. Anne Encinas No  No Brother/Sister  Other [phone]              GUARANTOR            Guarantor: Lloyd Koehler     : 1960   Address: [address] [place] [address];[facility] [address] Sex: Female     [place] [address]     Relation to Patient: Self       Home Phone: [phone]   Guarantor ID: [id number]       Work Phone:     Guarantor Employer: RETIRED         Status: RETIRED      COVERAGE        PRIMARY INSURANCE   Payor: MEDICAID [place] Plan: MEDICAID [address] [place] [address] DEPT OF*   Payor Address: [place] [address],  [place], [address]       Group Number:   Insurance Type: INDEMNITY   Subscriber Name: Francois Waite : 1960   Subscriber ID: [id number] Pat. Rel. to Sub: Self   SECONDARY INSURANCE   Payor:   Plan:     Payor Address:  ,           Group Number:   Insurance Type:     Subscriber Name:   Subscriber :     Subscriber ID:   Pat.  Rel. to Sub:        CSN: [id number]

## 2023-01-24 NOTE — H&P
Hospitalist History & Physical      PATIENT NAME:  Candace Caba    MRN:  [id number]  SERVICE DATE:  01/24/23    Primary Care Physician: Alysia Osman DO       SUBJECTIVE  CHIEF COMPLAINT:  had concerns including Knee Pain (Patient to the ED from home for right knee pain, patient denies injury but admits to a hx of a knee replacement. Patient states that she took percocet's for the pain). HPI:  Ms. Candace Caba, a 58y.o. year old female  who  has a past medical history of Adenoma of right adrenal gland, Anemia, Anxiety, Arthritis, Asthma, Benign essential tremor, Bipolar affective ([address] Chronic back pain, Chronic respiratory failure with hypoxia ([address] Depression, Diabetes mellitus ([address] Difficulty swallowing, Environmental and seasonal allergies, Excessive physiologic tremor, Fatty liver, Full dentures, GERD (gastroesophageal reflux disease), Gout, Herniated cervical disc, History of swelling of feet, Hypertension, Lymphedema of lower extremity, Mitochondrial cytopathy ([address] Muscle weakness (generalized), Need for assistance with personal care, Neuropathy, Obesity, PETR (obstructive sleep apnea), Osteoarthritis of left knee, PONV (postoperative nausea and vomiting), Seizures ([address] Spinal headache, and Thyroid disease. presents with sudden onset of pain from rt ankle to rt hip, worse in the knee, no fall or trauma, been unable to ambulate and was sent to ER by her pcp this am.she had rt knee I&D done in 8/2022. She is post rt TKA in [place] several years ago. And completed a course of amoxicillin for respiratory infection few weeks ago.      PAST MEDICAL HISTORY:    Past Medical History:   Diagnosis Date    Adenoma of right adrenal gland     Anemia     Anxiety     Arthritis     spinal    Asthma     controlled with inhalers     Benign essential tremor     Bipolar affective ([address]     Chronic back pain     Spinal cord stimulator in place    Chronic respiratory failure with hypoxia (HCC)     at times dt diaphragm does not function properly dt Mitochondrial disorder    Depression     stable    Diabetes mellitus ([facility]     Difficulty swallowing     for EGD 10-8-19     Environmental and seasonal allergies     Excessive physiologic tremor 5/24/2021    Fatty liver     Full dentures     GERD (gastroesophageal reflux disease)     Gout     past hx of    Herniated cervical disc     limited rom of head and neck    History of swelling of feet     Hypertension     Lymphedema of lower extremity 09/06/2012    Mitochondrial cytopathy ([facility]     s/s muscle and nerve pain, difficulty breathing, seizures, difficulty swallowing, digestive disorders- Dr. Jon [facility]    Muscle weakness (generalized)     Information obtained from [facility]    Need for assistance with personal care     Information obtained from [facility]    Neuropathy     at feet    Obesity     PETR (obstructive sleep apnea)     no CPAP dt insurance will not pay for    Osteoarthritis of left knee     Information obtained from [facility]    PONV (postoperative nausea and vomiting)     Seizures ([facility]     last approx 6-13-19- f/u w/ PCP  Granmal, flares up in heat/ summer time - no recent issues as of 10-4-19     Spinal headache     Thyroid disease      PAST SURGICAL HISTORY:    Past Surgical History:   Procedure Laterality Date    ANKLE FRACTURE SURGERY Right 2/14/2022    RIGHT ANKLE IRRIGAITON AND DEBRIDEMENT WITH EXTERNAL FIXATOR AND LACERATION REPAIR performed by Brenda Isabel MD at [facility] Right 3/2/2022    RIGHT LOWER EXTREMITY REMOVAL EXTERNAL FIXATOR, RIGHT PILON OPEN REDUCTION INTERNAL FIXATOR--[facility] (FACILITY) performed by Emperatriz Dougherty DO at [place] [address]      with Hysterectomy / unknown    BACK SURGERY  last one 1995    lumbar x 2    CARDIAC CATHETERIZATION Right 6-6-2013    Dr. Kelly Dumont Radial, no stents placed, no blockages     CHOLECYSTECTOMY  1999    open with gastric bypass COLONOSCOPY N/A 9/18/2018    COLONOSCOPY POLYPECTOMY HOT BIOPSY performed by Juan Manuel Mccormack MD at [address] N/A 10/8/2019    COLONOSCOPY POLYPECTOMY SNARE/COLD BIOPSY performed by Juan Manuel Mccormack MD at [address]    EGD COLONOSCOPY N/A 10/8/2019    EGD ESOPHAGOGASTRODUODENOSCOPY DILATATION performed by Juan Manuel Mccormack MD at [facility], DIAGNOSTIC      ESOPHAGEAL DILATATION  9/18/2018    ESOPHAGEAL DILATION Trude Neighbours performed by Juan Manuel Mccormack MD at [address] [facility] ([facility])  [address] [facility] Bilateral 2007,2017    knees    KNEE SURGERY Bilateral     scope    KNEE SURGERY Right 8/19/2022    RIGHT KNEE INCISION AND DRAINAGE performed by Brady Crystal DO at [address] Left 10 02 2013    lumbar paravertebral facet #2    NERVE BLOCK Left 10 9 13    hip inj #1    NERVE BLOCK Left 10/16/13    hip injection    NERVE BLOCK Left 10/29/2014    left lumbar transforaminal nerve lbock  #1    NERVE BLOCK Left 11/12/2014    lumbar left transforaminal nerve block  #2    NERVE BLOCK Left 12 8 14    lumbar transforaminal #3    NERVE BLOCK Right 3/30/15    cervical transforaminal #1    NERVE BLOCK Right 4/6/2015    right cervical transforaminal nerve block  #2    NERVE BLOCK Right 4/13/15    cervical transforaminal #3    NERVE BLOCK Left 7/6/15    knee injection #1    NERVE BLOCK  07/20/15    left genicular nerve block/knee #2    NERVE BLOCK Left 10 1 15    lumb transforam #1    NERVE BLOCK  10/26/15    left lumbar transforaminal nerve block #3    NERVE BLOCK Bilateral 4/1/2021    #1 BILATERAL SACROILIAC JOINT INJECTION UNDER FLUORO performed by Jean-Paul Maloney MD at [address] Left 11 25 13    lumbar radiofreq    OTHER SURGICAL HISTORY  3/28/2016    stage 1, 3 day percutanous trial [place] Gauzy lumbar spinal cord stimulator    OTHER SURGICAL HISTORY     racz procedure / lower back    VT COLONOSCOPY FLX DX W/COLLJ SPEC WHEN PFRMD N/A 3/20/2018    COLONOSCOPY DIAGNOSTIC OR SCREENING performed by Laura Cehn MD at [address] [place] [address] EGD TRANSORAL BIOPSY SINGLE/MULTIPLE N/A 3/20/2018    [facility]    Family History   Problem Relation Age of Onset    Heart Disease Mother     Cancer Father     Arthritis Other     Cancer Other     Depression Other     Heart Disease Other     Hypertension Other     Mental Illness Other     Stroke Other      SOCIAL HISTORY:    Social History     Socioeconomic History    Marital status:      Spouse name: Not on file    Number of children: Not on file    Years of education: Not on file    Highest education level: Not on file   Occupational History    Not on file   Tobacco Use    Smoking status: Former     Packs/day: 0.75     Years: 42.00     Pack years: 31.50     Types: Cigarettes     Start date: 1990     Quit date: 2021     Years since quittin.0    Smokeless tobacco: Never   Vaping Use    Vaping Use: Never used   Substance and Sexual Activity    Alcohol use: No     Alcohol/week: 0.0 standard drinks    Drug use: Not Currently     Types: Marijuana Harriet Choudhury)     Comment: edibles- through pain doctor    Sexual activity: Not on file   Other Topics Concern    Not on file   Social History Narrative    ** Merged History Encounter **          Social Determinants of Health     Financial Resource Strain: Not on file   Food Insecurity: Not on file   Transportation Needs: Not on file   Physical Activity: Not on file   Stress: Not on file   Social Connections: Not on file   Intimate Partner Violence: Not on file   Housing Stability: Not on file    TOBACCO:   reports that she quit smoking about 12 months ago. Her smoking use included cigarettes. She started smoking about 32 years ago. She has a 31.50 pack-year smoking history.  She has never used smokeless tobacco.  ETOH:   reports no history of alcohol use. MEDICATIONS:   Prior to Admission medications    Medication Sig Start Date End Date Taking? Authorizing Provider   aspirin 325 MG EC tablet Take 325 mg by mouth daily   Yes Historical Provider, MD   calcium citrate (CALCITRATE) 950 (200 Ca) MG tablet Take 1 tablet by mouth 3 times daily   Yes Historical Provider, MD   gabapentin (NEURONTIN) 300 MG capsule Take 600 mg by mouth 4 times daily.    Yes Historical Provider, MD   lipase-protease-amylase (CREON) 65747-696954 units CPEP delayed release capsule Take 3 capsules by mouth 3 times daily (with meals)   Yes Historical Provider, MD   lipase-protease-amylase (CREON) 24103-245183 units CPEP delayed release capsule Take 2 capsules by mouth take with snacks   Yes Historical Provider, MD   traZODone (DESYREL) 50 MG tablet Take 25 mg by mouth in the morning and at bedtime   Yes Historical Provider, MD   magnesium (MAGNESIUM-OXIDE) 250 MG TABS tablet Take 250 mg by mouth in the morning, at noon, and at bedtime   Yes Historical Provider, MD   ferrous sulfate (IRON 325) 325 (65 Fe) MG tablet Take 325 mg by mouth daily (with breakfast)   Yes Historical Provider, MD   Multiple Vitamins-Minerals (THERAPEUTIC MULTIVITAMIN-MINERALS) tablet Take 1 tablet by mouth daily   Yes Historical Provider, MD   docusate sodium (COLACE) 100 MG capsule Take 100 mg by mouth 2 times daily   Yes Historical Provider, MD   pantoprazole (PROTONIX) 40 MG tablet Take 40 mg by mouth daily as needed   Yes Historical Provider, MD   potassium chloride (KLOR-CON M) 20 MEQ extended release tablet Take 20 mEq by mouth daily   Yes Historical Provider, MD   Vitamin E 200 units TABS Take 200 Units by mouth daily   Yes Historical Provider, MD   ziprasidone (GEODON) 20 MG capsule Take 20 mg by mouth at bedtime   Yes Historical Provider, MD   ziprasidone (GEODON) 40 MG capsule Take 40 mg by mouth every evening   Yes Historical Provider, MD azithromycin (ZITHROMAX) 250 MG tablet Take 250 mg by mouth daily 1/20/23 1/24/23 Yes Historical Provider, MD   albuterol sulfate HFA (VENTOLIN HFA) 108 (90 Base) MCG/ACT inhaler Inhale 2 puffs into the lungs 4 times daily   Yes Historical Provider, MD   Cholecalciferol (VITAMIN D3) 50 MCG (2000 UT) CAPS Take 1 capsule by mouth 3 times daily 1/9/23   Historical Provider, MD   loratadine (CLARITIN) 10 MG tablet Take 10 mg by mouth daily 12/7/22   Historical Provider, MD   [facility] MAGNESIUM OXIDE 250 MG TABS tablet Take 250 mg by mouth 3 times daily 12/18/22   Historical Provider, MD   traZODone (DESYREL) 100 MG tablet Take 100 mg by mouth nightly 12/18/22   Historical Provider, MD   oxyCODONE-acetaminophen (PERCOCET)  MG per tablet Take 1 tablet by mouth every 6 hours as needed for Pain. Historical Provider, MD   promethazine (PHENERGAN) 25 MG tablet Take 25 mg by mouth every 6 hours as needed for Nausea    Historical Provider, MD   furosemide (LASIX) 40 MG tablet Take 1 tablet by mouth daily 4/16/22   Paz Mills MD   baclofen (LIORESAL) 20 MG tablet Take 20 mg by mouth 4 times daily    Historical Provider, MD   metoprolol succinate (TOPROL XL) 25 MG extended release tablet Take 0.5 tablets by mouth daily 7/2/21   Pamela Camp MD   traZODone (DESYREL) 50 MG tablet Take 50 mg by mouth nightly    Historical Provider, MD   montelukast (SINGULAIR) 10 MG tablet Take 1 tablet by mouth daily 8/12/19   Anahi Public, DO   Cholecalciferol (VITAMIN D3) 50 MCG (2000 UT) TABS Take 2,000 Units by mouth daily    Historical Provider, MD   allopurinol (ZYLOPRIM) 100 MG tablet Take 100 mg by mouth 2 times daily    Historical Provider, MD   escitalopram (LEXAPRO) 20 MG tablet Take 30 mg by mouth daily    Historical Provider, MD   topiramate (TOPAMAX) 200 MG tablet Take 1 tablet by mouth 2 times daily.  5/9/12   Ab Hric, DO        ALLERGIES: Bee pollen, Penicillins, Ropinirole, Ropinirole hcl, Vistaril [hydroxyzine hcl], Aripiprazole, Prednisone, Restoril [temazepam], Wax [beeswax], Hydroxyzine pamoate, and Tape [adhesive tape]    REVIEW OF SYSTEM:   ROS as noted in HPI, 12 point ROS reviewed and otherwise negative. OBJECTIVE  PHYSICAL EXAM:   Vitals:    01/24/23 0547 01/24/23 0605 01/24/23 1015 01/24/23 1245   BP: (!) 154/66   132/66   Pulse: (!) 105   83   Resp: 18   19   Temp: 99.5 °F (37.5 °C)  98.5 °F (36.9 °C)    TempSrc:   Oral    SpO2: 94%   98%   Weight:  218 lb (98.9 kg)     Height:  5' 7\" (1.702 m)         General appearance: alert, appears stated age and cooperative  CONSTITUTIONAL:  no apparent distress  ENT:  normocephalic, without obvious abnormality, atraumatic  NECK:  supple, symmetrical, trachea midline  Heart: regular rate and rhythm, S1, S2 normal   Lungs: clear to auscultation bilaterally  Abdomen: soft lax, not tender, not distended, positive bowel sounds  Extremities: rt knee swelling and TTP with decrease ROM from pain. Skin: Normal skin color. No rashes or lesions. Neurologic:  Neurovascularly intact without any focal sensory/motor deficits. Cranial nerves: II-XII intact, grossly non-focal.  Psychiatric: Alert and oriented, thought content appropriate, normal insight, flat affect      DATA:     Diagnostic tests reviewed for today's visit:    Most recent labs and imaging results reviewed.    Labs:   Recent Results (from the past 72 hour(s))   CBC with Auto Differential    Collection Time: 01/24/23  5:26 AM   Result Value Ref Range    WBC 11.6 (H) 4.5 - 11.5 E9/L    RBC 4.26 3.50 - 5.50 E12/L    Hemoglobin 13.7 11.5 - 15.5 g/dL    Hematocrit 42.0 34.0 - 48.0 %    MCV 98.6 80.0 - 99.9 fL    MCH 32.2 26.0 - 35.0 pg    MCHC 32.6 32.0 - 34.5 %    RDW 14.1 11.5 - 15.0 fL    Platelets 650 810 - 596 E9/L    MPV 9.2 7.0 - 12.0 fL    Neutrophils % 80.6 (H) 43.0 - 80.0 %    Immature Granulocytes % 0.8 0.0 - 5.0 %    Lymphocytes % 8.0 (L) 20.0 - 42.0 %    Monocytes % 10.0 2.0 - 12.0 % Eosinophils % 0.3 0.0 - 6.0 %    Basophils % 0.3 0.0 - 2.0 %    Neutrophils Absolute 9.32 (H) 1.80 - 7.30 E9/L    Immature Granulocytes # 0.09 E9/L    Lymphocytes Absolute 0.93 (L) 1.50 - 4.00 E9/L    Monocytes Absolute 1.16 (H) 0.10 - 0.95 E9/L    Eosinophils Absolute 0.04 (L) 0.05 - 0.50 E9/L    Basophils Absolute 0.03 0.00 - 0.20 E9/L   Comprehensive Metabolic Panel    Collection Time: 01/24/23  5:26 AM   Result Value Ref Range    Sodium 136 132 - 146 mmol/L    Potassium 4.1 3.5 - 5.0 mmol/L    Chloride 100 98 - 107 mmol/L    CO2 23 22 - 29 mmol/L    Anion Gap 13 7 - 16 mmol/L    Glucose 125 (H) 74 - 99 mg/dL    BUN 20 6 - 23 mg/dL    Creatinine 0.7 0.5 - 1.0 mg/dL    Est, Glom Filt Rate >60 >=60 mL/min/1.73    Calcium 8.6 8.6 - 10.2 mg/dL    Total Protein 6.9 6.4 - 8.3 g/dL    Albumin 3.6 3.5 - 5.2 g/dL    Total Bilirubin 0.3 0.0 - 1.2 mg/dL    Alkaline Phosphatase 102 35 - 104 U/L    ALT 14 0 - 32 U/L    AST 19 0 - 31 U/L   Serum Drug Screen    Collection Time: 01/24/23  5:26 AM   Result Value Ref Range    Ethanol Lvl <10 mg/dL    Acetaminophen Level <5.0 (L) 10.0 - 30.0 mcg/mL    Salicylate, Serum <0.3 0.0 - 30.0 mg/dL    TCA Scrn NEGATIVE Cutoff:300 ng/mL   Sedimentation Rate    Collection Time: 01/24/23  5:26 AM   Result Value Ref Range    Sed Rate 74 (H) 0 - 20 mm/Hr   Procalcitonin    Collection Time: 01/24/23  5:26 AM   Result Value Ref Range    Procalcitonin 0.41 (H) 0.00 - 0.08 ng/mL   Troponin    Collection Time: 01/24/23  5:26 AM   Result Value Ref Range    Troponin, High Sensitivity 24 (H) 0 - 9 ng/L   Lactate, Sepsis    Collection Time: 01/24/23  5:40 AM   Result Value Ref Range    Lactic Acid, Sepsis 0.9 0.5 - 1.9 mmol/L   EKG 12 Lead    Collection Time: 01/24/23  5:56 AM   Result Value Ref Range    Ventricular Rate 98 BPM    Atrial Rate 98 BPM    P-R Interval 154 ms    QRS Duration 98 ms    Q-T Interval 328 ms    QTc Calculation (Bazett) 418 ms    P Axis -4 degrees    R Axis 8 degrees    T Axis 28  degrees   COVID-19 & Influenza Combo    Collection Time: 01/24/23  6:20 AM    Specimen: Nasopharyngeal Swab   Result Value Ref Range    SARS-CoV-2 RNA, RT PCR NOT DETECTED NOT DETECTED    INFLUENZA A NOT DETECTED NOT DETECTED    INFLUENZA B NOT DETECTED NOT DETECTED   Lactate, Sepsis    Collection Time: 01/24/23 10:14 AM   Result Value Ref Range    Lactic Acid, Sepsis 0.4 (L) 0.5 - 1.9 mmol/L   C-Reactive Protein    Collection Time: 01/24/23 10:14 AM   Result Value Ref Range    CRP 28.4 (H) 0.0 - 0.4 mg/dL   Troponin    Collection Time: 01/24/23 10:14 AM   Result Value Ref Range    Troponin, High Sensitivity 24 (H) 0 - 9 ng/L   URINE DRUG SCREEN    Collection Time: 01/24/23 12:29 PM   Result Value Ref Range    Drug Screen Comment: see below    Urinalysis    Collection Time: 01/24/23 12:29 PM   Result Value Ref Range    Color, UA Yellow Straw/Yellow    Clarity, UA Clear Clear    Glucose, Ur Negative Negative mg/dL    Bilirubin Urine Negative Negative    Ketones, Urine Negative Negative mg/dL    Specific Gravity, UA 1.025 1.005 - 1.030    Blood, Urine Negative Negative    pH, UA 6.0 5.0 - 9.0    Protein, UA TRACE Negative mg/dL    Urobilinogen, Urine 1.0 <2.0 E.U./dL    Nitrite, Urine Negative Negative    Leukocyte Esterase, Urine Negative Negative   Microscopic Urinalysis    Collection Time: 01/24/23 12:29 PM   Result Value Ref Range    WBC, UA NONE 0 - 5 /HPF    RBC, UA NONE 0 - 2 /HPF    Bacteria, UA NONE SEEN None Seen /HPF     Oupatient labs:  Lab Results   Component Value Date    CHOL 154 04/11/2022    TRIG 163 (H) 04/11/2022    HDL 49 04/11/2022    LDLCALC 72 04/11/2022    TSH 0.548 03/16/2021    INR 1.2 08/19/2022    LABA1C 5.7 12/13/2013       Urinalysis:    Lab Results   Component Value Date/Time    NITRU Negative 01/24/2023 12:29 PM    [address] NONE 01/24/2023 12:29 PM    WBCUA 1-3 05/06/2012 04:00 AM    BACTERIA NONE SEEN 01/24/2023 12:29 PM    RBCUA NONE 01/24/2023 12:29 PM    RBCUA 0-1 12/13/2013 10:15 AM BLOODU Negative 2023 12:29 PM    SPECGRAV 1.025 2023 12:29 PM    GLUCOSEU Negative 2023 12:29 PM    GLUCOSEU NEGATIVE 2012 04:00 AM       Imaging:  XR KNEE RIGHT (3 VIEWS)    Result Date: 2023  EXAMINATION: THREE XRAY VIEWS OF THE RIGHT KNEE 2023 4:46 am COMPARISON: 2022 HISTORY: ORDERING SYSTEM PROVIDED HISTORY: knee pain TECHNOLOGIST PROVIDED HISTORY: Reason for exam:->knee pain What reading provider will be dictating this exam?->CRC FINDINGS: Fixation hardware at the distal femur is only partially imaged. A substantially healed femur fracture is present as before. Anatomically aligned right TKA. Moderate knee joint effusion. Stable substantially healed femur fracture with indwelling fixation hardware. Right TKA. Knee joint effusion. XR CHEST PORTABLE    Result Date: 2023  EXAMINATION: ONE XRAY VIEW OF THE CHEST 2023 4:46 am COMPARISON: 2022. HISTORY: ORDERING SYSTEM PROVIDED HISTORY: ams TECHNOLOGIST PROVIDED HISTORY: Reason for exam:->ams What reading provider will be dictating this exam?->CRC FINDINGS: Spinal neurostimulator is redemonstrated. Stable cardiomediastinal silhouette. Pulmonary vascularity is mildly congested which may in part relate to the semi supine positioning. Minimal ground-glass infiltrate and or atelectasis on the left is suggested. Neither costophrenic angle is blunted. Minimal ground-glass infiltrate and or atelectasis suggested on the left. The pulmonary vascularity appears mildly congested which may be exacerbated by semi supine positioning.      US DUP LOWER EXTREMITY RIGHT KRAIG    Result Date: 2023  Patient MRN:  [id number] : 1960 Age: 58 years Gender: Female Order Date:  2023 10:38 AM EXAM: US DUP LOWER EXTREMITY RIGHT KRAIG NUMBER OF IMAGES:  24 INDICATION:  Rule out DVT Rule out DVT What reading provider will be dictating this exam?->[facility] Within the visualized vessels, there is no evidence for deep venous thrombosis There is good compressibility, there is good augmentation, there is good color flow. Within the visualized vessels there is no evidence for deep venous thrombosis     US DUP LOWER EXTREMITY RIGHT KRAIG   Final Result   Within the visualized vessels there is no evidence for deep venous   thrombosis               XR CHEST PORTABLE   Final Result   Minimal ground-glass infiltrate and or atelectasis suggested on the left. The pulmonary vascularity appears mildly congested which may be exacerbated   by semi supine positioning. XR KNEE RIGHT (3 VIEWS)   Final Result   Stable substantially healed femur fracture with indwelling fixation hardware. Right TKA. Knee joint effusion. CT FEMUR RIGHT WO CONTRAST    (Results Pending)         ASSESSMENT AND PLAN  Principal Problem:    Septic joint ([address]  Resolved Problems:    * No resolved hospital problems. *    - Rt knee septic arthritis   - Asthma  - Depression  - HTN  - Obesity  - Neuropathy   - Seizure     Plan:  - admit to tele monitoring   - pain control  - consult Orthopedic for further recommendation  - antibiotic iv vanc/cefepime, but will consult ID for further recommendations and follow final cultures   - pt/ot pair ortho guidance  - iv fluid and keep npo meanwhile   - resume home regimen   - Oxygen supplement and Duoneb and follow pulse oximetry. VTE Prophylaxis: scd, and start chemical prophylaxis once clear per Ortho  DVT Prophylaxis: []Lovenox []Heparin []PCD [] [address] []Encouraged ambulation    Diet: Diet NPO  Code Status: Prior  Surrogate decision maker confirmed with patient:  Primary Emergency Contact: Tavo Yang, [place] Phone: [phone]      Disposition: [x]Med/Surg [] Intermediate [] ICU/CCU   Admit status: [] Observation [x] Inpatient       Additional work up or/and treatment plan may be added today or thereafter based on clinical progression. I am managing a portion of pt care.  Some medical issues are handled by other specialists. Additional work up and treatment should be done by my colleague hospitalist and at out pt setting by pt PCP and other out pt providers.      SIGNATUREClagnieszka Westfall MD  DATE: January 24, 2023

## 2023-01-24 NOTE — ED PROVIDER NOTES
ATTENDING PROVIDER ATTESTATION:     Yovani Wheeler presented to the emergency department for evaluation of Knee Pain (Patient to the ED from home for right knee pain, patient denies injury but admits to a hx of a knee replacement. Patient states that she took percocet's for the pain)   and was initially evaluated by the Medical Resident. See Original ED Note for H&P and ED course above. I have reviewed and discussed the case, including pertinent history (medical, surgical, family and social) and exam findings with the Medical Resident assigned to Yovani Wheeler. I have personally performed and/or participated in the history, exam, medical decision making, and procedures and agree with all pertinent clinical information and any additional changes or corrections are noted below in my assessment and plan. I have discussed this patient in detail with the resident, and provided the instruction and education,       I have reviewed my findings and recommendations with the assigned Medical Resident, Yovani Wheeler and members of family present at the time of disposition. I have performed a history and physical examination of this patient and directly supervised the resident caring for this patient              [address]        Pt Name: Yovani Wheeler  MRN: [id number]  [place] 1960  Date of evaluation: 1/24/2023  Provider: Ariel Foy MD  PCP: Lizzie Duncan DO  Note Started: 6:00 AM EST 1/24/23    CHIEF COMPLAINT       Chief Complaint   Patient presents with    Knee Pain     Patient to the ED from home for right knee pain, patient denies injury but admits to a hx of a knee replacement.  Patient states that she took percocet's for the pain       HISTORY OF PRESENT ILLNESS: 1 or more Elements     Limitations to history : None    Yovani Wheeler is a 58 y.o. female who presents for right knee pain and concerns for septic joint. Patient has a history of septic joint, reports she had an infection in the past in her right knee requiring surgery by Dr. Dickson Sapp from orthopedics. She reports knee pain and swelling. She is concerned is infected again. She reports low-grade temps at home. Denies any other infectious symptoms. No cough or runny nose. She reports her family doctor started her on what she believes is amoxicillin because he thought she might of had strep throat as last week she had a sore throat. She has no throat pain at this time. She denies any other complaints. She denies rashes. She does say her right knee is warm. Nursing Notes were all reviewed and agreed with or any disagreements were addressed in the HPI. REVIEW OF EXTERNAL NOTE :       11/17/22 Infectious disease office note for septic joint (right knee)  Notes from 8/19/22 by Dr. Makenna Mendoza I&D of right knee septic joint, right total knee periprosthetic infection    REVIEW OF SYSTEMS :      Positives and Pertinent negatives as per HPI.      SURGICAL HISTORY     Past Surgical History:   Procedure Laterality Date    ANKLE FRACTURE SURGERY Right 2/14/2022    RIGHT ANKLE IRRIGAITON AND DEBRIDEMENT WITH EXTERNAL FIXATOR AND LACERATION REPAIR performed by Kalyan Martinez MD at [address] Right 3/2/2022    RIGHT LOWER EXTREMITY REMOVAL EXTERNAL FIXATOR, RIGHT PILON OPEN REDUCTION INTERNAL FIXATOR--SYNTHES (FACILITY) performed by Earl Nielson DO at [facility]      with Hysterectomy / unknown    BACK SURGERY  last one 1995    lumbar x 2    CARDIAC CATHETERIZATION Right 6-6-2013    Dr. Saini Files Radial, no stents placed, no blockages     CHOLECYSTECTOMY  1999    open with gastric bypass    COLONOSCOPY N/A 9/18/2018    COLONOSCOPY POLYPECTOMY HOT BIOPSY performed by Blanca Blount MD at [address] N/A 10/8/2019    COLONOSCOPY POLYPECTOMY SNARE/COLD BIOPSY performed by Asim Alcazar MD [place] at [facility] ENDOSCOPY    EGD COLONOSCOPY N/A 10/8/2019    EGD ESOPHAGOGASTRODUODENOSCOPY DILATATION performed by Lorene Cortés MD at [facility] [place], DIAGNOSTIC      ESOPHAGEAL DILATATION  9/18/2018    ESOPHAGEAL DILATION Lambert Mano performed by CHRISTOPHE Durham MD at [address] [facility] ([address] [place] [address])  Reg King 1998 Bilateral 2007,2017    knees    KNEE SURGERY Bilateral     scope    KNEE SURGERY Right 8/19/2022    RIGHT KNEE INCISION AND DRAINAGE performed by Trupti Winslow DO at [address] Left 10 02 2013    lumbar paravertebral facet #2    NERVE BLOCK Left 10 9 13    hip inj #1    NERVE BLOCK Left 10/16/13    hip injection    NERVE BLOCK Left 10/29/2014    left lumbar transforaminal nerve lbock  #1    NERVE BLOCK Left 11/12/2014    lumbar left transforaminal nerve block  #2    NERVE BLOCK Left 12 8 14    lumbar transforaminal #3    NERVE BLOCK Right 3/30/15    cervical transforaminal #1    NERVE BLOCK Right 4/6/2015    right cervical transforaminal nerve block  #2    NERVE BLOCK Right 4/13/15    cervical transforaminal #3    NERVE BLOCK Left 7/6/15    knee injection #1    NERVE BLOCK  07/20/15    left genicular nerve block/knee #2    NERVE BLOCK Left 10 1 15    lumb transforam #1    NERVE BLOCK  10/26/15    left lumbar transforaminal nerve block #3    NERVE BLOCK Bilateral 4/1/2021    #1 BILATERAL SACROILIAC JOINT INJECTION UNDER FLUORO performed by King Burt MD at [address]  [facility] Left 11 25 13    lumbar radiofreq    OTHER SURGICAL HISTORY  3/28/2016    stage 1, 3 day percutanous trial [employer] lumbar spinal cord stimulator    OTHER SURGICAL HISTORY  1995    rac procedure / lower back    [place] COLONOSCOPY FLX DX W/COLLJ SPEC WHEN PFRMD N/A 3/20/2018    COLONOSCOPY DIAGNOSTIC OR SCREENING performed by Lorene Cortés MD at [facility] ENDOSCOPY    [place] EGD TRANSORAL BIOPSY SINGLE/MULTIPLE N/A 3/20/2018    EGD BIOPSY performed by Shell Spear MD at [address] [facility]       Previous Medications    ALLOPURINOL (ZYLOPRIM) 100 MG TABLET    Take 200 mg by mouth 2 times daily     ASPIRIN EC 81 MG EC TABLET    Take 1 tablet by mouth 2 times daily for 28 days    BACLOFEN (LIORESAL) 20 MG TABLET    Take 20 mg by mouth 4 times daily    CALCIUM CARBONATE 600 MG TABS TABLET    Take 1 tablet by mouth 2 times daily     CHOLECALCIFEROL (VITAMIN D3) 5000 UNITS TABS    Take 1 tablet by mouth every other day     CYANOCOBALAMIN 50 MCG TABLET    Take 100 mcg by mouth daily    DICLOFENAC SODIUM (VOLTAREN) 1 % GEL    Apply 4 g topically 2 times daily    ESCITALOPRAM (LEXAPRO) 20 MG TABLET    Take 20 mg by mouth 2 times daily     FUROSEMIDE (LASIX) 40 MG TABLET    Take 1 tablet by mouth daily    GABAPENTIN (NEURONTIN) 600 MG TABLET    Take 1 tablet by mouth 4 times daily. IPRATROPIUM-ALBUTEROL (DUONEB) 0.5-2.5 (3) MG/3ML SOLN NEBULIZER SOLUTION    Inhale 1 vial into the lungs every 4 hours as needed for Shortness of Breath    LEVOCARNITINE (CARNITOR) 330 MG TABLET    Take 330 mg by mouth 3 times daily For mitochondrial disease    LIPASE-PROTEASE-AMYLASE (CREON) 67238-86219 UNITS DELAYED RELEASE CAPSULE    Take 6 capsules by mouth 3 times daily (with meals)    MECLIZINE (ANTIVERT) 25 MG TABLET    Take 25 mg by mouth every 6 hours as needed    METOPROLOL SUCCINATE (TOPROL XL) 25 MG EXTENDED RELEASE TABLET    Take 0.5 tablets by mouth daily    MONTELUKAST (SINGULAIR) 10 MG TABLET    Take 1 tablet by mouth daily    OMEPRAZOLE (PRILOSEC) 20 MG DELAYED RELEASE CAPSULE    Take 20 mg by mouth Daily     OXYCODONE-ACETAMINOPHEN (PERCOCET) 5-325 MG PER TABLET    Take 1 tablet by mouth every 6 hours as needed for Pain (for 7 days starting 8-).     POLYETHYLENE GLYCOL (GLYCOLAX) 17 G PACKET    Take 17 g by mouth 2 times daily    PROMETHAZINE (PHENERGAN) 25 MG TABLET    Take 25 mg by mouth every 6 hours as needed for Nausea    RIFAMPIN (RIFADIN) 300 MG CAPSULE    Take by mouth 2 times daily    TOPIRAMATE (TOPAMAX) 200 MG TABLET    Take 1 tablet by mouth 2 times daily.    TRAZODONE (DESYREL) 100 MG TABLET    Take 100 mg by mouth nightly       ALLERGIES     Bee pollen, Penicillins, Ropinirole, Ropinirole hcl, Vistaril [hydroxyzine hcl], Aripiprazole, Prednisone, Restoril [temazepam], Wax [beeswax], Hydroxyzine pamoate, and Tape [adhesive tape]    FAMILYHISTORY       Family History   Problem Relation Age of Onset    Heart Disease Mother     Cancer Father     Arthritis Other     Cancer Other     Depression Other     Heart Disease Other     Hypertension Other     Mental Illness Other     Stroke Other         SOCIAL HISTORY       Social History     Tobacco Use    Smoking status: Former     Packs/day: 0.75     Years: 42.00     Pack years: 31.50     Types: Cigarettes     Start date: 1990     Quit date: 2021     Years since quittin.0    Smokeless tobacco: Never   Vaping Use    Vaping Use: Never used   Substance Use Topics    Alcohol use: No     Alcohol/week: 0.0 standard drinks    Drug use: Not Currently     Types: Marijuana (Weed)     Comment: edibles- through pain doctor       SCREENINGS        Maggie Coma Scale  Eye Opening: Spontaneous  Best Verbal Response: Oriented  Best Motor Response: Obeys commands  Marion Coma Scale Score: 15                CIWA Assessment  BP: (!) 154/66  Heart Rate: (!) 105           PHYSICAL EXAM  1 or more Elements     ED Triage Vitals [23 0525]   BP Temp Temp src Heart Rate Resp SpO2 Height Weight   105/76 98.9 °F (37.2 °C) -- 99 14 (!) 89 % -- --       Constitutional/General: Alert and oriented x3  Head: Normocephalic and atraumatic  Eyes: PERRL, EOMI, conjunctiva normal, sclera non icteric  ENT:  Oropharynx clear, handling secretions, no trismus, no asymmetry of the posterior oropharynx or uvular edema  Neck: Supple, full  ROM, no stridor, no meningeal signs  Respiratory: Lungs clear to auscultation bilaterally, no wheezes, rales, or rhonchi. Not in respiratory distress  Cardiovascular:  Regular rate. Regular rhythm. No murmurs, no gallops, no rubs. 2+ distal pulses. Equal extremity pulses. Chest: No chest wall tenderness  GI:  Abdomen Soft, Non tender, Non distended. No rebound, guarding, or rigidity. No pulsatile masses. Musculoskeletal: Moves all extremities x 4. Warm and well perfused, no clubbing, no cyanosis. Right lower extremity with swelling compared to the left. There is warmth and erythema along the right knee. It is tender to the touch. She is unable to fully flex or extend secondary to pain. There is a right knee joint effusion. There is a well-healed surgical scar on the knee as well as on the right lower leg. Compartments are soft and compressible. Pulses are palpable. Neurovascularly intact distally. 2+ DP/PT pulses. Capillary refill <3 secondary. Warm and well perfused. Sural, saphenous, deep peroneal, peroneal, and tibial nerves intact. Sensation intact. 5/5 strength. Achilies tendon intact. No evidence of compartment syndrome. Integument: skin warm and dry. No rashes. Neurologic: GCS 15, no focal deficits, symmetric strength 5/5 in the upper and lower extremities bilaterally  Psychiatric: Normal Affect            DIAGNOSTIC RESULTS   LABS: Interpreted by Alyssa Nguyen MD    Labs Reviewed   CULTURE, BLOOD 1   CULTURE, BLOOD 2   CBC WITH AUTO DIFFERENTIAL   COMPREHENSIVE METABOLIC PANEL   SERUM DRUG SCREEN   URINE DRUG SCREEN   URINALYSIS   LACTATE, SEPSIS   LACTATE, SEPSIS   SEDIMENTATION RATE   PROCALCITONIN   TROPONIN       As interpreted by me, the above displayed labs are abnormal. All other labs obtained during this visit were within normal range or not returned as of this dictation.     RADIOLOGY:   Non-plain film images such as CT, Ultrasound and MRI are read by the radiologist. Jamey Morgan radiographic images are visualized and preliminarily interpreted by the ED Provider with the findings documented in the ED course:    Interpretation per the Radiologist below, if available at the time of this note:    XR CHEST PORTABLE    (Results Pending)   XR KNEE RIGHT (3 VIEWS)    (Results Pending)     No results found. No results found. 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 EMERGENCY DEPARTMENT COURSE    Vitals:    Vitals:    01/24/23 0525 01/24/23 0547   BP: 105/76 (!) 154/66   Pulse: 99 (!) 105   Resp: 14 18   Temp: 98.9 °F (37.2 °C) 99.5 °F (37.5 °C)   SpO2: (!) 89% 94%       Patient was given the following medications:  Medications   acetaminophen (TYLENOL) tablet 1,000 mg (has no administration in time range)   0.9 % sodium chloride bolus (has no administration in time range)           Is this patient to be included in the SEP-1 Core Measure due to severe sepsis or septic shock?    No   Exclusion criteria - the patient is NOT to be included for SEP-1 Core Measure due to:  2+ SIRS criteria are not met        Medical Decision Making/Differential Diagnosis:    CC/HPI Summary, Social Determinants of health, Records Reviewed, DDx, testing done/not done, ED Course, Reassessment, disposition considerations/shared decision making with patient, consults, disposition:      ED Course as of 01/24/23 0926   Tue Jan 24, 2023   0608 The following tests were interpreted by me: Right knee xray. 3v xray, no obvious fx. Prior hardware in place   [CD]   0827 My independent interpretation of the laboratory testing shows lactate normal, troponin 24, procalcitonin 0.41, CMP unremarkable, CBC with white count 11.6. [CD]      ED Course User Index  [CD] Pb Whitaker MD         Medical Decision Making  Right knee swelling, considered septic joint, considered DVT, considered cellulitis, considered reactive effusion. Based on the existing hardware and prior septic joint, orthopedics was consulted emergently for arthrocentesis and further discussion of management. We empirically gave the patient antimicrobials while we are waiting for orthopedics to perform the arthrocentesis based on the timing of that procedure. Laboratory testing concerning for septic joint with 91% neutrophils with 35,000 nucleated cells. No other infectious sources located. Lactate normal.  CRP 28, troponin 24, lactate 0. White count 11.6. CMP normal.  Sed rate 74. consult placed to internal medicine for admission. EKG interpreted by me, normal sinus rhythm, normal rate, normal axis, no ST segment elevation or depression or acute ischemic changes. Changes from prior. Amount and/or Complexity of Data Reviewed  External Data Reviewed: labs, radiology and notes. Details: cultures and notes and op report  Labs: ordered. Decision-making details documented in ED Course. Radiology: ordered and independent interpretation performed. Decision-making details documented in ED Course.   ECG/medicine tests: ordered and independent interpretation performed. Discussion of management or test interpretation with external provider(s): Ortho  Internal Medicine    Risk  Prescription drug management. Drug therapy requiring intensive monitoring for toxicity. Decision regarding hospitalization. CONSULTS:   None         I am the Primary Clinician of Record. FINAL IMPRESSION    No diagnosis found. DISPOSITION/PLAN     DISPOSITION        PATIENT REFERRED TO:  No follow-up provider specified.     DISCHARGE MEDICATIONS:  New Prescriptions    No medications on file       DISCONTINUED MEDICATIONS:  Discontinued Medications    No medications on file              (Please note that portions of this note were completed with a voice recognition program.  Efforts were made to edit the dictations but occasionally words are mis-transcribed.)    Adilia Pinto MD (electronically signed)           Milvia Mendoza MD  01/24/23 3210

## 2023-01-24 NOTE — ED PROVIDER NOTES
Yong Paula is a 58 y.o. female    HPI   Yong Paula is a 58 y.o. female presenting to the ED for Knee Pain (Patient to the ED from home for right knee pain, patient denies injury but admits to a hx of a knee replacement. Patient states that she took percocet's for the pain)    History comes primarily from the patient. Patient presents to the emergency department for complaints of right leg pain, with her significant pain in her right knee. The pain however goes from her ankle up into her right hip. She says it feels a little bit like when she had sepsis of her knee previously. She says the pain has been worsening over the last several days. She denies any known fevers at home at this point. Other symptoms have included cough some substernal chest pressure, shortness of breath, sore throat and cough, all symptoms mild and going on for several weeks. The patient does have nebulizer at home, but states she has been unable to get the albuterol liquid from the pharmacy recently. She also reportedly was recently started on an antibiotic for her sore throat recently although she does not exactly recall what it was. Review of Systems   Full review of systems completed. Pertinent positives and negatives per the HPI, unless otherwise stated ROS is negative. Physical Exam  Vitals and nursing note reviewed. Constitutional:       General: She is not in acute distress. Appearance: Normal appearance. She is obese. She is ill-appearing. Comments: Patient feels generally warm   HENT:      Head: Normocephalic and atraumatic. Right Ear: External ear normal.      Left Ear: External ear normal.      Nose: Nose normal. No rhinorrhea. Mouth/Throat:      Mouth: Mucous membranes are moist.      Pharynx: Oropharynx is clear. Eyes:      Extraocular Movements: Extraocular movements intact.       Conjunctiva/sclera: Conjunctivae normal.      Pupils: Pupils are equal, round, and reactive to light. Cardiovascular:      Rate and Rhythm: Normal rate and regular rhythm. Pulses: Normal pulses. Heart sounds: Normal heart sounds. No murmur heard. Pulmonary:      Effort: Pulmonary effort is normal. No respiratory distress. Breath sounds: Normal breath sounds. No wheezing, rhonchi or rales. Abdominal:      General: Bowel sounds are normal. There is no distension. Palpations: Abdomen is soft. Tenderness: There is no abdominal tenderness. Musculoskeletal:         General: No swelling or deformity. Cervical back: Normal range of motion and neck supple. No rigidity. Right lower leg: Edema present. Left lower leg: Edema present. Comments: Appreciate cefepime if he cannot swelling, erythema or warmth in her right knee compared to her left. The patient does have tenderness from her right hip, through the thigh, knee and into her right calf. Skin:     General: Skin is warm and dry. Coloration: Skin is not jaundiced or pale. Neurological:      General: No focal deficit present. Mental Status: She is alert and oriented to person, place, and time. Mental status is at baseline. Psychiatric:         Mood and Affect: Mood normal.         Behavior: Behavior normal.            Diagnostic results  Labs    Labs Reviewed   CULTURE, BLOOD 2 - Abnormal; Notable for the following components:       Result Value    Culture, Blood 2   (*)     Value: Gram stain performed from blood culture bottle media  Gram positive cocci in clusters      All other components within normal limits    Narrative:     CALL  [facility]  H[facility] tel. ,  Microbiology results called to and read back by RIANA Licona, 01/25/2023  08:24, by YULISSA   CULTURE, URINE - Abnormal; Notable for the following components:    Urine Culture, Routine   (*)     Value:  Additional growth present, also evaluating for;  Mixed gram positive organisms      Organism Gram negative renny (*)     All other components within normal limits    Narrative:     Source: URINE       Site:              CULTURE, BODY FLUID - Abnormal; Notable for the following components:    Body Fluid Culture, Sterile Neisseria gonorrhoeae not isolated (*)     Organism Staphylococcus aureus (*)     All other components within normal limits    Narrative:     Source: SYNOV       Site: Body Fluid&Body Fluid             BLOOD ID PANEL, MOLECULAR - Abnormal; Notable for the following components:    Staphylococcus aureus by PCR DETECTED (*)     Staphylococcus species by PCR DETECTED (*)     Methicillin Resistance mecA/C and MREJ by PCR DETECTED (*)     All other components within normal limits    Narrative:     CALL  [facility]  [facility] tel. ,  Microbiology results called to and read back by RIANA Olguin, 01/25/2023  08:24, by Kavitha Long  CALL  [facility]  [facility] tel. ,  Previous panic on this admission - call not needed per SOP, 01/25/2023 10:37,  by YULISSA   CBC WITH AUTO DIFFERENTIAL - Abnormal; Notable for the following components:    WBC 11.6 (*)     Neutrophils % 80.6 (*)     Lymphocytes % 8.0 (*)     Neutrophils Absolute 9.32 (*)     Lymphocytes Absolute 0.93 (*)     Monocytes Absolute 1.16 (*)     Eosinophils Absolute 0.04 (*)     All other components within normal limits   COMPREHENSIVE METABOLIC PANEL - Abnormal; Notable for the following components:    Glucose 125 (*)     All other components within normal limits   SERUM DRUG SCREEN - Abnormal; Notable for the following components:    Acetaminophen Level <5.0 (*)     All other components within normal limits   URINE DRUG SCREEN - Abnormal; Notable for the following components:    Oxycodone Urine POSITIVE (*)     All other components within normal limits   LACTATE, SEPSIS - Abnormal; Notable for the following components:    Lactic Acid, Sepsis 0.4 (*)     All other components within normal limits   SEDIMENTATION RATE - Abnormal; Notable for the following components:    Sed Rate 74 (*)     All other components within normal limits PROCALCITONIN - Abnormal; Notable for the following components:    Procalcitonin 0.41 (*)     All other components within normal limits   TROPONIN - Abnormal; Notable for the following components:    Troponin, High Sensitivity 24 (*)     All other components within normal limits   C-REACTIVE PROTEIN - Abnormal; Notable for the following components:    CRP 28.4 (*)     All other components within normal limits   TROPONIN - Abnormal; Notable for the following components:    Troponin, High Sensitivity 24 (*)     All other components within normal limits   CULTURE, BLOOD 1    Narrative:     Source: BLOOD       Site:              COVID-19 & INFLUENZA COMBO   GRAM STAIN    Narrative:     Source: SYNOV       Site: Body Fluid&Body Fluid             URINALYSIS   LACTATE, SEPSIS   MICROSCOPIC URINALYSIS   CELL COUNT WITH DIFFERENTIAL, BODY FLUID   GLUCOSE, BODY FLUID   APTT   CBC   CRYSTALS, BODY FLUID   TYPE AND SCREEN       As interpreted by me, the above displayed labs are abnormal. All other labs obtained during this visit were within normal range or not returned as of this dictation. EKG interpretation: This EKG is signed and interpreted by me. Rate: 98  Rhythm: sinus  Axis: normal  Interpretation: Right bundle branch block, no ST elevations, QRS is narrow, QTC is less than 500. Comparison: stable as compared to patient's most recent EKG and no previous EKG available    RADIOLOGY:   Non-plain film images such as CT, Ultrasound and MRI are read by the radiologist. Plain radiographic images are visualized and preliminarily interpreted by myself with the below findings:    Knee x-ray appears stable with what appears to be intact orthopedic hardware from a knee replacement. I do not appreciate any significant osseous degeneration since her last knee x-ray in November.     Chest x-ray appears to have some possible mild infiltrates in the right lower fields and around the hilar region, however compared to her previous x-ray in August 2022, the patient's x-ray seems to be improved with significantly less pulmonary edema. Interpretation per the Radiologist below, if available at the time of this note:    CT FEMUR RIGHT WO CONTRAST   Final Result   1. Stable alignment of healing, internally fixated distal femur fracture. 2. Stable alignment of right knee arthroplasty. 3. Moderate synovial effusion. 4. Foci of subcutaneous gas along medial margin of the knee related to recent   procedure. US DUP LOWER EXTREMITY RIGHT KRAIG   Final Result   Within the visualized vessels there is no evidence for deep venous   thrombosis               XR CHEST PORTABLE   Final Result   Minimal ground-glass infiltrate and or atelectasis suggested on the left. The pulmonary vascularity appears mildly congested which may be exacerbated   by semi supine positioning. XR KNEE RIGHT (3 VIEWS)   Final Result   Stable substantially healed femur fracture with indwelling fixation hardware. Right TKA. Knee joint effusion. No results found. No results found.       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      Emergency Department Course  Vitals:    Vitals:    01/24/23 1500 01/24/23 1845 01/25/23 0750 01/25/23 0830   BP: 133/66 (!) 147/48 (!) 142/74    Pulse: 84 (!) 101 89    Resp: 23 18 18    Temp:  97.6 °F (36.4 °C) 97.8 °F (36.6 °C)    TempSrc:   Oral    SpO2: 98% 95% 100% 97%   Weight:       Height:           Patient was given the following medications:  Medications   lidocaine 1 % injection (has no administration in time range)   fentaNYL (SUBLIMAZE) injection 50 mcg (has no administration in time range)   oxyCODONE (ROXICODONE) immediate release tablet 5 mg (5 mg Oral Given 1/24/23 1455)   cefepime (MAXIPIME) 2,000 mg in sodium chloride 0.9 % 50 mL IVPB (Hgmm0Umr) (0 mg IntraVENous Stopped 1/25/23 1120)   albuterol (PROVENTIL) nebulizer solution 2.5 mg (has no administration in time range)   allopurinol (ZYLOPRIM) tablet 100 mg (has no administration in time range)   baclofen (LIORESAL) tablet 20 mg (has no administration in time range)   docusate sodium (COLACE) capsule 100 mg (100 mg Oral Not Given 1/25/23 1046)   escitalopram (LEXAPRO) tablet 30 mg (has no administration in time range)   gabapentin (NEURONTIN) capsule 600 mg (has no administration in time range)   metoprolol succinate (TOPROL XL) extended release tablet 12.5 mg (has no administration in time range)   pantoprazole (PROTONIX) tablet 40 mg (has no administration in time range)   promethazine (PHENERGAN) tablet 25 mg (has no administration in time range)   topiramate (TOPAMAX) tablet 200 mg (has no administration in time range)   traZODone (DESYREL) tablet 100 mg (has no administration in time range)   ziprasidone (GEODON) capsule 20 mg (has no administration in time range)   ziprasidone (GEODON) capsule 40 mg (has no administration in time range)   sodium chloride flush 0.9 % injection 10 mL (10 mLs IntraVENous Given 1/25/23 1051)   sodium chloride flush 0.9 % injection 10 mL (has no administration in time range)   0.9 % sodium chloride infusion (has no administration in time range)   ondansetron (ZOFRAN-ODT) disintegrating tablet 4 mg (has no administration in time range)     Or   ondansetron (ZOFRAN) injection 4 mg (has no administration in time range)   magnesium hydroxide (MILK OF MAGNESIA) 400 MG/5ML suspension 30 mL (has no administration in time range)   acetaminophen (TYLENOL) tablet 650 mg (has no administration in time range)     Or   acetaminophen (TYLENOL) suppository 650 mg (has no administration in time range)   0.9 % sodium chloride infusion ( IntraVENous New Bag 1/25/23 1046)   ceFAZolin (ANCEF) 2,000 mg in sterile water 20 mL IV syringe (has no administration in time range)   vancomycin (VANCOCIN) 1,250 mg in sodium chloride 0.9 % 250 mL IVPB (has no administration in time range)   midazolam PF (VERSED) injection 2 mg (has no administration in time range)   acetaminophen (TYLENOL) tablet 1,000 mg (1,000 mg Oral Given 1/24/23 0605)   0.9 % sodium chloride bolus (0 mLs IntraVENous Stopped 1/24/23 0951)   vancomycin (VANCOCIN) 2,000 mg in dextrose 5 % 500 mL IVPB (0 mg IntraVENous Stopped 1/24/23 1600)   cefepime (MAXIPIME) 2,000 mg in sodium chloride 0.9 % 50 mL IVPB (Szem2Ogu) (0 mg IntraVENous Stopped 1/24/23 1351)   fentaNYL (SUBLIMAZE) injection 50 mcg (50 mcg IntraVENous Given 1/25/23 0834)   0.9 % sodium chloride bolus (0 mLs IntraVENous Stopped 1/24/23 2210)       ED Course as of 01/25/23 1320   Tue Jan 24, 2023   0608 The following tests were interpreted by me: Right knee xray. 3v xray, no obvious fx.  Prior hardware in place   [CD]   1083 My independent interpretation of the laboratory testing shows lactate normal, troponin 24, procalcitonin 0.41, CMP unremarkable, CBC with white count 11.6. [CD]      ED Course User Index  [CD] Yaz Cristobal MD        Is this patient to be included in the SEP-1 core measure due to severe sepsis or septic shock? No Exclusion criteria - the patient is NOT to be included for SEP-1 Core Measure due to: May have criteria for sepsis, but does not meet criteria for severe sepsis or septic shock      Medical Decision Making/Differential Diagnosis:    CC/HPI Summary, social determinants of health, records reviewed, DDX, testing done/not done, ED course, reassessment, disposition considerations/shared decision making with patient, consults, disposition:    Medical Decision Making  Amount and/or Complexity of Data Reviewed  Labs: ordered. Decision-making details documented in ED Course. Radiology: ordered and independent interpretation performed. Decision-making details documented in ED Course. ECG/medicine tests: ordered and independent interpretation performed. Decision-making details documented in ED Course. Risk  Prescription drug management. Decision regarding hospitalization. Patient presented for knee pain and concerns for a septic joint and fever. EKG is ordered to have documentation of patient's current rhythm, and to rule out any obvious acute cardiac illnesses such as ACS. Additionally, QT interval may be of use in decision making regarding any medications administered here in the ED. Patient is placed on cardiac monitor and continuous pulse ox for monitoring. CBC is ordered to evaluate for any signs of infection or inflammation by obtaining a WBC count, or any signs of acute anemia by interpreting hemoglobin. CMP was ordered to evaluate for any electrolyte imbalances, kidney function, or any elevations in anion gap. Troponin ordered to evaluate for possible cardiac etiology of symptoms including but not limited to STEMI or NSTEMI. Urinalysis ordered to evaluate for UTI as the possible cause of symptoms, and may also evaluate hematuria as well. Blood cultures are ordered to evaluate, rule out and, if present, treat bacteremia with antibiotics narrowed down to the found organism.  Chest x-ray is ordered to evaluate for any possible signs of pneumonia, pleural effusions, cardiomegaly, pneumothorax, atelectasis, rib or sternal abnormalities including fractures. Other labs ordered include urinalysis, procalcitonin, sedimentation rate and CRP, lactic acid. My independent interpretation of the labs are the patient has white count of 11.6, elevated procalcitonin, CRP and sedimentation rate. Lactic acid is less than normal, troponin is elevated at 24 but has a delta of 0. Urinalysis was negative although the culture did grow organisms. X-rays were previously reviewed and interpreted individually by myself in this note. Right knee x-ray shows a joint effusion and the previously healed fracture with orthopedic joint hardware. DVT ultrasound of the right venous system is negative for DVT. Chest x-ray shows some groundglass opacities. CT scan of the right femur which is obtained later in the day shows joint effusion. These findings are concerning for septic arthritis of the right knee. Orthopedics is consulted and they are initially not able to aspirate fluid out of the joint. They suggest starting antibiotics due to the delay in joint aspiration. Blood cultures are still pending at this time    Patient is treated in the ED with Tylenol for her fever, a liter of fluids. She is given fentanyl for her pain and is started on cefepime and vancomycin. Later, orthopedics is able to obtain joint fluid which does appear to show staph. I spoke with the admitting provider and they accept the patient for admission, orthopedics will follow. CONSULTS: (Who and What was discussed)  IP CONSULT TO ORTHOPEDIC SURGERY  IP CONSULT TO INTERNAL MEDICINE  PHARMACY TO DOSE VANCOMYCIN  IP CONSULT TO INFECTIOUS DISEASES        I am the Primary Clinician of Record. Final impression  1.  Pyogenic arthritis of right knee joint, due to unspecified organism [facility])          Disposition/plan  DISPOSITION Admitted 01/24/2023 01:50:19 PM    PATIENT REFERRED TO:  No follow-up provider specified.     DISCHARGE MEDICATIONS:  Current Discharge Medication List          DISCONTINUED MEDICATIONS:  Current Discharge Medication List        STOP taking these medications       azithromycin (ZITHROMAX) 250 MG tablet Comments:   Reason for Stopping:         rifAMPin (RIFADIN) 300 MG capsule Comments:   Reason for Stopping:         ipratropium-albuterol (DUONEB) 0.5-2.5 (3) MG/3ML SOLN nebulizer solution Comments:   Reason for Stopping:         lipase-protease-amylase (CREON) 01490-46080 units delayed release capsule Comments:   Reason for Stopping:         polyethylene glycol (GLYCOLAX) 17 g packet Comments:   Reason for Stopping:         meclizine (ANTIVERT) 25 MG tablet Comments:   Reason for Stopping:         diclofenac sodium (VOLTAREN) 1 % GEL Comments:   Reason for Stopping:         cyanocobalamin 50 MCG tablet Comments:   Reason for Stopping:         calcium carbonate 600 MG TABS tablet Comments:   Reason for Stopping:         omeprazole (PRILOSEC) 20 MG delayed release capsule Comments:   Reason for Stopping:         levOCARNitine (CARNITOR) 330 MG tablet Comments:   Reason for Stopping:         gabapentin (NEURONTIN) 600 MG tablet Comments:   Reason for Stopping:                      (Please note that portions of this note were completed with a voice recognition program.  Efforts were made to edit the dictations but occasionally words are mis-transcribed.)    Ronda Lyles MD (electronically signed)       Carito Parker MD  Resident  01/25/23 6567

## 2023-01-24 NOTE — ED NOTES
Patient admits to taking 2 10mg percocet prior to coming to ED. She is prescribed this for chronic pain.       Obi Zhang, RN  01/24/23 [address], RN  01/24/23 9370

## 2023-01-24 NOTE — PROGRESS NOTES
After consent was obtained, using sterile technique the right knee was prepped and plain Lidocaine 1% was used as local anesthetic. The joint was entered using a superior lateral approach and 55 ml's of cloudy straw-colored colored fluid was withdrawn and sent for cell count with differential, gram stain, culture, crystal analysis, glucose analysis. The procedure was well tolerated. The aspiration site was dressed and a Band-Aid and Ace wrap. Placement of a to decrease swelling. We will follow the results of the cultures and make further recommendations as a result.

## 2023-01-25 ENCOUNTER — APPOINTMENT (OUTPATIENT)
Dept: GENERAL RADIOLOGY | Age: 63
DRG: 325 | End: 2023-01-25
Payer: MEDICAID

## 2023-01-25 ENCOUNTER — ANESTHESIA (OUTPATIENT)
Dept: OPERATING ROOM | Age: 63
End: 2023-01-25
Payer: MEDICAID

## 2023-01-25 ENCOUNTER — ANESTHESIA EVENT (OUTPATIENT)
Dept: OPERATING ROOM | Age: 63
End: 2023-01-25
Payer: MEDICAID

## 2023-01-25 LAB
ACINETOBACTER CALCOAC BAUMANNII COMPLEX BY PCR: NOT DETECTED
BACTEROIDES FRAGILIS BY PCR: NOT DETECTED
BOTTLE TYPE: ABNORMAL
CANDIDA ALBICANS BY PCR: NOT DETECTED
CANDIDA AURIS BY PCR: NOT DETECTED
CANDIDA GLABRATA BY PCR: NOT DETECTED
CANDIDA KRUSEI BY PCR: NOT DETECTED
CANDIDA PARAPSILOSIS BY PCR: NOT DETECTED
CANDIDA TROPICALIS BY PCR: NOT DETECTED
CRYPTOCOCCUS NEOFORMANS/GATTII BY PCR: NOT DETECTED
ENTEROBACTER CLOACAE COMPLEX BY PCR: NOT DETECTED
ENTEROBACTERALES BY PCR: NOT DETECTED
ENTEROCOCCUS FAECALIS BY PCR: NOT DETECTED
ENTEROCOCCUS FAECIUM BY PCR: NOT DETECTED
ESCHERICHIA COLI BY PCR: NOT DETECTED
GRAM STAIN ORDERABLE: NORMAL
HAEMOPHILUS INFLUENZAE BY PCR: NOT DETECTED
KLEBSIELLA AEROGENES BY PCR: NOT DETECTED
KLEBSIELLA OXYTOCA BY PCR: NOT DETECTED
KLEBSIELLA PNEUMONIAE GROUP BY PCR: NOT DETECTED
LISTERIA MONOCYTOGENES BY PCR: NOT DETECTED
METHICILLIN RESISTANCE MECA/C AND MREJ BY PCR: DETECTED
NEISSERIA MENINGITIDIS BY PCR: NOT DETECTED
ORDER NUMBER: ABNORMAL
PROTEUS SPECIES BY PCR: NOT DETECTED
PSEUDOMONAS AERUGINOSA BY PCR: NOT DETECTED
SALMONELLA SPECIES BY PCR: NOT DETECTED
SERRATIA MARCESCENS BY PCR: NOT DETECTED
SOURCE OF BLOOD CULTURE: ABNORMAL
STAPHYLOCOCCUS AUREUS BY PCR: DETECTED
STAPHYLOCOCCUS EPIDERMIDIS BY PCR: NOT DETECTED
STAPHYLOCOCCUS LUGDUNENSIS BY PCR: NOT DETECTED
STAPHYLOCOCCUS SPECIES BY PCR: DETECTED
STENOTROPHOMONAS MALTOPHILIA BY PCR: NOT DETECTED
STREPTOCOCCUS AGALACTIAE BY PCR: NOT DETECTED
STREPTOCOCCUS PNEUMONIAE BY PCR: NOT DETECTED
STREPTOCOCCUS PYOGENES  BY PCR: NOT DETECTED
STREPTOCOCCUS SPECIES BY PCR: NOT DETECTED

## 2023-01-25 PROCEDURE — 87205 SMEAR GRAM STAIN: CPT

## 2023-01-25 PROCEDURE — 6360000002 HC RX W HCPCS: Performed by: ANESTHESIOLOGY

## 2023-01-25 PROCEDURE — 97165 OT EVAL LOW COMPLEX 30 MIN: CPT

## 2023-01-25 PROCEDURE — 87206 SMEAR FLUORESCENT/ACID STAI: CPT

## 2023-01-25 PROCEDURE — 6360000002 HC RX W HCPCS: Performed by: ORTHOPAEDIC SURGERY

## 2023-01-25 PROCEDURE — 6360000002 HC RX W HCPCS: Performed by: INTERNAL MEDICINE

## 2023-01-25 PROCEDURE — 7100000001 HC PACU RECOVERY - ADDTL 15 MIN: Performed by: ORTHOPAEDIC SURGERY

## 2023-01-25 PROCEDURE — 88300 SURGICAL PATH GROSS: CPT

## 2023-01-25 PROCEDURE — 6370000000 HC RX 637 (ALT 250 FOR IP): Performed by: ORTHOPAEDIC SURGERY

## 2023-01-25 PROCEDURE — 2700000000 HC OXYGEN THERAPY PER DAY

## 2023-01-25 PROCEDURE — 87077 CULTURE AEROBIC IDENTIFY: CPT

## 2023-01-25 PROCEDURE — 73560 X-RAY EXAM OF KNEE 1 OR 2: CPT

## 2023-01-25 PROCEDURE — C1713 ANCHOR/SCREW BN/BN,TIS/BN: HCPCS | Performed by: ORTHOPAEDIC SURGERY

## 2023-01-25 PROCEDURE — 3600000015 HC SURGERY LEVEL 5 ADDTL 15MIN: Performed by: ORTHOPAEDIC SURGERY

## 2023-01-25 PROCEDURE — 3209999900 FLUORO FOR SURGICAL PROCEDURES

## 2023-01-25 PROCEDURE — 87075 CULTR BACTERIA EXCEPT BLOOD: CPT

## 2023-01-25 PROCEDURE — 2580000003 HC RX 258: Performed by: ORTHOPAEDIC SURGERY

## 2023-01-25 PROCEDURE — 3600000005 HC SURGERY LEVEL 5 BASE: Performed by: ORTHOPAEDIC SURGERY

## 2023-01-25 PROCEDURE — 94640 AIRWAY INHALATION TREATMENT: CPT

## 2023-01-25 PROCEDURE — 2580000003 HC RX 258

## 2023-01-25 PROCEDURE — 2500000003 HC RX 250 WO HCPCS

## 2023-01-25 PROCEDURE — 3700000000 HC ANESTHESIA ATTENDED CARE: Performed by: ORTHOPAEDIC SURGERY

## 2023-01-25 PROCEDURE — 1200000000 HC SEMI PRIVATE

## 2023-01-25 PROCEDURE — 87102 FUNGUS ISOLATION CULTURE: CPT

## 2023-01-25 PROCEDURE — 0SRC0EZ REPLACEMENT OF RIGHT KNEE JOINT WITH ARTICULATING SPACER, OPEN APPROACH: ICD-10-PCS | Performed by: ORTHOPAEDIC SURGERY

## 2023-01-25 PROCEDURE — 64447 NJX AA&/STRD FEMORAL NRV IMG: CPT | Performed by: ANESTHESIOLOGY

## 2023-01-25 PROCEDURE — 0SPC0JZ REMOVAL OF SYNTHETIC SUBSTITUTE FROM RIGHT KNEE JOINT, OPEN APPROACH: ICD-10-PCS | Performed by: ORTHOPAEDIC SURGERY

## 2023-01-25 PROCEDURE — 27488 REMOVAL OF KNEE PROSTHESIS: CPT | Performed by: ORTHOPAEDIC SURGERY

## 2023-01-25 PROCEDURE — 20610 DRAIN/INJ JOINT/BURSA W/O US: CPT | Performed by: ORTHOPAEDIC SURGERY

## 2023-01-25 PROCEDURE — 6360000002 HC RX W HCPCS

## 2023-01-25 PROCEDURE — 2500000003 HC RX 250 WO HCPCS: Performed by: ORTHOPAEDIC SURGERY

## 2023-01-25 PROCEDURE — 87070 CULTURE OTHR SPECIMN AEROBIC: CPT

## 2023-01-25 PROCEDURE — 87186 SC STD MICRODIL/AGAR DIL: CPT

## 2023-01-25 PROCEDURE — 6360000004 HC RX CONTRAST MEDICATION: Performed by: ORTHOPAEDIC SURGERY

## 2023-01-25 PROCEDURE — 3E0T3BZ INTRODUCTION OF ANESTHETIC AGENT INTO PERIPHERAL NERVES AND PLEXI, PERCUTANEOUS APPROACH: ICD-10-PCS | Performed by: ANESTHESIOLOGY

## 2023-01-25 PROCEDURE — 2709999900 HC NON-CHARGEABLE SUPPLY: Performed by: ORTHOPAEDIC SURGERY

## 2023-01-25 PROCEDURE — A4216 STERILE WATER/SALINE, 10 ML: HCPCS | Performed by: ORTHOPAEDIC SURGERY

## 2023-01-25 PROCEDURE — 87116 MYCOBACTERIA CULTURE: CPT

## 2023-01-25 PROCEDURE — 2580000003 HC RX 258: Performed by: INTERNAL MEDICINE

## 2023-01-25 PROCEDURE — 0S993ZX DRAINAGE OF RIGHT HIP JOINT, PERCUTANEOUS APPROACH, DIAGNOSTIC: ICD-10-PCS | Performed by: ORTHOPAEDIC SURGERY

## 2023-01-25 PROCEDURE — 7100000000 HC PACU RECOVERY - FIRST 15 MIN: Performed by: ORTHOPAEDIC SURGERY

## 2023-01-25 PROCEDURE — 97535 SELF CARE MNGMENT TRAINING: CPT

## 2023-01-25 PROCEDURE — C1776 JOINT DEVICE (IMPLANTABLE): HCPCS | Performed by: ORTHOPAEDIC SURGERY

## 2023-01-25 PROCEDURE — 87176 TISSUE HOMOGENIZATION CULTR: CPT

## 2023-01-25 PROCEDURE — 20680 REMOVAL OF IMPLANT DEEP: CPT | Performed by: ORTHOPAEDIC SURGERY

## 2023-01-25 PROCEDURE — 3700000001 HC ADD 15 MINUTES (ANESTHESIA): Performed by: ORTHOPAEDIC SURGERY

## 2023-01-25 DEVICE — CEMENT BNE 20ML 41GM FULL DOSE PMMA W/ TOBRA M VISC RADPQ: Type: IMPLANTABLE DEVICE | Site: KNEE | Status: FUNCTIONAL

## 2023-01-25 DEVICE — IMPLANTABLE DEVICE: Type: IMPLANTABLE DEVICE | Site: KNEE | Status: FUNCTIONAL

## 2023-01-25 RX ORDER — BACLOFEN 10 MG/1
20 TABLET ORAL 4 TIMES DAILY
Status: DISCONTINUED | OUTPATIENT
Start: 2023-01-25 | End: 2023-01-31 | Stop reason: HOSPADM

## 2023-01-25 RX ORDER — MORPHINE SULFATE 4 MG/ML
4 INJECTION, SOLUTION INTRAMUSCULAR; INTRAVENOUS EVERY 4 HOURS PRN
Status: DISCONTINUED | OUTPATIENT
Start: 2023-01-25 | End: 2023-01-31 | Stop reason: HOSPADM

## 2023-01-25 RX ORDER — SODIUM CHLORIDE 0.9 % (FLUSH) 0.9 %
5-40 SYRINGE (ML) INJECTION EVERY 12 HOURS SCHEDULED
Status: DISCONTINUED | OUTPATIENT
Start: 2023-01-25 | End: 2023-01-25 | Stop reason: HOSPADM

## 2023-01-25 RX ORDER — DOCUSATE SODIUM 100 MG/1
100 CAPSULE, LIQUID FILLED ORAL 2 TIMES DAILY
Status: DISCONTINUED | OUTPATIENT
Start: 2023-01-25 | End: 2023-01-31 | Stop reason: HOSPADM

## 2023-01-25 RX ORDER — ZIPRASIDONE HYDROCHLORIDE 40 MG/1
40 CAPSULE ORAL EVERY EVENING
Status: DISCONTINUED | OUTPATIENT
Start: 2023-01-25 | End: 2023-01-31 | Stop reason: HOSPADM

## 2023-01-25 RX ORDER — ACETAMINOPHEN 650 MG/1
650 SUPPOSITORY RECTAL EVERY 6 HOURS PRN
Status: DISCONTINUED | OUTPATIENT
Start: 2023-01-25 | End: 2023-01-31 | Stop reason: HOSPADM

## 2023-01-25 RX ORDER — SODIUM CHLORIDE 9 MG/ML
INJECTION, SOLUTION INTRAVENOUS CONTINUOUS PRN
Status: DISCONTINUED | OUTPATIENT
Start: 2023-01-25 | End: 2023-01-25 | Stop reason: SDUPTHER

## 2023-01-25 RX ORDER — PROMETHAZINE HYDROCHLORIDE 25 MG/1
25 TABLET ORAL EVERY 6 HOURS PRN
Status: DISCONTINUED | OUTPATIENT
Start: 2023-01-25 | End: 2023-01-31 | Stop reason: HOSPADM

## 2023-01-25 RX ORDER — ROCURONIUM BROMIDE 10 MG/ML
INJECTION, SOLUTION INTRAVENOUS PRN
Status: DISCONTINUED | OUTPATIENT
Start: 2023-01-25 | End: 2023-01-25 | Stop reason: SDUPTHER

## 2023-01-25 RX ORDER — ALBUTEROL SULFATE 2.5 MG/3ML
2.5 SOLUTION RESPIRATORY (INHALATION) 4 TIMES DAILY
Status: DISCONTINUED | OUTPATIENT
Start: 2023-01-25 | End: 2023-01-31 | Stop reason: HOSPADM

## 2023-01-25 RX ORDER — DIPHENHYDRAMINE HYDROCHLORIDE 50 MG/ML
12.5 INJECTION INTRAMUSCULAR; INTRAVENOUS
Status: DISCONTINUED | OUTPATIENT
Start: 2023-01-25 | End: 2023-01-25 | Stop reason: HOSPADM

## 2023-01-25 RX ORDER — DEXAMETHASONE SODIUM PHOSPHATE 10 MG/ML
INJECTION INTRAMUSCULAR; INTRAVENOUS PRN
Status: DISCONTINUED | OUTPATIENT
Start: 2023-01-25 | End: 2023-01-25 | Stop reason: SDUPTHER

## 2023-01-25 RX ORDER — CEFAZOLIN SODIUM 1 G/3ML
INJECTION, POWDER, FOR SOLUTION INTRAMUSCULAR; INTRAVENOUS PRN
Status: DISCONTINUED | OUTPATIENT
Start: 2023-01-25 | End: 2023-01-25 | Stop reason: SDUPTHER

## 2023-01-25 RX ORDER — PROPOFOL 10 MG/ML
INJECTION, EMULSION INTRAVENOUS PRN
Status: DISCONTINUED | OUTPATIENT
Start: 2023-01-25 | End: 2023-01-25 | Stop reason: SDUPTHER

## 2023-01-25 RX ORDER — BUPIVACAINE HYDROCHLORIDE AND EPINEPHRINE 5; 5 MG/ML; UG/ML
INJECTION, SOLUTION EPIDURAL; INTRACAUDAL; PERINEURAL PRN
Status: DISCONTINUED | OUTPATIENT
Start: 2023-01-25 | End: 2023-01-25 | Stop reason: HOSPADM

## 2023-01-25 RX ORDER — LABETALOL HYDROCHLORIDE 5 MG/ML
INJECTION, SOLUTION INTRAVENOUS PRN
Status: DISCONTINUED | OUTPATIENT
Start: 2023-01-25 | End: 2023-01-25 | Stop reason: SDUPTHER

## 2023-01-25 RX ORDER — ACETAMINOPHEN 325 MG/1
650 TABLET ORAL
Status: DISCONTINUED | OUTPATIENT
Start: 2023-01-25 | End: 2023-01-25 | Stop reason: HOSPADM

## 2023-01-25 RX ORDER — OXYCODONE HYDROCHLORIDE 10 MG/1
10 TABLET ORAL EVERY 4 HOURS PRN
Status: DISCONTINUED | OUTPATIENT
Start: 2023-01-25 | End: 2023-01-31 | Stop reason: HOSPADM

## 2023-01-25 RX ORDER — ASPIRIN 325 MG
325 TABLET, DELAYED RELEASE (ENTERIC COATED) ORAL DAILY
Qty: 30 TABLET | Refills: 3 | Status: SHIPPED | OUTPATIENT
Start: 2023-01-25 | End: 2024-01-25

## 2023-01-25 RX ORDER — ONDANSETRON 2 MG/ML
INJECTION INTRAMUSCULAR; INTRAVENOUS PRN
Status: DISCONTINUED | OUTPATIENT
Start: 2023-01-25 | End: 2023-01-25 | Stop reason: SDUPTHER

## 2023-01-25 RX ORDER — SODIUM CHLORIDE 0.9 % (FLUSH) 0.9 %
10 SYRINGE (ML) INJECTION EVERY 12 HOURS SCHEDULED
Status: DISCONTINUED | OUTPATIENT
Start: 2023-01-25 | End: 2023-01-27 | Stop reason: SDUPTHER

## 2023-01-25 RX ORDER — ACETAMINOPHEN 325 MG/1
650 TABLET ORAL EVERY 6 HOURS PRN
Status: DISCONTINUED | OUTPATIENT
Start: 2023-01-25 | End: 2023-01-31 | Stop reason: HOSPADM

## 2023-01-25 RX ORDER — FENTANYL CITRATE 50 UG/ML
INJECTION, SOLUTION INTRAMUSCULAR; INTRAVENOUS PRN
Status: DISCONTINUED | OUTPATIENT
Start: 2023-01-25 | End: 2023-01-25 | Stop reason: SDUPTHER

## 2023-01-25 RX ORDER — IPRATROPIUM BROMIDE AND ALBUTEROL SULFATE 2.5; .5 MG/3ML; MG/3ML
1 SOLUTION RESPIRATORY (INHALATION)
Status: DISCONTINUED | OUTPATIENT
Start: 2023-01-25 | End: 2023-01-25 | Stop reason: HOSPADM

## 2023-01-25 RX ORDER — KETOROLAC TROMETHAMINE 30 MG/ML
INJECTION, SOLUTION INTRAMUSCULAR; INTRAVENOUS PRN
Status: DISCONTINUED | OUTPATIENT
Start: 2023-01-25 | End: 2023-01-25 | Stop reason: HOSPADM

## 2023-01-25 RX ORDER — DIPHENHYDRAMINE HYDROCHLORIDE 50 MG/ML
INJECTION INTRAMUSCULAR; INTRAVENOUS PRN
Status: DISCONTINUED | OUTPATIENT
Start: 2023-01-25 | End: 2023-01-25 | Stop reason: SDUPTHER

## 2023-01-25 RX ORDER — SODIUM CHLORIDE 0.9 % (FLUSH) 0.9 %
10 SYRINGE (ML) INJECTION PRN
Status: DISCONTINUED | OUTPATIENT
Start: 2023-01-25 | End: 2023-01-27 | Stop reason: SDUPTHER

## 2023-01-25 RX ORDER — FENTANYL CITRATE 50 UG/ML
50 INJECTION, SOLUTION INTRAMUSCULAR; INTRAVENOUS
Status: DISCONTINUED | OUTPATIENT
Start: 2023-01-25 | End: 2023-01-27

## 2023-01-25 RX ORDER — PANTOPRAZOLE SODIUM 40 MG/1
40 TABLET, DELAYED RELEASE ORAL
Status: DISCONTINUED | OUTPATIENT
Start: 2023-01-25 | End: 2023-01-29

## 2023-01-25 RX ORDER — SODIUM CHLORIDE 9 MG/ML
INJECTION, SOLUTION INTRAVENOUS CONTINUOUS
Status: ACTIVE | OUTPATIENT
Start: 2023-01-25 | End: 2023-01-25

## 2023-01-25 RX ORDER — LABETALOL HYDROCHLORIDE 5 MG/ML
5 INJECTION, SOLUTION INTRAVENOUS
Status: DISCONTINUED | OUTPATIENT
Start: 2023-01-25 | End: 2023-01-25 | Stop reason: HOSPADM

## 2023-01-25 RX ORDER — ONDANSETRON 2 MG/ML
4 INJECTION INTRAMUSCULAR; INTRAVENOUS EVERY 6 HOURS PRN
Status: DISCONTINUED | OUTPATIENT
Start: 2023-01-25 | End: 2023-01-31 | Stop reason: HOSPADM

## 2023-01-25 RX ORDER — TOPIRAMATE 100 MG/1
200 TABLET, FILM COATED ORAL 2 TIMES DAILY
Status: DISCONTINUED | OUTPATIENT
Start: 2023-01-25 | End: 2023-01-31 | Stop reason: HOSPADM

## 2023-01-25 RX ORDER — SODIUM CHLORIDE 9 MG/ML
INJECTION, SOLUTION INTRAVENOUS PRN
Status: DISCONTINUED | OUTPATIENT
Start: 2023-01-25 | End: 2023-01-27 | Stop reason: SDUPTHER

## 2023-01-25 RX ORDER — MORPHINE SULFATE 2 MG/ML
2 INJECTION, SOLUTION INTRAMUSCULAR; INTRAVENOUS EVERY 4 HOURS PRN
Status: DISCONTINUED | OUTPATIENT
Start: 2023-01-25 | End: 2023-01-31 | Stop reason: HOSPADM

## 2023-01-25 RX ORDER — ONDANSETRON 2 MG/ML
4 INJECTION INTRAMUSCULAR; INTRAVENOUS
Status: DISCONTINUED | OUTPATIENT
Start: 2023-01-25 | End: 2023-01-25 | Stop reason: HOSPADM

## 2023-01-25 RX ORDER — METHYLENE BLUE 10 MG/ML
INJECTION INTRAVENOUS PRN
Status: DISCONTINUED | OUTPATIENT
Start: 2023-01-25 | End: 2023-01-25 | Stop reason: HOSPADM

## 2023-01-25 RX ORDER — KETOROLAC TROMETHAMINE 30 MG/ML
15 INJECTION, SOLUTION INTRAMUSCULAR; INTRAVENOUS EVERY 6 HOURS PRN
Status: DISCONTINUED | OUTPATIENT
Start: 2023-01-25 | End: 2023-01-27

## 2023-01-25 RX ORDER — GABAPENTIN 300 MG/1
600 CAPSULE ORAL 4 TIMES DAILY
Status: DISCONTINUED | OUTPATIENT
Start: 2023-01-25 | End: 2023-01-31 | Stop reason: HOSPADM

## 2023-01-25 RX ORDER — ONDANSETRON 4 MG/1
4 TABLET, ORALLY DISINTEGRATING ORAL EVERY 8 HOURS PRN
Status: DISCONTINUED | OUTPATIENT
Start: 2023-01-25 | End: 2023-01-31 | Stop reason: HOSPADM

## 2023-01-25 RX ORDER — LIDOCAINE HYDROCHLORIDE 20 MG/ML
INJECTION, SOLUTION INTRAVENOUS PRN
Status: DISCONTINUED | OUTPATIENT
Start: 2023-01-25 | End: 2023-01-25 | Stop reason: SDUPTHER

## 2023-01-25 RX ORDER — DROPERIDOL 2.5 MG/ML
0.62 INJECTION, SOLUTION INTRAMUSCULAR; INTRAVENOUS
Status: DISCONTINUED | OUTPATIENT
Start: 2023-01-25 | End: 2023-01-25 | Stop reason: HOSPADM

## 2023-01-25 RX ORDER — SODIUM CHLORIDE 9 MG/ML
INJECTION INTRAVENOUS PRN
Status: DISCONTINUED | OUTPATIENT
Start: 2023-01-25 | End: 2023-01-25 | Stop reason: HOSPADM

## 2023-01-25 RX ORDER — SODIUM CHLORIDE 9 MG/ML
25 INJECTION, SOLUTION INTRAVENOUS PRN
Status: DISCONTINUED | OUTPATIENT
Start: 2023-01-25 | End: 2023-01-25 | Stop reason: HOSPADM

## 2023-01-25 RX ORDER — ESCITALOPRAM OXALATE 10 MG/1
30 TABLET ORAL DAILY
Status: DISCONTINUED | OUTPATIENT
Start: 2023-01-25 | End: 2023-01-31 | Stop reason: HOSPADM

## 2023-01-25 RX ORDER — ROPIVACAINE HYDROCHLORIDE 5 MG/ML
INJECTION, SOLUTION EPIDURAL; INFILTRATION; PERINEURAL
Status: DISCONTINUED | OUTPATIENT
Start: 2023-01-25 | End: 2023-01-25 | Stop reason: SDUPTHER

## 2023-01-25 RX ORDER — TRAZODONE HYDROCHLORIDE 50 MG/1
100 TABLET ORAL NIGHTLY
Status: DISCONTINUED | OUTPATIENT
Start: 2023-01-25 | End: 2023-01-31 | Stop reason: HOSPADM

## 2023-01-25 RX ORDER — METOPROLOL SUCCINATE 25 MG/1
12.5 TABLET, EXTENDED RELEASE ORAL DAILY
Status: DISCONTINUED | OUTPATIENT
Start: 2023-01-25 | End: 2023-01-31 | Stop reason: HOSPADM

## 2023-01-25 RX ORDER — MIDAZOLAM HYDROCHLORIDE 2 MG/2ML
2 INJECTION, SOLUTION INTRAMUSCULAR; INTRAVENOUS ONCE
Status: DISCONTINUED | OUTPATIENT
Start: 2023-01-25 | End: 2023-01-27

## 2023-01-25 RX ORDER — ALLOPURINOL 100 MG/1
100 TABLET ORAL 2 TIMES DAILY
Status: DISCONTINUED | OUTPATIENT
Start: 2023-01-25 | End: 2023-01-31 | Stop reason: HOSPADM

## 2023-01-25 RX ORDER — SODIUM CHLORIDE 0.9 % (FLUSH) 0.9 %
5-40 SYRINGE (ML) INJECTION PRN
Status: DISCONTINUED | OUTPATIENT
Start: 2023-01-25 | End: 2023-01-25 | Stop reason: HOSPADM

## 2023-01-25 RX ORDER — OXYCODONE HYDROCHLORIDE 5 MG/1
5 TABLET ORAL EVERY 4 HOURS PRN
Status: DISCONTINUED | OUTPATIENT
Start: 2023-01-25 | End: 2023-01-31 | Stop reason: HOSPADM

## 2023-01-25 RX ORDER — HYDRALAZINE HYDROCHLORIDE 20 MG/ML
5 INJECTION INTRAMUSCULAR; INTRAVENOUS
Status: DISCONTINUED | OUTPATIENT
Start: 2023-01-25 | End: 2023-01-25 | Stop reason: HOSPADM

## 2023-01-25 RX ORDER — ZIPRASIDONE HYDROCHLORIDE 20 MG/1
20 CAPSULE ORAL NIGHTLY
Status: DISCONTINUED | OUTPATIENT
Start: 2023-01-25 | End: 2023-01-31 | Stop reason: HOSPADM

## 2023-01-25 RX ORDER — VANCOMYCIN HYDROCHLORIDE 1 G/20ML
INJECTION, POWDER, LYOPHILIZED, FOR SOLUTION INTRAVENOUS PRN
Status: DISCONTINUED | OUTPATIENT
Start: 2023-01-25 | End: 2023-01-25 | Stop reason: HOSPADM

## 2023-01-25 RX ADMIN — ONDANSETRON 4 MG: 2 INJECTION INTRAMUSCULAR; INTRAVENOUS at 14:34

## 2023-01-25 RX ADMIN — ALBUTEROL SULFATE 2.5 MG: 2.5 SOLUTION RESPIRATORY (INHALATION) at 20:47

## 2023-01-25 RX ADMIN — HYDROMORPHONE HYDROCHLORIDE 0.5 MG: 1 INJECTION, SOLUTION INTRAMUSCULAR; INTRAVENOUS; SUBCUTANEOUS at 18:09

## 2023-01-25 RX ADMIN — LABETALOL HYDROCHLORIDE 5 MG: 5 INJECTION INTRAVENOUS at 16:40

## 2023-01-25 RX ADMIN — ROCURONIUM BROMIDE 20 MG: 10 INJECTION, SOLUTION INTRAVENOUS at 16:32

## 2023-01-25 RX ADMIN — SODIUM CHLORIDE: 9 INJECTION, SOLUTION INTRAVENOUS at 16:35

## 2023-01-25 RX ADMIN — SODIUM CHLORIDE: 9 INJECTION, SOLUTION INTRAVENOUS at 14:22

## 2023-01-25 RX ADMIN — FENTANYL CITRATE 50 MCG: 50 INJECTION, SOLUTION INTRAMUSCULAR; INTRAVENOUS at 08:34

## 2023-01-25 RX ADMIN — ZIPRASIDONE HYDROCHLORIDE 40 MG: 20 CAPSULE ORAL at 21:24

## 2023-01-25 RX ADMIN — ACETAMINOPHEN 650 MG: 325 TABLET ORAL at 20:02

## 2023-01-25 RX ADMIN — FENTANYL CITRATE 50 MCG: 50 INJECTION, SOLUTION INTRAMUSCULAR; INTRAVENOUS at 17:27

## 2023-01-25 RX ADMIN — DOCUSATE SODIUM 100 MG: 100 CAPSULE, LIQUID FILLED ORAL at 20:02

## 2023-01-25 RX ADMIN — CEFAZOLIN 2 G: 1 INJECTION, POWDER, FOR SOLUTION INTRAMUSCULAR; INTRAVENOUS at 14:50

## 2023-01-25 RX ADMIN — OXYCODONE HYDROCHLORIDE 10 MG: 10 TABLET ORAL at 20:02

## 2023-01-25 RX ADMIN — SUGAMMADEX 200 MG: 100 INJECTION, SOLUTION INTRAVENOUS at 17:40

## 2023-01-25 RX ADMIN — LABETALOL HYDROCHLORIDE 2.5 MG: 5 INJECTION INTRAVENOUS at 16:16

## 2023-01-25 RX ADMIN — HYDROMORPHONE HYDROCHLORIDE 0.5 MG: 1 INJECTION, SOLUTION INTRAMUSCULAR; INTRAVENOUS; SUBCUTANEOUS at 18:24

## 2023-01-25 RX ADMIN — SODIUM CHLORIDE, PRESERVATIVE FREE 10 ML: 5 INJECTION INTRAVENOUS at 10:51

## 2023-01-25 RX ADMIN — SODIUM CHLORIDE: 9 INJECTION, SOLUTION INTRAVENOUS at 14:12

## 2023-01-25 RX ADMIN — DEXAMETHASONE SODIUM PHOSPHATE 10 MG: 10 INJECTION INTRAMUSCULAR; INTRAVENOUS at 14:34

## 2023-01-25 RX ADMIN — FENTANYL CITRATE 50 MCG: 50 INJECTION, SOLUTION INTRAMUSCULAR; INTRAVENOUS at 16:27

## 2023-01-25 RX ADMIN — TRAZODONE HYDROCHLORIDE 100 MG: 50 TABLET ORAL at 20:02

## 2023-01-25 RX ADMIN — ROCURONIUM BROMIDE 30 MG: 10 INJECTION, SOLUTION INTRAVENOUS at 14:30

## 2023-01-25 RX ADMIN — HYDROMORPHONE HYDROCHLORIDE 0.5 MG: 1 INJECTION, SOLUTION INTRAMUSCULAR; INTRAVENOUS; SUBCUTANEOUS at 18:17

## 2023-01-25 RX ADMIN — ZIPRASIDONE HYDROCHLORIDE 20 MG: 20 CAPSULE ORAL at 21:24

## 2023-01-25 RX ADMIN — LIDOCAINE HYDROCHLORIDE 100 MG: 20 INJECTION, SOLUTION INTRAVENOUS at 14:16

## 2023-01-25 RX ADMIN — VANCOMYCIN HYDROCHLORIDE 1250 MG: 10 INJECTION, POWDER, LYOPHILIZED, FOR SOLUTION INTRAVENOUS at 21:23

## 2023-01-25 RX ADMIN — GABAPENTIN 600 MG: 300 CAPSULE ORAL at 20:02

## 2023-01-25 RX ADMIN — ROCURONIUM BROMIDE 50 MG: 10 INJECTION, SOLUTION INTRAVENOUS at 14:16

## 2023-01-25 RX ADMIN — MORPHINE SULFATE 4 MG: 4 INJECTION, SOLUTION INTRAMUSCULAR; INTRAVENOUS at 21:38

## 2023-01-25 RX ADMIN — CEFEPIME 2000 MG: 2 INJECTION, POWDER, FOR SOLUTION INTRAVENOUS at 10:51

## 2023-01-25 RX ADMIN — ROCURONIUM BROMIDE 20 MG: 10 INJECTION, SOLUTION INTRAVENOUS at 15:12

## 2023-01-25 RX ADMIN — FENTANYL CITRATE 100 MCG: 50 INJECTION, SOLUTION INTRAMUSCULAR; INTRAVENOUS at 14:16

## 2023-01-25 RX ADMIN — CEFTRIAXONE 1000 MG: 1 INJECTION, POWDER, FOR SOLUTION INTRAMUSCULAR; INTRAVENOUS at 23:08

## 2023-01-25 RX ADMIN — HYDROMORPHONE HYDROCHLORIDE 0.25 MG: 1 INJECTION, SOLUTION INTRAMUSCULAR; INTRAVENOUS; SUBCUTANEOUS at 18:49

## 2023-01-25 RX ADMIN — ALLOPURINOL 100 MG: 100 TABLET ORAL at 20:03

## 2023-01-25 RX ADMIN — SODIUM CHLORIDE, PRESERVATIVE FREE 10 ML: 5 INJECTION INTRAVENOUS at 23:09

## 2023-01-25 RX ADMIN — HYDROMORPHONE HYDROCHLORIDE 0.25 MG: 1 INJECTION, SOLUTION INTRAMUSCULAR; INTRAVENOUS; SUBCUTANEOUS at 18:39

## 2023-01-25 RX ADMIN — BACLOFEN 20 MG: 10 TABLET ORAL at 20:02

## 2023-01-25 RX ADMIN — PROMETHAZINE HYDROCHLORIDE 25 MG: 25 TABLET ORAL at 20:02

## 2023-01-25 RX ADMIN — PROPOFOL 170 MG: 10 INJECTION, EMULSION INTRAVENOUS at 14:18

## 2023-01-25 RX ADMIN — HYDROMORPHONE HYDROCHLORIDE 0.25 MG: 1 INJECTION, SOLUTION INTRAMUSCULAR; INTRAVENOUS; SUBCUTANEOUS at 18:44

## 2023-01-25 RX ADMIN — TOPIRAMATE 200 MG: 100 TABLET, FILM COATED ORAL at 21:48

## 2023-01-25 RX ADMIN — DIPHENHYDRAMINE HYDROCHLORIDE 25 MG: 50 INJECTION, SOLUTION INTRAMUSCULAR; INTRAVENOUS at 16:00

## 2023-01-25 RX ADMIN — SODIUM CHLORIDE: 9 INJECTION, SOLUTION INTRAVENOUS at 10:46

## 2023-01-25 RX ADMIN — HYDROMORPHONE HYDROCHLORIDE 0.5 MG: 1 INJECTION, SOLUTION INTRAMUSCULAR; INTRAVENOUS; SUBCUTANEOUS at 18:29

## 2023-01-25 ASSESSMENT — PAIN DESCRIPTION - ONSET: ONSET: ON-GOING

## 2023-01-25 ASSESSMENT — PAIN DESCRIPTION - DESCRIPTORS
DESCRIPTORS: ACHING;DISCOMFORT
DESCRIPTORS: DISCOMFORT;CRAMPING;ACHING
DESCRIPTORS: ACHING;DISCOMFORT;JABBING
DESCRIPTORS: BURNING;THROBBING
DESCRIPTORS: ACHING;SHARP;SORE;DISCOMFORT
DESCRIPTORS: DISCOMFORT;CRAMPING
DESCRIPTORS: ACHING;DISCOMFORT;GNAWING

## 2023-01-25 ASSESSMENT — PAIN DESCRIPTION - DIRECTION: RADIATING_TOWARDS: BACK

## 2023-01-25 ASSESSMENT — PAIN DESCRIPTION - FREQUENCY
FREQUENCY: CONTINUOUS
FREQUENCY: CONTINUOUS

## 2023-01-25 ASSESSMENT — PAIN DESCRIPTION - LOCATION
LOCATION: LEG
LOCATION: KNEE
LOCATION: KNEE
LOCATION: LEG
LOCATION: KNEE;LEG;HIP
LOCATION: KNEE
LOCATION: LEG

## 2023-01-25 ASSESSMENT — PAIN SCALES - GENERAL
PAINLEVEL_OUTOF10: 10
PAINLEVEL_OUTOF10: 7
PAINLEVEL_OUTOF10: 10
PAINLEVEL_OUTOF10: 10
PAINLEVEL_OUTOF10: 0
PAINLEVEL_OUTOF10: 10
PAINLEVEL_OUTOF10: 9
PAINLEVEL_OUTOF10: 6

## 2023-01-25 ASSESSMENT — PAIN DESCRIPTION - PAIN TYPE
TYPE: SURGICAL PAIN;ACUTE PAIN
TYPE: ACUTE PAIN

## 2023-01-25 ASSESSMENT — PAIN DESCRIPTION - ORIENTATION
ORIENTATION: RIGHT

## 2023-01-25 ASSESSMENT — LIFESTYLE VARIABLES: SMOKING_STATUS: 1

## 2023-01-25 ASSESSMENT — PAIN - FUNCTIONAL ASSESSMENT
PAIN_FUNCTIONAL_ASSESSMENT: PREVENTS OR INTERFERES SOME ACTIVE ACTIVITIES AND ADLS
PAIN_FUNCTIONAL_ASSESSMENT: ACTIVITIES ARE NOT PREVENTED

## 2023-01-25 NOTE — ACP (ADVANCE CARE PLANNING)
Advance Care Planning   Healthcare Decision Maker:    Primary Decision Maker: Tavo Yang - Brother/Sister - [phone]    Click here to complete Healthcare Decision Makers including selection of the Healthcare Decision Maker Relationship (ie \"Primary\").

## 2023-01-25 NOTE — BRIEF OP NOTE
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, initial encounter ([facility] [address] Kathryn Everett    Post-Op Diagnosis: Same       Procedure(s):  RIGHT KNEE INCISION AND DRAINAGE, REMOVAL OF EXISTING HARDWARE, ANTIBIOTIC SPACER INSTALLED    Surgeon(s):  Alejandra Rodriguez DO    Assistant:  Resident: Doris Florez DO; Eitan Brito DO; Fernando Calzada DO    Anesthesia: General    Estimated Blood Loss (mL): less than 609    Complications: None    Specimens:   ID Type Source Tests Collected by Time Destination   1 : RIGHT KNEE  Joint/Joint Fluid Joint Fluid CULTURE, ANAEROBIC, CULTURE, FUNGUS, GRAM STAIN, CULTURE, SURGICAL, CULTURE WITH SMEAR, ACID FAST [address], [address] 1/25/2023 1716    2 : RIGHT KNEE Joint/Joint Fluid Joint Fluid CULTURE, ANAEROBIC, CULTURE, FUNGUS, GRAM STAIN, CULTURE, SURGICAL, CULTURE WITH SMEAR, ACID FAST [address], DO 1/25/2023 1719    3 : RIGHT KNEE Joint/Joint Fluid Joint Fluid CULTURE, ANAEROBIC, CULTURE, FUNGUS, GRAM STAIN, CULTURE, SURGICAL, CULTURE WITH SMEAR, ACID FAST [address], [address] 1/25/2023 1720    4 : RIGHT HIP Joint/Joint Fluid Joint Fluid CULTURE, ANAEROBIC, CULTURE, FUNGUS, GRAM STAIN, CULTURE, SURGICAL, CULTURE WITH SMEAR, ACID FAST [address], [address] 1/25/2023 1722        Implants:  Implant Name Type Inv.  Item Serial No.  Lot No. LRB No. Used Action   CEMENT BNE 20ML 41GM FULL DOSE PMMA W/ Marquis Rear VISC RADPQ - [id number]  CEMENT BNE 20ML 41GM FULL DOSE PMMA W/ TOBRA M VISC RADPQ  [employer] [facility]- [id number] Right [id number] Implanted   IMPL KNEE FEM COMP STEMMED REMEDY LG - [id number]  IMPL KNEE FEM COMP STEMMED REMEDY LG  [employer] [id number] Right 1 Implanted   IMPL KNEE STEM EXT COMP REMEDY 100MM - [id number]  IMPL KNEE STEM EXT COMP REMEDY 100MM  [employer] [id number] Right 1 Implanted   COMPONENT TIB L 80MM KNEE SPCR ANTIBIO TREAT REMEDY - [id number]  COMPONENT TIB L 80MM KNEE SPCR ANTIBIO TREAT REMEDY  [employer] [id number] Right 1 Implanted         Drains: * No LDAs found *    Findings: see op note    Electronically signed by Polo Hastings DO on 1/25/2023 at 5:50 PM

## 2023-01-25 NOTE — CONSULTS
Department of Internal Medicine  Infectious Diseases   Consult Note      Reason for Consult:  MRSA bacteremia, sepsis       Requesting Physician: Dr Escobar Reap:      Pt is known to me . This is a 58 yrs old female with hx of moderate  obesity, asthma, chronic low back pain s/p bilateral knee replacement ( 20 yrs ago at [facility] ) , right ankle ORIF ( 3/2022)l. She developed MRSA bacteremia , right TKA infection ( 8/2022) - I &D  on 8/18/2022 . She was treated with IV vancomycin and rifampin and placed on oral doxycycline suppressive treatment . This time, she presented to the ER with right knee pain for about 4 days . She denied fever or chills .     WBC was 11.6K   Sed rate 74  CRP 28.4  Vancomycin and cefepime started   Blood cx growing MRSA   Synovial fluid - MRSA     Pt underwent I & D  and explantation of the hardware ( 1/25/23)               Past Medical History:      Past Medical History:   Diagnosis Date    Adenoma of right adrenal gland     Anemia     Anxiety     Arthritis     spinal    Asthma     controlled with inhalers     Benign essential tremor     Bipolar affective (HCC)     Chronic back pain     Spinal cord stimulator in place    Chronic respiratory failure with hypoxia (HCC)     at times dt diaphragm does not function properly dt Mitochondrial disorder    Depression     stable    Diabetes mellitus ([address]     Difficulty swallowing     for EGD 10-8-19     Environmental and seasonal allergies     Excessive physiologic tremor 5/24/2021    Fatty liver     Full dentures     GERD (gastroesophageal reflux disease)     Gout     past hx of    Herniated cervical disc     limited rom of head and neck    History of swelling of feet     Hypertension     Lymphedema of lower extremity 09/06/2012    Mitochondrial cytopathy ([address]     s/s muscle and nerve pain, difficulty breathing, seizures, difficulty swallowing, digestive disorders- Dr. Jon [facility]    Muscle weakness (generalized)     Information obtained from [facility]    Need for assistance with personal care     Information obtained from [facility]    Neuropathy     at feet    Obesity     PETR (obstructive sleep apnea)     no CPAP dt insurance will not pay for    Osteoarthritis of left knee     Information obtained from [facility]    PONV (postoperative nausea and vomiting)     Seizures ([facility]     last approx 6-13-19- f/u w/ PCP  Granmal, flares up in heat/ summer time - no recent issues as of 10-4-19     Spinal headache     Thyroid disease        Past Surgical History:      Past Surgical History:   Procedure Laterality Date    ANKLE FRACTURE SURGERY Right 2/14/2022    RIGHT ANKLE IRRIGAITON AND DEBRIDEMENT WITH EXTERNAL FIXATOR AND LACERATION REPAIR performed by Mary Stafford MD at [address] Right 3/2/2022    RIGHT LOWER EXTREMITY REMOVAL EXTERNAL FIXATOR, RIGHT PILON OPEN REDUCTION INTERNAL FIXATOR--[facility] (FACILITY) performed by Geetha Zhu DO at [place] [address]      with Hysterectomy / unknown    BACK SURGERY  last one 1995    lumbar x 2    CARDIAC CATHETERIZATION Right 6-6-2013    Dr. Blandon Case Radial, no stents placed, no blockages     CHOLECYSTECTOMY  1999    open with gastric bypass    COLONOSCOPY N/A 9/18/2018    COLONOSCOPY POLYPECTOMY HOT BIOPSY performed by CHRISTOPHE Aguilar MD at [address] N/A 10/8/2019    COLONOSCOPY POLYPECTOMY SNARE/COLD BIOPSY performed by Branson Nyhan, MD at [address]    EGD COLONOSCOPY N/A 10/8/2019    EGD ESOPHAGOGASTRODUODENOSCOPY DILATATION performed by Branson Nyhan, MD at [facility], DIAGNOSTIC      ESOPHAGEAL DILATATION  9/18/2018    ESOPHAGEAL DILATION Crystal Wharton performed by Branson Nyhan, MD at [address] [facility] ([address])  Reg King 1998 Bilateral [id number]    knees    KNEE SURGERY Bilateral     scope KNEE SURGERY Right 8/19/2022    RIGHT KNEE INCISION AND DRAINAGE performed by Stefanie Yeboah DO at [address] Left 10 02 2013    lumbar paravertebral facet #2    NERVE BLOCK Left 10 9 13    hip inj #1    NERVE BLOCK Left 10/16/13    hip injection    NERVE BLOCK Left 10/29/2014    left lumbar transforaminal nerve lbock  #1    NERVE BLOCK Left 11/12/2014    lumbar left transforaminal nerve block  #2    NERVE BLOCK Left 12 8 14    lumbar transforaminal #3    NERVE BLOCK Right 3/30/15    cervical transforaminal #1    NERVE BLOCK Right 4/6/2015    right cervical transforaminal nerve block  #2    NERVE BLOCK Right 4/13/15    cervical transforaminal #3    NERVE BLOCK Left 7/6/15    knee injection #1    NERVE BLOCK  07/20/15    left genicular nerve block/knee #2    NERVE BLOCK Left 10 1 15    lumb transforam #1    NERVE BLOCK  10/26/15    left lumbar transforaminal nerve block #3    NERVE BLOCK Bilateral 4/1/2021    #1 BILATERAL SACROILIAC JOINT INJECTION UNDER FLUORO performed by Flavia Portillo MD at [address] [facility] Left 11 25 13    lumbar radiofreq    OTHER SURGICAL HISTORY  3/28/2016    stage 1, 3 day percutanous trial [place] LOOKK lumbar spinal cord stimulator    OTHER SURGICAL HISTORY  1995    racz procedure / lower back    [place] COLONOSCOPY FLX DX W/COLLJ SPEC WHEN PFRMD N/A 3/20/2018    COLONOSCOPY DIAGNOSTIC OR SCREENING performed by Earline Mina MD at [address] [facility] EGD TRANSORAL BIOPSY SINGLE/MULTIPLE N/A 3/20/2018    EGD BIOPSY performed by Earline Mina MD at [address] [facility]         Current Medications:      Current Facility-Administered Medications   Medication Dose Route Frequency Provider Last Rate Last Admin    fentaNYL (SUBLIMAZE) injection 50 mcg  50 mcg IntraVENous Q3H PRN Samer Alexandrea Santiago MD        cefepime (MAXIPIME) 2,000 mg in sodium chloride 0.9 % 50 mL IVPB ([id number])  2,000 mg IntraVENous Q12H Samer Alexandrea Santiago MD   Stopped at 01/25/23 1120    albuterol (PROVENTIL) nebulizer solution 2.5 mg  2.5 mg Nebulization 4x daily Mahendra Denis MD        allopurinol (ZYLOPRIM) tablet 100 mg  100 mg Oral BID Mahendra Denis MD        baclofen (LIORESAL) tablet 20 mg  20 mg Oral 4x daily Mahendra Denis MD        docusate sodium (COLACE) capsule 100 mg  100 mg Oral BID Samer Omer Ardon MD        escitalopram (LEXAPRO) tablet 30 mg  30 mg Oral Daily Samer Omer Ardon MD        gabapentin (NEURONTIN) capsule 600 mg  600 mg Oral 4x Daily Samer Omer Ardon MD        metoprolol succinate (TOPROL XL) extended release tablet 12.5 mg  12.5 mg Oral Daily Samer Omer Ardon MD        pantoprazole (PROTONIX) tablet 40 mg  40 mg Oral QAM AC Samer Omer Ardon MD        promethazine (PHENERGAN) tablet 25 mg  25 mg Oral Q6H PRN Samer Omer Ardon MD        topiramate (TOPAMAX) tablet 200 mg  200 mg Oral BID Samer Omer Ardon MD        traZODone (DESYREL) tablet 100 mg  100 mg Oral Nightly Samer Omer Ardon MD        ziprasidone (GEODON) capsule 20 mg  20 mg Oral Nightly Samer Omer Ardon MD        ziprasidone (GEODON) capsule 40 mg  40 mg Oral QPM Mahendra Denis MD        sodium chloride flush 0.9 % injection 10 mL  10 mL IntraVENous 2 times per day Samer Omer Ardon MD   10 mL at 01/25/23 1051    sodium chloride flush 0.9 % injection 10 mL  10 mL IntraVENous PRN Mahendra Denis MD        0.9 % sodium chloride infusion   IntraVENous PRN Mahendra Denis MD        ondansetron (ZOFRAN-ODT) disintegrating tablet 4 mg  4 mg Oral Q8H PRN Samer Omer Ardon MD        Or    ondansetron (ZOFRAN) injection 4 mg  4 mg IntraVENous Q6H PRN Samer Omer Ardon MD        magnesium hydroxide (MILK OF MAGNESIA) 400 MG/5ML suspension 30 mL  30 mL Oral Daily PRN Samer Omer Ardon MD        acetaminophen (TYLENOL) tablet 650 mg  650 mg Oral Q6H PRN Samer Omer Ardon MD        Or    acetaminophen (TYLENOL) suppository 650 mg  650 mg Rectal Q6H PRN Samer Omer Ardon MD        0.9 % sodium chloride infusion   IntraVENous Continuous Samer Deann Robin MD 50 mL/hr at 01/25/23 1046 New Bag at 01/25/23 1046    ceFAZolin (ANCEF) 2,000 mg in sterile water 20 mL IV syringe  2,000 mg IntraVENous On Call to [address] [place] [address],         vancomycin (VANCOCIN) 1,250 mg in sodium chloride 0.9 % 250 mL IVPB  1,250 mg IntraVENous Q12H Melvi Robin MD        midazolam PF (VERSED) injection 2 mg  2 mg IntraVENous Once Bear Ramirez MD        oxyCODONE (ROXICODONE) immediate release tablet 5 mg  5 mg Oral Q4H PRN Samer Deann Robin MD   5 mg at 01/24/23 1455     Facility-Administered Medications Ordered in Other Encounters   Medication Dose Route Frequency Provider Last Rate Last Admin    fentaNYL (SUBLIMAZE) injection   IntraVENous PRN Melinda Trejo, RN   100 mcg at 01/25/23 1416    lidocaine (cardiac) (XYLOCAINE) injection   IntraVENous PRN Melinda Trejo, RN   100 mg at 01/25/23 1416    rocuronium (ZEMURON) injection   IntraVENous PRN Melinda Hands, RN   20 mg at 01/25/23 1512    0.9 % sodium chloride infusion   IntraVENous Continuous PRN Melinda Trejo RN   New Bag at 01/25/23 1412    propofol injection   IntraVENous PRN Melinda Trejo, RN   170 mg at 01/25/23 1418    dexamethasone (DECADRON) injection   IntraVENous PRN Melinda Hands, RN   10 mg at 01/25/23 1434    ondansetron (ZOFRAN) injection   IntraVENous PRN Melinda Trejo, RN   4 mg at 01/25/23 1434    ceFAZolin (ANCEF) injection   IntraVENous PRN Melinda Hands, RN   2 g at 01/25/23 1450    0.9 % sodium chloride infusion   IntraVENous Continuous PRN Melinda Trejo, RN   New Bag at 01/25/23 1422       Allergies:  Bee pollen, Penicillins, Ropinirole, Ropinirole hcl, Vistaril [hydroxyzine hcl], Aripiprazole, Prednisone, Restoril [temazepam], Wax [beeswax], Hydroxyzine pamoate, and Tape Kelly Stapler tape]    Social History:      Social History     Socioeconomic History    Marital status:      Spouse name: Not on file    Number of children: Not on file    Years of education: Not on file    Highest education level: Not on file   Occupational History    Not on file   Tobacco Use    Smoking status: Former     Packs/day: 0.75     Years: 42.00     Pack years: 31.50     Types: Cigarettes     Start date: 1990     Quit date: 2021     Years since quittin.0    Smokeless tobacco: Never   Vaping Use    Vaping Use: Never used   Substance and Sexual Activity    Alcohol use: No     Alcohol/week: 0.0 standard drinks    Drug use: Not Currently     Types: Marijuana Germán Padgett)     Comment: edibles- through pain doctor    Sexual activity: Not on file   Other Topics Concern    Not on file   Social History Narrative    ** Merged History Encounter **          Social Determinants of Health     Financial Resource Strain: Not on file   Food Insecurity: Not on file   Transportation Needs: Not on file   Physical Activity: Not on file   Stress: Not on file   Social Connections: Not on file   Intimate Partner Violence: Not on file   Housing Stability: Not on file         Family History:     Family History   Problem Relation Age of Onset    Heart Disease Mother     Cancer Father     Arthritis Other     Cancer Other     Depression Other     Heart Disease Other     Hypertension Other     Mental Illness Other     Stroke Other        REVIEW OF SYSTEMS:    CONSTITUTIONAL:  Denies fever, chill or rigors. HEENT: denies blurring of vision or double vision, denies hearing problem  RESPIRATORY: denies cough, shortness of breath, sputum expectoration. CARDIOVASCULAR:  Denies palpitation or chest pain   GASTROINTESTINAL:  Denies abdomen pain, diarrhea or constipation,, nausea or vomiting. GENITOURINARY:  Denies burning urination or frequency of urination  INTEGUMENT: denies wound , rash  HEMATOLOGIC/LYMPHATIC:  Denies lymph node swelling, gum bleeding or easy bruising.   MUSCULOSKELETAL:  Right knee pain   NEUROLOGICAL:  Denies light headed, dizziness, loss of consciousness, weakness of lower extremities, bowel or bladder incontinence. PHYSICAL EXAM:      Vitals:       BP (!) 102/52   Pulse 83   Temp 97.5 °F (36.4 °C) (Temporal)   Resp 18   Ht 5' 7\" (1.702 m)   Wt 237 lb 12.8 oz (107.9 kg)   LMP 08/31/1988   SpO2 94%   BMI 37.24 kg/m²     General Appearance:    Awake, alert , no acute distress. Head:    Normocephalic, atraumatic   Eyes:    No pallor, no icterus,   Ears:    No obvious deformity or drainage.    Nose:   No nasal drainage   Throat:   Mucosa moist, no oral thrush   Neck:   Supple, no lymphadenopathy   Back:     no CVA tenderness   Lungs:     Clear to auscultation bilaterally, no wheeze    Heart:    Regular rate and rhythm, no murmur   Abdomen:     Soft, non-tender, bowel sounds present    Extremities:    Right knee wrapped    Pulses:   Dorsalis pedis palpable    Skin:   no rashes or lesions       CBC with Differential:      Lab Results   Component Value Date/Time    WBC 8.4 01/24/2023 06:56 PM    RBC 3.70 01/24/2023 06:56 PM    HGB 12.0 01/24/2023 06:56 PM    HCT 36.0 01/24/2023 06:56 PM     01/24/2023 06:56 PM    MCV 97.3 01/24/2023 06:56 PM    MCH 32.4 01/24/2023 06:56 PM    MCHC 33.3 01/24/2023 06:56 PM    RDW 13.8 01/24/2023 06:56 PM    SEGSPCT 64 12/27/2013 03:00 PM    LYMPHOPCT 8.0 01/24/2023 05:26 AM    MONOPCT 10.0 01/24/2023 05:26 AM    BASOPCT 0.3 01/24/2023 05:26 AM    MONOSABS 1.16 01/24/2023 05:26 AM    LYMPHSABS 0.93 01/24/2023 05:26 AM    EOSABS 0.04 01/24/2023 05:26 AM    BASOSABS 0.03 01/24/2023 05:26 AM       CMP     Lab Results   Component Value Date/Time     01/24/2023 05:26 AM    K 4.1 01/24/2023 05:26 AM    K 3.9 05/06/2022 05:28 PM     01/24/2023 05:26 AM    CO2 23 01/24/2023 05:26 AM    BUN 20 01/24/2023 05:26 AM    CREATININE 0.7 01/24/2023 05:26 AM    GFRAA >60 08/23/2022 07:14 AM    LABGLOM >60 01/24/2023 05:26 AM    GLUCOSE 125 01/24/2023 05:26 AM    GLUCOSE 93 05/24/2012 11:11 AM    PROT 6.9 01/24/2023 05:26 AM    LABALBU 3.6 01/24/2023 05:26 AM    LABALBU 4.7 05/21/2012 01:48 PM    CALCIUM 8.6 01/24/2023 05:26 AM    BILITOT 0.3 01/24/2023 05:26 AM    ALKPHOS 102 01/24/2023 05:26 AM    AST 19 01/24/2023 05:26 AM    ALT 14 01/24/2023 05:26 AM         Hepatic Function Panel:    Lab Results   Component Value Date/Time    ALKPHOS 102 01/24/2023 05:26 AM    ALT 14 01/24/2023 05:26 AM    AST 19 01/24/2023 05:26 AM    PROT 6.9 01/24/2023 05:26 AM    BILITOT 0.3 01/24/2023 05:26 AM    BILIDIR <0.2 03/16/2021 12:16 PM    IBILI see below 03/16/2021 12:16 PM    LABALBU 3.6 01/24/2023 05:26 AM    LABALBU 4.7 05/21/2012 01:48 PM       PT/INR:    Lab Results   Component Value Date/Time    PROTIME 13.1 08/19/2022 08:25 AM    PROTIME 10.8 05/07/2012 03:45 AM    INR 1.2 08/19/2022 08:25 AM       TSH:    Lab Results   Component Value Date/Time    TSH 0.548 03/16/2021 12:16 PM       U/A:    Lab Results   Component Value Date/Time    COLORU Yellow 01/24/2023 12:29 PM    PHUR 6.0 01/24/2023 12:29 PM    LABCAST MODERATE 05/06/2012 04:00 AM    WBCUA NONE 01/24/2023 12:29 PM    WBCUA 1-3 05/06/2012 04:00 AM    RBCUA NONE 01/24/2023 12:29 PM    RBCUA 0-1 12/13/2013 10:15 AM    BACTERIA NONE SEEN 01/24/2023 12:29 PM    CLARITYU Clear 01/24/2023 12:29 PM    SPECGRAV 1.025 01/24/2023 12:29 PM    LEUKOCYTESUR Negative 01/24/2023 12:29 PM    UROBILINOGEN 1.0 01/24/2023 12:29 PM    BILIRUBINUR Negative 01/24/2023 12:29 PM    BILIRUBINUR NEGATIVE 05/06/2012 04:00 AM    BLOODU Negative 01/24/2023 12:29 PM    GLUCOSEU Negative 01/24/2023 12:29 PM    GLUCOSEU NEGATIVE 05/06/2012 04:00 AM       ABG:    Lab Results   Component Value Date/Time    P5WQWWHP 94.0 05/06/2012 10:00 AM       MICROBIOLOGY:    Blood culture -       Staphylococcus aureus by PCR DETECTED Panic      Staphylococcus epidermidis by PCR Not Detected    Staphylococcus lugdunensis by PCR Not Detected    Staphylococcus species by PCR DETECTED Panic      Serratia marcescens by PCR Not Detected     Streptococcus pneumoniae by PCR Not Detected    Streptococcus pyogenes  by PCR Not Detected    Streptococcus species by PCR Not Detected    Stenotrophomonas maltophilia by PCR Not Detected    Candida albicans by PCR Not Detected    Candida auris by PCR Not Detected    Candida glabrata by PCR Not Detected    Candida krusei by PCR Not Detected    Candida parapsilosis by PCR Not Detected    Candida tropicalis by PCR Not Detected    Cryptococcus neoformans/gattii by PCR Not Detected    Methicillin Resistance mecA/C and MREJ by PCR DETECTED Panic         Specimen: Synovial Fluid Updated: 01/25/23 0713      Gram Stain Orderable --    Gram stain performed on unspun fluid   Moderate Polymorphonuclear leukocytes   Epithelial cells not seen   Rare Gram positive cocci in pairs Intracellular and Extracellular    Narrative:         Synovial fluid -    Susceptibility    Staphylococcus aureus (1)    Antibiotic Interpretation Microscan  Method Status    clindamycin Resistant >=^4 mcg/mL BACTERIAL SUSCEPTIBILITY PANEL BY NAVARRO     DAPTOmycin Sensitive ^0.5 mcg/mL BACTERIAL SUSCEPTIBILITY PANEL BY NAVARRO     doxycycline Sensitive <=^0.5 mcg/mL BACTERIAL SUSCEPTIBILITY PANEL BY NAVARRO     erythromycin Resistant >=^8 mcg/mL BACTERIAL SUSCEPTIBILITY PANEL BY NAVARRO     gentamicin Sensitive <=^0.5 mcg/mL BACTERIAL SUSCEPTIBILITY PANEL BY NAVARRO     oxacillin Resistant >=^4 mcg/mL BACTERIAL SUSCEPTIBILITY PANEL BY NAVARRO     tigecycline Sensitive <=^0.12 mcg/mL BACTERIAL SUSCEPTIBILITY PANEL BY NAVARRO     trimethoprim-sulfamethoxazole Sensitive <=^10 mcg/mL BACTERIAL SUSCEPTIBILITY PANEL BY NAVARRO     vancomycin Sensitive ^1 mcg/mL BACTERIAL SUSCEPTIBILITY PANEL BY NAVARRO        Narrative  Performed by: [address] [place] [address] Lab  Source: [address] [place] [address]       Site: Body Fluid&Body Fluid                Specimen Collected: 01/24/23 13:46 EST Last Resulted: 01/26/23 08:17 EST        Radiology :    CT scan right femur -  1.  Stable alignment of healing, internally fixated distal femur fracture. 2. Stable alignment of right knee arthroplasty. 3. Moderate synovial effusion. 4. Foci of subcutaneous gas along medial margin of the knee related to recent   procedure.          IMPRESSION:     Right TKA infection ( Recurrent ) - s/p explantation of the hardware ( 1/25)   Recurrent MRSA bacteremia   GNR bacteriuria        RECOMMENDATIONS:       Vancomycin 1250 mg IV q 12 hrs  Stop cefepime   Await cx,follow up blood cx, TTE    Thank you Dr Kenton Aguila for the consult

## 2023-01-25 NOTE — PROGRESS NOTES
Pharmacy Consultation Note  (Antibiotic Dosing and Monitoring)    Initial consult date: 1/25/23  Consulting physician/provider: Lawrence Bagley  Drug: Vancomycin  Indication: bone and joint    Age/  Gender Height Weight IBW  Allergy Information   62 y.o./female 5' 7\" (170.2 cm) 218 lb (98.9 kg)     Ideal body weight: 61.6 kg (135 lb 12.9 oz)  Adjusted ideal body weight: 76.5 kg (168 lb 10.9 oz)   Bee pollen, Penicillins, Ropinirole, Ropinirole hcl, Vistaril [hydroxyzine hcl], Aripiprazole, Prednisone, Restoril [temazepam], Wax [beeswax], Hydroxyzine pamoate, and Tape [adhesive tape]      Renal Function:  Recent Labs     01/24/23  0526   BUN 20   CREATININE 0.7     No intake or output data in the 24 hours ending 01/25/23 1037    Vancomycin Monitoring:  Trough:  No results for input(s): VANCOTROUGH in the last 72 hours. Random:  No results for input(s): VANCORANDOM in the last 72 hours. Recent Labs     01/24/23  1014   BLOODCULT2 Gram stain performed from blood culture bottle media  Gram positive cocci in clusters  *        Historical Cultures:  Organism   Date Value Ref Range Status   01/24/2023 Staphylococcus aureus (A)  Preliminary     Recent Labs     01/24/23  0540   BC 24 Hours no growth       Vancomycin Administration Times:  Recent vancomycin administrations                     vancomycin (VANCOCIN) 2,000 mg in dextrose 5 % 500 mL IVPB (mg) 2,000 mg New Bag 01/24/23 1352                    Assessment:  Patient is a 58 y.o. female who has been initiated on vancomycin  Estimated Creatinine Clearance: 101 mL/min (based on SCr of 0.7 mg/dL). To dose vancomycin, pharmacy will be utilizing [employer] calculation software for goal AUC/NAVARRO 400-600 mg/L-hr    Plan:  Vancomycin 1250 mg q 12h (predicted AUC/NAVARRO = 515, Tr = 16.3).    Will check vancomycin levels when appropriate  Will continue to monitor renal function   Pharmacy to follow      KIRIT Rosario [facility] 1/25/2023 10:37 AM

## 2023-01-25 NOTE — PROGRESS NOTES
Occupational Therapy  OCCUPATIONAL THERAPY INITIAL EVALUATION      [address] [address] [address]       Date:2023                                                  Patient Name: Candace Caba  MRN: [id number]  : 1960  Room: [address] [place] [address]    Evaluating OT: Alysia Henderson OTR/L #[id number]    Referring Provider[de-identified]  Arpit Cloud DO   Specific Provider Orders/Date: OT evaluation and treatment 23    Diagnosis:  Septic joint ([address] [M00.9]  Knee pain [M25.569]      Pertinent Medical History:  has a past medical history of Adenoma of right adrenal gland, Anemia, Anxiety, Arthritis, Asthma, Benign essential tremor, Bipolar affective ([address] Chronic back pain, Chronic respiratory failure with hypoxia ([address] Depression, Diabetes mellitus ([address] Difficulty swallowing, Environmental and seasonal allergies, Excessive physiologic tremor, Fatty liver, Full dentures, GERD (gastroesophageal reflux disease), Gout, Herniated cervical disc, History of swelling of feet, Hypertension, Lymphedema of lower extremity, Mitochondrial cytopathy ([address] Muscle weakness (generalized), Need for assistance with personal care, Neuropathy, Obesity, PETR (obstructive sleep apnea), Osteoarthritis of left knee, PONV (postoperative nausea and vomiting), Seizures ([address] Spinal headache, and Thyroid disease.        Precautions:  Fall Risk, WBAT RLE,     Assessment of current deficits   [x] Functional mobility   [x]ADLs  [x] Strength               []Cognition   [x] Functional transfers   [] IADLs         [x] Safety Awareness   [x]Endurance   [] Fine Coordination              [x] Balance      [] Vision/perception   []Sensation    []Gross Motor Coordination  [] ROM  [] Delirium                   [] Motor Control     OT PLAN OF CARE   OT POC based on physician orders, patient diagnosis and results of clinical assessment    Frequency/Duration  2-5 days/wk for 2 weeks PRN Specific OT Treatment to include:   * Instruction/training on adapted ADL techniques and AE recommendations to increase functional independence within precautions       * Training on energy conservation strategies, correct breathing pattern and techniques to improve independence/tolerance for self-care routine  * Functional transfer/mobility training/DME recommendations for increased independence, safety, and fall prevention  * Patient/Family education to increase follow through with safety techniques and functional independence  * Therapeutic exercise to improve motor endurance, ROM, and functional strength for ADLs/functional transfers  * Therapeutic activities to facilitate/challenge dynamic balance, stand tolerance for increased safety and independence with ADLs  * Therapeutic activities to facilitate gross/fine motor skills for increased independence with ADLs  * Positioning to improve skin integrity, interaction with environment and functional independence      Recommended Adaptive Equipment:  TBD     Home Living:  Pt lives alone in a 1 story with 0 step(s) to enter and 0 rail(s); bed/bath on 1st   Bathroom setup: tub shower combo with bath bench   Equipment owned: electric w/c, bath bench, ww    Prior Level of Function:  Modified [place]  with ADLs except for bath. Pt has a caregiver 5x/week x 5 hours/day ,    Modified [place] with IADLs.  Ambulated with ww, or uses electric w/c    Driving: no  Occupation/leisure: not stated    Pain Level: 3-4 LE    Cognition: A&O: 4/4;   Follows multi step directions: fair    Memory:  good    Sequencing:  fair    Problem solving:  fair    Judgement/safety:  fair     Functional Assessment:   AM-PAC Daily Activity Raw Score: 16/24     Initial Eval Status  Date: 1/25/23 Treatment Status  Date: STG=LTG  Time frame: 10-14 days   Feeding Independent   Independent    Grooming Minimal Assist   Modified [place]    UB Dressing Minimal Assist   Gown at bed level Modified [place]    LB Dressing Maximal Assist   Modified [place]    Bathing Moderate Assist LE  SBA UE  Supervision    Toileting Maximal Assist   Pure wick,  Pt attempted posterior hygiene after use of bed pan  Minimal Assist    Bed Mobility  Supine to sit: NT   Sit to supine: NT   Supine to sit: Supervision   Sit to supine: Supervision    Functional Transfers Sit to stand: NT   Stand to sit: NT   Stand pivot: NT   Supervision    Functional Mobility NT      TBD, pt scheduled for surgery today   Balance Sitting: indep at bed level      Standing: NT   Sitting: Independent       Standing: Supervision    Activity Tolerance fair  good   Visual/  Perceptual Glasses: yes  Perception: NT          BUE  ROM/Strength/  Fine motor Coordination Hand dominance: R    RUE: ROM WFL     Strength: grossly 4/5      Strength:  WFL     Coordination:   WFL    LUE: ROM  WFL     Strength: grossly 4/5      Strength:  WFL     Coordination: WFL       Hearing: WFL   Sensation:   c/o numbness or tingling RLE , neuropathy B hands and feet  Tone:  WFL   Edema: BLE noted    Comments:   RN cleared patient for OT and requesting assist with pt self care prior to surgery. OT evaluation completed at bed level pending surgery. [place] Upon arrival, patient was on bed pan and bedding was saturated with urine, pure wick in place. .  Therapist facilitated and instructed pt on adapted  techniques & compensatory strategies to improve safety and independence with basic ADLs, bed mobility, to allow pt to achieve highest level of independence and safely. Patient presents with   decreased  ADLs,  bed mobility, She will likely need a re- evaluation after surgery. At end of session, patient in bed with call light and phone within reach, all lines and tubes intact. Pt would benefit from continued skilled OT to increase safety and independence with completion of ADL tasks and functional mobility for improved quality of life.        Treatment: OT treatment provided this date includes: ADL-  Instruction/training on safety and adapted techniques for completion of bathing, dressing, toileting at bed level. Mobility-  Instruction/training on safety and improved independence with bed mobility  Therapist facilitated and provided cues for body alignment and hand/feet placement. Skilled positioning/alignment-  Proper Positioning/Alignment in bed       Rehab Potential: Good  for established goals     Patient / Family Goal:  Not stated    Patient and/or family were instructed on functional diagnosis, prognosis/goals and OT plan of care. Demonstrated good understanding. Eval Complexity: Low    Time In: 9:30  Time Out: 9:55  Total Treatment Time: 10 minutes    Min Units   OT Eval Low 31494  x     OT Eval Medium 76141      OT Eval High 33200       OT Re-Eval [id number]       Therapeutic Ex 82999       Therapeutic Activities 26058      ADL/Self Care 75001  10 1   Orthotic Management 49011       Neuro Re-Ed 82196       Non-Billable Time          Evaluation Time includes thorough review of current medical information, gathering information on past medical history/social history and prior level of function, completion of standardized testing/informal observation of tasks, assessment of data and education on plan of care and goals.             [place], [place] #[id number]

## 2023-01-25 NOTE — CARE COORDINATION
Case Management Assessment  Initial Evaluation    Date/Time of Evaluation: 1/25/2023 2:35 PM  Assessment Completed by: Roxana Romero RN    If patient is discharged prior to next notation, then this note serves as note for discharge by case management. Patient Name: Abida Diehl                   YOB: 1960  Diagnosis: Septic joint [facility]) [M00.9]  Knee pain [M25.569]                   Date / Time: 1/24/2023  5:19 AM    Patient Admission Status: Inpatient   Readmission Risk (Low < 19, Mod (19-27), High > 27): Readmission Risk Score: 19.9    Current PCP: Anabelle Walls, DO  PCP verified by CM? (P) Yes (Dr Meet Tobin)    Chart Reviewed: Yes      History Provided by: Patient  Patient Orientation: Alert and Oriented    Patient Cognition:      Hospitalization in the last 30 days (Readmission):  No    If yes, Readmission Assessment in CM Navigator will be completed.     Advance Directives:      Code Status: Full Code   Patient's Primary Decision Maker is: (P) Legal Next of Kin    Primary Decision Maker: Tavo Yang - Brother/Sister - [phone]    Discharge Planning:    Patient lives with: (P) Alone Type of Home: (P) Independent Living  Primary Care Giver: Self  Patient Support Systems include: Friends/Neighbors   Current Financial resources:    Current community resources:    Current services prior to admission: (P) [facility] Duty Homecare            Current DME:              Type of Home Care services:  (P) Aide Services, OT, PT, IV Therapy    ADLS  Prior functional level: (P) Assistance with the following:, Bathing, Cooking, Housework, Shopping  Current functional level: (P) Assistance with the following:, Mobility, Cooking, Housework, Shopping, Toileting, Dressing, Bathing    PT AM-PAC:   /24  OT AM-PAC: 16 /24    Family can provide assistance at DC: (P) Other (comment) (lives alone, in senior apt, [facility]  Would you like Case Management to discuss the discharge plan with any other family members/significant others, and if so, who? (P) No  Plans to Return to Present Housing: (P) Yes  Other Identified Issues/Barriers to RETURNING to current housing: unclear at this time  Potential Assistance needed at discharge: (P) Home Care            Potential DME:    Patient expects to discharge to: (P) [facility] for transportation at discharge: ambulette     Financial    Payor: MEDICAID [place] / Plan: [place] DEPT [address] / Product Type: *No Product type* /     Does insurance require precert for SNF: no    Potential assistance Purchasing Medications:    [facility]-Beds request:        [id number] [address] [facility]  [address]  Phone: [phone] Fax: [phone]      Notes:    Factors facilitating achievement of predicted outcomes: Caregiver support, Friend support, Cooperative, Pleasant, and Good insight into deficits    Barriers to discharge: Lower extremity weakness    Additional Case Management Notes: Pt lives alone in senior living apartment. Has walker, cane and motorized wheelchair. O2 at home and uses PRN (does not recall name of provider). Has aide from [employer] 5x/wk in home for some ADLs and homemaking assistance for shopping, meal prep, cleaning. Plans to return home. Pt for right knee surgery today. PT/OT to see after OR to eval for additional transition of care needs. The Plan for Transition of Care is related to the following treatment goals of Septic joint ([address] [M00.9]  Knee pain [A88.540]    IF APPLICABLE: The Patient and/or patient representative Lisa Floyd and her family were provided with a choice of provider and agrees with the discharge plan.  Freedom of choice list with basic dialogue that supports the patient's individualized plan of care/goals and shares the quality data associated with the providers was provided to:     Patient Representative Name:       The Patient and/or Patient Representative Agree with the Discharge Plan?       Vineet Lopez RN  Case Management Department  Ph: [phone]

## 2023-01-25 NOTE — PROGRESS NOTES
Physical Therapy  Physical Therapy Missed Visit    Name: Noemy Butterfield  : 1960  MRN: [id number]  Room #: 8267/2663-G    Date of Service: 2023    Attempted PT evaluation. Pt scheduled for orthopedic procedure this date. Will hold eval at this time and attempt again as schedule allows following procedure.     Alvino Ballard, PT, DPT  [id number]

## 2023-01-25 NOTE — ANESTHESIA PRE PROCEDURE
Department of Anesthesiology  Preprocedure Note       Name:  Lennox Dills   Age:  58 y.o.  :  1960                                          MRN:  [id number]         Date:  2023      Surgeon: Dewayne Query):  Reny Vallejo DO    Procedure: Procedure(s):  RIGHT KNEE INCISION AND DRAINAGE POSSIBLE POLY EXCHANGE    Medications prior to admission:   Prior to Admission medications    Medication Sig Start Date End Date Taking? Authorizing Provider   aspirin 325 MG EC tablet Take 325 mg by mouth daily    Historical Provider, MD   calcium citrate (CALCITRATE) 950 (200 Ca) MG tablet Take 1 tablet by mouth 3 times daily    Historical Provider, MD   gabapentin (NEURONTIN) 300 MG capsule Take 600 mg by mouth 4 times daily.     Historical Provider, MD   lipase-protease-amylase (CREON) 83975-663830 units CPEP delayed release capsule Take 3 capsules by mouth 3 times daily (with meals)    Historical Provider, MD   lipase-protease-amylase (CREON) 79180-866678 units CPEP delayed release capsule Take 2 capsules by mouth take with snacks    Historical Provider, MD   traZODone (DESYREL) 50 MG tablet Take 25 mg by mouth in the morning and at bedtime    Historical Provider, MD   magnesium (MAGNESIUM-OXIDE) 250 MG TABS tablet Take 250 mg by mouth in the morning, at noon, and at bedtime    Historical Provider, MD   ferrous sulfate (IRON 325) 325 (65 Fe) MG tablet Take 325 mg by mouth daily (with breakfast)    Historical Provider, MD   Multiple Vitamins-Minerals (THERAPEUTIC MULTIVITAMIN-MINERALS) tablet Take 1 tablet by mouth daily    Historical Provider, MD   docusate sodium (COLACE) 100 MG capsule Take 100 mg by mouth 2 times daily    Historical Provider, MD   pantoprazole (PROTONIX) 40 MG tablet Take 40 mg by mouth daily as needed    Historical Provider, MD   potassium chloride (KLOR-CON M) 20 MEQ extended release tablet Take 20 mEq by mouth daily    Historical Provider, MD   Vitamin E 200 units TABS Take 200 Units by mouth daily    Historical Provider, MD   ziprasidone (GEODON) 20 MG capsule Take 20 mg by mouth at bedtime    Historical Provider, MD   ziprasidone (GEODON) 40 MG capsule Take 40 mg by mouth every evening    Historical Provider, MD   albuterol sulfate HFA (VENTOLIN HFA) 108 (90 Base) MCG/ACT inhaler Inhale 2 puffs into the lungs 4 times daily    Historical Provider, MD   Cholecalciferol (VITAMIN D3) 50 MCG (2000 UT) CAPS Take 1 capsule by mouth 3 times daily 1/9/23   Historical Provider, MD   loratadine (CLARITIN) 10 MG tablet Take 10 mg by mouth daily 12/7/22   Historical Provider, MD   [employer] MAGNESIUM OXIDE 250 MG TABS tablet Take 250 mg by mouth 3 times daily 12/18/22   Historical Provider, MD   traZODone (DESYREL) 100 MG tablet Take 100 mg by mouth nightly 12/18/22   Historical Provider, MD   oxyCODONE-acetaminophen (PERCOCET)  MG per tablet Take 1 tablet by mouth every 6 hours as needed for Pain.     Historical Provider, MD   promethazine (PHENERGAN) 25 MG tablet Take 25 mg by mouth every 6 hours as needed for Nausea    Historical Provider, MD   furosemide (LASIX) 40 MG tablet Take 1 tablet by mouth daily 4/16/22   Roberto Negron MD   baclofen (LIORESAL) 20 MG tablet Take 20 mg by mouth 4 times daily    Historical Provider, MD   metoprolol succinate (TOPROL XL) 25 MG extended release tablet Take 0.5 tablets by mouth daily 7/2/21   Wong Camp MD   traZODone (DESYREL) 50 MG tablet Take 50 mg by mouth nightly    Historical Provider, MD   montelukast (SINGULAIR) 10 MG tablet Take 1 tablet by mouth daily 8/12/19   Carina Irby DO   Cholecalciferol (VITAMIN D3) 50 MCG (2000 UT) TABS Take 2,000 Units by mouth daily    Historical Provider, MD   allopurinol (ZYLOPRIM) 100 MG tablet Take 100 mg by mouth 2 times daily    Historical Provider, MD   escitalopram (LEXAPRO) 20 MG tablet Take 30 mg by mouth daily    Historical Provider, MD   topiramate (TOPAMAX) 200 MG tablet Take 1 tablet by mouth 2 times daily. 5/9/12   Ab Benavides DO       Current medications:    No current facility-administered medications for this visit. No current outpatient medications on file.      Facility-Administered Medications Ordered in Other Visits   Medication Dose Route Frequency Provider Last Rate Last Admin    fentaNYL (SUBLIMAZE) injection 50 mcg  50 mcg IntraVENous Q3H PRN Melvi Alvarado MD        cefepime (MAXIPIME) 2,000 mg in sodium chloride 0.9 % 50 mL IVPB ([id number])  2,000 mg IntraVENous Q12H Samelionel Alvarado MD   Stopped at 01/25/23 1120    albuterol (PROVENTIL) nebulizer solution 2.5 mg  2.5 mg Nebulization 4x daily Magda Sykes MD        allopurinol (ZYLOPRIM) tablet 100 mg  100 mg Oral BID Melvi Alvarado MD        baclofen (LIORESAL) tablet 20 mg  20 mg Oral 4x daily Samelionel Alvarado MD        docusate sodium (COLACE) capsule 100 mg  100 mg Oral BID Melvi Alvarado MD        escitalopram (LEXAPRO) tablet 30 mg  30 mg Oral Daily Samelionel Alvarado MD        gabapentin (NEURONTIN) capsule 600 mg  600 mg Oral 4x Daily Samelionel Alvarado MD        metoprolol succinate (TOPROL XL) extended release tablet 12.5 mg  12.5 mg Oral Daily Melvi Alvarado MD        pantoprazole (PROTONIX) tablet 40 mg  40 mg Oral QAM AC Melvi Alvarado MD        promethazine (PHENERGAN) tablet 25 mg  25 mg Oral Q6H PRN Samelionel Alvarado MD        topiramate (TOPAMAX) tablet 200 mg  200 mg Oral BID Samelionel Alvarado MD        traZODone (DESYREL) tablet 100 mg  100 mg Oral Nightly Samelionel Alvarado MD        ziprasidone (GEODON) capsule 20 mg  20 mg Oral Nightly Samelionel Alvarado MD        ziprasidone (GEODON) capsule 40 mg  40 mg Oral QPM Melvi Alvarado MD        sodium chloride flush 0.9 % injection 10 mL  10 mL IntraVENous 2 times per day Magda Sykes MD   10 mL at 01/25/23 1051    sodium chloride flush 0.9 % injection 10 mL  10 mL IntraVENous PRN Melvi Alvarado MD        0.9 % sodium chloride infusion   IntraVENous PRN Melvi Cason MD       • ondansetron (ZOFRAN-ODT) disintegrating tablet 4 mg  4 mg Oral Q8H PRN Melvi Cason MD        Or   • ondansetron (ZOFRAN) injection 4 mg  4 mg IntraVENous Q6H PRN Melvi Cason MD       • magnesium hydroxide (MILK OF MAGNESIA) 400 MG/5ML suspension 30 mL  30 mL Oral Daily PRN Melvi Cason MD       • acetaminophen (TYLENOL) tablet 650 mg  650 mg Oral Q6H PRN Melvi Cason MD        Or   • acetaminophen (TYLENOL) suppository 650 mg  650 mg Rectal Q6H PRN Melvi Cason MD       • 0.9 % sodium chloride infusion   IntraVENous Continuous Melvi Cason MD 50 mL/hr at 01/25/23 1046 New Bag at 01/25/23 1046   • ceFAZolin (ANCEF) 2,000 mg in sterile water 20 mL IV syringe  2,000 mg IntraVENous On Call to OR Tony Cox DO       • vancomycin (VANCOCIN) 1,250 mg in sodium chloride 0.9 % 250 mL IVPB  1,250 mg IntraVENous Q12H Melvi Cason MD       • oxyCODONE (ROXICODONE) immediate release tablet 5 mg  5 mg Oral Q4H PRN Melvi Cason MD   5 mg at 01/24/23 1455       Allergies:    Allergies   Allergen Reactions   • Bee Pollen Anaphylaxis, Shortness Of Breath and Swelling     And wasp   • Penicillins Anaphylaxis   • Ropinirole Swelling and Anaphylaxis     swelling of throat   • Ropinirole Hcl Anaphylaxis   • Vistaril [Hydroxyzine Hcl] Anaphylaxis   • Aripiprazole Other (See Comments)     muscle spasms and confusion   • Prednisone    • Restoril [Temazepam]    • Wax [Beeswax]    • Hydroxyzine Pamoate Itching and Rash   • Tape [Adhesive Tape] Rash     Paper tape allergy    Fabric tape is OK to use        Problem List:    Patient Active Problem List   Diagnosis Code   • Debility R53.81   • Obesity E66.9   • Bipolar 1 disorder (HCC) F31.9   • Generalized seizure disorder (HCC) G40.309   • Cervicalgia M54.2   • Asthma J45.909   • Hyperlipidemia E78.5   • Hypertension I10   • Arthritis M19.90   • Lymphedema of lower extremity I89.0   • Degenerative Osteoarthritis of both knees  M17.0    Status post total knee replacement, right Z96.651    Cervical spondylolysis M43.02    Degenerative Osteoarthritis thoracic spine M47.814    Thoracic facet syndrome M47.894    Cervical facet syndrome M47.812    Facet syndrome, lumbar M47.816    Neural foraminal stenosis of lumbar spine M48.061    Lumbar radiculopathy M54.16    DDD (degenerative disc disease), cervical M50.30    Neural foraminal stenosis of cervical spine M48.02    Protruded cervical disc M50.20    Cervical radiculopathy M54.12    Postlaminectomy syndrome, lumbar M96.1    Neuropathic pain syndrome (non-herpetic) M79.2    Chronic respiratory failure with hypoxia ([facility]) J96.11    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    Closed fracture of lower end of right radius with routine healing S52.501D    Closed left ankle fracture, initial encounter S82.892A    Closed fracture of right tibial plateau C98.455K    Closed right pilon fracture, initial encounter S82.871A    Acute pancreatitis K85.90    Pancreatic pseudocyst K86.3    Cellulitis of right lower extremity L03.115    Cellulitis L03.90    Septic joint ([facility]) M00.9    Infection of prosthetic right knee joint ([facility]) T84.53XA    Abscess of right thigh L02.415    Knee pain M25.569       Past Medical History:        Diagnosis Date    Adenoma of right adrenal gland     Anemia     Anxiety     Arthritis     spinal    Asthma     controlled with inhalers     Benign essential tremor     Bipolar affective ([facility])     Chronic back pain     Spinal cord stimulator in place    Chronic respiratory failure with hypoxia (HCC)     at times dt diaphragm does not function properly dt Mitochondrial disorder    Depression     stable    Diabetes mellitus [facility]     Difficulty swallowing     for EGD 10-8-19     Environmental and seasonal allergies     Excessive physiologic tremor 5/24/2021    Fatty liver     Full dentures     GERD (gastroesophageal reflux disease)     Gout     past hx of    Herniated cervical disc     limited rom of head and neck    History of swelling of feet     Hypertension     Lymphedema of lower extremity 09/06/2012    Mitochondrial cytopathy (HCC)     s/s muscle and nerve pain, difficulty breathing, seizures, difficulty swallowing, digestive disorders- Dr. Edwina Dakins [facility]    Muscle weakness (generalized)     Information obtained from  [employer] Need for assistance with personal care     Information obtained from [address] [place], [place] [address] [place] Neuropathy     at feet    Obesity     PETR (obstructive sleep apnea)     no CPAP dt insurance will not pay for    Osteoarthritis of left knee     Information obtained from [email or web address] [place] PONV (postoperative nausea and vomiting)     Seizures ([facility]     last approx 6-13-19- f/u w/ PCP  Granmal, flares up in heat/ summer time - no recent issues as of 10-4-19     Spinal headache     Thyroid disease        Past Surgical History:        Procedure Laterality Date    ANKLE FRACTURE SURGERY Right 2/14/2022    RIGHT ANKLE IRRIGAITON AND DEBRIDEMENT WITH EXTERNAL FIXATOR AND LACERATION REPAIR performed by Dayron Gregorio MD at [place] [address] Right 3/2/2022    RIGHT LOWER EXTREMITY REMOVAL EXTERNAL FIXATOR, RIGHT PILON OPEN REDUCTION INTERNAL FIXATOR--SYNTHES (FACILITY) performed by Foreign Jara DO at [facility]      with Hysterectomy / unknown    BACK SURGERY  last one 1995    lumbar x 2    CARDIAC CATHETERIZATION Right 6-6-2013    Dr. Neli Correa, no stents placed, no blockages     CHOLECYSTECTOMY  1999    open with gastric bypass    COLONOSCOPY N/A 9/18/2018    COLONOSCOPY POLYPECTOMY HOT BIOPSY performed by Justine Goldberg MD at [address] [facility] COLONOSCOPY N/A 10/8/2019    COLONOSCOPY POLYPECTOMY SNARE/COLD BIOPSY performed by Justine Goldberg MD at [address] EGD COLONOSCOPY N/A 10/8/2019    EGD ESOPHAGOGASTRODUODENOSCOPY DILATATION performed by CHRISTOPHE Wright MD at [address] [facility], COLON, DIAGNOSTIC      ESOPHAGEAL DILATATION  9/18/2018    ESOPHAGEAL DILATION Marilyn Remedies performed by CHRISTOPHE Wright MD at [address] [facility] (CERVIX STATUS UNKNOWN)  [address] Bilateral 2007,2017    knees    KNEE SURGERY Bilateral     scope    KNEE SURGERY Right 8/19/2022    RIGHT KNEE INCISION AND DRAINAGE performed by Cuong De La Rosa DO at [facility] Left 10 02 2013    lumbar paravertebral facet #2    NERVE BLOCK Left 10 9 13    hip inj #1    NERVE BLOCK Left 10/16/13    hip injection    NERVE BLOCK Left 10/29/2014    left lumbar transforaminal nerve lbock  #1    NERVE BLOCK Left 11/12/2014    lumbar left transforaminal nerve block  #2    NERVE BLOCK Left 12 8 14    lumbar transforaminal #3    NERVE BLOCK Right 3/30/15    cervical transforaminal #1    NERVE BLOCK Right 4/6/2015    right cervical transforaminal nerve block  #2    NERVE BLOCK Right 4/13/15    cervical transforaminal #3    NERVE BLOCK Left 7/6/15    knee injection #1    NERVE BLOCK  07/20/15    left genicular nerve block/knee #2    NERVE BLOCK Left 10 1 15    lumb transforam #1    NERVE BLOCK  10/26/15    left lumbar transforaminal nerve block #3    NERVE BLOCK Bilateral 4/1/2021    #1 BILATERAL SACROILIAC JOINT INJECTION UNDER FLUORO performed by Brinda Everett MD at [facility] OR    OTHER SURGICAL HISTORY Left 11 25 13    lumbar radiofreq    OTHER SURGICAL HISTORY 3/28/2016    stage 1, 3 day percutanous trial [employer] lumbar spinal cord stimulator    OTHER SURGICAL HISTORY      racz procedure / lower back    IA COLONOSCOPY FLX DX W/COLLJ SPEC WHEN PFRMD N/A 3/20/2018    COLONOSCOPY DIAGNOSTIC OR SCREENING performed by Awilda Kauffman MD at [facility] EGD TRANSORAL BIOPSY SINGLE/MULTIPLE N/A 3/20/2018    EGD BIOPSY performed by Awilda Kauffmna MD at [address] History:    Social History     Tobacco Use    Smoking status: Former     Packs/day: 0.75     Years: 42.00     Pack years: 31.50     Types: Cigarettes     Start date: 1990     Quit date: 2021     Years since quittin.0    Smokeless tobacco: Never   Substance Use Topics    Alcohol use: No     Alcohol/week: 0.0 standard drinks                                Counseling given: Not Answered      Vital Signs (Current): There were no vitals filed for this visit.                                            BP Readings from Last 3 Encounters:   23 (!) 142/74   22 106/62   22 116/80       NPO Status:                                                                                 BMI:   Wt Readings from Last 3 Encounters:   23 218 lb (98.9 kg)   22 217 lb (98.4 kg)   22 240 lb 6.4 oz (109 kg)     There is no height or weight on file to calculate BMI.    CBC:   Lab Results   Component Value Date/Time    WBC 8.4 2023 06:56 PM    RBC 3.70 2023 06:56 PM    HGB 12.0 2023 06:56 PM    HCT 36.0 2023 06:56 PM    MCV 97.3 2023 06:56 PM    RDW 13.8 2023 06:56 PM     2023 06:56 PM       CMP:   Lab Results   Component Value Date/Time     2023 05:26 AM    K 4.1 2023 05:26 AM    K 3.9 2022 05:28 PM     2023 05:26 AM    CO2 23 2023 05:26 AM    BUN 20 2023 05:26 AM    CREATININE 0.7 2023 05:26 AM    GFRAA >60 2022 07:14 AM LABGLOM >60 01/24/2023 05:26 AM    GLUCOSE 125 01/24/2023 05:26 AM    GLUCOSE 93 05/24/2012 11:11 AM    PROT 6.9 01/24/2023 05:26 AM    CALCIUM 8.6 01/24/2023 05:26 AM    BILITOT 0.3 01/24/2023 05:26 AM    ALKPHOS 102 01/24/2023 05:26 AM    AST 19 01/24/2023 05:26 AM    ALT 14 01/24/2023 05:26 AM       POC Tests: No results for input(s): POCGLU, POCNA, POCK, POCCL, POCBUN, POCHEMO, POCHCT in the last 72 hours. Coags:   Lab Results   Component Value Date/Time    PROTIME 13.1 08/19/2022 08:25 AM    PROTIME 10.8 05/07/2012 03:45 AM    INR 1.2 08/19/2022 08:25 AM    APTT 29.9 01/24/2023 06:56 PM       HCG (If Applicable): No results found for: PREGTESTUR, PREGSERUM, HCG, HCGQUANT     ABGs:   Lab Results   Component Value Date/Time    Z3RJGTDW 94.0 05/06/2012 10:00 AM        Type & Screen (If Applicable):  No results found for: LABABO, LABRH    Drug/Infectious Status (If Applicable):  Lab Results   Component Value Date/Time    HEPCAB NON REACT 05/21/2013 01:42 PM       COVID-19 Screening (If Applicable):   Lab Results   Component Value Date/Time    COVID19 NOT DETECTED 01/24/2023 06:20 AM    COVID19 Not Detected 02/10/2021 02:29 PM           Anesthesia Evaluation  Patient summary reviewed   history of anesthetic complications: PONV. Airway: Mallampati: III  TM distance: >3 FB   Neck ROM: full  Mouth opening: > = 3 FB   Dental: normal exam         Pulmonary: breath sounds clear to auscultation  (+) sleep apnea: on CPAP,  asthma: current smoker                          ROS comment: CXR 1/24/2023: Minimal ground-glass infiltrate and or atelectasis suggested on the left. The pulmonary vascularity appears mildly congested which may be exacerbated  by semi supine positioning.        Cardiovascular:    (+) hypertension:, valvular problems/murmurs (Mild MR): MR,       ECG reviewed  Rhythm: regular  Rate: normal  Echocardiogram reviewed         Beta Blocker:  Dose within 24 Hrs      ROS comment: Echo 8/2022:  Summary Limited study to r/o Endocarditis. Technically sub-optimal images. No gross indication of Endocarditis -Consider FELICIANO to r/o Endocarditis. Normal left ventricular chamber size. Abnormal septal motion, EF 50- 55%. Valves not well visualized. There is mild mitral regurgitation. There is trace tricuspid regurgitation. No intra cardiac thrombus. No previous echo for comparison. EKG 1/2023:    Normal sinus rhythm  Incomplete right bundle branch block  Borderline ECG  When compared with ECG of 13-FEB-2022 22:21,  Significant changes have occurred  Confirmed by Mehreen Segura ([id number]) on 1/24/2023 11:42:27 AM     Neuro/Psych:   (+) neuromuscular disease:, headaches:, psychiatric history (Bipolar 1 d/o):depression/anxiety              ROS comment: Generalized muscle weakness  Thoracic facet syndrome  Cervical facet syndrome  Facet syndrome, lumbar  Neural foraminal stenosis of lumbar spine  Lumbar radiculopathy  DDD (degenerative disc disease), cervical  Neural foraminal stenosis of cervical spine  Protruded cervical disc  Cervical radiculopathy  Postlaminectomy syndrome, lumbar  Neuropathic pain syndrome (non-herpetic)  Cervical spondylolysis  Degenerative Osteoarthritis thoracic spine  Neuropathty     GI/Hepatic/Renal:   (+) GERD: well controlled, liver disease:, morbid obesity         ROS comment: Acute pancreatitis. Endo/Other:    (+) DiabetesType II DM, , blood dyscrasia: anemia, arthritis (Gout):., .                  ROS comment: On ASA 325mg  Adenoma of right adrenal gland Abdominal:   (+) obese,     Abdomen: soft. Vascular: negative vascular ROS. Other Findings:             Anesthesia Plan      general, MAC and regional     ASA 3       Induction: intravenous. MIPS: Postoperative opioids intended, Prophylactic antiemetics administered and Postoperative trial extubation. Anesthetic plan and risks discussed with patient. Plan discussed with CRNA.                   [address] Rylan Galarza MD   1/25/2023

## 2023-01-25 NOTE — PROGRESS NOTES
Hospitalist Progress Note      PCP: Darron Brown DO    Date of Admission: 1/24/2023  Days in the hospital: 1    Hospital Course:   Ms. Cl Best, a 58y.o. year old female  who  has a past medical history of Adenoma of right adrenal gland, Anemia, Anxiety, Arthritis, Asthma, Benign essential tremor, Bipolar affective ([address] Chronic back pain, Chronic respiratory failure with hypoxia ([address] Depression, Diabetes mellitus ([address] Difficulty swallowing, Environmental and seasonal allergies, Excessive physiologic tremor, Fatty liver, Full dentures, GERD (gastroesophageal reflux disease), Gout, Herniated cervical disc, History of swelling of feet, Hypertension, Lymphedema of lower extremity, Mitochondrial cytopathy ([address] Muscle weakness (generalized), Need for assistance with personal care, Neuropathy, Obesity, PETR (obstructive sleep apnea), Osteoarthritis of left knee, PONV (postoperative nausea and vomiting), Seizures ([address] Spinal headache, and Thyroid disease. Patient presents with sudden onset of pain from right knee, ankle and hip pain, she had rt knee I&D done in 8/2022. She is post rt TKA in [place] several years ago. She was seen by orthopedics and concern for right knee septic arthritis and underwent aspiration    Subjective  Patient seen and examined at bedside. Noted to have septic arthritis and staff aureus noted and cultures including blood cultures. Patient scheduled for surgery today. Pain is fairly controlled at this time on current regimen. Exam:    BP (!) 142/74   Pulse 89   Temp 97.8 °F (36.6 °C) (Oral)   Resp 18   Ht 5' 7\" (1.702 m)   Wt 218 lb (98.9 kg)   LMP 08/31/1988   SpO2 97%   BMI 34.14 kg/m²     HEENT: No pallor, no icterus. Respiratory:  CTA, good air entry. Cardiovascular: RRR, no murmur. Abdomen: Soft, non-tender, BS noted. Musculoskeletal: No joint pains or joint swelling noted.    Neurologic: awake, alert and following commands Assessment/Plan:  Concern for right knee septic arthritis s/p aspiration, follow-up with ID and orthopedics, continue with broad-spectrum antibiotics, blood culture and knee aspirate positive for staph aureus. Patient scheduled for surgery today, follow-up with orthopedics. Hypertension, blood pressure controlled    Neuropathy, continue with Neurontin    Seizure disorder, continue with regular medications and seizure precautions      Labs:   Recent Labs     01/24/23  0526 01/24/23  1856   WBC 11.6* 8.4   HGB 13.7 12.0   HCT 42.0 36.0    177     Recent Labs     01/24/23  0526      K 4.1      CO2 23   BUN 20   CREATININE 0.7   CALCIUM 8.6     Recent Labs     01/24/23  0526   AST 19   ALT 14   BILITOT 0.3   ALKPHOS 102     No results for input(s): INR in the last 72 hours. No results for input(s): Jing Guajardo in the last 72 hours. Medications:  Reviewed    Infusion Medications    sodium chloride      sodium chloride 50 mL/hr at 01/25/23 1046     Scheduled Medications    cefepime  2,000 mg IntraVENous Q12H    albuterol  2.5 mg Nebulization 4x daily    allopurinol  100 mg Oral BID    baclofen  20 mg Oral 4x daily    docusate sodium  100 mg Oral BID    escitalopram  30 mg Oral Daily    gabapentin  600 mg Oral 4x Daily    metoprolol succinate  12.5 mg Oral Daily    pantoprazole  40 mg Oral QAM AC    topiramate  200 mg Oral BID    traZODone  100 mg Oral Nightly    ziprasidone  20 mg Oral Nightly    ziprasidone  40 mg Oral QPM    sodium chloride flush  10 mL IntraVENous 2 times per day    ceFAZolin  2,000 mg IntraVENous On Call to OR    vancomycin  1,250 mg IntraVENous Q12H     PRN Meds: fentanNYL, promethazine, sodium chloride flush, sodium chloride, ondansetron **OR** ondansetron, magnesium hydroxide, acetaminophen **OR** acetaminophen, oxyCODONE    No intake or output data in the 24 hours ending 01/25/23 1054  Body mass index is 34.14 kg/m².       Diet  Diet NPO Exceptions are: Marie [place] of [place] with Meds    Code Status  Full Code       Electronically signed by Janette Garibay MD on 1/25/2023 at 10:54 AM  [employer]   Please contact me through perfect serve    NOTE: This report was transcribed using voice recognition software. Every effort was made to ensure accuracy; however, inadvertent computerized transcription errors may be present.

## 2023-01-26 ENCOUNTER — APPOINTMENT (OUTPATIENT)
Dept: GENERAL RADIOLOGY | Age: 63
DRG: 325 | End: 2023-01-26
Payer: MEDICAID

## 2023-01-26 LAB
CRYSTALS, FLUID: NORMAL
GRAM STAIN ORDERABLE: NORMAL
SOURCE BODY FLUID: NORMAL
URINE CULTURE, ROUTINE: NORMAL
VANCOMYCIN TROUGH: 15.9 MCG/ML (ref 5–16)

## 2023-01-26 PROCEDURE — 97165 OT EVAL LOW COMPLEX 30 MIN: CPT

## 2023-01-26 PROCEDURE — 6370000000 HC RX 637 (ALT 250 FOR IP): Performed by: ORTHOPAEDIC SURGERY

## 2023-01-26 PROCEDURE — 2580000003 HC RX 258: Performed by: ORTHOPAEDIC SURGERY

## 2023-01-26 PROCEDURE — 6360000002 HC RX W HCPCS: Performed by: ORTHOPAEDIC SURGERY

## 2023-01-26 PROCEDURE — 1200000000 HC SEMI PRIVATE

## 2023-01-26 PROCEDURE — 36415 COLL VENOUS BLD VENIPUNCTURE: CPT

## 2023-01-26 PROCEDURE — 6370000000 HC RX 637 (ALT 250 FOR IP): Performed by: FAMILY MEDICINE

## 2023-01-26 PROCEDURE — L1832 KO ADJ JNT POS R SUP PRE CST: HCPCS

## 2023-01-26 PROCEDURE — 2700000000 HC OXYGEN THERAPY PER DAY

## 2023-01-26 PROCEDURE — 2580000003 HC RX 258: Performed by: FAMILY MEDICINE

## 2023-01-26 PROCEDURE — 94640 AIRWAY INHALATION TREATMENT: CPT

## 2023-01-26 PROCEDURE — 6360000002 HC RX W HCPCS

## 2023-01-26 PROCEDURE — 97530 THERAPEUTIC ACTIVITIES: CPT

## 2023-01-26 PROCEDURE — 87040 BLOOD CULTURE FOR BACTERIA: CPT

## 2023-01-26 PROCEDURE — 80202 ASSAY OF VANCOMYCIN: CPT

## 2023-01-26 PROCEDURE — 97161 PT EVAL LOW COMPLEX 20 MIN: CPT

## 2023-01-26 RX ORDER — SODIUM CHLORIDE 0.9 % (FLUSH) 0.9 %
5-40 SYRINGE (ML) INJECTION EVERY 12 HOURS SCHEDULED
Status: DISCONTINUED | OUTPATIENT
Start: 2023-01-26 | End: 2023-01-27

## 2023-01-26 RX ORDER — HEPARIN SODIUM (PORCINE) LOCK FLUSH IV SOLN 100 UNIT/ML 100 UNIT/ML
1 SOLUTION INTRAVENOUS PRN
Status: DISCONTINUED | OUTPATIENT
Start: 2023-01-26 | End: 2023-01-27

## 2023-01-26 RX ORDER — SODIUM CHLORIDE 9 MG/ML
INJECTION, SOLUTION INTRAVENOUS PRN
Status: DISCONTINUED | OUTPATIENT
Start: 2023-01-26 | End: 2023-01-27

## 2023-01-26 RX ORDER — HEPARIN SODIUM (PORCINE) LOCK FLUSH IV SOLN 100 UNIT/ML 100 UNIT/ML
1 SOLUTION INTRAVENOUS EVERY 12 HOURS SCHEDULED
Status: DISCONTINUED | OUTPATIENT
Start: 2023-01-26 | End: 2023-01-27

## 2023-01-26 RX ORDER — SODIUM CHLORIDE 0.9 % (FLUSH) 0.9 %
5-40 SYRINGE (ML) INJECTION PRN
Status: DISCONTINUED | OUTPATIENT
Start: 2023-01-26 | End: 2023-01-27

## 2023-01-26 RX ADMIN — DOCUSATE SODIUM 100 MG: 100 CAPSULE, LIQUID FILLED ORAL at 21:25

## 2023-01-26 RX ADMIN — GABAPENTIN 600 MG: 300 CAPSULE ORAL at 12:17

## 2023-01-26 RX ADMIN — PANCRELIPASE LIPASE, PANCRELIPASE PROTEASE, PANCRELIPASE AMYLASE 40000 UNITS: 20000; 63000; 84000 CAPSULE, DELAYED RELEASE ORAL at 17:13

## 2023-01-26 RX ADMIN — OXYCODONE HYDROCHLORIDE 10 MG: 10 TABLET ORAL at 21:23

## 2023-01-26 RX ADMIN — ALBUTEROL SULFATE 2.5 MG: 2.5 SOLUTION RESPIRATORY (INHALATION) at 17:02

## 2023-01-26 RX ADMIN — TOPIRAMATE 200 MG: 100 TABLET, FILM COATED ORAL at 21:23

## 2023-01-26 RX ADMIN — ALBUTEROL SULFATE 2.5 MG: 2.5 SOLUTION RESPIRATORY (INHALATION) at 11:35

## 2023-01-26 RX ADMIN — BACLOFEN 20 MG: 10 TABLET ORAL at 09:29

## 2023-01-26 RX ADMIN — OXYCODONE HYDROCHLORIDE 10 MG: 10 TABLET ORAL at 15:46

## 2023-01-26 RX ADMIN — METOPROLOL SUCCINATE 12.5 MG: 25 TABLET, EXTENDED RELEASE ORAL at 09:28

## 2023-01-26 RX ADMIN — OXYCODONE HYDROCHLORIDE 10 MG: 10 TABLET ORAL at 00:18

## 2023-01-26 RX ADMIN — OXYCODONE HYDROCHLORIDE 10 MG: 10 TABLET ORAL at 04:49

## 2023-01-26 RX ADMIN — TRAZODONE HYDROCHLORIDE 100 MG: 50 TABLET ORAL at 21:23

## 2023-01-26 RX ADMIN — MORPHINE SULFATE 4 MG: 4 INJECTION, SOLUTION INTRAMUSCULAR; INTRAVENOUS at 18:32

## 2023-01-26 RX ADMIN — GABAPENTIN 600 MG: 300 CAPSULE ORAL at 09:28

## 2023-01-26 RX ADMIN — ESCITALOPRAM OXALATE 30 MG: 10 TABLET, FILM COATED ORAL at 09:29

## 2023-01-26 RX ADMIN — ALLOPURINOL 100 MG: 100 TABLET ORAL at 21:26

## 2023-01-26 RX ADMIN — PANTOPRAZOLE SODIUM 40 MG: 40 TABLET, DELAYED RELEASE ORAL at 06:22

## 2023-01-26 RX ADMIN — SODIUM CHLORIDE, PRESERVATIVE FREE 10 ML: 5 INJECTION INTRAVENOUS at 09:30

## 2023-01-26 RX ADMIN — ALLOPURINOL 100 MG: 100 TABLET ORAL at 09:28

## 2023-01-26 RX ADMIN — KETOROLAC TROMETHAMINE 15 MG: 30 INJECTION, SOLUTION INTRAMUSCULAR; INTRAVENOUS at 23:35

## 2023-01-26 RX ADMIN — GABAPENTIN 600 MG: 300 CAPSULE ORAL at 17:11

## 2023-01-26 RX ADMIN — BACLOFEN 20 MG: 10 TABLET ORAL at 17:12

## 2023-01-26 RX ADMIN — PANCRELIPASE LIPASE, PANCRELIPASE PROTEASE, PANCRELIPASE AMYLASE 60000 UNITS: 20000; 63000; 84000 CAPSULE, DELAYED RELEASE ORAL at 09:29

## 2023-01-26 RX ADMIN — PANCRELIPASE LIPASE, PANCRELIPASE PROTEASE, PANCRELIPASE AMYLASE 60000 UNITS: 20000; 63000; 84000 CAPSULE, DELAYED RELEASE ORAL at 12:17

## 2023-01-26 RX ADMIN — ZIPRASIDONE HYDROCHLORIDE 40 MG: 20 CAPSULE ORAL at 17:56

## 2023-01-26 RX ADMIN — PROMETHAZINE HYDROCHLORIDE 25 MG: 25 TABLET ORAL at 21:23

## 2023-01-26 RX ADMIN — PANCRELIPASE LIPASE, PANCRELIPASE PROTEASE, PANCRELIPASE AMYLASE 40000 UNITS: 20000; 63000; 84000 CAPSULE, DELAYED RELEASE ORAL at 09:33

## 2023-01-26 RX ADMIN — ALBUTEROL SULFATE 2.5 MG: 2.5 SOLUTION RESPIRATORY (INHALATION) at 21:13

## 2023-01-26 RX ADMIN — BACLOFEN 20 MG: 10 TABLET ORAL at 12:17

## 2023-01-26 RX ADMIN — KETOROLAC TROMETHAMINE 15 MG: 30 INJECTION, SOLUTION INTRAMUSCULAR; INTRAVENOUS at 06:22

## 2023-01-26 RX ADMIN — KETOROLAC TROMETHAMINE 15 MG: 30 INJECTION, SOLUTION INTRAMUSCULAR; INTRAVENOUS at 17:12

## 2023-01-26 RX ADMIN — DOCUSATE SODIUM 100 MG: 100 CAPSULE, LIQUID FILLED ORAL at 09:29

## 2023-01-26 RX ADMIN — GABAPENTIN 600 MG: 300 CAPSULE ORAL at 21:23

## 2023-01-26 RX ADMIN — ZIPRASIDONE HYDROCHLORIDE 20 MG: 20 CAPSULE ORAL at 21:26

## 2023-01-26 RX ADMIN — KETOROLAC TROMETHAMINE 15 MG: 30 INJECTION, SOLUTION INTRAMUSCULAR; INTRAVENOUS at 00:01

## 2023-01-26 RX ADMIN — OXYCODONE HYDROCHLORIDE 10 MG: 10 TABLET ORAL at 11:25

## 2023-01-26 RX ADMIN — SODIUM CHLORIDE, PRESERVATIVE FREE 10 ML: 5 INJECTION INTRAVENOUS at 21:22

## 2023-01-26 RX ADMIN — FENTANYL CITRATE 50 MCG: 50 INJECTION, SOLUTION INTRAMUSCULAR; INTRAVENOUS at 09:30

## 2023-01-26 RX ADMIN — PANCRELIPASE LIPASE, PANCRELIPASE PROTEASE, PANCRELIPASE AMYLASE 60000 UNITS: 20000; 63000; 84000 CAPSULE, DELAYED RELEASE ORAL at 17:11

## 2023-01-26 RX ADMIN — VANCOMYCIN HYDROCHLORIDE 1250 MG: 10 INJECTION, POWDER, LYOPHILIZED, FOR SOLUTION INTRAVENOUS at 12:21

## 2023-01-26 RX ADMIN — SODIUM CHLORIDE, PRESERVATIVE FREE 10 ML: 5 INJECTION INTRAVENOUS at 21:25

## 2023-01-26 RX ADMIN — VANCOMYCIN HYDROCHLORIDE 1250 MG: 10 INJECTION, POWDER, LYOPHILIZED, FOR SOLUTION INTRAVENOUS at 23:36

## 2023-01-26 RX ADMIN — PANCRELIPASE LIPASE, PANCRELIPASE PROTEASE, PANCRELIPASE AMYLASE 40000 UNITS: 20000; 63000; 84000 CAPSULE, DELAYED RELEASE ORAL at 12:18

## 2023-01-26 RX ADMIN — BACLOFEN 20 MG: 10 TABLET ORAL at 21:22

## 2023-01-26 RX ADMIN — TOPIRAMATE 200 MG: 100 TABLET, FILM COATED ORAL at 09:30

## 2023-01-26 ASSESSMENT — PAIN SCALES - GENERAL
PAINLEVEL_OUTOF10: 6
PAINLEVEL_OUTOF10: 10
PAINLEVEL_OUTOF10: 7
PAINLEVEL_OUTOF10: 8
PAINLEVEL_OUTOF10: 5
PAINLEVEL_OUTOF10: 7
PAINLEVEL_OUTOF10: 8
PAINLEVEL_OUTOF10: 3
PAINLEVEL_OUTOF10: 7
PAINLEVEL_OUTOF10: 10
PAINLEVEL_OUTOF10: 8
PAINLEVEL_OUTOF10: 8
PAINLEVEL_OUTOF10: 10

## 2023-01-26 ASSESSMENT — PAIN DESCRIPTION - ORIENTATION
ORIENTATION: RIGHT

## 2023-01-26 ASSESSMENT — PAIN DESCRIPTION - LOCATION
LOCATION: KNEE
LOCATION: LEG
LOCATION: KNEE
LOCATION: LEG
LOCATION: KNEE
LOCATION: HIP;LEG
LOCATION: HIP;LEG
LOCATION: KNEE
LOCATION: LEG

## 2023-01-26 ASSESSMENT — PAIN DESCRIPTION - DESCRIPTORS
DESCRIPTORS: ACHING;CRAMPING;DISCOMFORT
DESCRIPTORS: ACHING;CRAMPING;DISCOMFORT
DESCRIPTORS: ACHING;SHOOTING;SORE
DESCRIPTORS: ACHING;CRAMPING;DISCOMFORT
DESCRIPTORS: ACHING;CRAMPING;DISCOMFORT
DESCRIPTORS: ACHING;SHOOTING;SORE
DESCRIPTORS: ACHING;CRAMPING;DISCOMFORT
DESCRIPTORS: DISCOMFORT;CRAMPING;ACHING
DESCRIPTORS: TENDER;SORE;ACHING

## 2023-01-26 ASSESSMENT — PAIN DESCRIPTION - PAIN TYPE: TYPE: ACUTE PAIN

## 2023-01-26 ASSESSMENT — PAIN - FUNCTIONAL ASSESSMENT: PAIN_FUNCTIONAL_ASSESSMENT: ACTIVITIES ARE NOT PREVENTED

## 2023-01-26 NOTE — PROCEDURES
Patient refused the 4 view lumbar spine x-ray that was ordered stat this morning. Patient states she will need \"heavy duty pain meds\" prior to the exam. Or a few days until her leg feels better that she had surgery on yesterday.

## 2023-01-26 NOTE — HOME CARE
Medicaid payable at 100% Medicaid fee schedule. authorization is required. Patient responsible for $3 copay for drug per dispense. Referral received for home IV infusion. Spoke with patient, demographics verified. IV coverage verified. Explained IV benefit. Patient is agreeable to home care. Will await final orders. Will need IV ATB script and pharmacy agreement form faxed to [phone]. Charity Turner, LPN [facility].

## 2023-01-26 NOTE — PROGRESS NOTES
Hospitalist Progress Note      PCP: Tricia Morrison DO    Date of Admission: 1/24/2023  Days in the hospital: 2    Hospital Course:   Ms. Kelli Siegel, a 58y.o. year old female  who  has a past medical history of Adenoma of right adrenal gland, Anemia, Anxiety, Arthritis, Asthma, Benign essential tremor, Bipolar affective ([address] Chronic back pain, Chronic respiratory failure with hypoxia ([address] Depression, Diabetes mellitus ([address] Difficulty swallowing, Environmental and seasonal allergies, Excessive physiologic tremor, Fatty liver, Full dentures, GERD (gastroesophageal reflux disease), Gout, Herniated cervical disc, History of swelling of feet, Hypertension, Lymphedema of lower extremity, Mitochondrial cytopathy ([address] Muscle weakness (generalized), Need for assistance with personal care, Neuropathy, Obesity, PETR (obstructive sleep apnea), Osteoarthritis of left knee, PONV (postoperative nausea and vomiting), Seizures ([address] Spinal headache, and Thyroid disease. Patient presents with sudden onset of pain from right knee, ankle and hip pain, she had rt knee I&D done in 8/2022. She is post rt TKA in [place] several years ago. She was seen by orthopedics and concern for right knee septic arthritis and underwent aspiration, cultures positive for staph aureus and sensitivity pending. Patient underwent right knee I&D and removal of existing hardware and antibiotic spacer placement on 1/25/2023. Subjective  Patient seen and examined at bedside. Has some pain in her right lower extremity. Denies any headache, dizziness, chest pain. Chart reviewed, overnight events reviewed. Exam:    BP (!) 102/52   Pulse 83   Temp 97.5 °F (36.4 °C) (Temporal)   Resp 16   Ht 5' 7\" (1.702 m)   Wt 237 lb 12.8 oz (107.9 kg)   LMP 08/31/1988   SpO2 94%   BMI 37.24 kg/m²     HEENT: No pallor, no icterus. Respiratory:  CTA, good air entry. Cardiovascular: RRR, no murmur.   Abdomen: Soft, non-tender, BS noted.  Musculoskeletal: No joint pains or joint swelling noted. Neurologic: awake, alert and following commands         Assessment/Plan:  Septic arthritis alcohol, patient underwent right knee I&D and removal of existing hardware and antibiotic spacer placement, awaiting ID recommendations, may follow-up with orthopedics, continue with empiric antibiotics, staff aureus noted in culture, sensitivity pending, will need long-term antibiotics. Hypertension, blood pressure controlled    Neuropathy, continue with Neurontin    Seizure disorder, continue with regular medications and seizure precautions      Labs:   Recent Labs     01/24/23  0526 01/24/23  1856   WBC 11.6* 8.4   HGB 13.7 12.0   HCT 42.0 36.0    177     Recent Labs     01/24/23  0526      K 4.1      CO2 23   BUN 20   CREATININE 0.7   CALCIUM 8.6     Recent Labs     01/24/23  0526   AST 19   ALT 14   BILITOT 0.3   ALKPHOS 102     No results for input(s): INR in the last 72 hours. No results for input(s): Yuri Benjamin in the last 72 hours.     Medications:  Reviewed    Infusion Medications    sodium chloride      sodium chloride       Scheduled Medications    lidocaine  5 mL IntraDERmal Once    sodium chloride flush  5-40 mL IntraVENous 2 times per day    heparin flush  1 mL IntraVENous 2 times per day    lipase-protease-amylase  60,000 Units Oral TID WC    albuterol  2.5 mg Nebulization 4x daily    allopurinol  100 mg Oral BID    baclofen  20 mg Oral 4x daily    docusate sodium  100 mg Oral BID    escitalopram  30 mg Oral Daily    gabapentin  600 mg Oral 4x Daily    metoprolol succinate  12.5 mg Oral Daily    pantoprazole  40 mg Oral QAM AC    topiramate  200 mg Oral BID    traZODone  100 mg Oral Nightly    ziprasidone  20 mg Oral Nightly    ziprasidone  40 mg Oral QPM    sodium chloride flush  10 mL IntraVENous 2 times per day    vancomycin  1,250 mg IntraVENous Q12H    midazolam  2 mg IntraVENous Once    cefTRIAXone (ROCEPHIN) IV  1,000 mg IntraVENous Q24H     PRN Meds: sodium chloride flush, sodium chloride, heparin flush, lipase-protease-amylase **AND** lipase-protease-amylase, fentanNYL, promethazine, sodium chloride flush, sodium chloride, ondansetron **OR** ondansetron, magnesium hydroxide, acetaminophen **OR** acetaminophen, oxyCODONE **OR** oxyCODONE, morphine **OR** morphine, ketorolac      Intake/Output Summary (Last 24 hours) at 1/26/2023 0858  Last data filed at 1/25/2023 1900  Gross per 24 hour   Intake 1800 ml   Output 150 ml   Net 1650 ml     Body mass index is 37.24 kg/m². Diet  ADULT DIET; Regular    Code Status  Full Code       Electronically signed by Janette Garibay MD on 1/26/2023 at 8:58 AM  [employer]   Please contact me through perfect serve    NOTE: This report was transcribed using voice recognition software. Every effort was made to ensure accuracy; however, inadvertent computerized transcription errors may be present.

## 2023-01-26 NOTE — ADDENDUM NOTE
Addendum  created 01/25/23 1934 by Jacqueline Underwood MD    Delete clinical note, Intraprocedure Blocks edited, Order Canceled from Note

## 2023-01-26 NOTE — ANESTHESIA PROCEDURE NOTES
Peripheral Block    Patient location during procedure: pre-op  Reason for block: post-op pain management  Start time: 1/25/2023 1:45 PM  End time: 1/25/2023 1:55 PM  Staffing  Performed: anesthesiologist   Anesthesiologist: Valentino Motto, MD  Preanesthetic Checklist  Completed: patient identified, IV checked, site marked, risks and benefits discussed, surgical/procedural consents, equipment checked, pre-op evaluation, timeout performed, anesthesia consent given, oxygen available, monitors applied/VS acknowledged, fire risk safety assessment completed and verbalized and blood product R/B/A discussed and consented  Peripheral Block   Patient position: supine  Prep: ChloraPrep  Provider prep: sterile gloves and mask  Patient monitoring: cardiac monitor, continuous pulse ox, IV access and oxygen  Block type: Saphenous  Laterality: right  Injection technique: single-shot  Guidance: ultrasound guided    Needle   Needle type: insulated echogenic nerve stimulator needle   Needle localization: ultrasound guidance  Assessment   Injection assessment: negative aspiration for heme, no paresthesia on injection, local visualized surrounding nerve on ultrasound and no intravascular symptoms  Paresthesia pain: none  Slow fractionated injection: yes  Hemodynamics: stable  Real-time US image taken/store: yes  Outcomes: uncomplicated and patient tolerated procedure well    Medications Administered  ropivacaine (NAROPIN) injection 0.5% - Perineural   30 mL - 1/25/2023 1:45:00 PM

## 2023-01-26 NOTE — CARE COORDINATION
1/26/2023social work transition of care planning  Sw met with pt at [address] remains to return home. Envelope with ambulette form in soft chart. Sw discussed hhc,pt had no preference-declined hhc list. Referral to [facility]. Will need final plan for iv atb (rx will need faxed to x [phone]) and hhc orders. Sw will follow.   Electronically signed by GAGE Hinojosa on 1/26/2023 at 11:56 AM

## 2023-01-26 NOTE — ANESTHESIA POSTPROCEDURE EVALUATION
Department of Anesthesiology  Postprocedure Note    Patient: Leny Yang  MRN: [id number]  YOB: 1960  Date of evaluation: 1/25/2023      Procedure Summary     Date: 01/25/23 Room / Location: [address]    Anesthesia Start: 1358 Anesthesia Stop: 1755    Procedure: RIGHT KNEE INCISION AND DRAINAGE, REMOVAL OF EXISTING HARDWARE, ANTIBIOTIC SPACER INSTALLED (Right: Knee) Diagnosis:       Infection associated with internal right knee prosthesis, initial encounter ([facility])      (nfection associated with internal right knee prosthesis, initial encounter ([facility]) [T84.53XA])    Surgeons: Vasile Brice DO Responsible Provider: Libia Mitchell MD    Anesthesia Type: general, MAC, regional ASA Status: 3          Anesthesia Type: No value filed.    Avelino Phase I: Avelino Score: 9    Vaelino Phase II:        Anesthesia Post Evaluation    Patient location during evaluation: PACU  Patient participation: complete - patient participated  Level of consciousness: awake and alert  Airway patency: patent  Nausea & Vomiting: no nausea and no vomiting  Complications: no  Cardiovascular status: hemodynamically stable  Respiratory status: acceptable  Hydration status: euvolemic

## 2023-01-26 NOTE — PROGRESS NOTES
Department of Orthopedic Surgery   Progress Note     patient seen and examined. Pain controlled, reports some soreness over entire right lower extremity. Patient also complaining of lower back pain that has been present over the past few days. No new complaints. Denies chest pain, shortness of breath, calf pain, dizziness/lightheadedness, fevers or chills. VITALS:  BP (!) 146/87   Pulse 81   Temp 97.6 °F (36.4 °C) (Temporal)   Resp 18   Ht 5' 7\" (1.702 m)   Wt 218 lb (98.9 kg)   LMP 08/31/1988   SpO2 99%   BMI 34.14 kg/m²     GENERAL: In bed, oriented x4  MUSCULOSKELETAL:   right lower extremity:  Dressing C/D/I, knee immobilizer in place  Compartments soft and compressible, calf non-tender  Palpable dorsalis pedis and posterior tibialis pulse, brisk cap refill to toes, foot warm and perfused  Sensation intact to light touch in sural/deep peroneal/superficial peroneal/saphenous/posterior tibial nerve distributions to foot/ankle. Demonstrates weak active and passive ankle plantar/dorsiflexion/great toe extension     CBC:   Lab Results   Component Value Date/Time    WBC 8.4 01/24/2023 06:56 PM    HGB 12.0 01/24/2023 06:56 PM    HCT 36.0 01/24/2023 06:56 PM     01/24/2023 06:56 PM       ASSESSMENT  Right knee explantation with insertion of articulating antibiotic cement spacer 1/25  Removal right distal femur plate and screw construct 1/25  Right hip aspiration with fluoroscopic guidance 1/25       PLAN    Continue physical therapy and protocol: Weightbearing as tolerated right lower extremity in full extension with hinged knee brace in place. Continue antibiotics per primary team.  Appreciate infectious disease input  Culture of surgical specimen pending. We will follow  Will follow cultures and labs for Intra-Op aspiration of right hip to rule out any occult septic arthritis of the hip. Deep venous thrombosis prophylaxis -per medicine recommendations.   Okay to resume postop day 1, early mobilization  Consult Ligia, hinged ROM brace to right lower extremity locked in extension   Recommend x-ray of lumbar spine to assess back pain. PT/OT  Pain Control: IV and PO  Monitor H&H  D/C Plan:  pending sw/cm and therapy recommendations. Ok to discharge from orthopedic standpoint once appropriate discharge plan is in place     Electronically signed by Svetlana Manley DO on 1/26/2023 at 5:40 AM     Orthopaedic Attending    I have seen and evaluated the patient with the resident and agree with the above assessments on today's visit. I have performed the key components of the history and physical examination and concur completely with the findings and plans as documented above. Appreciate ID input regarding antibiotics, follow-up intraoperative cultures, likely consistent with blood cultures. Patient will require likely 6 weeks of IV antibiotics and PICC line. Will need to follow-up with right hip aspiration results as well to ensure no occult septic hip. Patient does still complain of back pain, recommend neurosurgery consultation to exclude any potential site of infection from her back. PT eval, patient can be weightbearing as tolerated only with the knee in the brace locked in extension, can work on some gentle ROM with PT 0-30 degrees for now.   DVT prophylaxis  Social work and case management for help with home health antibiotics    Electronically signed by   Connor Mendoza DO  1/26/2023

## 2023-01-26 NOTE — DISCHARGE INSTRUCTIONS
[facility]) Department of Orthopedic Surgery  [address]    DO Dr. Gael Lomax MD Dr. Casimer Ferdinand, MD Donald Lange, PA-C Marti Rink DelloStritto PA-C Loetta Bamberg PA-C      Orthopaedics Discharge Instructions   Weight bearing Status - Partial weightbearing 25-50%  - on right lower Extremity in the knee immobilizer locked in extension. Pain medication Per Prescriptions  Contact Office for Medication Refill- [phone]  Office can refill pain med every 7 days  If patient discharging to facility then pain control will be continued per facility physician  Ice to operative/injured site for 15-30 minutes of each hour for next 5 days    Recommend that you continue to ice the area 2-3 times per day after this   Elevate operative/injured limb on 2 pillows at home  Goal is to have limb above the heart if able  Continue DVT Prophylaxis (blood clot prevention) as Prescribed: asprin   Wound care - ok to remove dressing on post operative day 7 and cover with a clean dry dressing as needed for drainage. All antibiotics as per Infectious Disease Recommendations  Follow Up in Office in 2 weeks. Your first post op appointment is often with one of our PAs. Call the office at [phone] or directions or with any questions. Watch for these significant complications. Call physician if they or any other problems occur:  Fever over 101°, redness, swelling or warmth at the operative site  Unrelieved nausea    Foul smelling or cloudy drainage at the operative site   Unrelieved pain    Blood soaked dressing.  (Some oozing may be normal)     Numb, pale, blue, cold or tingling extremity    Future Appointments   Date Time Provider Deysi Simms   1/27/2023 12:30 PM Anoop Hernandez MD [facility]   2/3/2023  3:20 PM Jono Epps DO [facility]   2/13/2023  1:15 PM SCHEDULE, SE ORTHO [facility]   3/16/2023 10:45 AM Dannie Mackenzie Watson MD [facility]         It is the Department of Orthopaedic Trauma's standard of practice that providers will de-escalate(wean) all patients from narcotic(opioid) medications during the post-operative period. We provide multimodal pain control but opioid medications are tapered in all of our patients. If patient requires referral to pain management for prolonged taper off of opioid pain medication we will facilitate this process.

## 2023-01-26 NOTE — PROGRESS NOTES
Physical Therapy  Initial Assessment       Name: Tabitha Leahy  : 1960  MRN: [id number]      Date of Service: 2023    Evaluating PT:  Celia Cowan PT, DPT  [id number]    Room #:  3820/1913-C  Diagnosis:  Septic joint ([address] [M00.9]  Knee pain [M25.569]  PMHx/PSHx:   has a past medical history of Adenoma of right adrenal gland, Anemia, Anxiety, Arthritis, Asthma, Benign essential tremor, Bipolar affective ([address] Chronic back pain, Chronic respiratory failure with hypoxia ([address] Depression, Diabetes mellitus ([address] Difficulty swallowing, Environmental and seasonal allergies, Excessive physiologic tremor, Fatty liver, Full dentures, GERD (gastroesophageal reflux disease), Gout, Herniated cervical disc, History of swelling of feet, Hypertension, Lymphedema of lower extremity, Mitochondrial cytopathy ([address] Muscle weakness (generalized), Need for assistance with personal care, Neuropathy, Obesity, PETR (obstructive sleep apnea), Osteoarthritis of left knee, PONV (postoperative nausea and vomiting), Seizures ([address] Spinal headache, and Thyroid disease. Procedure/Surgery:  RIGHT KNEE INCISION AND DRAINAGE, REMOVAL OF EXISTING HARDWARE, ANTIBIOTIC SPACER INSTALLED 23  Precautions:  Falls, WBAT RLE, Hinged Knee Brace Locked in Extension  Equipment Needs:  TBD    SUBJECTIVE:    Pt lives alone in a 1 story with 0 step(s) to enter and 0 rail(s); bed/bath on 1st   Bathroom setup: tub shower combo with bath bench   Equipment owned: electric w/c, bath bench, ww    OBJECTIVE:   Initial Evaluation  Date: 23 Treatment Short Term/ Long Term   Goals   AM-PAC 6 Clicks 0/06     Was pt agreeable to Eval/treatment? yes     Does pt have pain? No c/o pain      Bed Mobility  Rolling: MaxA  Supine to sit: MaxA x 2  Sit to supine: MaxA x 2  Scooting: MaxA  Rolling: Independent  Supine to sit:  Independent  Sit to supine: Independent  Scooting: Independent     Transfers Sit to stand: NT  Stand to sit: NT  Stand pivot: NT  Sit to stand: Modified Independent  Stand to sit: Modified Independent  Stand pivot: Modified Independent     Ambulation    NT  50 feet with Modified Independent  AAD    Stair negotiation: ascended and descended  NT  >4 steps with single rail Modified Independent     ROM BUE:  Defer to OT  BLE:  WFL     Strength BUE:  Defer to OT  BLE:  4/5  Improve 1 MMT   Balance Sitting EOB:  Keren  Dynamic Standing:  NT  Sitting EOB:  Independent    Dynamic Standing:   Modified Independent       Pt is A & O x 4  Sensation:  WNL  Edema:  WNL    Therapeutic Exercises:  functional mobility    Patient education  Pt educated on role of PT    Patient response to education:   Pt verbalized understanding Pt demonstrated skill Pt requires further education in this area   x x x     ASSESSMENT:    Conditions Requiring Skilled Therapeutic Intervention:    [x]Decreased strength     [x]Decreased ROM  [x]Decreased functional mobility  [x]Decreased balance   [x]Decreased endurance   []Decreased posture  []Decreased sensation  []Decreased coordination   []Decreased vision  [x]Decreased safety awareness   [x]Increased pain       Comments:  Pt agreeable to PT evaluation. Pt performing bed mobility with assist and cues. Pt declined trial to stand due to pain. Patient would benefit from continued skilled PT to maximize functional mobility independence. Treatment:  Patient practiced and was instructed in the following treatment:    Bed mobility- verbal cues to facilitate independence    Pt's/ family goals   1. Get better    Prognosis is good for reaching above PT goals. Patient and or family understand(s) diagnosis, prognosis, and plan of care.   yes    PHYSICAL THERAPY PLAN OF CARE:    PT POC is established based on physician order and patient diagnosis     Referring provider/PT Order:    01/24/23 1830  PT eval and treat  Start:  01/24/23 1830,   End:  01/24/23 1830,   ONE TIME,   Standing Count:  1 Occurrences,   R        Last continued at transfer on Wed Jan 25, 2023  7:34 PM    Geoff Donovan DO     Diagnosis:  Septic joint [facility]) [M00.9]  Knee pain [M25.569]  Specific instructions for next treatment:  functional mobility    Current Treatment Recommendations:     [x] Strengthening to improve independence with functional mobility   [x] ROM to improve independence with functional mobility   [x] Balance Training to improve static/dynamic balance and to reduce fall risk  [x] Endurance Training to improve activity tolerance during functional mobility   [x] Transfer Training to improve safety and independence with all functional transfers   [x] Gait Training to improve gait mechanics, endurance and assess need for appropriate assistive device  [x] Stair Training in preparation for safe discharge home and/or into the community   [x] Positioning to prevent skin breakdown and contractures  [x] Safety and Education Training   [x] Patient/Caregiver Education   [x] HEP  [] Other     PT long term treatment goals are located in above grid    Frequency of treatments: 5-7x/week x 1-2 weeks. Time in  0940  Time out  1000    Total Treatment Time  8 minutes     Evaluation Time includes thorough review of current medical information, gathering information on past medical history/social history and prior level of function, completion of standardized testing/informal observation of tasks, assessment of data and education on plan of care and goals.     CPT codes:  [x] Low Complexity PT evaluation 73895  [] Moderate Complexity PT evaluation 55507  [] High Complexity PT evaluation 06461  [] PT Re-evaluation 87986  [] Gait training 89337 0 minutes  [] Manual therapy 37907 0 minutes  [x] Therapeutic activities 26952 8 minutes  [] Therapeutic exercises 82348 0 minutes  [] Neuromuscular reeducation 44315 0 minutes       Yenny Damon PT, DPT   [id number]

## 2023-01-26 NOTE — PROGRESS NOTES
OCCUPATIONAL THERAPY Re- EVALUATION    [facility] [id number] [place] [address]      Date:2023                                                Patient Name: Megan Florez  MRN: [id number]  : 1960  Room: [address] [place] [address]     Evaluating OT:Ila Underwood, OTR/L   License #  [id number]       Referring Provider: Kimmy Plata DO    Specific Provider Orders/Date: OT evaluation & treatment        Diagnosis: Chronic recurrent infection right TKA  Retained hardware right femur with recurrent infection  Right hip pain rule out septic arthritis      Pertinent Medical History:  has a past medical history of Adenoma of right adrenal gland, Anemia, Anxiety, Arthritis, Asthma, Benign essential tremor, Bipolar affective ([address] Chronic back pain, Chronic respiratory failure with hypoxia ([address] Depression, Diabetes mellitus ([address] Difficulty swallowing, Environmental and seasonal allergies, Excessive physiologic tremor, Fatty liver, Full dentures, GERD (gastroesophageal reflux disease), Gout, Herniated cervical disc, History of swelling of feet, Hypertension, Lymphedema of lower extremity, Mitochondrial cytopathy ([address] Muscle weakness (generalized), Need for assistance with personal care, Neuropathy, Obesity, PETR (obstructive sleep apnea), Osteoarthritis of left knee, PONV (postoperative nausea and vomiting), Seizures ([address] Spinal headache, and Thyroid disease. Surgery: 23: Right knee explantation with insertion of articulating antibiotic cement spacer  Removal right distal femur plate and screw construct  Right hip aspiration with fluoroscopic guidance    Past Surgical History:  has a past surgical history that includes knee surgery (Bilateral); Gastric bypass surgery (); myomectomy; Tonsillectomy; Nerve Block (Left, 10 02 2013); Nerve Block (Left, 10 9 13); Nerve Block (Left, 10/16/13); other surgical history (Left, 13);  Nerve Block (Left, 10/29/2014); Nerve Block (Left, 11/12/2014); Nerve Block (Left, 12 8 14); Nerve Block (Right, 3/30/15); Nerve Block (Right, 4/6/2015); Nerve Block (Right, 4/13/15); Nerve Block (Left, 7/6/15); Nerve Block (07/20/15); Nerve Block (Left, 10 1 15); Nerve Block (10/26/15); other surgical history (3/28/2016); Endoscopy, colon, diagnostic; pr colonoscopy flx dx w/collj spec when pfrmd (N/A, 3/20/2018); pr egd transoral biopsy single/multiple (N/A, 3/20/2018); Cholecystectomy (1999); Hysterectomy (1988); Colonoscopy (N/A, 9/18/2018); Esophagus dilation (9/18/2018); back surgery (last one 1995); Cardiac catheterization (Right, 6-6-2013); Appendectomy; other surgical history (1995); joint replacement (Bilateral, [id number]); egd colonoscopy (N/A, 10/8/2019); Colonoscopy (N/A, 10/8/2019); Nerve Block (Bilateral, 4/1/2021); Ankle fracture surgery (Right, 2/14/2022); Ankle fracture surgery (Right, 3/2/2022); knee surgery (Right, 8/19/2022); and knee surgery (Right, 1/25/2023). Precautions:  Fall Risk   Per Ortho op note: full weightbearing with the brace locked in extension and can remove the brace for passive ROM   Assessment of current deficits    [x] Functional mobility            [x]ADLs           [x] Strength                  [x]Cognition    [x] Functional transfers          [x] IADLs         [x] Safety Awareness   [x]Endurance    [x] Fine Coordination                         [x] Balance      [] Vision/perception   [x]Sensation      []Gross Motor Coordination             [] ROM           [] Delirium                   [] Motor Control      OT PLAN OF CARE   OT POC based on physician orders, patient diagnosis and results of clinical assessment     Frequency/Duration: 2-4 days/wk for 2 weeks PRN   Specific OT Treatment Interventions to include:    Instruction/training on adapted ADL techniques and AE recommendations to increase functional independence within precautions  Training on energy conservation strategies, correct breathing pattern and techniques to improve independence/tolerance for self-care routine  Functional transfer/mobility training/DME recommendations for increased independence, safety, and fall prevention  Patient/Family education to increase follow through with safety techniques and functional independence  Recommendation of environmental modifications for increased safety with functional transfers/mobility and ADLs  Cognitive retraining/development of therapeutic activities to improve problem solving, judgement, memory, and attention for increased safety/participation in ADL/IADL tasks  Splinting/positioning for increased function, prevention of contractures, and improve skin integrity  Therapeutic exercise to improve motor endurance, ROM, and functional strength for ADLs/functional transfers  Therapeutic activities to facilitate/challenge dynamic balance, stand tolerance for increased safety and independence with ADLs  Therapeutic activities to facilitate gross/fine motor skills for increased independence with ADLs  Positioning to improve skin integrity, interaction with environment and functional independence     Recommended Adaptive Equipment:  TBD    Home Living:  Pt lives alone in a 1 story with 0 step(s) to enter and 0 rail(s); bed/bath on 1st   Bathroom setup: tub shower combo with bath bench   Equipment owned: electric w/c, bath bench, ww     Prior Level of Function:  Modified [place]  with ADLs except for bath. Pt has a caregiver 5x/week x 5 hours/day ,    Modified [place] with IADLs. Ambulated with ww, or uses electric w/c     Driving: no  Occupation/leisure: not stated    Pain Level: \"severe\" R LE; RN notified/aware, positioning & ice applied  Cognition: A&O: x3;  Follows 2 step directions              Memory:  G              Sequencing:  F              Problem solving:  F              Judgement/safety:  F                Functional Assessment:  AM-PAC Daily Activity Raw Score: 14/24 Initial Eval Status  Date: 1-26-23 Treatment Status  Date: STGs = LTGs  Time frame: 10-14 days   Feeding Ind. Mod I/ Ind   Grooming SBA seated   Modified [place]    UB Dressing Min A with gown   Modified [place]    LB Dressing Dep B socks   Supervision    Bathing Max A with sim. task   Supervision    Toileting Dep  Bedpan use   Supervision    Bed Mobility  Logroll: Max A  Supine to sit: Max A x2  Sit to supine:   Max A x2   Supine to sit: Modified [place]   Sit to supine: Modified [place]    Functional Transfers NT, pt. deferred   Supervision    Functional Mobility NT   Supervision    Balance Sitting:     Static:  SBA    Dynamic: Min A  Standing: NT/ pt. deferred       Activity Tolerance Poor with lt. Ax.   F/+   Visual/  Perceptual Glasses: yes          Vitals /57 seated  spO2 & HR WFL   WFL      Hand Dominance R    AROM (PROM) Strength Additional Info:    RUE  WFL 4/5 good  and wfl FMC/dexterity noted during ADL tasks      LUE WFL 4/5 good  and wfl FMC/dexterity noted during ADL tasks         Hearing: [facility]   Sensation:   c/o numbness or tingling RLE , neuropathy B hands and feet  Tone: WFL  B UE  Edema: none noted B UE     Comments: Upon arrival patient supine in bed, agreeable to OT, cleared by Nursing & Ortho. Therapist facilitated bed mobility/ functional sitting ax./balance at EOB/ADLs at bed levle with focus on safety, technique & precautions. Pt. Instructed RE: safe transfers/mobility, ADLs, role of OT, treatment plan, recs. , prec. At end of session, patient returned to supine with HOB elevated and ice bags applied, all needs met, RN notified, with call light and phone within reach, all lines and tubes intact. Overall patient demonstrated decreased strength, balance, independence & safety during completion of ADL/functional transfer/mobility tasks.   Pt would benefit from continued skilled OT to increase safety and independence with completion of ADL/IADL tasks for functional independence and quality of life. Treatment: OT treatment provided this date includes:   Instruction/training on safety and adapted techniques for completion of ADLs: to increase [place] in self care   Instruction/training on safe functional mobility/transfer techniques: with focus on safety, technique & precautions   Instruction/training on energy conservation/work simplification for completion of ADLs: techniques to increase [place] with self care ADLs & iADLs, work simplification to improve endurance   Proper Positioning/Alignment: for optimal healing, skin integrity to prevent breakdown, decrease edema  Skilled monitoring of vitals: to include BP, spO2 & HR during session  Sitting/standing Balance/Tolerance- to increased balance & activity tolerance during ADLs as well as facilitate proper posture and/or positioning. Therapeutic exercise- Instruction on B UE ROM exercises to improve strength/function for increased [place] with ADLs & iADLs     Rehab Potential: Good for established goals     Patient / Family Goal: to \"return home\"       Patient and/or family were instructed on functional diagnosis, prognosis/goals and OT plan of care. Demonstrated F+ understanding. Re-Eval Complexity: Low     Time In: 10:50  Time Out: 11:15  Total Treatment Time: 10    Min Units   OT Eval Low 45701  x     OT Eval Medium 94863       OT Eval High 49972       OT Re-Eval [id number]       Therapeutic Ex 54753       Therapeutic Activities 68446  10  1   ADL/Self Care 26853       Orthotic Management 40666       Manual 56184       Neuro Re-Ed 44137       Non-Billable Time          Evaluation Time additionally includes thorough review of current medical information, gathering information on past medical history/social history and prior level of function, interpretation of standardized testing/informal observation of tasks, assessment of data and development of plan of care and goals. Ila SMITH Kj OTR/L   License #  [id number]

## 2023-01-26 NOTE — OP NOTE
Operative Note      Patient: Vi Abdullahi  YOB: 1960  MRN: [id number]    Date of Procedure: 1/25/2023    Pre-Op Diagnosis:   Chronic recurrent infection right TKA  Retained hardware right femur with recurrent infection  Right hip pain rule out septic arthritis     Post-Op Diagnosis: Same       Procedure(s):  Right knee explantation with insertion of articulating antibiotic cement spacer  Removal right distal femur plate and screw construct  Right hip aspiration with fluoroscopic guidance    Surgeon(s):  Earl Nielson DO    Assistant:   Resident: Ernie Sigala DO; David Sears DO; Rae Simpson DO    Anesthesia: General    Estimated Blood Loss (mL): less than 248     Complications: None    Specimens:   ID Type Source Tests Collected by Time Destination   1 : RIGHT KNEE  Joint/Joint Fluid Joint Fluid CULTURE, ANAEROBIC, CULTURE, FUNGUS, GRAM STAIN (Canceled), CULTURE, SURGICAL, CULTURE WITH SMEAR, ACID FAST [address], [address] 1/25/2023 1716    2 : RIGHT KNEE Joint/Joint Fluid Joint Fluid CULTURE, ANAEROBIC, CULTURE, FUNGUS, GRAM STAIN (Canceled), CULTURE, SURGICAL, CULTURE WITH SMEAR, ACID FAST [address], DO 1/25/2023 1719    3 : RIGHT KNEE Joint/Joint Fluid Joint Fluid CULTURE, ANAEROBIC, CULTURE, FUNGUS, GRAM STAIN, CULTURE, SURGICAL, CULTURE WITH SMEAR, ACID FAST [address], [address] 1/25/2023 1720    A : RIGHT HIP  Joint/Joint Fluid Joint Fluid CULTURE, ANAEROBIC, CULTURE, FUNGUS, GRAM STAIN (Canceled), CULTURE, SURGICAL (Canceled), CULTURE WITH SMEAR, ACID FAST [address], [address] 1/25/2023 1745        Implants:  Implant Name Type Inv.  Item Serial No.  Lot No. LRB No. Used Action   CEMENT BNE 20ML 41GM FULL DOSE PMMA W/ TOBRA M VISC RADPQ - [id number]  CEMENT BNE 20ML 41GM FULL DOSE PMMA W/ TOBRA M VISC RADPQ  [employer] [facility] [id number] Right 3 Implanted   IMPL KNEE FEM COMP STEMMED REMEDY LG - [id number]  IMPL KNEE FEM COMP STEMMED REMEDY LG  [employer] [id number] Right 1 Implanted   IMPL KNEE STEM EXT COMP REMEDY 100MM - [id number]  IMPL KNEE STEM EXT COMP REMEDY 100MM  [employer]WD [id number] Right 1 Implanted   COMPONENT TIB L 80MM KNEE SPCR ANTIBIO TREAT REMEDY - [id number]  COMPONENT TIB L 80MM KNEE SPCR ANTIBIO TREAT REMEDY  [employer]WD [id number] Right 1 Implanted         Drains: * No LDAs found *  Specimens: 3 separate culture collections were obtained from the infected fluid about the knee prosthesis, a separate aspirate was obtained from the hip. Findings: Obvious chronic infection with significant bony erosion and bone loss most notable to the femur with severe central cavitation extending completely to the medial and lateral cortices, significant necrotic material within the femoral canal up to the level of the supracondylar region and loss of cancellous bone substance, osteoporotic bone quality to both the femur and tibia. Some central necrosis about the tibial component as well although much less severe than the femur. Detailed Description of Procedure:   Patient was brought to the operating suite where she was placed on operative table supine position. She received a general anesthetic by department esthesia. Intra-Op antibiotics were held until after intraoperative cultures were obtained. The right lower extremity sterilely prepped and draped out in standard sterile fashion. Surgical timeout is performed per protocol by all members of surgical team.  Leg was elevated tourniquet was set at 250 mmHg pressure. We utilized the previous anterior midline incision this was opened with a scalpel. Full-thickness flaps were created down to the extensor mechanism. A standard medial parapatellar arthrotomy was now made. There was effusion of the knee with discolored fluid consistent with infection. This was collected for culture analysis.   There was significant amount of hypertrophic synovial tissue which was white and yellow discolored throughout the entire knee. We now delaminated the synovial tissue using sharp excision electrocautery about all compartments of the knee from the suprapatellar recess down the medial lateral gutters, about the femoral component and the tibial component as well. Once we complete synovectomy and debridement of the intracapsular tissues back to healthy bleeding tissue we turned attention to implant removal.  We now used a small thin oscillating saw in order to cut through the cement mantle about the femoral component and the tibial components. Patient had a PS knee. We also utilized flexible osteotomes in order to disengage the components from the underlying bone. We first then remove the femoral component and had minimal amount of bone loss about the box of the femoral component however patient has significant amount of bony erosion and loss in the central part of the femur with significant cavitation. It was mucinous and liquefied material throughout as well as a significant mount of cement from her previous surgery. The screws from the lateral plate could be seen traversing through the cement as we remove the remainder of the cement about the distal femur. The lateral plate did communicate into the wound therefore this was also grossly infected. Lito Aparicio Next the polyethylene was removed from the tibial component and this was then disimpacted with some central loss. There was chronic cavitation sclerosis about the posterior lateral plateau from the infection as well as on the medial aspect as well. We then used curettes rongeurs and sharp excision remove any fibrinous or synovial type fluid about the prosthesis or any film from the chronic infection about all the bony surfaces. We then removed any remaining cement using osteotomes curettes and rongeurs recannulizing the canals as well.   We now thoroughly irrigated using pulsatile lavage in multiple liters normal saline solution. This point felt that appropriate explantation. All surgeons and staff no change there outer attire and gloves and we then proceeded forward with reimplantation of a temporizing spacer. We utilized the Prostalac spacer for templating this and also by utilizing the stem on the femoral component and a peg on the tibial component due to the central cavitation and bony loss. The large sizes were used for both the femur and the tibia. The tibial component was now placed this in standard cement technique into the tibia using unpressurized cement that included tobramycin vancomycin powder which was hand mixed. Next the femoral component was placed. We did use methylene blue to help guide a cement for later removal.  Once we felt we had the components in appropriate position the knee was held in extension. We did build up the cement about the components for added stability both the tibia and femoral components. The patella component was well fixated we soaked this with Betadine and did not remove this. Once the cement had cured the knee was taken through range of motion and the cemented components appear to be stable. We now turned our attention removal of the distal femoral lateral locking plate. Incision was made over the lateral thigh utilizing the previous incision we use electrocautery dissect down to subtends tissues but the iliotibial band in the regions of the screws and then remove the remaining screws from the lateral thigh. The more distal screws were able to be removed from the previous knee incision and a few of these were removed during insertion of the antibiotic spacer. There was some bony overgrowth over the plate over the distal femoral shaft using osteotome to remove this allowing us to then remove the plate in its entirety. The fracture appeared to be well-healed there is no gross mobility about the fracture site.   Final x-rays were taken and saved to synapse system. Wounds were irrigated 1 final time. We used monofilament only for closure using standard layered closure. Standard closure was utilized for the femoral plate incisions as well. Tourniquet was let down at this juncture, good peripheral pulses were palpable. Sterile compressive dressing was applied. Finally we turned attention to the right hip. Using fluoroscopic guidance we started a spinal gauge needle into the hip joint injected some dye confirming in the hip capsule and then aspirated fluid which was sent in a separate container to microbiology for gross analysis. We were able to aspirate approximately 7 cc of what appeared to be sanguinous/synovial fluid but no obvious gross infection. Patient was awoken uneventfully from anesthetic transferred onto the \[facility]\"" in the postanesthesia care in stable condition. Postoperative plans:  Patient admitted back to the medical surgical [place] on medicine team service. She be continued on antibiotics nasal be tailored to her intraoperative culture results per infectious disease recommendations. We will follow-up her intraoperative aspiration to ensure no occult septic arthritis of the hip. She started on chemical DVT prophylaxis postoperative day 1. We will consult the orthotist for hinged ROM brace which she can utilize as we will keep her locked in extension when ambulating, as she can be full weightbearing with the brace locked in extension and can remove the brace for passive ROM    Electronically signed by Bettie Schmidt DO on 1/25/2023 \      NOTE: This report was transcribed using voice recognition software.  Every effort was made to ensure accuracy; however, inadvertent computerized transcription errors may be present

## 2023-01-26 NOTE — PROGRESS NOTES
Pharmacy Consultation Note  (Antibiotic Dosing and Monitoring)    Initial consult date: 1/25/23  Consulting physician/provider: Justin Burt  Drug: Vancomycin  Indication: bone and joint    Age/  Gender Height Weight IBW  Allergy Information   62 y.o./female 5' 7\" (170.2 cm) 218 lb (98.9 kg)     Ideal body weight: 61.6 kg (135 lb 12.9 oz)  Adjusted ideal body weight: 80.1 kg (176 lb 9.6 oz)   Bee pollen, Penicillins, Ropinirole, Ropinirole hcl, Vistaril [hydroxyzine hcl], Aripiprazole, Prednisone, Restoril [temazepam], Wax [beeswax], Hydroxyzine pamoate, and Tape [adhesive tape]      Renal Function:  Recent Labs     01/24/23  0526   BUN 20   CREATININE 0.7         Intake/Output Summary (Last 24 hours) at 1/26/2023 1628  Last data filed at 1/26/2023 1457  Gross per 24 hour   Intake 2160 ml   Output 150 ml   Net 2010 ml       Vancomycin Monitoring:  Trough:  No results for input(s): VANCOTROUGH in the last 72 hours. Random:  No results for input(s): VANCORANDOM in the last 72 hours. Recent Labs     01/24/23  1014   BLOODCULT2 Gram stain performed from blood culture bottle media  Gram positive cocci in clusters  *  Sensitivity to follow          Historical Cultures:  Organism   Date Value Ref Range Status   01/24/2023 Staphylococcus aureus (A)  Preliminary     Recent Labs     01/24/23  0540   BC 24 Hours no growth         Recent vancomycin administrations                     vancomycin (VANCOCIN) 1,250 mg in sodium chloride 0.9 % 250 mL IVPB (mg) 1,250 mg New Bag 01/26/23 1221     1,250 mg New Bag 01/25/23 2123    vancomycin (VANCOCIN) injection (mg) 2,000 mg Given 01/25/23 1702    vancomycin (VANCOCIN) 2,000 mg in dextrose 5 % 500 mL IVPB (mg) 2,000 mg New Bag 01/24/23 1352                   Assessment:  Patient is a 58 y.o. female who has been initiated on vancomycin  Estimated Creatinine Clearance: 105 mL/min (based on SCr of 0.7 mg/dL).   To dose vancomycin, pharmacy will be utilizing [employer] calculation software for goal AUC/NAVARRO 400-600 mg/L-hr. No Scr today. Preliminary body fluid culture = MRSA    Plan:  Vancomycin 1250 mg q 12h (predicted AUC/NAVARRO = 517, Tr = 16.3). Will order vancomycin trough prior to 1/26 2300 dose (HOLD vancomycin dose if Trough level [>20 mCg/mL]  Will continue to monitor renal function; Ordered Scr with 1/27 AM labs. Pharmacy to follow.        Thank you for the consult,     Rob Mohamud, PharmD, [address]  PGY1 Pharmacy Resident     1/26/2023 4:36 PM

## 2023-01-27 LAB
AFB SMEAR: NORMAL
CREAT SERPL-MCNC: 0.6 MG/DL (ref 0.5–1)
FUNGUS STAIN: NORMAL
GFR SERPL CREATININE-BSD FRML MDRD: >60 ML/MIN/1.73

## 2023-01-27 PROCEDURE — 2500000003 HC RX 250 WO HCPCS: Performed by: ORTHOPAEDIC SURGERY

## 2023-01-27 PROCEDURE — 94640 AIRWAY INHALATION TREATMENT: CPT

## 2023-01-27 PROCEDURE — 6360000002 HC RX W HCPCS

## 2023-01-27 PROCEDURE — 82565 ASSAY OF CREATININE: CPT

## 2023-01-27 PROCEDURE — 1200000000 HC SEMI PRIVATE

## 2023-01-27 PROCEDURE — 97530 THERAPEUTIC ACTIVITIES: CPT

## 2023-01-27 PROCEDURE — 6370000000 HC RX 637 (ALT 250 FOR IP): Performed by: ORTHOPAEDIC SURGERY

## 2023-01-27 PROCEDURE — 97535 SELF CARE MNGMENT TRAINING: CPT

## 2023-01-27 PROCEDURE — 51798 US URINE CAPACITY MEASURE: CPT

## 2023-01-27 PROCEDURE — 6360000002 HC RX W HCPCS: Performed by: FAMILY MEDICINE

## 2023-01-27 PROCEDURE — 36415 COLL VENOUS BLD VENIPUNCTURE: CPT

## 2023-01-27 PROCEDURE — 6370000000 HC RX 637 (ALT 250 FOR IP): Performed by: INTERNAL MEDICINE

## 2023-01-27 PROCEDURE — 2580000003 HC RX 258: Performed by: INTERNAL MEDICINE

## 2023-01-27 PROCEDURE — 6360000002 HC RX W HCPCS: Performed by: PHYSICIAN ASSISTANT

## 2023-01-27 PROCEDURE — 6370000000 HC RX 637 (ALT 250 FOR IP): Performed by: FAMILY MEDICINE

## 2023-01-27 PROCEDURE — 2580000003 HC RX 258: Performed by: ORTHOPAEDIC SURGERY

## 2023-01-27 PROCEDURE — 2580000003 HC RX 258

## 2023-01-27 PROCEDURE — 51701 INSERT BLADDER CATHETER: CPT

## 2023-01-27 PROCEDURE — 6360000002 HC RX W HCPCS: Performed by: ORTHOPAEDIC SURGERY

## 2023-01-27 RX ORDER — POLYETHYLENE GLYCOL 3350 17 G/17G
17 POWDER, FOR SOLUTION ORAL DAILY
Status: DISCONTINUED | OUTPATIENT
Start: 2023-01-27 | End: 2023-01-28

## 2023-01-27 RX ORDER — SODIUM CHLORIDE 9 MG/ML
INJECTION, SOLUTION INTRAVENOUS PRN
Status: DISCONTINUED | OUTPATIENT
Start: 2023-01-27 | End: 2023-01-31 | Stop reason: HOSPADM

## 2023-01-27 RX ORDER — SODIUM CHLORIDE 0.9 % (FLUSH) 0.9 %
5-40 SYRINGE (ML) INJECTION EVERY 12 HOURS SCHEDULED
Status: DISCONTINUED | OUTPATIENT
Start: 2023-01-27 | End: 2023-01-31 | Stop reason: HOSPADM

## 2023-01-27 RX ORDER — LIDOCAINE HYDROCHLORIDE 10 MG/ML
5 INJECTION, SOLUTION EPIDURAL; INFILTRATION; INTRACAUDAL; PERINEURAL ONCE
Status: DISCONTINUED | OUTPATIENT
Start: 2023-01-27 | End: 2023-01-31 | Stop reason: HOSPADM

## 2023-01-27 RX ORDER — KETOROLAC TROMETHAMINE 30 MG/ML
15 INJECTION, SOLUTION INTRAMUSCULAR; INTRAVENOUS EVERY 8 HOURS
Status: DISPENSED | OUTPATIENT
Start: 2023-01-27 | End: 2023-01-29

## 2023-01-27 RX ORDER — HEPARIN SODIUM (PORCINE) LOCK FLUSH IV SOLN 100 UNIT/ML 100 UNIT/ML
3 SOLUTION INTRAVENOUS EVERY 12 HOURS SCHEDULED
Status: DISCONTINUED | OUTPATIENT
Start: 2023-01-27 | End: 2023-01-31 | Stop reason: HOSPADM

## 2023-01-27 RX ORDER — SODIUM CHLORIDE 0.9 % (FLUSH) 0.9 %
5-40 SYRINGE (ML) INJECTION PRN
Status: DISCONTINUED | OUTPATIENT
Start: 2023-01-27 | End: 2023-01-31 | Stop reason: HOSPADM

## 2023-01-27 RX ORDER — HEPARIN SODIUM (PORCINE) LOCK FLUSH IV SOLN 100 UNIT/ML 100 UNIT/ML
3 SOLUTION INTRAVENOUS PRN
Status: DISCONTINUED | OUTPATIENT
Start: 2023-01-27 | End: 2023-01-31 | Stop reason: HOSPADM

## 2023-01-27 RX ADMIN — PANCRELIPASE LIPASE, PANCRELIPASE PROTEASE, PANCRELIPASE AMYLASE 40000 UNITS: 20000; 63000; 84000 CAPSULE, DELAYED RELEASE ORAL at 21:45

## 2023-01-27 RX ADMIN — BACLOFEN 20 MG: 10 TABLET ORAL at 08:11

## 2023-01-27 RX ADMIN — KETOROLAC TROMETHAMINE 15 MG: 30 INJECTION, SOLUTION INTRAMUSCULAR; INTRAVENOUS at 16:35

## 2023-01-27 RX ADMIN — DOCUSATE SODIUM 100 MG: 100 CAPSULE, LIQUID FILLED ORAL at 07:59

## 2023-01-27 RX ADMIN — METOPROLOL SUCCINATE 12.5 MG: 25 TABLET, EXTENDED RELEASE ORAL at 07:59

## 2023-01-27 RX ADMIN — PANTOPRAZOLE SODIUM 40 MG: 40 TABLET, DELAYED RELEASE ORAL at 06:02

## 2023-01-27 RX ADMIN — ALBUTEROL SULFATE 2.5 MG: 2.5 SOLUTION RESPIRATORY (INHALATION) at 09:49

## 2023-01-27 RX ADMIN — TRAZODONE HYDROCHLORIDE 100 MG: 50 TABLET ORAL at 20:28

## 2023-01-27 RX ADMIN — SODIUM CHLORIDE, PRESERVATIVE FREE 100 UNITS: 5 INJECTION INTRAVENOUS at 09:00

## 2023-01-27 RX ADMIN — ACETAMINOPHEN 650 MG: 325 TABLET ORAL at 14:49

## 2023-01-27 RX ADMIN — BACLOFEN 20 MG: 10 TABLET ORAL at 20:29

## 2023-01-27 RX ADMIN — SODIUM CHLORIDE, PRESERVATIVE FREE 10 ML: 5 INJECTION INTRAVENOUS at 20:30

## 2023-01-27 RX ADMIN — ALBUTEROL SULFATE 2.5 MG: 2.5 SOLUTION RESPIRATORY (INHALATION) at 17:34

## 2023-01-27 RX ADMIN — MORPHINE SULFATE 2 MG: 2 INJECTION, SOLUTION INTRAMUSCULAR; INTRAVENOUS at 16:38

## 2023-01-27 RX ADMIN — GABAPENTIN 600 MG: 300 CAPSULE ORAL at 16:34

## 2023-01-27 RX ADMIN — GABAPENTIN 600 MG: 300 CAPSULE ORAL at 08:11

## 2023-01-27 RX ADMIN — TOPIRAMATE 200 MG: 100 TABLET, FILM COATED ORAL at 07:59

## 2023-01-27 RX ADMIN — SODIUM CHLORIDE, PRESERVATIVE FREE 10 ML: 5 INJECTION INTRAVENOUS at 08:20

## 2023-01-27 RX ADMIN — ESCITALOPRAM OXALATE 30 MG: 10 TABLET, FILM COATED ORAL at 07:59

## 2023-01-27 RX ADMIN — DOCUSATE SODIUM 100 MG: 100 CAPSULE, LIQUID FILLED ORAL at 20:28

## 2023-01-27 RX ADMIN — TOPIRAMATE 200 MG: 100 TABLET, FILM COATED ORAL at 20:31

## 2023-01-27 RX ADMIN — MORPHINE SULFATE 4 MG: 4 INJECTION, SOLUTION INTRAMUSCULAR; INTRAVENOUS at 11:48

## 2023-01-27 RX ADMIN — OXYCODONE HYDROCHLORIDE 10 MG: 10 TABLET ORAL at 14:44

## 2023-01-27 RX ADMIN — ZIPRASIDONE HYDROCHLORIDE 20 MG: 20 CAPSULE ORAL at 20:28

## 2023-01-27 RX ADMIN — ACETAMINOPHEN 650 MG: 325 TABLET ORAL at 21:20

## 2023-01-27 RX ADMIN — PANCRELIPASE LIPASE, PANCRELIPASE PROTEASE, PANCRELIPASE AMYLASE 60000 UNITS: 20000; 63000; 84000 CAPSULE, DELAYED RELEASE ORAL at 17:06

## 2023-01-27 RX ADMIN — ZIPRASIDONE HYDROCHLORIDE 40 MG: 20 CAPSULE ORAL at 17:06

## 2023-01-27 RX ADMIN — POLYETHYLENE GLYCOL 3350 17 G: 17 POWDER, FOR SOLUTION ORAL at 16:36

## 2023-01-27 RX ADMIN — ALLOPURINOL 100 MG: 100 TABLET ORAL at 07:58

## 2023-01-27 RX ADMIN — ALBUTEROL SULFATE 2.5 MG: 2.5 SOLUTION RESPIRATORY (INHALATION) at 19:36

## 2023-01-27 RX ADMIN — MAGNESIUM HYDROXIDE 30 ML: 1200 LIQUID ORAL at 20:31

## 2023-01-27 RX ADMIN — ALLOPURINOL 100 MG: 100 TABLET ORAL at 20:29

## 2023-01-27 RX ADMIN — BACLOFEN 20 MG: 10 TABLET ORAL at 16:34

## 2023-01-27 RX ADMIN — PANCRELIPASE LIPASE, PANCRELIPASE PROTEASE, PANCRELIPASE AMYLASE 60000 UNITS: 20000; 63000; 84000 CAPSULE, DELAYED RELEASE ORAL at 12:11

## 2023-01-27 RX ADMIN — PANCRELIPASE LIPASE, PANCRELIPASE PROTEASE, PANCRELIPASE AMYLASE 60000 UNITS: 20000; 63000; 84000 CAPSULE, DELAYED RELEASE ORAL at 08:00

## 2023-01-27 RX ADMIN — VANCOMYCIN HYDROCHLORIDE 1250 MG: 10 INJECTION, POWDER, LYOPHILIZED, FOR SOLUTION INTRAVENOUS at 11:57

## 2023-01-27 RX ADMIN — GABAPENTIN 600 MG: 300 CAPSULE ORAL at 12:10

## 2023-01-27 RX ADMIN — SODIUM CHLORIDE, PRESERVATIVE FREE 100 UNITS: 5 INJECTION INTRAVENOUS at 08:04

## 2023-01-27 RX ADMIN — OXYCODONE HYDROCHLORIDE 10 MG: 10 TABLET ORAL at 20:32

## 2023-01-27 RX ADMIN — OXYCODONE HYDROCHLORIDE 10 MG: 10 TABLET ORAL at 06:03

## 2023-01-27 RX ADMIN — KETOROLAC TROMETHAMINE 15 MG: 30 INJECTION, SOLUTION INTRAMUSCULAR; INTRAVENOUS at 21:17

## 2023-01-27 RX ADMIN — VANCOMYCIN HYDROCHLORIDE 1500 MG: 10 INJECTION, POWDER, LYOPHILIZED, FOR SOLUTION INTRAVENOUS at 23:08

## 2023-01-27 RX ADMIN — MORPHINE SULFATE 4 MG: 4 INJECTION, SOLUTION INTRAMUSCULAR; INTRAVENOUS at 08:19

## 2023-01-27 RX ADMIN — GABAPENTIN 600 MG: 300 CAPSULE ORAL at 20:32

## 2023-01-27 RX ADMIN — ALBUTEROL SULFATE 2.5 MG: 2.5 SOLUTION RESPIRATORY (INHALATION) at 13:57

## 2023-01-27 ASSESSMENT — PAIN DESCRIPTION - ORIENTATION
ORIENTATION: RIGHT
ORIENTATION: MID

## 2023-01-27 ASSESSMENT — PAIN SCALES - GENERAL
PAINLEVEL_OUTOF10: 4
PAINLEVEL_OUTOF10: 3
PAINLEVEL_OUTOF10: 8
PAINLEVEL_OUTOF10: 6
PAINLEVEL_OUTOF10: 9
PAINLEVEL_OUTOF10: 8
PAINLEVEL_OUTOF10: 10

## 2023-01-27 ASSESSMENT — PAIN - FUNCTIONAL ASSESSMENT
PAIN_FUNCTIONAL_ASSESSMENT: PREVENTS OR INTERFERES SOME ACTIVE ACTIVITIES AND ADLS
PAIN_FUNCTIONAL_ASSESSMENT: ACTIVITIES ARE NOT PREVENTED
PAIN_FUNCTIONAL_ASSESSMENT: ACTIVITIES ARE NOT PREVENTED
PAIN_FUNCTIONAL_ASSESSMENT: PREVENTS OR INTERFERES SOME ACTIVE ACTIVITIES AND ADLS

## 2023-01-27 ASSESSMENT — PAIN DESCRIPTION - DESCRIPTORS
DESCRIPTORS: ACHING
DESCRIPTORS: SPASM;ACHING
DESCRIPTORS: ACHING
DESCRIPTORS: ACHING;CRAMPING;DISCOMFORT
DESCRIPTORS: THROBBING
DESCRIPTORS: ACHING

## 2023-01-27 ASSESSMENT — PAIN DESCRIPTION - LOCATION
LOCATION: LEG
LOCATION: LEG
LOCATION: KNEE
LOCATION: LEG;HIP
LOCATION: LEG
LOCATION: BACK
LOCATION: LEG
LOCATION: LEG
LOCATION: BACK

## 2023-01-27 NOTE — PROGRESS NOTES
Pharmacy Consultation Note  (Antibiotic Dosing and Monitoring)    Initial consult date: 1/25/23  Consulting physician/provider: Yoav  Drug: Vancomycin  Indication: bone and joint    Age/  Gender Height Weight IBW  Allergy Information   62 y.o./female 5' 7\" (170.2 cm) 218 lb (98.9 kg)     Ideal body weight: 61.6 kg (135 lb 12.9 oz)  Adjusted ideal body weight: 80.1 kg (176 lb 9 oz)   Bee pollen, Penicillins, Ropinirole, Ropinirole hcl, Vistaril [hydroxyzine hcl], Aripiprazole, Prednisone, Restoril [temazepam], Wax [beeswax], Hydroxyzine pamoate, and Tape [adhesive tape]      Renal Function:  Recent Labs     01/27/23  0438   CREATININE 0.6         Intake/Output Summary (Last 24 hours) at 1/27/2023 1332  Last data filed at 1/27/2023 0316  Gross per 24 hour   Intake 600 ml   Output 1450 ml   Net -850 ml         Vancomycin Monitoring:  Trough:    Recent Labs     01/26/23  2151   VANCOTROUGH 15.9     Random:  No results for input(s): VANCORANDOM in the last 72 hours.    Recent Labs     01/26/23  0456   BLOODCULT2 24 Hours no growth          Historical Cultures:  Organism   Date Value Ref Range Status   01/25/2023 Staphylococcus aureus (A)  Preliminary     Recent Labs     01/26/23  0456   BC 24 Hours no growth         Recent vancomycin administrations                     vancomycin (VANCOCIN) 1,250 mg in sodium chloride 0.9 % 250 mL IVPB (mg) 1,250 mg New Bag 01/27/23 1157     1,250 mg New Bag 01/26/23 2336     1,250 mg New Bag  1221     1,250 mg New Bag 01/25/23 2123    vancomycin (VANCOCIN) injection (mg) 2,000 mg Given 01/25/23 1702    vancomycin (VANCOCIN) 2,000 mg in dextrose 5 % 500 mL IVPB (mg) 2,000 mg New Bag 01/24/23 1352                 Assessment:  Patient is a 62 y.o. female who has been initiated on vancomycin  Estimated Creatinine Clearance: 123 mL/min (based on SCr of 0.6 mg/dL).  To dose vancomycin, pharmacy will be utilizing [employer] calculation software for goal AUC/NAVARRO 400-600 mg/L-hr.   Scr: 0.6  mg/dL. Vancomycin trough = 15.9 mcg/mL (~8.5 hours post-dose). 3/3 preliminary surgical cultures = staphylococcus aureus (sensitivities to follow); Preliminary body fluid culture = MRSA and Blood culture via PCR = staphylococcus aureus (mecA/C gene detected). Plan:  Given nature of infection; will increase vancomycin to 1,500 mg IV Q12H. Will order vancomycin levels when appropriate. Will continue to monitor renal function; Ordered Scr with 1/28 AM labs. Pharmacy to follow.        Thank you for the consult,     Mauro Timmons, PharmD, [id number]  PGY1 Pharmacy Resident     1/27/2023 1:32 PM

## 2023-01-27 NOTE — PROGRESS NOTES
Department of Orthopedic Surgery   Progress Note     patient seen and examined. Pain controlled, reports some soreness over entire right lower extremity. No new complaints. Denies chest pain, shortness of breath, calf pain, dizziness/lightheadedness, fevers or chills. VITALS:  BP (!) 100/53   Pulse 74   Temp 96.9 °F (36.1 °C) (Temporal)   Resp 16   Ht 5' 7\" (1.702 m)   Wt 237 lb 11.2 oz (107.8 kg)   LMP 08/31/1988   SpO2 95%   BMI 37.23 kg/m²     GENERAL: In bed, oriented x4  MUSCULOSKELETAL:   right lower extremity:  Dressing C/D/I, knee immobilizer in place  Compartments soft and compressible, calf non-tender  Palpable dorsalis pedis and posterior tibialis pulse, brisk cap refill to toes, foot warm and perfused  Sensation intact to light touch in sural/deep peroneal/superficial peroneal/saphenous/posterior tibial nerve distributions to foot/ankle. Demonstrates weak active and passive ankle plantar/dorsiflexion/great toe extension     CBC:   Lab Results   Component Value Date/Time    WBC 8.4 01/24/2023 06:56 PM    HGB 12.0 01/24/2023 06:56 PM    HCT 36.0 01/24/2023 06:56 PM     01/24/2023 06:56 PM       ASSESSMENT  Right knee explantation with insertion of articulating antibiotic cement spacer 1/25  Removal right distal femur plate and screw construct 1/25  Right hip aspiration with fluoroscopic guidance 1/25       PLAN    Continue physical therapy and protocol: Weightbearing as tolerated right lower extremity in full extension with hinged knee brace in place. Continue antibiotics per ID recommendations appreciate infectious disease input  Culture of surgical specimen pending. Currently there is growth present and they are evaluating for staph species. We will follow  Will follow cultures and labs for Intra-Op aspiration of right hip to rule out any occult septic arthritis of the hip. Currently no growth on the hip aspiration and gram stain was also negative.   Deep venous thrombosis prophylaxis -per medicine recommendations. Okay to resume postop day 1, early mobilization  Currently in hinged knee brace and will continue. PT/OT  Pain Control: IV and PO  Monitor H&H  D/C Plan:  pending sw/cm and therapy recommendations. Appreciate therapy and social work's help in discharge planning. Ok to discharge from orthopedic standpoint once appropriate discharge plan is in place     Electronically signed by Geraldine Tobin DO on 1/27/2023 at 6:50 AM     Orthopaedic Attending    I have seen and evaluated the patient with the resident and agree with the above assessments on today's visit. I have performed the key components of the history and physical examination and concur completely with the findings and plans as documented above.     Electronically signed by   Wali Walker DO  1/27/2023

## 2023-01-27 NOTE — PROGRESS NOTES
Department of Internal Medicine  Infectious Diseases  Progress  Note      C/C :  MRSA bacteremia, sepsis , Right TKA infection     Denies fever   Reports right knee pain   Afebrile     Current Facility-Administered Medications   Medication Dose Route Frequency Provider Last Rate Last Admin    polyethylene glycol (GLYCOLAX) packet 17 g  17 g Oral Daily Juan Hamilton MD        lidocaine 1 % injection 5 mL  5 mL IntraDERmal Once Lulu Mantle, DO        sodium chloride flush 0.9 % injection 5-40 mL  5-40 mL IntraVENous 2 times per day Lulu Mantle, DO   10 mL at 01/26/23 2125    sodium chloride flush 0.9 % injection 5-40 mL  5-40 mL IntraVENous PRN Lulu Mantle, DO        0.9 % sodium chloride infusion   IntraVENous PRN Lulu Mantle, DO        heparin flush 100 UNIT/ML injection 100 Units  1 mL IntraVENous 2 times per day Lulu Mantle, DO   100 Units at 01/27/23 0900    heparin flush 100 UNIT/ML injection 100 Units  1 mL IntraCATHeter PRN Lulu Mantle, DO        lipase-protease-amylase (ZENPEP) 23150-89767 units delayed release capsule 60,000 Units  60,000 Units Oral TID WC Lulu Mantle, DO   60,000 Units at 01/27/23 1211    And    lipase-protease-amylase (ZENPEP) 91842-60700 units delayed release capsule 40,000 Units  40,000 Units Oral TID PRN Lulu Mantle, DO   40,000 Units at 01/26/23 1713    fentaNYL (SUBLIMAZE) injection 50 mcg  50 mcg IntraVENous Q3H PRN Zetta Westby, DO   50 mcg at 01/26/23 0930    albuterol (PROVENTIL) nebulizer solution 2.5 mg  2.5 mg Nebulization 4x daily Zetta Westby, DO   2.5 mg at 01/27/23 0949    allopurinol (ZYLOPRIM) tablet 100 mg  100 mg Oral BID Zetta Westby, DO   100 mg at 01/27/23 0758    baclofen (LIORESAL) tablet 20 mg  20 mg Oral 4x daily Zetta Westby, DO   20 mg at 01/27/23 5456    docusate sodium (COLACE) capsule 100 mg  100 mg Oral BID Zetta Westby, DO   100 mg at 01/27/23 0759    escitalopram (LEXAPRO) tablet 30 mg  30 mg Oral Daily Enola Aschoff Margot Zelaya, DO   30 mg at 01/27/23 0759    gabapentin (NEURONTIN) capsule 600 mg  600 mg Oral 4x Daily Alfonso Fernández, DO   600 mg at 01/27/23 1210    metoprolol succinate (TOPROL XL) extended release tablet 12.5 mg  12.5 mg Oral Daily Alfonso Fernández, DO   12.5 mg at 01/27/23 0759    pantoprazole (PROTONIX) tablet 40 mg  40 mg Oral QAM AC Alfonso Fernández, DO   40 mg at 01/27/23 0602    promethazine (PHENERGAN) tablet 25 mg  25 mg Oral Q6H PRN Alfonso Fernández DO   25 mg at 01/26/23 2123    topiramate (TOPAMAX) tablet 200 mg  200 mg Oral BID Alfonso Fernández DO   200 mg at 01/27/23 0759    traZODone (DESYREL) tablet 100 mg  100 mg Oral Nightly Alfonso Fernández, DO   100 mg at 01/26/23 2123    ziprasidone (GEODON) capsule 20 mg  20 mg Oral Nightly Alfonso Fernández DO   20 mg at 01/26/23 2126    ziprasidone (GEODON) capsule 40 mg  40 mg Oral QPM Alfonso Fernández, DO   40 mg at 01/26/23 1756    ondansetron (ZOFRAN-ODT) disintegrating tablet 4 mg  4 mg Oral Q8H PRN Alfonso Fernández DO        Or    ondansetron [facility]) injection 4 mg  4 mg IntraVENous Q6H PRN Alfonso Fernández DO        magnesium hydroxide (MILK OF MAGNESIA) 400 MG/5ML suspension 30 mL  30 mL Oral Daily PRN Alfonso Fernández DO        acetaminophen (TYLENOL) tablet 650 mg  650 mg Oral Q6H PRN Alfonso Fernández DO   650 mg at 01/25/23 2002    Or    acetaminophen (TYLENOL) suppository 650 mg  650 mg Rectal Q6H PRN Alfonso Fernández DO        vancomycin (VANCOCIN) 1,250 mg in sodium chloride 0.9 % 250 mL IVPB  1,250 mg IntraVENous Q12H Alfonso Fernández .7 mL/hr at 01/27/23 1157 1,250 mg at 01/27/23 1157    midazolam PF (VERSED) injection 2 mg  2 mg IntraVENous Once Alfonso Fernández DO        oxyCODONE (ROXICODONE) immediate release tablet 5 mg  5 mg Oral Q4H PRN Alfonso Fernández DO        Or    oxyCODONE HCl (OXY-IR) immediate release tablet 10 mg  10 mg Oral Q4H PRN Alfonso Fernández DO   10 mg at 01/27/23 0603    morphine (PF) injection 2 mg  2 mg IntraMUSCular Q4H PRN Rogers Breaker, DO        Or    morphine sulfate (PF) injection 4 mg  4 mg IntraMUSCular Q4H PRN Rogers Breaker, DO   4 mg at 01/27/23 1148    ketorolac (TORADOL) injection 15 mg  15 mg IntraVENous Q6H PRN Rilla Chough, DO   15 mg at 01/26/23 4751             REVIEW OF SYSTEMS:    CONSTITUTIONAL:  Denies fever, chill or rigors. HEENT: denies blurring of vision or double vision, denies hearing problem  RESPIRATORY: denies cough, shortness of breath, sputum expectoration. CARDIOVASCULAR:  Denies palpitation or chest pain   GASTROINTESTINAL:  Denies abdomen pain, diarrhea or constipation,, nausea or vomiting. GENITOURINARY:  Denies burning urination or frequency of urination  INTEGUMENT: denies wound , rash  HEMATOLOGIC/LYMPHATIC:  Denies lymph node swelling, gum bleeding or easy bruising. MUSCULOSKELETAL:  Right knee pain   NEUROLOGICAL:  Denies light headed, dizziness, loss of consciousness, weakness of lower extremities, bowel or bladder incontinence. PHYSICAL EXAM:      Vitals:       /70   Pulse 89   Temp 98.6 °F (37 °C) (Temporal)   Resp 16   Ht 5' 7\" (1.702 m)   Wt 237 lb 11.2 oz (107.8 kg)   LMP 08/31/1988   SpO2 92%   BMI 37.23 kg/m²     General Appearance:    Awake, alert , no acute distress. Head:    Normocephalic, atraumatic   Eyes:    No pallor, no icterus,   Ears:    No obvious deformity or drainage.    Nose:   No nasal drainage   Throat:   Mucosa moist, no oral thrush   Neck:   Supple, no lymphadenopathy   Back:     no CVA tenderness   Lungs:     Clear to auscultation bilaterally, no wheeze    Heart:    Regular rate and rhythm, no murmur   Abdomen:     Soft, non-tender, bowel sounds present    Extremities:   Right knee wrapped    Pulses:   Dorsalis pedis palpable    Skin:   no rashes or lesions       CBC with Differential:      Lab Results   Component Value Date/Time    WBC 8.4 01/24/2023 06:56 PM    RBC 3.70 01/24/2023 06:56 PM    HGB 12.0 01/24/2023 06:56 PM    HCT 36.0 01/24/2023 06:56 PM     01/24/2023 06:56 PM    MCV 97.3 01/24/2023 06:56 PM    MCH 32.4 01/24/2023 06:56 PM    MCHC 33.3 01/24/2023 06:56 PM    RDW 13.8 01/24/2023 06:56 PM    SEGSPCT 64 12/27/2013 03:00 PM    LYMPHOPCT 8.0 01/24/2023 05:26 AM    MONOPCT 10.0 01/24/2023 05:26 AM    BASOPCT 0.3 01/24/2023 05:26 AM    MONOSABS 1.16 01/24/2023 05:26 AM    LYMPHSABS 0.93 01/24/2023 05:26 AM    EOSABS 0.04 01/24/2023 05:26 AM    BASOSABS 0.03 01/24/2023 05:26 AM       CMP     Lab Results   Component Value Date/Time     01/24/2023 05:26 AM    K 4.1 01/24/2023 05:26 AM    K 3.9 05/06/2022 05:28 PM     01/24/2023 05:26 AM    CO2 23 01/24/2023 05:26 AM    BUN 20 01/24/2023 05:26 AM    CREATININE 0.6 01/27/2023 04:38 AM    GFRAA >60 08/23/2022 07:14 AM    LABGLOM >60 01/27/2023 04:38 AM    GLUCOSE 125 01/24/2023 05:26 AM    GLUCOSE 93 05/24/2012 11:11 AM    PROT 6.9 01/24/2023 05:26 AM    LABALBU 3.6 01/24/2023 05:26 AM    LABALBU 4.7 05/21/2012 01:48 PM    CALCIUM 8.6 01/24/2023 05:26 AM    BILITOT 0.3 01/24/2023 05:26 AM    ALKPHOS 102 01/24/2023 05:26 AM    AST 19 01/24/2023 05:26 AM    ALT 14 01/24/2023 05:26 AM         Hepatic Function Panel:      Lab Results   Component Value Date/Time    ALKPHOS 102 01/24/2023 05:26 AM    ALT 14 01/24/2023 05:26 AM    AST 19 01/24/2023 05:26 AM    PROT 6.9 01/24/2023 05:26 AM    BILITOT 0.3 01/24/2023 05:26 AM    BILIDIR <0.2 03/16/2021 12:16 PM    IBILI see below 03/16/2021 12:16 PM    LABALBU 3.6 01/24/2023 05:26 AM    LABALBU 4.7 05/21/2012 01:48 PM       PT/INR:    Lab Results   Component Value Date/Time    PROTIME 13.1 08/19/2022 08:25 AM    PROTIME 10.8 05/07/2012 03:45 AM    INR 1.2 08/19/2022 08:25 AM       TSH:    Lab Results   Component Value Date/Time    TSH 0.548 03/16/2021 12:16 PM       U/A:    Lab Results   Component Value Date/Time    COLORU Yellow 01/24/2023 12:29 PM    PHUR 6.0 01/24/2023 12:29 PM LABCAST MODERATE 05/06/2012 04:00 AM    WBCUA NONE 01/24/2023 12:29 PM    WBCUA 1-3 05/06/2012 04:00 AM    RBCUA NONE 01/24/2023 12:29 PM    RBCUA 0-1 12/13/2013 10:15 AM    BACTERIA NONE SEEN 01/24/2023 12:29 PM    CLARITYU Clear 01/24/2023 12:29 PM    SPECGRAV 1.025 01/24/2023 12:29 PM    LEUKOCYTESUR Negative 01/24/2023 12:29 PM    UROBILINOGEN 1.0 01/24/2023 12:29 PM    BILIRUBINUR Negative 01/24/2023 12:29 PM    BILIRUBINUR NEGATIVE 05/06/2012 04:00 AM    BLOODU Negative 01/24/2023 12:29 PM    GLUCOSEU Negative 01/24/2023 12:29 PM    GLUCOSEU NEGATIVE 05/06/2012 04:00 AM       ABG:    Lab Results   Component Value Date/Time    C8IPYQLM 94.0 05/06/2012 10:00 AM       MICROBIOLOGY:    Blood culture -       Staphylococcus aureus by PCR DETECTED Panic      Staphylococcus epidermidis by PCR Not Detected    Staphylococcus lugdunensis by PCR Not Detected    Staphylococcus species by PCR DETECTED Panic      Serratia marcescens by PCR Not Detected    Streptococcus pneumoniae by PCR Not Detected    Streptococcus pyogenes  by PCR Not Detected    Streptococcus species by PCR Not Detected    Stenotrophomonas maltophilia by PCR Not Detected    Candida albicans by PCR Not Detected    Candida auris by PCR Not Detected    Candida glabrata by PCR Not Detected    Candida krusei by PCR Not Detected    Candida parapsilosis by PCR Not Detected    Candida tropicalis by PCR Not Detected    Cryptococcus neoformans/gattii by PCR Not Detected    Methicillin Resistance mecA/C and MREJ by PCR DETECTED Panic         Specimen: Synovial Fluid Updated: 01/25/23 0713      Gram Stain Orderable --    Gram stain performed on unspun fluid   Moderate Polymorphonuclear leukocytes   Epithelial cells not seen   Rare Gram positive cocci in pairs Intracellular and Extracellular    Narrative:         Synovial fluid -    Susceptibility    Staphylococcus aureus (1)    Antibiotic Interpretation Microscan  Method Status    clindamycin Resistant >=^4 mcg/mL BACTERIAL SUSCEPTIBILITY PANEL BY NAVARRO     DAPTOmycin Sensitive ^0.5 mcg/mL BACTERIAL SUSCEPTIBILITY PANEL BY NAVARRO     doxycycline Sensitive <=^0.5 mcg/mL BACTERIAL SUSCEPTIBILITY PANEL BY NAVARRO     erythromycin Resistant >=^8 mcg/mL BACTERIAL SUSCEPTIBILITY PANEL BY NAVARRO     gentamicin Sensitive <=^0.5 mcg/mL BACTERIAL SUSCEPTIBILITY PANEL BY NAVARRO     oxacillin Resistant >=^4 mcg/mL BACTERIAL SUSCEPTIBILITY PANEL BY NAVARRO     tigecycline Sensitive <=^0.12 mcg/mL BACTERIAL SUSCEPTIBILITY PANEL BY NAVARRO     trimethoprim-sulfamethoxazole Sensitive <=^10 mcg/mL BACTERIAL SUSCEPTIBILITY PANEL BY NAVARRO     vancomycin Sensitive ^1 mcg/mL BACTERIAL SUSCEPTIBILITY PANEL BY NAVARRO        Narrative  Performed by: [address] [place] [address] Lab  Source: [address]       Site: Body Fluid&Body Fluid                Specimen Collected: 01/24/23 13:46 EST Last Resulted: 01/26/23 08:17 EST        Radiology :    CT scan right femur -  1. Stable alignment of healing, internally fixated distal femur fracture. 2. Stable alignment of right knee arthroplasty. 3. Moderate synovial effusion. 4. Foci of subcutaneous gas along medial margin of the knee related to recent   procedure.          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

## 2023-01-27 NOTE — PROGRESS NOTES
Occupational Therapy  OT BEDSIDE TREATMENT NOTE   [facility] [address] [address]      Date:2023  Patient Name: Emeli Calix  MRN: [id number]  : 1960  Room: [address] [place] [address]     Evaluating OT:Ila Underwood OTR/L   License #  [id number]        Referring Provider: Brady Crystal DO    Specific Provider Orders/Date: OT evaluation & treatment        Diagnosis: Chronic recurrent infection right TKA  Retained hardware right femur with recurrent infection  Right hip pain rule out septic arthritis      Pertinent Medical History:  has a past medical history of Adenoma of right adrenal gland, Anemia, Anxiety, Arthritis, Asthma, Benign essential tremor, Bipolar affective ([address] Chronic back pain, Chronic respiratory failure with hypoxia ([address] Depression, Diabetes mellitus ([address] Difficulty swallowing, Environmental and seasonal allergies, Excessive physiologic tremor, Fatty liver, Full dentures, GERD (gastroesophageal reflux disease), Gout, Herniated cervical disc, History of swelling of feet, Hypertension, Lymphedema of lower extremity, Mitochondrial cytopathy ([address] Muscle weakness (generalized), Need for assistance with personal care, Neuropathy, Obesity, PETR (obstructive sleep apnea), Osteoarthritis of left knee, PONV (postoperative nausea and vomiting), Seizures ([address] Spinal headache, and Thyroid disease. Surgery: 23: Right knee explantation with insertion of articulating antibiotic cement spacer  Removal right distal femur plate and screw construct  Right hip aspiration with fluoroscopic guidance    Past Surgical History:  has a past surgical history that includes knee surgery (Bilateral); Gastric bypass surgery (); myomectomy; Tonsillectomy; Nerve Block (Left, 10 02 2013); Nerve Block (Left, 10 9 13); Nerve Block (Left, 10/16/13); other surgical history (Left, 13); Nerve Block (Left, 10/29/2014);  Nerve Block (Left, 11/12/2014); Nerve Block (Left, 12 8 14); Nerve Block (Right, 3/30/15); Nerve Block (Right, 4/6/2015); Nerve Block (Right, 4/13/15); Nerve Block (Left, 7/6/15); Nerve Block (07/20/15); Nerve Block (Left, 10 1 15); Nerve Block (10/26/15); other surgical history (3/28/2016); Endoscopy, colon, diagnostic; pr colonoscopy flx dx w/collj spec when pfrmd (N/A, 3/20/2018); pr egd transoral biopsy single/multiple (N/A, 3/20/2018); Cholecystectomy (1999); Hysterectomy (1988); Colonoscopy (N/A, 9/18/2018); Esophagus dilation (9/18/2018); back surgery (last one 1995); Cardiac catheterization (Right, 6-6-2013); Appendectomy; other surgical history (1995); joint replacement (Bilateral, [id number]); egd colonoscopy (N/A, 10/8/2019); Colonoscopy (N/A, 10/8/2019); Nerve Block (Bilateral, 4/1/2021); Ankle fracture surgery (Right, 2/14/2022); Ankle fracture surgery (Right, 3/2/2022); knee surgery (Right, 8/19/2022); and knee surgery (Right, 1/25/2023). Precautions:  Fall Risk   Per Ortho op note: full weightbearing with the brace locked in extension and can remove the brace for passive ROM   Assessment of current deficits    [x] Functional mobility            [x]ADLs           [x] Strength                  [x]Cognition    [x] Functional transfers          [x] IADLs         [x] Safety Awareness   [x]Endurance    [x] Fine Coordination                         [x] Balance      [] Vision/perception   [x]Sensation      []Gross Motor Coordination             [] ROM           [] Delirium                   [] Motor Control      OT PLAN OF CARE   OT POC based on physician orders, patient diagnosis and results of clinical assessment     Frequency/Duration: 2-4 days/wk for 2 weeks PRN   Specific OT Treatment Interventions to include:    Instruction/training on adapted ADL techniques and AE recommendations to increase functional independence within precautions  Training on energy conservation strategies, correct breathing pattern and techniques to improve independence/tolerance for self-care routine  Functional transfer/mobility training/DME recommendations for increased independence, safety, and fall prevention  Patient/Family education to increase follow through with safety techniques and functional independence  Recommendation of environmental modifications for increased safety with functional transfers/mobility and ADLs  Cognitive retraining/development of therapeutic activities to improve problem solving, judgement, memory, and attention for increased safety/participation in ADL/IADL tasks  Splinting/positioning for increased function, prevention of contractures, and improve skin integrity  Therapeutic exercise to improve motor endurance, ROM, and functional strength for ADLs/functional transfers  Therapeutic activities to facilitate/challenge dynamic balance, stand tolerance for increased safety and independence with ADLs  Therapeutic activities to facilitate gross/fine motor skills for increased independence with ADLs  Positioning to improve skin integrity, interaction with environment and functional independence     Recommended Adaptive Equipment:  TBD    Home Living:  Pt lives alone in a 1 story with 0 step(s) to enter and 0 rail(s); bed/bath on 1st   Bathroom setup: tub shower combo with bath bench   Equipment owned: electric w/c, bath bench, ww     Prior Level of Function:  Modified [place]  with ADLs except for bath. Pt has a caregiver 5x/week x 5 hours/day ,    Modified [place] with IADLs. Ambulated with ww, or uses electric w/c     Driving: no  Occupation/leisure: not stated     Pain Level: \"severe\" R LE, educated on positioning  Cognition: A&O: x3;  Follows 2 step directions              Memory:  G              Sequencing:  F              Problem solving:  F              Judgement/safety:  F                Functional Assessment:  AM-PAC Daily Activity Raw Score: 15/24    Initial Eval Status  Date: 1-26-23 Treatment Status  Date: 1/27/23 STGs = LTGs  Time frame: 10-14 days   Feeding Ind. Ind Mod I/ Ind   Grooming SBA seated  SBA  To wash face and apply deodorant seated EOB Modified [place]    UB Dressing Min A with gown SBA  To don/doff gown seated EOB  Modified [place]    LB Dressing Dep B socks  Dependent  To don/doff socks seated EOB Supervision    Bathing Max A with sim. task Mod A  Seated EOB and standing for posterior  Supervision    Toileting Dep  Bedpan use Max A- hygiene  Supervision    Bed Mobility  Logroll: Max A  Supine to sit: Max A x2  Sit to supine:   Max A x2 Max A- supine<->sit  Educated pt on technique to increase independence. Supine to sit: Modified [place]   Sit to supine: Modified [place]    Functional Transfers NT, pt. deferred Max A- sit<->stand  Cuing for hand placement and body mechanics  Supervision    Functional Mobility NT Mod A  Side stepping along EOB using w/w  Supervision    Balance Sitting:     Static:  SBA    Dynamic: Min A  Standing: NT/ pt. deferred Sitting:     Static:  SBA    Dynamic: SBA  Standing: Mod A     Activity Tolerance Poor with lt. Ax. Fair F/+   Visual/  Perceptual Glasses: yes          Vitals /57 seated  spO2 & HR WFL   WFL       Comments: Upon arrival pt supine in bed. Pt educated on techniques to increase independence and safety during ADL's, bed mobility, and functional transfers. Discussed home set up with pt, giving suggestions to increase safety at discharge. At end of session pt left seated semi upright in bed, call light within reach. Pt has made fair progress towards set goals.      Continue with current plan of care    Treatment Time In: 12:10            Treatment Time Out: 12:50             Treatment Charges: Mins Units   Ther Ex  [id number]     Manual Therapy Jamar Greer [phone] [id number]   ADL/Home Mgt [phone]   Neuro Re-ed [id number]     Group Therapy      Orthotic manage/training  [id number]     Non-Billable Time     Total Timed [place] [phone] [place] [id number]

## 2023-01-27 NOTE — PROGRESS NOTES
Hospitalist Progress Note      PCP: Windy Boogie DO    Date of Admission: 1/24/2023  Days in the hospital: 3    Hospital Course:   Ms. Asim Nichols, a 58y.o. year old female  who  has a past medical history of Adenoma of right adrenal gland, Anemia, Anxiety, Arthritis, Asthma, Benign essential tremor, Bipolar affective ([address] Chronic back pain, Chronic respiratory failure with hypoxia ([address] Depression, Diabetes mellitus ([address] Difficulty swallowing, Environmental and seasonal allergies, Excessive physiologic tremor, Fatty liver, Full dentures, GERD (gastroesophageal reflux disease), Gout, Herniated cervical disc, History of swelling of feet, Hypertension, Lymphedema of lower extremity, Mitochondrial cytopathy ([address] Muscle weakness (generalized), Need for assistance with personal care, Neuropathy, Obesity, PETR (obstructive sleep apnea), Osteoarthritis of left knee, PONV (postoperative nausea and vomiting), Seizures ([address] Spinal headache, and Thyroid disease. Patient presents with sudden onset of pain from right knee, ankle and hip pain, she had rt knee I&D done in 8/2022. She is post rt TKA in [place] several years ago. She was seen by orthopedics and concern for right knee septic arthritis and underwent aspiration, cultures positive for staph aureus and sensitivity pending. Patient underwent right knee I&D and removal of existing hardware and antibiotic spacer placement on 1/25/2023. Cultures growing MRSA. Subjective  Patient seen and examined at bedside. Pain is fairly controlled, refusing to go to SNF and wants to go home only upon discharge. Awaiting ID recommendations regarding PICC line and antibiotic duration. Discussed with case management. Exam:    /70   Pulse 89   Temp 98.6 °F (37 °C) (Temporal)   Resp 16   Ht 5' 7\" (1.702 m)   Wt 237 lb 11.2 oz (107.8 kg)   LMP 08/31/1988   SpO2 90%   BMI 37.23 kg/m²     HEENT: No pallor, no icterus.   Respiratory:  CTA, good air entry. Cardiovascular: RRR, no murmur. Abdomen: Soft, non-tender, BS noted. Musculoskeletal: No joint pains or joint swelling noted. Neurologic: awake, alert and following commands         Assessment/Plan:  Septic arthritis alcohol, patient underwent right knee I&D and removal of existing hardware and antibiotic spacer placement, cultures growing MRSA, on vancomycin, follow-up with ID for further recommendations regarding antibiotic coverage. Follow-up with orthopedics, continue with physical therapy. Echocardiogram pending. Hypertension, blood pressure controlled    Neuropathy, continue with Neurontin    Seizure disorder, continue with regular medications and seizure precautions    Generalized weakness, physical therapy following      Labs:   Recent Labs     01/24/23  1856   WBC 8.4   HGB 12.0   HCT 36.0        Recent Labs     01/27/23  0438   CREATININE 0.6     No results for input(s): AST, ALT, BILIDIR, BILITOT, ALKPHOS in the last 72 hours. No results for input(s): INR in the last 72 hours. No results for input(s): Harper Villalpando in the last 72 hours.     Medications:  Reviewed    Infusion Medications    sodium chloride      sodium chloride       Scheduled Medications    lidocaine  5 mL IntraDERmal Once    sodium chloride flush  5-40 mL IntraVENous 2 times per day    heparin flush  1 mL IntraVENous 2 times per day    lipase-protease-amylase  60,000 Units Oral TID WC    albuterol  2.5 mg Nebulization 4x daily    allopurinol  100 mg Oral BID    baclofen  20 mg Oral 4x daily    docusate sodium  100 mg Oral BID    escitalopram  30 mg Oral Daily    gabapentin  600 mg Oral 4x Daily    metoprolol succinate  12.5 mg Oral Daily    pantoprazole  40 mg Oral QAM AC    topiramate  200 mg Oral BID    traZODone  100 mg Oral Nightly    ziprasidone  20 mg Oral Nightly    ziprasidone  40 mg Oral QPM    sodium chloride flush  10 mL IntraVENous 2 times per day    vancomycin  1,250 mg IntraVENous Q12H midazolam  2 mg IntraVENous Once     PRN Meds: sodium chloride flush, sodium chloride, heparin flush, lipase-protease-amylase **AND** lipase-protease-amylase, fentanNYL, promethazine, sodium chloride flush, sodium chloride, ondansetron **OR** ondansetron, magnesium hydroxide, acetaminophen **OR** acetaminophen, oxyCODONE **OR** oxyCODONE, morphine **OR** morphine, ketorolac      Intake/Output Summary (Last 24 hours) at 1/27/2023 0818  Last data filed at 1/27/2023 0316  Gross per 24 hour   Intake 600 ml   Output 1450 ml   Net -850 ml     Body mass index is 37.23 kg/m². Diet  ADULT DIET; Regular    Code Status  Full Code       Electronically signed by Salazar Go MD on 1/27/2023 at 8:18 AM  [employer]   Please contact me through perfect serve    NOTE: This report was transcribed using voice recognition software. Every effort was made to ensure accuracy; however, inadvertent computerized transcription errors may be present.

## 2023-01-27 NOTE — CARE COORDINATION
1/27/2023 social work transition of care planning  Sw followed up with pt at [phone] [facility] remains for home with DIONISIO Jane. Nitza faxed pharmacy from to x [phone]. Awaiting final plan for atb. If dc on iv atb,rx will need to be faxed to x [phone]. Electronically signed by GAGE García on 1/27/2023 at 11:06 AM    Addendum: No preference for dme,referral to [facility] earl to follow for home o2 if needed.   Electronically signed by GAGE García on 1/27/2023 at 12:03 PM

## 2023-01-28 LAB
ANAEROBIC CULTURE: NORMAL
CREAT SERPL-MCNC: 0.5 MG/DL (ref 0.5–1)
GFR SERPL CREATININE-BSD FRML MDRD: >60 ML/MIN/1.73

## 2023-01-28 PROCEDURE — 97530 THERAPEUTIC ACTIVITIES: CPT

## 2023-01-28 PROCEDURE — 2580000003 HC RX 258

## 2023-01-28 PROCEDURE — 05HC33Z INSERTION OF INFUSION DEVICE INTO LEFT BASILIC VEIN, PERCUTANEOUS APPROACH: ICD-10-PCS | Performed by: FAMILY MEDICINE

## 2023-01-28 PROCEDURE — 6360000002 HC RX W HCPCS: Performed by: PHYSICIAN ASSISTANT

## 2023-01-28 PROCEDURE — 6370000000 HC RX 637 (ALT 250 FOR IP): Performed by: FAMILY MEDICINE

## 2023-01-28 PROCEDURE — 82565 ASSAY OF CREATININE: CPT

## 2023-01-28 PROCEDURE — 1200000000 HC SEMI PRIVATE

## 2023-01-28 PROCEDURE — 2700000000 HC OXYGEN THERAPY PER DAY

## 2023-01-28 PROCEDURE — 6360000002 HC RX W HCPCS: Performed by: INTERNAL MEDICINE

## 2023-01-28 PROCEDURE — 6360000002 HC RX W HCPCS: Performed by: ORTHOPAEDIC SURGERY

## 2023-01-28 PROCEDURE — 36569 INSJ PICC 5 YR+ W/O IMAGING: CPT

## 2023-01-28 PROCEDURE — 94640 AIRWAY INHALATION TREATMENT: CPT

## 2023-01-28 PROCEDURE — 6370000000 HC RX 637 (ALT 250 FOR IP): Performed by: INTERNAL MEDICINE

## 2023-01-28 PROCEDURE — 36415 COLL VENOUS BLD VENIPUNCTURE: CPT

## 2023-01-28 PROCEDURE — 76937 US GUIDE VASCULAR ACCESS: CPT

## 2023-01-28 PROCEDURE — 6360000002 HC RX W HCPCS

## 2023-01-28 PROCEDURE — 2580000003 HC RX 258: Performed by: INTERNAL MEDICINE

## 2023-01-28 PROCEDURE — 6370000000 HC RX 637 (ALT 250 FOR IP): Performed by: ORTHOPAEDIC SURGERY

## 2023-01-28 PROCEDURE — C1751 CATH, INF, PER/CENT/MIDLINE: HCPCS

## 2023-01-28 RX ORDER — POLYETHYLENE GLYCOL 3350 17 G/17G
17 POWDER, FOR SOLUTION ORAL 2 TIMES DAILY
Status: DISCONTINUED | OUTPATIENT
Start: 2023-01-28 | End: 2023-01-31 | Stop reason: HOSPADM

## 2023-01-28 RX ORDER — SODIUM PHOSPHATE, DIBASIC AND SODIUM PHOSPHATE, MONOBASIC 7; 19 G/133ML; G/133ML
1 ENEMA RECTAL
Status: COMPLETED | OUTPATIENT
Start: 2023-01-28 | End: 2023-01-28

## 2023-01-28 RX ORDER — BISACODYL 10 MG
10 SUPPOSITORY, RECTAL RECTAL DAILY PRN
Status: DISCONTINUED | OUTPATIENT
Start: 2023-01-28 | End: 2023-01-31 | Stop reason: HOSPADM

## 2023-01-28 RX ADMIN — PANCRELIPASE LIPASE, PANCRELIPASE PROTEASE, PANCRELIPASE AMYLASE 60000 UNITS: 20000; 63000; 84000 CAPSULE, DELAYED RELEASE ORAL at 08:56

## 2023-01-28 RX ADMIN — DOCUSATE SODIUM 100 MG: 100 CAPSULE, LIQUID FILLED ORAL at 20:34

## 2023-01-28 RX ADMIN — Medication 1 ENEMA: at 17:28

## 2023-01-28 RX ADMIN — ALBUTEROL SULFATE 2.5 MG: 2.5 SOLUTION RESPIRATORY (INHALATION) at 08:37

## 2023-01-28 RX ADMIN — GABAPENTIN 600 MG: 300 CAPSULE ORAL at 17:33

## 2023-01-28 RX ADMIN — ALLOPURINOL 100 MG: 100 TABLET ORAL at 20:35

## 2023-01-28 RX ADMIN — BACLOFEN 20 MG: 10 TABLET ORAL at 17:35

## 2023-01-28 RX ADMIN — TOPIRAMATE 200 MG: 100 TABLET, FILM COATED ORAL at 20:37

## 2023-01-28 RX ADMIN — ACETAMINOPHEN 650 MG: 325 TABLET ORAL at 14:34

## 2023-01-28 RX ADMIN — BACLOFEN 20 MG: 10 TABLET ORAL at 20:34

## 2023-01-28 RX ADMIN — TOPIRAMATE 200 MG: 100 TABLET, FILM COATED ORAL at 08:56

## 2023-01-28 RX ADMIN — GABAPENTIN 600 MG: 300 CAPSULE ORAL at 08:54

## 2023-01-28 RX ADMIN — METOPROLOL SUCCINATE 12.5 MG: 25 TABLET, EXTENDED RELEASE ORAL at 08:55

## 2023-01-28 RX ADMIN — POLYETHYLENE GLYCOL 3350 17 G: 17 POWDER, FOR SOLUTION ORAL at 20:32

## 2023-01-28 RX ADMIN — ZIPRASIDONE HYDROCHLORIDE 20 MG: 20 CAPSULE ORAL at 20:36

## 2023-01-28 RX ADMIN — DOCUSATE SODIUM 100 MG: 100 CAPSULE, LIQUID FILLED ORAL at 08:55

## 2023-01-28 RX ADMIN — ACETAMINOPHEN 650 MG: 325 TABLET ORAL at 06:47

## 2023-01-28 RX ADMIN — VANCOMYCIN HYDROCHLORIDE 1500 MG: 10 INJECTION, POWDER, LYOPHILIZED, FOR SOLUTION INTRAVENOUS at 23:10

## 2023-01-28 RX ADMIN — VANCOMYCIN HYDROCHLORIDE 1500 MG: 10 INJECTION, POWDER, LYOPHILIZED, FOR SOLUTION INTRAVENOUS at 15:53

## 2023-01-28 RX ADMIN — ALBUTEROL SULFATE 2.5 MG: 2.5 SOLUTION RESPIRATORY (INHALATION) at 16:27

## 2023-01-28 RX ADMIN — BACLOFEN 20 MG: 10 TABLET ORAL at 13:47

## 2023-01-28 RX ADMIN — SODIUM CHLORIDE, PRESERVATIVE FREE 10 ML: 5 INJECTION INTRAVENOUS at 15:56

## 2023-01-28 RX ADMIN — SODIUM CHLORIDE, PRESERVATIVE FREE 10 ML: 5 INJECTION INTRAVENOUS at 09:22

## 2023-01-28 RX ADMIN — MORPHINE SULFATE 4 MG: 4 INJECTION, SOLUTION INTRAMUSCULAR; INTRAVENOUS at 09:21

## 2023-01-28 RX ADMIN — ESCITALOPRAM OXALATE 30 MG: 10 TABLET, FILM COATED ORAL at 08:54

## 2023-01-28 RX ADMIN — OXYCODONE HYDROCHLORIDE 10 MG: 10 TABLET ORAL at 06:45

## 2023-01-28 RX ADMIN — ACETAMINOPHEN 650 MG: 325 TABLET ORAL at 20:37

## 2023-01-28 RX ADMIN — PANTOPRAZOLE SODIUM 40 MG: 40 TABLET, DELAYED RELEASE ORAL at 05:59

## 2023-01-28 RX ADMIN — ALLOPURINOL 100 MG: 100 TABLET ORAL at 08:55

## 2023-01-28 RX ADMIN — OXYCODONE HYDROCHLORIDE 10 MG: 10 TABLET ORAL at 14:30

## 2023-01-28 RX ADMIN — HEPARIN SODIUM (PORCINE) LOCK FLUSH IV SOLN 100 UNIT/ML 300 UNITS: 100 SOLUTION at 08:58

## 2023-01-28 RX ADMIN — OXYCODONE HYDROCHLORIDE 10 MG: 10 TABLET ORAL at 20:38

## 2023-01-28 RX ADMIN — KETOROLAC TROMETHAMINE 15 MG: 30 INJECTION, SOLUTION INTRAMUSCULAR; INTRAVENOUS at 06:00

## 2023-01-28 RX ADMIN — PANCRELIPASE LIPASE, PANCRELIPASE PROTEASE, PANCRELIPASE AMYLASE 60000 UNITS: 20000; 63000; 84000 CAPSULE, DELAYED RELEASE ORAL at 17:35

## 2023-01-28 RX ADMIN — TRAZODONE HYDROCHLORIDE 100 MG: 50 TABLET ORAL at 20:35

## 2023-01-28 RX ADMIN — BACLOFEN 20 MG: 10 TABLET ORAL at 08:54

## 2023-01-28 RX ADMIN — GABAPENTIN 600 MG: 300 CAPSULE ORAL at 20:34

## 2023-01-28 RX ADMIN — MORPHINE SULFATE 4 MG: 4 INJECTION, SOLUTION INTRAMUSCULAR; INTRAVENOUS at 15:56

## 2023-01-28 RX ADMIN — SODIUM CHLORIDE, PRESERVATIVE FREE 10 ML: 5 INJECTION INTRAVENOUS at 20:44

## 2023-01-28 RX ADMIN — Medication 10 MG: at 14:31

## 2023-01-28 RX ADMIN — HEPARIN SODIUM (PORCINE) LOCK FLUSH IV SOLN 100 UNIT/ML 300 UNITS: 100 SOLUTION at 20:43

## 2023-01-28 RX ADMIN — SODIUM CHLORIDE, PRESERVATIVE FREE 10 ML: 5 INJECTION INTRAVENOUS at 09:04

## 2023-01-28 RX ADMIN — GABAPENTIN 600 MG: 300 CAPSULE ORAL at 13:47

## 2023-01-28 RX ADMIN — KETOROLAC TROMETHAMINE 15 MG: 30 INJECTION, SOLUTION INTRAMUSCULAR; INTRAVENOUS at 23:05

## 2023-01-28 RX ADMIN — ZIPRASIDONE HYDROCHLORIDE 40 MG: 20 CAPSULE ORAL at 17:32

## 2023-01-28 RX ADMIN — POLYETHYLENE GLYCOL 3350 17 G: 17 POWDER, FOR SOLUTION ORAL at 08:57

## 2023-01-28 RX ADMIN — PANCRELIPASE LIPASE, PANCRELIPASE PROTEASE, PANCRELIPASE AMYLASE 60000 UNITS: 20000; 63000; 84000 CAPSULE, DELAYED RELEASE ORAL at 13:47

## 2023-01-28 ASSESSMENT — PAIN - FUNCTIONAL ASSESSMENT
PAIN_FUNCTIONAL_ASSESSMENT: PREVENTS OR INTERFERES SOME ACTIVE ACTIVITIES AND ADLS
PAIN_FUNCTIONAL_ASSESSMENT: PREVENTS OR INTERFERES SOME ACTIVE ACTIVITIES AND ADLS
PAIN_FUNCTIONAL_ASSESSMENT: ACTIVITIES ARE NOT PREVENTED
PAIN_FUNCTIONAL_ASSESSMENT: PREVENTS OR INTERFERES SOME ACTIVE ACTIVITIES AND ADLS
PAIN_FUNCTIONAL_ASSESSMENT: PREVENTS OR INTERFERES SOME ACTIVE ACTIVITIES AND ADLS

## 2023-01-28 ASSESSMENT — PAIN DESCRIPTION - LOCATION
LOCATION: LEG
LOCATION: KNEE;HIP

## 2023-01-28 ASSESSMENT — PAIN SCALES - GENERAL
PAINLEVEL_OUTOF10: 9
PAINLEVEL_OUTOF10: 9
PAINLEVEL_OUTOF10: 7
PAINLEVEL_OUTOF10: 9
PAINLEVEL_OUTOF10: 8

## 2023-01-28 ASSESSMENT — PAIN DESCRIPTION - ORIENTATION
ORIENTATION: RIGHT
ORIENTATION: LEFT
ORIENTATION: RIGHT

## 2023-01-28 ASSESSMENT — PAIN DESCRIPTION - DESCRIPTORS
DESCRIPTORS: ACHING
DESCRIPTORS: ACHING;BURNING;THROBBING
DESCRIPTORS: ACHING
DESCRIPTORS: ACHING;THROBBING
DESCRIPTORS: DISCOMFORT;THROBBING;POUNDING

## 2023-01-28 NOTE — PROGRESS NOTES
Hospitalist Progress Note      PCP: Tristin Melvin DO    Date of Admission: 1/24/2023  Days in the hospital: 4    Hospital Course:   Ms. Toni Serrano, a 58y.o. year old female  who  has a past medical history of Adenoma of right adrenal gland, Anemia, Anxiety, Arthritis, Asthma, Benign essential tremor, Bipolar affective ([address] Chronic back pain, Chronic respiratory failure with hypoxia ([address] Depression, Diabetes mellitus ([address] Difficulty swallowing, Environmental and seasonal allergies, Excessive physiologic tremor, Fatty liver, Full dentures, GERD (gastroesophageal reflux disease), Gout, Herniated cervical disc, History of swelling of feet, Hypertension, Lymphedema of lower extremity, Mitochondrial cytopathy ([address] Muscle weakness (generalized), Need for assistance with personal care, Neuropathy, Obesity, PETR (obstructive sleep apnea), Osteoarthritis of left knee, PONV (postoperative nausea and vomiting), Seizures ([address] Spinal headache, and Thyroid disease. Patient presents with sudden onset of pain from right knee, ankle and hip pain, she had rt knee I&D done in 8/2022. She is post rt TKA in [place] several years ago. She was seen by orthopedics and concern for right knee septic arthritis and underwent aspiration, cultures positive for staph aureus and sensitivity pending. Patient underwent right knee I&D and removal of existing hardware and antibiotic spacer placement on 1/25/2023. Cultures growing MRSA. Subjective  Seen and examined chart reviewed discussed with nursing staff at bedside. Patient complains of constipation asking for suppository. She also says that she takes MiraLAX and Colace twice a day at home.   Awaiting PICC line patient still refusing placement  Exam:    /82   Pulse 84   Temp 97.5 °F (36.4 °C) (Temporal)   Resp 16   Ht 5' 7\" (1.702 m)   Wt 236 lb 4.8 oz (107.2 kg)   LMP 08/31/1988   SpO2 94%   BMI 37.01 kg/m²     HEENT: No pallor, no icterus. Respiratory:  CTA, good air entry. Cardiovascular: RRR, no murmur. Abdomen: Soft, non-tender, BS noted. Musculoskeletal: No joint pains or joint swelling noted. Neurologic: awake, alert and following commands               Labs:   No results for input(s): WBC, HGB, HCT, PLT in the last 72 hours. Recent Labs     01/27/23  0438 01/28/23  0643   CREATININE 0.6 0.5       No results for input(s): AST, ALT, BILIDIR, BILITOT, ALKPHOS in the last 72 hours. No results for input(s): INR in the last 72 hours. No results for input(s): Pk Roddy in the last 72 hours. Medications:  Reviewed    Infusion Medications    sodium chloride       Scheduled Medications    polyethylene glycol  17 g Oral BID    lidocaine PF  5 mL IntraDERmal Once    sodium chloride flush  5-40 mL IntraVENous 2 times per day    heparin flush  3 mL IntraVENous 2 times per day    ketorolac  15 mg IntraVENous q8h    vancomycin  1,500 mg IntraVENous Q12H    lipase-protease-amylase  60,000 Units Oral TID WC    albuterol  2.5 mg Nebulization 4x daily    allopurinol  100 mg Oral BID    baclofen  20 mg Oral 4x daily    docusate sodium  100 mg Oral BID    escitalopram  30 mg Oral Daily    gabapentin  600 mg Oral 4x Daily    metoprolol succinate  12.5 mg Oral Daily    pantoprazole  40 mg Oral QAM AC    topiramate  200 mg Oral BID    traZODone  100 mg Oral Nightly    ziprasidone  20 mg Oral Nightly    ziprasidone  40 mg Oral QPM     PRN Meds: bisacodyl, sodium chloride flush, sodium chloride, heparin flush, lipase-protease-amylase **AND** lipase-protease-amylase, promethazine, ondansetron **OR** ondansetron, magnesium hydroxide, acetaminophen **OR** acetaminophen, oxyCODONE **OR** oxyCODONE, morphine **OR** morphine      Intake/Output Summary (Last 24 hours) at 1/28/2023 1100  Last data filed at 1/28/2023 0900  Gross per 24 hour   Intake 880 ml   Output --   Net 880 ml       Body mass index is 37.01 kg/m².       Diet  ADULT DIET; Regular    Code Status  Full Code     Assessment/Plan    --Septic arthritis , right TKA infection. Status post hardware explantation 1/25, orthopedic surgery and infectious disease. Following. IV vancomycin ending PICC line    -- MRSA bacteremia recurrent. Vancomycin    --Hypertension, blood pressure controlled    --Neuropathy, continue with Neurontin    --Seizure disorder, continue with regular medications and seizure precautions    --Generalized weakness, physical therapy following    --Morbid obesity, lifestyle modification    --Disposition she refused placement, home with home health care on discharge  Electronically signed by Chevy Zhang MD on 1/28/2023 at 11:00 AM  [employer]   Please contact me through perfect serve    NOTE: This report was transcribed using voice recognition software. Every effort was made to ensure accuracy; however, inadvertent computerized transcription errors may be present. 2 = assistive person

## 2023-01-28 NOTE — PROGRESS NOTES
Department of Orthopedic Surgery   Progress Note     patient seen and examined. Pain controlled, reports some soreness over entire right lower extremity. No new complaints. Denies chest pain, shortness of breath, calf pain, dizziness/lightheadedness, fevers or chills. It is still painful but improving. VITALS:  BP (!) 114/57   Pulse 88   Temp 99 °F (37.2 °C) (Temporal)   Resp 16   Ht 5' 7\" (1.702 m)   Wt 236 lb 4.8 oz (107.2 kg)   LMP 08/31/1988   SpO2 96%   BMI 37.01 kg/m²     GENERAL: In bed, oriented x4  MUSCULOSKELETAL:   right lower extremity:  Dressing C/D/I, knee immobilizer in place  Compartments soft and compressible, calf non-tender  Palpable dorsalis pedis and posterior tibialis pulse, brisk cap refill to toes, foot warm and perfused  Sensation intact to light touch in sural/deep peroneal/superficial peroneal/saphenous/posterior tibial nerve distributions to foot/ankle. Demonstrates weak active and passive ankle plantar/dorsiflexion/great toe extension     CBC:   Lab Results   Component Value Date/Time    WBC 8.4 01/24/2023 06:56 PM    HGB 12.0 01/24/2023 06:56 PM    HCT 36.0 01/24/2023 06:56 PM     01/24/2023 06:56 PM       ASSESSMENT  Right knee explantation with insertion of articulating antibiotic cement spacer 1/25  Removal right distal femur plate and screw construct 1/25  Right hip aspiration with fluoroscopic guidance 1/25       PLAN    Continue physical therapy and protocol: Weightbearing as tolerated right lower extremity in full extension with hinged knee brace in place. Continue antibiotics per ID recommendations appreciate infectious disease input  Culture of surgical specimen showing staph aureus. Will follow cultures and labs for Intra-Op aspiration of right hip to rule out any septic arthritis of the hip. Currently no growth on the hip aspiration and gram stain was also negative. Deep venous thrombosis prophylaxis -per medicine recommendations.   Okay to resume postop day 1, early mobilization  Currently in hinged knee brace and will continue. PT/OT  Pain Control: IV and PO  Monitor H&H  D/C Plan:  pending sw/cm and therapy recommendations. Appreciate therapy and social work's help in discharge planning.   Ok to discharge from orthopedic standpoint once appropriate discharge plan is in place     Electronically signed by Chris Fairbanks DO on 1/28/2023 at 8:33 AM

## 2023-01-28 NOTE — PROGRESS NOTES
Physical Therapy    Treatment Note       Name: Edgar Vasquez  : 1960  MRN: [id number]      Date of Service: 2023    Evaluating PT:  Robson Barrow PT, DPT  [id number]    Room #:  1362/4296-D  Diagnosis:  Septic joint ([facility] [address] [M00.9]  Knee pain [M25.569]  PMHx/PSHx:   has a past medical history of Adenoma of right adrenal gland, Anemia, Anxiety, Arthritis, Asthma, Benign essential tremor, Bipolar affective ([address] Chronic back pain, Chronic respiratory failure with hypoxia ([facility] [address] Depression, Diabetes mellitus ([facility] [address] Difficulty swallowing, Environmental and seasonal allergies, Excessive physiologic tremor, Fatty liver, Full dentures, GERD (gastroesophageal reflux disease), Gout, Herniated cervical disc, History of swelling of feet, Hypertension, Lymphedema of lower extremity, Mitochondrial cytopathy ([facility] [address] Muscle weakness (generalized), Need for assistance with personal care, Neuropathy, Obesity, PETR (obstructive sleep apnea), Osteoarthritis of left knee, PONV (postoperative nausea and vomiting), Seizures ([address] Spinal headache, and Thyroid disease. Procedure/Surgery:  RIGHT KNEE INCISION AND DRAINAGE, REMOVAL OF EXISTING HARDWARE, ANTIBIOTIC SPACER INSTALLED 23  Precautions:  Falls, WBAT RLE, Hinged Knee Brace Locked in Extension  Equipment Needs:  TBD    SUBJECTIVE:    Pt lives alone in a 1 story with 0 step(s) to enter and 0 rail(s); bed/bath on 1st   Bathroom setup: tub shower combo with bath bench   Equipment owned: electric w/c, bath bench, ww    OBJECTIVE:   Initial Evaluation  Date: 23 Treatment Date: 23 Short Term/ Long Term   Goals   AM-PAC 6 Clicks     Was pt agreeable to Eval/treatment? yes Yes    Does pt have pain?  No c/o pain  No current complaints of pain    Bed Mobility  Rolling: MaxA  Supine to sit: MaxA x 2  Sit to supine: MaxA x 2  Scooting: MaxA Rolling: NT  Supine to sit: SBA  Sit to supine: NT  Scooting: SBA to EOB Rolling: Independent  Supine to sit: Independent  Sit to supine: Independent  Scooting: Independent     Transfers Sit to stand: NT  Stand to sit: NT  Stand pivot: NT Sit to stand: Min A  Stand to sit: Min A  Stand pivot: Min A with [employer] Sit to stand: Modified Independent  Stand to sit: Modified Independent  Stand pivot: Modified Independent     Ambulation    NT 2 feet to chair with [employer] with Min A 50 feet with Modified Independent  AAD    Stair negotiation: ascended and descended  NT NT >4 steps with single rail Modified Independent     ROM BUE:  Defer to OT  BLE:  WFL NT    Strength BUE:  Defer to OT  BLE:  4/5 NT Improve 1 MMT   Balance Sitting EOB:  Keren  Dynamic Standing:  NT Sitting EOB: Supervision  Dynamic Standing: Min A with [employer] Sitting EOB:  Independent    Dynamic Standing:   Modified Independent       Pt is A & O x: 4 to person, place, month/year, and situation. Sensation: Pt denies numbness and tingling of extremities. Edema: Unremarkable. Patient education  Pt educated on proper technique with [employer].    Patient response to education:   Pt verbalized understanding Pt demonstrated skill Pt requires further education in this area   Yes With cues Yes     ASSESSMENT:    Comments:    Pt was in bed upon room entry; agreeable to PT treatment. Hinged knee brace was donned. Pt completed bed mobility without need for hands on assist. Sitting balance was Good. Pt completed stand pivot to chair. Pt was steady and able to advance to chair with only light assist. Pt was seated on bedpan. Pt was assisted with removal of bed pan and was positioned comfortably. All questions and concerns were addressed. Pt was left in chair with all needs met at conclusion of session. Treatment:  Patient practiced and was instructed in the following treatment:    Therapeutic activities:  Bed mobility: Pt was cued for technique during supine to sit transfer. Transfers: Pt was cued for hand placement during sit <> stand transfers.  Pt completed multiple transfers from various surfaces (EOB x1, chair x1). Ambulation: Pt ambulated short distance to chair. Pt was cued for [employer] technique. PLAN:    Patient is making Good progress towards established goals. Will continue with current POC.       Time in: 1150  Time out: 1214    Total Treatment Time 24 minutes     CPT codes:  [] Gait training 15060 0 minutes  [] Manual therapy 25147 0 minutes  [x] Therapeutic activities 89566 24 minutes  [] Therapeutic exercises 10533 0 minutes  [] Neuromuscular reeducation 33066 0 minutes      Kalpana Nolasco PT, DPT   [id number]

## 2023-01-28 NOTE — PLAN OF CARE
Problem: Chronic Conditions and Co-morbidities  Goal: Patient's chronic conditions and co-morbidity symptoms are monitored and maintained or improved  1/28/2023 0142 by Tana Carey RN  Outcome: Progressing  1/27/2023 1531 by Karely Hicks RN  Outcome: Progressing     Problem: Discharge Planning  Goal: Discharge to home or other facility with appropriate resources  1/28/2023 0142 by Tana Carey RN  Outcome: Progressing  1/27/2023 1531 by Karely Hicks RN  Outcome: Progressing     Problem: Pain  Goal: Verbalizes/displays adequate comfort level or baseline comfort level  1/28/2023 0142 by Tana Carey RN  Outcome: Progressing  1/27/2023 1531 by Karely Hicks RN  Outcome: Progressing     Problem: Skin/Tissue Integrity  Goal: Absence of new skin breakdown  Description: 1. Monitor for areas of redness and/or skin breakdown  2. Assess vascular access sites hourly  3. Every 4-6 hours minimum:  Change oxygen saturation probe site  4. Every 4-6 hours:  If on nasal continuous positive airway pressure, respiratory therapy assess nares and determine need for appliance change or resting period.   1/28/2023 0142 by Tana Carey RN  Outcome: Progressing  1/27/2023 1531 by Karely Hicks RN  Outcome: Progressing

## 2023-01-28 NOTE — PROGRESS NOTES
Pharmacy Consultation Note  (Antibiotic Dosing and Monitoring)    Initial consult date: 1/25/23  Consulting physician/provider: Dr. Benjamín Morales  Drug: Vancomycin  Indication: Bone and Joint Infection    Age/  Gender Height Weight IBW  Allergy Information   62 y.o./female 5' 7\" (170.2 cm) 218 lb (98.9 kg)     Ideal body weight: 61.6 kg (135 lb 12.9 oz)  Adjusted ideal body weight: 79.8 kg (176 lb)   Bee pollen, Penicillins, Ropinirole, Ropinirole hcl, Vistaril [hydroxyzine hcl], Aripiprazole, Prednisone, Restoril [temazepam], Wax [beeswax], Hydroxyzine pamoate, and Tape [adhesive tape]      Renal Function:  Recent Labs     01/27/23  0438 01/28/23  0643   CREATININE 0.6 0.5       Intake/Output Summary (Last 24 hours) at 1/28/2023 0830  Last data filed at 1/28/2023 0558  Gross per 24 hour   Intake 600 ml   Output --   Net 600 ml       Vancomycin Monitoring:  Trough:    Recent Labs     01/26/23  2151   [address] 15.9       Random:  No results for input(s): VANCORANDOM in the last 72 hours. Recent Labs     01/26/23  0456   BLOODCULT2 24 Hours no growth        Historical Cultures:  Organism   Date Value Ref Range Status   01/25/2023 Staphylococcus aureus (A)  Preliminary     Recent Labs     01/26/23  0456   BC 24 Hours no growth       Recent vancomycin administrations                     vancomycin 1500 mg in sodium chloride 0.9% 300 mL IVPB (mg) 1,500 mg New Bag 01/27/23 2308    vancomycin (VANCOCIN) 1,250 mg in sodium chloride 0.9 % 250 mL IVPB (mg) 1,250 mg New Bag 01/27/23 1157     1,250 mg New Bag 01/26/23 2336     1,250 mg New Bag  1221     1,250 mg New Bag 01/25/23 2123    vancomycin (VANCOCIN) injection (mg) 2,000 mg Given 01/25/23 1702             Assessment:  Patient is a 58 y.o. female who has been initiated on vancomycin. Estimated Creatinine Clearance: 147 mL/min (based on SCr of 0.5 mg/dL). To dose vancomycin, pharmacy will be utilizing [employer] calculation software for goal AUC/NAVARRO 400-600 mg/L-hr. Plan:  Continue vancomycin 1,500 mg IV Q12H. Will check random vancomycin level tomorrow AM.   Will continue to monitor renal function; ordered SCr for tomorrow AM.   Pharmacy to follow.      Jacek VuongD  PGY1 Pharmacy Resident 1/28/2023 8:31 AM

## 2023-01-28 NOTE — PROGRESS NOTES
Chg double lumen picc Placement 1/28/2023    Product number: [id number]   Lot Number: [id number]      Ultrasound: yes   Left Basilic vein:                Upper Arm Circumference: 46cm    Size: 44cm, 5.5 fr    Exposed Length: 2cm    Internal Length: 42cm   Cut: 11cm   Vein Measurement: 0.60cm    Hansel Loyd RN  1/28/2023  3:41 PM    merlin

## 2023-01-28 NOTE — PROGRESS NOTES
Department of Internal Medicine  Infectious Diseases  Progress  Note      C/C :  MRSA bacteremia, sepsis , Right TKA infection     Denies fever   Reports right knee pain   Afebrile     Current Facility-Administered Medications   Medication Dose Route Frequency Provider Last Rate Last Admin    polyethylene glycol (GLYCOLAX) packet 17 g  17 g Oral BID Mauro Whitmore MD        bisacodyl (DULCOLAX) suppository 10 mg  10 mg Rectal Daily PRN Enzo James MD        lidocaine PF 1 % injection 5 mL  5 mL IntraDERmal Once Bill P MD Pallavi        sodium chloride flush 0.9 % injection 5-40 mL  5-40 mL IntraVENous 2 times per day Jorge Olivo MD   10 mL at 01/28/23 0904    sodium chloride flush 0.9 % injection 5-40 mL  5-40 mL IntraVENous PRN Bill P MD Pallavi   10 mL at 01/28/23 0922    0.9 % sodium chloride infusion   IntraVENous PRN Bill P MD Pallavi        heparin flush 100 UNIT/ML injection 300 Units  3 mL IntraVENous 2 times per day Mary Cazares MD   300 Units at 01/28/23 0858    heparin flush 100 UNIT/ML injection 300 Units  3 mL IntraCATHeter PRN Bill P MD Pallavi        ketorolac (TORADOL) injection 15 mg  15 mg IntraVENous q8h Ann Marie Kirkpatrick PA-C   15 mg at 01/28/23 0600    vancomycin 1500 mg in sodium chloride 0.9% 300 mL IVPB  1,500 mg IntraVENous Q12H Trinidad Bradley VIJAYA   Stopped at 01/28/23 0209    lipase-protease-amylase (ZENPEP) 20587-53806 units delayed release capsule 60,000 Units  60,000 Units Oral TID WC Ayaka Edwar, DO   60,000 Units at 01/28/23 0856    And    lipase-protease-amylase (ZENPEP) 04564-57817 units delayed release capsule 40,000 Units  40,000 Units Oral TID PRN Ayaka Edwar, DO   40,000 Units at 01/27/23 2145    albuterol (PROVENTIL) nebulizer solution 2.5 mg  2.5 mg Nebulization 4x daily Dee Dee Drews, DO   2.5 mg at 01/28/23 0837    allopurinol (ZYLOPRIM) tablet 100 mg  100 mg Oral BID Dee Dee Richards DO   100 mg at 01/28/23 0855    baclofen (LIORESAL) tablet 20 mg  20 mg Oral 4x daily Matt Meth, DO   20 mg at 01/28/23 9758    docusate sodium (COLACE) capsule 100 mg  100 mg Oral BID Matt Meth, DO   100 mg at 01/28/23 0855    escitalopram (LEXAPRO) tablet 30 mg  30 mg Oral Daily Matt Meth, DO   30 mg at 01/28/23 0854    gabapentin (NEURONTIN) capsule 600 mg  600 mg Oral 4x Daily Matt Meth, DO   600 mg at 01/28/23 7777    metoprolol succinate (TOPROL XL) extended release tablet 12.5 mg  12.5 mg Oral Daily Matt Meth, DO   12.5 mg at 01/28/23 0855    pantoprazole (PROTONIX) tablet 40 mg  40 mg Oral QAM AC Matt Meth, DO   40 mg at 01/28/23 0559    promethazine (PHENERGAN) tablet 25 mg  25 mg Oral Q6H PRN Matt Meth, DO   25 mg at 01/26/23 2123    topiramate (TOPAMAX) tablet 200 mg  200 mg Oral BID Matt Meth, DO   200 mg at 01/28/23 4627    traZODone (DESYREL) tablet 100 mg  100 mg Oral Nightly Matt Meth, DO   100 mg at 01/27/23 2028    ziprasidone (GEODON) capsule 20 mg  20 mg Oral Nightly Matt Meth, DO   20 mg at 01/27/23 2028    ziprasidone (GEODON) capsule 40 mg  40 mg Oral QPM Matt Meth, DO   40 mg at 01/27/23 1706    ondansetron (ZOFRAN-ODT) disintegrating tablet 4 mg  4 mg Oral Q8H PRN Matt Meth, DO        Or    ondansetron [facility] injection 4 mg  4 mg IntraVENous Q6H PRN Matt Meth, DO        magnesium hydroxide (MILK OF MAGNESIA) 400 MG/5ML suspension 30 mL  30 mL Oral Daily PRN Matt Meth, DO   30 mL at 01/27/23 2031    acetaminophen (TYLENOL) tablet 650 mg  650 mg Oral Q6H PRN Matt Meth, DO   650 mg at 01/28/23 8196    Or    acetaminophen (TYLENOL) suppository 650 mg  650 mg Rectal Q6H PRN Matt Meth, DO        oxyCODONE (ROXICODONE) immediate release tablet 5 mg  5 mg Oral Q4H PRN Matt Meth, DO        Or    oxyCODONE HCl (OXY-IR) immediate release tablet 10 mg  10 mg Oral Q4H PRN Matt Meth, DO   10 mg at 01/28/23 0645    morphine (PF) injection 2 mg  2 mg IntraMUSCular Q4H PRN Viveca Player, DO   2 mg at 01/27/23 5422    Or    morphine sulfate (PF) injection 4 mg  4 mg IntraMUSCular Q4H PRN Viveca Player, DO   4 mg at 01/28/23 5930             REVIEW OF SYSTEMS:    CONSTITUTIONAL:  Denies fever, chill or rigors. HEENT: denies blurring of vision or double vision, denies hearing problem  RESPIRATORY: denies cough, shortness of breath, sputum expectoration. CARDIOVASCULAR:  Denies palpitation or chest pain   GASTROINTESTINAL:  Denies abdomen pain, diarrhea or constipation,, nausea or vomiting. GENITOURINARY:  Denies burning urination or frequency of urination  INTEGUMENT: denies wound , rash  HEMATOLOGIC/LYMPHATIC:  Denies lymph node swelling, gum bleeding or easy bruising. MUSCULOSKELETAL:  Right knee pain   NEUROLOGICAL:  Denies light headed, dizziness, loss of consciousness, weakness of lower extremities, bowel or bladder incontinence. PHYSICAL EXAM:      Vitals:       /82   Pulse 84   Temp 97.5 °F (36.4 °C) (Temporal)   Resp 16   Ht 5' 7\" (1.702 m)   Wt 236 lb 4.8 oz (107.2 kg)   LMP 08/31/1988   SpO2 94%   BMI 37.01 kg/m²     General Appearance:    Awake, alert , no acute distress. Head:    Normocephalic, atraumatic   Eyes:    No pallor, no icterus,   Ears:    No obvious deformity or drainage.    Nose:   No nasal drainage   Throat:   Mucosa moist, no oral thrush   Neck:   Supple, no lymphadenopathy   Back:     no CVA tenderness   Lungs:     Clear to auscultation bilaterally, no wheeze    Heart:    Regular rate and rhythm, no murmur   Abdomen:     Soft, non-tender, bowel sounds present    Extremities:   Right knee wrapped    Pulses:   Dorsalis pedis palpable    Skin:   no rashes or lesions       CBC with Differential:      Lab Results   Component Value Date/Time    WBC 8.4 01/24/2023 06:56 PM    RBC 3.70 01/24/2023 06:56 PM    HGB 12.0 01/24/2023 06:56 PM    HCT 36.0 01/24/2023 06:56 PM     01/24/2023 06:56 PM    MCV 97.3 01/24/2023 06:56 PM    MCH 32.4 01/24/2023 06:56 PM    MCHC 33.3 01/24/2023 06:56 PM    RDW 13.8 01/24/2023 06:56 PM    SEGSPCT 64 12/27/2013 03:00 PM    LYMPHOPCT 8.0 01/24/2023 05:26 AM    MONOPCT 10.0 01/24/2023 05:26 AM    BASOPCT 0.3 01/24/2023 05:26 AM    MONOSABS 1.16 01/24/2023 05:26 AM    LYMPHSABS 0.93 01/24/2023 05:26 AM    EOSABS 0.04 01/24/2023 05:26 AM    BASOSABS 0.03 01/24/2023 05:26 AM       CMP     Lab Results   Component Value Date/Time     01/24/2023 05:26 AM    K 4.1 01/24/2023 05:26 AM    K 3.9 05/06/2022 05:28 PM     01/24/2023 05:26 AM    CO2 23 01/24/2023 05:26 AM    BUN 20 01/24/2023 05:26 AM    CREATININE 0.5 01/28/2023 06:43 AM    GFRAA >60 08/23/2022 07:14 AM    LABGLOM >60 01/28/2023 06:43 AM    GLUCOSE 125 01/24/2023 05:26 AM    GLUCOSE 93 05/24/2012 11:11 AM    PROT 6.9 01/24/2023 05:26 AM    LABALBU 3.6 01/24/2023 05:26 AM    LABALBU 4.7 05/21/2012 01:48 PM    CALCIUM 8.6 01/24/2023 05:26 AM    BILITOT 0.3 01/24/2023 05:26 AM    ALKPHOS 102 01/24/2023 05:26 AM    AST 19 01/24/2023 05:26 AM    ALT 14 01/24/2023 05:26 AM         Hepatic Function Panel:      Lab Results   Component Value Date/Time    ALKPHOS 102 01/24/2023 05:26 AM    ALT 14 01/24/2023 05:26 AM    AST 19 01/24/2023 05:26 AM    PROT 6.9 01/24/2023 05:26 AM    BILITOT 0.3 01/24/2023 05:26 AM    BILIDIR <0.2 03/16/2021 12:16 PM    IBILI see below 03/16/2021 12:16 PM    LABALBU 3.6 01/24/2023 05:26 AM    LABALBU 4.7 05/21/2012 01:48 PM       PT/INR:    Lab Results   Component Value Date/Time    PROTIME 13.1 08/19/2022 08:25 AM    PROTIME 10.8 05/07/2012 03:45 AM    INR 1.2 08/19/2022 08:25 AM       TSH:    Lab Results   Component Value Date/Time    TSH 0.548 03/16/2021 12:16 PM       U/A:    Lab Results   Component Value Date/Time    COLORU Yellow 01/24/2023 12:29 PM    PHUR 6.0 01/24/2023 12:29 PM    LABCAST MODERATE 05/06/2012 04:00 AM    WBCUA NONE 01/24/2023 12:29 PM    WBCUA 1-3 05/06/2012 04:00 AM    RBCUA NONE 01/24/2023 12:29 PM    RBCUA 0-1 12/13/2013 10:15 AM    BACTERIA NONE SEEN 01/24/2023 12:29 PM    CLARITYU Clear 01/24/2023 12:29 PM    SPECGRAV 1.025 01/24/2023 12:29 PM    LEUKOCYTESUR Negative 01/24/2023 12:29 PM    UROBILINOGEN 1.0 01/24/2023 12:29 PM    BILIRUBINUR Negative 01/24/2023 12:29 PM    BILIRUBINUR NEGATIVE 05/06/2012 04:00 AM    BLOODU Negative 01/24/2023 12:29 PM    GLUCOSEU Negative 01/24/2023 12:29 PM    GLUCOSEU NEGATIVE 05/06/2012 04:00 AM       ABG:    Lab Results   Component Value Date/Time    I6DDROTA 94.0 05/06/2012 10:00 AM       MICROBIOLOGY:    Blood culture -       Staphylococcus aureus by PCR DETECTED Panic      Staphylococcus epidermidis by PCR Not Detected    Staphylococcus lugdunensis by PCR Not Detected    Staphylococcus species by PCR DETECTED Panic      Serratia marcescens by PCR Not Detected    Streptococcus pneumoniae by PCR Not Detected    Streptococcus pyogenes  by PCR Not Detected    Streptococcus species by PCR Not Detected    Stenotrophomonas maltophilia by PCR Not Detected    Candida albicans by PCR Not Detected    Candida auris by PCR Not Detected    Candida glabrata by PCR Not Detected    Candida krusei by PCR Not Detected    Candida parapsilosis by PCR Not Detected    Candida tropicalis by PCR Not Detected    Cryptococcus neoformans/gattii by PCR Not Detected    Methicillin Resistance mecA/C and MREJ by PCR DETECTED Panic         Specimen: Synovial Fluid Updated: 01/25/23 0713      Gram Stain Orderable --    Gram stain performed on unspun fluid   Moderate Polymorphonuclear leukocytes   Epithelial cells not seen   Rare Gram positive cocci in pairs Intracellular and Extracellular    Narrative:         Synovial fluid -    Susceptibility    Staphylococcus aureus (1)    Antibiotic Interpretation Microscan  Method Status    clindamycin Resistant >=^4 mcg/mL BACTERIAL SUSCEPTIBILITY PANEL BY NAVARRO     DAPTOmycin Sensitive ^0.5 mcg/mL BACTERIAL SUSCEPTIBILITY PANEL BY NAVARRO     doxycycline Sensitive <=^0.5 mcg/mL BACTERIAL SUSCEPTIBILITY PANEL BY NAVARRO     erythromycin Resistant >=^8 mcg/mL BACTERIAL SUSCEPTIBILITY PANEL BY NAVARRO     gentamicin Sensitive <=^0.5 mcg/mL BACTERIAL SUSCEPTIBILITY PANEL BY NAVARRO     oxacillin Resistant >=^4 mcg/mL BACTERIAL SUSCEPTIBILITY PANEL BY NAVARRO     tigecycline Sensitive <=^0.12 mcg/mL BACTERIAL SUSCEPTIBILITY PANEL BY NAVARRO     trimethoprim-sulfamethoxazole Sensitive <=^10 mcg/mL BACTERIAL SUSCEPTIBILITY PANEL BY NAVARRO     vancomycin Sensitive ^1 mcg/mL BACTERIAL SUSCEPTIBILITY PANEL BY NAVARRO        Narrative  Performed by: [address] [place] [address] Lab  Source: [address] [place] [address]       Site: Body Fluid&Body Fluid                Specimen Collected: 01/24/23 13:46 EST Last Resulted: 01/26/23 08:17 EST        Radiology :    CT scan right femur -  1. Stable alignment of healing, internally fixated distal femur fracture. 2. Stable alignment of right knee arthroplasty. 3. Moderate synovial effusion. 4. Foci of subcutaneous gas along medial margin of the knee related to recent   procedure.          IMPRESSION:     Right TKA MRSA infection ( Recurrent ) - s/p explantation of the hardware ( 1/25)   Recurrent MRSA bacteremia   GNR bacteriuria          RECOMMENDATIONS:       Vancomycin 1250 mg IV q 12 hrs ( pharmacy following )   PICC line    Echo

## 2023-01-29 LAB
ALBUMIN SERPL-MCNC: 2.8 G/DL (ref 3.5–5.2)
ALP BLD-CCNC: 68 U/L (ref 35–104)
ALT SERPL-CCNC: 10 U/L (ref 0–32)
ANION GAP SERPL CALCULATED.3IONS-SCNC: 8 MMOL/L (ref 7–16)
AST SERPL-CCNC: 19 U/L (ref 0–31)
BASOPHILS ABSOLUTE: 0.04 E9/L (ref 0–0.2)
BASOPHILS RELATIVE PERCENT: 0.7 % (ref 0–2)
BILIRUB SERPL-MCNC: 0.3 MG/DL (ref 0–1.2)
BLOOD CULTURE, ROUTINE: NORMAL
BODY FLUID CULTURE, STERILE: ABNORMAL
BODY FLUID CULTURE, STERILE: ABNORMAL
BUN BLDV-MCNC: 15 MG/DL (ref 6–23)
CALCIUM SERPL-MCNC: 8.9 MG/DL (ref 8.6–10.2)
CHLORIDE BLD-SCNC: 107 MMOL/L (ref 98–107)
CO2: 28 MMOL/L (ref 22–29)
CREAT SERPL-MCNC: 0.6 MG/DL (ref 0.5–1)
CULTURE SURGICAL: ABNORMAL
CULTURE, BLOOD 2: ABNORMAL
EOSINOPHILS ABSOLUTE: 0.35 E9/L (ref 0.05–0.5)
EOSINOPHILS RELATIVE PERCENT: 6.1 % (ref 0–6)
GFR SERPL CREATININE-BSD FRML MDRD: >60 ML/MIN/1.73
GLUCOSE BLD-MCNC: 110 MG/DL (ref 74–99)
GRAM STAIN RESULT: ABNORMAL
HCT VFR BLD CALC: 26.5 % (ref 34–48)
HCT VFR BLD CALC: 27.2 % (ref 34–48)
HEMOGLOBIN: 8.5 G/DL (ref 11.5–15.5)
HEMOGLOBIN: 8.8 G/DL (ref 11.5–15.5)
IMMATURE GRANULOCYTES #: 0.1 E9/L
IMMATURE GRANULOCYTES %: 1.7 % (ref 0–5)
LYMPHOCYTES ABSOLUTE: 0.91 E9/L (ref 1.5–4)
LYMPHOCYTES RELATIVE PERCENT: 15.9 % (ref 20–42)
MAGNESIUM: 2.3 MG/DL (ref 1.6–2.6)
MCH RBC QN AUTO: 31.1 PG (ref 26–35)
MCHC RBC AUTO-ENTMCNC: 32.4 % (ref 32–34.5)
MCV RBC AUTO: 96.1 FL (ref 80–99.9)
MONOCYTES ABSOLUTE: 0.43 E9/L (ref 0.1–0.95)
MONOCYTES RELATIVE PERCENT: 7.5 % (ref 2–12)
NEUTROPHILS ABSOLUTE: 3.89 E9/L (ref 1.8–7.3)
NEUTROPHILS RELATIVE PERCENT: 68.1 % (ref 43–80)
ORGANISM: ABNORMAL
PDW BLD-RTO: 13.5 FL (ref 11.5–15)
PLATELET # BLD: 238 E9/L (ref 130–450)
PMV BLD AUTO: 8.8 FL (ref 7–12)
POTASSIUM SERPL-SCNC: 4.3 MMOL/L (ref 3.5–5)
RBC # BLD: 2.83 E12/L (ref 3.5–5.5)
SODIUM BLD-SCNC: 143 MMOL/L (ref 132–146)
TOTAL PROTEIN: 5.4 G/DL (ref 6.4–8.3)
VANCOMYCIN RANDOM: 23.8 MCG/ML (ref 5–40)
WBC # BLD: 5.7 E9/L (ref 4.5–11.5)

## 2023-01-29 PROCEDURE — 2580000003 HC RX 258: Performed by: INTERNAL MEDICINE

## 2023-01-29 PROCEDURE — 36415 COLL VENOUS BLD VENIPUNCTURE: CPT

## 2023-01-29 PROCEDURE — 85025 COMPLETE CBC W/AUTO DIFF WBC: CPT

## 2023-01-29 PROCEDURE — 6370000000 HC RX 637 (ALT 250 FOR IP): Performed by: FAMILY MEDICINE

## 2023-01-29 PROCEDURE — 85014 HEMATOCRIT: CPT

## 2023-01-29 PROCEDURE — 6360000002 HC RX W HCPCS: Performed by: INTERNAL MEDICINE

## 2023-01-29 PROCEDURE — 6360000002 HC RX W HCPCS: Performed by: ORTHOPAEDIC SURGERY

## 2023-01-29 PROCEDURE — 83735 ASSAY OF MAGNESIUM: CPT

## 2023-01-29 PROCEDURE — 2580000003 HC RX 258: Performed by: ORTHOPAEDIC SURGERY

## 2023-01-29 PROCEDURE — 1200000000 HC SEMI PRIVATE

## 2023-01-29 PROCEDURE — 85018 HEMOGLOBIN: CPT

## 2023-01-29 PROCEDURE — 80053 COMPREHEN METABOLIC PANEL: CPT

## 2023-01-29 PROCEDURE — 6360000002 HC RX W HCPCS

## 2023-01-29 PROCEDURE — 80202 ASSAY OF VANCOMYCIN: CPT

## 2023-01-29 PROCEDURE — 2700000000 HC OXYGEN THERAPY PER DAY

## 2023-01-29 PROCEDURE — 94640 AIRWAY INHALATION TREATMENT: CPT

## 2023-01-29 PROCEDURE — 6370000000 HC RX 637 (ALT 250 FOR IP): Performed by: ORTHOPAEDIC SURGERY

## 2023-01-29 PROCEDURE — 6360000002 HC RX W HCPCS: Performed by: PHYSICIAN ASSISTANT

## 2023-01-29 RX ORDER — ENOXAPARIN SODIUM 100 MG/ML
30 INJECTION SUBCUTANEOUS 2 TIMES DAILY
Status: DISCONTINUED | OUTPATIENT
Start: 2023-01-29 | End: 2023-01-31 | Stop reason: HOSPADM

## 2023-01-29 RX ADMIN — ALBUTEROL SULFATE 2.5 MG: 2.5 SOLUTION RESPIRATORY (INHALATION) at 07:36

## 2023-01-29 RX ADMIN — GABAPENTIN 600 MG: 300 CAPSULE ORAL at 08:53

## 2023-01-29 RX ADMIN — KETOROLAC TROMETHAMINE 15 MG: 30 INJECTION, SOLUTION INTRAMUSCULAR; INTRAVENOUS at 06:04

## 2023-01-29 RX ADMIN — TOPIRAMATE 200 MG: 100 TABLET, FILM COATED ORAL at 21:24

## 2023-01-29 RX ADMIN — BACLOFEN 20 MG: 10 TABLET ORAL at 08:55

## 2023-01-29 RX ADMIN — MORPHINE SULFATE 4 MG: 4 INJECTION, SOLUTION INTRAMUSCULAR; INTRAVENOUS at 09:00

## 2023-01-29 RX ADMIN — GABAPENTIN 600 MG: 300 CAPSULE ORAL at 12:18

## 2023-01-29 RX ADMIN — TOPIRAMATE 200 MG: 100 TABLET, FILM COATED ORAL at 08:57

## 2023-01-29 RX ADMIN — ZIPRASIDONE HYDROCHLORIDE 20 MG: 20 CAPSULE ORAL at 21:25

## 2023-01-29 RX ADMIN — ALLOPURINOL 100 MG: 100 TABLET ORAL at 09:02

## 2023-01-29 RX ADMIN — ALBUTEROL SULFATE 2.5 MG: 2.5 SOLUTION RESPIRATORY (INHALATION) at 12:37

## 2023-01-29 RX ADMIN — BACLOFEN 20 MG: 10 TABLET ORAL at 12:18

## 2023-01-29 RX ADMIN — BACLOFEN 20 MG: 10 TABLET ORAL at 21:24

## 2023-01-29 RX ADMIN — HEPARIN SODIUM (PORCINE) LOCK FLUSH IV SOLN 100 UNIT/ML 300 UNITS: 100 SOLUTION at 21:23

## 2023-01-29 RX ADMIN — ZIPRASIDONE HYDROCHLORIDE 40 MG: 20 CAPSULE ORAL at 16:34

## 2023-01-29 RX ADMIN — GABAPENTIN 600 MG: 300 CAPSULE ORAL at 21:25

## 2023-01-29 RX ADMIN — ENOXAPARIN SODIUM 30 MG: 100 INJECTION SUBCUTANEOUS at 21:24

## 2023-01-29 RX ADMIN — OXYCODONE HYDROCHLORIDE 10 MG: 10 TABLET ORAL at 12:26

## 2023-01-29 RX ADMIN — SODIUM CHLORIDE, PRESERVATIVE FREE 10 ML: 5 INJECTION INTRAVENOUS at 21:23

## 2023-01-29 RX ADMIN — MORPHINE SULFATE 4 MG: 4 INJECTION, SOLUTION INTRAMUSCULAR; INTRAVENOUS at 16:35

## 2023-01-29 RX ADMIN — PANCRELIPASE LIPASE, PANCRELIPASE PROTEASE, PANCRELIPASE AMYLASE 60000 UNITS: 20000; 63000; 84000 CAPSULE, DELAYED RELEASE ORAL at 16:35

## 2023-01-29 RX ADMIN — POLYETHYLENE GLYCOL 3350 17 G: 17 POWDER, FOR SOLUTION ORAL at 08:54

## 2023-01-29 RX ADMIN — PANCRELIPASE LIPASE, PANCRELIPASE PROTEASE, PANCRELIPASE AMYLASE 60000 UNITS: 20000; 63000; 84000 CAPSULE, DELAYED RELEASE ORAL at 12:18

## 2023-01-29 RX ADMIN — ENOXAPARIN SODIUM 30 MG: 100 INJECTION SUBCUTANEOUS at 08:49

## 2023-01-29 RX ADMIN — BACLOFEN 20 MG: 10 TABLET ORAL at 16:34

## 2023-01-29 RX ADMIN — SODIUM CHLORIDE, PRESERVATIVE FREE 10 ML: 5 INJECTION INTRAVENOUS at 09:02

## 2023-01-29 RX ADMIN — VANCOMYCIN HYDROCHLORIDE 1250 MG: 10 INJECTION, POWDER, LYOPHILIZED, FOR SOLUTION INTRAVENOUS at 12:19

## 2023-01-29 RX ADMIN — METOPROLOL SUCCINATE 12.5 MG: 25 TABLET, EXTENDED RELEASE ORAL at 08:55

## 2023-01-29 RX ADMIN — ALBUTEROL SULFATE 2.5 MG: 2.5 SOLUTION RESPIRATORY (INHALATION) at 16:25

## 2023-01-29 RX ADMIN — PANCRELIPASE LIPASE, PANCRELIPASE PROTEASE, PANCRELIPASE AMYLASE 60000 UNITS: 20000; 63000; 84000 CAPSULE, DELAYED RELEASE ORAL at 08:56

## 2023-01-29 RX ADMIN — DOCUSATE SODIUM 100 MG: 100 CAPSULE, LIQUID FILLED ORAL at 08:54

## 2023-01-29 RX ADMIN — ACETAMINOPHEN 650 MG: 325 TABLET ORAL at 06:51

## 2023-01-29 RX ADMIN — OXYCODONE HYDROCHLORIDE 10 MG: 10 TABLET ORAL at 06:51

## 2023-01-29 RX ADMIN — HEPARIN SODIUM (PORCINE) LOCK FLUSH IV SOLN 100 UNIT/ML 300 UNITS: 100 SOLUTION at 08:51

## 2023-01-29 RX ADMIN — POLYETHYLENE GLYCOL 3350 17 G: 17 POWDER, FOR SOLUTION ORAL at 21:23

## 2023-01-29 RX ADMIN — GABAPENTIN 600 MG: 300 CAPSULE ORAL at 16:34

## 2023-01-29 RX ADMIN — VANCOMYCIN HYDROCHLORIDE 1250 MG: 10 INJECTION, POWDER, LYOPHILIZED, FOR SOLUTION INTRAVENOUS at 23:03

## 2023-01-29 RX ADMIN — MORPHINE SULFATE 4 MG: 4 INJECTION, SOLUTION INTRAMUSCULAR; INTRAVENOUS at 12:27

## 2023-01-29 RX ADMIN — TRAZODONE HYDROCHLORIDE 100 MG: 50 TABLET ORAL at 21:24

## 2023-01-29 RX ADMIN — ALLOPURINOL 100 MG: 100 TABLET ORAL at 21:25

## 2023-01-29 RX ADMIN — PANTOPRAZOLE SODIUM 40 MG: 40 TABLET, DELAYED RELEASE ORAL at 06:03

## 2023-01-29 RX ADMIN — OXYCODONE HYDROCHLORIDE 10 MG: 10 TABLET ORAL at 21:25

## 2023-01-29 RX ADMIN — ACETAMINOPHEN 650 MG: 325 TABLET ORAL at 16:11

## 2023-01-29 RX ADMIN — OXYCODONE HYDROCHLORIDE 10 MG: 10 TABLET ORAL at 16:35

## 2023-01-29 RX ADMIN — DOCUSATE SODIUM 100 MG: 100 CAPSULE, LIQUID FILLED ORAL at 21:25

## 2023-01-29 RX ADMIN — ALBUTEROL SULFATE 2.5 MG: 2.5 SOLUTION RESPIRATORY (INHALATION) at 20:16

## 2023-01-29 RX ADMIN — SODIUM CHLORIDE, PRESERVATIVE FREE 10 ML: 5 INJECTION INTRAVENOUS at 08:51

## 2023-01-29 RX ADMIN — ESCITALOPRAM OXALATE 30 MG: 10 TABLET, FILM COATED ORAL at 08:54

## 2023-01-29 ASSESSMENT — PAIN DESCRIPTION - LOCATION
LOCATION: LEG
LOCATION: KNEE
LOCATION: LEG;KNEE
LOCATION: KNEE
LOCATION: KNEE
LOCATION: LEG
LOCATION: KNEE

## 2023-01-29 ASSESSMENT — PAIN - FUNCTIONAL ASSESSMENT
PAIN_FUNCTIONAL_ASSESSMENT: PREVENTS OR INTERFERES SOME ACTIVE ACTIVITIES AND ADLS

## 2023-01-29 ASSESSMENT — PAIN DESCRIPTION - DESCRIPTORS
DESCRIPTORS: ACHING
DESCRIPTORS: ACHING;BURNING;THROBBING
DESCRIPTORS: ACHING
DESCRIPTORS: ACHING

## 2023-01-29 ASSESSMENT — PAIN SCALES - GENERAL
PAINLEVEL_OUTOF10: 7
PAINLEVEL_OUTOF10: 9
PAINLEVEL_OUTOF10: 9
PAINLEVEL_OUTOF10: 8
PAINLEVEL_OUTOF10: 9
PAINLEVEL_OUTOF10: 9
PAINLEVEL_OUTOF10: 8
PAINLEVEL_OUTOF10: 3

## 2023-01-29 ASSESSMENT — PAIN DESCRIPTION - ORIENTATION
ORIENTATION: RIGHT

## 2023-01-29 NOTE — PROGRESS NOTES
Department of Internal Medicine  Infectious Diseases  Progress  Note      C/C :  MRSA bacteremia, sepsis , Right TKA infection     Denies fever   Reports right knee pain, constipation  Afebrile     Current Facility-Administered Medications   Medication Dose Route Frequency Provider Last Rate Last Admin    enoxaparin Sodium (LOVENOX) injection 30 mg  30 mg SubCUTAneous BID Tony Cox DO   30 mg at 01/29/23 0849    vancomycin (VANCOCIN) 1,250 mg in sodium chloride 0.9 % 250 mL IVPB  1,250 mg IntraVENous Q12H Shade Rooney DO        [START ON 1/30/2023] pantoprazole (PROTONIX) 40 mg in sodium chloride (PF) 0.9 % 10 mL injection  40 mg IntraVENous Daily Enzo James MD        polyethylene glycol (GLYCOLAX) packet 17 g  17 g Oral BID Juanita Mccormack MD   17 g at 01/29/23 0854    bisacodyl (DULCOLAX) suppository 10 mg  10 mg Rectal Daily PRN Juanita Mccormack MD   10 mg at 01/28/23 1431    lidocaine PF 1 % injection 5 mL  5 mL IntraDERmal Once Bill Olivo MD        sodium chloride flush 0.9 % injection 5-40 mL  5-40 mL IntraVENous 2 times per day Bill Olivo MD   10 mL at 01/29/23 0851    sodium chloride flush 0.9 % injection 5-40 mL  5-40 mL IntraVENous PRN Bill Olivo MD   10 mL at 01/29/23 0902    0.9 % sodium chloride infusion   IntraVENous PRN Bill Olivo MD        heparin flush 100 UNIT/ML injection 300 Units  3 mL IntraVENous 2 times per day Ty Amato MD   300 Units at 01/29/23 0851    heparin flush 100 UNIT/ML injection 300 Units  3 mL IntraCATHeter PRN Bill Olivo MD        ketorolac (TORADOL) injection 15 mg  15 mg IntraVENous q8h Ann Marie Kirkpatrick PA-C   15 mg at 01/29/23 0604    lipase-protease-amylase (ZENPEP) 87923-44613 units delayed release capsule 60,000 Units  60,000 Units Oral TID  Fabian Gamez DO   60,000 Units at 01/29/23 0856    And    lipase-protease-amylase (ZENPEP) 41609-54449 units delayed release capsule 40,000 Units  40,000 Units Oral TID PRN Fabian Gamez DO 40,000 Units at 01/27/23 2145    albuterol (PROVENTIL) nebulizer solution 2.5 mg  2.5 mg Nebulization 4x daily Dale Deep, DO   2.5 mg at 01/29/23 0736    allopurinol (ZYLOPRIM) tablet 100 mg  100 mg Oral BID Dale Deep, DO   100 mg at 01/29/23 0902    baclofen (LIORESAL) tablet 20 mg  20 mg Oral 4x daily Dale Deep, DO   20 mg at 01/29/23 9900    docusate sodium (COLACE) capsule 100 mg  100 mg Oral BID Dale Deep, DO   100 mg at 01/29/23 0854    escitalopram (LEXAPRO) tablet 30 mg  30 mg Oral Daily Dale Deep, DO   30 mg at 01/29/23 0854    gabapentin (NEURONTIN) capsule 600 mg  600 mg Oral 4x Daily Dale Deep, DO   600 mg at 01/29/23 5064    metoprolol succinate (TOPROL XL) extended release tablet 12.5 mg  12.5 mg Oral Daily Dale Deep, DO   12.5 mg at 01/29/23 0855    promethazine (PHENERGAN) tablet 25 mg  25 mg Oral Q6H PRN Dale Deep, DO   25 mg at 01/26/23 2123    topiramate (TOPAMAX) tablet 200 mg  200 mg Oral BID Dale Deep, DO   200 mg at 01/29/23 0196    traZODone (DESYREL) tablet 100 mg  100 mg Oral Nightly Dale Deep, DO   100 mg at 01/28/23 2035    ziprasidone (GEODON) capsule 20 mg  20 mg Oral Nightly Dale Deep, DO   20 mg at 01/28/23 2036    ziprasidone (GEODON) capsule 40 mg  40 mg Oral QPM Dale Deep, DO   40 mg at 01/28/23 1732    ondansetron (ZOFRAN-ODT) disintegrating tablet 4 mg  4 mg Oral Q8H PRN Dale Deep, DO        Or    ondansetron [facility]) injection 4 mg  4 mg IntraVENous Q6H PRN Dale Deep, DO        magnesium hydroxide (MILK OF MAGNESIA) 400 MG/5ML suspension 30 mL  30 mL Oral Daily PRN Dale Deep, DO   30 mL at 01/27/23 2031    acetaminophen (TYLENOL) tablet 650 mg  650 mg Oral Q6H PRN Dale Deep, DO   650 mg at 01/29/23 3777    Or    acetaminophen (TYLENOL) suppository 650 mg  650 mg Rectal Q6H PRN Dale Deep, DO        oxyCODONE (ROXICODONE) immediate release tablet 5 mg  5 mg Oral Q4H PRN Zeinab Snare, DO        Or    oxyCODONE HCl (OXY-IR) immediate release tablet 10 mg  10 mg Oral Q4H PRN Zeinab Snare, DO   10 mg at 01/29/23 8938    morphine (PF) injection 2 mg  2 mg IntraMUSCular Q4H PRN Zeinab Snare, DO   2 mg at 01/27/23 2871    Or    morphine sulfate (PF) injection 4 mg  4 mg IntraMUSCular Q4H PRN Zeinab Snare, DO   4 mg at 01/29/23 0900             REVIEW OF SYSTEMS:    CONSTITUTIONAL:  Denies fever, chill or rigors. HEENT: denies blurring of vision or double vision, denies hearing problem  RESPIRATORY: denies cough, shortness of breath, sputum expectoration. CARDIOVASCULAR:  Denies palpitation or chest pain   GASTROINTESTINAL:  Denies abdomen pain, diarrhea or constipation,, nausea or vomiting. GENITOURINARY:  Denies burning urination or frequency of urination  INTEGUMENT: denies wound , rash  HEMATOLOGIC/LYMPHATIC:  Denies lymph node swelling, gum bleeding or easy bruising. MUSCULOSKELETAL:  Right knee pain   NEUROLOGICAL:  Denies light headed, dizziness, loss of consciousness, weakness of lower extremities, bowel or bladder incontinence. PHYSICAL EXAM:      Vitals:       /69   Pulse 80   Temp 97.6 °F (36.4 °C) (Temporal)   Resp 16   Ht 5' 7\" (1.702 m)   Wt 232 lb (105.2 kg)   LMP 08/31/1988   SpO2 95%   BMI 36.34 kg/m²     General Appearance:    Awake, alert , no acute distress. Head:    Normocephalic, atraumatic   Eyes:    No pallor, no icterus,   Ears:    No obvious deformity or drainage.    Nose:   No nasal drainage   Throat:   Mucosa moist, no oral thrush   Neck:   Supple, no lymphadenopathy   Back:     no CVA tenderness   Lungs:     Clear to auscultation bilaterally, no wheeze    Heart:    Regular rate and rhythm, no murmur   Abdomen:     Soft, non-tender, bowel sounds present    Extremities:   Right knee wrapped    Pulses:   Dorsalis pedis palpable    Skin:   no rashes or lesions       CBC with Differential:      Lab Results   Component Value Date/Time    WBC 5.7 01/29/2023 08:41 AM    RBC 2.83 01/29/2023 08:41 AM    HGB 8.8 01/29/2023 08:41 AM    HCT 27.2 01/29/2023 08:41 AM     01/29/2023 08:41 AM    MCV 96.1 01/29/2023 08:41 AM    MCH 31.1 01/29/2023 08:41 AM    MCHC 32.4 01/29/2023 08:41 AM    RDW 13.5 01/29/2023 08:41 AM    SEGSPCT 64 12/27/2013 03:00 PM    LYMPHOPCT 15.9 01/29/2023 08:41 AM    MONOPCT 7.5 01/29/2023 08:41 AM    BASOPCT 0.7 01/29/2023 08:41 AM    MONOSABS 0.43 01/29/2023 08:41 AM    LYMPHSABS 0.91 01/29/2023 08:41 AM    EOSABS 0.35 01/29/2023 08:41 AM    BASOSABS 0.04 01/29/2023 08:41 AM       CMP     Lab Results   Component Value Date/Time     01/29/2023 08:41 AM    K 4.3 01/29/2023 08:41 AM    K 3.9 05/06/2022 05:28 PM     01/29/2023 08:41 AM    CO2 28 01/29/2023 08:41 AM    BUN 15 01/29/2023 08:41 AM    CREATININE 0.6 01/29/2023 08:41 AM    GFRAA >60 08/23/2022 07:14 AM    LABGLOM >60 01/29/2023 08:41 AM    GLUCOSE 110 01/29/2023 08:41 AM    GLUCOSE 93 05/24/2012 11:11 AM    PROT 5.4 01/29/2023 08:41 AM    LABALBU 2.8 01/29/2023 08:41 AM    LABALBU 4.7 05/21/2012 01:48 PM    CALCIUM 8.9 01/29/2023 08:41 AM    BILITOT 0.3 01/29/2023 08:41 AM    ALKPHOS 68 01/29/2023 08:41 AM    AST 19 01/29/2023 08:41 AM    ALT 10 01/29/2023 08:41 AM         Hepatic Function Panel:      Lab Results   Component Value Date/Time    ALKPHOS 68 01/29/2023 08:41 AM    ALT 10 01/29/2023 08:41 AM    AST 19 01/29/2023 08:41 AM    PROT 5.4 01/29/2023 08:41 AM    BILITOT 0.3 01/29/2023 08:41 AM    BILIDIR <0.2 03/16/2021 12:16 PM    IBILI see below 03/16/2021 12:16 PM    LABALBU 2.8 01/29/2023 08:41 AM    LABALBU 4.7 05/21/2012 01:48 PM       PT/INR:    Lab Results   Component Value Date/Time    PROTIME 13.1 08/19/2022 08:25 AM    PROTIME 10.8 05/07/2012 03:45 AM    INR 1.2 08/19/2022 08:25 AM       TSH:    Lab Results   Component Value Date/Time    TSH 0.548 03/16/2021 12:16 PM       U/A:    Lab Results Component Value Date/Time    COLORU Yellow 01/24/2023 12:29 PM    PHUR 6.0 01/24/2023 12:29 PM    LABCAST MODERATE 05/06/2012 04:00 AM    WBCUA NONE 01/24/2023 12:29 PM    WBCUA 1-3 05/06/2012 04:00 AM    RBCUA NONE 01/24/2023 12:29 PM    RBCUA 0-1 12/13/2013 10:15 AM    BACTERIA NONE SEEN 01/24/2023 12:29 PM    CLARITYU Clear 01/24/2023 12:29 PM    SPECGRAV 1.025 01/24/2023 12:29 PM    LEUKOCYTESUR Negative 01/24/2023 12:29 PM    UROBILINOGEN 1.0 01/24/2023 12:29 PM    BILIRUBINUR Negative 01/24/2023 12:29 PM    BILIRUBINUR NEGATIVE 05/06/2012 04:00 AM    BLOODU Negative 01/24/2023 12:29 PM    GLUCOSEU Negative 01/24/2023 12:29 PM    GLUCOSEU NEGATIVE 05/06/2012 04:00 AM       ABG:    Lab Results   Component Value Date/Time    Q6SOOIGR 94.0 05/06/2012 10:00 AM       MICROBIOLOGY:    Blood culture -       Staphylococcus aureus by PCR DETECTED Panic      Staphylococcus epidermidis by PCR Not Detected    Staphylococcus lugdunensis by PCR Not Detected    Staphylococcus species by PCR DETECTED Panic      Serratia marcescens by PCR Not Detected    Streptococcus pneumoniae by PCR Not Detected    Streptococcus pyogenes  by PCR Not Detected    Streptococcus species by PCR Not Detected    Stenotrophomonas maltophilia by PCR Not Detected    Candida albicans by PCR Not Detected    Candida auris by PCR Not Detected    Candida glabrata by PCR Not Detected    Candida krusei by PCR Not Detected    Candida parapsilosis by PCR Not Detected    Candida tropicalis by PCR Not Detected    Cryptococcus neoformans/gattii by PCR Not Detected    Methicillin Resistance mecA/C and MREJ by PCR DETECTED Panic         Specimen: Synovial Fluid Updated: 01/25/23 0713      Gram Stain Orderable --    Gram stain performed on unspun fluid   Moderate Polymorphonuclear leukocytes   Epithelial cells not seen   Rare Gram positive cocci in pairs Intracellular and Extracellular    Narrative:         Synovial fluid -    Susceptibility    Staphylococcus aureus (1)    Antibiotic Interpretation Microscan  Method Status    clindamycin Resistant >=^4 mcg/mL BACTERIAL SUSCEPTIBILITY PANEL BY NAVARRO     DAPTOmycin Sensitive ^0.5 mcg/mL BACTERIAL SUSCEPTIBILITY PANEL BY NAVARRO     doxycycline Sensitive <=^0.5 mcg/mL BACTERIAL SUSCEPTIBILITY PANEL BY NAVARRO     erythromycin Resistant >=^8 mcg/mL BACTERIAL SUSCEPTIBILITY PANEL BY NAVARRO     gentamicin Sensitive <=^0.5 mcg/mL BACTERIAL SUSCEPTIBILITY PANEL BY NAVARRO     oxacillin Resistant >=^4 mcg/mL BACTERIAL SUSCEPTIBILITY PANEL BY NAVARRO     tigecycline Sensitive <=^0.12 mcg/mL BACTERIAL SUSCEPTIBILITY PANEL BY NAVARRO     trimethoprim-sulfamethoxazole Sensitive <=^10 mcg/mL BACTERIAL SUSCEPTIBILITY PANEL BY NAVARRO     vancomycin Sensitive ^1 mcg/mL BACTERIAL SUSCEPTIBILITY PANEL BY NAVARRO        Narrative  Performed by: [address] [place] [address] Lab  Source: [address] [place] [address]       Site: Body Fluid&Body Fluid                Specimen Collected: 01/24/23 13:46 EST Last Resulted: 01/26/23 08:17 EST        Radiology :    CT scan right femur -  1. Stable alignment of healing, internally fixated distal femur fracture. 2. Stable alignment of right knee arthroplasty. 3. Moderate synovial effusion. 4. Foci of subcutaneous gas along medial margin of the knee related to recent   procedure.          IMPRESSION:     Right TKA MRSA infection ( Recurrent ) - s/p explantation of the hardware ( 1/25)   Recurrent MRSA bacteremia   GNR bacteriuria          RECOMMENDATIONS:       Vancomycin 1250 mg IV q 12 hrs ( pharmacy following )   PICC line    Echo

## 2023-01-29 NOTE — PROGRESS NOTES
Hospitalist Progress Note      PCP: Eduardo Gibson DO    Date of Admission: 1/24/2023  Days in the hospital: 5    Hospital Course:   Ms. Kimberly Dyer, a 58y.o. year old female  who  has a past medical history of Adenoma of right adrenal gland, Anemia, Anxiety, Arthritis, Asthma, Benign essential tremor, Bipolar affective ([address] Chronic back pain, Chronic respiratory failure with hypoxia ([address] Depression, Diabetes mellitus ([address] Difficulty swallowing, Environmental and seasonal allergies, Excessive physiologic tremor, Fatty liver, Full dentures, GERD (gastroesophageal reflux disease), Gout, Herniated cervical disc, History of swelling of feet, Hypertension, Lymphedema of lower extremity, Mitochondrial cytopathy ([address] Muscle weakness (generalized), Need for assistance with personal care, Neuropathy, Obesity, PETR (obstructive sleep apnea), Osteoarthritis of left knee, PONV (postoperative nausea and vomiting), Seizures ([address] Spinal headache, and Thyroid disease. Patient presents with sudden onset of pain from right knee, ankle and hip pain, she had rt knee I&D done in 8/2022. She is post rt TKA in [place] several years ago. She was seen by orthopedics and concern for right knee septic arthritis and underwent aspiration, cultures positive for staph aureus and sensitivity pending. Patient underwent right knee I&D and removal of existing hardware and antibiotic spacer placement on 1/25/2023. Cultures growing MRSA. Subjective  Patient seen and examined chart reviewed  No overnight events reported patient still complains of feeling weak and tired  Her hemoglobin is down to 8.8 today. Exam:    /69   Pulse 80   Temp 97.6 °F (36.4 °C) (Temporal)   Resp 16   Ht 5' 7\" (1.702 m)   Wt 232 lb (105.2 kg)   LMP 08/31/1988   SpO2 95%   BMI 36.34 kg/m²     HEENT: No pallor, no icterus. Respiratory:  CTA, good air entry. Cardiovascular: RRR, no murmur.   Abdomen: Soft, non-tender, BS noted.  Musculoskeletal: No joint pains or joint swelling noted. Neurologic: awake, alert and following commands               Labs:   Recent Labs     01/29/23  0841   WBC 5.7   HGB 8.8*   HCT 27.2*          Recent Labs     01/27/23  0438 01/28/23  0643 01/29/23  0841   NA  --   --  143   K  --   --  4.3   CL  --   --  107   CO2  --   --  28   BUN  --   --  15   CREATININE 0.6 0.5 0.6   CALCIUM  --   --  8.9       Recent Labs     01/29/23  0841   AST 19   ALT 10   BILITOT 0.3   ALKPHOS 68     No results for input(s): INR in the last 72 hours. No results for input(s): Nelson Lackey in the last 72 hours.     Medications:  Reviewed    Infusion Medications    sodium chloride       Scheduled Medications    enoxaparin  30 mg SubCUTAneous BID    vancomycin  1,250 mg IntraVENous Q12H    pantoprazole (PROTONIX) 40 mg injection  40 mg IntraVENous Daily    polyethylene glycol  17 g Oral BID    lidocaine PF  5 mL IntraDERmal Once    sodium chloride flush  5-40 mL IntraVENous 2 times per day    heparin flush  3 mL IntraVENous 2 times per day    ketorolac  15 mg IntraVENous q8h    lipase-protease-amylase  60,000 Units Oral TID WC    albuterol  2.5 mg Nebulization 4x daily    allopurinol  100 mg Oral BID    baclofen  20 mg Oral 4x daily    docusate sodium  100 mg Oral BID    escitalopram  30 mg Oral Daily    gabapentin  600 mg Oral 4x Daily    metoprolol succinate  12.5 mg Oral Daily    topiramate  200 mg Oral BID    traZODone  100 mg Oral Nightly    ziprasidone  20 mg Oral Nightly    ziprasidone  40 mg Oral QPM     PRN Meds: bisacodyl, sodium chloride flush, sodium chloride, heparin flush, lipase-protease-amylase **AND** lipase-protease-amylase, promethazine, ondansetron **OR** ondansetron, magnesium hydroxide, acetaminophen **OR** acetaminophen, oxyCODONE **OR** oxyCODONE, morphine **OR** morphine      Intake/Output Summary (Last 24 hours) at 1/29/2023 0959  Last data filed at 1/29/2023 0234  Gross per 24 hour Intake 610 ml   Output 1150 ml   Net -540 ml       Body mass index is 36.34 kg/m². Diet  ADULT DIET; Regular    Code Status  Full Code     Assessment/Plan    --Septic arthritis , right TKA infection. Status post hardware explantation 1/25, orthopedic surgery and infectious disease. Following. IV vancomycin ending PICC line in place    -- MRSA bacteremia recurrent. Vancomycin    --Acute normocytic anemia. Hemoglobin was normal on admission trended down to 8.8. Possible blood loss from surgery? We will obtain anemia work-up, check iron studies, folate and B12, will check FOBT. Start IV PPI and trend H&H, transfuse for hemoglobin less than 7.0.    --Hypertension, blood pressure controlled    --Neuropathy, continue with Neurontin    --Seizure disorder, continue with regular medications and seizure precautions    --Generalized weakness, physical therapy following    --Morbid obesity, lifestyle modification    --Disposition she refused placement, home with home health care on discharge  Electronically signed by Raúl Montes MD on 1/29/2023 at 9:50 AM  [employer]   Please contact me through perfect [employer]    NOTE: This report was transcribed using voice recognition software. Every effort was made to ensure accuracy; however, inadvertent computerized transcription errors may be present.

## 2023-01-29 NOTE — PROGRESS NOTES
Pharmacy Consultation Note  (Antibiotic Dosing and Monitoring)    Initial consult date: 1/25/23  Consulting physician/provider: Dr. Lawrence Bagley  Drug: Vancomycin  Indication: Bone and Joint Infection    Age/  Gender Height Weight IBW  Allergy Information   62 y.o./female 5' 7\" (170.2 cm) 218 lb (98.9 kg)     Ideal body weight: 61.6 kg (135 lb 12.9 oz)  Adjusted ideal body weight: 79.1 kg (174 lb 4.5 oz)   Bee pollen, Penicillins, Ropinirole, Ropinirole hcl, Vistaril [hydroxyzine hcl], Aripiprazole, Prednisone, Restoril [temazepam], Wax [beeswax], Hydroxyzine pamoate, and Tape [adhesive tape]      Renal Function:  Recent Labs     01/27/23  0438 01/28/23  0643 01/29/23  0841   BUN  --   --  15   CREATININE 0.6 0.5 0.6       Intake/Output Summary (Last 24 hours) at 1/29/2023 0928  Last data filed at 1/29/2023 8414  Gross per 24 hour   Intake 610 ml   Output 1150 ml   Net -540 ml       Vancomycin Monitoring:  Trough:    Recent Labs     01/26/23  2151   [address] 15.9       Random:    Recent Labs     01/29/23  0841   VANCORANDOM 23.8       No results for input(s): Marie Phi in the last 72 hours. Historical Cultures:  Organism   Date Value Ref Range Status   01/25/2023 Staphylococcus aureus (A)  Preliminary     No results for input(s): BC in the last 72 hours. Recent vancomycin administrations                     vancomycin 1500 mg in sodium chloride 0.9% 300 mL IVPB (mg) 1,500 mg New Bag 01/28/23 2310     1,500 mg New Bag  1553     1,500 mg New Bag 01/27/23 2308    vancomycin (VANCOCIN) 1,250 mg in sodium chloride 0.9 % 250 mL IVPB (mg) 1,250 mg New Bag 01/27/23 1157     1,250 mg New Bag 01/26/23 2336     1,250 mg New Bag  1221             Assessment:  Patient is a 58 y.o. female who has been initiated on vancomycin. Estimated Creatinine Clearance: 121 mL/min (based on SCr of 0.6 mg/dL). To dose vancomycin, pharmacy will be utilizing [employer] calculation software for goal AUC/NAVARRO 400-600 mg/L-hr.    Day #6 of vanco. SCr 0.6. Random AM vanco level = 23.8 mcg/mL (~7.5 hr post-dose level). Plan:  Adjust vancomycin to 1,250 mg IV Q12H (eAUC/NAVARRO = 536, Trough = 17.1 mcg/mL). Will re-check vancomycin levels when appropriate. Will continue to monitor renal function. Pharmacy to follow.      Kennedi Hernandez, PharmD  PGY1 Pharmacy Resident 1/29/2023 9:28 AM

## 2023-01-29 NOTE — PROGRESS NOTES
Department of Orthopedic Surgery   Progress Note     patient seen and examined. Pain controlled. Reports some clicking and popping of the right knee when standing and ambulating. PICC line in place. O2 sats dropped to 80 last night per nursing, patient was started on 2 L O2. Sat back to 94%. Not started on anticoagulation yet. No new complaints. Denies chest pain, shortness of breath, calf pain, dizziness/lightheadedness, fevers or chills. It is still painful but improving. VITALS:  /63   Pulse 75   Temp 97.7 °F (36.5 °C)   Resp 16   Ht 5' 7\" (1.702 m)   Wt 232 lb (105.2 kg)   LMP 08/31/1988   SpO2 95%   BMI 36.34 kg/m²     GENERAL: In bed, oriented x4  MUSCULOSKELETAL:   right lower extremity:  Dressing C/D/I, knee immobilizer in place  Compartments soft and compressible, tenderness to palpation over posterior fossa  Palpable dorsalis pedis and posterior tibialis pulse, brisk cap refill to toes, foot warm and perfused  Sensation intact to light touch in sural/deep peroneal/superficial peroneal/saphenous/posterior tibial nerve distributions to foot/ankle. Demonstrates weak active and passive ankle plantar/dorsiflexion/great toe extension     CBC:   Lab Results   Component Value Date/Time    WBC 8.4 01/24/2023 06:56 PM    HGB 12.0 01/24/2023 06:56 PM    HCT 36.0 01/24/2023 06:56 PM     01/24/2023 06:56 PM       ASSESSMENT  Right knee explantation with insertion of articulating antibiotic cement spacer 1/25  Removal right distal femur plate and screw construct 1/25  Right hip aspiration with fluoroscopic guidance 1/25       PLAN    Continue physical therapy and protocol: Weightbearing as tolerated right lower extremity in full extension with hinged knee brace in place.   Continue antibiotics per ID recommendations appreciate infectious disease input-currently on vancomycin 1250 mg IV every 12 hours  Culture of surgical specimen showing recurrent MRSA infection, MRSA bacteremia, GNR bacteriuria. Currently no growth on the hip aspiration and gram stain was also negative. Deep venous thrombosis prophylaxis -per medicine recommendations. Lovenox 30 mg twice daily order placed. Currently in hinged knee brace and will continue. PT/OT [facility] 15  Pain Control: IV and PO  Monitor H&H  D/C Plan:  pending sw/cm and therapy recommendations. Patient wishes to be DC'd at home.   Expressed desire to stay 1 more day to work with PT    Electronically signed by Cameron Maloney DO on 1/29/2023 at 7:04 AM

## 2023-01-29 NOTE — PLAN OF CARE
Problem: Chronic Conditions and Co-morbidities  Goal: Patient's chronic conditions and co-morbidity symptoms are monitored and maintained or improved  1/29/2023 1005 by Lida Wang RN  Outcome: Progressing  1/29/2023 0005 by Shay Hendrix RN  Outcome: Progressing     Problem: Discharge Planning  Goal: Discharge to home or other facility with appropriate resources  1/29/2023 1005 by Lida Wang RN  Outcome: Progressing  1/29/2023 0005 by Shay Hendrix RN  Outcome: Progressing     Problem: Pain  Goal: Verbalizes/displays adequate comfort level or baseline comfort level  1/29/2023 1005 by Lida Wang RN  Outcome: Progressing  1/29/2023 0005 by Shay Hendrix RN  Outcome: Progressing     Problem: Skin/Tissue Integrity  Goal: Absence of new skin breakdown  Description: 1. Monitor for areas of redness and/or skin breakdown  2. Assess vascular access sites hourly  3. Every 4-6 hours minimum:  Change oxygen saturation probe site  4. Every 4-6 hours:  If on nasal continuous positive airway pressure, respiratory therapy assess nares and determine need for appliance change or resting period.   1/29/2023 1005 by Lida Wang RN  Outcome: Progressing  1/29/2023 0005 by Shay Hendrix RN  Outcome: Progressing

## 2023-01-30 LAB
ALBUMIN SERPL-MCNC: 2.9 G/DL (ref 3.5–5.2)
ALP BLD-CCNC: 73 U/L (ref 35–104)
ALT SERPL-CCNC: 10 U/L (ref 0–32)
ANION GAP SERPL CALCULATED.3IONS-SCNC: 10 MMOL/L (ref 7–16)
AST SERPL-CCNC: 14 U/L (ref 0–31)
BASOPHILS ABSOLUTE: 0.04 E9/L (ref 0–0.2)
BASOPHILS RELATIVE PERCENT: 0.6 % (ref 0–2)
BILIRUB SERPL-MCNC: 0.2 MG/DL (ref 0–1.2)
BUN BLDV-MCNC: 17 MG/DL (ref 6–23)
CALCIUM SERPL-MCNC: 8.9 MG/DL (ref 8.6–10.2)
CHLORIDE BLD-SCNC: 106 MMOL/L (ref 98–107)
CO2: 27 MMOL/L (ref 22–29)
CREAT SERPL-MCNC: 0.6 MG/DL (ref 0.5–1)
EOSINOPHILS ABSOLUTE: 0.4 E9/L (ref 0.05–0.5)
EOSINOPHILS RELATIVE PERCENT: 5.7 % (ref 0–6)
GFR SERPL CREATININE-BSD FRML MDRD: >60 ML/MIN/1.73
GLUCOSE BLD-MCNC: 91 MG/DL (ref 74–99)
HCT VFR BLD CALC: 28.5 % (ref 34–48)
HEMOGLOBIN: 9.1 G/DL (ref 11.5–15.5)
IMMATURE GRANULOCYTES #: 0.18 E9/L
IMMATURE GRANULOCYTES %: 2.5 % (ref 0–5)
LYMPHOCYTES ABSOLUTE: 0.85 E9/L (ref 1.5–4)
LYMPHOCYTES RELATIVE PERCENT: 12 % (ref 20–42)
MAGNESIUM: 2.2 MG/DL (ref 1.6–2.6)
MCH RBC QN AUTO: 31.9 PG (ref 26–35)
MCHC RBC AUTO-ENTMCNC: 31.9 % (ref 32–34.5)
MCV RBC AUTO: 100 FL (ref 80–99.9)
METER GLUCOSE: 110 MG/DL (ref 74–99)
MONOCYTES ABSOLUTE: 0.59 E9/L (ref 0.1–0.95)
MONOCYTES RELATIVE PERCENT: 8.3 % (ref 2–12)
NEUTROPHILS ABSOLUTE: 5.01 E9/L (ref 1.8–7.3)
NEUTROPHILS RELATIVE PERCENT: 70.9 % (ref 43–80)
PDW BLD-RTO: 13.4 FL (ref 11.5–15)
PLATELET # BLD: 297 E9/L (ref 130–450)
PMV BLD AUTO: 8.8 FL (ref 7–12)
POTASSIUM SERPL-SCNC: 4.2 MMOL/L (ref 3.5–5)
RBC # BLD: 2.85 E12/L (ref 3.5–5.5)
SODIUM BLD-SCNC: 143 MMOL/L (ref 132–146)
TOTAL PROTEIN: 5.7 G/DL (ref 6.4–8.3)
WBC # BLD: 7.1 E9/L (ref 4.5–11.5)

## 2023-01-30 PROCEDURE — 6370000000 HC RX 637 (ALT 250 FOR IP): Performed by: FAMILY MEDICINE

## 2023-01-30 PROCEDURE — 6360000002 HC RX W HCPCS: Performed by: INTERNAL MEDICINE

## 2023-01-30 PROCEDURE — 82962 GLUCOSE BLOOD TEST: CPT

## 2023-01-30 PROCEDURE — 83735 ASSAY OF MAGNESIUM: CPT

## 2023-01-30 PROCEDURE — 1200000000 HC SEMI PRIVATE

## 2023-01-30 PROCEDURE — 2580000003 HC RX 258: Performed by: FAMILY MEDICINE

## 2023-01-30 PROCEDURE — 85025 COMPLETE CBC W/AUTO DIFF WBC: CPT

## 2023-01-30 PROCEDURE — 6370000000 HC RX 637 (ALT 250 FOR IP): Performed by: ORTHOPAEDIC SURGERY

## 2023-01-30 PROCEDURE — 97530 THERAPEUTIC ACTIVITIES: CPT

## 2023-01-30 PROCEDURE — 6360000002 HC RX W HCPCS: Performed by: FAMILY MEDICINE

## 2023-01-30 PROCEDURE — A4216 STERILE WATER/SALINE, 10 ML: HCPCS | Performed by: FAMILY MEDICINE

## 2023-01-30 PROCEDURE — 6360000002 HC RX W HCPCS

## 2023-01-30 PROCEDURE — 94640 AIRWAY INHALATION TREATMENT: CPT

## 2023-01-30 PROCEDURE — 6360000002 HC RX W HCPCS: Performed by: ORTHOPAEDIC SURGERY

## 2023-01-30 PROCEDURE — C9113 INJ PANTOPRAZOLE SODIUM, VIA: HCPCS | Performed by: FAMILY MEDICINE

## 2023-01-30 PROCEDURE — 36415 COLL VENOUS BLD VENIPUNCTURE: CPT

## 2023-01-30 PROCEDURE — 2580000003 HC RX 258: Performed by: ORTHOPAEDIC SURGERY

## 2023-01-30 PROCEDURE — 97535 SELF CARE MNGMENT TRAINING: CPT

## 2023-01-30 PROCEDURE — 80053 COMPREHEN METABOLIC PANEL: CPT

## 2023-01-30 PROCEDURE — 2580000003 HC RX 258: Performed by: INTERNAL MEDICINE

## 2023-01-30 RX ORDER — POLYETHYLENE GLYCOL 3350 17 G/17G
17 POWDER, FOR SOLUTION ORAL 2 TIMES DAILY
Qty: 527 G | Refills: 0 | COMMUNITY
Start: 2023-01-30

## 2023-01-30 RX ORDER — PANTOPRAZOLE SODIUM 40 MG/1
40 TABLET, DELAYED RELEASE ORAL DAILY
Qty: 30 TABLET | Refills: 0 | Status: SHIPPED | OUTPATIENT
Start: 2023-01-30

## 2023-01-30 RX ORDER — BACLOFEN 20 MG/1
10 TABLET ORAL 3 TIMES DAILY PRN
Qty: 30 TABLET | Refills: 0
Start: 2023-01-30

## 2023-01-30 RX ORDER — CARBOXYMETHYLCELLULOSE SODIUM 0.5 %
250 DROPPERETTE, SINGLE-USE DROP DISPENSER OPHTHALMIC (EYE) 2 TIMES DAILY
Refills: 0 | COMMUNITY
Start: 2023-01-30

## 2023-01-30 RX ADMIN — POLYETHYLENE GLYCOL 3350 17 G: 17 POWDER, FOR SOLUTION ORAL at 08:22

## 2023-01-30 RX ADMIN — SODIUM CHLORIDE, PRESERVATIVE FREE 10 ML: 5 INJECTION INTRAVENOUS at 20:45

## 2023-01-30 RX ADMIN — DOCUSATE SODIUM 100 MG: 100 CAPSULE, LIQUID FILLED ORAL at 08:22

## 2023-01-30 RX ADMIN — METOPROLOL SUCCINATE 12.5 MG: 25 TABLET, EXTENDED RELEASE ORAL at 08:20

## 2023-01-30 RX ADMIN — HEPARIN SODIUM (PORCINE) LOCK FLUSH IV SOLN 100 UNIT/ML 300 UNITS: 100 SOLUTION at 20:45

## 2023-01-30 RX ADMIN — GABAPENTIN 600 MG: 300 CAPSULE ORAL at 17:27

## 2023-01-30 RX ADMIN — PANCRELIPASE LIPASE, PANCRELIPASE PROTEASE, PANCRELIPASE AMYLASE 60000 UNITS: 20000; 63000; 84000 CAPSULE, DELAYED RELEASE ORAL at 08:23

## 2023-01-30 RX ADMIN — ALBUTEROL SULFATE 2.5 MG: 2.5 SOLUTION RESPIRATORY (INHALATION) at 21:13

## 2023-01-30 RX ADMIN — GABAPENTIN 600 MG: 300 CAPSULE ORAL at 20:44

## 2023-01-30 RX ADMIN — ENOXAPARIN SODIUM 30 MG: 100 INJECTION SUBCUTANEOUS at 08:20

## 2023-01-30 RX ADMIN — DOCUSATE SODIUM 100 MG: 100 CAPSULE, LIQUID FILLED ORAL at 20:44

## 2023-01-30 RX ADMIN — SODIUM CHLORIDE 40 MG: 9 INJECTION, SOLUTION INTRAMUSCULAR; INTRAVENOUS; SUBCUTANEOUS at 08:24

## 2023-01-30 RX ADMIN — PANCRELIPASE LIPASE, PANCRELIPASE PROTEASE, PANCRELIPASE AMYLASE 60000 UNITS: 20000; 63000; 84000 CAPSULE, DELAYED RELEASE ORAL at 17:29

## 2023-01-30 RX ADMIN — BACLOFEN 20 MG: 10 TABLET ORAL at 20:44

## 2023-01-30 RX ADMIN — SODIUM CHLORIDE, PRESERVATIVE FREE 10 ML: 5 INJECTION INTRAVENOUS at 08:21

## 2023-01-30 RX ADMIN — ALLOPURINOL 100 MG: 100 TABLET ORAL at 20:44

## 2023-01-30 RX ADMIN — HEPARIN SODIUM (PORCINE) LOCK FLUSH IV SOLN 100 UNIT/ML 300 UNITS: 100 SOLUTION at 08:22

## 2023-01-30 RX ADMIN — ESCITALOPRAM OXALATE 30 MG: 10 TABLET, FILM COATED ORAL at 08:21

## 2023-01-30 RX ADMIN — PANCRELIPASE LIPASE, PANCRELIPASE PROTEASE, PANCRELIPASE AMYLASE 60000 UNITS: 20000; 63000; 84000 CAPSULE, DELAYED RELEASE ORAL at 12:11

## 2023-01-30 RX ADMIN — MORPHINE SULFATE 4 MG: 4 INJECTION, SOLUTION INTRAMUSCULAR; INTRAVENOUS at 10:02

## 2023-01-30 RX ADMIN — ALLOPURINOL 100 MG: 100 TABLET ORAL at 08:22

## 2023-01-30 RX ADMIN — ZIPRASIDONE HYDROCHLORIDE 40 MG: 20 CAPSULE ORAL at 20:44

## 2023-01-30 RX ADMIN — VANCOMYCIN HYDROCHLORIDE 1250 MG: 10 INJECTION, POWDER, LYOPHILIZED, FOR SOLUTION INTRAVENOUS at 11:07

## 2023-01-30 RX ADMIN — OXYCODONE HYDROCHLORIDE 10 MG: 10 TABLET ORAL at 12:10

## 2023-01-30 RX ADMIN — OXYCODONE HYDROCHLORIDE 10 MG: 10 TABLET ORAL at 05:52

## 2023-01-30 RX ADMIN — MORPHINE SULFATE 4 MG: 4 INJECTION, SOLUTION INTRAMUSCULAR; INTRAVENOUS at 15:05

## 2023-01-30 RX ADMIN — GABAPENTIN 600 MG: 300 CAPSULE ORAL at 12:10

## 2023-01-30 RX ADMIN — TOPIRAMATE 200 MG: 100 TABLET, FILM COATED ORAL at 08:25

## 2023-01-30 RX ADMIN — POLYETHYLENE GLYCOL 3350 17 G: 17 POWDER, FOR SOLUTION ORAL at 20:45

## 2023-01-30 RX ADMIN — ALBUTEROL SULFATE 2.5 MG: 2.5 SOLUTION RESPIRATORY (INHALATION) at 09:33

## 2023-01-30 RX ADMIN — VANCOMYCIN HYDROCHLORIDE 1250 MG: 10 INJECTION, POWDER, LYOPHILIZED, FOR SOLUTION INTRAVENOUS at 23:16

## 2023-01-30 RX ADMIN — ALBUTEROL SULFATE 2.5 MG: 2.5 SOLUTION RESPIRATORY (INHALATION) at 13:03

## 2023-01-30 RX ADMIN — BACLOFEN 20 MG: 10 TABLET ORAL at 08:24

## 2023-01-30 RX ADMIN — TRAZODONE HYDROCHLORIDE 100 MG: 50 TABLET ORAL at 20:44

## 2023-01-30 RX ADMIN — ENOXAPARIN SODIUM 30 MG: 100 INJECTION SUBCUTANEOUS at 20:45

## 2023-01-30 RX ADMIN — MORPHINE SULFATE 4 MG: 4 INJECTION, SOLUTION INTRAMUSCULAR; INTRAVENOUS at 19:05

## 2023-01-30 RX ADMIN — OXYCODONE HYDROCHLORIDE 10 MG: 10 TABLET ORAL at 17:40

## 2023-01-30 RX ADMIN — ALBUTEROL SULFATE 2.5 MG: 2.5 SOLUTION RESPIRATORY (INHALATION) at 16:05

## 2023-01-30 RX ADMIN — ACETAMINOPHEN 650 MG: 325 TABLET ORAL at 22:07

## 2023-01-30 RX ADMIN — ACETAMINOPHEN 650 MG: 325 TABLET ORAL at 12:11

## 2023-01-30 RX ADMIN — OXYCODONE HYDROCHLORIDE 10 MG: 10 TABLET ORAL at 22:06

## 2023-01-30 RX ADMIN — GABAPENTIN 600 MG: 300 CAPSULE ORAL at 08:21

## 2023-01-30 RX ADMIN — BACLOFEN 20 MG: 10 TABLET ORAL at 12:10

## 2023-01-30 RX ADMIN — BACLOFEN 20 MG: 10 TABLET ORAL at 17:27

## 2023-01-30 RX ADMIN — ACETAMINOPHEN 650 MG: 325 TABLET ORAL at 05:53

## 2023-01-30 RX ADMIN — TOPIRAMATE 200 MG: 100 TABLET, FILM COATED ORAL at 20:45

## 2023-01-30 ASSESSMENT — PAIN SCALES - GENERAL
PAINLEVEL_OUTOF10: 9
PAINLEVEL_OUTOF10: 9
PAINLEVEL_OUTOF10: 10
PAINLEVEL_OUTOF10: 10
PAINLEVEL_OUTOF10: 9
PAINLEVEL_OUTOF10: 10
PAINLEVEL_OUTOF10: 10
PAINLEVEL_OUTOF10: 8

## 2023-01-30 ASSESSMENT — PAIN DESCRIPTION - DESCRIPTORS
DESCRIPTORS: ACHING;SHARP
DESCRIPTORS: DISCOMFORT;SORE;THROBBING
DESCRIPTORS: THROBBING
DESCRIPTORS: THROBBING;STABBING
DESCRIPTORS: ACHING;SHARP;SHOOTING
DESCRIPTORS: THROBBING;SORE;ACHING
DESCRIPTORS: ACHING;SHARP;SHOOTING

## 2023-01-30 ASSESSMENT — PAIN DESCRIPTION - ORIENTATION
ORIENTATION: RIGHT

## 2023-01-30 ASSESSMENT — PAIN DESCRIPTION - LOCATION
LOCATION: LEG
LOCATION: KNEE
LOCATION: KNEE
LOCATION: LEG
LOCATION: KNEE
LOCATION: KNEE
LOCATION: LEG;KNEE

## 2023-01-30 ASSESSMENT — PAIN DESCRIPTION - PAIN TYPE: TYPE: ACUTE PAIN

## 2023-01-30 ASSESSMENT — PAIN - FUNCTIONAL ASSESSMENT: PAIN_FUNCTIONAL_ASSESSMENT: PREVENTS OR INTERFERES SOME ACTIVE ACTIVITIES AND ADLS

## 2023-01-30 NOTE — PROGRESS NOTES
Department of Internal Medicine  Infectious Diseases  Progress  Note      C/C :  MRSA bacteremia, sepsis , Right TKA infection     Denies fever   Reports right knee pain, constipation  Afebrile     Current Facility-Administered Medications   Medication Dose Route Frequency Provider Last Rate Last Admin    enoxaparin Sodium (LOVENOX) injection 30 mg  30 mg SubCUTAneous BID Edna Tramo, DO   30 mg at 01/30/23 0820    vancomycin (VANCOCIN) 1,250 mg in sodium chloride 0.9 % 250 mL IVPB  1,250 mg IntraVENous Q12H Lizeth Bacca, DO   Stopped at 01/30/23 0051    pantoprazole (PROTONIX) 40 mg in sodium chloride (PF) 0.9 % 10 mL injection  40 mg IntraVENous Daily Maycol Conde MD   40 mg at 01/30/23 0824    polyethylene glycol (GLYCOLAX) packet 17 g  17 g Oral BID Maycol Conde MD   17 g at 01/30/23 8074    bisacodyl (DULCOLAX) suppository 10 mg  10 mg Rectal Daily PRN Maycol Conde MD   10 mg at 01/28/23 1431    lidocaine PF 1 % injection 5 mL  5 mL IntraDERmal Once Bill Olivo MD        sodium chloride flush 0.9 % injection 5-40 mL  5-40 mL IntraVENous 2 times per day Linda Helms MD   10 mL at 01/30/23 0821    sodium chloride flush 0.9 % injection 5-40 mL  5-40 mL IntraVENous PRN Bill Olivo MD   10 mL at 01/29/23 0902    0.9 % sodium chloride infusion   IntraVENous PRN Bill Olivo MD        heparin flush 100 UNIT/ML injection 300 Units  3 mL IntraVENous 2 times per day Linda Helms MD   300 Units at 01/30/23 0822    heparin flush 100 UNIT/ML injection 300 Units  3 mL IntraCATHeter PRN Bill Olivo MD        lipase-protease-amylase (ZENPEP) 13562-66283 units delayed release capsule 60,000 Units  60,000 Units Oral TID  Cassie Robin, DO   60,000 Units at 01/30/23 0823    And    lipase-protease-amylase (ZENPEP) 54571-69975 units delayed release capsule 40,000 Units  40,000 Units Oral TID PRN Cassie Robin, DO   40,000 Units at 01/27/23 2145    albuterol (PROVENTIL) nebulizer solution 2.5 mg  2.5 mg Nebulization 4x daily Lyndol Venus, DO   2.5 mg at 01/30/23 5308    allopurinol (ZYLOPRIM) tablet 100 mg  100 mg Oral BID Lyndol Venus, DO   100 mg at 01/30/23 5053    baclofen (LIORESAL) tablet 20 mg  20 mg Oral 4x daily Lyndol Venus, DO   20 mg at 01/30/23 9201    docusate sodium (COLACE) capsule 100 mg  100 mg Oral BID Lyndol Venus, DO   100 mg at 01/30/23 6990    escitalopram (LEXAPRO) tablet 30 mg  30 mg Oral Daily Lyndol Venus, DO   30 mg at 01/30/23 3549    gabapentin (NEURONTIN) capsule 600 mg  600 mg Oral 4x Daily Lyndol Venus, DO   600 mg at 01/30/23 1298    metoprolol succinate (TOPROL XL) extended release tablet 12.5 mg  12.5 mg Oral Daily Lyndol Venus, DO   12.5 mg at 01/30/23 0820    promethazine (PHENERGAN) tablet 25 mg  25 mg Oral Q6H PRN Lyndol Venus, DO   25 mg at 01/26/23 2123    topiramate (TOPAMAX) tablet 200 mg  200 mg Oral BID Lyndol Venus, DO   200 mg at 01/30/23 0825    traZODone (DESYREL) tablet 100 mg  100 mg Oral Nightly Lyndol Vensu, DO   100 mg at 01/29/23 2124    ziprasidone (GEODON) capsule 20 mg  20 mg Oral Nightly Lyndol Venus, DO   20 mg at 01/29/23 2125    ziprasidone (GEODON) capsule 40 mg  40 mg Oral QPM Lyndol Venus, DO   40 mg at 01/29/23 1634    ondansetron (ZOFRAN-ODT) disintegrating tablet 4 mg  4 mg Oral Q8H PRN Lyndol Venus, DO        Or    [facility]ansetron [facility]) injection 4 mg  4 mg IntraVENous Q6H PRN Lyndol Venus, DO        magnesium hydroxide (MILK OF MAGNESIA) 400 MG/5ML suspension 30 mL  30 mL Oral Daily PRN Lyndol Venus, DO   30 mL at 01/27/23 2031    acetaminophen (TYLENOL) tablet 650 mg  650 mg Oral Q6H PRN Geoff Donovan DO   650 mg at 01/30/23 0770    Or    acetaminophen (TYLENOL) suppository 650 mg  650 mg Rectal Q6H PRN Geoff Donovan DO        oxyCODONE (ROXICODONE) immediate release tablet 5 mg  5 mg Oral Q4H PRN Geoff Donovan DO        Or    oxyCODONE HCl (OXY-IR) immediate release tablet 10 mg  10 mg Oral Q4H PRN Cleatis Folk, DO   10 mg at 01/30/23 9539    morphine (PF) injection 2 mg  2 mg IntraMUSCular Q4H PRN Cleatis Folk, DO   2 mg at 01/27/23 7775    Or    morphine sulfate (PF) injection 4 mg  4 mg IntraMUSCular Q4H PRN Cleatis Folk, DO   4 mg at 01/30/23 1002             REVIEW OF SYSTEMS:    CONSTITUTIONAL:  Denies fever, chill or rigors. HEENT: denies blurring of vision or double vision, denies hearing problem  RESPIRATORY: denies cough, shortness of breath, sputum expectoration. CARDIOVASCULAR:  Denies palpitation or chest pain   GASTROINTESTINAL:  Denies abdomen pain, diarrhea or constipation,, nausea or vomiting. GENITOURINARY:  Denies burning urination or frequency of urination  INTEGUMENT: denies wound , rash  HEMATOLOGIC/LYMPHATIC:  Denies lymph node swelling, gum bleeding or easy bruising. MUSCULOSKELETAL:  Right knee pain   NEUROLOGICAL:  Denies light headed, dizziness, loss of consciousness, weakness of lower extremities, bowel or bladder incontinence. PHYSICAL EXAM:      Vitals:       BP (!) 125/50   Pulse 86   Temp 98 °F (36.7 °C) (Temporal)   Resp 18   Ht 5' 7\" (1.702 m)   Wt 238 lb 12.8 oz (108.3 kg)   LMP 08/31/1988   SpO2 92%   BMI 37.40 kg/m²     General Appearance:    Awake, alert , no acute distress. Head:    Normocephalic, atraumatic   Eyes:    No pallor, no icterus,   Ears:    No obvious deformity or drainage.    Nose:   No nasal drainage   Throat:   Mucosa moist, no oral thrush   Neck:   Supple, no lymphadenopathy   Back:     no CVA tenderness   Lungs:     Clear to auscultation bilaterally, no wheeze    Heart:    Regular rate and rhythm, no murmur   Abdomen:     Soft, non-tender, bowel sounds present    Extremities:   Right knee wrapped    Pulses:   Dorsalis pedis palpable    Skin:   no rashes or lesions       CBC with Differential:      Lab Results   Component Value Date/Time    WBC 7.1 01/30/2023 05:30 AM RBC 2.85 01/30/2023 05:30 AM    HGB 9.1 01/30/2023 05:30 AM    HCT 28.5 01/30/2023 05:30 AM     01/30/2023 05:30 AM    .0 01/30/2023 05:30 AM    MCH 31.9 01/30/2023 05:30 AM    MCHC 31.9 01/30/2023 05:30 AM    RDW 13.4 01/30/2023 05:30 AM    SEGSPCT 64 12/27/2013 03:00 PM    LYMPHOPCT 12.0 01/30/2023 05:30 AM    MONOPCT 8.3 01/30/2023 05:30 AM    BASOPCT 0.6 01/30/2023 05:30 AM    MONOSABS 0.59 01/30/2023 05:30 AM    LYMPHSABS 0.85 01/30/2023 05:30 AM    EOSABS 0.40 01/30/2023 05:30 AM    BASOSABS 0.04 01/30/2023 05:30 AM       CMP     Lab Results   Component Value Date/Time     01/30/2023 05:30 AM    K 4.2 01/30/2023 05:30 AM    K 3.9 05/06/2022 05:28 PM     01/30/2023 05:30 AM    CO2 27 01/30/2023 05:30 AM    BUN 17 01/30/2023 05:30 AM    CREATININE 0.6 01/30/2023 05:30 AM    GFRAA >60 08/23/2022 07:14 AM    LABGLOM >60 01/30/2023 05:30 AM    GLUCOSE 91 01/30/2023 05:30 AM    GLUCOSE 93 05/24/2012 11:11 AM    PROT 5.7 01/30/2023 05:30 AM    LABALBU 2.9 01/30/2023 05:30 AM    LABALBU 4.7 05/21/2012 01:48 PM    CALCIUM 8.9 01/30/2023 05:30 AM    BILITOT 0.2 01/30/2023 05:30 AM    ALKPHOS 73 01/30/2023 05:30 AM    AST 14 01/30/2023 05:30 AM    ALT 10 01/30/2023 05:30 AM         Hepatic Function Panel:      Lab Results   Component Value Date/Time    ALKPHOS 73 01/30/2023 05:30 AM    ALT 10 01/30/2023 05:30 AM    AST 14 01/30/2023 05:30 AM    PROT 5.7 01/30/2023 05:30 AM    BILITOT 0.2 01/30/2023 05:30 AM    BILIDIR <0.2 03/16/2021 12:16 PM    IBILI see below 03/16/2021 12:16 PM    LABALBU 2.9 01/30/2023 05:30 AM    LABALBU 4.7 05/21/2012 01:48 PM       PT/INR:    Lab Results   Component Value Date/Time    PROTIME 13.1 08/19/2022 08:25 AM    PROTIME 10.8 05/07/2012 03:45 AM    INR 1.2 08/19/2022 08:25 AM       TSH:    Lab Results   Component Value Date/Time    TSH 0.548 03/16/2021 12:16 PM       U/A:    Lab Results   Component Value Date/Time    COLORU Yellow 01/24/2023 12:29 PM    PHUR 6.0 01/24/2023 12:29 PM    LABCAST MODERATE 05/06/2012 04:00 AM    WBCUA NONE 01/24/2023 12:29 PM    WBCUA 1-3 05/06/2012 04:00 AM    RBCUA NONE 01/24/2023 12:29 PM    RBCUA 0-1 12/13/2013 10:15 AM    BACTERIA NONE SEEN 01/24/2023 12:29 PM    CLARITYU Clear 01/24/2023 12:29 PM    SPECGRAV 1.025 01/24/2023 12:29 PM    LEUKOCYTESUR Negative 01/24/2023 12:29 PM    UROBILINOGEN 1.0 01/24/2023 12:29 PM    BILIRUBINUR Negative 01/24/2023 12:29 PM    BILIRUBINUR NEGATIVE 05/06/2012 04:00 AM    BLOODU Negative 01/24/2023 12:29 PM    GLUCOSEU Negative 01/24/2023 12:29 PM    GLUCOSEU NEGATIVE 05/06/2012 04:00 AM       ABG:    Lab Results   Component Value Date/Time    H4QEXGMY 94.0 05/06/2012 10:00 AM       MICROBIOLOGY:    Blood culture -       Staphylococcus aureus by PCR DETECTED Panic      Staphylococcus epidermidis by PCR Not Detected    Staphylococcus lugdunensis by PCR Not Detected    Staphylococcus species by PCR DETECTED Panic      Serratia marcescens by PCR Not Detected    Streptococcus pneumoniae by PCR Not Detected    Streptococcus pyogenes  by PCR Not Detected    Streptococcus species by PCR Not Detected    Stenotrophomonas maltophilia by PCR Not Detected    Candida albicans by PCR Not Detected    Candida auris by PCR Not Detected    Candida glabrata by PCR Not Detected    Candida krusei by PCR Not Detected    Candida parapsilosis by PCR Not Detected    Candida tropicalis by PCR Not Detected    Cryptococcus neoformans/gattii by PCR Not Detected    Methicillin Resistance mecA/C and MREJ by PCR DETECTED Panic         Specimen: Synovial Fluid Updated: 01/25/23 0713      Gram Stain Orderable --    Gram stain performed on unspun fluid   Moderate Polymorphonuclear leukocytes   Epithelial cells not seen   Rare Gram positive cocci in pairs Intracellular and Extracellular    Narrative:         Synovial fluid -    Susceptibility    Staphylococcus aureus (1)    Antibiotic Interpretation Microscan  Method Status    clindamycin  Resistant >=^4 mcg/mL BACTERIAL SUSCEPTIBILITY PANEL BY NAVARRO     DAPTOmycin Sensitive ^0.5 mcg/mL BACTERIAL SUSCEPTIBILITY PANEL BY NAVARRO     doxycycline Sensitive <=^0.5 mcg/mL BACTERIAL SUSCEPTIBILITY PANEL BY NAVARRO     erythromycin Resistant >=^8 mcg/mL BACTERIAL SUSCEPTIBILITY PANEL BY NAVARRO     gentamicin Sensitive <=^0.5 mcg/mL BACTERIAL SUSCEPTIBILITY PANEL BY NAVARRO     oxacillin Resistant >=^4 mcg/mL BACTERIAL SUSCEPTIBILITY PANEL BY NAVARRO     tigecycline Sensitive <=^0.12 mcg/mL BACTERIAL SUSCEPTIBILITY PANEL BY NAVARRO     trimethoprim-sulfamethoxazole Sensitive <=^10 mcg/mL BACTERIAL SUSCEPTIBILITY PANEL BY NAVARRO     vancomycin Sensitive ^1 mcg/mL BACTERIAL SUSCEPTIBILITY PANEL BY NAVARRO        Narrative  Performed by: [address] [place] [address] Lab  Source: [address]       Site: Body Fluid&Body Fluid                Specimen Collected: 01/24/23 13:46 EST Last Resulted: 01/26/23 08:17 EST        Radiology :    CT scan right femur -  1. Stable alignment of healing, internally fixated distal femur fracture. 2. Stable alignment of right knee arthroplasty. 3. Moderate synovial effusion. 4. Foci of subcutaneous gas along medial margin of the knee related to recent   procedure.          IMPRESSION:     Right TKA MRSA infection ( Recurrent ) - s/p explantation of the hardware ( 1/25)   Recurrent MRSA bacteremia - blood cx neg on 1/24            RECOMMENDATIONS:       Vancomycin 1250 mg IV q 12 hrs for 6 weeks   ID follow up in 1-2 weeks   Monitor labs

## 2023-01-30 NOTE — PLAN OF CARE
Problem: Chronic Conditions and Co-morbidities  Goal: Patient's chronic conditions and co-morbidity symptoms are monitored and maintained or improved  1/30/2023 0954 by Moisés Keen RN  Outcome: Progressing  1/30/2023 0018 by Sangeetha Marquis RN  Outcome: Progressing     Problem: Discharge Planning  Goal: Discharge to home or other facility with appropriate resources  1/30/2023 0954 by Moisés Keen RN  Outcome: Progressing  1/30/2023 0018 by Sangeetha Marquis RN  Outcome: Progressing     Problem: Pain  Goal: Verbalizes/displays adequate comfort level or baseline comfort level  1/30/2023 0954 by Moisés Keen RN  Outcome: Progressing  1/30/2023 0018 by Sangeetha Marquis RN  Outcome: Progressing     Problem: Skin/Tissue Integrity  Goal: Absence of new skin breakdown  Description: 1. Monitor for areas of redness and/or skin breakdown  2. Assess vascular access sites hourly  3. Every 4-6 hours minimum:  Change oxygen saturation probe site  4. Every 4-6 hours:  If on nasal continuous positive airway pressure, respiratory therapy assess nares and determine need for appliance change or resting period.   1/30/2023 0954 by Moisés Keen RN  Outcome: Progressing  1/30/2023 0018 by Sangeetha Marquis RN  Outcome: Progressing

## 2023-01-30 NOTE — PROGRESS NOTES
Physical Therapy  Treatment Note       Name: Tabitha Leahy  : 1960  MRN: [id number]      Date of Service: 2023    Evaluating PT:  Celia Cowan PT, DPT  [id number]    Room #:  5767/1208-X  Diagnosis:  Septic joint ([address] [M00.9]  Knee pain [M25.569]  PMHx/PSHx:   has a past medical history of Adenoma of right adrenal gland, Anemia, Anxiety, Arthritis, Asthma, Benign essential tremor, Bipolar affective ([address] Chronic back pain, Chronic respiratory failure with hypoxia ([address] Depression, Diabetes mellitus ([address] Difficulty swallowing, Environmental and seasonal allergies, Excessive physiologic tremor, Fatty liver, Full dentures, GERD (gastroesophageal reflux disease), Gout, Herniated cervical disc, History of swelling of feet, Hypertension, Lymphedema of lower extremity, Mitochondrial cytopathy ([facility] [address] [facility] Muscle weakness (generalized), Need for assistance with personal care, Neuropathy, Obesity, PETR (obstructive sleep apnea), Osteoarthritis of left knee, PONV (postoperative nausea and vomiting), Seizures ([address] Spinal headache, and Thyroid disease. Procedure/Surgery:  RIGHT KNEE INCISION AND DRAINAGE, REMOVAL OF EXISTING HARDWARE, ANTIBIOTIC SPACER INSTALLED 23  Precautions:  Falls, WBAT RLE, Hinged Knee Brace Locked in Extension  Equipment Needs:      SUBJECTIVE:    Pt lives alone in a 1 story with 0 step(s) to enter and 0 rail(s); bed/bath on 1st   Bathroom setup: tub shower combo with bath bench   Equipment owned: electric w/c, bath bench, ww    OBJECTIVE:   Initial Evaluation  Date: 23 Treatment Date: 23 Short Term/ Long Term   Goals   AM-PAC 6 Clicks  78/48    Was pt agreeable to Eval/treatment? yes Yes    Does pt have pain? No c/o pain  Mild pain in RLE    Bed Mobility  Rolling: MaxA  Supine to sit: MaxA x 2  Sit to supine: MaxA x 2  Scooting: MaxA   Supine to sit: SBA  Sit to supine: SBA  Scooting: SBA to EOB Rolling: Independent  Supine to sit:  Independent  Sit to supine: Independent  Scooting: Independent     Transfers Sit to stand: NT  Stand to sit: NT  Stand pivot: NT Sit to stand: SBA  Stand to sit: SBA  Stand pivot: SBA with [employer] Sit to stand: Modified Independent  Stand to sit: Modified Independent  Stand pivot: Modified Independent     Ambulation    NT 2, 2 feet to chair with WW with SBA 50 feet with Modified Independent  AAD    Stair negotiation: ascended and descended  NT NT >4 steps with single rail Modified Independent     ROM BUE:  Defer to OT  BLE:  WFL NT    Strength BUE:  Defer to OT  BLE:  4/5 NT Improve 1 MMT   Balance Sitting EOB:  Keren  Dynamic Standing:  NT Sitting EOB: Supervision  Dynamic Standing: SBA Sitting EOB:  Independent    Dynamic Standing:   Modified Independent       Pt is A & O x: 4 to person, place, month/year, and situation. Sensation: Pt denies numbness and tingling of extremities. Edema: Unremarkable. Patient education  Pt educated on proper technique with [employer].    Patient response to education:   Pt verbalized understanding Pt demonstrated skill Pt requires further education in this area   Yes With cues Yes     ASSESSMENT:    Comments:    Pt agreeable to PT. Pt performing bed mobility, transfers, and ambulation without assist. Pt ambulation distance limited by weakness and pain. Patient would benefit from continued skilled PT to maximize functional mobility independence. Treatment:  Patient practiced and was instructed in the following treatment:    Therapeutic activities:  Bed mobility- verbal cues to facilitate independence  Functional transfers-Verbal cues for proper positioning and sequencing to perform transfers safely with maximum independence. PLAN:    Patient is making Good progress towards established goals. Will continue with current POC.       Time in: 1337  Time out: 1400    Total Treatment Time 23 minutes     CPT codes:  [] Gait training 92123 0 minutes  [] Manual therapy 69669 0 minutes  [x] Therapeutic activities 06661 23 minutes  [] Therapeutic exercises 87448 0 minutes  [] Neuromuscular reeducation 84317 0 minutes    Anish Moss, PT, DPT  [id number]

## 2023-01-30 NOTE — PROGRESS NOTES
Pharmacy Consultation Note  (Antibiotic Dosing and Monitoring)    Initial consult date: 1/25/23  Consulting physician/provider: Dr. Naga Daniel  Drug: Vancomycin  Indication: Bone and Joint Infection    Age/  Gender Height Weight IBW  Allergy Information   62 y.o./female 5' 7\" (170.2 cm) 218 lb (98.9 kg)     Ideal body weight: 61.6 kg (135 lb 12.9 oz)  Adjusted ideal body weight: 80.3 kg (177 lb)   Bee pollen, Penicillins, Ropinirole, Ropinirole hcl, Vistaril [hydroxyzine hcl], Aripiprazole, Prednisone, Restoril [temazepam], Wax [beeswax], Hydroxyzine pamoate, and Tape [adhesive tape]      Renal Function:  Recent Labs     01/28/23  0643 01/29/23  0841 01/30/23  0530   BUN  --  15 17   CREATININE 0.5 0.6 0.6       Intake/Output Summary (Last 24 hours) at 1/30/2023 1349  Last data filed at 1/30/2023 0549  Gross per 24 hour   Intake 480 ml   Output 250 ml   Net 230 ml       Vancomycin Monitoring:  Trough:  No results for input(s): VANCOTROUGH in the last 72 hours. Random:    Recent Labs     01/29/23  0841   VANCORANDOM 23.8         No results for input(s): Micheal Holguin in the last 72 hours. Historical Cultures:  Organism   Date Value Ref Range Status   01/25/2023 Staphylococcus aureus (A)  Final     No results for input(s): BC in the last 72 hours. Recent vancomycin administrations                     vancomycin (VANCOCIN) 1,250 mg in sodium chloride 0.9 % 250 mL IVPB (mg) 1,250 mg New Bag 01/30/23 1107     1,250 mg New Bag 01/29/23 2303     1,250 mg New Bag  1219    vancomycin 1500 mg in sodium chloride 0.9% 300 mL IVPB (mg) 1,500 mg New Bag 01/28/23 2310     1,500 mg New Bag  1553     1,500 mg New Bag 01/27/23 2308             Assessment:  Patient is a 58 y.o. female who has been initiated on vancomycin. Estimated Creatinine Clearance: 123 mL/min (based on SCr of 0.6 mg/dL). To dose vancomycin, pharmacy will be utilizing [employer] calculation software for goal AUC/NAVARRO 400-600 mg/L-hr.    Day #7 of vanco. SCr 0.6. Final 3/3 surgical, body fluid , and blood culture = MRSA. Per ID, vancomycin will be continued for six weeks. Plan:  Continue vancomycin 1,250 mg IV Q12H (eAUC/NAVARRO = 531, Trough = 16.9 mcg/mL). Will order a random vancomycin level with 1/31 AM labs to assess accumulation. Will continue to monitor renal function. Pharmacy to follow.      Thank you for the consult,     Cinda Castleman, PharmD, [facility]  PGY1 Pharmacy Resident     1/30/2023 2:02 PM

## 2023-01-30 NOTE — CARE COORDINATION
1/30/2023 social work transition of care planning  Pt plan remains for [facility] for iv therapy and PT. Awaiting final plan for iv atb. If discharging on Iv atb,rx will need to be faxed to x [phone](if after 4pm). Nitza will follow. Electronically signed by GAGE Mcdermott on 1/30/2023 at 8:33 AM    Addendum; Nitza faxed atb rx to x [phone] Awaiting start of care from [facility]. Nitza notified Rina elliott of discharge and need of raised toilet seat. Electronically signed by GAGE Mcdermott on 1/30/2023 at 11:46 AM    Addendum:Nitza set up pas ambulette for 10 am,due to 11:30 pm ambulette transport available tonight.    Electronically signed by GAGE Mcdermott on 1/30/2023 at 4:00 PM

## 2023-01-30 NOTE — PROGRESS NOTES
Occupational Therapy  OT BEDSIDE TREATMENT NOTE   [facility] [address] [address]      Date:2023  Patient Name: Kelli Siegel  MRN: [id number]  : 1960  Room: [address] [place] [address]     Evaluating OT:Ila Underwood, OTR/L   License #  [id number]        Referring Provider: Dewayne Mckoy DO    Specific Provider Orders/Date: OT evaluation & treatment        Diagnosis: Chronic recurrent infection right TKA  Retained hardware right femur with recurrent infection  Right hip pain rule out septic arthritis      Pertinent Medical History:  has a past medical history of Adenoma of right adrenal gland, Anemia, Anxiety, Arthritis, Asthma, Benign essential tremor, Bipolar affective ([address] Chronic back pain, Chronic respiratory failure with hypoxia ([address] Depression, Diabetes mellitus ([address] Difficulty swallowing, Environmental and seasonal allergies, Excessive physiologic tremor, Fatty liver, Full dentures, GERD (gastroesophageal reflux disease), Gout, Herniated cervical disc, History of swelling of feet, Hypertension, Lymphedema of lower extremity, Mitochondrial cytopathy ([address] Muscle weakness (generalized), Need for assistance with personal care, Neuropathy, Obesity, PETR (obstructive sleep apnea), Osteoarthritis of left knee, PONV (postoperative nausea and vomiting), Seizures ([address] Spinal headache, and Thyroid disease. Surgery: 23: Right knee explantation with insertion of articulating antibiotic cement spacer  Removal right distal femur plate and screw construct  Right hip aspiration with fluoroscopic guidance    Past Surgical History:  has a past surgical history that includes knee surgery (Bilateral); Gastric bypass surgery (); myomectomy; Tonsillectomy; Nerve Block (Left, 10 02 2013); Nerve Block (Left, 10 9 13); Nerve Block (Left, 10/16/13); other surgical history (Left, 13); Nerve Block (Left, 10/29/2014);  Nerve Block (Left, 11/12/2014); Nerve Block (Left, 12 8 14); Nerve Block (Right, 3/30/15); Nerve Block (Right, 4/6/2015); Nerve Block (Right, 4/13/15); Nerve Block (Left, 7/6/15); Nerve Block (07/20/15); Nerve Block (Left, 10 1 15); Nerve Block (10/26/15); other surgical history (3/28/2016); Endoscopy, colon, diagnostic; pr colonoscopy flx dx w/collj spec when pfrmd (N/A, 3/20/2018); pr egd transoral biopsy single/multiple (N/A, 3/20/2018); Cholecystectomy (1999); Hysterectomy (1988); Colonoscopy (N/A, 9/18/2018); Esophagus dilation (9/18/2018); back surgery (last one 1995); Cardiac catheterization (Right, 6-6-2013); Appendectomy; other surgical history (1995); joint replacement (Bilateral, [id number]); egd colonoscopy (N/A, 10/8/2019); Colonoscopy (N/A, 10/8/2019); Nerve Block (Bilateral, 4/1/2021); Ankle fracture surgery (Right, 2/14/2022); Ankle fracture surgery (Right, 3/2/2022); knee surgery (Right, 8/19/2022); and knee surgery (Right, 1/25/2023). Precautions:  Fall Risk   Per Ortho op note: full weightbearing with the brace locked in extension and can remove the brace for passive ROM   Assessment of current deficits    [x] Functional mobility            [x]ADLs           [x] Strength                  [x]Cognition    [x] Functional transfers          [x] IADLs         [x] Safety Awareness   [x]Endurance    [x] Fine Coordination                         [x] Balance      [] Vision/perception   [x]Sensation      []Gross Motor Coordination             [] ROM           [] Delirium                   [] Motor Control      OT PLAN OF CARE   OT POC based on physician orders, patient diagnosis and results of clinical assessment     Frequency/Duration: 2-4 days/wk for 2 weeks PRN   Specific OT Treatment Interventions to include:    Instruction/training on adapted ADL techniques and AE recommendations to increase functional independence within precautions  Training on energy conservation strategies, correct breathing pattern and techniques to improve independence/tolerance for self-care routine  Functional transfer/mobility training/DME recommendations for increased independence, safety, and fall prevention  Patient/Family education to increase follow through with safety techniques and functional independence  Recommendation of environmental modifications for increased safety with functional transfers/mobility and ADLs  Cognitive retraining/development of therapeutic activities to improve problem solving, judgement, memory, and attention for increased safety/participation in ADL/IADL tasks  Splinting/positioning for increased function, prevention of contractures, and improve skin integrity  Therapeutic exercise to improve motor endurance, ROM, and functional strength for ADLs/functional transfers  Therapeutic activities to facilitate/challenge dynamic balance, stand tolerance for increased safety and independence with ADLs  Therapeutic activities to facilitate gross/fine motor skills for increased independence with ADLs  Positioning to improve skin integrity, interaction with environment and functional independence     Recommended Adaptive Equipment:  TBD    Home Living:  Pt lives alone in a 1 story with 0 step(s) to enter and 0 rail(s); bed/bath on 1st   Bathroom setup: tub shower combo with bath bench   Equipment owned: electric w/c, bath bench, ww     Prior Level of Function:  Modified [place]  with ADLs except for bath. Pt has a caregiver 5x/week x 5 hours/day ,    Modified [place] with IADLs. Ambulated with ww, or uses electric w/c     Driving: no  Occupation/leisure: not stated     Pain Level: \"severe\" R LE, educated on positioning  Cognition: A&O: x3;  Follows 2 step directions              Memory:  G              Sequencing:  F+              Problem solving:  F              Judgement/safety:  F                Functional Assessment:  AM-PAC Daily Activity Raw Score: 18/24    Initial Eval Status  Date: 1-26-23 Treatment Status  Date: 1/30/23 STGs = LTGs  Time frame: 10-14 days   Feeding Ind. Ind Mod I/ Ind   Grooming SBA seated  SBA  Seated EOB Modified [place]    UB Dressing Min A with gown Set up  To don/doff gown seated EOB  Modified [place]    LB Dressing Dep B socks SBA  To don underwear seated and standing to pull over hips Supervision    Bathing Max A with sim. task Mod A  Per last tx  Supervision    Toileting Dep  Bedpan use SBA- hygiene   SBA- clothing management Supervision    Bed Mobility  Logroll: Max A  Supine to sit: Max A x2  Sit to supine:   Max A x2 SBA- supine<->sit  Educated pt on technique to increase independence. Supine to sit: Modified [place]   Sit to supine: Modified [place]    Functional Transfers NT, pt. deferred SBA- sit<->stand  Cuing for hand placement and body mechanics   SBA- toilet transfer (bedside commode) Supervision    Functional Mobility NT SBA  Side stepping along EOB using w/w  Supervision    Balance Sitting:     Static:  SBA    Dynamic: Min A  Standing: NT/ pt. deferred Sitting:     Static:  SBA    Dynamic: SBA  Standing: SBA     Activity Tolerance Poor with lt. Ax.  Fair- F/+   Visual/  Perceptual Glasses: yes          Vitals /57 seated  spO2 & HR WFL   WFL       Comments: Upon arrival pt supine in bed. Pt educated on techniques to increase independence and safety during ADL's, bed mobility, and functional transfers. Discussed home set up with pt, giving suggestions to increase safety at discharge. At end of session pt left seated semi upright in bed, call light within reach. Pt has made fair progress towards set goals.      Continue with current plan of care    Treatment Time In: 1:25            Treatment Time Out: 1:50             Treatment Charges: Mins Units   Ther Ex  20532     Manual Therapy 95037     Thera Activities 13282 10 1   ADL/Home Mgt [id number] 15 1   Neuro Re-ed 46848     Group Therapy      Orthotic manage/training  00389     Non-Billable Time Total Timed Treatment 25 [address]

## 2023-01-30 NOTE — PROGRESS NOTES
Department of Orthopedic Surgery   Progress Note    patient seen and examined. Pain controlled. Still complains of persistent clicking and popping of the right knee upon standing which causes discomfort. PICC line in place. O2 supplementation discontinued. Sat back to 93% on room air. Would like to go home as soon as possible. No new complaints. Denies chest pain, shortness of breath, calf pain, dizziness/lightheadedness, fevers or chills. It is still painful but improving. VITALS:  /66   Pulse 82   Temp 98.2 °F (36.8 °C) (Temporal)   Resp 16   Ht 5' 7\" (1.702 m)   Wt 238 lb 12.8 oz (108.3 kg)   LMP 08/31/1988   SpO2 93%   BMI 37.40 kg/m²     GENERAL: In bed, oriented x4  MUSCULOSKELETAL:   right lower extremity:  Dressing C/D/I, knee immobilizer in place  Compartments soft and compressible, tenderness to palpation over popliteal fossa  Palpable dorsalis pedis and posterior tibialis pulse, brisk cap refill to toes, foot warm and perfused  Sensation intact to light touch in sural/deep peroneal/superficial peroneal/saphenous/posterior tibial nerve distributions to foot/ankle. Demonstrates weak active and passive ankle plantar/dorsiflexion/great toe extension     CBC:   Lab Results   Component Value Date/Time    WBC 7.1 01/30/2023 05:30 AM    HGB 9.1 01/30/2023 05:30 AM    HCT 28.5 01/30/2023 05:30 AM     01/30/2023 05:30 AM       ASSESSMENT  Right knee explantation with insertion of articulating antibiotic cement spacer 1/25  Removal right distal femur plate and screw construct 1/25  Right hip aspiration with fluoroscopic guidance 1/25       PLAN    Continue physical therapy and protocol: Weightbearing as tolerated right lower extremity in full extension with hinged knee brace in place.   Continue antibiotics per ID recommendations appreciate infectious disease input-currently on vancomycin 1250 mg IV every 12 hours  Culture of surgical specimen showing recurrent MRSA infection, MRSA bacteremia, GNR bacteriuria. Currently no growth on the hip aspiration and gram stain was also negative. Deep venous thrombosis prophylaxis -per medicine recommendations. Continue Lovenox 30 mg twice daily  Currently in hinged knee brace and will continue. PT/OT [facility] 15  Pain Control: IV and PO  D/C Plan:  pending sw/cm and therapy recommendations. Patient wishes to be DC'd at home.   Okay to discharge from orthopedic standpoint    Electronically signed by Lucia Avelar DO on 1/30/2023 at 6:37 AM

## 2023-01-30 NOTE — PROGRESS NOTES
Hospitalist Progress Note      PCP: Bobo Minaya DO    Date of Admission: 1/24/2023  Days in the hospital: [address] [facility] Course:   Ms. Yong Paula, a 58y.o. year old female  who  has a past medical history of Adenoma of right adrenal gland, Anemia, Anxiety, Arthritis, Asthma, Benign essential tremor, Bipolar affective ([address] Chronic back pain, Chronic respiratory failure with hypoxia ([address] Depression, Diabetes mellitus ([address] Difficulty swallowing, Environmental and seasonal allergies, Excessive physiologic tremor, Fatty liver, Full dentures, GERD (gastroesophageal reflux disease), Gout, Herniated cervical disc, History of swelling of feet, Hypertension, Lymphedema of lower extremity, Mitochondrial cytopathy ([address] Muscle weakness (generalized), Need for assistance with personal care, Neuropathy, Obesity, PETR (obstructive sleep apnea), Osteoarthritis of left knee, PONV (postoperative nausea and vomiting), Seizures ([address] Spinal headache, and Thyroid disease. Patient presents with sudden onset of pain from right knee, ankle and hip pain, she had rt knee I&D done in 8/2022. She is post rt TKA in [place] several years ago. She was seen by orthopedics and concern for right knee septic arthritis and underwent aspiration, cultures positive for staph aureus and sensitivity pending. Patient underwent right knee I&D and removal of existing hardware and antibiotic spacer placement on 1/25/2023. Cultures growing MRSA. Subjective  Patient seen and examined chart reviewed  Discussed with the nursing staff  Patient feels more tired today, she complains of right hip and right knee pain  Clear for dc, med rec done, unable to go today due to transportation issues/[employer]. Exam:    BP (!) 125/50   Pulse 86   Temp 98 °F (36.7 °C) (Temporal)   Resp 18   Ht 5' 7\" (1.702 m)   Wt 238 lb 12.8 oz (108.3 kg)   LMP 08/31/1988   SpO2 92%   BMI 37.40 kg/m²     HEENT: No pallor, no icterus.   Respiratory:  CTA, good air entry. Cardiovascular: RRR, no murmur. Abdomen: Soft, non-tender, BS noted. Musculoskeletal: No joint pains or joint swelling noted. Neurologic: awake, alert and following commands               Labs:   Recent Labs     01/29/23  0841 01/29/23  1535 01/30/23  0530   WBC 5.7  --  7.1   HGB 8.8* 8.5* 9.1*   HCT 27.2* 26.5* 28.5*     --  297       Recent Labs     01/28/23  0643 01/29/23  0841 01/30/23  0530   NA  --  143 143   K  --  4.3 4.2   CL  --  107 106   CO2  --  28 27   BUN  --  15 17   CREATININE 0.5 0.6 0.6   CALCIUM  --  8.9 8.9       Recent Labs     01/29/23  0841 01/30/23  0530   AST 19 14   ALT 10 10   BILITOT 0.3 0.2   ALKPHOS 68 73       No results for input(s): INR in the last 72 hours. No results for input(s): Yanelis Neal in the last 72 hours.     Medications:  Reviewed    Infusion Medications    sodium chloride       Scheduled Medications    enoxaparin  30 mg SubCUTAneous BID    vancomycin  1,250 mg IntraVENous Q12H    pantoprazole (PROTONIX) 40 mg injection  40 mg IntraVENous Daily    polyethylene glycol  17 g Oral BID    lidocaine PF  5 mL IntraDERmal Once    sodium chloride flush  5-40 mL IntraVENous 2 times per day    heparin flush  3 mL IntraVENous 2 times per day    lipase-protease-amylase  60,000 Units Oral TID WC    albuterol  2.5 mg Nebulization 4x daily    allopurinol  100 mg Oral BID    baclofen  20 mg Oral 4x daily    docusate sodium  100 mg Oral BID    escitalopram  30 mg Oral Daily    gabapentin  600 mg Oral 4x Daily    metoprolol succinate  12.5 mg Oral Daily    topiramate  200 mg Oral BID    traZODone  100 mg Oral Nightly    ziprasidone  20 mg Oral Nightly    ziprasidone  40 mg Oral QPM     PRN Meds: bisacodyl, sodium chloride flush, sodium chloride, heparin flush, lipase-protease-amylase **AND** lipase-protease-amylase, promethazine, ondansetron **OR** ondansetron, magnesium hydroxide, acetaminophen **OR** acetaminophen, oxyCODONE **OR** oxyCODONE, morphine **OR** morphine      Intake/Output Summary (Last 24 hours) at 1/30/2023 0938  Last data filed at 1/30/2023 0549  Gross per 24 hour   Intake 660 ml   Output 250 ml   Net 410 ml       Body mass index is 37.4 kg/m². Diet  ADULT DIET; Regular    Code Status  Full Code     Assessment/Plan    --Septic arthritis , right TKA infection. Status post hardware explantation 1/25, orthopedic surgery and infectious disease. Following. IV vancomycin . PICC line in place  Dc on IV vanc tomorrow to home , social work arranging for [employer] and transportation. -- MRSA bacteremia recurrent. Vancomycin    --Acute normocytic anemia. Hemoglobin was normal on admission trended down to 8.8. Possible blood loss from surgery? We will obtain anemia work-up, check iron studies, folate and B12, will check FOBT. Start IV PPI and trend H&H, transfuse for hemoglobin less than 7.0. Hemoglobin seems to be stable around 9.1    --Hypertension, blood pressure controlled    --Neuropathy, continue with Neurontin    --Seizure disorder, continue with regular medications and seizure precautions    --Generalized weakness, physical therapy following    --Morbid obesity, lifestyle modification    --Disposition. She normally lives alone and uses wheelchair . she refused placement, Dc on IV vanc tomorrow to home , clear for dc today . med rec done. unable to go due to transportation issues/[employer] arrangements . social work arranging for [employer] and transportation for tomorrow morning. Electronically signed by Nolberto Schlatter MD on 1/30/2023 at 9:38 AM  [employer]   Please contact me through perfect [employer]    NOTE: This report was transcribed using voice recognition software. Every effort was made to ensure accuracy; however, inadvertent computerized transcription errors may be present.

## 2023-01-31 VITALS
WEIGHT: 238.8 LBS | HEART RATE: 80 BPM | SYSTOLIC BLOOD PRESSURE: 125 MMHG | TEMPERATURE: 98 F | RESPIRATION RATE: 16 BRPM | HEIGHT: 67 IN | OXYGEN SATURATION: 99 % | BODY MASS INDEX: 37.48 KG/M2 | DIASTOLIC BLOOD PRESSURE: 64 MMHG

## 2023-01-31 LAB
ALBUMIN SERPL-MCNC: 3 G/DL (ref 3.5–5.2)
ALP BLD-CCNC: 85 U/L (ref 35–104)
ALT SERPL-CCNC: 11 U/L (ref 0–32)
ANAEROBIC CULTURE: NORMAL
ANION GAP SERPL CALCULATED.3IONS-SCNC: 11 MMOL/L (ref 7–16)
AST SERPL-CCNC: 15 U/L (ref 0–31)
BASOPHILIC STIPPLING: ABNORMAL
BASOPHILS ABSOLUTE: 0 E9/L (ref 0–0.2)
BASOPHILS RELATIVE PERCENT: 0.9 % (ref 0–2)
BILIRUB SERPL-MCNC: 0.4 MG/DL (ref 0–1.2)
BLOOD CULTURE, ROUTINE: NORMAL
BODY FLUID CULTURE, STERILE: NORMAL
BUN BLDV-MCNC: 16 MG/DL (ref 6–23)
CALCIUM SERPL-MCNC: 9.4 MG/DL (ref 8.6–10.2)
CHLORIDE BLD-SCNC: 103 MMOL/L (ref 98–107)
CO2: 26 MMOL/L (ref 22–29)
CREAT SERPL-MCNC: 0.6 MG/DL (ref 0.5–1)
CULTURE, BLOOD 2: NORMAL
EOSINOPHILS ABSOLUTE: 0.19 E9/L (ref 0.05–0.5)
EOSINOPHILS RELATIVE PERCENT: 3.5 % (ref 0–6)
GFR SERPL CREATININE-BSD FRML MDRD: >60 ML/MIN/1.73
GLUCOSE BLD-MCNC: 94 MG/DL (ref 74–99)
GRAM STAIN RESULT: NORMAL
HCT VFR BLD CALC: 30.4 % (ref 34–48)
HEMOGLOBIN: 9.8 G/DL (ref 11.5–15.5)
HYPOCHROMIA: ABNORMAL
LYMPHOCYTES ABSOLUTE: 0.81 E9/L (ref 1.5–4)
LYMPHOCYTES RELATIVE PERCENT: 14.8 % (ref 20–42)
MAGNESIUM: 2.2 MG/DL (ref 1.6–2.6)
MCH RBC QN AUTO: 30.9 PG (ref 26–35)
MCHC RBC AUTO-ENTMCNC: 32.2 % (ref 32–34.5)
MCV RBC AUTO: 95.9 FL (ref 80–99.9)
METAMYELOCYTES RELATIVE PERCENT: 1.7 % (ref 0–1)
MONOCYTES ABSOLUTE: 0.22 E9/L (ref 0.1–0.95)
MONOCYTES RELATIVE PERCENT: 4.3 % (ref 2–12)
MYELOCYTE PERCENT: 0.9 % (ref 0–0)
NEUTROPHILS ABSOLUTE: 4.16 E9/L (ref 1.8–7.3)
NEUTROPHILS RELATIVE PERCENT: 74.8 % (ref 43–80)
PDW BLD-RTO: 13.8 FL (ref 11.5–15)
PLATELET # BLD: 324 E9/L (ref 130–450)
PMV BLD AUTO: 8.8 FL (ref 7–12)
POLYCHROMASIA: ABNORMAL
POTASSIUM SERPL-SCNC: 4 MMOL/L (ref 3.5–5)
RBC # BLD: 3.17 E12/L (ref 3.5–5.5)
SODIUM BLD-SCNC: 140 MMOL/L (ref 132–146)
TOTAL PROTEIN: 6.1 G/DL (ref 6.4–8.3)
VANCOMYCIN TROUGH: 23.7 MCG/ML (ref 5–16)
WBC # BLD: 5.4 E9/L (ref 4.5–11.5)

## 2023-01-31 PROCEDURE — 6360000002 HC RX W HCPCS: Performed by: FAMILY MEDICINE

## 2023-01-31 PROCEDURE — 2580000003 HC RX 258: Performed by: ORTHOPAEDIC SURGERY

## 2023-01-31 PROCEDURE — 6370000000 HC RX 637 (ALT 250 FOR IP): Performed by: ORTHOPAEDIC SURGERY

## 2023-01-31 PROCEDURE — 36415 COLL VENOUS BLD VENIPUNCTURE: CPT

## 2023-01-31 PROCEDURE — 6360000002 HC RX W HCPCS: Performed by: ORTHOPAEDIC SURGERY

## 2023-01-31 PROCEDURE — 94640 AIRWAY INHALATION TREATMENT: CPT

## 2023-01-31 PROCEDURE — 2580000003 HC RX 258: Performed by: FAMILY MEDICINE

## 2023-01-31 PROCEDURE — 6370000000 HC RX 637 (ALT 250 FOR IP): Performed by: FAMILY MEDICINE

## 2023-01-31 PROCEDURE — 83735 ASSAY OF MAGNESIUM: CPT

## 2023-01-31 PROCEDURE — 6360000002 HC RX W HCPCS: Performed by: INTERNAL MEDICINE

## 2023-01-31 PROCEDURE — 80202 ASSAY OF VANCOMYCIN: CPT

## 2023-01-31 PROCEDURE — A4216 STERILE WATER/SALINE, 10 ML: HCPCS | Performed by: FAMILY MEDICINE

## 2023-01-31 PROCEDURE — 97535 SELF CARE MNGMENT TRAINING: CPT

## 2023-01-31 PROCEDURE — 80053 COMPREHEN METABOLIC PANEL: CPT

## 2023-01-31 PROCEDURE — 85025 COMPLETE CBC W/AUTO DIFF WBC: CPT

## 2023-01-31 PROCEDURE — C9113 INJ PANTOPRAZOLE SODIUM, VIA: HCPCS | Performed by: FAMILY MEDICINE

## 2023-01-31 PROCEDURE — 6360000002 HC RX W HCPCS

## 2023-01-31 RX ORDER — CELECOXIB 100 MG/1
100 CAPSULE ORAL 2 TIMES DAILY
Qty: 60 CAPSULE | Refills: 3 | Status: SHIPPED | OUTPATIENT
Start: 2023-01-31

## 2023-01-31 RX ADMIN — GABAPENTIN 600 MG: 300 CAPSULE ORAL at 12:25

## 2023-01-31 RX ADMIN — TOPIRAMATE 200 MG: 100 TABLET, FILM COATED ORAL at 08:46

## 2023-01-31 RX ADMIN — VANCOMYCIN HYDROCHLORIDE 1250 MG: 10 INJECTION, POWDER, LYOPHILIZED, FOR SOLUTION INTRAVENOUS at 10:10

## 2023-01-31 RX ADMIN — OXYCODONE HYDROCHLORIDE 10 MG: 10 TABLET ORAL at 08:44

## 2023-01-31 RX ADMIN — ENOXAPARIN SODIUM 30 MG: 100 INJECTION SUBCUTANEOUS at 08:45

## 2023-01-31 RX ADMIN — PANCRELIPASE LIPASE, PANCRELIPASE PROTEASE, PANCRELIPASE AMYLASE 60000 UNITS: 20000; 63000; 84000 CAPSULE, DELAYED RELEASE ORAL at 08:46

## 2023-01-31 RX ADMIN — GABAPENTIN 600 MG: 300 CAPSULE ORAL at 08:43

## 2023-01-31 RX ADMIN — BACLOFEN 20 MG: 10 TABLET ORAL at 08:44

## 2023-01-31 RX ADMIN — MORPHINE SULFATE 4 MG: 4 INJECTION, SOLUTION INTRAMUSCULAR; INTRAVENOUS at 05:57

## 2023-01-31 RX ADMIN — POLYETHYLENE GLYCOL 3350 17 G: 17 POWDER, FOR SOLUTION ORAL at 08:46

## 2023-01-31 RX ADMIN — DOCUSATE SODIUM 100 MG: 100 CAPSULE, LIQUID FILLED ORAL at 08:43

## 2023-01-31 RX ADMIN — ALTEPLASE 1 MG: 2.2 INJECTION, POWDER, LYOPHILIZED, FOR SOLUTION INTRAVENOUS at 11:59

## 2023-01-31 RX ADMIN — METOPROLOL SUCCINATE 12.5 MG: 25 TABLET, EXTENDED RELEASE ORAL at 08:44

## 2023-01-31 RX ADMIN — SODIUM CHLORIDE 40 MG: 9 INJECTION, SOLUTION INTRAMUSCULAR; INTRAVENOUS; SUBCUTANEOUS at 08:46

## 2023-01-31 RX ADMIN — MORPHINE SULFATE 4 MG: 4 INJECTION, SOLUTION INTRAMUSCULAR; INTRAVENOUS at 11:58

## 2023-01-31 RX ADMIN — PANCRELIPASE LIPASE, PANCRELIPASE PROTEASE, PANCRELIPASE AMYLASE 60000 UNITS: 20000; 63000; 84000 CAPSULE, DELAYED RELEASE ORAL at 12:27

## 2023-01-31 RX ADMIN — ESCITALOPRAM OXALATE 30 MG: 10 TABLET, FILM COATED ORAL at 08:43

## 2023-01-31 RX ADMIN — ALBUTEROL SULFATE 2.5 MG: 2.5 SOLUTION RESPIRATORY (INHALATION) at 11:37

## 2023-01-31 RX ADMIN — OXYCODONE HYDROCHLORIDE 10 MG: 10 TABLET ORAL at 13:41

## 2023-01-31 RX ADMIN — ALBUTEROL SULFATE 2.5 MG: 2.5 SOLUTION RESPIRATORY (INHALATION) at 08:40

## 2023-01-31 RX ADMIN — BACLOFEN 20 MG: 10 TABLET ORAL at 12:25

## 2023-01-31 RX ADMIN — HEPARIN SODIUM (PORCINE) LOCK FLUSH IV SOLN 100 UNIT/ML 300 UNITS: 100 SOLUTION at 09:19

## 2023-01-31 RX ADMIN — ALLOPURINOL 100 MG: 100 TABLET ORAL at 08:44

## 2023-01-31 RX ADMIN — ALTEPLASE 1 MG: 2.2 INJECTION, POWDER, LYOPHILIZED, FOR SOLUTION INTRAVENOUS at 12:00

## 2023-01-31 ASSESSMENT — PAIN SCALES - GENERAL
PAINLEVEL_OUTOF10: 5
PAINLEVEL_OUTOF10: 9
PAINLEVEL_OUTOF10: 9
PAINLEVEL_OUTOF10: 10
PAINLEVEL_OUTOF10: 5

## 2023-01-31 ASSESSMENT — PAIN DESCRIPTION - DESCRIPTORS
DESCRIPTORS: SHARP
DESCRIPTORS: ACHING;STABBING
DESCRIPTORS: ACHING

## 2023-01-31 ASSESSMENT — PAIN DESCRIPTION - LOCATION
LOCATION: KNEE

## 2023-01-31 ASSESSMENT — PAIN DESCRIPTION - ORIENTATION
ORIENTATION: RIGHT
ORIENTATION: RIGHT

## 2023-01-31 NOTE — PROGRESS NOTES
Department of Orthopedic Surgery   Progress Note    patient seen and examined. Still complains of persistent clicking and popping of the right knee upon standing causing her severe pain. No new complaints. Denies chest pain, shortness of breath, calf pain, dizziness/lightheadedness, fevers or chills. VITALS:  BP (!) 109/57   Pulse 97   Temp 96.9 °F (36.1 °C) (Temporal)   Resp 18   Ht 5' 7\" (1.702 m)   Wt 238 lb 12.8 oz (108.3 kg)   LMP 08/31/1988   SpO2 95%   BMI 37.40 kg/m²     GENERAL: In bed, oriented x4  MUSCULOSKELETAL:   right lower extremity:  Dressing C/D/I, knee immobilizer in place  Compartments soft and compressible,   Palpable dorsalis pedis and posterior tibialis pulse, brisk cap refill to toes, foot warm and perfused  Sensation intact to light touch in sural/deep peroneal/superficial peroneal/saphenous/posterior tibial nerve distributions to foot/ankle. Demonstrates weak active and passive ankle plantar/dorsiflexion/great toe extension     CBC:   Lab Results   Component Value Date/Time    WBC 7.1 01/30/2023 05:30 AM    HGB 9.1 01/30/2023 05:30 AM    HCT 28.5 01/30/2023 05:30 AM     01/30/2023 05:30 AM       ASSESSMENT  Right knee explantation with insertion of articulating antibiotic cement spacer 1/25  Removal right distal femur plate and screw construct 1/25  Right hip aspiration with fluoroscopic guidance 1/25       PLAN    Continue physical therapy and protocol: Weightbearing as tolerated right lower extremity in full extension with hinged knee brace in place. Discussion with patient regarding popping and clicking of her knee. Current implants are temporizing component designed to combat the knee infection. Popping and clicking are expected. Definitive components which are more stable will be implanted once infection is cleared.   Continue antibiotics per ID recommendations-currently on vancomycin 1250 mg IV every 12 hours for 6 weeks-patient will follow-up with ID in 1 to 2 weeks  Culture of surgical specimen showing recurrent MRSA infection, MRSA bacteremia, GNR bacteriuria. Currently no growth on the hip aspiration and gram stain was also negative. Deep venous thrombosis prophylaxis -per medicine recommendations. Continue Lovenox 30 mg twice daily-transition to aspirin 325 mg once discharged  Currently in hinged knee brace and will continue.   PT/OT [facility] 15  Pain Control: PO-add Celebrex 100 mg twice daily for better pain optimization  D/C Plan: Plan to DC today home    Electronically signed by Ronaldo Richmond DO on 1/31/2023 at 4:45 AM

## 2023-01-31 NOTE — PROGRESS NOTES
OCCUPATIONAL THERAPY TREATMENT NOTE     [facility]      OT BEDSIDE TREATMENT NOTE      Date:2023  Patient Name: Cl Best  MRN: [id number]  : 1960  Room: [address] [place] [address]     Evaluating OT:Ila Underwood OTR/L   License #  [id number]        Referring Provider: Alejandra Rodriguez DO    Specific Provider Orders/Date: OT evaluation & treatment        Diagnosis: Chronic recurrent infection right TKA  Retained hardware right femur with recurrent infection  Right hip pain rule out septic arthritis      Pertinent Medical History:  has a past medical history of Adenoma of right adrenal gland, Anemia, Anxiety, Arthritis, Asthma, Benign essential tremor, Bipolar affective ([address] Chronic back pain, Chronic respiratory failure with hypoxia ([address] Depression, Diabetes mellitus ([address] Difficulty swallowing, Environmental and seasonal allergies, Excessive physiologic tremor, Fatty liver, Full dentures, GERD (gastroesophageal reflux disease), Gout, Herniated cervical disc, History of swelling of feet, Hypertension, Lymphedema of lower extremity, Mitochondrial cytopathy ([address] Muscle weakness (generalized), Need for assistance with personal care, Neuropathy, Obesity, PETR (obstructive sleep apnea), Osteoarthritis of left knee, PONV (postoperative nausea and vomiting), Seizures ([address] Spinal headache, and Thyroid disease. Surgery: 23: Right knee explantation with insertion of articulating antibiotic cement spacer  Removal right distal femur plate and screw construct  Right hip aspiration with fluoroscopic guidance    Past Surgical History:  has a past surgical history that includes knee surgery (Bilateral); Gastric bypass surgery (); myomectomy; Tonsillectomy; Nerve Block (Left, 10 02 2013); Nerve Block (Left, 10 9 13); Nerve Block (Left, 10/16/13); other surgical history (Left, 13); Nerve Block (Left, 10/29/2014); Nerve Block (Left, 2014);  Nerve Block (Left, 14); Nerve Block (Right, 3/30/15); Nerve Block (Right, 4/6/2015); Nerve Block (Right, 4/13/15); Nerve Block (Left, 7/6/15); Nerve Block (07/20/15); Nerve Block (Left, 10 1 15); Nerve Block (10/26/15); other surgical history (3/28/2016); Endoscopy, colon, diagnostic; pr colonoscopy flx dx w/collj spec when pfrmd (N/A, 3/20/2018); pr egd transoral biopsy single/multiple (N/A, 3/20/2018); Cholecystectomy (1999); Hysterectomy (1988); Colonoscopy (N/A, 9/18/2018); Esophagus dilation (9/18/2018); back surgery (last one 1995); Cardiac catheterization (Right, 6-6-2013); Appendectomy; other surgical history (1995); joint replacement (Bilateral, 2007,2017); egd colonoscopy (N/A, 10/8/2019); Colonoscopy (N/A, 10/8/2019); Nerve Block (Bilateral, 4/1/2021); Ankle fracture surgery (Right, 2/14/2022); Ankle fracture surgery (Right, 3/2/2022); knee surgery (Right, 8/19/2022); and knee surgery (Right, 1/25/2023).       Precautions:  Fall Risk   Per Ortho op note: full weightbearing with the brace locked in extension and can remove the brace for passive ROM   Assessment of current deficits    [x] Functional mobility            [x]ADLs           [x] Strength                  [x]Cognition    [x] Functional transfers          [x] IADLs         [x] Safety Awareness   [x]Endurance    [x] Fine Coordination                         [x] Balance      [] Vision/perception   [x]Sensation      []Gross Motor Coordination             [] ROM           [] Delirium                   [] Motor Control      OT PLAN OF CARE   OT POC based on physician orders, patient diagnosis and results of clinical assessment     Frequency/Duration: 2-4 days/wk for 2 weeks PRN   Specific OT Treatment Interventions to include:   Instruction/training on adapted ADL techniques and AE recommendations to increase functional independence within precautions  Training on energy conservation strategies, correct breathing pattern and techniques to improve independence/tolerance for  self-care routine  Functional transfer/mobility training/DME recommendations for increased independence, safety, and fall prevention  Patient/Family education to increase follow through with safety techniques and functional independence  Recommendation of environmental modifications for increased safety with functional transfers/mobility and ADLs  Cognitive retraining/development of therapeutic activities to improve problem solving, judgement, memory, and attention for increased safety/participation in ADL/IADL tasks  Splinting/positioning for increased function, prevention of contractures, and improve skin integrity  Therapeutic exercise to improve motor endurance, ROM, and functional strength for ADLs/functional transfers  Therapeutic activities to facilitate/challenge dynamic balance, stand tolerance for increased safety and independence with ADLs  Therapeutic activities to facilitate gross/fine motor skills for increased independence with ADLs  Positioning to improve skin integrity, interaction with environment and functional independence     Recommended Adaptive Equipment:  TBD    Home Living:  Pt lives alone in a 1 story with 0 step(s) to enter and 0 rail(s); bed/bath on 1st   Bathroom setup: tub shower combo with bath bench   Equipment owned: electric w/c, bath bench, ww     Prior Level of Function:  Modified [place]  with ADLs except for bath. Pt has a caregiver 5x/week x 5 hours/day ,    Modified [place] with IADLs. Ambulated with ww, or uses electric w/c     Driving: no  Occupation/leisure: not stated     Pain Level: \"severe\" R LE, educated on positioning  Cognition: A&O: x3;  Follows 2 step directions              Memory:  G              Sequencing:  F+              Problem solving:  F              Judgement/safety:  F                Functional Assessment:  AM-PAC Daily Activity Raw Score: 18/24    Initial Eval Status  Date: 1-26-23 Treatment Status  Date: 1/31/23 STGs = LTGs  Time frame: 10-14 days   Feeding Ind. Ind. Mod I/ Ind   Grooming SBA seated Set up seated Modified Sebastian    UB Dressing Min A with gown SBA with bra, set up with t-shirt seated Modified Sebastian    LB Dressing Dep B socks Dep B shoes  Min A to carmelina pants Supervision    Bathing Max A with sim. task Mod A with sim. task Supervision    Toileting Dep  Bedpan use SBA hygiene  SBA with clothing mgmt. Supervision    Bed Mobility  Logroll: Max A  Supine to sit: Max A x2  Sit to supine:   Max A x2 Sup <> sit: NT  SBA per last tx. Supine to sit: Modified Sebastian   Sit to supine: Modified Sebastian    Functional Transfers NT, pt. deferred SBA with sit <> stand  SBA with SPT from bsc to EOB with ww Supervision    Functional Mobility NT SBA with short in-room distance using ww (< 10') Supervision    Balance Sitting:     Static:  SBA    Dynamic: Min A  Standing: NT/ pt. deferred Sitting:     Static:  SBA    Dynamic: SBA  Standing: SBA     Activity Tolerance Poor with lt. Ax.  Fair- F/+   Visual/  Perceptual Glasses: yes          Vitals /57 seated  spO2 & HR WFL  spO2 & HR WFL WFL     Comments: Upon arrival pt seated, agreeable to OT after finishing lunch, cleared by RN. Therapist facilitated functional transfers/mobility training, ADLs with focus on safety, technique, precautions. Pt. Instructed RE: safe transfers/mobility, ADLs, role of OT, treatment plan, recs. , prec. At end of session seated EOB per pt. Request, all needs met, RN notified, all lines and tubes intact, call light within reach. Pt has made F+ progress towards set goals.    Continue with current plan of care    Treatment Time In:13:05            Treatment Time Out: 13:30                Treatment Charges: Mins Units   Ther Ex  66742     Manual Therapy 19592     Thera Activities 40060     ADL/Home Mgt 21443 25 2   Neuro Re-ed 56042     Group Therapy      Orthotic manage/training  28962     Non-Billable Time     Total Timed Treatment 25 2       Thank Owen alves.  Kj, OTR/L   License #  [id number]

## 2023-01-31 NOTE — PROGRESS NOTES
OCCUPATIONAL THERAPY TREATMENT NOTE     [facility]      OT BEDSIDE TREATMENT NOTE      Date:2023  Patient Name: Kelli Siegel  MRN: [id number]  : 1960  Room: [address] [place] [address]A       OT treatment attempt this a.m., pt. declined d/t pain R LE. Benefits of OT & OOB ax. reviewed, pt. still declined until after able to receive pain med. RN notified, will check back at a later time. Thank you,  Sissy Record.  DARIUS UnderwoodR/L   License #  [id number]

## 2023-01-31 NOTE — CARE COORDINATION
1/31/2023 social work transition of care planning  Pt plan is home. PAS ambulette is set up for 2 pm. [facility] to see this evening. Will need [facility] orders for PT/OT/SN-for iv therapy. Nurse notified.   Electronically signed by GAGE Lee on 1/31/2023 at 9:37 AM

## 2023-01-31 NOTE — PROGRESS NOTES
Pharmacy Consultation Note  (Antibiotic Dosing and Monitoring)    Initial consult date: 1/25/23  Consulting physician/provider: Dr. Jose Luis Vargas  Drug: Vancomycin  Indication: Bone and Joint Infection    Vancomycin has been discontinued. Clinical Pharmacy to sign-off. Physician to re-consult pharmacy if future dosing is needed.      Thank you for the consult,       Donna Carrion, PharmD, [id number]  PGY1 Pharmacy Resident     1/31/2023 11:27 AM

## 2023-01-31 NOTE — PROGRESS NOTES
Pharmacy Consultation Note  (Antibiotic Dosing and Monitoring)    Initial consult date: 1/25/23  Consulting physician/provider: Dr. Mackenzie Cleveland  Drug: Vancomycin  Indication: Bone and Joint Infection    Age/  Gender Height Weight IBW  Allergy Information   62 y.o./female 5' 7\" (170.2 cm) 218 lb (98.9 kg)     Ideal body weight: 61.6 kg (135 lb 12.9 oz)  Adjusted ideal body weight: 80.3 kg (177 lb)   Bee pollen, Penicillins, Ropinirole, Ropinirole hcl, Vistaril [hydroxyzine hcl], Aripiprazole, Prednisone, Restoril [temazepam], Wax [beeswax], Hydroxyzine pamoate, and Tape [adhesive tape]      Renal Function:  Recent Labs     01/29/23  0841 01/30/23  0530 01/31/23  0755   BUN 15 17 16   CREATININE 0.6 0.6 0.6       Intake/Output Summary (Last 24 hours) at 1/31/2023 1157  Last data filed at 1/31/2023 0846  Gross per 24 hour   Intake 900 ml   Output 1050 ml   Net -150 ml       Vancomycin Monitoring:  Trough:    Recent Labs     01/31/23  0755   VANCOTROUGH 23.7*       Random:    Recent Labs     01/29/23  0841   VANCORANDOM 23.8         No results for input(s): Jacobo Lennox in the last 72 hours. Historical Cultures:  Organism   Date Value Ref Range Status   01/25/2023 Staphylococcus aureus (A)  Final     No results for input(s): BC in the last 72 hours. Recent vancomycin administrations                     vancomycin (VANCOCIN) 1,250 mg in sodium chloride 0.9 % 250 mL IVPB (mg) 1,250 mg New Bag 01/31/23 1010     1,250 mg New Bag 01/30/23 2316     1,250 mg New Bag  1107     1,250 mg New Bag 01/29/23 2303     1,250 mg New Bag  1219    vancomycin 1500 mg in sodium chloride 0.9% 300 mL IVPB (mg) 1,500 mg New Bag 01/28/23 2310     1,500 mg New Bag  1553               Assessment:  Patient is a 58 y.o. female who has been initiated on vancomycin. Estimated Creatinine Clearance: 123 mL/min (based on SCr of 0.6 mg/dL). To dose vancomycin, pharmacy will be utilizing [employer] calculation software for goal AUC/NAVARRO 400-600 mg/L-hr. Day #8 of vancomycin. SCr 0.6. Vancomycin trough = 23.7 mcg/mL (~7 hours post-dose). Final 3/3 surgical, body fluid , and blood culture = MRSA. Per ID, vancomycin will be continued for six weeks. Plan:  Pharmacy will continue a more aggressive regimen due to nature of infection; vancomycin 1,250 mg IV Q12H (eAUC/NAVARRO = 555, Trough = 18.1 mcg/mL). Will order vancomycin levels when appropriate. Will continue to monitor renal function; ordered Scr with 2/1 AM labs. Pharmacy to follow.      Thank you for the consult,     Samara Chawla, PharmD, [address]  PGY1 Pharmacy Resident     1/31/2023 11:57 AM

## 2023-01-31 NOTE — PROGRESS NOTES
CLINICAL PHARMACY NOTE: MEDS TO BEDS    Total # of Prescriptions Filled: 1   The following medications were delivered to the patient:  Pantoprazole 40 mg    Additional Documentation:     Delivered meds to patient

## 2023-01-31 NOTE — DISCHARGE SUMMARY
Hospitalist Discharge Summary    Patient ID: Raza Caballero   Patient : 1960  Patient's PCP: Mónica Matias DO    Admit Date: 2023   Admitting Physician: Emmanuelle Edwards MD    Discharge Date:  2023  Discharge Physician: Brittany Carty MD   Discharge Condition: Stable  Discharge Disposition: Home    History of presenting illness:  59-year-old lady past medical history of type 2 diabetes, GERD, Cervical disc, adenoma of the right adrenal gland, asthma, benign essential tremor, bipolar, obesity presented to teach right knee pain status post I&D done in 2022. Also previous right TKA at [facility] 7 years ago. Seen by orthopedic given concern for septic arthritis. She is status post aspiration with culture sent growing staph given that she underwent removal of existing hardware and antibiotic spacer placement on 2023. Given MRSA bacteremia ID was consulted currently on IV vancomycin for 6 weeks duration treatment course. She is to follow-up with ID outpatient for echocardiogram.  PICC line was placed for IV antibiotics. Hospital course in brief:  (Please refer to daily progress notes for a comprehensive review of the hospitalization by requesting medical records)  As above    Consults:   [address] TO ORTHOTIST/PROSTHETIST  INPATIENT CONSULT TO ORTHOTIST/PROSTHETIST    Physical Exam  Constitutional:       Appearance: Normal appearance. Eyes:      Extraocular Movements: Extraocular movements intact. Pupils: Pupils are equal, round, and reactive to light. Cardiovascular:      Rate and Rhythm: Normal rate and regular rhythm. Pulmonary:      Effort: Pulmonary effort is normal.      Breath sounds: Normal breath sounds. Abdominal:      General: Abdomen is flat. Palpations: Abdomen is soft.    Musculoskeletal: Comments: Right knee dress with brace in place   Skin:     General: Skin is warm and dry. Neurological:      General: No focal deficit present. Mental Status: She is alert and oriented to person, place, and time. Psychiatric:         Mood and Affect: Mood normal.         Behavior: Behavior normal.      Discharge Diagnoses:  Right knee explantation with insertion of articulating antibiotic cement spacer 1/25  Removal right distal femur plate and screw construct 1/25  Right hip aspiration with fluoroscopic guidance 1/25  Recurrent MRSA bacteremia - blood cx neg on 1/24    Discharge Instructions / Follow up:    Continued appropriate risk factor modification of blood pressure, diabetes and serum lipids will remain essential to reducing risk of future atherosclerotic development    Activity: activity as tolerated    Significant labs:  CBC:   Recent Labs     01/29/23  0841 01/29/23  1535 01/30/23  0530 01/31/23  0755   WBC 5.7  --  7.1 5.4   RBC 2.83*  --  2.85* 3.17*   HGB 8.8* 8.5* 9.1* 9.8*   HCT 27.2* 26.5* 28.5* 30.4*   MCV 96.1  --  100.0* 95.9   RDW 13.5  --  13.4 13.8     --  297 324     BMP:   Recent Labs     01/29/23  0841 01/30/23  0530 01/31/23  0755    143 140   K 4.3 4.2 4.0    106 103   CO2 28 27 26   BUN 15 17 16   CREATININE 0.6 0.6 0.6   MG 2.3 2.2 2.2     LFT:  Recent Labs     01/29/23  0841 01/30/23  0530 01/31/23  0755   PROT 5.4* 5.7* 6.1*   ALKPHOS 68 73 85   ALT 10 10 11   AST 19 14 15   BILITOT 0.3 0.2 0.4     PT/INR: No results for input(s): INR, APTT in the last 72 hours. BNP: No results for input(s): BNP in the last 72 hours.   Hgb A1C:   Lab Results   Component Value Date    LABA1C 5.7 12/13/2013     Folate and B12:   Lab Results   Component Value Date    POYLVMTC60 159 02/01/2022   ,   Lab Results   Component Value Date    FOLATE >20.0 05/11/2020     Thyroid Studies:   Lab Results   Component Value Date    TSH 0.548 03/16/2021    X6REAWO 107.30 03/03/2021 R3EBHRT 10.0 05/21/2012       Urinalysis:    Lab Results   Component Value Date/Time    NITRU Negative 01/24/2023 12:29 PM    [address] NONE 01/24/2023 12:29 PM    WBCUA 1-3 05/06/2012 04:00 AM    BACTERIA NONE SEEN 01/24/2023 12:29 PM    RBCUA NONE 01/24/2023 12:29 PM    RBCUA 0-1 12/13/2013 10:15 AM    BLOODU Negative 01/24/2023 12:29 PM    SPECGRAV 1.025 01/24/2023 12:29 PM    GLUCOSEU Negative 01/24/2023 12:29 PM    GLUCOSEU NEGATIVE 05/06/2012 04:00 AM       Imaging:  XR KNEE RIGHT (1-2 VIEWS)    Result Date: 1/25/2023  EXAMINATION: TWO XRAY VIEWS OF THE RIGHT KNEE 1/25/2023 5:07 pm COMPARISON: 24 January 2023 HISTORY: ORDERING SYSTEM PROVIDED HISTORY: post op for pacu TECHNOLOGIST PROVIDED HISTORY: Reason for exam:->post op for pacu What reading provider will be dictating this exam?->CRC FINDINGS: Anatomically aligned recently revised right TKA. Fixation hardware at the femur has been removed. No new abnormal bone or soft tissue findings. Recently revised right TKA. Removal of femoral fixation hardware. XR KNEE RIGHT (3 VIEWS)    Result Date: 1/24/2023  EXAMINATION: THREE XRAY VIEWS OF THE RIGHT KNEE 1/24/2023 4:46 am COMPARISON: 17 November 2022 HISTORY: ORDERING SYSTEM PROVIDED HISTORY: knee pain TECHNOLOGIST PROVIDED HISTORY: Reason for exam:->knee pain What reading provider will be dictating this exam?->CRC FINDINGS: Fixation hardware at the distal femur is only partially imaged. A substantially healed femur fracture is present as before. Anatomically aligned right TKA. Moderate knee joint effusion. Stable substantially healed femur fracture with indwelling fixation hardware. Right TKA. Knee joint effusion. XR CHEST PORTABLE    Result Date: 1/24/2023  EXAMINATION: ONE XRAY VIEW OF THE CHEST 1/24/2023 4:46 am COMPARISON: 17 August 2022.  HISTORY: ORDERING SYSTEM PROVIDED HISTORY: ams TECHNOLOGIST PROVIDED HISTORY: Reason for exam:->ams What reading provider will be dictating this exam?->CRC FINDINGS: Spinal neurostimulator is redemonstrated. Stable cardiomediastinal silhouette. Pulmonary vascularity is mildly congested which may in part relate to the semi supine positioning. Minimal ground-glass infiltrate and or atelectasis on the left is suggested. Neither costophrenic angle is blunted. Minimal ground-glass infiltrate and or atelectasis suggested on the left. The pulmonary vascularity appears mildly congested which may be exacerbated by semi supine positioning. CT FEMUR RIGHT WO CONTRAST    Result Date: 1/24/2023  EXAMINATION: CT OF THE RIGHT FEMUR WITH CONTRAST 1/24/2023 11:37 am TECHNIQUE: CT of the right femur was performed with the administration of intravenous contrast.  Multiplanar reformatted images are provided for review. Automated exposure control, iterative reconstruction, and/or weight based adjustment of the mA/kV was utilized to reduce the radiation dose to as low as reasonably achievable. COMPARISON: Correlation made with radiographs from November 17, 2022. HISTORY ORDERING SYSTEM PROVIDED HISTORY: evaluate for non union TECHNOLOGIST PROVIDED HISTORY: Reason for exam:->evaluate for non union What reading provider will be dictating this exam?->CRC FINDINGS: Redemonstration of right knee arthroplasty appearing in satisfactory position. Redemonstration of internal fixating plate and screws along lateral margin of the femur. Hardware appears to be intact. There is similar alignment of a minimally angulated fracture involving distal femoral diaphysis. There is surrounding callus formation. No acute fracture. There is a moderate size knee effusion. Foci of subcutaneous gas seen along medial margin of the knee related to recent procedure. No obvious loculated fluid collections to suggest abscess. 1. Stable alignment of healing, internally fixated distal femur fracture. 2. Stable alignment of right knee arthroplasty. 3. Moderate synovial effusion.  4. Foci of subcutaneous gas along medial margin of the knee related to recent procedure. US DUP LOWER EXTREMITY RIGHT KRAIG    Result Date: 2023  Patient MRN:  [id number] : 1960 Age: 58 years Gender: Female Order Date:  2023 10:38 AM EXAM: US DUP LOWER EXTREMITY RIGHT KRAIG NUMBER OF IMAGES:  24 INDICATION:  Rule out DVT Rule out DVT What reading provider will be dictating this exam?->[facility] Within the visualized vessels, there is no evidence for deep venous thrombosis There is good compressibility, there is good augmentation, there is good color flow. Within the visualized vessels there is no evidence for deep venous thrombosis     FLUORO FOR SURGICAL PROCEDURES    Result Date: 2023  EXAMINATION: SPOT FLUOROSCOPIC IMAGES 2023 5:52 pm TECHNIQUE: Fluoroscopy was provided by the radiology department for procedure. Radiologist was not present during examination. FLUOROSCOPY DOSE AND TYPE OR TIME AND EXPOSURES: 6 images FLUOROSCOPY TIME: Not provided COMPARISON: None used HISTORY: ORDERING SYSTEM PROVIDED HISTORY: Infection TECHNOLOGIST PROVIDED HISTORY: Reason for exam:->total knee What reading provider will be dictating this exam?->CRC Intraprocedural imaging. FINDINGS: 6 spot images of the knee and hip were obtained. Fluoroscopic support provided to subspecialty service for hardware removal of the distal right femur and injection of the right hip. No obvious complication on the images provided. Please see subspecialty report for full details and interpretation of real time imaging. Intraprocedural fluoroscopic spot images as above. See separate procedure report for more information. Discharge Medications:      Medication List        START taking these medications      celecoxib 100 MG capsule  Commonly known as: CeleBREX  Take 1 capsule by mouth 2 times daily     vancomycin  infusion  Commonly known as: VANCOCIN  Infuse 1,250 mg intravenously in the morning and 1,250 mg in the evening. For 6 weeks.            CHANGE how you take these medications      baclofen 20 MG tablet  Commonly known as: LIORESAL  Take 0.5 tablets by mouth 3 times daily as needed (msucle spasm)  What changed:   how much to take  when to take this  reasons to take this     [employer] Magnesium Oxide 250 MG Tabs tablet  Generic drug: magnesium oxide  Take 1 tablet by mouth 2 times daily  What changed: when to take this     pantoprazole 40 MG tablet  Commonly known as: PROTONIX  Take 1 tablet by mouth daily  What changed:   when to take this  reasons to take this     traZODone 50 MG tablet  Commonly known as: DESYREL  What changed: Another medication with the same name was removed. Continue taking this medication, and follow the directions you see here.     Vitamin D3 50 MCG (2000 UT) Tabs  What changed: Another medication with the same name was removed. Continue taking this medication, and follow the directions you see here.            CONTINUE taking these medications      allopurinol 100 MG tablet  Commonly known as: ZYLOPRIM     aspirin 325 MG EC tablet  Take 1 tablet by mouth daily     calcium citrate 950 (200 Ca) MG tablet  Commonly known as: CALCITRATE     docusate sodium 100 MG capsule  Commonly known as: COLACE     ferrous sulfate 325 (65 Fe) MG tablet  Commonly known as: IRON 325     furosemide 40 MG tablet  Commonly known as: LASIX  Take 1 tablet by mouth daily     gabapentin 300 MG capsule  Commonly known as: NEURONTIN     Lexapro 20 MG tablet  Generic drug: escitalopram     * lipase-protease-amylase 29850-291761 units Cpep delayed release capsule  Commonly known as: CREON     * lipase-protease-amylase 93480-245104 units Cpep delayed release capsule  Commonly known as: CREON     loratadine 10 MG tablet  Commonly known as: CLARITIN     metoprolol succinate 25 MG extended release tablet  Commonly known as: TOPROL XL  Take 0.5 tablets by mouth daily     montelukast 10 MG tablet  Commonly known as: SINGULAIR  Take 1 tablet by  mouth daily     oxyCODONE-acetaminophen  MG per tablet  Commonly known as: PERCOCET     polyethylene glycol 17 g packet  Commonly known as: GLYCOLAX  Take 17 g by mouth 2 times daily     potassium chloride 20 MEQ extended release tablet  Commonly known as: KLOR-CON M     promethazine 25 MG tablet  Commonly known as: PHENERGAN     therapeutic multivitamin-minerals tablet     topiramate 200 MG tablet  Commonly known as: TOPAMAX  Take 1 tablet by mouth 2 times daily. Ventolin  (90 Base) MCG/ACT inhaler  Generic drug: albuterol sulfate HFA     * ziprasidone 20 MG capsule  Commonly known as: GEODON     * ziprasidone 40 MG capsule  Commonly known as: GEODON           * This list has 4 medication(s) that are the same as other medications prescribed for you. Read the directions carefully, and ask your doctor or other care provider to review them with you.                 STOP taking these medications      azithromycin 250 MG tablet  Commonly known as: ZITHROMAX     magnesium 250 MG Tabs tablet  Commonly known as: MAGNESIUM-OXIDE     Vitamin E 200 units Tabs               Where to Get Your Medications        These medications were sent to [address] [place] [address],[address], [address]      Phone: [phone]   aspirin 325 MG EC tablet  celecoxib 100 MG capsule       These medications were sent to [facility], [address] [address]      Phone: [phone]   pantoprazole 40 MG tablet       You can get these medications from any pharmacy    Bring a paper prescription for each of these medications  vancomycin  infusion  You don't need a prescription for these medications  [employer] Magnesium Oxide 250 MG Tabs tablet  polyethylene glycol 17 g packet       Information about where to get these medications is not yet available Ask your nurse or doctor about these medications  baclofen 20 MG tablet         Time Spent on discharge is more than 35 minutes in the examination, evaluation, counseling and review of medications and discharge plan.    +++++++++++++++++++++++++++++++++++++++++++++++++  Pratik Whalen MD  [address]  +++++++++++++++++++++++++++++++++++++++++++++++++  NOTE: This report was transcribed using voice recognition software. Every effort was made to ensure accuracy; however, inadvertent computerized transcription errors may be present.

## 2023-02-15 DIAGNOSIS — Z89.529: Primary | ICD-10-CM

## 2023-02-16 ENCOUNTER — HOSPITAL ENCOUNTER (OUTPATIENT)
Dept: GENERAL RADIOLOGY | Age: 63
Discharge: HOME | End: 2023-02-18
Payer: MEDICAID

## 2023-02-16 ENCOUNTER — OFFICE VISIT (OUTPATIENT)
Dept: ORTHOPEDIC SURGERY | Age: 63
End: 2023-02-16
Payer: MEDICAID

## 2023-02-16 ENCOUNTER — TELEPHONE (OUTPATIENT)
Dept: ORTHOPEDIC SURGERY | Age: 63
End: 2023-02-16

## 2023-02-16 DIAGNOSIS — M81.0 OSTEOPOROSIS, UNSPECIFIED OSTEOPOROSIS TYPE, UNSPECIFIED PATHOLOGICAL FRACTURE PRESENCE: Primary | ICD-10-CM

## 2023-02-16 DIAGNOSIS — Z89.529: ICD-10-CM

## 2023-02-16 PROCEDURE — 73552 X-RAY EXAM OF FEMUR 2/>: CPT

## 2023-02-16 PROCEDURE — 73560 X-RAY EXAM OF KNEE 1 OR 2: CPT

## 2023-02-16 PROCEDURE — 99024 POSTOP FOLLOW-UP VISIT: CPT | Performed by: PHYSICIAN ASSISTANT

## 2023-02-16 PROCEDURE — 99213 OFFICE O/P EST LOW 20 MIN: CPT | Performed by: PHYSICIAN ASSISTANT

## 2023-02-16 RX ORDER — ERGOCALCIFEROL 1.25 MG/1
50000 CAPSULE ORAL WEEKLY
Qty: 12 CAPSULE | Refills: 1 | Status: SHIPPED | OUTPATIENT
Start: 2023-02-16

## 2023-02-16 NOTE — PROGRESS NOTES
Alysia Poster is a 58 y.o. female who presents for follow up of 2 Week Post-Op Right Knee Septic. SURGEON: Dr. Alvaro Rodriguez DO  Date of Injury/Surgery:  1-  Date last seen in office:  11-    Symptoms: better  New complaints: Pt expressed having pain in the Right Knee. Pt noted having crepitus within the Right Knee. Pt expressed more symptoms increased at the End of the Day causing an increase of symptoms. Cast/Splint, Brace, or Dressings: Clean, dry and intact, Well fitting, and Taken down for visit    Weightbearing: right lower Partial weight bearing and Full weight bearing      Assistive device Wheelchair  Participating in therapy (location if yes)? yes, At Home.      Refills Needed: None  Order/Referral Needed: N/A

## 2023-02-16 NOTE — PROGRESS NOTES
Chief Complaint   Patient presents with    Follow-up     2 Week Post-Op Right Knee Septic Joint I&D. OP:SURGEON: Dr. Mindy Quintana DO  DATE OF PROCEDURE: 1/25/23  PROCEDURE:Right knee explantation with insertion of articulating antibiotic cement spacer  Removal right distal femur plate and screw construct  Right hip aspiration with fluoroscopic guidance       Subjective:  Francia Mason is approximately 3 weeks for the above surgery. She is weightbearing as tolerated right lower extremity with hinged ROM brace locked in extension. She does have a PICC line in place and does receive home health, however states that she has had issues with flushing and administering her vancomycin through the PICC line for at least a couple of weeks at this point. Pain at he knee is mild-moderate, global around the knee, and intermittent, but is controlled with current pain medications. Denies calf pain, CP, SOB, fever, chills, malaise. Review of Systems -  all pertinent positives and negatives in HPI. Objective:    General: Alert and oriented X 3, normocephalic atraumatic, external ears and eye normal, sclera clear, no acute distress, respirations easy and unlabored with no audible wheezes, skin warm and dry, speech and dress appropriate for noted age, affect euthymic. Extremity:  Left Lower Extremity  Skin clean dry and intact, without signs of infection  Incision well approximated without signs of redness, warmth or drainage- sutures intact  Moderate edema throughout the knee, leg, ankle  Compartments supple throughout thigh and leg  Calf supple and nontender, Homans -  Demonstrates active knee flexion/extension, ankle plantar/dorsiflexion/great toe extension. States sensation intact to touch in sural/deep peroneal/superficial peroneal/saphenous/posterior tibial nerve distributions to foot/ankle. Palpable dorsalis pedis and posterior tibialis pulses, cap refill brisk in toes, foot warm/perfused. XR:   Multiple views of R knee and R femur demonstrating abx cement knee spacer. Headless distal femoral screw remains intact without interval displacement, loosening, or failure. No significant change in alignment. No acute fractures or dislocations or any other osseus abnormality identified. Assessment:   Diagnosis Orders   1. Osteoporosis, unspecified osteoporosis type, unspecified pathological fracture presence  vitamin D (ERGOCALCIFEROL) 1.25 MG (53724 UT) CAPS capsule    Vitamin D 25 Hydroxy          Plan:  Reviewed x-rays with patient today in office   WBAT with hinged knee brace locked in extension R LE   Continue abx per ID. Continue HHC to help with infusions of abx. Recommend ID's guidance with PICC line complications. She has an appt this coming Monday with ID. Sutures removed, steri strips applied R knee. Ice, elevation, compression, otc analgesics prn     Follow up in 5 weeks with XR of the R knee. At this point will have completed abx therapy x6 weeks and finished 2 week trial off abx. Can consider re-implant at that time pending trending ESR and CRP, +/- knee aspiration to ensure negative cultures. Electronically signed by Neal Blackwell PA-C on 2/17/2023 at 9:14 AM  Note: This report was completed using [employer] voiced recognition software. Every effort has been made to ensure accuracy; however, inadvertent computerized transcription errors may be present.

## 2023-02-16 NOTE — TELEPHONE ENCOUNTER
Patient advised office staff that her PICC line has been having problems and she is unable to flush line for several days. Call placed to [address]. Spoke to [facility]. Anamika stated she will have [facility] nurse evaluate patient and check if patient available to be seen in office tomorrow.

## 2023-02-17 ENCOUNTER — TELEPHONE (OUTPATIENT)
Dept: ORTHOPEDIC SURGERY | Age: 63
End: 2023-02-17

## 2023-02-17 RX ORDER — ERGOCALCIFEROL 1.25 MG/1
50000 CAPSULE ORAL WEEKLY
Qty: 12 CAPSULE | Refills: 1 | Status: SHIPPED | OUTPATIENT
Start: 2023-02-17

## 2023-02-17 NOTE — TELEPHONE ENCOUNTER
Patient message states that she was supposed to have an rx of vitamin D prescribed after her appointment yesterday and nothing has been called in.        Future Appointments   Date Time Provider Deysi Bensoni   2/20/2023  1:00 PM Ada Odor, APRN - CNS [facility]   3/2/2023  3:30 PM Rasheeda Cleary MD [facility]   3/16/2023 10:45 AM Jarrod Parnell MD [facility]   3/23/2023 11:00 AM Nakia Fernando DO [facility]     Pend and routed

## 2023-02-23 ENCOUNTER — HOSPITAL ENCOUNTER (OUTPATIENT)
Dept: INFUSION THERAPY | Age: 63
Setting detail: INFUSION SERIES
Discharge: HOME | End: 2023-02-23
Payer: MEDICAID

## 2023-02-23 VITALS
HEART RATE: 71 BPM | RESPIRATION RATE: 18 BRPM | OXYGEN SATURATION: 100 % | DIASTOLIC BLOOD PRESSURE: 65 MMHG | TEMPERATURE: 97.9 F | SYSTOLIC BLOOD PRESSURE: 133 MMHG

## 2023-02-23 PROCEDURE — 36569 INSJ PICC 5 YR+ W/O IMAGING: CPT

## 2023-02-23 PROCEDURE — C1751 CATH, INF, PER/CENT/MIDLINE: HCPCS

## 2023-02-23 PROCEDURE — 76937 US GUIDE VASCULAR ACCESS: CPT

## 2023-02-23 RX ORDER — MECLIZINE HYDROCHLORIDE 25 MG/1
25 TABLET ORAL 4 TIMES DAILY
COMMUNITY

## 2023-02-23 NOTE — FLOWSHEET NOTE
Discharged to home in stable condition , tolerated picc line insertion well, VS stable, dc instructions given via iv team Eduarda MAYERS, antibiotic was hooked back up. all questions answered.

## 2023-02-23 NOTE — PROGRESS NOTES
Chg double lumen picc Placement 2/23/2023    Product number: [id number]   Lot Number: [id number]      Ultrasound: yes   Right Basilic vein:                Upper Arm Circumference: 47cm    Size: 5.5fr/44cm    Exposed Length: 2cm    Internal Length: 42cm   Cut: 11cm   Vein Measurement: 0.60cm    Jodie Love RN  2/23/2023  1:26 PM    merlin

## 2023-03-02 ENCOUNTER — TELEPHONE (OUTPATIENT)
Dept: ORTHOPEDIC SURGERY | Age: 63
End: 2023-03-02

## 2023-03-02 NOTE — TELEPHONE ENCOUNTER
Patient called office advising she has an appointment with Dr. Odilia Lopez to receive L shoulder and R hip injections. Inquiring if she should cancel this appointment. Discussed with Dr. Karlos John and APCs. Recommended delaying injections with active infection / PICC line. Patient agreeable to plan and verbalized understanding.     Future Appointments   Date Time Provider Deysi Demi   3/2/2023  3:30 PM Carol Guzman MD [facility]   3/16/2023 10:45 AM Delmi Cai MD [facility] ENDO [facility]   3/23/2023 11:00 AM Mingo Townsend DO [facility]   3/28/2023 12:15 PM MD JAYDA LedezmaNEOHINFDS AFL [facility] INF

## 2023-03-16 ENCOUNTER — OFFICE VISIT (OUTPATIENT)
Dept: ENDOCRINOLOGY | Age: 63
End: 2023-03-16
Payer: MEDICAID

## 2023-03-16 VITALS — DIASTOLIC BLOOD PRESSURE: 64 MMHG | SYSTOLIC BLOOD PRESSURE: 102 MMHG | HEART RATE: 81 BPM | OXYGEN SATURATION: 97 %

## 2023-03-16 DIAGNOSIS — E27.8 ADRENAL INCIDENTALOMA (HCC): ICD-10-CM

## 2023-03-16 DIAGNOSIS — E04.2 MULTINODULAR GOITER: Primary | ICD-10-CM

## 2023-03-16 DIAGNOSIS — E55.9 VITAMIN D DEFICIENCY: ICD-10-CM

## 2023-03-16 PROCEDURE — 99214 OFFICE O/P EST MOD 30 MIN: CPT | Performed by: INTERNAL MEDICINE

## 2023-03-16 PROCEDURE — 3078F DIAST BP <80 MM HG: CPT | Performed by: INTERNAL MEDICINE

## 2023-03-16 PROCEDURE — 3074F SYST BP LT 130 MM HG: CPT | Performed by: INTERNAL MEDICINE

## 2023-03-16 RX ORDER — DEXAMETHASONE 1 MG
1 TABLET ORAL ONCE
Qty: 1 TABLET | Refills: 0 | Status: SHIPPED | OUTPATIENT
Start: 2023-03-16 | End: 2023-03-16

## 2023-03-16 NOTE — PROGRESS NOTES
[address] [place] [address] Department of Endocrinology Diabetes and Metabolism   [address] [address]   Phone: [phone]  Fax: [phone]    Date of Service: 3/16/2023  Primary Care Physician: Ny Stack DO  Provider: Lukasz Diaz MD            Reason for the visit:  Multinodular goiter     History of Present Illness: The history is provided by the patient. No  was used. Accuracy of the patient data is excellent. Angelina Aviles is a very pleasant 58 y.o. female seen today for evaluation and management of multinodular goiter     The patient was found to have thyroid nodule on a routine physical examination   The pt was diagnosed with MNG in 5/19 5/22/19 Discovered incidentally on CT of neck 5/22/19 ordered by Dr. Joe tony for evaluation of swelling of the neck  7/31/19 US of thyroid: 1.3cm hypoechoic nodule in the isthmus  8/02/19 CT-guided core biopsy was negative for malignancy; flow cytometry was attempted but the cellularity of the specimen was inadequate.   2/03/20 US of thyroid: no significant change from 7/31/19; two nodules reported, one in each lobe located near the isthmus, both are 1.2 cm; calcification now noted in the right  Thyroid US 2/2021  Right thyroid lobe:  8.0 x 2.6 x 2.8 cm, Left thyroid lobe:  5.8 x 2.8 x 2.6 cm, Isthmus:  1.0 cm  Mildly heterogeneous in echotexture with normal vascularity  Nodules  9 mm cystic and solid nodule in the right thyroid lobe  3 mm focus of dystrophic calcification without associated nodule  3 mm predominantly cystic lesion in the left thyroid lobe      6/13/19  TSH 0.239, fT4 1.18  5/11/20  TSH 0.809, T3 114,  fT4 1.16     Lab Results   Component Value Date/Time    TSH 0.548 03/16/2021 12:16 PM    T4FREE 1.09 03/16/2021 12:16 PM    X1ADHRY 10.0 05/21/2012 01:48 PM    A4DNJVY 107.30 03/03/2021 01:46 PM       Right adrenal nodule   12/28/19  CT of abd : Stable small nodule in the right adrenal gland likely reflecting adenoma  02/06/20  24 urine metanephrines and catecholamines neg for pheo; urine cortisol nml, PRA and serum dean normal                 02/06/20 Serum Cortisol level 6.06 mcg/dl, but was checked at 4:38 p  5/11/20  Dexamethasone suppression test: Cortisol 3.03, ACTH 5.8    The patient is due to adrenal hormones check      PAST MEDICAL HISTORY   Past Medical History:   Diagnosis Date    Adenoma of right adrenal gland     Anemia     Anxiety     Arthritis     spinal    Asthma     controlled with inhalers     Benign essential tremor     Bipolar affective (HCC)     Chronic back pain     Spinal cord stimulator in place    Chronic respiratory failure with hypoxia (HCC)     at times dt diaphragm does not function properly dt Mitochondrial disorder    Depression     stable    Diabetes mellitus ([facility]     Difficulty swallowing     for EGD 10-8-19     Environmental and seasonal allergies     Excessive physiologic tremor 5/24/2021    Fatty liver     Full dentures     GERD (gastroesophageal reflux disease)     Gout     past hx of    Herniated cervical disc     limited rom of head and neck    History of swelling of feet     Hypertension     Lymphedema of lower extremity 09/06/2012    Mitochondrial cytopathy ([facility]     s/s muscle and nerve pain, difficulty breathing, seizures, difficulty swallowing, digestive disorders- Dr. Shah [place] [facility]    Muscle weakness (generalized)     Information obtained from [facility]    Need for assistance with personal care     Information obtained from [facility]    Neuropathy     at feet    Obesity     PETR (obstructive sleep apnea)     no CPAP dt insurance will not pay for    Osteoarthritis of left knee     Information obtained from [facility]    PONV (postoperative nausea and vomiting)     Seizures ([facility]     last approx 6-13-19- f/u w/ PCP  Granmal, flares up in heat/ summer time - no recent issues as of 10-4-19     Spinal headache     Thyroid disease        PAST SURGICAL HISTORY   Past Surgical History:   Procedure Laterality Date    ANKLE FRACTURE SURGERY Right 2/14/2022    RIGHT ANKLE IRRIGAITON AND DEBRIDEMENT WITH EXTERNAL FIXATOR AND LACERATION REPAIR performed by Yaakov Reed MD at [facility] Right 3/2/2022    RIGHT LOWER EXTREMITY REMOVAL EXTERNAL FIXATOR, RIGHT PILON OPEN REDUCTION INTERNAL FIXATOR--SYNTHES (FACILITY) performed by Karlos Pedro DO at [place] [address]      with Hysterectomy / unknown    BACK SURGERY  last one 1995    lumbar x 2    CARDIAC CATHETERIZATION Right 6-6-2013    Dr. Naheed Eaton Radial, no stents placed, no blockages     CHOLECYSTECTOMY  1999    open with gastric bypass    COLONOSCOPY N/A 9/18/2018    COLONOSCOPY POLYPECTOMY HOT BIOPSY performed by Kailash Oliveira MD at [facility] N/A 10/8/2019    COLONOSCOPY POLYPECTOMY SNARE/COLD BIOPSY performed by Kailash Oliveira MD at [facility]    EGD COLONOSCOPY N/A 10/8/2019    EGD ESOPHAGOGASTRODUODENOSCOPY DILATATION performed by Kailash Oliveira MD at [address], COLON, DIAGNOSTIC      ESOPHAGEAL DILATATION  9/18/2018    [address] performed by Kailash Oliveira MD at [address] ([facility])  [address] [place] 1998 Bilateral 2007,2017    knees    KNEE SURGERY Bilateral     scope    KNEE SURGERY Right 8/19/2022    RIGHT KNEE INCISION AND DRAINAGE performed by Karlos Pedro DO at [address] Right 1/25/2023    RIGHT KNEE INCISION AND DRAINAGE, REMOVAL OF EXISTING HARDWARE, ANTIBIOTIC SPACER INSTALLED performed by Karlos Pedro DO at [address] Left 10 02 2013    lumbar paravertebral facet #2    NERVE BLOCK Left 10 9 13    hip inj #1    NERVE BLOCK Left 10/16/13    hip injection    NERVE BLOCK Left 10/29/2014    left lumbar transforaminal nerve lbock  #1    NERVE BLOCK Left 11/12/2014 lumbar left transforaminal nerve block  #2    NERVE BLOCK Left 12 8 14    lumbar transforaminal #3    NERVE BLOCK Right 3/30/15    cervical transforaminal #1    NERVE BLOCK Right 4/6/2015    right cervical transforaminal nerve block  #2    NERVE BLOCK Right 4/13/15    cervical transforaminal #3    NERVE BLOCK Left 7/6/15    knee injection #1    NERVE BLOCK  07/20/15    left genicular nerve block/knee #2    NERVE BLOCK Left 10 1 15    lumb transforam #1    NERVE BLOCK  10/26/15    left lumbar transforaminal nerve block #3    NERVE BLOCK Bilateral 4/1/2021    #1 BILATERAL SACROILIAC JOINT INJECTION UNDER FLUORO performed by Daja Dave MD at [facility] Left 11 25 13    lumbar radiofreq    OTHER SURGICAL HISTORY  3/28/2016    stage 1, 3 day percutanous trial [employer] lumbar spinal cord stimulator    OTHER SURGICAL HISTORY  1995    racz procedure / lower back    DE COLONOSCOPY FLX DX W/COLLJ SPEC WHEN PFRMD N/A 3/20/2018    COLONOSCOPY DIAGNOSTIC OR SCREENING performed by Blanca Blount MD at [address] EGD TRANSORAL BIOPSY SINGLE/MULTIPLE N/A 3/20/2018    EGD BIOPSY performed by Blanca Blount MD at [facility]:   reports that she has been smoking cigarettes. She started smoking about 32 years ago. She has a 31.50 pack-year smoking history. She has never used smokeless tobacco.  Alcohol:   reports no history of alcohol use. Drugs:   reports that she does not currently use drugs after having used the following drugs: Marijuana Yessi Barrow).     FAMILY HISTORY   Family History   Problem Relation Age of Onset    Heart Disease Mother     Cancer Father     Arthritis Other     Cancer Other     Depression Other     Heart Disease Other     Hypertension Other     Mental Illness Other     Stroke Other        ALLERGIES AND DRUG REACTIONS   Allergies   Allergen Reactions    Bee Pollen Anaphylaxis, Shortness Of Breath and Swelling And wasp    Penicillins Anaphylaxis    Ropinirole Swelling and Anaphylaxis     swelling of throat    Ropinirole Hcl Anaphylaxis    Vistaril [Hydroxyzine Hcl] Anaphylaxis    Aripiprazole Other (See Comments)     muscle spasms and confusion    Prednisone     Restoril [Temazepam]     Wax [Beeswax]     Hydroxyzine Pamoate Itching and Rash    Tape [Adhesive Tape] Rash     Paper tape allergy    Fabric tape is OK to use        CURRENT MEDICATIONS   Current Outpatient Medications   Medication Sig Dispense Refill    LINZESS 290 MCG CAPS capsule TAKE 1 CAPSULE BY MOUTH EVERY DAY      celecoxib (CELEBREX) 100 MG capsule Take 1 capsule by mouth 2 times daily 60 capsule 3    [employer] MAGNESIUM OXIDE 250 MG TABS tablet Take 1 tablet by mouth 2 times daily (Patient taking differently: Take 250 mg by mouth 3 times daily)  0    aspirin 325 MG EC tablet Take 1 tablet by mouth daily (Patient taking differently: Take 81 mg by mouth daily) 30 tablet 3    gabapentin (NEURONTIN) 300 MG capsule Take 600 mg by mouth 4 times daily.       furosemide (LASIX) 40 MG tablet Take 1 tablet by mouth daily 60 tablet 3    Cholecalciferol (VITAMIN D3) 50 MCG (2000 UT) TABS Take 2,000 Units by mouth daily      Blood Glucose Monitoring Suppl (ONE TOUCH ULTRA 2) w/Device KIT USE TO TEST BLOOD SUGAR ONCE TO TWICE DAILY      gabapentin (NEURONTIN) 600 MG tablet TAKE ONE TABLET BY MOUTH 4 TIMES A DAY      ONETOUCH ULTRA strip TEST BLOOD SUGAR ONCE TO TWICE DAILY      Lancets (ONETOUCH DELICA PLUS [id number]) MISC USE ONCE TO TWICE DAILY      sulfamethoxazole-trimethoprim (BACTRIM DS;SEPTRA DS) 800-160 MG per tablet TAKE 1 TABLET BY MOUTH TWICE A DAY (Patient not taking: Reported on 3/2/2023)      Multiple Vitamin (DAILY VITES) TABS TAKE 1 TABLET BY MOUTH EVERY DAY      naloxone 4 MG/0.1ML LIQD nasal spray SPRAY 1 SPRAY INTO NOSTRIL ONE TIME (Patient not taking: Reported on 3/2/2023)      vitamin E 90 MG (200 UNIT) CAPS capsule TAKE 1 CAPSULE BY MOUTH THREE TIMES A DAY      fluticasone-umeclidin-vilant (TRELEGY ELLIPTA) 100-62.5-25 MCG/ACT AEPB inhaler Inhale 1 puff into the lungs daily 60 each 5    albuterol (PROVENTIL) (2.5 MG/3ML) 0.083% nebulizer solution Take 3 mLs by nebulization every 6 hours as needed for Wheezing 120 each 5    meclizine (ANTIVERT) 25 MG tablet Take 25 mg by mouth in the morning, at noon, in the evening, and at bedtime      vitamin D (ERGOCALCIFEROL) 1.25 MG (47069 UT) CAPS capsule Take 1 capsule by mouth once a week 12 capsule 1    vitamin D (ERGOCALCIFEROL) 1.25 MG (55873 UT) CAPS capsule Take 1 capsule by mouth once a week 12 capsule 1    polyethylene glycol (GLYCOLAX) 17 g packet Take 17 g by mouth 2 times daily (Patient not taking: Reported on 3/2/2023) 527 g 0    pantoprazole (PROTONIX) 40 MG tablet Take 1 tablet by mouth daily (Patient not taking: Reported on 3/2/2023) 30 tablet 0    baclofen (LIORESAL) 20 MG tablet Take 0.5 tablets by mouth 3 times daily as needed (msucle spasm) (Patient taking differently: Take 10 mg by mouth in the morning, at noon, in the evening, and at bedtime) 30 tablet 0    calcium citrate (CALCITRATE) 950 (200 Ca) MG tablet Take 1 tablet by mouth 3 times daily      lipase-protease-amylase (CREON) 53845-206822 units CPEP delayed release capsule Take 3 capsules by mouth 3 times daily (with meals)      lipase-protease-amylase (CREON) 59412-877671 units CPEP delayed release capsule Take 2 capsules by mouth take with snacks      ferrous sulfate (IRON 325) 325 (65 Fe) MG tablet Take 325 mg by mouth daily (with breakfast)      Multiple Vitamins-Minerals (THERAPEUTIC MULTIVITAMIN-MINERALS) tablet Take 1 tablet by mouth daily      docusate sodium (COLACE) 100 MG capsule Take 100 mg by mouth 2 times daily      potassium chloride (KLOR-CON M) 20 MEQ extended release tablet Take 20 mEq by mouth daily      ziprasidone (GEODON) 20 MG capsule Take 20 mg by mouth at bedtime (Patient not taking: No sig reported)      ziprasidone (GEODON) 40 MG capsule Take 40 mg by mouth every evening      albuterol sulfate HFA (PROVENTIL;VENTOLIN;PROAIR) 108 (90 Base) MCG/ACT inhaler Inhale 2 puffs into the lungs 4 times daily      loratadine (CLARITIN) 10 MG tablet Take 10 mg by mouth daily      oxyCODONE-acetaminophen (PERCOCET)  MG per tablet Take 1 tablet by mouth every 6 hours as needed for Pain. promethazine (PHENERGAN) 25 MG tablet Take 25 mg by mouth every 6 hours as needed for Nausea      metoprolol succinate (TOPROL XL) 25 MG extended release tablet Take 0.5 tablets by mouth daily 30 tablet 3    traZODone (DESYREL) 50 MG tablet Take 100 mg by mouth nightly      montelukast (SINGULAIR) 10 MG tablet Take 1 tablet by mouth daily 30 tablet 3    allopurinol (ZYLOPRIM) 100 MG tablet Take 100 mg by mouth 2 times daily      escitalopram (LEXAPRO) 20 MG tablet Take 30 mg by mouth daily      topiramate (TOPAMAX) 200 MG tablet Take 1 tablet by mouth 2 times daily. 1 tablet 0     No current facility-administered medications for this visit. Review of Systems  Constitutional: No fever, no chills, no diaphoresis, no generalized weakness. HEENT: No blurred vision, No sore throat, no ear pain, no hair loss  Neck: denied any neck swelling, difficulty swallowing,   Cadrdiopulomary: No CP, SOB or palpitation, No orthopnea or PND. No cough or wheezing. GI: No N/V/D, no constipation, No abdominal pain, no melena or hematochezia   : Denied any dysuria, hematuria, flank pain, discharge, or incontinence. Skin: denied any rash, ulcer, Hirsute, or hyperpigmentation. MSK: denied any joint deformity, joint pain/swelling, muscle pain, or back pain.   Neuro: no numbess, no tingling, no weakness,     OBJECTIVE    /64   Pulse 81   LMP 08/31/1988   SpO2 97%   BP Readings from Last 4 Encounters:   03/16/23 102/64   03/07/23 120/80   03/02/23 114/75   02/23/23 133/65     Wt Readings from Last 6 Encounters:   03/07/23 230 lb (104.3 kg)   03/02/23 220 lb (99.8 kg)   01/30/23 238 lb 12.8 oz (108.3 kg)   11/17/22 217 lb (98.4 kg)   08/18/22 240 lb 6.4 oz (109 kg)   05/06/22 230 lb (104.3 kg)       Physical examination:  General: awake alert, oriented x3, no abnormal position or movements. HEENT: normocephalic non traumatic  Neck: supple, no LN enlargement, no thyromegaly, no thyroid tenderness, no JVD. Pulm: Clear equal air entry no added sounds, no wheezing or rhonchi    CVS: S1 + S2, no murmur, no heave. Dorsalis pedis pulse palpable   Abd: soft lax, no tenderness, no organomegaly, audible bowel sounds. Skin: warm, no lesions, no rash.  No Palmar erythema  Musculoskeletal: No back tenderness, no kyphosis/scoliosis     Neuro: CN intact, sensation normal , muscle power normal. No tremors   Psych: normal mood, and affect    Review of Laboratory Data:  I personally reviewed the following labs:   Lab Results   Component Value Date/Time    WBC 4.0 (L) 03/09/2023 10:00 AM    RBC 3.14 (L) 03/09/2023 10:00 AM    HGB 10.1 (L) 03/09/2023 10:00 AM    HCT 32.3 (L) 03/09/2023 10:00 AM    .9 (H) 03/09/2023 10:00 AM    MCH 32.2 03/09/2023 10:00 AM    MCHC 31.3 (L) 03/09/2023 10:00 AM    RDW 13.7 03/09/2023 10:00 AM     03/09/2023 10:00 AM    MPV 9.6 03/09/2023 10:00 AM      Lab Results   Component Value Date/Time     03/09/2023 10:00 AM    K 3.8 03/09/2023 10:00 AM    K 3.9 05/06/2022 05:28 PM    CO2 24 03/09/2023 10:00 AM    BUN 25 (H) 03/09/2023 10:00 AM    CREATININE 0.7 03/09/2023 10:00 AM    CALCIUM 9.1 03/09/2023 10:00 AM    LABGLOM >60 03/09/2023 10:00 AM    GFRAA >60 08/23/2022 07:14 AM      Lab Results   Component Value Date/Time    TSH 0.548 03/16/2021 12:16 PM    T4FREE 1.09 03/16/2021 12:16 PM    C0EZABV 10.0 05/21/2012 01:48 PM    E4RKCXW 107.30 03/03/2021 01:46 PM     Lab Results   Component Value Date/Time    LABA1C 5.7 12/13/2013 10:22 AM    GLUCOSE 76 03/09/2023 10:00 AM    GLUCOSE 93 05/24/2012 11:11 AM    LABCREA 27 02/06/2020 07:30 AM Lab Results   Component Value Date/Time    TRIG 163 04/11/2022 10:40 AM    HDL 49 04/11/2022 10:40 AM    LDLCALC 72 04/11/2022 10:40 AM    CHOL 154 04/11/2022 10:40 AM     Lab Results   Component Value Date/Time    VITD25 46 02/01/2022 05:40 AM    VITD25 50 03/16/2021 12:16 PM       ASSESSMENT & RECOMMENDATIONS   James Loredo, a 58 y.o.-old female seen in for the following issues     Rt adrenal incidentaloma   12/28/19  CT of abd : Stable small nodule in the right adrenal gland likely reflecting adenoma  02/06/20  24 urine metanephrines and catecholamines neg for pheo; urine cortisol nml, PRA and serum dean normal                 02/06/20 Serum Cortisol level 6.06 mcg/dl, but was checked at 4:38 p  5/11/20  Dexamethasone suppression test: Cortisol 3.03, ACTH 5.8  She us due for adrenal labs now     H/o Multinodular goiter   Pt with h/o multinodular goiter. Last thyroid US in 2019 actually showed heterogenous thyroid gland without discrete thyroid nodules   Pt still c/o fullness in hr neck, will obtain one more US     vitD deficiency   Continue vitD supplement     I personally reviewed external notes from PCP and other patient's care team providers, and personally interpreted labs associated with the above diagnosis. I also ordered labs to further assess and manage the above addressed medical conditions. No follow-ups on file. The above issues were reviewed with the patient who understood and agreed with the plan. Thank you for allowing us to participate in the care of this patient. Please do not hesitate to contact us with any additional questions. Diagnosis Orders   1. Multinodular goiter  US THYROID    TSH    T4, Free    dexamethasone (DECADRON) 1 MG tablet      2. Vitamin D deficiency        3.  Adrenal incidentaloma ([facility]  Cortisol Total    Aldosterone & Renin, Direct with Ratio    Metanephrines Plasma Free    Basic Metabolic Panel        Mya Damon MD  Endocrinologist, [facility] ([facility])   [address] [facility] [address] [address]   Phone: [phone]  Fax: [phone]  ------------------------------  An electronic signature was used to authenticate this note.  Ela Curry MD on 3/16/2023 at 11:36 AM

## 2023-03-22 DIAGNOSIS — S82.871D CLOSED DISPLACED PILON FRACTURE OF RIGHT TIBIA WITH ROUTINE HEALING, SUBSEQUENT ENCOUNTER: Primary | ICD-10-CM

## 2023-03-22 DIAGNOSIS — M81.0 OSTEOPOROSIS, UNSPECIFIED OSTEOPOROSIS TYPE, UNSPECIFIED PATHOLOGICAL FRACTURE PRESENCE: ICD-10-CM

## 2023-03-23 ENCOUNTER — HOSPITAL ENCOUNTER (OUTPATIENT)
Dept: GENERAL RADIOLOGY | Age: 63
Discharge: HOME | End: 2023-03-25
Payer: MEDICAID

## 2023-03-23 ENCOUNTER — OFFICE VISIT (OUTPATIENT)
Dept: ORTHOPEDIC SURGERY | Age: 63
End: 2023-03-23
Payer: MEDICAID

## 2023-03-23 DIAGNOSIS — M81.0 OSTEOPOROSIS, UNSPECIFIED OSTEOPOROSIS TYPE, UNSPECIFIED PATHOLOGICAL FRACTURE PRESENCE: ICD-10-CM

## 2023-03-23 DIAGNOSIS — M00.061 STAPHYLOCOCCAL ARTHRITIS OF RIGHT KNEE (HCC): Primary | ICD-10-CM

## 2023-03-23 PROCEDURE — 73560 X-RAY EXAM OF KNEE 1 OR 2: CPT

## 2023-03-23 PROCEDURE — 20610 DRAIN/INJ JOINT/BURSA W/O US: CPT | Performed by: PHYSICIAN ASSISTANT

## 2023-03-23 PROCEDURE — 99213 OFFICE O/P EST LOW 20 MIN: CPT | Performed by: PHYSICIAN ASSISTANT

## 2023-03-23 RX ORDER — LIDOCAINE HYDROCHLORIDE 10 MG/ML
10 INJECTION, SOLUTION INFILTRATION; PERINEURAL ONCE
Status: COMPLETED | OUTPATIENT
Start: 2023-03-23 | End: 2023-03-23

## 2023-03-23 RX ADMIN — LIDOCAINE HYDROCHLORIDE 10 ML: 10 INJECTION, SOLUTION INFILTRATION; PERINEURAL at 15:45

## 2023-03-23 NOTE — PATIENT INSTRUCTIONS
You were ordered right knee aspiration through interventional radiology by your Orthopedic Provider.   If you do not hear from that department please contact: IR [id number]- [phone]

## 2023-03-23 NOTE — PROGRESS NOTES
aspiration is negative, will schedule pt for R TKA re-implant with Dr Brody Barboza. Continue to monitor for s/s of infx, which were reviewed with pt today  Continue knee bracing  Continue ASA daily     Follow up pending aspiration results    Electronically signed by Mariann Bradley PA-C on 3/24/2023 at 9:16 AM  Note: This report was completed using [employer] voiced recognition software. Every effort has been made to ensure accuracy; however, inadvertent computerized transcription errors may be present.

## 2023-03-28 NOTE — H&P (VIEW-ONLY)
vomiting. GENITOURINARY:  Denies burning urination or frequency of urination  INTEGUMENT: denies wound , rash  HEMATOLOGIC/LYMPHATIC:  Denies lymph node swelling, gum bleeding or easy bruising. MUSCULOSKELETAL: Right knee pain   NEUROLOGICAL:  Denies light headed, dizziness, loss of consciousness, weakness of lower extremities, bowel or bladder incontinence. PHYSICAL EXAM:      Vitals:     Vitals:    03/28/23 1239   BP: 112/68   Pulse: 84   Resp: 18   Temp: 98.2 °F (36.8 °C)   SpO2: 96%       General Appearance:    Awake, alert , no acute distress. Head:    Normocephalic, atraumatic   Eyes:    No pallor, no icterus,   Ears:    No obvious deformity or drainage.    Nose:   No nasal drainage   Throat:   Mucosa moist, no oral thrush   Neck:   Supple, no lymphadenopathy   Back:     no CVA tenderness   Lungs:     Clear to auscultation bilaterally, no wheeze    Heart:    Regular rate and rhythm, no murmur   Abdomen:     Soft, non-tender, bowel sounds present    Extremities:    + edema, right knee warm, swollen, no erythema    Pulses:   Dorsalis pedis palpable    Skin:   no rashes      CBC with Differential:      Lab Results   Component Value Date/Time    WBC 4.0 03/09/2023 10:00 AM    RBC 3.14 03/09/2023 10:00 AM    HGB 10.1 03/09/2023 10:00 AM    HCT 32.3 03/09/2023 10:00 AM     03/09/2023 10:00 AM    .9 03/09/2023 10:00 AM    MCH 32.2 03/09/2023 10:00 AM    MCHC 31.3 03/09/2023 10:00 AM    RDW 13.7 03/09/2023 10:00 AM    SEGSPCT 64 12/27/2013 03:00 PM    METASPCT 0.9 02/01/2023 08:05 PM    LYMPHOPCT 20.6 03/09/2023 10:00 AM    MONOPCT 10.0 03/09/2023 10:00 AM    MYELOPCT 0.9 01/31/2023 07:55 AM    BASOPCT 0.2 03/09/2023 10:00 AM    MONOSABS 0.40 03/09/2023 10:00 AM    LYMPHSABS 0.83 03/09/2023 10:00 AM    EOSABS 0.25 03/09/2023 10:00 AM    BASOSABS 0.01 03/09/2023 10:00 AM       CMP     Lab Results   Component Value Date/Time     03/09/2023 10:00 AM    K 3.8 03/09/2023 10:00 AM    K 3.9

## 2023-03-29 ENCOUNTER — HOSPITAL ENCOUNTER (OUTPATIENT)
Dept: SLEEP CENTER | Age: 63
Discharge: HOME | End: 2023-03-29
Payer: MEDICAID

## 2023-03-29 DIAGNOSIS — E66.9 OBESITY (BMI 30-39.9): ICD-10-CM

## 2023-03-29 DIAGNOSIS — G47.33 OSA (OBSTRUCTIVE SLEEP APNEA): ICD-10-CM

## 2023-03-29 DIAGNOSIS — G47.30 SLEEP APNEA, UNSPECIFIED TYPE: ICD-10-CM

## 2023-03-29 PROCEDURE — 95810 POLYSOM 6/> YRS 4/> PARAM: CPT

## 2023-03-30 ENCOUNTER — HOSPITAL ENCOUNTER (OUTPATIENT)
Dept: INTERVENTIONAL RADIOLOGY/VASCULAR | Age: 63
Discharge: HOME | End: 2023-04-01
Payer: MEDICAID

## 2023-03-30 VITALS
HEART RATE: 64 BPM | TEMPERATURE: 97.9 F | SYSTOLIC BLOOD PRESSURE: 133 MMHG | OXYGEN SATURATION: 95 % | DIASTOLIC BLOOD PRESSURE: 61 MMHG | RESPIRATION RATE: 18 BRPM

## 2023-03-30 VITALS — BODY MASS INDEX: 36.1 KG/M2 | WEIGHT: 230 LBS | OXYGEN SATURATION: 93 % | HEART RATE: 77 BPM | HEIGHT: 67 IN

## 2023-03-30 DIAGNOSIS — M00.061 STAPHYLOCOCCAL ARTHRITIS OF RIGHT KNEE (HCC): ICD-10-CM

## 2023-03-30 LAB
APPEARANCE FLUID: NORMAL
CELL COUNT FLUID TYPE: NORMAL
COLOR FLUID: YELLOW
FLUID TYPE: NORMAL
GLUCOSE FLD-MCNC: 58 MG/DL
MONOCYTE, FLUID: 19 %
NEUTROPHIL, FLUID: 81 %
NUCLEATED CELLS FLUID: 7484 /UL
RBC FLUID: NORMAL /UL

## 2023-03-30 PROCEDURE — 2709999900 IR ASP ABSCESS/HEMATOMA/BULLA/CYST

## 2023-03-30 PROCEDURE — 87205 SMEAR GRAM STAIN: CPT

## 2023-03-30 PROCEDURE — 2500000003 HC RX 250 WO HCPCS: Performed by: PHYSICIAN ASSISTANT

## 2023-03-30 PROCEDURE — 82947 ASSAY GLUCOSE BLOOD QUANT: CPT

## 2023-03-30 PROCEDURE — 87186 SC STD MICRODIL/AGAR DIL: CPT

## 2023-03-30 PROCEDURE — 77002 NEEDLE LOCALIZATION BY XRAY: CPT

## 2023-03-30 PROCEDURE — 87206 SMEAR FLUORESCENT/ACID STAI: CPT

## 2023-03-30 PROCEDURE — 89051 BODY FLUID CELL COUNT: CPT

## 2023-03-30 PROCEDURE — 87070 CULTURE OTHR SPECIMN AEROBIC: CPT

## 2023-03-30 PROCEDURE — 87077 CULTURE AEROBIC IDENTIFY: CPT

## 2023-03-30 PROCEDURE — 89060 EXAM SYNOVIAL FLUID CRYSTALS: CPT

## 2023-03-30 PROCEDURE — 10160 PNXR ASPIR ABSC HMTMA BULLA: CPT

## 2023-03-30 PROCEDURE — 87116 MYCOBACTERIA CULTURE: CPT

## 2023-03-30 RX ORDER — LIDOCAINE HYDROCHLORIDE 20 MG/ML
INJECTION, SOLUTION INFILTRATION; PERINEURAL
Status: COMPLETED | OUTPATIENT
Start: 2023-03-30 | End: 2023-03-30

## 2023-03-30 RX ADMIN — LIDOCAINE HYDROCHLORIDE 5 ML: 20 INJECTION, SOLUTION INFILTRATION; PERINEURAL at 13:53

## 2023-03-30 ASSESSMENT — SLEEP AND FATIGUE QUESTIONNAIRES
HOW LIKELY ARE YOU TO NOD OFF OR FALL ASLEEP WHEN YOU ARE A PASSENGER IN A CAR FOR AN HOUR WITHOUT A BREAK: 2
HOW LIKELY ARE YOU TO NOD OFF OR FALL ASLEEP WHILE SITTING INACTIVE IN A PUBLIC PLACE: 2
HOW LIKELY ARE YOU TO NOD OFF OR FALL ASLEEP WHILE WATCHING TV: 2
HOW LIKELY ARE YOU TO NOD OFF OR FALL ASLEEP WHILE SITTING QUIETLY AFTER LUNCH WITHOUT ALCOHOL: 2
HOW LIKELY ARE YOU TO NOD OFF OR FALL ASLEEP IN A CAR, WHILE STOPPED FOR A FEW MINUTES IN TRAFFIC: 1
HOW LIKELY ARE YOU TO NOD OFF OR FALL ASLEEP WHILE SITTING AND TALKING TO SOMEONE: 1
ESS TOTAL SCORE: 15
HOW LIKELY ARE YOU TO NOD OFF OR FALL ASLEEP WHILE LYING DOWN TO REST IN THE AFTERNOON WHEN CIRCUMSTANCES PERMIT: 3
HOW LIKELY ARE YOU TO NOD OFF OR FALL ASLEEP WHILE SITTING AND READING: 2

## 2023-03-30 NOTE — BRIEF OP NOTE
Brief Postoperative Note  ______________________________________________________________      IR Aspiration of right Knee joint     Patient Name: Nazario Maldonado   YOB: 1960  Medical Record Number: [id number]  Date of Procedure: 3/30/23  Room/Bed: Room/bed info not found    Pre-operative Diagnosis and Procedure: Right TKA MRSA infection ( Recurrent ) - s/p explantation of the hardware ( 1/25)    Post-operative Diagnosis: Same    Anesthesia: Local    Estimated Blood Loss: < 10 cc    Performed by: RADHA Kimble under on-site supervision by Jace Farah III MD.    Complications: none    Specimen obtained: 65 cc clear yellow synovial fluid.      Findings: successful aspiration and drainage of right knee joint    Electronically signed by RADHA Kimble   DD: 3/30/23  2:07 PM EDT

## 2023-03-30 NOTE — INTERVAL H&P NOTE
H&P UPDATE NOTE    Patient's History and Physical Examination were reviewed from current hospital admission records. Nhi Herman appears likely to able to tolerate the requested Joint Aspiration procedure by the consultant. Risk and benefits were discussed with patient including ultimate complications, possibly death and consent was obtained.     RADHA Palafox III MD.

## 2023-03-30 NOTE — PROGRESS NOTES
Patient arrived to Radiology department for right knee aspiration. Allergies, home medications, H&P and fasting instructions reviewed with patient. Vital signs taken. Procedural instructions given, questions answered, understanding expressed and consent signed. Patient given fluoroscopy education, no questions at this time.

## 2023-03-31 LAB — GRAM STAIN ORDERABLE: NORMAL

## 2023-04-01 LAB
CRYSTALS, FLUID: NORMAL
SOURCE BODY FLUID: NORMAL

## 2023-04-01 NOTE — PROGRESS NOTES
index is normal.    LIMB MOVEMENT SUMMARY:  There were 66 limb movements, the limb movement  index is normal.    OXYGEN SATURATION:  Average oxygen saturation while awake was 91%. Lowest saturation was 84%. The patient spent 25% of the time with  saturation at or greater than 90%. Seventy five percent of the time,  saturation ranged from 84% to 89%. HEART RATE SUMMARY  Average heart rate while awake was 71 beats per  minute. Maximum heart rate during sleep was 81 beats per minute and  minimum heart rate during sleep was 59 beats per minute. There were  rare PVCs, not associated with respiratory events. MISCELLANEOUS:  Comfort Sleepiness Scale score is 15/24. Snoring was  moderate. This was graded as 4 on a 1 to 10 scale. There was no  apparent bruxism. IMPRESSION:  1. No evidence of sleep apnea. 2.  Nocturnal hypoxia. 3.  Rare PVCs. 4.  Mild-to-moderate snoring. 5.  Hypersomnolence suggested by a high sleep efficiency and a short   sleep latency along with an elevated ESS. DISCUSSION:  This patient has an apnea/hypopnea index of two. Normal is  less than five, leaving this patient in the normal category. There were  rare PVCs, but there was nocturnal hypoxia present. However, based on  the results of this study, the nocturnal hypoxia is not related to sleep  apnea. It should be noted that the average oxygen saturation was  approximately 89%. SUGGESTIONS:  1.  Dr. Tory Raya to discuss the results of study with the patient. 2.  No indication to treat for sleep apnea. 3.  Consider nocturnal oxygen therapy if clinically indicated.         Yaz Geiger MD  Diplomat of Sleep Medicine    D: 03/31/2023 15:10:10       T: 03/31/2023 15:13:04     [email or web address]  Job#: [id number]     Doc#: [id number]    CC:

## 2023-04-02 LAB
ACID FAST STN SPEC QL: NORMAL
BACTERIA FLD AEROBE CULT: ABNORMAL
BACTERIA FLD AEROBE CULT: ABNORMAL
GRAM STN SPEC: ABNORMAL
ORGANISM: ABNORMAL

## 2023-04-03 ENCOUNTER — TELEPHONE (OUTPATIENT)
Dept: ORTHOPEDIC SURGERY | Age: 63
End: 2023-04-03

## 2023-04-03 DIAGNOSIS — M00.061 STAPHYLOCOCCAL ARTHRITIS OF RIGHT KNEE (HCC): Primary | ICD-10-CM

## 2023-04-03 NOTE — TELEPHONE ENCOUNTER
Spoke with patient on the phone today. Discussed positive MRSA findings on her R knee aspiration. Ortho office to call ID to get their recommendations regarding antibiotics. Spoke with Dr Nelli Cardozo, who is recommending escalating care to [facility]. Will fax referral to [facility]. Patient also complaining of back and R hip pain and fluid accumulation at the R hip. I recommended she return to the ED for this and she refused. I discussed this at length with her and she is again refusing going to the emergency department. We can see her again in the ortho office to re-evaluate her R hip, but I explained that we do not treat or evaluate spine in this office and she verbalized understanding.

## 2023-04-03 NOTE — TELEPHONE ENCOUNTER
Called and spoke with Abhijeet Gross MA covering for Dr. Hollie Loomis at this time. Updated Abhijeet Gross on aspiration results and current plane at within the ortho office. Reviewed with Abhijeet Gross that patient is not currently on IV antibiotics and requested their recommendation regarding antibiotics. Abhijeet Gross took office telephone number and will call back after speaking with her provider. Await call back.

## 2023-04-04 ENCOUNTER — HOSPITAL ENCOUNTER (OUTPATIENT)
Dept: ULTRASOUND IMAGING | Age: 63
Discharge: HOME | End: 2023-04-06
Payer: MEDICAID

## 2023-04-04 DIAGNOSIS — E04.2 MULTINODULAR GOITER: ICD-10-CM

## 2023-04-04 LAB
BACTERIA FLD AEROBE CULT: ABNORMAL
BACTERIA FLD AEROBE CULT: ABNORMAL
GRAM STN SPEC: ABNORMAL
ORGANISM: ABNORMAL

## 2023-04-04 PROCEDURE — 76536 US EXAM OF HEAD AND NECK: CPT

## 2023-04-04 NOTE — TELEPHONE ENCOUNTER
Received call from Fonemesh she states that ID will send referral for patient for PICC line placement and home health for antibiotic infusion.      Future Appointments   Date Time Provider Deysi Simms   4/4/2023  1:00 PM SEB US RM 2 SEBZ US SEB Radiolog   4/6/2023 10:45 AM DO KATTY Osman [facility]   4/6/2023 11:30 AM SEY INFUSION SVCS BAY 10 SEYZ INF SER [facility]   5/17/2023 10:30 AM SCHEDULE, AFL PULMONARY FUNCTION [facility] AFL PULMONAR   6/2/2023  2:00 PM SEBZ PFT STRESS LAB ROOM FRANCIA PFT [facility]   9/19/2023  1:45 PM Dannie Hale MD BDM ENDO HP

## 2023-04-05 ENCOUNTER — TELEPHONE (OUTPATIENT)
Dept: ENDOCRINOLOGY | Age: 63
End: 2023-04-05

## 2023-04-05 DIAGNOSIS — M00.061 STAPHYLOCOCCAL ARTHRITIS OF RIGHT KNEE (HCC): Primary | ICD-10-CM

## 2023-04-05 NOTE — TELEPHONE ENCOUNTER
Endocrine staff,   Please notify pt that I have reviewed your recent results. Thyroid US showed very tiny thyroid nodules.  Will continue following with US     Will discus thIs in details at next OV

## 2023-04-06 ENCOUNTER — HOSPITAL ENCOUNTER (OUTPATIENT)
Dept: GENERAL RADIOLOGY | Age: 63
Discharge: HOME | End: 2023-04-08
Payer: MEDICAID

## 2023-04-06 ENCOUNTER — OFFICE VISIT (OUTPATIENT)
Dept: ORTHOPEDIC SURGERY | Age: 63
End: 2023-04-06
Payer: MEDICAID

## 2023-04-06 ENCOUNTER — HOSPITAL ENCOUNTER (OUTPATIENT)
Dept: INFUSION THERAPY | Age: 63
Setting detail: INFUSION SERIES
Discharge: HOME | End: 2023-04-06
Payer: MEDICAID

## 2023-04-06 VITALS — WEIGHT: 230 LBS | HEIGHT: 67 IN | BODY MASS INDEX: 36.1 KG/M2

## 2023-04-06 DIAGNOSIS — L02.415 ABSCESS OF RIGHT THIGH: Primary | ICD-10-CM

## 2023-04-06 DIAGNOSIS — L02.415 ABSCESS OF RIGHT THIGH: ICD-10-CM

## 2023-04-06 DIAGNOSIS — M00.061 STAPHYLOCOCCAL ARTHRITIS OF RIGHT KNEE (HCC): ICD-10-CM

## 2023-04-06 DIAGNOSIS — M25.551 RIGHT HIP PAIN: ICD-10-CM

## 2023-04-06 PROCEDURE — 99213 OFFICE O/P EST LOW 20 MIN: CPT

## 2023-04-06 PROCEDURE — 99024 POSTOP FOLLOW-UP VISIT: CPT | Performed by: PHYSICIAN ASSISTANT

## 2023-04-06 PROCEDURE — C1751 CATH, INF, PER/CENT/MIDLINE: HCPCS

## 2023-04-06 PROCEDURE — 73560 X-RAY EXAM OF KNEE 1 OR 2: CPT

## 2023-04-06 PROCEDURE — 36569 INSJ PICC 5 YR+ W/O IMAGING: CPT

## 2023-04-06 PROCEDURE — 76937 US GUIDE VASCULAR ACCESS: CPT

## 2023-04-06 PROCEDURE — 73502 X-RAY EXAM HIP UNI 2-3 VIEWS: CPT

## 2023-04-06 NOTE — PROGRESS NOTES
hinged brace on. Okay to remove the brace for hygiene, therapy and when sitting around the home. Patient will be set up to resume [facility] therapy. IV antibiotics per ID. Discussed with patient to contact the office if she would like further treatment of her right hip once her right knee infection and treatment is completed in [place]. Electronically signed by RADHA Tejada on 4/6/2023 at 2:34 PM  Note: This report was completed using [employer] voiced recognition software. Every effort has been made to ensure accuracy; however, inadvertent computerized transcription errors may be present.

## 2023-04-06 NOTE — PATIENT INSTRUCTIONS
Patient referred to [facility] [employer] for further evaluation and treatment of her right knee. You were ordered MRI of right hip  by your Orthopedic Provider. If you do not hear from that department please contact: MRI- [phone]    Patient will be contacted regarding MRI results and further treatment options. Right hinged knee brace unlocked 0-50 today. Okay for partial weightbearing right lower extremity with hinged brace on. Okay to remove the brace for hygiene, therapy and when sitting around the home. Patient will be set up to resume [facility] therapy. IV antibiotics per ID. Discussed with patient to contact the office if she would like further treatment of her right hip once her right knee infection and treatment is completed.

## 2023-04-06 NOTE — LETTER
April 6, 2023       [address]  [address]  [place] [address]      Dear Yosi Leon:    Tamar Ortega was seen for a follow-up visit today. Recommend for patient to be excused from jury duty due to an orthopedic condition. If you have any questions or concerns, please don't hesitate to call.     Sincerely,        RADHA Melton

## 2023-04-06 NOTE — PROGRESS NOTES
Chg double lumen picc Placement 4/6/2023    Product number: [id number]   Lot Number: [id number]      Ultrasound: yes   Right Brachial vein:                Upper Arm Circumference: (CM) 48cm    Size:(FR)/GUAGE 5.5fr/40cm    Exposed Length: (CM) 3cm    Internal Length: (CM) 37cm   Cut: (CM) 15cm   Vein Measurement: 0.60cm    Karen Santos RN  4/6/2023  12:15 PM      merlin

## 2023-04-17 ENCOUNTER — TELEPHONE (OUTPATIENT)
Dept: ORTHOPEDIC SURGERY | Age: 63
End: 2023-04-17

## 2023-04-17 NOTE — TELEPHONE ENCOUNTER
Yes, if she thinks she has worsening infx despite abx, would contact infectious disease office for re-evaluation. Might be candida under her pannus.

## 2023-04-17 NOTE — TELEPHONE ENCOUNTER
Patient message states that she has been running a low grade temp for the last week and that she has started having painful lower leg swelling. Patient also reports that she thinks she has a skin infection starting under her stomach fold. Patient has been taking her IV vancomycin antibiotics but is unsure able what she should do about the increased pain and swelling to her lower leg. Requests call back.      Future Appointments   Date Time Provider Deysi Simms   4/19/2023 12:15 PM Roman Tony MD AFLNEOHINFDS AFL NEOH INF   5/17/2023 10:30 AM SCHEDULE, AFL PULMONARY FUNCTION [facility]ulm[facility]hab AFL PULMONAR   6/2/2023  2:00 PM FRANCIA PFT STRESS LAB ROOM FRANCIA PFT [facility]   9/19/2023  1:45 PM Dannie Herndon MD BD ENDO [facility]     Routed

## 2023-04-17 NOTE — TELEPHONE ENCOUNTER
Called patient back and advised that she should contact ID since they are the ones caring for the PICC line and antibiotic. Patient verbalized understanding.

## 2023-04-18 LAB
ACID FAST STN SPEC QL: NORMAL
MYCOBACTERIUM SPEC CULT: NORMAL

## 2023-04-19 NOTE — TELEPHONE ENCOUNTER
Pt lvm stating MRSA flared up in knee, causing pain/swelling of knee & lower leg. Requesting appt asap. Routing to provider for scheduling rec.

## 2023-04-20 NOTE — TELEPHONE ENCOUNTER
It was discussed that her care should be escalated and seen in [place] for her knee. If concerned about worsening infection, she should present to the ED.

## 2023-04-21 ENCOUNTER — TELEPHONE (OUTPATIENT)
Dept: ORTHOPEDIC SURGERY | Age: 63
End: 2023-04-21

## 2023-04-21 NOTE — TELEPHONE ENCOUNTER
Called and spoke with Dr. Octavio Zhou staff who states that they do have a history with patient and will call her after we hang up.

## 2023-04-21 NOTE — TELEPHONE ENCOUNTER
Called Brenda back, no answer. Left telephone message stating that patient has not completed her MRI that was ordered and the she was referred back to her doctor in [facility] [place] and is to follow with their office. Her case is now out of our scope and needs to be returned to the original surgeon.      Future Appointments   Date Time Provider Deysi Simms   5/10/2023 12:15 PM Judith Car MD AFLNEOHINFDS [facility] NEOH INF   5/17/2023 10:30 AM SCHEDULE, AFL PULMONARY FUNCTION [facility] AFL PULMONAR   6/2/2023  2:00 PM FRANCIA PFT STRESS LAB ROOM FRANCIA PFT [facility]   9/19/2023  1:45 PM Dannie Orlando MD BD ENDO [facility]

## 2023-04-21 NOTE — TELEPHONE ENCOUNTER
Patient called and states that she is unable to get MRI because of neuro stimulator in her back. Patient also states that she has not heard from the [facility] [employer] [facility] clinic and asks that this office call and set up an appointment for her and call her back.

## 2023-04-21 NOTE — TELEPHONE ENCOUNTER
Brenda's message states that patient needs to make an appointment with Dr. Stephanie Castro for follow up and requests that we call the patient to schedule.

## 2023-04-21 NOTE — TELEPHONE ENCOUNTER
Call to pt advised per provider review- It was discussed that her care should be escalated and seen in [place] for her knee. If concerned about worsening infection, she should present to the ED. Pt stated it is now in her hip and lower leg also. She is concerned about going to the ED d/t how long it takes and she will miss a dose of her medication. (Gisell Choe). She would like a call back from the clinical staff. Routing to clinical staff.

## 2023-04-25 LAB
ACID FAST STN SPEC QL: NORMAL
MYCOBACTERIUM SPEC CULT: NORMAL

## 2023-04-28 RX ORDER — CELECOXIB 100 MG/1
100 CAPSULE ORAL 2 TIMES DAILY
Qty: 60 CAPSULE | Refills: 3 | Status: SHIPPED | OUTPATIENT
Start: 2023-04-28

## 2023-04-28 NOTE — TELEPHONE ENCOUNTER
Received call from patient requesting refill of Celebrex 100mg BID at [facility]. OP:SURGEON: Dr. Honor Cheadle,   DATE OF PROCEDURE: 1-25-23  PROCEDURE: 1. Right knee explantation with insertion of articulating antibiotic cement spacer  2. Removal right distal femur plate and screw construct  3. Right hip aspiration with fluoroscopic guidance    Last OV: 4/6/2023    Medication pended and routed to providers for decision and signature.     Future Appointments   Date Time Provider Deysi Simms   5/10/2023 12:15 PM Sreedhar Sebastian MD AFLNEOHINFDS [facility] NEOH INF   5/17/2023 10:30 AM SCHEDULE, AFL PULMONARY FUNCTION [facility]hab AFL PULMONAR   6/2/2023  2:00 PM FRANCIA PFT STRESS LAB ROOM FRANCIA PFT [facility]   9/19/2023  1:45 PM Dannie Oh MD BD ENDO HP

## 2023-05-02 LAB
ACID FAST STN SPEC QL: NORMAL
MYCOBACTERIUM SPEC CULT: NORMAL

## 2023-05-09 LAB
ACID FAST STN SPEC QL: NORMAL
MYCOBACTERIUM SPEC CULT: NORMAL

## 2023-05-12 ENCOUNTER — TELEPHONE (OUTPATIENT)
Dept: ORTHOPEDIC SURGERY | Age: 63
End: 2023-05-12

## 2023-05-16 LAB
ACID FAST STN SPEC QL: NORMAL
MYCOBACTERIUM SPEC CULT: NORMAL

## 2023-05-23 ENCOUNTER — TELEPHONE (OUTPATIENT)
Dept: ORTHOPEDIC SURGERY | Age: 63
End: 2023-05-23

## 2023-05-23 NOTE — TELEPHONE ENCOUNTER
Patient message states that she was seen by Dr. Ralph Encarnacion @ [facility] and that he wants patient to get an aspiration to determine surgery location. Patient states that Dr. Ralph Encarnacion wants patient to have aspiration completed at [facility]. Patient requesting call back to schedule aspiration.      Future Appointments   Date Time Provider Deysi Simms   6/2/2023  2:00 PM FRANCIA PFT STRESS LAB ROOM FRANCIA PFJAZMYN [facility]   6/7/2023 10:45 AM ADEEL Garner CNP [facility]   9/19/2023  1:45 PM [facility]   11/22/2023 10:40 AM ADEEL Schroeder CNP [facility] AFL PULMONAR

## 2023-05-23 NOTE — TELEPHONE ENCOUNTER
Called patient back, no answer. Left message advised patient that Dr. Humberto Bumpers should have an order sent to the interventional radiology department for patient's aspiration order. Advised that Dr. Trae Shah office would not be involved in aspiration unless Dr. Jacinto Sorto office contacted requesting, which he has not. Advised patient to call with questions.      Future Appointments   Date Time Provider Deysi Simms   6/2/2023  2:00 PM FRANCIA PFT STRESS LAB ROOM FRANCIA ROGEL [facility]   6/7/2023 10:45 AM ADEEL Bustillos CNP VASC/MED [facility]   9/19/2023  1:45 PM Dannie Valdez MD BD ENDO [facility]   11/22/2023 10:40 AM ADEEL Alicea CNP [facility] AFL PULMONAR

## 2023-06-07 ENCOUNTER — OFFICE VISIT (OUTPATIENT)
Dept: VASCULAR SURGERY | Age: 63
End: 2023-06-07
Payer: MEDICAID

## 2023-06-07 VITALS — BODY MASS INDEX: 39.24 KG/M2 | HEIGHT: 67 IN | WEIGHT: 250 LBS

## 2023-06-07 DIAGNOSIS — R60.0 BILATERAL LOWER EXTREMITY EDEMA: ICD-10-CM

## 2023-06-07 PROCEDURE — 99213 OFFICE O/P EST LOW 20 MIN: CPT

## 2023-06-07 NOTE — PROGRESS NOTES
an above knee amputation on the right side at the [facility]. She will call us as needed.     ADEEL Herrera - CNP

## 2023-07-19 ENCOUNTER — APPOINTMENT (OUTPATIENT)
Dept: GENERAL RADIOLOGY | Age: 63
End: 2023-07-19
Payer: MEDICAID

## 2023-07-19 ENCOUNTER — HOSPITAL ENCOUNTER (EMERGENCY)
Age: 63
Discharge: HOME | End: 2023-07-20
Attending: EMERGENCY MEDICINE
Payer: MEDICAID
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
## 2023-07-19 DIAGNOSIS — M25.571 ACUTE RIGHT ANKLE PAIN: Primary | ICD-10-CM

## 2023-07-19 LAB
ALBUMIN SERPL-MCNC: 4.1 G/DL (ref 3.5–5.2)
ALP SERPL-CCNC: 135 U/L (ref 35–104)
ALT SERPL-CCNC: 13 U/L (ref 0–32)
ANION GAP SERPL CALCULATED.3IONS-SCNC: 11 MMOL/L (ref 7–16)
AST SERPL-CCNC: 19 U/L (ref 0–31)
BASOPHILS # BLD: 0.03 K/UL (ref 0–0.2)
BASOPHILS NFR BLD: 0 % (ref 0–2)
BILIRUB SERPL-MCNC: 0.2 MG/DL (ref 0–1.2)
BUN SERPL-MCNC: 27 MG/DL (ref 6–23)
CALCIUM SERPL-MCNC: 9.1 MG/DL (ref 8.6–10.2)
CHLORIDE SERPL-SCNC: 103 MMOL/L (ref 98–107)
CO2 SERPL-SCNC: 24 MMOL/L (ref 22–29)
CREAT SERPL-MCNC: 1.2 MG/DL (ref 0.5–1)
EOSINOPHIL # BLD: 0.12 K/UL (ref 0.05–0.5)
EOSINOPHILS RELATIVE PERCENT: 1 % (ref 0–6)
ERYTHROCYTE [DISTWIDTH] IN BLOOD BY AUTOMATED COUNT: 15.3 % (ref 11.5–15)
GFR SERPL CREATININE-BSD FRML MDRD: 50 ML/MIN/1.73M2
GLUCOSE SERPL-MCNC: 97 MG/DL (ref 74–99)
HCT VFR BLD AUTO: 46.7 % (ref 34–48)
HGB BLD-MCNC: 14.7 G/DL (ref 11.5–15.5)
IMM GRANULOCYTES # BLD AUTO: 0.05 K/UL (ref 0–0.58)
IMM GRANULOCYTES NFR BLD: 1 % (ref 0–5)
INR PPP: 1
LYMPHOCYTES NFR BLD: 1.49 K/UL (ref 1.5–4)
LYMPHOCYTES RELATIVE PERCENT: 15 % (ref 20–42)
MCH RBC QN AUTO: 30.8 PG (ref 26–35)
MCHC RBC AUTO-ENTMCNC: 31.5 G/DL (ref 32–34.5)
MCV RBC AUTO: 97.7 FL (ref 80–99.9)
MONOCYTES NFR BLD: 0.79 K/UL (ref 0.1–0.95)
MONOCYTES NFR BLD: 8 % (ref 2–12)
NEUTROPHILS NFR BLD: 74 % (ref 43–80)
NEUTS SEG NFR BLD: 7.19 K/UL (ref 1.8–7.3)
PARTIAL THROMBOPLASTIN TIME: 30.4 SEC (ref 24.5–35.1)
PLATELET # BLD AUTO: 230 K/UL (ref 130–450)
PMV BLD AUTO: 8.8 FL (ref 7–12)
POTASSIUM SERPL-SCNC: 4.6 MMOL/L (ref 3.5–5)
PROT SERPL-MCNC: 6.7 G/DL (ref 6.4–8.3)
PROTHROMBIN TIME: 10.9 SEC (ref 9.3–12.4)
RBC # BLD AUTO: 4.78 M/UL (ref 3.5–5.5)
SODIUM SERPL-SCNC: 138 MMOL/L (ref 132–146)
WBC OTHER # BLD: 9.7 K/UL (ref 4.5–11.5)

## 2023-07-19 PROCEDURE — 85730 THROMBOPLASTIN TIME PARTIAL: CPT

## 2023-07-19 PROCEDURE — 96374 THER/PROPH/DIAG INJ IV PUSH: CPT

## 2023-07-19 PROCEDURE — 73562 X-RAY EXAM OF KNEE 3: CPT

## 2023-07-19 PROCEDURE — 85610 PROTHROMBIN TIME: CPT

## 2023-07-19 PROCEDURE — 99284 EMERGENCY DEPT VISIT MOD MDM: CPT

## 2023-07-19 PROCEDURE — 96376 TX/PRO/DX INJ SAME DRUG ADON: CPT

## 2023-07-19 PROCEDURE — 6360000002 HC RX W HCPCS: Performed by: EMERGENCY MEDICINE

## 2023-07-19 PROCEDURE — 73610 X-RAY EXAM OF ANKLE: CPT

## 2023-07-19 PROCEDURE — 96375 TX/PRO/DX INJ NEW DRUG ADDON: CPT

## 2023-07-19 PROCEDURE — 80053 COMPREHEN METABOLIC PANEL: CPT

## 2023-07-19 PROCEDURE — 85027 COMPLETE CBC AUTOMATED: CPT

## 2023-07-19 PROCEDURE — 73590 X-RAY EXAM OF LOWER LEG: CPT

## 2023-07-19 RX ORDER — FENTANYL CITRATE 50 UG/ML
50 INJECTION, SOLUTION INTRAMUSCULAR; INTRAVENOUS ONCE
Status: COMPLETED | OUTPATIENT
Start: 2023-07-19 | End: 2023-07-19

## 2023-07-19 RX ORDER — MORPHINE SULFATE 4 MG/ML
4 INJECTION, SOLUTION INTRAMUSCULAR; INTRAVENOUS ONCE
Status: COMPLETED | OUTPATIENT
Start: 2023-07-19 | End: 2023-07-19

## 2023-07-19 RX ADMIN — MORPHINE SULFATE 4 MG: 4 INJECTION, SOLUTION INTRAMUSCULAR; INTRAVENOUS at 23:36

## 2023-07-19 RX ADMIN — FENTANYL CITRATE 50 MCG: 50 INJECTION INTRAMUSCULAR; INTRAVENOUS at 22:08

## 2023-07-19 ASSESSMENT — PAIN - FUNCTIONAL ASSESSMENT: PAIN_FUNCTIONAL_ASSESSMENT: 0-10

## 2023-07-19 ASSESSMENT — PAIN SCALES - GENERAL: PAINLEVEL_OUTOF10: 10

## 2023-07-20 VITALS
BODY MASS INDEX: 37.35 KG/M2 | WEIGHT: 238 LBS | HEIGHT: 67 IN | SYSTOLIC BLOOD PRESSURE: 165 MMHG | OXYGEN SATURATION: 96 % | TEMPERATURE: 98.7 F | HEART RATE: 73 BPM | RESPIRATION RATE: 15 BRPM | DIASTOLIC BLOOD PRESSURE: 78 MMHG

## 2023-07-20 PROCEDURE — 2500000003 HC RX 250 WO HCPCS: Performed by: EMERGENCY MEDICINE

## 2023-07-20 PROCEDURE — 2580000003 HC RX 258: Performed by: EMERGENCY MEDICINE

## 2023-07-20 RX ORDER — HYDROMORPHONE HYDROCHLORIDE 1 MG/ML
0.5 INJECTION, SOLUTION INTRAMUSCULAR; INTRAVENOUS; SUBCUTANEOUS ONCE
Status: COMPLETED | OUTPATIENT
Start: 2023-07-20 | End: 2023-07-20

## 2023-07-20 RX ORDER — 0.9 % SODIUM CHLORIDE 0.9 %
1000 INTRAVENOUS SOLUTION INTRAVENOUS ONCE
Status: COMPLETED | OUTPATIENT
Start: 2023-07-20 | End: 2023-07-20

## 2023-07-20 RX ADMIN — HYDROMORPHONE HYDROCHLORIDE 0.5 MG: 1 INJECTION, SOLUTION INTRAMUSCULAR; INTRAVENOUS; SUBCUTANEOUS at 05:17

## 2023-07-20 RX ADMIN — SODIUM CHLORIDE 1000 ML: 9 INJECTION, SOLUTION INTRAVENOUS at 05:11

## 2023-07-20 RX ADMIN — HYDROMORPHONE HYDROCHLORIDE 0.5 MG: 1 INJECTION, SOLUTION INTRAMUSCULAR; INTRAVENOUS; SUBCUTANEOUS at 03:10

## 2023-07-20 NOTE — CONSULTS
Department of Orthopedic Surgery  Resident Consult Note    Reason for Consult: Right leg pain    HISTORY OF PRESENT ILLNESS:       Patient is a 58 y.o. female with significant past orthopedic surgical history. This patient is s/p right pilon ORIF, right fibula ORIF, and right TKA explant with antibiotic spacer placement. Earlier today she was loading onto a transportation [employer] and her electric wheelchair. The ramp was deep so she got a running start at the ramp she was unable to stop before hitting something inside the Anahy. She notes that her right lower extremity \"jammed backwards\". She noted immediate pain about the right lower extremity and was unable to tolerate weightbearing after the incident. She notes that she was able to weight-bear prior to the incident today. Denies numbness/tingling/paresthesias. Denies any other orthopedic complaints at this time.       Past Medical History:        Diagnosis Date    Adenoma of right adrenal gland     Anemia     Anxiety     Arthritis     spinal    Asthma     controlled with inhalers     Benign essential tremor     Bipolar affective ([address])     Chronic back pain     Spinal cord stimulator in place    Chronic respiratory failure with hypoxia (HCC)     at times dt diaphragm does not function properly dt Mitochondrial disorder    Depression     stable    Diabetes mellitus ([address])     Difficulty swallowing     for EGD 10-8-19     Environmental and seasonal allergies     Excessive physiologic tremor 5/24/2021    Fatty liver     Full dentures     GERD (gastroesophageal reflux disease)     Gout     past hx of    Herniated cervical disc     limited rom of head and neck    History of swelling of feet     Hypertension     Lymphedema of lower extremity 09/06/2012    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 Janee Mcmullen [facility]    Muscle weakness (generalized)     Information obtained from [facility]    Need for assistance with personal care     Information obtained from [facility]    Neuropathy     at feet    Obesity     PETR (obstructive sleep apnea)     no CPAP dt insurance will not pay for    Osteoarthritis of left knee     Information obtained from [facility]    PONV (postoperative nausea and vomiting)     Seizures ([address])     last approx 6-13-19- f/u w/ PCP  Granmal, flares up in heat/ summer time - no recent issues as of 10-4-19     Spinal headache     Thyroid disease      Past Surgical History:        Procedure Laterality Date    ANKLE FRACTURE SURGERY Right 2/14/2022    RIGHT ANKLE IRRIGAITON AND DEBRIDEMENT WITH EXTERNAL FIXATOR AND LACERATION REPAIR performed by Lashell Walter MD at [address] Right 3/2/2022    RIGHT LOWER EXTREMITY REMOVAL EXTERNAL FIXATOR, RIGHT PILON OPEN REDUCTION INTERNAL FIXATOR--SYNTHES (FACILITY) performed by Yonathan Arechiga DO at [address] [facility]      with Hysterectomy / unknown    BACK SURGERY  last one 1995    lumbar x 2    CARDIAC CATHETERIZATION Right 6-6-2013    Dr. Nithya Rodas Radial, no stents placed, no blockages     CHOLECYSTECTOMY  1999    open with gastric bypass    COLONOSCOPY N/A 9/18/2018    COLONOSCOPY POLYPECTOMY HOT BIOPSY performed by CHRISTOPHE Chairez MD at [address] N/A 10/8/2019    COLONOSCOPY POLYPECTOMY SNARE/COLD BIOPSY performed by Cal Archer MD at [address] [facility]    EGD COLONOSCOPY N/A 10/8/2019    EGD ESOPHAGOGASTRODUODENOSCOPY DILATATION performed by Cal Archer MD at [address] [facility], COLON, DIAGNOSTIC      ESOPHAGEAL DILATATION  9/18/2018    ESOPHAGEAL DILATION Marrie Kugel performed by Cal Archer MD at [address] [facility] (CERVIX STATUS UNKNOWN)  1988    IR AIR ABS HEMATOMA BULLA CYST  3/30/2023    IR AIR ABS HEMATOMA BULLA CYST 3/30/2023 RADHA Tee SPECIAL PROCEDURES    JOINT REPLACEMENT

## 2023-07-20 NOTE — ED PROVIDER NOTES
[facility]        Pt Name: Rebecca Betancourt  MRN: [id number]  [address] 1960  Date of evaluation: 7/19/2023  Provider: Kathy Ga DO  PCP: Sveta Bruce MD  Note Started: 9:48 PM EDT 7/19/23    CHIEF COMPLAINT       Chief Complaint   Patient presents with    Ankle Pain     Pt c/o R ankle injury, pt states she heard a crack in her ankle. HISTORY OF PRESENT ILLNESS: 1 or more Elements   History From: Patient and EMS    Limitations to history : None    Rebecca Betancourt is a 58 y.o. female who presents after a fall. She says that she was wheeling her electric wheelchair up a hill, the wheels crumpled and she fell out of it with the foot pedal twisting over her right ankle. She says that she did not hurt anything else in the fall. Did not hit her head constant is conscious, is not having chest pain or difficulty breathing, no abdominal pain or hip or knee pain, her only complaint is pain in her right ankle. She has not received anything for pain yet. No other associated complaints. Nursing Notes were all reviewed and agreed with or any disagreements were addressed in the HPI. REVIEW OF SYSTEMS :      Positives and Pertinent negatives as per HPI.      SURGICAL HISTORY     Past Surgical History:   Procedure Laterality Date    ANKLE FRACTURE SURGERY Right 2/14/2022    RIGHT ANKLE IRRIGAITON AND DEBRIDEMENT WITH EXTERNAL FIXATOR AND LACERATION REPAIR performed by Pedro Delgadillo MD at [address] Right 3/2/2022    RIGHT LOWER EXTREMITY REMOVAL EXTERNAL FIXATOR, RIGHT PILON OPEN REDUCTION INTERNAL FIXATOR--SYNTHES (FACILITY) performed by Irma Laboy DO at [address] [place] [address]      with Hysterectomy / unknown    BACK SURGERY  last one 1995    lumbar x 2    CARDIAC CATHETERIZATION Right 6-6-2013    Dr. Bere Galarza Radial, no stents placed, no blockages XR TIBIA FIBULA RIGHT (2 VIEWS)   Final Result   Osteopenia and extensive orthopedic instrumentation which appears intact as   further outlined above. No acute fracture detected. RECOMMENDATION:   Careful clinical correlation and follow up recommended. XR ANKLE RIGHT (MIN 3 VIEWS)   Final Result   Osteopenia and extensive orthopedic instrumentation which appears intact as   further outlined above. No acute fracture detected. RECOMMENDATION:   Careful clinical correlation and follow up recommended. No results found. No results found. Is this patient to be included in the SEP-1 core measure due to severe sepsis or septic shock? No Exclusion criteria - the patient is NOT to be included for SEP-1 Core Measure due to: Infection is not suspected    PROCEDURES   Unless otherwise noted below, none    PAST MEDICAL HISTORY/Chronic Conditions Affecting Care      has a past medical history of Adenoma of right adrenal gland, Anemia, Anxiety, Arthritis, Asthma, Benign essential tremor, Bipolar affective (HCC), Chronic back pain, Chronic respiratory failure with hypoxia ([address]), Depression, Diabetes mellitus ([address]), Difficulty swallowing, Environmental and seasonal allergies, Excessive physiologic tremor (5/24/2021), Fatty liver, Full dentures, GERD (gastroesophageal reflux disease), Gout, Herniated cervical disc, History of swelling of feet, Hypertension, Lymphedema of lower extremity (09/06/2012), 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      EMERGENCY DEPARTMENT COURSE    Vitals:    Vitals:    07/19/23 2337 07/20/23 0310 07/20/23 0330 07/20/23 0403   BP: (!) 159/89 (!) 149/89 (!) 151/91 (!) 152/96   Pulse:       Resp:       Temp:       SpO2: 91% 97% 96%

## 2023-08-02 ENCOUNTER — HOSPITAL ENCOUNTER (OUTPATIENT)
Age: 63
Discharge: HOME | End: 2023-08-02
Payer: MEDICAID

## 2023-08-02 LAB
ALBUMIN SERPL-MCNC: 3.9 G/DL (ref 3.5–5.2)
ALP SERPL-CCNC: 143 U/L (ref 35–104)
ALT SERPL-CCNC: 12 U/L (ref 0–32)
ANION GAP SERPL CALCULATED.3IONS-SCNC: 10 MMOL/L (ref 7–16)
AST SERPL-CCNC: 14 U/L (ref 0–31)
BASOPHILS # BLD: 0.02 K/UL (ref 0–0.2)
BASOPHILS NFR BLD: 1 % (ref 0–2)
BILIRUB SERPL-MCNC: 0.2 MG/DL (ref 0–1.2)
BUN SERPL-MCNC: 20 MG/DL (ref 6–23)
CALCIUM SERPL-MCNC: 9.3 MG/DL (ref 8.6–10.2)
CHLORIDE SERPL-SCNC: 104 MMOL/L (ref 98–107)
CO2 SERPL-SCNC: 29 MMOL/L (ref 22–29)
CREAT SERPL-MCNC: 0.7 MG/DL (ref 0.5–1)
EOSINOPHIL # BLD: 0.08 K/UL (ref 0.05–0.5)
EOSINOPHILS RELATIVE PERCENT: 2 % (ref 0–6)
ERYTHROCYTE [DISTWIDTH] IN BLOOD BY AUTOMATED COUNT: 14.4 % (ref 11.5–15)
FERRITIN SERPL-MCNC: 57 NG/ML
GFR SERPL CREATININE-BSD FRML MDRD: >60 ML/MIN/1.73M2
GLUCOSE SERPL-MCNC: 86 MG/DL (ref 74–99)
HCT VFR BLD AUTO: 46.1 % (ref 34–48)
HGB BLD-MCNC: 14.7 G/DL (ref 11.5–15.5)
IMM GRANULOCYTES # BLD AUTO: 0.03 K/UL (ref 0–0.58)
IMM GRANULOCYTES NFR BLD: 1 % (ref 0–5)
IRON SATN MFR SERPL: 27 % (ref 15–50)
IRON SERPL-MCNC: 83 UG/DL (ref 37–145)
LYMPHOCYTES NFR BLD: 1.11 K/UL (ref 1.5–4)
LYMPHOCYTES RELATIVE PERCENT: 26 % (ref 20–42)
MCH RBC QN AUTO: 31.2 PG (ref 26–35)
MCHC RBC AUTO-ENTMCNC: 31.9 G/DL (ref 32–34.5)
MCV RBC AUTO: 97.9 FL (ref 80–99.9)
MONOCYTES NFR BLD: 0.33 K/UL (ref 0.1–0.95)
MONOCYTES NFR BLD: 8 % (ref 2–12)
NEUTROPHILS NFR BLD: 63 % (ref 43–80)
NEUTS SEG NFR BLD: 2.66 K/UL (ref 1.8–7.3)
PLATELET # BLD AUTO: 215 K/UL (ref 130–450)
PMV BLD AUTO: 8.5 FL (ref 7–12)
POTASSIUM SERPL-SCNC: 4.4 MMOL/L (ref 3.5–5)
PROT SERPL-MCNC: 6.6 G/DL (ref 6.4–8.3)
RBC # BLD AUTO: 4.71 M/UL (ref 3.5–5.5)
SODIUM SERPL-SCNC: 143 MMOL/L (ref 132–146)
TIBC SERPL-MCNC: 310 UG/DL (ref 250–450)
WBC OTHER # BLD: 4.2 K/UL (ref 4.5–11.5)

## 2023-08-02 PROCEDURE — 80053 COMPREHEN METABOLIC PANEL: CPT

## 2023-08-02 PROCEDURE — 83550 IRON BINDING TEST: CPT

## 2023-08-02 PROCEDURE — 83540 ASSAY OF IRON: CPT

## 2023-08-02 PROCEDURE — 85025 COMPLETE CBC W/AUTO DIFF WBC: CPT

## 2023-08-02 PROCEDURE — 82728 ASSAY OF FERRITIN: CPT

## 2023-08-03 ENCOUNTER — HOSPITAL ENCOUNTER (OUTPATIENT)
Dept: GENERAL RADIOLOGY | Age: 63
Discharge: HOME | End: 2023-08-05
Payer: MEDICAID

## 2023-08-03 ENCOUNTER — OFFICE VISIT (OUTPATIENT)
Dept: ORTHOPEDIC SURGERY | Age: 63
End: 2023-08-03
Payer: MEDICAID

## 2023-08-03 VITALS — BODY MASS INDEX: 37.35 KG/M2 | HEIGHT: 67 IN | WEIGHT: 238 LBS

## 2023-08-03 DIAGNOSIS — M79.671 RIGHT FOOT PAIN: Primary | ICD-10-CM

## 2023-08-03 DIAGNOSIS — M79.671 RIGHT FOOT PAIN: ICD-10-CM

## 2023-08-03 PROCEDURE — 73630 X-RAY EXAM OF FOOT: CPT

## 2023-08-03 RX ORDER — DULOXETIN HYDROCHLORIDE 30 MG/1
30 CAPSULE, DELAYED RELEASE ORAL 2 TIMES DAILY
COMMUNITY

## 2023-08-03 RX ORDER — CELECOXIB 200 MG/1
200 CAPSULE ORAL 2 TIMES DAILY
Qty: 60 CAPSULE | Refills: 0 | Status: SHIPPED | OUTPATIENT
Start: 2023-08-03

## 2023-08-03 RX ORDER — PANCRELIPASE LIPASE, PANCRELIPASE PROTEASE, PANCRELIPASE AMYLASE 10000; 32000; 42000 [USP'U]/1; [USP'U]/1; [USP'U]/1
2 CAPSULE, DELAYED RELEASE ORAL
COMMUNITY

## 2023-08-03 RX ORDER — UBIDECARENONE 75 MG
50 CAPSULE ORAL
COMMUNITY

## 2023-08-09 RX ORDER — DIMENHYDRINATE 50 MG
TABLET ORAL
COMMUNITY
Start: 2023-07-27

## 2023-08-09 RX ORDER — OMEPRAZOLE 20 MG/1
CAPSULE, DELAYED RELEASE ORAL
COMMUNITY
Start: 2023-07-20

## 2023-08-09 RX ORDER — DOXYCYCLINE HYCLATE 100 MG/1
CAPSULE ORAL
COMMUNITY
Start: 2023-07-10 | End: 2023-08-09

## 2023-08-09 RX ORDER — METOPROLOL SUCCINATE 50 MG/1
TABLET, EXTENDED RELEASE ORAL
COMMUNITY
Start: 2023-07-07

## 2023-08-09 RX ORDER — OMEGA-3-ACID ETHYL ESTERS 1 G/1
CAPSULE, LIQUID FILLED ORAL
COMMUNITY
Start: 2023-07-25

## 2023-08-09 RX ORDER — B-COMPLEX WITH VITAMIN C
1 TABLET ORAL 2 TIMES DAILY
COMMUNITY

## 2023-08-09 RX ORDER — AMOXICILLIN 250 MG
CAPSULE ORAL
COMMUNITY
Start: 2017-09-16

## 2023-08-09 RX ORDER — BACLOFEN 20 MG/1
TABLET ORAL
COMMUNITY
Start: 2023-07-28

## 2023-08-09 RX ORDER — BUSPIRONE HYDROCHLORIDE 15 MG/1
TABLET ORAL
COMMUNITY
Start: 2023-07-26

## 2023-08-09 NOTE — PROGRESS NOTES
Dr. Maria Elena Wyman informed that  pt needs to take supplements up to day of procedure due to pre existing condition.

## 2023-08-09 NOTE — PROGRESS NOTES
[address] [employer] PRE-ADMISSION TESTING INSTRUCTIONS    The Preadmission Testing patient is instructed accordingly using the following criteria (check applicable):    ARRIVAL INSTRUCTIONS:  [x] Parking the day of Surgery is located in the Main Entrance lot. Upon entering the door, make an immediate right to the surgery reception desk    [x] Bring photo ID and insurance card    [] Bring in a copy of Living will or Durable Power of  papers. [x] Please be sure to arrange for responsible adult to provide transportation to and from the hospital    [x] Please arrange for responsible adult to be with you for the 24 hour period post procedure due to having anesthesia    [x] If you awake am of surgery not feeling well or have temperature >100 please call [phone]    GENERAL INSTRUCTIONS:    [x] Nothing by mouth after midnight, including gum, candy, mints or water    [x] You may brush your teeth, but do not swallow any water    [x] Take medications as instructed with 1-2 oz of water    [] Stop herbal supplements and vitamins 5 days prior to procedure    [] Follow preop dosing of blood thinners per physician instructions    [] Take 1/2 dose of evening insulin, but no insulin after midnight    [] No oral diabetic medications after midnight    [x] If diabetic and have low blood sugar or feel symptomatic, take 1-2oz apple juice only    [x] Bring inhalers day of surgery    [] Bring C-PAP/ Bi-Pap day of surgery    [] Bring urine specimen day of surgery    [x] Shower or bath with soap, lather and rinse well, AM of Surgery, no lotion, powders or creams to surgical site    [x] Follow bowel prep as instructed per surgeon    [] No tobacco products within 24 hours of surgery     [] No alcohol or illegal drug use within 24 hours of surgery.     [x] Jewelry, body piercing's, eyeglasses, contact lenses and dentures are not permitted into surgery (bring cases)      [x] Please do not wear any nail polish,

## 2023-08-14 NOTE — PROGRESS NOTES
Patient stated she may have difficulty with having a responsible adult accompany her on the transportation arrangements she has made for after endoscopy procedure. Policy explained to patient that he must have responsible adult (at least 25 years or older) to accompany her and that someone needs to be with patient at home for 24 hours following anesthesia. Spoke to Constantino Thomas at Dr WAGGONER Located within [facility] office requesting they reach out to patient and confirm arrangements have been made.

## 2023-08-15 ENCOUNTER — ANESTHESIA (OUTPATIENT)
Dept: ENDOSCOPY | Age: 63
End: 2023-08-15
Payer: MEDICAID

## 2023-08-15 ENCOUNTER — ANESTHESIA EVENT (OUTPATIENT)
Dept: ENDOSCOPY | Age: 63
End: 2023-08-15
Payer: MEDICAID

## 2023-08-15 ENCOUNTER — HOSPITAL ENCOUNTER (OUTPATIENT)
Age: 63
Setting detail: OUTPATIENT SURGERY
Discharge: HOME | End: 2023-08-15
Attending: INTERNAL MEDICINE | Admitting: INTERNAL MEDICINE
Payer: MEDICAID

## 2023-08-15 VITALS
SYSTOLIC BLOOD PRESSURE: 136 MMHG | TEMPERATURE: 97 F | WEIGHT: 240 LBS | HEART RATE: 71 BPM | OXYGEN SATURATION: 96 % | RESPIRATION RATE: 16 BRPM | HEIGHT: 67 IN | DIASTOLIC BLOOD PRESSURE: 93 MMHG | BODY MASS INDEX: 37.67 KG/M2

## 2023-08-15 DIAGNOSIS — R19.4 CHANGE IN BOWEL HABITS: ICD-10-CM

## 2023-08-15 DIAGNOSIS — R10.9 ABDOMINAL PAIN, UNSPECIFIED ABDOMINAL LOCATION: ICD-10-CM

## 2023-08-15 LAB — GLUCOSE BLD-MCNC: 90 MG/DL (ref 74–99)

## 2023-08-15 PROCEDURE — 7100000011 HC PHASE II RECOVERY - ADDTL 15 MIN: Performed by: INTERNAL MEDICINE

## 2023-08-15 PROCEDURE — 2500000003 HC RX 250 WO HCPCS

## 2023-08-15 PROCEDURE — 82962 GLUCOSE BLOOD TEST: CPT

## 2023-08-15 PROCEDURE — 2580000003 HC RX 258: Performed by: INTERNAL MEDICINE

## 2023-08-15 PROCEDURE — 88305 TISSUE EXAM BY PATHOLOGIST: CPT

## 2023-08-15 PROCEDURE — 3700000000 HC ANESTHESIA ATTENDED CARE: Performed by: INTERNAL MEDICINE

## 2023-08-15 PROCEDURE — 7100000010 HC PHASE II RECOVERY - FIRST 15 MIN: Performed by: INTERNAL MEDICINE

## 2023-08-15 PROCEDURE — 2709999900 HC NON-CHARGEABLE SUPPLY: Performed by: INTERNAL MEDICINE

## 2023-08-15 PROCEDURE — 3700000001 HC ADD 15 MINUTES (ANESTHESIA): Performed by: INTERNAL MEDICINE

## 2023-08-15 PROCEDURE — 3609010600 HC COLONOSCOPY POLYPECTOMY SNARE/COLD BIOPSY: Performed by: INTERNAL MEDICINE

## 2023-08-15 PROCEDURE — 6360000002 HC RX W HCPCS

## 2023-08-15 PROCEDURE — 3609017700 HC EGD DILATION GASTRIC/DUODENAL STRICTURE: Performed by: INTERNAL MEDICINE

## 2023-08-15 RX ORDER — LIDOCAINE HYDROCHLORIDE 20 MG/ML
INJECTION, SOLUTION EPIDURAL; INFILTRATION; INTRACAUDAL; PERINEURAL PRN
Status: DISCONTINUED | OUTPATIENT
Start: 2023-08-15 | End: 2023-08-15 | Stop reason: SDUPTHER

## 2023-08-15 RX ORDER — GLYCOPYRROLATE 0.2 MG/ML
INJECTION INTRAMUSCULAR; INTRAVENOUS PRN
Status: DISCONTINUED | OUTPATIENT
Start: 2023-08-15 | End: 2023-08-15 | Stop reason: SDUPTHER

## 2023-08-15 RX ORDER — PROPOFOL 10 MG/ML
INJECTION, EMULSION INTRAVENOUS PRN
Status: DISCONTINUED | OUTPATIENT
Start: 2023-08-15 | End: 2023-08-15 | Stop reason: SDUPTHER

## 2023-08-15 RX ORDER — SODIUM CHLORIDE 9 MG/ML
INJECTION, SOLUTION INTRAVENOUS CONTINUOUS
Status: DISCONTINUED | OUTPATIENT
Start: 2023-08-15 | End: 2023-08-15 | Stop reason: HOSPADM

## 2023-08-15 RX ADMIN — LIDOCAINE HYDROCHLORIDE 80 MG: 20 INJECTION, SOLUTION EPIDURAL; INFILTRATION; INTRACAUDAL; PERINEURAL at 13:49

## 2023-08-15 RX ADMIN — SODIUM CHLORIDE: 9 INJECTION, SOLUTION INTRAVENOUS at 13:49

## 2023-08-15 RX ADMIN — GLYCOPYRROLATE 0.2 MG: 0.2 INJECTION INTRAMUSCULAR; INTRAVENOUS at 13:49

## 2023-08-15 RX ADMIN — PROPOFOL 1000 MG: 10 INJECTION, EMULSION INTRAVENOUS at 13:49

## 2023-08-15 ASSESSMENT — PAIN SCALES - GENERAL
PAINLEVEL_OUTOF10: 0
PAINLEVEL_OUTOF10: 0

## 2023-08-15 ASSESSMENT — ENCOUNTER SYMPTOMS: SHORTNESS OF BREATH: 1

## 2023-08-15 ASSESSMENT — LIFESTYLE VARIABLES: SMOKING_STATUS: 1

## 2023-08-15 NOTE — ANESTHESIA POSTPROCEDURE EVALUATION
Department of Anesthesiology  Postprocedure Note    Patient: Tiffanie Neves  MRN: [id number]  YOB: 1960  Date of evaluation: 8/15/2023      Procedure Summary     Date: 08/15/23 Room / Location: [place] [address] / [facility]    Anesthesia Start: 3989 Anesthesia Stop: 1432    Procedures:       COLONOSCOPY POLYPECTOMY SNARE/COLD BIOPSY      EGD BIOPSY      EGD ESOPHAGOGASTRODUODENOSCOPY DILATATION Diagnosis:       Change in bowel habits      Abdominal pain, unspecified abdominal location      (Change in bowel habits [R19.4])      (Abdominal pain, unspecified abdominal location [R10.9])    Surgeons: Froylan Marroquin MD Responsible Provider: Tory Topete DO    Anesthesia Type: MAC ASA Status: 4          Anesthesia Type: No value filed.     Avelino Phase I: Avelino Score: 10    Avelino Phase II:        Anesthesia Post Evaluation    Patient location during evaluation: PACU  Patient participation: complete - patient participated  Level of consciousness: awake and alert  Nausea & Vomiting: no vomiting and no nausea  Complications: no  Cardiovascular status: hemodynamically stable  Respiratory status: acceptable and spontaneous ventilation  Hydration status: stable  Pain management: adequate

## 2023-08-15 NOTE — H&P
Immediately prior to the procedure the patient's History and Physical was reviewed- there are no changes with the current vitals . Blood pressure 130/70, pulse 72, resp. rate 16, height 5' 7\" (1.702 m), weight 240 lb (108.9 kg), last menstrual period 08/31/1988, SpO2 94 %, not currently breastfeeding.

## 2023-08-15 NOTE — PROCEDURES
Colonoscopy note    Procedure: Colonoscopy with cold snare polypectomy    Indication: History of polyps, iron deficiency anemia recurrent, previous redundant colon      Sedation: MAC      Endoscope was advanced through anus to cecum       Preparation is fair with a large amount of effluent. Colon was very redundant and atonic. Patient tolerated procedure well. No apparent post- procedure complications. Cecum has 7 mm sessile polyp near the junction removed with cold snare. Ascending colon is normal  Hepatic Flexure is normal  Transverse colon is normal  Splenic Flexure is normal  Descending colon is normal  Sigmoid colon diverticulosis  Rectum direct views are normal  Retroflexion in rectum shows internal hemorrhoids    EBL: Less than 1 cc    IMPRESSION AND PLAN:    1. Cecal polyp removed as above  2. Diverticulosis sigmoid colon  3. Suboptimal prep and a large redundant atonic colon  4. Moderate internal hemorrhoids    Recommendations: Repeat colonoscopy 3 years with multiple day prep. Proceed with upper endoscopy. Follow up as outpatient in office , call [phone] to schedule for appointment if needed.          Rebecca Aguirre MD  8/15/2023  2:25 PM      [id number]

## 2023-08-15 NOTE — ANESTHESIA PRE PROCEDURE
and placed in chart.   )  Induction: intravenous. Anesthetic plan and risks discussed with patient. Plan discussed with CRNA.                   DO Malu   8/15/2023

## 2023-08-15 NOTE — PROCEDURES
Procedure:  Esophagogastroduodenoscopy with Biopsy and [address] dilation of the esophagus. Patient with Diane-en-Y gastric bypass. Indication: Recurrent iron deficiency anemia. Intermittent dysphagia responding to previous esophageal dilation. Sedation  MAC    Endoscope was advanced easily through mouth to jejunum      Oropharynx views are limited but grossly normal.    Esophagus:   Mucosa is normal.  GEJ at 40 cm. [address] dilation of the esophagus without difficulty. Stomach:   Normal Diane-en-Y gastric anastomosis and pouch    Jejunum: Approximately 20 cm into the jejunal segment was a few small areas of hyperemia which were biopsied    EBL: Less than 1 cc    IMPRESSION AND PLAN:     1.  Diane-en-Y gastric bypass with a few small areas of erythema approximately 20 cm into the jejunal segment. 2.  Empiric esophageal dilation for intermittent dysphagia as above    Recommendations: Suspect recurrent anemia from Diane-en-Y bypass. Await biopsy results of these areas in the jejunal segment. May ultimately need intravenous iron. We will bring her back to the office to discuss capsule enteroscopy option as well. Follow up as outpatient in office, call [phone] to schedule for appointment.       Jonathan Donald MD  8/15/2023  2:28 PM      [id number]

## 2023-08-17 ENCOUNTER — HOSPITAL ENCOUNTER (OUTPATIENT)
Dept: CT IMAGING | Age: 63
Discharge: HOME | End: 2023-08-19
Payer: MEDICAID

## 2023-08-17 DIAGNOSIS — M79.671 RIGHT FOOT PAIN: ICD-10-CM

## 2023-08-17 PROCEDURE — 73700 CT LOWER EXTREMITY W/O DYE: CPT

## 2023-08-18 LAB — SURGICAL PATHOLOGY REPORT: NORMAL

## 2023-08-23 RX ORDER — CELECOXIB 100 MG/1
100 CAPSULE ORAL 2 TIMES DAILY
Qty: 60 CAPSULE | Refills: 3 | OUTPATIENT
Start: 2023-08-23

## 2023-08-24 ENCOUNTER — TELEPHONE (OUTPATIENT)
Dept: ORTHOPEDIC SURGERY | Age: 63
End: 2023-08-24

## 2023-08-25 NOTE — TELEPHONE ENCOUNTER
Called patient to review CT. She has been WBAT with normal shoes. I think this is appropriate, as she says she has no pain and is doing much better than when last seen in office. We can see her one more time 4-5 weeks from now for repeat R foot XR. She is agreeable to plan.  Please call to schedule

## 2023-08-29 NOTE — TELEPHONE ENCOUNTER
Call placed to patient. Appointment scheduled at this time. Encouraged to call with questions or concerns.     Future Appointments   Date Time Provider [address]   9/19/2023  1:45 PM Jay Tsai MD [facility]   9/28/2023 10:00 AM Mima Koyanagi, DO [facility]   11/22/2023 10:40 AM ADEEL Jacobson - CNP [facility]

## 2023-09-13 DIAGNOSIS — M79.671 RIGHT FOOT PAIN: ICD-10-CM

## 2023-09-13 RX ORDER — CELECOXIB 200 MG/1
200 CAPSULE ORAL 2 TIMES DAILY
Qty: 60 CAPSULE | Refills: 0 | Status: SHIPPED | OUTPATIENT
Start: 2023-09-13

## 2023-09-13 NOTE — TELEPHONE ENCOUNTER
Pharmacy interface requesting refill of  celebrex .     Last office visit: 8/3/23    Future Appointments   Date Time Provider [address]   9/19/2023  1:45 PM Mic Cox MD [facility]   9/28/2023 10:00 AM Michael Rodriguez DO [facility]   11/22/2023 10:40 AM ADEEL Liao - CNP [facility]

## 2023-09-20 ENCOUNTER — TELEPHONE (OUTPATIENT)
Dept: ENDOCRINOLOGY | Age: 63
End: 2023-09-20

## 2023-09-20 NOTE — TELEPHONE ENCOUNTER
The pt left a message on the clinical line. She had a question regarding her lab orders. I returned the call with FREEDOM LOREDO to call back.

## 2023-09-22 DIAGNOSIS — M79.671 RIGHT FOOT PAIN: Primary | ICD-10-CM

## 2023-09-28 ENCOUNTER — HOSPITAL ENCOUNTER (OUTPATIENT)
Dept: GENERAL RADIOLOGY | Age: 63
Discharge: HOME | End: 2023-09-30
Payer: MEDICAID

## 2023-09-28 ENCOUNTER — OFFICE VISIT (OUTPATIENT)
Dept: ORTHOPEDIC SURGERY | Age: 63
End: 2023-09-28
Payer: MEDICAID

## 2023-09-28 VITALS — HEIGHT: 67 IN | BODY MASS INDEX: 37.59 KG/M2

## 2023-09-28 DIAGNOSIS — M79.671 RIGHT FOOT PAIN: ICD-10-CM

## 2023-09-28 DIAGNOSIS — M79.671 RIGHT FOOT PAIN: Primary | ICD-10-CM

## 2023-09-28 PROCEDURE — 99213 OFFICE O/P EST LOW 20 MIN: CPT | Performed by: ORTHOPAEDIC SURGERY

## 2023-09-28 PROCEDURE — 73630 X-RAY EXAM OF FOOT: CPT

## 2023-10-03 PROBLEM — M79.671 RIGHT FOOT PAIN: Status: ACTIVE | Noted: 2023-10-03

## 2023-10-12 ENCOUNTER — OFFICE VISIT (OUTPATIENT)
Dept: ENDOCRINOLOGY | Age: 63
End: 2023-10-12
Payer: MEDICAID

## 2023-10-12 VITALS
HEART RATE: 70 BPM | SYSTOLIC BLOOD PRESSURE: 136 MMHG | DIASTOLIC BLOOD PRESSURE: 77 MMHG | BODY MASS INDEX: 37.59 KG/M2 | HEIGHT: 67 IN

## 2023-10-12 DIAGNOSIS — E27.8 ADRENAL INCIDENTALOMA (HCC): ICD-10-CM

## 2023-10-12 DIAGNOSIS — E04.2 MULTINODULAR GOITER: Primary | ICD-10-CM

## 2023-10-12 DIAGNOSIS — E55.9 VITAMIN D DEFICIENCY: ICD-10-CM

## 2023-10-12 PROCEDURE — 3078F DIAST BP <80 MM HG: CPT | Performed by: CLINICAL NURSE SPECIALIST

## 2023-10-12 PROCEDURE — 3075F SYST BP GE 130 - 139MM HG: CPT | Performed by: CLINICAL NURSE SPECIALIST

## 2023-10-12 PROCEDURE — 99214 OFFICE O/P EST MOD 30 MIN: CPT | Performed by: CLINICAL NURSE SPECIALIST

## 2023-10-12 RX ORDER — DEXAMETHASONE 1 MG
1 TABLET ORAL ONCE
Qty: 1 TABLET | Refills: 0 | Status: SHIPPED | OUTPATIENT
Start: 2023-10-12 | End: 2023-10-12

## 2023-10-12 NOTE — PROGRESS NOTES
Component Value Date/Time    TSH 1.430 04/13/2023 09:30 AM    T4FREE 0.84 (L) 04/13/2023 09:30 AM    R6SIBFL 10.0 05/21/2012 01:48 PM    X6HCPPC 107.30 03/03/2021 01:46 PM       Right adrenal nodule   12/28/19  CT of abd : Stable small nodule in the right adrenal gland likely reflecting adenoma  02/06/20  24 urine metanephrines and catecholamines neg for pheo; urine cortisol nml, PRA and serum dean normal                 02/06/20 Serum Cortisol level 6.06 mcg/dl, but was checked at 4:38 p  5/11/20  Dexamethasone suppression test: Cortisol 3.03, ACTH 5.8    The patient is due to adrenal hormones check .   Was suppose to have labs done last OV but forgot      PAST MEDICAL HISTORY   Past Medical History:   Diagnosis Date    Adenoma of right adrenal gland     Anemia     Anxiety     Arthritis     spinal    Asthma     controlled with inhalers     Benign essential tremor     Bipolar affective ([address])     Chronic back pain     Spinal cord stimulator in place    Chronic respiratory failure with hypoxia ([address])     at times dt diaphragm does not function properly dt Mitochondrial disorder    Depression     stable    Diabetes mellitus ([address])     Difficulty swallowing     for EGD 10-8-19     Environmental and seasonal allergies     Excessive physiologic tremor 05/24/2021    Fatty liver     Full dentures     GERD (gastroesophageal reflux disease)     Gout     past hx of    Herniated cervical disc     limited rom of head and neck    History of swelling of feet     Hypertension     Lymphedema of lower extremity 09/06/2012    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 Mayra Dennis [facility]    Muscle weakness (generalized)     Information obtained from [facility]    Need for assistance with personal care     Information obtained from [facility]    Neuropathy     at feet    Obesity     PETR (obstructive sleep apnea)     no CPAP dt insurance

## 2023-11-10 ENCOUNTER — HOSPITAL ENCOUNTER (OUTPATIENT)
Dept: GENERAL RADIOLOGY | Age: 63
End: 2023-11-10
Payer: MEDICAID

## 2023-11-10 DIAGNOSIS — R13.19 OTHER DYSPHAGIA: ICD-10-CM

## 2023-11-10 PROCEDURE — 2500000003 HC RX 250 WO HCPCS

## 2023-11-10 PROCEDURE — 92526 ORAL FUNCTION THERAPY: CPT

## 2023-11-10 PROCEDURE — 74230 X-RAY XM SWLNG FUNCJ C+: CPT

## 2023-11-10 PROCEDURE — 92611 MOTION FLUOROSCOPY/SWALLOW: CPT

## 2023-11-10 RX ADMIN — BARIUM SULFATE 70 G: 0.81 POWDER, FOR SUSPENSION ORAL at 14:23

## 2023-11-10 RX ADMIN — BARIUM SULFATE 10 ML: 400 PASTE ORAL at 14:22

## 2023-11-10 RX ADMIN — BARIUM SULFATE 120 ML: 400 SUSPENSION ORAL at 14:22

## 2023-11-10 NOTE — PROGRESS NOTES
SPEECH/LANGUAGE PATHOLOGY  VIDEOFLUOROSCOPIC STUDY OF SWALLOWING (MBS)   and PLAN OF CARE    PATIENT NAME:  Martínez Jeffries  (female)     MRN:  [id number]    :  1960  (61 y.o.)  STATUS:  Outpatient    TODAY'S DATE:  11/10/2023  REFERRING PROVIDER:   RADHA Suarez   SPECIFIC PROVIDER ORDER: FL modified barium swallow with video  Date of order:  10/27/23   REASON FOR REFERRAL: Assess oropharyngeal swallow function   EVALUATING THERAPIST: SUNDAR Ruvalcaba      RESULTS:      DYSPHAGIA DIAGNOSIS:  Mild oropharyngeal phase dysphagia     DIET RECOMMENDATIONS:  Regular consistency solids (IDDSI level 7) with  thin liquids (IDDSI level 0)    FEEDING RECOMMENDATIONS:    Assistance level:  No assistance needed     Compensatory strategies recommended: No strategies are recommended at this time     Discussed recommendations with nursing and/or faxed report to referring provider: Yes    SPEECH THERAPY  PLAN OF CARE   The dysphagia POC is established based on physician order and dysphagia diagnosis    Dysphagia therapy is not recommended       Conditions Requiring Skilled Therapeutic Intervention for dysphagia:    not applicable    SPECIFIC DYSPHAGIA INTERVENTIONS TO INCLUDE:     Not applicable    Specific instructions for next treatment:  not applicable   Treatment Goals:    Short Term Goals:  Not applicable no therapy warranted     Long Term Goals:   Not applicable no therapy warranted      Patient/family Goal:    not applicable                    ADMITTING DIAGNOSIS: Other dysphagia [R13.19]     VISIT DIAGNOSIS:   Visit Diagnoses         Codes    Other dysphagia     R13.19                PATIENT REPORT/COMPLAINT: Patient reported sensation of food/liquid sticking in throat and she is not able to get it down or bring it back up. However, this did not occur during this evaluation.      PRIOR LEVEL OF SWALLOW FUNCTION:    Past History of Dysphagia?:  none reported    Home diet: Regular consistency solids (IDDSI level

## 2023-12-11 ENCOUNTER — TELEPHONE (OUTPATIENT)
Dept: CASE MANAGEMENT | Age: 63
End: 2023-12-11

## 2023-12-11 NOTE — TELEPHONE ENCOUNTER
I called the patient and she confirmed her CT lung screening at [facility] on 12/12/2023 at 11:00 am.  I reminded the patient to arrive at 10:30 am, enter through the main entrance, and register. Patient confirmed.           Electronically signed by Phil Brewster on 12/11/23 at 2:34 PM EST

## 2023-12-12 ENCOUNTER — HOSPITAL ENCOUNTER (OUTPATIENT)
Dept: CT IMAGING | Age: 63
Discharge: HOME | End: 2023-12-14
Attending: INTERNAL MEDICINE
Payer: MEDICAID

## 2023-12-12 DIAGNOSIS — F17.210 CIGARETTE NICOTINE DEPENDENCE WITHOUT COMPLICATION: ICD-10-CM

## 2023-12-12 PROCEDURE — 71271 CT THORAX LUNG CANCER SCR C-: CPT

## 2023-12-13 ENCOUNTER — TELEPHONE (OUTPATIENT)
Dept: CASE MANAGEMENT | Age: 63
End: 2023-12-13

## 2023-12-13 NOTE — TELEPHONE ENCOUNTER
No call, encounter opened to process CT Lung Screening. CT Lung Screen: 12/12/2023    IMPRESSION:  1. There is no pulmonary infiltrate, mass or suspicious pulmonary nodule  2. Emphysematous changes     LUNG RADS:  Lung-RADS 1 - Negative ()     Management:  12 month screening LDCT     RECOMMENDATIONS:  If you would like to register your patient with the [facility], please contact the Nurse Navigator at  [phone]. Pack years: 80    Social History     Tobacco Use  Smoking Status: Current Every Day Smoker   Start Date: 06/13/1990   Quit Date:    Types: Cigarettes   Packs/Day: 3   Years: 39   Pack Years: 135   Smokeless Tobacco: Never         Results letter sent to patient via my chart or mailed.      [address]

## 2024-01-02 PROBLEM — U07.1 COVID-19: Status: ACTIVE | Noted: 2021-11-19

## 2024-01-02 PROBLEM — D64.9 ANEMIA: Status: ACTIVE | Noted: 2023-09-05

## 2024-01-02 PROBLEM — Z96.89 S/P INSERTION OF SPINAL CORD STIMULATOR: Status: ACTIVE | Noted: 2021-05-31

## 2024-01-11 ENCOUNTER — APPOINTMENT (OUTPATIENT)
Dept: CT IMAGING | Age: 64
DRG: 282 | End: 2024-01-11
Attending: STUDENT IN AN ORGANIZED HEALTH CARE EDUCATION/TRAINING PROGRAM
Payer: MEDICAID

## 2024-01-11 ENCOUNTER — APPOINTMENT (OUTPATIENT)
Dept: GENERAL RADIOLOGY | Age: 64
DRG: 282 | End: 2024-01-11
Payer: MEDICAID

## 2024-01-11 ENCOUNTER — HOSPITAL ENCOUNTER (INPATIENT)
Age: 64
LOS: 5 days | Discharge: HOME | DRG: 282 | End: 2024-01-16
Attending: STUDENT IN AN ORGANIZED HEALTH CARE EDUCATION/TRAINING PROGRAM | Admitting: INTERNAL MEDICINE
Payer: MEDICAID

## 2024-01-11 DIAGNOSIS — K85.80 OTHER ACUTE PANCREATITIS WITHOUT INFECTION OR NECROSIS: Primary | ICD-10-CM

## 2024-01-11 DIAGNOSIS — M81.0 OSTEOPOROSIS, UNSPECIFIED OSTEOPOROSIS TYPE, UNSPECIFIED PATHOLOGICAL FRACTURE PRESENCE: ICD-10-CM

## 2024-01-11 PROBLEM — K85.90 ACUTE PANCREATITIS WITHOUT INFECTION OR NECROSIS: Status: ACTIVE | Noted: 2024-01-11

## 2024-01-11 LAB
ALBUMIN SERPL-MCNC: 3.8 G/DL (ref 3.5–5.2)
ALP SERPL-CCNC: 104 U/L (ref 35–104)
ALT SERPL-CCNC: 12 U/L (ref 0–32)
ANION GAP SERPL CALCULATED.3IONS-SCNC: 9 MMOL/L (ref 7–16)
AST SERPL-CCNC: 14 U/L (ref 0–31)
BASOPHILS # BLD: 0.02 K/UL (ref 0–0.2)
BASOPHILS NFR BLD: 0 % (ref 0–2)
BILIRUB SERPL-MCNC: 0.3 MG/DL (ref 0–1.2)
BILIRUB UR QL STRIP: NEGATIVE
BUN SERPL-MCNC: 23 MG/DL (ref 6–23)
CALCIUM SERPL-MCNC: 9.1 MG/DL (ref 8.6–10.2)
CHLORIDE SERPL-SCNC: 103 MMOL/L (ref 98–107)
CLARITY UR: CLEAR
CO2 SERPL-SCNC: 27 MMOL/L (ref 22–29)
COLOR UR: YELLOW
CREAT SERPL-MCNC: 0.7 MG/DL (ref 0.5–1)
EKG ATRIAL RATE: 68 BPM
EKG P AXIS: 3 DEGREES
EKG P-R INTERVAL: 166 MS
EKG Q-T INTERVAL: 384 MS
EKG QRS DURATION: 98 MS
EKG QTC CALCULATION (BAZETT): 408 MS
EKG R AXIS: -14 DEGREES
EKG T AXIS: 2 DEGREES
EKG VENTRICULAR RATE: 68 BPM
EOSINOPHIL # BLD: 0.04 K/UL (ref 0.05–0.5)
EOSINOPHILS RELATIVE PERCENT: 1 % (ref 0–6)
EPI CELLS #/AREA URNS HPF: NORMAL /HPF
ERYTHROCYTE [DISTWIDTH] IN BLOOD BY AUTOMATED COUNT: 12.4 % (ref 11.5–15)
GFR SERPL CREATININE-BSD FRML MDRD: >60 ML/MIN/1.73M2
GLUCOSE SERPL-MCNC: 121 MG/DL (ref 74–99)
GLUCOSE UR STRIP-MCNC: NEGATIVE MG/DL
HCT VFR BLD AUTO: 44.5 % (ref 34–48)
HGB BLD-MCNC: 14.7 G/DL (ref 11.5–15.5)
HGB UR QL STRIP.AUTO: NEGATIVE
IMM GRANULOCYTES # BLD AUTO: 0.04 K/UL (ref 0–0.58)
IMM GRANULOCYTES NFR BLD: 1 % (ref 0–5)
KETONES UR STRIP-MCNC: NEGATIVE MG/DL
LEUKOCYTE ESTERASE UR QL STRIP: NEGATIVE
LIPASE SERPL-CCNC: 1622 U/L (ref 13–60)
LYMPHOCYTES NFR BLD: 0.92 K/UL (ref 1.5–4)
LYMPHOCYTES RELATIVE PERCENT: 12 % (ref 20–42)
MAGNESIUM SERPL-MCNC: 2.4 MG/DL (ref 1.6–2.6)
MCH RBC QN AUTO: 32 PG (ref 26–35)
MCHC RBC AUTO-ENTMCNC: 33 G/DL (ref 32–34.5)
MCV RBC AUTO: 96.9 FL (ref 80–99.9)
MONOCYTES NFR BLD: 0.64 K/UL (ref 0.1–0.95)
MONOCYTES NFR BLD: 8 % (ref 2–12)
NEUTROPHILS NFR BLD: 79 % (ref 43–80)
NEUTS SEG NFR BLD: 6.15 K/UL (ref 1.8–7.3)
NITRITE UR QL STRIP: NEGATIVE
PH UR STRIP: 7 [PH] (ref 5–9)
PLATELET # BLD AUTO: 187 K/UL (ref 130–450)
PMV BLD AUTO: 9 FL (ref 7–12)
POTASSIUM SERPL-SCNC: 4.6 MMOL/L (ref 3.5–5)
PROT SERPL-MCNC: 6.4 G/DL (ref 6.4–8.3)
PROT UR STRIP-MCNC: NEGATIVE MG/DL
RBC # BLD AUTO: 4.59 M/UL (ref 3.5–5.5)
RBC #/AREA URNS HPF: NORMAL /HPF
SODIUM SERPL-SCNC: 139 MMOL/L (ref 132–146)
SP GR UR STRIP: 1.01 (ref 1–1.03)
TROPONIN I SERPL HS-MCNC: 10 NG/L (ref 0–9)
UROBILINOGEN UR STRIP-ACNC: 0.2 EU/DL (ref 0–1)
WBC #/AREA URNS HPF: NORMAL /HPF
WBC OTHER # BLD: 7.8 K/UL (ref 4.5–11.5)

## 2024-01-11 PROCEDURE — 6360000004 HC RX CONTRAST MEDICATION: Performed by: RADIOLOGY

## 2024-01-11 PROCEDURE — 93010 ELECTROCARDIOGRAM REPORT: CPT | Performed by: INTERNAL MEDICINE

## 2024-01-11 PROCEDURE — 83690 ASSAY OF LIPASE: CPT

## 2024-01-11 PROCEDURE — 99285 EMERGENCY DEPT VISIT HI MDM: CPT

## 2024-01-11 PROCEDURE — 94640 AIRWAY INHALATION TREATMENT: CPT

## 2024-01-11 PROCEDURE — 80053 COMPREHEN METABOLIC PANEL: CPT

## 2024-01-11 PROCEDURE — 6360000002 HC RX W HCPCS: Performed by: STUDENT IN AN ORGANIZED HEALTH CARE EDUCATION/TRAINING PROGRAM

## 2024-01-11 PROCEDURE — 6370000000 HC RX 637 (ALT 250 FOR IP): Performed by: INTERNAL MEDICINE

## 2024-01-11 PROCEDURE — 81001 URINALYSIS AUTO W/SCOPE: CPT

## 2024-01-11 PROCEDURE — 6360000002 HC RX W HCPCS: Performed by: INTERNAL MEDICINE

## 2024-01-11 PROCEDURE — 96375 TX/PRO/DX INJ NEW DRUG ADDON: CPT

## 2024-01-11 PROCEDURE — 2580000003 HC RX 258: Performed by: STUDENT IN AN ORGANIZED HEALTH CARE EDUCATION/TRAINING PROGRAM

## 2024-01-11 PROCEDURE — 96374 THER/PROPH/DIAG INJ IV PUSH: CPT

## 2024-01-11 PROCEDURE — 83735 ASSAY OF MAGNESIUM: CPT

## 2024-01-11 PROCEDURE — 85025 COMPLETE CBC W/AUTO DIFF WBC: CPT

## 2024-01-11 PROCEDURE — 71045 X-RAY EXAM CHEST 1 VIEW: CPT

## 2024-01-11 PROCEDURE — 93005 ELECTROCARDIOGRAM TRACING: CPT | Performed by: STUDENT IN AN ORGANIZED HEALTH CARE EDUCATION/TRAINING PROGRAM

## 2024-01-11 PROCEDURE — 1200000000 HC SEMI PRIVATE

## 2024-01-11 PROCEDURE — 74177 CT ABD & PELVIS W/CONTRAST: CPT

## 2024-01-11 PROCEDURE — 84484 ASSAY OF TROPONIN QUANT: CPT

## 2024-01-11 RX ORDER — FUROSEMIDE 40 MG/1
40 TABLET ORAL DAILY
Status: DISCONTINUED | OUTPATIENT
Start: 2024-01-12 | End: 2024-01-16 | Stop reason: HOSPADM

## 2024-01-11 RX ORDER — MORPHINE SULFATE 2 MG/ML
1 INJECTION, SOLUTION INTRAMUSCULAR; INTRAVENOUS EVERY 4 HOURS PRN
Status: DISCONTINUED | OUTPATIENT
Start: 2024-01-11 | End: 2024-01-16 | Stop reason: HOSPADM

## 2024-01-11 RX ORDER — MECLIZINE HCL 12.5 MG/1
25 TABLET ORAL 4 TIMES DAILY
Status: DISCONTINUED | OUTPATIENT
Start: 2024-01-11 | End: 2024-01-16 | Stop reason: HOSPADM

## 2024-01-11 RX ORDER — ERGOCALCIFEROL 1.25 MG/1
50000 CAPSULE ORAL WEEKLY
Status: DISCONTINUED | OUTPATIENT
Start: 2024-01-11 | End: 2024-01-11 | Stop reason: ALTCHOICE

## 2024-01-11 RX ORDER — GABAPENTIN 600 MG/1
600 TABLET ORAL 3 TIMES DAILY
Status: DISCONTINUED | OUTPATIENT
Start: 2024-01-11 | End: 2024-01-15

## 2024-01-11 RX ORDER — SODIUM CHLORIDE, SODIUM LACTATE, POTASSIUM CHLORIDE, CALCIUM CHLORIDE 600; 310; 30; 20 MG/100ML; MG/100ML; MG/100ML; MG/100ML
INJECTION, SOLUTION INTRAVENOUS CONTINUOUS
Status: DISCONTINUED | OUTPATIENT
Start: 2024-01-11 | End: 2024-01-16 | Stop reason: HOSPADM

## 2024-01-11 RX ORDER — PANTOPRAZOLE SODIUM 40 MG/1
40 TABLET, DELAYED RELEASE ORAL DAILY
Status: DISCONTINUED | OUTPATIENT
Start: 2024-01-12 | End: 2024-01-16 | Stop reason: HOSPADM

## 2024-01-11 RX ORDER — LEVOCARNITINE 330 MG/1
330 TABLET ORAL 3 TIMES DAILY
Status: DISCONTINUED | OUTPATIENT
Start: 2024-01-11 | End: 2024-01-16 | Stop reason: HOSPADM

## 2024-01-11 RX ORDER — PANTOPRAZOLE SODIUM 40 MG/1
40 TABLET, DELAYED RELEASE ORAL
Status: DISCONTINUED | OUTPATIENT
Start: 2024-01-12 | End: 2024-01-11 | Stop reason: SDUPTHER

## 2024-01-11 RX ORDER — DULOXETIN HYDROCHLORIDE 30 MG/1
30 CAPSULE, DELAYED RELEASE ORAL 2 TIMES DAILY
Status: DISCONTINUED | OUTPATIENT
Start: 2024-01-11 | End: 2024-01-12

## 2024-01-11 RX ORDER — MULTIVITAMIN WITH IRON
1 TABLET ORAL DAILY
Status: DISCONTINUED | OUTPATIENT
Start: 2024-01-12 | End: 2024-01-11 | Stop reason: SDUPTHER

## 2024-01-11 RX ORDER — VITAMIN C
1 TAB ORAL 2 TIMES DAILY
Status: DISCONTINUED | OUTPATIENT
Start: 2024-01-11 | End: 2024-01-11 | Stop reason: ALTCHOICE

## 2024-01-11 RX ORDER — ARFORMOTEROL TARTRATE 15 UG/2ML
15 SOLUTION RESPIRATORY (INHALATION)
Status: DISCONTINUED | OUTPATIENT
Start: 2024-01-11 | End: 2024-01-16 | Stop reason: HOSPADM

## 2024-01-11 RX ORDER — CETIRIZINE HYDROCHLORIDE 10 MG/1
5 TABLET ORAL DAILY
Status: DISCONTINUED | OUTPATIENT
Start: 2024-01-11 | End: 2024-01-16 | Stop reason: HOSPADM

## 2024-01-11 RX ORDER — ZIPRASIDONE HYDROCHLORIDE 40 MG/1
40 CAPSULE ORAL EVERY EVENING
Status: DISCONTINUED | OUTPATIENT
Start: 2024-01-11 | End: 2024-01-16 | Stop reason: HOSPADM

## 2024-01-11 RX ORDER — ALBUTEROL SULFATE 2.5 MG/3ML
2.5 SOLUTION RESPIRATORY (INHALATION) EVERY 6 HOURS PRN
Status: DISCONTINUED | OUTPATIENT
Start: 2024-01-11 | End: 2024-01-16 | Stop reason: HOSPADM

## 2024-01-11 RX ORDER — PROMETHAZINE HYDROCHLORIDE 25 MG/1
25 TABLET ORAL EVERY 6 HOURS PRN
Status: DISCONTINUED | OUTPATIENT
Start: 2024-01-11 | End: 2024-01-16 | Stop reason: HOSPADM

## 2024-01-11 RX ORDER — SODIUM CHLORIDE, SODIUM LACTATE, POTASSIUM CHLORIDE, AND CALCIUM CHLORIDE .6; .31; .03; .02 G/100ML; G/100ML; G/100ML; G/100ML
1000 INJECTION, SOLUTION INTRAVENOUS ONCE
Status: COMPLETED | OUTPATIENT
Start: 2024-01-11 | End: 2024-01-11

## 2024-01-11 RX ORDER — METOPROLOL SUCCINATE 50 MG/1
50 TABLET, EXTENDED RELEASE ORAL DAILY
Status: DISCONTINUED | OUTPATIENT
Start: 2024-01-11 | End: 2024-01-16 | Stop reason: HOSPADM

## 2024-01-11 RX ORDER — TRAZODONE HYDROCHLORIDE 50 MG/1
100 TABLET ORAL NIGHTLY
Status: DISCONTINUED | OUTPATIENT
Start: 2024-01-11 | End: 2024-01-16 | Stop reason: HOSPADM

## 2024-01-11 RX ORDER — FERROUS SULFATE 325(65) MG
325 TABLET ORAL
Status: DISCONTINUED | OUTPATIENT
Start: 2024-01-12 | End: 2024-01-16 | Stop reason: HOSPADM

## 2024-01-11 RX ORDER — ONDANSETRON 2 MG/ML
4 INJECTION INTRAMUSCULAR; INTRAVENOUS ONCE
Status: COMPLETED | OUTPATIENT
Start: 2024-01-11 | End: 2024-01-11

## 2024-01-11 RX ORDER — RIFAMPIN 300 MG/1
300 CAPSULE ORAL 2 TIMES DAILY
Status: DISCONTINUED | OUTPATIENT
Start: 2024-01-11 | End: 2024-01-16 | Stop reason: HOSPADM

## 2024-01-11 RX ORDER — OXYCODONE AND ACETAMINOPHEN 10; 325 MG/1; MG/1
1 TABLET ORAL EVERY 6 HOURS PRN
Status: DISCONTINUED | OUTPATIENT
Start: 2024-01-11 | End: 2024-01-16 | Stop reason: HOSPADM

## 2024-01-11 RX ORDER — FENTANYL CITRATE 50 UG/ML
25 INJECTION, SOLUTION INTRAMUSCULAR; INTRAVENOUS ONCE
Status: COMPLETED | OUTPATIENT
Start: 2024-01-11 | End: 2024-01-11

## 2024-01-11 RX ORDER — ALLOPURINOL 100 MG/1
100 TABLET ORAL 2 TIMES DAILY
Status: DISCONTINUED | OUTPATIENT
Start: 2024-01-11 | End: 2024-01-16 | Stop reason: HOSPADM

## 2024-01-11 RX ORDER — MONTELUKAST SODIUM 10 MG/1
10 TABLET ORAL NIGHTLY
Status: DISCONTINUED | OUTPATIENT
Start: 2024-01-11 | End: 2024-01-16 | Stop reason: HOSPADM

## 2024-01-11 RX ORDER — POTASSIUM CHLORIDE 20 MEQ/1
20 TABLET, EXTENDED RELEASE ORAL DAILY
Status: DISCONTINUED | OUTPATIENT
Start: 2024-01-11 | End: 2024-01-16 | Stop reason: HOSPADM

## 2024-01-11 RX ORDER — TOPIRAMATE 100 MG/1
200 TABLET, FILM COATED ORAL 2 TIMES DAILY
Status: DISCONTINUED | OUTPATIENT
Start: 2024-01-11 | End: 2024-01-16 | Stop reason: HOSPADM

## 2024-01-11 RX ORDER — M-VIT,TX,IRON,MINS/CALC/FOLIC 27MG-0.4MG
1 TABLET ORAL DAILY
Status: DISCONTINUED | OUTPATIENT
Start: 2024-01-11 | End: 2024-01-16 | Stop reason: HOSPADM

## 2024-01-11 RX ORDER — MORPHINE SULFATE 4 MG/ML
4 INJECTION, SOLUTION INTRAMUSCULAR; INTRAVENOUS ONCE
Status: COMPLETED | OUTPATIENT
Start: 2024-01-11 | End: 2024-01-11

## 2024-01-11 RX ADMIN — OXYCODONE AND ACETAMINOPHEN 1 TABLET: 10; 325 TABLET ORAL at 20:28

## 2024-01-11 RX ADMIN — FENTANYL CITRATE 25 MCG: 50 INJECTION INTRAMUSCULAR; INTRAVENOUS at 12:20

## 2024-01-11 RX ADMIN — SODIUM CHLORIDE, POTASSIUM CHLORIDE, SODIUM LACTATE AND CALCIUM CHLORIDE 1000 ML: 600; 310; 30; 20 INJECTION, SOLUTION INTRAVENOUS at 12:20

## 2024-01-11 RX ADMIN — POTASSIUM CHLORIDE 20 MEQ: 1500 TABLET, EXTENDED RELEASE ORAL at 21:14

## 2024-01-11 RX ADMIN — MECLIZINE 25 MG: 12.5 TABLET ORAL at 20:28

## 2024-01-11 RX ADMIN — ARFORMOTEROL TARTRATE 15 MCG: 15 SOLUTION RESPIRATORY (INHALATION) at 21:47

## 2024-01-11 RX ADMIN — DULOXETINE HYDROCHLORIDE 30 MG: 30 CAPSULE, DELAYED RELEASE ORAL at 20:29

## 2024-01-11 RX ADMIN — SODIUM CHLORIDE, POTASSIUM CHLORIDE, SODIUM LACTATE AND CALCIUM CHLORIDE: 600; 310; 30; 20 INJECTION, SOLUTION INTRAVENOUS at 16:20

## 2024-01-11 RX ADMIN — TRAZODONE HYDROCHLORIDE 100 MG: 50 TABLET ORAL at 20:28

## 2024-01-11 RX ADMIN — RIFAMPIN 300 MG: 300 CAPSULE ORAL at 20:29

## 2024-01-11 RX ADMIN — Medication 1 TABLET: at 21:15

## 2024-01-11 RX ADMIN — MORPHINE SULFATE 4 MG: 4 INJECTION, SOLUTION INTRAMUSCULAR; INTRAVENOUS at 16:21

## 2024-01-11 RX ADMIN — TOPIRAMATE 200 MG: 100 TABLET ORAL at 20:29

## 2024-01-11 RX ADMIN — ALLOPURINOL 100 MG: 100 TABLET ORAL at 20:29

## 2024-01-11 RX ADMIN — CETIRIZINE HYDROCHLORIDE 5 MG: 10 TABLET, FILM COATED ORAL at 21:14

## 2024-01-11 RX ADMIN — GABAPENTIN 600 MG: 600 TABLET, FILM COATED ORAL at 20:28

## 2024-01-11 RX ADMIN — MORPHINE SULFATE 1 MG: 2 INJECTION, SOLUTION INTRAMUSCULAR; INTRAVENOUS at 22:16

## 2024-01-11 RX ADMIN — METOPROLOL SUCCINATE 50 MG: 50 TABLET, EXTENDED RELEASE ORAL at 21:15

## 2024-01-11 RX ADMIN — IPRATROPIUM BROMIDE 0.5 MG: 0.5 SOLUTION RESPIRATORY (INHALATION) at 21:47

## 2024-01-11 RX ADMIN — ONDANSETRON 4 MG: 2 INJECTION INTRAMUSCULAR; INTRAVENOUS at 12:20

## 2024-01-11 RX ADMIN — ZIPRASIDONE HYDROCHLORIDE 40 MG: 40 CAPSULE ORAL at 22:16

## 2024-01-11 RX ADMIN — MONTELUKAST 10 MG: 10 TABLET, FILM COATED ORAL at 20:28

## 2024-01-11 RX ADMIN — LEVOCARNITINE 330 MG: 330 TABLET ORAL at 20:29

## 2024-01-11 RX ADMIN — IOPAMIDOL 75 ML: 755 INJECTION, SOLUTION INTRAVENOUS at 14:58

## 2024-01-11 ASSESSMENT — PAIN DESCRIPTION - DESCRIPTORS
DESCRIPTORS: ACHING;DISCOMFORT;SORE
DESCRIPTORS: ACHING;DISCOMFORT;SORE

## 2024-01-11 ASSESSMENT — PAIN DESCRIPTION - LOCATION
LOCATION: ABDOMEN;BACK
LOCATION: ABDOMEN;BACK
LOCATION: ABDOMEN
LOCATION: ABDOMEN

## 2024-01-11 ASSESSMENT — PAIN SCALES - GENERAL
PAINLEVEL_OUTOF10: 8
PAINLEVEL_OUTOF10: 7
PAINLEVEL_OUTOF10: 8
PAINLEVEL_OUTOF10: 8

## 2024-01-11 NOTE — ED PROVIDER NOTES
[facility] EMERGENCY DEPARTMENT  EMERGENCY DEPARTMENT ENCOUNTER    Pt Name: Leny Yang  MRN: [id number]  Birthdate 1960  Date of evaluation: 1/11/2024  Provider: Homero Santiago MD  PCP: Adam Whyte MD  Note Started: 12:11 PM EST 1/11/24    HPI     Patient is a 63 y.o. female presents with a chief complaint of   Chief Complaint   Patient presents with    Abdominal Pain   .    Patient presents for epigastric pain.  Patient states that she does have a history of pancreatitis and this is consistent with prior.  Patient also has a history of a remote Diane-en-Y gastric bypass.  Patient denies any fevers or chills.  States that she has been having diarrhea over the past couple days.  No chest pain but is having some chronic shortness of breath.  Patient has oxygen at home and sometimes wears it at night.  No changes in urinary habits    Nursing Notes were all reviewed and agreed with or any disagreements were addressed in the HPI.    History From: Patient    Review of Systems   Pertinent positives and negatives as per HPI.     Physical Exam  Vitals and nursing note reviewed.   Constitutional:       Appearance: She is well-developed.   HENT:      Head: Normocephalic and atraumatic.   Eyes:      Conjunctiva/sclera: Conjunctivae normal.   Cardiovascular:      Rate and Rhythm: Normal rate and regular rhythm.      Heart sounds: Normal heart sounds. No murmur heard.  Pulmonary:      Effort: Pulmonary effort is normal. No respiratory distress.      Breath sounds: Normal breath sounds. No wheezing or rales.   Abdominal:      General: Bowel sounds are normal.      Palpations: Abdomen is soft.      Tenderness: There is abdominal tenderness. There is no guarding or rebound.      Comments: Mild epigastric tenderness to palpation   Musculoskeletal:      Cervical back: Normal range of motion and neck supple.   Skin:     General: Skin is warm and dry.   Neurological:      Mental

## 2024-01-12 ENCOUNTER — APPOINTMENT (OUTPATIENT)
Dept: MRI IMAGING | Age: 64
DRG: 282 | End: 2024-01-12
Payer: MEDICAID

## 2024-01-12 LAB — LIPASE SERPL-CCNC: 749 U/L (ref 13–60)

## 2024-01-12 PROCEDURE — 99253 IP/OBS CNSLTJ NEW/EST LOW 45: CPT | Performed by: SURGERY

## 2024-01-12 PROCEDURE — A9577 INJ MULTIHANCE: HCPCS | Performed by: RADIOLOGY

## 2024-01-12 PROCEDURE — 6360000002 HC RX W HCPCS: Performed by: INTERNAL MEDICINE

## 2024-01-12 PROCEDURE — 36415 COLL VENOUS BLD VENIPUNCTURE: CPT

## 2024-01-12 PROCEDURE — 74183 MRI ABD W/O CNTR FLWD CNTR: CPT

## 2024-01-12 PROCEDURE — 2580000003 HC RX 258: Performed by: STUDENT IN AN ORGANIZED HEALTH CARE EDUCATION/TRAINING PROGRAM

## 2024-01-12 PROCEDURE — 6360000004 HC RX CONTRAST MEDICATION: Performed by: RADIOLOGY

## 2024-01-12 PROCEDURE — 6370000000 HC RX 637 (ALT 250 FOR IP): Performed by: INTERNAL MEDICINE

## 2024-01-12 PROCEDURE — 94640 AIRWAY INHALATION TREATMENT: CPT

## 2024-01-12 PROCEDURE — 1200000000 HC SEMI PRIVATE

## 2024-01-12 PROCEDURE — 83690 ASSAY OF LIPASE: CPT

## 2024-01-12 RX ORDER — BACLOFEN 10 MG/1
20 TABLET ORAL 4 TIMES DAILY
Status: DISCONTINUED | OUTPATIENT
Start: 2024-01-12 | End: 2024-01-16 | Stop reason: HOSPADM

## 2024-01-12 RX ORDER — DOXYCYCLINE HYCLATE 100 MG/1
100 CAPSULE ORAL 2 TIMES DAILY
COMMUNITY

## 2024-01-12 RX ORDER — POLYETHYLENE GLYCOL 3350 17 G/17G
17 POWDER, FOR SOLUTION ORAL DAILY PRN
COMMUNITY

## 2024-01-12 RX ORDER — PANTOPRAZOLE SODIUM 40 MG/1
40 TABLET, DELAYED RELEASE ORAL NIGHTLY
COMMUNITY

## 2024-01-12 RX ORDER — IBUPROFEN 200 MG
1 CAPSULE ORAL 3 TIMES DAILY
COMMUNITY

## 2024-01-12 RX ORDER — CITALOPRAM HYDROBROMIDE 10 MG/1
10 TABLET ORAL EVERY MORNING
COMMUNITY

## 2024-01-12 RX ORDER — RIFAMPIN 300 MG/1
300 CAPSULE ORAL 2 TIMES DAILY
COMMUNITY

## 2024-01-12 RX ORDER — DULOXETIN HYDROCHLORIDE 60 MG/1
60 CAPSULE, DELAYED RELEASE ORAL NIGHTLY
Status: DISCONTINUED | OUTPATIENT
Start: 2024-01-12 | End: 2024-01-16 | Stop reason: HOSPADM

## 2024-01-12 RX ORDER — DOXYCYCLINE HYCLATE 100 MG/1
100 CAPSULE ORAL EVERY 12 HOURS SCHEDULED
Status: DISCONTINUED | OUTPATIENT
Start: 2024-01-12 | End: 2024-01-16 | Stop reason: HOSPADM

## 2024-01-12 RX ADMIN — GABAPENTIN 600 MG: 600 TABLET, FILM COATED ORAL at 14:16

## 2024-01-12 RX ADMIN — ARFORMOTEROL TARTRATE 15 MCG: 15 SOLUTION RESPIRATORY (INHALATION) at 20:45

## 2024-01-12 RX ADMIN — MECLIZINE 25 MG: 12.5 TABLET ORAL at 08:39

## 2024-01-12 RX ADMIN — LEVOCARNITINE 330 MG: 330 TABLET ORAL at 20:40

## 2024-01-12 RX ADMIN — MONTELUKAST 10 MG: 10 TABLET, FILM COATED ORAL at 20:39

## 2024-01-12 RX ADMIN — POTASSIUM CHLORIDE 20 MEQ: 1500 TABLET, EXTENDED RELEASE ORAL at 08:39

## 2024-01-12 RX ADMIN — ALLOPURINOL 100 MG: 100 TABLET ORAL at 20:39

## 2024-01-12 RX ADMIN — PANTOPRAZOLE SODIUM 40 MG: 40 TABLET, DELAYED RELEASE ORAL at 08:40

## 2024-01-12 RX ADMIN — IPRATROPIUM BROMIDE 0.5 MG: 0.5 SOLUTION RESPIRATORY (INHALATION) at 11:28

## 2024-01-12 RX ADMIN — RIFAMPIN 300 MG: 300 CAPSULE ORAL at 08:39

## 2024-01-12 RX ADMIN — ALLOPURINOL 100 MG: 100 TABLET ORAL at 08:39

## 2024-01-12 RX ADMIN — GADOBENATE DIMEGLUMINE 20 ML: 529 INJECTION, SOLUTION INTRAVENOUS at 19:38

## 2024-01-12 RX ADMIN — RIFAMPIN 300 MG: 300 CAPSULE ORAL at 20:41

## 2024-01-12 RX ADMIN — MORPHINE SULFATE 1 MG: 2 INJECTION, SOLUTION INTRAMUSCULAR; INTRAVENOUS at 14:17

## 2024-01-12 RX ADMIN — CETIRIZINE HYDROCHLORIDE 5 MG: 10 TABLET, FILM COATED ORAL at 08:40

## 2024-01-12 RX ADMIN — ARFORMOTEROL TARTRATE 15 MCG: 15 SOLUTION RESPIRATORY (INHALATION) at 11:28

## 2024-01-12 RX ADMIN — OXYCODONE AND ACETAMINOPHEN 1 TABLET: 10; 325 TABLET ORAL at 05:53

## 2024-01-12 RX ADMIN — TOPIRAMATE 200 MG: 100 TABLET ORAL at 08:41

## 2024-01-12 RX ADMIN — OXYCODONE AND ACETAMINOPHEN 1 TABLET: 10; 325 TABLET ORAL at 12:12

## 2024-01-12 RX ADMIN — TOPIRAMATE 200 MG: 100 TABLET ORAL at 20:40

## 2024-01-12 RX ADMIN — TRAZODONE HYDROCHLORIDE 100 MG: 50 TABLET ORAL at 20:39

## 2024-01-12 RX ADMIN — MECLIZINE 25 MG: 12.5 TABLET ORAL at 20:37

## 2024-01-12 RX ADMIN — LEVOCARNITINE 330 MG: 330 TABLET ORAL at 14:16

## 2024-01-12 RX ADMIN — MECLIZINE 25 MG: 12.5 TABLET ORAL at 14:16

## 2024-01-12 RX ADMIN — BACLOFEN 20 MG: 10 TABLET ORAL at 20:37

## 2024-01-12 RX ADMIN — SODIUM CHLORIDE, POTASSIUM CHLORIDE, SODIUM LACTATE AND CALCIUM CHLORIDE: 600; 310; 30; 20 INJECTION, SOLUTION INTRAVENOUS at 01:58

## 2024-01-12 RX ADMIN — BACLOFEN 20 MG: 10 TABLET ORAL at 16:54

## 2024-01-12 RX ADMIN — ZIPRASIDONE HYDROCHLORIDE 40 MG: 40 CAPSULE ORAL at 16:56

## 2024-01-12 RX ADMIN — METOPROLOL SUCCINATE 50 MG: 50 TABLET, EXTENDED RELEASE ORAL at 08:39

## 2024-01-12 RX ADMIN — Medication 1 TABLET: at 08:39

## 2024-01-12 RX ADMIN — LEVOCARNITINE 330 MG: 330 TABLET ORAL at 08:39

## 2024-01-12 RX ADMIN — IPRATROPIUM BROMIDE 0.5 MG: 0.5 SOLUTION RESPIRATORY (INHALATION) at 20:45

## 2024-01-12 RX ADMIN — GABAPENTIN 600 MG: 600 TABLET, FILM COATED ORAL at 20:38

## 2024-01-12 RX ADMIN — MECLIZINE 25 MG: 12.5 TABLET ORAL at 16:54

## 2024-01-12 RX ADMIN — GABAPENTIN 600 MG: 600 TABLET, FILM COATED ORAL at 08:39

## 2024-01-12 RX ADMIN — SODIUM CHLORIDE, POTASSIUM CHLORIDE, SODIUM LACTATE AND CALCIUM CHLORIDE: 600; 310; 30; 20 INJECTION, SOLUTION INTRAVENOUS at 12:15

## 2024-01-12 RX ADMIN — MORPHINE SULFATE 1 MG: 2 INJECTION, SOLUTION INTRAMUSCULAR; INTRAVENOUS at 20:30

## 2024-01-12 RX ADMIN — DULOXETINE 60 MG: 60 CAPSULE, DELAYED RELEASE ORAL at 20:39

## 2024-01-12 RX ADMIN — DOXYCYCLINE HYCLATE 100 MG: 100 CAPSULE ORAL at 20:39

## 2024-01-12 RX ADMIN — IPRATROPIUM BROMIDE 0.5 MG: 0.5 SOLUTION RESPIRATORY (INHALATION) at 17:21

## 2024-01-12 RX ADMIN — FERROUS SULFATE TAB 325 MG (65 MG ELEMENTAL FE) 325 MG: 325 (65 FE) TAB at 08:40

## 2024-01-12 RX ADMIN — FUROSEMIDE 40 MG: 40 TABLET ORAL at 08:39

## 2024-01-12 ASSESSMENT — PAIN SCALES - GENERAL
PAINLEVEL_OUTOF10: 4
PAINLEVEL_OUTOF10: 8
PAINLEVEL_OUTOF10: 9
PAINLEVEL_OUTOF10: 3
PAINLEVEL_OUTOF10: 8
PAINLEVEL_OUTOF10: 7

## 2024-01-12 ASSESSMENT — PAIN DESCRIPTION - ORIENTATION
ORIENTATION: LOWER;MID
ORIENTATION: LOWER;MID
ORIENTATION: LOWER
ORIENTATION: LOWER

## 2024-01-12 ASSESSMENT — PAIN DESCRIPTION - DESCRIPTORS
DESCRIPTORS: ACHING;SHOOTING;SPASM
DESCRIPTORS: ACHING;DISCOMFORT;SORE
DESCRIPTORS: ACHING;CRAMPING;DISCOMFORT

## 2024-01-12 ASSESSMENT — PAIN DESCRIPTION - LOCATION
LOCATION: BACK;ABDOMEN
LOCATION: BACK;ABDOMEN
LOCATION: BACK
LOCATION: ABDOMEN;BACK
LOCATION: BACK

## 2024-01-12 ASSESSMENT — PAIN - FUNCTIONAL ASSESSMENT: PAIN_FUNCTIONAL_ASSESSMENT: PREVENTS OR INTERFERES WITH MANY ACTIVE NOT PASSIVE ACTIVITIES

## 2024-01-12 NOTE — H&P
mouth every 6 hours as needed for Nausea  furosemide (LASIX) 40 MG tablet, Take 1 tablet by mouth daily  traZODone (DESYREL) 50 MG tablet, Take 2 tablets by mouth nightly  montelukast (SINGULAIR) 10 MG tablet, Take 1 tablet by mouth daily  allopurinol (ZYLOPRIM) 100 MG tablet, Take 1 tablet by mouth 2 times daily  topiramate (TOPAMAX) 200 MG tablet, Take 1 tablet by mouth 2 times daily.    Allergies:  Bee pollen, Penicillins, Ropinirole, Ropinirole hcl, Vistaril [hydroxyzine hcl], Aripiprazole, Prednisone, Restoril [temazepam], Wax [beeswax], Hydroxyzine pamoate, and Tape [adhesive tape]    Social History:   Social History     Socioeconomic History    Marital status:      Spouse name: Not on file    Number of children: Not on file    Years of education: Not on file    Highest education level: Not on file   Occupational History    Occupation: sales/customer serv   Tobacco Use    Smoking status: Former     Current packs/day: 0.00     Average packs/day: 3.0 packs/day for 45.0 years (135.0 ttl pk-yrs)     Types: Cigarettes     Start date: 1990     Quit date: 2023     Years since quittin.0    Smokeless tobacco: Never    Tobacco comments:     Pt down to 1ppd    Vaping Use    Vaping Use: Never used    Passive vaping exposure: Yes   Substance and Sexual Activity    Alcohol use: No     Alcohol/week: 0.0 standard drinks of alcohol    Drug use: Not Currently    Sexual activity: Not Currently   Other Topics Concern    Not on file   Social History Narrative    ** Merged History Encounter **          Social Determinants of Health     Financial Resource Strain: Not on file   Food Insecurity: Not on file   Transportation Needs: Not on file   Physical Activity: Not on file   Stress: Not on file   Social Connections: Not on file   Intimate Partner Violence: Not on file   Housing Stability: Not on file         Family History:       Problem Relation Age of Onset    Heart Disease Mother     Cancer Father

## 2024-01-12 NOTE — CARE COORDINATION
Patient presented to ED with c/o epigastric pain.   GI consulted. Patient is NPO. IVF continuous. Met with patient at bedside to discuss transition of care. Patient lives alone in a single level apartment.  She uses a w/c in the home but has a walker to pivot. Has a BSC. She uses oxygen 2 l at night prn but does not know name of company. Currently on 2L of oxygen with pox 100%.  Patient has HHA through [facility] 5 hrs a day Monday through Friday. They were notified at [phone] and would like notified at discharge to resume care. Patient has h/o HHC with [facility] and agreeable to receive services again if needed. . Has h/o [facility]  but declines. Will await therapy evaluations  Her PCP is Dr. Whyte and she uses [facility]. She will need an ambulette arranged when medically cleared for transport home.Ambulette form will need completed and is in envelope in soft chart CM/SW will continue to follow

## 2024-01-12 NOTE — PLAN OF CARE
Problem: Safety - Adult  Goal: Free from fall injury  Outcome: Progressing      [facility] GENERAL SURGERY  [address]  [address] [facility] [address]          September 12, 2018    RE:  Abrahan Hagen                                                                                                                                                       [address] [place] [id number]            To whom it may concern:    Abrahan Hagen is under my professional care for Cholecystitis He  may return to work but with the following limitations.    When the patient returns to work, the following restrictions apply until October 1, 2018  A) Bend: Occasionally (1-3 hours)  B) Squat: Occasionally (1-3 hours)  C) Walk/Stand: Frequently (4-8 hours)  D) Reach Above Shoulders: Occasionally (1-3 hours)  E) Lift, carry, push, and pull no more than:  11-20 lbs.Light duty-unable to lift more than 20 pounds.      Sincerely,        Pallavi Reardon MD

## 2024-01-12 NOTE — CONSULTS
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  Will check MRCP if patient body habitus permits to rule out retained choledocholithiasis versus pancreatic divisum or other anatomic abnormality  Okay for diet after MRCP, start with clear liquid diet and advance as tolerated  No plan for intervention at this time unless MRCP shows choledocholithiasis.  If choledocholithiasis is noted on MRCP, patient would need laparoscopic assisted ERCP due to her post Diane-en-Y gastric bypass anatomy.  This was discussed in detail with the patient and she agrees with the above-stated plan.  Time spent reviewing past medical, surgical, social and family history, vitals, nursing assessment and images.  Time spent face to face with patient and family counciling and discussing care exceeded 50% of the time of the consult. Additional time spent reviewing images and labs, discussing case with nursing, support staff and other physicians; as well as coordinating care.        Physician Signature: Electronically signed by Dr. Cardoso

## 2024-01-13 LAB — LIPASE SERPL-CCNC: 91 U/L (ref 13–60)

## 2024-01-13 PROCEDURE — 1200000000 HC SEMI PRIVATE

## 2024-01-13 PROCEDURE — 97530 THERAPEUTIC ACTIVITIES: CPT

## 2024-01-13 PROCEDURE — 97161 PT EVAL LOW COMPLEX 20 MIN: CPT

## 2024-01-13 PROCEDURE — 83690 ASSAY OF LIPASE: CPT

## 2024-01-13 PROCEDURE — 97165 OT EVAL LOW COMPLEX 30 MIN: CPT

## 2024-01-13 PROCEDURE — 2580000003 HC RX 258: Performed by: STUDENT IN AN ORGANIZED HEALTH CARE EDUCATION/TRAINING PROGRAM

## 2024-01-13 PROCEDURE — 6360000002 HC RX W HCPCS: Performed by: INTERNAL MEDICINE

## 2024-01-13 PROCEDURE — 6370000000 HC RX 637 (ALT 250 FOR IP): Performed by: INTERNAL MEDICINE

## 2024-01-13 PROCEDURE — 97535 SELF CARE MNGMENT TRAINING: CPT

## 2024-01-13 PROCEDURE — 36415 COLL VENOUS BLD VENIPUNCTURE: CPT

## 2024-01-13 RX ADMIN — TOPIRAMATE 200 MG: 100 TABLET ORAL at 08:29

## 2024-01-13 RX ADMIN — GABAPENTIN 600 MG: 600 TABLET, FILM COATED ORAL at 08:30

## 2024-01-13 RX ADMIN — Medication 1 TABLET: at 08:30

## 2024-01-13 RX ADMIN — LEVOCARNITINE 330 MG: 330 TABLET ORAL at 08:29

## 2024-01-13 RX ADMIN — MECLIZINE 25 MG: 12.5 TABLET ORAL at 08:29

## 2024-01-13 RX ADMIN — GABAPENTIN 600 MG: 600 TABLET, FILM COATED ORAL at 20:31

## 2024-01-13 RX ADMIN — ALLOPURINOL 100 MG: 100 TABLET ORAL at 20:32

## 2024-01-13 RX ADMIN — DOXYCYCLINE HYCLATE 100 MG: 100 CAPSULE ORAL at 08:30

## 2024-01-13 RX ADMIN — METOPROLOL SUCCINATE 50 MG: 50 TABLET, EXTENDED RELEASE ORAL at 08:31

## 2024-01-13 RX ADMIN — ARFORMOTEROL TARTRATE 15 MCG: 15 SOLUTION RESPIRATORY (INHALATION) at 10:26

## 2024-01-13 RX ADMIN — SODIUM CHLORIDE, POTASSIUM CHLORIDE, SODIUM LACTATE AND CALCIUM CHLORIDE: 600; 310; 30; 20 INJECTION, SOLUTION INTRAVENOUS at 01:58

## 2024-01-13 RX ADMIN — OXYCODONE AND ACETAMINOPHEN 1 TABLET: 10; 325 TABLET ORAL at 11:27

## 2024-01-13 RX ADMIN — MORPHINE SULFATE 1 MG: 2 INJECTION, SOLUTION INTRAMUSCULAR; INTRAVENOUS at 18:05

## 2024-01-13 RX ADMIN — MECLIZINE 25 MG: 12.5 TABLET ORAL at 13:29

## 2024-01-13 RX ADMIN — DULOXETINE 60 MG: 60 CAPSULE, DELAYED RELEASE ORAL at 20:31

## 2024-01-13 RX ADMIN — BACLOFEN 20 MG: 10 TABLET ORAL at 20:31

## 2024-01-13 RX ADMIN — MECLIZINE 25 MG: 12.5 TABLET ORAL at 18:01

## 2024-01-13 RX ADMIN — PANCRELIPASE LIPASE, PANCRELIPASE PROTEASE, PANCRELIPASE AMYLASE 80000 UNITS: 20000; 63000; 84000 CAPSULE, DELAYED RELEASE ORAL at 20:30

## 2024-01-13 RX ADMIN — RIFAMPIN 300 MG: 300 CAPSULE ORAL at 20:32

## 2024-01-13 RX ADMIN — IPRATROPIUM BROMIDE 0.5 MG: 0.5 SOLUTION RESPIRATORY (INHALATION) at 10:27

## 2024-01-13 RX ADMIN — LEVOCARNITINE 330 MG: 330 TABLET ORAL at 20:30

## 2024-01-13 RX ADMIN — DOXYCYCLINE HYCLATE 100 MG: 100 CAPSULE ORAL at 20:31

## 2024-01-13 RX ADMIN — POTASSIUM CHLORIDE 20 MEQ: 1500 TABLET, EXTENDED RELEASE ORAL at 08:30

## 2024-01-13 RX ADMIN — MONTELUKAST 10 MG: 10 TABLET, FILM COATED ORAL at 20:31

## 2024-01-13 RX ADMIN — GABAPENTIN 600 MG: 600 TABLET, FILM COATED ORAL at 13:29

## 2024-01-13 RX ADMIN — SODIUM CHLORIDE, POTASSIUM CHLORIDE, SODIUM LACTATE AND CALCIUM CHLORIDE: 600; 310; 30; 20 INJECTION, SOLUTION INTRAVENOUS at 20:33

## 2024-01-13 RX ADMIN — IPRATROPIUM BROMIDE 0.5 MG: 0.5 SOLUTION RESPIRATORY (INHALATION) at 15:55

## 2024-01-13 RX ADMIN — OXYCODONE AND ACETAMINOPHEN 1 TABLET: 10; 325 TABLET ORAL at 01:56

## 2024-01-13 RX ADMIN — OXYCODONE AND ACETAMINOPHEN 1 TABLET: 10; 325 TABLET ORAL at 20:38

## 2024-01-13 RX ADMIN — PANTOPRAZOLE SODIUM 40 MG: 40 TABLET, DELAYED RELEASE ORAL at 08:30

## 2024-01-13 RX ADMIN — MORPHINE SULFATE 1 MG: 2 INJECTION, SOLUTION INTRAMUSCULAR; INTRAVENOUS at 08:27

## 2024-01-13 RX ADMIN — BACLOFEN 20 MG: 10 TABLET ORAL at 08:31

## 2024-01-13 RX ADMIN — FERROUS SULFATE TAB 325 MG (65 MG ELEMENTAL FE) 325 MG: 325 (65 FE) TAB at 08:31

## 2024-01-13 RX ADMIN — TOPIRAMATE 200 MG: 100 TABLET ORAL at 20:30

## 2024-01-13 RX ADMIN — BACLOFEN 20 MG: 10 TABLET ORAL at 13:28

## 2024-01-13 RX ADMIN — MECLIZINE 25 MG: 12.5 TABLET ORAL at 20:32

## 2024-01-13 RX ADMIN — TRAZODONE HYDROCHLORIDE 100 MG: 50 TABLET ORAL at 20:31

## 2024-01-13 RX ADMIN — RIFAMPIN 300 MG: 300 CAPSULE ORAL at 08:29

## 2024-01-13 RX ADMIN — LEVOCARNITINE 330 MG: 330 TABLET ORAL at 13:29

## 2024-01-13 RX ADMIN — PANCRELIPASE LIPASE, PANCRELIPASE PROTEASE, PANCRELIPASE AMYLASE 80000 UNITS: 20000; 63000; 84000 CAPSULE, DELAYED RELEASE ORAL at 16:08

## 2024-01-13 RX ADMIN — FUROSEMIDE 40 MG: 40 TABLET ORAL at 08:31

## 2024-01-13 RX ADMIN — ALLOPURINOL 100 MG: 100 TABLET ORAL at 08:31

## 2024-01-13 RX ADMIN — CETIRIZINE HYDROCHLORIDE 5 MG: 10 TABLET, FILM COATED ORAL at 08:31

## 2024-01-13 RX ADMIN — BACLOFEN 20 MG: 10 TABLET ORAL at 18:00

## 2024-01-13 RX ADMIN — ZIPRASIDONE HYDROCHLORIDE 40 MG: 40 CAPSULE ORAL at 18:01

## 2024-01-13 ASSESSMENT — PAIN SCALES - GENERAL
PAINLEVEL_OUTOF10: 7
PAINLEVEL_OUTOF10: 5
PAINLEVEL_OUTOF10: 3
PAINLEVEL_OUTOF10: 8
PAINLEVEL_OUTOF10: 7

## 2024-01-13 ASSESSMENT — PAIN DESCRIPTION - DESCRIPTORS
DESCRIPTORS: ACHING;TENDER;SORE
DESCRIPTORS: ACHING;SORE;THROBBING

## 2024-01-13 ASSESSMENT — PAIN DESCRIPTION - LOCATION
LOCATION: ABDOMEN;BACK
LOCATION: BACK
LOCATION: ABDOMEN;BACK
LOCATION: BACK;LEG
LOCATION: BACK
LOCATION: BACK;ABDOMEN
LOCATION: BACK

## 2024-01-13 ASSESSMENT — PAIN DESCRIPTION - ORIENTATION
ORIENTATION: MID;LOWER
ORIENTATION: LEFT;RIGHT;MID
ORIENTATION: LOWER;MID
ORIENTATION: MID;LOWER
ORIENTATION: LOWER
ORIENTATION: LOWER;LEFT;RIGHT
ORIENTATION: LOWER;MID

## 2024-01-14 LAB — LIPASE SERPL-CCNC: 57 U/L (ref 13–60)

## 2024-01-14 PROCEDURE — 2700000000 HC OXYGEN THERAPY PER DAY

## 2024-01-14 PROCEDURE — 6370000000 HC RX 637 (ALT 250 FOR IP): Performed by: INTERNAL MEDICINE

## 2024-01-14 PROCEDURE — 6360000002 HC RX W HCPCS: Performed by: INTERNAL MEDICINE

## 2024-01-14 PROCEDURE — 1200000000 HC SEMI PRIVATE

## 2024-01-14 PROCEDURE — 36415 COLL VENOUS BLD VENIPUNCTURE: CPT

## 2024-01-14 PROCEDURE — 83690 ASSAY OF LIPASE: CPT

## 2024-01-14 PROCEDURE — 2580000003 HC RX 258: Performed by: STUDENT IN AN ORGANIZED HEALTH CARE EDUCATION/TRAINING PROGRAM

## 2024-01-14 PROCEDURE — 94640 AIRWAY INHALATION TREATMENT: CPT

## 2024-01-14 RX ADMIN — DOXYCYCLINE HYCLATE 100 MG: 100 CAPSULE ORAL at 07:48

## 2024-01-14 RX ADMIN — MONTELUKAST 10 MG: 10 TABLET, FILM COATED ORAL at 21:09

## 2024-01-14 RX ADMIN — RIFAMPIN 300 MG: 300 CAPSULE ORAL at 21:09

## 2024-01-14 RX ADMIN — MECLIZINE 25 MG: 12.5 TABLET ORAL at 21:09

## 2024-01-14 RX ADMIN — Medication 1 TABLET: at 07:47

## 2024-01-14 RX ADMIN — IPRATROPIUM BROMIDE 0.5 MG: 0.5 SOLUTION RESPIRATORY (INHALATION) at 09:43

## 2024-01-14 RX ADMIN — POTASSIUM CHLORIDE 20 MEQ: 1500 TABLET, EXTENDED RELEASE ORAL at 07:48

## 2024-01-14 RX ADMIN — PANCRELIPASE LIPASE, PANCRELIPASE PROTEASE, PANCRELIPASE AMYLASE 80000 UNITS: 20000; 63000; 84000 CAPSULE, DELAYED RELEASE ORAL at 07:47

## 2024-01-14 RX ADMIN — ALLOPURINOL 100 MG: 100 TABLET ORAL at 21:09

## 2024-01-14 RX ADMIN — DULOXETINE 60 MG: 60 CAPSULE, DELAYED RELEASE ORAL at 21:09

## 2024-01-14 RX ADMIN — RIFAMPIN 300 MG: 300 CAPSULE ORAL at 07:46

## 2024-01-14 RX ADMIN — FUROSEMIDE 40 MG: 40 TABLET ORAL at 07:46

## 2024-01-14 RX ADMIN — ZIPRASIDONE HYDROCHLORIDE 40 MG: 40 CAPSULE ORAL at 17:31

## 2024-01-14 RX ADMIN — PANTOPRAZOLE SODIUM 40 MG: 40 TABLET, DELAYED RELEASE ORAL at 07:46

## 2024-01-14 RX ADMIN — TOPIRAMATE 200 MG: 100 TABLET ORAL at 21:10

## 2024-01-14 RX ADMIN — MECLIZINE 25 MG: 12.5 TABLET ORAL at 07:48

## 2024-01-14 RX ADMIN — ALLOPURINOL 100 MG: 100 TABLET ORAL at 07:49

## 2024-01-14 RX ADMIN — CETIRIZINE HYDROCHLORIDE 5 MG: 10 TABLET, FILM COATED ORAL at 07:45

## 2024-01-14 RX ADMIN — MORPHINE SULFATE 1 MG: 2 INJECTION, SOLUTION INTRAMUSCULAR; INTRAVENOUS at 16:54

## 2024-01-14 RX ADMIN — GABAPENTIN 600 MG: 600 TABLET, FILM COATED ORAL at 07:47

## 2024-01-14 RX ADMIN — LEVOCARNITINE 330 MG: 330 TABLET ORAL at 12:58

## 2024-01-14 RX ADMIN — OXYCODONE AND ACETAMINOPHEN 1 TABLET: 10; 325 TABLET ORAL at 07:56

## 2024-01-14 RX ADMIN — TRAZODONE HYDROCHLORIDE 100 MG: 50 TABLET ORAL at 21:09

## 2024-01-14 RX ADMIN — MECLIZINE 25 MG: 12.5 TABLET ORAL at 12:59

## 2024-01-14 RX ADMIN — MORPHINE SULFATE 1 MG: 2 INJECTION, SOLUTION INTRAMUSCULAR; INTRAVENOUS at 12:18

## 2024-01-14 RX ADMIN — OXYCODONE AND ACETAMINOPHEN 1 TABLET: 10; 325 TABLET ORAL at 14:51

## 2024-01-14 RX ADMIN — BACLOFEN 20 MG: 10 TABLET ORAL at 12:58

## 2024-01-14 RX ADMIN — MORPHINE SULFATE 1 MG: 2 INJECTION, SOLUTION INTRAMUSCULAR; INTRAVENOUS at 05:03

## 2024-01-14 RX ADMIN — TOPIRAMATE 200 MG: 100 TABLET ORAL at 07:45

## 2024-01-14 RX ADMIN — SODIUM CHLORIDE, POTASSIUM CHLORIDE, SODIUM LACTATE AND CALCIUM CHLORIDE: 600; 310; 30; 20 INJECTION, SOLUTION INTRAVENOUS at 14:55

## 2024-01-14 RX ADMIN — DOXYCYCLINE HYCLATE 100 MG: 100 CAPSULE ORAL at 21:09

## 2024-01-14 RX ADMIN — GABAPENTIN 600 MG: 600 TABLET, FILM COATED ORAL at 12:59

## 2024-01-14 RX ADMIN — FERROUS SULFATE TAB 325 MG (65 MG ELEMENTAL FE) 325 MG: 325 (65 FE) TAB at 07:47

## 2024-01-14 RX ADMIN — OXYCODONE AND ACETAMINOPHEN 1 TABLET: 10; 325 TABLET ORAL at 21:15

## 2024-01-14 RX ADMIN — IPRATROPIUM BROMIDE 0.5 MG: 0.5 SOLUTION RESPIRATORY (INHALATION) at 13:53

## 2024-01-14 RX ADMIN — BACLOFEN 20 MG: 10 TABLET ORAL at 21:09

## 2024-01-14 RX ADMIN — METOPROLOL SUCCINATE 50 MG: 50 TABLET, EXTENDED RELEASE ORAL at 07:47

## 2024-01-14 RX ADMIN — IPRATROPIUM BROMIDE 0.5 MG: 0.5 SOLUTION RESPIRATORY (INHALATION) at 20:54

## 2024-01-14 RX ADMIN — BACLOFEN 20 MG: 10 TABLET ORAL at 07:48

## 2024-01-14 RX ADMIN — LEVOCARNITINE 330 MG: 330 TABLET ORAL at 21:10

## 2024-01-14 RX ADMIN — ARFORMOTEROL TARTRATE 15 MCG: 15 SOLUTION RESPIRATORY (INHALATION) at 20:54

## 2024-01-14 RX ADMIN — SODIUM CHLORIDE, POTASSIUM CHLORIDE, SODIUM LACTATE AND CALCIUM CHLORIDE: 600; 310; 30; 20 INJECTION, SOLUTION INTRAVENOUS at 05:09

## 2024-01-14 RX ADMIN — IPRATROPIUM BROMIDE 0.5 MG: 0.5 SOLUTION RESPIRATORY (INHALATION) at 15:57

## 2024-01-14 RX ADMIN — MECLIZINE 25 MG: 12.5 TABLET ORAL at 17:32

## 2024-01-14 RX ADMIN — LEVOCARNITINE 330 MG: 330 TABLET ORAL at 07:47

## 2024-01-14 RX ADMIN — ARFORMOTEROL TARTRATE 15 MCG: 15 SOLUTION RESPIRATORY (INHALATION) at 09:43

## 2024-01-14 RX ADMIN — BACLOFEN 20 MG: 10 TABLET ORAL at 17:32

## 2024-01-14 RX ADMIN — PANCRELIPASE LIPASE, PANCRELIPASE PROTEASE, PANCRELIPASE AMYLASE 80000 UNITS: 20000; 63000; 84000 CAPSULE, DELAYED RELEASE ORAL at 22:27

## 2024-01-14 RX ADMIN — GABAPENTIN 600 MG: 600 TABLET, FILM COATED ORAL at 21:10

## 2024-01-14 RX ADMIN — PANCRELIPASE LIPASE, PANCRELIPASE PROTEASE, PANCRELIPASE AMYLASE 80000 UNITS: 20000; 63000; 84000 CAPSULE, DELAYED RELEASE ORAL at 13:02

## 2024-01-14 RX ADMIN — PROMETHAZINE HYDROCHLORIDE 25 MG: 25 TABLET ORAL at 09:30

## 2024-01-14 RX ADMIN — PANCRELIPASE LIPASE, PANCRELIPASE PROTEASE, PANCRELIPASE AMYLASE 80000 UNITS: 20000; 63000; 84000 CAPSULE, DELAYED RELEASE ORAL at 16:08

## 2024-01-14 ASSESSMENT — PAIN DESCRIPTION - LOCATION
LOCATION: ABDOMEN;BACK
LOCATION: ABDOMEN;BACK
LOCATION: ABDOMEN;BACK;LEG
LOCATION: ABDOMEN;KNEE;BACK
LOCATION: BACK;LEG
LOCATION: ABDOMEN;LEG
LOCATION: ABDOMEN

## 2024-01-14 ASSESSMENT — PAIN SCALES - GENERAL
PAINLEVEL_OUTOF10: 7
PAINLEVEL_OUTOF10: 7
PAINLEVEL_OUTOF10: 6
PAINLEVEL_OUTOF10: 8
PAINLEVEL_OUTOF10: 5
PAINLEVEL_OUTOF10: 8
PAINLEVEL_OUTOF10: 7
PAINLEVEL_OUTOF10: 7
PAINLEVEL_OUTOF10: 6
PAINLEVEL_OUTOF10: 8
PAINLEVEL_OUTOF10: 6

## 2024-01-14 ASSESSMENT — PAIN DESCRIPTION - DESCRIPTORS
DESCRIPTORS: ACHING;DISCOMFORT;SORE
DESCRIPTORS: ACHING;BURNING;SHOOTING
DESCRIPTORS: SPASM;ACHING;SHOOTING
DESCRIPTORS: ACHING;BURNING

## 2024-01-14 ASSESSMENT — PAIN DESCRIPTION - ORIENTATION
ORIENTATION: RIGHT;LOWER
ORIENTATION: RIGHT;UPPER
ORIENTATION: LOWER;RIGHT
ORIENTATION: MID;UPPER;LOWER
ORIENTATION: MID;LOWER
ORIENTATION: LOWER;RIGHT
ORIENTATION: RIGHT

## 2024-01-14 ASSESSMENT — PAIN - FUNCTIONAL ASSESSMENT
PAIN_FUNCTIONAL_ASSESSMENT: PREVENTS OR INTERFERES SOME ACTIVE ACTIVITIES AND ADLS
PAIN_FUNCTIONAL_ASSESSMENT: ACTIVITIES ARE NOT PREVENTED
PAIN_FUNCTIONAL_ASSESSMENT: PREVENTS OR INTERFERES SOME ACTIVE ACTIVITIES AND ADLS

## 2024-01-15 LAB
GLUCOSE BLD-MCNC: 134 MG/DL (ref 74–99)
LIPASE SERPL-CCNC: 56 U/L (ref 13–60)

## 2024-01-15 PROCEDURE — 36415 COLL VENOUS BLD VENIPUNCTURE: CPT

## 2024-01-15 PROCEDURE — 1200000000 HC SEMI PRIVATE

## 2024-01-15 PROCEDURE — 83690 ASSAY OF LIPASE: CPT

## 2024-01-15 PROCEDURE — 94640 AIRWAY INHALATION TREATMENT: CPT

## 2024-01-15 PROCEDURE — 6360000002 HC RX W HCPCS: Performed by: INTERNAL MEDICINE

## 2024-01-15 PROCEDURE — 82962 GLUCOSE BLOOD TEST: CPT

## 2024-01-15 PROCEDURE — 2580000003 HC RX 258: Performed by: STUDENT IN AN ORGANIZED HEALTH CARE EDUCATION/TRAINING PROGRAM

## 2024-01-15 PROCEDURE — 6370000000 HC RX 637 (ALT 250 FOR IP): Performed by: INTERNAL MEDICINE

## 2024-01-15 RX ORDER — GABAPENTIN 600 MG/1
600 TABLET ORAL 4 TIMES DAILY
Status: DISCONTINUED | OUTPATIENT
Start: 2024-01-15 | End: 2024-01-16 | Stop reason: HOSPADM

## 2024-01-15 RX ORDER — DOCUSATE SODIUM 100 MG/1
100 CAPSULE, LIQUID FILLED ORAL DAILY
Status: DISCONTINUED | OUTPATIENT
Start: 2024-01-15 | End: 2024-01-16 | Stop reason: HOSPADM

## 2024-01-15 RX ADMIN — FERROUS SULFATE TAB 325 MG (65 MG ELEMENTAL FE) 325 MG: 325 (65 FE) TAB at 07:42

## 2024-01-15 RX ADMIN — IPRATROPIUM BROMIDE 0.5 MG: 0.5 SOLUTION RESPIRATORY (INHALATION) at 21:43

## 2024-01-15 RX ADMIN — MECLIZINE 25 MG: 12.5 TABLET ORAL at 13:04

## 2024-01-15 RX ADMIN — TOPIRAMATE 200 MG: 100 TABLET ORAL at 20:41

## 2024-01-15 RX ADMIN — IPRATROPIUM BROMIDE 0.5 MG: 0.5 SOLUTION RESPIRATORY (INHALATION) at 10:24

## 2024-01-15 RX ADMIN — ZIPRASIDONE HYDROCHLORIDE 40 MG: 40 CAPSULE ORAL at 16:30

## 2024-01-15 RX ADMIN — OXYCODONE AND ACETAMINOPHEN 1 TABLET: 10; 325 TABLET ORAL at 15:06

## 2024-01-15 RX ADMIN — POTASSIUM CHLORIDE 20 MEQ: 1500 TABLET, EXTENDED RELEASE ORAL at 07:41

## 2024-01-15 RX ADMIN — MECLIZINE 25 MG: 12.5 TABLET ORAL at 16:31

## 2024-01-15 RX ADMIN — BACLOFEN 20 MG: 10 TABLET ORAL at 16:31

## 2024-01-15 RX ADMIN — METOPROLOL SUCCINATE 50 MG: 50 TABLET, EXTENDED RELEASE ORAL at 07:41

## 2024-01-15 RX ADMIN — ALLOPURINOL 100 MG: 100 TABLET ORAL at 07:42

## 2024-01-15 RX ADMIN — SODIUM CHLORIDE, POTASSIUM CHLORIDE, SODIUM LACTATE AND CALCIUM CHLORIDE: 600; 310; 30; 20 INJECTION, SOLUTION INTRAVENOUS at 17:39

## 2024-01-15 RX ADMIN — IPRATROPIUM BROMIDE 0.5 MG: 0.5 SOLUTION RESPIRATORY (INHALATION) at 14:09

## 2024-01-15 RX ADMIN — MECLIZINE 25 MG: 12.5 TABLET ORAL at 20:41

## 2024-01-15 RX ADMIN — DOCUSATE SODIUM 100 MG: 100 CAPSULE, LIQUID FILLED ORAL at 11:17

## 2024-01-15 RX ADMIN — ARFORMOTEROL TARTRATE 15 MCG: 15 SOLUTION RESPIRATORY (INHALATION) at 21:43

## 2024-01-15 RX ADMIN — DOXYCYCLINE HYCLATE 100 MG: 100 CAPSULE ORAL at 20:41

## 2024-01-15 RX ADMIN — SODIUM CHLORIDE, POTASSIUM CHLORIDE, SODIUM LACTATE AND CALCIUM CHLORIDE: 600; 310; 30; 20 INJECTION, SOLUTION INTRAVENOUS at 00:34

## 2024-01-15 RX ADMIN — BACLOFEN 20 MG: 10 TABLET ORAL at 07:41

## 2024-01-15 RX ADMIN — MORPHINE SULFATE 1 MG: 2 INJECTION, SOLUTION INTRAMUSCULAR; INTRAVENOUS at 10:44

## 2024-01-15 RX ADMIN — MORPHINE SULFATE 1 MG: 2 INJECTION, SOLUTION INTRAMUSCULAR; INTRAVENOUS at 02:36

## 2024-01-15 RX ADMIN — OXYCODONE AND ACETAMINOPHEN 1 TABLET: 10; 325 TABLET ORAL at 07:41

## 2024-01-15 RX ADMIN — Medication 1 TABLET: at 07:42

## 2024-01-15 RX ADMIN — LEVOCARNITINE 330 MG: 330 TABLET ORAL at 07:47

## 2024-01-15 RX ADMIN — ALLOPURINOL 100 MG: 100 TABLET ORAL at 20:42

## 2024-01-15 RX ADMIN — PANCRELIPASE LIPASE, PANCRELIPASE PROTEASE, PANCRELIPASE AMYLASE 80000 UNITS: 20000; 63000; 84000 CAPSULE, DELAYED RELEASE ORAL at 16:31

## 2024-01-15 RX ADMIN — MECLIZINE 25 MG: 12.5 TABLET ORAL at 07:42

## 2024-01-15 RX ADMIN — BACLOFEN 20 MG: 10 TABLET ORAL at 13:04

## 2024-01-15 RX ADMIN — FUROSEMIDE 40 MG: 40 TABLET ORAL at 07:42

## 2024-01-15 RX ADMIN — RIFAMPIN 300 MG: 300 CAPSULE ORAL at 07:48

## 2024-01-15 RX ADMIN — RIFAMPIN 300 MG: 300 CAPSULE ORAL at 20:41

## 2024-01-15 RX ADMIN — MORPHINE SULFATE 1 MG: 2 INJECTION, SOLUTION INTRAMUSCULAR; INTRAVENOUS at 17:37

## 2024-01-15 RX ADMIN — LEVOCARNITINE 330 MG: 330 TABLET ORAL at 20:42

## 2024-01-15 RX ADMIN — LEVOCARNITINE 330 MG: 330 TABLET ORAL at 13:04

## 2024-01-15 RX ADMIN — PANTOPRAZOLE SODIUM 40 MG: 40 TABLET, DELAYED RELEASE ORAL at 07:41

## 2024-01-15 RX ADMIN — GABAPENTIN 600 MG: 600 TABLET, FILM COATED ORAL at 13:03

## 2024-01-15 RX ADMIN — ARFORMOTEROL TARTRATE 15 MCG: 15 SOLUTION RESPIRATORY (INHALATION) at 10:24

## 2024-01-15 RX ADMIN — GABAPENTIN 600 MG: 600 TABLET, FILM COATED ORAL at 07:42

## 2024-01-15 RX ADMIN — TOPIRAMATE 200 MG: 100 TABLET ORAL at 07:48

## 2024-01-15 RX ADMIN — BACLOFEN 20 MG: 10 TABLET ORAL at 20:41

## 2024-01-15 RX ADMIN — TRAZODONE HYDROCHLORIDE 100 MG: 50 TABLET ORAL at 20:41

## 2024-01-15 RX ADMIN — MONTELUKAST 10 MG: 10 TABLET, FILM COATED ORAL at 20:42

## 2024-01-15 RX ADMIN — PANCRELIPASE LIPASE, PANCRELIPASE PROTEASE, PANCRELIPASE AMYLASE 80000 UNITS: 20000; 63000; 84000 CAPSULE, DELAYED RELEASE ORAL at 07:43

## 2024-01-15 RX ADMIN — GABAPENTIN 600 MG: 600 TABLET, FILM COATED ORAL at 20:42

## 2024-01-15 RX ADMIN — IPRATROPIUM BROMIDE 0.5 MG: 0.5 SOLUTION RESPIRATORY (INHALATION) at 17:57

## 2024-01-15 RX ADMIN — OXYCODONE AND ACETAMINOPHEN 1 TABLET: 10; 325 TABLET ORAL at 21:00

## 2024-01-15 RX ADMIN — DULOXETINE 60 MG: 60 CAPSULE, DELAYED RELEASE ORAL at 20:41

## 2024-01-15 RX ADMIN — PANCRELIPASE LIPASE, PANCRELIPASE PROTEASE, PANCRELIPASE AMYLASE 80000 UNITS: 20000; 63000; 84000 CAPSULE, DELAYED RELEASE ORAL at 11:17

## 2024-01-15 RX ADMIN — SODIUM CHLORIDE, POTASSIUM CHLORIDE, SODIUM LACTATE AND CALCIUM CHLORIDE: 600; 310; 30; 20 INJECTION, SOLUTION INTRAVENOUS at 10:44

## 2024-01-15 RX ADMIN — GABAPENTIN 600 MG: 600 TABLET, FILM COATED ORAL at 16:31

## 2024-01-15 RX ADMIN — CETIRIZINE HYDROCHLORIDE 5 MG: 10 TABLET, FILM COATED ORAL at 07:42

## 2024-01-15 RX ADMIN — PANCRELIPASE LIPASE, PANCRELIPASE PROTEASE, PANCRELIPASE AMYLASE 80000 UNITS: 20000; 63000; 84000 CAPSULE, DELAYED RELEASE ORAL at 20:41

## 2024-01-15 RX ADMIN — DOXYCYCLINE HYCLATE 100 MG: 100 CAPSULE ORAL at 07:42

## 2024-01-15 ASSESSMENT — PAIN - FUNCTIONAL ASSESSMENT
PAIN_FUNCTIONAL_ASSESSMENT: PREVENTS OR INTERFERES SOME ACTIVE ACTIVITIES AND ADLS
PAIN_FUNCTIONAL_ASSESSMENT: ACTIVITIES ARE NOT PREVENTED
PAIN_FUNCTIONAL_ASSESSMENT: ACTIVITIES ARE NOT PREVENTED

## 2024-01-15 ASSESSMENT — PAIN SCALES - GENERAL
PAINLEVEL_OUTOF10: 5
PAINLEVEL_OUTOF10: 8
PAINLEVEL_OUTOF10: 7
PAINLEVEL_OUTOF10: 7

## 2024-01-15 ASSESSMENT — PAIN DESCRIPTION - LOCATION
LOCATION: ABDOMEN
LOCATION: GENERALIZED
LOCATION: ABDOMEN;BACK;KNEE

## 2024-01-15 ASSESSMENT — PAIN DESCRIPTION - DESCRIPTORS
DESCRIPTORS: ACHING
DESCRIPTORS: DISCOMFORT;ACHING;THROBBING;TENDER
DESCRIPTORS: ACHING

## 2024-01-15 ASSESSMENT — PAIN DESCRIPTION - ORIENTATION
ORIENTATION: LEFT;MID
ORIENTATION: RIGHT;LEFT;UPPER

## 2024-01-15 NOTE — PLAN OF CARE
Problem: Chronic Conditions and Co-morbidities  Goal: Patient's chronic conditions and co-morbidity symptoms are monitored and maintained or improved  1/15/2024 0037 by Skye Perez RN  Outcome: Progressing  1/14/2024 1103 by Sandhya Amin RN  Outcome: Progressing     Problem: Discharge Planning  Goal: Discharge to home or other facility with appropriate resources  1/15/2024 0037 by Skye Perez RN  Outcome: Progressing  1/14/2024 1103 by Sandhya Amin RN  Outcome: Progressing     Problem: Skin/Tissue Integrity  Goal: Absence of new skin breakdown  Description: 1.  Monitor for areas of redness and/or skin breakdown  2.  Assess vascular access sites hourly  3.  Every 4-6 hours minimum:  Change oxygen saturation probe site  4.  Every 4-6 hours:  If on nasal continuous positive airway pressure, respiratory therapy assess nares and determine need for appliance change or resting period.  1/15/2024 0037 by Skye Perez RN  Outcome: Progressing  1/14/2024 1103 by Sandhya Amin RN  Outcome: Progressing     Problem: Safety - Adult  Goal: Free from fall injury  1/15/2024 0037 by Skye Perez RN  Outcome: Progressing  1/14/2024 1103 by Sandhya Amin RN  Outcome: Progressing     Problem: ABCDS Injury Assessment  Goal: Absence of physical injury  1/15/2024 0037 by Skye Perez RN  Outcome: Progressing  1/14/2024 1103 by Sandhya Amin RN  Outcome: Progressing     Problem: Pain  Goal: Verbalizes/displays adequate comfort level or baseline comfort level  1/15/2024 0037 by Skye Perez RN  Outcome: Progressing  1/14/2024 1103 by Sandhya Amin RN  Outcome: Progressing

## 2024-01-15 NOTE — CARE COORDINATION
Care Coordination  The patient was admitted with acute pancreatitis. This am is on a general diet and food is bothering her stomach. She's on colace. Her plan is to return home once tolerating her diet without pain. Patient has HHA through [facility] 5 hrs a day Monday through Friday and they will need notified on the day of discharge to resume care @[phone]. Pt [facility] 15/24 took steps to commode, ot [facility] 17/24. Her plan is to return home  and she will need an ambulette arranged when medically cleared for transport home. Ambulette form will need completed and is in envelope in soft chart. Will follow

## 2024-01-16 VITALS
OXYGEN SATURATION: 92 % | SYSTOLIC BLOOD PRESSURE: 139 MMHG | RESPIRATION RATE: 20 BRPM | HEIGHT: 67 IN | WEIGHT: 283 LBS | BODY MASS INDEX: 44.42 KG/M2 | HEART RATE: 73 BPM | DIASTOLIC BLOOD PRESSURE: 63 MMHG | TEMPERATURE: 97.2 F

## 2024-01-16 LAB — GLUCOSE BLD-MCNC: 88 MG/DL (ref 74–99)

## 2024-01-16 PROCEDURE — 6370000000 HC RX 637 (ALT 250 FOR IP): Performed by: INTERNAL MEDICINE

## 2024-01-16 PROCEDURE — 6360000002 HC RX W HCPCS: Performed by: INTERNAL MEDICINE

## 2024-01-16 PROCEDURE — 94640 AIRWAY INHALATION TREATMENT: CPT

## 2024-01-16 PROCEDURE — 82962 GLUCOSE BLOOD TEST: CPT

## 2024-01-16 PROCEDURE — 2580000003 HC RX 258: Performed by: STUDENT IN AN ORGANIZED HEALTH CARE EDUCATION/TRAINING PROGRAM

## 2024-01-16 RX ADMIN — POTASSIUM CHLORIDE 20 MEQ: 1500 TABLET, EXTENDED RELEASE ORAL at 07:51

## 2024-01-16 RX ADMIN — IPRATROPIUM BROMIDE 0.5 MG: 0.5 SOLUTION RESPIRATORY (INHALATION) at 12:22

## 2024-01-16 RX ADMIN — MECLIZINE 25 MG: 12.5 TABLET ORAL at 13:40

## 2024-01-16 RX ADMIN — ARFORMOTEROL TARTRATE 15 MCG: 15 SOLUTION RESPIRATORY (INHALATION) at 12:22

## 2024-01-16 RX ADMIN — SODIUM CHLORIDE, POTASSIUM CHLORIDE, SODIUM LACTATE AND CALCIUM CHLORIDE: 600; 310; 30; 20 INJECTION, SOLUTION INTRAVENOUS at 11:25

## 2024-01-16 RX ADMIN — RIFAMPIN 300 MG: 300 CAPSULE ORAL at 07:55

## 2024-01-16 RX ADMIN — PANTOPRAZOLE SODIUM 40 MG: 40 TABLET, DELAYED RELEASE ORAL at 07:51

## 2024-01-16 RX ADMIN — BACLOFEN 20 MG: 10 TABLET ORAL at 13:40

## 2024-01-16 RX ADMIN — DOCUSATE SODIUM 100 MG: 100 CAPSULE, LIQUID FILLED ORAL at 07:51

## 2024-01-16 RX ADMIN — LEVOCARNITINE 330 MG: 330 TABLET ORAL at 07:54

## 2024-01-16 RX ADMIN — LEVOCARNITINE 330 MG: 330 TABLET ORAL at 14:29

## 2024-01-16 RX ADMIN — PANCRELIPASE LIPASE, PANCRELIPASE PROTEASE, PANCRELIPASE AMYLASE 80000 UNITS: 20000; 63000; 84000 CAPSULE, DELAYED RELEASE ORAL at 16:24

## 2024-01-16 RX ADMIN — OXYCODONE AND ACETAMINOPHEN 1 TABLET: 10; 325 TABLET ORAL at 05:20

## 2024-01-16 RX ADMIN — BACLOFEN 20 MG: 10 TABLET ORAL at 16:24

## 2024-01-16 RX ADMIN — CETIRIZINE HYDROCHLORIDE 5 MG: 10 TABLET, FILM COATED ORAL at 07:51

## 2024-01-16 RX ADMIN — ALLOPURINOL 100 MG: 100 TABLET ORAL at 07:51

## 2024-01-16 RX ADMIN — MECLIZINE 25 MG: 12.5 TABLET ORAL at 07:51

## 2024-01-16 RX ADMIN — ZIPRASIDONE HYDROCHLORIDE 40 MG: 40 CAPSULE ORAL at 16:24

## 2024-01-16 RX ADMIN — FERROUS SULFATE TAB 325 MG (65 MG ELEMENTAL FE) 325 MG: 325 (65 FE) TAB at 07:51

## 2024-01-16 RX ADMIN — BACLOFEN 20 MG: 10 TABLET ORAL at 07:51

## 2024-01-16 RX ADMIN — TOPIRAMATE 200 MG: 100 TABLET ORAL at 07:55

## 2024-01-16 RX ADMIN — DOXYCYCLINE HYCLATE 100 MG: 100 CAPSULE ORAL at 07:51

## 2024-01-16 RX ADMIN — OXYCODONE AND ACETAMINOPHEN 1 TABLET: 10; 325 TABLET ORAL at 13:39

## 2024-01-16 RX ADMIN — METOPROLOL SUCCINATE 50 MG: 50 TABLET, EXTENDED RELEASE ORAL at 07:51

## 2024-01-16 RX ADMIN — MECLIZINE 25 MG: 12.5 TABLET ORAL at 16:24

## 2024-01-16 RX ADMIN — GABAPENTIN 600 MG: 600 TABLET, FILM COATED ORAL at 07:51

## 2024-01-16 RX ADMIN — MORPHINE SULFATE 1 MG: 2 INJECTION, SOLUTION INTRAMUSCULAR; INTRAVENOUS at 09:33

## 2024-01-16 RX ADMIN — GABAPENTIN 600 MG: 600 TABLET, FILM COATED ORAL at 13:39

## 2024-01-16 RX ADMIN — GABAPENTIN 600 MG: 600 TABLET, FILM COATED ORAL at 16:24

## 2024-01-16 RX ADMIN — FUROSEMIDE 40 MG: 40 TABLET ORAL at 07:51

## 2024-01-16 RX ADMIN — Medication 1 TABLET: at 07:51

## 2024-01-16 RX ADMIN — PANCRELIPASE LIPASE, PANCRELIPASE PROTEASE, PANCRELIPASE AMYLASE 80000 UNITS: 20000; 63000; 84000 CAPSULE, DELAYED RELEASE ORAL at 11:24

## 2024-01-16 RX ADMIN — PANCRELIPASE LIPASE, PANCRELIPASE PROTEASE, PANCRELIPASE AMYLASE 80000 UNITS: 20000; 63000; 84000 CAPSULE, DELAYED RELEASE ORAL at 07:52

## 2024-01-16 ASSESSMENT — PAIN DESCRIPTION - DESCRIPTORS: DESCRIPTORS: ACHING;SORE;PRESSURE

## 2024-01-16 ASSESSMENT — PAIN DESCRIPTION - LOCATION: LOCATION: ABDOMEN;KNEE

## 2024-01-16 ASSESSMENT — PAIN SCALES - GENERAL
PAINLEVEL_OUTOF10: 6
PAINLEVEL_OUTOF10: 4
PAINLEVEL_OUTOF10: 7

## 2024-01-16 ASSESSMENT — PAIN - FUNCTIONAL ASSESSMENT: PAIN_FUNCTIONAL_ASSESSMENT: PREVENTS OR INTERFERES SOME ACTIVE ACTIVITIES AND ADLS

## 2024-01-16 ASSESSMENT — PAIN DESCRIPTION - ORIENTATION: ORIENTATION: RIGHT

## 2024-01-16 NOTE — CARE COORDINATION
1/16. [facility] arranged [facility] for ambulette service to the pt's home. Nursing and pt aware of the time. Kate Peña RN

## 2024-01-16 NOTE — PROGRESS NOTES
Hospital Medicine    Subjective:  pt alert conversive abd pain better      Current Facility-Administered Medications:     lipase-protease-amylase (ZENPEP) 57506-40241 units delayed release capsule 80,000 Units, 80,000 Units, Oral, 4x Daily WC, Axel Pacheco, DO    DULoxetine (CYMBALTA) extended release capsule 60 mg, 60 mg, Oral, Nightly, Osvaldo Irving MD, 60 mg at 01/12/24 2039    doxycycline hyclate (VIBRAMYCIN) capsule 100 mg, 100 mg, Oral, 2 times per day, Osvaldo Irving MD, 100 mg at 01/13/24 0830    baclofen (LIORESAL) tablet 20 mg, 20 mg, Oral, 4x Daily, Osvaldo Irving MD, 20 mg at 01/13/24 0831    lactated ringers IV soln infusion, , IntraVENous, Continuous, Homero Santiago MD, Last Rate: 100 mL/hr at 01/13/24 0158, New Bag at 01/13/24 0158    topiramate (TOPAMAX) tablet 200 mg, 200 mg, Oral, BID, Osvaldo Irving MD, 200 mg at 01/13/24 0829    allopurinol (ZYLOPRIM) tablet 100 mg, 100 mg, Oral, BID, Osvaldo Irving MD, 100 mg at 01/13/24 0831    montelukast (SINGULAIR) tablet 10 mg, 10 mg, Oral, Nightly, Osvaldo Irving MD, 10 mg at 01/12/24 2039    traZODone (DESYREL) tablet 100 mg, 100 mg, Oral, Nightly, Osvaldo Irving MD, 100 mg at 01/12/24 2039    furosemide (LASIX) tablet 40 mg, 40 mg, Oral, Daily, Osvaldo Irving MD, 40 mg at 01/13/24 0831    promethazine (PHENERGAN) tablet 25 mg, 25 mg, Oral, Q6H PRN, Osvaldo Irving MD    oxyCODONE-acetaminophen (PERCOCET)  MG per tablet 1 tablet, 1 tablet, Oral, Q6H PRN, Osvaldo Irving MD, 1 tablet at 01/13/24 1127    ferrous sulfate (IRON 325) tablet 325 mg, 325 mg, Oral, Daily with breakfast, Osvaldo Irving MD, 325 mg at 01/13/24 0831    therapeutic multivitamin-minerals 1 tablet, 1 tablet, Oral, Daily, Osvaldo Irving MD, 1 tablet at 01/13/24 0830    potassium chloride (KLOR-CON M) extended release tablet 20 mEq, 20 mEq, Oral, Daily, Osvaldo Irving MD, 
  Hospital Medicine    Subjective:  pt c/o abd pain back pain      Current Facility-Administered Medications:     lipase-protease-amylase (ZENPEP) 54359-62189 units delayed release capsule 80,000 Units, 80,000 Units, Oral, 4x Daily WC, xAel Pacheco DO, 80,000 Units at 01/13/24 2030    DULoxetine (CYMBALTA) extended release capsule 60 mg, 60 mg, Oral, Nightly, Osvaldo Irving MD, 60 mg at 01/13/24 2031    doxycycline hyclate (VIBRAMYCIN) capsule 100 mg, 100 mg, Oral, 2 times per day, Osvaldo Irving MD, 100 mg at 01/13/24 2031    baclofen (LIORESAL) tablet 20 mg, 20 mg, Oral, 4x Daily, Osvaldo Irving MD, 20 mg at 01/13/24 2031    lactated ringers IV soln infusion, , IntraVENous, Continuous, Homero Santiago MD, Last Rate: 100 mL/hr at 01/14/24 0509, New Bag at 01/14/24 0509    topiramate (TOPAMAX) tablet 200 mg, 200 mg, Oral, BID, Osvaldo Irving MD, 200 mg at 01/13/24 2030    allopurinol (ZYLOPRIM) tablet 100 mg, 100 mg, Oral, BID, Osvaldo Irving MD, 100 mg at 01/13/24 2032    montelukast (SINGULAIR) tablet 10 mg, 10 mg, Oral, Nightly, Osvaldo Irving MD, 10 mg at 01/13/24 2031    traZODone (DESYREL) tablet 100 mg, 100 mg, Oral, Nightly, Osvaldo Irving MD, 100 mg at 01/13/24 2031    furosemide (LASIX) tablet 40 mg, 40 mg, Oral, Daily, Osvaldo Irving MD, 40 mg at 01/13/24 0831    promethazine (PHENERGAN) tablet 25 mg, 25 mg, Oral, Q6H PRN, Osvaldo Irving MD    oxyCODONE-acetaminophen (PERCOCET)  MG per tablet 1 tablet, 1 tablet, Oral, Q6H PRN, Osvaldo Irving MD, 1 tablet at 01/13/24 2038    ferrous sulfate (IRON 325) tablet 325 mg, 325 mg, Oral, Daily with breakfast, Osvaldo Irving MD, 325 mg at 01/13/24 0831    therapeutic multivitamin-minerals 1 tablet, 1 tablet, Oral, Daily, Osvaldo Irving MD, 1 tablet at 01/13/24 0830    potassium chloride (KLOR-CON M) extended release tablet 20 mEq, 20 mEq, Oral, Daily, 
. Your pt. Is requiring oxygen therapy can we please have a oxygen therapy order so we are covered? Thanks,    YAYA LunaP  
4 Eyes Skin Assessment     NAME:  Leny Yang  YOB: 1960  MEDICAL RECORD NUMBER:  [id number]    The patient is being assessed for  Admission    I agree that at least one RN has performed a thorough Head to Toe Skin Assessment on the patient. ALL assessment sites listed below have been assessed.      Areas assessed by both nurses:    Head, Face, Ears, Shoulders, Back, Chest, Arms, Elbows, Hands, Sacrum. Buttock, Coccyx, Ischium, Legs. Feet and Heels, Under Medical Devices , and Other old surgery scars on mid abd and knees        Does the Patient have a Wound? No noted wound(s)       Misha Prevention initiated by RN: No  Wound Care Orders initiated by RN: No    Pressure Injury (Stage 3,4, Unstageable, DTI, NWPT, and Complex wounds) if present, place Wound referral order by RN under : No    New Ostomies, if present place, Ostomy referral order under : No     Nurse 1 eSignature: Electronically signed by Peyman Jansen RN on 1/11/24 at 11:47 PM EST    **SHARE this note so that the co-signing nurse can place an eSignature**    Nurse 2 eSignature: Electronically signed by Gaudencio Christensen RN on 1/12/24 at 1:03 AM EST  
Diet advanced. Patient tolerating. No nausea/vomiting or increased pain.  
General Surgery   Daily Progress Note      Patient's Name/Date of Birth: Leny Yang / 1960    Date: January 13, 2024     Chief Complaint: pancreatitis.    Subjective: Patient states her abdominal pain has significantly improved compared to yesterday. Still has mild epigastric pain only.     Objective:  BP (!) 133/57   Pulse 70   Temp 97.4 °F (36.3 °C) (Temporal)   Resp 20   Ht 1.702 m (5' 7.01\")   Wt 128.4 kg (283 lb)   LMP 08/31/1988   SpO2 98%   BMI 44.31 kg/m²   Labs:  Recent Labs     01/11/24  1213   WBC 7.8   HGB 14.7   HCT 44.5     Recent Labs     01/11/24  1213      K 4.6      CO2 27   BUN 23   CREATININE 0.7     Recent Labs     01/11/24  1213 01/12/24  0423 01/13/24  0426   ALKPHOS 104  --   --    ALT 12  --   --    AST 14  --   --    BILITOT 0.3  --   --    LABALBU 3.8  --   --    LIPASE 1,622* 749* 91*         General appearance:  NAD, appears stated age  Head: NCAT, PERRLA, EOMI, red conjunctiva  Neck: supple, no masses, trachea midline  Lungs: Equal chest rise bilateral, no retractions, no wheezing  Heart: Reg rate  Abdomen: soft, mild epigastric tenderness, nondistended.  Skin; warm and dry, no cyanosis  Gu: no cva tenderness  Extremities: atraumatic, no focal motor deficits, no open wounds  Psych: No tremor, visual hallucinations      Assessment/Plan:  Leny Yang is a 63 y.o. female with acute pancreatitis without necrosis     Ok for clear liquid diet. Advance as tolerated.   Continue IVF.   PRN analgesia, antiemetics.   No choledocholithiasis noted on MRCP, pancreatic divisum only. No plans for surgical intervention.    Yosvany Golden DO  PGY-2 General Surgery Resident   
General Surgery   Daily Progress Note      Patient's Name/Date of Birth: Leny Yang / 1960    Date: January 14, 2024     Chief Complaint: pancreatitis.    Subjective: no acute events overnight, patient says abdominal pain has resolve. Now complaining only of back pain from laying in bed.     Objective:  BP (!) 148/71   Pulse 69   Temp 98.2 °F (36.8 °C) (Temporal)   Resp 17   Ht 1.702 m (5' 7.01\")   Wt 128.4 kg (283 lb)   LMP 08/31/1988   SpO2 95%   BMI 44.31 kg/m²   Labs:  Recent Labs     01/11/24  1213   WBC 7.8   HGB 14.7   HCT 44.5       Recent Labs     01/11/24  1213      K 4.6      CO2 27   BUN 23   CREATININE 0.7       Recent Labs     01/11/24  1213 01/12/24  0423 01/13/24  0426   ALKPHOS 104  --   --    ALT 12  --   --    AST 14  --   --    BILITOT 0.3  --   --    LABALBU 3.8  --   --    LIPASE 1,622* 749* 91*           General appearance:  NAD, appears stated age  Head: NCAT, PERRLA, EOMI, red conjunctiva  Neck: supple, no masses, trachea midline  Lungs: Equal chest rise bilateral, no retractions, no wheezing  Heart: Reg rate  Abdomen: soft, no epigastric tenderness, nondistended.  Skin; warm and dry, no cyanosis  Gu: no cva tenderness  Extremities: atraumatic, no focal motor deficits, no open wounds  Psych: No tremor, visual hallucinations    Assessment/Plan:  Leny Yang is a 63 y.o. female with acute pancreatitis without necrosis     Ok to advance as tolerated from general surgery POV. Will defer to primary.   PRN analgesia, antiemetics.   No choledocholithiasis noted on MRCP, pancreatic divisum only. No plans for surgical intervention.  Patient complaining of significant chronic back pain, will defer further mgmt to primary.   Please call w any further questions.     Yosvany Golden, DO  PGY-2 General Surgery Resident   
MRI Screening form done      2:25 PM: Informed CN that pt just remembered she supposed to be taking Baclofen QID. Attending needs to be informed.  
New IV line inserted at R hand/finger c 22g, patent & infusing well.  Pt is sent for MRI at 6:15 PM.  
Physical Therapy Initial Evaluation    Name: Leny Yang  : 1960  MRN: [id number]      Date of Service: 2024    Evaluating PT:  Axel Joy, PT [id number]    Referring provider/PT Order:  PT Eval and Treat   24 0945  PT eval and treat         Osvaldo Irving MD     Room #:  5404/5404-B  Diagnosis:  Acute pancreatitis without infection or necrosis [K85.90]  PMHx/PSHx:    @[facility]@    has a past surgical history that includes knee surgery (Bilateral); Gastric bypass surgery (); myomectomy; Tonsillectomy; Nerve Block (Left, 10/02/2013); Nerve Block (Left, 10/09/2013); Nerve Block (Left, 10/16/2013); other surgical history (Left, 2013); Nerve Block (Left, 10/29/2014); Nerve Block (Left, 2014); Nerve Block (Left, 2014); Nerve Block (Right, 2015); Nerve Block (Right, 2015); Nerve Block (Right, 2015); Nerve Block (Left, 2015); Nerve Block (2015); Nerve Block (Left, 10/01/2015); Nerve Block (10/26/2015); other surgical history (2016); Endoscopy, colon, diagnostic; pr colonoscopy flx dx w/collj spec when pfrmd (N/A, 2018); pr egd transoral biopsy single/multiple (N/A, 2018); Cholecystectomy (); Hysterectomy (); Colonoscopy (N/A, 2018); Esophagus dilation (2018); back surgery (last one ); Cardiac catheterization (Right, 2013); Appendectomy; other surgical history (); joint replacement (Bilateral, ,); egd colonoscopy (N/A, 10/08/2019); Colonoscopy (N/A, 10/08/2019); Nerve Block (Bilateral, 2021); Ankle fracture surgery (Right, 2022); Ankle fracture surgery (Right, 2022); knee surgery (Right, 2022); knee surgery (Right, 2023); IR ASP ABSCESS/HEMATOMA/BULLA/CYST (2023); Colonoscopy (); Colonoscopy (N/A, 8/15/2023); and Upper gastrointestinal endoscopy (N/A, 8/15/2023).     Procedure/Surgery:  none  Precautions:  Falls, Wears R knee support not present in 
Roro attending: \"Hi. This pt mentioned about taking Doxy. The last time the pt took it yesterday morning before she came in the ER. It is not part of the order in MAR, sultana. Thanx\"      10:55AM; Travis ordered      Roro attending:   10:58: \"The pt told the nurse that she was taking it prophylactically along with Rifamfin dose for MRSA prior infection. She has no PICC line right now. he took the last dose on yesterday AM at her home before coming to ER. She used to be taking Doxy 250 mg BID \"    Response  11:13 AM: \"Ok to restart Doxycycline is usually 100 bid May need to check with pcp or pharmacy to ensure accurate dose\"      
Subjective:    Chief complaint:    Eating breakfast- food is bothering her stomach  Wants gabapentin increased to qid  Wants stool softener  No problems overnight.  Denies chest pain, angina, and dyspnea.  Denies abdominal pain.  Tolerating diet.  No nausea or vomiting.    Objective:    BP (!) 114/59   Pulse 76   Temp 98.4 °F (36.9 °C) (Temporal)   Resp 16   Ht 1.702 m (5' 7.01\")   Wt 128.4 kg (283 lb)   LMP 08/31/1988   SpO2 96%   BMI 44.31 kg/m²   General : Awake ,alert,no distress.  Heart:  RRR, no murmurs, gallops, or rubs.  Lungs:  CTA bilaterally, no wheeze, rales or rhonchi  Abd: bowel sounds present, nontender, nondistended, no masses  Extrem:  No clubbing, cyanosis, or edema    CBC:   Lab Results   Component Value Date/Time    WBC 7.8 01/11/2024 12:13 PM    RBC 4.59 01/11/2024 12:13 PM    HGB 14.7 01/11/2024 12:13 PM    HCT 44.5 01/11/2024 12:13 PM    MCV 96.9 01/11/2024 12:13 PM    MCH 32.0 01/11/2024 12:13 PM    MCHC 33.0 01/11/2024 12:13 PM    RDW 12.4 01/11/2024 12:13 PM     01/11/2024 12:13 PM    MPV 9.0 01/11/2024 12:13 PM     BMP:    Lab Results   Component Value Date/Time     01/11/2024 12:13 PM    K 4.6 01/11/2024 12:13 PM    K 3.9 05/06/2022 05:28 PM     01/11/2024 12:13 PM    CO2 27 01/11/2024 12:13 PM    BUN 23 01/11/2024 12:13 PM    LABALBU 3.8 01/11/2024 12:13 PM    LABALBU 4.7 05/21/2012 01:48 PM    CREATININE 0.7 01/11/2024 12:13 PM    CALCIUM 9.1 01/11/2024 12:13 PM    GFRAA >60 08/23/2022 07:14 AM    LABGLOM >60 01/11/2024 12:13 PM    GLUCOSE 121 01/11/2024 12:13 PM    GLUCOSE 93 05/24/2012 11:11 AM     PT/INR:    Lab Results   Component Value Date/Time    PROTIME 10.9 07/19/2023 10:14 PM    PROTIME 10.8 05/07/2012 03:45 AM    INR 1.0 07/19/2023 10:14 PM     Troponin:    Lab Results   Component Value Date/Time    TROPONINI <0.01 04/25/2021 02:55 AM       No results for input(s): \"LABURIN\" in the last 72 hours.  No results for input(s): \"BC\" in the last 72 
Subjective:    Chief complaint:    Tolerating po  Pain improved  No problems overnight.  Denies chest pain, angina, and dyspnea.  Denies abdominal pain.  Tolerating diet.  No nausea or vomiting.    Objective:    /63   Pulse 73   Temp 97.2 °F (36.2 °C) (Temporal)   Resp 20   Ht 1.702 m (5' 7.01\")   Wt 128.4 kg (283 lb)   LMP 08/31/1988   SpO2 92%   BMI 44.31 kg/m²   General : Awake ,alert,no distress.  Heart:  RRR, no murmurs, gallops, or rubs.  Lungs:  CTA bilaterally, no wheeze, rales or rhonchi  Abd: bowel sounds present, nontender, nondistended, no masses  Extrem:  No clubbing, cyanosis, or edema    CBC:   Lab Results   Component Value Date/Time    WBC 7.8 01/11/2024 12:13 PM    RBC 4.59 01/11/2024 12:13 PM    HGB 14.7 01/11/2024 12:13 PM    HCT 44.5 01/11/2024 12:13 PM    MCV 96.9 01/11/2024 12:13 PM    MCH 32.0 01/11/2024 12:13 PM    MCHC 33.0 01/11/2024 12:13 PM    RDW 12.4 01/11/2024 12:13 PM     01/11/2024 12:13 PM    MPV 9.0 01/11/2024 12:13 PM     BMP:    Lab Results   Component Value Date/Time     01/11/2024 12:13 PM    K 4.6 01/11/2024 12:13 PM    K 3.9 05/06/2022 05:28 PM     01/11/2024 12:13 PM    CO2 27 01/11/2024 12:13 PM    BUN 23 01/11/2024 12:13 PM    LABALBU 3.8 01/11/2024 12:13 PM    LABALBU 4.7 05/21/2012 01:48 PM    CREATININE 0.7 01/11/2024 12:13 PM    CALCIUM 9.1 01/11/2024 12:13 PM    GFRAA >60 08/23/2022 07:14 AM    LABGLOM >60 01/11/2024 12:13 PM    GLUCOSE 121 01/11/2024 12:13 PM    GLUCOSE 93 05/24/2012 11:11 AM     PT/INR:    Lab Results   Component Value Date/Time    PROTIME 10.9 07/19/2023 10:14 PM    PROTIME 10.8 05/07/2012 03:45 AM    INR 1.0 07/19/2023 10:14 PM     Troponin:    Lab Results   Component Value Date/Time    TROPONINI <0.01 04/25/2021 02:55 AM       No results for input(s): \"LABURIN\" in the last 72 hours.  No results for input(s): \"BC\" in the last 72 hours.  No results for input(s): \"BLOODCULT2\" in the last 72 hours.      Current 
assist for bowel hygiene  Mod I to BSC   Bed Mobility  Supine to sit: min A   Sit to supine:  SBA  Supine to sit: mod I   Sit to supine: mod I recliner   Functional Transfers Sit to stand min A with ww   Mod I with ww short distance   Functional Mobility Stand pivot to BS <> bed min A   Mod I with ww   Balance Sitting:     Static:  SBA    Dynamic:SBA  Standing: min A                                                                        Activity Tolerance Fair limited by pain 2L O2 91%  Good with ADL completion   Visual/  Perceptual Glasses: yes WFL          Safety Good-                                  good     Hand Dominance R    AROM (PROM) Strength Additional Info:    RUE  WFL grossly limited by back pain  4/5 good  and wfl FMC/dexterity noted during ADL tasks       LUE WFL grossly limited by pain 4/5 good  and wfl FMC/dexterity noted during ADL tasks     Hearing: WFL   Sensation:  decreased feet>hands    Tone: WFL   Edema: none noted    Comments: Upon arrival patient supine and agreeable to evaluation.  Performed OT evaluation with education on benefits of mobility and upright sitting , safety. Performed dressing , grooming and toileting.  At end of session, patient supine with HOB elevated and  with call light and phone within reach, all lines and tubes intact. Nursing notified. Overall patient demonstrated  decreased independence and safety during completion of ADL/functional transfer/mobility tasks.  Pt would benefit from continued skilled OT to increase safety and independence with completion of ADL/IADL tasks for functional independence and quality of life.    Treatment: OT treatment provided this date includes:   Instruction/training on safety and adapted techniques for completion of ADLs: to increase [place] in self care within precautions  with AE/DME prn   Functional transfer/mobility training/DME recommendations for increased  independence, safety, and fall prevention with  ww

## 2024-01-17 ENCOUNTER — TELEPHONE (OUTPATIENT)
Dept: SURGERY | Age: 64
End: 2024-01-17

## 2024-01-17 DIAGNOSIS — Q45.3 PANCREATIC DIVISUM: ICD-10-CM

## 2024-01-17 NOTE — TELEPHONE ENCOUNTER
Per the order of Dr. Cardoso, patient has been scheduled for Laparoscopic assisted ERCP on 2024.  Patient provided with procedure information over the phone and verbalized understanding.  Patient instructed to please contact our office with any questions.    Procedure scheduled through [facility].  Dr. Cardoso to enter orders.        Prior Authorization Form:      DEMOGRAPHICS:                     Patient Name:  Leny Costa  Patient :  1960            Insurance:  [place] / [place]PT [place] / Product Type: *No Product type* /   Insurance ID Number:    Payer/Plan Subscr  Sex Relation Sub. Ins. ID Effective Group Num   1. [place] -* LENY COSTA 1960 Female Self [id number]                                    [address]         DIAGNOSIS & PROCEDURE:                       Procedure/Operation: Laparoscopic assisted ERCP           CPT Code: 89343    Diagnosis:  pancreatic divisium    ICD10 Code:     Location:  [facility]    Surgeon:  Walker    SCHEDULING INFORMATION:                          Date: 2024    Time: TBD              Anesthesia:  General                                                       Status:  Outpatient        Special Comments:         Electronically signed by Clotilde Giron on 2024 at 3:42 PM

## 2024-01-25 ENCOUNTER — OFFICE VISIT (OUTPATIENT)
Dept: VASCULAR SURGERY | Age: 64
End: 2024-01-25
Payer: MEDICAID

## 2024-01-25 ENCOUNTER — TELEPHONE (OUTPATIENT)
Dept: VASCULAR SURGERY | Age: 64
End: 2024-01-25

## 2024-01-25 VITALS — BODY MASS INDEX: 44.47 KG/M2 | WEIGHT: 284 LBS

## 2024-01-25 DIAGNOSIS — L81.9 DISCOLORATION OF SKIN OF FOOT: ICD-10-CM

## 2024-01-25 DIAGNOSIS — I89.0 LYMPHEDEMA OF BOTH LOWER EXTREMITIES: ICD-10-CM

## 2024-01-25 DIAGNOSIS — R09.89 DECREASED DORSALIS PEDIS PULSE: Primary | ICD-10-CM

## 2024-01-25 PROCEDURE — 99204 OFFICE O/P NEW MOD 45 MIN: CPT | Performed by: SURGERY

## 2024-01-25 NOTE — PROGRESS NOTES
Chief Complaint:   Chief Complaint   Patient presents with    Consultation     New pt. 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, does follow-up at the [facility] regarding the same and they recommended her medical therapy, did undergo multiple venous ultrasound studies more recently right leg, no DVT, in the past has undergone lymphedema therapy, did not follow-up because of lack of transportation and other issues, was referred back by her PCP because of history of discoloration of foot in dependent position ever since she underwent multiple surgeries of the right leg, include right knee joint, tells me that she had MRSA, seen by ID consultant, concerned about arterial flow to the foot and wanted to be evaluated    Patient has other medical issues including, bicondylar cytopathy, cervical spondylosis, chronic back pain, degenerative disc disease of the lumbar sacral spine, depression, hyperlipidemia history of tobacco use      Patient denies any focal lateralizing neurological symptoms like loss of speech, vision or loss of function of extremity    Patient can walk only few steps, pivoting from the wheelchair to the bed etc., and denies any symptoms of rest pain    Allergies   Allergen Reactions    Bee Pollen Anaphylaxis, Shortness Of Breath and Swelling     And wasp    Penicillins Anaphylaxis    Ropinirole Swelling and Anaphylaxis     swelling of throat    Ropinirole Hcl Anaphylaxis    Vistaril [Hydroxyzine Hcl] Anaphylaxis    Aripiprazole Other (See Comments)     muscle spasms and confusion    Prednisone     Restoril

## 2024-01-25 NOTE — TELEPHONE ENCOUNTER
Notified patient of TEAGAN at [facility] on 2-5-24 at 10:30 am.  [place] at 10:00 am.

## 2024-01-29 RX ORDER — SODIUM CHLORIDE 9 MG/ML
INJECTION, SOLUTION INTRAVENOUS CONTINUOUS
Status: CANCELLED | OUTPATIENT
Start: 2024-01-29

## 2024-01-30 ENCOUNTER — HOSPITAL ENCOUNTER (OUTPATIENT)
Dept: PREADMISSION TESTING | Age: 64
Discharge: HOME | End: 2024-01-30
Payer: MEDICAID

## 2024-01-30 VITALS
HEART RATE: 81 BPM | SYSTOLIC BLOOD PRESSURE: 145 MMHG | OXYGEN SATURATION: 93 % | WEIGHT: 292 LBS | HEIGHT: 67 IN | BODY MASS INDEX: 45.83 KG/M2 | RESPIRATION RATE: 20 BRPM | DIASTOLIC BLOOD PRESSURE: 87 MMHG | TEMPERATURE: 98.2 F

## 2024-01-30 DIAGNOSIS — Z01.818 PREOP TESTING: Primary | ICD-10-CM

## 2024-01-30 LAB
ALBUMIN SERPL-MCNC: 3.8 G/DL (ref 3.5–5.2)
ALP SERPL-CCNC: 98 U/L (ref 35–104)
ALT SERPL-CCNC: 10 U/L (ref 0–32)
ANION GAP SERPL CALCULATED.3IONS-SCNC: 10 MMOL/L (ref 7–16)
AST SERPL-CCNC: 15 U/L (ref 0–31)
BASOPHILS # BLD: 0.01 K/UL (ref 0–0.2)
BASOPHILS NFR BLD: 0 % (ref 0–2)
BILIRUB SERPL-MCNC: 0.5 MG/DL (ref 0–1.2)
BUN SERPL-MCNC: 15 MG/DL (ref 6–23)
CALCIUM SERPL-MCNC: 9.1 MG/DL (ref 8.6–10.2)
CHLORIDE SERPL-SCNC: 101 MMOL/L (ref 98–107)
CO2 SERPL-SCNC: 26 MMOL/L (ref 22–29)
CREAT SERPL-MCNC: 0.8 MG/DL (ref 0.5–1)
EOSINOPHIL # BLD: 0.06 K/UL (ref 0.05–0.5)
EOSINOPHILS RELATIVE PERCENT: 1 % (ref 0–6)
ERYTHROCYTE [DISTWIDTH] IN BLOOD BY AUTOMATED COUNT: 12.3 % (ref 11.5–15)
GFR SERPL CREATININE-BSD FRML MDRD: >60 ML/MIN/1.73M2
GLUCOSE SERPL-MCNC: 106 MG/DL (ref 74–99)
HCT VFR BLD AUTO: 41.5 % (ref 34–48)
HGB BLD-MCNC: 14 G/DL (ref 11.5–15.5)
IMM GRANULOCYTES # BLD AUTO: 0.05 K/UL (ref 0–0.58)
IMM GRANULOCYTES NFR BLD: 1 % (ref 0–5)
LYMPHOCYTES NFR BLD: 1.32 K/UL (ref 1.5–4)
LYMPHOCYTES RELATIVE PERCENT: 15 % (ref 20–42)
MCH RBC QN AUTO: 31.8 PG (ref 26–35)
MCHC RBC AUTO-ENTMCNC: 33.7 G/DL (ref 32–34.5)
MCV RBC AUTO: 94.3 FL (ref 80–99.9)
MONOCYTES NFR BLD: 0.79 K/UL (ref 0.1–0.95)
MONOCYTES NFR BLD: 9 % (ref 2–12)
NEUTROPHILS NFR BLD: 75 % (ref 43–80)
NEUTS SEG NFR BLD: 6.52 K/UL (ref 1.8–7.3)
PLATELET # BLD AUTO: 169 K/UL (ref 130–450)
PMV BLD AUTO: 8.6 FL (ref 7–12)
POTASSIUM SERPL-SCNC: 4.4 MMOL/L (ref 3.5–5)
PROT SERPL-MCNC: 6.6 G/DL (ref 6.4–8.3)
RBC # BLD AUTO: 4.4 M/UL (ref 3.5–5.5)
SODIUM SERPL-SCNC: 137 MMOL/L (ref 132–146)
WBC OTHER # BLD: 8.8 K/UL (ref 4.5–11.5)

## 2024-01-30 PROCEDURE — 85025 COMPLETE CBC W/AUTO DIFF WBC: CPT

## 2024-01-30 PROCEDURE — 80053 COMPREHEN METABOLIC PANEL: CPT

## 2024-01-30 RX ORDER — CITALOPRAM 20 MG/1
20 TABLET ORAL DAILY
COMMUNITY
Start: 2024-01-29

## 2024-01-30 ASSESSMENT — PAIN DESCRIPTION - PAIN TYPE: TYPE: CHRONIC PAIN

## 2024-01-30 ASSESSMENT — PAIN SCALES - GENERAL: PAINLEVEL_OUTOF10: 8

## 2024-01-30 ASSESSMENT — PAIN DESCRIPTION - ONSET: ONSET: ON-GOING

## 2024-01-30 ASSESSMENT — PAIN DESCRIPTION - DESCRIPTORS: DESCRIPTORS: BURNING;PRESSURE

## 2024-01-30 ASSESSMENT — PAIN DESCRIPTION - LOCATION: LOCATION: ABDOMEN

## 2024-01-30 NOTE — PROGRESS NOTES
[facility]                                                                                                                    PRE OP INSTRUCTIONS FOR  Leny Yang        Date: 1/30/2024    Date of surgery: 2/1/24  1145    Arrival Time: 1015 in main lobby    Do not eat or drink anything after midnight prior to surgery. This includes no water, chewing gum, mints or ice chips.    Take the following medications with a small sip of water on the morning of Surgery: Omeprazole, Levocarnitine, Metoprolol, Percocet if needed, Inhaler if needed, Anoro Ellipta, Gabapentin, Topiramate, Celexa     Diabetics may take evening dose of insulin but none after midnight.  If you feel symptomatic or low blood sugar morning of surgery drink 1-2 ounces of apple juice only.    Aspirin, Ibuprofen, Advil, Naproxen, Vitamin E and other Anti-inflammatory products should be stopped  before surgery  as directed by your physician.  Take Tylenol only unless instructed otherwise by your surgeon.    Check with your Doctor regarding stopping Plavix, Coumadin, Lovenox, Eliquis, Effient, or other blood thinners.    Do not smoke,use illicit drugs and do not drink any alcoholic beverages 24 hours prior to surgery.    You may brush your teeth the morning of surgery.  DO NOT SWALLOW WATER    You MUST make arrangements for a responsible adult to take you home after your surgery. You will not be allowed to leave alone or drive yourself home.  It is strongly suggested someone stay with you the first 24 hrs. Your surgery will be cancelled if you do not have a ride home.    PEDIATRIC PATIENTS ONLY:  A parent/legal guardian must accompany a child scheduled for surgery and plan to stay at the hospital until the child is discharged.  Please do not bring other children with you.    Please wear simple, loose fitting clothing to the hospital.  Do not bring valuables (money, credit cards, checkbooks, etc.) Do not wear any makeup

## 2024-02-01 ENCOUNTER — ANESTHESIA (OUTPATIENT)
Dept: OPERATING ROOM | Age: 64
End: 2024-02-01
Payer: MEDICAID

## 2024-02-01 ENCOUNTER — HOSPITAL ENCOUNTER (OUTPATIENT)
Dept: GENERAL RADIOLOGY | Age: 64
Discharge: HOME | End: 2024-02-03
Attending: SURGERY
Payer: MEDICAID

## 2024-02-01 ENCOUNTER — HOSPITAL ENCOUNTER (OUTPATIENT)
Age: 64
Setting detail: OUTPATIENT SURGERY
Discharge: HOME | End: 2024-02-01
Attending: SURGERY | Admitting: SURGERY
Payer: MEDICAID

## 2024-02-01 ENCOUNTER — ANESTHESIA EVENT (OUTPATIENT)
Dept: OPERATING ROOM | Age: 64
End: 2024-02-01
Payer: MEDICAID

## 2024-02-01 VITALS
HEIGHT: 67 IN | BODY MASS INDEX: 45.83 KG/M2 | HEART RATE: 86 BPM | WEIGHT: 292 LBS | OXYGEN SATURATION: 93 % | TEMPERATURE: 97.2 F | SYSTOLIC BLOOD PRESSURE: 167 MMHG | RESPIRATION RATE: 18 BRPM | DIASTOLIC BLOOD PRESSURE: 70 MMHG

## 2024-02-01 DIAGNOSIS — Q45.3 PANCREATIC DIVISUM: ICD-10-CM

## 2024-02-01 DIAGNOSIS — R09.89 DECREASED DORSALIS PEDIS PULSE: Primary | ICD-10-CM

## 2024-02-01 DIAGNOSIS — K85.90 ACUTE PANCREATITIS WITHOUT INFECTION OR NECROSIS, UNSPECIFIED PANCREATITIS TYPE: ICD-10-CM

## 2024-02-01 DIAGNOSIS — K85.90 ACUTE PANCREATITIS, UNSPECIFIED COMPLICATION STATUS, UNSPECIFIED PANCREATITIS TYPE: ICD-10-CM

## 2024-02-01 PROCEDURE — C1769 GUIDE WIRE: HCPCS | Performed by: SURGERY

## 2024-02-01 PROCEDURE — 3700000001 HC ADD 15 MINUTES (ANESTHESIA): Performed by: SURGERY

## 2024-02-01 PROCEDURE — 2580000003 HC RX 258: Performed by: ANESTHESIOLOGY

## 2024-02-01 PROCEDURE — 2580000003 HC RX 258: Performed by: SURGERY

## 2024-02-01 PROCEDURE — 6360000002 HC RX W HCPCS: Performed by: SURGERY

## 2024-02-01 PROCEDURE — 3700000000 HC ANESTHESIA ATTENDED CARE: Performed by: SURGERY

## 2024-02-01 PROCEDURE — 2720000010 HC SURG SUPPLY STERILE: Performed by: SURGERY

## 2024-02-01 PROCEDURE — 2500000003 HC RX 250 WO HCPCS: Performed by: NURSE ANESTHETIST, CERTIFIED REGISTERED

## 2024-02-01 PROCEDURE — 6360000002 HC RX W HCPCS

## 2024-02-01 PROCEDURE — 7100000000 HC PACU RECOVERY - FIRST 15 MIN: Performed by: SURGERY

## 2024-02-01 PROCEDURE — 6360000002 HC RX W HCPCS: Performed by: ANESTHESIOLOGY

## 2024-02-01 PROCEDURE — 44180 LAP ENTEROLYSIS: CPT | Performed by: SURGERY

## 2024-02-01 PROCEDURE — 99213 OFFICE O/P EST LOW 20 MIN: CPT | Performed by: SURGERY

## 2024-02-01 PROCEDURE — 3600000014 HC SURGERY LEVEL 4 ADDTL 15MIN: Performed by: SURGERY

## 2024-02-01 PROCEDURE — 6360000002 HC RX W HCPCS: Performed by: NURSE ANESTHETIST, CERTIFIED REGISTERED

## 2024-02-01 PROCEDURE — 7100000001 HC PACU RECOVERY - ADDTL 15 MIN: Performed by: SURGERY

## 2024-02-01 PROCEDURE — 7100000011 HC PHASE II RECOVERY - ADDTL 15 MIN: Performed by: SURGERY

## 2024-02-01 PROCEDURE — 2709999900 HC NON-CHARGEABLE SUPPLY: Performed by: SURGERY

## 2024-02-01 PROCEDURE — 7100000010 HC PHASE II RECOVERY - FIRST 15 MIN: Performed by: SURGERY

## 2024-02-01 PROCEDURE — 3600000004 HC SURGERY LEVEL 4 BASE: Performed by: SURGERY

## 2024-02-01 RX ORDER — SODIUM CHLORIDE 0.9 % (FLUSH) 0.9 %
5-40 SYRINGE (ML) INJECTION PRN
Status: DISCONTINUED | OUTPATIENT
Start: 2024-02-01 | End: 2024-02-01 | Stop reason: HOSPADM

## 2024-02-01 RX ORDER — LABETALOL HYDROCHLORIDE 5 MG/ML
10 INJECTION, SOLUTION INTRAVENOUS
Status: DISCONTINUED | OUTPATIENT
Start: 2024-02-01 | End: 2024-02-01 | Stop reason: HOSPADM

## 2024-02-01 RX ORDER — DIPHENHYDRAMINE HYDROCHLORIDE 50 MG/ML
12.5 INJECTION INTRAMUSCULAR; INTRAVENOUS
Status: DISCONTINUED | OUTPATIENT
Start: 2024-02-01 | End: 2024-02-01 | Stop reason: HOSPADM

## 2024-02-01 RX ORDER — LIDOCAINE HYDROCHLORIDE 20 MG/ML
INJECTION, SOLUTION INTRAVENOUS PRN
Status: DISCONTINUED | OUTPATIENT
Start: 2024-02-01 | End: 2024-02-01 | Stop reason: SDUPTHER

## 2024-02-01 RX ORDER — FENTANYL CITRATE 0.05 MG/ML
25 INJECTION, SOLUTION INTRAMUSCULAR; INTRAVENOUS EVERY 5 MIN PRN
Status: DISCONTINUED | OUTPATIENT
Start: 2024-02-01 | End: 2024-02-01 | Stop reason: HOSPADM

## 2024-02-01 RX ORDER — PROCHLORPERAZINE EDISYLATE 5 MG/ML
5 INJECTION INTRAMUSCULAR; INTRAVENOUS
Status: COMPLETED | OUTPATIENT
Start: 2024-02-01 | End: 2024-02-01

## 2024-02-01 RX ORDER — ROCURONIUM BROMIDE 10 MG/ML
INJECTION, SOLUTION INTRAVENOUS PRN
Status: DISCONTINUED | OUTPATIENT
Start: 2024-02-01 | End: 2024-02-01 | Stop reason: SDUPTHER

## 2024-02-01 RX ORDER — OXYCODONE HYDROCHLORIDE AND ACETAMINOPHEN 5; 325 MG/1; MG/1
1 TABLET ORAL EVERY 6 HOURS PRN
Qty: 12 TABLET | Refills: 0 | Status: SHIPPED | OUTPATIENT
Start: 2024-02-01 | End: 2024-02-04

## 2024-02-01 RX ORDER — FENTANYL CITRATE 50 UG/ML
INJECTION, SOLUTION INTRAMUSCULAR; INTRAVENOUS PRN
Status: DISCONTINUED | OUTPATIENT
Start: 2024-02-01 | End: 2024-02-01 | Stop reason: SDUPTHER

## 2024-02-01 RX ORDER — CIPROFLOXACIN 2 MG/ML
400 INJECTION, SOLUTION INTRAVENOUS
Status: COMPLETED | OUTPATIENT
Start: 2024-02-01 | End: 2024-02-01

## 2024-02-01 RX ORDER — ONDANSETRON 2 MG/ML
INJECTION INTRAMUSCULAR; INTRAVENOUS PRN
Status: DISCONTINUED | OUTPATIENT
Start: 2024-02-01 | End: 2024-02-01 | Stop reason: SDUPTHER

## 2024-02-01 RX ORDER — MIDAZOLAM HYDROCHLORIDE 1 MG/ML
INJECTION INTRAMUSCULAR; INTRAVENOUS PRN
Status: DISCONTINUED | OUTPATIENT
Start: 2024-02-01 | End: 2024-02-01 | Stop reason: SDUPTHER

## 2024-02-01 RX ORDER — HYDRALAZINE HYDROCHLORIDE 20 MG/ML
10 INJECTION INTRAMUSCULAR; INTRAVENOUS
Status: DISCONTINUED | OUTPATIENT
Start: 2024-02-01 | End: 2024-02-01 | Stop reason: HOSPADM

## 2024-02-01 RX ORDER — PROCHLORPERAZINE EDISYLATE 5 MG/ML
INJECTION INTRAMUSCULAR; INTRAVENOUS
Status: COMPLETED
Start: 2024-02-01 | End: 2024-02-01

## 2024-02-01 RX ORDER — BUPIVACAINE HYDROCHLORIDE 2.5 MG/ML
INJECTION, SOLUTION EPIDURAL; INFILTRATION; INTRACAUDAL PRN
Status: DISCONTINUED | OUTPATIENT
Start: 2024-02-01 | End: 2024-02-01 | Stop reason: ALTCHOICE

## 2024-02-01 RX ORDER — MEPERIDINE HYDROCHLORIDE 25 MG/ML
12.5 INJECTION INTRAMUSCULAR; INTRAVENOUS; SUBCUTANEOUS EVERY 5 MIN PRN
Status: DISCONTINUED | OUTPATIENT
Start: 2024-02-01 | End: 2024-02-01 | Stop reason: HOSPADM

## 2024-02-01 RX ORDER — IPRATROPIUM BROMIDE AND ALBUTEROL SULFATE 2.5; .5 MG/3ML; MG/3ML
1 SOLUTION RESPIRATORY (INHALATION)
Status: DISCONTINUED | OUTPATIENT
Start: 2024-02-01 | End: 2024-02-01 | Stop reason: HOSPADM

## 2024-02-01 RX ORDER — ONDANSETRON 2 MG/ML
4 INJECTION INTRAMUSCULAR; INTRAVENOUS
Status: DISCONTINUED | OUTPATIENT
Start: 2024-02-01 | End: 2024-02-01 | Stop reason: HOSPADM

## 2024-02-01 RX ORDER — SODIUM CHLORIDE 0.9 % (FLUSH) 0.9 %
5-40 SYRINGE (ML) INJECTION EVERY 12 HOURS SCHEDULED
Status: DISCONTINUED | OUTPATIENT
Start: 2024-02-01 | End: 2024-02-01 | Stop reason: HOSPADM

## 2024-02-01 RX ORDER — KETOROLAC TROMETHAMINE 30 MG/ML
30 INJECTION, SOLUTION INTRAMUSCULAR; INTRAVENOUS
Status: DISCONTINUED | OUTPATIENT
Start: 2024-02-01 | End: 2024-02-01 | Stop reason: HOSPADM

## 2024-02-01 RX ORDER — MORPHINE SULFATE 2 MG/ML
2 INJECTION, SOLUTION INTRAMUSCULAR; INTRAVENOUS EVERY 5 MIN PRN
Status: DISCONTINUED | OUTPATIENT
Start: 2024-02-01 | End: 2024-02-01 | Stop reason: HOSPADM

## 2024-02-01 RX ORDER — METHOCARBAMOL 750 MG/1
750 TABLET, FILM COATED ORAL 4 TIMES DAILY
Qty: 40 TABLET | Refills: 0 | Status: SHIPPED | OUTPATIENT
Start: 2024-02-01 | End: 2024-02-11

## 2024-02-01 RX ORDER — SODIUM CHLORIDE 9 MG/ML
INJECTION, SOLUTION INTRAVENOUS CONTINUOUS
Status: DISCONTINUED | OUTPATIENT
Start: 2024-02-01 | End: 2024-02-01 | Stop reason: HOSPADM

## 2024-02-01 RX ORDER — SODIUM CHLORIDE 9 MG/ML
INJECTION, SOLUTION INTRAVENOUS PRN
Status: DISCONTINUED | OUTPATIENT
Start: 2024-02-01 | End: 2024-02-01 | Stop reason: HOSPADM

## 2024-02-01 RX ORDER — METHOCARBAMOL 100 MG/ML
1000 INJECTION, SOLUTION INTRAMUSCULAR; INTRAVENOUS
Status: DISCONTINUED | OUTPATIENT
Start: 2024-02-01 | End: 2024-02-01 | Stop reason: HOSPADM

## 2024-02-01 RX ORDER — PROPOFOL 10 MG/ML
INJECTION, EMULSION INTRAVENOUS PRN
Status: DISCONTINUED | OUTPATIENT
Start: 2024-02-01 | End: 2024-02-01 | Stop reason: SDUPTHER

## 2024-02-01 RX ADMIN — PROPOFOL 150 MG: 10 INJECTION, EMULSION INTRAVENOUS at 11:59

## 2024-02-01 RX ADMIN — MORPHINE SULFATE 2 MG: 2 INJECTION, SOLUTION INTRAMUSCULAR; INTRAVENOUS at 13:42

## 2024-02-01 RX ADMIN — ROCURONIUM BROMIDE 30 MG: 10 INJECTION, SOLUTION INTRAVENOUS at 11:59

## 2024-02-01 RX ADMIN — PROPOFOL 50 MG: 10 INJECTION, EMULSION INTRAVENOUS at 12:20

## 2024-02-01 RX ADMIN — SUGAMMADEX 200 MG: 100 INJECTION, SOLUTION INTRAVENOUS at 13:00

## 2024-02-01 RX ADMIN — PROCHLORPERAZINE EDISYLATE 5 MG: 5 INJECTION INTRAMUSCULAR; INTRAVENOUS at 13:17

## 2024-02-01 RX ADMIN — FENTANYL CITRATE 100 MCG: 50 INJECTION, SOLUTION INTRAMUSCULAR; INTRAVENOUS at 11:59

## 2024-02-01 RX ADMIN — SODIUM CHLORIDE: 900 INJECTION, SOLUTION INTRAVENOUS at 11:51

## 2024-02-01 RX ADMIN — SODIUM CHLORIDE: 9 INJECTION, SOLUTION INTRAVENOUS at 11:14

## 2024-02-01 RX ADMIN — ONDANSETRON 4 MG: 2 INJECTION INTRAMUSCULAR; INTRAVENOUS at 13:00

## 2024-02-01 RX ADMIN — MORPHINE SULFATE 2 MG: 2 INJECTION, SOLUTION INTRAMUSCULAR; INTRAVENOUS at 13:21

## 2024-02-01 RX ADMIN — MORPHINE SULFATE 2 MG: 2 INJECTION, SOLUTION INTRAMUSCULAR; INTRAVENOUS at 13:35

## 2024-02-01 RX ADMIN — ROCURONIUM BROMIDE 10 MG: 10 INJECTION, SOLUTION INTRAVENOUS at 12:10

## 2024-02-01 RX ADMIN — LIDOCAINE HYDROCHLORIDE 60 MG: 20 INJECTION, SOLUTION INTRAVENOUS at 11:59

## 2024-02-01 RX ADMIN — CIPROFLOXACIN 400 MG: 2 INJECTION, SOLUTION INTRAVENOUS at 12:04

## 2024-02-01 RX ADMIN — MIDAZOLAM 2 MG: 1 INJECTION INTRAMUSCULAR; INTRAVENOUS at 11:51

## 2024-02-01 ASSESSMENT — PAIN SCALES - GENERAL
PAINLEVEL_OUTOF10: 5
PAINLEVEL_OUTOF10: 8
PAINLEVEL_OUTOF10: 5
PAINLEVEL_OUTOF10: 4

## 2024-02-01 ASSESSMENT — PAIN DESCRIPTION - LOCATION
LOCATION: ABDOMEN

## 2024-02-01 ASSESSMENT — PAIN - FUNCTIONAL ASSESSMENT
PAIN_FUNCTIONAL_ASSESSMENT: 0-10
PAIN_FUNCTIONAL_ASSESSMENT: NONE - DENIES PAIN

## 2024-02-01 ASSESSMENT — PAIN DESCRIPTION - ORIENTATION
ORIENTATION: MID

## 2024-02-01 ASSESSMENT — PAIN DESCRIPTION - DESCRIPTORS
DESCRIPTORS: ACHING;DISCOMFORT
DESCRIPTORS: ACHING;DISCOMFORT
DESCRIPTORS: DISCOMFORT
DESCRIPTORS: ACHING;DISCOMFORT
DESCRIPTORS: ACHING;DISCOMFORT

## 2024-02-01 ASSESSMENT — PAIN DESCRIPTION - PAIN TYPE
TYPE: SURGICAL PAIN

## 2024-02-01 ASSESSMENT — ENCOUNTER SYMPTOMS: SHORTNESS OF BREATH: 1

## 2024-02-01 ASSESSMENT — LIFESTYLE VARIABLES: SMOKING_STATUS: 1

## 2024-02-01 NOTE — DISCHARGE INSTRUCTIONS
Endoscopic Retrograde Cholangiopancreatogram (ERCP): What to Expect at Home  Your Recovery  After you have an endoscopic retrograde cholangiopancreatogram (ERCP), you probably will stay at the hospital or clinic for 1 to 2 hours. This will allow the medicine to wear off. You will be able to go home after your doctor or a nurse checks to make sure you are not having any problems. If you stay in the hospital overnight, you may go home the next day.  You may have a sore throat for a day or two after the procedure.  This care sheet gives you a general idea about how long it will take for you to recover. But each person recovers at a different pace. Follow the steps below to get better as quickly as possible.  How can you care for yourself at home?  Activity    Rest as much as you need to after you go home.     You should be able to go back to your usual activities the day after the procedure.   Diet    Follow your doctor's directions for eating after the procedure.     Drink plenty of fluids (unless your doctor tells you not to).   Medicines    Your doctor will tell you if and when you can restart your medicines. He or she will also give you instructions about taking any new medicines.     If you stopped taking aspirin or some other blood thinner, your doctor will tell you when to start taking it again.     If you have a sore throat the next day, use an over-the-counter spray to numb your throat. Be safe with medicines. Read and follow all instructions on the label.   Follow-up care is a key part of your treatment and safety. Be sure to make and go to all appointments, and call your doctor if you are having problems. It's also a good idea to know your test results and keep a list of the medicines you take.  When should you call for help?   Call 911 anytime you think you may need emergency care. For example, call if:    You passed out (lost consciousness).     Your stools are maroon or very bloody.     You have

## 2024-02-01 NOTE — ANESTHESIA POSTPROCEDURE EVALUATION
Department of Anesthesiology  Postprocedure Note    Patient: Leny Yang  MRN: [id number]  YOB: 1960  Date of evaluation: 2/1/2024    Procedure Summary       Date: 02/01/24 Room / Location: [address]    Anesthesia Start: 1151 Anesthesia Stop: 1315    Procedure: LAPAROSCOPIC TAKEDOWN OF ADHESIONS ABORTED ERCP ENDOSCOPIC RETROGRADE CHOLANGIOPANCREATOGRAPHY (Abdomen) Diagnosis:       Pancreatic divisum      (Pancreatic divisum [Q45.3])    Surgeons: Christopher Cardoso MD Responsible Provider: Zana Santillan MD    Anesthesia Type: General ASA Status: 4            Anesthesia Type: General    Avelino Phase I: Avelino Score: 10    Avelino Phase II: Avelino Score: 10    Anesthesia Post Evaluation    Patient location during evaluation: PACU  Patient participation: complete - patient participated  Level of consciousness: awake  Airway patency: patent  Nausea & Vomiting: no nausea and no vomiting  Cardiovascular status: hemodynamically stable  Respiratory status: acceptable  Hydration status: euvolemic  Pain management: adequate        No notable events documented.

## 2024-02-01 NOTE — ANESTHESIA PRE PROCEDURE
Itching and Rash   • Tape [Adhesive Tape] Rash     Paper tape allergy    Fabric tape is OK to use        Problem List:    Patient Active Problem List   Diagnosis Code   • Debility R53.81   • Obesity E66.9   • Bipolar 1 disorder ([facility]) F31.9   • Generalized seizure disorder ([facility]) G40.309   • Cervicalgia M54.2   • Asthma J45.909   • Hyperlipidemia E78.5   • Hypertension I10   • Arthritis M19.90   • Lymphedema of lower extremity I89.0   • Degenerative Osteoarthritis of both knees M17.0   • Status post total knee replacement, right Z96.651   • Cervical spondylolysis M43.02   • Degenerative Osteoarthritis thoracic spine M47.814   • Thoracic facet syndrome M47.894   • Cervical facet syndrome M47.812   • Facet syndrome, lumbar M47.816   • Neural foraminal stenosis of lumbar spine M48.061   • Lumbar radiculopathy M54.16   • DDD (degenerative disc disease), cervical M50.30   • Neural foraminal stenosis of cervical spine M48.02   • Protruded cervical disc M50.20   • Cervical radiculopathy M54.12   • Postlaminectomy syndrome, lumbar M96.1   • Neuropathic pain syndrome (non-herpetic) M79.2   • Chronic respiratory failure with hypoxia ([facility]) J96.11   • 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   • GERD (gastroesophageal reflux disease) K21.9   • Fatty liver K76.0   • Depression F32.A   • PETR (obstructive sleep apnea) G47.33   • Chronic back pain M54.9, G89.29   • Adenoma of right adrenal gland D35.01   • Lumbosacral spondylosis without myelopathy M47.817   • Sacroiliac dysfunction M53.3   • DDD (degenerative disc disease), lumbar M51.36   • Thoracic degenerative disc disease M51.34   • Excessive physiologic tremor R25.1   • Closed fracture of lower end of right radius with routine healing S52.501D   • Closed left ankle fracture, initial encounter S82.892A   • Closed fracture of right tibial plateau S82.141A   • Closed right pilon fracture, initial encounter S82.871A   • Acute pancreatitis K85.90   • Pancreatic pseudocyst K86.3   •

## 2024-02-01 NOTE — H&P
General Surgery History and Physical  [facility]    Patient's Name/Date of Birth: Leny Yang / 1960    Date: February 1, 2024     Surgeon: Christopher Cardoso MD    PCP: Adam Whyte MD     Chief Complaint: pancreatic divisum    HPI:   Leny Yang is a 63 y.o. female who presents for evaluation of pancreatic divisum and recurrent pancreatitis and intractable abdominal pain. She has a history of RnY gastric bypass. Lap assisted ercp with minor papilla sphincterotomy was recommended. She denies nausea, vomiting, constipation, diarrhea, headache, chest pain, shortness of breath, fevers, chills.       Patient Active Problem List   Diagnosis    Debility    Obesity    Bipolar 1 disorder (HCC)    Generalized seizure disorder (HCC)    Cervicalgia    Asthma    Hyperlipidemia    Hypertension    Arthritis    Lymphedema of lower extremity    Degenerative Osteoarthritis of both knees    Status post total knee replacement, right    Cervical spondylolysis    Degenerative Osteoarthritis thoracic spine    Thoracic facet syndrome    Cervical facet syndrome    Facet syndrome, lumbar    Neural foraminal stenosis of lumbar spine    Lumbar radiculopathy    DDD (degenerative disc disease), cervical    Neural foraminal stenosis of cervical spine    Protruded cervical disc    Cervical radiculopathy    Postlaminectomy syndrome, lumbar    Neuropathic pain syndrome (non-herpetic)    Chronic respiratory failure with hypoxia (HCC)    Mitochondrial cytopathy (HCC)    GERD (gastroesophageal reflux disease)    Fatty liver    Depression    PETR (obstructive sleep apnea)    Chronic back pain    Adenoma of right adrenal gland    Lumbosacral spondylosis without myelopathy    Sacroiliac dysfunction    DDD (degenerative disc disease), lumbar    Thoracic degenerative disc disease    Excessive physiologic tremor    Closed fracture of lower end of right radius with routine healing    Closed left ankle fracture, initial

## 2024-02-02 NOTE — OP NOTE
Operative Note      Patient: Leny Yang  YOB: 1960  MRN: [id number]    Date of Procedure: 2/1/2024    Pre-Op Diagnosis Codes:     * Pancreatic divisum [Q45.3]    Post-Op Diagnosis: Same       Procedure(s):  LAPAROSCOPIC TAKEDOWN OF ADHESIONS ABORTED ERCP ENDOSCOPIC RETROGRADE CHOLANGIOPANCREATOGRAPHY    Surgeon(s):  Christopher Cardoso MD Romain, Consandre, MD    Assistant:   First Assistant: Leny Madera  Resident: Te Che DO    Anesthesia: Monitor Anesthesia Care    Estimated Blood Loss (mL): less than 50     Complications: None    Specimens:   * No specimens in log *    Implants:  * No implants in log *      Drains: * No LDAs found *    Findings: see below    Detailed Description of Procedure:     The patient was identified and the procedure was confirmed.  She was taken to the operating room and laid supine on the operating room table.  She underwent general anesthesia by the anesthesia team and was intubated.  Her abdomen was then prepped and draped in a sterile fashion.    The patient was given IV antibiotics prior to skin incision.  A 5 mm incision was made superior to the umbilicus.  A Veress needle was passed into the abdominal cavity however adequate insufflation could not be obtained.  I then proceeded to make a 5 mm incision in the right upper quadrant.  An Optiview trocar with a camera were then used to gain access to the abdominal cavity which was then insufflated to 15 mmHg.  Evaluation of the abdominal cavity revealed extensive adhesions to the midline and upper abdomen.  I was able to place another 5 mm trocar in the right lateral abdomen under direct visualization.  The first trocar placed was noted to be in a good spot free of injury.  Using these 2 trocars, I proceeded to use cold scissors to take down adhesions from the upper abdominal.  Careful dissection was undertaken for over 1 hour to attempt to free up enough space in the upper abdomen in order to  find the gastric remnant.  Despite extensive adhesions to revealed the transverse colon, omentum, and small bowel and the left upper quadrant, I was unable to safely locate the gastric remnant.  In lieu of this and in order to avoid any injury to the viscera by: Further, I elected to abort the ERCP.  I carefully reevaluated the viscera and there was no evidence of injury.  The pneumoperitoneum was then released and the trocars were removed.  The trocar sites were reapproximated with 4 Monocryl suture and skin glue was applied.    The patient tolerated the procedure well.  There were no complications.  She was awakened from anesthesia and transferred to the postanesthesia care unit in stable condition.  All needle, sponge, instrument counts were correct.    Electronically signed by Christopher Cardoso MD on 2/2/2024 at 4:52 AM

## 2024-02-09 ENCOUNTER — TELEPHONE (OUTPATIENT)
Dept: VASCULAR SURGERY | Age: 64
End: 2024-02-09

## 2024-02-09 ENCOUNTER — HOSPITAL ENCOUNTER (OUTPATIENT)
Dept: INTERVENTIONAL RADIOLOGY/VASCULAR | Age: 64
End: 2024-02-09
Attending: SURGERY
Payer: MEDICAID

## 2024-02-09 DIAGNOSIS — I89.0 LYMPHEDEMA OF BOTH LOWER EXTREMITIES: Primary | ICD-10-CM

## 2024-02-09 DIAGNOSIS — L81.9 DISCOLORATION OF SKIN OF FOOT: ICD-10-CM

## 2024-02-09 DIAGNOSIS — I89.0 LYMPHEDEMA OF BOTH LOWER EXTREMITIES: ICD-10-CM

## 2024-02-09 DIAGNOSIS — R09.89 DECREASED DORSALIS PEDIS PULSE: ICD-10-CM

## 2024-02-09 PROBLEM — L03.115 CELLULITIS OF RIGHT LOWER EXTREMITY: Status: RESOLVED | Noted: 2022-08-17 | Resolved: 2024-02-09

## 2024-02-09 PROBLEM — U07.1 COVID-19: Status: RESOLVED | Noted: 2021-11-19 | Resolved: 2024-02-09

## 2024-02-09 PROBLEM — L02.415 ABSCESS OF RIGHT THIGH: Status: RESOLVED | Noted: 2022-09-02 | Resolved: 2024-02-09

## 2024-02-09 PROBLEM — L03.90 CELLULITIS: Status: RESOLVED | Noted: 2022-08-17 | Resolved: 2024-02-09

## 2024-02-09 PROCEDURE — 93922 UPR/L XTREMITY ART 2 LEVELS: CPT

## 2024-02-09 PROCEDURE — 93922 UPR/L XTREMITY ART 2 LEVELS: CPT | Performed by: SURGERY

## 2024-02-09 NOTE — TELEPHONE ENCOUNTER
The lower extremity artery Doppler study was reviewed, normal ankle arm index on the right and mild left femoral-popliteal arterial occlusive disease with ankle-brachial 0.8    The patient will recommend lymphedema therapy and once maximum improvement is noted, have the legs measured for compression device    Also because of mild femoral-popliteal arterial occlusive disease of the left side, patient was recommended follow-up evaluation see me in 1 year    To call the office with any questions or concerns

## 2024-02-12 ENCOUNTER — TELEPHONE (OUTPATIENT)
Dept: VASCULAR SURGERY | Age: 64
End: 2024-02-12

## 2024-02-12 NOTE — TELEPHONE ENCOUNTER
Notified patient of results, scheduled patient for lymphedema therapy and appointment to see Dr. Pollard in one year 2-13-25 at 1:15 pm.

## 2024-02-20 ENCOUNTER — OFFICE VISIT (OUTPATIENT)
Dept: SURGERY | Age: 64
End: 2024-02-20

## 2024-02-20 VITALS
HEIGHT: 67 IN | TEMPERATURE: 97 F | BODY MASS INDEX: 45.73 KG/M2 | SYSTOLIC BLOOD PRESSURE: 108 MMHG | OXYGEN SATURATION: 99 % | HEART RATE: 77 BPM | DIASTOLIC BLOOD PRESSURE: 55 MMHG

## 2024-02-20 DIAGNOSIS — Q45.3 PANCREATIC DIVISUM: Primary | ICD-10-CM

## 2024-02-20 PROCEDURE — 99024 POSTOP FOLLOW-UP VISIT: CPT | Performed by: SURGERY

## 2024-02-21 NOTE — PROGRESS NOTES
ROS: negative for -  genital discharge or hematuria  Musculoskeletal ROS: negative for - focal weakness, gangrene  Psych/Neuro ROS: negative for - visual or auditory hallucinations, suicidal ideation      Time spent reviewing past medical, surgical, social and family history, vitals, nursing assessment and images.    Imaging: N/A    Pathology: N/A    Assessment/Plan:  Leny Yang is a 63 y.o. female pancreatic divisum, chronic recurrent abdominal pain likely secondary to recurrent pancreatitis status post recent laparoscopy with lysis of adhesions and inability to complete the ERCP and access the patient's gastric remnant    Patient reports having had prior endoscopy with evaluation of her duodenum as she has a connection between her gastric pouch and gastric remnant  Will check CT of the abdomen pelvis with p.o. contrast to evaluate for possible gastric gastric fistula  Pending CT scan, may proceed with EGD with possible conventional ERCP transoral  Will discuss further after CT scan    Physician Signature: Electronically signed by Dr. Cardoso  2/21/2024

## 2024-03-11 DIAGNOSIS — E27.8 ADRENAL INCIDENTALOMA (HCC): Primary | ICD-10-CM

## 2024-03-11 RX ORDER — DEXAMETHASONE 1 MG
TABLET ORAL
Qty: 1 TABLET | Refills: 0 | Status: SHIPPED | OUTPATIENT
Start: 2024-03-11

## 2024-03-11 NOTE — TELEPHONE ENCOUNTER
Pt needs refill. Took pill last night to have labs this morning and her ride never showed up and she had to reschedule.

## 2024-03-19 ENCOUNTER — HOSPITAL ENCOUNTER (OUTPATIENT)
Dept: CT IMAGING | Age: 64
Discharge: HOME | End: 2024-03-21
Attending: SURGERY
Payer: MEDICAID

## 2024-03-19 DIAGNOSIS — Q45.3 PANCREATIC DIVISUM: ICD-10-CM

## 2024-03-19 PROCEDURE — 74176 CT ABD & PELVIS W/O CONTRAST: CPT

## 2024-03-19 PROCEDURE — 6360000004 HC RX CONTRAST MEDICATION: Performed by: RADIOLOGY

## 2024-03-19 RX ADMIN — IOPAMIDOL 18 ML: 755 INJECTION, SOLUTION INTRAVENOUS at 14:54

## 2024-03-26 ENCOUNTER — SCHEDULED TELEPHONE ENCOUNTER (OUTPATIENT)
Dept: SURGERY | Age: 64
End: 2024-03-26

## 2024-03-26 DIAGNOSIS — R10.13 EPIGASTRIC PAIN: Primary | ICD-10-CM

## 2024-03-26 PROCEDURE — 99024 POSTOP FOLLOW-UP VISIT: CPT | Performed by: SURGERY

## 2024-03-26 NOTE — OP NOTE
Operative Note      Patient: Tushar Almonte  YOB: 1960  MRN: [id number]    Date of Procedure: 2021    Pre-Op Diagnosis: SACROILIAC DYSFUNCTION    Post-Op Diagnosis: Same       Procedure(s):  #1 BILATERAL SACROILIAC JOINT INJECTION UNDER FLUORO     Surgeon(s):  Nola Peterson MD    Assistant:   * No surgical staff found *    Anesthesia: Local    Estimated Blood Loss (mL): Minimal    Complications: None    Specimens:   * No specimens in log *    Implants:  * No implants in log *      Drains: * No LDAs found *    Findings: good needle placement    Detailed Description of Procedure:   2021    Patient: Tushar Almonte  :  1960  Age:  61 y.o. Sex:  female     PRE-OPERATIVE DIAGNOSIS: Bilateral   Sacroiliitis, somatic dysfunction of the lumbosacral spine. POST-OPERATIVE DIAGNOSIS: Same. PROCEDURE:  Fluoroscopic guided Bilateral   sacroiliac joint injection with steroid (#1). SURGEON:  Nola Peterson MD    ANESTHESIA: Local    ESTIMATED BLOOD LOSS: None.  ______________________________________________________________________  BRIEF HISTORY:  Tushar Almonte comes in today for first Bilateral sacroiliac joint injection under fluoroscopic guidance. The potential complications as well as the procedure in detail were explained to her today. She has elected to undergo the aforementioned procedure. Leny's complete History & Physical examination were reviewed in depth, a copy of which is in the chart. DESCRIPTION OF PROCEDURE:    After confirming written and informed consent, a time-out was performed and Evangelina name and date of birth, the procedure to be performed as well as the plan for the location of the needle insertion were confirmed. The patient was brought into the procedure room and placed in the prone position on the fluoroscopy table. Standard monitors were placed and vital signs were observed throughout the procedure.  The low back and upper buttocks area was prepped with chloraprep and draped in a sterile manner. AP fluoroscopy was used to visualize the sacroiliac joint. The fluoroscopic beam was then obliqued until the anterior and posterior margins of the joint were aligned. The inferior margin of the joint was identified and marked. The skin and subcutaneous tissue about this identified point were anesthestized with 0.5% lidocaine. TWO 22 gauge 3-1/2 spinal needle was advanced toward the the identified point under fluoroscopic guidance. Once the targeted point was reached and the joint space was entered, negative aspiration was confirmed, and 0.5 cc of 240 omnipaque was injected. The  Joint space was appropriately outlined. Then, after negative aspiration, a solution consisiting of 0.25% marcaine 2 cc and 30 mg DepoMedrol was easily injected on each side. The needle was then removed and the needle insertion site was covered with Band-Aid. Disposition the patient tolerated the procedure well and there were no complications . Vital signs remained stable throughout the procedure. The patient was escorted to the recovery area where they remained until discharge and written discharge instructions for the procedure were given. Plan: Lesly Rosales will return to our pain management center as scheduled.      Marichuy Hua MD No

## 2024-03-27 PROBLEM — R10.13 EPIGASTRIC PAIN: Status: ACTIVE | Noted: 2024-03-27

## 2024-03-27 NOTE — PROGRESS NOTES
TELEPHONE VISIT     Leny Yang is a 63 y.o. female evaluated via telephone on 3/26/2024.    Consent:  She and/or health care decision maker is aware that that she may receive a bill for this telephone service,  which includes applicable co-pays.  This virtual visit was conducted with the patient's  (and/or legal guardian's) consent.  This Virtual  Telephone Visit was conducted with patient's (and/or legal guardian's) consent  Patient identification was verified, and a caregiver was present when appropriate.   Patient identification was verified, and a caregiver was present when appropriate. The patient was located in a state where the provider was licensed to provide care.depending on her insurance coverage, and has provided verbal consent to proceed    Yes    The patient was located at Home: [address] [place] [address] [address]  [facility] [id number]  Provider was located at Facility (Appt Dept): [address] [place] [address]  [address]  [place] [address]    Documentation:  Patient identification was verified at the start of the visit: Yes  I communicated with the patient and/or health care decision maker about recent CT scan.   Details of this discussion including any medical advice provided: We discussed in detail her recent CT scan which does show evidence of Diane-en-Y gastric bypass without gastric gastric fistula noted on CT scan.  Patient has been feeling better however recently.  She denies any new episodes of epigastric pain radiating to the back or pancreatitis.  She has been eating better.  We discussed no further intervention at this time unless things were to regress.      I affirm this is a Patient Initiated Episode with a Patient who has not had a related appointment within my department in the past 7 days or scheduled within the next 24 hours.                  Total Time: minutes: <5 minutes (not billable)        Note: not billable if this call serves to triage the patient into an appointment for the relevant

## 2024-04-03 ENCOUNTER — HOSPITAL ENCOUNTER (OUTPATIENT)
Age: 64
Discharge: HOME | End: 2024-04-03
Payer: MEDICAID

## 2024-04-03 DIAGNOSIS — E04.2 MULTINODULAR GOITER: ICD-10-CM

## 2024-04-03 DIAGNOSIS — E27.8 ADRENAL INCIDENTALOMA (HCC): ICD-10-CM

## 2024-04-03 DIAGNOSIS — E55.9 VITAMIN D DEFICIENCY: ICD-10-CM

## 2024-04-03 LAB
25(OH)D3 SERPL-MCNC: 33.7 NG/ML (ref 30–100)
ALBUMIN SERPL-MCNC: 4.2 G/DL (ref 3.5–5.2)
ALP SERPL-CCNC: 125 U/L (ref 35–104)
ALT SERPL-CCNC: 15 U/L (ref 0–32)
ANION GAP SERPL CALCULATED.3IONS-SCNC: 8 MMOL/L (ref 7–16)
AST SERPL-CCNC: 18 U/L (ref 0–31)
BILIRUB SERPL-MCNC: 0.2 MG/DL (ref 0–1.2)
BUN SERPL-MCNC: 27 MG/DL (ref 6–23)
CALCIUM SERPL-MCNC: 9.2 MG/DL (ref 8.6–10.2)
CHLORIDE SERPL-SCNC: 108 MMOL/L (ref 98–107)
CO2 SERPL-SCNC: 27 MMOL/L (ref 22–29)
CREAT SERPL-MCNC: 0.8 MG/DL (ref 0.5–1)
GFR SERPL CREATININE-BSD FRML MDRD: 88 ML/MIN/1.73M2
GLUCOSE SERPL-MCNC: 100 MG/DL (ref 74–99)
POTASSIUM SERPL-SCNC: 4.2 MMOL/L (ref 3.5–5)
PROT SERPL-MCNC: 7 G/DL (ref 6.4–8.3)
SODIUM SERPL-SCNC: 143 MMOL/L (ref 132–146)
T4 FREE SERPL-MCNC: 1 NG/DL (ref 0.9–1.7)
TSH SERPL DL<=0.05 MIU/L-ACNC: 1.35 UIU/ML (ref 0.27–4.2)

## 2024-04-03 PROCEDURE — 83835 ASSAY OF METANEPHRINES: CPT

## 2024-04-03 PROCEDURE — 36415 COLL VENOUS BLD VENIPUNCTURE: CPT

## 2024-04-03 PROCEDURE — 82088 ASSAY OF ALDOSTERONE: CPT

## 2024-04-03 PROCEDURE — 80053 COMPREHEN METABOLIC PANEL: CPT

## 2024-04-03 PROCEDURE — 82533 TOTAL CORTISOL: CPT

## 2024-04-03 PROCEDURE — 82306 VITAMIN D 25 HYDROXY: CPT

## 2024-04-03 PROCEDURE — 84443 ASSAY THYROID STIM HORMONE: CPT

## 2024-04-03 PROCEDURE — 84244 ASSAY OF RENIN: CPT

## 2024-04-03 PROCEDURE — 84439 ASSAY OF FREE THYROXINE: CPT

## 2024-04-04 ENCOUNTER — TELEPHONE (OUTPATIENT)
Dept: ENDOCRINOLOGY | Age: 64
End: 2024-04-04

## 2024-04-04 LAB
CORTIS SERPL-MCNC: 12.4 UG/DL (ref 2.7–18.4)
CORTISOL COLLECTION INFO: NORMAL

## 2024-04-04 NOTE — TELEPHONE ENCOUNTER
Pt notified Please call pt and inform her I have reviewed labs and will discuss with her at upcoming appt

## 2024-04-04 NOTE — TELEPHONE ENCOUNTER
----- Message from ADEEL Brunner - CNS sent at 4/3/2024  9:58 AM EDT -----  Please call pt and inform her I have reviewed labs and will discuss with her at upcoming appt

## 2024-04-07 LAB
ALDOST SERPL-MCNC: 8.6 NG/DL
ALDOSTERONE COMMENT: NORMAL

## 2024-04-08 LAB
ALDOST/RENIN PLAS-RTO: 0.9 RATIO (ref 0.1–3.7)
METANEPH/PLASMA INTERP: NORMAL
METANEPHRINE: <0.1 NMOL/L (ref 0–0.49)
NORMETANEPHRINE PLASMA: 0.5 NMOL/L (ref 0–0.89)
RENIN PLAS-MCNC: 9.5 PG/ML

## 2024-04-09 ENCOUNTER — HOSPITAL ENCOUNTER (OUTPATIENT)
Dept: CT IMAGING | Age: 64
Discharge: HOME | End: 2024-04-11
Attending: PODIATRIST
Payer: MEDICAID

## 2024-04-09 DIAGNOSIS — S92.244A NONDISPLACED FRACTURE OF MEDIAL CUNEIFORM OF RIGHT FOOT, INITIAL ENCOUNTER FOR CLOSED FRACTURE: ICD-10-CM

## 2024-04-09 PROCEDURE — 73700 CT LOWER EXTREMITY W/O DYE: CPT

## 2024-04-15 ENCOUNTER — OFFICE VISIT (OUTPATIENT)
Dept: ENDOCRINOLOGY | Age: 64
End: 2024-04-15
Payer: MEDICAID

## 2024-04-15 VITALS
BODY MASS INDEX: 43.04 KG/M2 | DIASTOLIC BLOOD PRESSURE: 85 MMHG | WEIGHT: 284 LBS | SYSTOLIC BLOOD PRESSURE: 137 MMHG | OXYGEN SATURATION: 95 % | HEIGHT: 68 IN | HEART RATE: 63 BPM

## 2024-04-15 DIAGNOSIS — E55.9 VITAMIN D DEFICIENCY: ICD-10-CM

## 2024-04-15 DIAGNOSIS — E04.2 MULTINODULAR GOITER: ICD-10-CM

## 2024-04-15 DIAGNOSIS — E27.8 ADRENAL INCIDENTALOMA (HCC): Primary | ICD-10-CM

## 2024-04-15 PROCEDURE — 3079F DIAST BP 80-89 MM HG: CPT | Performed by: INTERNAL MEDICINE

## 2024-04-15 PROCEDURE — 99214 OFFICE O/P EST MOD 30 MIN: CPT | Performed by: INTERNAL MEDICINE

## 2024-04-15 PROCEDURE — 3075F SYST BP GE 130 - 139MM HG: CPT | Performed by: INTERNAL MEDICINE

## 2024-04-15 NOTE — PROGRESS NOTES
mouth at bedtime      rifAMPin (RIFADIN) 300 MG capsule Take 1 capsule by mouth 2 times daily Chronic therapy      B Complex-Folic Acid (B-50 BALANCED PO) Take 1 tablet by mouth daily      calcium citrate (CALCITRATE) 950 (200 Ca) MG tablet Take 1 tablet by mouth 3 times daily      Coenzyme Q10 (CO Q-10) 100 MG CAPS Take 2 capsules by mouth in the morning and at bedtime      EPINEPHrine (EPIPEN) 0.3 MG/0.3ML SOAJ injection Inject into the muscle as needed (as needed)      ZENPEP 57806-822677 units CPEP Take 2 capsules by mouth 4 times daily (with meals and nightly)      Azelastine HCl 137 MCG/SPRAY SOLN 1 spray by Nasal route daily      umeclidinium-vilanterol (ANORO ELLIPTA) 62.5-25 MCG/ACT inhaler Inhale 1 puff into the lungs daily 60 each 5    celecoxib (CELEBREX) 200 MG capsule Take 1 capsule by mouth 2 times daily 60 capsule 0    Magnesium Oxide (MAGNESIUM-OXIDE) 250 MG TABS tablet Take 1 tablet by mouth in the morning, at noon, and at bedtime      baclofen (LIORESAL) 20 MG tablet Take 1 tablet by mouth in the morning, at noon, in the evening, and at bedtime      omega-3 acid ethyl esters (LOVAZA) 1 g capsule Take 1 capsule by mouth 2 times daily      omeprazole (PRILOSEC) 20 MG delayed release capsule Take 1 capsule by mouth Daily      metoprolol succinate (TOPROL XL) 50 MG extended release tablet Take 0.5 tablets by mouth in the morning and at bedtime      vitamin B-12 (CYANOCOBALAMIN) 100 MCG tablet Take 1 tablet by mouth daily      levOCARNitine (CARNITOR) 330 MG tablet Take 1 tablet by mouth 3 times daily      gabapentin (NEURONTIN) 600 MG tablet TAKE ONE TABLET BY MOUTH 4 TIMES A DAY      LINZESS 290 MCG CAPS capsule Take 1 capsule by mouth every morning (before breakfast)      vitamin E 90 MG (200 UNIT) CAPS capsule Take 1 capsule by mouth in the morning, at noon, and at bedtime      meclizine (ANTIVERT) 25 MG tablet Take 1 tablet by mouth in the morning, at noon, in the evening, and at bedtime

## 2024-05-15 ENCOUNTER — HOSPITAL ENCOUNTER (EMERGENCY)
Age: 64
Discharge: HOME | End: 2024-05-15
Payer: MEDICAID

## 2024-05-15 ENCOUNTER — APPOINTMENT (OUTPATIENT)
Dept: GENERAL RADIOLOGY | Age: 64
End: 2024-05-15
Payer: MEDICAID

## 2024-05-15 VITALS
RESPIRATION RATE: 14 BRPM | HEIGHT: 68 IN | DIASTOLIC BLOOD PRESSURE: 57 MMHG | TEMPERATURE: 98.2 F | BODY MASS INDEX: 44.25 KG/M2 | WEIGHT: 292 LBS | HEART RATE: 71 BPM | OXYGEN SATURATION: 92 % | SYSTOLIC BLOOD PRESSURE: 125 MMHG

## 2024-05-15 DIAGNOSIS — S81.811A LACERATION OF SKIN OF RIGHT LOWER LEG, INITIAL ENCOUNTER: Primary | ICD-10-CM

## 2024-05-15 DIAGNOSIS — S80.11XA CONTUSION OF RIGHT LOWER LEG, INITIAL ENCOUNTER: ICD-10-CM

## 2024-05-15 PROCEDURE — 6370000000 HC RX 637 (ALT 250 FOR IP): Performed by: NURSE PRACTITIONER

## 2024-05-15 PROCEDURE — 73590 X-RAY EXAM OF LOWER LEG: CPT

## 2024-05-15 PROCEDURE — 99283 EMERGENCY DEPT VISIT LOW MDM: CPT

## 2024-05-15 PROCEDURE — 12002 RPR S/N/AX/GEN/TRNK2.6-7.5CM: CPT

## 2024-05-15 PROCEDURE — 2500000003 HC RX 250 WO HCPCS: Performed by: NURSE PRACTITIONER

## 2024-05-15 PROCEDURE — 6370000000 HC RX 637 (ALT 250 FOR IP)

## 2024-05-15 RX ORDER — OXYCODONE HYDROCHLORIDE AND ACETAMINOPHEN 5; 325 MG/1; MG/1
2 TABLET ORAL ONCE
Status: COMPLETED | OUTPATIENT
Start: 2024-05-15 | End: 2024-05-15

## 2024-05-15 RX ORDER — LIDOCAINE HYDROCHLORIDE 10 MG/ML
20 INJECTION, SOLUTION INFILTRATION; PERINEURAL ONCE
Status: COMPLETED | OUTPATIENT
Start: 2024-05-15 | End: 2024-05-15

## 2024-05-15 RX ORDER — GINSENG 100 MG
CAPSULE ORAL
Status: COMPLETED
Start: 2024-05-15 | End: 2024-05-15

## 2024-05-15 RX ORDER — BACITRACIN ZINC 500 [USP'U]/G
OINTMENT TOPICAL ONCE
Status: COMPLETED | OUTPATIENT
Start: 2024-05-15 | End: 2024-05-15

## 2024-05-15 RX ADMIN — LIDOCAINE HYDROCHLORIDE 20 ML: 10 INJECTION, SOLUTION INFILTRATION; PERINEURAL at 21:03

## 2024-05-15 RX ADMIN — OXYCODONE HYDROCHLORIDE AND ACETAMINOPHEN 2 TABLET: 5; 325 TABLET ORAL at 21:25

## 2024-05-15 RX ADMIN — BACITRACIN ZINC: 500 OINTMENT TOPICAL at 21:05

## 2024-05-15 RX ADMIN — BACITRACIN: 500 OINTMENT TOPICAL at 21:05

## 2024-05-15 ASSESSMENT — LIFESTYLE VARIABLES
HOW OFTEN DO YOU HAVE A DRINK CONTAINING ALCOHOL: NEVER
HOW MANY STANDARD DRINKS CONTAINING ALCOHOL DO YOU HAVE ON A TYPICAL DAY: PATIENT DOES NOT DRINK

## 2024-05-15 ASSESSMENT — PAIN DESCRIPTION - LOCATION: LOCATION: LEG

## 2024-05-15 ASSESSMENT — PAIN DESCRIPTION - ORIENTATION: ORIENTATION: RIGHT;LOWER

## 2024-05-15 ASSESSMENT — PAIN - FUNCTIONAL ASSESSMENT: PAIN_FUNCTIONAL_ASSESSMENT: 0-10

## 2024-05-15 ASSESSMENT — PAIN SCALES - GENERAL: PAINLEVEL_OUTOF10: 6

## 2024-05-15 NOTE — ED PROVIDER NOTES
Independent [facility] Visit.        [facility]  Department of Emergency Medicine   ED  Encounter Note  Admit Date/RoomTime: 5/15/2024  7:47 PM  ED Room:     NAME: Leny Yang  : 1960  MRN: [id number]     Chief Complaint:  Laceration (Right lower leg)    History of Present Illness       Leny Yang is a 63 y.o. old female presenting to the emergency department by ambulance, for a laceration to the right lower leg, caused by her arm accidentally hitting the joystick of her electronic wheelchair approximately 6 PM which caused her wheelchair to go forward hitting her lower leg on the wooden frame of her couch and obtained a laceration.  She has a history of lymphedema and also has a history of having her right knee replaced but due to a MRSA infection she has an acrylic spacer and is on chronic antibiotic treatment with doxycycline 100 mg twice a day and rifampin 300 mg for the past year.  She does not ambulate and she is chronically wheelchair-bound.  She denies any other injury.  There is not a possibility of retained foreign body in the affected area.  Bleeding is  controlled with a pressure dressing. She takes no blood thinning agents.  There is pain at injury site and reports she has chronic neuropathy and takes Percocet at home.  Tetanus up-to-date less than 5 years 2021.      ROS   Pertinent positives and negatives are stated within HPI, all other systems reviewed and are negative.    Past Medical History:  has a past medical history of Adenoma of right adrenal gland, Anemia, Anesthesia complication, Anxiety, Arrhythmia, Arthritis, Asthma, Benign essential tremor, Bipolar affective (HCC), Chronic back pain, Chronic respiratory failure with hypoxia (HCC), Decreased dorsalis pedis pulse, Depression, Difficulty swallowing, Environmental and seasonal allergies, Excessive physiologic tremor, Fatty liver, Full dentures, GERD (gastroesophageal reflux disease), Gout, Herniated

## 2024-05-21 ENCOUNTER — HOSPITAL ENCOUNTER (OUTPATIENT)
Age: 64
Discharge: HOME | End: 2024-05-21
Payer: MEDICAID

## 2024-05-21 DIAGNOSIS — T84.53XD INFECTION ASSOCIATED WITH INTERNAL RIGHT KNEE PROSTHESIS, SUBSEQUENT ENCOUNTER: ICD-10-CM

## 2024-05-21 LAB
CRP SERPL HS-MCNC: 13 MG/L (ref 0–5)
ERYTHROCYTE [SEDIMENTATION RATE] IN BLOOD BY WESTERGREN METHOD: 20 MM/HR (ref 0–20)

## 2024-05-21 PROCEDURE — 36415 COLL VENOUS BLD VENIPUNCTURE: CPT

## 2024-05-21 PROCEDURE — 85652 RBC SED RATE AUTOMATED: CPT

## 2024-05-21 PROCEDURE — 86140 C-REACTIVE PROTEIN: CPT

## 2024-06-07 ENCOUNTER — HOSPITAL ENCOUNTER (OUTPATIENT)
Age: 64
Setting detail: SPECIMEN
Discharge: HOME | End: 2024-06-07

## 2024-06-07 ENCOUNTER — HOSPITAL ENCOUNTER (OUTPATIENT)
Age: 64
Discharge: HOME | End: 2024-06-07
Payer: MEDICAID

## 2024-06-07 DIAGNOSIS — T84.53XD INFECTION ASSOCIATED WITH INTERNAL RIGHT KNEE PROSTHESIS, SUBSEQUENT ENCOUNTER: ICD-10-CM

## 2024-06-07 LAB
ALBUMIN SERPL-MCNC: 3.9 G/DL (ref 3.5–5.2)
ALP SERPL-CCNC: 143 U/L (ref 35–104)
ALT SERPL-CCNC: 17 U/L (ref 0–32)
AST SERPL-CCNC: 19 U/L (ref 0–31)
BILIRUB DIRECT SERPL-MCNC: <0.2 MG/DL (ref 0–0.3)
BILIRUB INDIRECT SERPL-MCNC: ABNORMAL MG/DL (ref 0–1)
BILIRUB SERPL-MCNC: 0.2 MG/DL (ref 0–1.2)
PROT SERPL-MCNC: 7 G/DL (ref 6.4–8.3)

## 2024-06-07 PROCEDURE — 80076 HEPATIC FUNCTION PANEL: CPT

## 2024-06-07 PROCEDURE — 86003 ALLG SPEC IGE CRUDE XTRC EA: CPT

## 2024-06-07 PROCEDURE — 82785 ASSAY OF IGE: CPT

## 2024-06-07 PROCEDURE — 36415 COLL VENOUS BLD VENIPUNCTURE: CPT

## 2024-06-12 LAB
A ALTERNATA IGE QN: <0.1 KU/L (ref 0–0.34)
A FUMIGATUS IGE QN: <0.1 KU/L (ref 0–0.34)
ALLERGEN BIRCH IGE: <0.1 KU/L (ref 0–0.34)
BERMUDA GRASS IGE QN: <0.1 KU/L (ref 0–0.34)
BOXELDER IGE QN: <0.1 KU/L (ref 0–0.34)
C HERBARUM IGE QN: <0.1 KUL/L (ref 0–0.34)
CALIF WALNUT POLN IGE QN: <0.1 KU/L (ref 0–0.34)
CAT DANDER IGE QN: <0.1 KU/L (ref 0–0.34)
CMN PIGWEED IGE QN: <0.1 KU/L (ref 0–0.34)
COMMON RAGWEED IGE QN: <0.1 KU/L (ref 0–0.34)
COTTONWOOD IGE QN: <0.1 KU/L (ref 0–0.34)
D FARINAE IGE QN: <0.1 KU/L (ref 0–0.34)
D PTERONYSS IGE QN: <0.1 KU/L (ref 0–0.34)
DOG DANDER IGE QN: <0.1 KU/L (ref 0–0.34)
IGE SERPL-ACNC: 2 IU/ML (ref 0–100)
LONDON PLANE IGE QN: <0.1 KU/L (ref 0–0.34)
M RACEMOSUS IGE QN: <0.1 KU/L (ref 0–0.34)
MOUSE EPITH IGE QN: <0.1 KU/L (ref 0–0.34)
MT JUNIPER IGE QN: <0.1 KU/L (ref 0–0.34)
P NOTATUM IGE QN: <0.1 KU/L (ref 0–0.34)
PECAN/HICK TREE IGE QN: <0.1 KU/L (ref 0–0.34)
ROACH IGE QN: <0.1 KU/L (ref 0–0.34)
SALTWORT IGE QN: <0.1 KU/L (ref 0–0.34)
SHEEP SORREL IGE QN: <0.1 KU/L (ref 0–0.34)
TIMOTHY IGE QN: <0.1 KU/L (ref 0–0.34)
WHITE ASH IGE QN: <0.1 KU/L (ref 0–0.34)
WHITE ELM IGE QN: <0.1 KU/L (ref 0–0.34)
WHITE MULBERRY IGE QN: <0.1 KU/L (ref 0–0.34)
WHITE OAK IGE QN: <0.1 KU/L (ref 0–0.34)

## 2024-12-24 ENCOUNTER — APPOINTMENT (OUTPATIENT)
Dept: WOUND CARE | Facility: CLINIC | Age: 64
End: 2024-12-24
Payer: MEDICAID

## 2025-01-02 ENCOUNTER — APPOINTMENT (OUTPATIENT)
Dept: WOUND CARE | Facility: CLINIC | Age: 65
End: 2025-01-02
Payer: MEDICAID

## 2025-01-03 ENCOUNTER — APPOINTMENT (OUTPATIENT)
Dept: WOUND CARE | Facility: CLINIC | Age: 65
End: 2025-01-03
Payer: MEDICAID

## 2025-01-09 ENCOUNTER — OFFICE VISIT (OUTPATIENT)
Dept: WOUND CARE | Facility: CLINIC | Age: 65
End: 2025-01-09
Payer: MEDICAID

## 2025-01-09 PROCEDURE — 11042 DBRDMT SUBQ TIS 1ST 20SQCM/<: CPT

## 2025-01-09 PROCEDURE — 99213 OFFICE O/P EST LOW 20 MIN: CPT | Mod: 25

## 2025-01-13 ENCOUNTER — APPOINTMENT (OUTPATIENT)
Dept: WOUND CARE | Facility: CLINIC | Age: 65
End: 2025-01-13
Payer: MEDICAID

## 2025-01-16 ENCOUNTER — APPOINTMENT (OUTPATIENT)
Dept: WOUND CARE | Facility: CLINIC | Age: 65
End: 2025-01-16
Payer: MEDICAID

## 2025-01-23 ENCOUNTER — APPOINTMENT (OUTPATIENT)
Dept: WOUND CARE | Facility: CLINIC | Age: 65
End: 2025-01-23
Payer: MEDICAID

## 2025-01-30 ENCOUNTER — OFFICE VISIT (OUTPATIENT)
Dept: WOUND CARE | Facility: CLINIC | Age: 65
End: 2025-01-30
Payer: MEDICAID

## 2025-01-30 PROCEDURE — 11042 DBRDMT SUBQ TIS 1ST 20SQCM/<: CPT

## 2025-02-06 ENCOUNTER — OFFICE VISIT (OUTPATIENT)
Dept: WOUND CARE | Facility: CLINIC | Age: 65
End: 2025-02-06
Payer: MEDICAID

## 2025-02-06 PROCEDURE — 11042 DBRDMT SUBQ TIS 1ST 20SQCM/<: CPT

## 2025-02-12 ENCOUNTER — OFFICE VISIT (OUTPATIENT)
Dept: WOUND CARE | Facility: CLINIC | Age: 65
End: 2025-02-12
Payer: MEDICAID

## 2025-02-12 PROCEDURE — 11042 DBRDMT SUBQ TIS 1ST 20SQCM/<: CPT

## 2025-02-26 ENCOUNTER — OFFICE VISIT (OUTPATIENT)
Dept: WOUND CARE | Facility: CLINIC | Age: 65
End: 2025-02-26
Payer: MEDICAID

## 2025-02-26 PROCEDURE — 11042 DBRDMT SUBQ TIS 1ST 20SQCM/<: CPT

## 2025-03-02 ENCOUNTER — APPOINTMENT (OUTPATIENT)
Dept: GENERAL RADIOLOGY | Age: 65
DRG: 349 | End: 2025-03-02
Payer: MEDICAID

## 2025-03-02 ENCOUNTER — HOSPITAL ENCOUNTER (INPATIENT)
Age: 65
LOS: 4 days | Discharge: HOME | DRG: 349 | End: 2025-03-07
Attending: EMERGENCY MEDICINE | Admitting: INTERNAL MEDICINE
Payer: MEDICAID

## 2025-03-02 ENCOUNTER — APPOINTMENT (OUTPATIENT)
Dept: ULTRASOUND IMAGING | Age: 65
DRG: 349 | End: 2025-03-02
Payer: MEDICAID

## 2025-03-02 DIAGNOSIS — M23.51 RECURRENT RIGHT KNEE INSTABILITY: Primary | ICD-10-CM

## 2025-03-02 DIAGNOSIS — L08.9 SKIN INFECTION: ICD-10-CM

## 2025-03-02 DIAGNOSIS — M81.0 OSTEOPOROSIS, UNSPECIFIED OSTEOPOROSIS TYPE, UNSPECIFIED PATHOLOGICAL FRACTURE PRESENCE: ICD-10-CM

## 2025-03-02 PROBLEM — R26.2 AMBULATORY DYSFUNCTION: Status: ACTIVE | Noted: 2025-03-02

## 2025-03-02 PROBLEM — T14.8XXA WOUND INFECTION: Status: ACTIVE | Noted: 2025-03-02

## 2025-03-02 PROBLEM — M25.571 ACUTE RIGHT ANKLE PAIN: Status: ACTIVE | Noted: 2025-03-02

## 2025-03-02 LAB
ALBUMIN SERPL-MCNC: 3.7 G/DL (ref 3.5–5.2)
ALP SERPL-CCNC: 102 U/L (ref 35–104)
ALT SERPL-CCNC: 10 U/L (ref 0–32)
ANION GAP SERPL CALCULATED.3IONS-SCNC: 10 MMOL/L (ref 7–16)
AST SERPL-CCNC: 13 U/L (ref 0–31)
BASOPHILS # BLD: 0.02 K/UL (ref 0–0.2)
BASOPHILS NFR BLD: 0 % (ref 0–2)
BILIRUB SERPL-MCNC: 0.4 MG/DL (ref 0–1.2)
BUN SERPL-MCNC: 14 MG/DL (ref 6–23)
CALCIUM SERPL-MCNC: 8.6 MG/DL (ref 8.6–10.2)
CHLORIDE SERPL-SCNC: 100 MMOL/L (ref 98–107)
CHOLEST SERPL-MCNC: 176 MG/DL
CO2 SERPL-SCNC: 24 MMOL/L (ref 22–29)
CREAT SERPL-MCNC: 0.7 MG/DL (ref 0.5–1)
CRP SERPL HS-MCNC: 182 MG/L (ref 0–5)
EOSINOPHIL # BLD: 0.01 K/UL (ref 0.05–0.5)
EOSINOPHILS RELATIVE PERCENT: 0 % (ref 0–6)
ERYTHROCYTE [DISTWIDTH] IN BLOOD BY AUTOMATED COUNT: 12.9 % (ref 11.5–15)
ERYTHROCYTE [SEDIMENTATION RATE] IN BLOOD BY WESTERGREN METHOD: 52 MM/HR (ref 0–20)
GFR, ESTIMATED: >90 ML/MIN/1.73M2
GLUCOSE BLD-MCNC: 133 MG/DL (ref 74–99)
GLUCOSE SERPL-MCNC: 129 MG/DL (ref 74–99)
HBA1C MFR BLD: 5.4 % (ref 4–5.6)
HCT VFR BLD AUTO: 39.1 % (ref 34–48)
HDLC SERPL-MCNC: 45 MG/DL
HGB BLD-MCNC: 13.3 G/DL (ref 11.5–15.5)
IMM GRANULOCYTES # BLD AUTO: 0.07 K/UL (ref 0–0.58)
IMM GRANULOCYTES NFR BLD: 1 % (ref 0–5)
INR PPP: 1.1
LACTATE BLDV-SCNC: 0.5 MMOL/L (ref 0.5–2.2)
LDLC SERPL CALC-MCNC: 102 MG/DL
LYMPHOCYTES NFR BLD: 1.23 K/UL (ref 1.5–4)
LYMPHOCYTES RELATIVE PERCENT: 11 % (ref 20–42)
MAGNESIUM SERPL-MCNC: 2.6 MG/DL (ref 1.6–2.6)
MCH RBC QN AUTO: 32.6 PG (ref 26–35)
MCHC RBC AUTO-ENTMCNC: 34 G/DL (ref 32–34.5)
MCV RBC AUTO: 95.8 FL (ref 80–99.9)
MONOCYTES NFR BLD: 1.13 K/UL (ref 0.1–0.95)
MONOCYTES NFR BLD: 10 % (ref 2–12)
NEUTROPHILS NFR BLD: 78 % (ref 43–80)
NEUTS SEG NFR BLD: 8.98 K/UL (ref 1.8–7.3)
PHOSPHATE SERPL-MCNC: 2.8 MG/DL (ref 2.5–4.5)
PLATELET # BLD AUTO: 214 K/UL (ref 130–450)
PMV BLD AUTO: 8.7 FL (ref 7–12)
POTASSIUM SERPL-SCNC: 4.3 MMOL/L (ref 3.5–5)
PROCALCITONIN SERPL-MCNC: 0.07 NG/ML (ref 0–0.08)
PROT SERPL-MCNC: 6.8 G/DL (ref 6.4–8.3)
PROTHROMBIN TIME: 12.2 SEC (ref 9.3–12.4)
RBC # BLD AUTO: 4.08 M/UL (ref 3.5–5.5)
SODIUM SERPL-SCNC: 134 MMOL/L (ref 132–146)
TRIGL SERPL-MCNC: 145 MG/DL
TSH SERPL DL<=0.05 MIU/L-ACNC: 0.24 UIU/ML (ref 0.27–4.2)
VLDLC SERPL CALC-MCNC: 29 MG/DL
WBC OTHER # BLD: 11.4 K/UL (ref 4.5–11.5)

## 2025-03-02 PROCEDURE — 94640 AIRWAY INHALATION TREATMENT: CPT

## 2025-03-02 PROCEDURE — 6360000002 HC RX W HCPCS: Performed by: NURSE PRACTITIONER

## 2025-03-02 PROCEDURE — 84443 ASSAY THYROID STIM HORMONE: CPT

## 2025-03-02 PROCEDURE — 96372 THER/PROPH/DIAG INJ SC/IM: CPT

## 2025-03-02 PROCEDURE — 96375 TX/PRO/DX INJ NEW DRUG ADDON: CPT

## 2025-03-02 PROCEDURE — 6370000000 HC RX 637 (ALT 250 FOR IP): Performed by: NURSE PRACTITIONER

## 2025-03-02 PROCEDURE — 6360000002 HC RX W HCPCS: Performed by: EMERGENCY MEDICINE

## 2025-03-02 PROCEDURE — 73590 X-RAY EXAM OF LOWER LEG: CPT

## 2025-03-02 PROCEDURE — 73630 X-RAY EXAM OF FOOT: CPT

## 2025-03-02 PROCEDURE — 85025 COMPLETE CBC W/AUTO DIFF WBC: CPT

## 2025-03-02 PROCEDURE — 6360000002 HC RX W HCPCS

## 2025-03-02 PROCEDURE — 73610 X-RAY EXAM OF ANKLE: CPT

## 2025-03-02 PROCEDURE — 96365 THER/PROPH/DIAG IV INF INIT: CPT

## 2025-03-02 PROCEDURE — 96368 THER/DIAG CONCURRENT INF: CPT

## 2025-03-02 PROCEDURE — G0378 HOSPITAL OBSERVATION PER HR: HCPCS

## 2025-03-02 PROCEDURE — 83540 ASSAY OF IRON: CPT

## 2025-03-02 PROCEDURE — 93971 EXTREMITY STUDY: CPT

## 2025-03-02 PROCEDURE — 86140 C-REACTIVE PROTEIN: CPT

## 2025-03-02 PROCEDURE — 2580000003 HC RX 258

## 2025-03-02 PROCEDURE — 82962 GLUCOSE BLOOD TEST: CPT

## 2025-03-02 PROCEDURE — 83735 ASSAY OF MAGNESIUM: CPT

## 2025-03-02 PROCEDURE — 85610 PROTHROMBIN TIME: CPT

## 2025-03-02 PROCEDURE — 83550 IRON BINDING TEST: CPT

## 2025-03-02 PROCEDURE — 2580000003 HC RX 258: Performed by: EMERGENCY MEDICINE

## 2025-03-02 PROCEDURE — 99223 1ST HOSP IP/OBS HIGH 75: CPT | Performed by: NURSE PRACTITIONER

## 2025-03-02 PROCEDURE — 83605 ASSAY OF LACTIC ACID: CPT

## 2025-03-02 PROCEDURE — 87040 BLOOD CULTURE FOR BACTERIA: CPT

## 2025-03-02 PROCEDURE — 80053 COMPREHEN METABOLIC PANEL: CPT

## 2025-03-02 PROCEDURE — 93005 ELECTROCARDIOGRAM TRACING: CPT | Performed by: EMERGENCY MEDICINE

## 2025-03-02 PROCEDURE — 99285 EMERGENCY DEPT VISIT HI MDM: CPT

## 2025-03-02 PROCEDURE — 80061 LIPID PANEL: CPT

## 2025-03-02 PROCEDURE — 85652 RBC SED RATE AUTOMATED: CPT

## 2025-03-02 PROCEDURE — 83036 HEMOGLOBIN GLYCOSYLATED A1C: CPT

## 2025-03-02 PROCEDURE — 84100 ASSAY OF PHOSPHORUS: CPT

## 2025-03-02 PROCEDURE — 96366 THER/PROPH/DIAG IV INF ADDON: CPT

## 2025-03-02 PROCEDURE — 84145 PROCALCITONIN (PCT): CPT

## 2025-03-02 RX ORDER — ARFORMOTEROL TARTRATE 15 UG/2ML
15 SOLUTION RESPIRATORY (INHALATION)
Status: DISCONTINUED | OUTPATIENT
Start: 2025-03-02 | End: 2025-03-08 | Stop reason: HOSPADM

## 2025-03-02 RX ORDER — FERROUS SULFATE 325(65) MG
325 TABLET ORAL
Status: DISCONTINUED | OUTPATIENT
Start: 2025-03-03 | End: 2025-03-08 | Stop reason: HOSPADM

## 2025-03-02 RX ORDER — CITALOPRAM HYDROBROMIDE 20 MG/1
20 TABLET ORAL DAILY
Status: DISCONTINUED | OUTPATIENT
Start: 2025-03-02 | End: 2025-03-08 | Stop reason: HOSPADM

## 2025-03-02 RX ORDER — DEXTROSE MONOHYDRATE 100 MG/ML
INJECTION, SOLUTION INTRAVENOUS CONTINUOUS PRN
Status: DISCONTINUED | OUTPATIENT
Start: 2025-03-02 | End: 2025-03-08 | Stop reason: HOSPADM

## 2025-03-02 RX ORDER — TRAZODONE HYDROCHLORIDE 50 MG/1
100 TABLET ORAL NIGHTLY
Status: DISCONTINUED | OUTPATIENT
Start: 2025-03-02 | End: 2025-03-08 | Stop reason: HOSPADM

## 2025-03-02 RX ORDER — DOXYCYCLINE 100 MG/1
100 CAPSULE ORAL 2 TIMES DAILY
Status: DISCONTINUED | OUTPATIENT
Start: 2025-03-02 | End: 2025-03-08 | Stop reason: HOSPADM

## 2025-03-02 RX ORDER — MAGNESIUM SULFATE IN WATER 40 MG/ML
2000 INJECTION, SOLUTION INTRAVENOUS PRN
Status: DISCONTINUED | OUTPATIENT
Start: 2025-03-02 | End: 2025-03-08 | Stop reason: HOSPADM

## 2025-03-02 RX ORDER — PSEUDOEPHEDRINE HCL 30 MG
250 TABLET ORAL 3 TIMES DAILY
Status: DISCONTINUED | OUTPATIENT
Start: 2025-03-02 | End: 2025-03-08 | Stop reason: HOSPADM

## 2025-03-02 RX ORDER — SODIUM CHLORIDE 0.9 % (FLUSH) 0.9 %
5-40 SYRINGE (ML) INJECTION EVERY 12 HOURS SCHEDULED
Status: DISCONTINUED | OUTPATIENT
Start: 2025-03-02 | End: 2025-03-08 | Stop reason: HOSPADM

## 2025-03-02 RX ORDER — GLUCAGON 1 MG/ML
1 KIT INJECTION PRN
Status: DISCONTINUED | OUTPATIENT
Start: 2025-03-02 | End: 2025-03-08 | Stop reason: HOSPADM

## 2025-03-02 RX ORDER — LANOLIN ALCOHOL/MO/W.PET/CERES
250 CREAM (GRAM) TOPICAL 3 TIMES DAILY
Status: DISCONTINUED | OUTPATIENT
Start: 2025-03-02 | End: 2025-03-04 | Stop reason: SDUPTHER

## 2025-03-02 RX ORDER — CETIRIZINE HYDROCHLORIDE 10 MG/1
10 TABLET ORAL DAILY
Status: DISCONTINUED | OUTPATIENT
Start: 2025-03-02 | End: 2025-03-08 | Stop reason: HOSPADM

## 2025-03-02 RX ORDER — MECOBALAMIN 5000 MCG
5 TABLET,DISINTEGRATING ORAL EVERY EVENING
Status: DISCONTINUED | OUTPATIENT
Start: 2025-03-02 | End: 2025-03-08 | Stop reason: HOSPADM

## 2025-03-02 RX ORDER — POTASSIUM CHLORIDE 1500 MG/1
20 TABLET, EXTENDED RELEASE ORAL DAILY
Status: DISCONTINUED | OUTPATIENT
Start: 2025-03-02 | End: 2025-03-08 | Stop reason: HOSPADM

## 2025-03-02 RX ORDER — 0.9 % SODIUM CHLORIDE 0.9 %
1000 INTRAVENOUS SOLUTION INTRAVENOUS ONCE
Status: COMPLETED | OUTPATIENT
Start: 2025-03-02 | End: 2025-03-02

## 2025-03-02 RX ORDER — PANTOPRAZOLE SODIUM 40 MG/1
40 TABLET, DELAYED RELEASE ORAL NIGHTLY
Status: DISCONTINUED | OUTPATIENT
Start: 2025-03-02 | End: 2025-03-02

## 2025-03-02 RX ORDER — POLYETHYLENE GLYCOL 3350 17 G/17G
17 POWDER, FOR SOLUTION ORAL DAILY PRN
Status: DISCONTINUED | OUTPATIENT
Start: 2025-03-02 | End: 2025-03-04

## 2025-03-02 RX ORDER — SODIUM CHLORIDE 9 MG/ML
INJECTION, SOLUTION INTRAVENOUS PRN
Status: DISCONTINUED | OUTPATIENT
Start: 2025-03-02 | End: 2025-03-08 | Stop reason: HOSPADM

## 2025-03-02 RX ORDER — ALLOPURINOL 100 MG/1
100 TABLET ORAL 2 TIMES DAILY
Status: DISCONTINUED | OUTPATIENT
Start: 2025-03-02 | End: 2025-03-08 | Stop reason: HOSPADM

## 2025-03-02 RX ORDER — CALCIUM CARBONATE 500 MG/1
500 TABLET, CHEWABLE ORAL 3 TIMES DAILY PRN
Status: DISCONTINUED | OUTPATIENT
Start: 2025-03-02 | End: 2025-03-08 | Stop reason: HOSPADM

## 2025-03-02 RX ORDER — BENZONATATE 100 MG/1
100 CAPSULE ORAL 3 TIMES DAILY PRN
Status: DISCONTINUED | OUTPATIENT
Start: 2025-03-02 | End: 2025-03-08 | Stop reason: HOSPADM

## 2025-03-02 RX ORDER — POTASSIUM CHLORIDE 1500 MG/1
40 TABLET, EXTENDED RELEASE ORAL PRN
Status: DISCONTINUED | OUTPATIENT
Start: 2025-03-02 | End: 2025-03-08 | Stop reason: HOSPADM

## 2025-03-02 RX ORDER — BACLOFEN 10 MG/1
20 TABLET ORAL 4 TIMES DAILY
Status: DISCONTINUED | OUTPATIENT
Start: 2025-03-02 | End: 2025-03-08 | Stop reason: HOSPADM

## 2025-03-02 RX ORDER — ENOXAPARIN SODIUM 100 MG/ML
30 INJECTION SUBCUTANEOUS 2 TIMES DAILY
Status: DISCONTINUED | OUTPATIENT
Start: 2025-03-02 | End: 2025-03-08 | Stop reason: HOSPADM

## 2025-03-02 RX ORDER — SODIUM CHLORIDE 0.9 % (FLUSH) 0.9 %
5-40 SYRINGE (ML) INJECTION PRN
Status: DISCONTINUED | OUTPATIENT
Start: 2025-03-02 | End: 2025-03-08 | Stop reason: HOSPADM

## 2025-03-02 RX ORDER — TOPIRAMATE 100 MG/1
200 TABLET, FILM COATED ORAL 2 TIMES DAILY
Status: DISCONTINUED | OUTPATIENT
Start: 2025-03-02 | End: 2025-03-08 | Stop reason: HOSPADM

## 2025-03-02 RX ORDER — ONDANSETRON 4 MG/1
4 TABLET, ORALLY DISINTEGRATING ORAL EVERY 8 HOURS PRN
Status: DISCONTINUED | OUTPATIENT
Start: 2025-03-02 | End: 2025-03-08 | Stop reason: HOSPADM

## 2025-03-02 RX ORDER — MECLIZINE HCL 12.5 MG 12.5 MG/1
25 TABLET ORAL 4 TIMES DAILY
Status: DISCONTINUED | OUTPATIENT
Start: 2025-03-02 | End: 2025-03-08 | Stop reason: HOSPADM

## 2025-03-02 RX ORDER — GABAPENTIN 300 MG/1
600 CAPSULE ORAL 4 TIMES DAILY
Status: DISCONTINUED | OUTPATIENT
Start: 2025-03-02 | End: 2025-03-08 | Stop reason: HOSPADM

## 2025-03-02 RX ORDER — DOCUSATE SODIUM 100 MG/1
200 CAPSULE, LIQUID FILLED ORAL 2 TIMES DAILY
Status: DISCONTINUED | OUTPATIENT
Start: 2025-03-02 | End: 2025-03-04

## 2025-03-02 RX ORDER — FENTANYL CITRATE 50 UG/ML
50 INJECTION, SOLUTION INTRAMUSCULAR; INTRAVENOUS ONCE
Status: COMPLETED | OUTPATIENT
Start: 2025-03-02 | End: 2025-03-02

## 2025-03-02 RX ORDER — HYDRALAZINE HYDROCHLORIDE 20 MG/ML
10 INJECTION INTRAMUSCULAR; INTRAVENOUS EVERY 6 HOURS PRN
Status: DISCONTINUED | OUTPATIENT
Start: 2025-03-02 | End: 2025-03-08 | Stop reason: HOSPADM

## 2025-03-02 RX ORDER — ZIPRASIDONE HYDROCHLORIDE 40 MG/1
40 CAPSULE ORAL EVERY EVENING
Status: DISCONTINUED | OUTPATIENT
Start: 2025-03-02 | End: 2025-03-08 | Stop reason: HOSPADM

## 2025-03-02 RX ORDER — MONTELUKAST SODIUM 10 MG/1
10 TABLET ORAL DAILY
Status: DISCONTINUED | OUTPATIENT
Start: 2025-03-02 | End: 2025-03-08 | Stop reason: HOSPADM

## 2025-03-02 RX ORDER — ACETAMINOPHEN 650 MG/1
650 SUPPOSITORY RECTAL EVERY 6 HOURS PRN
Status: DISCONTINUED | OUTPATIENT
Start: 2025-03-02 | End: 2025-03-08 | Stop reason: HOSPADM

## 2025-03-02 RX ORDER — FUROSEMIDE 40 MG/1
40 TABLET ORAL DAILY
Status: DISCONTINUED | OUTPATIENT
Start: 2025-03-02 | End: 2025-03-08 | Stop reason: HOSPADM

## 2025-03-02 RX ORDER — POTASSIUM CHLORIDE 7.45 MG/ML
10 INJECTION INTRAVENOUS PRN
Status: DISCONTINUED | OUTPATIENT
Start: 2025-03-02 | End: 2025-03-08 | Stop reason: HOSPADM

## 2025-03-02 RX ORDER — PANTOPRAZOLE SODIUM 40 MG/1
40 TABLET, DELAYED RELEASE ORAL
Status: DISCONTINUED | OUTPATIENT
Start: 2025-03-03 | End: 2025-03-08 | Stop reason: HOSPADM

## 2025-03-02 RX ORDER — ONDANSETRON 2 MG/ML
4 INJECTION INTRAMUSCULAR; INTRAVENOUS EVERY 6 HOURS PRN
Status: DISCONTINUED | OUTPATIENT
Start: 2025-03-02 | End: 2025-03-08 | Stop reason: HOSPADM

## 2025-03-02 RX ORDER — METOPROLOL SUCCINATE 25 MG/1
25 TABLET, EXTENDED RELEASE ORAL 2 TIMES DAILY
Status: DISCONTINUED | OUTPATIENT
Start: 2025-03-02 | End: 2025-03-08 | Stop reason: HOSPADM

## 2025-03-02 RX ORDER — OXYCODONE AND ACETAMINOPHEN 10; 325 MG/1; MG/1
1 TABLET ORAL EVERY 6 HOURS PRN
Status: DISCONTINUED | OUTPATIENT
Start: 2025-03-02 | End: 2025-03-08 | Stop reason: HOSPADM

## 2025-03-02 RX ORDER — PROMETHAZINE HYDROCHLORIDE 25 MG/1
25 TABLET ORAL 2 TIMES DAILY
Status: DISCONTINUED | OUTPATIENT
Start: 2025-03-02 | End: 2025-03-08 | Stop reason: HOSPADM

## 2025-03-02 RX ORDER — ACETAMINOPHEN 325 MG/1
650 TABLET ORAL EVERY 6 HOURS PRN
Status: DISCONTINUED | OUTPATIENT
Start: 2025-03-02 | End: 2025-03-08 | Stop reason: HOSPADM

## 2025-03-02 RX ORDER — AZELASTINE HYDROCHLORIDE 137 UG/1
1 SPRAY, METERED NASAL DAILY
Status: DISCONTINUED | OUTPATIENT
Start: 2025-03-02 | End: 2025-03-02 | Stop reason: SDUPTHER

## 2025-03-02 RX ORDER — MECOBALAMIN 5000 MCG
5 TABLET,DISINTEGRATING ORAL NIGHTLY PRN
Status: DISCONTINUED | OUTPATIENT
Start: 2025-03-02 | End: 2025-03-08 | Stop reason: HOSPADM

## 2025-03-02 RX ORDER — CELECOXIB 100 MG/1
200 CAPSULE ORAL 2 TIMES DAILY
Status: DISCONTINUED | OUTPATIENT
Start: 2025-03-02 | End: 2025-03-08 | Stop reason: HOSPADM

## 2025-03-02 RX ADMIN — PANCRELIPASE LIPASE, PANCRELIPASE PROTEASE, PANCRELIPASE AMYLASE 80000 UNITS: 20000; 63000; 84000 CAPSULE, DELAYED RELEASE ORAL at 14:13

## 2025-03-02 RX ADMIN — Medication 250 MG: at 14:16

## 2025-03-02 RX ADMIN — CETIRIZINE HYDROCHLORIDE 10 MG: 10 TABLET, FILM COATED ORAL at 13:32

## 2025-03-02 RX ADMIN — VANCOMYCIN HYDROCHLORIDE 2750 MG: 1 INJECTION, POWDER, LYOPHILIZED, FOR SOLUTION INTRAVENOUS at 13:02

## 2025-03-02 RX ADMIN — CITALOPRAM HYDROBROMIDE 20 MG: 20 TABLET ORAL at 13:32

## 2025-03-02 RX ADMIN — CEFEPIME 2000 MG: 2 INJECTION, POWDER, FOR SOLUTION INTRAVENOUS at 12:06

## 2025-03-02 RX ADMIN — SODIUM CHLORIDE 1000 ML: 0.9 INJECTION, SOLUTION INTRAVENOUS at 12:07

## 2025-03-02 RX ADMIN — TRAZODONE HYDROCHLORIDE 100 MG: 50 TABLET ORAL at 22:58

## 2025-03-02 RX ADMIN — FENTANYL CITRATE 50 MCG: 50 INJECTION INTRAMUSCULAR; INTRAVENOUS at 09:28

## 2025-03-02 RX ADMIN — ENOXAPARIN SODIUM 30 MG: 100 INJECTION SUBCUTANEOUS at 22:57

## 2025-03-02 RX ADMIN — BACLOFEN 20 MG: 10 TABLET ORAL at 22:58

## 2025-03-02 RX ADMIN — ZIPRASIDONE HYDROCHLORIDE 40 MG: 40 CAPSULE ORAL at 18:20

## 2025-03-02 RX ADMIN — TOPIRAMATE 200 MG: 100 TABLET, FILM COATED ORAL at 22:58

## 2025-03-02 RX ADMIN — ARFORMOTEROL TARTRATE 15 MCG: 15 SOLUTION RESPIRATORY (INHALATION) at 12:32

## 2025-03-02 RX ADMIN — FUROSEMIDE 40 MG: 20 TABLET ORAL at 13:40

## 2025-03-02 RX ADMIN — TOPIRAMATE 200 MG: 100 TABLET, FILM COATED ORAL at 14:11

## 2025-03-02 RX ADMIN — OXYCODONE AND ACETAMINOPHEN 1 TABLET: 325; 10 TABLET ORAL at 13:36

## 2025-03-02 RX ADMIN — Medication 300 MG: at 18:20

## 2025-03-02 RX ADMIN — POTASSIUM CHLORIDE 20 MEQ: 1500 TABLET, EXTENDED RELEASE ORAL at 14:10

## 2025-03-02 RX ADMIN — CELECOXIB 200 MG: 100 CAPSULE ORAL at 22:57

## 2025-03-02 RX ADMIN — Medication 250 MG: at 22:57

## 2025-03-02 RX ADMIN — Medication 5 MG: at 18:16

## 2025-03-02 RX ADMIN — IPRATROPIUM BROMIDE 0.5 MG: 0.5 SOLUTION RESPIRATORY (INHALATION) at 12:32

## 2025-03-02 RX ADMIN — OXYCODONE AND ACETAMINOPHEN 1 TABLET: 325; 10 TABLET ORAL at 22:57

## 2025-03-02 RX ADMIN — MONTELUKAST 10 MG: 10 TABLET, FILM COATED ORAL at 22:58

## 2025-03-02 RX ADMIN — METOPROLOL SUCCINATE 25 MG: 25 TABLET, EXTENDED RELEASE ORAL at 13:34

## 2025-03-02 RX ADMIN — Medication 300 MG: at 13:33

## 2025-03-02 RX ADMIN — PROMETHAZINE HYDROCHLORIDE 25 MG: 25 TABLET ORAL at 18:17

## 2025-03-02 RX ADMIN — GABAPENTIN 600 MG: 300 CAPSULE ORAL at 18:17

## 2025-03-02 RX ADMIN — MECLIZINE 25 MG: 12.5 TABLET ORAL at 18:16

## 2025-03-02 RX ADMIN — GABAPENTIN 600 MG: 300 CAPSULE ORAL at 13:34

## 2025-03-02 RX ADMIN — DOCUSATE SODIUM 200 MG: 100 CAPSULE, LIQUID FILLED ORAL at 13:32

## 2025-03-02 RX ADMIN — ALLOPURINOL 100 MG: 100 TABLET ORAL at 23:03

## 2025-03-02 RX ADMIN — ENOXAPARIN SODIUM 30 MG: 100 INJECTION SUBCUTANEOUS at 13:33

## 2025-03-02 RX ADMIN — PANCRELIPASE LIPASE, PANCRELIPASE PROTEASE, PANCRELIPASE AMYLASE 80000 UNITS: 20000; 63000; 84000 CAPSULE, DELAYED RELEASE ORAL at 22:57

## 2025-03-02 RX ADMIN — BACLOFEN 20 MG: 10 TABLET ORAL at 13:33

## 2025-03-02 RX ADMIN — ALLOPURINOL 100 MG: 100 TABLET ORAL at 13:32

## 2025-03-02 RX ADMIN — GABAPENTIN 600 MG: 300 CAPSULE ORAL at 22:58

## 2025-03-02 RX ADMIN — PANCRELIPASE LIPASE, PANCRELIPASE PROTEASE, PANCRELIPASE AMYLASE 80000 UNITS: 20000; 63000; 84000 CAPSULE, DELAYED RELEASE ORAL at 18:16

## 2025-03-02 RX ADMIN — BACLOFEN 20 MG: 10 TABLET ORAL at 18:16

## 2025-03-02 RX ADMIN — CELECOXIB 200 MG: 100 CAPSULE ORAL at 14:15

## 2025-03-02 RX ADMIN — MECLIZINE 25 MG: 12.5 TABLET ORAL at 13:35

## 2025-03-02 ASSESSMENT — PAIN DESCRIPTION - DESCRIPTORS: DESCRIPTORS: ACHING;DISCOMFORT;PRESSURE

## 2025-03-02 ASSESSMENT — PAIN DESCRIPTION - ORIENTATION: ORIENTATION: LOWER

## 2025-03-02 ASSESSMENT — PAIN DESCRIPTION - LOCATION: LOCATION: BACK

## 2025-03-02 ASSESSMENT — PAIN SCALES - GENERAL
PAINLEVEL_OUTOF10: 7
PAINLEVEL_OUTOF10: 9

## 2025-03-02 ASSESSMENT — PAIN - FUNCTIONAL ASSESSMENT: PAIN_FUNCTIONAL_ASSESSMENT: PREVENTS OR INTERFERES SOME ACTIVE ACTIVITIES AND ADLS

## 2025-03-02 NOTE — ED PROVIDER NOTES
[facility]  EMERGENCY DEPARTMENT ENCOUNTER      Pt Name: Leny Yang  MRN: [id number]  Birthdate 1960  Date of evaluation: 3/2/2025  Provider: Rafael Kirkland MD  PCP: Adam Whyte MD  Note Started: 9:39 AM EST 3/2/25    CHIEF COMPLAINT       Chief Complaint   Patient presents with    Extremity Weakness     (Increased weakness, lightheaded, R ankle, R knee pain, denies any falls/injuries)       HISTORY OF PRESENT ILLNESS: 1 or more Elements   History From: Patient  Limitations to history : None    Leny Yang is a 64 y.o. female who presents BIBEMS from home with reports of acute on chronic ankle pain secondary to known chronic wound of the right lower extremity.  Patient endorses history of MRSA on suppressive rifampin and doxycycline chronically.  Reports that over the past couple of days her right lower has had increased pain, warmth and erythema.  She reports that her symptoms are worsened with past few days.  Denies obvious inciting incident or traumatic event.  Denies recent falls or injuring her ankle.  Nothing make symptoms better or worse.  Currently denies nausea, emesis, objective fevers or chills, chest pain or pressure, shortness of breath, dysuria, diarrhea.    Nursing Notes were all reviewed and agreed with or any disagreements were addressed in the HPI.    REVIEW OF SYSTEMS :    Positives and Pertinent negatives as per HPI.     PAST MEDICAL HISTORY/Chronic Conditions Affecting Care    has a past medical history of Adenoma of right adrenal gland, Anemia, Anesthesia complication, Anxiety, Arrhythmia, Arthritis, Asthma, Benign essential tremor, Bipolar affective (HCC), Chronic back pain, Chronic respiratory failure with hypoxia ([facility]), Decreased dorsalis pedis pulse (01/25/2024), Depression, Difficulty swallowing, Environmental and seasonal allergies, Excessive physiologic tremor (05/24/2021), Fatty liver, Full dentures, GERD (gastroesophageal reflux disease), Gout, Herniated  disease)     Gout     past hx of    Herniated cervical disc     limited rom of head and neck    History of swelling of feet     Hypertension     Leg pain     Leg swelling     Lymphedema of both lower extremities 01/25/2024    Lymphedema of lower extremity 09/06/2012    Mitochondrial cytopathy     s/s muscle and nerve pain, difficulty breathing, seizures, difficulty swallowing, digestive disorders- Dr. Inocente Hickman [facility]    MRSA (methicillin resistant Staphylococcus aureus)     2023 and 2022 right knee and ABD    Muscle weakness (generalized)     Nausea     Need for assistance with personal care     Neuropathy     at feet    Obesity     PETR (obstructive sleep apnea)     NC O2 3L at night    Osteoarthritis of left knee     Pancreatic duct disruption     Pancreatitis     PONV (postoperative nausea and vomiting)     Seizures (HCC)     last approx 7-2023 f/u w/ PCP  Granmal, flares up in heat/ summer time    Spinal headache     Thyroid disease     Wheelchair dependence     stand/ pivot transfer       CONSULTS: (Who and What was discussed)  IP CONSULT TO VASCULAR ACCESS TEAM  IP CONSULT TO INFECTIOUS DISEASES  IP CONSULT TO INTERNAL MEDICINE  IP CONSULT TO PHARMACY    Discussion with Other Profesionals : See ED course and below    Social Determinants : Stress    Records Reviewed : Outpatient Notes reviewed outpatient infectious disease notes from 6/4/2024.    CC/HPI Summary, DDx, ED Course, and Reassessment:   64 y.o. female who presents BIBEMS from home with reports of acute on chronic ankle pain secondary to known chronic wound of the right lower extremity.     On initial evaluation patient in no acute distress.  Vital signs WNL.  On initial exam no focal deficits, lungs and heart sounds unremarkable, abdomen soft nontender nondistended, right lower extremity as pictured above, PT pulses appreciated on Doppler bilaterally, right DP pulse appreciated on Doppler.  Presentation concerning for but not limited to:

## 2025-03-02 NOTE — PROGRESS NOTES
Pharmacy Consultation Note  (Antibiotic Dosing and Monitoring)    Initial consult date: 3/2/25  Consulting physician/provider: Dr. Olivo  Drug: Vancomycin  Indication: Bone and Joint Infection    Age/  Gender Height Weight IBW  Allergy Information   64 y.o./female 170.2 cm (5' 7\") (!) 139.7 kg (308 lb)     Ideal body weight: 61.6 kg (135 lb 12.9 oz)  Adjusted ideal body weight: 92.8 kg (204 lb 10.9 oz)   Bee venom, Penicillins, Ropinirole, Ropinirole hcl, Vistaril [hydroxyzine hcl], Aripiprazole, Prednisone, Restoril [temazepam], Wax [beeswax], Hydroxyzine pamoate, and Tape [adhesive tape]      Renal Function:  Recent Labs     03/02/25  0813   BUN 14   CREATININE 0.7     No intake or output data in the 24 hours ending 03/02/25 1248    Vancomycin Monitoring:  Trough:  No results for input(s): \"VANCOTROUGH\" in the last 72 hours.  Random:  No results for input(s): \"VANCORANDOM\" in the last 72 hours.    Vancomycin Administration Times:  Recent vancomycin administrations        No vancomycin IV orders with administrations found.            Orders not given:            vancomycin (VANCOCIN) 2,750 mg in sodium chloride 0.9 % 500 mL IVPB                    Assessment:  Patient is a 64 y.o. female who has been initiated on vancomycin  Estimated Creatinine Clearance: 119 mL/min (based on SCr of 0.7 mg/dL).      Plan:  Vancomycin 1750 mg IV every 12 hours  Will check vancomycin levels when appropriate  Will continue to monitor renal function   Pharmacy to follow      Carlos Call PharmD, [facility] Hospital[facility] 3/2/2025 12:48 PM  [phone]    BRIT: [phone]  SEY: [phone]  SJW: [phone]

## 2025-03-02 NOTE — H&P
Hospitalist History & Physical      PCP: Adam Whyte MD    Date of Service: Pt seen/examined on 3/2/2025     Chief Complaint:  had concerns including Extremity Weakness ((Increased weakness, lightheaded, R ankle, R knee pain, denies any falls/injuries)).    History of Present Illness:    Ms. Leny Yang, a 64 y.o. year old female  who  has a past medical history of Adenoma of right adrenal gland, Anemia, Anesthesia complication, Anxiety, Arrhythmia, Arthritis, Asthma, Benign essential tremor, Bipolar affective (HCC), Chronic back pain, Chronic respiratory failure with hypoxia (HCC), Decreased dorsalis pedis pulse, Depression, Difficulty swallowing, Environmental and seasonal allergies, Excessive physiologic tremor, Fatty liver, Full dentures, GERD (gastroesophageal reflux disease), Gout, Herniated cervical disc, History of swelling of feet, Hypertension, Leg pain, Leg swelling, Lymphedema of both lower extremities, Lymphedema of lower extremity, Mitochondrial cytopathy, MRSA (methicillin resistant Staphylococcus aureus), Muscle weakness (generalized), Nausea, Need for assistance with personal care, Neuropathy, Obesity, PETR (obstructive sleep apnea), Osteoarthritis of left knee, Pancreatic duct disruption, Pancreatitis, PONV (postoperative nausea and vomiting), Seizures ([facility]), Spinal headache, Thyroid disease, and Wheelchair dependence.     Patient presented to the ED with complaints of acute on chronic R ankle and R knee pain and increased warmth and erythema associated with a chronic RLE wound that began 3 days prior to arrival. She also endorses difficulty ambulating 2/2 pain.  She reports she has hardware all through her RLE and she had a previous MRSA infection involving her R knee hardware for which she is now on on suppressive rifampin and doxycycline chronically.  She follows with [facility] in regards to her wound and just saw them last week.  She denies any fevers or chills, chest

## 2025-03-02 NOTE — CONSULTS
Department of Internal Medicine  Infectious Diseases   Consult Note      Reason for Consult:  Right TKA infection       Requesting Physician: Dr Lloyd         HISTORY OF PRESENT ILLNESS:        Pt is known to me . This is a 64 yrs old female with hx of morbid obesity, asthma, chronic low back pain s/p bilateral knee replacement ( 20 yrs ago at [facility] ) , right ankle ORIF ( 3/2022), MRSA MRSA bacteremia , right TKA infection ( 8/2022) - underwent  I &D  on 8/18/2022 - treated with IV vancomycin and rifampin - now on chronic suppressive doxycycline and ? Rifampin ( pt has not come to us for the follow up for > 8 months ) - presented to the ER with worsening right right knee, ankle and leg ~ 2 days , denied fever or chills  WBC was 11.4, CRP was 12, sed rate 52  X ray right ankle - diffuse soft tissue swelling     Past Medical History:      Past Medical History:   Diagnosis Date    Adenoma of right adrenal gland     Anemia     Anesthesia complication     woke up during Diane en y gastric bypass; couldn't speak or move    Anxiety     Arrhythmia     [facility]    Arthritis     spinal    Asthma     controlled with inhalers     Benign essential tremor     Bipolar affective (HCC)     Chronic back pain     Spinal cord stimulator in place    Chronic respiratory failure with hypoxia (HCC)     at times dt diaphragm does not function properly dt Mitochondrial disorder    Decreased dorsalis pedis pulse 01/25/2024    Depression     stable    Difficulty swallowing     for EGD 10-8-19     Environmental and seasonal allergies     Excessive physiologic tremor 05/24/2021    Fatty liver     Full dentures     GERD (gastroesophageal reflux disease)     Gout     past hx of    Herniated cervical disc     limited rom of head and neck    History of swelling of feet     Hypertension     Leg pain     Leg swelling     Lymphedema of both lower extremities 01/25/2024    Lymphedema of lower extremity 09/06/2012    Mitochondrial cytopathy

## 2025-03-03 LAB
25(OH)D3 SERPL-MCNC: 26.3 NG/ML (ref 30–100)
ALBUMIN SERPL-MCNC: 3.1 G/DL (ref 3.5–5.2)
ALP SERPL-CCNC: 89 U/L (ref 35–104)
ALT SERPL-CCNC: 8 U/L (ref 0–32)
ANION GAP SERPL CALCULATED.3IONS-SCNC: 12 MMOL/L (ref 7–16)
AST SERPL-CCNC: 9 U/L (ref 0–31)
BILIRUB SERPL-MCNC: 0.3 MG/DL (ref 0–1.2)
BUN SERPL-MCNC: 12 MG/DL (ref 6–23)
CALCIUM SERPL-MCNC: 8.9 MG/DL (ref 8.6–10.2)
CHLORIDE SERPL-SCNC: 105 MMOL/L (ref 98–107)
CO2 SERPL-SCNC: 21 MMOL/L (ref 22–29)
CREAT SERPL-MCNC: 0.7 MG/DL (ref 0.5–1)
FOLATE SERPL-MCNC: >20 NG/ML (ref 4.8–24.2)
GFR, ESTIMATED: >90 ML/MIN/1.73M2
GLUCOSE SERPL-MCNC: 122 MG/DL (ref 74–99)
IRON SATN MFR SERPL: 8 % (ref 15–50)
IRON SERPL-MCNC: 21 UG/DL (ref 37–145)
MAGNESIUM SERPL-MCNC: 2.2 MG/DL (ref 1.6–2.6)
POTASSIUM SERPL-SCNC: 4 MMOL/L (ref 3.5–5)
PROCALCITONIN SERPL-MCNC: 0.08 NG/ML (ref 0–0.08)
PROT SERPL-MCNC: 6.1 G/DL (ref 6.4–8.3)
SODIUM SERPL-SCNC: 138 MMOL/L (ref 132–146)
TIBC SERPL-MCNC: 260 UG/DL (ref 250–450)
VIT B12 SERPL-MCNC: 1440 PG/ML (ref 211–946)

## 2025-03-03 PROCEDURE — 6360000002 HC RX W HCPCS: Performed by: INTERNAL MEDICINE

## 2025-03-03 PROCEDURE — 2500000003 HC RX 250 WO HCPCS: Performed by: NURSE PRACTITIONER

## 2025-03-03 PROCEDURE — 83735 ASSAY OF MAGNESIUM: CPT

## 2025-03-03 PROCEDURE — 6360000002 HC RX W HCPCS: Performed by: NURSE PRACTITIONER

## 2025-03-03 PROCEDURE — 2580000003 HC RX 258: Performed by: INTERNAL MEDICINE

## 2025-03-03 PROCEDURE — 82607 VITAMIN B-12: CPT

## 2025-03-03 PROCEDURE — 94640 AIRWAY INHALATION TREATMENT: CPT

## 2025-03-03 PROCEDURE — 84145 PROCALCITONIN (PCT): CPT

## 2025-03-03 PROCEDURE — 96366 THER/PROPH/DIAG IV INF ADDON: CPT

## 2025-03-03 PROCEDURE — 99232 SBSQ HOSP IP/OBS MODERATE 35: CPT | Performed by: STUDENT IN AN ORGANIZED HEALTH CARE EDUCATION/TRAINING PROGRAM

## 2025-03-03 PROCEDURE — 1200000000 HC SEMI PRIVATE

## 2025-03-03 PROCEDURE — 80053 COMPREHEN METABOLIC PANEL: CPT

## 2025-03-03 PROCEDURE — 82746 ASSAY OF FOLIC ACID SERUM: CPT

## 2025-03-03 PROCEDURE — 6370000000 HC RX 637 (ALT 250 FOR IP): Performed by: NURSE PRACTITIONER

## 2025-03-03 PROCEDURE — 82306 VITAMIN D 25 HYDROXY: CPT

## 2025-03-03 PROCEDURE — 6370000000 HC RX 637 (ALT 250 FOR IP): Performed by: STUDENT IN AN ORGANIZED HEALTH CARE EDUCATION/TRAINING PROGRAM

## 2025-03-03 PROCEDURE — 36415 COLL VENOUS BLD VENIPUNCTURE: CPT

## 2025-03-03 PROCEDURE — 96372 THER/PROPH/DIAG INJ SC/IM: CPT

## 2025-03-03 RX ORDER — ERGOCALCIFEROL 1.25 MG/1
50000 CAPSULE, LIQUID FILLED ORAL WEEKLY
Status: DISCONTINUED | OUTPATIENT
Start: 2025-03-03 | End: 2025-03-08 | Stop reason: HOSPADM

## 2025-03-03 RX ORDER — NICOTINE 21 MG/24HR
1 PATCH, TRANSDERMAL 24 HOURS TRANSDERMAL DAILY
Status: DISCONTINUED | OUTPATIENT
Start: 2025-03-03 | End: 2025-03-08 | Stop reason: HOSPADM

## 2025-03-03 RX ADMIN — Medication 300 MG: at 21:38

## 2025-03-03 RX ADMIN — OXYCODONE AND ACETAMINOPHEN 1 TABLET: 325; 10 TABLET ORAL at 08:22

## 2025-03-03 RX ADMIN — TOPIRAMATE 200 MG: 100 TABLET, FILM COATED ORAL at 08:21

## 2025-03-03 RX ADMIN — PROMETHAZINE HYDROCHLORIDE 25 MG: 25 TABLET ORAL at 08:22

## 2025-03-03 RX ADMIN — CELECOXIB 200 MG: 100 CAPSULE ORAL at 21:38

## 2025-03-03 RX ADMIN — CETIRIZINE HYDROCHLORIDE 10 MG: 10 TABLET, FILM COATED ORAL at 08:22

## 2025-03-03 RX ADMIN — ARFORMOTEROL TARTRATE 15 MCG: 15 SOLUTION RESPIRATORY (INHALATION) at 07:55

## 2025-03-03 RX ADMIN — GABAPENTIN 600 MG: 300 CAPSULE ORAL at 17:18

## 2025-03-03 RX ADMIN — TOPIRAMATE 200 MG: 100 TABLET, FILM COATED ORAL at 21:39

## 2025-03-03 RX ADMIN — TRAZODONE HYDROCHLORIDE 100 MG: 50 TABLET ORAL at 21:38

## 2025-03-03 RX ADMIN — Medication 250 MG: at 21:38

## 2025-03-03 RX ADMIN — ALLOPURINOL 100 MG: 100 TABLET ORAL at 08:25

## 2025-03-03 RX ADMIN — Medication 250 MG: at 08:25

## 2025-03-03 RX ADMIN — IPRATROPIUM BROMIDE 0.5 MG: 0.5 SOLUTION RESPIRATORY (INHALATION) at 12:34

## 2025-03-03 RX ADMIN — ENOXAPARIN SODIUM 30 MG: 100 INJECTION SUBCUTANEOUS at 21:37

## 2025-03-03 RX ADMIN — FERROUS SULFATE TAB 325 MG (65 MG ELEMENTAL FE) 325 MG: 325 (65 FE) TAB at 08:22

## 2025-03-03 RX ADMIN — ALLOPURINOL 100 MG: 100 TABLET ORAL at 21:39

## 2025-03-03 RX ADMIN — GABAPENTIN 600 MG: 300 CAPSULE ORAL at 08:22

## 2025-03-03 RX ADMIN — Medication 5 MG: at 21:37

## 2025-03-03 RX ADMIN — PROMETHAZINE HYDROCHLORIDE 25 MG: 25 TABLET ORAL at 17:18

## 2025-03-03 RX ADMIN — DOCUSATE SODIUM 200 MG: 100 CAPSULE, LIQUID FILLED ORAL at 08:21

## 2025-03-03 RX ADMIN — MECLIZINE 25 MG: 12.5 TABLET ORAL at 17:18

## 2025-03-03 RX ADMIN — IPRATROPIUM BROMIDE 0.5 MG: 0.5 SOLUTION RESPIRATORY (INHALATION) at 07:55

## 2025-03-03 RX ADMIN — GABAPENTIN 600 MG: 300 CAPSULE ORAL at 15:12

## 2025-03-03 RX ADMIN — PANCRELIPASE LIPASE, PANCRELIPASE PROTEASE, PANCRELIPASE AMYLASE 80000 UNITS: 20000; 63000; 84000 CAPSULE, DELAYED RELEASE ORAL at 10:59

## 2025-03-03 RX ADMIN — CITALOPRAM HYDROBROMIDE 20 MG: 20 TABLET ORAL at 08:22

## 2025-03-03 RX ADMIN — OXYCODONE AND ACETAMINOPHEN 1 TABLET: 325; 10 TABLET ORAL at 21:40

## 2025-03-03 RX ADMIN — BACLOFEN 20 MG: 10 TABLET ORAL at 15:12

## 2025-03-03 RX ADMIN — METOPROLOL SUCCINATE 25 MG: 25 TABLET, EXTENDED RELEASE ORAL at 08:23

## 2025-03-03 RX ADMIN — VANCOMYCIN HYDROCHLORIDE 1750 MG: 10 INJECTION, POWDER, LYOPHILIZED, FOR SOLUTION INTRAVENOUS at 19:16

## 2025-03-03 RX ADMIN — Medication 300 MG: at 08:22

## 2025-03-03 RX ADMIN — BACLOFEN 20 MG: 10 TABLET ORAL at 21:37

## 2025-03-03 RX ADMIN — Medication 250 MG: at 15:33

## 2025-03-03 RX ADMIN — SODIUM CHLORIDE, PRESERVATIVE FREE 10 ML: 5 INJECTION INTRAVENOUS at 08:26

## 2025-03-03 RX ADMIN — OXYCODONE AND ACETAMINOPHEN 1 TABLET: 325; 10 TABLET ORAL at 15:10

## 2025-03-03 RX ADMIN — IPRATROPIUM BROMIDE 0.5 MG: 0.5 SOLUTION RESPIRATORY (INHALATION) at 20:02

## 2025-03-03 RX ADMIN — FUROSEMIDE 40 MG: 20 TABLET ORAL at 08:22

## 2025-03-03 RX ADMIN — DOCUSATE SODIUM 200 MG: 100 CAPSULE, LIQUID FILLED ORAL at 21:37

## 2025-03-03 RX ADMIN — METOPROLOL SUCCINATE 25 MG: 25 TABLET, EXTENDED RELEASE ORAL at 21:37

## 2025-03-03 RX ADMIN — ENOXAPARIN SODIUM 30 MG: 100 INJECTION SUBCUTANEOUS at 08:21

## 2025-03-03 RX ADMIN — ACETAMINOPHEN 650 MG: 325 TABLET ORAL at 17:23

## 2025-03-03 RX ADMIN — POLYETHYLENE GLYCOL 3350 17 GRAM ORAL POWDER PACKET 17 G: at 15:29

## 2025-03-03 RX ADMIN — PANCRELIPASE LIPASE, PANCRELIPASE PROTEASE, PANCRELIPASE AMYLASE 80000 UNITS: 20000; 63000; 84000 CAPSULE, DELAYED RELEASE ORAL at 17:18

## 2025-03-03 RX ADMIN — Medication 300 MG: at 15:13

## 2025-03-03 RX ADMIN — MECLIZINE 25 MG: 12.5 TABLET ORAL at 21:38

## 2025-03-03 RX ADMIN — SODIUM CHLORIDE, PRESERVATIVE FREE 10 ML: 5 INJECTION INTRAVENOUS at 01:25

## 2025-03-03 RX ADMIN — PANTOPRAZOLE SODIUM 40 MG: 40 TABLET, DELAYED RELEASE ORAL at 08:23

## 2025-03-03 RX ADMIN — BACLOFEN 20 MG: 10 TABLET ORAL at 17:19

## 2025-03-03 RX ADMIN — MECLIZINE 25 MG: 12.5 TABLET ORAL at 08:21

## 2025-03-03 RX ADMIN — MONTELUKAST 10 MG: 10 TABLET, FILM COATED ORAL at 21:39

## 2025-03-03 RX ADMIN — ERGOCALCIFEROL 50000 UNITS: 1.25 CAPSULE ORAL at 11:00

## 2025-03-03 RX ADMIN — ARFORMOTEROL TARTRATE 15 MCG: 15 SOLUTION RESPIRATORY (INHALATION) at 20:01

## 2025-03-03 RX ADMIN — BACLOFEN 20 MG: 10 TABLET ORAL at 08:22

## 2025-03-03 RX ADMIN — VANCOMYCIN HYDROCHLORIDE 1750 MG: 10 INJECTION, POWDER, LYOPHILIZED, FOR SOLUTION INTRAVENOUS at 05:51

## 2025-03-03 RX ADMIN — POTASSIUM CHLORIDE 20 MEQ: 1500 TABLET, EXTENDED RELEASE ORAL at 08:22

## 2025-03-03 RX ADMIN — SODIUM CHLORIDE, PRESERVATIVE FREE 10 ML: 5 INJECTION INTRAVENOUS at 21:39

## 2025-03-03 RX ADMIN — CELECOXIB 200 MG: 100 CAPSULE ORAL at 08:26

## 2025-03-03 RX ADMIN — GABAPENTIN 600 MG: 300 CAPSULE ORAL at 21:39

## 2025-03-03 RX ADMIN — MECLIZINE 25 MG: 12.5 TABLET ORAL at 15:13

## 2025-03-03 ASSESSMENT — PAIN DESCRIPTION - DESCRIPTORS
DESCRIPTORS: ACHING;DISCOMFORT;DULL
DESCRIPTORS: ACHING;DISCOMFORT;DULL
DESCRIPTORS: ACHING;SORE;THROBBING;DISCOMFORT

## 2025-03-03 ASSESSMENT — PAIN SCALES - GENERAL
PAINLEVEL_OUTOF10: 8
PAINLEVEL_OUTOF10: 6
PAINLEVEL_OUTOF10: 7
PAINLEVEL_OUTOF10: 8
PAINLEVEL_OUTOF10: 7
PAINLEVEL_OUTOF10: 7

## 2025-03-03 ASSESSMENT — PAIN DESCRIPTION - LOCATION
LOCATION: BACK;LEG
LOCATION: LEG;BACK
LOCATION: BACK;ANKLE;KNEE

## 2025-03-03 ASSESSMENT — PAIN DESCRIPTION - PAIN TYPE: TYPE: CHRONIC PAIN;ACUTE PAIN

## 2025-03-03 ASSESSMENT — PAIN DESCRIPTION - ORIENTATION
ORIENTATION: RIGHT;LOWER;MID
ORIENTATION: RIGHT

## 2025-03-03 ASSESSMENT — PAIN - FUNCTIONAL ASSESSMENT
PAIN_FUNCTIONAL_ASSESSMENT: PREVENTS OR INTERFERES SOME ACTIVE ACTIVITIES AND ADLS
PAIN_FUNCTIONAL_ASSESSMENT: PREVENTS OR INTERFERES WITH MANY ACTIVE NOT PASSIVE ACTIVITIES

## 2025-03-03 ASSESSMENT — PAIN DESCRIPTION - FREQUENCY: FREQUENCY: CONTINUOUS

## 2025-03-03 ASSESSMENT — PAIN DESCRIPTION - ONSET: ONSET: ON-GOING

## 2025-03-03 NOTE — CONSULTS
Department of Orthopedic Surgery  Resident Consult Note    Reason for Consult:  right knee infection    HISTORY OF PRESENT ILLNESS:       Patient is a 64 y.o. female who presents with right lower extremity pain and warmth.  Patient has extensive orthopedic surgery history including right tibial plafond fracture 2022, right knee periprosthetic infection 2022 with antibiotic spacer placement.  Patient was last seen by [facility] in 2023 and recommended to follow-up with [facility] most recent visit last week.  Patient reports her right lower extremity has been doing well with wound care about anterior lateral aspect.  Reports she has been experiencing increased warmth and pain prompting her ED visit.  Denies any new trauma right lower extremity.    Past Medical History:        Diagnosis Date    Adenoma of right adrenal gland     Anemia     Anesthesia complication     woke up during Diane en y gastric bypass; couldn't speak or move    Anxiety     Arrhythmia     [facility]ology    Arthritis     spinal    Asthma     controlled with inhalers     Benign essential tremor     Bipolar affective (HCC)     Chronic back pain     Spinal cord stimulator in place    Chronic respiratory failure with hypoxia (HCC)     at times dt diaphragm does not function properly dt Mitochondrial disorder    Decreased dorsalis pedis pulse 01/25/2024    Depression     stable    Difficulty swallowing     for EGD 10-8-19     Environmental and seasonal allergies     Excessive physiologic tremor 05/24/2021    Fatty liver     Full dentures     GERD (gastroesophageal reflux disease)     Gout     past hx of    Herniated cervical disc     limited rom of head and neck    History of swelling of feet     Hypertension     Leg pain     Leg swelling     Lymphedema of both lower extremities 01/25/2024    Lymphedema of lower extremity 09/06/2012    Mitochondrial cytopathy     s/s muscle and nerve pain, difficulty  LAPAROSCOPIC TAKEDOWN OF ADHESIONS ABORTED ERCP ENDOSCOPIC RETROGRADE CHOLANGIOPANCREATOGRAPHY performed by Christopher Cardoso MD at [facility] OR    ESOPHAGEAL DILATATION  09/18/2018    ESOPHAGEAL DILATION PEMBERTON performed by CHRISTOPHE Caputo MD at [facility] ENDOSCOPY    GASTRIC BYPASS SURGERY  1999    HYSTERECTOMY (CERVIX STATUS UNKNOWN)  1988    IR ASP ABSCESS/HEMATOMA/BULLA/CYST  03/30/2023    IR AIR ABS HEMATOMA BULLA CYST 3/30/2023 Mike Martines, PA [facility] SPECIAL PROCEDURES    JOINT REPLACEMENT Bilateral 2007,2017    knees    KNEE SURGERY Bilateral     scope    KNEE SURGERY Right 08/19/2022    RIGHT KNEE INCISION AND DRAINAGE performed by Vasile Brice DO at [facility] OR    KNEE SURGERY Right 01/25/2023    RIGHT KNEE INCISION AND DRAINAGE, REMOVAL OF EXISTING HARDWARE, ANTIBIOTIC SPACER INSTALLED performed by Vasile Brice DO at [facility] OR    MYOMECTOMY      NERVE BLOCK Left 10/02/2013    lumbar paravertebral facet #2    NERVE BLOCK Left 10/09/2013    hip inj #1    NERVE BLOCK Left 10/16/2013    hip injection    NERVE BLOCK Left 10/29/2014    left lumbar transforaminal nerve lbock  #1    NERVE BLOCK Left 11/12/2014    lumbar left transforaminal nerve block  #2    NERVE BLOCK Left 12/08/2014    lumbar transforaminal #3    NERVE BLOCK Right 03/30/2015    cervical transforaminal #1    NERVE BLOCK Right 04/06/2015    right cervical transforaminal nerve block  #2    NERVE BLOCK Right 04/13/2015    cervical transforaminal #3    NERVE BLOCK Left 07/06/2015    knee injection #1    NERVE BLOCK  07/20/2015    left genicular nerve block/knee #2    NERVE BLOCK Left 10/01/2015    lumb transforam #1    NERVE BLOCK  10/26/2015    left lumbar transforaminal nerve block #3    NERVE BLOCK Bilateral 04/01/2021    #1 BILATERAL SACROILIAC JOINT INJECTION UNDER FLUORO performed by Lane Cates MD at [facility] OR    OTHER SURGICAL HISTORY Left 11/25/2013    lumbar radiofreq    OTHER SURGICAL HISTORY  03/28/2016    stage 1, 3 day

## 2025-03-03 NOTE — CARE COORDINATION
Chart reviewed for transition of care at discharge. Admitted with a wound infection. Per rounds. ID is on for infected right knee, with cellulitis. Currently on IV vancomycin Q12 hours. Patient stated she lives alone on a one floor apartment. She uses a w/c in the home but has a walker to pivot. Has a BSC. She uses oxygen 2 l at night prn but does not know name of company. [facility] checking. She is on RA currently at 94% Patient has HHA through [facility] 5 -6hrs 7 days a week. Phone # is  and would like notified at discharge to resume care. She also gets meals 7/days week per [employer].  is Francoise Drew [phone],mobile, and work # is [phone]. Message left. Patient has h/o HHC with [facility], and agreeable to referral if needed for Home care and antibiotics. Dylan at [facility] home infusion notified for referral if needed. Pcp is Dr whyte and pharmacy is [facility]. D/c plan at this time is home with [facility]. CM will follow.  Electronically signed by Sylvester Hester RN on 3/3/2025 at 4:29 PM    Case Management Assessment  Initial Evaluation    Date/Time of Evaluation: 3/3/2025 4:32 PM  Assessment Completed by: Sylvester Hester RN    If patient is discharged prior to next notation, then this note serves as note for discharge by case management.    Patient Name: Leny Yang                   YOB: 1960  Diagnosis: Skin infection [L08.9]  Wound infection [T14.8XXA, L08.9]                   Date / Time: 3/2/2025  6:29 AM    Patient Admission Status: Inpatient   Readmission Risk (Low < 19, Mod (19-27), High > 27): Readmission Risk Score: 15    Current PCP: Adam Whyte MD  PCP verified by CM? Yes    Chart Reviewed: Yes      History Provided by: Patient  Patient Orientation: Alert and Oriented    Patient Cognition: Alert    Hospitalization in the last 30 days (Readmission):  No    If yes, Readmission Assessment in CM Navigator will be completed.    Advance

## 2025-03-03 NOTE — PROGRESS NOTES
Department of Internal Medicine  Infectious Diseases   Progress Note    C/C :  Right TKA infection , right leg cellulitis    Pt denies fever or chills  Reports pain in the knee, leg pain   Afebrile     Current Facility-Administered Medications   Medication Dose Route Frequency Provider Last Rate Last Admin    vitamin D (ERGOCALCIFEROL) capsule 50,000 Units  50,000 Units Oral Weekly Talon Whitehead MD   50,000 Units at 03/03/25 1100    nicotine (NICODERM CQ) 21 MG/24HR 1 patch  1 patch TransDERmal Daily Talon Whitehead MD   1 patch at 03/03/25 1514    allopurinol (ZYLOPRIM) tablet 100 mg  100 mg Oral BID Halinak, Marguerite Renu, APRN - NP   100 mg at 03/03/25 0825    baclofen (LIORESAL) tablet 20 mg  20 mg Oral 4x daily Halinakus, Marguerite Renu, APRN - NP   20 mg at 03/03/25 1512    calcium citrate (CALCITRATE) tablet 250 mg  250 mg Oral TID Etienne, Marguerite Renu, APRN - NP   250 mg at 03/03/25 1533    celecoxib (CELEBREX) capsule 200 mg  200 mg Oral BID Halinakus, Marguerite Renu, APRN - NP   200 mg at 03/03/25 0826    citalopram (CELEXA) tablet 20 mg  20 mg Oral Daily Halinakus, Marguerite Renu, APRN - NP   20 mg at 03/03/25 0822    docusate sodium (COLACE) capsule 200 mg  200 mg Oral BID Halinak, Marguerite Renu, APRN - NP   200 mg at 03/03/25 0821    [Held by provider] doxycycline hyclate (VIBRAMYCIN) capsule 100 mg  100 mg Oral BID Halinakus, Marguerite Renu, APRN - NP        ferrous sulfate (IRON 325) tablet 325 mg  325 mg Oral Daily with breakfast Etienne, Marguerite Renu, APRN - NP   325 mg at 03/03/25 0822    furosemide (LASIX) tablet 40 mg  40 mg Oral Daily Halinakus, Marguerite Renu, APRN - NP   40 mg at 03/03/25 0822    gabapentin (NEURONTIN) capsule 600 mg  600 mg Oral 4x Daily Halinakus, Marguerite Renu, APRN - NP   600 mg at 03/03/25 1512    cetirizine (ZYRTEC) tablet 10 mg  10 mg Oral Daily Marguerite Clifton APRN - NP   10 mg at 03/03/25 0822    magnesium oxide (MAG-OX) tablet 300 mg  300 mg Oral TID Marguerite Clifton APRN - NP   300 mg at 03/03/25 1518     (Peripheral Line)  10 mEq IntraVENous PRN Hunkus, Marguerite Renu, APRN - NP        magnesium sulfate 2000 mg in 50 mL IVPB premix  2,000 mg IntraVENous PRN Hunkus, Marguerite Renu, APRN - NP        enoxaparin Sodium (LOVENOX) injection 30 mg  30 mg SubCUTAneous BID Hunkus, Marguerite Renu, APRN - NP   30 mg at 03/03/25 0821    ondansetron (ZOFRAN-ODT) disintegrating tablet 4 mg  4 mg Oral Q8H PRN Hunkus, Marguerite Renu, APRN - NP        Or    ondansetron (ZOFRAN) injection 4 mg  4 mg IntraVENous Q6H PRN Hunkus, Marguerite Renu, APRN - NP        acetaminophen (TYLENOL) tablet 650 mg  650 mg Oral Q6H PRN Hunkus, Marguerite Renu, APRN - NP        Or    acetaminophen (TYLENOL) suppository 650 mg  650 mg Rectal Q6H PRN Hunkus, Marguerite Renu, APRN - NP        vancomycin (VANCOCIN) 1,750 mg in sodium chloride 0.9 % 500 mL IVPB  1,750 mg IntraVENous Q12H Bill Olivo MD   Stopped at 03/03/25 0827         REVIEW OF SYSTEMS:    CONSTITUTIONAL:  Denies fever, chill or rigors.  HEENT: denies blurring of vision or double vision, denies hearing problem  RESPIRATORY: denies cough, shortness of breath, sputum expectoration.  CARDIOVASCULAR:  Denies palpitation or chest pain   GASTROINTESTINAL:  Denies abdomen pain, diarrhea or constipation,, nausea or vomiting.  GENITOURINARY:  Denies burning urination or frequency of urination  INTEGUMENT: right leg redness   HEMATOLOGIC/LYMPHATIC:  Denies lymph node swelling, gum bleeding or easy bruising.  MUSCULOSKELETAL:  right leg, ankle, knee pain   NEUROLOGICAL:  Denies light headed, dizziness, loss of consciousness, weakness of lower extremities, bowel or bladder incontinence.      PHYSICAL EXAM:      Vitals:       /69   Pulse 93   Temp 98.1 °F (36.7 °C) (Oral)   Resp 18   Ht 1.702 m (5' 7\")   Wt (!) 139.7 kg (308 lb)   LMP 08/31/1988   SpO2 94%   BMI 48.24 kg/m²     General Appearance:    Awake, alert , no acute distress.   Head:    Normocephalic, atraumatic   Eyes:    No pallor, no icterus,   Ears:

## 2025-03-03 NOTE — PROGRESS NOTES
4 Eyes Skin Assessment     NAME:  Leny Yang  YOB: 1960  MEDICAL RECORD NUMBER:  [id number]    The patient is being assessed for  Admission    I agree that at least one RN has performed a thorough Head to Toe Skin Assessment on the patient. ALL assessment sites listed below have been assessed.      Areas assessed by both nurses:    Head, Face, Ears, Shoulders, Back, Chest, Arms, Elbows, Hands, Sacrum. Buttock, Coccyx, Ischium, Legs. Feet and Heels, and Under Medical Devices         Does the Patient have a Wound? Yes wound(s) were present on assessment. LDA wound assessment was Initiated and completed by RN       Misha Prevention initiated by RN: Yes  Wound Care Orders initiated by RN: Yes    Pressure Injury (Stage 3,4, Unstageable, DTI, NWPT, and Complex wounds) if present, place Wound referral order by RN under : No    New Ostomies, if present place, Ostomy referral order under : No     Nurse 1 eSignature: Electronically signed by Karolina Day RN on 3/3/25 at 1:16 AM EST    **SHARE this note so that the co-signing nurse can place an eSignature**    Nurse 2 eSignature: Electronically signed by Madina Ken RN on 3/3/25 at 1:25 AM EST

## 2025-03-03 NOTE — ACP (ADVANCE CARE PLANNING)
Advance Care Planning   The patient has the following advanced directives on file:  Advance Directives       Power of  Living Will ACP-Advance Directive ACP-Power of     Not on File Not on File Not on File Not on File            The patient has appointed the following active healthcare agents:    Primary Decision Maker: Tavo Yang - Brother/Sister - [phone]    The Patient has the following current code status:    Code Status: Full Code      Sylvester Hester RN  3/3/2025

## 2025-03-03 NOTE — PROGRESS NOTES
Pharmacy Consultation Note  (Antibiotic Dosing and Monitoring)    Initial consult date: 3/2/25  Consulting physician/provider: Dr. Olivo  Drug: Vancomycin  Indication: Bone and Joint Infection    Age/  Gender Height Weight IBW  Allergy Information   64 y.o./female 170.2 cm (5' 7\") (!) 139.7 kg (308 lb)     Ideal body weight: 61.6 kg (135 lb 12.9 oz)  Adjusted ideal body weight: 92.8 kg (204 lb 10.9 oz)   Bee venom, Penicillins, Ropinirole, Ropinirole hcl, Vistaril [hydroxyzine hcl], Aripiprazole, Prednisone, Restoril [temazepam], Wax [beeswax], Hydroxyzine pamoate, and Tape [adhesive tape]      Renal Function:  Recent Labs     03/02/25  0813 03/03/25  0344   BUN 14 12   CREATININE 0.7 0.7       Intake/Output Summary (Last 24 hours) at 3/3/2025 0756  Last data filed at 3/3/2025 0556  Gross per 24 hour   Intake --   Output 0 ml   Net 0 ml       Vancomycin Monitoring:  Trough:  No results for input(s): \"VANCOTROUGH\" in the last 72 hours.  Random:  No results for input(s): \"VANCORANDOM\" in the last 72 hours.    Vancomycin Administration Times:  Recent vancomycin administrations                     vancomycin (VANCOCIN) 1,750 mg in sodium chloride 0.9 % 500 mL IVPB (mg) 1,750 mg New Bag 03/03/25 0551    vancomycin (VANCOCIN) 2,750 mg in sodium chloride 0.9 % 500 mL IVPB (mg) 2,750 mg New Bag 03/02/25 1302                        Assessment:  Patient is a 64 y.o. female who has been initiated on vancomycin  Estimated Creatinine Clearance: 119 mL/min (based on SCr of 0.7 mg/dL).  3/3: scr 0.7 ( at baseline), no growth in cultures so far - final results pending      Plan:  Continue Vancomycin 1750 mg IV every 12 hours  Trough 3/4 @ 1600 - hold next dose if > 20 mcg/mL  Will check vancomycin levels when appropriate  Will continue to monitor renal function   Pharmacy to follow      Daya Max PharmD  PGY1 Pharmacy Practice Resident [phone]  3/3/2025 7:58 AM      SEB: [phone]  SEY: [phone]  [facility]: [phone]

## 2025-03-03 NOTE — PROGRESS NOTES
[facility] Hospitalist Progress Note    SYNOPSIS: Patient admitted on 3/2/2025 for Wound infection    Ms. Leny Yang, a 64 y.o. year old female  who  has a past medical history of Adenoma of right adrenal gland, Anemia, Anesthesia complication, Anxiety, Arrhythmia, Arthritis, Asthma, Benign essential tremor, Bipolar affective (HCC), Chronic back pain, Chronic respiratory failure with hypoxia (HCC), Decreased dorsalis pedis pulse, Depression, Difficulty swallowing, Environmental and seasonal allergies, Excessive physiologic tremor, Fatty liver, Full dentures, GERD (gastroesophageal reflux disease), Gout, Herniated cervical disc, History of swelling of feet, Hypertension, Leg pain, Leg swelling, Lymphedema of both lower extremities, Lymphedema of lower extremity, Mitochondrial cytopathy, MRSA (methicillin resistant Staphylococcus aureus), Muscle weakness (generalized), Nausea, Need for assistance with personal care, Neuropathy, Obesity, PETR (obstructive sleep apnea), Osteoarthritis of left knee, Pancreatic duct disruption, Pancreatitis, PONV (postoperative nausea and vomiting), Seizures ([facility]), Spinal headache, Thyroid disease, and Wheelchair dependence.      Patient presented to the ED with complaints of acute on chronic R ankle and R knee pain and increased warmth and erythema associated with a chronic RLE wound that began 3 days prior to arrival. She also endorses difficulty ambulating 2/2 pain.  She reports she has hardware all through her RLE and she had a previous MRSA infection involving her R knee hardware for which she is now on on suppressive rifampin and doxycycline chronically.  She follows with [facility] in regards to her wound and just saw them last week.  She denies any fevers or chills, chest pain, SOB, cough, ABD pain, N/V/D.      ER COURSE:  In ED vitals are stable.  Laboratory workup significant for elevated CRP.  Imaging reassuring.  Blood cultures collected and pending.   bilaterally.  Normal respiratory effort.   Cardiovascular:  RRR. S1, S2 without MRG.  PV: Pulses palpable. Nonpitting edema and vascular discolortion of BLE.  Abdomen: Soft, non-tender, non-distended. +BS  Musculoskeletal: No obvious deformities.   Skin: Normal skin color.  Wound to RLE with surrounding warmth and erythema as pictured below    Neurologic:  Grossly non-focal. Awake, alert, following commands.   Psychiatric: Alert and oriented, thought content appropriate, normal insight and judgement      ASSESSMENT and PLAN:    Assessment    Suspected acute infection of chronic wound  Right ankle pain  Chronic prosthetic joint infection right knee with antibiotic spacer  S/p right tibial ORIF  Ambulatory dysfunction  COPD not in exacerbation  Tobacco abuse  Morbid obesity BMI 48.24  Hypertension  Hyperlipidemia  Bipolar disorder  History of alcohol abuse  Iron deficiency  History of seizure  PETR  Chronic pain    Plan  -Monitor on telemetry  -Continue vancomycin  -ID following  -Blood culture no growth so far  -Wound care  -Orthopedic surgery consulted, appreciate recommendations, no plan for surgical intervention  -NRT while inpatient, patient has quit smoking for past few weeks, continue abstinence  -Resume home medication as appropriate  -PT/OT eval  -Optimize electrolyte           DVT Prophylaxis [x] Lovenox, []  Heparin, [] SCDs, [] Ambulation   GI Prophylaxis [] PPI,  [] H2 Blocker,  [] Carafate,  [x] Diet/Tube Feeds   Disposition Patient requires continued admission due to pending IV antibiotic, final ID recommendations   MDM [] Low, [x] Moderate,[]  High       Medications:  REVIEWED DAILY    Infusion Medications    dextrose      sodium chloride       Scheduled Medications    vitamin D  50,000 Units Oral Weekly    nicotine  1 patch TransDERmal Daily    allopurinol  100 mg Oral BID    baclofen  20 mg Oral 4x daily    calcium citrate  250 mg Oral TID    celecoxib  200 mg Oral BID    citalopram  20 mg Oral Daily

## 2025-03-04 ENCOUNTER — APPOINTMENT (OUTPATIENT)
Dept: CT IMAGING | Age: 65
DRG: 349 | End: 2025-03-04
Attending: INTERNAL MEDICINE
Payer: MEDICAID

## 2025-03-04 LAB
DATE LAST DOSE: ABNORMAL
TME LAST DOSE: ABNORMAL H
VANCOMYCIN DOSE: ABNORMAL MG
VANCOMYCIN TROUGH SERPL-MCNC: 23.1 UG/ML (ref 5–16)

## 2025-03-04 PROCEDURE — 6370000000 HC RX 637 (ALT 250 FOR IP): Performed by: NURSE PRACTITIONER

## 2025-03-04 PROCEDURE — 6360000002 HC RX W HCPCS: Performed by: INTERNAL MEDICINE

## 2025-03-04 PROCEDURE — 1200000000 HC SEMI PRIVATE

## 2025-03-04 PROCEDURE — 6360000002 HC RX W HCPCS: Performed by: NURSE PRACTITIONER

## 2025-03-04 PROCEDURE — 80202 ASSAY OF VANCOMYCIN: CPT

## 2025-03-04 PROCEDURE — 2500000003 HC RX 250 WO HCPCS: Performed by: NURSE PRACTITIONER

## 2025-03-04 PROCEDURE — 36415 COLL VENOUS BLD VENIPUNCTURE: CPT

## 2025-03-04 PROCEDURE — 99232 SBSQ HOSP IP/OBS MODERATE 35: CPT | Performed by: STUDENT IN AN ORGANIZED HEALTH CARE EDUCATION/TRAINING PROGRAM

## 2025-03-04 PROCEDURE — 97535 SELF CARE MNGMENT TRAINING: CPT

## 2025-03-04 PROCEDURE — 2580000003 HC RX 258: Performed by: INTERNAL MEDICINE

## 2025-03-04 PROCEDURE — 6360000004 HC RX CONTRAST MEDICATION: Performed by: RADIOLOGY

## 2025-03-04 PROCEDURE — 97161 PT EVAL LOW COMPLEX 20 MIN: CPT

## 2025-03-04 PROCEDURE — 97166 OT EVAL MOD COMPLEX 45 MIN: CPT

## 2025-03-04 PROCEDURE — 97530 THERAPEUTIC ACTIVITIES: CPT

## 2025-03-04 PROCEDURE — 6370000000 HC RX 637 (ALT 250 FOR IP): Performed by: STUDENT IN AN ORGANIZED HEALTH CARE EDUCATION/TRAINING PROGRAM

## 2025-03-04 PROCEDURE — 94640 AIRWAY INHALATION TREATMENT: CPT

## 2025-03-04 PROCEDURE — 73702 CT LWR EXTREMITY W/O&W/DYE: CPT

## 2025-03-04 RX ORDER — SODIUM CHLORIDE 0.9 % (FLUSH) 0.9 %
10 SYRINGE (ML) INJECTION
Status: ACTIVE | OUTPATIENT
Start: 2025-03-04 | End: 2025-03-05

## 2025-03-04 RX ORDER — LANOLIN ALCOHOL/MO/W.PET/CERES
400 CREAM (GRAM) TOPICAL DAILY
Status: DISCONTINUED | OUTPATIENT
Start: 2025-03-04 | End: 2025-03-08 | Stop reason: HOSPADM

## 2025-03-04 RX ORDER — BISACODYL 10 MG
10 SUPPOSITORY, RECTAL RECTAL DAILY PRN
Status: DISCONTINUED | OUTPATIENT
Start: 2025-03-04 | End: 2025-03-08 | Stop reason: HOSPADM

## 2025-03-04 RX ORDER — SENNA AND DOCUSATE SODIUM 50; 8.6 MG/1; MG/1
2 TABLET, FILM COATED ORAL DAILY
Status: DISCONTINUED | OUTPATIENT
Start: 2025-03-04 | End: 2025-03-08 | Stop reason: HOSPADM

## 2025-03-04 RX ORDER — POLYETHYLENE GLYCOL 3350 17 G/17G
17 POWDER, FOR SOLUTION ORAL DAILY
Status: DISCONTINUED | OUTPATIENT
Start: 2025-03-04 | End: 2025-03-08 | Stop reason: HOSPADM

## 2025-03-04 RX ORDER — IOPAMIDOL 755 MG/ML
75 INJECTION, SOLUTION INTRAVASCULAR
Status: COMPLETED | OUTPATIENT
Start: 2025-03-04 | End: 2025-03-04

## 2025-03-04 RX ADMIN — PANCRELIPASE LIPASE, PANCRELIPASE PROTEASE, PANCRELIPASE AMYLASE 80000 UNITS: 20000; 63000; 84000 CAPSULE, DELAYED RELEASE ORAL at 09:23

## 2025-03-04 RX ADMIN — Medication 250 MG: at 13:12

## 2025-03-04 RX ADMIN — CELECOXIB 200 MG: 100 CAPSULE ORAL at 21:17

## 2025-03-04 RX ADMIN — ARFORMOTEROL TARTRATE 15 MCG: 15 SOLUTION RESPIRATORY (INHALATION) at 20:29

## 2025-03-04 RX ADMIN — CITALOPRAM HYDROBROMIDE 20 MG: 20 TABLET ORAL at 09:21

## 2025-03-04 RX ADMIN — CELECOXIB 200 MG: 100 CAPSULE ORAL at 09:23

## 2025-03-04 RX ADMIN — ENOXAPARIN SODIUM 30 MG: 100 INJECTION SUBCUTANEOUS at 09:19

## 2025-03-04 RX ADMIN — PROMETHAZINE HYDROCHLORIDE 25 MG: 25 TABLET ORAL at 09:20

## 2025-03-04 RX ADMIN — OXYCODONE AND ACETAMINOPHEN 1 TABLET: 325; 10 TABLET ORAL at 04:39

## 2025-03-04 RX ADMIN — IPRATROPIUM BROMIDE 0.5 MG: 0.5 SOLUTION RESPIRATORY (INHALATION) at 12:29

## 2025-03-04 RX ADMIN — GABAPENTIN 600 MG: 300 CAPSULE ORAL at 13:12

## 2025-03-04 RX ADMIN — PANCRELIPASE LIPASE, PANCRELIPASE PROTEASE, PANCRELIPASE AMYLASE 80000 UNITS: 20000; 63000; 84000 CAPSULE, DELAYED RELEASE ORAL at 17:43

## 2025-03-04 RX ADMIN — METOPROLOL SUCCINATE 25 MG: 25 TABLET, EXTENDED RELEASE ORAL at 09:21

## 2025-03-04 RX ADMIN — VANCOMYCIN HYDROCHLORIDE 1750 MG: 10 INJECTION, POWDER, LYOPHILIZED, FOR SOLUTION INTRAVENOUS at 05:36

## 2025-03-04 RX ADMIN — POTASSIUM CHLORIDE 20 MEQ: 1500 TABLET, EXTENDED RELEASE ORAL at 09:20

## 2025-03-04 RX ADMIN — ZIPRASIDONE HYDROCHLORIDE 40 MG: 40 CAPSULE ORAL at 21:18

## 2025-03-04 RX ADMIN — ENOXAPARIN SODIUM 30 MG: 100 INJECTION SUBCUTANEOUS at 21:07

## 2025-03-04 RX ADMIN — FERROUS SULFATE TAB 325 MG (65 MG ELEMENTAL FE) 325 MG: 325 (65 FE) TAB at 09:19

## 2025-03-04 RX ADMIN — PANTOPRAZOLE SODIUM 40 MG: 40 TABLET, DELAYED RELEASE ORAL at 06:03

## 2025-03-04 RX ADMIN — POLYETHYLENE GLYCOL 3350 17 GRAM ORAL POWDER PACKET 17 G: at 09:31

## 2025-03-04 RX ADMIN — CETIRIZINE HYDROCHLORIDE 10 MG: 10 TABLET, FILM COATED ORAL at 09:20

## 2025-03-04 RX ADMIN — PANCRELIPASE LIPASE, PANCRELIPASE PROTEASE, PANCRELIPASE AMYLASE 60000 UNITS: 20000; 63000; 84000 CAPSULE, DELAYED RELEASE ORAL at 21:15

## 2025-03-04 RX ADMIN — METOPROLOL SUCCINATE 25 MG: 25 TABLET, EXTENDED RELEASE ORAL at 21:08

## 2025-03-04 RX ADMIN — FUROSEMIDE 40 MG: 20 TABLET ORAL at 09:22

## 2025-03-04 RX ADMIN — TOPIRAMATE 200 MG: 100 TABLET, FILM COATED ORAL at 21:18

## 2025-03-04 RX ADMIN — ALLOPURINOL 100 MG: 100 TABLET ORAL at 21:08

## 2025-03-04 RX ADMIN — TOPIRAMATE 200 MG: 100 TABLET, FILM COATED ORAL at 09:25

## 2025-03-04 RX ADMIN — SODIUM CHLORIDE, PRESERVATIVE FREE 10 ML: 5 INJECTION INTRAVENOUS at 21:08

## 2025-03-04 RX ADMIN — MONTELUKAST 10 MG: 10 TABLET, FILM COATED ORAL at 21:07

## 2025-03-04 RX ADMIN — MECLIZINE 25 MG: 12.5 TABLET ORAL at 09:21

## 2025-03-04 RX ADMIN — OXYCODONE AND ACETAMINOPHEN 1 TABLET: 325; 10 TABLET ORAL at 11:11

## 2025-03-04 RX ADMIN — BACLOFEN 20 MG: 10 TABLET ORAL at 17:40

## 2025-03-04 RX ADMIN — ALLOPURINOL 100 MG: 100 TABLET ORAL at 09:21

## 2025-03-04 RX ADMIN — BACLOFEN 20 MG: 10 TABLET ORAL at 09:22

## 2025-03-04 RX ADMIN — SODIUM CHLORIDE, PRESERVATIVE FREE 10 ML: 5 INJECTION INTRAVENOUS at 09:25

## 2025-03-04 RX ADMIN — Medication 250 MG: at 21:18

## 2025-03-04 RX ADMIN — MECLIZINE 25 MG: 12.5 TABLET ORAL at 13:12

## 2025-03-04 RX ADMIN — PROMETHAZINE HYDROCHLORIDE 25 MG: 25 TABLET ORAL at 17:41

## 2025-03-04 RX ADMIN — GABAPENTIN 600 MG: 300 CAPSULE ORAL at 17:40

## 2025-03-04 RX ADMIN — Medication 400 MG: at 09:46

## 2025-03-04 RX ADMIN — SENNOSIDES AND DOCUSATE SODIUM 2 TABLET: 50; 8.6 TABLET ORAL at 09:46

## 2025-03-04 RX ADMIN — MECLIZINE 25 MG: 12.5 TABLET ORAL at 21:07

## 2025-03-04 RX ADMIN — BACLOFEN 20 MG: 10 TABLET ORAL at 13:13

## 2025-03-04 RX ADMIN — IPRATROPIUM BROMIDE 0.5 MG: 0.5 SOLUTION RESPIRATORY (INHALATION) at 20:29

## 2025-03-04 RX ADMIN — IOPAMIDOL 75 ML: 755 INJECTION, SOLUTION INTRAVENOUS at 08:02

## 2025-03-04 RX ADMIN — TRAZODONE HYDROCHLORIDE 100 MG: 50 TABLET ORAL at 21:08

## 2025-03-04 RX ADMIN — DOCUSATE SODIUM 200 MG: 100 CAPSULE, LIQUID FILLED ORAL at 09:20

## 2025-03-04 RX ADMIN — GABAPENTIN 600 MG: 300 CAPSULE ORAL at 09:19

## 2025-03-04 RX ADMIN — MECLIZINE 25 MG: 12.5 TABLET ORAL at 17:40

## 2025-03-04 RX ADMIN — BACLOFEN 20 MG: 10 TABLET ORAL at 21:08

## 2025-03-04 RX ADMIN — ACETAMINOPHEN 650 MG: 325 TABLET ORAL at 16:03

## 2025-03-04 RX ADMIN — Medication 5 MG: at 21:08

## 2025-03-04 RX ADMIN — Medication 250 MG: at 09:24

## 2025-03-04 RX ADMIN — GABAPENTIN 600 MG: 300 CAPSULE ORAL at 21:08

## 2025-03-04 RX ADMIN — PANCRELIPASE LIPASE, PANCRELIPASE PROTEASE, PANCRELIPASE AMYLASE 80000 UNITS: 20000; 63000; 84000 CAPSULE, DELAYED RELEASE ORAL at 12:11

## 2025-03-04 RX ADMIN — OXYCODONE AND ACETAMINOPHEN 1 TABLET: 325; 10 TABLET ORAL at 17:41

## 2025-03-04 ASSESSMENT — PAIN DESCRIPTION - ORIENTATION
ORIENTATION: RIGHT

## 2025-03-04 ASSESSMENT — PAIN DESCRIPTION - LOCATION
LOCATION: BACK;LEG
LOCATION: GENERALIZED
LOCATION: BACK;LEG
LOCATION: GENERALIZED
LOCATION: ANKLE;KNEE

## 2025-03-04 ASSESSMENT — PAIN SCALES - GENERAL
PAINLEVEL_OUTOF10: 4
PAINLEVEL_OUTOF10: 9
PAINLEVEL_OUTOF10: 8
PAINLEVEL_OUTOF10: 9
PAINLEVEL_OUTOF10: 7

## 2025-03-04 ASSESSMENT — PAIN DESCRIPTION - DESCRIPTORS
DESCRIPTORS: ACHING
DESCRIPTORS: ACHING
DESCRIPTORS: ACHING;DISCOMFORT;SPASM;THROBBING
DESCRIPTORS: ACHING;DULL;SORE

## 2025-03-04 ASSESSMENT — PAIN DESCRIPTION - PAIN TYPE: TYPE: ACUTE PAIN;CHRONIC PAIN

## 2025-03-04 NOTE — PROGRESS NOTES
Physical Therapy  Physical Therapy Initial Assessment    Name: Leny Yang  : 1960  MRN: [id number]      Date of Service: 3/4/2025    Evaluating PT:  Steve Amaya PT, DPT [id number]    Room #:  8405/8405-B  Diagnosis:  Skin infection [L08.9]  Wound infection [T14.8XXA, L08.9]  PMHx/PSHx:   has a past medical history of Adenoma of right adrenal gland, Anemia, Anesthesia complication, Anxiety, Arrhythmia, Arthritis, Asthma, Benign essential tremor, Bipolar affective (HCC), Chronic back pain, Chronic respiratory failure with hypoxia (HCC), Decreased dorsalis pedis pulse, Depression, Difficulty swallowing, Environmental and seasonal allergies, Excessive physiologic tremor, Fatty liver, Full dentures, GERD (gastroesophageal reflux disease), Gout, Herniated cervical disc, History of swelling of feet, Hypertension, Leg pain, Leg swelling, Lymphedema of both lower extremities, Lymphedema of lower extremity, Mitochondrial cytopathy, MRSA (methicillin resistant Staphylococcus aureus), Muscle weakness (generalized), Nausea, Need for assistance with personal care, Neuropathy, Obesity, PETR (obstructive sleep apnea), Osteoarthritis of left knee, Pancreatic duct disruption, Pancreatitis, PONV (postoperative nausea and vomiting), Seizures (HCC), Spinal headache, Thyroid disease, and Wheelchair dependence.   has a past surgical history that includes knee surgery (Bilateral); Gastric bypass surgery (); myomectomy; Tonsillectomy; Nerve Block (Left, 10/02/2013); Nerve Block (Left, 10/09/2013); Nerve Block (Left, 10/16/2013); other surgical history (Left, 2013); Nerve Block (Left, 10/29/2014); Nerve Block (Left, 2014); Nerve Block (Left, 2014); Nerve Block (Right, 2015); Nerve Block (Right, 2015); Nerve Block (Right, 2015); Nerve Block (Left, 2015); Nerve Block (2015); Nerve Block (Left, 10/01/2015); Nerve Block (10/26/2015); other surgical history (2016); Endoscopy,

## 2025-03-04 NOTE — PROGRESS NOTES
Department of Internal Medicine  Infectious Diseases   Progress Note    C/C :  Right TKA infection , right leg cellulitis    Pt denies fever or chills  Reports pain in the knee, leg pain   Afebrile     Current Facility-Administered Medications   Medication Dose Route Frequency Provider Last Rate Last Admin    sodium chloride flush 0.9 % injection 10 mL  10 mL IntraVENous Once PRN Sarabjit Graves MD        sennosides-docusate sodium (SENOKOT-S) 8.6-50 MG tablet 2 tablet  2 tablet Oral Daily Talon Whitehead MD   2 tablet at 03/04/25 0946    bisacodyl (DULCOLAX) suppository 10 mg  10 mg Rectal Daily PRN Talon Whitehead MD        polyethylene glycol (GLYCOLAX) packet 17 g  17 g Oral Daily Talon Whitehead MD        magnesium oxide (MAG-OX) tablet 400 mg  400 mg Oral Daily Talon Whitehead MD   400 mg at 03/04/25 0946    vitamin D (ERGOCALCIFEROL) capsule 50,000 Units  50,000 Units Oral Weekly Talon Whitehead MD   50,000 Units at 03/03/25 1100    nicotine (NICODERM CQ) 21 MG/24HR 1 patch  1 patch TransDERmal Daily Talon Whitehead MD   1 patch at 03/04/25 0918    allopurinol (ZYLOPRIM) tablet 100 mg  100 mg Oral BID Marguerite Clifton, APRN - NP   100 mg at 03/04/25 0921    baclofen (LIORESAL) tablet 20 mg  20 mg Oral 4x daily Marguerite Clifton, APRN - NP   20 mg at 03/04/25 1313    calcium citrate (CALCITRATE) tablet 250 mg  250 mg Oral TID Marguerite Clifton, APRN - NP   250 mg at 03/04/25 1312    celecoxib (CELEBREX) capsule 200 mg  200 mg Oral BID Marguerite Clifton, APRN - NP   200 mg at 03/04/25 0923    citalopram (CELEXA) tablet 20 mg  20 mg Oral Daily Marguerite Clifton, APRN - NP   20 mg at 03/04/25 0921    [Held by provider] doxycycline hyclate (VIBRAMYCIN) capsule 100 mg  100 mg Oral BID Marguerite Clifton, APRN - NP        ferrous sulfate (IRON 325) tablet 325 mg  325 mg Oral Daily with breakfast Marguerite Clifton APRN - NP   325 mg at 03/04/25 0919    furosemide (LASIX) tablet 40 mg  40 mg Oral     potassium bicarb-citric acid (EFFER-K) effervescent tablet 40 mEq  40 mEq Oral PRN Hunkus, Marguerite Renu, APRN - NP        Or    potassium chloride 10 mEq/100 mL IVPB (Peripheral Line)  10 mEq IntraVENous PRN Hunkus, Marguerite Renu, APRN - NP        magnesium sulfate 2000 mg in 50 mL IVPB premix  2,000 mg IntraVENous PRN Hunkus, Marguerite Renu, APRN - NP        enoxaparin Sodium (LOVENOX) injection 30 mg  30 mg SubCUTAneous BID Hunkus, Marguerite Renu, APRN - NP   30 mg at 03/04/25 0919    ondansetron (ZOFRAN-ODT) disintegrating tablet 4 mg  4 mg Oral Q8H PRN Hunkus, Marguerite Renu, APRN - NP        Or    ondansetron (ZOFRAN) injection 4 mg  4 mg IntraVENous Q6H PRN Hunkus, Marguerite Renu, APRN - NP        acetaminophen (TYLENOL) tablet 650 mg  650 mg Oral Q6H PRN Hunkus, Marguerite Renu, APRN - NP   650 mg at 03/03/25 1723    Or    acetaminophen (TYLENOL) suppository 650 mg  650 mg Rectal Q6H PRN Hunkus, Marguerite Renu, APRN - NP        vancomycin (VANCOCIN) 1,750 mg in sodium chloride 0.9 % 500 mL IVPB  1,750 mg IntraVENous Q12H Bill Olivo MD   Stopped at 03/04/25 0737         REVIEW OF SYSTEMS:    CONSTITUTIONAL:  Denies fever, chill or rigors.  HEENT: denies blurring of vision or double vision, denies hearing problem  RESPIRATORY: denies cough, shortness of breath, sputum expectoration.  CARDIOVASCULAR:  Denies palpitation or chest pain   GASTROINTESTINAL:  Denies abdomen pain, diarrhea or constipation,, nausea or vomiting.  GENITOURINARY:  Denies burning urination or frequency of urination  INTEGUMENT: right leg redness   HEMATOLOGIC/LYMPHATIC:  Denies lymph node swelling, gum bleeding or easy bruising.  MUSCULOSKELETAL:  right leg, ankle, knee pain   NEUROLOGICAL:  Denies light headed, dizziness, loss of consciousness, weakness of lower extremities, bowel or bladder incontinence.      PHYSICAL EXAM:      Vitals:       /63   Pulse 68   Temp 97.9 °F (36.6 °C)   Resp 18   Ht 1.702 m (5' 7\")   Wt (!) 139.7 kg (308 lb)   LMP

## 2025-03-04 NOTE — FLOWSHEET NOTE
Inpatient Wound Care (Initial consult) 8405B    Admit Date: 3/2/2025  6:29 AM    Reason for consult:  Right leg    Significant history: Per H&P    Chief Complaint:  had concerns including Extremity Weakness ((Increased weakness, lightheaded, R ankle, R knee pain, denies any falls/injuries)).     History of Present Illness:    Ms. Leny Yang, a 64 y.o. year old female  who  has a past medical history of Adenoma of right adrenal gland, Anemia, Anesthesia complication, Anxiety, Arrhythmia, Arthritis, Asthma, Benign essential tremor, Bipolar affective (HCC), Chronic back pain, Chronic respiratory failure with hypoxia (HCC), Decreased dorsalis pedis pulse, Depression, Difficulty swallowing, Environmental and seasonal allergies, Excessive physiologic tremor, Fatty liver, Full dentures, GERD (gastroesophageal reflux disease), Gout, Herniated cervical disc, History of swelling of feet, Hypertension, Leg pain, Leg swelling, Lymphedema of both lower extremities, Lymphedema of lower extremity, Mitochondrial cytopathy, MRSA (methicillin resistant Staphylococcus aureus), Muscle weakness (generalized), Nausea, Need for assistance with personal care, Neuropathy, Obesity, PETR (obstructive sleep apnea), Osteoarthritis of left knee, Pancreatic duct disruption, Pancreatitis, PONV (postoperative nausea and vomiting), Seizures ([facility]), Spinal headache, Thyroid disease, and Wheelchair dependence.     Findings:     03/04/25 0832   Skin Integumentary    Skin Integrity   (Dry flaky)   Location BLE   Skin Integrity Site 2   Skin Integrity Location 2 Ecchymosis   Location 2 BUE   Skin Integrity Site 3   Skin Integrity Location 3   (dry scabbed area)    Location 3 RLE       **Informed Consent**    The patient has given verbal consent to have photos taken of right lower leg and inserted into their chart as part of their permanent medical record for purposes of documentation, treatment management and/or medical review.   All Images taken on

## 2025-03-04 NOTE — PROGRESS NOTES
improve motor endurance, ROM, and functional strength for ADLs/functional transfers  * Therapeutic activities to facilitate/challenge dynamic balance, stand tolerance for increased safety and independence with ADLs  * Therapeutic activities to facilitate gross/fine motor skills for increased independence with ADLs  * Neuro-muscular re-education: facilitation of righting/equilibrium reactions, midline orientation, scapular stability/mobility, normalization of muscle tone, and facilitation of volitional active controled movement  * Positioning to improve skin integrity, interaction with environment and functional independence    Home Living: Pt lives alone in a 1 story apartment  with ramp entry       Bedroom setup: main level  standard bed   Bathroom setup: main level  tub/shower combination  and standard commode     Equipment owned: front wheeled walker , manual wc , and power wc , extended tub bench  and elevated toilet seat , reacher, sock aid , and home health aides 7 days/week, 5-6 hours per day      Prior Level of Function:  Modified Independent  with ADLs , Assist with IADLs; using power w/c for functional mobility, using fww to pivot     Driving: not currently  Occupation/leisure: n/a    Pain Level: unrated RLE pain; OT provided education on compensatory strategies, repositioning, and diversion for pain management    Cognition: A&O: 4/4   Follows multi  step directions: Good   Memory: Good   Sequencing: Good   Problem solving: Good   Judgement/safety: Fair +   Attention:  Good      Functional Assessment:  AM-PAC Daily Activity Raw Score: 15/24   Initial Eval Status  Date: 3/4/2025  Treatment Status  Date: STGs = LTGs  Time frame: 10-14 days   Feeding Set up    Mod I      Grooming Stand by assist   seated  Mod I      UB Dressing Min A   seated  Mod I      LB Dressing Max A  For socks seated at eob without AD  Mod I      Bathing Max A  With sim tasks   Mod I      Toileting Mod A   For clothing management and  classified as moderate  complexity based off the noted performance deficits, personal factors, co-morbidities, assistance required, and other factors as noted in the clinical evaluation and functional testing.     Evaluation time includes thorough review of current medical information, gathering information on past medical & social history & PLOF, completion of standardized testing, informal observation of tasks, consultation with other medical professions/disciplines, assessment of data & development of POC/goals.     Time In: 1503  Time Out: 1528  Total Treatment Time: 8 min    Min Units   OT Eval Low 97764      OT Eval Medium 27061 X    OT Eval High 59211      OT Re-Eval 70162       Therapeutic Ex 79136       Therapeutic Activities 30021       ADL/Self Care 68690  8 1    Orthotic Management 07918       Manual 47093     Neuro Re-Ed 24104       Non-Billable Time                UYEN Deleon, OTR/L; [id number]

## 2025-03-04 NOTE — PROGRESS NOTES
4 Eyes Skin Assessment     NAME:  Leny Yang  YOB: 1960  MEDICAL RECORD NUMBER:  [id number]    The patient is being assessed for  Admission    I agree that at least one RN has performed a thorough Head to Toe Skin Assessment on the patient. ALL assessment sites listed below have been assessed.      Areas assessed by both nurses:    Head, Face, Ears, Shoulders, Back, Chest, Arms, Elbows, Hands, Sacrum. Buttock, Coccyx, Ischium, and Legs. Feet and Heels        Does the Patient have a Wound? Yes wound(s) were present on assessment. LDA wound assessment was Initiated and completed by RN wound on right shin        Misha Prevention initiated by RN: Yes  Wound Care Orders initiated by RN: No DENNYS    Pressure Injury (Stage 3,4, Unstageable, DTI, NWPT, and Complex wounds) if present, place Wound referral order by RN under : No    New Ostomies, if present place, Ostomy referral order under : No     Nurse 1 eSignature: Electronically signed by Lesli Blackwell RN on 3/3/25 at 11:54 PM EST    **SHARE this note so that the co-signing nurse can place an eSignature**    Nurse 2 eSignature: {Esignature:[id number]}

## 2025-03-04 NOTE — PLAN OF CARE
Problem: Safety - Adult  Goal: Free from fall injury  Outcome: Progressing     Problem: ABCDS Injury Assessment  Goal: Absence of physical injury  Outcome: Progressing  Flowsheets (Taken 3/4/2025 0042)  Absence of Physical Injury: Implement safety measures based on patient assessment     Problem: Pain  Goal: Verbalizes/displays adequate comfort level or baseline comfort level  Outcome: Progressing  Flowsheets (Taken 3/3/2025 1510 by Neelam Dolan RN)  Verbalizes/displays adequate comfort level or baseline comfort level:   Encourage patient to monitor pain and request assistance   Assess pain using appropriate pain scale   Administer analgesics based on type and severity of pain and evaluate response

## 2025-03-04 NOTE — PROGRESS NOTES
[facility] Hospitalist Progress Note    SYNOPSIS: Patient admitted on 3/2/2025 for Wound infection    Ms. Leny Yang, a 64 y.o. year old female  who  has a past medical history of Adenoma of right adrenal gland, Anemia, Anesthesia complication, Anxiety, Arrhythmia, Arthritis, Asthma, Benign essential tremor, Bipolar affective (HCC), Chronic back pain, Chronic respiratory failure with hypoxia (HCC), Decreased dorsalis pedis pulse, Depression, Difficulty swallowing, Environmental and seasonal allergies, Excessive physiologic tremor, Fatty liver, Full dentures, GERD (gastroesophageal reflux disease), Gout, Herniated cervical disc, History of swelling of feet, Hypertension, Leg pain, Leg swelling, Lymphedema of both lower extremities, Lymphedema of lower extremity, Mitochondrial cytopathy, MRSA (methicillin resistant Staphylococcus aureus), Muscle weakness (generalized), Nausea, Need for assistance with personal care, Neuropathy, Obesity, PETR (obstructive sleep apnea), Osteoarthritis of left knee, Pancreatic duct disruption, Pancreatitis, PONV (postoperative nausea and vomiting), Seizures ([facility]), Spinal headache, Thyroid disease, and Wheelchair dependence.      Patient presented to the ED with complaints of acute on chronic R ankle and R knee pain and increased warmth and erythema associated with a chronic RLE wound that began 3 days prior to arrival. She also endorses difficulty ambulating 2/2 pain.  She reports she has hardware all through her RLE and she had a previous MRSA infection involving her R knee hardware for which she is now on on suppressive rifampin and doxycycline chronically.  She follows with [facility] in regards to her wound and just saw them last week.  She denies any fevers or chills, chest pain, SOB, cough, ABD pain, N/V/D.      ER COURSE:  In ED vitals are stable.  Laboratory workup significant for elevated CRP.  Imaging reassuring.  Blood cultures collected and pending.   excoriations, bruising, chronic wound  Extremities:  Decreased ROM, peripheral edema, mottling      OBJECTIVE:    /63   Pulse 68   Temp 97.9 °F (36.6 °C)   Resp 18   Ht 1.702 m (5' 7\")   Wt (!) 139.7 kg (308 lb)   LMP 08/31/1988   SpO2 90%   BMI 48.24 kg/m²     General appearance: Obese elderly female in no apparent distress, appears older than stated age and cooperative.  HEENT: Conjunctivae/corneas clear. Mucous membranes moist.  Respiratory:  Clear to auscultation though diminished in the bases bilaterally.  Normal respiratory effort.   Cardiovascular:  RRR. S1, S2 without MRG.  PV: Pulses palpable. Nonpitting edema and vascular discolortion of BLE.  Abdomen: Soft, non-tender, non-distended. +BS  Musculoskeletal: No obvious deformities.   Skin: Normal skin color.  Wound to RLE with surrounding warmth and erythema as pictured below    Neurologic:  Grossly non-focal. Awake, alert, following commands.   Psychiatric: Alert and oriented, thought content appropriate, normal insight and judgement      ASSESSMENT and PLAN:    Assessment    Suspected acute infection of chronic wound  Right ankle pain  Chronic prosthetic joint infection right knee with antibiotic spacer  S/p right tibial ORIF  Ambulatory dysfunction  COPD not in exacerbation  Tobacco abuse  Morbid obesity BMI 48.24  Hypertension  Hyperlipidemia  Bipolar disorder  History of alcohol abuse  Iron deficiency  History of seizure  PETR  Chronic pain    Plan  -Monitor on telemetry  -Continue vancomycin  -ID following, appreciate recommendations  -Blood culture no growth so far  -Wound care  -Orthopedic surgery consulted, appreciate recommendations, no plan for surgical intervention  -NRT while inpatient, patient has quit smoking for past few weeks, continue abstinence  -Resume home medication as appropriate  -PT/OT eval  -Optimize electrolyte  -Bowel regimen           DVT Prophylaxis [x] Lovenox, []  Heparin, [] SCDs, [] Ambulation   GI Prophylaxis

## 2025-03-04 NOTE — CARE COORDINATION
Plan at this time is home with [facility] when medically ready. Home health care orders are in. Per ID note from yesterday, 1. Vancomycin 1750 mg IV q 12 hrs. 2. CT scan of right knee, leg. Will need plan from ID for antibiotics. A referral was already made to [facility] in case IV antibiotics needed for discharge. PT/OT evals have been ordered. Therapy department notified to see patient as soon as possible to make sure patient is safe to return home.   Liz Mejia RN   [phone]

## 2025-03-05 ENCOUNTER — APPOINTMENT (OUTPATIENT)
Dept: WOUND CARE | Facility: CLINIC | Age: 65
End: 2025-03-05
Payer: MEDICAID

## 2025-03-05 PROBLEM — J44.9 COPD (CHRONIC OBSTRUCTIVE PULMONARY DISEASE) (HCC): Status: ACTIVE | Noted: 2025-03-05

## 2025-03-05 PROBLEM — E55.9 VITAMIN D DEFICIENCY: Status: ACTIVE | Noted: 2025-03-05

## 2025-03-05 LAB
DATE LAST DOSE: NORMAL
EKG ATRIAL RATE: 98 BPM
EKG P AXIS: 44 DEGREES
EKG P-R INTERVAL: 180 MS
EKG Q-T INTERVAL: 348 MS
EKG QRS DURATION: 104 MS
EKG QTC CALCULATION (BAZETT): 444 MS
EKG R AXIS: 16 DEGREES
EKG T AXIS: 32 DEGREES
EKG VENTRICULAR RATE: 98 BPM
TME LAST DOSE: NORMAL H
VANCOMYCIN DOSE: NORMAL MG
VANCOMYCIN SERPL-MCNC: 10.8 UG/ML (ref 5–40)

## 2025-03-05 PROCEDURE — 1200000000 HC SEMI PRIVATE

## 2025-03-05 PROCEDURE — 2580000003 HC RX 258: Performed by: INTERNAL MEDICINE

## 2025-03-05 PROCEDURE — 99232 SBSQ HOSP IP/OBS MODERATE 35: CPT | Performed by: INTERNAL MEDICINE

## 2025-03-05 PROCEDURE — 93010 ELECTROCARDIOGRAM REPORT: CPT | Performed by: INTERNAL MEDICINE

## 2025-03-05 PROCEDURE — 6360000002 HC RX W HCPCS: Performed by: NURSE PRACTITIONER

## 2025-03-05 PROCEDURE — 97530 THERAPEUTIC ACTIVITIES: CPT

## 2025-03-05 PROCEDURE — 94640 AIRWAY INHALATION TREATMENT: CPT

## 2025-03-05 PROCEDURE — 6370000000 HC RX 637 (ALT 250 FOR IP): Performed by: STUDENT IN AN ORGANIZED HEALTH CARE EDUCATION/TRAINING PROGRAM

## 2025-03-05 PROCEDURE — 2500000003 HC RX 250 WO HCPCS: Performed by: NURSE PRACTITIONER

## 2025-03-05 PROCEDURE — 97535 SELF CARE MNGMENT TRAINING: CPT

## 2025-03-05 PROCEDURE — 36415 COLL VENOUS BLD VENIPUNCTURE: CPT

## 2025-03-05 PROCEDURE — 6360000002 HC RX W HCPCS: Performed by: INTERNAL MEDICINE

## 2025-03-05 PROCEDURE — 6370000000 HC RX 637 (ALT 250 FOR IP): Performed by: NURSE PRACTITIONER

## 2025-03-05 PROCEDURE — 80202 ASSAY OF VANCOMYCIN: CPT

## 2025-03-05 RX ORDER — SODIUM CHLORIDE 0.9 % (FLUSH) 0.9 %
5-40 SYRINGE (ML) INJECTION EVERY 12 HOURS SCHEDULED
Status: DISCONTINUED | OUTPATIENT
Start: 2025-03-05 | End: 2025-03-08 | Stop reason: HOSPADM

## 2025-03-05 RX ORDER — SODIUM CHLORIDE 0.9 % (FLUSH) 0.9 %
5-40 SYRINGE (ML) INJECTION PRN
Status: DISCONTINUED | OUTPATIENT
Start: 2025-03-05 | End: 2025-03-08 | Stop reason: HOSPADM

## 2025-03-05 RX ORDER — LIDOCAINE HYDROCHLORIDE 10 MG/ML
50 INJECTION, SOLUTION EPIDURAL; INFILTRATION; INTRACAUDAL; PERINEURAL ONCE
Status: DISCONTINUED | OUTPATIENT
Start: 2025-03-05 | End: 2025-03-08 | Stop reason: HOSPADM

## 2025-03-05 RX ORDER — SODIUM CHLORIDE 9 MG/ML
INJECTION, SOLUTION INTRAVENOUS PRN
Status: DISCONTINUED | OUTPATIENT
Start: 2025-03-05 | End: 2025-03-08 | Stop reason: HOSPADM

## 2025-03-05 RX ADMIN — ZIPRASIDONE HYDROCHLORIDE 40 MG: 40 CAPSULE ORAL at 20:42

## 2025-03-05 RX ADMIN — GABAPENTIN 600 MG: 300 CAPSULE ORAL at 13:23

## 2025-03-05 RX ADMIN — PROMETHAZINE HYDROCHLORIDE 25 MG: 25 TABLET ORAL at 08:23

## 2025-03-05 RX ADMIN — PANCRELIPASE LIPASE, PANCRELIPASE PROTEASE, PANCRELIPASE AMYLASE 80000 UNITS: 20000; 63000; 84000 CAPSULE, DELAYED RELEASE ORAL at 16:52

## 2025-03-05 RX ADMIN — TOPIRAMATE 200 MG: 100 TABLET, FILM COATED ORAL at 08:25

## 2025-03-05 RX ADMIN — ACETAMINOPHEN 650 MG: 325 TABLET ORAL at 16:56

## 2025-03-05 RX ADMIN — ARFORMOTEROL TARTRATE 15 MCG: 15 SOLUTION RESPIRATORY (INHALATION) at 19:50

## 2025-03-05 RX ADMIN — FUROSEMIDE 40 MG: 20 TABLET ORAL at 08:24

## 2025-03-05 RX ADMIN — ALLOPURINOL 100 MG: 100 TABLET ORAL at 20:41

## 2025-03-05 RX ADMIN — GABAPENTIN 600 MG: 300 CAPSULE ORAL at 08:23

## 2025-03-05 RX ADMIN — Medication 400 MG: at 08:24

## 2025-03-05 RX ADMIN — BACLOFEN 20 MG: 10 TABLET ORAL at 13:22

## 2025-03-05 RX ADMIN — POLYETHYLENE GLYCOL 3350 17 G: 17 POWDER, FOR SOLUTION ORAL at 08:23

## 2025-03-05 RX ADMIN — Medication 250 MG: at 08:25

## 2025-03-05 RX ADMIN — PANCRELIPASE LIPASE, PANCRELIPASE PROTEASE, PANCRELIPASE AMYLASE 80000 UNITS: 20000; 63000; 84000 CAPSULE, DELAYED RELEASE ORAL at 20:45

## 2025-03-05 RX ADMIN — OXYCODONE AND ACETAMINOPHEN 1 TABLET: 325; 10 TABLET ORAL at 03:59

## 2025-03-05 RX ADMIN — MECLIZINE 25 MG: 12.5 TABLET ORAL at 20:41

## 2025-03-05 RX ADMIN — PANTOPRAZOLE SODIUM 40 MG: 40 TABLET, DELAYED RELEASE ORAL at 06:27

## 2025-03-05 RX ADMIN — Medication 250 MG: at 13:22

## 2025-03-05 RX ADMIN — METOPROLOL SUCCINATE 25 MG: 25 TABLET, EXTENDED RELEASE ORAL at 08:23

## 2025-03-05 RX ADMIN — PROMETHAZINE HYDROCHLORIDE 25 MG: 25 TABLET ORAL at 16:53

## 2025-03-05 RX ADMIN — MONTELUKAST 10 MG: 10 TABLET, FILM COATED ORAL at 20:41

## 2025-03-05 RX ADMIN — CITALOPRAM HYDROBROMIDE 20 MG: 20 TABLET ORAL at 08:23

## 2025-03-05 RX ADMIN — PANCRELIPASE LIPASE, PANCRELIPASE PROTEASE, PANCRELIPASE AMYLASE 80000 UNITS: 20000; 63000; 84000 CAPSULE, DELAYED RELEASE ORAL at 11:53

## 2025-03-05 RX ADMIN — IPRATROPIUM BROMIDE 0.5 MG: 0.5 SOLUTION RESPIRATORY (INHALATION) at 14:13

## 2025-03-05 RX ADMIN — OXYCODONE AND ACETAMINOPHEN 1 TABLET: 325; 10 TABLET ORAL at 13:23

## 2025-03-05 RX ADMIN — TOPIRAMATE 200 MG: 100 TABLET, FILM COATED ORAL at 20:45

## 2025-03-05 RX ADMIN — ARFORMOTEROL TARTRATE 15 MCG: 15 SOLUTION RESPIRATORY (INHALATION) at 10:09

## 2025-03-05 RX ADMIN — ALLOPURINOL 100 MG: 100 TABLET ORAL at 08:23

## 2025-03-05 RX ADMIN — VANCOMYCIN HYDROCHLORIDE 1000 MG: 1 INJECTION, POWDER, LYOPHILIZED, FOR SOLUTION INTRAVENOUS at 17:32

## 2025-03-05 RX ADMIN — SENNOSIDES AND DOCUSATE SODIUM 2 TABLET: 50; 8.6 TABLET ORAL at 08:31

## 2025-03-05 RX ADMIN — POTASSIUM CHLORIDE 20 MEQ: 1500 TABLET, EXTENDED RELEASE ORAL at 08:23

## 2025-03-05 RX ADMIN — ENOXAPARIN SODIUM 30 MG: 100 INJECTION SUBCUTANEOUS at 08:23

## 2025-03-05 RX ADMIN — MECLIZINE 25 MG: 12.5 TABLET ORAL at 16:53

## 2025-03-05 RX ADMIN — GABAPENTIN 600 MG: 300 CAPSULE ORAL at 16:53

## 2025-03-05 RX ADMIN — CELECOXIB 200 MG: 100 CAPSULE ORAL at 08:25

## 2025-03-05 RX ADMIN — BACLOFEN 20 MG: 10 TABLET ORAL at 08:23

## 2025-03-05 RX ADMIN — MECLIZINE 25 MG: 12.5 TABLET ORAL at 13:23

## 2025-03-05 RX ADMIN — CETIRIZINE HYDROCHLORIDE 10 MG: 10 TABLET, FILM COATED ORAL at 08:23

## 2025-03-05 RX ADMIN — ENOXAPARIN SODIUM 30 MG: 100 INJECTION SUBCUTANEOUS at 20:41

## 2025-03-05 RX ADMIN — Medication 5 MG: at 20:40

## 2025-03-05 RX ADMIN — IPRATROPIUM BROMIDE 0.5 MG: 0.5 SOLUTION RESPIRATORY (INHALATION) at 10:09

## 2025-03-05 RX ADMIN — CELECOXIB 200 MG: 100 CAPSULE ORAL at 20:45

## 2025-03-05 RX ADMIN — TRAZODONE HYDROCHLORIDE 100 MG: 50 TABLET ORAL at 20:41

## 2025-03-05 RX ADMIN — SODIUM CHLORIDE, PRESERVATIVE FREE 10 ML: 5 INJECTION INTRAVENOUS at 20:44

## 2025-03-05 RX ADMIN — OXYCODONE AND ACETAMINOPHEN 1 TABLET: 325; 10 TABLET ORAL at 20:42

## 2025-03-05 RX ADMIN — MECLIZINE 25 MG: 12.5 TABLET ORAL at 08:22

## 2025-03-05 RX ADMIN — IPRATROPIUM BROMIDE 0.5 MG: 0.5 SOLUTION RESPIRATORY (INHALATION) at 19:50

## 2025-03-05 RX ADMIN — Medication 250 MG: at 20:45

## 2025-03-05 RX ADMIN — METOPROLOL SUCCINATE 25 MG: 25 TABLET, EXTENDED RELEASE ORAL at 20:42

## 2025-03-05 RX ADMIN — BACLOFEN 20 MG: 10 TABLET ORAL at 20:41

## 2025-03-05 RX ADMIN — BACLOFEN 20 MG: 10 TABLET ORAL at 16:53

## 2025-03-05 RX ADMIN — GABAPENTIN 600 MG: 300 CAPSULE ORAL at 20:41

## 2025-03-05 RX ADMIN — PANCRELIPASE LIPASE, PANCRELIPASE PROTEASE, PANCRELIPASE AMYLASE 80000 UNITS: 20000; 63000; 84000 CAPSULE, DELAYED RELEASE ORAL at 08:24

## 2025-03-05 RX ADMIN — FERROUS SULFATE TAB 325 MG (65 MG ELEMENTAL FE) 325 MG: 325 (65 FE) TAB at 08:24

## 2025-03-05 ASSESSMENT — PAIN SCALES - WONG BAKER: WONGBAKER_NUMERICALRESPONSE: NO HURT

## 2025-03-05 ASSESSMENT — PAIN DESCRIPTION - ORIENTATION
ORIENTATION: RIGHT
ORIENTATION: RIGHT;LOWER

## 2025-03-05 ASSESSMENT — PAIN DESCRIPTION - DESCRIPTORS
DESCRIPTORS: ACHING;DULL;SORE
DESCRIPTORS: THROBBING;SHOOTING;SPASM
DESCRIPTORS: ACHING;SORE;DULL

## 2025-03-05 ASSESSMENT — PAIN SCALES - GENERAL
PAINLEVEL_OUTOF10: 8
PAINLEVEL_OUTOF10: 6
PAINLEVEL_OUTOF10: 9
PAINLEVEL_OUTOF10: 8

## 2025-03-05 ASSESSMENT — PAIN DESCRIPTION - LOCATION
LOCATION: LEG;BACK
LOCATION: LEG
LOCATION: KNEE;BACK
LOCATION: LEG

## 2025-03-05 NOTE — PROGRESS NOTES
Pharmacy Consultation Note  (Antibiotic Dosing and Monitoring)    Initial consult date: 3/2/25  Consulting physician/provider: Dr. Olivo  Drug: Vancomycin  Indication: Bone and Joint Infection    Age/  Gender Height Weight IBW  Allergy Information   64 y.o./female 170.2 cm (5' 7\") (!) 139.7 kg (308 lb)     Ideal body weight: 61.6 kg (135 lb 12.9 oz)  Adjusted ideal body weight: 92.8 kg (204 lb 10.9 oz)   Bee venom, Penicillins, Ropinirole, Ropinirole hcl, Vistaril [hydroxyzine hcl], Aripiprazole, Prednisone, Restoril [temazepam], Wax [beeswax], Hydroxyzine pamoate, and Tape [adhesive tape]      Renal Function:  Recent Labs     03/03/25  0344   BUN 12   CREATININE 0.7     No intake or output data in the 24 hours ending 03/05/25 0832      Vancomycin Monitoring:  Trough:    Recent Labs     03/04/25  1734   VANCOTROUGH 23.1*     Random:    Recent Labs     03/05/25  0547   VANCORANDOM 10.8       Vancomycin Administration Times:  Recent vancomycin administrations                     vancomycin (VANCOCIN) 1,750 mg in sodium chloride 0.9 % 500 mL IVPB (mg) 1,750 mg New Bag 03/04/25 0536     1,750 mg New Bag 03/03/25 1916     1,750 mg New Bag  0551    vancomycin (VANCOCIN) 2,750 mg in sodium chloride 0.9 % 500 mL IVPB (mg) 2,750 mg New Bag 03/02/25 1302               Assessment:  Patient is a 64 y.o. female who has been initiated on vancomycin  Estimated Creatinine Clearance: 119 mL/min (based on SCr of 0.7 mg/dL).  3/3: scr 0.7 ( at baseline), no growth in cultures so far - final results pending  3/4: Trough level 23.1 (estimated ).  3/5: Random level 10.8 3/5 @ 0547 (~24 hours post dose)      Plan:  Vancomycin 1000 mg q12h (Predicted AUC/NAVARRO = 539, Tr = 13)  Will continue to monitor renal function   Pharmacy to follow      Thank you for this consult,    Bartolo Gonzalez, [facility] 3/5/2025 9:15 AM   Pharmacy Ext [phone]

## 2025-03-05 NOTE — CARE COORDINATION
Plan at this time is home with [facility] when medically ready. Home health care orders are in. Per ID note from yesterday, 1. Vancomycin 1750 mg IV q 12 hrs. 2. Ortho eval for CT finding. Will need plan from ID for antibiotics at discharge. A referral was already made to [facility] in case IV antibiotics needed for discharge. PT/OT [facility] scores 11/24 and 15/24. I met with patient in room to discuss therapy evals and transition of care. She is adamant about returning home and not going to a [facility]. She said she has HHA through [facility] 5 -6hrs 7 days a week. Phone # is  and would like notified at discharge to resume care. She also gets meals 7/days week per [employer].  is Francoise Drew [phone],mobile, and work # is [phone]. Patient said she will need transportation home at time of discharge. Ambulette form in envelope in soft chart will need to be completed, signed and dated by nursing on day of discharge. Patient placed on daily therapy TX's as able since plan is home.    Liz Mejia RN   [phone]

## 2025-03-05 NOTE — PLAN OF CARE
Problem: Safety - Adult  Goal: Free from fall injury  Outcome: Progressing     Problem: ABCDS Injury Assessment  Goal: Absence of physical injury  Outcome: Progressing     Problem: Pain  Goal: Verbalizes/displays adequate comfort level or baseline comfort level  Outcome: Progressing     Problem: Infection - Adult  Goal: Absence of infection at discharge  Outcome: Progressing  Goal: Absence of infection during hospitalization  Outcome: Progressing  Goal: Absence of fever/infection during anticipated neutropenic period  Outcome: Progressing     Problem: Skin/Tissue Integrity  Goal: Skin integrity remains intact  Description: 1.  Monitor for areas of redness and/or skin breakdown  2.  Assess vascular access sites hourly  3.  Every 4-6 hours minimum:  Change oxygen saturation probe site  4.  Every 4-6 hours:  If on nasal continuous positive airway pressure, respiratory therapy assess nares and determine need for appliance change or resting period  Outcome: Progressing     Problem: Discharge Planning  Goal: Discharge to home or other facility with appropriate resources  Outcome: Progressing

## 2025-03-05 NOTE — PROGRESS NOTES
Orthopedic surgery interval update:    CT right ankle reviewed demonstrating no concerns for abscess formation, hardware failure.  No acute orthopedic surgical intervention planned regarding right lower extremity.  Continue to recommend wound care right lower extremity. Orthopedic surgery will follow from periphery for remainder of visit, please contact with questions or concerns. Discussed with attending.      Electronically signed by Aniket Marquez DO on 3/5/2025 at 5:33 PM

## 2025-03-05 NOTE — PROGRESS NOTES
diagnostic; pr colonoscopy flx dx w/collj spec when pfrmd (N/A, 03/20/2018); pr egd transoral biopsy single/multiple (N/A, 03/20/2018); Cholecystectomy (1999); Hysterectomy (1988); Colonoscopy (N/A, 09/18/2018); Esophagus dilation (09/18/2018); back surgery (last one 1995); Cardiac catheterization (Right, 06/06/2013); Appendectomy; other surgical history (1995); joint replacement (Bilateral, 2007,2017); egd colonoscopy (N/A, 10/08/2019); Colonoscopy (N/A, 10/08/2019); Nerve Block (Bilateral, 04/01/2021); Ankle fracture surgery (Right, 02/14/2022); Ankle fracture surgery (Right, 03/02/2022); knee surgery (Right, 08/19/2022); knee surgery (Right, 01/25/2023); IR ASP ABSCESS/HEMATOMA/BULLA/CYST (03/30/2023); Colonoscopy (2023); Colonoscopy (N/A, 8/15/2023); Upper gastrointestinal endoscopy (N/A, 8/15/2023); and ERCP (N/A, 2/1/2024).  Procedure/Surgery:  None  Precautions:  Falls, WBAT RLE  Equipment Owned:  Manual wheelchair, power wheelchair, FWW  Equipment Needs:  TBD    SUBJECTIVE:    Pt lives alone in apartment with ramp to enter.  Pt transferred to/from wheelchair via stand pivot using FWW prior to admission; ambulated short distances (~15 feet) with FWW prior to admission.    OBJECTIVE:   Initial Evaluation  Date: 3/4/2025 Treatment Date: 3/5/25 Short Term/ Long Term   Goals   AM-PAC 6 Clicks 11/24 14/24    Was pt agreeable to Eval/treatment? Yes yes    Does pt have pain? Moderate RLE RLE pain     Bed Mobility  Rolling: NT  Supine to sit: ModA  Sit to supine: NT  Scooting: NT Rolling: NT  Supine to sit: SBA  Sit to supine: SBA  Scooting: SBA Rolling: Independent  Supine to sit: Independent  Sit to supine: Independent  Scooting: Independent   Transfers Sit to stand: ModA x2 (bed); MaxA x2 (commode)  Stand to sit: ModA  Stand pivot: NT Sit to stand: SBA  Stand to sit: SBA  Stand pivot: CGA WW Sit to stand: Mod I  Stand to sit: Mod I  Stand pivot: Mod I with AAD   Ambulation    5 feet x2 with WW ModA A few steps EOB  chair position with pillows utilized to facilitate upright posture, joint and skin integrity, and interaction with environment.       PLAN:    Patient is making fair progress towards established goals.  Will continue with current POC.      Time in  1420  Time out  1444    Total Treatment Time  24 minutes     CPT codes:  [] Gait training 31302 0 minutes  [] Manual therapy 94115 0 minutes  [x] Therapeutic activities 06627 24 minutes  [] Therapeutic exercises 52796 0 minutes  [] Neuromuscular reeducation 31164 0 minutes    Soan Liriano PT, DPT  [id number]

## 2025-03-05 NOTE — PROGRESS NOTES
Department of Internal Medicine  Infectious Diseases   Progress Note    C/C :  Right TKA infection , right leg cellulitis    Pt denies fever or chills  Reports pain in the knee, leg pain   Afebrile     Current Facility-Administered Medications   Medication Dose Route Frequency Provider Last Rate Last Admin    vancomycin (VANCOCIN) 1,000 mg in sodium chloride 0.9 % 250 mL IVPB ([id number])  1,000 mg IntraVENous Q12H Bill Olivo MD        sennosides-docusate sodium (SENOKOT-S) 8.6-50 MG tablet 2 tablet  2 tablet Oral Daily Talon Whitehead MD   2 tablet at 03/05/25 0831    bisacodyl (DULCOLAX) suppository 10 mg  10 mg Rectal Daily PRN Talon Whitehead MD        polyethylene glycol (GLYCOLAX) packet 17 g  17 g Oral Daily Talon Whitehead MD   17 g at 03/05/25 0823    magnesium oxide (MAG-OX) tablet 400 mg  400 mg Oral Daily Talon Whitehead MD   400 mg at 03/05/25 0824    vitamin D (ERGOCALCIFEROL) capsule 50,000 Units  50,000 Units Oral Weekly Talon Whitehead MD   50,000 Units at 03/03/25 1100    nicotine (NICODERM CQ) 21 MG/24HR 1 patch  1 patch TransDERmal Daily Talon Whitehead MD   1 patch at 03/05/25 0827    allopurinol (ZYLOPRIM) tablet 100 mg  100 mg Oral BID Marguerite Clifton, APRN - NP   100 mg at 03/05/25 0823    baclofen (LIORESAL) tablet 20 mg  20 mg Oral 4x daily Marguerite Clifton, APRN - NP   20 mg at 03/05/25 1322    calcium citrate (CALCITRATE) tablet 250 mg  250 mg Oral TID Marguerite Clifton, APRN - NP   250 mg at 03/05/25 1322    celecoxib (CELEBREX) capsule 200 mg  200 mg Oral BID Etienne, Marguerite Guon, APRN - NP   200 mg at 03/05/25 0825    citalopram (CELEXA) tablet 20 mg  20 mg Oral Daily Marguerite Clifton, APRN - NP   20 mg at 03/05/25 0823    [Held by provider] doxycycline hyclate (VIBRAMYCIN) capsule 100 mg  100 mg Oral BID Marguerite Clifton APRN - JUANJOSE        ferrous sulfate (IRON 325) tablet 325 mg  325 mg Oral Daily with breakfast Marguerite Clifton APRN - NP   325 mg at 03/05/25 0824  q 12 hrs   PICC line insertion   Ortho following

## 2025-03-05 NOTE — PROGRESS NOTES
OCCUPATIONAL THERAPY TREATMENT NOTE  [facility] [address] [place], [place]      Date:3/5/2025  Patient Name: Leny Yang  MRN: [id number]  : 1960  Room: [address] [place] [address]     Per OT Eval:   Evaluating OT: Sania Mello, OTD, OTR/L; [id number]     Occupational therapy physician order:  Start     Ordering Provider     25 1145   OT eval and treat  Start:  25 1145,   End:  25 1145,   ONE TIME,   Standing Count:  1 Occurrences,   R         Etienne, Marguerite Sears, APRN - NP                                           Pt presents to ED with increased weakness     Diagnosis:   1. Skin infection       Problem List       Patient Active Problem List   Diagnosis    Debility    Obesity    Bipolar 1 disorder (HCC)    Generalized seizure disorder (HCC)    Cervicalgia    Asthma    Hyperlipidemia    Hypertension    Arthritis    Lymphedema of lower extremity    Degenerative Osteoarthritis of both knees    Status post total knee replacement, right    Cervical spondylolysis    Degenerative Osteoarthritis thoracic spine    Thoracic facet syndrome    Cervical facet syndrome    Facet syndrome, lumbar    Neural foraminal stenosis of lumbar spine    Lumbar radiculopathy    DDD (degenerative disc disease), cervical    Neural foraminal stenosis of cervical spine    Protruded cervical disc    Cervical radiculopathy    Postlaminectomy syndrome, lumbar    Neuropathic pain syndrome (non-herpetic)    Chronic respiratory failure with hypoxia (HCC)    Mitochondrial cytopathy    GERD (gastroesophageal reflux disease)    Fatty liver    Depression    PETR (obstructive sleep apnea)    Chronic back pain    Adenoma of right adrenal gland    Lumbosacral spondylosis without myelopathy    Sacroiliac dysfunction    DDD (degenerative disc disease), lumbar    Thoracic degenerative disc disease    Excessive physiologic tremor    Closed fracture of lower end of right radius with

## 2025-03-05 NOTE — PROGRESS NOTES
Daily with breakfast    furosemide  40 mg Oral Daily    gabapentin  600 mg Oral 4x Daily    cetirizine  10 mg Oral Daily    meclizine  25 mg Oral 4x daily    metoprolol succinate  25 mg Oral BID    montelukast  10 mg Oral Daily    pantoprazole  40 mg Oral QAM AC    potassium chloride  20 mEq Oral Daily    promethazine  25 mg Oral BID    topiramate  200 mg Oral BID    traZODone  100 mg Oral Nightly    arformoterol tartrate  15 mcg Nebulization BID RT    And    ipratropium  0.5 mg Nebulization Q6H RT    Pancrelipase (Lip-Prot-Amyl)  80,000 Units Oral 4x Daily WC    ziprasidone  40 mg Oral QPM    melatonin  5 mg Oral QPM    sodium chloride flush  5-40 mL IntraVENous 2 times per day    enoxaparin  30 mg SubCUTAneous BID     PRN Meds: bisacodyl, oxyCODONE-acetaminophen, melatonin, hydrALAZINE, calcium carbonate, Polyvinyl Alcohol-Povidone PF, sodium chloride, magnesium hydroxide, benzocaine-menthol, benzonatate, glucose, dextrose bolus **OR** dextrose bolus, glucagon (rDNA), dextrose, sodium chloride flush, sodium chloride, potassium chloride **OR** potassium alternative oral replacement **OR** potassium chloride, magnesium sulfate, ondansetron **OR** ondansetron, acetaminophen **OR** acetaminophen    Labs:     No results for input(s): \"WBC\", \"HGB\", \"HCT\", \"PLT\" in the last 72 hours.      Recent Labs     03/03/25  0344      K 4.0      CO2 21*   BUN 12   CREATININE 0.7   CALCIUM 8.9       Recent Labs     03/03/25  0344   ALKPHOS 89   ALT 8   AST 9   BILITOT 0.3       No results for input(s): \"INR\" in the last 72 hours.      No results for input(s): \"CKTOTAL\", \"TROPONINI\" in the last 72 hours.    Chronic labs:    Lab Results   Component Value Date    CHOL 176 03/02/2025    TRIG 145 03/02/2025    HDL 45 03/02/2025    TSH 0.24 (L) 03/02/2025    INR 1.1 03/02/2025    LABA1C 5.4 03/02/2025       Radiology: REVIEWED DAILY    +++++++++++++++++++++++++++++++++++++++++++++++++  Shay Lackey MD  [facility]-  Hospitalist  [facility] [place], [place]  +++++++++++++++++++++++++++++++++++++++++++++++++  NOTE: This report was transcribed using voice recognition software. Every effort was made to ensure accuracy; however, inadvertent computerized transcription errors may be present.

## 2025-03-05 NOTE — PROGRESS NOTES
Pharmacy Consultation Note  (Antibiotic Dosing and Monitoring)    Initial consult date: 3/2/25  Consulting physician/provider: Dr. Olivo  Drug: Vancomycin  Indication: Bone and Joint Infection    Age/  Gender Height Weight IBW  Allergy Information   64 y.o./female 170.2 cm (5' 7\") (!) 139.7 kg (308 lb)     Ideal body weight: 61.6 kg (135 lb 12.9 oz)  Adjusted ideal body weight: 92.8 kg (204 lb 10.9 oz)   Bee venom, Penicillins, Ropinirole, Ropinirole hcl, Vistaril [hydroxyzine hcl], Aripiprazole, Prednisone, Restoril [temazepam], Wax [beeswax], Hydroxyzine pamoate, and Tape [adhesive tape]      Renal Function:  Recent Labs     03/02/25  0813 03/03/25  0344   BUN 14 12   CREATININE 0.7 0.7     No intake or output data in the 24 hours ending 03/04/25 1940      Vancomycin Monitoring:  Trough:    Recent Labs     03/04/25  1734   VANCOTROUGH 23.1*     Random:  No results for input(s): \"VANCORANDOM\" in the last 72 hours.    Vancomycin Administration Times:  Recent vancomycin administrations                     vancomycin (VANCOCIN) 1,750 mg in sodium chloride 0.9 % 500 mL IVPB (mg) 1,750 mg New Bag 03/04/25 0536     1,750 mg New Bag 03/03/25 1916     1,750 mg New Bag  0551    vancomycin (VANCOCIN) 2,750 mg in sodium chloride 0.9 % 500 mL IVPB (mg) 2,750 mg New Bag 03/02/25 1302             Assessment:  Patient is a 64 y.o. female who has been initiated on vancomycin  Estimated Creatinine Clearance: 119 mL/min (based on SCr of 0.7 mg/dL).  3/3: scr 0.7 ( at baseline), no growth in cultures so far - final results pending  3/4: Trough level 23.1 (estimated )      Plan:  Hold vanco for now  Check vancomycin level in AM  Will continue to monitor renal function   Pharmacy to follow      Joan Huizar, PharmD, BCPS 3/4/2025 7:42 PM  [phone]

## 2025-03-06 PROCEDURE — 2500000003 HC RX 250 WO HCPCS: Performed by: INTERNAL MEDICINE

## 2025-03-06 PROCEDURE — 02HV33Z INSERTION OF INFUSION DEVICE INTO SUPERIOR VENA CAVA, PERCUTANEOUS APPROACH: ICD-10-PCS | Performed by: INTERNAL MEDICINE

## 2025-03-06 PROCEDURE — 6360000002 HC RX W HCPCS: Performed by: INTERNAL MEDICINE

## 2025-03-06 PROCEDURE — 94640 AIRWAY INHALATION TREATMENT: CPT

## 2025-03-06 PROCEDURE — C1751 CATH, INF, PER/CENT/MIDLINE: HCPCS

## 2025-03-06 PROCEDURE — 2580000003 HC RX 258: Performed by: INTERNAL MEDICINE

## 2025-03-06 PROCEDURE — 6360000002 HC RX W HCPCS: Performed by: NURSE PRACTITIONER

## 2025-03-06 PROCEDURE — 1200000000 HC SEMI PRIVATE

## 2025-03-06 PROCEDURE — 6370000000 HC RX 637 (ALT 250 FOR IP): Performed by: STUDENT IN AN ORGANIZED HEALTH CARE EDUCATION/TRAINING PROGRAM

## 2025-03-06 PROCEDURE — 76937 US GUIDE VASCULAR ACCESS: CPT

## 2025-03-06 PROCEDURE — 36569 INSJ PICC 5 YR+ W/O IMAGING: CPT

## 2025-03-06 PROCEDURE — 99232 SBSQ HOSP IP/OBS MODERATE 35: CPT | Performed by: INTERNAL MEDICINE

## 2025-03-06 PROCEDURE — 6370000000 HC RX 637 (ALT 250 FOR IP): Performed by: NURSE PRACTITIONER

## 2025-03-06 RX ADMIN — CETIRIZINE HYDROCHLORIDE 10 MG: 10 TABLET, FILM COATED ORAL at 07:55

## 2025-03-06 RX ADMIN — FERROUS SULFATE TAB 325 MG (65 MG ELEMENTAL FE) 325 MG: 325 (65 FE) TAB at 07:52

## 2025-03-06 RX ADMIN — IPRATROPIUM BROMIDE 0.5 MG: 0.5 SOLUTION RESPIRATORY (INHALATION) at 19:20

## 2025-03-06 RX ADMIN — Medication 400 MG: at 07:50

## 2025-03-06 RX ADMIN — PANCRELIPASE LIPASE, PANCRELIPASE PROTEASE, PANCRELIPASE AMYLASE 80000 UNITS: 20000; 63000; 84000 CAPSULE, DELAYED RELEASE ORAL at 11:50

## 2025-03-06 RX ADMIN — CELECOXIB 200 MG: 100 CAPSULE ORAL at 07:55

## 2025-03-06 RX ADMIN — MECLIZINE 25 MG: 12.5 TABLET ORAL at 20:49

## 2025-03-06 RX ADMIN — VANCOMYCIN HYDROCHLORIDE 1000 MG: 1 INJECTION, POWDER, LYOPHILIZED, FOR SOLUTION INTRAVENOUS at 21:01

## 2025-03-06 RX ADMIN — BACLOFEN 20 MG: 10 TABLET ORAL at 17:07

## 2025-03-06 RX ADMIN — IPRATROPIUM BROMIDE 0.5 MG: 0.5 SOLUTION RESPIRATORY (INHALATION) at 08:37

## 2025-03-06 RX ADMIN — Medication 250 MG: at 13:27

## 2025-03-06 RX ADMIN — MECLIZINE 25 MG: 12.5 TABLET ORAL at 17:06

## 2025-03-06 RX ADMIN — GABAPENTIN 600 MG: 300 CAPSULE ORAL at 20:49

## 2025-03-06 RX ADMIN — TRAZODONE HYDROCHLORIDE 100 MG: 50 TABLET ORAL at 20:49

## 2025-03-06 RX ADMIN — GABAPENTIN 600 MG: 300 CAPSULE ORAL at 07:50

## 2025-03-06 RX ADMIN — BACLOFEN 20 MG: 10 TABLET ORAL at 20:52

## 2025-03-06 RX ADMIN — GABAPENTIN 600 MG: 300 CAPSULE ORAL at 17:07

## 2025-03-06 RX ADMIN — PANCRELIPASE LIPASE, PANCRELIPASE PROTEASE, PANCRELIPASE AMYLASE 80000 UNITS: 20000; 63000; 84000 CAPSULE, DELAYED RELEASE ORAL at 17:09

## 2025-03-06 RX ADMIN — ALLOPURINOL 100 MG: 100 TABLET ORAL at 07:52

## 2025-03-06 RX ADMIN — Medication 5 MG: at 20:50

## 2025-03-06 RX ADMIN — BACLOFEN 20 MG: 10 TABLET ORAL at 13:25

## 2025-03-06 RX ADMIN — ENOXAPARIN SODIUM 30 MG: 100 INJECTION SUBCUTANEOUS at 07:56

## 2025-03-06 RX ADMIN — OXYCODONE AND ACETAMINOPHEN 1 TABLET: 325; 10 TABLET ORAL at 11:53

## 2025-03-06 RX ADMIN — PANCRELIPASE LIPASE, PANCRELIPASE PROTEASE, PANCRELIPASE AMYLASE 80000 UNITS: 20000; 63000; 84000 CAPSULE, DELAYED RELEASE ORAL at 07:53

## 2025-03-06 RX ADMIN — OXYCODONE AND ACETAMINOPHEN 1 TABLET: 325; 10 TABLET ORAL at 18:56

## 2025-03-06 RX ADMIN — ONDANSETRON 4 MG: 2 INJECTION, SOLUTION INTRAMUSCULAR; INTRAVENOUS at 14:28

## 2025-03-06 RX ADMIN — PROMETHAZINE HYDROCHLORIDE 25 MG: 25 TABLET ORAL at 17:07

## 2025-03-06 RX ADMIN — METOPROLOL SUCCINATE 25 MG: 25 TABLET, EXTENDED RELEASE ORAL at 20:53

## 2025-03-06 RX ADMIN — MECLIZINE 25 MG: 12.5 TABLET ORAL at 13:25

## 2025-03-06 RX ADMIN — ENOXAPARIN SODIUM 30 MG: 100 INJECTION SUBCUTANEOUS at 20:48

## 2025-03-06 RX ADMIN — ARFORMOTEROL TARTRATE 15 MCG: 15 SOLUTION RESPIRATORY (INHALATION) at 19:20

## 2025-03-06 RX ADMIN — Medication 250 MG: at 20:50

## 2025-03-06 RX ADMIN — ARFORMOTEROL TARTRATE 15 MCG: 15 SOLUTION RESPIRATORY (INHALATION) at 08:36

## 2025-03-06 RX ADMIN — SENNOSIDES AND DOCUSATE SODIUM 2 TABLET: 50; 8.6 TABLET ORAL at 07:49

## 2025-03-06 RX ADMIN — PROMETHAZINE HYDROCHLORIDE 25 MG: 25 TABLET ORAL at 07:52

## 2025-03-06 RX ADMIN — MECLIZINE 25 MG: 12.5 TABLET ORAL at 07:50

## 2025-03-06 RX ADMIN — CELECOXIB 200 MG: 100 CAPSULE ORAL at 20:49

## 2025-03-06 RX ADMIN — ALLOPURINOL 100 MG: 100 TABLET ORAL at 20:52

## 2025-03-06 RX ADMIN — POTASSIUM CHLORIDE 20 MEQ: 1500 TABLET, EXTENDED RELEASE ORAL at 07:51

## 2025-03-06 RX ADMIN — SODIUM CHLORIDE, PRESERVATIVE FREE 10 ML: 5 INJECTION INTRAVENOUS at 07:57

## 2025-03-06 RX ADMIN — TOPIRAMATE 200 MG: 100 TABLET, FILM COATED ORAL at 07:55

## 2025-03-06 RX ADMIN — VANCOMYCIN HYDROCHLORIDE 1000 MG: 1 INJECTION, POWDER, LYOPHILIZED, FOR SOLUTION INTRAVENOUS at 05:48

## 2025-03-06 RX ADMIN — IPRATROPIUM BROMIDE 0.5 MG: 0.5 SOLUTION RESPIRATORY (INHALATION) at 12:28

## 2025-03-06 RX ADMIN — POLYETHYLENE GLYCOL 3350 17 G: 17 POWDER, FOR SOLUTION ORAL at 07:52

## 2025-03-06 RX ADMIN — METOPROLOL SUCCINATE 25 MG: 25 TABLET, EXTENDED RELEASE ORAL at 07:51

## 2025-03-06 RX ADMIN — FUROSEMIDE 40 MG: 20 TABLET ORAL at 07:52

## 2025-03-06 RX ADMIN — ZIPRASIDONE HYDROCHLORIDE 40 MG: 40 CAPSULE ORAL at 20:50

## 2025-03-06 RX ADMIN — GABAPENTIN 600 MG: 300 CAPSULE ORAL at 13:25

## 2025-03-06 RX ADMIN — BACLOFEN 20 MG: 10 TABLET ORAL at 07:49

## 2025-03-06 RX ADMIN — SODIUM CHLORIDE, PRESERVATIVE FREE 10 ML: 5 INJECTION INTRAVENOUS at 20:51

## 2025-03-06 RX ADMIN — CITALOPRAM HYDROBROMIDE 20 MG: 20 TABLET ORAL at 07:50

## 2025-03-06 RX ADMIN — MONTELUKAST 10 MG: 10 TABLET, FILM COATED ORAL at 20:52

## 2025-03-06 RX ADMIN — PANCRELIPASE LIPASE, PANCRELIPASE PROTEASE, PANCRELIPASE AMYLASE 80000 UNITS: 20000; 63000; 84000 CAPSULE, DELAYED RELEASE ORAL at 20:52

## 2025-03-06 RX ADMIN — Medication 250 MG: at 07:53

## 2025-03-06 RX ADMIN — TOPIRAMATE 200 MG: 100 TABLET, FILM COATED ORAL at 20:51

## 2025-03-06 RX ADMIN — OXYCODONE AND ACETAMINOPHEN 1 TABLET: 325; 10 TABLET ORAL at 05:44

## 2025-03-06 RX ADMIN — PANTOPRAZOLE SODIUM 40 MG: 40 TABLET, DELAYED RELEASE ORAL at 05:44

## 2025-03-06 ASSESSMENT — PAIN DESCRIPTION - ORIENTATION
ORIENTATION: RIGHT;LEFT
ORIENTATION: RIGHT;LOWER
ORIENTATION: RIGHT;LOWER
ORIENTATION: RIGHT

## 2025-03-06 ASSESSMENT — PAIN DESCRIPTION - DESCRIPTORS
DESCRIPTORS: ACHING;DISCOMFORT;SORE
DESCRIPTORS: ACHING;THROBBING
DESCRIPTORS: ACHING
DESCRIPTORS: ACHING;DISCOMFORT;SORE

## 2025-03-06 ASSESSMENT — PAIN DESCRIPTION - LOCATION
LOCATION: BACK;LEG
LOCATION: LEG;BACK
LOCATION: LEG
LOCATION: LEG

## 2025-03-06 ASSESSMENT — PAIN SCALES - GENERAL
PAINLEVEL_OUTOF10: 8
PAINLEVEL_OUTOF10: 0
PAINLEVEL_OUTOF10: 8
PAINLEVEL_OUTOF10: 5
PAINLEVEL_OUTOF10: 8
PAINLEVEL_OUTOF10: 8

## 2025-03-06 ASSESSMENT — PAIN SCALES - WONG BAKER: WONGBAKER_NUMERICALRESPONSE: NO HURT

## 2025-03-06 NOTE — PROGRESS NOTES
Department of Internal Medicine  Infectious Diseases   Progress Note    C/C :  Right TKA infection , right leg cellulitis    Pt denies fever or chills  Reports pain in the knee, leg pain   Afebrile     Current Facility-Administered Medications   Medication Dose Route Frequency Provider Last Rate Last Admin    vancomycin (VANCOCIN) 1,000 mg in sodium chloride 0.9 % 250 mL IVPB ([id number])  1,000 mg IntraVENous Q12H LimbuBill MD   Stopped at 03/06/25 0648    sodium chloride flush 0.9 % injection 5-40 mL  5-40 mL IntraVENous 2 times per day Limbu, Bill ALEXANDER MD   10 mL at 03/06/25 0757    sodium chloride flush 0.9 % injection 5-40 mL  5-40 mL IntraVENous PRN Limbu, Bill ALEXANDER MD        0.9 % sodium chloride infusion   IntraVENous PRN Limbu, Bill ALEXANDER MD        lidocaine PF 1 % injection 50 mg  50 mg IntraDERmal Once Limbu, Bill ALEXANDER MD        sennosides-docusate sodium (SENOKOT-S) 8.6-50 MG tablet 2 tablet  2 tablet Oral Daily Talon Whitehead MD   2 tablet at 03/06/25 0749    bisacodyl (DULCOLAX) suppository 10 mg  10 mg Rectal Daily PRN Talon Whitehead MD        polyethylene glycol (GLYCOLAX) packet 17 g  17 g Oral Daily Talon Whitehead MD   17 g at 03/06/25 0752    magnesium oxide (MAG-OX) tablet 400 mg  400 mg Oral Daily Talon Whitehead MD   400 mg at 03/06/25 0750    vitamin D (ERGOCALCIFEROL) capsule 50,000 Units  50,000 Units Oral Weekly Talon Whitehead MD   50,000 Units at 03/03/25 1100    nicotine (NICODERM CQ) 21 MG/24HR 1 patch  1 patch TransDERmal Daily Talon Whitehead MD   1 patch at 03/06/25 0802    allopurinol (ZYLOPRIM) tablet 100 mg  100 mg Oral BID Marguerite Clifton APRN - NP   100 mg at 03/06/25 0752    baclofen (LIORESAL) tablet 20 mg  20 mg Oral 4x daily Marguerite Clifton APRN - NP   20 mg at 03/06/25 1325    calcium citrate (CALCITRATE) tablet 250 mg  250 mg Oral TID Marguerite Clifton APRN - NP   250 mg at 03/06/25 1327    celecoxib (CELEBREX) capsule 200 mg  200 mg Oral BID Etienne

## 2025-03-06 NOTE — PLAN OF CARE
Problem: Safety - Adult  Goal: Free from fall injury  3/6/2025 0009 by Cristal Tarango RN  Outcome: Progressing     Problem: ABCDS Injury Assessment  Goal: Absence of physical injury  3/6/2025 0009 by Cristal Tarango RN  Outcome: Progressing     Problem: Pain  Goal: Verbalizes/displays adequate comfort level or baseline comfort level  3/6/2025 0009 by Cristal Tarango RN  Outcome: Progressing     Problem: Skin/Tissue Integrity - Adult  Goal: Skin integrity remains intact  Description: 1.  Monitor for areas of redness and/or skin breakdown  2.  Assess vascular access sites hourly  3.  Every 4-6 hours minimum:  Change oxygen saturation probe site  4.  Every 4-6 hours:  If on nasal continuous positive airway pressure, respiratory therapy assess nares and determine need for appliance change or resting period  3/6/2025 0009 by Cristal Tarango RN  Outcome: Progressing     Problem: Skin/Tissue Integrity - Adult  Goal: Incisions, wounds, or drain sites healing without S/S of infection  3/6/2025 0009 by Cristal Tarango RN  Outcome: Progressing     Problem: Skin/Tissue Integrity - Adult  Goal: Oral mucous membranes remain intact  3/6/2025 0009 by Cristal Tarango RN  Outcome: Progressing     Problem: Infection - Adult  Goal: Absence of infection at discharge  3/6/2025 0009 by Cristal Tarango RN  Outcome: Progressing     Problem: Infection - Adult  Goal: Absence of infection during hospitalization  3/6/2025 0009 by Cristal Tarango RN  Outcome: Progressing     Problem: Infection - Adult  Goal: Absence of fever/infection during anticipated neutropenic period  3/6/2025 0009 by Cristal Tarango RN  Outcome: Progressing     Problem: Skin/Tissue Integrity  Goal: Skin integrity remains intact  Description: 1.  Monitor for areas of redness and/or skin breakdown  2.  Assess vascular access sites hourly  3.  Every 4-6 hours minimum:  Change oxygen saturation probe

## 2025-03-06 NOTE — CARE COORDINATION
Plan at this time is home with [facility] when medically ready. Home health care orders are in. Per ID note from yesterday, 1. Vancomycin 1750 mg IV q 12 hrs. 2. PICC line insertion. Per ortho surgery note from yesterday,  CT right ankle reviewed demonstrating no concerns for abscess formation, hardware failure.  No acute orthopedic surgical intervention planned regarding right lower extremity.  Continue to recommend wound care right lower extremity. Patient is adamant about returning home and not going to a [facility]. She said she has HHA through [facility] 5 -6hrs 7 days a week. Phone # is  and would like notified at discharge to resume care. She also gets meals 7/days week per [employer].  is Francoise Drew [phone],mobile, and work # is [phone]. Patient said she will need transportation home at time of discharge. Ambulette form in envelope in soft chart will need to be completed, signed and dated by nursing on day of discharge. Patient placed on daily therapy TX's as able since plan is home.     Liz Mejia RN   [phone]

## 2025-03-06 NOTE — PROGRESS NOTES
Pharmacy Consultation Note  (Antibiotic Dosing and Monitoring)    Initial consult date: 3/2/25  Consulting physician/provider: Dr. Olivo  Drug: Vancomycin  Indication: Bone and Joint Infection    Age/  Gender Height Weight IBW  Allergy Information   64 y.o./female 170.2 cm (5' 7\") (!) 139.7 kg (308 lb)     Ideal body weight: 61.6 kg (135 lb 12.9 oz)  Adjusted ideal body weight: 92.8 kg (204 lb 10.9 oz)   Bee venom, Penicillins, Ropinirole, Ropinirole hcl, Vistaril [hydroxyzine hcl], Aripiprazole, Prednisone, Restoril [temazepam], Wax [beeswax], Hydroxyzine pamoate, and Tape [adhesive tape]      Renal Function:  No results for input(s): \"BUN\", \"CREATININE\" in the last 72 hours.    No intake or output data in the 24 hours ending 03/06/25 1253      Vancomycin Monitoring:  Trough:    Recent Labs     03/04/25  1734   VANCOTROUGH 23.1*     Random:    Recent Labs     03/05/25  0547   VANCORANDOM 10.8       Vancomycin Administration Times:  Recent vancomycin administrations                     vancomycin (VANCOCIN) 1,000 mg in sodium chloride 0.9 % 250 mL IVPB ([id number]) (mg) 1,000 mg New Bag 03/06/25 0548     1,000 mg New Bag 03/05/25 1732    vancomycin (VANCOCIN) 1,750 mg in sodium chloride 0.9 % 500 mL IVPB (mg) 1,750 mg New Bag 03/04/25 0536     1,750 mg New Bag 03/03/25 1916          Assessment:  Patient is a 64 y.o. female who has been initiated on vancomycin  Estimated Creatinine Clearance: 119 mL/min (based on SCr of 0.7 mg/dL).  3/3: scr 0.7 ( at baseline), no growth in cultures so far - final results pending  3/4: Trough level 23.1 (estimated ).  3/5: Random level 10.8 3/5 @ 0547 (~24 hours post dose)      Plan:  Vancomycin 1000 mg q12h (Predicted AUC/NAVARRO = 593, Tr = 12.4)  Will continue to monitor renal function   Pharmacy to follow      Thank you for this consult,    Bartolo Gonzalez, [facility] 3/6/2025 12:53 PM   Pharmacy Ext [phone]

## 2025-03-06 NOTE — PROGRESS NOTES
[facility] Hospitalist Progress Note    SYNOPSIS: Patient admitted on 3/2/2025 for Wound infection    Ms. Leny Yang, a 64 y.o. year old female  who  has a past medical history of Adenoma of right adrenal gland, Anemia, Anesthesia complication, Anxiety, Arrhythmia, Arthritis, Asthma, Benign essential tremor, Bipolar affective (HCC), Chronic back pain, Chronic respiratory failure with hypoxia (HCC), Decreased dorsalis pedis pulse, Depression, Difficulty swallowing, Environmental and seasonal allergies, Excessive physiologic tremor, Fatty liver, Full dentures, GERD (gastroesophageal reflux disease), Gout, Herniated cervical disc, History of swelling of feet, Hypertension, Leg pain, Leg swelling, Lymphedema of both lower extremities, Lymphedema of lower extremity, Mitochondrial cytopathy, MRSA (methicillin resistant Staphylococcus aureus), Muscle weakness (generalized), Nausea, Need for assistance with personal care, Neuropathy, Obesity, PETR (obstructive sleep apnea), Osteoarthritis of left knee, Pancreatic duct disruption, Pancreatitis, PONV (postoperative nausea and vomiting), Seizures ([facility]), Spinal headache, Thyroid disease, and Wheelchair dependence.      Patient presented to the ED with complaints of acute on chronic R ankle and R knee pain and increased warmth and erythema associated with a chronic RLE wound that began 3 days prior to arrival. She also endorses difficulty ambulating 2/2 pain.  She reports she has hardware all through her RLE and she had a previous MRSA infection involving her R knee hardware for which she is now on on suppressive rifampin and doxycycline chronically.  She follows with [facility] in regards to her wound and just saw them last week.  She denies any fevers or chills, chest pain, SOB, cough, ABD pain, N/V/D.      ER COURSE:  In ED vitals are stable.  Laboratory workup significant for elevated CRP.  Imaging reassuring.  Blood cultures collected and pending.

## 2025-03-06 NOTE — PROGRESS NOTES
Chg double lumen picc Placement 3/6/2025    Product number: [id number]   Lot Number: [id number]      Ultrasound: [id number]   Right Basilic vein:                Upper Arm Circumference: (CM) 55cm    Size:(FR)/GUAGE 5.5cm/42cm    Exposed Length: (CM) 3cm    Internal Length: (CM) 39cm   Cut: (CM) 13cm   Vein Measurement: 0.60cm              Lidocaine Given: yes  Shanice Mascorro RN  3/6/2025  11:25 AM

## 2025-03-07 VITALS
DIASTOLIC BLOOD PRESSURE: 78 MMHG | TEMPERATURE: 97.8 F | WEIGHT: 293 LBS | SYSTOLIC BLOOD PRESSURE: 147 MMHG | HEIGHT: 67 IN | RESPIRATION RATE: 16 BRPM | HEART RATE: 89 BPM | BODY MASS INDEX: 45.99 KG/M2 | OXYGEN SATURATION: 91 %

## 2025-03-07 LAB
DATE LAST DOSE: NORMAL
MICROORGANISM SPEC CULT: NORMAL
MICROORGANISM SPEC CULT: NORMAL
SERVICE CMNT-IMP: NORMAL
SERVICE CMNT-IMP: NORMAL
SPECIMEN DESCRIPTION: NORMAL
SPECIMEN DESCRIPTION: NORMAL
TME LAST DOSE: NORMAL H
VANCOMYCIN DOSE: NORMAL MG
VANCOMYCIN SERPL-MCNC: 15.1 UG/ML (ref 5–40)

## 2025-03-07 PROCEDURE — 94640 AIRWAY INHALATION TREATMENT: CPT

## 2025-03-07 PROCEDURE — 6370000000 HC RX 637 (ALT 250 FOR IP): Performed by: INTERNAL MEDICINE

## 2025-03-07 PROCEDURE — 6370000000 HC RX 637 (ALT 250 FOR IP): Performed by: NURSE PRACTITIONER

## 2025-03-07 PROCEDURE — 1200000000 HC SEMI PRIVATE

## 2025-03-07 PROCEDURE — 6360000002 HC RX W HCPCS: Performed by: INTERNAL MEDICINE

## 2025-03-07 PROCEDURE — 36415 COLL VENOUS BLD VENIPUNCTURE: CPT

## 2025-03-07 PROCEDURE — 99239 HOSP IP/OBS DSCHRG MGMT >30: CPT | Performed by: INTERNAL MEDICINE

## 2025-03-07 PROCEDURE — 2500000003 HC RX 250 WO HCPCS: Performed by: INTERNAL MEDICINE

## 2025-03-07 PROCEDURE — 80202 ASSAY OF VANCOMYCIN: CPT

## 2025-03-07 PROCEDURE — 6360000002 HC RX W HCPCS: Performed by: NURSE PRACTITIONER

## 2025-03-07 PROCEDURE — 2580000003 HC RX 258: Performed by: INTERNAL MEDICINE

## 2025-03-07 PROCEDURE — 6370000000 HC RX 637 (ALT 250 FOR IP): Performed by: STUDENT IN AN ORGANIZED HEALTH CARE EDUCATION/TRAINING PROGRAM

## 2025-03-07 RX ORDER — POLYETHYLENE GLYCOL 3350 17 G/17G
17 POWDER, FOR SOLUTION ORAL ONCE
Status: COMPLETED | OUTPATIENT
Start: 2025-03-07 | End: 2025-03-07

## 2025-03-07 RX ADMIN — MECLIZINE 25 MG: 12.5 TABLET ORAL at 07:50

## 2025-03-07 RX ADMIN — GABAPENTIN 600 MG: 300 CAPSULE ORAL at 17:20

## 2025-03-07 RX ADMIN — SENNOSIDES AND DOCUSATE SODIUM 2 TABLET: 50; 8.6 TABLET ORAL at 07:50

## 2025-03-07 RX ADMIN — MECLIZINE 25 MG: 12.5 TABLET ORAL at 17:21

## 2025-03-07 RX ADMIN — POLYETHYLENE GLYCOL 3350 17 G: 17 POWDER, FOR SOLUTION ORAL at 07:52

## 2025-03-07 RX ADMIN — ACETAMINOPHEN 650 MG: 325 TABLET ORAL at 19:44

## 2025-03-07 RX ADMIN — METOPROLOL SUCCINATE 25 MG: 25 TABLET, EXTENDED RELEASE ORAL at 07:51

## 2025-03-07 RX ADMIN — ENOXAPARIN SODIUM 30 MG: 100 INJECTION SUBCUTANEOUS at 07:52

## 2025-03-07 RX ADMIN — VANCOMYCIN HYDROCHLORIDE 1000 MG: 1 INJECTION, POWDER, LYOPHILIZED, FOR SOLUTION INTRAVENOUS at 17:31

## 2025-03-07 RX ADMIN — Medication 250 MG: at 07:55

## 2025-03-07 RX ADMIN — PROMETHAZINE HYDROCHLORIDE 25 MG: 25 TABLET ORAL at 17:20

## 2025-03-07 RX ADMIN — FERROUS SULFATE TAB 325 MG (65 MG ELEMENTAL FE) 325 MG: 325 (65 FE) TAB at 07:49

## 2025-03-07 RX ADMIN — POLYETHYLENE GLYCOL 3350 17 G: 17 POWDER, FOR SOLUTION ORAL at 16:00

## 2025-03-07 RX ADMIN — BACLOFEN 20 MG: 10 TABLET ORAL at 12:33

## 2025-03-07 RX ADMIN — OXYCODONE AND ACETAMINOPHEN 1 TABLET: 325; 10 TABLET ORAL at 07:17

## 2025-03-07 RX ADMIN — BACLOFEN 20 MG: 10 TABLET ORAL at 07:51

## 2025-03-07 RX ADMIN — Medication 400 MG: at 07:51

## 2025-03-07 RX ADMIN — IPRATROPIUM BROMIDE 0.5 MG: 0.5 SOLUTION RESPIRATORY (INHALATION) at 19:42

## 2025-03-07 RX ADMIN — IPRATROPIUM BROMIDE 0.5 MG: 0.5 SOLUTION RESPIRATORY (INHALATION) at 07:38

## 2025-03-07 RX ADMIN — PANTOPRAZOLE SODIUM 40 MG: 40 TABLET, DELAYED RELEASE ORAL at 07:12

## 2025-03-07 RX ADMIN — CETIRIZINE HYDROCHLORIDE 10 MG: 10 TABLET, FILM COATED ORAL at 07:52

## 2025-03-07 RX ADMIN — FUROSEMIDE 40 MG: 20 TABLET ORAL at 07:52

## 2025-03-07 RX ADMIN — SODIUM CHLORIDE, PRESERVATIVE FREE 10 ML: 5 INJECTION INTRAVENOUS at 08:07

## 2025-03-07 RX ADMIN — ALLOPURINOL 100 MG: 100 TABLET ORAL at 07:50

## 2025-03-07 RX ADMIN — CELECOXIB 200 MG: 100 CAPSULE ORAL at 07:55

## 2025-03-07 RX ADMIN — Medication 250 MG: at 15:59

## 2025-03-07 RX ADMIN — ONDANSETRON 4 MG: 2 INJECTION, SOLUTION INTRAMUSCULAR; INTRAVENOUS at 15:00

## 2025-03-07 RX ADMIN — BACLOFEN 20 MG: 10 TABLET ORAL at 17:20

## 2025-03-07 RX ADMIN — CITALOPRAM HYDROBROMIDE 20 MG: 20 TABLET ORAL at 07:49

## 2025-03-07 RX ADMIN — PANCRELIPASE LIPASE, PANCRELIPASE PROTEASE, PANCRELIPASE AMYLASE 80000 UNITS: 20000; 63000; 84000 CAPSULE, DELAYED RELEASE ORAL at 12:35

## 2025-03-07 RX ADMIN — POTASSIUM CHLORIDE 20 MEQ: 1500 TABLET, EXTENDED RELEASE ORAL at 07:51

## 2025-03-07 RX ADMIN — GABAPENTIN 600 MG: 300 CAPSULE ORAL at 07:50

## 2025-03-07 RX ADMIN — ARFORMOTEROL TARTRATE 15 MCG: 15 SOLUTION RESPIRATORY (INHALATION) at 07:37

## 2025-03-07 RX ADMIN — MECLIZINE 25 MG: 12.5 TABLET ORAL at 12:33

## 2025-03-07 RX ADMIN — OXYCODONE AND ACETAMINOPHEN 1 TABLET: 325; 10 TABLET ORAL at 15:59

## 2025-03-07 RX ADMIN — TOPIRAMATE 200 MG: 100 TABLET, FILM COATED ORAL at 07:55

## 2025-03-07 RX ADMIN — GABAPENTIN 600 MG: 300 CAPSULE ORAL at 12:34

## 2025-03-07 RX ADMIN — ARFORMOTEROL TARTRATE 15 MCG: 15 SOLUTION RESPIRATORY (INHALATION) at 19:42

## 2025-03-07 RX ADMIN — PANCRELIPASE LIPASE, PANCRELIPASE PROTEASE, PANCRELIPASE AMYLASE 80000 UNITS: 20000; 63000; 84000 CAPSULE, DELAYED RELEASE ORAL at 17:21

## 2025-03-07 RX ADMIN — PANCRELIPASE LIPASE, PANCRELIPASE PROTEASE, PANCRELIPASE AMYLASE 80000 UNITS: 20000; 63000; 84000 CAPSULE, DELAYED RELEASE ORAL at 07:53

## 2025-03-07 RX ADMIN — PROMETHAZINE HYDROCHLORIDE 25 MG: 25 TABLET ORAL at 07:52

## 2025-03-07 RX ADMIN — VANCOMYCIN HYDROCHLORIDE 1000 MG: 1 INJECTION, POWDER, LYOPHILIZED, FOR SOLUTION INTRAVENOUS at 08:06

## 2025-03-07 ASSESSMENT — PAIN DESCRIPTION - ORIENTATION
ORIENTATION: POSTERIOR
ORIENTATION: RIGHT

## 2025-03-07 ASSESSMENT — PAIN DESCRIPTION - LOCATION
LOCATION: LEG
LOCATION: NECK

## 2025-03-07 ASSESSMENT — PAIN DESCRIPTION - DESCRIPTORS
DESCRIPTORS: THROBBING
DESCRIPTORS: SORE;SHARP

## 2025-03-07 ASSESSMENT — PAIN SCALES - GENERAL
PAINLEVEL_OUTOF10: 7
PAINLEVEL_OUTOF10: 8
PAINLEVEL_OUTOF10: 5

## 2025-03-07 NOTE — PROGRESS NOTES
Pharmacy Consultation Note  (Antibiotic Dosing and Monitoring)    Initial consult date: 3/2/25  Consulting physician/provider: Dr. Olivo  Drug: Vancomycin  Indication: Bone and Joint Infection    Age/  Gender Height Weight IBW  Allergy Information   64 y.o./female 170.2 cm (5' 7\") (!) 139.7 kg (308 lb)     Ideal body weight: 61.6 kg (135 lb 12.9 oz)  Adjusted ideal body weight: 92.8 kg (204 lb 10.9 oz)   Bee venom, Penicillins, Ropinirole, Ropinirole hcl, Vistaril [hydroxyzine hcl], Aripiprazole, Prednisone, Restoril [temazepam], Wax [beeswax], Hydroxyzine pamoate, and Tape [adhesive tape]      Renal Function:  No results for input(s): \"BUN\", \"CREATININE\" in the last 72 hours.      Intake/Output Summary (Last 24 hours) at 3/7/2025 1032  Last data filed at 3/7/2025 0735  Gross per 24 hour   Intake 150 ml   Output 3225 ml   Net -3075 ml         Vancomycin Monitoring:  Trough:    Recent Labs     03/04/25  1734   VANCOTROUGH 23.1*     Random:    Recent Labs     03/05/25  0547 03/07/25  0428   VANCORANDOM 10.8 15.1       Vancomycin Administration Times:  Recent vancomycin administrations                     vancomycin (VANCOCIN) 1,000 mg in sodium chloride 0.9 % 250 mL IVPB ([id number]) (mg) 1,000 mg New Bag 03/07/25 0806     1,000 mg New Bag 03/06/25 2101     1,000 mg New Bag  0548     1,000 mg New Bag 03/05/25 1732             Assessment:  Patient is a 64 y.o. female who has been initiated on vancomycin  Estimated Creatinine Clearance: 119 mL/min (based on SCr of 0.7 mg/dL).  3/3: scr 0.7 ( at baseline), no growth in cultures so far - final results pending  3/4: Trough level 23.1 (estimated ).  3/5: Random level 10.8 3/5 @ 0547 (~24 hours post dose)      Plan:  Vancomycin 1000 mg q12h (Predicted AUC/NAVARRO = 490, Tr = 12.1)  Will continue to monitor renal function   Pharmacy to follow      Thank you for this consult,    Bartolo Gonzalez, [facility] 3/7/2025 10:32 AM   Pharmacy Ext [phone]

## 2025-03-07 NOTE — PROGRESS NOTES
Department of Internal Medicine  Infectious Diseases   Progress Note    C/C :  Right TKA infection , right leg cellulitis    Pt denies fever or chills  Reports pain in the knee, leg pain   Afebrile     Current Facility-Administered Medications   Medication Dose Route Frequency Provider Last Rate Last Admin    vancomycin (VANCOCIN) 1,000 mg in sodium chloride 0.9 % 250 mL IVPB ([id number])  1,000 mg IntraVENous Q12H LimbuBill MD   Stopped at 03/07/25 0947    sodium chloride flush 0.9 % injection 5-40 mL  5-40 mL IntraVENous 2 times per day Limbu, Bill ALEXANDER MD   10 mL at 03/07/25 0807    sodium chloride flush 0.9 % injection 5-40 mL  5-40 mL IntraVENous PRN Limbu, Bill ALEXANDER MD        0.9 % sodium chloride infusion   IntraVENous PRN Limbu, Bill ALEXANDER MD        lidocaine PF 1 % injection 50 mg  50 mg IntraDERmal Once Limbu, Bill ALEXANDER MD        sennosides-docusate sodium (SENOKOT-S) 8.6-50 MG tablet 2 tablet  2 tablet Oral Daily Talon Whitehead MD   2 tablet at 03/07/25 0750    bisacodyl (DULCOLAX) suppository 10 mg  10 mg Rectal Daily PRN Talon Whitehead MD        polyethylene glycol (GLYCOLAX) packet 17 g  17 g Oral Daily Talon Whitehead MD   17 g at 03/07/25 0752    magnesium oxide (MAG-OX) tablet 400 mg  400 mg Oral Daily Talon Whitehead MD   400 mg at 03/07/25 0751    vitamin D (ERGOCALCIFEROL) capsule 50,000 Units  50,000 Units Oral Weekly Talon Whitehead MD   50,000 Units at 03/03/25 1100    nicotine (NICODERM CQ) 21 MG/24HR 1 patch  1 patch TransDERmal Daily Talon Whitehead MD   1 patch at 03/07/25 0807    allopurinol (ZYLOPRIM) tablet 100 mg  100 mg Oral BID Marguerite Clifton APRN - NP   100 mg at 03/07/25 0750    baclofen (LIORESAL) tablet 20 mg  20 mg Oral 4x daily Marguerite Clifton APRN - NP   20 mg at 03/07/25 1233    calcium citrate (CALCITRATE) tablet 250 mg  250 mg Oral TID Marguerite Clifton APRN - NP   250 mg at 03/07/25 1559    celecoxib (CELEBREX) capsule 200 mg  200 mg Oral BID Etienne  of series  312).  2. Periprosthetic lucency anterior to the distal tibial screws, concerning  for particle disease/osteolysis.  3. Nonspecific soft tissue edema.  No loculated fluid collections identified.  4. Background of mild to moderate osteoarthritis.         IMPRESSION:     Right TKA infection ( hx of MRSA )   Right leg wound infection , cellulitis- improving     RECOMMENDATIONS:      Vancomycin 1000 mg IV q 12 hrs ~ 14 days   Monitor labs   ID follow up in 7-10 days   OK to discharge from ID POV

## 2025-03-07 NOTE — CARE COORDINATION
CT Surgery Discharge order noted. Jacqueline from [facility] notified of patient discharging home today. She said start of care will be tomorrow am. Vancomycin script faxed to [facility] at [phone]. Home health care orders are in. RN notified that patient to receive all her antibiotic doses today prior to discharge. Patient has HHA through [facility] 5 -6hrs 7 days a week. Phone # is  and I notified them of patient discharging home today. Transportation set up via [employer] [facility] for 7pm-9pm today. RN and patient all notified. Completed and signed [facility] form in envelope is in envelope in soft chart.   Liz Mejia RN CM  [phone]      Patient requesting pressurized balls for the home antibiotics as opposed to the bag with pump. She said she cannot administer it that way. [facility] notified.   Liz Mejia RN CM  [phone]      I received call back from Dylan pharmacist at [facility] who said that the IV antibiotic ordered for the patient is not placed in pressurized balls and the medication has already been mixed in a bag and ready for delivery. Patient notified.   Liz Mejia RN CM  [phone]

## 2025-03-07 NOTE — PROGRESS NOTES
[facility] Hospitalist Progress Note    SYNOPSIS: Patient admitted on 3/2/2025 for Wound infection    Ms. Leny Yang, a 64 y.o. year old female  who  has a past medical history of Adenoma of right adrenal gland, Anemia, Anesthesia complication, Anxiety, Arrhythmia, Arthritis, Asthma, Benign essential tremor, Bipolar affective (HCC), Chronic back pain, Chronic respiratory failure with hypoxia (HCC), Decreased dorsalis pedis pulse, Depression, Difficulty swallowing, Environmental and seasonal allergies, Excessive physiologic tremor, Fatty liver, Full dentures, GERD (gastroesophageal reflux disease), Gout, Herniated cervical disc, History of swelling of feet, Hypertension, Leg pain, Leg swelling, Lymphedema of both lower extremities, Lymphedema of lower extremity, Mitochondrial cytopathy, MRSA (methicillin resistant Staphylococcus aureus), Muscle weakness (generalized), Nausea, Need for assistance with personal care, Neuropathy, Obesity, PETR (obstructive sleep apnea), Osteoarthritis of left knee, Pancreatic duct disruption, Pancreatitis, PONV (postoperative nausea and vomiting), Seizures ([facility]), Spinal headache, Thyroid disease, and Wheelchair dependence.      Patient presented to the ED with complaints of acute on chronic R ankle and R knee pain and increased warmth and erythema associated with a chronic RLE wound that began 3 days prior to arrival. She also endorses difficulty ambulating 2/2 pain.  She reports she has hardware all through her RLE and she had a previous MRSA infection involving her R knee hardware for which she is now on on suppressive rifampin and doxycycline chronically.  She follows with [facility] in regards to her wound and just saw them last week.  She denies any fevers or chills, chest pain, SOB, cough, ABD pain, N/V/D.      ER COURSE:  In ED vitals are stable.  Laboratory workup significant for elevated CRP.  Imaging reassuring.  Blood cultures collected and pending.   TransDERmal Daily    allopurinol  100 mg Oral BID    baclofen  20 mg Oral 4x daily    calcium citrate  250 mg Oral TID    celecoxib  200 mg Oral BID    citalopram  20 mg Oral Daily    [Held by provider] doxycycline hyclate  100 mg Oral BID    ferrous sulfate  325 mg Oral Daily with breakfast    furosemide  40 mg Oral Daily    gabapentin  600 mg Oral 4x Daily    cetirizine  10 mg Oral Daily    meclizine  25 mg Oral 4x daily    metoprolol succinate  25 mg Oral BID    montelukast  10 mg Oral Daily    pantoprazole  40 mg Oral QAM AC    potassium chloride  20 mEq Oral Daily    promethazine  25 mg Oral BID    topiramate  200 mg Oral BID    traZODone  100 mg Oral Nightly    arformoterol tartrate  15 mcg Nebulization BID RT    And    ipratropium  0.5 mg Nebulization Q6H RT    Pancrelipase (Lip-Prot-Amyl)  80,000 Units Oral 4x Daily WC    ziprasidone  40 mg Oral QPM    melatonin  5 mg Oral QPM    sodium chloride flush  5-40 mL IntraVENous 2 times per day    enoxaparin  30 mg SubCUTAneous BID     PRN Meds: sodium chloride flush, sodium chloride, bisacodyl, oxyCODONE-acetaminophen, melatonin, hydrALAZINE, calcium carbonate, Polyvinyl Alcohol-Povidone PF, sodium chloride, magnesium hydroxide, benzocaine-menthol, benzonatate, glucose, dextrose bolus **OR** dextrose bolus, glucagon (rDNA), dextrose, sodium chloride flush, sodium chloride, potassium chloride **OR** potassium alternative oral replacement **OR** potassium chloride, magnesium sulfate, ondansetron **OR** ondansetron, acetaminophen **OR** acetaminophen    Labs:     No results for input(s): \"WBC\", \"HGB\", \"HCT\", \"PLT\" in the last 72 hours.      No results for input(s): \"NA\", \"K\", \"CL\", \"CO2\", \"BUN\", \"CREATININE\", \"CALCIUM\", \"PHOS\" in the last 72 hours.    Invalid input(s): \"MAGNES\"      No results for input(s): \"ALKPHOS\", \"ALT\", \"AST\", \"BILITOT\", \"AMYLASE\", \"LIPASE\" in the last 72 hours.    Invalid input(s): \"PROT\", \"ALB\"      No results for input(s): \"INR\" in the last

## 2025-03-07 NOTE — PLAN OF CARE
Problem: Safety - Adult  Goal: Free from fall injury  3/6/2025 2336 by Cristal Tarango RN  Outcome: Progressing     Problem: ABCDS Injury Assessment  Goal: Absence of physical injury  3/6/2025 2336 by Cristal Tarango RN  Outcome: Progressing     Problem: Pain  Goal: Verbalizes/displays adequate comfort level or baseline comfort level  3/6/2025 2336 by Cristal Tarango RN  Outcome: Progressing     Problem: Skin/Tissue Integrity - Adult  Goal: Skin integrity remains intact  Description: 1.  Monitor for areas of redness and/or skin breakdown  2.  Assess vascular access sites hourly  3.  Every 4-6 hours minimum:  Change oxygen saturation probe site  4.  Every 4-6 hours:  If on nasal continuous positive airway pressure, respiratory therapy assess nares and determine need for appliance change or resting period  3/6/2025 2336 by Cristal Tarango RN  Outcome: Progressing     Problem: Skin/Tissue Integrity - Adult  Goal: Oral mucous membranes remain intact  3/6/2025 2336 by Cristal Tarango RN  Outcome: Progressing     Problem: Infection - Adult  Goal: Absence of infection at discharge  3/6/2025 2336 by Cristal Tarango RN  Outcome: Progressing

## 2025-03-07 NOTE — DISCHARGE SUMMARY
Physician Discharge Summary     Patient ID:  Leny Yang  [id number]  64 y.o.  1960    Admit date: 3/2/2025    Discharge date and time:  3/7/2025    Discharge Diagnoses: Principal Problem:    Wound infection  Active Problems:    Cellulitis of right leg    Infection of prosthetic right knee joint    Hypertension    Class 3 severe obesity due to excess calories without serious comorbidity with body mass index (BMI) of 45.0 to 49.9 in adult    Tobacco abuse    Acute right ankle pain    Ambulatory dysfunction    Vitamin D deficiency  Resolved Problems:    * No resolved hospital problems. *      Consults: IP CONSULT TO VASCULAR ACCESS TEAM  IP CONSULT TO INFECTIOUS DISEASES  IP CONSULT TO INTERNAL MEDICINE  IP CONSULT TO PHARMACY  IP CONSULT TO VASCULAR ACCESS TEAM  IP CONSULT TO ORTHOPEDIC SURGERY  IP CONSULT TO VASCULAR ACCESS TEAM  IP CONSULT TO HOME CARE NEEDS    Procedures: See below    Hospital Course:   Ms. Leny Yang, a 64 y.o. year old female  who  has a past medical history of Adenoma of right adrenal gland, Anemia, Anesthesia complication, Anxiety, Arrhythmia, Arthritis, Asthma, Benign essential tremor, Bipolar affective (HCC), Chronic back pain, Chronic respiratory failure with hypoxia (HCC), Decreased dorsalis pedis pulse, Depression, Difficulty swallowing, Environmental and seasonal allergies, Excessive physiologic tremor, Fatty liver, Full dentures, GERD (gastroesophageal reflux disease), Gout, Herniated cervical disc, History of swelling of feet, Hypertension, Leg pain, Leg swelling, Lymphedema of both lower extremities, Lymphedema of lower extremity, Mitochondrial cytopathy, MRSA (methicillin resistant Staphylococcus aureus), Muscle weakness (generalized), Nausea, Need for assistance with personal care, Neuropathy, Obesity, PETR (obstructive sleep apnea), Osteoarthritis of left knee, Pancreatic duct disruption, Pancreatitis, PONV (postoperative nausea and vomiting), Seizures (HCC), Spinal  appropriate  -PT/OT eval  -Optimize electrolyte  Lifestyle modification condition for healthy weight loss  -Bowel regimen    Discharge Exam:  See progress note from today    Condition:  Stable    Disposition: home    Patient Instructions:   Current Discharge Medication List        START taking these medications    Details   vancomycin (VANCOCIN) infusion Infuse 1,000 mg intravenously in the morning and 1,000 mg in the evening. Do all this for 14 days. For 10-14 days.  Qty: 28 g, Refills: 0           CONTINUE these medications which have NOT CHANGED    Details   dexAMETHasone (DECADRON) 1 MG tablet Take one tablet by mouth once. Take at 11:00pm  Qty: 1 tablet, Refills: 0    Associated Diagnoses: Adrenal incidentaloma      citalopram (CELEXA) 20 MG tablet Take 1 tablet by mouth daily      calcium citrate (CALCITRATE) 950 (200 Ca) MG tablet Take 1 tablet by mouth 3 times daily      Coenzyme Q10 (CO Q-10) 100 MG CAPS Take 2 capsules by mouth in the morning and at bedtime      ZENPEP 17498-800103 units CPEP Take 2 capsules by mouth 4 times daily (with meals and nightly)      Azelastine HCl 137 MCG/SPRAY SOLN 1 spray by Nasal route daily      celecoxib (CELEBREX) 200 MG capsule Take 1 capsule by mouth 2 times daily  Qty: 60 capsule, Refills: 0    Associated Diagnoses: Right foot pain      Magnesium Oxide (MAGNESIUM-OXIDE) 250 MG TABS tablet Take 1 tablet by mouth in the morning, at noon, and at bedtime      omega-3 acid ethyl esters (LOVAZA) 1 g capsule Take 1 capsule by mouth 2 times daily      metoprolol succinate (TOPROL XL) 50 MG extended release tablet Take 0.5 tablets by mouth in the morning and at bedtime      vitamin B-12 (CYANOCOBALAMIN) 100 MCG tablet Take 1 tablet by mouth daily      gabapentin (NEURONTIN) 600 MG tablet TAKE ONE TABLET BY MOUTH 4 TIMES A DAY      ferrous sulfate (IRON 325) 325 (65 Fe) MG tablet Take 1 tablet by mouth daily (with breakfast)      docusate sodium (COLACE) 100 MG capsule Take 2

## 2025-03-07 NOTE — PLAN OF CARE
Problem: Safety - Adult  Goal: Free from fall injury  Outcome: Progressing     Problem: ABCDS Injury Assessment  Goal: Absence of physical injury  Outcome: Progressing     Problem: Pain  Goal: Verbalizes/displays adequate comfort level or baseline comfort level  Outcome: Progressing     Problem: Skin/Tissue Integrity - Adult  Goal: Skin integrity remains intact  Description: 1.  Monitor for areas of redness and/or skin breakdown  2.  Assess vascular access sites hourly  3.  Every 4-6 hours minimum:  Change oxygen saturation probe site  4.  Every 4-6 hours:  If on nasal continuous positive airway pressure, respiratory therapy assess nares and determine need for appliance change or resting period  Outcome: Progressing  Goal: Incisions, wounds, or drain sites healing without S/S of infection  Outcome: Progressing  Goal: Oral mucous membranes remain intact  Outcome: Progressing     Problem: Infection - Adult  Goal: Absence of infection at discharge  Outcome: Progressing  Goal: Absence of infection during hospitalization  Outcome: Progressing  Goal: Absence of fever/infection during anticipated neutropenic period  Outcome: Progressing     Problem: Skin/Tissue Integrity  Goal: Skin integrity remains intact  Description: 1.  Monitor for areas of redness and/or skin breakdown  2.  Assess vascular access sites hourly  3.  Every 4-6 hours minimum:  Change oxygen saturation probe site  4.  Every 4-6 hours:  If on nasal continuous positive airway pressure, respiratory therapy assess nares and determine need for appliance change or resting period  Outcome: Progressing     Problem: Discharge Planning  Goal: Discharge to home or other facility with appropriate resources  Outcome: Progressing     Problem: Chronic Conditions and Co-morbidities  Goal: Patient's chronic conditions and co-morbidity symptoms are monitored and maintained or improved  Outcome: Progressing

## 2025-03-08 NOTE — PLAN OF CARE
Problem: Safety - Adult  Goal: Free from fall injury  Outcome: Completed     Problem: ABCDS Injury Assessment  Goal: Absence of physical injury  Outcome: Completed     Problem: Pain  Goal: Verbalizes/displays adequate comfort level or baseline comfort level  Outcome: Completed     Problem: Skin/Tissue Integrity - Adult  Goal: Skin integrity remains intact  Description: 1.  Monitor for areas of redness and/or skin breakdown  2.  Assess vascular access sites hourly  3.  Every 4-6 hours minimum:  Change oxygen saturation probe site  4.  Every 4-6 hours:  If on nasal continuous positive airway pressure, respiratory therapy assess nares and determine need for appliance change or resting period  Outcome: Completed  Goal: Incisions, wounds, or drain sites healing without S/S of infection  Outcome: Completed  Goal: Oral mucous membranes remain intact  Outcome: Completed     Problem: Infection - Adult  Goal: Absence of infection at discharge  Outcome: Completed  Goal: Absence of infection during hospitalization  Outcome: Completed  Goal: Absence of fever/infection during anticipated neutropenic period  Outcome: Completed     Problem: Skin/Tissue Integrity  Goal: Skin integrity remains intact  Description: 1.  Monitor for areas of redness and/or skin breakdown  2.  Assess vascular access sites hourly  3.  Every 4-6 hours minimum:  Change oxygen saturation probe site  4.  Every 4-6 hours:  If on nasal continuous positive airway pressure, respiratory therapy assess nares and determine need for appliance change or resting period  Outcome: Completed     Problem: Discharge Planning  Goal: Discharge to home or other facility with appropriate resources  Outcome: Completed     Problem: Chronic Conditions and Co-morbidities  Goal: Patient's chronic conditions and co-morbidity symptoms are monitored and maintained or improved  Outcome: Completed

## 2025-03-13 ENCOUNTER — TELEPHONE (OUTPATIENT)
Dept: VASCULAR SURGERY | Age: 65
End: 2025-03-13

## 2025-03-13 NOTE — TELEPHONE ENCOUNTER
Pt. Called the reason why she cancelled appt. She just got out of hospital.  She has MRSA and will call back to reschedule appt.

## 2025-03-20 ENCOUNTER — OFFICE VISIT (OUTPATIENT)
Dept: WOUND CARE | Facility: CLINIC | Age: 65
End: 2025-03-20
Payer: MEDICAID

## 2025-03-20 ENCOUNTER — APPOINTMENT (OUTPATIENT)
Dept: RADIOLOGY | Facility: HOSPITAL | Age: 65
End: 2025-03-20
Payer: MEDICAID

## 2025-03-20 ENCOUNTER — HOSPITAL ENCOUNTER (EMERGENCY)
Facility: HOSPITAL | Age: 65
Discharge: HOME | End: 2025-03-21
Attending: EMERGENCY MEDICINE
Payer: MEDICAID

## 2025-03-20 VITALS
TEMPERATURE: 98.7 F | HEART RATE: 63 BPM | BODY MASS INDEX: 45.99 KG/M2 | DIASTOLIC BLOOD PRESSURE: 51 MMHG | SYSTOLIC BLOOD PRESSURE: 111 MMHG | HEIGHT: 67 IN | WEIGHT: 293 LBS | OXYGEN SATURATION: 97 % | RESPIRATION RATE: 11 BRPM

## 2025-03-20 DIAGNOSIS — L03.115 CELLULITIS OF RIGHT LOWER LEG: Primary | ICD-10-CM

## 2025-03-20 LAB
ALBUMIN SERPL BCP-MCNC: 3.5 G/DL (ref 3.4–5)
ALP SERPL-CCNC: 87 U/L (ref 33–136)
ALT SERPL W P-5'-P-CCNC: 11 U/L (ref 7–45)
ANION GAP SERPL CALC-SCNC: 11 MMOL/L (ref 10–20)
AST SERPL W P-5'-P-CCNC: 13 U/L (ref 9–39)
BASOPHILS # BLD AUTO: 0.02 X10*3/UL (ref 0–0.1)
BASOPHILS NFR BLD AUTO: 0.5 %
BILIRUB SERPL-MCNC: 0.3 MG/DL (ref 0–1.2)
BUN SERPL-MCNC: 17 MG/DL (ref 6–23)
CALCIUM SERPL-MCNC: 8.7 MG/DL (ref 8.6–10.3)
CARDIAC TROPONIN I PNL SERPL HS: 4 NG/L (ref 0–13)
CHLORIDE SERPL-SCNC: 109 MMOL/L (ref 98–107)
CO2 SERPL-SCNC: 27 MMOL/L (ref 21–32)
CREAT SERPL-MCNC: 0.86 MG/DL (ref 0.5–1.05)
EGFRCR SERPLBLD CKD-EPI 2021: 76 ML/MIN/1.73M*2
EOSINOPHIL # BLD AUTO: 0.17 X10*3/UL (ref 0–0.7)
EOSINOPHIL NFR BLD AUTO: 3.9 %
ERYTHROCYTE [DISTWIDTH] IN BLOOD BY AUTOMATED COUNT: 13.7 % (ref 11.5–14.5)
FLUAV RNA RESP QL NAA+PROBE: NOT DETECTED
FLUBV RNA RESP QL NAA+PROBE: NOT DETECTED
GLUCOSE BLD MANUAL STRIP-MCNC: 74 MG/DL (ref 74–99)
GLUCOSE SERPL-MCNC: 92 MG/DL (ref 74–99)
HCT VFR BLD AUTO: 38.8 % (ref 36–46)
HGB BLD-MCNC: 12.6 G/DL (ref 12–16)
IMM GRANULOCYTES # BLD AUTO: 0.02 X10*3/UL (ref 0–0.7)
IMM GRANULOCYTES NFR BLD AUTO: 0.5 % (ref 0–0.9)
LACTATE SERPL-SCNC: 0.9 MMOL/L (ref 0.4–2)
LIPASE SERPL-CCNC: 17 U/L (ref 9–82)
LYMPHOCYTES # BLD AUTO: 1.23 X10*3/UL (ref 1.2–4.8)
LYMPHOCYTES NFR BLD AUTO: 27.9 %
MAGNESIUM SERPL-MCNC: 2.25 MG/DL (ref 1.6–2.4)
MCH RBC QN AUTO: 31.3 PG (ref 26–34)
MCHC RBC AUTO-ENTMCNC: 32.5 G/DL (ref 32–36)
MCV RBC AUTO: 97 FL (ref 80–100)
MONOCYTES # BLD AUTO: 0.48 X10*3/UL (ref 0.1–1)
MONOCYTES NFR BLD AUTO: 10.9 %
NEUTROPHILS # BLD AUTO: 2.49 X10*3/UL (ref 1.2–7.7)
NEUTROPHILS NFR BLD AUTO: 56.3 %
NRBC BLD-RTO: 0 /100 WBCS (ref 0–0)
PLATELET # BLD AUTO: 172 X10*3/UL (ref 150–450)
POTASSIUM SERPL-SCNC: 4 MMOL/L (ref 3.5–5.3)
PROT SERPL-MCNC: 6.4 G/DL (ref 6.4–8.2)
RBC # BLD AUTO: 4.02 X10*6/UL (ref 4–5.2)
SARS-COV-2 RNA RESP QL NAA+PROBE: NOT DETECTED
SODIUM SERPL-SCNC: 143 MMOL/L (ref 136–145)
WBC # BLD AUTO: 4.4 X10*3/UL (ref 4.4–11.3)

## 2025-03-20 PROCEDURE — 36415 COLL VENOUS BLD VENIPUNCTURE: CPT | Performed by: EMERGENCY MEDICINE

## 2025-03-20 PROCEDURE — 83735 ASSAY OF MAGNESIUM: CPT | Performed by: EMERGENCY MEDICINE

## 2025-03-20 PROCEDURE — 73590 X-RAY EXAM OF LOWER LEG: CPT | Mod: RT

## 2025-03-20 PROCEDURE — 99285 EMERGENCY DEPT VISIT HI MDM: CPT | Mod: 27

## 2025-03-20 PROCEDURE — 73590 X-RAY EXAM OF LOWER LEG: CPT | Mod: RIGHT SIDE | Performed by: RADIOLOGY

## 2025-03-20 PROCEDURE — 83605 ASSAY OF LACTIC ACID: CPT | Performed by: EMERGENCY MEDICINE

## 2025-03-20 PROCEDURE — 84484 ASSAY OF TROPONIN QUANT: CPT | Performed by: EMERGENCY MEDICINE

## 2025-03-20 PROCEDURE — 87636 SARSCOV2 & INF A&B AMP PRB: CPT | Performed by: EMERGENCY MEDICINE

## 2025-03-20 PROCEDURE — 82947 ASSAY GLUCOSE BLOOD QUANT: CPT | Mod: 59

## 2025-03-20 PROCEDURE — 82310 ASSAY OF CALCIUM: CPT | Performed by: EMERGENCY MEDICINE

## 2025-03-20 PROCEDURE — 85025 COMPLETE CBC W/AUTO DIFF WBC: CPT | Performed by: EMERGENCY MEDICINE

## 2025-03-20 PROCEDURE — 83690 ASSAY OF LIPASE: CPT | Performed by: EMERGENCY MEDICINE

## 2025-03-20 PROCEDURE — 99213 OFFICE O/P EST LOW 20 MIN: CPT

## 2025-03-20 PROCEDURE — 2500000005 HC RX 250 GENERAL PHARMACY W/O HCPCS: Performed by: EMERGENCY MEDICINE

## 2025-03-20 RX ORDER — BACITRACIN ZINC 500 UNIT/G
OINTMENT IN PACKET (EA) TOPICAL ONCE
Status: COMPLETED | OUTPATIENT
Start: 2025-03-20 | End: 2025-03-20

## 2025-03-20 RX ADMIN — BACITRACIN 1 APPLICATION: 500 OINTMENT TOPICAL at 19:23

## 2025-03-20 ASSESSMENT — LIFESTYLE VARIABLES
TOTAL SCORE: 0
EVER HAD A DRINK FIRST THING IN THE MORNING TO STEADY YOUR NERVES TO GET RID OF A HANGOVER: NO
HAVE YOU EVER FELT YOU SHOULD CUT DOWN ON YOUR DRINKING: NO
HAVE PEOPLE ANNOYED YOU BY CRITICIZING YOUR DRINKING: NO
EVER FELT BAD OR GUILTY ABOUT YOUR DRINKING: NO

## 2025-03-20 ASSESSMENT — PAIN DESCRIPTION - ORIENTATION: ORIENTATION: RIGHT

## 2025-03-20 ASSESSMENT — PAIN DESCRIPTION - LOCATION: LOCATION: LEG

## 2025-03-20 ASSESSMENT — COLUMBIA-SUICIDE SEVERITY RATING SCALE - C-SSRS
2. HAVE YOU ACTUALLY HAD ANY THOUGHTS OF KILLING YOURSELF?: NO
6. HAVE YOU EVER DONE ANYTHING, STARTED TO DO ANYTHING, OR PREPARED TO DO ANYTHING TO END YOUR LIFE?: NO
1. IN THE PAST MONTH, HAVE YOU WISHED YOU WERE DEAD OR WISHED YOU COULD GO TO SLEEP AND NOT WAKE UP?: NO

## 2025-03-20 ASSESSMENT — PAIN DESCRIPTION - PAIN TYPE: TYPE: CHRONIC PAIN

## 2025-03-20 ASSESSMENT — PAIN - FUNCTIONAL ASSESSMENT: PAIN_FUNCTIONAL_ASSESSMENT: 0-10

## 2025-03-20 ASSESSMENT — PAIN SCALES - GENERAL: PAINLEVEL_OUTOF10: 8

## 2025-03-20 ASSESSMENT — PAIN DESCRIPTION - DESCRIPTORS: DESCRIPTORS: ACHING

## 2025-03-20 NOTE — DISCHARGE INSTRUCTIONS
Your Lab work is stable/normal.  White count normal  Continue current medications and care.  Continue current IV antibiotics as directed.  Follow up with wound clinic as scheduled  Follow up with your regular doctor(s) as scheduled

## 2025-03-20 NOTE — ED PROVIDER NOTES
"Department of Emergency Medicine   ED  Provider Note  Admit Date/RoomTime: 3/20/2025  3:35 PM  ED Room: [facility]/05                  History of Present Illness:   Irene Petty is a 64 y.o. female presenting to the ED for \"I feel out of it\", beginning today.  Recently hospitalized and [facility] in [place] for an MRSA RLE.  On home Vancomycin via IV.  Recent DC from [facility] in [place].  The complaint has been persistent, moderate in severity, and worsened by nothing.  Patient complains of generalized weakness.  Patient states that she lives at home by herself.  Patient was sent here from [facility] and [facility] for further evaluation of her right lower leg.  Patient is uncertain if she has had any fever.  Patient reports multiple repeat infections of the right lower extremity.  Patient reports difficulty caring for self at home.      Review of Systems:   Pertinent positives and review of systems as noted above.  Remaining 10 review of systems is negative or noncontributory to today's episode of care.        --------------------------------------------- PAST HISTORY ---------------------------------------------  Past Medical History:  has no past medical history on file.  MRSA of the right lower extremity, osteoarthritis, anemia, asthma, bipolar disorder, hypertension, hyperlipidemia,    Past Surgical History:  has no past surgical history on file.    Social History:  No tobacco use.  No alcohol use.  No drug use.    Family History: family history is not on file. Unless otherwise noted, family history is non contributory    Patient's Medications    No medications on file      The patient’s home medications have been reviewed.    Allergies: Penicillins, Ropinirole, Venom-wasp, and Aripiprazole    -------------------------------------------------- RESULTS -------------------------------------------------  All laboratory and radiology results have been personally reviewed by myself "   LABS:  Labs Reviewed   COMPREHENSIVE METABOLIC PANEL - Abnormal       Result Value    Glucose 92      Sodium 143      Potassium 4.0      Chloride 109 (*)     Bicarbonate 27      Anion Gap 11      Urea Nitrogen 17      Creatinine 0.86      eGFR 76      Calcium 8.7      Albumin 3.5      Alkaline Phosphatase 87      Total Protein 6.4      AST 13      Bilirubin, Total 0.3      ALT 11     MAGNESIUM - Normal    Magnesium 2.25     LIPASE - Normal    Lipase 17      Narrative:     Venipuncture immediately after or during the administration of Metamizole may lead to falsely low results. Testing should be performed immediately prior to Metamizole dosing.   LACTATE - Normal    Lactate 0.9      Narrative:     Venipuncture immediately after or during the administration of Metamizole may lead to falsely low results. Testing should be performed immediately prior to Metamizole dosing.   TROPONIN I, HIGH SENSITIVITY - Normal    Troponin I, High Sensitivity 4      Narrative:     Less than 99th percentile of normal range cutoff-  Female and children under 18 years old <14 ng/L; Male <21 ng/L: Negative  Repeat testing should be performed if clinically indicated.     Female and children under 18 years old 14-50 ng/L; Male 21-50 ng/L:  Consistent with possible cardiac damage and possible increased clinical   risk. Serial measurements may help to assess extent of myocardial damage.     >50 ng/L: Consistent with cardiac damage, increased clinical risk and  myocardial infarction. Serial measurements may help assess extent of   myocardial damage.      NOTE: Children less than 1 year old may have higher baseline troponin   levels and results should be interpreted in conjunction with the overall   clinical context.     NOTE: Troponin I testing is performed using a different   testing methodology at [facility] than at other   [facility]. Direct result comparisons should only   be made within the same method.   SARS-COV-2  AND INFLUENZA A/B PCR - Normal    Flu A Result Not Detected      Flu B Result Not Detected      Coronavirus 2019, PCR Not Detected      Narrative:     This assay is an FDA-cleared, in vitro diagnostic nucleic acid amplification test for the qualitative detection and differentiation of SARS CoV-2/ Influenza A/B from nasopharyngeal specimens collected from individuals with signs and symptoms of respiratory tract infections, and has been validated for use at [facility]. Negative results do not preclude COVID-19/ Influenza A/B infections and should not be used as the sole basis for diagnosis, treatment, or other management decisions. Testing for SARS CoV-2 is recommended only for patients who meet current clinical and/or epidemiological criteria defined by federal, state, or local public health directives.   POCT GLUCOSE - Normal    POCT Glucose 74     CBC WITH AUTO DIFFERENTIAL    WBC 4.4      nRBC 0.0      RBC 4.02      Hemoglobin 12.6      Hematocrit 38.8      MCV 97      MCH 31.3      MCHC 32.5      RDW 13.7      Platelets 172      Neutrophils % 56.3      Immature Granulocytes %, Automated 0.5      Lymphocytes % 27.9      Monocytes % 10.9      Eosinophils % 3.9      Basophils % 0.5      Neutrophils Absolute 2.49      Immature Granulocytes Absolute, Automated 0.02      Lymphocytes Absolute 1.23      Monocytes Absolute 0.48      Eosinophils Absolute 0.17      Basophils Absolute 0.02     URINALYSIS WITH REFLEX CULTURE AND MICROSCOPIC    Narrative:     The following orders were created for panel order Urinalysis with Reflex Culture and Microscopic.  Procedure                               Abnormality         Status                     ---------                               -----------         ------                     Urinalysis with Reflex C...[[id number]]                                                 Extra Urine Gray Tube[[id number]]                                                      "    Please view results for these tests on the individual orders.   URINALYSIS WITH REFLEX CULTURE AND MICROSCOPIC   EXTRA URINE GRAY TUBE   POCT GLUCOSE METER         RADIOLOGY:  Interpreted by Radiologist.  XR tibia fibula right 2 views   Final Result   Postsurgical changes status post antibiotic spacer in the knee as   well as fixation of bimalleolar fractures. Severe soft tissue edema.   No acute osseous abnormality seen             MACRO:   None        Signed by: Nagi eLvine 3/20/2025 6:26 PM   Dictation workstation:   [id number]          No results found for this or any previous visit (from the past 4464 hours).  ------------------------- NURSING NOTES AND VITALS REVIEWED ---------------------------   The nursing notes within the ED encounter and vital signs as below have been reviewed.   /51 (BP Location: Left arm, Patient Position: Lying)   Pulse 63   Temp 37.1 °C (98.7 °F) (Temporal)   Resp 11   Ht 1.702 m (5' 7\")   Wt 139 kg (307 lb)   SpO2 97%   BMI 48.08 kg/m²   Oxygen Saturation Interpretation: Normal      ---------------------------------------------------PHYSICAL EXAM--------------------------------------  Physical Exam  Vitals and nursing note reviewed.   Constitutional:       General: She is not in acute distress.     Appearance: She is well-developed. She is obese. She is ill-appearing. She is not toxic-appearing.      Comments: Chronically ill-appearing   HENT:      Head: Normocephalic and atraumatic.      Nose: Nose normal.      Mouth/Throat:      Mouth: Mucous membranes are moist.      Pharynx: Oropharynx is clear. No oropharyngeal exudate or posterior oropharyngeal erythema.   Eyes:      General: No scleral icterus.     Extraocular Movements: Extraocular movements intact.      Conjunctiva/sclera: Conjunctivae normal.      Pupils: Pupils are equal, round, and reactive to light.   Cardiovascular:      Rate and Rhythm: Normal rate and regular rhythm.      Pulses: Normal pulses. "      Heart sounds: Normal heart sounds. No murmur heard.  Pulmonary:      Effort: Pulmonary effort is normal. No respiratory distress.      Breath sounds: Normal breath sounds. No wheezing, rhonchi or rales.   Abdominal:      General: Bowel sounds are normal. There is no distension.      Palpations: Abdomen is soft.      Tenderness: There is no abdominal tenderness. There is no guarding or rebound.      Hernia: No hernia is present.   Musculoskeletal:         General: Swelling and tenderness present.      Cervical back: Normal range of motion and neck supple. No rigidity or tenderness.      Right lower leg: Edema present.      Left lower leg: Edema present.      Comments: Patient has erythema and warmth of the right lower extremity that extends to the right foot.  She has a dressing in place around the right ankle.  There is no weeping or draining of the right lower extremity.  There is good capillary refill.   Skin:     General: Skin is warm and dry.      Capillary Refill: Capillary refill takes less than 2 seconds.      Findings: Erythema present. No rash.   Neurological:      Mental Status: She is alert and oriented to person, place, and time.   Psychiatric:         Mood and Affect: Mood normal.            Procedures  None  ------------------------------ ED COURSE/MEDICAL DECISION MAKING----------------------    Medical Decision Making:   Was seen and examined by me.  An IV is started appropriate labs are obtained.  Imaging obtained.    ED Course as of 03/20/25 2130   Thu Mar 20, 2025   1732 CBC is normal [EC]   1732 Comprehensive metabolic panel is normal [EC]   1732 Troponin is normal [EC]   1732 Lactate is normal [EC]   1732 Magnesium is normal [EC]   1836 Right tib/fib:  FINDINGS:  Right tibia-fibula, two views      Plate and screw fixation of distal tibial fracture long screw  fixation of distal fibular fracture. The hardware is intact.  Antibiotic spacer in the knee. No acute fracture dislocation  seen.  Severe soft tissue edema about the calf      IMPRESSION:  Postsurgical changes status post antibiotic spacer in the knee as  well as fixation of bimalleolar fractures. Severe soft tissue edema.  No acute osseous abnormality seen   [EC]   1937 Lab work is normal.  Lactate is normal.  Vital signs are normal.  Patient be discharged home to continue her current medication regimen.  I have asked that she follow-up with her primary care in the wound care clinic as scheduled.  She is invited to return if acutely worsening worrisome symptoms. [EC]      ED Course User Index  [EC] Clayton Warren DO         Diagnoses as of 03/20/25 2130   Cellulitis of right lower leg      Counseling:   The emergency provider has spoken with the patient and discussed today’s results, in addition to providing specific details for the plan of care and counseling regarding the diagnosis and prognosis.  Questions are answered at this time and they are agreeable with the plan.      --------------------------------- IMPRESSION AND DISPOSITION ---------------------------------        IMPRESSION  1. Cellulitis of right lower leg        DISPOSITION  Disposition: Discharge to home  Patient condition is fair      Billing Provider Critical Care Time: 0 minutes     Clayton Warren DO  03/20/25 1939       Clayton Warren DO  03/20/25 2130

## 2025-03-23 ENCOUNTER — APPOINTMENT (OUTPATIENT)
Dept: GENERAL RADIOLOGY | Age: 65
End: 2025-03-23
Payer: MEDICAID

## 2025-03-23 ENCOUNTER — HOSPITAL ENCOUNTER (EMERGENCY)
Age: 65
Discharge: HOME | End: 2025-03-23
Attending: STUDENT IN AN ORGANIZED HEALTH CARE EDUCATION/TRAINING PROGRAM
Payer: MEDICAID

## 2025-03-23 VITALS
HEART RATE: 71 BPM | SYSTOLIC BLOOD PRESSURE: 114 MMHG | BODY MASS INDEX: 50.02 KG/M2 | WEIGHT: 293 LBS | RESPIRATION RATE: 12 BRPM | OXYGEN SATURATION: 90 % | TEMPERATURE: 98.5 F | DIASTOLIC BLOOD PRESSURE: 67 MMHG | HEIGHT: 64 IN

## 2025-03-23 DIAGNOSIS — L03.115 CELLULITIS OF RIGHT LOWER EXTREMITY: Primary | ICD-10-CM

## 2025-03-23 LAB
ALBUMIN SERPL-MCNC: 3.4 G/DL (ref 3.5–5.2)
ALP SERPL-CCNC: 120 U/L (ref 35–104)
ALT SERPL-CCNC: 13 U/L (ref 0–32)
ANION GAP SERPL CALCULATED.3IONS-SCNC: 12 MMOL/L (ref 7–16)
AST SERPL-CCNC: 13 U/L (ref 0–31)
BASOPHILS # BLD: 0.02 K/UL (ref 0–0.2)
BASOPHILS NFR BLD: 0 % (ref 0–2)
BILIRUB SERPL-MCNC: 0.2 MG/DL (ref 0–1.2)
BUN SERPL-MCNC: 21 MG/DL (ref 6–23)
CALCIUM SERPL-MCNC: 8.7 MG/DL (ref 8.6–10.2)
CHLORIDE SERPL-SCNC: 104 MMOL/L (ref 98–107)
CO2 SERPL-SCNC: 22 MMOL/L (ref 22–29)
CREAT SERPL-MCNC: 1 MG/DL (ref 0.5–1)
EOSINOPHIL # BLD: 0.2 K/UL (ref 0.05–0.5)
EOSINOPHILS RELATIVE PERCENT: 3 % (ref 0–6)
ERYTHROCYTE [DISTWIDTH] IN BLOOD BY AUTOMATED COUNT: 13.7 % (ref 11.5–15)
GFR, ESTIMATED: 62 ML/MIN/1.73M2
GLUCOSE SERPL-MCNC: 103 MG/DL (ref 74–99)
HCT VFR BLD AUTO: 37.8 % (ref 34–48)
HGB BLD-MCNC: 12.2 G/DL (ref 11.5–15.5)
IMM GRANULOCYTES # BLD AUTO: <0.03 K/UL (ref 0–0.58)
IMM GRANULOCYTES NFR BLD: 0 % (ref 0–5)
LACTATE BLDV-SCNC: 0.9 MMOL/L (ref 0.5–1.9)
LYMPHOCYTES NFR BLD: 1.23 K/UL (ref 1.5–4)
LYMPHOCYTES RELATIVE PERCENT: 21 % (ref 20–42)
MCH RBC QN AUTO: 31.7 PG (ref 26–35)
MCHC RBC AUTO-ENTMCNC: 32.3 G/DL (ref 32–34.5)
MCV RBC AUTO: 98.2 FL (ref 80–99.9)
MONOCYTES NFR BLD: 0.63 K/UL (ref 0.1–0.95)
MONOCYTES NFR BLD: 11 % (ref 2–12)
NEUTROPHILS NFR BLD: 65 % (ref 43–80)
NEUTS SEG NFR BLD: 3.89 K/UL (ref 1.8–7.3)
PLATELET # BLD AUTO: 180 K/UL (ref 130–450)
PMV BLD AUTO: 8.6 FL (ref 7–12)
POTASSIUM SERPL-SCNC: 4.5 MMOL/L (ref 3.5–5)
PROT SERPL-MCNC: 6.9 G/DL (ref 6.4–8.3)
RBC # BLD AUTO: 3.85 M/UL (ref 3.5–5.5)
SODIUM SERPL-SCNC: 138 MMOL/L (ref 132–146)
WBC OTHER # BLD: 6 K/UL (ref 4.5–11.5)

## 2025-03-23 PROCEDURE — 96374 THER/PROPH/DIAG INJ IV PUSH: CPT

## 2025-03-23 PROCEDURE — 73562 X-RAY EXAM OF KNEE 3: CPT

## 2025-03-23 PROCEDURE — 73590 X-RAY EXAM OF LOWER LEG: CPT

## 2025-03-23 PROCEDURE — 99285 EMERGENCY DEPT VISIT HI MDM: CPT

## 2025-03-23 PROCEDURE — 96375 TX/PRO/DX INJ NEW DRUG ADDON: CPT

## 2025-03-23 PROCEDURE — 80053 COMPREHEN METABOLIC PANEL: CPT

## 2025-03-23 PROCEDURE — 83605 ASSAY OF LACTIC ACID: CPT

## 2025-03-23 PROCEDURE — 87040 BLOOD CULTURE FOR BACTERIA: CPT

## 2025-03-23 PROCEDURE — 73502 X-RAY EXAM HIP UNI 2-3 VIEWS: CPT

## 2025-03-23 PROCEDURE — 6360000002 HC RX W HCPCS

## 2025-03-23 PROCEDURE — 85025 COMPLETE CBC W/AUTO DIFF WBC: CPT

## 2025-03-23 PROCEDURE — 2580000003 HC RX 258

## 2025-03-23 PROCEDURE — 73552 X-RAY EXAM OF FEMUR 2/>: CPT

## 2025-03-23 PROCEDURE — 93005 ELECTROCARDIOGRAM TRACING: CPT

## 2025-03-23 RX ORDER — 0.9 % SODIUM CHLORIDE 0.9 %
1000 INTRAVENOUS SOLUTION INTRAVENOUS ONCE
Status: COMPLETED | OUTPATIENT
Start: 2025-03-23 | End: 2025-03-23

## 2025-03-23 RX ORDER — MORPHINE SULFATE 4 MG/ML
4 INJECTION, SOLUTION INTRAMUSCULAR; INTRAVENOUS ONCE
Status: COMPLETED | OUTPATIENT
Start: 2025-03-23 | End: 2025-03-23

## 2025-03-23 RX ORDER — ONDANSETRON 2 MG/ML
4 INJECTION INTRAMUSCULAR; INTRAVENOUS ONCE
Status: COMPLETED | OUTPATIENT
Start: 2025-03-23 | End: 2025-03-23

## 2025-03-23 RX ADMIN — ONDANSETRON 4 MG: 2 INJECTION, SOLUTION INTRAMUSCULAR; INTRAVENOUS at 01:23

## 2025-03-23 RX ADMIN — SODIUM CHLORIDE 1000 ML: 0.9 INJECTION, SOLUTION INTRAVENOUS at 01:23

## 2025-03-23 RX ADMIN — MORPHINE SULFATE 4 MG: 4 INJECTION, SOLUTION INTRAMUSCULAR; INTRAVENOUS at 02:56

## 2025-03-23 ASSESSMENT — PAIN DESCRIPTION - LOCATION: LOCATION: LEG

## 2025-03-23 ASSESSMENT — PAIN DESCRIPTION - ORIENTATION: ORIENTATION: RIGHT

## 2025-03-23 ASSESSMENT — PAIN SCALES - GENERAL: PAINLEVEL_OUTOF10: 9

## 2025-03-23 ASSESSMENT — PAIN DESCRIPTION - DESCRIPTORS: DESCRIPTORS: ACHING

## 2025-03-23 NOTE — DISCHARGE INSTRUCTIONS
Please continue IV vancomycin at home, return to the emergency department for any worsening symptoms or concerns including changes in color of lower extremity, signs of necrosis which would include a change of color into black, spiked fevers or chills, chest pain or shortness of breath, or worsening lower extremity pain.  Please follow-up with primary care physician as needed.  Please schedule appointment with orthopedics as well as infectious disease on Monday.

## 2025-03-23 NOTE — ED NOTES
Blood cultures obtained from left hand, per policy.  Set two of two drawn at this time by sarah zelaya lpn.

## 2025-03-23 NOTE — ED NOTES
Blood cultures obtained from right hand, per policy.  Set one of two drawn at this time by sarah zelaya lpn

## 2025-03-23 NOTE — ED PROVIDER NOTES
[facility] EMERGENCY DEPARTMENT  EMERGENCY DEPARTMENT ENCOUNTER        Pt Name: Leny Yang  MRN: [id number]  Birthdate 1960  Date of evaluation: 3/23/2025  Provider: Gamal Sánchez DO  PCP: Adam Whyte MD  Note Started: 12:23 AM EDT 3/23/25    CHIEF COMPLAINT       Chief Complaint   Patient presents with    Leg Pain     (R leg pain, patient states she has MRSA on R leg and is having a flare up tonight, denies falls/injuries)       HISTORY OF PRESENT ILLNESS: 1 or more Elements   History From: Patient    Leny Yang is a 64 y.o. female with a PMHx of chronic MRSA cellulitis of right leg, on chronic IV vancomycin at home, asthma, PETR, hypertension, pancreatitis, thyroid disease, GERD, seizures, morbid obesity, lymphedema, gastric bypass surgery, who presents right lower extremity cellulitis.  Patient states that she has had chronic MRSA for the last 2 years, feels as though it is flaring up, reports increased pain and swelling as well as redness of her right lower extremity, usually the pain does not go past her knee, she feels that the pain is going into her right thigh and right hip.  She denies any fevers, chills, chest pain, shortness of breath, abdominal pain, vomiting, diarrhea, constipation, urinary symptoms.  She does report nausea.  Patient admits to cigarette smoking, denies alcohol use, denies illicit drug use.      Nursing Notes were all reviewed and agreed with or any disagreements were addressed in the HPI.      REVIEW OF EXTERNAL NOTES :       PDMP Monitoring:    Last PDMP Gamal as Reviewed:  Review User Review Instant Review Result   DAEL NUNEZ 9/2/2021  7:23 PM Reviewed PDMP [1]     Last Controlled Substance Monitoring Documentation      Flowsheet Row ED from 2/27/2021 in [facility] Emergency Department   Comments signed out AMA filed at 02/27/2021 0703          Urine Drug Screenings (1 yr)       POCT Rapid Drug

## 2025-03-24 LAB
EKG ATRIAL RATE: 71 BPM
EKG P AXIS: 30 DEGREES
EKG P-R INTERVAL: 194 MS
EKG Q-T INTERVAL: 392 MS
EKG QRS DURATION: 100 MS
EKG QTC CALCULATION (BAZETT): 425 MS
EKG R AXIS: -12 DEGREES
EKG T AXIS: 6 DEGREES
EKG VENTRICULAR RATE: 71 BPM

## 2025-03-24 PROCEDURE — 93010 ELECTROCARDIOGRAM REPORT: CPT | Performed by: INTERNAL MEDICINE

## 2025-03-28 LAB
MICROORGANISM SPEC CULT: NORMAL
MICROORGANISM SPEC CULT: NORMAL
SERVICE CMNT-IMP: NORMAL
SERVICE CMNT-IMP: NORMAL
SPECIMEN DESCRIPTION: NORMAL
SPECIMEN DESCRIPTION: NORMAL

## 2025-04-01 ENCOUNTER — APPOINTMENT (OUTPATIENT)
Dept: WOUND CARE | Facility: CLINIC | Age: 65
End: 2025-04-01
Payer: MEDICAID

## 2025-04-08 ENCOUNTER — OFFICE VISIT (OUTPATIENT)
Dept: WOUND CARE | Facility: CLINIC | Age: 65
End: 2025-04-08
Payer: MEDICAID

## 2025-04-08 PROCEDURE — 99212 OFFICE O/P EST SF 10 MIN: CPT

## 2025-04-15 ENCOUNTER — OFFICE VISIT (OUTPATIENT)
Dept: ENDOCRINOLOGY | Age: 65
End: 2025-04-15
Payer: MEDICAID

## 2025-04-15 VITALS
RESPIRATION RATE: 18 BRPM | HEIGHT: 67 IN | SYSTOLIC BLOOD PRESSURE: 107 MMHG | TEMPERATURE: 98.7 F | OXYGEN SATURATION: 95 % | HEART RATE: 79 BPM | BODY MASS INDEX: 45.99 KG/M2 | DIASTOLIC BLOOD PRESSURE: 57 MMHG | WEIGHT: 293 LBS

## 2025-04-15 DIAGNOSIS — E05.90 SUBCLINICAL HYPERTHYROIDISM: ICD-10-CM

## 2025-04-15 DIAGNOSIS — E04.2 MULTINODULAR GOITER: ICD-10-CM

## 2025-04-15 DIAGNOSIS — E55.9 VITAMIN D DEFICIENCY: ICD-10-CM

## 2025-04-15 DIAGNOSIS — E27.8 ADRENAL INCIDENTALOMA: Primary | ICD-10-CM

## 2025-04-15 PROCEDURE — 99214 OFFICE O/P EST MOD 30 MIN: CPT | Performed by: INTERNAL MEDICINE

## 2025-04-15 PROCEDURE — G2211 COMPLEX E/M VISIT ADD ON: HCPCS | Performed by: INTERNAL MEDICINE

## 2025-04-15 PROCEDURE — 3078F DIAST BP <80 MM HG: CPT | Performed by: INTERNAL MEDICINE

## 2025-04-15 PROCEDURE — 3074F SYST BP LT 130 MM HG: CPT | Performed by: INTERNAL MEDICINE

## 2025-04-15 RX ORDER — CIPROFLOXACIN 500 MG/1
500 TABLET, FILM COATED ORAL 2 TIMES DAILY
COMMUNITY
Start: 2025-03-31 | End: 2025-05-12

## 2025-04-15 RX ORDER — DEXAMETHASONE 1 MG
1 TABLET ORAL ONCE
Qty: 1 TABLET | Refills: 0 | Status: SHIPPED | OUTPATIENT
Start: 2025-10-15 | End: 2025-10-15

## 2025-04-15 NOTE — PROGRESS NOTES
other patient's care team providers, and personally interpreted labs associated with the above diagnosis. I also ordered labs to further assess and manage the above addressed medical conditions.     Return in about 1 year (around 4/15/2026) for Multinodular goiter, adrenal incidentaloma , VitD deficiency.    The above issues were reviewed with the patient who understood and agreed with the plan.    Thank you for allowing us to participate in the care of this patient. Please do not hesitate to contact us with any additional questions.    Diagnosis Orders   1. Adrenal incidentaloma  Aldosterone & Renin, Direct with Ratio    Basic Metabolic Panel    Cortisol Total    Metanephrines Plasma Free    dexAMETHasone (DECADRON) 1 MG tablet      2. Multinodular goiter  US THYROID    TSH    T4, Free      3. Vitamin D deficiency        4. Subclinical hyperthyroidism  TSH    T4, Free            Dannie Carrizales MD  Endocrinologist, [address] [address]   Phone: [phone]  Fax: [phone]  ------------------------------  An electronic signature was used to authenticate this note. Dannie Carrizales MD on 4/15/2025 at 9:32 AM

## 2025-05-23 ENCOUNTER — HOSPITAL ENCOUNTER (OUTPATIENT)
Age: 65
Discharge: HOME | End: 2025-05-23
Payer: MEDICAID

## 2025-05-23 LAB
25(OH)D3 SERPL-MCNC: 34.2 NG/ML (ref 30–100)
ALBUMIN SERPL-MCNC: 4 G/DL (ref 3.5–5.2)
ALBUMIN SERPL-MCNC: 4.2 G/DL (ref 3.5–5.2)
ALP SERPL-CCNC: 105 U/L (ref 35–104)
ALP SERPL-CCNC: 108 U/L (ref 35–104)
ALT SERPL-CCNC: 14 U/L (ref 0–32)
ALT SERPL-CCNC: 16 U/L (ref 0–32)
ANION GAP SERPL CALCULATED.3IONS-SCNC: 14 MMOL/L (ref 7–16)
AST SERPL-CCNC: 16 U/L (ref 0–31)
AST SERPL-CCNC: 17 U/L (ref 0–31)
BASOPHILS # BLD: 0.02 K/UL (ref 0–0.2)
BASOPHILS NFR BLD: 1 % (ref 0–2)
BILIRUB DIRECT SERPL-MCNC: <0.2 MG/DL (ref 0–0.3)
BILIRUB INDIRECT SERPL-MCNC: ABNORMAL MG/DL (ref 0–1)
BILIRUB SERPL-MCNC: 0.2 MG/DL (ref 0–1.2)
BILIRUB SERPL-MCNC: 0.2 MG/DL (ref 0–1.2)
BILIRUB UR QL STRIP: NEGATIVE
BUN SERPL-MCNC: 19 MG/DL (ref 6–23)
CALCIUM SERPL-MCNC: 9.4 MG/DL (ref 8.6–10.2)
CHLORIDE SERPL-SCNC: 107 MMOL/L (ref 98–107)
CHOLEST SERPL-MCNC: 238 MG/DL
CLARITY UR: CLEAR
CO2 SERPL-SCNC: 20 MMOL/L (ref 22–29)
COLOR UR: YELLOW
COMMENT: NORMAL
CREAT SERPL-MCNC: 0.7 MG/DL (ref 0.5–1)
EOSINOPHIL # BLD: 0.08 K/UL (ref 0.05–0.5)
EOSINOPHILS RELATIVE PERCENT: 2 % (ref 0–6)
ERYTHROCYTE [DISTWIDTH] IN BLOOD BY AUTOMATED COUNT: 13.3 % (ref 11.5–15)
ERYTHROCYTE [SEDIMENTATION RATE] IN BLOOD BY WESTERGREN METHOD: 28 MM/HR (ref 0–20)
GFR, ESTIMATED: >90 ML/MIN/1.73M2
GLUCOSE SERPL-MCNC: 87 MG/DL (ref 74–99)
GLUCOSE UR STRIP-MCNC: NEGATIVE MG/DL
HBA1C MFR BLD: 5.6 % (ref 4–5.6)
HCT VFR BLD AUTO: 46.1 % (ref 34–48)
HDLC SERPL-MCNC: 62 MG/DL
HGB BLD-MCNC: 14.4 G/DL (ref 11.5–15.5)
HGB UR QL STRIP.AUTO: NEGATIVE
IMM GRANULOCYTES # BLD AUTO: 0.05 K/UL (ref 0–0.58)
IMM GRANULOCYTES NFR BLD: 1 % (ref 0–5)
IRON SERPL-MCNC: 107 UG/DL (ref 37–145)
KETONES UR STRIP-MCNC: NEGATIVE MG/DL
LDLC SERPL CALC-MCNC: 135 MG/DL
LEUKOCYTE ESTERASE UR QL STRIP: NEGATIVE
LYMPHOCYTES NFR BLD: 1.41 K/UL (ref 1.5–4)
LYMPHOCYTES RELATIVE PERCENT: 33 % (ref 20–42)
MCH RBC QN AUTO: 30.9 PG (ref 26–35)
MCHC RBC AUTO-ENTMCNC: 31.2 G/DL (ref 32–34.5)
MCV RBC AUTO: 98.9 FL (ref 80–99.9)
MONOCYTES NFR BLD: 0.37 K/UL (ref 0.1–0.95)
MONOCYTES NFR BLD: 9 % (ref 2–12)
NEUTROPHILS NFR BLD: 55 % (ref 43–80)
NEUTS SEG NFR BLD: 2.35 K/UL (ref 1.8–7.3)
NITRITE UR QL STRIP: NEGATIVE
PH UR STRIP: 5.5 [PH] (ref 5–8)
PLATELET # BLD AUTO: 259 K/UL (ref 130–450)
PMV BLD AUTO: 8.9 FL (ref 7–12)
POTASSIUM SERPL-SCNC: 4.5 MMOL/L (ref 3.5–5)
PROT SERPL-MCNC: 7.4 G/DL (ref 6.4–8.3)
PROT SERPL-MCNC: 7.4 G/DL (ref 6.4–8.3)
PROT UR STRIP-MCNC: NEGATIVE MG/DL
RBC # BLD AUTO: 4.66 M/UL (ref 3.5–5.5)
SODIUM SERPL-SCNC: 141 MMOL/L (ref 132–146)
SP GR UR STRIP: 1.02 (ref 1–1.03)
T4 FREE SERPL-MCNC: 1 NG/DL (ref 0.9–1.7)
TRIGL SERPL-MCNC: 205 MG/DL
TSH SERPL DL<=0.05 MIU/L-ACNC: 0.86 UIU/ML (ref 0.27–4.2)
URATE SERPL-MCNC: 6.1 MG/DL (ref 2.4–5.7)
UROBILINOGEN UR STRIP-ACNC: 0.2 EU/DL (ref 0–1)
VLDLC SERPL CALC-MCNC: 41 MG/DL
WBC OTHER # BLD: 4.3 K/UL (ref 4.5–11.5)

## 2025-05-23 PROCEDURE — 84439 ASSAY OF FREE THYROXINE: CPT

## 2025-05-23 PROCEDURE — 80053 COMPREHEN METABOLIC PANEL: CPT

## 2025-05-23 PROCEDURE — 84550 ASSAY OF BLOOD/URIC ACID: CPT

## 2025-05-23 PROCEDURE — 80061 LIPID PANEL: CPT

## 2025-05-23 PROCEDURE — 81003 URINALYSIS AUTO W/O SCOPE: CPT

## 2025-05-23 PROCEDURE — 82306 VITAMIN D 25 HYDROXY: CPT

## 2025-05-23 PROCEDURE — 80076 HEPATIC FUNCTION PANEL: CPT

## 2025-05-23 PROCEDURE — 84443 ASSAY THYROID STIM HORMONE: CPT

## 2025-05-23 PROCEDURE — 85652 RBC SED RATE AUTOMATED: CPT

## 2025-05-23 PROCEDURE — 83036 HEMOGLOBIN GLYCOSYLATED A1C: CPT

## 2025-05-23 PROCEDURE — 36415 COLL VENOUS BLD VENIPUNCTURE: CPT

## 2025-05-23 PROCEDURE — 83540 ASSAY OF IRON: CPT

## 2025-05-23 PROCEDURE — 85025 COMPLETE CBC W/AUTO DIFF WBC: CPT

## 2025-05-30 ENCOUNTER — HOSPITAL ENCOUNTER (OUTPATIENT)
Age: 65
Setting detail: SPECIMEN
Discharge: HOME | End: 2025-05-30

## 2025-06-08 ENCOUNTER — HOSPITAL ENCOUNTER (EMERGENCY)
Age: 65
Discharge: HOME | End: 2025-06-08
Attending: EMERGENCY MEDICINE
Payer: MEDICAID

## 2025-06-08 VITALS
BODY MASS INDEX: 45.99 KG/M2 | TEMPERATURE: 97.4 F | WEIGHT: 293 LBS | SYSTOLIC BLOOD PRESSURE: 123 MMHG | DIASTOLIC BLOOD PRESSURE: 55 MMHG | HEIGHT: 67 IN | HEART RATE: 62 BPM | OXYGEN SATURATION: 99 % | RESPIRATION RATE: 16 BRPM

## 2025-06-08 DIAGNOSIS — T22.212A PARTIAL THICKNESS BURN OF LEFT FOREARM, INITIAL ENCOUNTER: Primary | ICD-10-CM

## 2025-06-08 PROCEDURE — 99283 EMERGENCY DEPT VISIT LOW MDM: CPT

## 2025-06-08 PROCEDURE — 6370000000 HC RX 637 (ALT 250 FOR IP)

## 2025-06-08 RX ORDER — HYDROCODONE BITARTRATE AND ACETAMINOPHEN 5; 325 MG/1; MG/1
1 TABLET ORAL EVERY 6 HOURS PRN
Qty: 12 TABLET | Refills: 0 | Status: SHIPPED | OUTPATIENT
Start: 2025-06-08 | End: 2025-06-11

## 2025-06-08 RX ORDER — HYDROCODONE BITARTRATE AND ACETAMINOPHEN 5; 325 MG/1; MG/1
1 TABLET ORAL ONCE
Status: COMPLETED | OUTPATIENT
Start: 2025-06-08 | End: 2025-06-08

## 2025-06-08 RX ADMIN — HYDROCODONE BITARTRATE AND ACETAMINOPHEN 1 TABLET: 5; 325 TABLET ORAL at 14:56

## 2025-06-08 ASSESSMENT — PAIN DESCRIPTION - ORIENTATION: ORIENTATION: LEFT

## 2025-06-08 ASSESSMENT — PAIN DESCRIPTION - FREQUENCY: FREQUENCY: CONTINUOUS

## 2025-06-08 ASSESSMENT — PAIN SCALES - GENERAL: PAINLEVEL_OUTOF10: 8

## 2025-06-08 ASSESSMENT — PAIN DESCRIPTION - PAIN TYPE: TYPE: ACUTE PAIN

## 2025-06-08 ASSESSMENT — PAIN DESCRIPTION - ONSET: ONSET: ON-GOING

## 2025-06-08 ASSESSMENT — PAIN - FUNCTIONAL ASSESSMENT
PAIN_FUNCTIONAL_ASSESSMENT: 0-10
PAIN_FUNCTIONAL_ASSESSMENT: PREVENTS OR INTERFERES SOME ACTIVE ACTIVITIES AND ADLS

## 2025-06-08 ASSESSMENT — PAIN DESCRIPTION - LOCATION: LOCATION: HAND

## 2025-06-08 NOTE — ED NOTES
Patient is laying semifowlers position in bed. Alert and oriented x4. Left forearm is wrapped with hardy. Patient has food and water at bedside and making phone calls with cell phone. Introduced self to patient. Patient has no requests at this time.

## 2025-06-08 NOTE — DISCHARGE INSTRUCTIONS
Follow-up with PCP and Burn Center ([facility]).  Return to ED if symptoms change or worsen.    Thank you for the opportunity to care for you during this emergency department visit. I hope you are doing better. We strive to always improve our care. You may receive a survey and I hope you had a positive experience here. We greatly appreciate your 5 scores.

## 2025-06-08 NOTE — ED PROVIDER NOTES
Department of Emergency Medicine   ED Provider Note  Admit Date/RoomTime: 6/8/2025  2:40 PM  ED Room: [facility]          History of Present Illness:  6/8/25, Time: 2:53 PM EDT      Patient is a 64 y.o. female presents with a chief complaint of left wrist burn  This has been occurring for today.  Patient states that it gets better with nothing.  Patient states that it gets worse with nothing.  Patient states that it is severe in severity.  Patient states it was acute in onset.  She notes she was baking apply and as she was pulling it out of the oven she jerked and the possibility of spilled out onto her left lateral breast.  She notes it was very hard to place a resident of vegetables on it and took a nap.  Woke up and was having worsening pain.  No fevers or chills, numbness or tingling, weakness.  Has not taken anything for the pain.      Review of Systems:     Pertinent positives and negatives are stated within HPI.      --------------------------------------------- PAST HISTORY ---------------------------------------------  Past Medical History:  has a past medical history of Adenoma of right adrenal gland, Anemia, Anesthesia complication, Anxiety, Arrhythmia, Arthritis, Asthma, Benign essential tremor, Bipolar affective (HCC), Chronic back pain, Chronic respiratory failure with hypoxia (HCC), Decreased dorsalis pedis pulse, Depression, Difficulty swallowing, Environmental and seasonal allergies, Excessive physiologic tremor, Fatty liver, Full dentures, GERD (gastroesophageal reflux disease), Gout, Herniated cervical disc, History of swelling of feet, Hypertension, Leg pain, Leg swelling, Lymphedema of both lower extremities, Lymphedema of lower extremity, Mitochondrial cytopathy, MRSA (methicillin resistant Staphylococcus aureus), Muscle weakness (generalized), Nausea, Need for assistance with personal care, Neuropathy, Obesity, PETR (obstructive sleep apnea), Osteoarthritis of left knee, Pancreatic duct

## 2025-06-10 ENCOUNTER — HOSPITAL ENCOUNTER (EMERGENCY)
Age: 65
Discharge: HOME | End: 2025-06-11
Attending: EMERGENCY MEDICINE
Payer: MEDICAID

## 2025-06-10 ENCOUNTER — APPOINTMENT (OUTPATIENT)
Dept: GENERAL RADIOLOGY | Age: 65
End: 2025-06-10
Payer: MEDICAID
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
## 2025-06-10 DIAGNOSIS — M54.50 LUMBAR BACK PAIN: ICD-10-CM

## 2025-06-10 LAB
BASOPHILS # BLD: 0.03 K/UL (ref 0–0.2)
BASOPHILS NFR BLD: 0 % (ref 0–2)
BILIRUB UR QL STRIP: NEGATIVE
CLARITY UR: CLEAR
COLOR UR: YELLOW
COMMENT: NORMAL
EOSINOPHIL # BLD: 0.11 K/UL (ref 0.05–0.5)
EOSINOPHILS RELATIVE PERCENT: 1 % (ref 0–6)
ERYTHROCYTE [DISTWIDTH] IN BLOOD BY AUTOMATED COUNT: 13.7 % (ref 11.5–15)
GLUCOSE UR STRIP-MCNC: NEGATIVE MG/DL
HCT VFR BLD AUTO: 40.3 % (ref 34–48)
HGB BLD-MCNC: 12.7 G/DL (ref 11.5–15.5)
HGB UR QL STRIP.AUTO: NEGATIVE
IMM GRANULOCYTES # BLD AUTO: 0.05 K/UL (ref 0–0.58)
IMM GRANULOCYTES NFR BLD: 1 % (ref 0–5)
KETONES UR STRIP-MCNC: NEGATIVE MG/DL
LEUKOCYTE ESTERASE UR QL STRIP: NEGATIVE
LYMPHOCYTES NFR BLD: 1.04 K/UL (ref 1.5–4)
LYMPHOCYTES RELATIVE PERCENT: 11 % (ref 20–42)
MCH RBC QN AUTO: 31.7 PG (ref 26–35)
MCHC RBC AUTO-ENTMCNC: 31.5 G/DL (ref 32–34.5)
MCV RBC AUTO: 100.5 FL (ref 80–99.9)
MONOCYTES NFR BLD: 0.83 K/UL (ref 0.1–0.95)
MONOCYTES NFR BLD: 9 % (ref 2–12)
NEUTROPHILS NFR BLD: 78 % (ref 43–80)
NEUTS SEG NFR BLD: 7.25 K/UL (ref 1.8–7.3)
NITRITE UR QL STRIP: NEGATIVE
PH UR STRIP: 5.5 [PH] (ref 5–8)
PLATELET # BLD AUTO: 193 K/UL (ref 130–450)
PMV BLD AUTO: 9.4 FL (ref 7–12)
PROT UR STRIP-MCNC: NEGATIVE MG/DL
RBC # BLD AUTO: 4.01 M/UL (ref 3.5–5.5)
SP GR UR STRIP: 1.02 (ref 1–1.03)
UROBILINOGEN UR STRIP-ACNC: 0.2 EU/DL (ref 0–1)
WBC OTHER # BLD: 9.3 K/UL (ref 4.5–11.5)

## 2025-06-10 PROCEDURE — 81003 URINALYSIS AUTO W/O SCOPE: CPT

## 2025-06-10 PROCEDURE — 71045 X-RAY EXAM CHEST 1 VIEW: CPT

## 2025-06-10 PROCEDURE — 80053 COMPREHEN METABOLIC PANEL: CPT

## 2025-06-10 PROCEDURE — 85025 COMPLETE CBC W/AUTO DIFF WBC: CPT

## 2025-06-10 PROCEDURE — 99284 EMERGENCY DEPT VISIT MOD MDM: CPT

## 2025-06-11 ENCOUNTER — APPOINTMENT (OUTPATIENT)
Dept: CT IMAGING | Age: 65
End: 2025-06-11
Payer: MEDICAID

## 2025-06-11 VITALS
TEMPERATURE: 98 F | BODY MASS INDEX: 45.99 KG/M2 | SYSTOLIC BLOOD PRESSURE: 144 MMHG | OXYGEN SATURATION: 98 % | DIASTOLIC BLOOD PRESSURE: 64 MMHG | HEIGHT: 67 IN | RESPIRATION RATE: 20 BRPM | WEIGHT: 293 LBS | HEART RATE: 68 BPM

## 2025-06-11 LAB
ALBUMIN SERPL-MCNC: 3.6 G/DL (ref 3.5–5.2)
ALP SERPL-CCNC: 102 U/L (ref 35–104)
ALT SERPL-CCNC: 14 U/L (ref 0–35)
ANION GAP SERPL CALCULATED.3IONS-SCNC: 11 MMOL/L (ref 7–16)
AST SERPL-CCNC: 18 U/L (ref 0–35)
BILIRUB SERPL-MCNC: 0.2 MG/DL (ref 0–1.2)
BUN SERPL-MCNC: 24 MG/DL (ref 8–23)
CALCIUM SERPL-MCNC: 8.5 MG/DL (ref 8.8–10.2)
CHLORIDE SERPL-SCNC: 108 MMOL/L (ref 98–107)
CO2 SERPL-SCNC: 25 MMOL/L (ref 22–29)
CREAT SERPL-MCNC: 1 MG/DL (ref 0.5–1)
GFR, ESTIMATED: 61 ML/MIN/1.73M2
GLUCOSE BLD-MCNC: 115 MG/DL (ref 74–99)
GLUCOSE SERPL-MCNC: 120 MG/DL (ref 74–99)
POTASSIUM SERPL-SCNC: 4.8 MMOL/L (ref 3.5–5.1)
PROT SERPL-MCNC: 6.5 G/DL (ref 6.4–8.3)
SODIUM SERPL-SCNC: 144 MMOL/L (ref 136–145)

## 2025-06-11 PROCEDURE — 82962 GLUCOSE BLOOD TEST: CPT

## 2025-06-11 PROCEDURE — 70450 CT HEAD/BRAIN W/O DYE: CPT

## 2025-06-11 PROCEDURE — 72131 CT LUMBAR SPINE W/O DYE: CPT

## 2025-06-11 PROCEDURE — 72125 CT NECK SPINE W/O DYE: CPT

## 2025-06-11 RX ORDER — OXYCODONE AND ACETAMINOPHEN 5; 325 MG/1; MG/1
1 TABLET ORAL EVERY 8 HOURS PRN
Qty: 6 TABLET | Refills: 0 | Status: SHIPPED | OUTPATIENT
Start: 2025-06-11 | End: 2025-06-13

## 2025-06-11 NOTE — ED NOTES
Report given to RIANA alonso from ED.   Report method in person   The following was reviewed with receiving RN:   Current vital signs:  /68   Pulse 76   Temp 98 °F (36.7 °C) (Oral)   Resp 18   Ht 1.702 m (5' 7\")   Wt 133.8 kg (295 lb)   LMP 08/31/1988   SpO2 96%   BMI 46.20 kg/m²                      Any medication or safety alerts were reviewed. Any pending diagnostics and notifications were also reviewed, as well as any safety concerns or issues, abnormal labs, abnormal imaging, and abnormal assessment findings. Questions were answered.

## 2025-06-11 NOTE — ED PROVIDER NOTES
HPI:  6/10/25, Time: 9:33 PM EDT         Leny Yang is a 64 y.o. female  has a past medical history of Adenoma of right adrenal gland, Anemia, Anesthesia complication, Anxiety, Arrhythmia, Arthritis, Asthma, Benign essential tremor, Bipolar affective (HCC), Chronic back pain, Chronic respiratory failure with hypoxia (HCC), Decreased dorsalis pedis pulse, Depression, Difficulty swallowing, Environmental and seasonal allergies, Excessive physiologic tremor, Fatty liver, Full dentures, GERD (gastroesophageal reflux disease), Gout, Herniated cervical disc, History of swelling of feet, Hypertension, Leg pain, Leg swelling, Lymphedema of both lower extremities, Lymphedema of lower extremity, Mitochondrial cytopathy, MRSA (methicillin resistant Staphylococcus aureus), Muscle weakness (generalized), Nausea, Need for assistance with personal care, Neuropathy, Obesity, PETR (obstructive sleep apnea), Osteoarthritis of left knee, Pancreatic duct disruption, Pancreatitis, PONV (postoperative nausea and vomiting), Seizures (HCC), Spinal headache, Thyroid disease, and Wheelchair dependence.presenting to the ED for fall, beginning short time ago.  The complaint has been persistent, moderate in severity, and worsened by nothing.  Patient presenting here because on fall.  Patient was transferring from toilet to wheelchair.  Patient reports she does not walk she is wheelchair.  Patient reports she has a history mitochondrial disease and reportedly has had these episodes in the past.  She reports that her legs gave out.  Patient reports she does wear oxygen due to history of COPD she reports no chest pain or difficulty breathing she reports no abdominal pain or vomiting there is no black or tarry stool she reports no hematemesis she reports a fever chills.  She does report she did strike her head she does complain of neck and back pain.    ROS:   Pertinent positives and negatives are stated within HPI, all other systems reviewed

## 2025-06-19 ENCOUNTER — APPOINTMENT (OUTPATIENT)
Dept: ULTRASOUND IMAGING | Age: 65
End: 2025-06-19
Payer: MEDICAID

## 2025-06-19 ENCOUNTER — HOSPITAL ENCOUNTER (EMERGENCY)
Age: 65
Discharge: HOME | End: 2025-06-20
Attending: EMERGENCY MEDICINE
Payer: MEDICAID

## 2025-06-19 ENCOUNTER — APPOINTMENT (OUTPATIENT)
Dept: GENERAL RADIOLOGY | Age: 65
End: 2025-06-19
Payer: MEDICAID

## 2025-06-19 DIAGNOSIS — I89.0 LYMPHEDEMA: ICD-10-CM

## 2025-06-19 DIAGNOSIS — N39.0 URINARY TRACT INFECTION WITHOUT HEMATURIA, SITE UNSPECIFIED: Primary | ICD-10-CM

## 2025-06-19 DIAGNOSIS — B37.9 YEAST INFECTION: ICD-10-CM

## 2025-06-19 LAB
ALBUMIN SERPL-MCNC: 3.6 G/DL (ref 3.5–5.2)
ALP SERPL-CCNC: 100 U/L (ref 35–104)
ALT SERPL-CCNC: 14 U/L (ref 0–35)
ANION GAP SERPL CALCULATED.3IONS-SCNC: 12 MMOL/L (ref 7–16)
AST SERPL-CCNC: 18 U/L (ref 0–35)
BACTERIA URNS QL MICRO: ABNORMAL
BASOPHILS # BLD: 0.03 K/UL (ref 0–0.2)
BASOPHILS NFR BLD: 1 % (ref 0–2)
BILIRUB SERPL-MCNC: 0.2 MG/DL (ref 0–1.2)
BILIRUB UR QL STRIP: NEGATIVE
BNP SERPL-MCNC: 342 PG/ML (ref 0–125)
BUN SERPL-MCNC: 28 MG/DL (ref 8–23)
CALCIUM SERPL-MCNC: 8.8 MG/DL (ref 8.8–10.2)
CHLORIDE SERPL-SCNC: 105 MMOL/L (ref 98–107)
CLARITY UR: ABNORMAL
CO2 SERPL-SCNC: 26 MMOL/L (ref 22–29)
COLOR UR: YELLOW
CREAT SERPL-MCNC: 1 MG/DL (ref 0.5–1)
EOSINOPHIL # BLD: 0.14 K/UL (ref 0.05–0.5)
EOSINOPHILS RELATIVE PERCENT: 2 % (ref 0–6)
EPI CELLS #/AREA URNS HPF: ABNORMAL /HPF
ERYTHROCYTE [DISTWIDTH] IN BLOOD BY AUTOMATED COUNT: 13.3 % (ref 11.5–15)
GFR, ESTIMATED: 64 ML/MIN/1.73M2
GLUCOSE SERPL-MCNC: 99 MG/DL (ref 74–99)
GLUCOSE UR STRIP-MCNC: NEGATIVE MG/DL
HCT VFR BLD AUTO: 39.3 % (ref 34–48)
HGB BLD-MCNC: 12.4 G/DL (ref 11.5–15.5)
HGB UR QL STRIP.AUTO: NEGATIVE
IMM GRANULOCYTES # BLD AUTO: 0.03 K/UL (ref 0–0.58)
IMM GRANULOCYTES NFR BLD: 1 % (ref 0–5)
KETONES UR STRIP-MCNC: ABNORMAL MG/DL
LEUKOCYTE ESTERASE UR QL STRIP: ABNORMAL
LYMPHOCYTES NFR BLD: 1.41 K/UL (ref 1.5–4)
LYMPHOCYTES RELATIVE PERCENT: 22 % (ref 20–42)
MCH RBC QN AUTO: 30.8 PG (ref 26–35)
MCHC RBC AUTO-ENTMCNC: 31.6 G/DL (ref 32–34.5)
MCV RBC AUTO: 97.5 FL (ref 80–99.9)
MONOCYTES NFR BLD: 0.61 K/UL (ref 0.1–0.95)
MONOCYTES NFR BLD: 10 % (ref 2–12)
NEUTROPHILS NFR BLD: 65 % (ref 43–80)
NEUTS SEG NFR BLD: 4.1 K/UL (ref 1.8–7.3)
NITRITE UR QL STRIP: NEGATIVE
PH UR STRIP: 5.5 [PH] (ref 5–8)
PLATELET # BLD AUTO: 213 K/UL (ref 130–450)
PMV BLD AUTO: 8.6 FL (ref 7–12)
POTASSIUM SERPL-SCNC: 4.3 MMOL/L (ref 3.5–5.1)
PROT SERPL-MCNC: 6.3 G/DL (ref 6.4–8.3)
PROT UR STRIP-MCNC: NEGATIVE MG/DL
RBC # BLD AUTO: 4.03 M/UL (ref 3.5–5.5)
RBC #/AREA URNS HPF: ABNORMAL /HPF
SODIUM SERPL-SCNC: 142 MMOL/L (ref 136–145)
SP GR UR STRIP: >1.03 (ref 1–1.03)
TROPONIN I SERPL HS-MCNC: 14 NG/L (ref 0–14)
UROBILINOGEN UR STRIP-ACNC: 0.2 EU/DL (ref 0–1)
WBC #/AREA URNS HPF: ABNORMAL /HPF
WBC OTHER # BLD: 6.3 K/UL (ref 4.5–11.5)

## 2025-06-19 PROCEDURE — 93970 EXTREMITY STUDY: CPT

## 2025-06-19 PROCEDURE — 71045 X-RAY EXAM CHEST 1 VIEW: CPT

## 2025-06-19 PROCEDURE — 87086 URINE CULTURE/COLONY COUNT: CPT

## 2025-06-19 PROCEDURE — 81001 URINALYSIS AUTO W/SCOPE: CPT

## 2025-06-19 PROCEDURE — 83880 ASSAY OF NATRIURETIC PEPTIDE: CPT

## 2025-06-19 PROCEDURE — 99285 EMERGENCY DEPT VISIT HI MDM: CPT

## 2025-06-19 PROCEDURE — 85025 COMPLETE CBC W/AUTO DIFF WBC: CPT

## 2025-06-19 PROCEDURE — 84484 ASSAY OF TROPONIN QUANT: CPT

## 2025-06-19 PROCEDURE — 93005 ELECTROCARDIOGRAM TRACING: CPT | Performed by: EMERGENCY MEDICINE

## 2025-06-19 PROCEDURE — 6370000000 HC RX 637 (ALT 250 FOR IP): Performed by: EMERGENCY MEDICINE

## 2025-06-19 PROCEDURE — 80053 COMPREHEN METABOLIC PANEL: CPT

## 2025-06-19 RX ORDER — NYSTATIN 100000 [USP'U]/G
POWDER TOPICAL 3 TIMES DAILY
Qty: 60 G | Refills: 3 | Status: SHIPPED | OUTPATIENT
Start: 2025-06-19

## 2025-06-19 RX ORDER — BACITRACIN ZINC 500 [USP'U]/G
OINTMENT TOPICAL ONCE
Status: COMPLETED | OUTPATIENT
Start: 2025-06-19 | End: 2025-06-19

## 2025-06-19 RX ORDER — CEFDINIR 300 MG/1
300 CAPSULE ORAL 2 TIMES DAILY
Qty: 14 CAPSULE | Refills: 0 | Status: SHIPPED | OUTPATIENT
Start: 2025-06-19 | End: 2025-06-26

## 2025-06-19 RX ORDER — CEFDINIR 300 MG/1
300 CAPSULE ORAL ONCE
Status: COMPLETED | OUTPATIENT
Start: 2025-06-19 | End: 2025-06-19

## 2025-06-19 RX ADMIN — CEFDINIR 300 MG: 300 CAPSULE ORAL at 23:59

## 2025-06-19 RX ADMIN — BACITRACIN ZINC: 500 OINTMENT TOPICAL at 21:30

## 2025-06-19 ASSESSMENT — PAIN - FUNCTIONAL ASSESSMENT: PAIN_FUNCTIONAL_ASSESSMENT: NONE - DENIES PAIN

## 2025-06-20 VITALS
DIASTOLIC BLOOD PRESSURE: 56 MMHG | RESPIRATION RATE: 16 BRPM | TEMPERATURE: 97.5 F | HEART RATE: 31 BPM | SYSTOLIC BLOOD PRESSURE: 107 MMHG | OXYGEN SATURATION: 96 %

## 2025-06-20 LAB
EKG ATRIAL RATE: 69 BPM
EKG P AXIS: 38 DEGREES
EKG P-R INTERVAL: 176 MS
EKG Q-T INTERVAL: 416 MS
EKG QRS DURATION: 112 MS
EKG QTC CALCULATION (BAZETT): 445 MS
EKG R AXIS: 0 DEGREES
EKG T AXIS: 12 DEGREES
EKG VENTRICULAR RATE: 69 BPM
MICROORGANISM SPEC CULT: ABNORMAL
SERVICE CMNT-IMP: ABNORMAL
SPECIMEN DESCRIPTION: ABNORMAL

## 2025-06-20 PROCEDURE — 93010 ELECTROCARDIOGRAM REPORT: CPT | Performed by: INTERNAL MEDICINE

## 2025-06-20 PROCEDURE — 2500000003 HC RX 250 WO HCPCS: Performed by: EMERGENCY MEDICINE

## 2025-06-20 RX ADMIN — MICONAZOLE NITRATE: 20.6 POWDER TOPICAL at 00:00

## 2025-06-20 NOTE — ED PROVIDER NOTES
(LIORESAL) 20 MG TABLET    Take 1 tablet by mouth in the morning, at noon, in the evening, and at bedtime    CALCIUM CITRATE (CALCITRATE) 950 (200 CA) MG TABLET    Take 1 tablet by mouth 3 times daily    CELECOXIB (CELEBREX) 200 MG CAPSULE    Take 1 capsule by mouth 2 times daily    CIPROFLOXACIN (CIPRO) 500 MG TABLET    Take 1 tablet by mouth 2 times daily    CITALOPRAM (CELEXA) 20 MG TABLET    Take 1 tablet by mouth daily    COENZYME Q10 (CO Q-10) 100 MG CAPS    Take 2 capsules by mouth in the morning and at bedtime    DEXAMETHASONE (DECADRON) 1 MG TABLET    Take 1 tablet by mouth once for 1 dose Take 1 tablet of Dexamethasone 1 mg at 11 pm and go to the Lab next morning at 8am    DOCUSATE SODIUM (COLACE) 100 MG CAPSULE    Take 2 capsules by mouth 2 times daily    EPINEPHRINE (EPIPEN) 0.3 MG/0.3ML SOAJ INJECTION    Inject into the muscle as needed (as needed)    FERROUS SULFATE (IRON 325) 325 (65 FE) MG TABLET    Take 1 tablet by mouth daily (with breakfast)    FUROSEMIDE (LASIX) 40 MG TABLET    Take 1 tablet by mouth daily    GABAPENTIN (NEURONTIN) 600 MG TABLET    TAKE ONE TABLET BY MOUTH 4 TIMES A DAY    LEVOCARNITINE (CARNITOR) 330 MG TABLET    Take 1 tablet by mouth 3 times daily    LINZESS 290 MCG CAPS CAPSULE    Take 1 capsule by mouth every morning (before breakfast)    LORATADINE (CLARITIN) 10 MG TABLET    Take 1 tablet by mouth daily    MAGNESIUM OXIDE (MAGNESIUM-OXIDE) 250 MG TABS TABLET    Take 1 tablet by mouth in the morning, at noon, and at bedtime    METOPROLOL SUCCINATE (TOPROL XL) 50 MG EXTENDED RELEASE TABLET    Take 0.5 tablets by mouth in the morning and at bedtime    MONTELUKAST (SINGULAIR) 10 MG TABLET    Take 1 tablet by mouth daily    MULTIPLE VITAMINS-MINERALS (THERAPEUTIC MULTIVITAMIN-MINERALS) TABLET    Take 1 tablet by mouth daily    OMEGA-3 ACID ETHYL ESTERS (LOVAZA) 1 G CAPSULE    Take 1 capsule by mouth 2 times daily    OMEPRAZOLE (PRILOSEC) 20 MG DELAYED RELEASE CAPSULE    Take 1  clinic.  Discharged in stable condition. [MB]      ED Course User Index  [MB] Trisha Wong DO        Medical Decision Making  Amount and/or Complexity of Data Reviewed  Labs: ordered.  Radiology: ordered.  ECG/medicine tests: ordered.    Risk  OTC drugs.  Prescription drug management.        64-year-old female past medical history of lymphedema, hypertension, seizures presents with concern for swelling in her lower extremities, dysuria and a yeast infection.  Hemodynamically stable on arrival to the [facility] apartment, nontoxic in no acute distress.  She does have erythema in her inguinal folds without purulence or necrosis or other concerning findings, she is afebrile, abdomen is soft without tenderness to palpation.  Does not appear to be, but dehydrated.  Chronic nonpitting edema to bilateral lower extremities.  Physical exam otherwise unremarkable.    Differential diagnosis to include but not limited to UTI, yeast infection    Social Determinants of Health : Smoking    EKG without acute changes chest x-ray does not demonstrate evidence of volume overload, troponin, BNP reassuring, there is no leukocytosis or anemia, stable kidney liver function without electro abnormalities.  Urinalysis with evidence of infection, sent for culture, given p.o. Omnicef.  Burn wound was dressed with bacitracin and sterile dressing.  Patient reassured by results, was given powder for her yeast infection sent with a prescription for the same.  Comfortable plan for discharge, will follow-up with lymphedema clinic, discharged in stable condition.    CONSULTS: (Who and What was discussed)  None        Counseling:   The emergency provider has spoken with the patient and discussed today’s results, in addition to providing specific details for the plan of care and counseling regarding the diagnosis and prognosis.  Questions are answered at this time and they are agreeable with the plan.      I am the Primary Clinician of Record.    FINAL

## 2025-07-16 ENCOUNTER — HOSPITAL ENCOUNTER (EMERGENCY)
Age: 65
Discharge: HOME | End: 2025-07-17
Attending: EMERGENCY MEDICINE
Payer: MEDICAID

## 2025-07-16 ENCOUNTER — APPOINTMENT (OUTPATIENT)
Dept: GENERAL RADIOLOGY | Age: 65
End: 2025-07-16
Payer: MEDICAID

## 2025-07-16 DIAGNOSIS — R07.89 CHEST WALL PAIN: ICD-10-CM
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
## 2025-07-16 DIAGNOSIS — S42.002A FRACTURE OF UNSPECIFIED PART OF LEFT CLAVICLE, INITIAL ENCOUNTER FOR CLOSED FRACTURE: ICD-10-CM

## 2025-07-16 PROCEDURE — 80053 COMPREHEN METABOLIC PANEL: CPT

## 2025-07-16 PROCEDURE — 85025 COMPLETE CBC W/AUTO DIFF WBC: CPT

## 2025-07-16 PROCEDURE — 73110 X-RAY EXAM OF WRIST: CPT

## 2025-07-16 PROCEDURE — 99284 EMERGENCY DEPT VISIT MOD MDM: CPT

## 2025-07-16 PROCEDURE — 73030 X-RAY EXAM OF SHOULDER: CPT

## 2025-07-16 ASSESSMENT — PAIN DESCRIPTION - DESCRIPTORS
DESCRIPTORS: ACHING;SHARP;SHOOTING
DESCRIPTORS_2: ACHING;SHARP;THROBBING

## 2025-07-16 ASSESSMENT — PAIN - FUNCTIONAL ASSESSMENT
PAIN_FUNCTIONAL_ASSESSMENT_SITE2: PREVENTS OR INTERFERES SOME ACTIVE ACTIVITIES AND ADLS
PAIN_FUNCTIONAL_ASSESSMENT: 0-10
PAIN_FUNCTIONAL_ASSESSMENT: PREVENTS OR INTERFERES SOME ACTIVE ACTIVITIES AND ADLS

## 2025-07-16 ASSESSMENT — PAIN DESCRIPTION - ONSET: ONSET: SUDDEN

## 2025-07-16 ASSESSMENT — PAIN DESCRIPTION - INTENSITY: RATING_2: 10

## 2025-07-16 ASSESSMENT — PAIN DESCRIPTION - LOCATION
LOCATION_2: WRIST
LOCATION: SHOULDER

## 2025-07-16 ASSESSMENT — PAIN SCALES - GENERAL: PAINLEVEL_OUTOF10: 10

## 2025-07-16 ASSESSMENT — PAIN DESCRIPTION - FREQUENCY: FREQUENCY: CONTINUOUS

## 2025-07-16 ASSESSMENT — PAIN DESCRIPTION - PAIN TYPE: TYPE: ACUTE PAIN

## 2025-07-16 ASSESSMENT — PAIN DESCRIPTION - ORIENTATION
ORIENTATION_2: LEFT
ORIENTATION: LEFT

## 2025-07-17 ENCOUNTER — FOLLOWUP TELEPHONE ENCOUNTER (OUTPATIENT)
Dept: AUDIOLOGY | Age: 65
End: 2025-07-17

## 2025-07-17 ENCOUNTER — APPOINTMENT (OUTPATIENT)
Dept: CT IMAGING | Age: 65
End: 2025-07-17
Payer: MEDICAID

## 2025-07-17 ENCOUNTER — APPOINTMENT (OUTPATIENT)
Dept: GENERAL RADIOLOGY | Age: 65
End: 2025-07-17
Payer: MEDICAID

## 2025-07-17 VITALS
RESPIRATION RATE: 18 BRPM | WEIGHT: 293 LBS | BODY MASS INDEX: 45.99 KG/M2 | HEIGHT: 67 IN | TEMPERATURE: 98.1 F | SYSTOLIC BLOOD PRESSURE: 127 MMHG | DIASTOLIC BLOOD PRESSURE: 85 MMHG | OXYGEN SATURATION: 95 % | HEART RATE: 64 BPM

## 2025-07-17 LAB
ALBUMIN SERPL-MCNC: 3.8 G/DL (ref 3.5–5.2)
ALP SERPL-CCNC: 112 U/L (ref 35–104)
ALT SERPL-CCNC: 15 U/L (ref 0–35)
ANION GAP SERPL CALCULATED.3IONS-SCNC: 15 MMOL/L (ref 7–16)
AST SERPL-CCNC: 29 U/L (ref 0–35)
BASOPHILS # BLD: 0.02 K/UL (ref 0–0.2)
BASOPHILS NFR BLD: 0 % (ref 0–2)
BILIRUB SERPL-MCNC: 0.2 MG/DL (ref 0–1.2)
BUN SERPL-MCNC: 38 MG/DL (ref 8–23)
CALCIUM SERPL-MCNC: 9.2 MG/DL (ref 8.8–10.2)
CHLORIDE SERPL-SCNC: 106 MMOL/L (ref 98–107)
CO2 SERPL-SCNC: 18 MMOL/L (ref 22–29)
CREAT SERPL-MCNC: 1.3 MG/DL (ref 0.5–1)
EOSINOPHIL # BLD: 0.21 K/UL (ref 0.05–0.5)
EOSINOPHILS RELATIVE PERCENT: 3 % (ref 0–6)
ERYTHROCYTE [DISTWIDTH] IN BLOOD BY AUTOMATED COUNT: 13.3 % (ref 11.5–15)
GFR, ESTIMATED: 47 ML/MIN/1.73M2
GLUCOSE SERPL-MCNC: 105 MG/DL (ref 74–99)
HCT VFR BLD AUTO: 42 % (ref 34–48)
HGB BLD-MCNC: 13.4 G/DL (ref 11.5–15.5)
IMM GRANULOCYTES # BLD AUTO: <0.03 K/UL (ref 0–0.58)
IMM GRANULOCYTES NFR BLD: 0 % (ref 0–5)
LYMPHOCYTES NFR BLD: 1.67 K/UL (ref 1.5–4)
LYMPHOCYTES RELATIVE PERCENT: 26 % (ref 20–42)
MCH RBC QN AUTO: 30.9 PG (ref 26–35)
MCHC RBC AUTO-ENTMCNC: 31.9 G/DL (ref 32–34.5)
MCV RBC AUTO: 97 FL (ref 80–99.9)
MONOCYTES NFR BLD: 0.61 K/UL (ref 0.1–0.95)
MONOCYTES NFR BLD: 9 % (ref 2–12)
NEUTROPHILS NFR BLD: 61 % (ref 43–80)
NEUTS SEG NFR BLD: 3.93 K/UL (ref 1.8–7.3)
PLATELET # BLD AUTO: 193 K/UL (ref 130–450)
PMV BLD AUTO: 9.2 FL (ref 7–12)
POTASSIUM SERPL-SCNC: 5.2 MMOL/L (ref 3.5–5.1)
POTASSIUM SERPL-SCNC: 5.5 MMOL/L (ref 3.5–5.1)
PROT SERPL-MCNC: 6.9 G/DL (ref 6.4–8.3)
RBC # BLD AUTO: 4.33 M/UL (ref 3.5–5.5)
SODIUM SERPL-SCNC: 138 MMOL/L (ref 136–145)
WBC OTHER # BLD: 6.5 K/UL (ref 4.5–11.5)

## 2025-07-17 PROCEDURE — 6370000000 HC RX 637 (ALT 250 FOR IP)

## 2025-07-17 PROCEDURE — 84132 ASSAY OF SERUM POTASSIUM: CPT

## 2025-07-17 PROCEDURE — 71250 CT THORAX DX C-: CPT

## 2025-07-17 PROCEDURE — 73590 X-RAY EXAM OF LOWER LEG: CPT

## 2025-07-17 PROCEDURE — 70450 CT HEAD/BRAIN W/O DYE: CPT

## 2025-07-17 PROCEDURE — 72125 CT NECK SPINE W/O DYE: CPT

## 2025-07-17 RX ORDER — BACITRACIN ZINC 500 [USP'U]/G
OINTMENT TOPICAL ONCE
Status: COMPLETED | OUTPATIENT
Start: 2025-07-17 | End: 2025-07-17

## 2025-07-17 RX ORDER — ACETAMINOPHEN 500 MG
1000 TABLET ORAL ONCE
Status: DISCONTINUED | OUTPATIENT
Start: 2025-07-17 | End: 2025-07-17 | Stop reason: HOSPADM

## 2025-07-17 RX ADMIN — BACITRACIN ZINC: 500 OINTMENT TOPICAL at 03:42

## 2025-07-17 NOTE — ED PROVIDER NOTES
[facility] EMERGENCY DEPARTMENT  EMERGENCY DEPARTMENT ENCOUNTER        Pt Name: Leny Yang  MRN: [id number]  Birthdate 1960  Date of evaluation: 7/16/2025  Provider: Hemanth Hatfield MD  PCP: Adam Whyte MD  Note Started: 11:28 PM EDT 7/16/25    CHIEF COMPLAINT       Chief Complaint   Patient presents with    Fall     Fall from WC, bent forward and fell out of chair. Pt has hematoma to left side of head. Pain to left shoulder, wrist and clavicle. Pt has open wound to lower left leg. States wound had been closed for last month, but opened when falling       HISTORY OF PRESENT ILLNESS: 1 or more Elements   History From: patient and EMS    Limitations to history : None    Leny Yang is a 64 y.o. female who presents after fall.  Patient was leaning forward from a wheelchair when she fell forward landing on her head and left side.  She is complaining of pain to her head, her chest, left shoulder, left wrist and left lower leg.  She also has a small wound that opened up medial shin since the fall.  EMS placed her on nasal cannula because she is satting at 90% on room air.  Patient has oxygen at night.  She denies being on any blood thinners.    Patient denies fever, chills, headache, chest pain, abdominal pain, nausea, vomiting, diarrhea, lightheadedness, dysuria, hematuria, hematochezia, and melena.    Nursing Notes were all reviewed and agreed with or any disagreements were addressed in the HPI.        REVIEW OF SYSTEMS :           Positives and Pertinent negatives as per HPI.     SURGICAL HISTORY     Past Surgical History:   Procedure Laterality Date    ANKLE FRACTURE SURGERY Right 02/14/2022    RIGHT ANKLE IRRIGAITON AND DEBRIDEMENT WITH EXTERNAL FIXATOR AND LACERATION REPAIR performed by Marty Ugarte MD at [facility]    ANKLE FRACTURE SURGERY Right 03/02/2022    RIGHT LOWER EXTREMITY REMOVAL EXTERNAL FIXATOR, RIGHT PILON OPEN REDUCTION INTERNAL

## 2025-07-17 NOTE — TELEPHONE ENCOUNTER
Received referral for hearing evaluation from [facility] Genetics department with diagnosis of Mitochondrial disorder. Per notes, she uses power wheelchair. Therefore will forward to [facility] office to more easily accommodate wheelchair.     Sent referral to [facility]. Referral scanned.     Marilyn Chance M.A., CCC/A  [place] Lic [id number]  Electronically signed by Chandler Segura on 7/17/2025 at 10:33 AM

## 2025-07-17 NOTE — ED NOTES
Call placed to [facility] for updated ETA 30min  
LINDSEY RAYMUNDO [phone]  
Patient assisted on and off bedpan  
Sling applied to left upper extremity  
Wound care provided to left lower leg with wound , non adhesive dressing and gauze.   
Decreased dorsalis pedis pulse, Depression, Difficulty swallowing, Environmental and seasonal allergies, Excessive physiologic tremor, Fatty liver, Full dentures, GERD (gastroesophageal reflux disease), Gout, Herniated cervical disc, History of swelling of feet, Hypertension, Leg pain, Leg swelling, Lymphedema of both lower extremities, Lymphedema of lower extremity, Mitochondrial cytopathy, MRSA (methicillin resistant Staphylococcus aureus), Muscle weakness (generalized), Nausea, Need for assistance with personal care, Neuropathy, Obesity, PETR (obstructive sleep apnea), Osteoarthritis of left knee, Pancreatic duct disruption, Pancreatitis, PONV (postoperative nausea and vomiting), Seizures (HCC), Spinal headache, Thyroid disease, and Wheelchair dependence.  Records Reviewed:     Echo 8/22/2022 ejection fraction of 50 to 55%    Re-Evaluations:             Patient reevaluated and made aware of findings.  Patient was given Tylenol she was ordered sling here.  Patient had bacitracin applied to the affected area.  Patient was made aware of results and findings.  Patient will be discharged home.  She is well with her PCP      Consultations:                 Critical Care:         This patient's ED course included: a personal history and physicial eaxmination    This patient has been closely monitored during their ED course.    Counseling:   The emergency provider has spoken with the patient and discussed today’s results, in addition to providing specific details for the plan of care and counseling regarding the diagnosis and prognosis.  Questions are answered at this time and they are agreeable with the plan.       --------------------------------- IMPRESSION AND DISPOSITION ---------------------------------    IMPRESSION  1. Fall, initial encounter    2. Fracture of unspecified part of left clavicle, initial encounter for closed fracture    3. Chest wall pain        DISPOSITION  Disposition: Discharge  Patient condition

## 2025-07-18 ENCOUNTER — TELEPHONE (OUTPATIENT)
Dept: AUDIOLOGY | Age: 65
End: 2025-07-18

## 2025-07-18 NOTE — TELEPHONE ENCOUNTER
Referral for Hearing Evaluation received.  Called patient and left a voicemail for the patient to call back for scheduling.    Electronically signed by Chandler Quick on 7/18/25 at 1:05 PM EDT

## 2025-07-20 ENCOUNTER — APPOINTMENT (OUTPATIENT)
Dept: CT IMAGING | Age: 65
End: 2025-07-20
Attending: EMERGENCY MEDICINE
Payer: MEDICAID

## 2025-07-20 ENCOUNTER — APPOINTMENT (OUTPATIENT)
Dept: GENERAL RADIOLOGY | Age: 65
End: 2025-07-20
Payer: MEDICAID

## 2025-07-20 ENCOUNTER — HOSPITAL ENCOUNTER (OUTPATIENT)
Age: 65
Setting detail: OBSERVATION
Discharge: HOME HEALTH | End: 2025-07-22
Attending: EMERGENCY MEDICINE | Admitting: INTERNAL MEDICINE
Payer: MEDICAID

## 2025-07-20 DIAGNOSIS — S81.002A OPEN WOUND OF LEFT KNEE, LEG, AND ANKLE, INITIAL ENCOUNTER: ICD-10-CM
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
## 2025-07-20 DIAGNOSIS — S81.802A OPEN WOUND OF LEFT KNEE, LEG, AND ANKLE, INITIAL ENCOUNTER: ICD-10-CM

## 2025-07-20 DIAGNOSIS — S42.002D CLOSED NONDISPLACED FRACTURE OF LEFT CLAVICLE WITH ROUTINE HEALING, UNSPECIFIED PART OF CLAVICLE, SUBSEQUENT ENCOUNTER: ICD-10-CM

## 2025-07-20 DIAGNOSIS — S91.002A OPEN WOUND OF LEFT KNEE, LEG, AND ANKLE, INITIAL ENCOUNTER: ICD-10-CM

## 2025-07-20 DIAGNOSIS — S09.90XA CLOSED HEAD INJURY, INITIAL ENCOUNTER: ICD-10-CM
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
## 2025-07-20 LAB
ALBUMIN SERPL-MCNC: 3.8 G/DL (ref 3.5–5.2)
ALP SERPL-CCNC: 103 U/L (ref 35–104)
ALT SERPL-CCNC: 18 U/L (ref 0–35)
ANION GAP SERPL CALCULATED.3IONS-SCNC: 10 MMOL/L (ref 7–16)
AST SERPL-CCNC: 31 U/L (ref 0–35)
BASOPHILS # BLD: 0.01 K/UL (ref 0–0.2)
BASOPHILS NFR BLD: 0 % (ref 0–2)
BILIRUB SERPL-MCNC: 0.2 MG/DL (ref 0–1.2)
BILIRUB UR QL STRIP: NEGATIVE
BUN SERPL-MCNC: 31 MG/DL (ref 8–23)
CALCIUM SERPL-MCNC: 8.8 MG/DL (ref 8.8–10.2)
CHLORIDE SERPL-SCNC: 106 MMOL/L (ref 98–107)
CK SERPL-CCNC: 194 U/L (ref 0–170)
CLARITY UR: CLEAR
CO2 SERPL-SCNC: 24 MMOL/L (ref 22–29)
COLOR UR: YELLOW
COMMENT: ABNORMAL
CREAT SERPL-MCNC: 0.9 MG/DL (ref 0.5–1)
EOSINOPHIL # BLD: 0.09 K/UL (ref 0.05–0.5)
EOSINOPHILS RELATIVE PERCENT: 2 % (ref 0–6)
ERYTHROCYTE [DISTWIDTH] IN BLOOD BY AUTOMATED COUNT: 12.9 % (ref 11.5–15)
GFR, ESTIMATED: 76 ML/MIN/1.73M2
GLUCOSE SERPL-MCNC: 105 MG/DL (ref 74–99)
GLUCOSE UR STRIP-MCNC: NEGATIVE MG/DL
HCT VFR BLD AUTO: 39.7 % (ref 34–48)
HGB BLD-MCNC: 13.1 G/DL (ref 11.5–15.5)
HGB UR QL STRIP.AUTO: NEGATIVE
IMM GRANULOCYTES # BLD AUTO: <0.03 K/UL (ref 0–0.58)
IMM GRANULOCYTES NFR BLD: 0 % (ref 0–5)
KETONES UR STRIP-MCNC: ABNORMAL MG/DL
LEUKOCYTE ESTERASE UR QL STRIP: NEGATIVE
LYMPHOCYTES NFR BLD: 1.27 K/UL (ref 1.5–4)
LYMPHOCYTES RELATIVE PERCENT: 23 % (ref 20–42)
MCH RBC QN AUTO: 30.9 PG (ref 26–35)
MCHC RBC AUTO-ENTMCNC: 33 G/DL (ref 32–34.5)
MCV RBC AUTO: 93.6 FL (ref 80–99.9)
MONOCYTES NFR BLD: 0.54 K/UL (ref 0.1–0.95)
MONOCYTES NFR BLD: 10 % (ref 2–12)
NEUTROPHILS NFR BLD: 66 % (ref 43–80)
NEUTS SEG NFR BLD: 3.69 K/UL (ref 1.8–7.3)
NITRITE UR QL STRIP: NEGATIVE
PH UR STRIP: 7 [PH] (ref 5–8)
PLATELET # BLD AUTO: 175 K/UL (ref 130–450)
PMV BLD AUTO: 8.9 FL (ref 7–12)
POTASSIUM SERPL-SCNC: 4.6 MMOL/L (ref 3.5–5.1)
PROT SERPL-MCNC: 6.5 G/DL (ref 6.4–8.3)
PROT UR STRIP-MCNC: NEGATIVE MG/DL
RBC # BLD AUTO: 4.24 M/UL (ref 3.5–5.5)
SODIUM SERPL-SCNC: 140 MMOL/L (ref 136–145)
SP GR UR STRIP: 1.01 (ref 1–1.03)
UROBILINOGEN UR STRIP-ACNC: 0.2 EU/DL (ref 0–1)
WBC OTHER # BLD: 5.6 K/UL (ref 4.5–11.5)

## 2025-07-20 PROCEDURE — 6360000002 HC RX W HCPCS: Performed by: INTERNAL MEDICINE

## 2025-07-20 PROCEDURE — 87086 URINE CULTURE/COLONY COUNT: CPT

## 2025-07-20 PROCEDURE — G0378 HOSPITAL OBSERVATION PER HR: HCPCS

## 2025-07-20 PROCEDURE — 73590 X-RAY EXAM OF LOWER LEG: CPT

## 2025-07-20 PROCEDURE — 73562 X-RAY EXAM OF KNEE 3: CPT

## 2025-07-20 PROCEDURE — 72125 CT NECK SPINE W/O DYE: CPT

## 2025-07-20 PROCEDURE — 82550 ASSAY OF CK (CPK): CPT

## 2025-07-20 PROCEDURE — 71250 CT THORAX DX C-: CPT

## 2025-07-20 PROCEDURE — 70450 CT HEAD/BRAIN W/O DYE: CPT

## 2025-07-20 PROCEDURE — 99285 EMERGENCY DEPT VISIT HI MDM: CPT

## 2025-07-20 PROCEDURE — 6370000000 HC RX 637 (ALT 250 FOR IP): Performed by: INTERNAL MEDICINE

## 2025-07-20 PROCEDURE — 72131 CT LUMBAR SPINE W/O DYE: CPT

## 2025-07-20 PROCEDURE — 80053 COMPREHEN METABOLIC PANEL: CPT

## 2025-07-20 PROCEDURE — 73610 X-RAY EXAM OF ANKLE: CPT

## 2025-07-20 PROCEDURE — 96374 THER/PROPH/DIAG INJ IV PUSH: CPT

## 2025-07-20 PROCEDURE — 94640 AIRWAY INHALATION TREATMENT: CPT

## 2025-07-20 PROCEDURE — 85025 COMPLETE CBC W/AUTO DIFF WBC: CPT

## 2025-07-20 PROCEDURE — 2500000003 HC RX 250 WO HCPCS: Performed by: INTERNAL MEDICINE

## 2025-07-20 PROCEDURE — 96372 THER/PROPH/DIAG INJ SC/IM: CPT

## 2025-07-20 PROCEDURE — 81003 URINALYSIS AUTO W/O SCOPE: CPT

## 2025-07-20 PROCEDURE — 6360000002 HC RX W HCPCS: Performed by: EMERGENCY MEDICINE

## 2025-07-20 RX ORDER — ACETAMINOPHEN 650 MG/1
650 SUPPOSITORY RECTAL EVERY 6 HOURS PRN
Status: DISCONTINUED | OUTPATIENT
Start: 2025-07-20 | End: 2025-07-22 | Stop reason: HOSPADM

## 2025-07-20 RX ORDER — SODIUM CHLORIDE 0.9 % (FLUSH) 0.9 %
5-40 SYRINGE (ML) INJECTION PRN
Status: DISCONTINUED | OUTPATIENT
Start: 2025-07-20 | End: 2025-07-22 | Stop reason: HOSPADM

## 2025-07-20 RX ORDER — ALLOPURINOL 100 MG/1
100 TABLET ORAL 2 TIMES DAILY
Status: DISCONTINUED | OUTPATIENT
Start: 2025-07-20 | End: 2025-07-22 | Stop reason: HOSPADM

## 2025-07-20 RX ORDER — ALBUTEROL SULFATE 0.83 MG/ML
2.5 SOLUTION RESPIRATORY (INHALATION) EVERY 6 HOURS PRN
Status: DISCONTINUED | OUTPATIENT
Start: 2025-07-20 | End: 2025-07-22 | Stop reason: HOSPADM

## 2025-07-20 RX ORDER — SODIUM CHLORIDE 9 MG/ML
INJECTION, SOLUTION INTRAVENOUS PRN
Status: DISCONTINUED | OUTPATIENT
Start: 2025-07-20 | End: 2025-07-22 | Stop reason: HOSPADM

## 2025-07-20 RX ORDER — POLYETHYLENE GLYCOL 3350 17 G/17G
17 POWDER, FOR SOLUTION ORAL DAILY PRN
Status: DISCONTINUED | OUTPATIENT
Start: 2025-07-20 | End: 2025-07-22 | Stop reason: HOSPADM

## 2025-07-20 RX ORDER — FUROSEMIDE 40 MG/1
40 TABLET ORAL DAILY
Status: DISCONTINUED | OUTPATIENT
Start: 2025-07-20 | End: 2025-07-22 | Stop reason: HOSPADM

## 2025-07-20 RX ORDER — POTASSIUM CHLORIDE 7.45 MG/ML
10 INJECTION INTRAVENOUS PRN
Status: DISCONTINUED | OUTPATIENT
Start: 2025-07-20 | End: 2025-07-22 | Stop reason: HOSPADM

## 2025-07-20 RX ORDER — FERROUS SULFATE 325(65) MG
325 TABLET ORAL
Status: DISCONTINUED | OUTPATIENT
Start: 2025-07-21 | End: 2025-07-22 | Stop reason: HOSPADM

## 2025-07-20 RX ORDER — ONDANSETRON 4 MG/1
4 TABLET, ORALLY DISINTEGRATING ORAL EVERY 8 HOURS PRN
Status: DISCONTINUED | OUTPATIENT
Start: 2025-07-20 | End: 2025-07-22 | Stop reason: HOSPADM

## 2025-07-20 RX ORDER — ZIPRASIDONE HYDROCHLORIDE 40 MG/1
40 CAPSULE ORAL EVERY EVENING
Status: DISCONTINUED | OUTPATIENT
Start: 2025-07-20 | End: 2025-07-22 | Stop reason: HOSPADM

## 2025-07-20 RX ORDER — POTASSIUM CHLORIDE 1500 MG/1
20 TABLET, EXTENDED RELEASE ORAL DAILY
Status: DISCONTINUED | OUTPATIENT
Start: 2025-07-20 | End: 2025-07-22 | Stop reason: HOSPADM

## 2025-07-20 RX ORDER — MONTELUKAST SODIUM 10 MG/1
10 TABLET ORAL DAILY
Status: DISCONTINUED | OUTPATIENT
Start: 2025-07-20 | End: 2025-07-22 | Stop reason: HOSPADM

## 2025-07-20 RX ORDER — ONDANSETRON 2 MG/ML
4 INJECTION INTRAMUSCULAR; INTRAVENOUS EVERY 6 HOURS PRN
Status: DISCONTINUED | OUTPATIENT
Start: 2025-07-20 | End: 2025-07-22 | Stop reason: HOSPADM

## 2025-07-20 RX ORDER — LANOLIN ALCOHOL/MO/W.PET/CERES
400 CREAM (GRAM) TOPICAL 3 TIMES DAILY
Status: DISCONTINUED | OUTPATIENT
Start: 2025-07-20 | End: 2025-07-22 | Stop reason: HOSPADM

## 2025-07-20 RX ORDER — ACETAMINOPHEN 325 MG/1
650 TABLET ORAL EVERY 6 HOURS PRN
Status: DISCONTINUED | OUTPATIENT
Start: 2025-07-20 | End: 2025-07-22 | Stop reason: HOSPADM

## 2025-07-20 RX ORDER — FENTANYL CITRATE 50 UG/ML
50 INJECTION, SOLUTION INTRAMUSCULAR; INTRAVENOUS ONCE
Status: COMPLETED | OUTPATIENT
Start: 2025-07-20 | End: 2025-07-20

## 2025-07-20 RX ORDER — MAGNESIUM SULFATE IN WATER 40 MG/ML
2000 INJECTION, SOLUTION INTRAVENOUS PRN
Status: DISCONTINUED | OUTPATIENT
Start: 2025-07-20 | End: 2025-07-22 | Stop reason: HOSPADM

## 2025-07-20 RX ORDER — PANTOPRAZOLE SODIUM 40 MG/1
40 TABLET, DELAYED RELEASE ORAL
Status: DISCONTINUED | OUTPATIENT
Start: 2025-07-21 | End: 2025-07-22 | Stop reason: HOSPADM

## 2025-07-20 RX ORDER — POTASSIUM CHLORIDE 1500 MG/1
40 TABLET, EXTENDED RELEASE ORAL PRN
Status: DISCONTINUED | OUTPATIENT
Start: 2025-07-20 | End: 2025-07-22 | Stop reason: HOSPADM

## 2025-07-20 RX ORDER — RIFAMPIN 300 MG/1
300 CAPSULE ORAL 2 TIMES DAILY
Status: DISCONTINUED | OUTPATIENT
Start: 2025-07-20 | End: 2025-07-22 | Stop reason: HOSPADM

## 2025-07-20 RX ORDER — GABAPENTIN 300 MG/1
600 CAPSULE ORAL 4 TIMES DAILY
Status: DISCONTINUED | OUTPATIENT
Start: 2025-07-20 | End: 2025-07-22 | Stop reason: HOSPADM

## 2025-07-20 RX ORDER — SODIUM CHLORIDE 0.9 % (FLUSH) 0.9 %
5-40 SYRINGE (ML) INJECTION EVERY 12 HOURS SCHEDULED
Status: DISCONTINUED | OUTPATIENT
Start: 2025-07-20 | End: 2025-07-22 | Stop reason: HOSPADM

## 2025-07-20 RX ORDER — METOPROLOL SUCCINATE 25 MG/1
25 TABLET, EXTENDED RELEASE ORAL 2 TIMES DAILY
Status: DISCONTINUED | OUTPATIENT
Start: 2025-07-20 | End: 2025-07-22 | Stop reason: HOSPADM

## 2025-07-20 RX ORDER — TRAZODONE HYDROCHLORIDE 50 MG/1
200 TABLET ORAL NIGHTLY
Status: DISCONTINUED | OUTPATIENT
Start: 2025-07-20 | End: 2025-07-22 | Stop reason: HOSPADM

## 2025-07-20 RX ORDER — ARFORMOTEROL TARTRATE 15 UG/2ML
15 SOLUTION RESPIRATORY (INHALATION)
Status: DISCONTINUED | OUTPATIENT
Start: 2025-07-20 | End: 2025-07-22 | Stop reason: HOSPADM

## 2025-07-20 RX ORDER — ENOXAPARIN SODIUM 100 MG/ML
30 INJECTION SUBCUTANEOUS 2 TIMES DAILY
Status: DISCONTINUED | OUTPATIENT
Start: 2025-07-20 | End: 2025-07-22 | Stop reason: HOSPADM

## 2025-07-20 RX ORDER — TOPIRAMATE 100 MG/1
200 TABLET, FILM COATED ORAL 2 TIMES DAILY
Status: DISCONTINUED | OUTPATIENT
Start: 2025-07-20 | End: 2025-07-22 | Stop reason: HOSPADM

## 2025-07-20 RX ORDER — DOCUSATE SODIUM 100 MG/1
200 CAPSULE, LIQUID FILLED ORAL 2 TIMES DAILY
Status: DISCONTINUED | OUTPATIENT
Start: 2025-07-20 | End: 2025-07-22 | Stop reason: HOSPADM

## 2025-07-20 RX ORDER — CITALOPRAM HYDROBROMIDE 20 MG/1
20 TABLET ORAL DAILY
Status: DISCONTINUED | OUTPATIENT
Start: 2025-07-20 | End: 2025-07-20

## 2025-07-20 RX ORDER — LEVOCARNITINE 330 MG/1
330 TABLET ORAL 3 TIMES DAILY
Status: DISCONTINUED | OUTPATIENT
Start: 2025-07-20 | End: 2025-07-20 | Stop reason: CLARIF

## 2025-07-20 RX ORDER — BACLOFEN 10 MG/1
20 TABLET ORAL 4 TIMES DAILY
Status: DISCONTINUED | OUTPATIENT
Start: 2025-07-20 | End: 2025-07-22 | Stop reason: HOSPADM

## 2025-07-20 RX ADMIN — METOPROLOL SUCCINATE 25 MG: 25 TABLET, EXTENDED RELEASE ORAL at 22:17

## 2025-07-20 RX ADMIN — TOPIRAMATE 200 MG: 100 TABLET, FILM COATED ORAL at 22:17

## 2025-07-20 RX ADMIN — ZIPRASIDONE HYDROCHLORIDE 40 MG: 40 CAPSULE ORAL at 22:22

## 2025-07-20 RX ADMIN — DOCUSATE SODIUM 200 MG: 100 CAPSULE, LIQUID FILLED ORAL at 22:18

## 2025-07-20 RX ADMIN — SODIUM CHLORIDE, PRESERVATIVE FREE 10 ML: 5 INJECTION INTRAVENOUS at 22:19

## 2025-07-20 RX ADMIN — TRAZODONE HYDROCHLORIDE 200 MG: 50 TABLET ORAL at 22:18

## 2025-07-20 RX ADMIN — FENTANYL CITRATE 50 MCG: 50 INJECTION, SOLUTION INTRAMUSCULAR; INTRAVENOUS at 16:16

## 2025-07-20 RX ADMIN — ENOXAPARIN SODIUM 30 MG: 100 INJECTION SUBCUTANEOUS at 22:19

## 2025-07-20 RX ADMIN — FUROSEMIDE 40 MG: 40 TABLET ORAL at 22:24

## 2025-07-20 RX ADMIN — BACLOFEN 20 MG: 10 TABLET ORAL at 22:18

## 2025-07-20 RX ADMIN — Medication 400 MG: at 22:17

## 2025-07-20 RX ADMIN — MONTELUKAST 10 MG: 10 TABLET, FILM COATED ORAL at 22:24

## 2025-07-20 RX ADMIN — ALLOPURINOL 100 MG: 100 TABLET ORAL at 22:18

## 2025-07-20 RX ADMIN — ACETAMINOPHEN 650 MG: 325 TABLET ORAL at 22:17

## 2025-07-20 RX ADMIN — ARFORMOTEROL TARTRATE 15 MCG: 15 SOLUTION RESPIRATORY (INHALATION) at 19:58

## 2025-07-20 RX ADMIN — PANCRELIPASE LIPASE, PANCRELIPASE PROTEASE, PANCRELIPASE AMYLASE 80000 UNITS: 20000; 63000; 84000 CAPSULE, DELAYED RELEASE ORAL at 22:19

## 2025-07-20 RX ADMIN — GABAPENTIN 600 MG: 300 CAPSULE ORAL at 22:18

## 2025-07-20 RX ADMIN — POTASSIUM CHLORIDE 20 MEQ: 1500 TABLET, EXTENDED RELEASE ORAL at 22:24

## 2025-07-20 RX ADMIN — IPRATROPIUM BROMIDE 0.5 MG: 0.5 SOLUTION RESPIRATORY (INHALATION) at 19:58

## 2025-07-20 ASSESSMENT — PAIN DESCRIPTION - LOCATION
LOCATION: LEG;BACK;HEAD
LOCATION: LEG;HEAD

## 2025-07-20 ASSESSMENT — LIFESTYLE VARIABLES
HOW MANY STANDARD DRINKS CONTAINING ALCOHOL DO YOU HAVE ON A TYPICAL DAY: PATIENT DOES NOT DRINK
HOW OFTEN DO YOU HAVE A DRINK CONTAINING ALCOHOL: NEVER

## 2025-07-20 ASSESSMENT — PAIN DESCRIPTION - PAIN TYPE
TYPE: ACUTE PAIN
TYPE: ACUTE PAIN

## 2025-07-20 ASSESSMENT — PAIN DESCRIPTION - ORIENTATION
ORIENTATION: LEFT;RIGHT
ORIENTATION: RIGHT;LEFT;LOWER

## 2025-07-20 ASSESSMENT — PAIN SCALES - GENERAL
PAINLEVEL_OUTOF10: 9
PAINLEVEL_OUTOF10: 3
PAINLEVEL_OUTOF10: 9

## 2025-07-20 ASSESSMENT — PAIN DESCRIPTION - DESCRIPTORS
DESCRIPTORS: SHARP;THROBBING
DESCRIPTORS: THROBBING

## 2025-07-20 NOTE — PROGRESS NOTES
Leny Yang was ordered linaclotide (Linzess) which is a nonformulary medication.  This medication will need to be supplied by the patient as the pharmacy does not carry this non-formulary medication.    If the medication has not been administered by 1400 on the following day from the time the order was placed, a pharmacist will follow-up with the nurse of the patient to assess the capability of the patient to bring in the medication.    If it is determined that the patient cannot supply the medication and it is not available to be dispensed from the pharmacy, the provider will be notified.

## 2025-07-20 NOTE — ED PROVIDER NOTES
Department of Emergency Medicine   ED  Provider Note  Admit Date/RoomTime: 7/20/2025  2:09 PM  ED Room: YANG D/HD          History of Present Illness:  7/20/25, Time: 5:52 PM EDT  Chief Complaint   Patient presents with    Fall     Mechanical fall. On floor for 4 hours. Recently broke clavicle.                eLny Yang is a 64 y.o. female presenting to the ED for fall.  Patient lives at home alone.  She fell out of her wheelchair.  She was on the ground about 4 hours.  She says she was really seen in the ER, similar situation where she fell, but at that point she broke her clavicle.  She was in too much pain to get up.  Neighbor checked on her, EMS was called.  She did hit her head, there is no loss of conscious, she is on no anticoagulation.  She does complain of left ankle and right knee pain.  No nausea or vomiting.  No chest pain or back pain.  No paresthesias.  No cough or sputum.  No other symptoms or complaints.    Review of Systems:   Pertinent positives and negatives are stated within HPI, all other systems reviewed and are negative.        --------------------------------------------- PAST HISTORY ---------------------------------------------  Past Medical History:  has a past medical history of Adenoma of right adrenal gland, Anemia, Anesthesia complication, Anxiety, Arrhythmia, Arthritis, Asthma, Benign essential tremor, Bipolar affective (HCC), Chronic back pain, Chronic respiratory failure with hypoxia (HCC), Decreased dorsalis pedis pulse, Depression, Difficulty swallowing, Environmental and seasonal allergies, Excessive physiologic tremor, Fatty liver, Full dentures, GERD (gastroesophageal reflux disease), Gout, Herniated cervical disc, History of swelling of feet, Hypertension, Leg pain, Leg swelling, Lymphedema of both lower extremities, Lymphedema of lower extremity, Mitochondrial cytopathy, MRSA (methicillin resistant Staphylococcus aureus), Muscle weakness (generalized), Nausea, Need  for assistance with personal care, Neuropathy, Obesity, PETR (obstructive sleep apnea), Osteoarthritis of left knee, Pancreatic duct disruption, Pancreatitis, PONV (postoperative nausea and vomiting), Seizures (HCC), Spinal headache, Thyroid disease, and Wheelchair dependence.    Past Surgical History:  has a past surgical history that includes knee surgery (Bilateral); Gastric bypass surgery (1999); myomectomy; Tonsillectomy; Nerve Block (Left, 10/02/2013); Nerve Block (Left, 10/09/2013); Nerve Block (Left, 10/16/2013); other surgical history (Left, 11/25/2013); Nerve Block (Left, 10/29/2014); Nerve Block (Left, 11/12/2014); Nerve Block (Left, 12/08/2014); Nerve Block (Right, 03/30/2015); Nerve Block (Right, 04/06/2015); Nerve Block (Right, 04/13/2015); Nerve Block (Left, 07/06/2015); Nerve Block (07/20/2015); Nerve Block (Left, 10/01/2015); Nerve Block (10/26/2015); other surgical history (03/28/2016); Endoscopy, colon, diagnostic; pr colonoscopy flx dx w/collj spec when pfrmd (N/A, 03/20/2018); pr egd transoral biopsy single/multiple (N/A, 03/20/2018); Cholecystectomy (1999); Hysterectomy (1988); Colonoscopy (N/A, 09/18/2018); Esophagus dilation (09/18/2018); back surgery (last one 1995); Cardiac catheterization (Right, 06/06/2013); Appendectomy; other surgical history (1995); joint replacement (Bilateral, 2007,2017); egd colonoscopy (N/A, 10/08/2019); Colonoscopy (N/A, 10/08/2019); Nerve Block (Bilateral, 04/01/2021); Ankle fracture surgery (Right, 02/14/2022); Ankle fracture surgery (Right, 03/02/2022); knee surgery (Right, 08/19/2022); knee surgery (Right, 01/25/2023); IR ASP ABSCESS/HEMATOMA/BULLA/CYST (03/30/2023); Colonoscopy (2023); Colonoscopy (N/A, 8/15/2023); Upper gastrointestinal endoscopy (N/A, 8/15/2023); and ERCP (N/A, 2/1/2024).    Social History:  reports that she has been smoking cigarettes. She started smoking about 35 years ago. She has a 130.6 pack-year smoking history. She has never used

## 2025-07-21 LAB
CRP SERPL HS-MCNC: 9.5 MG/L (ref 0–5)
ERYTHROCYTE [SEDIMENTATION RATE] IN BLOOD BY WESTERGREN METHOD: 27 MM/HR (ref 0–20)
MICROORGANISM SPEC CULT: NO GROWTH
SERVICE CMNT-IMP: NORMAL
SPECIMEN DESCRIPTION: NORMAL

## 2025-07-21 PROCEDURE — G0378 HOSPITAL OBSERVATION PER HR: HCPCS

## 2025-07-21 PROCEDURE — 36415 COLL VENOUS BLD VENIPUNCTURE: CPT

## 2025-07-21 PROCEDURE — 2500000003 HC RX 250 WO HCPCS: Performed by: INTERNAL MEDICINE

## 2025-07-21 PROCEDURE — 85652 RBC SED RATE AUTOMATED: CPT

## 2025-07-21 PROCEDURE — 96375 TX/PRO/DX INJ NEW DRUG ADDON: CPT

## 2025-07-21 PROCEDURE — 86140 C-REACTIVE PROTEIN: CPT

## 2025-07-21 PROCEDURE — 96372 THER/PROPH/DIAG INJ SC/IM: CPT

## 2025-07-21 PROCEDURE — 6370000000 HC RX 637 (ALT 250 FOR IP): Performed by: INTERNAL MEDICINE

## 2025-07-21 PROCEDURE — 6360000002 HC RX W HCPCS

## 2025-07-21 PROCEDURE — 6360000002 HC RX W HCPCS: Performed by: INTERNAL MEDICINE

## 2025-07-21 PROCEDURE — 94640 AIRWAY INHALATION TREATMENT: CPT

## 2025-07-21 PROCEDURE — 2700000000 HC OXYGEN THERAPY PER DAY

## 2025-07-21 RX ORDER — KETOROLAC TROMETHAMINE 30 MG/ML
15 INJECTION, SOLUTION INTRAMUSCULAR; INTRAVENOUS ONCE
Status: COMPLETED | OUTPATIENT
Start: 2025-07-21 | End: 2025-07-21

## 2025-07-21 RX ORDER — OXYCODONE AND ACETAMINOPHEN 10; 325 MG/1; MG/1
TABLET ORAL
Status: ON HOLD | COMMUNITY
Start: 2025-07-16 | End: 2025-07-21 | Stop reason: HOSPADM

## 2025-07-21 RX ADMIN — PANTOPRAZOLE SODIUM 40 MG: 40 TABLET, DELAYED RELEASE ORAL at 05:54

## 2025-07-21 RX ADMIN — ENOXAPARIN SODIUM 30 MG: 100 INJECTION SUBCUTANEOUS at 20:11

## 2025-07-21 RX ADMIN — POLYETHYLENE GLYCOL 3350 17 G: 17 POWDER, FOR SOLUTION ORAL at 16:21

## 2025-07-21 RX ADMIN — BACLOFEN 20 MG: 10 TABLET ORAL at 13:45

## 2025-07-21 RX ADMIN — TRAZODONE HYDROCHLORIDE 200 MG: 50 TABLET ORAL at 20:11

## 2025-07-21 RX ADMIN — FERROUS SULFATE TAB 325 MG (65 MG ELEMENTAL FE) 325 MG: 325 (65 FE) TAB at 08:31

## 2025-07-21 RX ADMIN — METOPROLOL SUCCINATE 25 MG: 25 TABLET, EXTENDED RELEASE ORAL at 20:11

## 2025-07-21 RX ADMIN — POTASSIUM CHLORIDE 20 MEQ: 1500 TABLET, EXTENDED RELEASE ORAL at 08:30

## 2025-07-21 RX ADMIN — ENOXAPARIN SODIUM 30 MG: 100 INJECTION SUBCUTANEOUS at 08:34

## 2025-07-21 RX ADMIN — SODIUM CHLORIDE, PRESERVATIVE FREE 10 ML: 5 INJECTION INTRAVENOUS at 20:12

## 2025-07-21 RX ADMIN — PANCRELIPASE LIPASE, PANCRELIPASE PROTEASE, PANCRELIPASE AMYLASE 80000 UNITS: 20000; 63000; 84000 CAPSULE, DELAYED RELEASE ORAL at 16:17

## 2025-07-21 RX ADMIN — KETOROLAC TROMETHAMINE 15 MG: 30 INJECTION, SOLUTION INTRAMUSCULAR at 17:31

## 2025-07-21 RX ADMIN — IPRATROPIUM BROMIDE 0.5 MG: 0.5 SOLUTION RESPIRATORY (INHALATION) at 04:19

## 2025-07-21 RX ADMIN — BACLOFEN 20 MG: 10 TABLET ORAL at 20:11

## 2025-07-21 RX ADMIN — ALLOPURINOL 100 MG: 100 TABLET ORAL at 08:31

## 2025-07-21 RX ADMIN — DOCUSATE SODIUM 200 MG: 100 CAPSULE, LIQUID FILLED ORAL at 08:30

## 2025-07-21 RX ADMIN — GABAPENTIN 600 MG: 300 CAPSULE ORAL at 16:17

## 2025-07-21 RX ADMIN — Medication 400 MG: at 13:45

## 2025-07-21 RX ADMIN — ACETAMINOPHEN 650 MG: 325 TABLET ORAL at 14:33

## 2025-07-21 RX ADMIN — BACLOFEN 20 MG: 10 TABLET ORAL at 16:17

## 2025-07-21 RX ADMIN — FUROSEMIDE 40 MG: 40 TABLET ORAL at 08:30

## 2025-07-21 RX ADMIN — RIFAMPIN 300 MG: 300 CAPSULE ORAL at 11:21

## 2025-07-21 RX ADMIN — TOPIRAMATE 200 MG: 100 TABLET, FILM COATED ORAL at 20:11

## 2025-07-21 RX ADMIN — MICONAZOLE NITRATE: 20.6 POWDER TOPICAL at 11:23

## 2025-07-21 RX ADMIN — IPRATROPIUM BROMIDE 0.5 MG: 0.5 SOLUTION RESPIRATORY (INHALATION) at 13:25

## 2025-07-21 RX ADMIN — PANCRELIPASE LIPASE, PANCRELIPASE PROTEASE, PANCRELIPASE AMYLASE 80000 UNITS: 20000; 63000; 84000 CAPSULE, DELAYED RELEASE ORAL at 11:21

## 2025-07-21 RX ADMIN — PANCRELIPASE LIPASE, PANCRELIPASE PROTEASE, PANCRELIPASE AMYLASE 80000 UNITS: 20000; 63000; 84000 CAPSULE, DELAYED RELEASE ORAL at 20:11

## 2025-07-21 RX ADMIN — Medication 400 MG: at 08:31

## 2025-07-21 RX ADMIN — TOPIRAMATE 200 MG: 100 TABLET, FILM COATED ORAL at 08:30

## 2025-07-21 RX ADMIN — DOCUSATE SODIUM 200 MG: 100 CAPSULE, LIQUID FILLED ORAL at 20:11

## 2025-07-21 RX ADMIN — MONTELUKAST 10 MG: 10 TABLET, FILM COATED ORAL at 08:30

## 2025-07-21 RX ADMIN — ALBUTEROL SULFATE 2.5 MG: 0.83 SOLUTION RESPIRATORY (INHALATION) at 04:20

## 2025-07-21 RX ADMIN — GABAPENTIN 600 MG: 300 CAPSULE ORAL at 08:30

## 2025-07-21 RX ADMIN — IPRATROPIUM BROMIDE 0.5 MG: 0.5 SOLUTION RESPIRATORY (INHALATION) at 08:41

## 2025-07-21 RX ADMIN — MICONAZOLE NITRATE: 20.6 POWDER TOPICAL at 20:12

## 2025-07-21 RX ADMIN — BACLOFEN 20 MG: 10 TABLET ORAL at 08:30

## 2025-07-21 RX ADMIN — ACETAMINOPHEN 650 MG: 325 TABLET ORAL at 05:54

## 2025-07-21 RX ADMIN — Medication 400 MG: at 20:11

## 2025-07-21 RX ADMIN — GABAPENTIN 600 MG: 300 CAPSULE ORAL at 20:11

## 2025-07-21 RX ADMIN — ARFORMOTEROL TARTRATE 15 MCG: 15 SOLUTION RESPIRATORY (INHALATION) at 08:41

## 2025-07-21 RX ADMIN — RIFAMPIN 300 MG: 300 CAPSULE ORAL at 20:52

## 2025-07-21 RX ADMIN — METOPROLOL SUCCINATE 25 MG: 25 TABLET, EXTENDED RELEASE ORAL at 08:30

## 2025-07-21 RX ADMIN — ALLOPURINOL 100 MG: 100 TABLET ORAL at 20:11

## 2025-07-21 RX ADMIN — PANCRELIPASE LIPASE, PANCRELIPASE PROTEASE, PANCRELIPASE AMYLASE 80000 UNITS: 20000; 63000; 84000 CAPSULE, DELAYED RELEASE ORAL at 08:30

## 2025-07-21 RX ADMIN — GABAPENTIN 600 MG: 300 CAPSULE ORAL at 13:45

## 2025-07-21 RX ADMIN — ZIPRASIDONE HYDROCHLORIDE 40 MG: 40 CAPSULE ORAL at 17:32

## 2025-07-21 ASSESSMENT — PAIN DESCRIPTION - FREQUENCY
FREQUENCY: CONTINUOUS
FREQUENCY: CONTINUOUS

## 2025-07-21 ASSESSMENT — PAIN SCALES - GENERAL
PAINLEVEL_OUTOF10: 6
PAINLEVEL_OUTOF10: 0
PAINLEVEL_OUTOF10: 7
PAINLEVEL_OUTOF10: 4
PAINLEVEL_OUTOF10: 9
PAINLEVEL_OUTOF10: 3

## 2025-07-21 ASSESSMENT — PAIN DESCRIPTION - DESCRIPTORS
DESCRIPTORS: ACHING;DISCOMFORT;BURNING
DESCRIPTORS: ACHING;CRAMPING;SORE
DESCRIPTORS: ACHING;BURNING;SORE

## 2025-07-21 ASSESSMENT — PAIN DESCRIPTION - PAIN TYPE
TYPE: ACUTE PAIN;CHRONIC PAIN
TYPE: ACUTE PAIN

## 2025-07-21 ASSESSMENT — PAIN DESCRIPTION - LOCATION
LOCATION: LEG
LOCATION: GENERALIZED;LEG
LOCATION: GENERALIZED

## 2025-07-21 ASSESSMENT — PAIN DESCRIPTION - ORIENTATION: ORIENTATION: LEFT

## 2025-07-21 NOTE — ED NOTES
Pt is requesting this RN to call EMT as to where her keys are. EMT reports that the only belongings they brought with pt were 2 cell phones and a .

## 2025-07-21 NOTE — CONSULTS
Department of Orthopedic Surgery  Resident Consult Note          Reason for Consult: Right knee pain and prior left clavicle fracture    HISTORY OF PRESENT ILLNESS:       Patient is a 64 y.o. female who presents to [facility] due to right knee pain status post fall.  She states that she fell out of her wheelchair twice over the past week and has had increasing knee pain.  Patient states that she is nonambulatory and gets around in a wheelchair.  Patient has a complicated history about her right knee and she currently has an antibiotic spacer implanted by Dr. Brice.  In addition, patient also has hardware in her right ankle due to prior fracture.  Patient states that she has pain about her knee globally and is not able to focally point out where her pain is most severe.  She is also very sensitive to touch about her right knee.  She states that she also has hip pain.  She denies any fevers or chills.  She states that she has baseline paresthesias about bilateral feet however she believes they may be worse today than prior.  She has a very complicated medical history.  Denies any other orthopedic complaints at this time.      Past Medical History:        Diagnosis Date    Adenoma of right adrenal gland     Anemia     Anesthesia complication     woke up during Diane en y gastric bypass; couldn't speak or move    Anxiety     Arrhythmia     [facility] cardiology    Arthritis     spinal    Asthma     controlled with inhalers     Benign essential tremor     Bipolar affective (HCC)     Chronic back pain     Spinal cord stimulator in place    Chronic respiratory failure with hypoxia (HCC)     at times dt diaphragm does not function properly dt Mitochondrial disorder    Decreased dorsalis pedis pulse 01/25/2024    Depression     stable    Difficulty swallowing     for EGD 10-8-19     Environmental and seasonal allergies     Excessive physiologic tremor 05/24/2021    Fatty liver     Full dentures

## 2025-07-21 NOTE — PROGRESS NOTES
4 Eyes Skin Assessment     NAME:  Leny Yang  YOB: 1960  MEDICAL RECORD NUMBER:  [id number]    The patient is being assessed for  Admission    I agree that at least one RN has performed a thorough Head to Toe Skin Assessment on the patient. ALL assessment sites listed below have been assessed.      Areas assessed by both nurses:    Head, Face, Ears, Shoulders, Back, Chest, Arms, Elbows, Hands, Sacrum. Buttock, Coccyx, Ischium, and Legs. Feet and Heels        Does the Patient have a Wound? Yes wound(s) were present on assessment. LDA wound assessment was Initiated and completed by RN       Misha Prevention initiated by RN: Yes  Wound Care Orders initiated by RN: Yes    For hospital-acquired stage 1 & 2 and ALL Stage 3,4, Unstageable, DTI, NWPT, and Complex wounds: place order “IP Wound Care/Ostomy Nurse Eval and Treat” by RN under : Yes    New Ostomies, if present place, Ostomy referral order under : No     Nurse 1 eSignature: Electronically signed by Mesha Elizondo RN on 7/20/25 at 9:34 PM EDT    **SHARE this note so that the co-signing nurse can place an eSignature**    Nurse 2 eSignature: Electronically signed by Sona Negrete RN on 7/21/25 at 4:22 AM EDT

## 2025-07-21 NOTE — H&P
[facility]  History and Physical      CHIEF COMPLAINT: Recurrent falls    Reason for Admission: Possible placement    History Obtained From: Patient    PCP :  Adam Whyte MD    [address] [place] [phone]      HISTORY OF PRESENT ILLNESS:      The patient is a 64 y.o. female presents to the emergency room after falling.  Patient recently fell with clavicular fracture.  Patient was admitted for possible placement.  At the time of questioning patient reports that she believes she got really drowsy after taking narcotic.  She wants Percocet stopped.  She would like to go home on discharge.  Physical therapy evaluation is pending.  She denies any loss of consciousness.    Past Medical History:        Diagnosis Date    Adenoma of right adrenal gland     Anemia     Anesthesia complication     woke up during Diane en y gastric bypass; couldn't speak or move    Anxiety     Arrhythmia     [facility] cardiology    Arthritis     spinal    Asthma     controlled with inhalers     Benign essential tremor     Bipolar affective (HCC)     Chronic back pain     Spinal cord stimulator in place    Chronic respiratory failure with hypoxia (HCC)     at times dt diaphragm does not function properly dt Mitochondrial disorder    Decreased dorsalis pedis pulse 01/25/2024    Depression     stable    Difficulty swallowing     for EGD 10-8-19     Environmental and seasonal allergies     Excessive physiologic tremor 05/24/2021    Fatty liver     Full dentures     GERD (gastroesophageal reflux disease)     Gout     past hx of    Herniated cervical disc     limited rom of head and neck    History of swelling of feet     Hypertension     Leg pain     Leg swelling     Lymphedema of both lower extremities 01/25/2024    Lymphedema of lower extremity 09/06/2012    Mitochondrial cytopathy     s/s muscle and nerve pain, difficulty breathing, seizures, difficulty swallowing, digestive disorders- Dr. Inocente Hickman

## 2025-07-21 NOTE — PROGRESS NOTES
Dr Aristides escamilla served that Liliana unable to leave  because [facility] wouldn't drop off to without someone being there. It's their policy.

## 2025-07-21 NOTE — CARE COORDINATION
7/21. Met with the pt at the bedside to discuss transition of care. The pt lives alone, one floor. She mostly uses her electric wc. PCP Dr Whyte/. Pharmacy [facility] pharmacy. She does not wish to go to rehab, but is agreeable to home care. She chose [facility] care. Referral made via [facility]. Kate Peña RN

## 2025-07-21 NOTE — PROGRESS NOTES
[facility] called by this nurse for ambulance transfer home today. This nurse was told that [facility] will not transfer her home by stretcher if there is no one home to receive her. Sheff lives alone and no one will be there. [facility] has placed her on a Will call.

## 2025-07-21 NOTE — PROGRESS NOTES
Dr. Irving notified that PT is requesting ortho consult regarding follow up for clavicle fracture as well as post surgical changes seen on knee xray prior to working with therapy. New orders noted.  Electronically signed by Bette Fofana RN on 7/21/2025 at 2:13 PM

## 2025-07-22 VITALS
WEIGHT: 291 LBS | HEART RATE: 56 BPM | SYSTOLIC BLOOD PRESSURE: 120 MMHG | BODY MASS INDEX: 45.67 KG/M2 | OXYGEN SATURATION: 97 % | HEIGHT: 67 IN | DIASTOLIC BLOOD PRESSURE: 52 MMHG | TEMPERATURE: 97.8 F | RESPIRATION RATE: 18 BRPM

## 2025-07-22 PROCEDURE — 6360000002 HC RX W HCPCS: Performed by: INTERNAL MEDICINE

## 2025-07-22 PROCEDURE — 97535 SELF CARE MNGMENT TRAINING: CPT

## 2025-07-22 PROCEDURE — 97161 PT EVAL LOW COMPLEX 20 MIN: CPT

## 2025-07-22 PROCEDURE — G0378 HOSPITAL OBSERVATION PER HR: HCPCS

## 2025-07-22 PROCEDURE — 94640 AIRWAY INHALATION TREATMENT: CPT

## 2025-07-22 PROCEDURE — 2700000000 HC OXYGEN THERAPY PER DAY

## 2025-07-22 PROCEDURE — 97166 OT EVAL MOD COMPLEX 45 MIN: CPT

## 2025-07-22 PROCEDURE — 2500000003 HC RX 250 WO HCPCS: Performed by: INTERNAL MEDICINE

## 2025-07-22 PROCEDURE — 96372 THER/PROPH/DIAG INJ SC/IM: CPT

## 2025-07-22 PROCEDURE — 6370000000 HC RX 637 (ALT 250 FOR IP): Performed by: INTERNAL MEDICINE

## 2025-07-22 PROCEDURE — 97530 THERAPEUTIC ACTIVITIES: CPT

## 2025-07-22 RX ADMIN — METOPROLOL SUCCINATE 25 MG: 25 TABLET, EXTENDED RELEASE ORAL at 08:21

## 2025-07-22 RX ADMIN — GABAPENTIN 600 MG: 300 CAPSULE ORAL at 08:20

## 2025-07-22 RX ADMIN — MONTELUKAST 10 MG: 10 TABLET, FILM COATED ORAL at 08:20

## 2025-07-22 RX ADMIN — ALLOPURINOL 100 MG: 100 TABLET ORAL at 08:21

## 2025-07-22 RX ADMIN — MICONAZOLE NITRATE: 20.6 POWDER TOPICAL at 08:36

## 2025-07-22 RX ADMIN — POTASSIUM CHLORIDE 20 MEQ: 1500 TABLET, EXTENDED RELEASE ORAL at 08:21

## 2025-07-22 RX ADMIN — ACETAMINOPHEN 650 MG: 325 TABLET ORAL at 05:06

## 2025-07-22 RX ADMIN — ENOXAPARIN SODIUM 30 MG: 100 INJECTION SUBCUTANEOUS at 08:20

## 2025-07-22 RX ADMIN — Medication 400 MG: at 08:21

## 2025-07-22 RX ADMIN — FUROSEMIDE 40 MG: 40 TABLET ORAL at 08:21

## 2025-07-22 RX ADMIN — PANCRELIPASE LIPASE, PANCRELIPASE PROTEASE, PANCRELIPASE AMYLASE 80000 UNITS: 20000; 63000; 84000 CAPSULE, DELAYED RELEASE ORAL at 08:20

## 2025-07-22 RX ADMIN — BACLOFEN 20 MG: 10 TABLET ORAL at 08:21

## 2025-07-22 RX ADMIN — RIFAMPIN 300 MG: 300 CAPSULE ORAL at 08:22

## 2025-07-22 RX ADMIN — SODIUM CHLORIDE, PRESERVATIVE FREE 10 ML: 5 INJECTION INTRAVENOUS at 08:23

## 2025-07-22 RX ADMIN — IPRATROPIUM BROMIDE 0.5 MG: 0.5 SOLUTION RESPIRATORY (INHALATION) at 03:06

## 2025-07-22 RX ADMIN — ALBUTEROL SULFATE 2.5 MG: 0.83 SOLUTION RESPIRATORY (INHALATION) at 03:07

## 2025-07-22 RX ADMIN — TOPIRAMATE 200 MG: 100 TABLET, FILM COATED ORAL at 08:22

## 2025-07-22 RX ADMIN — FERROUS SULFATE TAB 325 MG (65 MG ELEMENTAL FE) 325 MG: 325 (65 FE) TAB at 08:21

## 2025-07-22 RX ADMIN — POLYETHYLENE GLYCOL 3350 17 G: 17 POWDER, FOR SOLUTION ORAL at 08:32

## 2025-07-22 RX ADMIN — DOCUSATE SODIUM 200 MG: 100 CAPSULE, LIQUID FILLED ORAL at 08:20

## 2025-07-22 RX ADMIN — PANTOPRAZOLE SODIUM 40 MG: 40 TABLET, DELAYED RELEASE ORAL at 05:07

## 2025-07-22 ASSESSMENT — PAIN DESCRIPTION - FREQUENCY: FREQUENCY: CONTINUOUS

## 2025-07-22 ASSESSMENT — PAIN DESCRIPTION - ONSET: ONSET: AWAKENED FROM SLEEP

## 2025-07-22 ASSESSMENT — PAIN DESCRIPTION - PAIN TYPE: TYPE: ACUTE PAIN

## 2025-07-22 ASSESSMENT — PAIN DESCRIPTION - LOCATION: LOCATION: BACK

## 2025-07-22 ASSESSMENT — PAIN SCALES - GENERAL
PAINLEVEL_OUTOF10: 8
PAINLEVEL_OUTOF10: 0
PAINLEVEL_OUTOF10: 5

## 2025-07-22 ASSESSMENT — PAIN DESCRIPTION - DESCRIPTORS: DESCRIPTORS: ACHING;SORE;DISCOMFORT

## 2025-07-22 ASSESSMENT — PAIN DESCRIPTION - ORIENTATION: ORIENTATION: LOWER

## 2025-07-22 ASSESSMENT — PAIN - FUNCTIONAL ASSESSMENT: PAIN_FUNCTIONAL_ASSESSMENT: PREVENTS OR INTERFERES SOME ACTIVE ACTIVITIES AND ADLS

## 2025-07-22 NOTE — DISCHARGE INSTRUCTIONS
Your information:  Name: Leny Yang  : 1960    Your instructions:    Home health care for dressing change: Cleanse wound to Left lower leg with saline, apply xeroform then cover with 4x4, abd pad and wrap from toes to knee with kerlex followed by ACE wrap Change dressing daily     What to do after you leave the hospital:    Recommended diet: {diet:65328}    Recommended activity: {discharge activity:68448}        The following personal items were collected during your admission and were returned to you:    Belongings  Dental Appliances: None  Vision - Corrective Lenses: Eyeglasses  Hearing Aid: None  Clothing: At home  Jewelry: None  Electronic Devices: Cell Phone,  (x2)  Weapons (Notify Protective Services/Security): None  Home Medications: None  Valuables Given To: Patient  Provide Name(s) of Who Valuable(s) Were Given To: na    Information obtained by:  By signing below, I understand that if any problems occur once I leave the hospital I am to contact ***.  I understand and acknowledge receipt of the instructions indicated above.

## 2025-07-22 NOTE — PROGRESS NOTES
Patient discharging home with [facility]. [facility] for transportation @1030. IV removed per protocol without any complications. Discharge instructions reviewed with patient and no further questions, verbalized understanding.

## 2025-07-22 NOTE — CARE COORDINATION
7/22. Transportation home arranged with [facility] for 1030. Nursing and pt aware. Kate Peña RN

## 2025-07-22 NOTE — PROGRESS NOTES
Occupational Therapy  OCCUPATIONAL THERAPY INITIAL EVALUATION    [facility]   [address] [place], [place]       Date:2025                                                  Patient Name: Leny Yang  MRN: [id number]  : 1960  Room: 5405404-    Evaluating OT: Trisha Bui, OTR/L   License #  [id number]     Occupational therapy physician order:  25 1230  OT eval and treat  Start:  25 1230,   End:  25 1230,   ONE TIME,   Standing Count:  1 Occurrences,   R         Aristides, Osvaldo HOFFMAN MD          Pt presents to ED with fall     Diagnosis: Fall at home, initial encounter [W19.XXXA, Y92.009]    Pertinent Medical History:   Past Medical History:   Diagnosis Date    Adenoma of right adrenal gland     Anemia     Anesthesia complication     woke up during Diane en y gastric bypass; couldn't speak or move    Anxiety     Arrhythmia     [facility] cardiology    Arthritis     spinal    Asthma     controlled with inhalers     Benign essential tremor     Bipolar affective (HCC)     Chronic back pain     Spinal cord stimulator in place    Chronic respiratory failure with hypoxia (HCC)     at times dt diaphragm does not function properly dt Mitochondrial disorder    Decreased dorsalis pedis pulse 2024    Depression     stable    Difficulty swallowing     for EGD 10-8-19     Environmental and seasonal allergies     Excessive physiologic tremor 2021    Fatty liver     Full dentures     GERD (gastroesophageal reflux disease)     Gout     past hx of    Herniated cervical disc     limited rom of head and neck    History of swelling of feet     Hypertension     Leg pain     Leg swelling     Lymphedema of both lower extremities 2024    Lymphedema of lower extremity 2012    Mitochondrial cytopathy     s/s muscle and nerve pain, difficulty breathing, seizures, difficulty swallowing, digestive disorders- Dr. Brower  Clinical decision making  Co-morbidities affecting occupational performance  Modification or assistance to complete eval    Low Complexity   1 to 3 []  Low []  None []  None []   Moderate Complexity   3 to 5 [x]  Mod [x]  Maybe []  Min to Mod [x]   High Complexity   5 or more []  High []  Yes [x]  Max []     The above evaluation is classified as moderate  complexity based off the noted performance deficits, personal factors, co-morbidities, assistance required, and other factors as noted in the clinical evaluation and functional testing.     Evaluation time includes thorough review of current medical information, gathering information on past medical & social history & PLOF, completion of standardized testing, informal observation of tasks, consultation with other medical professions/disciplines, assessment of data & development of POC/goals.     Time In: 8:54 AM  Time Out: 9:30 AM  Total Treatment Time: 8 mintues    Min Units   OT Eval Low 20223      OT Eval Medium 97166 x 1   OT Eval High 12094      OT Re-Eval 24389       Therapeutic Ex 20725       Therapeutic Activities 93982       ADL/Self Care 24733  8  1   Orthotic Management 24699       Manual 32312     Neuro Re-Ed 14728       Non-Billable Time                Trisha Bui, OTR/L   License #  [id number]

## 2025-07-22 NOTE — FLOWSHEET NOTE
Inpatient Wound Care (Initial consult) 5404B    Admit Date: 7/20/2025  2:09 PM    Reason for consult:  Left lower leg    Significant history: Per H&P    HISTORY OF PRESENT ILLNESS:       The patient is a 64 y.o. female presents to the emergency room after falling.  Patient recently fell with clavicular fracture.  Patient was admitted for possible placement.  At the time of questioning patient reports that she believes she got really drowsy after taking narcotic.  She wants Percocet stopped.  She would like to go home on discharge.  Physical therapy evaluation is pending.  She denies any loss of consciousness.       Findings:     07/22/25 1002   Skin Integumentary    Skin Integrity Vascular discoloration or hemochromatosis/staining   Location BLE   Skin Integrity Site 2   Skin Integrity Location 2 Ecchymosis   Location 2 BUE   Skin Integrity Site 3   Skin Integrity Location 3 Excoriation    Location 3 Abdominal fold   Wound 07/20/25 Pretibial Left   Date First Assessed/Time First Assessed: 07/20/25 2112   Present on Original Admission: Yes  Primary Wound Type: Traumatic  Secondary Wound Type - Traumatic: Blister  Location: Pretibial  Wound Location Orientation: Left   Wound Image    Wound Etiology Traumatic   Dressing Status New dressing applied   Wound Cleansed Cleansed with saline   Dressing/Treatment ABD;Ace wrap;Dry dressing;Roll gauze;Xeroform   Wound Length (cm) 2.8 cm   Wound Width (cm) 3.1 cm   Wound Depth (cm) 0.1 cm   Wound Surface Area (cm^2) 8.68 cm^2   Change in Wound Size % (l*w) 27.67   Wound Volume (cm^3) 0.868 cm^3   Wound Healing % 91   Wound Assessment Pink/red   Drainage Amount Moderate (25-50%)   Drainage Description Serosanguinous   Odor None   Edwige-wound Assessment Dry/flaky       **Informed Consent**    The patient has given verbal consent to have photos taken of wound and inserted into their chart as part of their permanent medical record for purposes of documentation, treatment management

## 2025-07-22 NOTE — PROGRESS NOTES
Dr. Perry Singh taken off patient's care team per nursing communication as there is no order for a consult.

## 2025-07-22 NOTE — PROGRESS NOTES
Physical Therapy    Physical Therapy Initial Assessment     Name: Leny Yang  : 1960  MRN: [id number]      Date of Service: 2025    Evaluating PT:  Julia Sevilla PT, DPT [id number]    Room #:  5404/5404-B  Diagnosis:  Fall at home, initial encounter [W19.XXXA, Y92.009]  PMHx/PSHx:   has a past medical history of Adenoma of right adrenal gland, Anemia, Anesthesia complication, Anxiety, Arrhythmia, Arthritis, Asthma, Benign essential tremor, Bipolar affective (HCC), Chronic back pain, Chronic respiratory failure with hypoxia (HCC), Decreased dorsalis pedis pulse, Depression, Difficulty swallowing, Environmental and seasonal allergies, Excessive physiologic tremor, Fatty liver, Full dentures, GERD (gastroesophageal reflux disease), Gout, Herniated cervical disc, History of swelling of feet, Hypertension, Leg pain, Leg swelling, Lymphedema of both lower extremities, Lymphedema of lower extremity, Mitochondrial cytopathy, MRSA (methicillin resistant Staphylococcus aureus), Muscle weakness (generalized), Nausea, Need for assistance with personal care, Neuropathy, Obesity, PETR (obstructive sleep apnea), Osteoarthritis of left knee, Pancreatic duct disruption, Pancreatitis, PONV (postoperative nausea and vomiting), Seizures (HCC), Spinal headache, Thyroid disease, and Wheelchair dependence.  Procedure/Surgery:  None this admission  Precautions:  Fall risk, alarms, NWB LUE, WBAT RLE, spinal neutrality, purewick, reduced insight  Equipment Needs:  TBD    SUBJECTIVE:    Pt lives alone in a 1 story home with a ramped entry. Pt spends most of her time in her power WC. She completes stand pivots with a FWW. Pt owns a FWW, a power WC, and a manual WC.    OBJECTIVE:   Initial Evaluation  Date: 25 Treatment Date:  Short Term/ Long Term   Goals   AM-PAC 6 Clicks      Was pt agreeable to Eval/treatment? Yes     Does pt have pain? R knee pain     Bed Mobility  Rolling: MaxA  Supine to sit: ModAx2  Sit to supine:

## 2025-07-23 ENCOUNTER — TELEPHONE (OUTPATIENT)
Age: 65
End: 2025-07-23

## 2025-08-05 DIAGNOSIS — T14.8XXA FRACTURE: Primary | ICD-10-CM

## 2025-08-05 DIAGNOSIS — M25.562 LEFT KNEE PAIN, UNSPECIFIED CHRONICITY: ICD-10-CM

## 2025-08-07 ENCOUNTER — OFFICE VISIT (OUTPATIENT)
Age: 65
End: 2025-08-07
Payer: MEDICAID

## 2025-08-07 ENCOUNTER — HOSPITAL ENCOUNTER (OUTPATIENT)
Dept: GENERAL RADIOLOGY | Age: 65
Discharge: HOME | End: 2025-08-09
Payer: MEDICAID

## 2025-08-07 VITALS — OXYGEN SATURATION: 92 % | DIASTOLIC BLOOD PRESSURE: 71 MMHG | SYSTOLIC BLOOD PRESSURE: 116 MMHG | HEART RATE: 58 BPM

## 2025-08-07 DIAGNOSIS — Z96.651 HISTORY OF REVISION OF TOTAL REPLACEMENT OF RIGHT KNEE JOINT: ICD-10-CM

## 2025-08-07 DIAGNOSIS — S43.52XA ACROMIOCLAVICULAR SPRAIN, LEFT, INITIAL ENCOUNTER: Primary | ICD-10-CM

## 2025-08-07 DIAGNOSIS — T14.8XXA FRACTURE: ICD-10-CM

## 2025-08-07 DIAGNOSIS — M25.562 LEFT KNEE PAIN, UNSPECIFIED CHRONICITY: ICD-10-CM

## 2025-08-07 PROCEDURE — 73565 X-RAY EXAM OF KNEES: CPT

## 2025-08-07 PROCEDURE — 99213 OFFICE O/P EST LOW 20 MIN: CPT | Performed by: ORTHOPAEDIC SURGERY

## 2025-08-07 PROCEDURE — 73000 X-RAY EXAM OF COLLAR BONE: CPT

## 2025-08-07 PROCEDURE — 3074F SYST BP LT 130 MM HG: CPT | Performed by: ORTHOPAEDIC SURGERY

## 2025-08-07 PROCEDURE — 99212 OFFICE O/P EST SF 10 MIN: CPT | Performed by: ORTHOPAEDIC SURGERY

## 2025-08-07 PROCEDURE — 3078F DIAST BP <80 MM HG: CPT | Performed by: ORTHOPAEDIC SURGERY

## 2025-08-22 ENCOUNTER — TELEPHONE (OUTPATIENT)
Dept: AUDIOLOGY | Age: 65
End: 2025-08-22

## 2025-08-29 ENCOUNTER — TELEPHONE (OUTPATIENT)
Age: 65
End: 2025-08-29

## (undated) DEVICE — PAD,ABDOMINAL,5"X9",ST,LF,25/BX: Brand: MEDLINE INDUSTRIES, INC.

## (undated) DEVICE — VALVE SUCTION AIR H2O HYDR H2O JET CONN STRL ORCA POD + DISP

## (undated) DEVICE — DRESSING,GAUZE,XEROFORM,CURAD,5"X9",ST: Brand: CURAD

## (undated) DEVICE — NEEDLE HYPO 18GA L1.5IN PNK POLYPR HUB S STL THN WALL FILL

## (undated) DEVICE — 3M™ IOBAN™ 2 ANTIMICROBIAL INCISE DRAPE 6650EZ: Brand: IOBAN™ 2

## (undated) DEVICE — LOWER EXT KNEE DRAPE: Brand: MEDLINE INDUSTRIES, INC.

## (undated) DEVICE — 3M™ RED DOT™ MONITORING ELECTRODE WITH FOAM TAPE AND STICKY GEL 2560, 50/BAG, 20/CASE, 72/PLT: Brand: RED DOT™

## (undated) DEVICE — SPONGE GZ W4XL4IN RAYON POLY FILL CVR W/ NONWOVEN FAB

## (undated) DEVICE — BIT DRL L180MM DIA2.5MM QUIK CPL NONRADIOPAQUE W/O STP

## (undated) DEVICE — BANDAGE COMPR W4INXL10YD WHITE/BEIGE E MTRX HK LOOP CLSR

## (undated) DEVICE — BLOCK BITE 60FR RUBBER ADLT DENTAL

## (undated) DEVICE — DRILL SYSTEM 7

## (undated) DEVICE — SET ORTHO STD STORTSTD1

## (undated) DEVICE — SOLUTION IRRIG 3000ML 0.9% SOD CHL USP UROMATIC PLAS CONT

## (undated) DEVICE — SET ORTHO STD STORTSTD2

## (undated) DEVICE — DRAPE C ARM W41XL65IN UNIV W/ CLP AND RUBBERBAND

## (undated) DEVICE — SYSTEM BX CAP BILI RAP EXCHG CAP LOK DEV COMPATIBLE W/ OLY

## (undated) DEVICE — WIPES SKIN CLOTH READYPREP 9 X 10.5 IN 2% CHLORHEX GLUCONATE CHG PREOP

## (undated) DEVICE — GLOVE SURG SZ 65 THK91MIL LTX FREE SYN POLYISOPRENE

## (undated) DEVICE — PACK SURG LAP CHOLE CUSTOM

## (undated) DEVICE — SNARE ENDOSCP L240CM LOOP W13MM SHTH DIA2.4MM SM OVL FLX

## (undated) DEVICE — Z DISCONTINUED APPLICATOR SURG PREP 0.35OZ 2% CHG 70% ISO ALC W/ HI LT

## (undated) DEVICE — ELECTRODE PT RET AD L9FT HI MOIST COND ADH HYDRGEL CORDED

## (undated) DEVICE — SWAB SPEC COLL SHFT L5.25IN POLYUR FOAM TIP SFT DBL MEDIA

## (undated) DEVICE — CLAMP EXT FIX L TI ALLY COMB CLP ON SELF HLD

## (undated) DEVICE — GLOVE ORANGE PI 7 1/2   MSG9075

## (undated) DEVICE — SPHINCTEROTOME: Brand: HYDRATOME RX 44

## (undated) DEVICE — ROD EXT FIX L350MM DIA11MM C FBR MR CONDITIONAL

## (undated) DEVICE — PADDING,UNDERCAST,COTTON, 4"X4YD STERILE: Brand: MEDLINE

## (undated) DEVICE — TRAP,MUCUS SPECIMEN,40CC: Brand: MEDLINE

## (undated) DEVICE — GOWN,SIRUS,POLYRNF,BRTHSLV,XLN/XXL,18/CS: Brand: MEDLINE

## (undated) DEVICE — [HIGH FLOW INSUFFLATOR,  DO NOT USE IF PACKAGE IS DAMAGED,  KEEP DRY,  KEEP AWAY FROM SUNLIGHT,  PROTECT FROM HEAT AND RADIOACTIVE SOURCES.]: Brand: PNEUMOSURE

## (undated) DEVICE — THIN OFFSET (13.3 X 0.38 X 42.0MM)

## (undated) DEVICE — TROCAR: Brand: KII FIOS FIRST ENTRY

## (undated) DEVICE — SYRINGE 20ML LL S/C 50

## (undated) DEVICE — CONTAINER SPEC ANAERB VACTNR

## (undated) DEVICE — GLOVE ORTHO 8   MSG9480

## (undated) DEVICE — GRADUATE TRIANG MEASURE 1000ML BLK PRNT

## (undated) DEVICE — GAUZE,SPONGE,4"X4",12PLY,STERILE,LF,2'S: Brand: MEDLINE

## (undated) DEVICE — 6 ML SYRINGE LUER-LOCK TIP: Brand: MONOJECT

## (undated) DEVICE — BIT DRL L110MM DIA2.5MM G QUIK CPL W/O STP REUSE

## (undated) DEVICE — MEDIA CONTRAST ISOVUE 250 100ML

## (undated) DEVICE — BLOCK BITE 60FR CAREGUARD

## (undated) DEVICE — MARKER,SKIN,WI/RULER AND LABELS: Brand: MEDLINE

## (undated) DEVICE — AIR/WATER CLEANING ADAPTER FOR OLYMPUS® GI ENDOSCOPE: Brand: BULLDOG®

## (undated) DEVICE — CONTAINER SPEC COLL 960ML POLYPR TRIANG GRAD INTAKE/OUTPUT

## (undated) DEVICE — CLAMP EXT FIX DIA8/11MM COMB CLP ON SELF HLD

## (undated) DEVICE — PIN FIX L225MM DIA6MM S STL FOR L EXT FIX SET

## (undated) DEVICE — GLOVE ORANGE PI 8   MSG9080

## (undated) DEVICE — ROD EXT FIX L150MM DIA11MM C FBR MR CONDITIONAL

## (undated) DEVICE — BNDG,ELSTC,MATRIX,STRL,6"X5YD,LF,HOOK&LP: Brand: MEDLINE

## (undated) DEVICE — KIT BEDSIDE REVITAL OX 500ML

## (undated) DEVICE — SYRINGE MED 10ML LUERLOCK TIP W/O SFTY DISP

## (undated) DEVICE — PRECISION THIN (9.0 X 0.38 X 31.0MM)

## (undated) DEVICE — SPONGE GZ W4XL4IN RAYON POLY CVR W/NONWOVEN FAB STRL 2/PK

## (undated) DEVICE — APPLICATOR MEDICATED 26 CC SOLUTION HI LT ORNG CHLORAPREP

## (undated) DEVICE — SYRINGE MED 10ML TRNSLUC BRL PLUNG BLK MRK POLYPR CTRL

## (undated) DEVICE — GAUZE,SPONGE,AVANT,4"X4",4PLY,STRL,10/TR: Brand: MEDLINE

## (undated) DEVICE — INSUFFLATION NEEDLE TO ESTABLISH PNEUMOPERITONEUM.: Brand: INSUFFLATION NEEDLE

## (undated) DEVICE — Device: Brand: PORTEX

## (undated) DEVICE — SCREW EXT FIX L175MM DIA5MM THRD L60MM S STL SELF DRL SCHNZ

## (undated) DEVICE — 12 ML SYRINGE,LUER-LOCK TIP: Brand: MONOJECT

## (undated) DEVICE — BANDAGE ADH W1XL3IN NAT FAB WVN FLX DURABLE N ADH PD SEAL

## (undated) DEVICE — CONTAINER VACUTAINER ANAER CULTURE SWAB

## (undated) DEVICE — CLAMP EXT FIX L PIN 6 POS MAG RESONANCE CONDITIONAL

## (undated) DEVICE — GOWN,SIRUS,NONRNF,SETINSLV,XL,20/CS: Brand: MEDLINE

## (undated) DEVICE — TOWEL,OR,DSP,ST,BLUE,STD,6/PK,12PK/CS: Brand: MEDLINE

## (undated) DEVICE — NON-DEHP CATHETER EXTENSION SET, MALE LUER LOCK ADAPTER

## (undated) DEVICE — BNDG,ELSTC,MATRIX,STRL,3"X5YD,LF,HOOK&LP: Brand: MEDLINE

## (undated) DEVICE — GOWN,BREATHABLE SLV,AURORA,XLG,STRL: Brand: MEDLINE

## (undated) DEVICE — TUBING SUCT 12FR MAL ALUM SHFT FN CAP VENT UNIV CONN W/ OBT

## (undated) DEVICE — STAPLER SKIN L440MM 32MM LNG 12 FIRING B FRM PWR + GRIPPING

## (undated) DEVICE — SINGLE USE DISTAL COVER MAJ-2315: Brand: SINGLE USE DISTAL COVER

## (undated) DEVICE — GOWN,AURORA,BRTHSLV,2XL,18/CS: Brand: MEDLINE

## (undated) DEVICE — TRAP POLYP ETRAP

## (undated) DEVICE — Device

## (undated) DEVICE — GAMMEX® NON-LATEX PI ORTHO SIZE 8, STERILE POLYISOPRENE POWDER-FREE SURGICAL GLOVE: Brand: GAMMEX

## (undated) DEVICE — Z INACTIVE USE 2855096 SPONGE GZ W4XL4IN 8 PLY 100% COT

## (undated) DEVICE — SWAB CULTERETTE EZ POLYURE DUAL FOAM TIP

## (undated) DEVICE — HANDPIECE SET WITH BONE CLEANING TIP AND SUCTION TUBE: Brand: INTERPULSE

## (undated) DEVICE — NEEDLE HYPO 25GA L1.5IN BLU POLYPR HUB S STL REG BVL STR

## (undated) DEVICE — BIT DRL L200MM DIA2.8MM CALIB L100MM FOR 3.5MM VA LCP PROX

## (undated) DEVICE — SYRINGE, LUER LOCK, 5ML: Brand: MEDLINE

## (undated) DEVICE — SAGITTAL CMD II (14.0 X 0.38 X 25.5MM)

## (undated) DEVICE — LIQUIBAND RAPID ADHESIVE 36/CS 0.8ML: Brand: MEDLINE

## (undated) DEVICE — DRESSING HYDROFIBER AQUACEL AG ADVANTAGE 3.5X10 IN

## (undated) DEVICE — DRESSING HYDROFIBER AQUACEL AG ADVANTAGE 3.5X12 IN

## (undated) DEVICE — 4-PORT MANIFOLD: Brand: NEPTUNE 2

## (undated) DEVICE — PADDING CAST W6INXL4YD COT LO LINTING WYTEX

## (undated) DEVICE — SPONGE GZ 4IN 4IN 4 PLY N WVN AVANT

## (undated) DEVICE — TROCAR: Brand: KII SLEEVE

## (undated) DEVICE — GOWN ISOLATN XL BLU POLYPR OVR HD OPN BK KNIT CUF PROTCT

## (undated) DEVICE — 450 ML BOTTLE OF 0.05% CHLORHEXIDINE GLUCONATE IN 99.95% STERILE WATER FOR IRRIGATION, USP AND APPLICATOR.: Brand: IRRISEPT ANTIMICROBIAL WOUND LAVAGE

## (undated) DEVICE — SPONGE LAP W18XL18IN WHT COT 4 PLY FLD STRUNG RADPQ DISP ST 2 PER PACK

## (undated) DEVICE — STAPLER INT L28CM 60MM 12 FIRING B FRM PWD + ECHELON FLX

## (undated) DEVICE — COVER,LIGHT HANDLE,FLX,1/PK: Brand: MEDLINE INDUSTRIES, INC.

## (undated) DEVICE — GLOVE SURG SZ 8 L12IN FNGR THK79MIL GRN LTX FREE

## (undated) DEVICE — FORCEPS BX OVL CUP FEN DISPOSABLE CAP L 160CM RAD JAW 4

## (undated) DEVICE — POST EXT FIX DIA11MM 30DEG OUTRIG MAG RESONANCE CONDITIONAL

## (undated) DEVICE — SCREW EXT FIX L80MM DIA4MM THRD DIA3MM S STL SELF DRL BLNT

## (undated) DEVICE — SHEET,DRAPE,40X58,STERILE: Brand: MEDLINE

## (undated) DEVICE — GAUZE,SPONGE,4"X4",8PLY,STRL,LF,10/TRAY: Brand: MEDLINE

## (undated) DEVICE — TROCARS: Brand: KII® OPTICAL ACCESS SYSTEM

## (undated) DEVICE — BASIC DOUBLE BASIN 2-LF: Brand: MEDLINE INDUSTRIES, INC.

## (undated) DEVICE — SYSTEM INJ BILI RAP REFIL CONT

## (undated) DEVICE — Z INACTIVE USE 2855131 SPONGE GZ W4XL4IN RAYON POLY FILL CVR W/ NONWOVEN FAB

## (undated) DEVICE — GARMENT,MEDLINE,DVT,INT,CALF,MED, GEN2: Brand: MEDLINE

## (undated) DEVICE — TUBING, SUCTION, 1/4" X 10', STRAIGHT: Brand: MEDLINE

## (undated) DEVICE — PRECISION THIN, OFFSET (5.5 X 0.38 X 25.0MM)